# Patient Record
Sex: FEMALE | Race: ASIAN | NOT HISPANIC OR LATINO | Employment: OTHER | URBAN - METROPOLITAN AREA
[De-identification: names, ages, dates, MRNs, and addresses within clinical notes are randomized per-mention and may not be internally consistent; named-entity substitution may affect disease eponyms.]

---

## 2011-09-16 LAB — HM COLONOSCOPY: NEGATIVE

## 2017-01-02 RX ORDER — PRAVASTATIN SODIUM 20 MG/1
TABLET ORAL
Qty: 90 TABLET | Refills: 1 | Status: SHIPPED | OUTPATIENT
Start: 2017-01-02 | End: 2017-07-02 | Stop reason: SDUPTHER

## 2017-01-17 DIAGNOSIS — E11.69 COMBINED HYPERLIPIDEMIA ASSOCIATED WITH TYPE 2 DIABETES MELLITUS: ICD-10-CM

## 2017-01-17 DIAGNOSIS — E78.2 COMBINED HYPERLIPIDEMIA ASSOCIATED WITH TYPE 2 DIABETES MELLITUS: ICD-10-CM

## 2017-01-17 RX ORDER — METFORMIN HYDROCHLORIDE 500 MG/1
TABLET ORAL
Qty: 270 TABLET | Refills: 1 | Status: SHIPPED | OUTPATIENT
Start: 2017-01-17 | End: 2017-09-03 | Stop reason: SDUPTHER

## 2017-02-02 RX ORDER — OMEGA-3-ACID ETHYL ESTERS 1 G/1
2 CAPSULE, LIQUID FILLED ORAL 2 TIMES DAILY
Qty: 360 CAPSULE | Refills: 2 | Status: SHIPPED | OUTPATIENT
Start: 2017-02-02 | End: 2017-03-13

## 2017-02-02 NOTE — TELEPHONE ENCOUNTER
----- Message from Zoey Sanderson sent at 2/1/2017  3:37 PM CST -----  Contact: Self/[phone]  Refill:  omega-3 acid ethyl esters (LOVAZA) 1 gram capsule    Thank you.

## 2017-02-24 DIAGNOSIS — E11.9 TYPE 2 DIABETES MELLITUS WITHOUT COMPLICATION: ICD-10-CM

## 2017-03-13 ENCOUNTER — OFFICE VISIT (OUTPATIENT)
Dept: FAMILY MEDICINE | Facility: CLINIC | Age: 72
End: 2017-03-13
Payer: MEDICARE

## 2017-03-13 VITALS
RESPIRATION RATE: 16 BRPM | TEMPERATURE: 98 F | WEIGHT: 110.44 LBS | BODY MASS INDEX: 18.4 KG/M2 | HEART RATE: 60 BPM | DIASTOLIC BLOOD PRESSURE: 66 MMHG | SYSTOLIC BLOOD PRESSURE: 138 MMHG | OXYGEN SATURATION: 97 % | HEIGHT: 65 IN

## 2017-03-13 DIAGNOSIS — Z00.00 ROUTINE MEDICAL EXAM: Primary | ICD-10-CM

## 2017-03-13 DIAGNOSIS — I48.20 CHRONIC ATRIAL FIBRILLATION: Chronic | ICD-10-CM

## 2017-03-13 DIAGNOSIS — E11.29 TYPE 2 DIABETES MELLITUS WITH MICROALBUMINURIA, WITHOUT LONG-TERM CURRENT USE OF INSULIN: ICD-10-CM

## 2017-03-13 DIAGNOSIS — I70.0 ATHEROSCLEROSIS OF AORTA: Chronic | ICD-10-CM

## 2017-03-13 DIAGNOSIS — E03.9 ACQUIRED HYPOTHYROIDISM: Chronic | ICD-10-CM

## 2017-03-13 DIAGNOSIS — R80.9 TYPE 2 DIABETES MELLITUS WITH MICROALBUMINURIA, WITHOUT LONG-TERM CURRENT USE OF INSULIN: ICD-10-CM

## 2017-03-13 DIAGNOSIS — E11.3299 TYPE 2 DIABETES MELLITUS WITH MILD NONPROLIFERATIVE RETINOPATHY WITHOUT MACULAR EDEMA, WITHOUT LONG-TERM CURRENT USE OF INSULIN, UNSPECIFIED LATERALITY: ICD-10-CM

## 2017-03-13 DIAGNOSIS — E78.2 MIXED HYPERLIPIDEMIA: Chronic | ICD-10-CM

## 2017-03-13 DIAGNOSIS — I09.9 RHEUMATIC HEART DISEASE: Chronic | ICD-10-CM

## 2017-03-13 DIAGNOSIS — I10 ESSENTIAL (PRIMARY) HYPERTENSION: ICD-10-CM

## 2017-03-13 PROCEDURE — 3075F SYST BP GE 130 - 139MM HG: CPT | Mod: S$GLB,,, | Performed by: INTERNAL MEDICINE

## 2017-03-13 PROCEDURE — 99499 UNLISTED E&M SERVICE: CPT | Mod: S$GLB,,, | Performed by: INTERNAL MEDICINE

## 2017-03-13 PROCEDURE — 99397 PER PM REEVAL EST PAT 65+ YR: CPT | Mod: S$GLB,,, | Performed by: INTERNAL MEDICINE

## 2017-03-13 PROCEDURE — 3078F DIAST BP <80 MM HG: CPT | Mod: S$GLB,,, | Performed by: INTERNAL MEDICINE

## 2017-03-13 PROCEDURE — 99999 PR PBB SHADOW E&M-EST. PATIENT-LVL III: CPT | Mod: PBBFAC,,, | Performed by: INTERNAL MEDICINE

## 2017-03-13 RX ORDER — AMLODIPINE BESYLATE 5 MG/1
5 TABLET ORAL DAILY
Qty: 90 TABLET | Refills: 3 | Status: SHIPPED | OUTPATIENT
Start: 2017-03-13 | End: 2018-08-31 | Stop reason: SDUPTHER

## 2017-03-13 RX ORDER — LOSARTAN POTASSIUM 100 MG/1
100 TABLET ORAL DAILY
Qty: 90 TABLET | Refills: 3 | Status: SHIPPED | OUTPATIENT
Start: 2017-03-13 | End: 2018-04-19 | Stop reason: ALTCHOICE

## 2017-03-13 RX ORDER — METOPROLOL TARTRATE 25 MG/1
TABLET, FILM COATED ORAL
Qty: 180 TABLET | Refills: 3 | Status: SHIPPED | OUTPATIENT
Start: 2017-03-13 | End: 2018-06-08 | Stop reason: SDUPTHER

## 2017-03-13 RX ORDER — BLOOD-GLUCOSE CONTROL, NORMAL
EACH MISCELLANEOUS
COMMUNITY
Start: 2017-02-16 | End: 2021-01-01 | Stop reason: SDUPTHER

## 2017-03-13 RX ORDER — BLOOD GLUCOSE CONTROL HIGH,LOW
EACH MISCELLANEOUS
COMMUNITY
Start: 2016-12-29 | End: 2020-04-02 | Stop reason: SDUPTHER

## 2017-03-13 NOTE — ASSESSMENT & PLAN NOTE
Recheck labs.  On 1500mg/day.  Counseled on goal of A1c for her age and comorbidities.  The current medical regimen is effective;  continue present plan and medications.

## 2017-03-13 NOTE — PROGRESS NOTES
Patient, Orion Ty (MRN #[id number]), presented with a recent Platelet count less than 150 K/uL consistent with the definition of thrombocytopenia (ICD10 - D69.6).    Platelets   Date Value Ref Range Status   05/27/2016 98 (L) 150 - 350 K/uL Final     The patient's thrombocytopenia was monitored, evaluated, addressed and/or treated. This addendum to the medical record is made on 03/13/2017.

## 2017-03-13 NOTE — PROGRESS NOTES
Chief Complaint  Chief Complaint   Patient presents with    Annual Exam       HPI  Orion Ty is a 71 y.o. female with medical diagnoses as listed in the medical history and problem list that presents for routine physical.  Pt is known to me with her last appointment June 2016.  No acute complaints.  She is requesting to have her  translate when needed.       PAST MEDICAL HISTORY:  Past Medical History:   Diagnosis Date    Closed displaced fracture of distal phalanx of lesser toe 10/20/2015    Diabetes mellitus     Diabetes mellitus type I     Hypertension     Osteopenia     Pacemaker     Rheumatic heart disease        PAST SURGICAL HISTORY:  Past Surgical History:   Procedure Laterality Date    CARDIAC PACEMAKER PLACEMENT      CARDIAC VALVE REPLACEMENT      CARDIAC VALVE SURGERY      thryoid s         SOCIAL HISTORY:  Social History     Social History    Marital status:      Spouse name: N/A    Number of children: N/A    Years of education: N/A     Occupational History    Not on file.     Social History Main Topics    Smoking status: Never Smoker    Smokeless tobacco: Not on file    Alcohol use No    Drug use: Not on file    Sexual activity: Not on file     Other Topics Concern    Not on file     Social History Narrative       FAMILY HISTORY:  Family History   Problem Relation Age of Onset    Breast cancer Neg Hx     Colon cancer Neg Hx     Ovarian cancer Neg Hx        ALLERGIES AND MEDICATIONS: updated and reviewed.  Review of patient's allergies indicates:  No Known Allergies  Current Outpatient Prescriptions   Medication Sig Dispense Refill    ACCU-CHEK GATO CONTROL SOLN Soln       amlodipine (NORVASC) 5 MG tablet Take 1 tablet (5 mg total) by mouth once daily. 90 tablet 3    digoxin (DIGOX) 250 mcg tablet Take 1 tablet (0.25 mg total) by mouth once daily. 90 tablet 3    furosemide (LASIX) 40 MG tablet Take 1 tablet (40 mg total) by mouth daily as needed (leg  swelling). 30 tablet 11    lancets 30 gauge Misc       losartan (COZAAR) 100 MG tablet Take 1 tablet (100 mg total) by mouth once daily. 90 tablet 3    metformin (GLUCOPHAGE) 500 MG tablet TAKE 2 TABLETS BY MOUTH EVERY MORNING AND 1 TABLET BY MOUTH EVERY EVENING 270 tablet 1    metoprolol tartrate (LOPRESSOR) 25 MG tablet TAKE 1 TABLET(25 MG) BY MOUTH TWICE DAILY 180 tablet 3    pravastatin (PRAVACHOL) 20 MG tablet TAKE 1 TABLET BY MOUTH ONCE DAILY 90 tablet 1    TRUETEST TEST STRIPS Strp USE ONCE DAILY (Patient taking differently: twice daily) 100 strip 6    warfarin (COUMADIN) 4 MG tablet TAKE 1 TABLET BY MOUTH ON MONDAY AND FRIDAY AND ONE-HALF TABLET BY MOUTH ON ALL OTHER DAYS 57 tablet 3     No current facility-administered medications for this visit.          ROS  Review of Systems   Constitutional: Negative for chills, fever and unexpected weight change.   HENT: Negative for congestion, ear pain, hearing loss, rhinorrhea, sore throat and trouble swallowing.    Eyes: Negative for discharge, redness and visual disturbance.   Respiratory: Negative for cough, chest tightness, shortness of breath and wheezing.    Cardiovascular: Negative for chest pain, palpitations and leg swelling.   Gastrointestinal: Negative for abdominal pain, constipation, diarrhea, nausea and vomiting.   Endocrine: Negative for polydipsia, polyphagia and polyuria.   Genitourinary: Negative for decreased urine volume, dysuria and hematuria.   Musculoskeletal: Negative for arthralgias, joint swelling and myalgias.   Skin: Negative for color change and rash.   Neurological: Negative for dizziness, weakness, light-headedness and headaches.   Psychiatric/Behavioral: Negative for decreased concentration, dysphoric mood, sleep disturbance and suicidal ideas.           Physical Exam  Vitals:    03/13/17 1306   BP: 138/66   BP Location: Left arm   Patient Position: Sitting   BP Method: Manual   Pulse: 60   Resp: 16   Temp: 98 °F (36.7 °C)  "  [facility]   SpO2: 97%   Weight: 50.1 kg (110 lb 7.2 oz)   Height: 5' 5" (1.651 m)    Body mass index is 18.38 kg/(m^2).  Weight: 50.1 kg (110 lb 7.2 oz)   Height: 5' 5" (165.1 cm)   General appearance: alert, appears stated age, cooperative and no distress  Head: Normocephalic, without obvious abnormality, atraumatic  Eyes: PERRL EOMI conjunctiva clear  Ears: normal TM's and external ear canals both ears  Nose: Nares normal. Septum midline. Mucosa normal. No drainage or sinus tenderness.  Throat: lips, mucosa, and tongue normal; teeth and gums normal  Neck: no adenopathy, no carotid bruit, no JVD, supple, symmetrical, trachea midline and thyroid not enlarged, symmetric, no tenderness/mass/nodules  Back: symmetric, no curvature. ROM normal. No CVA tenderness.  Lungs: clear to auscultation bilaterally  Heart: regular rate and rhythm, no S3 or S4, systolic murmur: systolic ejection 3/6, harsh throughout the precordium, no click and no rub  Abdomen: soft, non-tender; bowel sounds normal; no masses,  no organomegaly  Extremities: extremities normal, atraumatic, no cyanosis or edema  Pulses: 2+ and symmetric  Neurologic: Mental status: Alert, oriented, thought content appropriate  Sensory: normal  Motor:grossly normal  Coordination: normal  Gait: Normal  Symmetric facial movements palate elevated symmetrically tongue midline     Health Maintenance       Date Due Completion Date    Pneumococcal (65+) (1 of 2 - PCV13) 7/21/2009 ---    Colonoscopy 1/1/2016 1/1/2006 (Done)    Override on 1/1/2006: Done (repeat in 10 years)    Hemoglobin A1c 11/30/2016 5/30/2016    Pap Smear 1/13/2017 1/13/2016    Lipid Panel 2/17/2017 2/17/2016    Mammogram 1/20/2017 1/20/2016    Foot Exam 5/30/2017 5/30/2016    Eye Exam 3/8/2018 3/8/2017 (Done)    Override on 3/8/2017: Done (Dr. Richmond Andrea/Optometry-positive for non-proliferative diabetic retinopathy)    DEXA SCAN 2/1/2019 2/1/2016    Override on 8/4/2014: Done    TETANUS VACCINE " 6/30/2026 6/30/2016 (Declined)    Override on 6/30/2016: Declined            Assessment & Plan  Problem List Items Addressed This Visit        Cardiac    Atrial fibrillation (Chronic)    Overview     Followed by Dr. Boateng         Current Assessment & Plan     Regular rhythm today.  Monitor         Relevant Medications    metoprolol tartrate (LOPRESSOR) 25 MG tablet    Other Relevant Orders    CBC auto differential    Rheumatic heart disease (Chronic)    Current Assessment & Plan     Stable, asymptomatic chronic condition.  Will continue to maximize risk factor reduction and adjust medication as needed.          Atherosclerosis of aorta (Chronic)    Overview     Seen on Echo x2  2014         Current Assessment & Plan     Stable, asymptomatic chronic condition.  Will continue to maximize risk factor reduction and adjust medication as needed.          Essential (primary) hypertension (Chronic)    Current Assessment & Plan     The current medical regimen is effective;  continue present plan and medications.          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  On 1500mg/day.  Counseled on goal of A1c for her age and comorbidities.  The current medical regimen is effective;  continue present plan and medications.          Relevant Orders    Hemoglobin A1c       Endocrine    Hypothyroidism (Chronic)    Current Assessment & Plan     Recheck labs.  Continue replacement.          Relevant Orders    TSH    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    Current Assessment & Plan     See above            Fluids/Electrolytes/Nutrition/GI    Mixed hyperlipidemia (Chronic)    Current Assessment & Plan     Recheck labs.  Continue statin.          Relevant Orders    Lipid panel    Comprehensive metabolic  panel      Other Visit Diagnoses     Routine medical exam    -  Primary            Health Maintenance reviewed, [id number] from pharmacy due to insurance coverage.    Follow-up: Return in about 6 months (around 9/13/2017) for follow up for chronic conditions.

## 2017-03-13 NOTE — MR AVS SNAPSHOT
[facility]  [address]  [place] [address]  Phone: [phone]  Fax: [phone]                  Orion Ty   3/13/2017 1:00 PM   Office Visit    Description:  Female : 1945   Provider:  Otis Zimmer MD   Department:  [facility]           Reason for Visit     Annual Exam           Diagnoses this Visit        Comments    Routine medical exam    -  Primary     Acquired hypothyroidism         Type 2 diabetes mellitus with microalbuminuria, without long-term current use of insulin         Mixed hyperlipidemia         Chronic atrial fibrillation         Atherosclerosis of aorta         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 without macular edema, without long-term current use of insulin, unspecified laterality         Combined hyperlipidemia associated with type 2 diabetes mellitus         Need for vaccination for pneumococcus         Essential (primary) hypertension                To Do List           Future Appointments        Provider Department Dept Phone    3/14/2017 11:30 AM [facility] [phone]      Goals (5 Years of Data)     None      Follow-Up and Disposition     Return in about 6 months (around 2017) for follow up for chronic conditions.       These Medications        Disp Refills Start End    losartan (COZAAR) 100 MG tablet 90 tablet 3 3/13/2017     Take 1 tablet (100 mg total) by mouth once daily. - Oral    Pharmacy: [facility] [address] [place] [address] [address] [place] [address] AT [facility] #: [phone]       amlodipine (NORVASC) 5 MG tablet 90 tablet 3 3/13/2017     Take 1 tablet (5 mg total) by mouth once daily. - Oral    Pharmacy: [facility] [address]  [facility] AT [facility] #: [phone]       Notes to Pharmacy: **Patient requests 90 days supply**    metoprolol tartrate (LOPRESSOR) 25 MG tablet 180 tablet 3  3/13/2017     TAKE 1 TABLET(25 MG) BY MOUTH TWICE DAILY    Pharmacy: [facility] [id number]  [address] [facility] AT [facility] #: [phone]       Notes to Pharmacy: **Patient requests 90 days supply**      [facility]     [facility] Care [facility] - 24/7 Assistance  Registered nurses in the [facility] Call Center provide clinical advisement, health education, appointment booking, and other advisory services.  Call for this free service at [phone].             Medications           Message regarding Medications     Verify the changes and/or additions to your medication regime listed below are the same as discussed with your clinician today.  If any of these changes or additions are incorrect, please notify your healthcare provider.        CHANGE how you are taking these medications     Start Taking Instead of    losartan (COZAAR) 100 MG tablet losartan (COZAAR) 50 MG tablet    Dosage:  Take 1 tablet (100 mg total) by mouth once daily. Dosage:  Take 1 tablet (50 mg total) by mouth once daily.    Reason for Change:  Reorder     amlodipine (NORVASC) 5 MG tablet amlodipine (NORVASC) 5 MG tablet    Dosage:  Take 1 tablet (5 mg total) by mouth once daily. Dosage:  TAKE 1 TABLET BY MOUTH DAILY    Reason for Change:  Reorder       STOP taking these medications     omega-3 acid ethyl esters (LOVAZA) 1 gram capsule Take 2 capsules (2 g total) by mouth 2 (two) times daily.           Verify that the below list of medications is an accurate representation of the medications you are currently taking.  If none reported, the list may be blank. If incorrect, please contact your healthcare provider. Carry this list with you in case of emergency.           Current Medications     ACCU-CHEK GATO CONTROL SOLN Soln     amlodipine (NORVASC) 5 MG tablet Take 1 tablet (5 mg total) by mouth once daily.    digoxin (DIGOX) 250 mcg tablet Take 1 tablet (0.25 mg total) by mouth once daily.     "furosemide (LASIX) 40 MG tablet Take 1 tablet (40 mg total) by mouth daily as needed (leg swelling).    lancets 30 gauge Misc     losartan (COZAAR) 100 MG tablet Take 1 tablet (100 mg total) by mouth once daily.    metformin (GLUCOPHAGE) 500 MG tablet TAKE 2 TABLETS BY MOUTH EVERY MORNING AND 1 TABLET BY MOUTH EVERY EVENING    metoprolol tartrate (LOPRESSOR) 25 MG tablet TAKE 1 TABLET(25 MG) BY MOUTH TWICE DAILY    pravastatin (PRAVACHOL) 20 MG tablet TAKE 1 TABLET BY MOUTH ONCE DAILY    TRUETEST TEST STRIPS Strp USE ONCE DAILY    warfarin (COUMADIN) 4 MG tablet TAKE 1 TABLET BY MOUTH ON MONDAY AND FRIDAY AND ONE-HALF TABLET BY MOUTH ON ALL OTHER DAYS           Clinical Reference Information           Your Vitals Were     BP Pulse Temp Resp Height Weight    138/66 (BP Location: Left arm, Patient Position: Sitting, BP Method: Manual) 60 98 °F (36.7 °C) (Oral) 16 5' 5" (1.651 m) 50.1 kg (110 lb 7.2 oz)    SpO2 BMI             97% 18.38 kg/m2         Blood Pressure          Most Recent Value    BP  138/66      Allergies as of 3/13/2017     No Known Allergies      Immunizations Administered on Date of Encounter - 3/13/2017     None      Orders Placed During Today's Visit     Future Labs/Procedures Expected by Expires    CBC auto differential  3/13/2017 3/13/2018    Comprehensive metabolic panel  3/13/2017 3/13/2018    Hemoglobin A1c  3/13/2017 5/12/2018    Lipid panel  3/13/2017 3/13/2018    TSH  3/13/2017 3/13/2018      Instructions    Check about the colonoscopy.      It appears that you need the Prevnar 13 vaccine       Language Assistance Services     ATTENTION: Language assistance services are available, free of charge. Please call [phone].      ATENCIÓN: Si habla markañol, tiene a vaca disposición servicios gratuitos de asistencia lingüística. Llame al [phone].     CHÚ Ý: N?u b?n nói Ti?ng Vi?t, có các d?ch v? h? tr? ngôn ng? mi?n phí dành cho b?n. G?i s? [phone].         [facility] complies with applicable Federal civil rights laws and does not discriminate on the basis of race, color, national origin, age, disability, or sex.

## 2017-03-13 NOTE — ASSESSMENT & PLAN NOTE
Stable, asymptomatic chronic condition.  Will continue to maximize risk factor reduction and adjust medication as needed.

## 2017-03-14 ENCOUNTER — LAB VISIT (OUTPATIENT)
Dept: LAB | Facility: HOSPITAL | Age: 72
End: 2017-03-14
Attending: INTERNAL MEDICINE
Payer: MEDICARE

## 2017-03-14 DIAGNOSIS — E11.29 TYPE 2 DIABETES MELLITUS WITH MICROALBUMINURIA, WITHOUT LONG-TERM CURRENT USE OF INSULIN: ICD-10-CM

## 2017-03-14 DIAGNOSIS — I48.20 CHRONIC ATRIAL FIBRILLATION: Chronic | ICD-10-CM

## 2017-03-14 DIAGNOSIS — E03.9 ACQUIRED HYPOTHYROIDISM: Chronic | ICD-10-CM

## 2017-03-14 DIAGNOSIS — E78.2 MIXED HYPERLIPIDEMIA: Chronic | ICD-10-CM

## 2017-03-14 DIAGNOSIS — R80.9 TYPE 2 DIABETES MELLITUS WITH MICROALBUMINURIA, WITHOUT LONG-TERM CURRENT USE OF INSULIN: ICD-10-CM

## 2017-03-14 DIAGNOSIS — E11.9 TYPE 2 DIABETES MELLITUS WITHOUT COMPLICATION: ICD-10-CM

## 2017-03-14 LAB
ALBUMIN SERPL BCP-MCNC: 4.2 G/DL
ALP SERPL-CCNC: 44 U/L
ALT SERPL W/O P-5'-P-CCNC: 23 U/L
ANION GAP SERPL CALC-SCNC: 9 MMOL/L
AST SERPL-CCNC: 45 U/L
BASOPHILS # BLD AUTO: 0.05 K/UL
BASOPHILS NFR BLD: 0.7 %
BILIRUB SERPL-MCNC: 0.7 MG/DL
BUN SERPL-MCNC: 12 MG/DL
CALCIUM SERPL-MCNC: 10 MG/DL
CHLORIDE SERPL-SCNC: 104 MMOL/L
CHOLEST/HDLC SERPL: 4.6 {RATIO}
CHOLEST/HDLC SERPL: 4.6 {RATIO}
CO2 SERPL-SCNC: 27 MMOL/L
CREAT SERPL-MCNC: 0.8 MG/DL
DIFFERENTIAL METHOD: ABNORMAL
EOSINOPHIL # BLD AUTO: 0.1 K/UL
EOSINOPHIL NFR BLD: 1.9 %
ERYTHROCYTE [DISTWIDTH] IN BLOOD BY AUTOMATED COUNT: 14.6 %
EST. GFR  (AFRICAN AMERICAN): >60 ML/MIN/1.73 M^2
EST. GFR  (NON AFRICAN AMERICAN): >60 ML/MIN/1.73 M^2
GLUCOSE SERPL-MCNC: 149 MG/DL
HCT VFR BLD AUTO: 38.6 %
HDL/CHOLESTEROL RATIO: 21.6 %
HDL/CHOLESTEROL RATIO: 21.6 %
HDLC SERPL-MCNC: 139 MG/DL
HDLC SERPL-MCNC: 139 MG/DL
HDLC SERPL-MCNC: 30 MG/DL
HDLC SERPL-MCNC: 30 MG/DL
HGB BLD-MCNC: 12.2 G/DL
LDLC SERPL CALC-MCNC: 55.2 MG/DL
LDLC SERPL CALC-MCNC: 55.2 MG/DL
LYMPHOCYTES # BLD AUTO: 2.2 K/UL
LYMPHOCYTES NFR BLD: 32.2 %
MCH RBC QN AUTO: 30.3 PG
MCHC RBC AUTO-ENTMCNC: 31.6 %
MCV RBC AUTO: 96 FL
MONOCYTES # BLD AUTO: 0.4 K/UL
MONOCYTES NFR BLD: 5.3 %
NEUTROPHILS # BLD AUTO: 4.1 K/UL
NEUTROPHILS NFR BLD: 59.8 %
NONHDLC SERPL-MCNC: 109 MG/DL
NONHDLC SERPL-MCNC: 109 MG/DL
PLATELET # BLD AUTO: 91 K/UL
PMV BLD AUTO: 11.6 FL
POTASSIUM SERPL-SCNC: 4.5 MMOL/L
PROT SERPL-MCNC: 7.3 G/DL
RBC # BLD AUTO: 4.03 M/UL
SODIUM SERPL-SCNC: 140 MMOL/L
T4 FREE SERPL-MCNC: 1.03 NG/DL
TRIGL SERPL-MCNC: 269 MG/DL
TRIGL SERPL-MCNC: 269 MG/DL
TSH SERPL DL<=0.005 MIU/L-ACNC: 6.91 UIU/ML
WBC # BLD AUTO: 6.84 K/UL

## 2017-03-14 PROCEDURE — 36415 COLL VENOUS BLD VENIPUNCTURE: CPT | Mod: PO

## 2017-03-14 PROCEDURE — 83036 HEMOGLOBIN GLYCOSYLATED A1C: CPT

## 2017-03-14 PROCEDURE — 84443 ASSAY THYROID STIM HORMONE: CPT

## 2017-03-14 PROCEDURE — 85025 COMPLETE CBC W/AUTO DIFF WBC: CPT

## 2017-03-14 PROCEDURE — 80053 COMPREHEN METABOLIC PANEL: CPT

## 2017-03-14 PROCEDURE — 84439 ASSAY OF FREE THYROXINE: CPT

## 2017-03-14 PROCEDURE — 80061 LIPID PANEL: CPT

## 2017-03-16 ENCOUNTER — PATIENT MESSAGE (OUTPATIENT)
Dept: FAMILY MEDICINE | Facility: CLINIC | Age: 72
End: 2017-03-16

## 2017-03-16 DIAGNOSIS — E03.8 SUBCLINICAL HYPOTHYROIDISM: Primary | ICD-10-CM

## 2017-03-16 LAB
ESTIMATED AVG GLUCOSE: 143 MG/DL
HBA1C MFR BLD HPLC: 6.6 %

## 2017-03-16 NOTE — TELEPHONE ENCOUNTER
Subclinical hypothyroid.  Recheck labs in 3 months.  If TSH continues to increase closer to 10, will plan to start levothyroxine.    Otis Zimmer

## 2017-04-07 ENCOUNTER — OFFICE VISIT (OUTPATIENT)
Dept: FAMILY MEDICINE | Facility: CLINIC | Age: 72
End: 2017-04-07
Payer: MEDICARE

## 2017-04-07 VITALS
BODY MASS INDEX: 18.4 KG/M2 | SYSTOLIC BLOOD PRESSURE: 134 MMHG | DIASTOLIC BLOOD PRESSURE: 70 MMHG | OXYGEN SATURATION: 97 % | TEMPERATURE: 98 F | WEIGHT: 110.44 LBS | HEART RATE: 60 BPM | HEIGHT: 65 IN

## 2017-04-07 DIAGNOSIS — H90.42 SENSORINEURAL HEARING LOSS (SNHL) OF LEFT EAR WITH UNRESTRICTED HEARING OF RIGHT EAR: Primary | ICD-10-CM

## 2017-04-07 PROCEDURE — 1126F AMNT PAIN NOTED NONE PRSNT: CPT | Mod: S$GLB,,, | Performed by: INTERNAL MEDICINE

## 2017-04-07 PROCEDURE — 1159F MED LIST DOCD IN RCRD: CPT | Mod: S$GLB,,, | Performed by: INTERNAL MEDICINE

## 2017-04-07 PROCEDURE — 99499 UNLISTED E&M SERVICE: CPT | Mod: S$GLB,,, | Performed by: INTERNAL MEDICINE

## 2017-04-07 PROCEDURE — 99999 PR PBB SHADOW E&M-EST. PATIENT-LVL III: CPT | Mod: PBBFAC,,, | Performed by: INTERNAL MEDICINE

## 2017-04-07 PROCEDURE — 1157F ADVNC CARE PLAN IN RCRD: CPT | Mod: S$GLB,,, | Performed by: INTERNAL MEDICINE

## 2017-04-07 PROCEDURE — 1160F RVW MEDS BY RX/DR IN RCRD: CPT | Mod: S$GLB,,, | Performed by: INTERNAL MEDICINE

## 2017-04-07 PROCEDURE — 99214 OFFICE O/P EST MOD 30 MIN: CPT | Mod: S$GLB,,, | Performed by: INTERNAL MEDICINE

## 2017-04-07 PROCEDURE — 3078F DIAST BP <80 MM HG: CPT | Mod: S$GLB,,, | Performed by: INTERNAL MEDICINE

## 2017-04-07 PROCEDURE — 3075F SYST BP GE 130 - 139MM HG: CPT | Mod: S$GLB,,, | Performed by: INTERNAL MEDICINE

## 2017-04-07 NOTE — MR AVS SNAPSHOT
[facility]  [address]  [place] [address]  Phone: [phone]  Fax: [phone]                  Orion Ty   2017 2:20 PM   Office Visit    Description:  Female : 1945   Provider:  Otis Zimmer MD   Department:  [facility]           Reason for Visit     Otalgia           Diagnoses this Visit        Comments    Sensorineural hearing loss (SNHL) of left ear with unrestricted hearing of right ear    -  Primary            To Do List           Future Appointments        Provider Department Dept Phone    5/15/2017 1:30 PM Sergo Boateng MD [facility] [phone]      Goals (5 Years of Data)     None      Follow-Up and Disposition     Return for pending findings from Dr. Moser.      [facility] On Call     [facility] On Call Nurse Care Line -  Assistance  Unless otherwise directed by your provider, please contact [facility], our nurse care line that is available for  assistance.     Registered nurses in the [facility] Center provide: appointment scheduling, clinical advisement, health education, and other advisory services.  Call: [phone] (toll free)               Medications           Message regarding Medications     Verify the changes and/or additions to your medication regime listed below are the same as discussed with your clinician today.  If any of these changes or additions are incorrect, please notify your healthcare provider.             Verify that the below list of medications is an accurate representation of the medications you are currently taking.  If none reported, the list may be blank. If incorrect, please contact your healthcare provider. Carry this list with you in case of emergency.           Current Medications     ACCU-CHEK GATO CONTROL SOLN Soln     amlodipine (NORVASC) 5 MG tablet Take 1 tablet (5 mg total) by mouth once daily.    digoxin (DIGOX) 250 mcg tablet Take 1 tablet (0.25 mg total) by  "mouth once daily.    furosemide (LASIX) 40 MG tablet Take 1 tablet (40 mg total) by mouth daily as needed (leg swelling).    lancets 30 gauge Misc     losartan (COZAAR) 100 MG tablet Take 1 tablet (100 mg total) by mouth once daily.    metformin (GLUCOPHAGE) 500 MG tablet TAKE 2 TABLETS BY MOUTH EVERY MORNING AND 1 TABLET BY MOUTH EVERY EVENING    metoprolol tartrate (LOPRESSOR) 25 MG tablet TAKE 1 TABLET(25 MG) BY MOUTH TWICE DAILY    pravastatin (PRAVACHOL) 20 MG tablet TAKE 1 TABLET BY MOUTH ONCE DAILY    TRUETEST TEST STRIPS Strp USE ONCE DAILY    warfarin (COUMADIN) 4 MG tablet TAKE 1 TABLET BY MOUTH ON MONDAY AND FRIDAY AND ONE-HALF TABLET BY MOUTH ON ALL OTHER DAYS           Clinical Reference Information           Your Vitals Were     BP Pulse Temp Height Weight SpO2    134/70 (BP Location: Left arm, Patient Position: Sitting, BP Method: Manual) 60 97.9 °F (36.6 °C) (Oral) 5' 5" (1.651 m) 50.1 kg (110 lb 7.2 oz) 97%    BMI                18.38 kg/m2          Blood Pressure          Most Recent Value    BP  134/70      Allergies as of 4/7/2017     No Known Allergies      Immunizations Administered on Date of Encounter - 4/7/2017     None      Instructions    Normal TM appearance.      Olivo localizes to the right.  Unable to hear bone or air conduction on the left only with 256 Hz tuning fork.      Normal neuro exam otherwise today.        Language Assistance Services     ATTENTION: Language assistance services are available, free of charge. Please call [phone].      ATENCIÓN: Si habla español, tiene a vaca disposición servicios gratuitos de asistencia lingüística. Llame al [phone].     [facility] Ý: N?u b?n nói Ti?ng Vi?t, có các d?ch v? h? tr? ngôn ng? mi?n phí dành cho b?n. G?i s? [phone].         [facility] complies with applicable Federal civil rights laws and does not discriminate on the basis of race, color, national origin, age, disability, or sex.        "

## 2017-04-07 NOTE — PATIENT INSTRUCTIONS
Normal TM appearance.      Olivo localizes to the right.  Unable to hear bone or air conduction on the left only with 256 Hz tuning fork.      Normal neuro exam otherwise today.

## 2017-04-07 NOTE — PROGRESS NOTES
Chief Complaint  Chief Complaint   Patient presents with    Otalgia     Left       HPI  Orion Ty is a 71 y.o. female with medical diagnoses as listed in the medical history and problem list that presents for left ear pain for a day.  Pt is known to me with her last appointment 3/13/2017.  Actual pain episode very mild.  Had sudden onset of hearing loss on the left only.  Can't hear telephone.  No congestion runny nose sore throat HA.  Denies weakness, numbness, facial droop, slurred speech vision change.  She is on coumadin.       PAST MEDICAL HISTORY:  Past Medical History:   Diagnosis Date    Closed displaced fracture of distal phalanx of lesser toe 10/20/2015    Diabetes mellitus     Diabetes mellitus type I     Hypertension     Osteopenia     Pacemaker     Rheumatic heart disease        PAST SURGICAL HISTORY:  Past Surgical History:   Procedure Laterality Date    CARDIAC PACEMAKER PLACEMENT      CARDIAC VALVE REPLACEMENT      CARDIAC VALVE SURGERY      thryoid s         SOCIAL HISTORY:  Social History     Social History    Marital status:      Spouse name: N/A    Number of children: N/A    Years of education: N/A     Occupational History    Not on file.     Social History Main Topics    Smoking status: Never Smoker    Smokeless tobacco: Not on file    Alcohol use No    Drug use: Not on file    Sexual activity: Not on file     Other Topics Concern    Not on file     Social History Narrative    No narrative on file       FAMILY HISTORY:  Family History   Problem Relation Age of Onset    Breast cancer Neg Hx     Colon cancer Neg Hx     Ovarian cancer Neg Hx        ALLERGIES AND MEDICATIONS: updated and reviewed.  Review of patient's allergies indicates:  No Known Allergies  Current Outpatient Prescriptions   Medication Sig Dispense Refill    ACCU-CHEK GATO CONTROL SOLN Soln       amlodipine (NORVASC) 5 MG tablet Take 1 tablet (5 mg total) by mouth once daily. 90 tablet 3     digoxin (DIGOX) 250 mcg tablet Take 1 tablet (0.25 mg total) by mouth once daily. 90 tablet 3    furosemide (LASIX) 40 MG tablet Take 1 tablet (40 mg total) by mouth daily as needed (leg swelling). 30 tablet 11    lancets 30 gauge Misc       losartan (COZAAR) 100 MG tablet Take 1 tablet (100 mg total) by mouth once daily. 90 tablet 3    metformin (GLUCOPHAGE) 500 MG tablet TAKE 2 TABLETS BY MOUTH EVERY MORNING AND 1 TABLET BY MOUTH EVERY EVENING 270 tablet 1    metoprolol tartrate (LOPRESSOR) 25 MG tablet TAKE 1 TABLET(25 MG) BY MOUTH TWICE DAILY 180 tablet 3    pravastatin (PRAVACHOL) 20 MG tablet TAKE 1 TABLET BY MOUTH ONCE DAILY 90 tablet 1    TRUETEST TEST STRIPS Strp USE ONCE DAILY (Patient taking differently: twice daily) 100 strip 6    warfarin (COUMADIN) 4 MG tablet TAKE 1 TABLET BY MOUTH ON MONDAY AND FRIDAY AND ONE-HALF TABLET BY MOUTH ON ALL OTHER DAYS 57 tablet 3     No current facility-administered medications for this visit.          ROS  Review of Systems   Constitutional: Negative for chills, fever and unexpected weight change.   HENT: Positive for hearing loss. Negative for congestion, ear pain, rhinorrhea, sore throat and trouble swallowing.    Eyes: Negative for discharge, redness and visual disturbance.   Respiratory: Negative for cough, chest tightness, shortness of breath and wheezing.    Cardiovascular: Negative for chest pain, palpitations and leg swelling.   Gastrointestinal: Negative for abdominal pain, constipation, diarrhea, nausea and vomiting.   Genitourinary: Negative for decreased urine volume, dysuria and hematuria.   Musculoskeletal: Negative for arthralgias, joint swelling and myalgias.   Skin: Negative for color change and rash.   Neurological: Negative for dizziness, weakness, light-headedness and headaches.           Physical Exam  Vitals:    04/07/17 1447   BP: 134/70   BP Location: Left arm   Patient Position: Sitting   BP Method: Manual   Pulse: 60   Temp: 97.9 °F  "(36.6 °C)   TempSrc: Oral   SpO2: 97%   Weight: 50.1 kg (110 lb 7.2 oz)   Height: 5' 5" (1.651 m)    Body mass index is 18.38 kg/(m^2).  Weight: 50.1 kg (110 lb 7.2 oz)   Height: 5' 5" (165.1 cm)   General appearance: alert, appears stated age, cooperative and no distress  Head: Normocephalic, without obvious abnormality, atraumatic  Eyes: PERRL EOMI conjunctiva clear  Ears: normal TM's and external ear canals both ears and Olivo with 256Hz tuning fork localizes to the right.  Pt denies being able to her in left ear with bone conduction from the mastoid or air conduction infront of the left ear.  Cannot hear snapped finger on the left.  Nose: Nares normal. Septum midline. Mucosa normal. No drainage or sinus tenderness.  Throat: lips, mucosa, and tongue normal; teeth and gums normal  Neck: no adenopathy, no carotid bruit, no JVD, supple, symmetrical, trachea midline and thyroid not enlarged, symmetric, no tenderness/mass/nodules  Lungs: clear to auscultation bilaterally  Heart: regular rate and rhythm, no S3 or S4, no click and no rub  Extremities: extremities normal, atraumatic, no cyanosis or edema  Pulses: 2+ and symmetric  Neurologic: Grossly normal      Health Maintenance       Date Due Completion Date    Pneumococcal (65+) (1 of 2 - PCV13) 7/21/2009 ---    Foot Exam 5/30/2017 5/30/2016    Hemoglobin A1c 9/14/2017 3/14/2017    Eye Exam 3/8/2018 3/8/2017 (Done)    Override on 3/8/2017: Done (Dr. Richmond Andrea/Optometry-positive for non-proliferative diabetic retinopathy)    Lipid Panel 3/14/2018 3/14/2017    Pap Smear 3/16/2018 3/16/2017    Mammogram 3/27/2018 3/27/2017    DEXA SCAN 2/1/2019 2/1/2016    Override on 8/4/2014: Done    Colonoscopy 1/1/2020 1/1/2010 (Done)    Override on 1/1/2010: Done    Override on 1/1/2006: Done (repeat in 10 years)    TETANUS VACCINE 6/30/2026 6/30/2016 (Declined)    Override on 6/30/2016: Declined            Assessment & Plan  Problem List Items Addressed This Visit     None    "   Visit Diagnoses     Sensorineural hearing loss (SNHL) of left ear with unrestricted hearing of right ear    -  Primary  -  Already has ENT appt for this Monday. Unclear etiology.  No findings that were consistent with stroke.  Will monitor ENT evaluation            Health Maintenance reviewed, deferred.    Follow-up: Return for pending findings from Dr. Moser.

## 2017-04-20 ENCOUNTER — HOSPITAL ENCOUNTER (OUTPATIENT)
Dept: RADIOLOGY | Facility: HOSPITAL | Age: 72
Discharge: HOME | End: 2017-04-20
Attending: OTOLARYNGOLOGY
Payer: MEDICARE

## 2017-04-20 DIAGNOSIS — H90.5 RIGHT-SIDED SENSORINEURAL HEARING LOSS: ICD-10-CM

## 2017-04-20 DIAGNOSIS — H91.22 SUDDEN LEFT HEARING LOSS: ICD-10-CM

## 2017-04-22 RX ORDER — LOSARTAN POTASSIUM 50 MG/1
TABLET ORAL
Qty: 90 TABLET | Refills: 0 | Status: SHIPPED | OUTPATIENT
Start: 2017-04-22 | End: 2018-04-19 | Stop reason: SDUPTHER

## 2017-04-27 ENCOUNTER — HOSPITAL ENCOUNTER (OUTPATIENT)
Dept: RADIOLOGY | Facility: HOSPITAL | Age: 72
Discharge: HOME | End: 2017-04-27
Attending: OTOLARYNGOLOGY
Payer: MEDICARE

## 2017-04-27 DIAGNOSIS — H91.22 HEARING LOSS, SUDDEN, LEFT: ICD-10-CM

## 2017-04-27 DIAGNOSIS — H90.5 SENSORINEURAL HEARING LOSS OF LEFT EAR: ICD-10-CM

## 2017-04-27 DIAGNOSIS — H91.22 SUDDEN LEFT HEARING LOSS: ICD-10-CM

## 2017-04-27 PROCEDURE — 70481 CT ORBIT/EAR/FOSSA W/DYE: CPT | Mod: TC

## 2017-04-27 PROCEDURE — 25500020 PHARM REV CODE 255: Performed by: OTOLARYNGOLOGY

## 2017-04-27 PROCEDURE — 70481 CT ORBIT/EAR/FOSSA W/DYE: CPT | Mod: 26,,, | Performed by: RADIOLOGY

## 2017-04-27 RX ADMIN — IOHEXOL 75 ML: 350 INJECTION, SOLUTION INTRAVENOUS at 11:04

## 2017-04-30 DIAGNOSIS — Z95.2 HEART VALVE REPLACED: ICD-10-CM

## 2017-04-30 DIAGNOSIS — I48.91 ATRIAL FIBRILLATION: ICD-10-CM

## 2017-05-01 DIAGNOSIS — Z95.2 HEART VALVE REPLACED: ICD-10-CM

## 2017-05-01 DIAGNOSIS — I48.91 ATRIAL FIBRILLATION: ICD-10-CM

## 2017-05-01 RX ORDER — WARFARIN 4 MG/1
TABLET ORAL
Qty: 90 TABLET | Refills: 0 | Status: SHIPPED | OUTPATIENT
Start: 2017-05-01 | End: 2017-05-01 | Stop reason: SDUPTHER

## 2017-05-01 RX ORDER — WARFARIN 4 MG/1
TABLET ORAL
Qty: 135 TABLET | Refills: 0 | Status: SHIPPED | OUTPATIENT
Start: 2017-05-01 | End: 2019-02-15 | Stop reason: SDUPTHER

## 2017-05-07 RX ORDER — AMLODIPINE BESYLATE 5 MG/1
TABLET ORAL
Qty: 90 TABLET | Refills: 0 | Status: SHIPPED | OUTPATIENT
Start: 2017-05-07 | End: 2018-04-19 | Stop reason: ALTCHOICE

## 2017-05-15 ENCOUNTER — OFFICE VISIT (OUTPATIENT)
Dept: CARDIOLOGY | Facility: CLINIC | Age: 72
End: 2017-05-15
Payer: MEDICARE

## 2017-05-15 VITALS
OXYGEN SATURATION: 96 % | BODY MASS INDEX: 21.65 KG/M2 | SYSTOLIC BLOOD PRESSURE: 138 MMHG | WEIGHT: 110.25 LBS | DIASTOLIC BLOOD PRESSURE: 64 MMHG | HEIGHT: 60 IN | HEART RATE: 61 BPM

## 2017-05-15 DIAGNOSIS — Z95.2 S/P MVR (MITRAL VALVE REPLACEMENT): Primary | ICD-10-CM

## 2017-05-15 DIAGNOSIS — I48.20 CHRONIC ATRIAL FIBRILLATION: Chronic | ICD-10-CM

## 2017-05-15 DIAGNOSIS — Z95.2 S/P AVR: ICD-10-CM

## 2017-05-15 DIAGNOSIS — E11.29 TYPE 2 DIABETES MELLITUS WITH MICROALBUMINURIA, WITHOUT LONG-TERM CURRENT USE OF INSULIN: Chronic | ICD-10-CM

## 2017-05-15 DIAGNOSIS — I10 ESSENTIAL (PRIMARY) HYPERTENSION: Chronic | ICD-10-CM

## 2017-05-15 DIAGNOSIS — Q75.2 HYPERTELORISM: ICD-10-CM

## 2017-05-15 DIAGNOSIS — R80.9 TYPE 2 DIABETES MELLITUS WITH MICROALBUMINURIA, WITHOUT LONG-TERM CURRENT USE OF INSULIN: Chronic | ICD-10-CM

## 2017-05-15 DIAGNOSIS — Z95.0 CARDIAC PACEMAKER IN SITU: ICD-10-CM

## 2017-05-15 DIAGNOSIS — E78.2 MIXED HYPERLIPIDEMIA: Chronic | ICD-10-CM

## 2017-05-15 PROCEDURE — 99499 UNLISTED E&M SERVICE: CPT | Mod: S$GLB,,, | Performed by: INTERNAL MEDICINE

## 2017-05-15 PROCEDURE — 3044F HG A1C LEVEL LT 7.0%: CPT | Mod: S$GLB,,, | Performed by: INTERNAL MEDICINE

## 2017-05-15 PROCEDURE — 4010F ACE/ARB THERAPY RXD/TAKEN: CPT | Mod: S$GLB,,, | Performed by: INTERNAL MEDICINE

## 2017-05-15 PROCEDURE — 99999 PR PBB SHADOW E&M-EST. PATIENT-LVL III: CPT | Mod: PBBFAC,,, | Performed by: INTERNAL MEDICINE

## 2017-05-15 PROCEDURE — 1160F RVW MEDS BY RX/DR IN RCRD: CPT | Mod: S$GLB,,, | Performed by: INTERNAL MEDICINE

## 2017-05-15 PROCEDURE — 1126F AMNT PAIN NOTED NONE PRSNT: CPT | Mod: S$GLB,,, | Performed by: INTERNAL MEDICINE

## 2017-05-15 PROCEDURE — 3075F SYST BP GE 130 - 139MM HG: CPT | Mod: S$GLB,,, | Performed by: INTERNAL MEDICINE

## 2017-05-15 PROCEDURE — 99214 OFFICE O/P EST MOD 30 MIN: CPT | Mod: S$GLB,,, | Performed by: INTERNAL MEDICINE

## 2017-05-15 PROCEDURE — 1159F MED LIST DOCD IN RCRD: CPT | Mod: S$GLB,,, | Performed by: INTERNAL MEDICINE

## 2017-05-15 PROCEDURE — 3078F DIAST BP <80 MM HG: CPT | Mod: S$GLB,,, | Performed by: INTERNAL MEDICINE

## 2017-05-15 PROCEDURE — 93000 ELECTROCARDIOGRAM COMPLETE: CPT | Mod: S$GLB,,, | Performed by: INTERNAL MEDICINE

## 2017-05-15 NOTE — MR AVS SNAPSHOT
[facility]  [address] [facility] [place] [address]  Phone: [phone]                  Orion Ty   5/15/2017 1:30 PM   Office Visit    Description:  Female : 1945   Provider:  Sergo Boateng MD   Department:  [facility]           Reason for Visit     Atrial Fibrillation                To Do List           Goals (5 Years of Data)     None      [facility]     [facility] Nurse Care Line -  Assistance  Unless otherwise directed by your provider, please contact [facility], our nurse care line that is available for  assistance.     Registered nurses in the [facility] provide: appointment scheduling, clinical advisement, health education, and other advisory services.  Call: [phone] (toll free)               Medications           Message regarding Medications     Verify the changes and/or additions to your medication regime listed below are the same as discussed with your clinician today.  If any of these changes or additions are incorrect, please notify your healthcare provider.             Verify that the below list of medications is an accurate representation of the medications you are currently taking.  If none reported, the list may be blank. If incorrect, please contact your healthcare provider. Carry this list with you in case of emergency.           Current Medications     ACCU-CHEK GATO CONTROL SOLN Soln     amlodipine (NORVASC) 5 MG tablet Take 1 tablet (5 mg total) by mouth once daily.    amlodipine (NORVASC) 5 MG tablet TAKE 1 TABLET BY MOUTH DAILY    digoxin (DIGOX) 250 mcg tablet Take 1 tablet (0.25 mg total) by mouth once daily.    furosemide (LASIX) 40 MG tablet Take 1 tablet (40 mg total) by mouth daily as needed (leg swelling).    lancets 30 gauge Misc     losartan (COZAAR) 100 MG tablet Take 1 tablet (100 mg total) by mouth once daily.    losartan (COZAAR) 50 MG tablet TAKE 1 TABLET(50 MG) BY MOUTH EVERY DAY     metformin (GLUCOPHAGE) 500 MG tablet TAKE 2 TABLETS BY MOUTH EVERY MORNING AND 1 TABLET BY MOUTH EVERY EVENING    metoprolol tartrate (LOPRESSOR) 25 MG tablet TAKE 1 TABLET(25 MG) BY MOUTH TWICE DAILY    pravastatin (PRAVACHOL) 20 MG tablet TAKE 1 TABLET BY MOUTH ONCE DAILY    TRUETEST TEST STRIPS Strp USE ONCE DAILY    warfarin (COUMADIN) 4 MG tablet TAKE 1 TABLET BY MOUTH ON MONDAY AND FRIDAY AND ONE-HALF TABLET BY MOUTH ON ALL OTHER DAYS    warfarin (COUMADIN) 4 MG tablet TAKE 1 TABLET BY MOUTH ON MONDAY AND FRIDAY AND 1/2 TABLET BY MOUTH ON ALL OTHER DAYS           Clinical Reference Information           Your Vitals Were     BP Pulse Height Weight SpO2 BMI    138/64 (BP Location: Right arm, Patient Position: Sitting, BP Method: Automatic) 61 5' (1.524 m) 50 kg (110 lb 3.7 oz) 96% 21.53 kg/m2      Blood Pressure          Most Recent Value    BP  138/64      Allergies as of 5/15/2017     No Known Allergies      Immunizations Administered on Date of Encounter - 5/15/2017     None      Language Assistance Services     ATTENTION: Language assistance services are available, free of charge. Please call [phone].      ATENCIÓN: Si habla jimena, tiene a vaca disposición servicios gratuitos de asistencia lingüística. Llame al [phone].     SHERI Ý: N?u b?n nói Ti?ng Vi?t, có các d?ch v? h? tr? ngôn ng? mi?n phí dành cho b?n. G?i s? [phone].         [facility] complies with applicable Federal civil rights laws and does not discriminate on the basis of race, color, national origin, age, disability, or sex.

## 2017-05-15 NOTE — PROGRESS NOTES
Subjective:    Patient ID:  Orion Ty is a 71 y.o. female who presents for follow-up of Atrial Fibrillation      HPI        Mechanical AVR/MVR 2010 monitors coumadin at home  Chronic A-fib  Medtronic pacemaker - last generator change 2011  HTN, DM, HLD     Last echo 2/17/16    1 - Low normal to mildly depressed left ventricular systolic function (EF 50-55%).  Mild global hypokinesis.     2 - Left ventricular diastolic dysfunction.     3 - Mechanical aortic valve prosthesis, effective prosthetic valve area corrected for BSA is 0.35 cm2.  Cannot exclude prosthetic aortic stenosis (peak yong 4.1 m/s, mean grad 30 mmHg).     4 - Trivial aortic regurgitation.     5 - Mechanical mitral valve prosthesis, appears to be normally functioning.     6 - Mild tricuspid regurgitation.     7 - Pulmonary hypertension. The estimated PA systolic pressure is 48 mmHg    Glucose has been more difficult to control  EKG V-paced, underlying A-fib       Review of Systems   Constitution: Negative for decreased appetite.   HENT: Negative for ear discharge.    Eyes: Negative for blurred vision.   Respiratory: Negative for hemoptysis.    Endocrine: Negative for polyphagia.   Hematologic/Lymphatic: Negative for adenopathy.   Skin: Negative for color change.   Musculoskeletal: Negative for joint swelling.   Neurological: Negative for brief paralysis.   Psychiatric/Behavioral: Negative for hallucinations.        Objective:    Physical Exam   Constitutional: She is oriented to person, place, and time. She appears well-developed.   Cardiovascular: Normal rate.  An irregularly irregular rhythm present.   No murmur heard.  Pulses:       Radial pulses are 2+ on the right side, and 2+ on the left side.        Dorsalis pedis pulses are 2+ on the right side, and 2+ on the left side.   Mechanical S1 and S2   Pulmonary/Chest: Effort normal and breath sounds normal.   Abdominal: Soft. She exhibits no distension. There is no tenderness.   Musculoskeletal:  She exhibits edema.   Neurological: She is alert and oriented to person, place, and time.   Skin: Skin is warm and dry.   Psychiatric: She has a normal mood and affect.   Nursing note and vitals reviewed.        Assessment:       1. S/P MVR (mitral valve replacement)    2. S/P AVR    3. Cardiac pacemaker in situ    4. Mixed hyperlipidemia    5. Type 2 diabetes mellitus with microalbuminuria, without long-term current use of insulin    6. Essential (primary) hypertension    7. Chronic atrial fibrillation         Plan:       Reschedule echo  Medtronic pacemaker check

## 2017-06-05 ENCOUNTER — HOSPITAL ENCOUNTER (OUTPATIENT)
Dept: CARDIOLOGY | Facility: HOSPITAL | Age: 72
Discharge: HOME | End: 2017-06-05
Attending: INTERNAL MEDICINE
Payer: MEDICARE

## 2017-06-05 DIAGNOSIS — Z95.2 S/P AVR: ICD-10-CM

## 2017-06-05 DIAGNOSIS — I48.20 CHRONIC ATRIAL FIBRILLATION: ICD-10-CM

## 2017-06-05 DIAGNOSIS — Z95.0 CARDIAC PACEMAKER IN SITU: ICD-10-CM

## 2017-06-05 DIAGNOSIS — E78.5 HYPERLIPIDEMIA, UNSPECIFIED HYPERLIPIDEMIA TYPE: ICD-10-CM

## 2017-06-05 DIAGNOSIS — Z95.2 S/P MVR (MITRAL VALVE REPLACEMENT): ICD-10-CM

## 2017-06-05 LAB
AORTIC VALVE REGURGITATION: ABNORMAL
ESTIMATED PA SYSTOLIC PRESSURE: 43.45
MITRAL VALVE REGURGITATION: ABNORMAL
RETIRED EF AND QEF - SEE NOTES: 55 (ref 55–65)
TRICUSPID VALVE REGURGITATION: ABNORMAL

## 2017-06-05 PROCEDURE — 93306 TTE W/DOPPLER COMPLETE: CPT

## 2017-06-05 PROCEDURE — 93306 TTE W/DOPPLER COMPLETE: CPT | Mod: 26,,, | Performed by: INTERNAL MEDICINE

## 2017-06-22 ENCOUNTER — OFFICE VISIT (OUTPATIENT)
Dept: CARDIOLOGY | Facility: CLINIC | Age: 72
End: 2017-06-22
Payer: MEDICARE

## 2017-06-22 VITALS
OXYGEN SATURATION: 98 % | HEIGHT: 60 IN | HEART RATE: 60 BPM | SYSTOLIC BLOOD PRESSURE: 143 MMHG | WEIGHT: 110 LBS | BODY MASS INDEX: 21.6 KG/M2 | DIASTOLIC BLOOD PRESSURE: 65 MMHG

## 2017-06-22 DIAGNOSIS — E78.2 MIXED HYPERLIPIDEMIA: Chronic | ICD-10-CM

## 2017-06-22 DIAGNOSIS — I48.20 CHRONIC ATRIAL FIBRILLATION: Primary | Chronic | ICD-10-CM

## 2017-06-22 DIAGNOSIS — R80.9 TYPE 2 DIABETES MELLITUS WITH MICROALBUMINURIA, WITHOUT LONG-TERM CURRENT USE OF INSULIN: Chronic | ICD-10-CM

## 2017-06-22 DIAGNOSIS — R07.89 CHEST PAIN, ATYPICAL: ICD-10-CM

## 2017-06-22 DIAGNOSIS — I10 ESSENTIAL (PRIMARY) HYPERTENSION: Chronic | ICD-10-CM

## 2017-06-22 DIAGNOSIS — Z95.0 CARDIAC PACEMAKER IN SITU: ICD-10-CM

## 2017-06-22 DIAGNOSIS — Z95.2 S/P AVR: ICD-10-CM

## 2017-06-22 DIAGNOSIS — E11.29 TYPE 2 DIABETES MELLITUS WITH MICROALBUMINURIA, WITHOUT LONG-TERM CURRENT USE OF INSULIN: Chronic | ICD-10-CM

## 2017-06-22 DIAGNOSIS — Z95.2 S/P MVR (MITRAL VALVE REPLACEMENT): ICD-10-CM

## 2017-06-22 PROCEDURE — 4010F ACE/ARB THERAPY RXD/TAKEN: CPT | Mod: S$GLB,,, | Performed by: INTERNAL MEDICINE

## 2017-06-22 PROCEDURE — 3044F HG A1C LEVEL LT 7.0%: CPT | Mod: S$GLB,,, | Performed by: INTERNAL MEDICINE

## 2017-06-22 PROCEDURE — 99499 UNLISTED E&M SERVICE: CPT | Mod: S$GLB,,, | Performed by: INTERNAL MEDICINE

## 2017-06-22 PROCEDURE — 93279 PRGRMG DEV EVAL PM/LDLS PM: CPT | Mod: S$GLB,,, | Performed by: INTERNAL MEDICINE

## 2017-06-22 PROCEDURE — 99999 PR PBB SHADOW E&M-EST. PATIENT-LVL IV: CPT | Mod: PBBFAC,,, | Performed by: INTERNAL MEDICINE

## 2017-06-22 PROCEDURE — 99214 OFFICE O/P EST MOD 30 MIN: CPT | Mod: 25,S$GLB,, | Performed by: INTERNAL MEDICINE

## 2017-06-22 PROCEDURE — 1159F MED LIST DOCD IN RCRD: CPT | Mod: S$GLB,,, | Performed by: INTERNAL MEDICINE

## 2017-06-22 PROCEDURE — 1126F AMNT PAIN NOTED NONE PRSNT: CPT | Mod: S$GLB,,, | Performed by: INTERNAL MEDICINE

## 2017-06-22 NOTE — PROGRESS NOTES
Subjective:    Patient ID:  Orion Ty is a 71 y.o. female who presents for follow-up of Chest Pain      HPI     Medtronic pacemaker check ok - 2 years left on PPM    Mechanical AVR/MVR 2010 monitors coumadin at home  Chronic A-fib  Medtronic pacemaker - last generator change 2011  HTN, DM, HLD     Echo 6/5/17    1 - Normal left ventricular systolic function (EF 55-60%).     2 - Concentric hypertrophy.     3 - Biatrial enlargement.     4 - Pulmonary hypertension. The estimated PA systolic pressure is 43 mmHg.     5 - Aortic valve mechanical prosthesis, effective prosthetic valve area corrected for BSA is 0.34 cm2.     6 - Mitral valve mechanical prosthesis - adequately functioning.     7 - Trivial aortic regurgitation.     8 - Trivial mitral regurgitation.     9 - Mild tricuspid regurgitation.     Yesterday had left sided CP - radiated to her back, lasted about 30 min    Review of Systems   Constitution: Negative for decreased appetite.   HENT: Negative for ear discharge.    Eyes: Negative for blurred vision.   Respiratory: Negative for hemoptysis.    Endocrine: Negative for polyphagia.   Hematologic/Lymphatic: Negative for adenopathy.   Skin: Negative for color change.   Musculoskeletal: Negative for joint swelling.   Neurological: Negative for brief paralysis.   Psychiatric/Behavioral: Negative for hallucinations.        Objective:    Physical Exam   Constitutional: She is oriented to person, place, and time. She appears well-developed.   Cardiovascular: Normal rate.  An irregularly irregular rhythm present.   No murmur heard.  Pulses:       Radial pulses are 2+ on the right side, and 2+ on the left side.        Dorsalis pedis pulses are 2+ on the right side, and 2+ on the left side.   Mechanical S1 and S2   Pulmonary/Chest: Effort normal and breath sounds normal.   Abdominal: Soft. She exhibits no distension. There is no tenderness.   Musculoskeletal: She exhibits edema.   Neurological: She is alert and  oriented to person, place, and time.   Skin: Skin is warm and dry.   Psychiatric: She has a normal mood and affect.   Nursing note and vitals reviewed.        Assessment:       1. Chronic atrial fibrillation    2. Essential (primary) hypertension    3. S/P AVR    4. S/P MVR (mitral valve replacement)    5. Type 2 diabetes mellitus with microalbuminuria, without long-term current use of insulin    6. Mixed hyperlipidemia    7. Cardiac pacemaker in situ    8. Chest pain, atypical         Plan:       lexiscan myoview for CP  OV with results

## 2017-07-02 RX ORDER — PRAVASTATIN SODIUM 20 MG/1
TABLET ORAL
Qty: 90 TABLET | Refills: 2 | Status: SHIPPED | OUTPATIENT
Start: 2017-07-02 | End: 2018-03-27 | Stop reason: SDUPTHER

## 2017-08-14 ENCOUNTER — OFFICE VISIT (OUTPATIENT)
Dept: FAMILY MEDICINE | Facility: CLINIC | Age: 72
End: 2017-08-14
Payer: MEDICARE

## 2017-08-14 VITALS
TEMPERATURE: 98 F | BODY MASS INDEX: 20.6 KG/M2 | HEART RATE: 62 BPM | DIASTOLIC BLOOD PRESSURE: 80 MMHG | WEIGHT: 109.13 LBS | HEIGHT: 61 IN | SYSTOLIC BLOOD PRESSURE: 138 MMHG | OXYGEN SATURATION: 93 %

## 2017-08-14 DIAGNOSIS — I10 ESSENTIAL (PRIMARY) HYPERTENSION: Chronic | ICD-10-CM

## 2017-08-14 DIAGNOSIS — R80.9 TYPE 2 DIABETES MELLITUS WITH MICROALBUMINURIA, WITHOUT LONG-TERM CURRENT USE OF INSULIN: Chronic | ICD-10-CM

## 2017-08-14 DIAGNOSIS — E11.29 TYPE 2 DIABETES MELLITUS WITH MICROALBUMINURIA, WITHOUT LONG-TERM CURRENT USE OF INSULIN: Chronic | ICD-10-CM

## 2017-08-14 DIAGNOSIS — E11.3299 TYPE 2 DIABETES MELLITUS WITH MILD NONPROLIFERATIVE RETINOPATHY WITHOUT MACULAR EDEMA, WITHOUT LONG-TERM CURRENT USE OF INSULIN, UNSPECIFIED LATERALITY: ICD-10-CM

## 2017-08-14 DIAGNOSIS — E03.8 SUBCLINICAL HYPOTHYROIDISM: ICD-10-CM

## 2017-08-14 DIAGNOSIS — E78.2 MIXED HYPERLIPIDEMIA: Chronic | ICD-10-CM

## 2017-08-14 PROCEDURE — 4010F ACE/ARB THERAPY RXD/TAKEN: CPT | Mod: S$GLB,,, | Performed by: INTERNAL MEDICINE

## 2017-08-14 PROCEDURE — 99999 PR PBB SHADOW E&M-EST. PATIENT-LVL III: CPT | Mod: PBBFAC,,, | Performed by: INTERNAL MEDICINE

## 2017-08-14 PROCEDURE — 99499 UNLISTED E&M SERVICE: CPT | Mod: S$GLB,,, | Performed by: INTERNAL MEDICINE

## 2017-08-14 PROCEDURE — 3079F DIAST BP 80-89 MM HG: CPT | Mod: S$GLB,,, | Performed by: INTERNAL MEDICINE

## 2017-08-14 PROCEDURE — 1126F AMNT PAIN NOTED NONE PRSNT: CPT | Mod: S$GLB,,, | Performed by: INTERNAL MEDICINE

## 2017-08-14 PROCEDURE — 3008F BODY MASS INDEX DOCD: CPT | Mod: S$GLB,,, | Performed by: INTERNAL MEDICINE

## 2017-08-14 PROCEDURE — 3075F SYST BP GE 130 - 139MM HG: CPT | Mod: S$GLB,,, | Performed by: INTERNAL MEDICINE

## 2017-08-14 PROCEDURE — 1159F MED LIST DOCD IN RCRD: CPT | Mod: S$GLB,,, | Performed by: INTERNAL MEDICINE

## 2017-08-14 PROCEDURE — 99214 OFFICE O/P EST MOD 30 MIN: CPT | Mod: S$GLB,,, | Performed by: INTERNAL MEDICINE

## 2017-08-14 PROCEDURE — 3044F HG A1C LEVEL LT 7.0%: CPT | Mod: S$GLB,,, | Performed by: INTERNAL MEDICINE

## 2017-08-14 RX ORDER — OMEGA-3-ACID ETHYL ESTERS 1 G/1
CAPSULE, LIQUID FILLED ORAL
Refills: 2 | Status: ON HOLD | COMMUNITY
Start: 2017-06-21 | End: 2022-01-01 | Stop reason: HOSPADM

## 2017-08-14 NOTE — PROGRESS NOTES
Chief Complaint  Chief Complaint   Patient presents with    Hypertension     F/U    Hyperlipidemia     F/U    Diabetes     F/U    Medication Refill       HPI  Orion Ty is a 72 y.o. female with medical diagnoses as listed in the medical history and problem list that presents for HTN DM thyroid follow up.  Pt is known to me with her last appointment 4/7/2017.  She is taking and tolerating her medications.  She was noted to have an increase in her TSH after he last lab draw but normal TSH.  No hypoglycemic symptoms.  No CP, SOB, palpitations.  She reports that her home BPs have running a bit on the low side and would like to know if she can try stopping any of her medications.        PAST MEDICAL HISTORY:  Past Medical History:   Diagnosis Date    Closed displaced fracture of distal phalanx of lesser toe 10/20/2015    Diabetes mellitus     Diabetes mellitus, type 2     Hypertension     Osteopenia     Pacemaker     Rheumatic heart disease        PAST SURGICAL HISTORY:  Past Surgical History:   Procedure Laterality Date    CARDIAC PACEMAKER PLACEMENT      CARDIAC VALVE REPLACEMENT      CARDIAC VALVE SURGERY      thryoid s         SOCIAL HISTORY:  Social History     Social History    Marital status:      Spouse name: N/A    Number of children: N/A    Years of education: N/A     Occupational History    Not on file.     Social History Main Topics    Smoking status: Never Smoker    Smokeless tobacco: Never Used    Alcohol use No    Drug use: Unknown    Sexual activity: Not on file     Other Topics Concern    Not on file     Social History Narrative    No narrative on file       FAMILY HISTORY:  Family History   Problem Relation Age of Onset    Breast cancer Neg Hx     Colon cancer Neg Hx     Ovarian cancer Neg Hx        ALLERGIES AND MEDICATIONS: updated and reviewed.  Review of patient's allergies indicates:  No Known Allergies  Current Outpatient Prescriptions   Medication Sig  Dispense Refill    ACCU-CHEK GATO CONTROL SOLN Soln       amlodipine (NORVASC) 5 MG tablet Take 1 tablet (5 mg total) by mouth once daily. 90 tablet 3    digoxin (DIGOX) 250 mcg tablet Take 1 tablet (0.25 mg total) by mouth once daily. 90 tablet 3    furosemide (LASIX) 40 MG tablet Take 1 tablet (40 mg total) by mouth daily as needed (leg swelling). 30 tablet 11    lancets 30 gauge Misc       losartan (COZAAR) 100 MG tablet Take 1 tablet (100 mg total) by mouth once daily. 90 tablet 3    losartan (COZAAR) 50 MG tablet TAKE 1 TABLET(50 MG) BY MOUTH EVERY DAY 90 tablet 0    metoprolol tartrate (LOPRESSOR) 25 MG tablet TAKE 1 TABLET(25 MG) BY MOUTH TWICE DAILY 180 tablet 3    pravastatin (PRAVACHOL) 20 MG tablet TAKE 1 TABLET BY MOUTH EVERY DAY 90 tablet 2    TRUETEST TEST STRIPS Strp USE ONCE DAILY (Patient taking differently: twice daily) 100 strip 6    warfarin (COUMADIN) 4 MG tablet TAKE 1 TABLET BY MOUTH ON MONDAY AND FRIDAY AND ONE-HALF TABLET BY MOUTH ON ALL OTHER DAYS 57 tablet 3    warfarin (COUMADIN) 4 MG tablet TAKE 1 TABLET BY MOUTH ON MONDAY AND FRIDAY AND 1/2 TABLET BY MOUTH ON ALL OTHER DAYS 135 tablet 0    amlodipine (NORVASC) 5 MG tablet TAKE 1 TABLET BY MOUTH DAILY 90 tablet 0    metformin (GLUCOPHAGE) 500 MG tablet TAKE 2 TABLETS BY MOUTH EVERY MORNING AND 1 TABLET BY MOUTH EVERY EVENING 270 tablet 1    omega-3 acid ethyl esters (LOVAZA) 1 gram capsule TK 2 CS PO BID  2     No current facility-administered medications for this visit.          ROS  Review of Systems   Constitutional: Negative for chills, fever and unexpected weight change.   HENT: Negative for congestion, ear pain, hearing loss, rhinorrhea, sore throat and trouble swallowing.    Eyes: Negative for discharge, redness and visual disturbance.   Respiratory: Negative for cough, chest tightness, shortness of breath and wheezing.    Cardiovascular: Negative for chest pain, palpitations and leg swelling.   Gastrointestinal:  "Negative for abdominal pain, constipation, diarrhea, nausea and vomiting.   Endocrine: Negative for polydipsia, polyphagia and polyuria.   Genitourinary: Negative for decreased urine volume, dysuria and hematuria.   Musculoskeletal: Negative for arthralgias, joint swelling and myalgias.   Skin: Negative for color change and rash.   Neurological: Negative for dizziness, weakness, light-headedness and headaches.   Psychiatric/Behavioral: Negative for decreased concentration, dysphoric mood, sleep disturbance and suicidal ideas.           Physical Exam  Vitals:    08/14/17 1335   BP: 138/80   BP Location: Left arm   Patient Position: Sitting   BP Method: Medium (Manual)   Pulse: 62   Temp: 98.3 °F (36.8 °C)   TempSrc: Oral   SpO2: (!) 93%   Weight: 49.5 kg (109 lb 2 oz)   Height: 5' 1" (1.549 m)    Body mass index is 20.62 kg/m².  Weight: 49.5 kg (109 lb 2 oz)   Height: 5' 1" (154.9 cm)   General appearance: alert, appears stated age, cooperative and no distress  Head: Normocephalic, without obvious abnormality, atraumatic  Eyes: PERRL EOMI conjunctiva clear  Neck: no adenopathy, no carotid bruit, no JVD, supple, symmetrical, trachea midline and thyroid not enlarged, symmetric, no tenderness/mass/nodules  Lungs: clear to auscultation bilaterally  Heart: regular rate and rhythm, S1, S2 normal, no murmur, click, rub or gallop  Abdomen: soft, non-tender; bowel sounds normal; no masses,  no organomegaly  Extremities: extremities normal, atraumatic, no cyanosis or edema  Pulses: 2+ and symmetric  Neurologic: Grossly normal  Protective Sensation (w/ 10 gram monofilament):  Right: Intact  Left: Intact    Visual Inspection:  Normal -  Bilateral    Pedal Pulses:   Right: Present  Left: Present    Posterior tibialis:   Right:Present  Left: Present          Health Maintenance       Date Due Completion Date    Pneumococcal (65+) (1 of 2 - PCV13) 07/21/2009 ---    Foot Exam 05/30/2017 5/30/2016    Influenza Vaccine 08/01/2017 " 10/10/2016 (Done)    Override on 10/10/2016: Done    Override on 12/1/2014: Done    Hemoglobin A1c 11/15/2017 5/15/2017    Eye Exam 03/08/2018 3/8/2017 (Done)    Override on 3/8/2017: Done (Dr. 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          Relevant Orders    Comprehensive metabolic panel    Lipid panel    Hemoglobin A1c       Cardiac/Vascular    Mixed hyperlipidemia (Chronic)    Current Assessment & Plan     The current medical regimen is effective;  continue present plan and medications.          Essential (primary) hypertension (Chronic)    Current Assessment & Plan     The current medical regimen is effective;  continue present plan and medications. Counseled that she may stop her amlodipine 5mg and monitor her pressure closely.              Endocrine    Subclinical hypothyroidism    Current Assessment & Plan     Recheck TSH         Relevant Orders    TSH    Type 2 diabetes mellitus with microalbuminuria, without long-term current use of insulin (Chronic)    Current Assessment & Plan     As above           Other Visit Diagnoses    None.           Health Maintenance reviewed, reports up to date with PCV-13.  Monitor.    Follow-up: Return for Routine Physical, March 2018.

## 2017-08-15 ENCOUNTER — LAB VISIT (OUTPATIENT)
Dept: LAB | Facility: HOSPITAL | Age: 72
End: 2017-08-15
Attending: INTERNAL MEDICINE
Payer: MEDICARE

## 2017-08-15 DIAGNOSIS — E11.3299 TYPE 2 DIABETES MELLITUS WITH MILD NONPROLIFERATIVE RETINOPATHY WITHOUT MACULAR EDEMA, WITHOUT LONG-TERM CURRENT USE OF INSULIN, UNSPECIFIED LATERALITY: ICD-10-CM

## 2017-08-15 DIAGNOSIS — E03.8 SUBCLINICAL HYPOTHYROIDISM: ICD-10-CM

## 2017-08-15 LAB
ALBUMIN SERPL BCP-MCNC: 4.1 G/DL
ALP SERPL-CCNC: 50 U/L
ALT SERPL W/O P-5'-P-CCNC: 21 U/L
ANION GAP SERPL CALC-SCNC: 6 MMOL/L
AST SERPL-CCNC: 32 U/L
BILIRUB SERPL-MCNC: 0.9 MG/DL
BUN SERPL-MCNC: 12 MG/DL
CALCIUM SERPL-MCNC: 9.7 MG/DL
CHLORIDE SERPL-SCNC: 106 MMOL/L
CHOLEST/HDLC SERPL: 4.2 {RATIO}
CO2 SERPL-SCNC: 28 MMOL/L
CREAT SERPL-MCNC: 0.8 MG/DL
EST. GFR  (AFRICAN AMERICAN): >60 ML/MIN/1.73 M^2
EST. GFR  (NON AFRICAN AMERICAN): >60 ML/MIN/1.73 M^2
GLUCOSE SERPL-MCNC: 149 MG/DL
HDL/CHOLESTEROL RATIO: 23.6 %
HDLC SERPL-MCNC: 127 MG/DL
HDLC SERPL-MCNC: 30 MG/DL
LDLC SERPL CALC-MCNC: 51.2 MG/DL
NONHDLC SERPL-MCNC: 97 MG/DL
POTASSIUM SERPL-SCNC: 4.8 MMOL/L
PROT SERPL-MCNC: 7.3 G/DL
SODIUM SERPL-SCNC: 140 MMOL/L
T4 FREE SERPL-MCNC: 0.97 NG/DL
TRIGL SERPL-MCNC: 229 MG/DL
TSH SERPL DL<=0.005 MIU/L-ACNC: 6.05 UIU/ML

## 2017-08-15 PROCEDURE — 80061 LIPID PANEL: CPT

## 2017-08-15 PROCEDURE — 36415 COLL VENOUS BLD VENIPUNCTURE: CPT | Mod: PO

## 2017-08-15 PROCEDURE — 83036 HEMOGLOBIN GLYCOSYLATED A1C: CPT

## 2017-08-15 PROCEDURE — 84443 ASSAY THYROID STIM HORMONE: CPT

## 2017-08-15 PROCEDURE — 80053 COMPREHEN METABOLIC PANEL: CPT

## 2017-08-15 PROCEDURE — 84439 ASSAY OF FREE THYROXINE: CPT

## 2017-08-16 LAB
ESTIMATED AVG GLUCOSE: 131 MG/DL
HBA1C MFR BLD HPLC: 6.2 %

## 2017-08-16 NOTE — PROGRESS NOTES
Your lab results are looking good.  There is still a slight change in the thyroid level but medication is not needed.  We can just keep an eye on this with the blood work.  Please continue your current medications and doses.  Please feel free to contact me with any questions or concerns.    Sincerely,  Otis Zimmer  [email or web address]

## 2017-08-30 ENCOUNTER — TELEPHONE (OUTPATIENT)
Dept: CARDIOLOGY | Facility: CLINIC | Age: 72
End: 2017-08-30

## 2017-09-03 DIAGNOSIS — E11.69 COMBINED HYPERLIPIDEMIA ASSOCIATED WITH TYPE 2 DIABETES MELLITUS: ICD-10-CM

## 2017-09-03 DIAGNOSIS — E78.2 COMBINED HYPERLIPIDEMIA ASSOCIATED WITH TYPE 2 DIABETES MELLITUS: ICD-10-CM

## 2017-09-04 RX ORDER — METFORMIN HYDROCHLORIDE 500 MG/1
TABLET ORAL
Qty: 270 TABLET | Refills: 1 | Status: SHIPPED | OUTPATIENT
Start: 2017-09-04 | End: 2018-03-01 | Stop reason: SDUPTHER

## 2017-09-26 RX ORDER — DIGOXIN 250 UG/1
TABLET ORAL
Qty: 90 TABLET | Refills: 0 | Status: SHIPPED | OUTPATIENT
Start: 2017-09-26 | End: 2017-12-12 | Stop reason: SDUPTHER

## 2017-11-09 ENCOUNTER — OFFICE VISIT (OUTPATIENT)
Dept: FAMILY MEDICINE | Facility: CLINIC | Age: 72
End: 2017-11-09
Payer: MEDICARE

## 2017-11-09 ENCOUNTER — LAB VISIT (OUTPATIENT)
Dept: LAB | Facility: HOSPITAL | Age: 72
End: 2017-11-09
Attending: FAMILY MEDICINE
Payer: MEDICARE

## 2017-11-09 VITALS
HEIGHT: 61 IN | BODY MASS INDEX: 20.01 KG/M2 | DIASTOLIC BLOOD PRESSURE: 80 MMHG | TEMPERATURE: 98 F | HEART RATE: 60 BPM | SYSTOLIC BLOOD PRESSURE: 118 MMHG | WEIGHT: 106 LBS | OXYGEN SATURATION: 95 %

## 2017-11-09 DIAGNOSIS — R31.0 GROSS HEMATURIA: ICD-10-CM

## 2017-11-09 DIAGNOSIS — I48.20 CHRONIC ATRIAL FIBRILLATION: ICD-10-CM

## 2017-11-09 DIAGNOSIS — Z79.01 CURRENT USE OF LONG TERM ANTICOAGULATION: ICD-10-CM

## 2017-11-09 DIAGNOSIS — R31.0 GROSS HEMATURIA: Primary | ICD-10-CM

## 2017-11-09 LAB
BACTERIA #/AREA URNS AUTO: NORMAL /HPF
BILIRUB SERPL-MCNC: NEGATIVE MG/DL
BLOOD URINE, POC: 50
COLOR, POC UA: YELLOW
GLUCOSE UR QL STRIP: NORMAL
KETONES UR QL STRIP: NEGATIVE
LEUKOCYTE ESTERASE URINE, POC: NEGATIVE
MICROSCOPIC COMMENT: NORMAL
NITRITE, POC UA: NEGATIVE
PH, POC UA: 6
PROTEIN, POC: POSITIVE
RBC #/AREA URNS AUTO: 1 /HPF (ref 0–4)
SPECIFIC GRAVITY, POC UA: 1.01
UROBILINOGEN, POC UA: NORMAL

## 2017-11-09 PROCEDURE — 81001 URINALYSIS AUTO W/SCOPE: CPT

## 2017-11-09 PROCEDURE — 99499 UNLISTED E&M SERVICE: CPT | Mod: S$GLB,,, | Performed by: FAMILY MEDICINE

## 2017-11-09 PROCEDURE — 99999 PR PBB SHADOW E&M-EST. PATIENT-LVL III: CPT | Mod: PBBFAC,,, | Performed by: FAMILY MEDICINE

## 2017-11-09 PROCEDURE — 87086 URINE CULTURE/COLONY COUNT: CPT

## 2017-11-09 PROCEDURE — 81001 URINALYSIS AUTO W/SCOPE: CPT | Mod: S$GLB,,, | Performed by: FAMILY MEDICINE

## 2017-11-09 PROCEDURE — 99213 OFFICE O/P EST LOW 20 MIN: CPT | Mod: 25,S$GLB,, | Performed by: FAMILY MEDICINE

## 2017-11-09 RX ORDER — LANCING DEVICE
EACH MISCELLANEOUS
COMMUNITY
Start: 2017-10-05 | End: 2021-01-01

## 2017-11-09 NOTE — PROGRESS NOTES
Office Visit    Patient Name: Orion Ty    : 1945  MRN: [id number]      Assessment/Plan:  Orion Ty is a 72 y.o. female who presents today for :    Gross hematuria  Chronic atrial fibrillation  -     POCT INR; Future; Expected date: 2017  Current use of long term anticoagulation  -     POCT urinalysis, dipstick or tablet reag  -     Urinalysis Microscopic; Future; Expected date: 2017  -     Urine culture; Future; Expected date: 2017  -     POCT INR; Future; Expected date: 2017    -AF VSS  Dipstick with normal except with +blood but grossly normal in appearance, will send for UA/UCx for verification and further eval of UTI vs. Coumadin effect. Will also check INR to make sure she's not supratherapeutic  -advised adequate hydration and proper hygiene  -RTC if Sx worsens or call clinic back if pt has any concerns. ED precautions given for bleeding.      Return for any evaluation as needed.     This note was created by combination of typed  and Dragon dictation.  Transcription errors may be present.  If there are any questions, please contact me.        ----------------------------------------------------------------------------------------------------------------------      HPI:  Orion is a 72 y.o. female who presents today for:    Hematuria        This patient has multiple medical diagnoses as noted below.    This patient is new to me     Patient is here today for Hematuria    She has a h/o Afib with pacemake on Coumadin 4mg on  and , and 2mg on all other days. Has not had any bleeding issues recently.   This morning, she noticed that her urine was red, denies any burning/pain with urination - no associated with frequency/urgency.  Her latest urine prior to clinic was NOT red.  She did remember having several episode of hematuria in the past - last was over a year ago.   Her last INR was 3.0 about 2 weeks ago. She has been taking her medications     NO  flank pain/bladder pain/hematuria  NO VB  No recent/GI bleeds - her last stool yesterday was normal without any blood.  Denies any F/C/N/V/palpitations/CP/MARTÍNEZ/SOB/vision changes/photophobia or phonophobia/syncope/facial or muscular weakness/neck stiffness/tingling/numbness/abd pain/swelling/bowel or bladder changes.         Additional ROS  No vision changes  No F/C/wt changes/fatigue  No dysphagia/sore throat/rhinorrhea  No CP/SOB/palpitations/swelling  No cough/wheezing/SOB  No nausea/vomiting/abd pain/blood in stool, no diarrhea, no constipation  No muscle aches/joint pain  No rashes  No weakness/HA/tingling/numbness  No anxiety/depression  No dysuria  No genital bleeding/discharge    No polyuria/polydipsia/fatigue/wt changes/cold or hot intolerance      Patient Active Problem List   Diagnosis    Atrial fibrillation    Cardiac pacemaker in situ    Rheumatic heart disease    Subclinical hypothyroidism    Osteopenia    Atherosclerosis of aorta    Mixed hyperlipidemia    Long term current use of anticoagulant    S/P AVR    S/P MVR (mitral valve replacement)    Type 2 diabetes mellitus with microalbuminuria, without long-term current use of insulin    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     Social History Main Topics    Smoking status: Never Smoker    Smokeless tobacco: Never Used    Alcohol use No    Drug use: Unknown    Sexual  "activity: Not on file     Other Topics Concern    Not on file     Social History Narrative    No narrative on file       Current Medications  Medications reviewed and updated.     Allergies   Review of patient's allergies indicates:  No Known Allergies          Review of Systems  See HPI      Physical Exam  /80   Pulse 60   Temp 97.9 °F (36.6 °C) (Oral)   Ht 5' 1" (1.549 m)   Wt 48.1 kg (106 lb)   SpO2 95%   BMI 20.03 kg/m²     GEN: NAD, well developed, pleasant, well nourished  HEENT: NCAT, PERRLA, EOMI, sclera clear, anicteric, O/P clear, MMM with no lesions  NECK: normal, supple with midline trachea, no LAD, no thyromegaly  LUNGS: CTAB, no w/r/r, no increased work of breathing   HEART: irregularly irregular, normal S1 and S2, no m/r/g, no edema  ABD: s/nt/nd, NABS  SKIN: normal turgor, no rashes  PSYCH: AOx3, appropriate mood and affect  MSK: warm/well perfused, normal ROM in all 4 extremities, no c/c/e.      Labs  Lab Results   Component Value Date    HGBA1C 6.2 (H) 08/15/2017     Lab Results   Component Value Date     08/15/2017    K 4.8 08/15/2017     08/15/2017    CO2 28 08/15/2017    BUN 12 08/15/2017    CREATININE 0.8 08/15/2017    CALCIUM 9.7 08/15/2017    ANIONGAP 6 (L) 08/15/2017    ESTGFRAFRICA >60.0 08/15/2017    EGFRNONAA >60.0 08/15/2017     Lab Results   Component Value Date    CHOL 127 08/15/2017    CHOL 139 03/14/2017    CHOL 139 03/14/2017     Lab Results   Component Value Date    HDL 30 (L) 08/15/2017    HDL 30 (L) 03/14/2017    HDL 30 (L) 03/14/2017     Lab Results   Component Value Date    LDLCALC 51.2 (L) 08/15/2017    LDLCALC 55.2 (L) 03/14/2017    LDLCALC 55.2 (L) 03/14/2017     Lab Results   Component Value Date    TRIG 229 (H) 08/15/2017    TRIG 269 (H) 03/14/2017    TRIG 269 (H) 03/14/2017     Lab Results   Component Value Date    CHOLHDL 23.6 08/15/2017    CHOLHDL 21.6 03/14/2017    CHOLHDL 21.6 03/14/2017     Last set of blood work has been reviewed as noted " above.      Health Maintenance  Health Maintenance       Date Due Completion Date    Pneumococcal (65+) (1 of 2 - PCV13) 07/21/2009 ---    Influenza Vaccine 08/01/2017 10/10/2016 (Done)    Override on 10/10/2016: Done    Override on 12/1/2014: Done    Hemoglobin A1c 02/15/2018 8/15/2017    Eye Exam 03/08/2018 3/8/2017 (Done)    Override on 3/8/2017: Done (Dr. Richmond Andrea/Optometry-positive for non-proliferative diabetic retinopathy)    Pap Smear 03/16/2018 3/16/2017    Mammogram 03/27/2018 3/27/2017    Foot Exam 08/14/2018 8/14/2017    Lipid Panel 08/15/2018 8/15/2017    DEXA SCAN 02/01/2019 2/1/2016    Override on 8/4/2014: Done    Colonoscopy 01/01/2020 1/1/2010 (Done)    Override on 1/1/2010: Done    Override on 1/1/2006: Done (repeat in 10 years)    TETANUS VACCINE 06/30/2026 6/30/2016 (Declined)    Override on 6/30/2016: Declined

## 2017-11-10 LAB
BACTERIA UR CULT: NORMAL
BACTERIA UR CULT: NORMAL

## 2017-11-28 ENCOUNTER — OFFICE VISIT (OUTPATIENT)
Dept: FAMILY MEDICINE | Facility: CLINIC | Age: 72
End: 2017-11-28
Payer: MEDICARE

## 2017-11-28 ENCOUNTER — LAB VISIT (OUTPATIENT)
Dept: LAB | Facility: HOSPITAL | Age: 72
End: 2017-11-28
Attending: INTERNAL MEDICINE
Payer: MEDICARE

## 2017-11-28 VITALS
DIASTOLIC BLOOD PRESSURE: 60 MMHG | HEIGHT: 61 IN | SYSTOLIC BLOOD PRESSURE: 120 MMHG | OXYGEN SATURATION: 97 % | HEART RATE: 60 BPM | TEMPERATURE: 99 F | BODY MASS INDEX: 19.81 KG/M2 | WEIGHT: 104.94 LBS

## 2017-11-28 DIAGNOSIS — S70.11XA HEMATOMA OF RIGHT THIGH, INITIAL ENCOUNTER: ICD-10-CM

## 2017-11-28 DIAGNOSIS — Z79.01 LONG TERM CURRENT USE OF ANTICOAGULANT: ICD-10-CM

## 2017-11-28 DIAGNOSIS — R23.3 ABNORMAL BRUISING: Primary | ICD-10-CM

## 2017-11-28 DIAGNOSIS — R23.3 ABNORMAL BRUISING: ICD-10-CM

## 2017-11-28 LAB
INR PPP: 4.1
PROTHROMBIN TIME: 41.9 SEC

## 2017-11-28 PROCEDURE — 99499 UNLISTED E&M SERVICE: CPT | Mod: S$GLB,,, | Performed by: INTERNAL MEDICINE

## 2017-11-28 PROCEDURE — 99999 PR PBB SHADOW E&M-EST. PATIENT-LVL III: CPT | Mod: PBBFAC,,, | Performed by: INTERNAL MEDICINE

## 2017-11-28 PROCEDURE — 36415 COLL VENOUS BLD VENIPUNCTURE: CPT | Mod: PO

## 2017-11-28 PROCEDURE — 85610 PROTHROMBIN TIME: CPT

## 2017-11-28 PROCEDURE — 99214 OFFICE O/P EST MOD 30 MIN: CPT | Mod: S$GLB,,, | Performed by: INTERNAL MEDICINE

## 2017-11-28 NOTE — PROGRESS NOTES
Assessment & Plan  Problem List Items Addressed This Visit        Hematology    Long term current use of anticoagulant  -    Check INR    Relevant Orders    Protime-INR      Other Visit Diagnoses     Abnormal bruising    -  Primary  -    Suspect secondary to some type of trauma that caused hematoma    Relevant Orders    Protime-INR    Hematoma of right thigh, initial encounter    -  Checking INR.\ ok to continue warm compresses.     Relevant Orders    Protime-INR            Health Maintenance reviewed, had flu vaccine.    Follow-up: Return if symptoms worsen or fail to improve.  ______________________________________________________________________    Chief Complaint  Chief Complaint   Patient presents with    Hematuria     f/u    Bleeding/Bruising     right thigh for a week       HPI  Orion Ty is a 72 y.o. female with medical diagnoses as listed in the medical history and problem list that presents for f/u gross hematuria and bruising of the right knee since last week.  Pt is known to me with her last appointment 11/9/2017.  Urine culture did not grown out anything of clinical significance.      Hematuria has resolved. Had onset of pain of the right lateral thigh that started last week.  No clear trauma or other inciting event.  Then noted to have bruising around the right knee without pain.  She felt a lump in the right lateral thigh.  Using heat and tylenol.  Last home INR was 3.6.  No change in dose.  This was several days ago.        PAST MEDICAL HISTORY:  Past Medical History:   Diagnosis Date    Closed displaced fracture of distal phalanx of lesser toe 10/20/2015    Diabetes mellitus     Diabetes mellitus, type 2     Hypertension     Osteopenia     Pacemaker     Rheumatic heart disease        PAST SURGICAL HISTORY:  Past Surgical History:   Procedure Laterality Date    CARDIAC PACEMAKER PLACEMENT      CARDIAC VALVE REPLACEMENT      CARDIAC VALVE SURGERY      thryoid s         SOCIAL  HISTORY:  Social History     Social History    Marital status:      Spouse name: N/A    Number of children: N/A    Years of education: N/A     Occupational History    Not on file.     Social History Main Topics    Smoking status: Never Smoker    Smokeless tobacco: Never Used    Alcohol use No    Drug use: Unknown    Sexual activity: Not on file     Other Topics Concern    Not on file     Social History Narrative    No narrative on file       FAMILY HISTORY:  Family History   Problem Relation Age of Onset    Breast cancer Neg Hx     Colon cancer Neg Hx     Ovarian cancer Neg Hx        ALLERGIES AND MEDICATIONS: updated and reviewed.  Review of patient's allergies indicates:  No Known Allergies  Current Outpatient Prescriptions   Medication Sig Dispense Refill    ACCU-CHEK GATO CONTROL SOLN Soln       amlodipine (NORVASC) 5 MG tablet Take 1 tablet (5 mg total) by mouth once daily. 90 tablet 3    amlodipine (NORVASC) 5 MG tablet TAKE 1 TABLET BY MOUTH DAILY 90 tablet 0    DIGOX 250 mcg tablet TAKE 1 TABLET(0.25 MG) BY MOUTH EVERY DAY 90 tablet 0    FLUZONE HIGH-DOSE 2017-18, PF, 180 mcg/0.5 mL vaccine ADM 0.5ML IM UTD  0    lancets 30 gauge Misc       lancing device Misc       losartan (COZAAR) 100 MG tablet Take 1 tablet (100 mg total) by mouth once daily. 90 tablet 3    losartan (COZAAR) 50 MG tablet TAKE 1 TABLET(50 MG) BY MOUTH EVERY DAY 90 tablet 0    metformin (GLUCOPHAGE) 500 MG tablet TAKE 2 TABLETS BY MOUTH EVERY MORNING AND 1 TABLET BY MOUTH EVERY EVENING 270 tablet 1    metoprolol tartrate (LOPRESSOR) 25 MG tablet TAKE 1 TABLET(25 MG) BY MOUTH TWICE DAILY 180 tablet 3    omega-3 acid ethyl esters (LOVAZA) 1 gram capsule TK 2 CS PO BID  2    pravastatin (PRAVACHOL) 20 MG tablet TAKE 1 TABLET BY MOUTH EVERY DAY 90 tablet 2    TRUETEST TEST STRIPS Strp USE ONCE DAILY (Patient taking differently: twice daily) 100 strip 6    warfarin (COUMADIN) 4 MG tablet TAKE 1 TABLET BY  "MOUTH ON MONDAY AND FRIDAY AND ONE-HALF TABLET BY MOUTH ON ALL OTHER DAYS 57 tablet 3    warfarin (COUMADIN) 4 MG tablet TAKE 1 TABLET BY MOUTH ON MONDAY AND FRIDAY AND 1/2 TABLET BY MOUTH ON ALL OTHER DAYS 135 tablet 0    furosemide (LASIX) 40 MG tablet Take 1 tablet (40 mg total) by mouth daily as needed (leg swelling). 30 tablet 11     No current facility-administered medications for this visit.          ROS  Review of Systems   Constitutional: Negative for chills, fever and unexpected weight change.   Respiratory: Negative for cough, chest tightness, shortness of breath and wheezing.    Cardiovascular: Negative for chest pain, palpitations and leg swelling.   Gastrointestinal: Negative for abdominal pain, constipation, diarrhea, nausea and vomiting.   Genitourinary: Negative for decreased urine volume, dysuria and hematuria.   Musculoskeletal: Positive for myalgias. Negative for arthralgias and joint swelling.   Skin: Positive for color change. Negative for rash.   Neurological: Negative for dizziness, weakness, light-headedness and headaches.           Physical Exam  Vitals:    11/28/17 0909   BP: 120/60   Pulse: 60   Temp: 98.7 °F (37.1 °C)   SpO2: 97%   Weight: 47.6 kg (104 lb 15 oz)   Height: 5' 1" (1.549 m)    Body mass index is 19.83 kg/m².  Weight: 47.6 kg (104 lb 15 oz)   Height: 5' 1" (154.9 cm)   Physical Exam   Constitutional: She is oriented to person, place, and time. She appears well-developed and well-nourished. No distress.   HENT:   Head: Normocephalic and atraumatic.   Eyes: Conjunctivae, EOM and lids are normal. Pupils are equal, round, and reactive to light. No scleral icterus.   Neck: Full passive range of motion without pain. Neck supple. No JVD present. Carotid bruit is not present. No thyromegaly present.   Cardiovascular: Normal rate, regular rhythm, intact distal pulses and normal pulses.  Exam reveals no S3, no S4 and no friction rub.    Murmur heard.  Pulmonary/Chest: Effort normal " and breath sounds normal. She has no wheezes. She has no rhonchi. She has no rales.   Abdominal: Soft. Bowel sounds are normal. There is no tenderness.   Musculoskeletal: She exhibits no edema or tenderness.   Lymphadenopathy:        Head (right side): No submental and no submandibular adenopathy present.        Head (left side): No submental and no submandibular adenopathy present.     She has no cervical adenopathy.   Neurological: She is alert and oriented to person, place, and time.   Motor grossly intact.  Sensory grossly intact.  Symmetric facial movements palate elevated symmetrically tongue midline   Skin: Skin is warm and dry. Bruising noted. No rash noted.        Psychiatric: She has a normal mood and affect. Her speech is normal and behavior is normal. Thought content normal.           Health Maintenance       Date Due Completion Date    Pneumococcal (65+) (1 of 2 - PCV13) 07/21/2009 ---    Influenza Vaccine 08/01/2017 10/10/2016 (Done)    Override on 10/10/2016: Done    Override on 12/1/2014: Done    Hemoglobin A1c 02/15/2018 8/15/2017    Eye Exam 03/08/2018 3/8/2017 (Done)    Override on 3/8/2017: Done (Dr. Richmond Andrea/Optometry-positive for non-proliferative diabetic retinopathy)    Pap Smear 03/16/2018 3/16/2017    Mammogram 03/27/2018 3/27/2017    Foot Exam 08/14/2018 8/14/2017    Lipid Panel 08/15/2018 8/15/2017    DEXA SCAN 02/01/2019 2/1/2016    Override on 8/4/2014: Done    Colonoscopy 01/01/2020 1/1/2010 (Done)    Override on 1/1/2010: Done    Override on 1/1/2006: Done (repeat in 10 years)    TETANUS VACCINE 06/30/2026 6/30/2016 (Declined)    Override on 6/30/2016: Declined

## 2017-11-28 NOTE — PATIENT INSTRUCTIONS
I will be in touch with the INR results to see if we need to hold a dose.      Tylenol and some salonpas is ok for pain.     Use a heating pad on the lump in the thigh to help get this to go away

## 2017-11-29 ENCOUNTER — TELEPHONE (OUTPATIENT)
Dept: FAMILY MEDICINE | Facility: CLINIC | Age: 72
End: 2017-11-29

## 2017-11-29 ENCOUNTER — TELEPHONE (OUTPATIENT)
Dept: CARDIOLOGY | Facility: CLINIC | Age: 72
End: 2017-11-29

## 2017-11-29 NOTE — TELEPHONE ENCOUNTER
I sent her a [email or web address] message       Your INR returned at 4.1.  I would skip your next dose of warfarin and then restart it as per your usual dosing.  You may need to let the coumadin nurse or your cardiologist be aware of this in case they want to change your future dosing.

## 2017-11-29 NOTE — TELEPHONE ENCOUNTER
----- Message from Mari Ruiz MA sent at 11/29/2017  3:42 PM CST -----      ----- Message -----  From: Leah Fowler  Sent: 11/29/2017   1:45 PM  To: [facility] Staff    Patient is calling in regards to her INR. She can be reached at [phone]. Thank you!

## 2017-12-12 ENCOUNTER — OFFICE VISIT (OUTPATIENT)
Dept: CARDIOLOGY | Facility: CLINIC | Age: 72
End: 2017-12-12
Payer: MEDICARE

## 2017-12-12 VITALS
HEIGHT: 61 IN | DIASTOLIC BLOOD PRESSURE: 57 MMHG | SYSTOLIC BLOOD PRESSURE: 124 MMHG | BODY MASS INDEX: 19.94 KG/M2 | WEIGHT: 105.63 LBS | HEART RATE: 65 BPM | OXYGEN SATURATION: 95 %

## 2017-12-12 DIAGNOSIS — Z95.2 S/P MVR (MITRAL VALVE REPLACEMENT): ICD-10-CM

## 2017-12-12 DIAGNOSIS — E78.2 MIXED HYPERLIPIDEMIA: Chronic | ICD-10-CM

## 2017-12-12 DIAGNOSIS — I10 HTN (HYPERTENSION): ICD-10-CM

## 2017-12-12 DIAGNOSIS — Z95.2 S/P AVR: ICD-10-CM

## 2017-12-12 DIAGNOSIS — I48.20 CHRONIC ATRIAL FIBRILLATION: Primary | Chronic | ICD-10-CM

## 2017-12-12 DIAGNOSIS — E11.29 TYPE 2 DIABETES MELLITUS WITH MICROALBUMINURIA, WITHOUT LONG-TERM CURRENT USE OF INSULIN: Chronic | ICD-10-CM

## 2017-12-12 DIAGNOSIS — R80.9 TYPE 2 DIABETES MELLITUS WITH MICROALBUMINURIA, WITHOUT LONG-TERM CURRENT USE OF INSULIN: Chronic | ICD-10-CM

## 2017-12-12 DIAGNOSIS — R07.89 CHEST PAIN, ATYPICAL: ICD-10-CM

## 2017-12-12 PROCEDURE — 99999 PR PBB SHADOW E&M-EST. PATIENT-LVL IV: CPT | Mod: PBBFAC,,, | Performed by: INTERNAL MEDICINE

## 2017-12-12 PROCEDURE — 93280 PM DEVICE PROGR EVAL DUAL: CPT | Mod: S$GLB,,, | Performed by: INTERNAL MEDICINE

## 2017-12-12 PROCEDURE — 99214 OFFICE O/P EST MOD 30 MIN: CPT | Mod: S$GLB,,, | Performed by: INTERNAL MEDICINE

## 2017-12-12 PROCEDURE — 99499 UNLISTED E&M SERVICE: CPT | Mod: S$GLB,,, | Performed by: INTERNAL MEDICINE

## 2017-12-12 PROCEDURE — 93010 ELECTROCARDIOGRAM REPORT: CPT | Mod: S$GLB,,, | Performed by: INTERNAL MEDICINE

## 2017-12-12 RX ORDER — WARFARIN 4 MG/1
TABLET ORAL
Qty: 57 TABLET | Refills: 11 | Status: SHIPPED | OUTPATIENT
Start: 2017-12-12 | End: 2018-04-19 | Stop reason: ALTCHOICE

## 2017-12-12 RX ORDER — DIGOXIN 250 MCG
TABLET ORAL
Qty: 90 TABLET | Refills: 3 | Status: SHIPPED | OUTPATIENT
Start: 2017-12-12 | End: 2018-04-19

## 2017-12-12 NOTE — PROGRESS NOTES
Subjective:    Patient ID:  Orion Ty is a 72 y.o. female who presents for follow-up of Atrial Fibrillation      HPI     [employer] pacemaker last check 12/12/17     Mechanical AVR/MVR 2010 monitors coumadin at home  Chronic A-fib  [employer] pacemaker - last generator change 2011  HTN, DM, HLD     Echo 6/5/17    1 - Normal left ventricular systolic function (EF 55-60%).     2 - Concentric hypertrophy.     3 - Biatrial enlargement.     4 - Pulmonary hypertension. The estimated PA systolic pressure is 43 mmHg.     5 - Aortic valve mechanical prosthesis, effective prosthetic valve area corrected for BSA is 0.34 cm2.     6 - Mitral valve mechanical prosthesis - adequately functioning.     7 - Trivial aortic regurgitation.     8 - Trivial mitral regurgitation.     9 - Mild tricuspid regurgitation.     Stress test was ordered last visit but never done - patient refused    Denies further CP or SOB    Review of Systems   Constitution: Negative for decreased appetite.   HENT: Negative for ear discharge.    Eyes: Negative for blurred vision.   Respiratory: Negative for hemoptysis.    Endocrine: Negative for polyphagia.   Hematologic/Lymphatic: Negative for adenopathy.   Skin: Negative for color change.   Musculoskeletal: Negative for joint swelling.   Neurological: Negative for brief paralysis.   Psychiatric/Behavioral: Negative for hallucinations.        Objective:    Physical Exam   Constitutional: She is oriented to person, place, and time. She appears well-developed.   Cardiovascular: Normal rate.  An irregularly irregular rhythm present.   No murmur heard.  Pulses:       Radial pulses are 2+ on the right side, and 2+ on the left side.        Dorsalis pedis pulses are 2+ on the right side, and 2+ on the left side.   Mechanical S1 and S2   Pulmonary/Chest: Effort normal and breath sounds normal.   Abdominal: Soft. She exhibits no distension. There is no tenderness.   Musculoskeletal: She exhibits edema.    Neurological: She is alert and oriented to person, place, and time.   Skin: Skin is warm and dry.   Psychiatric: She has a normal mood and affect.   Nursing note and vitals reviewed.        Assessment:       1. Chronic atrial fibrillation    2. S/P AVR    3. S/P MVR (mitral valve replacement)    4. Type 2 diabetes mellitus with microalbuminuria, without long-term current use of insulin    5. Mixed hyperlipidemia    6. Chest pain, atypical         Plan:       Cardiac stable  OV 6 months with repeat echo

## 2017-12-30 ENCOUNTER — PATIENT MESSAGE (OUTPATIENT)
Dept: FAMILY MEDICINE | Facility: CLINIC | Age: 72
End: 2017-12-30

## 2018-01-03 RX ORDER — DEXTROSE 4 G
TABLET,CHEWABLE ORAL
Qty: 1 EACH | Refills: 1 | Status: SHIPPED | OUTPATIENT
Start: 2018-01-03 | End: 2020-03-12

## 2018-03-01 DIAGNOSIS — E03.8 SUBCLINICAL HYPOTHYROIDISM: ICD-10-CM

## 2018-03-01 DIAGNOSIS — I10 ESSENTIAL (PRIMARY) HYPERTENSION: Chronic | ICD-10-CM

## 2018-03-01 DIAGNOSIS — E78.2 COMBINED HYPERLIPIDEMIA ASSOCIATED WITH TYPE 2 DIABETES MELLITUS: ICD-10-CM

## 2018-03-01 DIAGNOSIS — E11.69 COMBINED HYPERLIPIDEMIA ASSOCIATED WITH TYPE 2 DIABETES MELLITUS: ICD-10-CM

## 2018-03-01 DIAGNOSIS — E11.3299 TYPE 2 DIABETES MELLITUS WITH MILD NONPROLIFERATIVE RETINOPATHY WITHOUT MACULAR EDEMA, WITHOUT LONG-TERM CURRENT USE OF INSULIN, UNSPECIFIED LATERALITY: Primary | ICD-10-CM

## 2018-03-01 RX ORDER — METFORMIN HYDROCHLORIDE 500 MG/1
TABLET ORAL
Qty: 270 TABLET | Refills: 0 | Status: SHIPPED | OUTPATIENT
Start: 2018-03-01 | End: 2018-07-09 | Stop reason: SDUPTHER

## 2018-03-01 NOTE — TELEPHONE ENCOUNTER
Refill approved.  Due for routine physical with labs prior.  Please sched.    Thank you,  Karthikeyan

## 2018-03-07 ENCOUNTER — TELEPHONE (OUTPATIENT)
Dept: FAMILY MEDICINE | Facility: CLINIC | Age: 73
End: 2018-03-07

## 2018-03-07 DIAGNOSIS — Z79.01 LONG TERM CURRENT USE OF ANTICOAGULANT: Primary | ICD-10-CM

## 2018-03-07 NOTE — TELEPHONE ENCOUNTER
----- Message from Sonia Godoy sent at 3/7/2018  3:20 PM CST -----  Contact: [phone]  Pt wants the orders for the coumadin blood work be added to the pt  visit tomorrow morning at 7 Please call pt at your earliest convenience.  Thanks !

## 2018-03-08 ENCOUNTER — LAB VISIT (OUTPATIENT)
Dept: LAB | Facility: HOSPITAL | Age: 73
End: 2018-03-08
Attending: INTERNAL MEDICINE
Payer: MEDICARE

## 2018-03-08 DIAGNOSIS — Z79.01 LONG TERM CURRENT USE OF ANTICOAGULANT: ICD-10-CM

## 2018-03-08 DIAGNOSIS — E11.69 COMBINED HYPERLIPIDEMIA ASSOCIATED WITH TYPE 2 DIABETES MELLITUS: ICD-10-CM

## 2018-03-08 DIAGNOSIS — E78.2 COMBINED HYPERLIPIDEMIA ASSOCIATED WITH TYPE 2 DIABETES MELLITUS: ICD-10-CM

## 2018-03-08 DIAGNOSIS — E11.3299 TYPE 2 DIABETES MELLITUS WITH MILD NONPROLIFERATIVE RETINOPATHY WITHOUT MACULAR EDEMA, WITHOUT LONG-TERM CURRENT USE OF INSULIN, UNSPECIFIED LATERALITY: ICD-10-CM

## 2018-03-08 DIAGNOSIS — E03.8 SUBCLINICAL HYPOTHYROIDISM: ICD-10-CM

## 2018-03-08 DIAGNOSIS — I10 ESSENTIAL (PRIMARY) HYPERTENSION: Chronic | ICD-10-CM

## 2018-03-08 LAB
ALBUMIN SERPL BCP-MCNC: 4.3 G/DL
ALP SERPL-CCNC: 52 U/L
ALT SERPL W/O P-5'-P-CCNC: 30 U/L
ANION GAP SERPL CALC-SCNC: 8 MMOL/L
AST SERPL-CCNC: 41 U/L
BASOPHILS # BLD AUTO: 0.05 K/UL
BASOPHILS NFR BLD: 0.8 %
BILIRUB SERPL-MCNC: 0.8 MG/DL
BUN SERPL-MCNC: 13 MG/DL
CALCIUM SERPL-MCNC: 10 MG/DL
CHLORIDE SERPL-SCNC: 106 MMOL/L
CHOLEST SERPL-MCNC: 140 MG/DL
CHOLEST/HDLC SERPL: 3.7 {RATIO}
CO2 SERPL-SCNC: 28 MMOL/L
CREAT SERPL-MCNC: 0.9 MG/DL
DIFFERENTIAL METHOD: ABNORMAL
EOSINOPHIL # BLD AUTO: 0.2 K/UL
EOSINOPHIL NFR BLD: 2.3 %
ERYTHROCYTE [DISTWIDTH] IN BLOOD BY AUTOMATED COUNT: 13.8 %
EST. GFR  (AFRICAN AMERICAN): >60 ML/MIN/1.73 M^2
EST. GFR  (NON AFRICAN AMERICAN): >60 ML/MIN/1.73 M^2
ESTIMATED AVG GLUCOSE: 131 MG/DL
GLUCOSE SERPL-MCNC: 152 MG/DL
HBA1C MFR BLD HPLC: 6.2 %
HCT VFR BLD AUTO: 38.6 %
HDLC SERPL-MCNC: 38 MG/DL
HDLC SERPL: 27.1 %
HGB BLD-MCNC: 12.5 G/DL
IMM GRANULOCYTES # BLD AUTO: 0.01 K/UL
IMM GRANULOCYTES NFR BLD AUTO: 0.2 %
INR PPP: 3.1
LDLC SERPL CALC-MCNC: 72.8 MG/DL
LYMPHOCYTES # BLD AUTO: 1.8 K/UL
LYMPHOCYTES NFR BLD: 28.4 %
MCH RBC QN AUTO: 30.5 PG
MCHC RBC AUTO-ENTMCNC: 32.4 G/DL
MCV RBC AUTO: 94 FL
MONOCYTES # BLD AUTO: 0.4 K/UL
MONOCYTES NFR BLD: 5.5 %
NEUTROPHILS # BLD AUTO: 4.1 K/UL
NEUTROPHILS NFR BLD: 62.8 %
NONHDLC SERPL-MCNC: 102 MG/DL
NRBC BLD-RTO: 0 /100 WBC
PLATELET # BLD AUTO: 82 K/UL
PMV BLD AUTO: 12.9 FL
POTASSIUM SERPL-SCNC: 4.5 MMOL/L
PROT SERPL-MCNC: 7.4 G/DL
PROTHROMBIN TIME: 30.4 SEC
RBC # BLD AUTO: 4.1 M/UL
SODIUM SERPL-SCNC: 142 MMOL/L
T4 FREE SERPL-MCNC: 0.99 NG/DL
TRIGL SERPL-MCNC: 146 MG/DL
TSH SERPL DL<=0.005 MIU/L-ACNC: 7.04 UIU/ML
WBC # BLD AUTO: 6.49 K/UL

## 2018-03-08 PROCEDURE — 36415 COLL VENOUS BLD VENIPUNCTURE: CPT | Mod: PO

## 2018-03-08 PROCEDURE — 80053 COMPREHEN METABOLIC PANEL: CPT

## 2018-03-08 PROCEDURE — 84443 ASSAY THYROID STIM HORMONE: CPT

## 2018-03-08 PROCEDURE — 85025 COMPLETE CBC W/AUTO DIFF WBC: CPT

## 2018-03-08 PROCEDURE — 84439 ASSAY OF FREE THYROXINE: CPT

## 2018-03-08 PROCEDURE — 80061 LIPID PANEL: CPT

## 2018-03-08 PROCEDURE — 85610 PROTHROMBIN TIME: CPT

## 2018-03-08 PROCEDURE — 83036 HEMOGLOBIN GLYCOSYLATED A1C: CPT

## 2018-03-14 ENCOUNTER — OFFICE VISIT (OUTPATIENT)
Dept: FAMILY MEDICINE | Facility: CLINIC | Age: 73
End: 2018-03-14
Payer: MEDICARE

## 2018-03-14 VITALS
OXYGEN SATURATION: 96 % | BODY MASS INDEX: 20 KG/M2 | TEMPERATURE: 98 F | WEIGHT: 105.94 LBS | SYSTOLIC BLOOD PRESSURE: 138 MMHG | DIASTOLIC BLOOD PRESSURE: 70 MMHG | HEIGHT: 61 IN | HEART RATE: 60 BPM

## 2018-03-14 DIAGNOSIS — I48.20 CHRONIC ATRIAL FIBRILLATION: Chronic | ICD-10-CM

## 2018-03-14 DIAGNOSIS — E03.8 SUBCLINICAL HYPOTHYROIDISM: ICD-10-CM

## 2018-03-14 DIAGNOSIS — I70.0 ATHEROSCLEROSIS OF AORTA: Chronic | ICD-10-CM

## 2018-03-14 DIAGNOSIS — R80.9 TYPE 2 DIABETES MELLITUS WITH MICROALBUMINURIA, WITHOUT LONG-TERM CURRENT USE OF INSULIN: Chronic | ICD-10-CM

## 2018-03-14 DIAGNOSIS — E11.29 TYPE 2 DIABETES MELLITUS WITH MICROALBUMINURIA, WITHOUT LONG-TERM CURRENT USE OF INSULIN: Chronic | ICD-10-CM

## 2018-03-14 DIAGNOSIS — E78.2 MIXED HYPERLIPIDEMIA: Chronic | ICD-10-CM

## 2018-03-14 DIAGNOSIS — Z00.00 ROUTINE MEDICAL EXAM: Primary | ICD-10-CM

## 2018-03-14 DIAGNOSIS — E11.3299 TYPE 2 DIABETES MELLITUS WITH MILD NONPROLIFERATIVE RETINOPATHY WITHOUT MACULAR EDEMA, WITHOUT LONG-TERM CURRENT USE OF INSULIN, UNSPECIFIED LATERALITY: ICD-10-CM

## 2018-03-14 DIAGNOSIS — I10 ESSENTIAL (PRIMARY) HYPERTENSION: Chronic | ICD-10-CM

## 2018-03-14 PROBLEM — R07.89 CHEST PAIN, ATYPICAL: Status: RESOLVED | Noted: 2017-06-22 | Resolved: 2018-03-14

## 2018-03-14 PROCEDURE — 99499 UNLISTED E&M SERVICE: CPT | Mod: S$GLB,,, | Performed by: INTERNAL MEDICINE

## 2018-03-14 PROCEDURE — 99999 PR PBB SHADOW E&M-EST. PATIENT-LVL III: CPT | Mod: PBBFAC,,, | Performed by: INTERNAL MEDICINE

## 2018-03-14 PROCEDURE — 99397 PER PM REEVAL EST PAT 65+ YR: CPT | Mod: S$GLB,,, | Performed by: INTERNAL MEDICINE

## 2018-03-14 NOTE — PROGRESS NOTES
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  Keep eye appointments            Cardiac/Vascular    Atrial fibrillation (Chronic)    Current Assessment & Plan     The current medical regimen is effective;  continue present plan and medications. Requesting to change to Dr. Manzanares         Atherosclerosis of aorta (Chronic)    Overview     Seen on Echo x2  2014.  Stable, asymptomatic chronic condition.  Will continue to maximize risk factor reduction and adjust medication as needed.          Mixed hyperlipidemia (Chronic)    Current Assessment & Plan     The current medical regimen is effective;  continue present plan and medications.          Essential (primary) hypertension (Chronic)    Current Assessment & Plan     The current medical regimen is effective;  continue present plan and medications.             Endocrine    Subclinical hypothyroidism (Chronic)    Current Assessment & Plan     Slowly increasing TSH.  Asymptomatic.  Monitor labs         Relevant Orders    TSH    Type 2 diabetes mellitus with microalbuminuria, without long-term current use of insulin (Chronic)    Current Assessment & Plan     The current medical regimen is effective;  continue present plan and medications.          Relevant Orders    Basic metabolic panel    Hemoglobin A1c      Other Visit Diagnoses     Routine medical exam    -  Primary  -    Discussed healthy diet, regular exercise, necessary labs, age appropriate cancer screening, and routine vaccinations.   She is getting her MMG and DEXA from [facility] Maintenance reviewed, needs to see OBGYN.    Follow-up: Follow-up in about 6 months (around 9/14/2018) for follow up for chronic conditions.  ______________________________________________________________________    Chief Complaint  Chief Complaint   Patient presents with     Annual Exam       HPI  Orion Ty is a 72 y.o. female with medical diagnoses as listed in the medical history and problem list that presents for routine physical.  Pt is known to me with her last appointment 11/28/2017.  She had labs prior to her OV that showed stable CBC except for low platelets that is stable.  CMP stable, A1c well controlled and LDL controlled.  TSH is slowly increasing but remains in a subclinical state    No acute complaints.  No temperature intolerance, Gi changes, weight gain, fatigue.        PAST MEDICAL HISTORY:  Past Medical History:   Diagnosis Date    Closed displaced fracture of distal phalanx of lesser toe 10/20/2015    Diabetes mellitus     Diabetes mellitus, type 2     Hypertension     Osteopenia     Pacemaker     Rheumatic heart disease        PAST SURGICAL HISTORY:  Past Surgical History:   Procedure Laterality Date    CARDIAC PACEMAKER PLACEMENT      CARDIAC VALVE REPLACEMENT      CARDIAC VALVE SURGERY      thryoid s         SOCIAL HISTORY:  Social History     Social History    Marital status:      Spouse name: N/A    Number of children: N/A    Years of education: N/A     Occupational History    Not on file.     Social History Main Topics    Smoking status: Never Smoker    Smokeless tobacco: Never Used    Alcohol use No    Drug use: Unknown    Sexual activity: Not on file     Other Topics Concern    Not on file     Social History Narrative    No narrative on file       FAMILY HISTORY:  Family History   Problem Relation Age of Onset    Breast cancer Neg Hx     Colon cancer Neg Hx     Ovarian cancer Neg Hx        ALLERGIES AND MEDICATIONS: updated and reviewed.  Review of patient's allergies indicates:  No Known Allergies  Current Outpatient Prescriptions   Medication Sig Dispense Refill    ACCU-CHEK GATO CONTROL SOLN Soln       amlodipine (NORVASC) 5 MG tablet Take 1 tablet (5 mg total) by mouth once daily. 90 tablet 3    amlodipine  (NORVASC) 5 MG tablet TAKE 1 TABLET BY MOUTH DAILY 90 tablet 0    blood-glucose meter Misc Accu-chek [place] Glucose Meter, Use to test blood sugar once daily. 1 each 1    digoxin (DIGOX) 250 mcg tablet TAKE 1 TABLET(0.25 MG) BY MOUTH EVERY DAY 90 tablet 3    lancets 30 gauge Misc       lancing device Misc       losartan (COZAAR) 100 MG tablet Take 1 tablet (100 mg total) by mouth once daily. 90 tablet 3    losartan (COZAAR) 50 MG tablet TAKE 1 TABLET(50 MG) BY MOUTH EVERY DAY 90 tablet 0    metFORMIN (GLUCOPHAGE) 500 MG tablet TAKE 2 TABLETS EVERY MORNING AND 1 TABLET EVERY EVENING 270 tablet 0    metoprolol tartrate (LOPRESSOR) 25 MG tablet TAKE 1 TABLET(25 MG) BY MOUTH TWICE DAILY 180 tablet 3    omega-3 acid ethyl esters (LOVAZA) 1 gram capsule TK 2 CS PO BID  2    pravastatin (PRAVACHOL) 20 MG tablet TAKE 1 TABLET BY MOUTH EVERY DAY 90 tablet 2    TRUETEST TEST STRIPS Strp USE ONCE DAILY (Patient taking differently: twice daily) 100 strip 6    warfarin (COUMADIN) 4 MG tablet TAKE 1 TABLET BY MOUTH ON MONDAY AND FRIDAY AND 1/2 TABLET BY MOUTH ON ALL OTHER DAYS 135 tablet 0    warfarin (COUMADIN) 4 MG tablet TAKE 1 TABLET BY MOUTH ON MONDAY AND FRIDAY AND ONE-HALF TABLET BY MOUTH ON ALL OTHER DAYS 57 tablet 11    FLUZONE HIGH-DOSE 2017-18, PF, 180 mcg/0.5 mL vaccine ADM 0.5ML IM UTD  0    furosemide (LASIX) 40 MG tablet Take 1 tablet (40 mg total) by mouth daily as needed (leg swelling). 30 tablet 11     No current facility-administered medications for this visit.          ROS  Review of Systems   Constitutional: Negative for chills, fever and unexpected weight change.   HENT: Negative for congestion, ear pain, hearing loss, rhinorrhea, sore throat and trouble swallowing.    Eyes: Negative for discharge, redness and visual disturbance.   Respiratory: Negative for cough, chest tightness, shortness of breath and wheezing.    Cardiovascular: Negative for chest pain, palpitations and leg swelling.  "  Gastrointestinal: Negative for abdominal pain, constipation, diarrhea, nausea and vomiting.   Endocrine: Negative for polydipsia, polyphagia and polyuria.   Genitourinary: Negative for decreased urine volume, dysuria and hematuria.   Musculoskeletal: Negative for arthralgias, joint swelling and myalgias.   Skin: Negative for color change and rash.   Neurological: Negative for dizziness, weakness, light-headedness and headaches.   Psychiatric/Behavioral: Negative for decreased concentration, dysphoric mood, sleep disturbance and suicidal ideas.           Physical Exam  Vitals:    03/14/18 0803   BP: 138/70   Pulse: 60   Temp: 98.3 °F (36.8 °C)   SpO2: 96%   Weight: 48.1 kg (105 lb 14.9 oz)   Height: 5' 1" (1.549 m)    Body mass index is 20.02 kg/m².  Weight: 48.1 kg (105 lb 14.9 oz)   Height: 5' 1" (154.9 cm)   Physical Exam   Constitutional: She is oriented to person, place, and time. She appears well-developed and well-nourished. No distress.   HENT:   Head: Normocephalic and atraumatic.   Right Ear: Tympanic membrane, external ear and ear canal normal.   Left Ear: Tympanic membrane, external ear and ear canal normal.   Nose: Nose normal. Right sinus exhibits no maxillary sinus tenderness and no frontal sinus tenderness. Left sinus exhibits no maxillary sinus tenderness and no frontal sinus tenderness.   Mouth/Throat: Uvula is midline, oropharynx is clear and moist and mucous membranes are normal. No tonsillar exudate.   Eyes: Conjunctivae, EOM and lids are normal. Pupils are equal, round, and reactive to light. No scleral icterus.   Neck: Full passive range of motion without pain. Neck supple. No JVD present. No spinous process tenderness and no muscular tenderness present. Carotid bruit is not present. No thyromegaly present.   Cardiovascular: Normal rate, regular rhythm, S1 normal, S2 normal and intact distal pulses.  Exam reveals no S3, no S4 and no friction rub.    Murmur heard.   Systolic murmur is present " with a grade of 3/6   Pulmonary/Chest: Effort normal and breath sounds normal. She has no wheezes. She has no rhonchi. She has no rales.   Abdominal: Soft. Bowel sounds are normal. She exhibits no distension. There is no hepatosplenomegaly. There is no tenderness. There is no rebound and no CVA tenderness.   Musculoskeletal: Normal range of motion. She exhibits no edema or tenderness.   Lymphadenopathy:        Head (right side): No submental and no submandibular adenopathy present.        Head (left side): No submental and no submandibular adenopathy present.     She has no cervical adenopathy.   Neurological: She is alert and oriented to person, place, and time. Coordination normal.   Motor grossly intact.  Sensory grossly intact.  Symmetric facial movements palate elevated symmetrically tongue midline     Skin: Skin is warm and dry. No rash noted. No cyanosis. Nails show no clubbing.   Psychiatric: She has a normal mood and affect. Her speech is normal and behavior is normal. Thought content normal. Cognition and memory are normal.           Health Maintenance       Date Due Completion Date    Pneumococcal (65+) (1 of 2 - PCV13) 07/21/2009 ---    Pap Smear 03/16/2018 3/16/2017    Mammogram 03/27/2018 3/27/2017    Foot Exam 08/14/2018 8/14/2017    Eye Exam 09/06/2018 9/6/2017    Override on 3/8/2017: Done (Dr. Richmond Andrea/Optometry-positive for non-proliferative diabetic retinopathy)    Hemoglobin A1c 09/08/2018 3/8/2018    High Dose Statin 12/12/2018 12/12/2017    DEXA SCAN 02/01/2019 2/1/2016    Override on 8/4/2014: Done    Lipid Panel 03/08/2019 3/8/2018    Colonoscopy 01/01/2020 1/1/2010 (Done)    Override on 1/1/2010: Done    Override on 1/1/2006: Done (repeat in 10 years)    TETANUS VACCINE 06/30/2026 6/30/2016 (Declined)    Override on 6/30/2016: Declined

## 2018-03-14 NOTE — PROGRESS NOTES
Patient, Orion Ty (MRN #[id number]), presented with a recent Platelet count less than 150 K/uL consistent with the definition of thrombocytopenia (ICD10 - D69.6).    Platelets   Date Value Ref Range Status   03/08/2018 82 (L) 150 - 350 K/uL Final     The patient's thrombocytopenia was monitored, evaluated, addressed and/or treated. This addendum to the medical record is made on 03/14/2018.

## 2018-03-14 NOTE — ASSESSMENT & PLAN NOTE
The current medical regimen is effective;  continue present plan and medications. Requesting to change to Dr. Manzanares

## 2018-03-27 RX ORDER — PRAVASTATIN SODIUM 20 MG/1
TABLET ORAL
Qty: 90 TABLET | Refills: 3 | Status: SHIPPED | OUTPATIENT
Start: 2018-03-27 | End: 2019-02-15 | Stop reason: SDUPTHER

## 2018-04-19 ENCOUNTER — OFFICE VISIT (OUTPATIENT)
Dept: CARDIOLOGY | Facility: CLINIC | Age: 73
End: 2018-04-19
Payer: MEDICARE

## 2018-04-19 ENCOUNTER — ANTI-COAG VISIT (OUTPATIENT)
Dept: CARDIOLOGY | Facility: CLINIC | Age: 73
End: 2018-04-19

## 2018-04-19 VITALS
WEIGHT: 103.38 LBS | DIASTOLIC BLOOD PRESSURE: 65 MMHG | RESPIRATION RATE: 15 BRPM | BODY MASS INDEX: 19.52 KG/M2 | SYSTOLIC BLOOD PRESSURE: 138 MMHG | OXYGEN SATURATION: 98 % | HEIGHT: 61 IN | HEART RATE: 61 BPM

## 2018-04-19 DIAGNOSIS — Z79.01 LONG TERM CURRENT USE OF ANTICOAGULANT: ICD-10-CM

## 2018-04-19 DIAGNOSIS — I48.20 CHRONIC ATRIAL FIBRILLATION: ICD-10-CM

## 2018-04-19 DIAGNOSIS — I09.9 RHEUMATIC HEART DISEASE: Chronic | ICD-10-CM

## 2018-04-19 DIAGNOSIS — Z95.2 S/P AVR: ICD-10-CM

## 2018-04-19 DIAGNOSIS — I10 ESSENTIAL (PRIMARY) HYPERTENSION: Chronic | ICD-10-CM

## 2018-04-19 DIAGNOSIS — E11.3299 TYPE 2 DIABETES MELLITUS WITH MILD NONPROLIFERATIVE RETINOPATHY WITHOUT MACULAR EDEMA, WITHOUT LONG-TERM CURRENT USE OF INSULIN, UNSPECIFIED LATERALITY: Primary | Chronic | ICD-10-CM

## 2018-04-19 DIAGNOSIS — R80.9 TYPE 2 DIABETES MELLITUS WITH MICROALBUMINURIA, WITHOUT LONG-TERM CURRENT USE OF INSULIN: Chronic | ICD-10-CM

## 2018-04-19 DIAGNOSIS — Z95.2 S/P MVR (MITRAL VALVE REPLACEMENT): ICD-10-CM

## 2018-04-19 DIAGNOSIS — E78.2 MIXED HYPERLIPIDEMIA: Chronic | ICD-10-CM

## 2018-04-19 DIAGNOSIS — Z95.0 CARDIAC PACEMAKER IN SITU: ICD-10-CM

## 2018-04-19 DIAGNOSIS — E11.29 TYPE 2 DIABETES MELLITUS WITH MICROALBUMINURIA, WITHOUT LONG-TERM CURRENT USE OF INSULIN: Chronic | ICD-10-CM

## 2018-04-19 PROCEDURE — 99999 PR PBB SHADOW E&M-EST. PATIENT-LVL III: CPT | Mod: PBBFAC,,, | Performed by: INTERNAL MEDICINE

## 2018-04-19 PROCEDURE — 99215 OFFICE O/P EST HI 40 MIN: CPT | Mod: S$GLB,,, | Performed by: INTERNAL MEDICINE

## 2018-04-19 PROCEDURE — 93000 ELECTROCARDIOGRAM COMPLETE: CPT | Mod: S$GLB,,, | Performed by: INTERNAL MEDICINE

## 2018-04-19 PROCEDURE — 3078F DIAST BP <80 MM HG: CPT | Mod: CPTII,S$GLB,, | Performed by: INTERNAL MEDICINE

## 2018-04-19 PROCEDURE — 99499 UNLISTED E&M SERVICE: CPT | Mod: S$GLB,,, | Performed by: INTERNAL MEDICINE

## 2018-04-19 PROCEDURE — 3075F SYST BP GE 130 - 139MM HG: CPT | Mod: CPTII,S$GLB,, | Performed by: INTERNAL MEDICINE

## 2018-04-19 PROCEDURE — 3044F HG A1C LEVEL LT 7.0%: CPT | Mod: CPTII,S$GLB,, | Performed by: INTERNAL MEDICINE

## 2018-04-19 RX ORDER — LOSARTAN POTASSIUM 50 MG/1
50 TABLET ORAL DAILY
Qty: 90 TABLET | Refills: 3 | Status: SHIPPED | OUTPATIENT
Start: 2018-04-19 | End: 2019-02-15 | Stop reason: SDUPTHER

## 2018-04-19 NOTE — PROGRESS NOTES
CARDIOVASCULAR PROGRESS NOTE    REASON FOR CONSULT:   Orion Ty is a 72 y.o. female who presents for follow up of AF/AVR/MVR.    PCP: Mesha  HISTORY OF PRESENT ILLNESS:   Previously seen by Dr. Boateng, but requesting to change providers.    The patient is seen accompanied by her  who aids with Kittitian interpretation.  The patient denies chest discomfort or shortness of breath.  She's had no palpitations, lightheadedness, dizziness, or syncope.  There's been no PND, orthopnea, or lower extremity edema.  She denies melena, hematuria, or claudicant symptoms.    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) monitors coumadin at home  ?severe prosthetic AS (echo 12/2017)  Chronic A-fib  Medtronic pacemaker - last generator change 2011    PAST MEDICAL HISTORY:     Past Medical History:   Diagnosis Date    Closed displaced fracture of distal phalanx of lesser toe 10/20/2015    Diabetes mellitus     Diabetes mellitus, type 2     Hypertension     Osteopenia     Pacemaker     Rheumatic heart disease        PAST SURGICAL HISTORY:     Past Surgical History:   Procedure Laterality Date    CARDIAC PACEMAKER PLACEMENT      CARDIAC VALVE REPLACEMENT      CARDIAC VALVE SURGERY      thryoid s         ALLERGIES AND MEDICATION:   Review of patient's allergies indicates:  No Known Allergies  Previous Medications    ACCU-CHEK GATO CONTROL SOLN SOLN        AMLODIPINE (NORVASC) 5 MG TABLET    Take 1 tablet (5 mg total) by mouth once daily.    BLOOD-GLUCOSE METER MISC    Accu-chek Gato Glucose Meter, Use to test blood sugar once daily.    DIGOXIN (DIGOX) 250 MCG TABLET    TAKE 1 TABLET(0.25 MG) BY MOUTH EVERY DAY    FLUZONE HIGH-DOSE 2017-18, PF, 180 MCG/0.5 ML VACCINE    ADM 0.5ML IM UTD    FUROSEMIDE (LASIX) 40 MG TABLET    Take 1 tablet (40 mg total) by mouth daily as needed (leg swelling).    [facility]        LANCING DEVICE MISC        LOSARTAN (COZAAR) 50 MG TABLET     TAKE 1 TABLET(50 MG) BY MOUTH EVERY DAY    METFORMIN (GLUCOPHAGE) 500 MG TABLET    TAKE 2 TABLETS EVERY MORNING AND 1 TABLET EVERY EVENING    METOPROLOL TARTRATE (LOPRESSOR) 25 MG TABLET    TAKE 1 TABLET(25 MG) BY MOUTH TWICE DAILY    OMEGA-3 ACID ETHYL ESTERS (LOVAZA) 1 GRAM CAPSULE    TK 2 CS PO BID    PRAVASTATIN (PRAVACHOL) 20 MG TABLET    TAKE 1 TABLET BY MOUTH EVERY DAY    TRUETEST TEST STRIPS STRP    USE ONCE DAILY    WARFARIN (COUMADIN) 4 MG TABLET    TAKE 1 TABLET BY MOUTH ON MONDAY AND FRIDAY AND 1/2 TABLET BY MOUTH ON ALL OTHER DAYS       SOCIAL HISTORY:     Social History     Social History    Marital status:      Spouse name: N/A    Number of children: N/A    Years of education: N/A     Occupational History    Not on file.     Social History Main Topics    Smoking status: Never Smoker    Smokeless tobacco: Never Used    Alcohol use No    Drug use: Unknown    Sexual activity: Not on file     Other Topics Concern    Not on file     Social History Narrative    No narrative on file       FAMILY HISTORY:     Family History   Problem Relation Age of Onset    Breast cancer Neg Hx     Colon cancer Neg Hx     Ovarian cancer Neg Hx        REVIEW OF SYSTEMS:   Review of Systems   Constitutional: Negative for chills, diaphoresis and fever.   HENT: Negative for nosebleeds.    Eyes: Negative for blurred vision, double vision and photophobia.   Respiratory: Negative for hemoptysis, shortness of breath and wheezing.    Cardiovascular: Negative for chest pain, palpitations, orthopnea, claudication, leg swelling and PND.   Gastrointestinal: Negative for abdominal pain, blood in stool, heartburn, melena, nausea and vomiting.   Genitourinary: Negative for flank pain and hematuria.   Musculoskeletal: Negative for falls, myalgias and neck pain.   Skin: Negative for rash.   Neurological: Negative for dizziness, seizures, loss of consciousness, weakness and headaches.   Endo/Heme/Allergies: Negative for  "polydipsia. Does not bruise/bleed easily.   Psychiatric/Behavioral: Negative for depression and memory loss. The patient is not nervous/anxious.        PHYSICAL EXAM:     Vitals:    04/19/18 0826   BP: 138/65   Pulse: 61   Resp: 15    Body mass index is 19.54 kg/m².  Weight: 46.9 kg (103 lb 6.3 oz)   Height: 5' 1" (154.9 cm)     Physical Exam   Constitutional: She is oriented to person, place, and time. She appears well-developed and well-nourished. She is cooperative.  Non-toxic appearance. No distress.   HENT:   Head: Normocephalic and atraumatic.   Eyes: Conjunctivae and EOM are normal. Pupils are equal, round, and reactive to light. No scleral icterus.   Neck: Trachea normal. Neck supple. Normal carotid pulses and no JVD present. Carotid bruit is not present. No neck rigidity. No edema present. No thyromegaly present.   Cardiovascular: Normal rate, regular rhythm, S1 normal and S2 normal.  PMI is not displaced.  Exam reveals no gallop and no friction rub.    Murmur heard.   Systolic murmur is present with a grade of 2/6   Pulses:       Carotid pulses are 2+ on the right side, and 2+ on the left side.  Crisp Mech S1S2.  Systolic murmur radiates to carotids   Pulmonary/Chest: Effort normal and breath sounds normal. No respiratory distress. She has no wheezes. She has no rales. She exhibits no tenderness.   Abdominal: Soft. Bowel sounds are normal. She exhibits no distension. There is no hepatosplenomegaly.   Musculoskeletal: She exhibits no edema or tenderness.   Feet:   Right Foot:   Skin Integrity: Negative for ulcer.   Left Foot:   Skin Integrity: Negative for ulcer.   Neurological: She is alert and oriented to person, place, and time. No cranial nerve deficit.   Skin: Skin is warm and dry. No rash noted. No erythema.   Psychiatric: She has a normal mood and affect. Her speech is normal and behavior is normal.   Vitals reviewed.      DATA:   EKG: (personally reviewed tracing)  4/19/18  60, similar to " 12/12/17    Laboratory:  CBC:    Recent Labs  Lab 05/27/16  0700 03/14/17  0735 03/08/18  0736   WHITE BLOOD CELL COUNT 7.14 6.84 6.49   HEMOGLOBIN 12.0 12.2 12.5   HEMATOCRIT 36.4 L 38.6 38.6   PLATELETS 98 L 91 L 82 L       CHEMISTRIES:    Recent Labs  Lab 03/14/17  0735 04/27/17  0905 08/15/17  0745 03/08/18  0736   GLUCOSE 149 H  --  149 H 152 H   SODIUM 140  --  140 142   POTASSIUM 4.5  --  4.8 4.5   BUN BLD 12 16 12 13   CREATININE 0.8 0.9 0.8 0.9   EGFR IF  >60.0 >60 >60.0 >60.0   EGFR IF NON- >60.0 >60 >60.0 >60.0   CALCIUM 10.0  --  9.7 10.0       CARDIAC BIOMARKERS:        COAGS:    Recent Labs  Lab 05/30/16  1045 11/28/17  0934 03/08/18  0736   INR 2.9 H 4.1 H 3.1 H       LIPIDS/LFTS:    Recent Labs  Lab 03/14/17  0735 08/15/17  0745 03/08/18  0736   CHOLESTEROL 139  139 127 140   TRIGLYCERIDES 269 H  269 H 229 H 146   HDL 30 L  30 L 30 L 38 L   LDL CHOLESTEROL 55.2 L  55.2 L 51.2 L 72.8   NON-HDL CHOLESTEROL 109  109 97 102   AST 45 H 32 41 H   ALT 23 21 30     Lab Results   Component Value Date    TSH 7.037 (H) 03/08/2018     Free T4 0.99 (3/8/18)    Cardiovascular Testing:  Echo 6/5/17    1 - Normal left ventricular systolic function (EF 55-60%).     2 - Concentric hypertrophy.     3 - Biatrial enlargement.     4 - Pulmonary hypertension. The estimated PA systolic pressure is 43 mmHg.     5 - Aortic valve mechanical prosthesis, effective prosthetic valve area corrected for BSA is 0.34 cm2.     6 - Mitral valve mechanical prosthesis - adequately functioning.     7 - Trivial aortic regurgitation.     8 - Trivial mitral regurgitation.     9 - Mild tricuspid regurgitation.     L MPI 2/20/15  Nuclear Quantitative Functional Analysis:   LVEF: 57 %  Impression: EQUIVOCAL MYOCARDIAL PERFUSION  1. There is a mild to moderate mostly fixed lateral wall defect of uncertain significance.   2. The perfusion scan is free of evidence for myocardial ischemia.   3. There is abnormal  wall motion at rest showing mild hypokinesis of the anteroseptal wall of the left ventricle.   4. Resting LV function is normal.   5. The ventricular volumes are normal at rest and stress.   6. The extracardiac distribution of radioactivity is normal.   7. There was no previous study available to compare.    ASSESSMENT:   # [facility] AVR/MVR, echo suggests severe prosthetic AVR stenosis, asymptomatic  # HTN, controlled  # Chr AF, on coumadin (goal INR 2.5-3.5 given [facility] valves)  # HLP on prava 40mg  # DM  # thrombocytopenia    PLAN:   Cont med rx  Stop digoxin, increase metoprolol as a contingency  Refer to coumadin clinic for monitoring, pt checks INR at home  RTC 6 months with echo and Medtronic PPM check  Pt agreeable to enrollment in digital htn program      Ludwig Grigsby MD, FACC

## 2018-04-19 NOTE — PROGRESS NOTES
72 year old patient with Past medical history including atrial fibrillation, rheumatic heart disease, mixed hyperlipidemia, hypertension, subclinical hypothyroidism DM2, s/p mechanica AVR & MVR 2010, pulmonary hypertension, thrombocytopenia. Per medication card, She is on coumadin 4mg tablets (1 tablet Mondays and Fridays and 2mg all other days).

## 2018-04-20 NOTE — PROGRESS NOTES
I was able to reach patient's . She is currently taking coumadin Mon/Fri 4mg, all other days 2mg.  Per , she has been on coumadin several years and has her own [employer] Meter, which she would like to continue to use. They will come in Tuesday for a meter check and to sign home monitoring contract and to assess INR.

## 2018-04-24 ENCOUNTER — ANTI-COAG VISIT (OUTPATIENT)
Dept: CARDIOLOGY | Facility: CLINIC | Age: 73
End: 2018-04-24
Payer: MEDICARE

## 2018-04-24 DIAGNOSIS — Z95.2 S/P AVR: ICD-10-CM

## 2018-04-24 DIAGNOSIS — Z95.2 S/P MVR (MITRAL VALVE REPLACEMENT): ICD-10-CM

## 2018-04-24 DIAGNOSIS — Z79.01 LONG TERM CURRENT USE OF ANTICOAGULANT: ICD-10-CM

## 2018-04-24 DIAGNOSIS — I48.20 CHRONIC ATRIAL FIBRILLATION: ICD-10-CM

## 2018-04-24 LAB — INR PPP: 2.5

## 2018-04-24 NOTE — PROGRESS NOTES
Patient and her  are here for a Meter check. Patient demonstrated proper technique and knowledge with meter. INR within therapeutic range today. Patient denies any bleeding, bruising or other changes. Home monitoring contract presented to patient and her . It was read and signed by both. Advised patient and her  to maintain current dose and re test in 3 weeks. Advised patient and  to call coumadin clinic with any changes or concerns.

## 2018-04-27 NOTE — PROGRESS NOTES
Pt seen by Geovanna MILLIGAN. I have reviewed her initial findings and agree with her assessment and plan

## 2018-05-02 NOTE — PROGRESS NOTES
I called [facility] to have her INRs reported to us moving forward.  They will be faxing us a form for Dr Grigsby to sign. Once this is done they will begin forwarding patient's results to us.

## 2018-05-09 ENCOUNTER — HOSPITAL ENCOUNTER (OUTPATIENT)
Dept: CARDIOLOGY | Facility: HOSPITAL | Age: 73
Discharge: HOME | End: 2018-05-09
Attending: INTERNAL MEDICINE
Payer: MEDICARE

## 2018-05-09 DIAGNOSIS — Z95.2 S/P AVR: ICD-10-CM

## 2018-05-09 DIAGNOSIS — I48.20 CHRONIC ATRIAL FIBRILLATION: Chronic | ICD-10-CM

## 2018-05-09 DIAGNOSIS — E11.29 TYPE 2 DIABETES MELLITUS WITH MICROALBUMINURIA, WITHOUT LONG-TERM CURRENT USE OF INSULIN: Chronic | ICD-10-CM

## 2018-05-09 DIAGNOSIS — R80.9 TYPE 2 DIABETES MELLITUS WITH MICROALBUMINURIA, WITHOUT LONG-TERM CURRENT USE OF INSULIN: Chronic | ICD-10-CM

## 2018-05-09 DIAGNOSIS — Z95.2 S/P MVR (MITRAL VALVE REPLACEMENT): ICD-10-CM

## 2018-05-09 LAB
AORTIC VALVE REGURGITATION: ABNORMAL
ESTIMATED PA SYSTOLIC PRESSURE: 51.72
MITRAL VALVE REGURGITATION: ABNORMAL
RETIRED EF AND QEF - SEE NOTES: 50 (ref 55–65)
TRICUSPID VALVE REGURGITATION: ABNORMAL

## 2018-05-09 PROCEDURE — 93306 TTE W/DOPPLER COMPLETE: CPT

## 2018-05-09 PROCEDURE — 93306 TTE W/DOPPLER COMPLETE: CPT | Mod: 26,,, | Performed by: INTERNAL MEDICINE

## 2018-05-15 ENCOUNTER — OFFICE VISIT (OUTPATIENT)
Dept: CARDIOLOGY | Facility: CLINIC | Age: 73
End: 2018-05-15
Payer: MEDICARE

## 2018-05-15 VITALS
SYSTOLIC BLOOD PRESSURE: 130 MMHG | HEIGHT: 61 IN | WEIGHT: 107.13 LBS | OXYGEN SATURATION: 95 % | RESPIRATION RATE: 15 BRPM | DIASTOLIC BLOOD PRESSURE: 66 MMHG | BODY MASS INDEX: 20.22 KG/M2 | HEART RATE: 65 BPM

## 2018-05-15 DIAGNOSIS — Z79.01 LONG TERM CURRENT USE OF ANTICOAGULANT: ICD-10-CM

## 2018-05-15 DIAGNOSIS — Z95.2 S/P MVR (MITRAL VALVE REPLACEMENT): ICD-10-CM

## 2018-05-15 DIAGNOSIS — E78.2 MIXED HYPERLIPIDEMIA: Chronic | ICD-10-CM

## 2018-05-15 DIAGNOSIS — E11.3299 TYPE 2 DIABETES MELLITUS WITH MILD NONPROLIFERATIVE RETINOPATHY WITHOUT MACULAR EDEMA, WITHOUT LONG-TERM CURRENT USE OF INSULIN, UNSPECIFIED LATERALITY: Chronic | ICD-10-CM

## 2018-05-15 DIAGNOSIS — Z95.0 CARDIAC PACEMAKER IN SITU: ICD-10-CM

## 2018-05-15 DIAGNOSIS — E11.29 TYPE 2 DIABETES MELLITUS WITH MICROALBUMINURIA, WITHOUT LONG-TERM CURRENT USE OF INSULIN: Chronic | ICD-10-CM

## 2018-05-15 DIAGNOSIS — I10 ESSENTIAL (PRIMARY) HYPERTENSION: Primary | Chronic | ICD-10-CM

## 2018-05-15 DIAGNOSIS — Z95.2 S/P AVR: ICD-10-CM

## 2018-05-15 DIAGNOSIS — I09.9 RHEUMATIC HEART DISEASE: Chronic | ICD-10-CM

## 2018-05-15 DIAGNOSIS — I48.20 CHRONIC ATRIAL FIBRILLATION: ICD-10-CM

## 2018-05-15 DIAGNOSIS — R80.9 TYPE 2 DIABETES MELLITUS WITH MICROALBUMINURIA, WITHOUT LONG-TERM CURRENT USE OF INSULIN: Chronic | ICD-10-CM

## 2018-05-15 PROCEDURE — 3044F HG A1C LEVEL LT 7.0%: CPT | Mod: CPTII,S$GLB,, | Performed by: INTERNAL MEDICINE

## 2018-05-15 PROCEDURE — 99214 OFFICE O/P EST MOD 30 MIN: CPT | Mod: 25,S$GLB,, | Performed by: INTERNAL MEDICINE

## 2018-05-15 PROCEDURE — 93279 PRGRMG DEV EVAL PM/LDLS PM: CPT | Mod: 26,,, | Performed by: INTERNAL MEDICINE

## 2018-05-15 PROCEDURE — 3078F DIAST BP <80 MM HG: CPT | Mod: CPTII,S$GLB,, | Performed by: INTERNAL MEDICINE

## 2018-05-15 PROCEDURE — 3075F SYST BP GE 130 - 139MM HG: CPT | Mod: CPTII,S$GLB,, | Performed by: INTERNAL MEDICINE

## 2018-05-15 PROCEDURE — 99499 UNLISTED E&M SERVICE: CPT | Mod: S$GLB,,, | Performed by: INTERNAL MEDICINE

## 2018-05-15 PROCEDURE — 99999 PR PBB SHADOW E&M-EST. PATIENT-LVL III: CPT | Mod: PBBFAC,,, | Performed by: INTERNAL MEDICINE

## 2018-05-15 NOTE — PROGRESS NOTES
CARDIOVASCULAR PROGRESS NOTE    REASON FOR CONSULT:   Orion Ty is a 72 y.o. female who presents for follow up of AF/AVR/MVR.    PCP: Mesha  HISTORY OF PRESENT ILLNESS:   The patient is seen accompanied by her  who aids with Faroese interpretation.  The patient denies chest discomfort or shortness of breath.  She's had no palpitations, lightheadedness, dizziness, or syncope.  There's been no PND, orthopnea, or lower extremity edema.  She denies melena, hematuria, or claudicant symptoms.    The Medtronic pacemaker was interrogated in the office today.  Current rhythm is atrial fibrillation at 82 bpm.  The patient is pacing 82% of time in the ventricle.  Estimated longevity is 12 months.  Sensing, impedance, and pacing thresholds are normal.  Programming changes today included lowering the rate to 50 bpm and order to save the battery.    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) monitors coumadin at home  ?severe prosthetic AS (echo 12/2017)  Chronic A-fib  Medtronic pacemaker - last generator change 2011    PAST MEDICAL HISTORY:     Past Medical History:   Diagnosis Date    Closed displaced fracture of distal phalanx of lesser toe 10/20/2015    Diabetes mellitus     Diabetes mellitus, type 2     Hypertension     Osteopenia     Pacemaker     Rheumatic heart disease        PAST SURGICAL HISTORY:     Past Surgical History:   Procedure Laterality Date    CARDIAC PACEMAKER PLACEMENT      CARDIAC VALVE REPLACEMENT      CARDIAC VALVE SURGERY      thryoid s         ALLERGIES AND MEDICATION:   Review of patient's allergies indicates:  No Known Allergies  Previous Medications    ACCU-CHEK GATO CONTROL SOLN SOLN        AMLODIPINE (NORVASC) 5 MG TABLET    Take 1 tablet (5 mg total) by mouth once daily.    BLOOD-GLUCOSE METER MISC    Accu-chek Gato Glucose Meter, Use to test blood sugar once daily.    FLUZONE HIGH-DOSE 2017-18, PF, 180 MCG/0.5 ML VACCINE    ADM 0.5ML IM  UTD    FUROSEMIDE (LASIX) 40 MG TABLET    Take 1 tablet (40 mg total) by mouth daily as needed (leg swelling).    LANCETS 30 GAUGE MISC        LANCING DEVICE MISC        LOSARTAN (COZAAR) 50 MG TABLET    Take 1 tablet (50 mg total) by mouth once daily.    METFORMIN (GLUCOPHAGE) 500 MG TABLET    TAKE 2 TABLETS EVERY MORNING AND 1 TABLET EVERY EVENING    METOPROLOL TARTRATE (LOPRESSOR) 25 MG TABLET    TAKE 1 TABLET(25 MG) BY MOUTH TWICE DAILY    OMEGA-3 ACID ETHYL ESTERS (LOVAZA) 1 GRAM CAPSULE    TK 2 CS PO BID    PRAVASTATIN (PRAVACHOL) 20 MG TABLET    TAKE 1 TABLET BY MOUTH EVERY DAY    TRUETEST TEST STRIPS STRP    USE ONCE DAILY    WARFARIN (COUMADIN) 4 MG TABLET    TAKE 1 TABLET BY MOUTH ON MONDAY AND FRIDAY AND 1/2 TABLET BY MOUTH ON ALL OTHER DAYS       SOCIAL HISTORY:     Social History     Social History    Marital status:      Spouse name: N/A    Number of children: N/A    Years of education: N/A     Occupational History    Not on file.     Social History Main Topics    Smoking status: Never Smoker    Smokeless tobacco: Never Used    Alcohol use No    Drug use: Unknown    Sexual activity: Not on file     Other Topics Concern    Not on file     Social History Narrative    No narrative on file       FAMILY HISTORY:     Family History   Problem Relation Age of Onset    Breast cancer Neg Hx     Colon cancer Neg Hx     Ovarian cancer Neg Hx        REVIEW OF SYSTEMS:   Review of Systems   Constitutional: Negative for chills, diaphoresis and fever.   HENT: Negative for nosebleeds.    Eyes: Negative for blurred vision, double vision and photophobia.   Respiratory: Negative for hemoptysis, shortness of breath and wheezing.    Cardiovascular: Negative for chest pain, palpitations, orthopnea, claudication, leg swelling and PND.   Gastrointestinal: Negative for abdominal pain, blood in stool, heartburn, melena, nausea and vomiting.   Genitourinary: Negative for flank pain and hematuria.  "  Musculoskeletal: Negative for falls, myalgias and neck pain.   Skin: Negative for rash.   Neurological: Negative for dizziness, seizures, loss of consciousness, weakness and headaches.   Endo/Heme/Allergies: Negative for polydipsia. Does not bruise/bleed easily.   Psychiatric/Behavioral: Negative for depression and memory loss. The patient is not nervous/anxious.        PHYSICAL EXAM:     Vitals:    05/15/18 1341   BP: 130/66   Pulse: 65   Resp: 15    Body mass index is 20.24 kg/m².  Weight: 48.6 kg (107 lb 2.3 oz)   Height: 5' 1" (154.9 cm)     Physical Exam   Constitutional: She is oriented to person, place, and time. She appears well-developed and well-nourished. She is cooperative.  Non-toxic appearance. No distress.   HENT:   Head: Normocephalic and atraumatic.   Eyes: Conjunctivae and EOM are normal. Pupils are equal, round, and reactive to light. No scleral icterus.   Neck: Trachea normal. Neck supple. Normal carotid pulses and no JVD present. Carotid bruit is not present. No neck rigidity. No edema present. No thyromegaly present.   Cardiovascular: Normal rate, regular rhythm, S1 normal and S2 normal.  PMI is not displaced.  Exam reveals no gallop and no friction rub.    Murmur heard.   Systolic murmur is present with a grade of 2/6   Pulses:       Carotid pulses are 2+ on the right side, and 2+ on the left side.  Crisp Mech S1S2.  Systolic murmur radiates to carotids   Pulmonary/Chest: Effort normal and breath sounds normal. No respiratory distress. She has no wheezes. She has no rales. She exhibits no tenderness.   Abdominal: Soft. Bowel sounds are normal. She exhibits no distension. There is no hepatosplenomegaly.   Musculoskeletal: She exhibits no edema or tenderness.   Feet:   Right Foot:   Skin Integrity: Negative for ulcer.   Left Foot:   Skin Integrity: Negative for ulcer.   Neurological: She is alert and oriented to person, place, and time. No cranial nerve deficit.   Skin: Skin is warm and dry. " No rash noted. No erythema.   Psychiatric: She has a normal mood and affect. Her speech is normal and behavior is normal.   Vitals reviewed.      DATA:   EKG: (personally reviewed tracing)  4/19/18  60, similar to 12/12/17    Laboratory:  CBC:    Recent Labs  Lab 05/27/16  0700 03/14/17  0735 03/08/18  0736   WHITE BLOOD CELL COUNT 7.14 6.84 6.49   HEMOGLOBIN 12.0 12.2 12.5   HEMATOCRIT 36.4 L 38.6 38.6   PLATELETS 98 L 91 L 82 L       CHEMISTRIES:    Recent Labs  Lab 03/14/17  0735 04/27/17  0905 08/15/17  0745 03/08/18  0736   GLUCOSE 149 H  --  149 H 152 H   SODIUM 140  --  140 142   POTASSIUM 4.5  --  4.8 4.5   BUN BLD 12 16 12 13   CREATININE 0.8 0.9 0.8 0.9   EGFR IF  >60.0 >60 >60.0 >60.0   EGFR IF NON- >60.0 >60 >60.0 >60.0   CALCIUM 10.0  --  9.7 10.0       CARDIAC BIOMARKERS:        COAGS:    Recent Labs  Lab 11/28/17  0934 03/08/18  0736 04/24/18   INR 4.1 H 3.1 H 2.5       LIPIDS/LFTS:    Recent Labs  Lab 03/14/17  0735 08/15/17  0745 03/08/18  0736   CHOLESTEROL 139  139 127 140   TRIGLYCERIDES 269 H  269 H 229 H 146   HDL 30 L  30 L 30 L 38 L   LDL CHOLESTEROL 55.2 L  55.2 L 51.2 L 72.8   NON-HDL CHOLESTEROL 109  109 97 102   AST 45 H 32 41 H   ALT 23 21 30     Lab Results   Component Value Date    TSH 7.037 (H) 03/08/2018     Free T4 0.99 (3/8/18)    Cardiovascular Testing:  Echo 5/9/18    1 - Low normal to mildly depressed left ventricular systolic function (EF 50-55%).     2 - Concentric hypertrophy.     3 - Biatrial enlargement.     4 - Pulmonary hypertension. The estimated PA systolic pressure is 52 mmHg.     5 - Aortic valve mechanical prosthesis, YULIYA = 0.7 cm2, AVAi = 0.49 cm2/m2, peak velocity = 3.67 m/s, mean gradient = 33 mmHg.     6 - Mild aortic regurgitation.     7 - Mitral valve mechanical prosthesis adequately functioning.     8 - Mild mitral regurgitation.     9 - Trivial to mild tricuspid regurgitation.     10 - Trivial pulmonic regurgitation.      L MPI 2/20/15  Nuclear Quantitative Functional Analysis:   LVEF: 57 %  Impression: EQUIVOCAL MYOCARDIAL PERFUSION  1. There is a mild to moderate mostly fixed lateral wall defect of uncertain significance.   2. The perfusion scan is free of evidence for myocardial ischemia.   3. There is abnormal wall motion at rest showing mild hypokinesis of the anteroseptal wall of the left ventricle.   4. Resting LV function is normal.   5. The ventricular volumes are normal at rest and stress.   6. The extracardiac distribution of radioactivity is normal.   7. There was no previous study available to compare.    ASSESSMENT:   # [facility] AVR/MVR, echo suggests severe prosthetic AVR stenosis, asymptomatic  # HTN, controlled  # Chr AF, on coumadin (goal INR 2.5-3.5 given [facility] valves)  # HLP on prava 40mg  # DM  # thrombocytopenia    PLAN:   Cont med rx  RTC 6 months with Medtronic PPM check      Ludwig Grigsby MD, FACC

## 2018-05-16 NOTE — PROGRESS NOTES
Called Patient and spoke with her , needed to check if INR was tested 5/15,  stated no that he would test it tomorrow -5/17, also the  stated she's supposed to test every 3 weeks.

## 2018-05-17 ENCOUNTER — ANTI-COAG VISIT (OUTPATIENT)
Dept: CARDIOLOGY | Facility: CLINIC | Age: 73
End: 2018-05-17

## 2018-05-17 DIAGNOSIS — Z79.01 LONG TERM CURRENT USE OF ANTICOAGULANT: ICD-10-CM

## 2018-05-17 DIAGNOSIS — Z95.2 S/P AVR: ICD-10-CM

## 2018-05-17 DIAGNOSIS — Z95.2 S/P MVR (MITRAL VALVE REPLACEMENT): ICD-10-CM

## 2018-05-17 DIAGNOSIS — I48.20 CHRONIC ATRIAL FIBRILLATION: ICD-10-CM

## 2018-05-17 LAB — INR PPP: 2.2

## 2018-05-17 NOTE — PROGRESS NOTES
called in a verbal result dated today 5/17/18 as: INR -2.2, states unable to call it in to [facility] due to [facility] needing a referral update to coumadin clinic and was told to call in results to coumadin clinic

## 2018-06-06 ENCOUNTER — ANTI-COAG VISIT (OUTPATIENT)
Dept: CARDIOLOGY | Facility: CLINIC | Age: 73
End: 2018-06-06

## 2018-06-06 DIAGNOSIS — I48.20 CHRONIC ATRIAL FIBRILLATION: ICD-10-CM

## 2018-06-06 DIAGNOSIS — Z95.2 S/P AVR: ICD-10-CM

## 2018-06-06 DIAGNOSIS — Z95.2 S/P MVR (MITRAL VALVE REPLACEMENT): ICD-10-CM

## 2018-06-06 DIAGNOSIS — Z79.01 LONG TERM CURRENT USE OF ANTICOAGULANT: ICD-10-CM

## 2018-06-06 LAB — INR PPP: 1.9

## 2018-06-06 NOTE — PROGRESS NOTES
called in a verbal result dated today 6/06 as: INR -1.9, states is unable to call it in to [facility], said they needed some information but did not tell him what they needed

## 2018-06-08 DIAGNOSIS — I48.20 CHRONIC ATRIAL FIBRILLATION: Chronic | ICD-10-CM

## 2018-06-08 RX ORDER — METOPROLOL TARTRATE 25 MG/1
TABLET, FILM COATED ORAL
Qty: 180 TABLET | Refills: 3 | Status: SHIPPED | OUTPATIENT
Start: 2018-06-08 | End: 2019-02-15 | Stop reason: SDUPTHER

## 2018-06-11 ENCOUNTER — PATIENT MESSAGE (OUTPATIENT)
Dept: FAMILY MEDICINE | Facility: CLINIC | Age: 73
End: 2018-06-11

## 2018-06-11 RX ORDER — NITROGLYCERIN 0.4 MG/1
TABLET SUBLINGUAL
Qty: 275 TABLET | Refills: 0 | Status: ON HOLD | OUTPATIENT
Start: 2018-06-11 | End: 2022-01-01 | Stop reason: HOSPADM

## 2018-06-11 RX ORDER — NITROGLYCERIN 0.4 MG/1
0.4 TABLET SUBLINGUAL EVERY 5 MIN PRN
Qty: 30 TABLET | Refills: 0 | Status: SHIPPED | OUTPATIENT
Start: 2018-06-11 | End: 2018-06-11 | Stop reason: SDUPTHER

## 2018-06-12 NOTE — PROGRESS NOTES
Per patient, [facility] was not accepting pt/inr results. It seems they were accepted, but faxed to the wrong number. Representative has been given the correct contact info. Was not able to reach patient or her . Left message on voicemail informing them that the matter has been settled.

## 2018-06-13 ENCOUNTER — ANTI-COAG VISIT (OUTPATIENT)
Dept: CARDIOLOGY | Facility: CLINIC | Age: 73
End: 2018-06-13

## 2018-06-13 DIAGNOSIS — I48.20 CHRONIC ATRIAL FIBRILLATION: ICD-10-CM

## 2018-06-13 DIAGNOSIS — Z95.2 S/P MVR (MITRAL VALVE REPLACEMENT): ICD-10-CM

## 2018-06-13 DIAGNOSIS — Z79.01 LONG TERM CURRENT USE OF ANTICOAGULANT: ICD-10-CM

## 2018-06-13 DIAGNOSIS — Z95.2 S/P AVR: ICD-10-CM

## 2018-06-13 LAB — INR PPP: 2.4

## 2018-06-13 NOTE — PROGRESS NOTES
Verbal result taken from __Patient Husband__Toan _____. PT/INR ___2.4____ Date drawn____6/13/18____ Hardcopy to be faxed.

## 2018-06-20 ENCOUNTER — ANTI-COAG VISIT (OUTPATIENT)
Dept: CARDIOLOGY | Facility: CLINIC | Age: 73
End: 2018-06-20
Payer: MEDICARE

## 2018-06-20 DIAGNOSIS — Z95.2 S/P AVR: ICD-10-CM

## 2018-06-20 DIAGNOSIS — Z79.01 LONG TERM CURRENT USE OF ANTICOAGULANT: ICD-10-CM

## 2018-06-20 DIAGNOSIS — I48.20 CHRONIC ATRIAL FIBRILLATION: ICD-10-CM

## 2018-06-20 DIAGNOSIS — Z95.2 S/P MVR (MITRAL VALVE REPLACEMENT): ICD-10-CM

## 2018-06-20 LAB — INR PPP: 2.4

## 2018-06-20 PROCEDURE — G0250 MD INR TEST REVIE INTER MGMT: HCPCS | Mod: S$GLB,,, | Performed by: PHARMACIST

## 2018-06-27 ENCOUNTER — ANTI-COAG VISIT (OUTPATIENT)
Dept: CARDIOLOGY | Facility: CLINIC | Age: 73
End: 2018-06-27

## 2018-06-27 DIAGNOSIS — I48.20 CHRONIC ATRIAL FIBRILLATION: ICD-10-CM

## 2018-06-27 DIAGNOSIS — Z95.2 S/P AVR: ICD-10-CM

## 2018-06-27 DIAGNOSIS — Z95.2 S/P MVR (MITRAL VALVE REPLACEMENT): ICD-10-CM

## 2018-06-27 DIAGNOSIS — Z79.01 LONG TERM CURRENT USE OF ANTICOAGULANT: ICD-10-CM

## 2018-06-27 LAB — INR PPP: 2.6

## 2018-07-06 ENCOUNTER — PES CALL (OUTPATIENT)
Dept: ADMINISTRATIVE | Facility: CLINIC | Age: 73
End: 2018-07-06

## 2018-07-09 DIAGNOSIS — E11.69 COMBINED HYPERLIPIDEMIA ASSOCIATED WITH TYPE 2 DIABETES MELLITUS: ICD-10-CM

## 2018-07-09 DIAGNOSIS — E78.2 COMBINED HYPERLIPIDEMIA ASSOCIATED WITH TYPE 2 DIABETES MELLITUS: ICD-10-CM

## 2018-07-10 RX ORDER — METFORMIN HYDROCHLORIDE 500 MG/1
TABLET ORAL
Qty: 270 TABLET | Refills: 3 | Status: SHIPPED | OUTPATIENT
Start: 2018-07-10 | End: 2019-02-15 | Stop reason: SDUPTHER

## 2018-07-11 ENCOUNTER — ANTI-COAG VISIT (OUTPATIENT)
Dept: CARDIOLOGY | Facility: CLINIC | Age: 73
End: 2018-07-11

## 2018-07-11 DIAGNOSIS — I48.20 CHRONIC ATRIAL FIBRILLATION: ICD-10-CM

## 2018-07-11 DIAGNOSIS — Z95.2 S/P AVR: ICD-10-CM

## 2018-07-11 DIAGNOSIS — Z95.2 S/P MVR (MITRAL VALVE REPLACEMENT): ICD-10-CM

## 2018-07-11 DIAGNOSIS — Z79.01 LONG TERM CURRENT USE OF ANTICOAGULANT: ICD-10-CM

## 2018-07-11 LAB — INR PPP: 2.7

## 2018-07-20 ENCOUNTER — TELEPHONE (OUTPATIENT)
Dept: CARDIOLOGY | Facility: CLINIC | Age: 73
End: 2018-07-20

## 2018-07-20 NOTE — PROGRESS NOTES
"We received the following message:    "Pt is having a tooth extraction and needs cardiac clearance and instructions on coumadin.    1. The pt is at acceptable cardiac risk for the dental procedure.   2. She needs antibiotic prophylaxis.   3. She needs bridging lovenox, I will ask the coumadin clinic to assist. "    We will need to know date and extent of the procedure. I tried to leave message for callback with pt but unable to . Will continue to try to reach pt.     "

## 2018-07-25 ENCOUNTER — ANTI-COAG VISIT (OUTPATIENT)
Dept: CARDIOLOGY | Facility: CLINIC | Age: 73
End: 2018-07-25

## 2018-07-25 DIAGNOSIS — Z79.01 LONG TERM CURRENT USE OF ANTICOAGULANT: ICD-10-CM

## 2018-07-25 DIAGNOSIS — Z95.2 S/P MVR (MITRAL VALVE REPLACEMENT): ICD-10-CM

## 2018-07-25 DIAGNOSIS — I48.20 CHRONIC ATRIAL FIBRILLATION: ICD-10-CM

## 2018-07-25 DIAGNOSIS — Z95.2 S/P AVR: ICD-10-CM

## 2018-07-25 LAB — INR PPP: 2.3

## 2018-07-25 NOTE — PROGRESS NOTES
INR slightly low today, patient had tooth extraction yesterday and skipped coumadin.  She is prescribed to take penicillin 500mg x 5 days, no DDI expected.

## 2018-07-26 ENCOUNTER — HOSPITAL ENCOUNTER (EMERGENCY)
Facility: HOSPITAL | Age: 73
Discharge: ANOTHER HOSPITAL | End: 2018-07-27
Attending: EMERGENCY MEDICINE
Payer: MEDICARE

## 2018-07-26 DIAGNOSIS — K06.8 GINGIVAL BLEEDING: ICD-10-CM

## 2018-07-26 DIAGNOSIS — Z79.01 CURRENT USE OF LONG TERM ANTICOAGULATION: Primary | ICD-10-CM

## 2018-07-26 LAB
POC PTINR: 2.3 (ref 0.9–1.2)
POC PTWBT: 26.4 SEC (ref 9.7–14.3)
SAMPLE: ABNORMAL

## 2018-07-26 PROCEDURE — 25000003 PHARM REV CODE 250: Performed by: EMERGENCY MEDICINE

## 2018-07-26 PROCEDURE — 99285 EMERGENCY DEPT VISIT HI MDM: CPT

## 2018-07-26 PROCEDURE — 85610 PROTHROMBIN TIME: CPT

## 2018-07-26 PROCEDURE — 85025 COMPLETE CBC W/AUTO DIFF WBC: CPT

## 2018-07-26 RX ORDER — LIDOCAINE HYDROCHLORIDE AND EPINEPHRINE 10; 10 MG/ML; UG/ML
1 INJECTION, SOLUTION INFILTRATION; PERINEURAL
Status: COMPLETED | OUTPATIENT
Start: 2018-07-26 | End: 2018-07-26

## 2018-07-26 RX ADMIN — LIDOCAINE HYDROCHLORIDE AND EPINEPHRINE 1 ML: 10; 10 INJECTION, SOLUTION INFILTRATION; PERINEURAL at 09:07

## 2018-07-27 VITALS
BODY MASS INDEX: 20.42 KG/M2 | RESPIRATION RATE: 16 BRPM | OXYGEN SATURATION: 94 % | WEIGHT: 104 LBS | HEIGHT: 60 IN | SYSTOLIC BLOOD PRESSURE: 123 MMHG | DIASTOLIC BLOOD PRESSURE: 57 MMHG | HEART RATE: 88 BPM | TEMPERATURE: 98 F

## 2018-07-27 LAB — POCT GLUCOSE: 126 MG/DL (ref 70–110)

## 2018-07-27 PROCEDURE — 25000003 PHARM REV CODE 250: Performed by: EMERGENCY MEDICINE

## 2018-07-27 RX ORDER — SODIUM CHLORIDE 9 MG/ML
1000 INJECTION, SOLUTION INTRAVENOUS
Status: COMPLETED | OUTPATIENT
Start: 2018-07-27 | End: 2018-07-27

## 2018-07-27 RX ORDER — SODIUM CHLORIDE 9 MG/ML
1000 INJECTION, SOLUTION INTRAVENOUS
Status: DISCONTINUED | OUTPATIENT
Start: 2018-07-27 | End: 2018-07-27

## 2018-07-27 RX ADMIN — SODIUM CHLORIDE 1000 ML: 0.9 INJECTION, SOLUTION INTRAVENOUS at 01:07

## 2018-07-27 NOTE — ED NOTES
Report given to Anu rn at receiving facility ([facility]) accepted by MD Hernandez, no additional questions at this time. Vs stable. Awaiting [facility] for transport and pt and family aware of POC.

## 2018-07-27 NOTE — ED NOTES
Report given to [facility], care assumed, unit 376, piv ns continues to infuse with nrti noted to site. Vs stable, stable for transport. Bleeding controlled.

## 2018-07-27 NOTE — PROGRESS NOTES
pts husbands reports holding pts dose after tooth extraction on 7/23 and also held on 7/24 due to continued bleeding. Resumed w/ 6mg on 7/25 and ER visit on 7/26, d/c on 7/26 pt took 4mg. As of 7/27 bleeding has stopped. Please dose

## 2018-07-27 NOTE — ED PROVIDER NOTES
Encounter Date: 7/26/2018       History     Chief Complaint   Patient presents with    tooth bleeding     pt c/o dental bleeding , pt reports had tooth pulled on left upper rear tooth, reports had bleeding and had to return to dentist to seal, reports bleeding began today approx 10 and worsened approx 4pm. reports on coumadin     74 yo F on coumadin (heart valve replacements) presents to ED for recurrent bleeding to L upper gumline s/p tooth extraction 4 days ago by Dr. Howard Collins. Patient stopped coumadin during procedure, but resumed coumadin yesterday at recommendation of coumadin clinic per family. Denies pain, but does note feeling weak now. Notes having recurrent bleeding same day of surgery that required Surgicel to be sutured into extraction site. Bleeding began again this evening, prompting visit today. Notes saturating through gauze nearly every hour since this evening.          Review of patient's allergies indicates:  No Known Allergies  Past Medical History:   Diagnosis Date    Closed displaced fracture of distal phalanx of lesser toe 10/20/2015    Diabetes mellitus     Diabetes mellitus, type 2     Hypertension     Osteopenia     Pacemaker     Rheumatic heart disease      Past Surgical History:   Procedure Laterality Date    CARDIAC PACEMAKER PLACEMENT      CARDIAC VALVE REPLACEMENT      CARDIAC VALVE SURGERY      thryoid s       Family History   Problem Relation Age of Onset    Breast cancer Neg Hx     Colon cancer Neg Hx     Ovarian cancer Neg Hx      Social History   Substance Use Topics    Smoking status: Never Smoker    Smokeless tobacco: Never Used    Alcohol use No     Review of Systems   Constitutional: Negative for fever.   Eyes: Negative for visual disturbance.   Respiratory: Negative for cough and shortness of breath.    Cardiovascular: Negative for chest pain.   Gastrointestinal: Negative for abdominal pain, nausea and vomiting.   Skin: Positive for wound.    Neurological: Negative for dizziness and headaches.   All other systems reviewed and are negative.      Physical Exam     Initial Vitals [07/26/18 2049]   BP Pulse Resp Temp SpO2   122/86 102 18 98.3 °F (36.8 °C) 99 %      MAP       --         Physical Exam    Nursing note and vitals reviewed.  Constitutional: She appears well-developed and well-nourished. She is not diaphoretic. No distress.   HENT:   Head: Normocephalic and atraumatic.   Nose: Nose normal.   Mouth/Throat:       Brisk active bleeding from L upper gumline at tooth #15 extraction site.    Eyes: Conjunctivae and EOM are normal. Right eye exhibits no discharge. Left eye exhibits no discharge.   Neck: Normal range of motion. No tracheal deviation present. No JVD present.   Cardiovascular: Regular rhythm and normal heart sounds. Tachycardia present.    Pulmonary/Chest: Breath sounds normal. No stridor. No respiratory distress.   Musculoskeletal: She exhibits no edema or tenderness.   Neurological: She is alert and oriented to person, place, and time.   Skin: Skin is warm and dry. No rash and no abscess noted. No erythema. No pallor.         ED Course   Lac Repair  Date/Time: 7/26/2018 10:14 PM  Performed by: BRISEIDA PARHAM  Authorized by: KELLY BANKS   Consent Done: Yes  Consent: Verbal consent obtained.  Consent given by: patient  Location: Tooth #15 (L upper)  Anesthesia: local infiltration    Anesthesia:  Local Anesthetic: lidocaine 1% with epinephrine  Anesthetic total (ml): 0.5ml.  Patient sedated: no  Preparation: Patient was prepped and draped in the usual sterile fashion.  Patient tolerance: Patient tolerated the procedure well with no immediate complications  Comments: 0.5cm lidocaine with epi injected locally to extraction site. Hemcon dressing applied while suctioning patient.        Labs Reviewed   ISTAT PROCEDURE - Abnormal; Notable for the following:        Result Value    POC PTWBT 26.4 (*)     POC PTINR 2.3 (*)     All other  components within normal limits   POCT CBC   POCT PROTIME-INR          Imaging Results    None          Medical Decision Making:   History:   Old Medical Records: I decided to obtain old medical records.  Initial Assessment:   72 yo F with recurrent bleeding from tooth extraction site. On coumadin.   Clinical Tests:   Lab Tests: Ordered and Reviewed  ED Management:  Patient previously had Surgicel sown into extraction site. Lidocaine with epi injected to site and Hemcon dental dressing applied in this ED with significant improvement. Repeat evaluation shows persistent improvement of bleeding, but not complete resolution. Patient is tachycardiac. INR 2.3. H/H 13/39 today. Vitals stable.     Patient needs more definitive management by specialist. Transfer center contacted and discussed case with Nate. Dr. Veras will continue to evaluate and monitor patient while transfer decision is being made. Hemodynamically stable at this time.  Other:   I have discussed this case with another health care provider.       <> Summary of the Discussion: Also evaluated by attending.                       Clinical Impression:   There were no encounter diagnoses.                             Alhaji Elizabeth PA-C  07/26/18 0863

## 2018-07-27 NOTE — PROGRESS NOTES
Pt  said that the Office is close today 7/27/18. But on 7/26/18 he spoke to the Dentist and he said that he did all he could to control the (Bleeding) and that as for as the coumadin. He will leave that up to coumadin clinic to handle. Althought  did say that for right now pt is (Bleeding) a little. She was at [facility] ER on 7/26/18 then went to ([facility]) now home. Please advise.

## 2018-07-27 NOTE — ED NOTES
Nate, RN, ([facility]) called to report that pt has been accepted by Dr Parish Hernandez at [facility] for ED to ED transfer; Dr Veras notified; JOEL Dumont (primary nurse) to be notified

## 2018-07-27 NOTE — ED NOTES
Per Dr Veras, transfer nurse at [facility] has been called for update on bed status for pt; still awaiting bed availability at [facility] for dental services

## 2018-07-28 ENCOUNTER — HOSPITAL ENCOUNTER (EMERGENCY)
Facility: HOSPITAL | Age: 73
Discharge: HOME | End: 2018-07-28
Attending: EMERGENCY MEDICINE
Payer: MEDICARE

## 2018-07-28 VITALS
HEART RATE: 75 BPM | SYSTOLIC BLOOD PRESSURE: 106 MMHG | DIASTOLIC BLOOD PRESSURE: 59 MMHG | WEIGHT: 104 LBS | RESPIRATION RATE: 18 BRPM | BODY MASS INDEX: 20.42 KG/M2 | OXYGEN SATURATION: 95 % | TEMPERATURE: 98 F | HEIGHT: 60 IN

## 2018-07-28 DIAGNOSIS — K13.79 MOUTH BLEEDING: ICD-10-CM

## 2018-07-28 DIAGNOSIS — R79.1 ELEVATED INR: Primary | ICD-10-CM

## 2018-07-28 LAB
INR PPP: 3.9
PROTHROMBIN TIME: 40.7 SEC

## 2018-07-28 PROCEDURE — 85610 PROTHROMBIN TIME: CPT

## 2018-07-28 PROCEDURE — 99283 EMERGENCY DEPT VISIT LOW MDM: CPT

## 2018-07-28 NOTE — ED PROVIDER NOTES
"Encounter Date: 7/28/2018       History     Chief Complaint   Patient presents with    Dental Problem     States "she had tooth number 15 pulled from the top last Monday. She's been bleeding ever since. We've been to 3 different ERs". Reports pt takes warfarin      Chief complaint:  Dental pain    History of present illness:  Patient is a 73-year-old female who reports having had a tooth removed on 7/22/18.  She is taking coumadin s/p valve replacement.  Since procedure she has had recurrant bouts of bleeding.  She was instructed to not stop taking coumadin prior to her procedure (performed by Dr. Collins, [phone]-he is also present in ED providing history).  She has had visits both here and [facility] secondary to post procedural bleeding.  No bleeding at the present time.      The history is provided by the patient, the spouse, a relative and a caregiver. No  was used.     Review of patient's allergies indicates:  No Known Allergies  Past Medical History:   Diagnosis Date    Closed displaced fracture of distal phalanx of lesser toe 10/20/2015    Diabetes mellitus     Diabetes mellitus, type 2     Hypertension     Osteopenia     Pacemaker     Rheumatic heart disease      Past Surgical History:   Procedure Laterality Date    CARDIAC PACEMAKER PLACEMENT      CARDIAC VALVE REPLACEMENT      CARDIAC VALVE SURGERY      thryoid s       Family History   Problem Relation Age of Onset    Breast cancer Neg Hx     Colon cancer Neg Hx     Ovarian cancer Neg Hx      Social History   Substance Use Topics    Smoking status: Never Smoker    Smokeless tobacco: Never Used    Alcohol use No     Review of Systems   Constitutional: Negative for chills, fatigue and fever.   HENT: Negative for congestion, ear discharge, ear pain, postnasal drip, rhinorrhea, sinus pressure, sneezing, sore throat and voice change.    Eyes: Negative for discharge and itching.   Respiratory: Negative for cough, " shortness of breath and wheezing.    Cardiovascular: Negative for chest pain, palpitations and leg swelling.   Gastrointestinal: Negative for abdominal pain, constipation, diarrhea, nausea and vomiting.   Endocrine: Negative for polydipsia, polyphagia and polyuria.   Genitourinary: Negative for dysuria, frequency, hematuria, urgency, vaginal bleeding, vaginal discharge and vaginal pain.   Musculoskeletal: Negative for arthralgias and myalgias.   Skin: Negative for rash and wound.   Neurological: Negative for dizziness, seizures, syncope, weakness and numbness.   Hematological: Negative for adenopathy. Does not bruise/bleed easily.   Psychiatric/Behavioral: Negative for self-injury and suicidal ideas. The patient is not nervous/anxious.        Physical Exam     Initial Vitals [07/28/18 1354]   BP Pulse Resp Temp SpO2   120/62 79 16 99.2 °F (37.3 °C) 97 %      MAP       --         Physical Exam    Nursing note and vitals reviewed.  Constitutional: She appears well-developed and well-nourished.   HENT:   Head: Normocephalic and atraumatic.   Right Ear: External ear normal.   Left Ear: External ear normal.   Nose: Nose normal.   Mouth/Throat:       Eyes: Conjunctivae and EOM are normal. Pupils are equal, round, and reactive to light. Right eye exhibits no discharge. Left eye exhibits no discharge.   Neck: Normal range of motion.   Abdominal: She exhibits no distension.   Musculoskeletal: Normal range of motion.   Neurological: She is alert and oriented to person, place, and time.   Skin: Skin is dry. Capillary refill takes less than 2 seconds.         ED Course   Procedures  Labs Reviewed   PROTIME-INR - Abnormal; Notable for the following:        Result Value    Prothrombin Time 40.7 (*)     INR 3.9 (*)     All other components within normal limits          Imaging Results    None                APC / Resident Notes:   MEDICAL DECISION MAKING    INITIAL ASSESSMENT: 73 y.o.  female s/p tooth extraction, on  anticoagulants with recurrant bleeding at the procedure site.  Dentist here with patient. No current bleeding at this time.    LABS AND RADIOLOGY: PT/INR=3.9 (target 2.5-3.5)    MEDICATIONS ADMINISTERED: none    DIFFERENTIAL DIAGNOSES:coumadin toxicity, postprocedural bleeding    ACUTE DIAGNOSIS (ES):postprocedural bleeding    D/C PLANNING AND MEDICATIONS ORDRED: hold coumadin x1d then resume     FOLLOW UP: with coumdin clinic and pcp    PHYSICIAN INTERACTION:Care of the patient discussed with Dr. Abreu who agreed with the assessment and plan.                    Clinical Impression:   The primary encounter diagnosis was Elevated INR. A diagnosis of Mouth bleeding was also pertinent to this visit.      Disposition:   Disposition: Discharged  Condition: Stable                        Eliseo Gonzales, [facility]  07/28/18 4818

## 2018-07-28 NOTE — ED TRIAGE NOTES
Pt c/o dental bleeding. Pt had a tooth pulled Monday and states it has not stopped bleeding. Pt was seen at [facility] Thursday night for same complaint but the bleeding started again that night. Pt currently on Coumadin  4 mg and her PCP told her not to stop it.  states the dentist is currently on his way. Denies pain at this time

## 2018-07-28 NOTE — DISCHARGE INSTRUCTIONS
Stop coumadin for one day then resume at usual dose.  Return to the Emergency department for any worsening or failure to improve, otherwise follow up with your primary care provider.

## 2018-07-30 ENCOUNTER — ANTI-COAG VISIT (OUTPATIENT)
Dept: CARDIOLOGY | Facility: CLINIC | Age: 73
End: 2018-07-30
Payer: MEDICARE

## 2018-07-30 ENCOUNTER — TELEPHONE (OUTPATIENT)
Dept: CARDIOLOGY | Facility: CLINIC | Age: 73
End: 2018-07-30

## 2018-07-30 DIAGNOSIS — Z79.01 LONG TERM CURRENT USE OF ANTICOAGULANT: ICD-10-CM

## 2018-07-30 LAB — INR PPP: 2.2

## 2018-07-30 PROCEDURE — G0250 MD INR TEST REVIE INTER MGMT: HCPCS | Mod: S$GLB,,, | Performed by: INTERNAL MEDICINE

## 2018-07-30 NOTE — PROGRESS NOTES
"ED visit for bleeding post dental extractions - 7/27 & 7/28   Dental Problem       States "she had tooth number 15 pulled from the top last Monday. She's been bleeding ever since. We've been to 3 different ERs". Reports pt takes warfarin       Chief complaint:  Dental pain     History of present illness:  Patient is a 73-year-old female who reports having had a tooth removed on 7/22/18.  She is taking coumadin s/p valve replacement.  Since procedure she has had recurrant bouts of bleeding.  She was instructed to not stop taking coumadin prior to her procedure (performed by Dr. Collins, [phone]-he is also present in ED providing history).  [facility] bleeding.  No bleeding at the present time.  INITIAL ASSESSMENT: 73 y.o.  female s/p tooth extraction, on anticoagulants with recurrant bleeding at the procedure site.  Dentist here with patient. No current bleeding at this time.   LABS AND RADIOLOGY: PT/INR=3.9 (target 2.5-3.5)   MEDICATIONS ADMINISTERED: none   DIFFERENTIAL DIAGNOSES:coumadin toxicity, postprocedural bleeding   ACUTE DIAGNOSIS (ES):postprocedural bleeding   D/C PLANNING AND MEDICATIONS ORDRED: hold coumadin x1d then resume    FOLLOW UP: with coumdin clinic and pcp    Held dose per ED report last evening and confirmation per patient, will try to have her maintain her dose for now and repeat INR later this week for close follow-up.  INR subtherapeutic today but will not boost due to bleeding.  Does not report bleeding this morning.  "

## 2018-08-02 ENCOUNTER — ANTI-COAG VISIT (OUTPATIENT)
Dept: CARDIOLOGY | Facility: CLINIC | Age: 73
End: 2018-08-02

## 2018-08-02 LAB — INR PPP: 2.6

## 2018-08-16 ENCOUNTER — ANTI-COAG VISIT (OUTPATIENT)
Dept: CARDIOLOGY | Facility: CLINIC | Age: 73
End: 2018-08-16

## 2018-08-16 DIAGNOSIS — Z95.2 S/P MVR (MITRAL VALVE REPLACEMENT): ICD-10-CM

## 2018-08-16 DIAGNOSIS — Z79.01 LONG TERM CURRENT USE OF ANTICOAGULANT: ICD-10-CM

## 2018-08-16 DIAGNOSIS — Z95.2 S/P AVR: ICD-10-CM

## 2018-08-16 DIAGNOSIS — I48.20 CHRONIC ATRIAL FIBRILLATION: ICD-10-CM

## 2018-08-16 LAB — INR PPP: 4.4

## 2018-08-16 NOTE — PROGRESS NOTES
Patient's  reports incorrect dose, INR elevated with possible  increase in greens.  Will hold today and maintain for elevated INR as recent tooth bleeding.

## 2018-08-17 ENCOUNTER — OFFICE VISIT (OUTPATIENT)
Dept: CARDIOLOGY | Facility: CLINIC | Age: 73
End: 2018-08-17
Payer: MEDICARE

## 2018-08-17 VITALS
OXYGEN SATURATION: 92 % | BODY MASS INDEX: 20.38 KG/M2 | HEIGHT: 60 IN | HEART RATE: 65 BPM | WEIGHT: 103.81 LBS | SYSTOLIC BLOOD PRESSURE: 152 MMHG | RESPIRATION RATE: 15 BRPM | DIASTOLIC BLOOD PRESSURE: 74 MMHG

## 2018-08-17 DIAGNOSIS — R80.9 TYPE 2 DIABETES MELLITUS WITH MICROALBUMINURIA, WITHOUT LONG-TERM CURRENT USE OF INSULIN: Chronic | ICD-10-CM

## 2018-08-17 DIAGNOSIS — E11.29 TYPE 2 DIABETES MELLITUS WITH MICROALBUMINURIA, WITHOUT LONG-TERM CURRENT USE OF INSULIN: Chronic | ICD-10-CM

## 2018-08-17 DIAGNOSIS — E78.2 MIXED HYPERLIPIDEMIA: Chronic | ICD-10-CM

## 2018-08-17 DIAGNOSIS — I48.20 CHRONIC ATRIAL FIBRILLATION: ICD-10-CM

## 2018-08-17 DIAGNOSIS — Z95.2 S/P MVR (MITRAL VALVE REPLACEMENT): Primary | ICD-10-CM

## 2018-08-17 DIAGNOSIS — I09.9 RHEUMATIC HEART DISEASE: Chronic | ICD-10-CM

## 2018-08-17 DIAGNOSIS — E11.3299 TYPE 2 DIABETES MELLITUS WITH MILD NONPROLIFERATIVE RETINOPATHY WITHOUT MACULAR EDEMA, WITHOUT LONG-TERM CURRENT USE OF INSULIN, UNSPECIFIED LATERALITY: Chronic | ICD-10-CM

## 2018-08-17 DIAGNOSIS — Z95.0 CARDIAC PACEMAKER IN SITU: ICD-10-CM

## 2018-08-17 DIAGNOSIS — Z79.01 LONG TERM CURRENT USE OF ANTICOAGULANT: ICD-10-CM

## 2018-08-17 DIAGNOSIS — Z95.2 S/P AVR: ICD-10-CM

## 2018-08-17 DIAGNOSIS — I10 ESSENTIAL (PRIMARY) HYPERTENSION: Chronic | ICD-10-CM

## 2018-08-17 PROCEDURE — 3077F SYST BP >= 140 MM HG: CPT | Mod: CPTII,S$GLB,, | Performed by: INTERNAL MEDICINE

## 2018-08-17 PROCEDURE — 3078F DIAST BP <80 MM HG: CPT | Mod: CPTII,S$GLB,, | Performed by: INTERNAL MEDICINE

## 2018-08-17 PROCEDURE — 99999 PR PBB SHADOW E&M-EST. PATIENT-LVL III: CPT | Mod: PBBFAC,,, | Performed by: INTERNAL MEDICINE

## 2018-08-17 PROCEDURE — 99214 OFFICE O/P EST MOD 30 MIN: CPT | Mod: S$GLB,,, | Performed by: INTERNAL MEDICINE

## 2018-08-17 PROCEDURE — 3044F HG A1C LEVEL LT 7.0%: CPT | Mod: CPTII,S$GLB,, | Performed by: INTERNAL MEDICINE

## 2018-08-17 NOTE — PROGRESS NOTES
CARDIOVASCULAR PROGRESS NOTE    REASON FOR CONSULT:   rOion Ty is a 73 y.o. female who presents for follow up of AF/AVR/MVR.    PCP: Mesha  HISTORY OF PRESENT ILLNESS:   The patient is seen accompanied by her  who aids with [facility] interpretation.  The patient denies chest discomfort or shortness of breath.  She's had no palpitations, lightheadedness, dizziness, or syncope.  There's been no PND, orthopnea, or lower extremity edema.  She denies melena, hematuria, or claudicant symptoms.    She recently had a dental procedure, and suffered some bleeding in relation to this.  This bleeding appears to have resolved.  The patient's INR appears to be supratherapeutic today and is being managed by the [facility]in [facility].  I explained to the  that if she requires further significant dental work or other procedures, we may consider bridging her with Lovenox.    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) monitors coumadin at home  ?mod prosthetic AS (echo 12/2017)  Chronic A-fib  Medtronic pacemaker - last generator change 2011    PAST MEDICAL HISTORY:     Past Medical History:   Diagnosis Date    Closed displaced fracture of distal phalanx of lesser toe 10/20/2015    Diabetes mellitus     Diabetes mellitus, type 2     Hypertension     Osteopenia     Pacemaker     Rheumatic heart disease        PAST SURGICAL HISTORY:     Past Surgical History:   Procedure Laterality Date    CARDIAC PACEMAKER PLACEMENT      CARDIAC VALVE REPLACEMENT      CARDIAC VALVE SURGERY      thryoid s         ALLERGIES AND MEDICATION:   Review of patient's allergies indicates:  No Known Allergies  Previous Medications    ACCU-CHEK GATO CONTROL SOLN SOLN        AMLODIPINE (NORVASC) 5 MG TABLET    Take 1 tablet (5 mg total) by mouth once daily.    BLOOD-GLUCOSE METER MISC    Accu-chek Gato Glucose Meter, Use to test blood sugar once daily.    FLUZONE HIGH-DOSE 2017-18, PF, 180 MCG/0.5 ML  VACCINE    ADM 0.5ML IM UTD    FUROSEMIDE (LASIX) 40 MG TABLET    Take 1 tablet (40 mg total) by mouth daily as needed (leg swelling).    LANCETS 30 GAUGE MISC        LANCING DEVICE MISC        LOSARTAN (COZAAR) 50 MG TABLET    Take 1 tablet (50 mg total) by mouth once daily.    METFORMIN (GLUCOPHAGE) 500 MG TABLET    TAKE 2 TABLETS BY MOUTH EVERY MORNING AND 1 TABLET EVERY EVENING    METOPROLOL TARTRATE (LOPRESSOR) 25 MG TABLET    TAKE 1 TABLET BY MOUTH TWICE DAILY    NITROGLYCERIN (NITROSTAT) 0.4 MG SL TABLET    DISSOLVE 1 TABLET UNDER THE TONGUE EVERY 5 MINUTES AS NEEDED FOR CHEST PAINS    OMEGA-3 ACID ETHYL ESTERS (LOVAZA) 1 GRAM CAPSULE    TK 2 CS PO BID    PRAVASTATIN (PRAVACHOL) 20 MG TABLET    TAKE 1 TABLET BY MOUTH EVERY DAY    TRUETEST TEST STRIPS STRP    USE ONCE DAILY    WARFARIN (COUMADIN) 4 MG TABLET    TAKE 1 TABLET BY MOUTH ON MONDAY AND FRIDAY AND 1/2 TABLET BY MOUTH ON ALL OTHER DAYS       SOCIAL HISTORY:     Social History     Socioeconomic History    Marital status:      Spouse name: Not on file    Number of children: Not on file    Years of education: Not on file    Highest education level: Not on file   Social Needs    Financial resource strain: Not on file    Food insecurity - worry: Not on file    Food insecurity - inability: Not on file    Transportation needs - medical: Not on file    Transportation needs - non-medical: Not on file   Occupational History    Not on file   Tobacco Use    Smoking status: Never Smoker    Smokeless tobacco: Never Used   Substance and Sexual Activity    Alcohol use: No    Drug use: Not on file    Sexual activity: Not on file   Other Topics Concern    Not on file   Social History Narrative    Not on file       FAMILY HISTORY:     Family History   Problem Relation Age of Onset    Breast cancer Neg Hx     Colon cancer Neg Hx     Ovarian cancer Neg Hx        REVIEW OF SYSTEMS:   Review of Systems   Constitutional: Negative for chills,  diaphoresis and fever.   HENT: Negative for nosebleeds.    Eyes: Negative for blurred vision, double vision and photophobia.   Respiratory: Negative for hemoptysis, shortness of breath and wheezing.    Cardiovascular: Negative for chest pain, palpitations, orthopnea, claudication, leg swelling and PND.   Gastrointestinal: Negative for abdominal pain, blood in stool, heartburn, melena, nausea and vomiting.   Genitourinary: Negative for flank pain and hematuria.   Musculoskeletal: Negative for falls, myalgias and neck pain.   Skin: Negative for rash.   Neurological: Negative for dizziness, seizures, loss of consciousness, weakness and headaches.   Endo/Heme/Allergies: Negative for polydipsia. Does not bruise/bleed easily.   Psychiatric/Behavioral: Negative for depression and memory loss. The patient is not nervous/anxious.        PHYSICAL EXAM:     Vitals:    08/17/18 1118   BP: (!) 152/74   Pulse: 65   Resp: 15    Body mass index is 20.28 kg/m².  Weight: 47.1 kg (103 lb 13.4 oz)   Height: 5' (152.4 cm)     Physical Exam   Constitutional: She is oriented to person, place, and time. She appears well-developed and well-nourished. She is cooperative.  Non-toxic appearance. No distress.   HENT:   Head: Normocephalic and atraumatic.   Eyes: Conjunctivae and EOM are normal. Pupils are equal, round, and reactive to light. No scleral icterus.   Neck: Trachea normal and normal range of motion. Neck supple. Normal carotid pulses and no JVD present. Carotid bruit is not present. No neck rigidity. No tracheal deviation and no edema present. No thyromegaly present.   Cardiovascular: Normal rate, S1 normal and S2 normal. An irregularly irregular rhythm present. PMI is not displaced. Exam reveals no gallop and no friction rub.   Murmur heard.   Systolic murmur is present with a grade of 2/6.  Pulses:       Carotid pulses are 2+ on the right side, and 2+ on the left side.  Crisp Mech S1S2.  Systolic murmur radiates to carotids    Pulmonary/Chest: Effort normal and breath sounds normal. No stridor. No respiratory distress. She has no wheezes. She has no rales. She exhibits no tenderness.   Abdominal: Soft. She exhibits no distension. There is no hepatosplenomegaly.   Musculoskeletal: She exhibits no edema or tenderness.   Feet:   Right Foot:   Skin Integrity: Negative for ulcer.   Left Foot:   Skin Integrity: Negative for ulcer.   Neurological: She is alert and oriented to person, place, and time. No cranial nerve deficit.   Skin: Skin is warm and dry. No rash noted. No erythema.   Psychiatric: She has a normal mood and affect. Her speech is normal and behavior is normal.   Vitals reviewed.      DATA:   EKG: (personally reviewed tracing)  4/19/18  60, similar to 12/12/17    Laboratory:  CBC:  Recent Labs   Lab  05/27/16   0700  03/14/17   0735  03/08/18   0736   WHITE BLOOD CELL COUNT  7.14  6.84  6.49   HEMOGLOBIN  12.0  12.2  12.5   HEMATOCRIT  36.4 L  38.6  38.6   PLATELETS  98 L  91 L  82 L       CHEMISTRIES:  Recent Labs   Lab  03/14/17   0735  04/27/17   0905  08/15/17   0745  03/08/18   0736   GLUCOSE  149 H   --   149 H  152 H   SODIUM  140   --   140  142   POTASSIUM  4.5   --   4.8  4.5   BUN BLD  12  16  12  13   CREATININE  0.8  0.9  0.8  0.9   EGFR IF AFRICAN AMERICAN  >60.0  >60  >60.0  >60.0   EGFR IF NON-  >60.0  >60  >60.0  >60.0   CALCIUM  10.0   --   9.7  10.0       CARDIAC BIOMARKERS:        COAGS:  Recent Labs   Lab 07/30/18 08/02/18 08/16/18   INR  2.2  2.6  4.4       LIPIDS/LFTS:  Recent Labs   Lab  03/14/17   0735  08/15/17   0745  03/08/18   0736   CHOLESTEROL  139  139  127  140   TRIGLYCERIDES  269 H  269 H  229 H  146   HDL  30 L  30 L  30 L  38 L   LDL CHOLESTEROL  55.2 L  55.2 L  51.2 L  72.8   NON-HDL CHOLESTEROL  109  109  97  102   AST  45 H  32  41 H   ALT  23  21  30     Lab Results   Component Value Date    TSH 7.037 (H) 03/08/2018     Free T4 0.99 (3/8/18)    Cardiovascular  Testing:  Echo 5/9/18    1 - Low normal to mildly depressed left ventricular systolic function (EF 50-55%).     2 - Concentric hypertrophy.     3 - Biatrial enlargement.     4 - Pulmonary hypertension. The estimated PA systolic pressure is 52 mmHg.     5 - Aortic valve mechanical prosthesis, YULIYA = 0.7 cm2, AVAi = 0.49 cm2/m2, peak velocity = 3.67 m/s, mean gradient = 33 mmHg.     6 - Mild aortic regurgitation.     7 - Mitral valve mechanical prosthesis adequately functioning.     8 - Mild mitral regurgitation.     9 - Trivial to mild tricuspid regurgitation.     10 - Trivial pulmonic regurgitation.     L MPI 2/20/15  Nuclear Quantitative Functional Analysis:   LVEF: 57 %  Impression: EQUIVOCAL MYOCARDIAL PERFUSION  1. There is a mild to moderate mostly fixed lateral wall defect of uncertain significance.   2. The perfusion scan is free of evidence for myocardial ischemia.   3. There is abnormal wall motion at rest showing mild hypokinesis of the anteroseptal wall of the left ventricle.   4. Resting LV function is normal.   5. The ventricular volumes are normal at rest and stress.   6. The extracardiac distribution of radioactivity is normal.   7. There was no previous study available to compare.    ASSESSMENT:   # [facility] AVR/MVR, echo 5/2018 suggests mod prosthetic AVR stenosis, asymptomatic  # HTN, uncontrolled  # Chr AF, on coumadin (goal INR 2.5-3.5 given [facility] valves)  # HLP on prava 40mg  # DM  # thrombocytopenia    PLAN:   Cont med rx  Pt to monitor BP at home and RTC sooner for BP persistently >140/90  RTC 3 months with Medtronic PPM check      Ludwig Grigsby MD, FACC

## 2018-08-23 ENCOUNTER — ANTI-COAG VISIT (OUTPATIENT)
Dept: CARDIOLOGY | Facility: CLINIC | Age: 73
End: 2018-08-23

## 2018-08-23 DIAGNOSIS — Z95.2 S/P AVR: ICD-10-CM

## 2018-08-23 DIAGNOSIS — Z95.2 S/P MVR (MITRAL VALVE REPLACEMENT): ICD-10-CM

## 2018-08-23 DIAGNOSIS — I48.20 CHRONIC ATRIAL FIBRILLATION: ICD-10-CM

## 2018-08-23 DIAGNOSIS — Z79.01 LONG TERM CURRENT USE OF ANTICOAGULANT: ICD-10-CM

## 2018-08-23 LAB — INR PPP: 2.8

## 2018-08-24 ENCOUNTER — PES CALL (OUTPATIENT)
Dept: ADMINISTRATIVE | Facility: CLINIC | Age: 73
End: 2018-08-24

## 2018-08-28 ENCOUNTER — TELEPHONE (OUTPATIENT)
Dept: CARDIOLOGY | Facility: CLINIC | Age: 73
End: 2018-08-28

## 2018-08-31 ENCOUNTER — OFFICE VISIT (OUTPATIENT)
Dept: CARDIOLOGY | Facility: CLINIC | Age: 73
End: 2018-08-31
Payer: MEDICARE

## 2018-08-31 VITALS
DIASTOLIC BLOOD PRESSURE: 78 MMHG | SYSTOLIC BLOOD PRESSURE: 136 MMHG | OXYGEN SATURATION: 97 % | HEIGHT: 60 IN | RESPIRATION RATE: 15 BRPM | BODY MASS INDEX: 20.91 KG/M2 | WEIGHT: 106.5 LBS | HEART RATE: 64 BPM

## 2018-08-31 DIAGNOSIS — Z95.2 S/P MVR (MITRAL VALVE REPLACEMENT): ICD-10-CM

## 2018-08-31 DIAGNOSIS — I10 ESSENTIAL (PRIMARY) HYPERTENSION: Primary | Chronic | ICD-10-CM

## 2018-08-31 DIAGNOSIS — Z95.2 S/P AVR: ICD-10-CM

## 2018-08-31 DIAGNOSIS — Z79.01 LONG TERM CURRENT USE OF ANTICOAGULANT: ICD-10-CM

## 2018-08-31 DIAGNOSIS — R80.9 TYPE 2 DIABETES MELLITUS WITH MICROALBUMINURIA, WITHOUT LONG-TERM CURRENT USE OF INSULIN: Chronic | ICD-10-CM

## 2018-08-31 DIAGNOSIS — E11.29 TYPE 2 DIABETES MELLITUS WITH MICROALBUMINURIA, WITHOUT LONG-TERM CURRENT USE OF INSULIN: Chronic | ICD-10-CM

## 2018-08-31 DIAGNOSIS — E78.2 MIXED HYPERLIPIDEMIA: Chronic | ICD-10-CM

## 2018-08-31 DIAGNOSIS — Z95.0 CARDIAC PACEMAKER IN SITU: ICD-10-CM

## 2018-08-31 DIAGNOSIS — I48.20 CHRONIC ATRIAL FIBRILLATION: ICD-10-CM

## 2018-08-31 PROCEDURE — 3078F DIAST BP <80 MM HG: CPT | Mod: CPTII,S$GLB,, | Performed by: INTERNAL MEDICINE

## 2018-08-31 PROCEDURE — 99214 OFFICE O/P EST MOD 30 MIN: CPT | Mod: S$GLB,,, | Performed by: INTERNAL MEDICINE

## 2018-08-31 PROCEDURE — 3044F HG A1C LEVEL LT 7.0%: CPT | Mod: CPTII,S$GLB,, | Performed by: INTERNAL MEDICINE

## 2018-08-31 PROCEDURE — 3075F SYST BP GE 130 - 139MM HG: CPT | Mod: CPTII,S$GLB,, | Performed by: INTERNAL MEDICINE

## 2018-08-31 PROCEDURE — 99999 PR PBB SHADOW E&M-EST. PATIENT-LVL III: CPT | Mod: PBBFAC,,, | Performed by: INTERNAL MEDICINE

## 2018-08-31 PROCEDURE — 93010 ELECTROCARDIOGRAM REPORT: CPT | Mod: ,,, | Performed by: INTERNAL MEDICINE

## 2018-08-31 RX ORDER — AMLODIPINE BESYLATE 10 MG/1
10 TABLET ORAL DAILY
Qty: 90 TABLET | Refills: 3 | Status: SHIPPED | OUTPATIENT
Start: 2018-08-31 | End: 2019-02-15 | Stop reason: SDUPTHER

## 2018-09-06 ENCOUNTER — ANTI-COAG VISIT (OUTPATIENT)
Dept: CARDIOLOGY | Facility: CLINIC | Age: 73
End: 2018-09-06
Payer: MEDICARE

## 2018-09-06 DIAGNOSIS — Z79.01 LONG TERM CURRENT USE OF ANTICOAGULANT: ICD-10-CM

## 2018-09-06 DIAGNOSIS — Z95.2 S/P AVR: ICD-10-CM

## 2018-09-06 DIAGNOSIS — Z95.2 S/P MVR (MITRAL VALVE REPLACEMENT): ICD-10-CM

## 2018-09-06 DIAGNOSIS — I48.20 CHRONIC ATRIAL FIBRILLATION: ICD-10-CM

## 2018-09-06 LAB — INR PPP: 4.6

## 2018-09-06 PROCEDURE — G0250 MD INR TEST REVIE INTER MGMT: HCPCS | Mod: ,,, | Performed by: INTERNAL MEDICINE

## 2018-09-12 ENCOUNTER — OFFICE VISIT (OUTPATIENT)
Dept: FAMILY MEDICINE | Facility: CLINIC | Age: 73
End: 2018-09-12
Payer: MEDICARE

## 2018-09-12 VITALS
SYSTOLIC BLOOD PRESSURE: 154 MMHG | HEART RATE: 80 BPM | BODY MASS INDEX: 20.81 KG/M2 | DIASTOLIC BLOOD PRESSURE: 74 MMHG | OXYGEN SATURATION: 94 % | TEMPERATURE: 98 F | HEIGHT: 60 IN | WEIGHT: 106 LBS

## 2018-09-12 VITALS
TEMPERATURE: 98 F | HEIGHT: 60 IN | DIASTOLIC BLOOD PRESSURE: 74 MMHG | HEART RATE: 94 BPM | SYSTOLIC BLOOD PRESSURE: 136 MMHG | BODY MASS INDEX: 20.81 KG/M2 | WEIGHT: 106 LBS | OXYGEN SATURATION: 95 %

## 2018-09-12 DIAGNOSIS — I48.20 CHRONIC ATRIAL FIBRILLATION: ICD-10-CM

## 2018-09-12 DIAGNOSIS — Z23 NEED FOR 23-POLYVALENT PNEUMOCOCCAL POLYSACCHARIDE VACCINE: ICD-10-CM

## 2018-09-12 DIAGNOSIS — Z95.0 CARDIAC PACEMAKER IN SITU: ICD-10-CM

## 2018-09-12 DIAGNOSIS — E03.8 SUBCLINICAL HYPOTHYROIDISM: Chronic | ICD-10-CM

## 2018-09-12 DIAGNOSIS — E78.2 MIXED HYPERLIPIDEMIA: Chronic | ICD-10-CM

## 2018-09-12 DIAGNOSIS — Z23 NEED FOR IMMUNIZATION AGAINST INFLUENZA: ICD-10-CM

## 2018-09-12 DIAGNOSIS — Z79.01 LONG TERM CURRENT USE OF ANTICOAGULANT: ICD-10-CM

## 2018-09-12 DIAGNOSIS — Z95.2 S/P MVR (MITRAL VALVE REPLACEMENT): ICD-10-CM

## 2018-09-12 DIAGNOSIS — I70.0 ATHEROSCLEROSIS OF AORTA: Chronic | ICD-10-CM

## 2018-09-12 DIAGNOSIS — E11.29 TYPE 2 DIABETES MELLITUS WITH MICROALBUMINURIA, WITHOUT LONG-TERM CURRENT USE OF INSULIN: Chronic | ICD-10-CM

## 2018-09-12 DIAGNOSIS — E11.3299 TYPE 2 DIABETES MELLITUS WITH MILD NONPROLIFERATIVE RETINOPATHY WITHOUT MACULAR EDEMA, WITHOUT LONG-TERM CURRENT USE OF INSULIN, UNSPECIFIED LATERALITY: Chronic | ICD-10-CM

## 2018-09-12 DIAGNOSIS — Z95.2 S/P AVR: ICD-10-CM

## 2018-09-12 DIAGNOSIS — I10 ESSENTIAL (PRIMARY) HYPERTENSION: Chronic | ICD-10-CM

## 2018-09-12 DIAGNOSIS — R80.9 TYPE 2 DIABETES MELLITUS WITH MICROALBUMINURIA, WITHOUT LONG-TERM CURRENT USE OF INSULIN: Chronic | ICD-10-CM

## 2018-09-12 DIAGNOSIS — M85.80 OSTEOPENIA, UNSPECIFIED LOCATION: ICD-10-CM

## 2018-09-12 DIAGNOSIS — Z12.39 SCREENING FOR MALIGNANT NEOPLASM OF BREAST: ICD-10-CM

## 2018-09-12 DIAGNOSIS — Z00.00 ENCOUNTER FOR PREVENTIVE HEALTH EXAMINATION: Primary | ICD-10-CM

## 2018-09-12 PROCEDURE — 90732 PPSV23 VACC 2 YRS+ SUBQ/IM: CPT | Mod: PBBFAC,PO

## 2018-09-12 PROCEDURE — 3078F DIAST BP <80 MM HG: CPT | Mod: CPTII,,, | Performed by: INTERNAL MEDICINE

## 2018-09-12 PROCEDURE — 99213 OFFICE O/P EST LOW 20 MIN: CPT | Mod: PBBFAC,27,PO | Performed by: INTERNAL MEDICINE

## 2018-09-12 PROCEDURE — 3075F SYST BP GE 130 - 139MM HG: CPT | Mod: CPTII,,, | Performed by: INTERNAL MEDICINE

## 2018-09-12 PROCEDURE — G0439 PPPS, SUBSEQ VISIT: HCPCS | Mod: ,,, | Performed by: NURSE PRACTITIONER

## 2018-09-12 PROCEDURE — 1101F PT FALLS ASSESS-DOCD LE1/YR: CPT | Mod: CPTII,,, | Performed by: INTERNAL MEDICINE

## 2018-09-12 PROCEDURE — 99215 OFFICE O/P EST HI 40 MIN: CPT | Mod: PBBFAC,PO,25 | Performed by: NURSE PRACTITIONER

## 2018-09-12 PROCEDURE — 3078F DIAST BP <80 MM HG: CPT | Mod: CPTII,,, | Performed by: NURSE PRACTITIONER

## 2018-09-12 PROCEDURE — 3044F HG A1C LEVEL LT 7.0%: CPT | Mod: CPTII,,, | Performed by: INTERNAL MEDICINE

## 2018-09-12 PROCEDURE — 99214 OFFICE O/P EST MOD 30 MIN: CPT | Mod: S$PBB,,, | Performed by: INTERNAL MEDICINE

## 2018-09-12 PROCEDURE — 99999 PR PBB SHADOW E&M-EST. PATIENT-LVL III: CPT | Mod: PBBFAC,,, | Performed by: INTERNAL MEDICINE

## 2018-09-12 PROCEDURE — 3077F SYST BP >= 140 MM HG: CPT | Mod: CPTII,,, | Performed by: NURSE PRACTITIONER

## 2018-09-12 PROCEDURE — 3044F HG A1C LEVEL LT 7.0%: CPT | Mod: CPTII,,, | Performed by: NURSE PRACTITIONER

## 2018-09-12 PROCEDURE — 99999 PR PBB SHADOW E&M-EST. PATIENT-LVL V: CPT | Mod: PBBFAC,,, | Performed by: NURSE PRACTITIONER

## 2018-09-12 NOTE — ASSESSMENT & PLAN NOTE
Excellent home control. Rare low readings with even more rare symptoms.  Will monitor closely for ability to reduce metformin to 500mg daily.

## 2018-09-12 NOTE — ASSESSMENT & PLAN NOTE
Good home blood pressure log.  Patient is asked to monitor BP at home or work, several times per month and return with written values at next office visit.

## 2018-09-12 NOTE — PROGRESS NOTES
Assessment & Plan  Problem List Items Addressed This Visit        Cardiac/Vascular    Essential (primary) hypertension (Chronic)    Current Assessment & Plan     Good home blood pressure log.  Patient is asked to monitor BP at home or work, several times per month and return with written values at next office visit.              Endocrine    Subclinical hypothyroidism (Chronic)    Current Assessment & Plan     Slowly increasing TSH.  Asymptomatic.  Monitor labs         Type 2 diabetes mellitus with microalbuminuria, without long-term current use of insulin (Chronic)    Current Assessment & Plan     Excellent home control. Rare low readings with even more rare symptoms.  Will monitor closely for ability to reduce metformin to 500mg daily.                  Health Maintenance reviewed, Flu in Oct.    Follow-up: Follow-up in about 6 months (around 3/12/2019) for Routine Physical.  ______________________________________________________________________    Chief Complaint  Chief Complaint   Patient presents with    Hypothyroidism     subclinical    Hypertension    Diabetes    Follow-up       HPI  Orion Ty is a 73 y.o. female with medical diagnoses as listed in the medical history and problem list that presents for HTN DM subclinical hypothyroid follow up.  Pt is known to me with her last appointment 9/12/2018.      Her TSH has been slowly increasing but she remains asymptomatic.    She has been doing very well.  Sugars have been under excellent control.  Rare readings in the 70s and even less often with symptoms.  She is taking her tolerating her metformin.      Cardiology had increased her amlodipine to 10mg but she stayed on 5mg.  She has been watching her home BPs closely and I have attached her home BP log.              PAST MEDICAL HISTORY:  Past Medical History:   Diagnosis Date    Closed displaced fracture of distal phalanx of lesser toe 10/20/2015    Diabetes mellitus     Diabetes mellitus, type 2      Hypertension     Osteopenia     Pacemaker     Rheumatic heart disease        PAST SURGICAL HISTORY:  Past Surgical History:   Procedure Laterality Date    CARDIAC PACEMAKER PLACEMENT      CARDIAC VALVE REPLACEMENT      CARDIAC VALVE SURGERY      thryoid s         SOCIAL HISTORY:  Social History     Socioeconomic History    Marital status:      Spouse name: Not on file    Number of children: Not on file    Years of education: Not on file    Highest education level: Not on file   Social Needs    Financial resource strain: Not on file    Food insecurity - worry: Not on file    Food insecurity - inability: Not on file    Transportation needs - medical: Not on file    Transportation needs - non-medical: Not on file   Occupational History    Not on file   Tobacco Use    Smoking status: Never Smoker    Smokeless tobacco: Never Used   Substance and Sexual Activity    Alcohol use: No    Drug use: Not on file    Sexual activity: Not on file   Other Topics Concern    Not on file   Social History Narrative    Not on file       FAMILY HISTORY:  Family History   Problem Relation Age of Onset    Breast cancer Neg Hx     Colon cancer Neg Hx     Ovarian cancer Neg Hx        ALLERGIES AND MEDICATIONS: updated and reviewed.  Review of patient's allergies indicates:  No Known Allergies  Current Outpatient Medications   Medication Sig Dispense Refill    ACCU-CHEK GATO CONTROL SOLN Soln       amLODIPine (NORVASC) 10 MG tablet Take 1 tablet (10 mg total) by mouth once daily. 90 tablet 3    blood-glucose meter Misc Accu-chek Gato Glucose Meter, Use to test blood sugar once daily. 1 each 1    lancets 30 gauge Misc       lancing device Misc       losartan (COZAAR) 50 MG tablet Take 1 tablet (50 mg total) by mouth once daily. 90 tablet 3    metFORMIN (GLUCOPHAGE) 500 MG tablet TAKE 2 TABLETS BY MOUTH EVERY MORNING AND 1 TABLET EVERY EVENING 270 tablet 3    metoprolol tartrate (LOPRESSOR) 25 MG  tablet TAKE 1 TABLET BY MOUTH TWICE DAILY 180 tablet 3    nitroGLYCERIN (NITROSTAT) 0.4 MG SL tablet DISSOLVE 1 TABLET UNDER THE TONGUE EVERY 5 MINUTES AS NEEDED FOR CHEST PAINS 275 tablet 0    omega-3 acid ethyl esters (LOVAZA) 1 gram capsule TK 2 CS PO BID  2    pravastatin (PRAVACHOL) 20 MG tablet TAKE 1 TABLET BY MOUTH EVERY DAY 90 tablet 3    TRUETEST TEST STRIPS Strp USE ONCE DAILY (Patient taking differently: twice daily) 100 strip 6    warfarin (COUMADIN) 4 MG tablet TAKE 1 TABLET BY MOUTH ON MONDAY AND FRIDAY AND 1/2 TABLET BY MOUTH ON ALL OTHER DAYS 135 tablet 0     No current facility-administered medications for this visit.          ROS  Review of Systems   Constitutional: Negative for chills, fever and unexpected weight change.   HENT: Negative for congestion, ear pain, hearing loss, rhinorrhea, sore throat and trouble swallowing.    Eyes: Negative for discharge, redness and visual disturbance.   Respiratory: Negative for cough, chest tightness, shortness of breath and wheezing.    Cardiovascular: Negative for chest pain, palpitations and leg swelling.   Gastrointestinal: Negative for abdominal pain, constipation, diarrhea, nausea and vomiting.   Endocrine: Negative for polydipsia, polyphagia and polyuria.   Genitourinary: Negative for decreased urine volume, dysuria and hematuria.   Musculoskeletal: Negative for arthralgias, joint swelling and myalgias.   Skin: Negative for color change and rash.   Neurological: Negative for dizziness, weakness, light-headedness and headaches.   Psychiatric/Behavioral: Negative for decreased concentration, dysphoric mood, sleep disturbance and suicidal ideas.           Physical Exam  Vitals:    09/12/18 1314 09/12/18 1350   BP: (!) 150/70 136/74   Pulse: 94    Temp: 97.9 °F (36.6 °C)    SpO2: 95%    Weight: 48.1 kg (106 lb)    Height: 5' (1.524 m)     Body mass index is 20.7 kg/m².  Weight: 48.1 kg (106 lb)   Height: 5' (152.4 cm)   Physical Exam   Constitutional:  She is oriented to person, place, and time. She appears well-developed and well-nourished. No distress.   HENT:   Head: Normocephalic and atraumatic.   Eyes: Conjunctivae and EOM are normal. Pupils are equal, round, and reactive to light.   Cardiovascular: Normal rate and regular rhythm. Exam reveals no gallop and no friction rub.   Murmur heard.  Pulmonary/Chest: Effort normal and breath sounds normal. No respiratory distress.   Musculoskeletal: She exhibits no edema, tenderness or deformity.   Neurological: She is alert and oriented to person, place, and time.   Symmetric facial movements palate elevated symmetrically tongue midline    Skin: Skin is warm and dry. No erythema.           Health Maintenance       Date Due Completion Date    Mammogram 03/27/2018 3/27/2017    Eye Exam 09/06/2018 9/6/2017    Override on 3/8/2017: Done (Dr. Richmond Andrea/Optometry-positive for non-proliferative diabetic retinopathy)    Influenza Vaccine 10/02/2018 (Originally 8/1/2018) 10/10/2017 (Done)    Override on 10/10/2017: Done    Override on 10/10/2016: Done    Override on 12/1/2014: Done    Hemoglobin A1c 11/15/2018 5/15/2018    DEXA SCAN 02/01/2019 2/1/2016    Override on 8/4/2014: Done    Lipid Panel 03/08/2019 3/8/2018    High Dose Statin 09/12/2019 9/12/2018    Foot Exam 09/12/2019 9/12/2018    Override on 9/12/2018: Done    Colonoscopy 01/01/2020 1/1/2010 (Done)    Override on 1/1/2010: Done    Override on 1/1/2006: Done (repeat in 10 years)    TETANUS VACCINE 06/30/2026 6/30/2016 (Declined)    Override on 6/30/2016: Declined

## 2018-09-12 NOTE — PROGRESS NOTES
Orion Ty presented for a  Medicare AWV and comprehensive Health Risk Assessment today. The following components were reviewed and updated:    · Medical history  · Family History  · Social history  · Allergies and Current Medications  · Health Risk Assessment  · Health Maintenance  · Care Team     ** See Completed Assessments for Annual Wellness Visit within the encounter summary.**       The following assessments were completed:  · Living Situation  · CAGE  · Depression Screening  · Timed Get Up and Go  · Whisper Test  · Cognitive Function Screening  · Nutrition Screening  · ADL Screening  · PAQ Screening    Vitals:    09/12/18 1146   BP: (!) 154/74   BP Location: Right arm   Patient Position: Sitting   BP Method: Medium (Manual)   Pulse: 80   Temp: 97.9 °F (36.6 °C)   TempSrc: Oral   SpO2: (!) 94%   Weight: 48.1 kg (106 lb)   Height: 5' (1.524 m)     Body mass index is 20.7 kg/m².  Physical Exam   Constitutional: She is oriented to person, place, and time. She appears well-developed and well-nourished.   Cardiovascular: Normal rate and regular rhythm.   Pulmonary/Chest: Effort normal and breath sounds normal.   Neurological: She is alert and oriented to person, place, and time.   Skin: Skin is warm and dry.   Psychiatric: She has a normal mood and affect.       Protective Sensation (w/ 10 gram monofilament):  Right: Intact  Left: Intact    Visual Inspection:  Normal -  Bilateral    Pedal Pulses:   Right: Present  Left: Present    Posterior tibialis:   Right:Present  Left: Present          Diagnoses and health risks identified today and associated recommendations/orders:    1. Encounter for preventive health examination  Patient was seen today for AWV.  Healthcare maintenance addressed and updated.  [facility] for outside eye exam.  Mammogram scheduled.  Foot exam performed.  Pneumonia 23 administered.  Will return in October for influenza vaccine.    2. Atherosclerosis of aorta  Stable.  The current medical regimen is  effective;  continue present plan and medications.    3. Chronic atrial fibrillation  The current medical regimen is effective;  continue present plan and medications.    4. Type 2 diabetes mellitus with microalbuminuria, without long-term current use of insulin  The current medical regimen is effective;  continue present plan and medications.    5. 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    6. Mixed hyperlipidemia  The current medical regimen is effective;  continue present plan and medications.    7. Essential (primary) hypertension  The current medical regimen is effective;  continue present plan and medications.    8. Cardiac pacemaker in situ  The current medical regimen is effective;  continue present plan and medications.    9. S/P AVR  The current medical regimen is effective;  continue present plan and medications.    10. S/P MVR (mitral valve replacement)  The current medical regimen is effective;  continue present plan and medications.    11. Long term current use of anticoagulant  The current medical regimen is effective;  continue present plan and medications.    12. Subclinical hypothyroidism  The current medical regimen is effective;  continue present plan and medications.    13. Osteopenia, unspecified location  The current medical regimen is effective;  continue present plan and medications.    14. Need for 23-polyvalent pneumococcal polysaccharide vaccine  Administered today.  - (In Office Administered) Pneumococcal Polysaccharide Vaccine (23 Valent) (SQ/IM)    15. Need for immunization against influenza  Will return in October.  - Influenza - High Dose (65+) (PF) (IM)    16. Screening for malignant neoplasm of breast  Scheduled today.  - Mammo Digital Screening Bilateral With CAD; Future    Provided Orion  with a 5-10 year written screening schedule and personal prevention plan. Recommendations were developed using the USPSTF age appropriate recommendations. Education, counseling, and referrals were provided as needed. After Visit Summary printed and given to patient which includes a list of additional screenings\tests needed.    Follow-up in about 1 year (around 9/12/2019) for annual wellness visit.    Ericka Juan NP

## 2018-09-12 NOTE — PROGRESS NOTES
Patient tolerated vaccination well. Advised patient to wait 15 mins after shot. Verbalized understanding.

## 2018-09-12 NOTE — PATIENT INSTRUCTIONS
Counseling and Referral of Other Preventative  (Italic type indicates deductible and co-insurance are waived)    Patient Name: Orion Ty  Today's Date: 9/12/2018    Health Maintenance       Date Due Completion Date    Pap Smear 03/16/2018 3/16/2017    Pneumococcal (65+) (2 of 2 - PPSV23) 03/24/2018 3/24/2017    Mammogram 03/27/2018 3/27/2017    Influenza Vaccine 08/01/2018 10/10/2017 (Done)    Override on 10/10/2017: Done    Override on 10/10/2016: Done    Override on 12/1/2014: Done    Foot Exam 08/14/2018 8/14/2017    Eye Exam 09/06/2018 9/6/2017    Override on 3/8/2017: Done (Dr. Richmond Andrea/Optometry-positive for non-proliferative diabetic retinopathy)    Hemoglobin A1c 11/15/2018 5/15/2018    DEXA SCAN 02/01/2019 2/1/2016    Override on 8/4/2014: Done    Lipid Panel 03/08/2019 3/8/2018    High Dose Statin 08/31/2019 8/31/2018    Colonoscopy 01/01/2020 1/1/2010 (Done)    Override on 1/1/2010: Done    Override on 1/1/2006: Done (repeat in 10 years)    TETANUS VACCINE 06/30/2026 6/30/2016 (Declined)    Override on 6/30/2016: Declined        No orders of the defined types were placed in this encounter.    The following information is provided to all patients.  This information is to help you find resources for any of the problems found today that may be affecting your health:                Living healthy guide: www.[facility].gov      Understanding Diabetes: www.diabetes.org      Eating healthy: www.cdc.gov/healthyweight      CDC home safety checklist: www.cdc.gov/steadi/patient.html      Agency on Aging: www.goea.[place].gov      Alcoholics anonymous (AA): www.aa.org      Physical Activity: www.ade.nih.gov/rl0xzxa      Tobacco use: www.quitwithusla.org

## 2018-09-13 ENCOUNTER — HOSPITAL ENCOUNTER (OUTPATIENT)
Dept: RADIOLOGY | Facility: HOSPITAL | Age: 73
Discharge: HOME | End: 2018-09-13
Attending: NURSE PRACTITIONER
Payer: MEDICARE

## 2018-09-13 DIAGNOSIS — Z12.39 SCREENING FOR MALIGNANT NEOPLASM OF BREAST: ICD-10-CM

## 2018-09-13 PROCEDURE — 77067 SCR MAMMO BI INCL CAD: CPT | Mod: 26,,, | Performed by: RADIOLOGY

## 2018-09-13 PROCEDURE — 77063 BREAST TOMOSYNTHESIS BI: CPT | Mod: 26,,, | Performed by: RADIOLOGY

## 2018-09-13 PROCEDURE — 77063 BREAST TOMOSYNTHESIS BI: CPT | Mod: TC,PO

## 2018-09-14 ENCOUNTER — TELEPHONE (OUTPATIENT)
Dept: FAMILY MEDICINE | Facility: CLINIC | Age: 73
End: 2018-09-14

## 2018-09-14 NOTE — TELEPHONE ENCOUNTER
----- Message from Jesica Saleem sent at 9/14/2018  9:07 AM CDT -----  Contact: Cristobal/ / [phone]  Pt's  returning call for results. Thank you.

## 2018-09-14 NOTE — PROGRESS NOTES
Called and spoke with patient's  to see if INR was tested 9/13, he states she had lab yesterday but I told him no INR was drawn, he stated he will test it today -9/14

## 2018-09-17 ENCOUNTER — PATIENT MESSAGE (OUTPATIENT)
Dept: CARDIOLOGY | Facility: CLINIC | Age: 73
End: 2018-09-17

## 2018-09-19 ENCOUNTER — ANTI-COAG VISIT (OUTPATIENT)
Dept: CARDIOLOGY | Facility: CLINIC | Age: 73
End: 2018-09-19

## 2018-09-19 DIAGNOSIS — Z95.2 S/P MVR (MITRAL VALVE REPLACEMENT): ICD-10-CM

## 2018-09-19 DIAGNOSIS — I48.20 CHRONIC ATRIAL FIBRILLATION: ICD-10-CM

## 2018-09-19 DIAGNOSIS — Z79.01 LONG TERM CURRENT USE OF ANTICOAGULANT: ICD-10-CM

## 2018-09-19 DIAGNOSIS — Z95.2 S/P AVR: ICD-10-CM

## 2018-09-19 LAB — INR PPP: 2.6

## 2018-10-06 ENCOUNTER — TELEPHONE (OUTPATIENT)
Dept: ADMINISTRATIVE | Facility: HOSPITAL | Age: 73
End: 2018-10-06

## 2018-10-09 ENCOUNTER — ANTI-COAG VISIT (OUTPATIENT)
Dept: CARDIOLOGY | Facility: CLINIC | Age: 73
End: 2018-10-09
Payer: MEDICARE

## 2018-10-09 DIAGNOSIS — I48.20 CHRONIC ATRIAL FIBRILLATION: ICD-10-CM

## 2018-10-09 DIAGNOSIS — Z95.2 S/P MVR (MITRAL VALVE REPLACEMENT): ICD-10-CM

## 2018-10-09 DIAGNOSIS — Z79.01 LONG TERM CURRENT USE OF ANTICOAGULANT: ICD-10-CM

## 2018-10-09 DIAGNOSIS — Z95.2 S/P AVR: ICD-10-CM

## 2018-10-09 LAB — INR PPP: 1.8

## 2018-10-09 PROCEDURE — G0250 MD INR TEST REVIE INTER MGMT: HCPCS | Mod: ,,, | Performed by: INTERNAL MEDICINE

## 2018-10-09 NOTE — PROGRESS NOTES
INR low with no explanation, will boost today as her and maintain dose.  Will repeat INR Monday, closer interval.

## 2018-10-16 ENCOUNTER — ANTI-COAG VISIT (OUTPATIENT)
Dept: CARDIOLOGY | Facility: CLINIC | Age: 73
End: 2018-10-16

## 2018-10-16 DIAGNOSIS — Z79.01 LONG TERM CURRENT USE OF ANTICOAGULANT: ICD-10-CM

## 2018-10-16 DIAGNOSIS — Z95.2 S/P AVR: ICD-10-CM

## 2018-10-16 DIAGNOSIS — I48.20 CHRONIC ATRIAL FIBRILLATION: ICD-10-CM

## 2018-10-16 DIAGNOSIS — Z95.2 S/P MVR (MITRAL VALVE REPLACEMENT): ICD-10-CM

## 2018-10-16 LAB — INR PPP: 2.1

## 2018-10-23 ENCOUNTER — ANTI-COAG VISIT (OUTPATIENT)
Dept: CARDIOLOGY | Facility: CLINIC | Age: 73
End: 2018-10-23

## 2018-10-23 DIAGNOSIS — Z95.2 S/P AVR: ICD-10-CM

## 2018-10-23 DIAGNOSIS — Z95.2 S/P MVR (MITRAL VALVE REPLACEMENT): ICD-10-CM

## 2018-10-23 DIAGNOSIS — Z79.01 LONG TERM CURRENT USE OF ANTICOAGULANT: ICD-10-CM

## 2018-10-23 DIAGNOSIS — I48.20 CHRONIC ATRIAL FIBRILLATION: ICD-10-CM

## 2018-10-23 LAB — INR PPP: 1.8

## 2018-10-23 NOTE — PROGRESS NOTES
INR low again today but patient reports taking lower dose than recommended and also had extra servings of greens. Will boost again today and recommend patient follow new dosing plan and diet consistency. Will recheck INR again this week because we will need enoxaparin bridging if INR remains low.

## 2018-10-26 ENCOUNTER — ANTI-COAG VISIT (OUTPATIENT)
Dept: CARDIOLOGY | Facility: CLINIC | Age: 73
End: 2018-10-26

## 2018-10-26 DIAGNOSIS — I48.20 CHRONIC ATRIAL FIBRILLATION: ICD-10-CM

## 2018-10-26 DIAGNOSIS — Z79.01 LONG TERM CURRENT USE OF ANTICOAGULANT: ICD-10-CM

## 2018-10-26 DIAGNOSIS — Z95.2 S/P AVR: ICD-10-CM

## 2018-10-26 DIAGNOSIS — Z95.2 S/P MVR (MITRAL VALVE REPLACEMENT): ICD-10-CM

## 2018-10-26 LAB — INR PPP: 3.7

## 2018-10-26 NOTE — PROGRESS NOTES
questioned and confirmed dose 6mg 10/23.  Reports patient ate less greens than usual. No other changes reported

## 2018-10-26 NOTE — PROGRESS NOTES
INR slightly above goal range today. Patient's  reports some decreased intake of vit K foods. Will resume maintenance plan and advise patient/ to be consistent with diet.

## 2018-10-26 NOTE — PROGRESS NOTES
advised for pt to maintain be consistent with diet. Greens x1 weekly and Boost x2 bottles weekly.  verbalized understanding.

## 2018-10-30 ENCOUNTER — ANTI-COAG VISIT (OUTPATIENT)
Dept: CARDIOLOGY | Facility: CLINIC | Age: 73
End: 2018-10-30

## 2018-10-30 DIAGNOSIS — Z79.01 LONG TERM CURRENT USE OF ANTICOAGULANT: ICD-10-CM

## 2018-10-30 DIAGNOSIS — Z95.2 S/P AVR: ICD-10-CM

## 2018-10-30 DIAGNOSIS — I48.20 CHRONIC ATRIAL FIBRILLATION: ICD-10-CM

## 2018-10-30 DIAGNOSIS — Z95.2 S/P MVR (MITRAL VALVE REPLACEMENT): ICD-10-CM

## 2018-10-30 LAB — INR PPP: 3.6

## 2018-10-30 NOTE — PROGRESS NOTES
called and was given coumadin instructions: 2mg -Tuesday/Thursday/Saturday/Sunday and 4mg -Monday/Wednesday/Friday and re test the INR on 11/06/18, He stated understanding

## 2018-11-08 ENCOUNTER — ANTI-COAG VISIT (OUTPATIENT)
Dept: CARDIOLOGY | Facility: CLINIC | Age: 73
End: 2018-11-08

## 2018-11-08 ENCOUNTER — LAB VISIT (OUTPATIENT)
Dept: LAB | Facility: HOSPITAL | Age: 73
End: 2018-11-08
Attending: INTERNAL MEDICINE
Payer: MEDICARE

## 2018-11-08 DIAGNOSIS — I48.20 CHRONIC ATRIAL FIBRILLATION: ICD-10-CM

## 2018-11-08 DIAGNOSIS — Z95.2 S/P MVR (MITRAL VALVE REPLACEMENT): ICD-10-CM

## 2018-11-08 DIAGNOSIS — Z79.01 LONG TERM CURRENT USE OF ANTICOAGULANT: ICD-10-CM

## 2018-11-08 DIAGNOSIS — Z95.2 S/P AVR: ICD-10-CM

## 2018-11-08 LAB
INR PPP: 2.6
PROTHROMBIN TIME: 25 SEC

## 2018-11-08 PROCEDURE — 36415 COLL VENOUS BLD VENIPUNCTURE: CPT | Mod: HCNC,PO

## 2018-11-08 PROCEDURE — 85610 PROTHROMBIN TIME: CPT | Mod: HCNC

## 2018-11-09 ENCOUNTER — ANTI-COAG VISIT (OUTPATIENT)
Dept: CARDIOLOGY | Facility: CLINIC | Age: 73
End: 2018-11-09

## 2018-11-09 DIAGNOSIS — Z95.2 S/P MVR (MITRAL VALVE REPLACEMENT): ICD-10-CM

## 2018-11-09 DIAGNOSIS — I48.20 CHRONIC ATRIAL FIBRILLATION: ICD-10-CM

## 2018-11-09 DIAGNOSIS — Z95.2 S/P AVR: ICD-10-CM

## 2018-11-09 DIAGNOSIS — Z79.01 LONG TERM CURRENT USE OF ANTICOAGULANT: ICD-10-CM

## 2018-11-09 NOTE — PROGRESS NOTES
advised to have patient (Maintain - be consistent with diet  Greens 1x/week; Boost - 2 bottles/week. Ale verbalized understanding.

## 2018-11-12 ENCOUNTER — LAB VISIT (OUTPATIENT)
Dept: LAB | Facility: HOSPITAL | Age: 73
End: 2018-11-12
Attending: INTERNAL MEDICINE
Payer: MEDICARE

## 2018-11-12 DIAGNOSIS — I48.20 CHRONIC ATRIAL FIBRILLATION: ICD-10-CM

## 2018-11-12 DIAGNOSIS — Z95.2 S/P MVR (MITRAL VALVE REPLACEMENT): ICD-10-CM

## 2018-11-12 DIAGNOSIS — Z95.2 S/P AVR: ICD-10-CM

## 2018-11-12 DIAGNOSIS — Z79.01 LONG TERM CURRENT USE OF ANTICOAGULANT: ICD-10-CM

## 2018-11-12 LAB
INR PPP: 2.5
PROTHROMBIN TIME: 24.5 SEC

## 2018-11-12 PROCEDURE — 85610 PROTHROMBIN TIME: CPT | Mod: HCNC

## 2018-11-12 PROCEDURE — 36415 COLL VENOUS BLD VENIPUNCTURE: CPT | Mod: HCNC,PO

## 2018-11-13 ENCOUNTER — ANTI-COAG VISIT (OUTPATIENT)
Dept: CARDIOLOGY | Facility: CLINIC | Age: 73
End: 2018-11-13

## 2018-11-13 DIAGNOSIS — Z79.01 LONG TERM CURRENT USE OF ANTICOAGULANT: Primary | ICD-10-CM

## 2018-11-13 DIAGNOSIS — I48.20 CHRONIC ATRIAL FIBRILLATION: ICD-10-CM

## 2018-11-13 DIAGNOSIS — Z95.2 S/P AVR: ICD-10-CM

## 2018-11-13 DIAGNOSIS — Z95.2 S/P MVR (MITRAL VALVE REPLACEMENT): ICD-10-CM

## 2018-11-20 ENCOUNTER — LAB VISIT (OUTPATIENT)
Dept: LAB | Facility: HOSPITAL | Age: 73
End: 2018-11-20
Attending: INTERNAL MEDICINE
Payer: MEDICARE

## 2018-11-20 ENCOUNTER — ANTI-COAG VISIT (OUTPATIENT)
Dept: CARDIOLOGY | Facility: CLINIC | Age: 73
End: 2018-11-20

## 2018-11-20 DIAGNOSIS — Z79.01 LONG TERM CURRENT USE OF ANTICOAGULANT: ICD-10-CM

## 2018-11-20 DIAGNOSIS — I48.20 CHRONIC ATRIAL FIBRILLATION: ICD-10-CM

## 2018-11-20 DIAGNOSIS — Z95.2 S/P MVR (MITRAL VALVE REPLACEMENT): ICD-10-CM

## 2018-11-20 DIAGNOSIS — Z95.2 S/P AVR: ICD-10-CM

## 2018-11-20 LAB
INR PPP: 2.7
PROTHROMBIN TIME: 26.3 SEC

## 2018-11-20 PROCEDURE — 36415 COLL VENOUS BLD VENIPUNCTURE: CPT | Mod: HCNC,PO

## 2018-11-20 PROCEDURE — 85610 PROTHROMBIN TIME: CPT | Mod: HCNC

## 2018-11-21 ENCOUNTER — OFFICE VISIT (OUTPATIENT)
Dept: CARDIOLOGY | Facility: CLINIC | Age: 73
End: 2018-11-21
Payer: MEDICARE

## 2018-11-21 VITALS
SYSTOLIC BLOOD PRESSURE: 132 MMHG | WEIGHT: 110.25 LBS | DIASTOLIC BLOOD PRESSURE: 74 MMHG | BODY MASS INDEX: 21.65 KG/M2 | OXYGEN SATURATION: 95 % | HEART RATE: 74 BPM | RESPIRATION RATE: 15 BRPM | HEIGHT: 60 IN

## 2018-11-21 DIAGNOSIS — Z95.0 CARDIAC PACEMAKER IN SITU: ICD-10-CM

## 2018-11-21 DIAGNOSIS — I48.20 CHRONIC ATRIAL FIBRILLATION: ICD-10-CM

## 2018-11-21 DIAGNOSIS — E11.29 TYPE 2 DIABETES MELLITUS WITH MICROALBUMINURIA, WITHOUT LONG-TERM CURRENT USE OF INSULIN: Chronic | ICD-10-CM

## 2018-11-21 DIAGNOSIS — I10 ESSENTIAL (PRIMARY) HYPERTENSION: Chronic | ICD-10-CM

## 2018-11-21 DIAGNOSIS — Z95.2 S/P MVR (MITRAL VALVE REPLACEMENT): ICD-10-CM

## 2018-11-21 DIAGNOSIS — Z95.2 S/P AVR: ICD-10-CM

## 2018-11-21 DIAGNOSIS — R80.9 TYPE 2 DIABETES MELLITUS WITH MICROALBUMINURIA, WITHOUT LONG-TERM CURRENT USE OF INSULIN: Chronic | ICD-10-CM

## 2018-11-21 DIAGNOSIS — I09.9 RHEUMATIC HEART DISEASE: Primary | Chronic | ICD-10-CM

## 2018-11-21 DIAGNOSIS — E78.2 MIXED HYPERLIPIDEMIA: Chronic | ICD-10-CM

## 2018-11-21 PROCEDURE — 1101F PT FALLS ASSESS-DOCD LE1/YR: CPT | Mod: CPTII,HCNC,S$GLB, | Performed by: INTERNAL MEDICINE

## 2018-11-21 PROCEDURE — 3078F DIAST BP <80 MM HG: CPT | Mod: CPTII,HCNC,S$GLB, | Performed by: INTERNAL MEDICINE

## 2018-11-21 PROCEDURE — 3075F SYST BP GE 130 - 139MM HG: CPT | Mod: CPTII,HCNC,S$GLB, | Performed by: INTERNAL MEDICINE

## 2018-11-21 PROCEDURE — 93288 INTERROG EVL PM/LDLS PM IP: CPT | Mod: 26,HCNC,, | Performed by: INTERNAL MEDICINE

## 2018-11-21 PROCEDURE — 99999 PR PBB SHADOW E&M-EST. PATIENT-LVL III: CPT | Mod: PBBFAC,HCNC,, | Performed by: INTERNAL MEDICINE

## 2018-11-21 PROCEDURE — 99214 OFFICE O/P EST MOD 30 MIN: CPT | Mod: HCNC,25,S$GLB, | Performed by: INTERNAL MEDICINE

## 2018-11-21 PROCEDURE — 3044F HG A1C LEVEL LT 7.0%: CPT | Mod: CPTII,HCNC,S$GLB, | Performed by: INTERNAL MEDICINE

## 2018-11-21 NOTE — PROGRESS NOTES
Jax advised to have pt maintain. Also to eat greens 1 a week and 2 bottles of (Boost) a week.  verbalized understanding.

## 2018-11-21 NOTE — PROGRESS NOTES
CARDIOVASCULAR PROGRESS NOTE    REASON FOR CONSULT:   Orion Ty is a 73 y.o. female who presents for follow up of Chr AF, AVR/MVR.    PCP: Mesha  HISTORY OF PRESENT ILLNESS:   The patient is seen accompanied by her  who aids with Chinese interpretation.  The patient denies chest discomfort or shortness of breath.  She's had no palpitations, lightheadedness, dizziness, or syncope.  There's been no PND, orthopnea, or lower extremity edema.  She denies melena, hematuria, or claudicant symptoms.    The Medtronic pacemaker was interrogated in the office today.  Underlying rhythm is atrial fibrillation at 74 beats per minute.  Pacemaker is programmed at VVI 50.  Estimated longevity is 14 months.  The patient is pacing 15% of the time in the ventricle.  Sensing and pacing thresholds as well as impedance are normal.  No programming changes were made.    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) monitors coumadin at home  ?mod prosthetic AS (echo 12/2017)  Chronic A-fib  Medtronic pacemaker - last generator change 2011    PAST MEDICAL HISTORY:     Past Medical History:   Diagnosis Date    Closed displaced fracture of distal phalanx of lesser toe 10/20/2015    Diabetes mellitus     Diabetes mellitus, type 2     Hypertension     Osteopenia     Pacemaker     Rheumatic heart disease        PAST SURGICAL HISTORY:     Past Surgical History:   Procedure Laterality Date    CARDIAC PACEMAKER PLACEMENT      CARDIAC VALVE REPLACEMENT      CARDIAC VALVE SURGERY      thryoid s         ALLERGIES AND MEDICATION:   Review of patient's allergies indicates:  No Known Allergies       Medication List           Accurate as of 11/21/18 12:37 PM. If you have any questions, ask your nurse or doctor.               CHANGE how you take these medications    TRUETEST TEST STRIPS Strp  Generic drug:  blood sugar diagnostic  USE ONCE DAILY  What changed:  See the new instructions.        CONTINUE  taking these medications    ACCU-CHEK GATO CONTROL SOLN Soln  Generic drug:  blood glucose control high,low     amLODIPine 10 MG tablet  Commonly known as:  NORVASC  Take 1 tablet (10 mg total) by mouth once daily.     blood-glucose meter Misc  Accu-chek Gato Glucose Meter, Use to test blood sugar once daily.     lancets 30 gauge Misc     lancing device Misc     losartan 50 MG tablet  Commonly known as:  COZAAR  Take 1 tablet (50 mg total) by mouth once daily.     metFORMIN 500 MG tablet  Commonly known as:  GLUCOPHAGE  TAKE 2 TABLETS BY MOUTH EVERY MORNING AND 1 TABLET EVERY EVENING     metoprolol tartrate 25 MG tablet  Commonly known as:  LOPRESSOR  TAKE 1 TABLET BY MOUTH TWICE DAILY     nitroGLYCERIN 0.4 MG SL tablet  Commonly known as:  NITROSTAT  DISSOLVE 1 TABLET UNDER THE TONGUE EVERY 5 MINUTES AS NEEDED FOR CHEST PAINS     omega-3 acid ethyl esters 1 gram capsule  Commonly known as:  LOVAZA     pravastatin 20 MG tablet  Commonly known as:  PRAVACHOL  TAKE 1 TABLET BY MOUTH EVERY DAY     warfarin 4 MG tablet  Commonly known as:  COUMADIN  TAKE 1 TABLET BY MOUTH ON MONDAY AND FRIDAY AND 1/2 TABLET BY MOUTH ON ALL OTHER DAYS              SOCIAL HISTORY:     Social History     Socioeconomic History    Marital status:      Spouse name: Not on file    Number of children: Not on file    Years of education: Not on file    Highest education level: Not on file   Social Needs    Financial resource strain: Not on file    Food insecurity - worry: Not on file    Food insecurity - inability: Not on file    Transportation needs - medical: Not on file    Transportation needs - non-medical: Not on file   Occupational History    Not on file   Tobacco Use    Smoking status: Never Smoker    Smokeless tobacco: Never Used   Substance and Sexual Activity    Alcohol use: No    Drug use: Not on file    Sexual activity: Not on file   Other Topics Concern    Not on file   Social History Narrative    Not on  file       FAMILY HISTORY:     Family History   Problem Relation Age of Onset    Breast cancer Neg Hx     Colon cancer Neg Hx     Ovarian cancer Neg Hx        REVIEW OF SYSTEMS:   Review of Systems   Constitutional: Negative for chills, diaphoresis and fever.   HENT: Negative for nosebleeds.    Eyes: Negative for blurred vision, double vision and photophobia.   Respiratory: Negative for hemoptysis, shortness of breath and wheezing.    Cardiovascular: Negative for chest pain, palpitations, orthopnea, claudication, leg swelling and PND.   Gastrointestinal: Negative for abdominal pain, blood in stool, heartburn, melena, nausea and vomiting.   Genitourinary: Negative for flank pain and hematuria.   Musculoskeletal: Negative for falls, myalgias and neck pain.   Skin: Negative for rash.   Neurological: Negative for dizziness, seizures, loss of consciousness, weakness and headaches.   Endo/Heme/Allergies: Negative for polydipsia. Does not bruise/bleed easily.   Psychiatric/Behavioral: Negative for depression and memory loss. The patient is not nervous/anxious.        PHYSICAL EXAM:     Vitals:    11/21/18 1232   BP: 132/74   Pulse: 74   Resp: 15    Body mass index is 21.53 kg/m².  Weight: 50 kg (110 lb 3.7 oz)   Height: 5' (152.4 cm)     Physical Exam   Constitutional: She is oriented to person, place, and time. She appears well-developed and well-nourished. She is cooperative.  Non-toxic appearance. No distress.   HENT:   Head: Normocephalic and atraumatic.   Eyes: Conjunctivae and EOM are normal. Pupils are equal, round, and reactive to light. No scleral icterus.   Neck: Trachea normal and normal range of motion. Neck supple. Normal carotid pulses and no JVD present. Carotid bruit is not present. No neck rigidity. No tracheal deviation and no edema present. No thyromegaly present.   Cardiovascular: Normal rate, S1 normal and S2 normal. An irregularly irregular rhythm present. PMI is not displaced. Exam reveals no  gallop and no friction rub.   Murmur heard.   Systolic murmur is present with a grade of 2/6.  Pulses:       Carotid pulses are 2+ on the right side, and 2+ on the left side.  Crisp Mech S1S2.  Systolic murmur radiates to carotids   Pulmonary/Chest: Effort normal and breath sounds normal. No stridor. No respiratory distress. She has no wheezes. She has no rales. She exhibits no tenderness.   Abdominal: Soft. She exhibits no distension. There is no hepatosplenomegaly.   Musculoskeletal: She exhibits no edema or tenderness.   Feet:   Right Foot:   Skin Integrity: Negative for ulcer.   Left Foot:   Skin Integrity: Negative for ulcer.   Neurological: She is alert and oriented to person, place, and time. No cranial nerve deficit.   Skin: Skin is warm and dry. No rash noted. No erythema.   Psychiatric: She has a normal mood and affect. Her speech is normal and behavior is normal.   Vitals reviewed.      DATA:   EKG: (personally reviewed tracing)  8/31/18 AF 63, LVH/LAD/repol, occ     Laboratory:  CBC:  Recent Labs   Lab 05/27/16  0700 03/14/17  0735 03/08/18  0736   WHITE BLOOD CELL COUNT 7.14 6.84 6.49   HEMOGLOBIN 12.0 12.2 12.5   HEMATOCRIT 36.4 L 38.6 38.6   PLATELETS 98 L 91 L 82 L       CHEMISTRIES:  Recent Labs   Lab 08/15/17  0745 03/08/18  0736 09/13/18  0738   GLUCOSE 149 H 152 H 102   SODIUM 140 142 138   POTASSIUM 4.8 4.5 4.8   BUN BLD 12 13 14   CREATININE 0.8 0.9 0.8   EGFR IF A[place] AMERICAN >60.0 >60.0 >60.0   EGFR IF NON- >60.0 >60.0 >60.0   CALCIUM 9.7 10.0 10.1       CARDIAC BIOMARKERS:        COAGS:  Recent Labs   Lab 11/08/18  1521 11/12/18  1622 11/20/18  1117   INR 2.6 H 2.5 H 2.7 H       LIPIDS/LFTS:  Recent Labs   Lab 03/14/17  0735 08/15/17  0745 03/08/18  0736   CHOLESTEROL 139  139 127 140   TRIGLYCERIDES 269 H  269 H 229 H 146   HDL 30 L  30 L 30 L 38 L   LDL CHOLESTEROL 55.2 L  55.2 L 51.2 L 72.8   NON-HDL CHOLESTEROL 109  109 97 102   AST 45 H 32 41 H   ALT 23 21 30      Lab Results   Component Value Date    TSH 3.997 09/13/2018     Free T4 0.99 (3/8/18)    Cardiovascular Testing:  Echo 5/9/18    1 - Low normal to mildly depressed left ventricular systolic function (EF 50-55%).     2 - Concentric hypertrophy.     3 - Biatrial enlargement.     4 - Pulmonary hypertension. The estimated PA systolic pressure is 52 mmHg.     5 - Aortic valve mechanical prosthesis, YULIYA = 0.7 cm2, AVAi = 0.49 cm2/m2, peak velocity = 3.67 m/s, mean gradient = 33 mmHg.     6 - Mild aortic regurgitation.     7 - Mitral valve mechanical prosthesis adequately functioning.     8 - Mild mitral regurgitation.     9 - Trivial to mild tricuspid regurgitation.     10 - Trivial pulmonic regurgitation.     L MPI 2/20/15  Nuclear Quantitative Functional Analysis:   LVEF: 57 %  Impression: EQUIVOCAL MYOCARDIAL PERFUSION  1. There is a mild to moderate mostly fixed lateral wall defect of uncertain significance.   2. The perfusion scan is free of evidence for myocardial ischemia.   3. There is abnormal wall motion at rest showing mild hypokinesis of the anteroseptal wall of the left ventricle.   4. Resting LV function is normal.   5. The ventricular volumes are normal at rest and stress.   6. The extracardiac distribution of radioactivity is normal.   7. There was no previous study available to compare.    ASSESSMENT:   # [facility] AVR/MVR,   # prosthetic AS, echo 5/2018 suggests mod prosthetic AVR stenosis, asymptomatic  # HTN, controlled  # Chr AF, on coumadin (goal INR 2.5-3.5 given [facility] valves)  # HLP on prava 40mg  # DM  # thrombocytopenia  # Medtronic PPM    PLAN:   Cont med rx  RTC 6 months with Medtronic PPM check and echo (May 2019)      Ludwig Grigsby MD, FACC

## 2018-11-27 LAB — INR PPP: 2.8

## 2018-11-28 ENCOUNTER — ANTI-COAG VISIT (OUTPATIENT)
Dept: CARDIOLOGY | Facility: CLINIC | Age: 73
End: 2018-11-28

## 2018-11-28 DIAGNOSIS — Z95.2 S/P MVR (MITRAL VALVE REPLACEMENT): ICD-10-CM

## 2018-11-28 DIAGNOSIS — I48.20 CHRONIC ATRIAL FIBRILLATION: ICD-10-CM

## 2018-11-28 DIAGNOSIS — Z79.01 LONG TERM CURRENT USE OF ANTICOAGULANT: ICD-10-CM

## 2018-11-28 DIAGNOSIS — Z95.2 S/P AVR: ICD-10-CM

## 2018-12-11 ENCOUNTER — ANTI-COAG VISIT (OUTPATIENT)
Dept: CARDIOLOGY | Facility: CLINIC | Age: 73
End: 2018-12-11
Payer: MEDICARE

## 2018-12-11 DIAGNOSIS — Z95.2 S/P MVR (MITRAL VALVE REPLACEMENT): ICD-10-CM

## 2018-12-11 DIAGNOSIS — Z79.01 LONG TERM CURRENT USE OF ANTICOAGULANT: ICD-10-CM

## 2018-12-11 DIAGNOSIS — I48.20 CHRONIC ATRIAL FIBRILLATION: ICD-10-CM

## 2018-12-11 DIAGNOSIS — Z95.2 S/P AVR: ICD-10-CM

## 2018-12-11 LAB — INR PPP: 3

## 2018-12-11 PROCEDURE — G0250 MD INR TEST REVIE INTER MGMT: HCPCS | Mod: S$GLB,,, | Performed by: PHARMACIST

## 2018-12-26 ENCOUNTER — ANTI-COAG VISIT (OUTPATIENT)
Dept: CARDIOLOGY | Facility: CLINIC | Age: 73
End: 2018-12-26

## 2018-12-26 DIAGNOSIS — Z95.2 S/P AVR: ICD-10-CM

## 2018-12-26 DIAGNOSIS — Z95.2 S/P MVR (MITRAL VALVE REPLACEMENT): ICD-10-CM

## 2018-12-26 DIAGNOSIS — I48.20 CHRONIC ATRIAL FIBRILLATION: ICD-10-CM

## 2018-12-26 DIAGNOSIS — Z79.01 LONG TERM CURRENT USE OF ANTICOAGULANT: ICD-10-CM

## 2018-12-26 LAB — INR PPP: 3.1

## 2019-01-09 ENCOUNTER — ANTI-COAG VISIT (OUTPATIENT)
Dept: CARDIOLOGY | Facility: CLINIC | Age: 74
End: 2019-01-09

## 2019-01-09 DIAGNOSIS — Z79.01 LONG TERM CURRENT USE OF ANTICOAGULANT: ICD-10-CM

## 2019-01-09 DIAGNOSIS — Z95.2 S/P MVR (MITRAL VALVE REPLACEMENT): ICD-10-CM

## 2019-01-09 DIAGNOSIS — Z95.2 S/P AVR: ICD-10-CM

## 2019-01-09 DIAGNOSIS — I48.20 CHRONIC ATRIAL FIBRILLATION: ICD-10-CM

## 2019-01-09 LAB — INR PPP: 2.5

## 2019-01-14 RX ORDER — WARFARIN 4 MG/1
TABLET ORAL
Qty: 57 TABLET | Refills: 0 | Status: SHIPPED | OUTPATIENT
Start: 2019-01-14 | End: 2019-03-13 | Stop reason: SDUPTHER

## 2019-01-24 ENCOUNTER — ANTI-COAG VISIT (OUTPATIENT)
Dept: CARDIOLOGY | Facility: CLINIC | Age: 74
End: 2019-01-24

## 2019-01-24 ENCOUNTER — TELEPHONE (OUTPATIENT)
Dept: ADMINISTRATIVE | Facility: HOSPITAL | Age: 74
End: 2019-01-24

## 2019-01-24 DIAGNOSIS — Z95.2 S/P AVR: ICD-10-CM

## 2019-01-24 DIAGNOSIS — I48.20 CHRONIC ATRIAL FIBRILLATION: ICD-10-CM

## 2019-01-24 DIAGNOSIS — Z95.2 S/P MVR (MITRAL VALVE REPLACEMENT): ICD-10-CM

## 2019-01-24 DIAGNOSIS — Z79.01 LONG TERM CURRENT USE OF ANTICOAGULANT: ICD-10-CM

## 2019-01-24 LAB — INR PPP: 2.2

## 2019-01-24 NOTE — PROGRESS NOTES
advised to have patient take (Warfarin 4mg today, then resume dosage). Eat greens 1 a week and drink (2/bottles) of [place] a week.  verbalized understanding.

## 2019-02-04 DIAGNOSIS — M81.0 OSTEOPOROSIS: Primary | ICD-10-CM

## 2019-02-07 ENCOUNTER — ANTI-COAG VISIT (OUTPATIENT)
Dept: CARDIOLOGY | Facility: CLINIC | Age: 74
End: 2019-02-07
Payer: MEDICARE

## 2019-02-07 DIAGNOSIS — I48.20 CHRONIC ATRIAL FIBRILLATION: ICD-10-CM

## 2019-02-07 DIAGNOSIS — Z95.2 S/P AVR: ICD-10-CM

## 2019-02-07 DIAGNOSIS — Z79.01 LONG TERM CURRENT USE OF ANTICOAGULANT: ICD-10-CM

## 2019-02-07 DIAGNOSIS — Z95.2 S/P MVR (MITRAL VALVE REPLACEMENT): ICD-10-CM

## 2019-02-07 LAB — INR PPP: 2.2

## 2019-02-07 PROCEDURE — G0250 PR MD REVIEW INTERPRET OF TEST: ICD-10-PCS | Mod: S$GLB,,, | Performed by: PHARMACIST

## 2019-02-07 PROCEDURE — G0250 MD INR TEST REVIE INTER MGMT: HCPCS | Mod: S$GLB,,, | Performed by: PHARMACIST

## 2019-02-07 NOTE — PROGRESS NOTES
advised to have pt take (Warfarin 4mg Thurs/SAt/Mon/Wed, except 2mg all other days. Also eat (Greens 1 a week, drink 2 bottles Boost a week.  verbalized understanding.

## 2019-02-14 ENCOUNTER — ANTI-COAG VISIT (OUTPATIENT)
Dept: CARDIOLOGY | Facility: CLINIC | Age: 74
End: 2019-02-14

## 2019-02-14 DIAGNOSIS — Z95.2 S/P AVR: ICD-10-CM

## 2019-02-14 DIAGNOSIS — Z79.01 LONG TERM CURRENT USE OF ANTICOAGULANT: ICD-10-CM

## 2019-02-14 DIAGNOSIS — Z95.2 S/P MVR (MITRAL VALVE REPLACEMENT): ICD-10-CM

## 2019-02-14 DIAGNOSIS — I48.20 CHRONIC ATRIAL FIBRILLATION: ICD-10-CM

## 2019-02-14 LAB — INR PPP: 3.3

## 2019-02-15 ENCOUNTER — OFFICE VISIT (OUTPATIENT)
Dept: FAMILY MEDICINE | Facility: CLINIC | Age: 74
End: 2019-02-15
Payer: MEDICARE

## 2019-02-15 VITALS
TEMPERATURE: 98 F | HEART RATE: 76 BPM | DIASTOLIC BLOOD PRESSURE: 70 MMHG | BODY MASS INDEX: 21.34 KG/M2 | OXYGEN SATURATION: 97 % | SYSTOLIC BLOOD PRESSURE: 140 MMHG | WEIGHT: 113 LBS | HEIGHT: 61 IN

## 2019-02-15 DIAGNOSIS — Z95.2 HEART VALVE REPLACED: ICD-10-CM

## 2019-02-15 DIAGNOSIS — E78.2 MIXED HYPERLIPIDEMIA: Chronic | ICD-10-CM

## 2019-02-15 DIAGNOSIS — E03.8 SUBCLINICAL HYPOTHYROIDISM: Chronic | ICD-10-CM

## 2019-02-15 DIAGNOSIS — E11.3299 TYPE 2 DIABETES MELLITUS WITH MILD NONPROLIFERATIVE RETINOPATHY WITHOUT MACULAR EDEMA, WITHOUT LONG-TERM CURRENT USE OF INSULIN, UNSPECIFIED LATERALITY: Chronic | ICD-10-CM

## 2019-02-15 DIAGNOSIS — N30.01 ACUTE CYSTITIS WITH HEMATURIA: Primary | ICD-10-CM

## 2019-02-15 DIAGNOSIS — Z79.01 LONG TERM CURRENT USE OF ANTICOAGULANT: ICD-10-CM

## 2019-02-15 DIAGNOSIS — R80.9 TYPE 2 DIABETES MELLITUS WITH MICROALBUMINURIA, WITHOUT LONG-TERM CURRENT USE OF INSULIN: Chronic | ICD-10-CM

## 2019-02-15 DIAGNOSIS — I48.20 CHRONIC ATRIAL FIBRILLATION: Chronic | ICD-10-CM

## 2019-02-15 DIAGNOSIS — I70.0 ATHEROSCLEROSIS OF AORTA: ICD-10-CM

## 2019-02-15 DIAGNOSIS — I10 ESSENTIAL (PRIMARY) HYPERTENSION: Chronic | ICD-10-CM

## 2019-02-15 DIAGNOSIS — E11.29 TYPE 2 DIABETES MELLITUS WITH MICROALBUMINURIA, WITHOUT LONG-TERM CURRENT USE OF INSULIN: Chronic | ICD-10-CM

## 2019-02-15 DIAGNOSIS — D69.6 THROMBOCYTOPENIA: ICD-10-CM

## 2019-02-15 LAB
BILIRUB SERPL-MCNC: NORMAL MG/DL
BLOOD URINE, POC: 50
COLOR, POC UA: YELLOW
GLUCOSE UR QL STRIP: NORMAL
KETONES UR QL STRIP: NORMAL
LEUKOCYTE ESTERASE URINE, POC: NORMAL
NITRITE, POC UA: NORMAL
PH, POC UA: 6
PROTEIN, POC: NORMAL
SPECIFIC GRAVITY, POC UA: 1.01
UROBILINOGEN, POC UA: NORMAL

## 2019-02-15 PROCEDURE — 81001 POCT URINALYSIS, DIPSTICK OR TABLET REAGENT, AUTOMATED, WITH MICROSCOP: ICD-10-PCS | Mod: HCNC,S$GLB,, | Performed by: INTERNAL MEDICINE

## 2019-02-15 PROCEDURE — 87086 URINE CULTURE/COLONY COUNT: CPT | Mod: HCNC

## 2019-02-15 PROCEDURE — 99214 OFFICE O/P EST MOD 30 MIN: CPT | Mod: 25,HCNC,S$GLB, | Performed by: INTERNAL MEDICINE

## 2019-02-15 PROCEDURE — 81001 URINALYSIS AUTO W/SCOPE: CPT | Mod: HCNC,S$GLB,, | Performed by: INTERNAL MEDICINE

## 2019-02-15 PROCEDURE — 3044F PR MOST RECENT HEMOGLOBIN A1C LEVEL <7.0%: ICD-10-PCS | Mod: HCNC,CPTII,S$GLB, | Performed by: INTERNAL MEDICINE

## 2019-02-15 PROCEDURE — 3078F DIAST BP <80 MM HG: CPT | Mod: HCNC,CPTII,S$GLB, | Performed by: INTERNAL MEDICINE

## 2019-02-15 PROCEDURE — 3077F PR MOST RECENT SYSTOLIC BLOOD PRESSURE >= 140 MM HG: ICD-10-PCS | Mod: HCNC,CPTII,S$GLB, | Performed by: INTERNAL MEDICINE

## 2019-02-15 PROCEDURE — 99214 PR OFFICE/OUTPT VISIT, EST, LEVL IV, 30-39 MIN: ICD-10-PCS | Mod: 25,HCNC,S$GLB, | Performed by: INTERNAL MEDICINE

## 2019-02-15 PROCEDURE — 87186 SC STD MICRODIL/AGAR DIL: CPT | Mod: HCNC

## 2019-02-15 PROCEDURE — 1101F PR PT FALLS ASSESS DOC 0-1 FALLS W/OUT INJ PAST YR: ICD-10-PCS | Mod: HCNC,CPTII,S$GLB, | Performed by: INTERNAL MEDICINE

## 2019-02-15 PROCEDURE — 87088 URINE BACTERIA CULTURE: CPT | Mod: HCNC

## 2019-02-15 PROCEDURE — 99999 PR PBB SHADOW E&M-EST. PATIENT-LVL III: CPT | Mod: PBBFAC,HCNC,, | Performed by: INTERNAL MEDICINE

## 2019-02-15 PROCEDURE — 3077F SYST BP >= 140 MM HG: CPT | Mod: HCNC,CPTII,S$GLB, | Performed by: INTERNAL MEDICINE

## 2019-02-15 PROCEDURE — 1101F PT FALLS ASSESS-DOCD LE1/YR: CPT | Mod: HCNC,CPTII,S$GLB, | Performed by: INTERNAL MEDICINE

## 2019-02-15 PROCEDURE — 99999 PR PBB SHADOW E&M-EST. PATIENT-LVL III: ICD-10-PCS | Mod: PBBFAC,HCNC,, | Performed by: INTERNAL MEDICINE

## 2019-02-15 PROCEDURE — 3078F PR MOST RECENT DIASTOLIC BLOOD PRESSURE < 80 MM HG: ICD-10-PCS | Mod: HCNC,CPTII,S$GLB, | Performed by: INTERNAL MEDICINE

## 2019-02-15 PROCEDURE — 87077 CULTURE AEROBIC IDENTIFY: CPT | Mod: HCNC

## 2019-02-15 PROCEDURE — 3044F HG A1C LEVEL LT 7.0%: CPT | Mod: HCNC,CPTII,S$GLB, | Performed by: INTERNAL MEDICINE

## 2019-02-15 RX ORDER — AMLODIPINE BESYLATE 10 MG/1
10 TABLET ORAL DAILY
Qty: 90 TABLET | Refills: 3 | Status: ON HOLD | OUTPATIENT
Start: 2019-02-15 | End: 2019-12-10 | Stop reason: CLARIF

## 2019-02-15 RX ORDER — SULFAMETHOXAZOLE AND TRIMETHOPRIM 800; 160 MG/1; MG/1
1 TABLET ORAL 2 TIMES DAILY
Qty: 6 TABLET | Refills: 0 | Status: SHIPPED | OUTPATIENT
Start: 2019-02-15 | End: 2019-02-18

## 2019-02-15 RX ORDER — METFORMIN HYDROCHLORIDE 500 MG/1
TABLET ORAL
Qty: 270 TABLET | Refills: 3 | Status: SHIPPED | OUTPATIENT
Start: 2019-02-15 | End: 2020-02-01 | Stop reason: ALTCHOICE

## 2019-02-15 RX ORDER — METOPROLOL TARTRATE 25 MG/1
25 TABLET, FILM COATED ORAL 2 TIMES DAILY
Qty: 180 TABLET | Refills: 3 | Status: ON HOLD | OUTPATIENT
Start: 2019-02-15 | End: 2019-12-10 | Stop reason: HOSPADM

## 2019-02-15 RX ORDER — WARFARIN 4 MG/1
TABLET ORAL
Qty: 135 TABLET | Refills: 3 | Status: SHIPPED | OUTPATIENT
Start: 2019-02-15 | End: 2019-02-26 | Stop reason: SDUPTHER

## 2019-02-15 RX ORDER — PRAVASTATIN SODIUM 20 MG/1
20 TABLET ORAL DAILY
Qty: 90 TABLET | Refills: 3 | Status: SHIPPED | OUTPATIENT
Start: 2019-02-15 | End: 2019-12-17 | Stop reason: SDUPTHER

## 2019-02-15 RX ORDER — LOSARTAN POTASSIUM 50 MG/1
50 TABLET ORAL DAILY
Qty: 90 TABLET | Refills: 3 | Status: SHIPPED | OUTPATIENT
Start: 2019-02-15 | End: 2019-12-17 | Stop reason: SDUPTHER

## 2019-02-15 NOTE — ASSESSMENT & PLAN NOTE
Counseled on risk of increased INR due to antibiotics.  I asked them to check the INR tomorrow or Sunday

## 2019-02-15 NOTE — PROGRESS NOTES
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          Relevant Medications    metFORMIN (GLUCOPHAGE) 500 MG tablet       Cardiac/Vascular    Chronic atrial fibrillation (Chronic)    Current Assessment & Plan     The current medical regimen is effective;  continue present plan and medications.          Relevant Medications    metoprolol tartrate (LOPRESSOR) 25 MG tablet    warfarin (COUMADIN) 4 MG tablet    Atherosclerosis of aorta (Chronic)    Overview     Seen on Echo x2  2014.  Stable, asymptomatic chronic condition.  Will continue to maximize risk factor reduction and adjust medication as needed.          Mixed hyperlipidemia (Chronic)    Current Assessment & Plan     The current medical regimen is effective;  continue present plan and medications.          Relevant Medications    pravastatin (PRAVACHOL) 20 MG tablet    Essential (primary) hypertension (Chronic)    Current Assessment & Plan     Slightly elevated today.  Monitor at follow up in 4 weeks         Relevant Medications    amLODIPine (NORVASC) 10 MG tablet    losartan (COZAAR) 50 MG tablet    Other Relevant Orders    CBC auto differential    Comprehensive metabolic panel    Lipid panel       Hematology    Long term current use of anticoagulant (Chronic)    Current Assessment & Plan     Counseled on risk of increased INR due to antibiotics.  I asked them to check the INR tomorrow or Sunday         Thrombocytopenia (Chronic)    Current Assessment & Plan     Monitor with labs in 4 weeks            Endocrine    Subclinical hypothyroidism (Chronic)    Current Assessment & Plan     Recheck labs         Relevant Orders    TSH    Type 2 diabetes mellitus with microalbuminuria, without long-term current use of insulin (Chronic)    Current  Assessment & Plan     The current medical regimen is effective;  continue present plan and medications.          Relevant Medications    metFORMIN (GLUCOPHAGE) 500 MG tablet    Other Relevant Orders    Hemoglobin A1c      Other Visit Diagnoses     Acute cystitis with hematuria    -  Primary  -    Start bactrim.  As above regarding INR.      Relevant Medications    sulfamethoxazole-trimethoprim 800-160mg (BACTRIM DS) 800-160 mg Tab    Other Relevant Orders    POCT urinalysis, dipstick or tablet reag (Completed)    Urine culture    Heart valve replaced        Relevant Medications    warfarin (COUMADIN) 4 MG tablet            Health Maintenance reviewed, will discuss DEXA at follow up.    Follow-up: Follow-up in about 4 weeks (around 3/15/2019) for Routine Physical.  ______________________________________________________________________    Chief Complaint  Chief Complaint   Patient presents with    Hematuria       HPI  Orion Ty is a 73 y.o. female with medical diagnoses as listed in the medical history and problem list that presents for hematuria for 2 days.  Pt is known to me with her last appointment 9/12/2018.      + hematuria, dysuria, frequency.  No F/C/NS, N/V, flank pain.        PAST MEDICAL HISTORY:  Past Medical History:   Diagnosis Date    Closed displaced fracture of distal phalanx of lesser toe 10/20/2015    Diabetes mellitus     Diabetes mellitus, type 2     Hypertension     Osteopenia     Pacemaker     Rheumatic heart disease        PAST SURGICAL HISTORY:  Past Surgical History:   Procedure Laterality Date    CARDIAC PACEMAKER PLACEMENT      CARDIAC VALVE REPLACEMENT      CARDIAC VALVE SURGERY      thryoid s         SOCIAL HISTORY:  Social History     Socioeconomic History    Marital status:      Spouse name: Not on file    Number of children: Not on file    Years of education: Not on file    Highest education level: Not on file   Social Needs    Financial resource strain:  Not on file    Food insecurity - worry: Not on file    Food insecurity - inability: Not on file    Transportation needs - medical: Not on file    Transportation needs - non-medical: Not on file   Occupational History    Not on file   Tobacco Use    Smoking status: Never Smoker    Smokeless tobacco: Never Used   Substance and Sexual Activity    Alcohol use: No    Drug use: Not on file    Sexual activity: Not on file   Other Topics Concern    Not on file   Social History Narrative    Not on file       FAMILY HISTORY:  Family History   Problem Relation Age of Onset    Breast cancer Neg Hx     Colon cancer Neg Hx     Ovarian cancer Neg Hx        ALLERGIES AND MEDICATIONS: updated and reviewed.  Review of patient's allergies indicates:  No Known Allergies  Current Outpatient Medications   Medication Sig Dispense Refill    amLODIPine (NORVASC) 10 MG tablet Take 1 tablet (10 mg total) by mouth once daily. 90 tablet 3    losartan (COZAAR) 50 MG tablet Take 1 tablet (50 mg total) by mouth once daily. 90 tablet 3    metFORMIN (GLUCOPHAGE) 500 MG tablet TAKE 2 TABLETS BY MOUTH EVERY MORNING AND 1 TABLET EVERY EVENING 270 tablet 3    metoprolol tartrate (LOPRESSOR) 25 MG tablet Take 1 tablet (25 mg total) by mouth 2 (two) times daily. 180 tablet 3    multivitamin-Ca-iron-minerals 27-0.4 mg Tab       omega-3 acid ethyl esters (LOVAZA) 1 gram capsule TK 2 CS PO BID  2    pravastatin (PRAVACHOL) 20 MG tablet Take 1 tablet (20 mg total) by mouth once daily. 90 tablet 3    warfarin (COUMADIN) 4 MG tablet TAKE 1 TABLET BY MOUTH DAILY ON MONDAY AND FRIDAY AND 1/2 TABLET BY MOUTH ALL OTHER DAYS 57 tablet 0    ACCU-CHEK GATO CONTROL SOLN Soln       adhesive bandage (BANDAGES/PLASTIC TOP)       B-COMPLEX WITH VITAMIN C ORAL       blood-glucose meter Misc Accu-chek Gato Glucose Meter, Use to test blood sugar once daily. 1 each 1    lancets 30 gauge Misc       lancing device Misc       nitroGLYCERIN  "(NITROSTAT) 0.4 MG SL tablet DISSOLVE 1 TABLET UNDER THE TONGUE EVERY 5 MINUTES AS NEEDED FOR CHEST PAINS 275 tablet 0    phenyleph/pramoxin/glycr/w.pet (HEMORRHOIDAL CREAM RECT)       sulfamethoxazole-trimethoprim 800-160mg (BACTRIM DS) 800-160 mg Tab Take 1 tablet by mouth 2 (two) times daily. for 3 days 6 tablet 0    TRUETEST TEST STRIPS Strp USE ONCE DAILY (Patient taking differently: twice daily) 100 strip 6    warfarin (COUMADIN) 4 MG tablet TAKE 1 TABLET BY MOUTH ON MONDAY AND FRIDAY AND 1/2 TABLET BY MOUTH ON ALL OTHER DAYS 135 tablet 3     No current facility-administered medications for this visit.          ROS  Review of Systems   Constitutional: Negative for chills, fever and unexpected weight change.   HENT: Negative for congestion, ear pain, hearing loss, rhinorrhea, sore throat and trouble swallowing.    Eyes: Negative for discharge, redness and visual disturbance.   Respiratory: Negative for cough, chest tightness, shortness of breath and wheezing.    Cardiovascular: Negative for chest pain, palpitations and leg swelling.   Gastrointestinal: Negative for abdominal pain, constipation, diarrhea, nausea and vomiting.   Endocrine: Negative for polydipsia, polyphagia and polyuria.   Genitourinary: Positive for dysuria, frequency and hematuria. Negative for decreased urine volume.   Musculoskeletal: Negative for arthralgias, joint swelling and myalgias.   Skin: Negative for color change and rash.   Neurological: Negative for dizziness, weakness, light-headedness and headaches.   Psychiatric/Behavioral: Negative for decreased concentration, dysphoric mood, sleep disturbance and suicidal ideas.           Physical Exam  Vitals:    02/15/19 1045   BP: (!) 140/70   Pulse: 76   Temp: 98.3 °F (36.8 °C)   SpO2: 97%   Weight: 51.2 kg (112 lb 15.8 oz)   Height: 5' 1" (1.549 m)    Body mass index is 21.35 kg/m².  Weight: 51.2 kg (112 lb 15.8 oz)   Height: 5' 1" (154.9 cm)   Physical Exam   Constitutional: She is " oriented to person, place, and time. She appears well-developed and well-nourished. No distress.   HENT:   Head: Normocephalic and atraumatic.   Eyes: Conjunctivae, EOM and lids are normal. Pupils are equal, round, and reactive to light. No scleral icterus.   Neck: Full passive range of motion without pain. Neck supple. No JVD present. Carotid bruit is not present. No thyromegaly present.   Cardiovascular: Normal rate, regular rhythm, intact distal pulses and normal pulses. Exam reveals no S3, no S4 and no friction rub.   Murmur heard.  Pulmonary/Chest: Effort normal and breath sounds normal. She has no wheezes. She has no rhonchi. She has no rales.   Abdominal: Soft. Bowel sounds are normal. There is no tenderness.   Musculoskeletal: She exhibits no edema or tenderness.   Lymphadenopathy:        Head (right side): No submental and no submandibular adenopathy present.        Head (left side): No submental and no submandibular adenopathy present.     She has no cervical adenopathy.   Neurological: She is alert and oriented to person, place, and time.   Motor grossly intact.  Sensory grossly intact.  Symmetric facial movements palate elevated symmetrically tongue midline   Skin: Skin is warm and dry. No rash noted.   Psychiatric: She has a normal mood and affect. Her speech is normal and behavior is normal. Thought content normal.           Health Maintenance       Date Due Completion Date    DEXA SCAN 02/01/2019 2/1/2016    Override on 8/4/2014: Done    Lipid Panel 03/08/2019 3/8/2018    Hemoglobin A1c 03/13/2019 9/13/2018    Foot Exam 09/12/2019 9/12/2018    Override on 9/12/2018: Done    Mammogram 09/13/2019 9/13/2018    Colonoscopy 01/01/2020 1/1/2010 (Done)    Override on 1/1/2010: Done    Override on 1/1/2006: Done (repeat in 10 years)    Eye Exam 01/10/2020 1/10/2019    Override on 3/8/2017: Done (Dr. Richmond Andrea/Optometry-positive for non-proliferative diabetic retinopathy)    High Dose Statin 02/15/2020  2/15/2019    TETANUS VACCINE 06/30/2026 6/30/2016 (Declined)    Override on 6/30/2016: Declined

## 2019-02-15 NOTE — Clinical Note
UTI with previous resistance.  Starting her on Bactrim.  I asked her to check her INR tomorrow or Sunday.  Just FYI in case you wanted a different INR schedule. Thanks, Karthikeyan

## 2019-02-18 LAB
BACTERIA UR CULT: NORMAL
INR PPP: 3.7

## 2019-02-18 NOTE — PROGRESS NOTES
Your urine culture returned positive but you are on the correct antibiotic.  Please continue your current medications and doses.  Please feel free to contact me with any questions or concerns.    Sincerely,  Otis Zimmer  [email or web address]

## 2019-02-18 NOTE — PROGRESS NOTES
Patient was seen 2/15/19 by Dr. Otis Zimmer for hematuria, Patient was started on Sulfamethoxazole-Trimethoprim 800-160mg -1 twice a day for acute cystitis with hematuria

## 2019-02-18 NOTE — PROGRESS NOTES
Patient was seen in clinic 2/15/19 by Dr. Otis Zimmer for hematuria, patient was put on Sulfamethoxazole-Triamthoprim 800-160mg -1 twice a day for Acute cystitis with hematuria

## 2019-02-19 ENCOUNTER — ANTI-COAG VISIT (OUTPATIENT)
Dept: CARDIOLOGY | Facility: CLINIC | Age: 74
End: 2019-02-19

## 2019-02-19 DIAGNOSIS — Z95.2 S/P AVR: ICD-10-CM

## 2019-02-19 DIAGNOSIS — Z79.01 LONG TERM CURRENT USE OF ANTICOAGULANT: ICD-10-CM

## 2019-02-19 DIAGNOSIS — I48.20 CHRONIC ATRIAL FIBRILLATION: ICD-10-CM

## 2019-02-19 DIAGNOSIS — Z95.2 S/P MVR (MITRAL VALVE REPLACEMENT): ICD-10-CM

## 2019-02-21 ENCOUNTER — ANTI-COAG VISIT (OUTPATIENT)
Dept: CARDIOLOGY | Facility: CLINIC | Age: 74
End: 2019-02-21

## 2019-02-21 DIAGNOSIS — Z79.01 LONG TERM CURRENT USE OF ANTICOAGULANT: ICD-10-CM

## 2019-02-21 DIAGNOSIS — Z95.2 S/P MVR (MITRAL VALVE REPLACEMENT): ICD-10-CM

## 2019-02-21 DIAGNOSIS — Z95.2 S/P AVR: ICD-10-CM

## 2019-02-21 DIAGNOSIS — I48.20 CHRONIC ATRIAL FIBRILLATION: ICD-10-CM

## 2019-02-21 LAB — INR PPP: 2.7

## 2019-02-21 NOTE — PROGRESS NOTES
advised to have patient take (Warfarin $mg Turs/SAt, except 2mg Fri/Sun). Eat ([facility]) once Week, and drink 2 Bottlet (Boost) a week.  verbalized understanding.

## 2019-02-25 ENCOUNTER — ANTI-COAG VISIT (OUTPATIENT)
Dept: CARDIOLOGY | Facility: CLINIC | Age: 74
End: 2019-02-25

## 2019-02-25 DIAGNOSIS — I48.20 CHRONIC ATRIAL FIBRILLATION: ICD-10-CM

## 2019-02-25 DIAGNOSIS — Z79.01 LONG TERM CURRENT USE OF ANTICOAGULANT: ICD-10-CM

## 2019-02-25 DIAGNOSIS — Z95.2 S/P AVR: ICD-10-CM

## 2019-02-25 DIAGNOSIS — Z95.2 S/P MVR (MITRAL VALVE REPLACEMENT): ICD-10-CM

## 2019-02-25 LAB — INR PPP: 2.7

## 2019-02-26 DIAGNOSIS — I48.20 CHRONIC ATRIAL FIBRILLATION: Chronic | ICD-10-CM

## 2019-02-26 DIAGNOSIS — Z95.2 HEART VALVE REPLACED: ICD-10-CM

## 2019-02-26 RX ORDER — WARFARIN 4 MG/1
TABLET ORAL
Qty: 135 TABLET | Refills: 3 | Status: SHIPPED | OUTPATIENT
Start: 2019-02-26 | End: 2019-03-11 | Stop reason: SDUPTHER

## 2019-03-05 LAB — INR PPP: 3.2

## 2019-03-06 ENCOUNTER — ANTI-COAG VISIT (OUTPATIENT)
Dept: CARDIOLOGY | Facility: CLINIC | Age: 74
End: 2019-03-06

## 2019-03-06 DIAGNOSIS — I48.20 CHRONIC ATRIAL FIBRILLATION: ICD-10-CM

## 2019-03-06 DIAGNOSIS — Z95.2 S/P MVR (MITRAL VALVE REPLACEMENT): ICD-10-CM

## 2019-03-06 DIAGNOSIS — Z95.2 S/P AVR: ICD-10-CM

## 2019-03-06 DIAGNOSIS — Z79.01 LONG TERM CURRENT USE OF ANTICOAGULANT: ICD-10-CM

## 2019-03-06 NOTE — PROGRESS NOTES
called to report that the Patient was taking 4mg daily except 2mg-Tuesday and Friday but this week took 2mg Monday and Tuesday, wants to know what to do now, no bleeding/bruises/swelling, drinks 2 boost per week--not new, call back # [phone]

## 2019-03-11 DIAGNOSIS — Z95.2 HEART VALVE REPLACED: ICD-10-CM

## 2019-03-11 DIAGNOSIS — I48.20 CHRONIC ATRIAL FIBRILLATION: Chronic | ICD-10-CM

## 2019-03-12 ENCOUNTER — ANTI-COAG VISIT (OUTPATIENT)
Dept: CARDIOLOGY | Facility: CLINIC | Age: 74
End: 2019-03-12

## 2019-03-12 DIAGNOSIS — Z95.2 S/P MVR (MITRAL VALVE REPLACEMENT): ICD-10-CM

## 2019-03-12 DIAGNOSIS — Z95.2 S/P AVR: ICD-10-CM

## 2019-03-12 DIAGNOSIS — I48.20 CHRONIC ATRIAL FIBRILLATION: ICD-10-CM

## 2019-03-12 DIAGNOSIS — Z79.01 LONG TERM CURRENT USE OF ANTICOAGULANT: ICD-10-CM

## 2019-03-12 LAB — INR PPP: 3.2

## 2019-03-12 RX ORDER — WARFARIN 4 MG/1
TABLET ORAL
Qty: 135 TABLET | Refills: 3 | Status: SHIPPED | OUTPATIENT
Start: 2019-03-12 | End: 2019-12-17 | Stop reason: SDUPTHER

## 2019-03-13 ENCOUNTER — TELEPHONE (OUTPATIENT)
Dept: FAMILY MEDICINE | Facility: CLINIC | Age: 74
End: 2019-03-13

## 2019-03-13 ENCOUNTER — PATIENT OUTREACH (OUTPATIENT)
Dept: ADMINISTRATIVE | Facility: HOSPITAL | Age: 74
End: 2019-03-13

## 2019-03-13 DIAGNOSIS — M89.9 DISORDER OF BONE AND CARTILAGE: ICD-10-CM

## 2019-03-13 DIAGNOSIS — E11.9 DIABETES MELLITUS WITHOUT COMPLICATION: Primary | ICD-10-CM

## 2019-03-13 DIAGNOSIS — Z79.01 LONG TERM (CURRENT) USE OF ANTICOAGULANTS: Primary | ICD-10-CM

## 2019-03-13 DIAGNOSIS — M94.9 DISORDER OF BONE AND CARTILAGE: ICD-10-CM

## 2019-03-13 RX ORDER — WARFARIN 4 MG/1
2-4 TABLET ORAL DAILY
Qty: 30 TABLET | Refills: 1 | Status: SHIPPED | OUTPATIENT
Start: 2019-03-13 | End: 2019-06-18 | Stop reason: SDUPTHER

## 2019-03-13 NOTE — TELEPHONE ENCOUNTER
Spoke w/ patient's , he is requesting a 30 day supply of his wife's coumadin to be sent to [facility] ( [facility] / Patricia ) @  [phone]. The patient will run out before mail order supply is received. She has 3 days of medication left. Please advise

## 2019-03-19 ENCOUNTER — ANTI-COAG VISIT (OUTPATIENT)
Dept: CARDIOLOGY | Facility: CLINIC | Age: 74
End: 2019-03-19

## 2019-03-19 DIAGNOSIS — I48.20 CHRONIC ATRIAL FIBRILLATION: ICD-10-CM

## 2019-03-19 DIAGNOSIS — Z95.2 S/P MVR (MITRAL VALVE REPLACEMENT): ICD-10-CM

## 2019-03-19 DIAGNOSIS — Z79.01 LONG TERM CURRENT USE OF ANTICOAGULANT: ICD-10-CM

## 2019-03-19 DIAGNOSIS — Z95.2 S/P AVR: ICD-10-CM

## 2019-03-19 LAB — INR PPP: 3

## 2019-03-21 ENCOUNTER — PATIENT OUTREACH (OUTPATIENT)
Dept: ADMINISTRATIVE | Facility: HOSPITAL | Age: 74
End: 2019-03-21

## 2019-03-22 ENCOUNTER — LAB VISIT (OUTPATIENT)
Dept: LAB | Facility: HOSPITAL | Age: 74
End: 2019-03-22
Attending: INTERNAL MEDICINE
Payer: MEDICARE

## 2019-03-22 DIAGNOSIS — R80.9 TYPE 2 DIABETES MELLITUS WITH MICROALBUMINURIA, WITHOUT LONG-TERM CURRENT USE OF INSULIN: Chronic | ICD-10-CM

## 2019-03-22 DIAGNOSIS — E11.29 TYPE 2 DIABETES MELLITUS WITH MICROALBUMINURIA, WITHOUT LONG-TERM CURRENT USE OF INSULIN: Chronic | ICD-10-CM

## 2019-03-22 DIAGNOSIS — I10 ESSENTIAL (PRIMARY) HYPERTENSION: Chronic | ICD-10-CM

## 2019-03-22 DIAGNOSIS — E03.8 SUBCLINICAL HYPOTHYROIDISM: Chronic | ICD-10-CM

## 2019-03-22 LAB
ALBUMIN SERPL BCP-MCNC: 4.3 G/DL
ALP SERPL-CCNC: 67 U/L
ALT SERPL W/O P-5'-P-CCNC: 17 U/L
ANION GAP SERPL CALC-SCNC: 5 MMOL/L
AST SERPL-CCNC: 33 U/L
BASOPHILS # BLD AUTO: 0.06 K/UL
BASOPHILS NFR BLD: 0.9 %
BILIRUB SERPL-MCNC: 1 MG/DL
BUN SERPL-MCNC: 17 MG/DL
CALCIUM SERPL-MCNC: 10.2 MG/DL
CHLORIDE SERPL-SCNC: 102 MMOL/L
CHOLEST SERPL-MCNC: 143 MG/DL
CHOLEST/HDLC SERPL: 2.8 {RATIO}
CO2 SERPL-SCNC: 29 MMOL/L
CREAT SERPL-MCNC: 0.8 MG/DL
DIFFERENTIAL METHOD: ABNORMAL
EOSINOPHIL # BLD AUTO: 0.1 K/UL
EOSINOPHIL NFR BLD: 1.9 %
ERYTHROCYTE [DISTWIDTH] IN BLOOD BY AUTOMATED COUNT: 14 %
EST. GFR  (AFRICAN AMERICAN): >60 ML/MIN/1.73 M^2
EST. GFR  (NON AFRICAN AMERICAN): >60 ML/MIN/1.73 M^2
ESTIMATED AVG GLUCOSE: 123 MG/DL
GLUCOSE SERPL-MCNC: 102 MG/DL
HBA1C MFR BLD HPLC: 5.9 %
HCT VFR BLD AUTO: 39.8 %
HDLC SERPL-MCNC: 51 MG/DL
HDLC SERPL: 35.7 %
HGB BLD-MCNC: 12.7 G/DL
IMM GRANULOCYTES # BLD AUTO: 0.02 K/UL
IMM GRANULOCYTES NFR BLD AUTO: 0.3 %
LDLC SERPL CALC-MCNC: 72.8 MG/DL
LYMPHOCYTES # BLD AUTO: 1.5 K/UL
LYMPHOCYTES NFR BLD: 22.5 %
MCH RBC QN AUTO: 30.5 PG
MCHC RBC AUTO-ENTMCNC: 31.9 G/DL
MCV RBC AUTO: 95 FL
MONOCYTES # BLD AUTO: 0.5 K/UL
MONOCYTES NFR BLD: 6.8 %
NEUTROPHILS # BLD AUTO: 4.6 K/UL
NEUTROPHILS NFR BLD: 67.6 %
NONHDLC SERPL-MCNC: 92 MG/DL
NRBC BLD-RTO: 0 /100 WBC
PLATELET # BLD AUTO: 87 K/UL
PMV BLD AUTO: 12.5 FL
POTASSIUM SERPL-SCNC: 4.2 MMOL/L
PROT SERPL-MCNC: 7.7 G/DL
RBC # BLD AUTO: 4.17 M/UL
SODIUM SERPL-SCNC: 136 MMOL/L
T4 FREE SERPL-MCNC: 1.08 NG/DL
TRIGL SERPL-MCNC: 96 MG/DL
TSH SERPL DL<=0.005 MIU/L-ACNC: 5.08 UIU/ML
WBC # BLD AUTO: 6.76 K/UL

## 2019-03-22 PROCEDURE — 36415 COLL VENOUS BLD VENIPUNCTURE: CPT | Mod: HCNC,PO

## 2019-03-22 PROCEDURE — 84439 ASSAY OF FREE THYROXINE: CPT | Mod: HCNC

## 2019-03-22 PROCEDURE — 85025 COMPLETE CBC W/AUTO DIFF WBC: CPT | Mod: HCNC

## 2019-03-22 PROCEDURE — 84443 ASSAY THYROID STIM HORMONE: CPT | Mod: HCNC

## 2019-03-22 PROCEDURE — 83036 HEMOGLOBIN GLYCOSYLATED A1C: CPT | Mod: HCNC

## 2019-03-22 PROCEDURE — 80053 COMPREHEN METABOLIC PANEL: CPT | Mod: HCNC

## 2019-03-22 PROCEDURE — 80061 LIPID PANEL: CPT | Mod: HCNC

## 2019-04-02 ENCOUNTER — ANTI-COAG VISIT (OUTPATIENT)
Dept: CARDIOLOGY | Facility: CLINIC | Age: 74
End: 2019-04-02

## 2019-04-02 LAB — INR PPP: 3

## 2019-04-04 ENCOUNTER — OFFICE VISIT (OUTPATIENT)
Dept: FAMILY MEDICINE | Facility: CLINIC | Age: 74
End: 2019-04-04
Payer: MEDICARE

## 2019-04-04 VITALS
SYSTOLIC BLOOD PRESSURE: 130 MMHG | TEMPERATURE: 98 F | DIASTOLIC BLOOD PRESSURE: 70 MMHG | HEART RATE: 66 BPM | BODY MASS INDEX: 21.42 KG/M2 | HEIGHT: 61 IN | WEIGHT: 113.44 LBS | OXYGEN SATURATION: 95 %

## 2019-04-04 DIAGNOSIS — M85.89 OSTEOPENIA OF MULTIPLE SITES: ICD-10-CM

## 2019-04-04 DIAGNOSIS — E11.29 TYPE 2 DIABETES MELLITUS WITH MICROALBUMINURIA, WITHOUT LONG-TERM CURRENT USE OF INSULIN: Chronic | ICD-10-CM

## 2019-04-04 DIAGNOSIS — I70.0 ATHEROSCLEROSIS OF AORTA: Chronic | ICD-10-CM

## 2019-04-04 DIAGNOSIS — R80.9 TYPE 2 DIABETES MELLITUS WITH MICROALBUMINURIA, WITHOUT LONG-TERM CURRENT USE OF INSULIN: Chronic | ICD-10-CM

## 2019-04-04 DIAGNOSIS — Z12.31 ENCOUNTER FOR SCREENING MAMMOGRAM FOR BREAST CANCER: ICD-10-CM

## 2019-04-04 DIAGNOSIS — D69.6 THROMBOCYTOPENIA: Chronic | ICD-10-CM

## 2019-04-04 DIAGNOSIS — E03.8 SUBCLINICAL HYPOTHYROIDISM: Chronic | ICD-10-CM

## 2019-04-04 DIAGNOSIS — I48.20 CHRONIC ATRIAL FIBRILLATION: Chronic | ICD-10-CM

## 2019-04-04 DIAGNOSIS — E78.2 MIXED HYPERLIPIDEMIA: Chronic | ICD-10-CM

## 2019-04-04 DIAGNOSIS — Z79.01 LONG TERM CURRENT USE OF ANTICOAGULANT: Chronic | ICD-10-CM

## 2019-04-04 DIAGNOSIS — E11.3299 TYPE 2 DIABETES MELLITUS WITH MILD NONPROLIFERATIVE RETINOPATHY WITHOUT MACULAR EDEMA, WITHOUT LONG-TERM CURRENT USE OF INSULIN, UNSPECIFIED LATERALITY: Chronic | ICD-10-CM

## 2019-04-04 DIAGNOSIS — Z00.00 ROUTINE MEDICAL EXAM: Primary | ICD-10-CM

## 2019-04-04 DIAGNOSIS — I10 ESSENTIAL (PRIMARY) HYPERTENSION: Chronic | ICD-10-CM

## 2019-04-04 PROCEDURE — 99397 PER PM REEVAL EST PAT 65+ YR: CPT | Mod: HCNC,S$GLB,, | Performed by: INTERNAL MEDICINE

## 2019-04-04 PROCEDURE — 3044F HG A1C LEVEL LT 7.0%: CPT | Mod: HCNC,CPTII,S$GLB, | Performed by: INTERNAL MEDICINE

## 2019-04-04 PROCEDURE — 3075F PR MOST RECENT SYSTOLIC BLOOD PRESS GE 130-139MM HG: ICD-10-PCS | Mod: HCNC,CPTII,S$GLB, | Performed by: INTERNAL MEDICINE

## 2019-04-04 PROCEDURE — 3044F PR MOST RECENT HEMOGLOBIN A1C LEVEL <7.0%: ICD-10-PCS | Mod: HCNC,CPTII,S$GLB, | Performed by: INTERNAL MEDICINE

## 2019-04-04 PROCEDURE — 3078F PR MOST RECENT DIASTOLIC BLOOD PRESSURE < 80 MM HG: ICD-10-PCS | Mod: HCNC,CPTII,S$GLB, | Performed by: INTERNAL MEDICINE

## 2019-04-04 PROCEDURE — 99499 UNLISTED E&M SERVICE: CPT | Mod: HCNC,S$GLB,, | Performed by: INTERNAL MEDICINE

## 2019-04-04 PROCEDURE — 99499 RISK ADDL DX/OHS AUDIT: ICD-10-PCS | Mod: HCNC,S$GLB,, | Performed by: INTERNAL MEDICINE

## 2019-04-04 PROCEDURE — 99999 PR PBB SHADOW E&M-EST. PATIENT-LVL III: CPT | Mod: PBBFAC,HCNC,, | Performed by: INTERNAL MEDICINE

## 2019-04-04 PROCEDURE — 3078F DIAST BP <80 MM HG: CPT | Mod: HCNC,CPTII,S$GLB, | Performed by: INTERNAL MEDICINE

## 2019-04-04 PROCEDURE — 99999 PR PBB SHADOW E&M-EST. PATIENT-LVL III: ICD-10-PCS | Mod: PBBFAC,HCNC,, | Performed by: INTERNAL MEDICINE

## 2019-04-04 PROCEDURE — 3075F SYST BP GE 130 - 139MM HG: CPT | Mod: HCNC,CPTII,S$GLB, | Performed by: INTERNAL MEDICINE

## 2019-04-04 PROCEDURE — 99397 PR PREVENTIVE VISIT,EST,65 & OVER: ICD-10-PCS | Mod: HCNC,S$GLB,, | Performed by: INTERNAL MEDICINE

## 2019-04-04 NOTE — PROGRESS NOTES
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          Atherosclerosis of aorta (Chronic)    Overview     Seen on Echo x2  2014.  Stable, asymptomatic chronic condition.  Will continue to maximize risk factor reduction and adjust medication as needed.          Mixed hyperlipidemia (Chronic)    Current Assessment & Plan     The current medical regimen is effective;  continue present plan and medications.          Essential (primary) hypertension (Chronic)    Current Assessment & Plan     The current medical regimen is effective;  continue present plan and medications.             Hematology    Long term current use of anticoagulant (Chronic)    Current Assessment & Plan     The current medical regimen is effective;  continue present plan and medications.          Thrombocytopenia (Chronic)    Current Assessment & Plan     Stable, asymptomatic chronic condition.  Will continue to maximize risk factor reduction and adjust medication as needed.             Endocrine    Subclinical hypothyroidism (Chronic)    Current Assessment & Plan     Will continue to monitor with routine labs.  No clear need for replacement at this time.          Relevant Orders    TSH    Type 2 diabetes mellitus with microalbuminuria, without long-term current use of insulin (Chronic)    Current Assessment & Plan     The current medical regimen is effective;  continue present plan and medications.          Relevant Orders    Hemoglobin A1c    Comprehensive metabolic panel       Orthopedic    Osteopenia (Chronic)    Current Assessment & Plan     Needs repeat DEXA           Other Visit Diagnoses     Routine  medical exam    -  Primary  -    Discussed healthy diet, regular exercise, necessary labs, age appropriate cancer screening, and routine vaccinations.       Encounter for screening mammogram for breast cancer    -  Screening MMG     Relevant Orders    Mammo Digital Screening Bilat            Health Maintenance reviewed, as above.    Follow-up: Follow up in about 6 months (around 10/4/2019) for follow up for chronic conditions.  ______________________________________________________________________    Chief Complaint  Chief Complaint   Patient presents with    Annual Exam       HPI  Orion Ty is a 73 y.o. female with medical diagnoses as listed in the medical history and problem list that presents for routine physical.  Pt is known to me with her last appointment 2/15/2019.  She had labs prior to this OV that showed reassuring CBC, CMP, lipids, A1c.  TSH was slightly high at ~5.     No acute complaints      PAST MEDICAL HISTORY:  Past Medical History:   Diagnosis Date    Closed displaced fracture of distal phalanx of lesser toe 10/20/2015    Diabetes mellitus     Diabetes mellitus, type 2     Hypertension     Osteopenia     Pacemaker     Rheumatic heart disease        PAST SURGICAL HISTORY:  Past Surgical History:   Procedure Laterality Date    CARDIAC PACEMAKER PLACEMENT      CARDIAC VALVE REPLACEMENT      CARDIAC VALVE SURGERY      thryoid s         SOCIAL HISTORY:  Social History     Socioeconomic History    Marital status:      Spouse name: Not on file    Number of children: Not on file    Years of education: Not on file    Highest education level: Not on file   Occupational History    Not on file   Social Needs    Financial resource strain: Not on file    Food insecurity:     Worry: Not on file     Inability: Not on file    Transportation needs:     Medical: Not on file     Non-medical: Not on file   Tobacco Use    Smoking status: Never Smoker    Smokeless tobacco: Never Used    Substance and Sexual Activity    Alcohol use: No    Drug use: Not on file    Sexual activity: Not on file   Lifestyle    Physical activity:     Days per week: Not on file     Minutes per session: Not on file    Stress: Not on file   Relationships    Social connections:     Talks on phone: Not on file     Gets together: Not on file     Attends Hoahaoism service: Not on file     Active member of club or organization: Not on file     Attends meetings of clubs or organizations: Not on file     Relationship status: Not on file   Other Topics Concern    Not on file   Social History Narrative    Not on file       FAMILY HISTORY:  Family History   Problem Relation Age of Onset    Breast cancer Neg Hx     Colon cancer Neg Hx     Ovarian cancer Neg Hx        ALLERGIES AND MEDICATIONS: updated and reviewed.  Review of patient's allergies indicates:  No Known Allergies  Current Outpatient Medications   Medication Sig Dispense Refill    B-COMPLEX WITH VITAMIN C ORAL       losartan (COZAAR) 50 MG tablet Take 1 tablet (50 mg total) by mouth once daily. 90 tablet 3    metFORMIN (GLUCOPHAGE) 500 MG tablet TAKE 2 TABLETS BY MOUTH EVERY MORNING AND 1 TABLET EVERY EVENING 270 tablet 3    metoprolol tartrate (LOPRESSOR) 25 MG tablet Take 1 tablet (25 mg total) by mouth 2 (two) times daily. 180 tablet 3    multivitamin-Ca-iron-minerals 27-0.4 mg Tab       omega-3 acid ethyl esters (LOVAZA) 1 gram capsule TK 2 CS PO BID  2    phenyleph/pramoxin/glycr/w.pet (HEMORRHOIDAL CREAM RECT)       pravastatin (PRAVACHOL) 20 MG tablet Take 1 tablet (20 mg total) by mouth once daily. 90 tablet 3    warfarin (COUMADIN) 4 MG tablet TAKE 1 TABLET BY MOUTH ON MONDAY AND FRIDAY AND 1/2 TABLET BY MOUTH ON ALL OTHER DAYS 135 tablet 3    warfarin (COUMADIN) 4 MG tablet Take 0.5-1 tablets (2-4 mg total) by mouth Daily. As directed by coumadin clinic 30 tablet 1    ACCU-CHEK GATO CONTROL SOLN Soln       adhesive bandage  "(BANDAGES/PLASTIC TOP)       amLODIPine (NORVASC) 10 MG tablet Take 1 tablet (10 mg total) by mouth once daily. 90 tablet 3    blood-glucose meter Misc Accu-chek Cort[place] Glucose Meter, Use to test blood sugar once daily. 1 each 1    lancets 30 gauge Misc       lancing device Misc       nitroGLYCERIN (NITROSTAT) 0.4 MG SL tablet DISSOLVE 1 TABLET UNDER THE TONGUE EVERY 5 MINUTES AS NEEDED FOR CHEST PAINS 275 tablet 0    TRUETEST TEST STRIPS Strp USE ONCE DAILY (Patient taking differently: twice daily) 100 strip 6     No current facility-administered medications for this visit.          ROS  Review of Systems   Constitutional: Negative for chills, fever and unexpected weight change.   HENT: Negative for congestion, ear pain, hearing loss, rhinorrhea, sore throat and trouble swallowing.    Eyes: Negative for discharge, redness and visual disturbance.   Respiratory: Negative for cough, chest tightness, shortness of breath and wheezing.    Cardiovascular: Negative for chest pain, palpitations and leg swelling.   Gastrointestinal: Negative for abdominal pain, constipation, diarrhea, nausea and vomiting.   Endocrine: Negative for polydipsia, polyphagia and polyuria.   Genitourinary: Negative for decreased urine volume, dysuria and hematuria.   Musculoskeletal: Negative for arthralgias, joint swelling and myalgias.   Skin: Negative for color change and rash.   Neurological: Negative for dizziness, weakness, light-headedness and headaches.   Psychiatric/Behavioral: Negative for decreased concentration, dysphoric mood, sleep disturbance and suicidal ideas.           Physical Exam  Vitals:    04/04/19 0953   BP: 130/70   Pulse: 66   Temp: 98.4 °F (36.9 °C)   SpO2: 95%   Weight: 51.5 kg (113 lb 6.8 oz)   Height: 5' 1" (1.549 m)    Body mass index is 21.43 kg/m².  Weight: 51.5 kg (113 lb 6.8 oz)   Height: 5' 1" (154.9 cm)   Physical Exam   Constitutional: She is oriented to person, place, and time. She appears well-developed " and well-nourished. No distress.   HENT:   Head: Normocephalic and atraumatic.   Right Ear: Tympanic membrane, external ear and ear canal normal.   Left Ear: Tympanic membrane, external ear and ear canal normal.   Nose: Nose normal. Right sinus exhibits no maxillary sinus tenderness and no frontal sinus tenderness. Left sinus exhibits no maxillary sinus tenderness and no frontal sinus tenderness.   Mouth/Throat: Uvula is midline, oropharynx is clear and moist and mucous membranes are normal. No tonsillar exudate.   Eyes: Pupils are equal, round, and reactive to light. Conjunctivae, EOM and lids are normal. No scleral icterus.   Neck: Full passive range of motion without pain. Neck supple. No JVD present. No spinous process tenderness and no muscular tenderness present. Carotid bruit is not present. No thyromegaly present.   Cardiovascular: Normal rate, regular rhythm, S1 normal, S2 normal and intact distal pulses. Exam reveals no S3, no S4 and no friction rub.   Murmur heard.  Pulmonary/Chest: Effort normal and breath sounds normal. She has no wheezes. She has no rhonchi. She has no rales.   Abdominal: Soft. Bowel sounds are normal. She exhibits no distension. There is no hepatosplenomegaly. There is no tenderness. There is no rebound and no CVA tenderness.   Musculoskeletal: Normal range of motion. She exhibits no edema or tenderness.   Lymphadenopathy:        Head (right side): No submental and no submandibular adenopathy present.        Head (left side): No submental and no submandibular adenopathy present.     She has no cervical adenopathy.   Neurological: She is alert and oriented to person, place, and time. Coordination normal.   Motor grossly intact.  Sensory grossly intact.  Symmetric facial movements palate elevated symmetrically tongue midline     Skin: Skin is warm and dry. No rash noted. No cyanosis. Nails show no clubbing.   Psychiatric: She has a normal mood and affect. Her speech is normal and  behavior is normal. Thought content normal. Cognition and memory are normal.           Health Maintenance       Date Due Completion Date    DEXA SCAN 02/01/2019 2/1/2016    Override on 8/4/2014: Done    Foot Exam 09/12/2019 9/12/2018    Mammogram 09/13/2019 9/13/2018    Hemoglobin A1c 09/22/2019 3/22/2019    Colonoscopy 01/01/2020 1/1/2010 (Done)    Override on 1/1/2010: Done    Override on 1/1/2006: Done (repeat in 10 years)    Eye Exam 01/10/2020 1/10/2019    Override on 3/8/2017: Done (Dr. Richmond Andrea/Optometry-positive for non-proliferative diabetic retinopathy)    High Dose Statin 02/15/2020 2/15/2019    Lipid Panel 03/22/2020 3/22/2019    TETANUS VACCINE 06/30/2026 6/30/2016 (Declined)    Override on 6/30/2016: Declined

## 2019-04-11 ENCOUNTER — PATIENT OUTREACH (OUTPATIENT)
Dept: ADMINISTRATIVE | Facility: HOSPITAL | Age: 74
End: 2019-04-11

## 2019-04-15 ENCOUNTER — OFFICE VISIT (OUTPATIENT)
Dept: CARDIOLOGY | Facility: CLINIC | Age: 74
End: 2019-04-15
Payer: MEDICARE

## 2019-04-15 VITALS
BODY MASS INDEX: 21.45 KG/M2 | SYSTOLIC BLOOD PRESSURE: 140 MMHG | DIASTOLIC BLOOD PRESSURE: 82 MMHG | WEIGHT: 113.56 LBS | RESPIRATION RATE: 16 BRPM | HEART RATE: 106 BPM | OXYGEN SATURATION: 99 %

## 2019-04-15 DIAGNOSIS — Z01.810 PREOP CARDIOVASCULAR EXAM: ICD-10-CM

## 2019-04-15 DIAGNOSIS — I48.20 CHRONIC ATRIAL FIBRILLATION: ICD-10-CM

## 2019-04-15 DIAGNOSIS — Z95.2 S/P AVR: ICD-10-CM

## 2019-04-15 DIAGNOSIS — E78.2 MIXED HYPERLIPIDEMIA: ICD-10-CM

## 2019-04-15 DIAGNOSIS — I09.9 RHEUMATIC HEART DISEASE: ICD-10-CM

## 2019-04-15 DIAGNOSIS — E11.29 TYPE 2 DIABETES MELLITUS WITH MICROALBUMINURIA, WITHOUT LONG-TERM CURRENT USE OF INSULIN: ICD-10-CM

## 2019-04-15 DIAGNOSIS — R80.9 TYPE 2 DIABETES MELLITUS WITH MICROALBUMINURIA, WITHOUT LONG-TERM CURRENT USE OF INSULIN: ICD-10-CM

## 2019-04-15 DIAGNOSIS — I10 ESSENTIAL (PRIMARY) HYPERTENSION: ICD-10-CM

## 2019-04-15 DIAGNOSIS — Z95.0 CARDIAC PACEMAKER IN SITU: ICD-10-CM

## 2019-04-15 DIAGNOSIS — Z79.01 LONG TERM CURRENT USE OF ANTICOAGULANT: ICD-10-CM

## 2019-04-15 DIAGNOSIS — Z95.2 S/P MVR (MITRAL VALVE REPLACEMENT): Primary | ICD-10-CM

## 2019-04-15 PROCEDURE — 3079F DIAST BP 80-89 MM HG: CPT | Mod: HCNC,CPTII,S$GLB, | Performed by: INTERNAL MEDICINE

## 2019-04-15 PROCEDURE — 99999 PR PBB SHADOW E&M-EST. PATIENT-LVL III: CPT | Mod: PBBFAC,HCNC,, | Performed by: INTERNAL MEDICINE

## 2019-04-15 PROCEDURE — 1101F PT FALLS ASSESS-DOCD LE1/YR: CPT | Mod: HCNC,CPTII,S$GLB, | Performed by: INTERNAL MEDICINE

## 2019-04-15 PROCEDURE — 3044F HG A1C LEVEL LT 7.0%: CPT | Mod: HCNC,CPTII,S$GLB, | Performed by: INTERNAL MEDICINE

## 2019-04-15 PROCEDURE — 99214 PR OFFICE/OUTPT VISIT, EST, LEVL IV, 30-39 MIN: ICD-10-PCS | Mod: HCNC,S$GLB,, | Performed by: INTERNAL MEDICINE

## 2019-04-15 PROCEDURE — 99499 RISK ADDL DX/OHS AUDIT: ICD-10-PCS | Mod: HCNC,S$GLB,, | Performed by: INTERNAL MEDICINE

## 2019-04-15 PROCEDURE — 1101F PR PT FALLS ASSESS DOC 0-1 FALLS W/OUT INJ PAST YR: ICD-10-PCS | Mod: HCNC,CPTII,S$GLB, | Performed by: INTERNAL MEDICINE

## 2019-04-15 PROCEDURE — 3079F PR MOST RECENT DIASTOLIC BLOOD PRESSURE 80-89 MM HG: ICD-10-PCS | Mod: HCNC,CPTII,S$GLB, | Performed by: INTERNAL MEDICINE

## 2019-04-15 PROCEDURE — 3044F PR MOST RECENT HEMOGLOBIN A1C LEVEL <7.0%: ICD-10-PCS | Mod: HCNC,CPTII,S$GLB, | Performed by: INTERNAL MEDICINE

## 2019-04-15 PROCEDURE — 93000 EKG 12-LEAD: ICD-10-PCS | Mod: HCNC,S$GLB,, | Performed by: INTERNAL MEDICINE

## 2019-04-15 PROCEDURE — 3077F PR MOST RECENT SYSTOLIC BLOOD PRESSURE >= 140 MM HG: ICD-10-PCS | Mod: HCNC,CPTII,S$GLB, | Performed by: INTERNAL MEDICINE

## 2019-04-15 PROCEDURE — 99999 PR PBB SHADOW E&M-EST. PATIENT-LVL III: ICD-10-PCS | Mod: PBBFAC,HCNC,, | Performed by: INTERNAL MEDICINE

## 2019-04-15 PROCEDURE — 93000 ELECTROCARDIOGRAM COMPLETE: CPT | Mod: HCNC,S$GLB,, | Performed by: INTERNAL MEDICINE

## 2019-04-15 PROCEDURE — 3077F SYST BP >= 140 MM HG: CPT | Mod: HCNC,CPTII,S$GLB, | Performed by: INTERNAL MEDICINE

## 2019-04-15 PROCEDURE — 99214 OFFICE O/P EST MOD 30 MIN: CPT | Mod: HCNC,S$GLB,, | Performed by: INTERNAL MEDICINE

## 2019-04-15 PROCEDURE — 99499 UNLISTED E&M SERVICE: CPT | Mod: HCNC,S$GLB,, | Performed by: INTERNAL MEDICINE

## 2019-04-15 NOTE — PROGRESS NOTES
CARDIOVASCULAR PROGRESS NOTE    REASON FOR CONSULT:   Orion Ty is a 73 y.o. female who presents for follow up of Chr AF, AVR/MVR.    PCP: Mesha Aquino: Andrea, Can  HISTORY OF PRESENT ILLNESS:   The patient is seen accompanied by her  who aids with [place] interpretation.  The patient denies chest discomfort or shortness of breath.  She's had no palpitations, lightheadedness, dizziness, or syncope.  There's been no PND, orthopnea, or lower extremity edema.  She denies melena, hematuria, or claudicant symptoms.  They have come in earlier than planned because the patient's blood pressure has been somewhat uncontrolled in the 140-170 range.  She denies any symptoms with this.  They also tell me that the patient is due to have cataract surgery in the near future.  She is certainly at acceptable cardiac risk for this surgery.  However, I have suggested that if the surgeon plans to withdraw her from Coumadin, given her multiple mechanical valves, she should be bridged with Lovenox.    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) monitors coumadin at home  ?mod prosthetic AS (echo 12/2017)  Chronic A-fib  Medtronic pacemaker - last generator change 2011    PAST MEDICAL HISTORY:     Past Medical History:   Diagnosis Date    Closed displaced fracture of distal phalanx of lesser toe 10/20/2015    Diabetes mellitus     Diabetes mellitus, type 2     Hypertension     Osteopenia     Pacemaker     Rheumatic heart disease        PAST SURGICAL HISTORY:     Past Surgical History:   Procedure Laterality Date    CARDIAC PACEMAKER PLACEMENT      CARDIAC VALVE REPLACEMENT      CARDIAC VALVE SURGERY      thryoid s         ALLERGIES AND MEDICATION:   Review of patient's allergies indicates:  No Known Allergies       Medication List           Accurate as of 4/15/19  3:15 PM. If you have any questions, ask your nurse or doctor.               CHANGE how you take these medications     TRUETEST TEST STRIPS Strp  Generic drug:  blood sugar diagnostic  USE ONCE DAILY  What changed:  See the new instructions.        CONTINUE taking these medications    ACCU-CHEK GATO CONTROL SOLN Soln  Generic drug:  blood glucose control high,low     amLODIPine 10 MG tablet  Commonly known as:  NORVASC  Take 1 tablet (10 mg total) by mouth once daily.     B-COMPLEX WITH VITAMIN C ORAL     BANDAGES/PLASTIC TOP     blood-glucose meter Misc  Accu-chek Gato Glucose Meter, Use to test blood sugar once daily.     HEMORRHOIDAL CREAM RECT     lancets 30 gauge Misc     lancing device Misc     losartan 50 MG tablet  Commonly known as:  COZAAR  Take 1 tablet (50 mg total) by mouth once daily.     metFORMIN 500 MG tablet  Commonly known as:  GLUCOPHAGE  TAKE 2 TABLETS BY MOUTH EVERY MORNING AND 1 TABLET EVERY EVENING     metoprolol tartrate 25 MG tablet  Commonly known as:  LOPRESSOR  Take 1 tablet (25 mg total) by mouth 2 (two) times daily.     multivitamin-Ca-iron-minerals 27-0.4 mg Tab     nitroGLYCERIN 0.4 MG SL tablet  Commonly known as:  NITROSTAT  DISSOLVE 1 TABLET UNDER THE TONGUE EVERY 5 MINUTES AS NEEDED FOR CHEST PAINS     omega-3 acid ethyl esters 1 gram capsule  Commonly known as:  LOVAZA     pravastatin 20 MG tablet  Commonly known as:  PRAVACHOL  Take 1 tablet (20 mg total) by mouth once daily.     * warfarin 4 MG tablet  Commonly known as:  COUMADIN  TAKE 1 TABLET BY MOUTH ON MONDAY AND FRIDAY AND 1/2 TABLET BY MOUTH ON ALL OTHER DAYS     * warfarin 4 MG tablet  Commonly known as:  COUMADIN  Take 0.5-1 tablets (2-4 mg total) by mouth Daily. As directed by coumadin clinic         * This list has 2 medication(s) that are the same as other medications prescribed for you. Read the directions carefully, and ask your doctor or other care provider to review them with you.                  SOCIAL HISTORY:     Social History     Socioeconomic History    Marital status:      Spouse name: Not on file    Number  of children: Not on file    Years of education: Not on file    Highest education level: Not on file   Occupational History    Not on file   Social Needs    Financial resource strain: Not on file    Food insecurity:     Worry: Not on file     Inability: Not on file    Transportation needs:     Medical: Not on file     Non-medical: Not on file   Tobacco Use    Smoking status: Never Smoker    Smokeless tobacco: Never Used   Substance and Sexual Activity    Alcohol use: No    Drug use: Not on file    Sexual activity: Not on file   Lifestyle    Physical activity:     Days per week: Not on file     Minutes per session: Not on file    Stress: Not on file   Relationships    Social connections:     Talks on phone: Not on file     Gets together: Not on file     Attends Scientologist service: Not on file     Active member of club or organization: Not on file     Attends meetings of clubs or organizations: Not on file     Relationship status: Not on file   Other Topics Concern    Not on file   Social History Narrative    Not on file       FAMILY HISTORY:     Family History   Problem Relation Age of Onset    Breast cancer Neg Hx     Colon cancer Neg Hx     Ovarian cancer Neg Hx        REVIEW OF SYSTEMS:   Review of Systems   Constitutional: Negative for chills, diaphoresis and fever.   HENT: Negative for nosebleeds.    Eyes: Negative for blurred vision, double vision and photophobia.   Respiratory: Negative for hemoptysis, shortness of breath and wheezing.    Cardiovascular: Negative for chest pain, palpitations, orthopnea, claudication, leg swelling and PND.   Gastrointestinal: Negative for abdominal pain, blood in stool, heartburn, melena, nausea and vomiting.   Genitourinary: Negative for flank pain and hematuria.   Musculoskeletal: Negative for falls, myalgias and neck pain.   Skin: Negative for rash.   Neurological: Negative for dizziness, seizures, loss of consciousness, weakness and headaches.    Endo/Heme/Allergies: Negative for polydipsia. Does not bruise/bleed easily.   Psychiatric/Behavioral: Negative for depression and memory loss. The patient is not nervous/anxious.        PHYSICAL EXAM:     Vitals:    04/15/19 1502   BP: (!) 140/82   Pulse: 106   Resp: 16    Body mass index is 21.45 kg/m².  Weight: 51.5 kg (113 lb 8.6 oz)         Physical Exam   Constitutional: She is oriented to person, place, and time. She appears well-developed and well-nourished. She is cooperative.  Non-toxic appearance. No distress.   HENT:   Head: Normocephalic and atraumatic.   Eyes: Pupils are equal, round, and reactive to light. Conjunctivae and EOM are normal. No scleral icterus.   Neck: Trachea normal and normal range of motion. Neck supple. Normal carotid pulses and no JVD present. Carotid bruit is not present. No neck rigidity. No tracheal deviation and no edema present. No thyromegaly present.   Cardiovascular: Normal rate, S1 normal and S2 normal. An irregularly irregular rhythm present. PMI is not displaced. Exam reveals no gallop and no friction rub.   Murmur heard.   Systolic murmur is present with a grade of 2/6.  Pulses:       Carotid pulses are 2+ on the right side, and 2+ on the left side.  Crisp Mech S1S2.  Systolic murmur radiates to carotids   Pulmonary/Chest: Effort normal and breath sounds normal. No stridor. No respiratory distress. She has no wheezes. She has no rales. She exhibits no tenderness.   Abdominal: Soft. She exhibits no distension. There is no hepatosplenomegaly.   Musculoskeletal: She exhibits no edema or tenderness.   Feet:   Right Foot:   Skin Integrity: Negative for ulcer.   Left Foot:   Skin Integrity: Negative for ulcer.   Neurological: She is alert and oriented to person, place, and time. No cranial nerve deficit.   Skin: Skin is warm and dry. No rash noted. No erythema.   Psychiatric: She has a normal mood and affect. Her speech is normal and behavior is normal.   Vitals  reviewed.      DATA:   EKG: (personally reviewed tracing)  4/15/19 AF 79, occ paced beat, LVH/LAD/repol, similar to 8/31/18    Laboratory:  CBC:  Recent Labs   Lab 03/14/17  0735 03/08/18  0736 03/22/19  0704   WHITE BLOOD CELL COUNT 6.84 6.49 6.76   HEMOGLOBIN 12.2 12.5 12.7   HEMATOCRIT 38.6 38.6 39.8   PLATELETS 91 L 82 L 87 L       CHEMISTRIES:  Recent Labs   Lab 03/08/18  0736 09/13/18  0738 03/22/19  0704   GLUCOSE 152 H 102 102   SODIUM 142 138 136   POTASSIUM 4.5 4.8 4.2   BUN BLD 13 14 17   CREATININE 0.9 0.8 0.8   EGFR IF AFR[place] AMERICAN >60.0 >60.0 >60.0   EGFR IF NON- >60.0 >60.0 >60.0   CALCIUM 10.0 10.1 10.2       CARDIAC BIOMARKERS:        COAGS:  Recent Labs   Lab 03/12/19 03/19/19 04/02/19   INR 3.2 3.0 3.0       LIPIDS/LFTS:  Recent Labs   Lab 08/15/17  0745 03/08/18  0736 03/22/19  0704   CHOLESTEROL 127 140 143   TRIGLYCERIDES 229 H 146 96   HDL 30 L 38 L 51   LDL CHOLESTEROL 51.2 L 72.8 72.8   NON-HDL CHOLESTEROL 97 102 92   AST 32 41 H 33   ALT 21 30 17     Lab Results   Component Value Date    TSH 5.081 (H) 03/22/2019     Free T4 0.99 (3/8/18)    Cardiovascular Testing:  Echo 5/9/18    1 - Low normal to mildly depressed left ventricular systolic function (EF 50-55%).     2 - Concentric hypertrophy.     3 - Biatrial enlargement.     4 - Pulmonary hypertension. The estimated PA systolic pressure is 52 mmHg.     5 - Aortic valve mechanical prosthesis, YULIYA = 0.7 cm2, AVAi = 0.49 cm2/m2, peak velocity = 3.67 m/s, mean gradient = 33 mmHg.     6 - Mild aortic regurgitation.     7 - Mitral valve mechanical prosthesis adequately functioning.     8 - Mild mitral regurgitation.     9 - Trivial to mild tricuspid regurgitation.     10 - Trivial pulmonic regurgitation.     L MPI 2/20/15  Nuclear Quantitative Functional Analysis:   LVEF: 57 %  Impression: EQUIVOCAL MYOCARDIAL PERFUSION  1. There is a mild to moderate mostly fixed lateral wall defect of uncertain significance.   2. The  perfusion scan is free of evidence for myocardial ischemia.   3. There is abnormal wall motion at rest showing mild hypokinesis of the anteroseptal wall of the left ventricle.   4. Resting LV function is normal.   5. The ventricular volumes are normal at rest and stress.   6. The extracardiac distribution of radioactivity is normal.   7. There was no previous study available to compare.    [facility]:   [facility]  # prosthetic AS, echo 5/2018 suggests mod prosthetic AVR stenosis, asymptomatic  # HTN, controlled  # Chr AF, on coumadin (goal INR 2.5-3.5 given [facility] valves)  # HLP on prava 40mg  # DM  # thrombocytopenia  # Medtronic PPM    PLAN:   Cont med rx  RTC 1 month with Medtronic PPM check and echo (May 2019)  The patient tells me she plans to have cataract surgery in the near future.  If she needs to be withdrawn from Coumadin, I suggest bridging anticoagulation with Lovenox.  Please contact the Coumadin clinic for further instructions.      Ludwig Grigsby MD, FACC

## 2019-04-15 NOTE — PROGRESS NOTES
A staff message was sent from Dr Grigsby in regards to this patietnt is having cataract surgery.  He wants us to contact her opthamologist (Dr. Richmond Andrea) and arrange for a lovenox bridge if he plans to withdraw her from coumadin for the procedure.    I sent Dr Andrea a message in Epic to see if he requires any hold of Coumadin as we generally do not require it.

## 2019-04-17 ENCOUNTER — ANTI-COAG VISIT (OUTPATIENT)
Dept: CARDIOLOGY | Facility: CLINIC | Age: 74
End: 2019-04-17
Payer: MEDICARE

## 2019-04-17 DIAGNOSIS — Z95.2 S/P MVR (MITRAL VALVE REPLACEMENT): ICD-10-CM

## 2019-04-17 DIAGNOSIS — Z95.2 S/P AVR: ICD-10-CM

## 2019-04-17 DIAGNOSIS — I48.20 CHRONIC ATRIAL FIBRILLATION: ICD-10-CM

## 2019-04-17 DIAGNOSIS — Z79.01 LONG TERM CURRENT USE OF ANTICOAGULANT: ICD-10-CM

## 2019-04-17 LAB — INR PPP: 4.2

## 2019-04-17 PROCEDURE — 93793 ANTICOAG MGMT PT WARFARIN: CPT | Mod: S$GLB,,, | Performed by: PHARMACIST

## 2019-04-17 PROCEDURE — 93793 PR ANTICOAGULANT MGMT FOR PT TAKING WARFARIN: ICD-10-PCS | Mod: S$GLB,,, | Performed by: PHARMACIST

## 2019-04-22 ENCOUNTER — HOSPITAL ENCOUNTER (OUTPATIENT)
Dept: CARDIOLOGY | Facility: HOSPITAL | Age: 74
Discharge: HOME | End: 2019-04-22
Attending: INTERNAL MEDICINE
Payer: MEDICARE

## 2019-04-22 VITALS — BODY MASS INDEX: 19.84 KG/M2 | WEIGHT: 112 LBS | HEIGHT: 63 IN

## 2019-04-22 DIAGNOSIS — I09.9 RHEUMATIC HEART DISEASE: ICD-10-CM

## 2019-04-22 DIAGNOSIS — I48.20 CHRONIC ATRIAL FIBRILLATION: ICD-10-CM

## 2019-04-22 DIAGNOSIS — Z95.2 S/P AVR: ICD-10-CM

## 2019-04-22 DIAGNOSIS — Z95.2 S/P MVR (MITRAL VALVE REPLACEMENT): ICD-10-CM

## 2019-04-22 LAB
ASCENDING AORTA: 3.07 CM
AV INDEX (PROSTH): 0.23
AV MEAN GRADIENT: 33.66 MMHG
AV PEAK GRADIENT: 62.41 MMHG
AV VALVE AREA: 0.89 CM2
AV VELOCITY RATIO: 0.21
BSA FOR ECHO PROCEDURE: 1.5 M2
CV ECHO LV RWT: 0.5 CM
DOP CALC AO PEAK VEL: 3.95 M/S
DOP CALC AO VTI: 83.57 CM
DOP CALC LVOT AREA: 3.8 CM2
DOP CALC LVOT DIAMETER: 2.2 CM
DOP CALC LVOT PEAK VEL: 0.81 M/S
DOP CALC LVOT STROKE VOLUME: 74.16 CM3
DOP CALCLVOT PEAK VEL VTI: 19.52 CM
ECHO LV POSTERIOR WALL: 1.15 CM (ref 0.6–1.1)
FRACTIONAL SHORTENING: 37 % (ref 28–44)
INTERVENTRICULAR SEPTUM: 1.22 CM (ref 0.6–1.1)
IVRT: 0.07 MSEC
LA MAJOR: 3.68 CM
LEFT ATRIUM SIZE: 5.87 CM
LEFT INTERNAL DIMENSION IN SYSTOLE: 2.87 CM (ref 2.1–4)
LEFT VENTRICLE DIASTOLIC VOLUME INDEX: 63.89 ML/M2
LEFT VENTRICLE DIASTOLIC VOLUME: 96.56 ML
LEFT VENTRICLE MASS INDEX: 132.3 G/M2
LEFT VENTRICLE SYSTOLIC VOLUME INDEX: 20.8 ML/M2
LEFT VENTRICLE SYSTOLIC VOLUME: 31.5 ML
LEFT VENTRICULAR INTERNAL DIMENSION IN DIASTOLE: 4.58 CM (ref 3.5–6)
LEFT VENTRICULAR MASS: 199.97 G
MV PEAK E VEL: 1.97 M/S
PISA TR MAX VEL: 3.59 M/S
PV PEAK VELOCITY: 0.87 CM/S
RA PRESSURE: 3 MMHG
RV TISSUE DOPPLER FREE WALL SYSTOLIC VELOCITY 1 (APICAL 4 CHAMBER VIEW): 4.81 M/S
STJ: 3.34 CM
TR MAX PG: 51.55 MMHG
TRICUSPID ANNULAR PLANE SYSTOLIC EXCURSION: 1.05 CM
TV REST PULMONARY ARTERY PRESSURE: 55 MMHG

## 2019-04-22 PROCEDURE — 93306 TTE W/DOPPLER COMPLETE: CPT | Mod: 26,HCNC,, | Performed by: INTERNAL MEDICINE

## 2019-04-22 PROCEDURE — 93306 TRANSTHORACIC ECHO (TTE) COMPLETE (CUPID ONLY): ICD-10-PCS | Mod: 26,HCNC,, | Performed by: INTERNAL MEDICINE

## 2019-04-22 PROCEDURE — 93306 TTE W/DOPPLER COMPLETE: CPT | Mod: HCNC

## 2019-04-25 ENCOUNTER — ANTI-COAG VISIT (OUTPATIENT)
Dept: CARDIOLOGY | Facility: CLINIC | Age: 74
End: 2019-04-25
Payer: MEDICARE

## 2019-04-25 DIAGNOSIS — Z79.01 LONG TERM CURRENT USE OF ANTICOAGULANT: ICD-10-CM

## 2019-04-25 DIAGNOSIS — Z95.2 S/P MVR (MITRAL VALVE REPLACEMENT): ICD-10-CM

## 2019-04-25 DIAGNOSIS — I48.20 CHRONIC ATRIAL FIBRILLATION: ICD-10-CM

## 2019-04-25 DIAGNOSIS — Z95.2 S/P AVR: ICD-10-CM

## 2019-04-25 LAB — INR PPP: 3.4

## 2019-04-25 PROCEDURE — 93793 PR ANTICOAGULANT MGMT FOR PT TAKING WARFARIN: ICD-10-PCS | Mod: S$GLB,,, | Performed by: PHARMACIST

## 2019-04-25 PROCEDURE — 93793 ANTICOAG MGMT PT WARFARIN: CPT | Mod: S$GLB,,, | Performed by: PHARMACIST

## 2019-05-08 NOTE — PROGRESS NOTES
Called and left message to call coumadin clinic, Need to check if INR was tested recently -it was due 5/01/19

## 2019-05-10 ENCOUNTER — ANTI-COAG VISIT (OUTPATIENT)
Dept: CARDIOLOGY | Facility: CLINIC | Age: 74
End: 2019-05-10
Payer: MEDICARE

## 2019-05-10 DIAGNOSIS — Z95.2 S/P AVR: ICD-10-CM

## 2019-05-10 DIAGNOSIS — Z95.2 S/P MVR (MITRAL VALVE REPLACEMENT): ICD-10-CM

## 2019-05-10 DIAGNOSIS — Z79.01 LONG TERM CURRENT USE OF ANTICOAGULANT: ICD-10-CM

## 2019-05-10 DIAGNOSIS — I48.20 CHRONIC ATRIAL FIBRILLATION: ICD-10-CM

## 2019-05-10 LAB — INR PPP: 3

## 2019-05-10 PROCEDURE — 93793 PR ANTICOAGULANT MGMT FOR PT TAKING WARFARIN: ICD-10-PCS | Mod: S$GLB,,, | Performed by: PHARMACIST

## 2019-05-10 PROCEDURE — 93793 ANTICOAG MGMT PT WARFARIN: CPT | Mod: S$GLB,,, | Performed by: PHARMACIST

## 2019-05-10 NOTE — PROGRESS NOTES
INR at goal. Medications and chart reviewed. No changes noted to necessitate adjustment of warfarin or follow-up plan. See calendar.

## 2019-05-23 NOTE — PROGRESS NOTES
Called Patient and spoke with her , needed to check if INR was tested -5/16/19,  stated no but will test tomorrow--unable to test it today, advised  to please test early am tomorrow -5/24 and call result in the am

## 2019-05-28 ENCOUNTER — ANTI-COAG VISIT (OUTPATIENT)
Dept: CARDIOLOGY | Facility: CLINIC | Age: 74
End: 2019-05-28
Payer: MEDICARE

## 2019-05-28 DIAGNOSIS — I48.20 CHRONIC ATRIAL FIBRILLATION: ICD-10-CM

## 2019-05-28 DIAGNOSIS — Z95.2 S/P AVR: ICD-10-CM

## 2019-05-28 DIAGNOSIS — Z95.2 S/P MVR (MITRAL VALVE REPLACEMENT): ICD-10-CM

## 2019-05-28 DIAGNOSIS — Z79.01 LONG TERM CURRENT USE OF ANTICOAGULANT: ICD-10-CM

## 2019-05-28 LAB — INR PPP: 3.5

## 2019-05-28 PROCEDURE — 93793 ANTICOAG MGMT PT WARFARIN: CPT | Mod: S$GLB,,, | Performed by: PHARMACIST

## 2019-05-28 PROCEDURE — 93793 PR ANTICOAGULANT MGMT FOR PT TAKING WARFARIN: ICD-10-PCS | Mod: S$GLB,,, | Performed by: PHARMACIST

## 2019-06-04 ENCOUNTER — ANTI-COAG VISIT (OUTPATIENT)
Dept: CARDIOLOGY | Facility: CLINIC | Age: 74
End: 2019-06-04
Payer: MEDICARE

## 2019-06-04 DIAGNOSIS — Z95.2 S/P MVR (MITRAL VALVE REPLACEMENT): ICD-10-CM

## 2019-06-04 DIAGNOSIS — Z79.01 LONG TERM CURRENT USE OF ANTICOAGULANT: ICD-10-CM

## 2019-06-04 DIAGNOSIS — Z95.2 S/P AVR: ICD-10-CM

## 2019-06-04 DIAGNOSIS — I48.20 CHRONIC ATRIAL FIBRILLATION: ICD-10-CM

## 2019-06-04 LAB — INR PPP: 3.4

## 2019-06-04 PROCEDURE — 93793 PR ANTICOAGULANT MGMT FOR PT TAKING WARFARIN: ICD-10-PCS | Mod: S$GLB,,, | Performed by: PHARMACIST

## 2019-06-04 PROCEDURE — 93793 ANTICOAG MGMT PT WARFARIN: CPT | Mod: S$GLB,,, | Performed by: PHARMACIST

## 2019-06-12 NOTE — PROGRESS NOTES
Called Patient and spoke with her  to check if INR was tested -6/11/19, he stated no but will test today -6/12/19

## 2019-06-17 ENCOUNTER — HOSPITAL ENCOUNTER (OUTPATIENT)
Dept: RADIOLOGY | Facility: CLINIC | Age: 74
Discharge: HOME | End: 2019-06-17
Attending: INTERNAL MEDICINE
Payer: MEDICARE

## 2019-06-17 DIAGNOSIS — M89.9 DISORDER OF BONE AND CARTILAGE: ICD-10-CM

## 2019-06-17 DIAGNOSIS — M94.9 DISORDER OF BONE AND CARTILAGE: ICD-10-CM

## 2019-06-17 PROCEDURE — 77080 DEXA BONE DENSITY SPINE HIP: ICD-10-PCS | Mod: 26,HCNC,, | Performed by: INTERNAL MEDICINE

## 2019-06-17 PROCEDURE — 77080 DXA BONE DENSITY AXIAL: CPT | Mod: 26,HCNC,, | Performed by: INTERNAL MEDICINE

## 2019-06-17 PROCEDURE — 77080 DXA BONE DENSITY AXIAL: CPT | Mod: TC,HCNC,PO

## 2019-06-18 ENCOUNTER — ANTI-COAG VISIT (OUTPATIENT)
Dept: CARDIOLOGY | Facility: CLINIC | Age: 74
End: 2019-06-18
Payer: MEDICARE

## 2019-06-18 ENCOUNTER — OFFICE VISIT (OUTPATIENT)
Dept: FAMILY MEDICINE | Facility: CLINIC | Age: 74
End: 2019-06-18
Payer: MEDICARE

## 2019-06-18 VITALS
HEART RATE: 78 BPM | SYSTOLIC BLOOD PRESSURE: 127 MMHG | WEIGHT: 111.69 LBS | HEIGHT: 62 IN | BODY MASS INDEX: 20.55 KG/M2 | OXYGEN SATURATION: 97 % | TEMPERATURE: 99 F | DIASTOLIC BLOOD PRESSURE: 85 MMHG

## 2019-06-18 DIAGNOSIS — I70.0 ATHEROSCLEROSIS OF AORTA: Chronic | ICD-10-CM

## 2019-06-18 DIAGNOSIS — E11.3299 TYPE 2 DIABETES MELLITUS WITH MILD NONPROLIFERATIVE RETINOPATHY WITHOUT MACULAR EDEMA, WITHOUT LONG-TERM CURRENT USE OF INSULIN, UNSPECIFIED LATERALITY: Chronic | ICD-10-CM

## 2019-06-18 DIAGNOSIS — I48.20 CHRONIC ATRIAL FIBRILLATION: ICD-10-CM

## 2019-06-18 DIAGNOSIS — D69.6 THROMBOCYTOPENIA: Chronic | ICD-10-CM

## 2019-06-18 DIAGNOSIS — Z95.2 S/P MVR (MITRAL VALVE REPLACEMENT): ICD-10-CM

## 2019-06-18 DIAGNOSIS — E11.29 TYPE 2 DIABETES MELLITUS WITH MICROALBUMINURIA, WITHOUT LONG-TERM CURRENT USE OF INSULIN: Chronic | ICD-10-CM

## 2019-06-18 DIAGNOSIS — Z79.01 LONG TERM CURRENT USE OF ANTICOAGULANT: ICD-10-CM

## 2019-06-18 DIAGNOSIS — Z95.2 S/P AVR: ICD-10-CM

## 2019-06-18 DIAGNOSIS — H60.321 ACUTE HEMORRHAGIC OTITIS EXTERNA OF RIGHT EAR: Primary | ICD-10-CM

## 2019-06-18 DIAGNOSIS — I48.20 CHRONIC ATRIAL FIBRILLATION: Chronic | ICD-10-CM

## 2019-06-18 DIAGNOSIS — Z79.01 LONG TERM CURRENT USE OF ANTICOAGULANT: Chronic | ICD-10-CM

## 2019-06-18 DIAGNOSIS — R80.9 TYPE 2 DIABETES MELLITUS WITH MICROALBUMINURIA, WITHOUT LONG-TERM CURRENT USE OF INSULIN: Chronic | ICD-10-CM

## 2019-06-18 LAB — INR PPP: 3.8

## 2019-06-18 PROCEDURE — 3079F DIAST BP 80-89 MM HG: CPT | Mod: HCNC,CPTII,S$GLB, | Performed by: NURSE PRACTITIONER

## 2019-06-18 PROCEDURE — 3074F PR MOST RECENT SYSTOLIC BLOOD PRESSURE < 130 MM HG: ICD-10-PCS | Mod: HCNC,CPTII,S$GLB, | Performed by: NURSE PRACTITIONER

## 2019-06-18 PROCEDURE — 1101F PT FALLS ASSESS-DOCD LE1/YR: CPT | Mod: HCNC,CPTII,S$GLB, | Performed by: NURSE PRACTITIONER

## 2019-06-18 PROCEDURE — 99213 PR OFFICE/OUTPT VISIT, EST, LEVL III, 20-29 MIN: ICD-10-PCS | Mod: HCNC,S$GLB,, | Performed by: NURSE PRACTITIONER

## 2019-06-18 PROCEDURE — 1101F PR PT FALLS ASSESS DOC 0-1 FALLS W/OUT INJ PAST YR: ICD-10-PCS | Mod: HCNC,CPTII,S$GLB, | Performed by: NURSE PRACTITIONER

## 2019-06-18 PROCEDURE — 3074F SYST BP LT 130 MM HG: CPT | Mod: HCNC,CPTII,S$GLB, | Performed by: NURSE PRACTITIONER

## 2019-06-18 PROCEDURE — 3044F PR MOST RECENT HEMOGLOBIN A1C LEVEL <7.0%: ICD-10-PCS | Mod: HCNC,CPTII,S$GLB, | Performed by: NURSE PRACTITIONER

## 2019-06-18 PROCEDURE — 3044F HG A1C LEVEL LT 7.0%: CPT | Mod: HCNC,CPTII,S$GLB, | Performed by: NURSE PRACTITIONER

## 2019-06-18 PROCEDURE — 99999 PR PBB SHADOW E&M-EST. PATIENT-LVL IV: ICD-10-PCS | Mod: PBBFAC,HCNC,, | Performed by: NURSE PRACTITIONER

## 2019-06-18 PROCEDURE — 93793 ANTICOAG MGMT PT WARFARIN: CPT | Mod: S$GLB,,, | Performed by: PHARMACIST

## 2019-06-18 PROCEDURE — 99213 OFFICE O/P EST LOW 20 MIN: CPT | Mod: HCNC,S$GLB,, | Performed by: NURSE PRACTITIONER

## 2019-06-18 PROCEDURE — 3079F PR MOST RECENT DIASTOLIC BLOOD PRESSURE 80-89 MM HG: ICD-10-PCS | Mod: HCNC,CPTII,S$GLB, | Performed by: NURSE PRACTITIONER

## 2019-06-18 PROCEDURE — 99999 PR PBB SHADOW E&M-EST. PATIENT-LVL IV: CPT | Mod: PBBFAC,HCNC,, | Performed by: NURSE PRACTITIONER

## 2019-06-18 PROCEDURE — 93793 PR ANTICOAGULANT MGMT FOR PT TAKING WARFARIN: ICD-10-PCS | Mod: S$GLB,,, | Performed by: PHARMACIST

## 2019-06-18 RX ORDER — NEOMYCIN SULFATE, POLYMYXIN B SULFATE AND HYDROCORTISONE 10; 3.5; 1 MG/ML; MG/ML; [USP'U]/ML
3 SUSPENSION/ DROPS AURICULAR (OTIC) 3 TIMES DAILY
Qty: 10 ML | Refills: 0 | Status: SHIPPED | OUTPATIENT
Start: 2019-06-18 | End: 2019-06-28

## 2019-06-18 NOTE — PROGRESS NOTES
" confirmed pts taking incorrect dose of coumadin w/ 4mg MON and SAT; 2mg ALL others   Reports eating less greens and only had one boost this week; spotted blood in pts R ear; pt has an appt today @ 11 for this (per note, scratching the external canal and using Q-tips in the ear as well. She denies ear pain or decreased hearing; prescribed neomycin-polymyxin-hydrocortisone (CORTISPORIN) 3.5-10,000-1 mg/mL-unit/mL-% otic suspension"  NO other changes     "

## 2019-06-18 NOTE — PROGRESS NOTES
History of Present Illness   Orion Ty is a 73 y.o. woman with medical history as listed below who presents today for evaluation of right ear bleeding x3 days. Her  has noticed some dried blood on her pillow in the morning. She reports that she has had some itching and has been scratching the external canal and using Q-tips in the ear as well. She denies ear pain or decreased hearing. She has tried ear wax drops and alcohol in the ear with no relief. She and the  are concerned because she is taking Coumadin. She has no additional complaints and is otherwise healthy on today's visit.    Past Medical History:   Diagnosis Date    Closed displaced fracture of distal phalanx of lesser toe 10/20/2015    Diabetes mellitus     Diabetes mellitus, type 2     Hypertension     Osteopenia     Pacemaker     Rheumatic heart disease        Past Surgical History:   Procedure Laterality Date    CARDIAC PACEMAKER PLACEMENT      CARDIAC VALVE REPLACEMENT      CARDIAC VALVE SURGERY      thryoid s         Social History     Socioeconomic History    Marital status:      Spouse name: Not on file    Number of children: Not on file    Years of education: Not on file    Highest education level: Not on file   Occupational History    Not on file   Social Needs    Financial resource strain: Not on file    Food insecurity:     Worry: Not on file     Inability: Not on file    Transportation needs:     Medical: Not on file     Non-medical: Not on file   Tobacco Use    Smoking status: Never Smoker    Smokeless tobacco: Never Used   Substance and Sexual Activity    Alcohol use: No    Drug use: Not on file    Sexual activity: Not on file   Lifestyle    Physical activity:     Days per week: Not on file     Minutes per session: Not on file    Stress: Not on file   Relationships    Social connections:     Talks on phone: Not on file     Gets together: Not on file     Attends Congregational service: Not on  "file     Active member of club or organization: Not on file     Attends meetings of clubs or organizations: Not on file     Relationship status: Not on file   Other Topics Concern    Not on file   Social History Narrative    Not on file       Family History   Problem Relation Age of Onset    Breast cancer Neg Hx     Colon cancer Neg Hx     Ovarian cancer Neg Hx        Review of Systems  Review of Systems   Constitutional: Negative for fever.   HENT: Positive for ear discharge (bloody). Negative for ear pain, hearing loss and tinnitus.      A complete review of systems was otherwise negative.    Physical Exam  /85   Pulse 78   Temp 98.7 °F (37.1 °C) (Oral)   Ht 5' 2" (1.575 m)   Wt 50.7 kg (111 lb 10.6 oz)   SpO2 97%   BMI 20.42 kg/m²   General appearance: alert, appears stated age, cooperative and no distress  Ears: normal TM and external ear canal left ear, abnormal external canal right ear - edematous and erythematous and dried blood noted to external canal with normal TM right side  Nose: Nares normal. Septum midline. Mucosa normal. No drainage or sinus tenderness.  Throat: lips, mucosa, and tongue normal; teeth and gums normal  Neurologic: Grossly normal    Assessment/Plan  Acute hemorrhagic otitis externa of right ear  Treatment as listed below.  Avoid scratching in the ER and do not use Q-tip.  ER precautions discussed, no red flags on exam.  RTC PRN.  -     neomycin-polymyxin-hydrocortisone (CORTISPORIN) 3.5-10,000-1 mg/mL-unit/mL-% otic suspension; Place 3 drops into the right ear 3 (three) times daily. for 10 days  Dispense: 10 mL; Refill: 0    Long term current use of anticoagulant  The current medical regimen is effective;  continue present plan and medications.  Followed by Coumadin clinic.    Chronic atrial fibrillation  The current medical regimen is effective;  continue present plan and medications.    Atherosclerosis of aorta  Stable.  Statin therapy.    Type 2 diabetes mellitus with " microalbuminuria, without long-term current use of insulin  The current medical regimen is effective;  continue present plan and medications.    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    Thrombocytopenia  The current medical regimen is effective;  continue present plan and medications.    Patient has verbalized understanding and is in agreement with plan of care.    Follow up if symptoms worsen or fail to improve.

## 2019-06-19 NOTE — PROGRESS NOTES
"Your bone desity results are showing osteopenia or "pre-osteoporosis."  At this time, I would recommend that you be sure to take 800 units of Vit D a day.  Please continue your current medications and doses.  Please feel free to contact me with any questions or concerns.    Sincerely,  Otis Zimmer  [email or web address]  "

## 2019-06-25 ENCOUNTER — ANTI-COAG VISIT (OUTPATIENT)
Dept: CARDIOLOGY | Facility: CLINIC | Age: 74
End: 2019-06-25
Payer: MEDICARE

## 2019-06-25 DIAGNOSIS — Z95.2 S/P AVR: ICD-10-CM

## 2019-06-25 DIAGNOSIS — I48.20 CHRONIC ATRIAL FIBRILLATION: ICD-10-CM

## 2019-06-25 DIAGNOSIS — Z79.01 LONG TERM CURRENT USE OF ANTICOAGULANT: ICD-10-CM

## 2019-06-25 DIAGNOSIS — Z95.2 S/P MVR (MITRAL VALVE REPLACEMENT): ICD-10-CM

## 2019-06-25 LAB — INR PPP: 2.5

## 2019-06-25 PROCEDURE — 93793 PR ANTICOAGULANT MGMT FOR PT TAKING WARFARIN: ICD-10-PCS | Mod: S$GLB,,, | Performed by: PHARMACIST

## 2019-06-25 PROCEDURE — 93793 ANTICOAG MGMT PT WARFARIN: CPT | Mod: S$GLB,,, | Performed by: PHARMACIST

## 2019-07-06 DIAGNOSIS — I48.20 CHRONIC ATRIAL FIBRILLATION: Chronic | ICD-10-CM

## 2019-07-08 RX ORDER — METOPROLOL TARTRATE 25 MG/1
TABLET, FILM COATED ORAL
Qty: 180 TABLET | Refills: 0 | Status: SHIPPED | OUTPATIENT
Start: 2019-07-08 | End: 2019-12-17 | Stop reason: SDUPTHER

## 2019-07-16 ENCOUNTER — ANTI-COAG VISIT (OUTPATIENT)
Dept: CARDIOLOGY | Facility: CLINIC | Age: 74
End: 2019-07-16
Payer: MEDICARE

## 2019-07-16 DIAGNOSIS — Z95.2 S/P MVR (MITRAL VALVE REPLACEMENT): ICD-10-CM

## 2019-07-16 DIAGNOSIS — Z95.2 S/P AVR: ICD-10-CM

## 2019-07-16 DIAGNOSIS — Z79.01 LONG TERM CURRENT USE OF ANTICOAGULANT: ICD-10-CM

## 2019-07-16 DIAGNOSIS — I48.20 CHRONIC ATRIAL FIBRILLATION: ICD-10-CM

## 2019-07-16 LAB — INR PPP: 3.2

## 2019-07-16 PROCEDURE — 93793 ANTICOAG MGMT PT WARFARIN: CPT | Mod: S$GLB,,, | Performed by: PHARMACIST

## 2019-07-16 PROCEDURE — 93793 PR ANTICOAGULANT MGMT FOR PT TAKING WARFARIN: ICD-10-PCS | Mod: S$GLB,,, | Performed by: PHARMACIST

## 2019-07-17 NOTE — PROGRESS NOTES
was given lab result, verified correct dose of coumadin,  reports no changes,  was advised to continue same dose of coumadin and given next re test date,  verbalized understanding

## 2019-07-24 LAB
LEFT EYE DM RETINOPATHY: POSITIVE
RIGHT EYE DM RETINOPATHY: POSITIVE

## 2019-07-31 NOTE — PROGRESS NOTES
Called and spoke with  to check if INR was tested recently, it was due 7/23/19, He reports it was not done but will test tomorrow -8/01/19

## 2019-08-06 ENCOUNTER — ANTI-COAG VISIT (OUTPATIENT)
Dept: CARDIOLOGY | Facility: CLINIC | Age: 74
End: 2019-08-06
Payer: MEDICARE

## 2019-08-06 DIAGNOSIS — Z95.2 S/P MVR (MITRAL VALVE REPLACEMENT): ICD-10-CM

## 2019-08-06 DIAGNOSIS — I48.20 CHRONIC ATRIAL FIBRILLATION: ICD-10-CM

## 2019-08-06 DIAGNOSIS — Z95.2 S/P AVR: ICD-10-CM

## 2019-08-06 DIAGNOSIS — Z79.01 LONG TERM CURRENT USE OF ANTICOAGULANT: ICD-10-CM

## 2019-08-06 LAB — INR PPP: 3

## 2019-08-06 PROCEDURE — 93793 PR ANTICOAGULANT MGMT FOR PT TAKING WARFARIN: ICD-10-PCS | Mod: S$GLB,,, | Performed by: PHARMACIST

## 2019-08-06 PROCEDURE — 93793 ANTICOAG MGMT PT WARFARIN: CPT | Mod: S$GLB,,, | Performed by: PHARMACIST

## 2019-08-20 ENCOUNTER — ANTI-COAG VISIT (OUTPATIENT)
Dept: CARDIOLOGY | Facility: CLINIC | Age: 74
End: 2019-08-20
Payer: MEDICARE

## 2019-08-20 DIAGNOSIS — I48.20 CHRONIC ATRIAL FIBRILLATION: ICD-10-CM

## 2019-08-20 DIAGNOSIS — Z95.2 S/P MVR (MITRAL VALVE REPLACEMENT): ICD-10-CM

## 2019-08-20 DIAGNOSIS — Z79.01 LONG TERM CURRENT USE OF ANTICOAGULANT: ICD-10-CM

## 2019-08-20 DIAGNOSIS — Z95.2 S/P AVR: ICD-10-CM

## 2019-08-20 LAB — INR PPP: 3.3

## 2019-08-20 PROCEDURE — 93793 ANTICOAG MGMT PT WARFARIN: CPT | Mod: S$GLB,,,

## 2019-08-20 PROCEDURE — 93793 PR ANTICOAGULANT MGMT FOR PT TAKING WARFARIN: ICD-10-PCS | Mod: S$GLB,,,

## 2019-08-28 ENCOUNTER — OFFICE VISIT (OUTPATIENT)
Dept: CARDIOLOGY | Facility: CLINIC | Age: 74
End: 2019-08-28
Payer: MEDICARE

## 2019-08-28 VITALS
WEIGHT: 113.13 LBS | BODY MASS INDEX: 20.82 KG/M2 | SYSTOLIC BLOOD PRESSURE: 126 MMHG | HEART RATE: 65 BPM | RESPIRATION RATE: 15 BRPM | DIASTOLIC BLOOD PRESSURE: 74 MMHG | OXYGEN SATURATION: 94 % | HEIGHT: 62 IN

## 2019-08-28 DIAGNOSIS — Z95.2 S/P AVR: ICD-10-CM

## 2019-08-28 DIAGNOSIS — R80.9 TYPE 2 DIABETES MELLITUS WITH MICROALBUMINURIA, WITHOUT LONG-TERM CURRENT USE OF INSULIN: Chronic | ICD-10-CM

## 2019-08-28 DIAGNOSIS — Z79.01 LONG TERM CURRENT USE OF ANTICOAGULANT: Chronic | ICD-10-CM

## 2019-08-28 DIAGNOSIS — I10 ESSENTIAL (PRIMARY) HYPERTENSION: Chronic | ICD-10-CM

## 2019-08-28 DIAGNOSIS — I48.20 CHRONIC ATRIAL FIBRILLATION: Primary | Chronic | ICD-10-CM

## 2019-08-28 DIAGNOSIS — Z95.2 S/P MVR (MITRAL VALVE REPLACEMENT): ICD-10-CM

## 2019-08-28 DIAGNOSIS — E11.29 TYPE 2 DIABETES MELLITUS WITH MICROALBUMINURIA, WITHOUT LONG-TERM CURRENT USE OF INSULIN: Chronic | ICD-10-CM

## 2019-08-28 DIAGNOSIS — E78.2 MIXED HYPERLIPIDEMIA: Chronic | ICD-10-CM

## 2019-08-28 DIAGNOSIS — Z95.0 CARDIAC PACEMAKER IN SITU: ICD-10-CM

## 2019-08-28 PROCEDURE — 99214 OFFICE O/P EST MOD 30 MIN: CPT | Mod: 25,HCNC,S$GLB, | Performed by: INTERNAL MEDICINE

## 2019-08-28 PROCEDURE — 3044F PR MOST RECENT HEMOGLOBIN A1C LEVEL <7.0%: ICD-10-PCS | Mod: HCNC,CPTII,S$GLB, | Performed by: INTERNAL MEDICINE

## 2019-08-28 PROCEDURE — 3078F PR MOST RECENT DIASTOLIC BLOOD PRESSURE < 80 MM HG: ICD-10-PCS | Mod: HCNC,CPTII,S$GLB, | Performed by: INTERNAL MEDICINE

## 2019-08-28 PROCEDURE — 1101F PR PT FALLS ASSESS DOC 0-1 FALLS W/OUT INJ PAST YR: ICD-10-PCS | Mod: HCNC,CPTII,S$GLB, | Performed by: INTERNAL MEDICINE

## 2019-08-28 PROCEDURE — 3074F SYST BP LT 130 MM HG: CPT | Mod: HCNC,CPTII,S$GLB, | Performed by: INTERNAL MEDICINE

## 2019-08-28 PROCEDURE — 93288 PR INTERROG EVAL, IN PERSON,PACEMAKER: ICD-10-PCS | Mod: 26,HCNC,, | Performed by: INTERNAL MEDICINE

## 2019-08-28 PROCEDURE — 3078F DIAST BP <80 MM HG: CPT | Mod: HCNC,CPTII,S$GLB, | Performed by: INTERNAL MEDICINE

## 2019-08-28 PROCEDURE — 3074F PR MOST RECENT SYSTOLIC BLOOD PRESSURE < 130 MM HG: ICD-10-PCS | Mod: HCNC,CPTII,S$GLB, | Performed by: INTERNAL MEDICINE

## 2019-08-28 PROCEDURE — 93288 INTERROG EVL PM/LDLS PM IP: CPT | Mod: 26,HCNC,, | Performed by: INTERNAL MEDICINE

## 2019-08-28 PROCEDURE — 99999 PR PBB SHADOW E&M-EST. PATIENT-LVL III: CPT | Mod: PBBFAC,HCNC,, | Performed by: INTERNAL MEDICINE

## 2019-08-28 PROCEDURE — 1101F PT FALLS ASSESS-DOCD LE1/YR: CPT | Mod: HCNC,CPTII,S$GLB, | Performed by: INTERNAL MEDICINE

## 2019-08-28 PROCEDURE — 3044F HG A1C LEVEL LT 7.0%: CPT | Mod: HCNC,CPTII,S$GLB, | Performed by: INTERNAL MEDICINE

## 2019-08-28 PROCEDURE — 99999 PR PBB SHADOW E&M-EST. PATIENT-LVL III: ICD-10-PCS | Mod: PBBFAC,HCNC,, | Performed by: INTERNAL MEDICINE

## 2019-08-28 PROCEDURE — 99499 UNLISTED E&M SERVICE: CPT | Mod: HCNC,S$GLB,, | Performed by: INTERNAL MEDICINE

## 2019-08-28 PROCEDURE — 99499 RISK ADDL DX/OHS AUDIT: ICD-10-PCS | Mod: HCNC,S$GLB,, | Performed by: INTERNAL MEDICINE

## 2019-08-28 PROCEDURE — 99214 PR OFFICE/OUTPT VISIT, EST, LEVL IV, 30-39 MIN: ICD-10-PCS | Mod: 25,HCNC,S$GLB, | Performed by: INTERNAL MEDICINE

## 2019-08-28 NOTE — PROGRESS NOTES
Called  to check if INR was tested 8/27/19, he reports no it was not due to the patient being out of town, will test again 9/03/19

## 2019-08-28 NOTE — PROGRESS NOTES
CARDIOVASCULAR PROGRESS NOTE    REASON FOR CONSULT:   Orion Ty is a 74 y.o. female who presents for follow up of Chr AF, AVR/MVR.    PCP: Mesha Aquino: Andrea, Can  HISTORY OF PRESENT ILLNESS:   The patient returns for follow-up of her testing.  She denies intercurrent angina or dyspnea.  There has been no palpitations, lightheadedness, dizziness, syncope.  She denies PND, orthopnea, lower extremity edema.  There has been no melena, hematuria, claudicant symptoms.    I reviewed the results of her echocardiogram noting normal LV function with normal prosthetic mechanical aortic and mitral valve function.    The Medtronic pacemaker was interrogated in the office today.  There is normal sensing and pacing thresholds as well as impedance.  No device programming changes were made today.  Estimated longevity is 5 months.    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) monitors coumadin at home  ?mod prosthetic AS (echo 12/2017)  Chronic A-fib  Medtronic pacemaker - last generator change 2011  Ao root dil 3.7cm (echo 4/2019)    PAST MEDICAL HISTORY:     Past Medical History:   Diagnosis Date    Closed displaced fracture of distal phalanx of lesser toe 10/20/2015    Diabetes mellitus     Diabetes mellitus, type 2     Hypertension     Osteopenia     Pacemaker     Rheumatic heart disease        PAST SURGICAL HISTORY:     Past Surgical History:   Procedure Laterality Date    CARDIAC PACEMAKER PLACEMENT      CARDIAC VALVE REPLACEMENT      CARDIAC VALVE SURGERY      thryoid s         ALLERGIES AND MEDICATION:   Review of patient's allergies indicates:  No Known Allergies       Medication List           Accurate as of 8/28/19  1:38 PM. If you have any questions, ask your nurse or doctor.               CHANGE how you take these medications    TRUETEST TEST STRIPS Strp  Generic drug:  blood sugar diagnostic  USE ONCE DAILY  What changed:  See the new instructions.        CONTINUE taking  these medications    ACCU-CHEK GATO CONTROL SOLN Soln  Generic drug:  blood glucose control high,low     amLODIPine 10 MG tablet  Commonly known as:  NORVASC  Take 1 tablet (10 mg total) by mouth once daily.     B-COMPLEX WITH VITAMIN C ORAL     BANDAGES/PLASTIC TOP     blood-glucose meter Misc  Accu-chek Gato Glucose Meter, Use to test blood sugar once daily.     HEMORRHOIDAL CREAM RECT     lancets 30 gauge Misc     lancing device Misc     losartan 50 MG tablet  Commonly known as:  COZAAR  Take 1 tablet (50 mg total) by mouth once daily.     metFORMIN 500 MG tablet  Commonly known as:  GLUCOPHAGE  TAKE 2 TABLETS BY MOUTH EVERY MORNING AND 1 TABLET EVERY EVENING     * metoprolol tartrate 25 MG tablet  Commonly known as:  LOPRESSOR  Take 1 tablet (25 mg total) by mouth 2 (two) times daily.     * metoprolol tartrate 25 MG tablet  Commonly known as:  LOPRESSOR  TAKE 1 TABLET BY MOUTH TWICE DAILY     multivitamin-Ca-iron-minerals 27-0.4 mg Tab     nitroGLYCERIN 0.4 MG SL tablet  Commonly known as:  NITROSTAT  DISSOLVE 1 TABLET UNDER THE TONGUE EVERY 5 MINUTES AS NEEDED FOR CHEST PAINS     omega-3 acid ethyl esters 1 gram capsule  Commonly known as:  LOVAZA     pravastatin 20 MG tablet  Commonly known as:  PRAVACHOL  Take 1 tablet (20 mg total) by mouth once daily.     warfarin 4 MG tablet  Commonly known as:  COUMADIN  TAKE 1 TABLET BY MOUTH ON MONDAY AND FRIDAY AND 1/2 TABLET BY MOUTH ON ALL OTHER DAYS         * This list has 2 medication(s) that are the same as other medications prescribed for you. Read the directions carefully, and ask your doctor or other care provider to review them with you.                  SOCIAL HISTORY:     Social History     Socioeconomic History    Marital status:      Spouse name: Not on file    Number of children: Not on file    Years of education: Not on file    Highest education level: Not on file   Occupational History    Not on file   Social Needs    Financial resource  strain: Not on file    Food insecurity:     Worry: Not on file     Inability: Not on file    Transportation needs:     Medical: Not on file     Non-medical: Not on file   Tobacco Use    Smoking status: Never Smoker    Smokeless tobacco: Never Used   Substance and Sexual Activity    Alcohol use: No    Drug use: Not on file    Sexual activity: Not on file   Lifestyle    Physical activity:     Days per week: Not on file     Minutes per session: Not on file    Stress: Not on file   Relationships    Social connections:     Talks on phone: Not on file     Gets together: Not on file     Attends [employer] service: Not on file     Active member of club or organization: Not on file     Attends meetings of clubs or organizations: Not on file     Relationship status: Not on file   Other Topics Concern    Not on file   Social History Narrative    Not on file       FAMILY HISTORY:     Family History   Problem Relation Age of Onset    Breast cancer Neg Hx     Colon cancer Neg Hx     Ovarian cancer Neg Hx        REVIEW OF SYSTEMS:   Review of Systems   Constitutional: Negative for chills, diaphoresis and fever.   HENT: Negative for nosebleeds.    Eyes: Negative for blurred vision, double vision and photophobia.   Respiratory: Negative for hemoptysis, shortness of breath and wheezing.    Cardiovascular: Negative for chest pain, palpitations, orthopnea, claudication, leg swelling and PND.   Gastrointestinal: Negative for abdominal pain, blood in stool, heartburn, melena, nausea and vomiting.   Genitourinary: Negative for flank pain and hematuria.   Musculoskeletal: Negative for falls, myalgias and neck pain.   Skin: Negative for rash.   Neurological: Negative for dizziness, seizures, loss of consciousness, weakness and headaches.   Endo/Heme/Allergies: Negative for polydipsia. Does not bruise/bleed easily.   Psychiatric/Behavioral: Negative for depression and memory loss. The patient is not nervous/anxious.   "      PHYSICAL EXAM:     Vitals:    08/28/19 1245   BP: 126/74   Pulse: 65   Resp: 15    Body mass index is 20.69 kg/m².  Weight: 51.3 kg (113 lb 1.5 oz)   Height: 5' 2" (157.5 cm)     Physical Exam   Constitutional: She is oriented to person, place, and time. She appears well-developed and well-nourished. She is cooperative.  Non-toxic appearance. No distress.   HENT:   Head: Normocephalic and atraumatic.   Eyes: Pupils are equal, round, and reactive to light. Conjunctivae and EOM are normal. No scleral icterus.   Neck: Trachea normal and normal range of motion. Neck supple. Normal carotid pulses and no JVD present. Carotid bruit is not present. No neck rigidity. No tracheal deviation and no edema present. No thyromegaly present.   Cardiovascular: Normal rate. An irregularly irregular rhythm present. PMI is not displaced. Exam reveals no gallop and no friction rub.   Murmur heard.   Systolic murmur is present with a grade of 2/6.  Pulses:       Carotid pulses are 2+ on the right side, and 2+ on the left side.  Crisp Mech S1S2.  Systolic murmur radiates to carotids   Pulmonary/Chest: Effort normal and breath sounds normal. No stridor. No respiratory distress. She has no wheezes. She has no rales. She exhibits no tenderness.   Abdominal: Soft. She exhibits no distension. There is no hepatosplenomegaly.   Musculoskeletal: She exhibits no edema or tenderness.   Feet:   Right Foot:   Skin Integrity: Negative for ulcer.   Left Foot:   Skin Integrity: Negative for ulcer.   Neurological: She is alert and oriented to person, place, and time. No cranial nerve deficit.   Skin: Skin is warm and dry. No rash noted. No erythema.   Psychiatric: She has a normal mood and affect. Her speech is normal and behavior is normal.   Vitals reviewed.      DATA:   EKG: (personally reviewed tracing)  4/15/19 AF 79, occ paced beat, LVH/LAD/repol, similar to 8/31/18    Laboratory:  CBC:  Recent Labs   Lab 03/14/17  0735 03/08/18  0736 " 03/22/19  0704   WBC 6.84 6.49 6.76   Hemoglobin 12.2 12.5 12.7   Hematocrit 38.6 38.6 39.8   Platelets 91 L 82 L 87 L       CHEMISTRIES:  Recent Labs   Lab 03/08/18  0736 09/13/18  0738 03/22/19  0704   Glucose 152 H 102 102   Sodium 142 138 136   Potassium 4.5 4.8 4.2   BUN, Bld 13 14 17   Creatinine 0.9 0.8 0.8   eGFR if African American >60.0 >60.0 >60.0   eGFR if non African American >60.0 >60.0 >60.0   Calcium 10.0 10.1 10.2       CARDIAC BIOMARKERS:        COAGS:  Recent Labs   Lab 07/16/19 08/06/19 08/20/19   INR 3.2 3.0 3.3       LIPIDS/LFTS:  Recent Labs   Lab 08/15/17  0745 03/08/18  0736 03/22/19  0704   Cholesterol 127 140 143   Triglycerides 229 H 146 96   HDL 30 L 38 L 51   LDL Cholesterol 51.2 L 72.8 72.8   Non-HDL Cholesterol 97 102 92   AST 32 41 H 33   ALT 21 30 17     Lab Results   Component Value Date    TSH 5.081 (H) 03/22/2019     Free T4 0.99 (3/8/18)    Cardiovascular Testing:  Echo 4/22/19 (similar aortic grad/yong vs report 5/2018)  · Normal left ventricular systolic function. The estimated ejection fraction is 55%  · Concentric left ventricular hypertrophy.  · Normal LV diastolic function.  · Normal right ventricular systolic function.  · Severe left atrial enlargement.  · Severe right atrial enlargement.  · There is a mechanical aortic valve present. Prosthetic aortic valve is normal. (peak yong 3.95 m/sec, mean grad 34 mmHg)  · There is a mechanical mitral valve prosthesis. Prosthetic mitral valve is normal.  · Mild tricuspid regurgitation.  · The estimated PA systolic pressure is 55 mm Hg  · Pulmonary hypertension present.  · The aortic root is mildly dilated. 3.7 cm    L MPI 2/20/15  Nuclear Quantitative Functional Analysis:   LVEF: 57 %  Impression: EQUIVOCAL MYOCARDIAL PERFUSION  1. There is a mild to moderate mostly fixed lateral wall defect of uncertain significance.   2. The perfusion scan is free of evidence for myocardial ischemia.   3. There is abnormal wall motion at rest  showing mild hypokinesis of the anteroseptal wall of the left ventricle.   4. Resting LV function is normal.   5. The ventricular volumes are normal at rest and stress.   6. The extracardiac distribution of radioactivity is normal.   7. There was no previous study available to compare.    ASSESSMENT:   [facility] by [facility]  # prosthetic AS, echo 4/2019 suggests mod prosthetic AVR stenosis, asymptomatic, stable gradient by echo 4/2019.  # Ao root dil 3.7cm (echo 4/2019)  # HTN, controlled  # Chr AF, on coumadin (goal INR 2.5-3.5 given [facility] valves)  # HLP on prava 40mg  # DM  # thrombocytopenia  # Medtronic PPM, normally functioning    PLAN:   Cont med rx  RTC 3 month with Medtronic PPM check (Late Nov 2019)  Repeat echo 1 year (Aug 2019)      Ludwig Grigsby MD, FACC

## 2019-09-04 NOTE — PROGRESS NOTES
Called and spoke with  to check if INR was tested -9/03, he states she forgot, will test today 9/04

## 2019-09-06 ENCOUNTER — ANTI-COAG VISIT (OUTPATIENT)
Dept: CARDIOLOGY | Facility: CLINIC | Age: 74
End: 2019-09-06
Payer: MEDICARE

## 2019-09-06 DIAGNOSIS — I48.20 CHRONIC ATRIAL FIBRILLATION: ICD-10-CM

## 2019-09-06 DIAGNOSIS — Z95.2 S/P AVR: ICD-10-CM

## 2019-09-06 DIAGNOSIS — Z95.2 S/P MVR (MITRAL VALVE REPLACEMENT): ICD-10-CM

## 2019-09-06 DIAGNOSIS — Z79.01 LONG TERM CURRENT USE OF ANTICOAGULANT: ICD-10-CM

## 2019-09-06 LAB — INR PPP: 2.3

## 2019-09-06 PROCEDURE — 93793 ANTICOAG MGMT PT WARFARIN: CPT | Mod: S$GLB,,, | Performed by: PHARMACIST

## 2019-09-06 PROCEDURE — 93793 PR ANTICOAGULANT MGMT FOR PT TAKING WARFARIN: ICD-10-PCS | Mod: S$GLB,,, | Performed by: PHARMACIST

## 2019-09-06 NOTE — PROGRESS NOTES
Confirmed pts taking correct dose of coumadin  Reports eating greens 'salad' on 9/5 & also confirmed correct amount of pt boost intake   NO other changes

## 2019-09-19 ENCOUNTER — HOSPITAL ENCOUNTER (OUTPATIENT)
Dept: RADIOLOGY | Facility: HOSPITAL | Age: 74
Discharge: HOME | End: 2019-09-19
Attending: INTERNAL MEDICINE
Payer: MEDICARE

## 2019-09-19 VITALS — HEIGHT: 62 IN | BODY MASS INDEX: 20.8 KG/M2 | WEIGHT: 113 LBS

## 2019-09-19 DIAGNOSIS — Z12.31 ENCOUNTER FOR SCREENING MAMMOGRAM FOR BREAST CANCER: ICD-10-CM

## 2019-09-19 PROCEDURE — 77067 SCR MAMMO BI INCL CAD: CPT | Mod: 26,HCNC,, | Performed by: RADIOLOGY

## 2019-09-19 PROCEDURE — 77063 BREAST TOMOSYNTHESIS BI: CPT | Mod: 26,HCNC,, | Performed by: RADIOLOGY

## 2019-09-19 PROCEDURE — 77067 MAMMO DIGITAL SCREENING BILAT WITH TOMOSYNTHESIS_CAD: ICD-10-PCS | Mod: 26,HCNC,, | Performed by: RADIOLOGY

## 2019-09-19 PROCEDURE — 77063 MAMMO DIGITAL SCREENING BILAT WITH TOMOSYNTHESIS_CAD: ICD-10-PCS | Mod: 26,HCNC,, | Performed by: RADIOLOGY

## 2019-09-19 PROCEDURE — 77067 SCR MAMMO BI INCL CAD: CPT | Mod: TC,HCNC,PO

## 2019-09-23 ENCOUNTER — ANTI-COAG VISIT (OUTPATIENT)
Dept: CARDIOLOGY | Facility: CLINIC | Age: 74
End: 2019-09-23
Payer: MEDICARE

## 2019-09-23 DIAGNOSIS — Z79.01 LONG TERM CURRENT USE OF ANTICOAGULANT: ICD-10-CM

## 2019-09-23 DIAGNOSIS — Z95.2 S/P MVR (MITRAL VALVE REPLACEMENT): ICD-10-CM

## 2019-09-23 DIAGNOSIS — I48.20 CHRONIC ATRIAL FIBRILLATION: ICD-10-CM

## 2019-09-23 DIAGNOSIS — Z95.2 S/P AVR: ICD-10-CM

## 2019-09-23 LAB — INR PPP: 2.9

## 2019-09-23 PROCEDURE — 93793 ANTICOAG MGMT PT WARFARIN: CPT | Mod: S$GLB,,, | Performed by: PHARMACIST

## 2019-09-23 PROCEDURE — 93793 PR ANTICOAGULANT MGMT FOR PT TAKING WARFARIN: ICD-10-PCS | Mod: S$GLB,,, | Performed by: PHARMACIST

## 2019-10-03 NOTE — PROGRESS NOTES
Called and spoke with  to check if INR was tested -10/01, he reports she did not test, will have her test today -10/03

## 2019-10-08 ENCOUNTER — ANTI-COAG VISIT (OUTPATIENT)
Dept: CARDIOLOGY | Facility: CLINIC | Age: 74
End: 2019-10-08
Payer: MEDICARE

## 2019-10-08 DIAGNOSIS — Z79.01 LONG TERM CURRENT USE OF ANTICOAGULANT: ICD-10-CM

## 2019-10-08 DIAGNOSIS — Z95.2 S/P AVR: ICD-10-CM

## 2019-10-08 DIAGNOSIS — I48.20 CHRONIC ATRIAL FIBRILLATION: ICD-10-CM

## 2019-10-08 DIAGNOSIS — Z95.2 S/P MVR (MITRAL VALVE REPLACEMENT): ICD-10-CM

## 2019-10-08 LAB — INR PPP: 3

## 2019-10-08 PROCEDURE — 93793 PR ANTICOAGULANT MGMT FOR PT TAKING WARFARIN: ICD-10-PCS | Mod: S$GLB,,, | Performed by: PHARMACIST

## 2019-10-08 PROCEDURE — 93793 ANTICOAG MGMT PT WARFARIN: CPT | Mod: S$GLB,,, | Performed by: PHARMACIST

## 2019-10-08 NOTE — PROGRESS NOTES
INR self testing was missed 10/01, it was rescheduled to 10/03 and was missed again, Patient tested today 10/08/19

## 2019-10-16 NOTE — PROGRESS NOTES
Called Patient and left message to call coumadin clinic, need to check if INR was tested -10/15/19

## 2019-10-23 ENCOUNTER — ANTI-COAG VISIT (OUTPATIENT)
Dept: CARDIOLOGY | Facility: CLINIC | Age: 74
End: 2019-10-23
Payer: MEDICARE

## 2019-10-23 DIAGNOSIS — Z95.2 S/P MVR (MITRAL VALVE REPLACEMENT): ICD-10-CM

## 2019-10-23 DIAGNOSIS — Z95.2 S/P AVR: ICD-10-CM

## 2019-10-23 DIAGNOSIS — I48.20 CHRONIC ATRIAL FIBRILLATION: ICD-10-CM

## 2019-10-23 DIAGNOSIS — Z79.01 LONG TERM CURRENT USE OF ANTICOAGULANT: ICD-10-CM

## 2019-10-23 LAB — INR PPP: 2.6

## 2019-10-23 PROCEDURE — 93793 PR ANTICOAGULANT MGMT FOR PT TAKING WARFARIN: ICD-10-PCS | Mod: S$GLB,,, | Performed by: PHARMACIST

## 2019-10-23 PROCEDURE — 93793 ANTICOAG MGMT PT WARFARIN: CPT | Mod: S$GLB,,, | Performed by: PHARMACIST

## 2019-10-30 ENCOUNTER — PATIENT MESSAGE (OUTPATIENT)
Dept: FAMILY MEDICINE | Facility: CLINIC | Age: 74
End: 2019-10-30

## 2019-10-31 ENCOUNTER — PATIENT MESSAGE (OUTPATIENT)
Dept: FAMILY MEDICINE | Facility: CLINIC | Age: 74
End: 2019-10-31

## 2019-11-11 ENCOUNTER — OFFICE VISIT (OUTPATIENT)
Dept: FAMILY MEDICINE | Facility: CLINIC | Age: 74
End: 2019-11-11
Payer: MEDICARE

## 2019-11-11 VITALS
HEIGHT: 62 IN | OXYGEN SATURATION: 96 % | BODY MASS INDEX: 20.93 KG/M2 | SYSTOLIC BLOOD PRESSURE: 121 MMHG | WEIGHT: 113.75 LBS | HEART RATE: 65 BPM | TEMPERATURE: 98 F | DIASTOLIC BLOOD PRESSURE: 70 MMHG

## 2019-11-11 DIAGNOSIS — E03.8 SUBCLINICAL HYPOTHYROIDISM: ICD-10-CM

## 2019-11-11 DIAGNOSIS — I48.20 CHRONIC ATRIAL FIBRILLATION: ICD-10-CM

## 2019-11-11 DIAGNOSIS — Z95.0 CARDIAC PACEMAKER IN SITU: ICD-10-CM

## 2019-11-11 DIAGNOSIS — I70.0 ATHEROSCLEROSIS OF AORTA: ICD-10-CM

## 2019-11-11 DIAGNOSIS — Z79.01 LONG TERM CURRENT USE OF ANTICOAGULANT: ICD-10-CM

## 2019-11-11 DIAGNOSIS — E78.2 MIXED HYPERLIPIDEMIA: ICD-10-CM

## 2019-11-11 DIAGNOSIS — I10 ESSENTIAL (PRIMARY) HYPERTENSION: ICD-10-CM

## 2019-11-11 DIAGNOSIS — Z01.818 PREOPERATIVE EXAMINATION: Primary | ICD-10-CM

## 2019-11-11 DIAGNOSIS — H26.8 OTHER CATARACT OF RIGHT EYE: ICD-10-CM

## 2019-11-11 DIAGNOSIS — E11.3299 TYPE 2 DIABETES MELLITUS WITH MILD NONPROLIFERATIVE RETINOPATHY WITHOUT MACULAR EDEMA, WITHOUT LONG-TERM CURRENT USE OF INSULIN, UNSPECIFIED LATERALITY: ICD-10-CM

## 2019-11-11 PROCEDURE — 3044F HG A1C LEVEL LT 7.0%: CPT | Mod: HCNC,CPTII,S$GLB, | Performed by: FAMILY MEDICINE

## 2019-11-11 PROCEDURE — 99999 PR PBB SHADOW E&M-EST. PATIENT-LVL III: ICD-10-PCS | Mod: PBBFAC,HCNC,, | Performed by: FAMILY MEDICINE

## 2019-11-11 PROCEDURE — 99214 OFFICE O/P EST MOD 30 MIN: CPT | Mod: HCNC,S$GLB,, | Performed by: FAMILY MEDICINE

## 2019-11-11 PROCEDURE — 3074F PR MOST RECENT SYSTOLIC BLOOD PRESSURE < 130 MM HG: ICD-10-PCS | Mod: HCNC,CPTII,S$GLB, | Performed by: FAMILY MEDICINE

## 2019-11-11 PROCEDURE — 99999 PR PBB SHADOW E&M-EST. PATIENT-LVL III: CPT | Mod: PBBFAC,HCNC,, | Performed by: FAMILY MEDICINE

## 2019-11-11 PROCEDURE — 1101F PR PT FALLS ASSESS DOC 0-1 FALLS W/OUT INJ PAST YR: ICD-10-PCS | Mod: HCNC,CPTII,S$GLB, | Performed by: FAMILY MEDICINE

## 2019-11-11 PROCEDURE — 1101F PT FALLS ASSESS-DOCD LE1/YR: CPT | Mod: HCNC,CPTII,S$GLB, | Performed by: FAMILY MEDICINE

## 2019-11-11 PROCEDURE — 3078F DIAST BP <80 MM HG: CPT | Mod: HCNC,CPTII,S$GLB, | Performed by: FAMILY MEDICINE

## 2019-11-11 PROCEDURE — 3044F PR MOST RECENT HEMOGLOBIN A1C LEVEL <7.0%: ICD-10-PCS | Mod: HCNC,CPTII,S$GLB, | Performed by: FAMILY MEDICINE

## 2019-11-11 PROCEDURE — 99214 PR OFFICE/OUTPT VISIT, EST, LEVL IV, 30-39 MIN: ICD-10-PCS | Mod: HCNC,S$GLB,, | Performed by: FAMILY MEDICINE

## 2019-11-11 PROCEDURE — 3074F SYST BP LT 130 MM HG: CPT | Mod: HCNC,CPTII,S$GLB, | Performed by: FAMILY MEDICINE

## 2019-11-11 PROCEDURE — 3078F PR MOST RECENT DIASTOLIC BLOOD PRESSURE < 80 MM HG: ICD-10-PCS | Mod: HCNC,CPTII,S$GLB, | Performed by: FAMILY MEDICINE

## 2019-11-11 NOTE — PROGRESS NOTES
Office Visit    Patient Name: Orion Ty    : 1945  MRN: [id number]      Assessment/Plan:  Orion Ty is a 74 y.o. female who presents today for :    Preoperative examination  Other cataract of right eye  Pt medically optimized for cataract surgery  Pt is optimized for surgery and is at low risk for veronika-operative CV event.  Continue current medication regimen  -this note is printed out and given to patient to give to her surgeon          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 without macular edema, without long-term current use of insulin, unspecified laterality  Essential (primary) hypertension  Chronic atrial fibrillation  Cardiac pacemaker in situ  Long term current use of anticoagulant  Subclinical hypothyroidism  Atherosclerosis of aorta  Mixed hyperlipidemia    -stable, continue current regimen   -f/u as needed        Follow up for any evaluation as needed.     This note was created by combination of typed  and Dragon dictation.  Transcription errors may be present.  If there are any questions, please contact me.        ----------------------------------------------------------------------------------------------------------------------      HPI:  Patient Care Team:  Otis Zimmer MD as PCP - General (Internal Medicine)  Radha Canas LPN as Licensed Practical Nurse    Orion is a 74 y.o. female with      Patient Active Problem List   Diagnosis    Chronic atrial fibrillation    Cardiac pacemaker in situ    Rheumatic heart disease    Subclinical hypothyroidism    Osteopenia    Atherosclerosis of aorta    Mixed hyperlipidemia    Long term current use of anticoagulant    S/P AVR    S/P MVR (mitral valve replacement)    Type 2 diabetes mellitus with microalbuminuria, without long-term current use of insulin    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    Essential (primary) hypertension    Thrombocytopenia          Patient presents today for:  Pre-op Exam    Procedure to be performed: Right Catract removal with Dr. Richmond Andrea in 3 weeks. Topical anesthesia.    Pt states that pt has had no limitations in activities.   Patient is not a smoker. She takes coumadin long term for Afib - stable, no bleeding. She is able to go up and down 1-2 flight of stairs and able to walk 8 blocks without CP/SOB/MARTÍNEZ.  Denies F/C/N/V/palpitations/claudication.  Denies weakness/tingling/numbness/vertigo/unsteadiness/changes in mental status/blackouts.          Additional ROS    No F/C/wt changes/fatigue  No dysphagia/sore throat/rhinorrhea  No CP/SOB/palpitations/swelling  No cough/wheezing/SOB  No nausea/vomiting/abd pain/no diarrhea, no constipation, no blood in stool  No muscle aches/joint pain   No rashes  No MSK weakness/HA/tingling/numbness  No anxiety/depression  No polyuria/polydipsia/fatigue/wt changes/cold or hot intolerance  No bleeding/bruising/swelling/lumps in axilla, groin, or neck        Patient Active Problem List   Diagnosis    Chronic atrial fibrillation    Cardiac pacemaker in situ    Rheumatic heart disease    Subclinical hypothyroidism    Osteopenia    Atherosclerosis of aorta    Mixed hyperlipidemia    Long term current use of anticoagulant    S/P AVR    S/P MVR (mitral valve replacement)    Type 2 diabetes mellitus with microalbuminuria, without long-term current use of insulin    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    Essential (primary) hypertension    Thrombocytopenia       Current Medications  Medications reviewed and updated.       Current Outpatient Medications:     ACCU-CHEK GATO CONTROL RAVNE Richmond, , Disp: , Rfl:     adhesive bandage (BANDAGES/PLASTIC TOP), , Disp: , Rfl:     amLODIPine (NORVASC) 10 MG tablet, Take 1 tablet (10 mg total) by mouth once daily., Disp: 90 tablet, Rfl: 3    B-COMPLEX WITH VITAMIN C ORAL, , Disp: , Rfl:     blood-glucose meter Misc,  Accu-chek Cortney Glucose Meter, Use to test blood sugar once daily., Disp: 1 each, Rfl: 1    FLUZONE HIGH-DOSE 2019-20, PF, 180 mcg/0.5 mL Syrg, ADM 0.5ML IM UTD, Disp: , Rfl: 0    lancets 30 gauge Misc, , Disp: , Rfl:     lancing device Misc, , Disp: , Rfl:     losartan (COZAAR) 50 MG tablet, Take 1 tablet (50 mg total) by mouth once daily., Disp: 90 tablet, Rfl: 3    metFORMIN (GLUCOPHAGE) 500 MG tablet, TAKE 2 TABLETS BY MOUTH EVERY MORNING AND 1 TABLET EVERY EVENING, Disp: 270 tablet, Rfl: 3    metoprolol tartrate (LOPRESSOR) 25 MG tablet, Take 1 tablet (25 mg total) by mouth 2 (two) times daily., Disp: 180 tablet, Rfl: 3    metoprolol tartrate (LOPRESSOR) 25 MG tablet, TAKE 1 TABLET BY MOUTH TWICE DAILY, Disp: 180 tablet, Rfl: 0    multivitamin-Ca-iron-minerals 27-0.4 mg Tab, , Disp: , Rfl:     nitroGLYCERIN (NITROSTAT) 0.4 MG SL tablet, DISSOLVE 1 TABLET UNDER THE TONGUE EVERY 5 MINUTES AS NEEDED FOR CHEST PAINS, Disp: 275 tablet, Rfl: 0    omega-3 acid ethyl esters (LOVAZA) 1 gram capsule, TK 2 CS PO BID, Disp: , Rfl: 2    phenyleph/pramoxin/glycr/w.pet (HEMORRHOIDAL CREAM RECT), , Disp: , Rfl:     pravastatin (PRAVACHOL) 20 MG tablet, Take 1 tablet (20 mg total) by mouth once daily., Disp: 90 tablet, Rfl: 3    TRUETEST TEST STRIPS Strp, USE ONCE DAILY (Patient taking differently: twice daily), Disp: 100 strip, Rfl: 6    warfarin (COUMADIN) 4 MG tablet, TAKE 1 TABLET BY MOUTH ON MONDAY AND FRIDAY AND 1/2 TABLET BY MOUTH ON ALL OTHER DAYS, Disp: 135 tablet, Rfl: 3    Past Surgical History:   Procedure Laterality Date    CARDIAC PACEMAKER PLACEMENT      CARDIAC VALVE REPLACEMENT      CARDIAC VALVE SURGERY      thryoid s         Family History   Problem Relation Age of Onset    Breast cancer Maternal Aunt     Colon cancer Neg Hx     Ovarian cancer Neg Hx        Social History     Socioeconomic History    Marital status:      Spouse name: Not on file    Number of children: Not on file  "   Years of education: Not on file    Highest education level: Not on file   Occupational History    Not on file   Social Needs    Financial resource strain: Not on file    Food insecurity:     Worry: Not on file     Inability: Not on file    Transportation needs:     Medical: Not on file     Non-medical: Not on file   Tobacco Use    Smoking status: Never Smoker    Smokeless tobacco: Never Used   Substance and Sexual Activity    Alcohol use: No    Drug use: Not on file    Sexual activity: Not on file   Lifestyle    Physical activity:     Days per week: Not on file     Minutes per session: Not on file    Stress: Not on file   Relationships    Social connections:     Talks on phone: Not on file     Gets together: Not on file     Attends Baptism service: Not on file     Active member of club or organization: Not on file     Attends meetings of clubs or organizations: Not on file     Relationship status: Not on file   Other Topics Concern    Not on file   Social History Narrative    Not on file           Allergies   Review of patient's allergies indicates:  No Known Allergies          Review of Systems  See HPI      Physical Exam  /70 (BP Location: Left arm, Patient Position: Sitting, BP Method: Small (Manual))   Pulse 65   Temp 98.4 °F (36.9 °C) (Oral)   Ht 5' 2" (1.575 m)   Wt 51.6 kg (113 lb 12.1 oz)   SpO2 96%   BMI 20.81 kg/m²     GEN: NAD, well developed, pleasant, well nourished  HEENT: NCAT, PERRLA, EOMI, sclera clear, anicteric, O/P clear, MMM with no lesions  NECK: normal, supple with midline trachea, no LAD, no thyromegaly  LUNGS: CTAB, no w/r/r, no increased work of breathing   HEART: Normal rate. +Irregularly irregular rhythm present. PMI is not displaced. 2/6 systolic murmur heard. normal S1 and S2, no r/g, no edema  ABD: s/nt/nd, NABS  SKIN: normal turgor, no rashes  PSYCH: AOx3, appropriate mood and affect  MSK: warm/well perfused, normal ROM in all extremities, no " c/c/e.      Labs  Lab Results   Component Value Date    HGBA1C 5.9 (H) 03/22/2019     Lab Results   Component Value Date     03/22/2019    K 4.2 03/22/2019     03/22/2019    CO2 29 03/22/2019    BUN 17 03/22/2019    CREATININE 0.8 03/22/2019    CALCIUM 10.2 03/22/2019    ANIONGAP 5 (L) 03/22/2019    ESTGFRAFRICA >60.0 03/22/2019    EGFRNONAA >60.0 03/22/2019     Lab Results   Component Value Date    CHOL 143 03/22/2019    CHOL 140 03/08/2018    CHOL 127 08/15/2017     Lab Results   Component Value Date    HDL 51 03/22/2019    HDL 38 (L) 03/08/2018    HDL 30 (L) 08/15/2017     Lab Results   Component Value Date    LDLCALC 72.8 03/22/2019    LDLCALC 72.8 03/08/2018    LDLCALC 51.2 (L) 08/15/2017     Lab Results   Component Value Date    TRIG 96 03/22/2019    TRIG 146 03/08/2018    TRIG 229 (H) 08/15/2017     Lab Results   Component Value Date    CHOLHDL 35.7 03/22/2019    CHOLHDL 27.1 03/08/2018    CHOLHDL 23.6 08/15/2017     Last set of blood work has been reviewed as noted above.

## 2019-12-04 ENCOUNTER — LAB VISIT (OUTPATIENT)
Dept: LAB | Facility: HOSPITAL | Age: 74
End: 2019-12-04
Attending: INTERNAL MEDICINE
Payer: MEDICARE

## 2019-12-04 ENCOUNTER — ANTI-COAG VISIT (OUTPATIENT)
Dept: CARDIOLOGY | Facility: CLINIC | Age: 74
End: 2019-12-04
Payer: MEDICARE

## 2019-12-04 DIAGNOSIS — Z95.2 S/P AVR: ICD-10-CM

## 2019-12-04 DIAGNOSIS — Z95.2 S/P MVR (MITRAL VALVE REPLACEMENT): ICD-10-CM

## 2019-12-04 DIAGNOSIS — I48.20 CHRONIC ATRIAL FIBRILLATION: ICD-10-CM

## 2019-12-04 DIAGNOSIS — Z79.01 LONG TERM CURRENT USE OF ANTICOAGULANT: ICD-10-CM

## 2019-12-04 LAB
INR PPP: 3.9 (ref 0.8–1.2)
PROTHROMBIN TIME: 37.7 SEC (ref 9–12.5)

## 2019-12-04 PROCEDURE — 36415 COLL VENOUS BLD VENIPUNCTURE: CPT | Mod: HCNC,PO

## 2019-12-04 PROCEDURE — 93793 ANTICOAG MGMT PT WARFARIN: CPT | Mod: S$GLB,,, | Performed by: PHARMACIST

## 2019-12-04 PROCEDURE — 85610 PROTHROMBIN TIME: CPT | Mod: HCNC

## 2019-12-04 PROCEDURE — 93793 PR ANTICOAGULANT MGMT FOR PT TAKING WARFARIN: ICD-10-PCS | Mod: S$GLB,,, | Performed by: PHARMACIST

## 2019-12-09 ENCOUNTER — TELEPHONE (OUTPATIENT)
Dept: FAMILY MEDICINE | Facility: CLINIC | Age: 74
End: 2019-12-09

## 2019-12-09 ENCOUNTER — PATIENT OUTREACH (OUTPATIENT)
Dept: ADMINISTRATIVE | Facility: OTHER | Age: 74
End: 2019-12-09

## 2019-12-09 ENCOUNTER — HOSPITAL ENCOUNTER (OUTPATIENT)
Facility: HOSPITAL | Age: 74
Discharge: HOME | End: 2019-12-10
Attending: EMERGENCY MEDICINE | Admitting: HOSPITALIST
Payer: MEDICARE

## 2019-12-09 DIAGNOSIS — R07.9 CHEST PAIN: ICD-10-CM

## 2019-12-09 DIAGNOSIS — I10 ESSENTIAL (PRIMARY) HYPERTENSION: Chronic | ICD-10-CM

## 2019-12-09 DIAGNOSIS — I48.20 CHRONIC ATRIAL FIBRILLATION: Primary | Chronic | ICD-10-CM

## 2019-12-09 PROBLEM — I25.10 CAD (CORONARY ARTERY DISEASE): Status: ACTIVE | Noted: 2019-12-09

## 2019-12-09 PROBLEM — I27.21 PAH (PULMONARY ARTERY HYPERTENSION): Status: ACTIVE | Noted: 2019-12-09

## 2019-12-09 PROBLEM — E87.70 VOLUME OVERLOAD: Status: ACTIVE | Noted: 2019-12-09

## 2019-12-09 LAB
ALBUMIN SERPL BCP-MCNC: 4.2 G/DL (ref 3.5–5.2)
ALP SERPL-CCNC: 74 U/L (ref 55–135)
ALT SERPL W/O P-5'-P-CCNC: 19 U/L (ref 10–44)
ANION GAP SERPL CALC-SCNC: 11 MMOL/L (ref 8–16)
AST SERPL-CCNC: 35 U/L (ref 10–40)
BASOPHILS # BLD AUTO: 0.04 K/UL (ref 0–0.2)
BASOPHILS NFR BLD: 0.5 % (ref 0–1.9)
BILIRUB SERPL-MCNC: 0.7 MG/DL (ref 0.1–1)
BILIRUB UR QL STRIP: NEGATIVE
BNP SERPL-MCNC: 250 PG/ML (ref 0–99)
BUN SERPL-MCNC: 16 MG/DL (ref 8–23)
CALCIUM SERPL-MCNC: 9.6 MG/DL (ref 8.7–10.5)
CHLORIDE SERPL-SCNC: 100 MMOL/L (ref 95–110)
CHOLEST SERPL-MCNC: 131 MG/DL (ref 120–199)
CHOLEST/HDLC SERPL: 3 {RATIO} (ref 2–5)
CLARITY UR: CLEAR
CO2 SERPL-SCNC: 22 MMOL/L (ref 23–29)
COLOR UR: NORMAL
CREAT SERPL-MCNC: 0.8 MG/DL (ref 0.5–1.4)
DIFFERENTIAL METHOD: ABNORMAL
EOSINOPHIL # BLD AUTO: 0.2 K/UL (ref 0–0.5)
EOSINOPHIL NFR BLD: 2.7 % (ref 0–8)
ERYTHROCYTE [DISTWIDTH] IN BLOOD BY AUTOMATED COUNT: 13.1 % (ref 11.5–14.5)
EST. GFR  (AFRICAN AMERICAN): >60 ML/MIN/1.73 M^2
EST. GFR  (NON AFRICAN AMERICAN): >60 ML/MIN/1.73 M^2
ESTIMATED AVG GLUCOSE: 126 MG/DL (ref 68–131)
GLUCOSE SERPL-MCNC: 108 MG/DL (ref 70–110)
GLUCOSE UR QL STRIP: NEGATIVE
HBA1C MFR BLD HPLC: 6 % (ref 4–5.6)
HCT VFR BLD AUTO: 37.8 % (ref 37–48.5)
HDLC SERPL-MCNC: 43 MG/DL (ref 40–75)
HDLC SERPL: 32.8 % (ref 20–50)
HGB BLD-MCNC: 12.3 G/DL (ref 12–16)
HGB UR QL STRIP: NEGATIVE
IMM GRANULOCYTES # BLD AUTO: 0.02 K/UL (ref 0–0.04)
IMM GRANULOCYTES NFR BLD AUTO: 0.3 % (ref 0–0.5)
INR PPP: 2.5 (ref 0.8–1.2)
KETONES UR QL STRIP: NEGATIVE
LDLC SERPL CALC-MCNC: 73.6 MG/DL (ref 63–159)
LEUKOCYTE ESTERASE UR QL STRIP: NEGATIVE
LYMPHOCYTES # BLD AUTO: 1.6 K/UL (ref 1–4.8)
LYMPHOCYTES NFR BLD: 21.4 % (ref 18–48)
MCH RBC QN AUTO: 30.4 PG (ref 27–31)
MCHC RBC AUTO-ENTMCNC: 32.5 G/DL (ref 32–36)
MCV RBC AUTO: 93 FL (ref 82–98)
MONOCYTES # BLD AUTO: 0.5 K/UL (ref 0.3–1)
MONOCYTES NFR BLD: 6.9 % (ref 4–15)
NEUTROPHILS # BLD AUTO: 5.1 K/UL (ref 1.8–7.7)
NEUTROPHILS NFR BLD: 68.2 % (ref 38–73)
NITRITE UR QL STRIP: NEGATIVE
NONHDLC SERPL-MCNC: 88 MG/DL
NRBC BLD-RTO: 0 /100 WBC
PH UR STRIP: 7 [PH] (ref 5–8)
PLATELET # BLD AUTO: 96 K/UL (ref 150–350)
PMV BLD AUTO: 11.8 FL (ref 9.2–12.9)
POCT GLUCOSE: 101 MG/DL (ref 70–110)
POCT GLUCOSE: 102 MG/DL (ref 70–110)
POCT GLUCOSE: 114 MG/DL (ref 70–110)
POCT GLUCOSE: 123 MG/DL (ref 70–110)
POTASSIUM SERPL-SCNC: 4.2 MMOL/L (ref 3.5–5.1)
PROT SERPL-MCNC: 7.4 G/DL (ref 6–8.4)
PROT UR QL STRIP: NEGATIVE
PROTHROMBIN TIME: 26.2 SEC (ref 9–12.5)
RBC # BLD AUTO: 4.05 M/UL (ref 4–5.4)
SODIUM SERPL-SCNC: 133 MMOL/L (ref 136–145)
SP GR UR STRIP: 1.01 (ref 1–1.03)
TRIGL SERPL-MCNC: 72 MG/DL (ref 30–150)
TROPONIN I SERPL DL<=0.01 NG/ML-MCNC: 0.01 NG/ML (ref 0–0.03)
TROPONIN I SERPL DL<=0.01 NG/ML-MCNC: 0.01 NG/ML (ref 0–0.03)
TROPONIN I SERPL DL<=0.01 NG/ML-MCNC: <0.006 NG/ML (ref 0–0.03)
TROPONIN I SERPL DL<=0.01 NG/ML-MCNC: <0.006 NG/ML (ref 0–0.03)
TSH SERPL DL<=0.005 MIU/L-ACNC: 3.67 UIU/ML (ref 0.4–4)
URN SPEC COLLECT METH UR: NORMAL
UROBILINOGEN UR STRIP-ACNC: NEGATIVE EU/DL
WBC # BLD AUTO: 7.44 K/UL (ref 3.9–12.7)

## 2019-12-09 PROCEDURE — 99214 OFFICE O/P EST MOD 30 MIN: CPT | Mod: HCNC,,, | Performed by: INTERNAL MEDICINE

## 2019-12-09 PROCEDURE — 63600175 PHARM REV CODE 636 W HCPCS: Mod: HCNC

## 2019-12-09 PROCEDURE — G0378 HOSPITAL OBSERVATION PER HR: HCPCS | Mod: HCNC

## 2019-12-09 PROCEDURE — 93005 ELECTROCARDIOGRAM TRACING: CPT | Mod: HCNC

## 2019-12-09 PROCEDURE — 84484 ASSAY OF TROPONIN QUANT: CPT | Mod: 91,HCNC

## 2019-12-09 PROCEDURE — 84484 ASSAY OF TROPONIN QUANT: CPT | Mod: HCNC

## 2019-12-09 PROCEDURE — 84443 ASSAY THYROID STIM HORMONE: CPT | Mod: HCNC

## 2019-12-09 PROCEDURE — 83036 HEMOGLOBIN GLYCOSYLATED A1C: CPT | Mod: HCNC

## 2019-12-09 PROCEDURE — 99214 PR OFFICE/OUTPT VISIT, EST, LEVL IV, 30-39 MIN: ICD-10-PCS | Mod: HCNC,,, | Performed by: INTERNAL MEDICINE

## 2019-12-09 PROCEDURE — 80053 COMPREHEN METABOLIC PANEL: CPT | Mod: HCNC

## 2019-12-09 PROCEDURE — 25000003 PHARM REV CODE 250: Mod: HCNC | Performed by: EMERGENCY MEDICINE

## 2019-12-09 PROCEDURE — 63600175 PHARM REV CODE 636 W HCPCS: Mod: HCNC | Performed by: NURSE PRACTITIONER

## 2019-12-09 PROCEDURE — 99285 EMERGENCY DEPT VISIT HI MDM: CPT | Mod: 25,HCNC

## 2019-12-09 PROCEDURE — 83880 ASSAY OF NATRIURETIC PEPTIDE: CPT | Mod: HCNC

## 2019-12-09 PROCEDURE — 36415 COLL VENOUS BLD VENIPUNCTURE: CPT | Mod: HCNC

## 2019-12-09 PROCEDURE — 93010 EKG 12-LEAD: ICD-10-PCS | Mod: HCNC,,, | Performed by: INTERNAL MEDICINE

## 2019-12-09 PROCEDURE — 93010 ELECTROCARDIOGRAM REPORT: CPT | Mod: HCNC,,, | Performed by: INTERNAL MEDICINE

## 2019-12-09 PROCEDURE — 85610 PROTHROMBIN TIME: CPT | Mod: HCNC

## 2019-12-09 PROCEDURE — 85025 COMPLETE CBC W/AUTO DIFF WBC: CPT | Mod: HCNC

## 2019-12-09 PROCEDURE — 81003 URINALYSIS AUTO W/O SCOPE: CPT | Mod: HCNC

## 2019-12-09 PROCEDURE — 96360 HYDRATION IV INFUSION INIT: CPT

## 2019-12-09 PROCEDURE — 82962 GLUCOSE BLOOD TEST: CPT | Mod: HCNC

## 2019-12-09 PROCEDURE — 96361 HYDRATE IV INFUSION ADD-ON: CPT

## 2019-12-09 PROCEDURE — 80061 LIPID PANEL: CPT | Mod: HCNC

## 2019-12-09 RX ORDER — WARFARIN 2 MG/1
2 TABLET ORAL
Status: DISCONTINUED | OUTPATIENT
Start: 2019-12-10 | End: 2019-12-10 | Stop reason: HOSPADM

## 2019-12-09 RX ORDER — SODIUM CHLORIDE 0.9 % (FLUSH) 0.9 %
10 SYRINGE (ML) INJECTION
Status: DISCONTINUED | OUTPATIENT
Start: 2019-12-09 | End: 2019-12-10 | Stop reason: HOSPADM

## 2019-12-09 RX ORDER — SODIUM CHLORIDE 9 MG/ML
INJECTION, SOLUTION INTRAVENOUS CONTINUOUS
Status: DISCONTINUED | OUTPATIENT
Start: 2019-12-09 | End: 2019-12-09

## 2019-12-09 RX ORDER — PRAVASTATIN SODIUM 10 MG/1
20 TABLET ORAL DAILY
Status: DISCONTINUED | OUTPATIENT
Start: 2019-12-09 | End: 2019-12-10 | Stop reason: HOSPADM

## 2019-12-09 RX ORDER — NITROGLYCERIN 0.4 MG/1
TABLET SUBLINGUAL
Status: COMPLETED
Start: 2019-12-09 | End: 2019-12-09

## 2019-12-09 RX ORDER — NITROGLYCERIN 0.4 MG/1
0.4 TABLET SUBLINGUAL
Status: COMPLETED | OUTPATIENT
Start: 2019-12-09 | End: 2019-12-09

## 2019-12-09 RX ORDER — GLUCAGON 1 MG
1 KIT INJECTION
Status: DISCONTINUED | OUTPATIENT
Start: 2019-12-09 | End: 2019-12-10 | Stop reason: HOSPADM

## 2019-12-09 RX ORDER — INSULIN ASPART 100 [IU]/ML
0-5 INJECTION, SOLUTION INTRAVENOUS; SUBCUTANEOUS EVERY 6 HOURS PRN
Status: DISCONTINUED | OUTPATIENT
Start: 2019-12-09 | End: 2019-12-10 | Stop reason: HOSPADM

## 2019-12-09 RX ORDER — ASPIRIN 325 MG
325 TABLET ORAL
Status: COMPLETED | OUTPATIENT
Start: 2019-12-09 | End: 2019-12-09

## 2019-12-09 RX ORDER — FUROSEMIDE 10 MG/ML
20 INJECTION INTRAMUSCULAR; INTRAVENOUS DAILY
Status: DISCONTINUED | OUTPATIENT
Start: 2019-12-10 | End: 2019-12-10

## 2019-12-09 RX ORDER — NITROGLYCERIN 0.4 MG/1
0.4 TABLET SUBLINGUAL EVERY 5 MIN PRN
Status: DISCONTINUED | OUTPATIENT
Start: 2019-12-09 | End: 2019-12-10 | Stop reason: HOSPADM

## 2019-12-09 RX ORDER — AMLODIPINE BESYLATE 5 MG/1
10 TABLET ORAL DAILY
Status: DISCONTINUED | OUTPATIENT
Start: 2019-12-09 | End: 2019-12-10

## 2019-12-09 RX ORDER — LOSARTAN POTASSIUM 25 MG/1
50 TABLET ORAL DAILY
Status: DISCONTINUED | OUTPATIENT
Start: 2019-12-09 | End: 2019-12-10 | Stop reason: HOSPADM

## 2019-12-09 RX ORDER — WARFARIN 4 MG/1
4 TABLET ORAL
Status: DISCONTINUED | OUTPATIENT
Start: 2019-12-09 | End: 2019-12-10 | Stop reason: HOSPADM

## 2019-12-09 RX ADMIN — ASPIRIN 325 MG ORAL TABLET 325 MG: 325 PILL ORAL at 02:12

## 2019-12-09 RX ADMIN — PRAVASTATIN SODIUM 20 MG: 10 TABLET ORAL at 10:12

## 2019-12-09 RX ADMIN — WARFARIN SODIUM 4 MG: 4 TABLET ORAL at 05:12

## 2019-12-09 RX ADMIN — AMLODIPINE BESYLATE 10 MG: 5 TABLET ORAL at 09:12

## 2019-12-09 RX ADMIN — NITROGLYCERIN: 0.4 TABLET SUBLINGUAL at 02:12

## 2019-12-09 RX ADMIN — SODIUM CHLORIDE: 0.9 INJECTION, SOLUTION INTRAVENOUS at 08:12

## 2019-12-09 RX ADMIN — NITROGLYCERIN 0.5 INCH: 20 OINTMENT TOPICAL at 03:12

## 2019-12-09 RX ADMIN — LOSARTAN POTASSIUM 50 MG: 25 TABLET, FILM COATED ORAL at 09:12

## 2019-12-09 NOTE — ED PROVIDER NOTES
Encounter Date: 12/9/2019    SCRIBE #1 NOTE: I, Jean Ramesh, am scribing for, and in the presence of,  Wilner Vaughn MD. I have scribed the following portions of the note - Other sections scribed: HPI, ROS, PE.       History     Chief Complaint   Patient presents with    Chest Pain     patient reports heaviness in the chest since yesterday. Patient report heaviness radiating to bilateral shoulders. Patient also reports feeling fatigue. denies N/V/D. Patient with cardiac  history     CC: Chest Pain    HPI:  This is a 74 y.o. female with a PMHx of DM, HTN, and Rheumatic Heart and PSHx of heart valve replacement(x2) and pacemaker who presents to the Emergency Department with a cc of a 5/10 heavy chest pain beginning 2 day ago that radiates to the shoulders bilaterally.   Pt reports associated fatigue. Pt denies N/V/D. Symptoms are worsened by walking or laying down. Pt reports being given Aspirin at the ED with minor relief. She denies a history of blood clots, bypass, or stents. She notes that her pacemaker was placed in by Dr. Ludwig Grigsby, her cardiologist.     The history is provided by the patient and the spouse. The history is limited by a language barrier.     Review of patient's allergies indicates:  No Known Allergies  Past Medical History:   Diagnosis Date    Closed displaced fracture of distal phalanx of lesser toe 10/20/2015    Diabetes mellitus     Diabetes mellitus, type 2     Hypertension     Osteopenia     Pacemaker     Rheumatic heart disease      Past Surgical History:   Procedure Laterality Date    CARDIAC PACEMAKER PLACEMENT      CARDIAC VALVE REPLACEMENT      CARDIAC VALVE SURGERY      thryoid s       Family History   Problem Relation Age of Onset    Breast cancer Maternal Aunt     Colon cancer Neg Hx     Ovarian cancer Neg Hx      Social History     Tobacco Use    Smoking status: Never Smoker    Smokeless tobacco: Never Used   Substance Use Topics    Alcohol use: No     Drug use: Never     Review of Systems   Constitutional: Positive for fatigue. Negative for fever.   HENT: Negative for sore throat.    Respiratory: Negative for shortness of breath.    Cardiovascular: Positive for chest pain.   Gastrointestinal: Negative for diarrhea, nausea and vomiting.   Genitourinary: Negative for dysuria.   Musculoskeletal: Negative for back pain.   Skin: Negative for rash.   Neurological: Negative for weakness.   Hematological: Does not bruise/bleed easily.   Psychiatric/Behavioral: Negative for confusion.       Physical Exam     Initial Vitals [12/09/19 0150]   BP Pulse Resp Temp SpO2   (!) 159/67 70 19 98.6 °F (37 °C) (!) 92 %      MAP       --         Physical Exam    Nursing note and vitals reviewed.  Constitutional: She is not diaphoretic. No distress.   HENT:   Head: Normocephalic and atraumatic.   Mouth/Throat: Oropharynx is clear and moist.   Eyes: EOM are normal. Pupils are equal, round, and reactive to light. No scleral icterus.   Neck: Normal range of motion. Neck supple. No JVD present.   Cardiovascular: Normal rate, regular rhythm and intact distal pulses.   Pacemaker placed   Pulmonary/Chest: Breath sounds normal. No stridor. No respiratory distress.   Abdominal: Soft. Bowel sounds are normal. She exhibits no distension. There is no tenderness.   Musculoskeletal: Normal range of motion. She exhibits no edema or tenderness.   Neurological: She is alert. She has normal strength. No cranial nerve deficit or sensory deficit. GCS score is 15. GCS eye subscore is 4. GCS verbal subscore is 5. GCS motor subscore is 6.   Skin: Skin is warm and dry.   Psychiatric: She has a normal mood and affect.         ED Course   Procedures  Labs Reviewed   CBC W/ AUTO DIFFERENTIAL - Abnormal; Notable for the following components:       Result Value    Platelets 96 (*)     All other components within normal limits   COMPREHENSIVE METABOLIC PANEL - Abnormal; Notable for the following components:     Sodium 133 (*)     CO2 22 (*)     All other components within normal limits   B-TYPE NATRIURETIC PEPTIDE - Abnormal; Notable for the following components:     (*)     All other components within normal limits   PROTIME-INR - Abnormal; Notable for the following components:    Prothrombin Time 26.2 (*)     INR 2.5 (*)     All other components within normal limits   POCT GLUCOSE - Abnormal; Notable for the following components:    POCT Glucose 114 (*)     All other components within normal limits   TROPONIN I   TROPONIN I   LIPID PANEL   TSH   HEMOGLOBIN A1C   URINALYSIS, REFLEX TO URINE CULTURE        ECG Results          EKG 12-lead (In process)  Result time 12/09/19 06:17:40    In process by Interface, Lab In [facility] (12/09/19 06:17:40)                 Narrative:    Test Reason : R07.9,    Vent. Rate : 066 BPM     Atrial Rate : 066 BPM     P-R Int : 000 ms          QRS Dur : 108 ms      QT Int : 432 ms       P-R-T Axes : 000 000 -87 degrees     QTc Int : 452 ms    Accelerated Junctional rhythm with frequent ventricular-paced complexes  LVH with repolarization abnormality  Abnormal ECG  When compared with ECG of 15-APR-2019 14:14,  Significant changes have occurred    Referred By: [email or web address]   SELF           Confirmed By:                   In process by Interface, Lab In [facility] (12/09/19 06:16:52)                 Narrative:    Test Reason : R07.9,    Vent. Rate : 066 BPM     Atrial Rate : 066 BPM     P-R Int : 000 ms          QRS Dur : 108 ms      QT Int : 432 ms       P-R-T Axes : 000 000 -87 degrees     QTc Int : 452 ms    Accelerated Junctional rhythm with frequent ventricular-paced complexes  LVH with repolarization abnormality  Abnormal ECG  When compared with ECG of 15-APR-2019 14:14,  Significant changes have occurred    Referred By: [email or web address]   SELF           Confirmed By:                             Imaging Results          X-Ray Chest AP Portable (Final result)  Result time 12/09/19 04:01:03    Final  result by Reina Perkins MD (12/09/19 04:01:03)                 Impression:      Cardiomegaly and findings suggestive of interstitial edema/CHF.      Electronically signed by: Reina Perkins MD  Date:    12/09/2019  Time:    04:01             Narrative:    EXAMINATION:  XR CHEST AP PORTABLE    CLINICAL HISTORY:  Chest Pain;    TECHNIQUE:  Single frontal view of the chest was performed.    COMPARISON:  12/23/2010    FINDINGS:  Cardiac monitoring leads overlie the chest.  There is a stably positioned right-sided cardiac pacing device in place.  There is postoperative change of prior median sternotomy and cardiac valve replacement.  The cardiomediastinal silhouette is enlarged.  The lungs are symmetrically expanded with slight increased attenuation which can be seen in the setting of interstitial edema/CHF.  No significant volume of pleural fluid or pneumothorax.  Visualized osseous structures are intact.                                 Medical Decision Making:   Clinical Tests:   Lab Tests: Ordered and Reviewed  Radiological Study: Ordered and Reviewed  Medical Tests: Ordered and Reviewed  ED Management:  3:20- Patient's plan of action is to be administered Nitroglycerin gel and then placed in observations.           Pt arrived alert, afebrile, non-toxic in appearance, in no acute respiratory distress with VSS.  EKG had no ischemic changes.  Initial troponin negative with remainder of labs unremarkable.  Pt's CP did raise moderate suspicion for ACS and pain was relived with NTG.  Pt discussed with Dr. Blum and admitted for observation and cardiology consultation in the AM.    Wilner Vaughn MD                 I, Wilner Vaughn, personally performed the services described in this documentation. All medical record entries made by the scribe were at my direction and in my presence.  I have reviewed the chart and agree that the record reflects my personal performance and is accurate and complete.                   Clinical Impression:       ICD-10-CM ICD-9-CM   1. Chest pain R07.9 786.50                             Wilner Vaughn MD  12/09/19 0667

## 2019-12-09 NOTE — H&P
[facility] Hospital Medicine  History & Physical    Patient Name: Orion Ty  MRN: [id number]  Admission Date: 12/9/2019  Attending Physician: Malina Cazares MD   Primary Care Provider: Otis Zimmer MD         Patient information was obtained from patient and ER records.     Subjective:     Principal Problem:Chest pain    Chief Complaint:   Chief Complaint   Patient presents with    Chest Pain     patient reports heaviness in the chest since yesterday. Patient report heaviness radiating to bilateral shoulders. Patient also reports feeling fatigue. denies N/V/D. Patient with cardiac  history        HPI: Orion Ty is a 74 y.o. female patient with PMHx including DMII, pacemaker, valve replacement (on coumadin), HTN.  Patient presents with her .  She speaks minimal English but does understand and nose enough to make her needs known.  Her  who is the at the bedside speaks English.  Per patient she presents for evaluation of typical and atypical features of chest tightness that patient reports that began 1 day prior to presentation she also reports left shoulder pain but denies that it radiates from her mid sternal/epigastric area.  She rates the pain 5/10.  She did not take anything for the chest pain.  And reports that it relieved without intervention.  She denies any prior chest pain similar.  Patient has a history of valve replacement she is on Coumadin with therapeutic INR.    Past Medical History:   Diagnosis Date    Closed displaced fracture of distal phalanx of lesser toe 10/20/2015    Diabetes mellitus     Diabetes mellitus, type 2     Hypertension     Osteopenia     Pacemaker     Rheumatic heart disease        Past Surgical History:   Procedure Laterality Date    CARDIAC PACEMAKER PLACEMENT      CARDIAC VALVE REPLACEMENT      CARDIAC VALVE SURGERY      thryoid s         Review of patient's allergies indicates:  No Known Allergies    No current  facility-administered medications on file prior to encounter.      Current Outpatient Medications on File Prior to Encounter   Medication Sig    losartan (COZAAR) 50 MG tablet Take 1 tablet (50 mg total) by mouth once daily.    metFORMIN (GLUCOPHAGE) 500 MG tablet TAKE 2 TABLETS BY MOUTH EVERY MORNING AND 1 TABLET EVERY EVENING    metoprolol tartrate (LOPRESSOR) 25 MG tablet Take 1 tablet (25 mg total) by mouth 2 (two) times daily.    metoprolol tartrate (LOPRESSOR) 25 MG tablet TAKE 1 TABLET BY MOUTH TWICE DAILY    nitroGLYCERIN (NITROSTAT) 0.4 MG SL tablet DISSOLVE 1 TABLET UNDER THE TONGUE EVERY 5 MINUTES AS NEEDED FOR CHEST PAINS    pravastatin (PRAVACHOL) 20 MG tablet Take 1 tablet (20 mg total) by mouth once daily.    warfarin (COUMADIN) 4 MG tablet TAKE 1 TABLET BY MOUTH ON MONDAY AND FRIDAY AND 1/2 TABLET BY MOUTH ON ALL OTHER DAYS    ACCU-CHEK GATO CONTROL SOLN Soln     adhesive bandage (BANDAGES/PLASTIC TOP)     amLODIPine (NORVASC) 10 MG tablet Take 1 tablet (10 mg total) by mouth once daily.    B-COMPLEX WITH VITAMIN C ORAL     blood-glucose meter Misc Accu-chek Gato Glucose Meter, Use to test blood sugar once daily.    FLUZONE HIGH-DOSE 2019-20, PF, 180 mcg/0.5 mL Syrg ADM 0.5ML IM UTD    lancets 30 gauge Misc     lancing device Misc     multivitamin-Ca-iron-minerals 27-0.4 mg Tab     omega-3 acid ethyl esters (LOVAZA) 1 gram capsule TK 2 CS PO BID    phenyleph/pramoxin/glycr/w.pet (HEMORRHOIDAL CREAM RECT)     TRUETEST TEST STRIPS Strp USE ONCE DAILY (Patient taking differently: twice daily)     Family History     Problem Relation (Age of Onset)    Breast cancer Maternal Aunt        Tobacco Use    Smoking status: Never Smoker    Smokeless tobacco: Never Used   Substance and Sexual Activity    Alcohol use: No    Drug use: Never    Sexual activity: Not on file     Review of Systems   Constitutional: Negative for appetite change, chills, diaphoresis and fever.   HENT: Negative  for congestion, hearing loss, sore throat, tinnitus and trouble swallowing.    Eyes: Negative for photophobia, discharge, itching and visual disturbance.   Respiratory: Negative for apnea, cough, wheezing and stridor.    Cardiovascular: Positive for chest pain. Negative for palpitations and leg swelling.   Gastrointestinal: Negative for abdominal distention, abdominal pain, blood in stool, constipation, diarrhea and nausea.   Endocrine: Negative for polydipsia, polyphagia and polyuria.   Genitourinary: Negative for difficulty urinating, dysuria, flank pain and frequency.   Musculoskeletal: Negative for arthralgias, joint swelling and neck stiffness.   Skin: Negative for color change, rash and wound.   Neurological: Negative for dizziness, tremors, seizures, light-headedness, numbness and headaches.   Hematological: Negative for adenopathy.   Psychiatric/Behavioral: Negative for hallucinations and self-injury.     Objective:     Vital Signs (Most Recent):  Temp: 98.4 °F (36.9 °C) (12/09/19 1126)  Pulse: 66 (12/09/19 1126)  Resp: 16 (12/09/19 1126)  BP: (!) 105/56 (12/09/19 1126)  SpO2: 99 % (12/09/19 1126) Vital Signs (24h Range):  Temp:  [97.8 °F (36.6 °C)-98.7 °F (37.1 °C)] 98.4 °F (36.9 °C)  Pulse:  [64-70] 66  Resp:  [16-29] 16  SpO2:  [89 %-99 %] 99 %  BP: (105-159)/(56-75) 105/56     Weight: 49.6 kg (109 lb 5.6 oz)  Body mass index is 20.66 kg/m².    Physical Exam   Constitutional: She appears well-developed and well-nourished. She is cooperative.   HENT:   Head: Normocephalic and atraumatic.   Wears glasses   Eyes: Conjunctivae and lids are normal.   Neck: Full passive range of motion without pain. Neck supple. No JVD present. No edema present. No thyroid mass present.   Cardiovascular: S1 normal, S2 normal and intact distal pulses.   No murmur heard.  Abdominal: Soft. Bowel sounds are normal. She exhibits no distension and no abdominal bruit. There is no splenomegaly or hepatomegaly. There is no tenderness.  There is no CVA tenderness.   Lymphadenopathy:     She has no cervical adenopathy.     She has no axillary adenopathy.   Neurological: She is alert. She has normal reflexes. She displays no tremor. She displays no seizure activity.   Skin: Skin is warm, dry and intact.   Psychiatric: She has a normal mood and affect. Her speech is normal. Thought content normal. Cognition and memory are normal.           Significant Labs: All pertinent labs within the past 24 hours have been reviewed.    Significant Imaging: I have reviewed all pertinent imaging results/findings within the past 24 hours.    Assessment/Plan:     * Chest pain  Typical and atypical features of chest tightness that patient reports that began 1 day prior to presentation she also reports left shoulder pain but denies that it radiates from her mid sternal/epigastric area.  She rates the pain 5/10.  Continuous cardiac monitoring  Continue to trend CE Q8 hrs X3 sets   initial dose then 81 mg PO Daily  2D Echo  Consult to cardiology  UA, TSH, lipid panel            CAD (coronary artery disease)  Patient with history of valve replacement on Coumadin her INR is therapeutic 2.5 continue her home dose of Coumadin 4 mg      Chronic atrial fibrillation  Patient is in afib at the time of presentation continue beta-blocker from home      Type 2 diabetes mellitus with microalbuminuria, without long-term current use of insulin  Hold home oral hyperglycemics if indicated - while hospitalized will use combined insulin therapy with basal and prandial insulin coverage, POCT glucose checks, hypoglycemic protocol and correction scale - HgA1c      Subclinical hypothyroidism  Synthroid    VTE Risk Mitigation (From admission, onward)         Ordered     warfarin (COUMADIN) tablet 2 mg  Every Tues, Thurs, Sat      12/09/19 1006     warfarin (COUMADIN) tablet 4 mg  Every Mon, Wed, Fri      12/09/19 1006     IP VTE HIGH RISK PATIENT  Once      12/09/19 1006                    GHAZAAL Acevedo, FNP-C  Hospitalist - Department of Hospital Medicine  [address] [address]  Office [phone]; Pager [phone]

## 2019-12-09 NOTE — SUBJECTIVE & OBJECTIVE
Past Medical History:   Diagnosis Date    Closed displaced fracture of distal phalanx of lesser toe 10/20/2015    Diabetes mellitus     Diabetes mellitus, type 2     Hypertension     Osteopenia     Pacemaker     Rheumatic heart disease        Past Surgical History:   Procedure Laterality Date    CARDIAC PACEMAKER PLACEMENT      CARDIAC VALVE REPLACEMENT      CARDIAC VALVE SURGERY      thryoid s         Review of patient's allergies indicates:  No Known Allergies    No current facility-administered medications on file prior to encounter.      Current Outpatient Medications on File Prior to Encounter   Medication Sig    losartan (COZAAR) 50 MG tablet Take 1 tablet (50 mg total) by mouth once daily.    metFORMIN (GLUCOPHAGE) 500 MG tablet TAKE 2 TABLETS BY MOUTH EVERY MORNING AND 1 TABLET EVERY EVENING    metoprolol tartrate (LOPRESSOR) 25 MG tablet Take 1 tablet (25 mg total) by mouth 2 (two) times daily.    metoprolol tartrate (LOPRESSOR) 25 MG tablet TAKE 1 TABLET BY MOUTH TWICE DAILY    nitroGLYCERIN (NITROSTAT) 0.4 MG SL tablet DISSOLVE 1 TABLET UNDER THE TONGUE EVERY 5 MINUTES AS NEEDED FOR CHEST PAINS    pravastatin (PRAVACHOL) 20 MG tablet Take 1 tablet (20 mg total) by mouth once daily.    warfarin (COUMADIN) 4 MG tablet TAKE 1 TABLET BY MOUTH ON MONDAY AND FRIDAY AND 1/2 TABLET BY MOUTH ON ALL OTHER DAYS    ACCU-CHEK GATO CONTROL SOLN Soln     adhesive bandage (BANDAGES/PLASTIC TOP)     amLODIPine (NORVASC) 10 MG tablet Take 1 tablet (10 mg total) by mouth once daily.    B-COMPLEX WITH VITAMIN C ORAL     blood-glucose meter Misc Accu-chek Gato Glucose Meter, Use to test blood sugar once daily.    FLUZONE HIGH-DOSE 2019-20, PF, 180 mcg/0.5 mL Syrg ADM 0.5ML IM UTD    lancets 30 gauge Misc     lancing device Misc     multivitamin-Ca-iron-minerals 27-0.4 mg Tab     omega-3 acid ethyl esters (LOVAZA) 1 gram capsule TK 2 CS PO BID    phenyleph/pramoxin/glycr/w.pet (HEMORRHOIDAL CREAM  RECT)     TRUETEST TEST STRIPS Strp USE ONCE DAILY (Patient taking differently: twice daily)     Family History     Problem Relation (Age of Onset)    Breast cancer Maternal Aunt        Tobacco Use    Smoking status: Never Smoker    Smokeless tobacco: Never Used   Substance and Sexual Activity    Alcohol use: No    Drug use: Never    Sexual activity: Not on file     Review of Systems   Constitutional: Negative for appetite change, chills, diaphoresis and fever.   HENT: Negative for congestion, hearing loss, sore throat, tinnitus and trouble swallowing.    Eyes: Negative for photophobia, discharge, itching and visual disturbance.   Respiratory: Negative for apnea, cough, wheezing and stridor.    Cardiovascular: Positive for chest pain. Negative for palpitations and leg swelling.   Gastrointestinal: Negative for abdominal distention, abdominal pain, blood in stool, constipation, diarrhea and nausea.   Endocrine: Negative for polydipsia, polyphagia and polyuria.   Genitourinary: Negative for difficulty urinating, dysuria, flank pain and frequency.   Musculoskeletal: Negative for arthralgias, joint swelling and neck stiffness.   Skin: Negative for color change, rash and wound.   Neurological: Negative for dizziness, tremors, seizures, light-headedness, numbness and headaches.   Hematological: Negative for adenopathy.   Psychiatric/Behavioral: Negative for hallucinations and self-injury.     Objective:     Vital Signs (Most Recent):  Temp: 98.4 °F (36.9 °C) (12/09/19 1126)  Pulse: 66 (12/09/19 1126)  Resp: 16 (12/09/19 1126)  BP: (!) 105/56 (12/09/19 1126)  SpO2: 99 % (12/09/19 1126) Vital Signs (24h Range):  Temp:  [97.8 °F (36.6 °C)-98.7 °F (37.1 °C)] 98.4 °F (36.9 °C)  Pulse:  [64-70] 66  Resp:  [16-29] 16  SpO2:  [89 %-99 %] 99 %  BP: (105-159)/(56-75) 105/56     Weight: 49.6 kg (109 lb 5.6 oz)  Body mass index is 20.66 kg/m².    Physical Exam   Constitutional: She appears well-developed and well-nourished. She  is cooperative.   HENT:   Head: Normocephalic and atraumatic.   Wears glasses   Eyes: Conjunctivae and lids are normal.   Neck: Full passive range of motion without pain. Neck supple. No JVD present. No edema present. No thyroid mass present.   Cardiovascular: S1 normal, S2 normal and intact distal pulses.   No murmur heard.  Abdominal: Soft. Bowel sounds are normal. She exhibits no distension and no abdominal bruit. There is no splenomegaly or hepatomegaly. There is no tenderness. There is no CVA tenderness.   Lymphadenopathy:     She has no cervical adenopathy.     She has no axillary adenopathy.   Neurological: She is alert. She has normal reflexes. She displays no tremor. She displays no seizure activity.   Skin: Skin is warm, dry and intact.   Psychiatric: She has a normal mood and affect. Her speech is normal. Thought content normal. Cognition and memory are normal.           Significant Labs: All pertinent labs within the past 24 hours have been reviewed.    Significant Imaging: I have reviewed all pertinent imaging results/findings within the past 24 hours.

## 2019-12-09 NOTE — HPI
Orion Ty presents to [facility]/ with complaints of chest pressure over the past few days, worsening this am. Left sided. Radiation to shoulders. Currently denies pain. Regular cardiologist is Dr. Grigsby. She has a history of MVR/AVR. Preserved LVEF from echo in 4/2019. She had a stress in 2015. No hx of CAD. Chronic atrial fibrillation. PPM that was interrogated last in 8/2019. BP in 160s on presentation.     Echo 4/2019:    · Normal left ventricular systolic function. The estimated ejection fraction is 55%  · Concentric left ventricular hypertrophy.  · Normal LV diastolic function.  · Normal right ventricular systolic function.  · Severe left atrial enlargement.  · Severe right atrial enlargement.  · There is a mechanical aortic valve present. Prosthetic aortic valve is normal.  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 Prosthetic mitral valve is normal.  · Mild tricuspid regurgitation.  · The estimated PA systolic pressure is 55 mm Hg  · Pulmonary hypertension present.  · The aortic root is mildly dilated. 3.7 cm    Nuclear MPS 2/2015:    1. The EKG portion of this study is uninterpretable for ischemia at a peak heart rate of 81 bpm (56% of predicted).   2. Blood pressure remained stable throughout the protocol  (Presenting BP: 131/87 Peak BP: 144/85).   3. No significant arrhythmias were present.   4. There were no symptoms of chest discomfort or significant dyspnea throughout the protocol.     Nuclear Quantitative Functional Analysis:   LVEF: 57 %    Impression: EQUIVOCAL MYOCARDIAL PERFUSION  1. There is a mild to moderate mostly fixed lateral wall defect of uncertain significance.   2. The perfusion scan is free of evidence for myocardial ischemia.   3. There is abnormal wall motion at rest showing mild hypokinesis of the anteroseptal wall of the left ventricle.   4. Resting LV function is normal.   5. The ventricular volumes are normal at rest and stress.   6. The extracardiac distribution of  radioactivity is normal.   7. There was no previous study available to compare.

## 2019-12-09 NOTE — ED NOTES
Assumed care of patient. Pt resting in bed in NAD,  at bedside. Pt updated on transfer to obs.  and pt verbalizes understanding of delay.

## 2019-12-09 NOTE — ASSESSMENT & PLAN NOTE
Typical and atypical features of chest tightness that patient reports that began 1 day prior to presentation she also reports left shoulder pain but denies that it radiates from her mid sternal/epigastric area.  She rates the pain 5/10.  Continuous cardiac monitoring  Continue to trend CE Q8 hrs X3 sets   initial dose then 81 mg PO Daily  2D Echo  UA, TSH, lipid panel

## 2019-12-09 NOTE — CONSULTS
[facility]  Cardiology  Consult Note    Patient Name: Orion Ty  MRN: [id number]  Admission Date: 12/9/2019  Hospital Length of Stay: 0 days  Code Status: Full Code   Attending Provider: Malina Cazraes MD   Consulting Provider: Varinder Barahona MD  Primary Care Physician: Otis Zimmer MD  Principal Problem:Chest pain    Patient information was obtained from spouse/SO, past medical records and ER records.     Inpatient consult to Cardiology  Consult performed by: Varinder Barahona MD  Consult ordered by: Wilner Vaughn MD        Subjective:     Chief Complaint:  Chest pain     HPI:   Orion Ty presents to [facility]/ with complaints of chest pressure over the past few days, worsening this am. Left sided. Radiation to shoulders. Currently denies pain. Regular cardiologist is Dr. Grigsby. She has a history of MVR/AVR. Preserved LVEF from echo in 4/2019. She had a stress in 2015. No hx of CAD. Chronic atrial fibrillation. PPM that was interrogated last in 8/2019. BP in 160s on presentation.     Echo 4/2019:    · Normal left ventricular systolic function. The estimated ejection fraction is 55%  · Concentric left ventricular hypertrophy.  · Normal LV diastolic function.  · Normal right ventricular systolic function.  · Severe left atrial enlargement.  · Severe right atrial enlargement.  · There is a mechanical aortic valve present. Prosthetic aortic valve is normal.  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 Prosthetic mitral valve is normal.  · Mild tricuspid regurgitation.  · The estimated PA systolic pressure is 55 mm Hg  · Pulmonary hypertension present.  · The aortic root is mildly dilated. 3.7 cm    Nuclear MPS 2/2015:    1. The EKG portion of this study is uninterpretable for ischemia at a peak heart rate of 81 bpm (56% of predicted).   2. Blood pressure remained stable throughout the protocol  (Presenting BP: 131/87 Peak BP: 144/85).   3. No significant arrhythmias were  present.   4. There were no symptoms of chest discomfort or significant dyspnea throughout the protocol.     Nuclear Quantitative Functional Analysis:   LVEF: 57 %    Impression: EQUIVOCAL MYOCARDIAL PERFUSION  1. There is a mild to moderate mostly fixed lateral wall defect of uncertain significance.   2. The perfusion scan is free of evidence for myocardial ischemia.   3. There is abnormal wall motion at rest showing mild hypokinesis of the anteroseptal wall of the left ventricle.   4. Resting LV function is normal.   5. The ventricular volumes are normal at rest and stress.   6. The extracardiac distribution of radioactivity is normal.   7. There was no previous study available to compare.    Past Medical History:   Diagnosis Date    Closed displaced fracture of distal phalanx of lesser toe 10/20/2015    Diabetes mellitus     Diabetes mellitus, type 2     Hypertension     Osteopenia     Pacemaker     Rheumatic heart disease        Past Surgical History:   Procedure Laterality Date    CARDIAC PACEMAKER PLACEMENT      CARDIAC VALVE REPLACEMENT      CARDIAC VALVE SURGERY      ilene basilio         Review of patient's allergies indicates:  No Known Allergies    No current facility-administered medications on file prior to encounter.      Current Outpatient Medications on File Prior to Encounter   Medication Sig    losartan (COZAAR) 50 MG tablet Take 1 tablet (50 mg total) by mouth once daily.    metFORMIN (GLUCOPHAGE) 500 MG tablet TAKE 2 TABLETS BY MOUTH EVERY MORNING AND 1 TABLET EVERY EVENING    metoprolol tartrate (LOPRESSOR) 25 MG tablet Take 1 tablet (25 mg total) by mouth 2 (two) times daily.    metoprolol tartrate (LOPRESSOR) 25 MG tablet TAKE 1 TABLET BY MOUTH TWICE DAILY    nitroGLYCERIN (NITROSTAT) 0.4 MG SL tablet DISSOLVE 1 TABLET UNDER THE TONGUE EVERY 5 MINUTES AS NEEDED FOR CHEST PAINS    pravastatin (PRAVACHOL) 20 MG tablet Take 1 tablet (20 mg total) by mouth once daily.    warfarin  (COUMADIN) 4 MG tablet TAKE 1 TABLET BY MOUTH ON MONDAY AND FRIDAY AND 1/2 TABLET BY MOUTH ON ALL OTHER DAYS    ACCU-CHEK GATO CONTROL SOLN Soln     adhesive bandage (BANDAGES/PLASTIC TOP)     amLODIPine (NORVASC) 10 MG tablet Take 1 tablet (10 mg total) by mouth once daily.    B-COMPLEX WITH VITAMIN C ORAL     blood-glucose meter Misc Accu-chek Gato Glucose Meter, Use to test blood sugar once daily.    FLUZONE HIGH-DOSE 2019-20, PF, 180 mcg/0.5 mL Syrg ADM 0.5ML IM UTD    lancets 30 gauge Misc     lancing device Misc     multivitamin-Ca-iron-minerals 27-0.4 mg Tab     omega-3 acid ethyl esters (LOVAZA) 1 gram capsule TK 2 CS PO BID    phenyleph/pramoxin/glycr/w.pet (HEMORRHOIDAL CREAM RECT)     TRUETEST TEST STRIPS Strp USE ONCE DAILY (Patient taking differently: twice daily)     Family History     Problem Relation (Age of Onset)    Breast cancer Maternal Aunt        Tobacco Use    Smoking status: Never Smoker    Smokeless tobacco: Never Used   Substance and Sexual Activity    Alcohol use: No    Drug use: Never    Sexual activity: Not on file     Review of Systems   Cardiovascular: Positive for chest pain and irregular heartbeat. Negative for leg swelling, orthopnea, palpitations and paroxysmal nocturnal dyspnea.   Respiratory: Negative for shortness of breath and wheezing.    All other systems reviewed and are negative.    Objective:     Vital Signs (Most Recent):  Temp: 98.1 °F (36.7 °C) (12/09/19 1559)  Pulse: 82 (12/09/19 1559)  Resp: 16 (12/09/19 1559)  BP: (!) 115/58 (12/09/19 1559)  SpO2: 99 % (12/09/19 1559) Vital Signs (24h Range):  Temp:  [97.8 °F (36.6 °C)-98.7 °F (37.1 °C)] 98.1 °F (36.7 °C)  Pulse:  [64-82] 82  Resp:  [16-29] 16  SpO2:  [89 %-99 %] 99 %  BP: (105-159)/(56-75) 115/58     Weight: 49.6 kg (109 lb 5.6 oz)  Body mass index is 20.66 kg/m².    SpO2: 99 %  O2 Device (Oxygen Therapy): nasal cannula      Intake/Output Summary (Last 24 hours) at 12/9/2019 0370  Last data filed  at 12/9/2019 1620  Gross per 24 hour   Intake 786.67 ml   Output 500 ml   Net 286.67 ml       Lines/Drains/Airways     Peripheral Intravenous Line                 Peripheral IV - Single Lumen 12/09/19 0222 20 G Left Antecubital less than 1 day                Physical Exam   Constitutional: No distress.   Cardiovascular:   Murmur heard.  High-pitched blowing holosystolic murmur is present at the apex.   Harsh midsystolic murmur of grade 2/6 is also present at the upper right sternal border radiating to the neck.  Pulmonary/Chest: She has rales in the right lower field and the left lower field.   Abdominal: Soft. Bowel sounds are normal. There is no tenderness.   Musculoskeletal:        Right lower leg: She exhibits no edema.        Left lower leg: She exhibits no edema.   Skin: She is not diaphoretic.   Vitals reviewed.      Significant Labs:   CMP   Recent Labs   Lab 12/09/19 0226   *   K 4.2      CO2 22*      BUN 16   CREATININE 0.8   CALCIUM 9.6   PROT 7.4   ALBUMIN 4.2   BILITOT 0.7   ALKPHOS 74   AST 35   ALT 19   ANIONGAP 11   ESTGFRAFRICA >60   EGFRNONAA >60   , CBC   Recent Labs   Lab 12/09/19 0226   WBC 7.44   HGB 12.3   HCT 37.8   PLT 96*   , INR   Recent Labs   Lab 12/09/19 0222   INR 2.5*   , Lipid Panel   Recent Labs   Lab 12/09/19  0526   CHOL 131   HDL 43   LDLCALC 73.6   TRIG 72   CHOLHDL 32.8    and Troponin   Recent Labs   Lab 12/09/19 0226 12/09/19  0526 12/09/19  1117   TROPONINI 0.006 <0.006 <0.006       Significant Imaging: EKG: paced,underlying afib    Assessment and Plan:     * Chest pain  Cardiac enzymes negative. NPO at MN. Nuclear MPS.    PAH (pulmonary artery hypertension)  Secondary to valvular heart disease.    Volume overload  Preserved LVEF. Lasix. Daily weights. Strict I/Os    S/P MVR (mitral valve replacement)  Continue coumadin    S/P AVR  Continue coumadin    Mixed hyperlipidemia  Continue statin.    Chronic atrial fibrillation  Continue coumadin. Restart  metoprolol.        VTE Risk Mitigation (From admission, onward)         Ordered     warfarin (COUMADIN) tablet 2 mg  Every Tues, Thurs, Sat      12/09/19 1006     warfarin (COUMADIN) tablet 4 mg  Every Mon, Wed, Fri 12/09/19 1006     IP VTE HIGH RISK PATIENT  Once      12/09/19 1006                Thank you for your consult. I will follow-up with patient. Please contact us if you have any additional questions.    Varinder Barahona MD  Cardiology   [facility]

## 2019-12-09 NOTE — HPI
Orion Ty is a 74 y.o. female patient with PMHx including DMII, pacemaker, valve replacement (on coumadin), HTN.  Patient presents with her .  She speaks minimal English but does understand and nose enough to make her needs known.  Her  who is the at the bedside speaks English.  Per patient she presents for evaluation of typical and atypical features of chest tightness that patient reports that began 1 day prior to presentation she also reports left shoulder pain but denies that it radiates from her mid sternal/epigastric area.  She rates the pain 5/10.  She did not take anything for the chest pain.  And reports that it relieved without intervention.  She denies any prior chest pain similar.  Patient has a history of valve replacement she is on Coumadin with therapeutic INR.

## 2019-12-09 NOTE — ED NOTES
Bed rails are up and call light is within patient reach. Pt resting in bed with lights off.  at bedside.

## 2019-12-09 NOTE — ED TRIAGE NOTES
"Pt arrives with c/o chest pain x2 days but has gotten worse today and states "it is heavy pressure on chest" Pt denies NVD, SOB, diaphoresis. Pt speaks some english but  is good historian. States pt has had 3 pacemakers ( has been paced for 30 years)  states this is year 9 with this current pacemaker.   "

## 2019-12-09 NOTE — ASSESSMENT & PLAN NOTE
Typical and atypical features of chest tightness that patient reports that began 1 day prior to presentation she also reports left shoulder pain but denies that it radiates from her mid sternal/epigastric area.  She rates the pain 5/10.  Continuous cardiac monitoring  Continue to trend CE Q8 hrs X3 sets   initial dose then 81 mg PO Daily  2D Echo  Consult to cardiology  UA, TSH, lipid panel

## 2019-12-09 NOTE — PLAN OF CARE
12/09/19 1058   Discharge Assessment   Assessment Type Discharge Planning Assessment   Assessment information obtained from? Medical Record   Prior to hospitilization cognitive status: Alert/Oriented   Prior to hospitalization functional status: Independent;Assistive Equipment   Current cognitive status: Alert/Oriented   Current Functional Status: Independent;Assistive Equipment   Facility Arrived From: home   Lives With spouse   Able to Return to Prior Arrangements yes   Is patient able to care for self after discharge? Yes   Who are your caregiver(s) and their phone number(s)? [facility] [phone]   Patient's perception of discharge disposition home or selfcare   Readmission Within the Last 30 Days no previous admission in last 30 days   Patient currently being followed by outpatient case management? No   Patient currently receives any other outside agency services? No   Equipment Currently Used at Home none   Do you have any problems affording any of your prescribed medications? No   Is the patient taking medications as prescribed? yes   Does the patient have transportation home? Yes   Transportation Anticipated family or friend will provide   Does the patient receive services at the Coumadin Clinic? Yes  ([facility])   Discharge Plan A Home with family  (with follow up)   DME Needed Upon Discharge  none   Patient/Family in Agreement with Plan yes     [facility] - [place], [place] - [address]  [address] [address]  Phone: [phone] Fax: [phone]    [id number] - [place] - [address] AT [facility] [address]  [address]  [place] [address]  Phone: [phone] Fax: [phone]

## 2019-12-09 NOTE — ASSESSMENT & PLAN NOTE
Patient with history of valve replacement on Coumadin her INR is therapeutic 2.5 continue her home dose of Coumadin 4 mg

## 2019-12-09 NOTE — NURSING
Received patient from ER to room via stretcher. Patient accompanied by transport and  Cristobal. Pt stood and took a few steps to the hospital bed standby assist. Evaluated general patient appearance and condition. Admit assessment initiated. Tele monitoring initiated.  in progress to L AC intact. NTG paste to chest present.  fluent in English/Brazilian at bedside. Pt no complaints of cp/chest pressure at this time.  No apparent distress noted at this time.

## 2019-12-09 NOTE — TELEPHONE ENCOUNTER
----- Message from Kusum Quick sent at 12/9/2019  9:26 AM CST -----  Contact: Pt   Type: Patient Call Back    Who called:pt     What is the request in detail:pt  is calling to speak to Nurse    Can the clinic reply by [facility]?no    Would the patient rather a call back or a response via [facility]? call    Best call back number:

## 2019-12-09 NOTE — PROGRESS NOTES
WRITTEN HEALTHCARE DISCHARGE INFORMATION      Things that YOU are RESPONSIBLE for to Manage Your Care At Home:     1. Getting your prescriptions filled.  2. Taking you medications as directed. DO NOT MISS ANY DOSES!  3. Going to your follow-up doctor appointments. This is important because it allows the doctor to monitor your progress and to determine if any changes need to be made to your treatment plan.     If you are unable to make your follow up appointments, please call the number listed and reschedule this appointment.      ____________HELP AT HOME____________________     Experiencing any SIGNS or SYMPTOMS: YOU CAN     Schedule a same day appopintment with your Primary Care Doctor or  you can call [facility] On Call Nurse Care Line for 24/7 assistance at [phone]     If you are experience any signs or symptoms that have become severe, Call 911 and come to your nearest Emergency Room.     Thank you for choosing [facility] and allowing us to care for you.   From your care management team:      You should receive a call from [facility] Discharge Department within 48-72 hours to help manage your care after discharge. Please try to make sure that you answer your phone for this important phone call.      Follow-up Information     Otis Zimmer MD On 12/17/2019.    Specialty:  Internal Medicine  Why:  Appointment scheduled for Decenber 17th at 1:40pm  Contact information:  [address] [place] [address]  [phone]             Ludwig Grigsby MD On 12/10/2019.    Specialties:  Cardiology, INTERVENTIONAL CARDIOLOGY  Why:  Appointment scheduled for December 10th at 1pm  Contact information:  [address] [facility] [address]  [address]  [facility] [address]  [phone]

## 2019-12-09 NOTE — SUBJECTIVE & OBJECTIVE
Past Medical History:   Diagnosis Date    Closed displaced fracture of distal phalanx of lesser toe 10/20/2015    Diabetes mellitus     Diabetes mellitus, type 2     Hypertension     Osteopenia     Pacemaker     Rheumatic heart disease        Past Surgical History:   Procedure Laterality Date    CARDIAC PACEMAKER PLACEMENT      CARDIAC VALVE REPLACEMENT      CARDIAC VALVE SURGERY      thryoid s         Review of patient's allergies indicates:  No Known Allergies    No current facility-administered medications on file prior to encounter.      Current Outpatient Medications on File Prior to Encounter   Medication Sig    losartan (COZAAR) 50 MG tablet Take 1 tablet (50 mg total) by mouth once daily.    metFORMIN (GLUCOPHAGE) 500 MG tablet TAKE 2 TABLETS BY MOUTH EVERY MORNING AND 1 TABLET EVERY EVENING    metoprolol tartrate (LOPRESSOR) 25 MG tablet Take 1 tablet (25 mg total) by mouth 2 (two) times daily.    metoprolol tartrate (LOPRESSOR) 25 MG tablet TAKE 1 TABLET BY MOUTH TWICE DAILY    nitroGLYCERIN (NITROSTAT) 0.4 MG SL tablet DISSOLVE 1 TABLET UNDER THE TONGUE EVERY 5 MINUTES AS NEEDED FOR CHEST PAINS    pravastatin (PRAVACHOL) 20 MG tablet Take 1 tablet (20 mg total) by mouth once daily.    warfarin (COUMADIN) 4 MG tablet TAKE 1 TABLET BY MOUTH ON MONDAY AND FRIDAY AND 1/2 TABLET BY MOUTH ON ALL OTHER DAYS    ACCU-CHEK GATO CONTROL SOLN Soln     adhesive bandage (BANDAGES/PLASTIC TOP)     amLODIPine (NORVASC) 10 MG tablet Take 1 tablet (10 mg total) by mouth once daily.    B-COMPLEX WITH VITAMIN C ORAL     blood-glucose meter Misc Accu-chek Gato Glucose Meter, Use to test blood sugar once daily.    FLUZONE HIGH-DOSE 2019-20, PF, 180 mcg/0.5 mL Syrg ADM 0.5ML IM UTD    lancets 30 gauge Misc     lancing device Misc     multivitamin-Ca-iron-minerals 27-0.4 mg Tab     omega-3 acid ethyl esters (LOVAZA) 1 gram capsule TK 2 CS PO BID    phenyleph/pramoxin/glycr/w.pet (HEMORRHOIDAL CREAM  RECT)     TRUETEST TEST STRIPS Strp USE ONCE DAILY (Patient taking differently: twice daily)     Family History     Problem Relation (Age of Onset)    Breast cancer Maternal Aunt        Tobacco Use    Smoking status: Never Smoker    Smokeless tobacco: Never Used   Substance and Sexual Activity    Alcohol use: No    Drug use: Never    Sexual activity: Not on file     Review of Systems   Cardiovascular: Positive for chest pain and irregular heartbeat. Negative for leg swelling, orthopnea, palpitations and paroxysmal nocturnal dyspnea.   Respiratory: Negative for shortness of breath and wheezing.    All other systems reviewed and are negative.    Objective:     Vital Signs (Most Recent):  Temp: 98.1 °F (36.7 °C) (12/09/19 1559)  Pulse: 82 (12/09/19 1559)  Resp: 16 (12/09/19 1559)  BP: (!) 115/58 (12/09/19 1559)  SpO2: 99 % (12/09/19 1559) Vital Signs (24h Range):  Temp:  [97.8 °F (36.6 °C)-98.7 °F (37.1 °C)] 98.1 °F (36.7 °C)  Pulse:  [64-82] 82  Resp:  [16-29] 16  SpO2:  [89 %-99 %] 99 %  BP: (105-159)/(56-75) 115/58     Weight: 49.6 kg (109 lb 5.6 oz)  Body mass index is 20.66 kg/m².    SpO2: 99 %  O2 Device (Oxygen Therapy): nasal cannula      Intake/Output Summary (Last 24 hours) at 12/9/2019 1641  Last data filed at 12/9/2019 1620  Gross per 24 hour   Intake 786.67 ml   Output 500 ml   Net 286.67 ml       Lines/Drains/Airways     Peripheral Intravenous Line                 Peripheral IV - Single Lumen 12/09/19 0222 20 G Left Antecubital less than 1 day                Physical Exam   Constitutional: No distress.   Cardiovascular:   Murmur heard.  High-pitched blowing holosystolic murmur is present at the apex.   Harsh midsystolic murmur of grade 2/6 is also present at the upper right sternal border radiating to the neck.  Pulmonary/Chest: She has rales in the right lower field and the left lower field.   Abdominal: Soft. Bowel sounds are normal. There is no tenderness.   Musculoskeletal:        Right lower  leg: She exhibits no edema.        Left lower leg: She exhibits no edema.   Skin: She is not diaphoretic.   Vitals reviewed.      Significant Labs:   CMP   Recent Labs   Lab 12/09/19 0226   *   K 4.2      CO2 22*      BUN 16   CREATININE 0.8   CALCIUM 9.6   PROT 7.4   ALBUMIN 4.2   BILITOT 0.7   ALKPHOS 74   AST 35   ALT 19   ANIONGAP 11   ESTGFRAFRICA >60   EGFRNONAA >60   , CBC   Recent Labs   Lab 12/09/19 0226   WBC 7.44   HGB 12.3   HCT 37.8   PLT 96*   , INR   Recent Labs   Lab 12/09/19  0222   INR 2.5*   , Lipid Panel   Recent Labs   Lab 12/09/19  0526   CHOL 131   HDL 43   LDLCALC 73.6   TRIG 72   CHOLHDL 32.8    and Troponin   Recent Labs   Lab 12/09/19 0226 12/09/19  0526 12/09/19  1117   TROPONINI 0.006 <0.006 <0.006       Significant Imaging: EKG: paced,underlying afib

## 2019-12-10 ENCOUNTER — ANTI-COAG VISIT (OUTPATIENT)
Dept: CARDIOLOGY | Facility: CLINIC | Age: 74
End: 2019-12-10

## 2019-12-10 VITALS
HEART RATE: 91 BPM | HEIGHT: 61 IN | BODY MASS INDEX: 20.65 KG/M2 | WEIGHT: 109.38 LBS | OXYGEN SATURATION: 95 % | RESPIRATION RATE: 18 BRPM | TEMPERATURE: 98 F | DIASTOLIC BLOOD PRESSURE: 71 MMHG | SYSTOLIC BLOOD PRESSURE: 120 MMHG

## 2019-12-10 DIAGNOSIS — Z79.01 LONG TERM CURRENT USE OF ANTICOAGULANT: ICD-10-CM

## 2019-12-10 DIAGNOSIS — Z95.2 S/P AVR: ICD-10-CM

## 2019-12-10 DIAGNOSIS — I48.20 CHRONIC ATRIAL FIBRILLATION: ICD-10-CM

## 2019-12-10 DIAGNOSIS — Z95.2 S/P MVR (MITRAL VALVE REPLACEMENT): ICD-10-CM

## 2019-12-10 LAB
ALBUMIN SERPL BCP-MCNC: 3.8 G/DL (ref 3.5–5.2)
ALP SERPL-CCNC: 63 U/L (ref 55–135)
ALT SERPL W/O P-5'-P-CCNC: 12 U/L (ref 10–44)
ANION GAP SERPL CALC-SCNC: 8 MMOL/L (ref 8–16)
ASCENDING AORTA: 3.71 CM
AST SERPL-CCNC: 28 U/L (ref 10–40)
AV INDEX (PROSTH): 0.3
AV MEAN GRADIENT: 60 MMHG
AV PEAK GRADIENT: 104 MMHG
AV VALVE AREA: 0.94 CM2
AV VELOCITY RATIO: 0.35
BASOPHILS # BLD AUTO: 0.03 K/UL (ref 0–0.2)
BASOPHILS NFR BLD: 0.5 % (ref 0–1.9)
BILIRUB SERPL-MCNC: 1 MG/DL (ref 0.1–1)
BSA FOR ECHO PROCEDURE: 1.46 M2
BUN SERPL-MCNC: 11 MG/DL (ref 8–23)
CALCIUM SERPL-MCNC: 9.1 MG/DL (ref 8.7–10.5)
CHLORIDE SERPL-SCNC: 104 MMOL/L (ref 95–110)
CO2 SERPL-SCNC: 24 MMOL/L (ref 23–29)
CREAT SERPL-MCNC: 0.7 MG/DL (ref 0.5–1.4)
CV ECHO LV RWT: 0.45 CM
CV STRESS BASE HR: 84 BPM
DIASTOLIC BLOOD PRESSURE: 80 MMHG
DIFFERENTIAL METHOD: ABNORMAL
DOP CALC AO PEAK VEL: 5.1 M/S
DOP CALC AO VTI: 106.31 CM
DOP CALC LVOT AREA: 3.1 CM2
DOP CALC LVOT DIAMETER: 1.99 CM
DOP CALC LVOT PEAK VEL: 1.8 M/S
DOP CALC LVOT STROKE VOLUME: 100.29 CM3
DOP CALCLVOT PEAK VEL VTI: 32.26 CM
ECHO LV POSTERIOR WALL: 0.99 CM (ref 0.6–1.1)
EOSINOPHIL # BLD AUTO: 0.2 K/UL (ref 0–0.5)
EOSINOPHIL NFR BLD: 2.4 % (ref 0–8)
ERYTHROCYTE [DISTWIDTH] IN BLOOD BY AUTOMATED COUNT: 13.4 % (ref 11.5–14.5)
EST. GFR  (AFRICAN AMERICAN): >60 ML/MIN/1.73 M^2
EST. GFR  (NON AFRICAN AMERICAN): >60 ML/MIN/1.73 M^2
FRACTIONAL SHORTENING: 30 % (ref 28–44)
GLUCOSE SERPL-MCNC: 115 MG/DL (ref 70–110)
HCT VFR BLD AUTO: 37.1 % (ref 37–48.5)
HGB BLD-MCNC: 11.9 G/DL (ref 12–16)
IMM GRANULOCYTES # BLD AUTO: 0.02 K/UL (ref 0–0.04)
IMM GRANULOCYTES NFR BLD AUTO: 0.3 % (ref 0–0.5)
INR PPP: 2.1 (ref 0.8–1.2)
INTERVENTRICULAR SEPTUM: 1.48 CM (ref 0.6–1.1)
IVRT: 0.06 MSEC
LA MAJOR: 7.55 CM
LEFT ATRIUM SIZE: 5.33 CM
LEFT INTERNAL DIMENSION IN SYSTOLE: 3.06 CM (ref 2.1–4)
LEFT VENTRICLE DIASTOLIC VOLUME INDEX: 58.79 ML/M2
LEFT VENTRICLE DIASTOLIC VOLUME: 85.93 ML
LEFT VENTRICLE MASS INDEX: 135 G/M2
LEFT VENTRICLE SYSTOLIC VOLUME INDEX: 25.1 ML/M2
LEFT VENTRICLE SYSTOLIC VOLUME: 36.63 ML
LEFT VENTRICULAR INTERNAL DIMENSION IN DIASTOLE: 4.36 CM (ref 3.5–6)
LEFT VENTRICULAR MASS: 196.73 G
LYMPHOCYTES # BLD AUTO: 1 K/UL (ref 1–4.8)
LYMPHOCYTES NFR BLD: 15.2 % (ref 18–48)
MCH RBC QN AUTO: 30.4 PG (ref 27–31)
MCHC RBC AUTO-ENTMCNC: 32.1 G/DL (ref 32–36)
MCV RBC AUTO: 95 FL (ref 82–98)
MONOCYTES # BLD AUTO: 0.5 K/UL (ref 0.3–1)
MONOCYTES NFR BLD: 8.2 % (ref 4–15)
MV PEAK E VEL: 2.3 M/S
NEUTROPHILS # BLD AUTO: 4.7 K/UL (ref 1.8–7.7)
NEUTROPHILS NFR BLD: 73.4 % (ref 38–73)
NRBC BLD-RTO: 0 /100 WBC
NUC REST DIASTOLIC VOLUME INDEX: 58
NUC REST EJECTION FRACTION: 58
NUC REST SYSTOLIC VOLUME INDEX: 24
OHS CV CPX 85 PERCENT MAX PREDICTED HEART RATE MALE: 119
OHS CV CPX MAX PREDICTED HEART RATE: 140
OHS CV CPX PATIENT IS FEMALE: 1
OHS CV CPX PATIENT IS MALE: 0
OHS CV CPX PEAK DIASTOLIC BLOOD PRESSURE: 74 MMHG
OHS CV CPX PEAK HEAR RATE: 109 BPM
OHS CV CPX PEAK RATE PRESSURE PRODUCT: NORMAL
OHS CV CPX PEAK SYSTOLIC BLOOD PRESSURE: 121 MMHG
OHS CV CPX PERCENT MAX PREDICTED HEART RATE ACHIEVED: 78
OHS CV CPX RATE PRESSURE PRODUCT PRESENTING: NORMAL
PISA TR MAX VEL: 3.22 M/S
PLATELET # BLD AUTO: 95 K/UL (ref 150–350)
PMV BLD AUTO: 9.7 FL (ref 9.2–12.9)
POCT GLUCOSE: 105 MG/DL (ref 70–110)
POTASSIUM SERPL-SCNC: 4.3 MMOL/L (ref 3.5–5.1)
PROT SERPL-MCNC: 6.8 G/DL (ref 6–8.4)
PROTHROMBIN TIME: 22.3 SEC (ref 9–12.5)
PV PEAK VELOCITY: 0.99 CM/S
RA MAJOR: 7.32 CM
RA PRESSURE: 3 MMHG
RA WIDTH: 4.53 CM
RBC # BLD AUTO: 3.91 M/UL (ref 4–5.4)
RIGHT VENTRICULAR END-DIASTOLIC DIMENSION: 3.81 CM
RV TISSUE DOPPLER FREE WALL SYSTOLIC VELOCITY 1 (APICAL 4 CHAMBER VIEW): 5.85 CM/S
SINUS: 2.99 CM
SODIUM SERPL-SCNC: 136 MMOL/L (ref 136–145)
STJ: 1.98 CM
STRESS ECHO TARGET HR: 123 BPM
SYSTOLIC BLOOD PRESSURE: 130 MMHG
TR MAX PG: 41 MMHG
TRICUSPID ANNULAR PLANE SYSTOLIC EXCURSION: 1.22 CM
TV REST PULMONARY ARTERY PRESSURE: 44 MMHG
WBC # BLD AUTO: 6.37 K/UL (ref 3.9–12.7)

## 2019-12-10 PROCEDURE — 25000003 PHARM REV CODE 250: Mod: HCNC | Performed by: EMERGENCY MEDICINE

## 2019-12-10 PROCEDURE — 85610 PROTHROMBIN TIME: CPT | Mod: HCNC

## 2019-12-10 PROCEDURE — 80053 COMPREHEN METABOLIC PANEL: CPT | Mod: HCNC

## 2019-12-10 PROCEDURE — 25000003 PHARM REV CODE 250: Mod: HCNC | Performed by: HOSPITALIST

## 2019-12-10 PROCEDURE — 85025 COMPLETE CBC W/AUTO DIFF WBC: CPT | Mod: HCNC

## 2019-12-10 PROCEDURE — G0378 HOSPITAL OBSERVATION PER HR: HCPCS | Mod: HCNC

## 2019-12-10 PROCEDURE — 63600175 PHARM REV CODE 636 W HCPCS: Mod: HCNC | Performed by: INTERNAL MEDICINE

## 2019-12-10 PROCEDURE — 36415 COLL VENOUS BLD VENIPUNCTURE: CPT | Mod: HCNC

## 2019-12-10 RX ORDER — REGADENOSON 0.08 MG/ML
0.4 INJECTION, SOLUTION INTRAVENOUS ONCE
Status: COMPLETED | OUTPATIENT
Start: 2019-12-10 | End: 2019-12-10

## 2019-12-10 RX ORDER — ACETAMINOPHEN 325 MG/1
650 TABLET ORAL EVERY 6 HOURS PRN
Status: DISCONTINUED | OUTPATIENT
Start: 2019-12-10 | End: 2019-12-10 | Stop reason: HOSPADM

## 2019-12-10 RX ORDER — METOPROLOL TARTRATE 25 MG/1
25 TABLET, FILM COATED ORAL 2 TIMES DAILY
Status: ON HOLD | COMMUNITY
End: 2019-12-10 | Stop reason: HOSPADM

## 2019-12-10 RX ORDER — LANCETS
EACH MISCELLANEOUS
Qty: 200 EACH | Refills: 11 | Status: SHIPPED | OUTPATIENT
Start: 2019-12-10 | End: 2020-03-12

## 2019-12-10 RX ORDER — AMLODIPINE BESYLATE 5 MG/1
5 TABLET ORAL DAILY
Status: DISCONTINUED | OUTPATIENT
Start: 2019-12-11 | End: 2019-12-10

## 2019-12-10 RX ADMIN — LOSARTAN POTASSIUM 50 MG: 25 TABLET, FILM COATED ORAL at 12:12

## 2019-12-10 RX ADMIN — ACETAMINOPHEN 650 MG: 325 TABLET ORAL at 08:12

## 2019-12-10 RX ADMIN — REGADENOSON 0.4 MG: 0.08 INJECTION, SOLUTION INTRAVENOUS at 11:12

## 2019-12-10 RX ADMIN — PRAVASTATIN SODIUM 20 MG: 10 TABLET ORAL at 12:12

## 2019-12-10 NOTE — TELEPHONE ENCOUNTER
----- Message from Kusum Quick sent at 12/10/2019  8:30 AM CST -----  Contact: self  Type: Patient Call Back    Who called:pt     What is the request in detail:pt  needs to speak to Nurse    Can the clinic reply by [facility]?no    Would the patient rather a call back or a response via [facility]? call    Best call back number:

## 2019-12-10 NOTE — NURSING
Discharge instructions given to patient and friend  at bedside. Patient and friend verbalized understanding of instructions. Patient states willingness to comply. Saline lock removed. Tele monitoring removed. Transport requested

## 2019-12-10 NOTE — NURSING
Patient returned to room from NM. Patient awake, alert, oriented without c/o discomfort. Tele monitoring in progress. No apparent distress noted at this time.

## 2019-12-10 NOTE — PLAN OF CARE
Problem: Fall Injury Risk  Goal: Absence of Fall and Fall-Related Injury  Outcome: Ongoing, Progressing  Intervention: Promote Injury-Free Environment  Flowsheets (Taken 12/9/2019 1933)  Safety Promotion/Fall Prevention: assistive device/personal item within reach; bed alarm set; Fall Risk reviewed with patient/family; high risk medications identified; lighting adjusted; muscle strengthening facilitated; nonskid shoes/socks when out of bed; instructed to call staff for mobility  Environmental Safety Modification: assistive device/personal items within reach; clutter free environment maintained; lighting adjusted; room near unit station; room organization consistent     Problem: Adult Inpatient Plan of Care  Goal: Plan of Care Review  Outcome: Ongoing, Progressing  Goal: Patient-Specific Goal (Individualization)  Outcome: Ongoing, Progressing  Goal: Absence of Hospital-Acquired Illness or Injury  Outcome: Ongoing, Progressing  Goal: Optimal Comfort and Wellbeing  Outcome: Ongoing, Progressing  Goal: Readiness for Transition of Care  Outcome: Ongoing, Progressing  Goal: Rounds/Family Conference  Outcome: Ongoing, Progressing     Problem: Diabetes Comorbidity  Goal: Blood Glucose Level Within Desired Range  Outcome: Ongoing, Progressing  Intervention: Maintain Glycemic Control  Flowsheets (Taken 12/9/2019 1933)  Glycemic Management: blood glucose monitoring     Problem: Pain (Acute Coronary Syndrome)  Goal: Absence of Cardiac-Related Pain  Outcome: Ongoing, Progressing  Intervention: Manage Cardiac Pain Symptoms  Flowsheets (Taken 12/9/2019 1933)  Chest Pain Intervention: cardiac biomarkers drawn; cardiac monitoring continued; cardiac monitor placed; activity minimized; oxygen applied     Problem: Tissue Perfusion (Acute Coronary Syndrome)  Goal: Adequate Tissue Perfusion  Outcome: Ongoing, Progressing   Pt admitted for CP remained free of falls and free of CP this shift. Cardiology consulted, plans for NM stress  test and echo tomorrow. Pt updated on plan of care.

## 2019-12-10 NOTE — PROGRESS NOTES
H??ng D?n Sandoval?t Vi?n ??i v?i Ch?ng ?au Th?t Ng?c  Quý v? ?ã ???c ch?n ?oán m?c m?t lo?i ?au ng?c có tên là ?au th?t ng?c. Ch?ng ?au này x?y ra khi l??ng ôxi ??n c? anthony quá ít. C?m giác ?au th??ng sandoval?t hi?n d??i x??ng ?c, ? vai trái c?a quý v?, ho?c sandoval?ng d??i cánh mamadou trái. C?m giác ?au th?m chí có th? magdiel ??n hàm ho?c l?ng. T?p th? d?c, ho?t ??ng tamara?u, tr?m c?m, ho?c c?ng th?ng có th? kh?i ??u ch?ng ?au này. B?ng bi?n pháp ?i?u tr? và thay ??i l?i s?ng thích h?p ?? gi?m thi?u các nhân t? r?i ro, h?u h?t b?nh nhân ?au th?t ng?c có th? jessica trì ??i s?ng tr?n v?n và tích c?c.  Ki?m Soát Ca?c Nhân Tô? Ru?i Ro  · Tìm hi?u cách ?o vijay?t áp. Ghi l?i k?t qu? ?o. Hãy h?i bác s? ?? bi?t có k?t qu? nào thì quý v? c?n ph?i ???c ch?m sóc y t?.  · Jessica tri? m??c cân n??ng h??p ly?. Nh? ng??i giúp ?? gi?m cân th?a.  · Gi?m ?n mu?i.  ? H?n ch? th?c ?n ?óng h?p, s?y khô, ?óng gói, và th?c ?n nhanh.  ? Không thêm mu?i vào th?c ?n t?i bàn ?n.  ? Dùng th?o d??c thay cho mu?i ?? nêm khi n?u ?n.  · B?t ??u m?t ch??ng trình t?p th? d?c. H?i ý ki?n bác s? ?? bi?t cách b?t ??u. Các ho?t ??ng ??n gi?n ch?ng h?n nh? ?i b? ho?c làm v??n có th? có ích cho quý v?.  · B? thói quen hút thu?c. ??ng ký m?t ch??ng trình kel thu?c lá ?? c?i thi?n c? h?i thành công c?a quý v?.  · Tránh các tình hu?ng c?ng th?ng. Ho?c các ky? thuâ?t kiê?m soa?t c?ng th??ng.  Dùng Thu?c  · Ghi l?i nh?ng c?n ?au ng?c. Jolly ollie ghi chú này khi ?i khám b?nh.  · S? d?ng thu?c ?úng ollie ch? ??nh (th??ng là nitroglycerin). Không b? c?.  Dùng Nitroglycerin  · Lúc nào c?ng jolly ollie thu?c nitroglycerin.  · N?u quý v? ?ang dùng nitroglycerin, hoàn toàn không ???c dùng Viagra, Cialis, ho?c Levitra. Nh?ng thu?c này có th? ph?n ?ng v?i nitroglycerin và gây ra t?t vijay?t áp ??n m?c nguy hi?m ho?c th?m chí ?e d?a tính m?ng.  · N?u quý v? dùng nitroglycerin ?? phòng tránh các c?n ?au th?t, hãy tuân th? ch? ??nh c?a bác s? ??i v?i lo?i thu?c nitroglycerin c?a quý v? (thu?c viên, phun,  hay m? trên da, ho?c mi?ng ??p).  · N?u quý v? s? d?ng nitroglycerin ?? ng?n ch?n m?t c?n ?au th?t, hãy th?c hi?n nh? mariela:  ? Hãy ng?i couch?ng (quý v? có th? c?m th?y chóng m?t).  ? Hãy ??t m?t viên bên d??i l??i, ho?c gi?a môi và n??u, ho?c gi?a má và n??u. ?? cho viên thu?c tan h?t; ??ng nhai hay nu?t.  ? N?u quý v? dùng thu?c phun, thì hãy phun m?t l?n bên trên ho?c d??i l??i. ??ng hít. Hãy ng?m mi?ng l?i. Ch? vài giây tr??c khi nu?t.  ? Mariela khi dùng m?t viên ho?c phun thu?c m?t l?n, hãy ng?i tian 5 phút.  ? N?u ch?ng ?au th?t h?t h?n, hãy ngh? ng?i m?t lúc và ti?p t?c ho?t ??ng bình th??ng.  ? N?u ch?ng ?au th?t không d?t ho?c tr? n?ng, HY G?I 911 ngay l?p t?c. ??NG ch?m tr?. Quý v? có th? b? nh?i máu c? anthony ?ó!  ? Mariela khi g?i 911, hãy dùng viên th? todd. Ho?c, phun thu?c l?n th? todd. Ch? 5 phút n?a. N?u ch?ng ?au th?t v?n không d?t, hãy dùng viên th? ba, ho?c phun thu?c l?n th? ba. ??ng dùng quá 3 viên, ho?c phun thu?c quá 3 l?n, tian vòng 15 phút. Hãy gi? ?i?n tho?i liên l?c v?i 911 ?? ???c h??ng d?n thêm.  Abran Do?i  ??t h?n abran dõi abran h??ng d?n c?a nhân sony esquivel.  Khi Nào C?n G?i Cho Bác S?  Hãy g?i cho bác s? ngay n?u quý v? có b?t k? tri?u ch?ng nào mariela ?ây:  · Nh?c ??u n?ng  · Chóng m?t n?ng ho?c ng?t x?u  · Bu?n nôn ho?c ói m?a  · Anthony ??p nhanh (trên 100 l?n m?i phút)  · M?t cá s?ng  · Y?u s?c  · Các c?n ?au th?t kéo dài h?n, couch?t hi?n th??ng xuyên h?n, ho?c n?ng h?n tr??c ?ây

## 2019-12-10 NOTE — PLAN OF CARE
"   12/10/19 1242   Final Note   Assessment Type Final Discharge Note   Anticipated Discharge Disposition Home   Hospital Follow Up  Appt(s) scheduled? Yes   Discharge plans and expectations educations in teach back method with documentation complete? Yes   Right Care Referral Info   Post Acute Recommendation No Care   pts nurse Micki notified that pt can d/c from CM standpoint.    EDUCATION:   provided with educational information on Chest Pain.  Information reviewed and placed in :My Healthcare Packet" to be brought home to use as resource after discharge.  Information included:  signs and symptoms to look for and call the doctor if experiencing, and symptoms that may indicate a medical emergency: CALL 911.      All questions answered.  Teach back method used.    Patient stated, "  Will call MD if chest pain returns or gets worse--go to scheduled appointments and take meds asprescribed".        "

## 2019-12-10 NOTE — HOSPITAL COURSE
Mr. Ty is a 73 yo female who was admitted for chest pain. ACS ruled out. No further chest pain during hospital stay. NST no evidence of ischemia. 2 D echo showed normal EF, normal diastolic, and septal wall abnormal motion consistent with post op status. A fib rate controlled during stay. Patient stable for discharge home with follow up primary Cardiologist Dr. Grigsby on 1/10/2020.

## 2019-12-10 NOTE — TELEPHONE ENCOUNTER
Mr. Ty states that the patient needs AccuCheck lancets and strps. States that the patient checks her levels BID.       Please advise and review orders.    Thanks,  Tegan

## 2019-12-10 NOTE — PLAN OF CARE
Problem: Fall Injury Risk  Goal: Absence of Fall and Fall-Related Injury  Outcome: Ongoing, Progressing  Intervention: Identify and Manage Contributors to Fall Injury Risk  Flowsheets (Taken 12/10/2019 0344)  Self-Care Promotion: independence encouraged; BADL personal objects within reach  Medication Review/Management: medications reviewed     Problem: Fall Injury Risk  Goal: Absence of Fall and Fall-Related Injury  Intervention: Identify and Manage Contributors to Fall Injury Risk  Flowsheets (Taken 12/10/2019 0344)  Self-Care Promotion: independence encouraged; BADL personal objects within reach  Medication Review/Management: medications reviewed     Problem: Adult Inpatient Plan of Care  Goal: Plan of Care Review  Outcome: Ongoing, Progressing     Problem: Adult Inpatient Plan of Care  Goal: Absence of Hospital-Acquired Illness or Injury  Outcome: Ongoing, Progressing     Problem: Adult Inpatient Plan of Care  Goal: Absence of Hospital-Acquired Illness or Injury  Outcome: Ongoing, Progressing     Problem: Diabetes Comorbidity  Goal: Blood Glucose Level Within Desired Range  Outcome: Ongoing, Progressing     Problem: Pain (Acute Coronary Syndrome)  Goal: Absence of Cardiac-Related Pain  Intervention: Manage Cardiac Pain Symptoms  Flowsheets (Taken 12/10/2019 0344)  Chest Pain Intervention: cardiac biomarkers drawn; cardiac monitoring continued; oxygen applied     Problem: Pain (Acute Coronary Syndrome)  Goal: Absence of Cardiac-Related Pain  Intervention: Manage Cardiac Pain Symptoms  Flowsheets (Taken 12/10/2019 0344)  Chest Pain Intervention: cardiac biomarkers drawn; cardiac monitoring continued; oxygen applied   No chest pain reported this shift. Tele continues A-Fib with pacer beats. Troponins negative x3. Remains NPO after midnight for nuclear stress.

## 2019-12-10 NOTE — PROGRESS NOTES
WRITTEN HEALTHCARE DISCHARGE INFORMATION      Things that YOU are RESPONSIBLE for to Manage Your Care At Home:     1. Getting your prescriptions filled.  2. Taking you medications as directed. DO NOT MISS ANY DOSES!  3. Going to your follow-up doctor appointments. This is important because it allows the doctor to monitor your progress and to determine if any changes need to be made to your treatment plan.     If you are unable to make your follow up appointments, please call the number listed and reschedule this appointment.      ____________HELP AT HOME____________________     Experiencing any SIGNS or SYMPTOMS: YOU CAN     Schedule a same day appopintment with your Primary Care Doctor or  you can call [facility] On Call Nurse Care Line for 24/7 assistance at [phone]     If you are experience any signs or symptoms that have become severe, Call 911 and come to your nearest Emergency Room.     Thank you for choosing [facility] and allowing us to care for you.   From your care management team:      You should receive a call from [facility] Discharge Department within 48-72 hours to help manage your care after discharge. Please try to make sure that you answer your phone for this important phone call.    Follow-up Information     Otis Zimmer MD On 12/17/2019.    Specialty:  Internal Medicine  Why:  Appointment scheduled for Decenber 17th at 1:40pm  Contact information:  [address] [place] [address]  [phone]             Ludwig Grigsby MD On 1/10/2020.    Specialties:  Cardiology, INTERVENTIONAL CARDIOLOGY  Why:  Appointment scheduled for January 10th at 10am  Contact information:  [address] [facility] [address]  [address]  [facility] [address]  [phone]

## 2019-12-10 NOTE — NURSING
Patient to NM via wc. Patient awake, alert, oriented with mild headache, (prn APAP given 0832 see MAR) . No apparent distress noted.

## 2019-12-10 NOTE — DISCHARGE SUMMARY
[facility] Hospital Medicine  Discharge Summary      Patient Name: Orion Ty  MRN: [id number]  Admission Date: 12/9/2019  Hospital Length of Stay: 0 days  Discharge Date and Time:  12/10/2019 12:39 PM  Attending Physician: Loraine Sabillon MD   Discharging Provider: Lacy Ty NP  Primary Care Provider: Otis Zimmer MD      HPI:   Orion Ty is a 74 y.o. female patient with PMHx including DMII, pacemaker, valve replacement (on coumadin), HTN.  Patient presents with her .  She speaks minimal English but does understand and nose enough to make her needs known.  Her  who is the at the bedside speaks English.  Per patient she presents for evaluation of typical and atypical features of chest tightness that patient reports that began 1 day prior to presentation she also reports left shoulder pain but denies that it radiates from her mid sternal/epigastric area.  She rates the pain 5/10.  She did not take anything for the chest pain.  And reports that it relieved without intervention.  She denies any prior chest pain similar.  Patient has a history of valve replacement she is on Coumadin with therapeutic INR.    * No surgery found *      Hospital Course:   Mr. Ty is a 75 yo female who was admitted for chest pain. ACS ruled out. No further chest pain during hospital stay. NST no evidence of ischemia. 2 D echo showed normal EF, normal diastolic, and septal wall abnormal motion consistent with post op status. A fib rate controlled during stay. Patient stable for discharge home with follow up primary Cardiologist Dr. Grigsby on 1/10/2020.      Consults:   Consults (From admission, onward)        Status Ordering Provider     Inpatient consult to Cardiology  Once     Provider:  Varinder Barahona MD    Completed CARINE PABLO          * Chest pain  Typical and atypical features of chest tightness that patient reports that began 1 day prior to presentation she  also reports left shoulder pain but denies that it radiates from her mid sternal/epigastric area.  She rates the pain 5/10.  Continuous cardiac monitoring  Continue to trend CE Q8 hrs X3 sets   initial dose then 81 mg PO Daily  2D Echo  Consult to cardiology  UA, TSH, lipid panel              Final Active Diagnoses:    Diagnosis Date Noted POA    PRINCIPAL PROBLEM:  Chest pain [R07.9] 12/09/2019 Yes    CAD (coronary artery disease) [I25.10] 12/09/2019 Yes    Volume overload [E87.70] 12/09/2019 Yes    PAH (pulmonary artery hypertension) [I27.21] 12/09/2019 Yes    Type 2 diabetes mellitus with microalbuminuria, without long-term current use of insulin [E11.29, R80.9] 05/26/2015 Yes     Chronic    S/P AVR [Z95.2] 02/24/2015 Not Applicable    S/P MVR (mitral valve replacement) [Z95.2] 02/24/2015 Not Applicable    Subclinical hypothyroidism [E03.9] 01/08/2015 Yes     Chronic    Mixed hyperlipidemia [E78.2] 01/08/2015 Yes     Chronic    Chronic atrial fibrillation [I48.20] 06/25/2013 Yes     Chronic      Problems Resolved During this Admission:       Discharged Condition: good    Disposition: Home or Self Care    Follow Up:  Follow-up Information     Otis Zimmer MD On 12/17/2019.    Specialty:  Internal Medicine  Why:  Appointment scheduled for Decenber 17th at 1:40pm  Contact information:  [address] [facility] [address]  [phone]             Ludwig Grigsby MD On 1/10/2020.    Specialties:  Cardiology, INTERVENTIONAL CARDIOLOGY  Why:  Appointment scheduled for January 10th at 10am  Contact information:  [address]  [address]  [facility] [address]  [phone]                 Patient Instructions:      Diet Cardiac     Diet diabetic     Notify your health care provider if you experience any of the following:  temperature >100.4     Notify your health care provider if you experience any of the following:  difficulty breathing or increased cough     Notify your health care provider if  you experience any of the following:  increased confusion or weakness       Significant Diagnostic Studies: Labs:   BMP:   Recent Labs   Lab 12/09/19 0226 12/10/19  0647    115*   * 136   K 4.2 4.3    104   CO2 22* 24   BUN 16 11   CREATININE 0.8 0.7   CALCIUM 9.6 9.1   , CMP   Recent Labs   Lab 12/09/19  0226 12/10/19  0647   * 136   K 4.2 4.3    104   CO2 22* 24    115*   BUN 16 11   CREATININE 0.8 0.7   CALCIUM 9.6 9.1   PROT 7.4 6.8   ALBUMIN 4.2 3.8   BILITOT 0.7 1.0   ALKPHOS 74 63   AST 35 28   ALT 19 12   ANIONGAP 11 8   ESTGFRAFRICA >60 >60   EGFRNONAA >60 >60   , CBC   Recent Labs   Lab 12/09/19  0226 12/10/19  0647   WBC 7.44 6.37   HGB 12.3 11.9*   HCT 37.8 37.1   PLT 96* 95*   , INR   Lab Results   Component Value Date    INR 2.1 (H) 12/10/2019    INR 2.5 (H) 12/09/2019    INR 3.9 (H) 12/04/2019   , Lipid Panel   Lab Results   Component Value Date    CHOL 131 12/09/2019    HDL 43 12/09/2019    LDLCALC 73.6 12/09/2019    TRIG 72 12/09/2019    CHOLHDL 32.8 12/09/2019   , Troponin   Recent Labs   Lab 12/09/19  1655   TROPONINI 0.010    and A1C:   Recent Labs   Lab 12/09/19  0730   HGBA1C 6.0*       Pending Diagnostic Studies:     None         Medications:  Reconciled Home Medications:      Medication List      CHANGE how you take these medications    * lancets 30 gauge Misc  What changed:  Another medication with the same name was added. Make sure you understand how and when to take each.     * lancets Misc  Commonly known as:  Accu-Chek Softclix Lancets  Use to test blood sugar BID  What changed:  You were already taking a medication with the same name, and this prescription was added. Make sure you understand how and when to take each.     metoprolol tartrate 25 MG tablet  Commonly known as:  LOPRESSOR  TAKE 1 TABLET BY MOUTH TWICE DAILY  What changed:  Another medication with the same name was removed. Continue taking this medication, and follow the directions you  see here.     * Truetest Test Strips Strp  Generic drug:  blood sugar diagnostic  USE ONCE DAILY  What changed:  See the new instructions.     * blood sugar diagnostic Strp  Use to test blood sugar BID  What changed:  You were already taking a medication with the same name, and this prescription was added. Make sure you understand how and when to take each.         * This list has 4 medication(s) that are the same as other medications prescribed for you. Read the directions carefully, and ask your doctor or other care provider to review them with you.            CONTINUE taking these medications    B-COMPLEX WITH VITAMIN C ORAL     BANDAGES/PLASTIC TOP     blood-glucose meter Misc  Accu-chek Cortney Glucose Meter, Use to test blood sugar once daily.     Fluzone High-Dose 2019-20 (PF) 180 mcg/0.5 mL Syrg  Generic drug:  flu vacc [id number](65yr up)PF  ADM 0.5ML IM UTD     lancing device Misc     losartan 50 MG tablet  Commonly known as:  COZAAR  Take 1 tablet (50 mg total) by mouth once daily.     metFORMIN 500 MG tablet  Commonly known as:  GLUCOPHAGE  TAKE 2 TABLETS BY MOUTH EVERY MORNING AND 1 TABLET EVERY EVENING     multivitamin-Ca-iron-minerals 27-0.4 mg Tab     nitroGLYCERIN 0.4 MG SL tablet  Commonly known as:  NITROSTAT  DISSOLVE 1 TABLET UNDER THE TONGUE EVERY 5 MINUTES AS NEEDED FOR CHEST PAINS     omega-3 acid ethyl esters 1 gram capsule  Commonly known as:  LOVAZA  TK 2 CS PO BID     pravastatin 20 MG tablet  Commonly known as:  PRAVACHOL  Take 1 tablet (20 mg total) by mouth once daily.     warfarin 4 MG tablet  Commonly known as:  COUMADIN  TAKE 1 TABLET BY MOUTH ON MONDAY AND FRIDAY AND 1/2 TABLET BY MOUTH ON ALL OTHER DAYS        STOP taking these medications    HEMORRHOIDAL CREAM RECT        ASK your doctor about these medications    Accu-Chek Cortney Control Soln Soln  Generic drug:  blood glucose control high,low            Indwelling Lines/Drains at time of discharge:   Lines/Drains/Airways      None                 Time spent on the discharge of patient: 35 minutes  Patient was seen and examined on the date of discharge and determined to be suitable for discharge.         Lacy Ty NP  Department of Hospital Medicine  [facility]

## 2019-12-12 NOTE — PROGRESS NOTES
Called Patient and left message to remind her to test her INR today 12/12 since her last INR was low, and to call coumadin clinic if any questions

## 2019-12-17 ENCOUNTER — ANTI-COAG VISIT (OUTPATIENT)
Dept: CARDIOLOGY | Facility: CLINIC | Age: 74
End: 2019-12-17
Payer: MEDICARE

## 2019-12-17 ENCOUNTER — OFFICE VISIT (OUTPATIENT)
Dept: FAMILY MEDICINE | Facility: CLINIC | Age: 74
End: 2019-12-17
Payer: MEDICARE

## 2019-12-17 VITALS
TEMPERATURE: 98 F | HEIGHT: 61 IN | WEIGHT: 120.5 LBS | HEART RATE: 70 BPM | BODY MASS INDEX: 22.75 KG/M2 | SYSTOLIC BLOOD PRESSURE: 124 MMHG | DIASTOLIC BLOOD PRESSURE: 60 MMHG | OXYGEN SATURATION: 95 %

## 2019-12-17 DIAGNOSIS — E78.2 MIXED HYPERLIPIDEMIA: Chronic | ICD-10-CM

## 2019-12-17 DIAGNOSIS — E11.29 TYPE 2 DIABETES MELLITUS WITH MICROALBUMINURIA, WITHOUT LONG-TERM CURRENT USE OF INSULIN: Chronic | ICD-10-CM

## 2019-12-17 DIAGNOSIS — I10 ESSENTIAL (PRIMARY) HYPERTENSION: Chronic | ICD-10-CM

## 2019-12-17 DIAGNOSIS — I48.20 CHRONIC ATRIAL FIBRILLATION: ICD-10-CM

## 2019-12-17 DIAGNOSIS — R80.9 TYPE 2 DIABETES MELLITUS WITH MICROALBUMINURIA, WITHOUT LONG-TERM CURRENT USE OF INSULIN: Chronic | ICD-10-CM

## 2019-12-17 DIAGNOSIS — Z09 HOSPITAL DISCHARGE FOLLOW-UP: Primary | ICD-10-CM

## 2019-12-17 DIAGNOSIS — Z95.2 S/P MVR (MITRAL VALVE REPLACEMENT): ICD-10-CM

## 2019-12-17 DIAGNOSIS — E03.8 SUBCLINICAL HYPOTHYROIDISM: Chronic | ICD-10-CM

## 2019-12-17 DIAGNOSIS — Z95.2 HEART VALVE REPLACED: ICD-10-CM

## 2019-12-17 DIAGNOSIS — I27.21 PAH (PULMONARY ARTERY HYPERTENSION): ICD-10-CM

## 2019-12-17 DIAGNOSIS — Z95.2 S/P AVR: ICD-10-CM

## 2019-12-17 DIAGNOSIS — Z79.01 LONG TERM CURRENT USE OF ANTICOAGULANT: ICD-10-CM

## 2019-12-17 DIAGNOSIS — I48.20 CHRONIC ATRIAL FIBRILLATION: Chronic | ICD-10-CM

## 2019-12-17 PROBLEM — R07.9 CHEST PAIN: Status: RESOLVED | Noted: 2019-12-09 | Resolved: 2019-12-17

## 2019-12-17 LAB — INR PPP: 3.2

## 2019-12-17 PROCEDURE — 99999 PR PBB SHADOW E&M-EST. PATIENT-LVL III: ICD-10-PCS | Mod: PBBFAC,HCNC,, | Performed by: INTERNAL MEDICINE

## 2019-12-17 PROCEDURE — 93793 PR ANTICOAGULANT MGMT FOR PT TAKING WARFARIN: ICD-10-PCS | Mod: S$GLB,,, | Performed by: PHARMACIST

## 2019-12-17 PROCEDURE — 99214 OFFICE O/P EST MOD 30 MIN: CPT | Mod: HCNC,S$GLB,, | Performed by: INTERNAL MEDICINE

## 2019-12-17 PROCEDURE — 99999 PR PBB SHADOW E&M-EST. PATIENT-LVL III: CPT | Mod: PBBFAC,HCNC,, | Performed by: INTERNAL MEDICINE

## 2019-12-17 PROCEDURE — 93793 ANTICOAG MGMT PT WARFARIN: CPT | Mod: S$GLB,,, | Performed by: PHARMACIST

## 2019-12-17 PROCEDURE — 99214 PR OFFICE/OUTPT VISIT, EST, LEVL IV, 30-39 MIN: ICD-10-PCS | Mod: HCNC,S$GLB,, | Performed by: INTERNAL MEDICINE

## 2019-12-17 RX ORDER — CIPROFLOXACIN HYDROCHLORIDE 3 MG/ML
SOLUTION/ DROPS OPHTHALMIC
Refills: 1 | COMMUNITY
Start: 2019-12-14 | End: 2020-02-01 | Stop reason: CLARIF

## 2019-12-17 RX ORDER — WARFARIN 4 MG/1
TABLET ORAL
Qty: 135 TABLET | Refills: 1 | Status: SHIPPED | OUTPATIENT
Start: 2019-12-17 | End: 2021-01-01

## 2019-12-17 RX ORDER — LOSARTAN POTASSIUM 50 MG/1
50 TABLET ORAL DAILY
Qty: 90 TABLET | Refills: 1 | Status: SHIPPED | OUTPATIENT
Start: 2019-12-17 | End: 2020-06-12 | Stop reason: SDUPTHER

## 2019-12-17 RX ORDER — PRAVASTATIN SODIUM 20 MG/1
20 TABLET ORAL DAILY
Qty: 90 TABLET | Refills: 1 | Status: SHIPPED | OUTPATIENT
Start: 2019-12-17 | End: 2020-06-12 | Stop reason: SDUPTHER

## 2019-12-17 RX ORDER — PREDNISOLONE ACETATE 10 MG/ML
SUSPENSION/ DROPS OPHTHALMIC
Refills: 1 | COMMUNITY
Start: 2019-12-14 | End: 2020-02-01 | Stop reason: CLARIF

## 2019-12-17 RX ORDER — METOPROLOL TARTRATE 25 MG/1
TABLET, FILM COATED ORAL
Qty: 180 TABLET | Refills: 1 | Status: SHIPPED | OUTPATIENT
Start: 2019-12-17 | End: 2020-07-13 | Stop reason: SDUPTHER

## 2019-12-17 NOTE — PROGRESS NOTES
Assessment & Plan  Problem List Items Addressed This Visit        Pulmonary    PAH (pulmonary artery hypertension)    Overview     Secondary to valvular heart disease         Current Assessment & Plan     Stable.  Monitor             Cardiac/Vascular    Mixed hyperlipidemia (Chronic)    Current Assessment & Plan     The current medical regimen is effective;  continue present plan and medications.          Relevant Medications    pravastatin (PRAVACHOL) 20 MG tablet    Essential (primary) hypertension (Chronic)    Current Assessment & Plan     The current medical regimen is effective;  continue present plan and medications.          Relevant Medications    losartan (COZAAR) 50 MG tablet    Other Relevant Orders    CBC auto differential    Lipid panel    Chronic atrial fibrillation (Chronic)    Current Assessment & Plan     The current medical regimen is effective;  continue present plan and medications.          Relevant Medications    metoprolol tartrate (LOPRESSOR) 25 MG tablet    warfarin (COUMADIN) 4 MG tablet       Endocrine    Type 2 diabetes mellitus with microalbuminuria, without long-term current use of insulin (Chronic)    Current Assessment & Plan     The current medical regimen is effective;  continue present plan and medications.          Relevant Orders    Hemoglobin A1c    Comprehensive metabolic panel    Microalbumin/creatinine urine ratio    Subclinical hypothyroidism (Chronic)    Current Assessment & Plan     Recheck labs at follow up         Relevant Orders    TSH      Other Visit Diagnoses     Hospital discharge follow-up    -  Primary  -    Doing well.  The current medical regimen is effective;  continue present plan and medications.     Heart valve replaced        Relevant Medications    warfarin (COUMADIN) 4 MG tablet            Health Maintenance reviewed, shingrix from pharmacy.    Follow-up: Follow up in about 6 months (around 6/17/2020) for Routine  Physical.  ______________________________________________________________________    Chief Complaint  Chief Complaint   Patient presents with    Hospital Follow Up       HPI  Orion Ty is a 74 y.o. female with medical diagnoses as listed in the medical history and problem list that presents for hospital follow up.  Pt is known to me with her last appointment 11/11/2019.      Hospital Course:   Mr. Ty is a 75 yo female who was admitted for chest pain. ACS ruled out. No further chest pain during hospital stay. NST no evidence of ischemia. 2 D echo showed normal EF, normal diastolic, and septal wall abnormal motion consistent with post op status. A fib rate controlled during stay. Patient stable for discharge home with follow up primary Cardiologist Dr. Grigsby on 1/10/2020.      Currently:  She is doing very well.  Taking and tolerating medications as prescribed without perceived side effects.  No CP, SOB, palpitations, hypoglycemic symptoms.        PAST MEDICAL HISTORY:  Past Medical History:   Diagnosis Date    Closed displaced fracture of distal phalanx of lesser toe 10/20/2015    Diabetes mellitus     Diabetes mellitus, type 2     Hypertension     Osteopenia     Pacemaker     Rheumatic heart disease        PAST SURGICAL HISTORY:  Past Surgical History:   Procedure Laterality Date    CARDIAC PACEMAKER PLACEMENT      CARDIAC VALVE REPLACEMENT      CARDIAC VALVE SURGERY      thryoid s         SOCIAL HISTORY:  Social History     Socioeconomic History    Marital status:      Spouse name: Not on file    Number of children: Not on file    Years of education: Not on file    Highest education level: Not on file   Occupational History    Not on file   Social Needs    Financial resource strain: Not on file    Food insecurity:     Worry: Not on file     Inability: Not on file    Transportation needs:     Medical: Not on file     Non-medical: Not on file   Tobacco Use    Smoking status:  Never Smoker    Smokeless tobacco: Never Used   Substance and Sexual Activity    Alcohol use: No    Drug use: Never    Sexual activity: Not on file   Lifestyle    Physical activity:     Days per week: Not on file     Minutes per session: Not on file    Stress: Not on file   Relationships    Social connections:     Talks on phone: Not on file     Gets together: Not on file     Attends Mandaen service: Not on file     Active member of club or organization: Not on file     Attends meetings of clubs or organizations: Not on file     Relationship status: Not on file   Other Topics Concern    Not on file   Social History Narrative    Not on file       FAMILY HISTORY:  Family History   Problem Relation Age of Onset    Breast cancer Maternal Aunt     Colon cancer Neg Hx     Ovarian cancer Neg Hx        ALLERGIES AND MEDICATIONS: updated and reviewed.  Review of patient's allergies indicates:   Allergen Reactions    Aspirin Other (See Comments)     Current Outpatient Medications   Medication Sig Dispense Refill    B-COMPLEX WITH VITAMIN C ORAL       blood sugar diagnostic Strp Use to test blood sugar  strip 11    ciprofloxacin HCl (CILOXAN) 0.3 % ophthalmic solution INSTILL ONE DROP IN OD QID.  1    lancets (ACCU-CHEK SOFTCLIX LANCETS) Misc Use to test blood sugar  each 11    losartan (COZAAR) 50 MG tablet Take 1 tablet (50 mg total) by mouth once daily. 90 tablet 1    metFORMIN (GLUCOPHAGE) 500 MG tablet TAKE 2 TABLETS BY MOUTH EVERY MORNING AND 1 TABLET EVERY EVENING 270 tablet 3    metoprolol tartrate (LOPRESSOR) 25 MG tablet TAKE 1 TABLET BY MOUTH TWICE DAILY 180 tablet 1    multivitamin-Ca-iron-minerals 27-0.4 mg Tab       omega-3 acid ethyl esters (LOVAZA) 1 gram capsule TK 2 CS PO BID  2    pravastatin (PRAVACHOL) 20 MG tablet Take 1 tablet (20 mg total) by mouth once daily. 90 tablet 1    warfarin (COUMADIN) 4 MG tablet TAKE 1 TABLET BY MOUTH ON MONDAY AND FRIDAY AND 1/2 TABLET  BY MOUTH ON ALL OTHER DAYS 135 tablet 1    ACCU-CHEK GATO CONTROL SOLN Soln       adhesive bandage (BANDAGES/PLASTIC TOP)       blood-glucose meter Misc Accu-chek Gato Glucose Meter, Use to test blood sugar once daily. (Patient not taking: Reported on 12/17/2019) 1 each 1    FLUZONE HIGH-DOSE 2019-20, PF, 180 mcg/0.5 mL Syrg ADM 0.5ML IM UTD  0    lancets 30 gauge Misc       lancing device Misc       nitroGLYCERIN (NITROSTAT) 0.4 MG SL tablet DISSOLVE 1 TABLET UNDER THE TONGUE EVERY 5 MINUTES AS NEEDED FOR CHEST PAINS (Patient not taking: Reported on 12/17/2019) 275 tablet 0    prednisoLONE acetate (PRED FORTE) 1 % DrpS INSTILL ONE DROP IN OD QID  1    TRUETEST TEST STRIPS Strp USE ONCE DAILY (Patient not taking: Reported on 12/17/2019) 100 strip 6     No current facility-administered medications for this visit.          ROS  Review of Systems   Constitutional: Negative for chills, fever and unexpected weight change.   HENT: Negative for congestion, ear pain, hearing loss, rhinorrhea, sore throat and trouble swallowing.    Eyes: Negative for discharge, redness and visual disturbance.   Respiratory: Negative for cough, chest tightness, shortness of breath and wheezing.    Cardiovascular: Negative for chest pain, palpitations and leg swelling.   Gastrointestinal: Negative for abdominal pain, constipation, diarrhea, nausea and vomiting.   Endocrine: Negative for polydipsia, polyphagia and polyuria.   Genitourinary: Negative for decreased urine volume, dysuria and hematuria.   Musculoskeletal: Negative for arthralgias, joint swelling and myalgias.   Skin: Negative for color change and rash.   Neurological: Negative for dizziness, weakness, light-headedness and headaches.   Psychiatric/Behavioral: Negative for decreased concentration, dysphoric mood, sleep disturbance and suicidal ideas.           Physical Exam  Vitals:    12/17/19 1322   BP: 124/60   Pulse: 70   Temp: 97.8 °F (36.6 °C)   SpO2: 95%   Weight:  "54.6 kg (120 lb 7.7 oz)   Height: 5' 1" (1.549 m)    Body mass index is 22.76 kg/m².  Weight: 54.6 kg (120 lb 7.7 oz)   Height: 5' 1" (154.9 cm)   Physical Exam   Constitutional: She is oriented to person, place, and time. She appears well-developed and well-nourished. No distress.   HENT:   Head: Normocephalic and atraumatic.   Eyes: Pupils are equal, round, and reactive to light. Conjunctivae, EOM and lids are normal. No scleral icterus.   Neck: Full passive range of motion without pain. Neck supple. No JVD present. No thyromegaly present.   Cardiovascular: Normal rate, regular rhythm, intact distal pulses and normal pulses. Exam reveals no S3, no S4 and no friction rub.   Murmur heard.  Mechanical valve sounds noted   Pulmonary/Chest: Effort normal and breath sounds normal. She has no wheezes. She has no rhonchi. She has no rales.   Abdominal: Soft. Bowel sounds are normal. There is no tenderness.   Musculoskeletal: She exhibits no edema or tenderness.   Lymphadenopathy:        Head (right side): No submental and no submandibular adenopathy present.        Head (left side): No submental and no submandibular adenopathy present.     She has no cervical adenopathy.   Neurological: She is alert and oriented to person, place, and time.   Motor grossly intact.  Sensory grossly intact.  Symmetric facial movements palate elevated symmetrically tongue midline   Skin: Skin is warm and dry. No rash noted.   Psychiatric: She has a normal mood and affect. Her speech is normal and behavior is normal. Thought content normal.           Health Maintenance       Date Due Completion Date    Shingles Vaccine (1 of 2) 07/21/1994 ---    Pap Smear 03/16/2018 3/16/2017    Foot Exam 09/12/2019 9/12/2018    Colonoscopy 01/01/2020 1/1/2010 (Done)    Override on 1/1/2010: Done    Override on 1/1/2006: Done (repeat in 10 years)    Hemoglobin A1c 06/09/2020 12/9/2019    Eye Exam 07/24/2020 7/24/2019    Mammogram 09/19/2020 9/19/2019    Lipid " Panel 12/09/2020 12/9/2019    High Dose Statin 12/17/2020 12/17/2019    DEXA SCAN 06/17/2022 6/17/2019    Override on 8/4/2014: Done    TETANUS VACCINE 06/30/2026 6/30/2016 (Declined)    Override on 6/30/2016: Declined

## 2019-12-24 ENCOUNTER — PATIENT OUTREACH (OUTPATIENT)
Dept: ADMINISTRATIVE | Facility: HOSPITAL | Age: 74
End: 2019-12-24

## 2020-01-06 NOTE — PROGRESS NOTES
Called Patient  to check if INR was tested 12/23/19,  states it was not done but will test today 1/06/20

## 2020-01-08 ENCOUNTER — ANTI-COAG VISIT (OUTPATIENT)
Dept: CARDIOLOGY | Facility: CLINIC | Age: 75
End: 2020-01-08
Payer: MEDICARE

## 2020-01-08 DIAGNOSIS — Z79.01 LONG TERM CURRENT USE OF ANTICOAGULANT: ICD-10-CM

## 2020-01-08 DIAGNOSIS — Z95.2 S/P MVR (MITRAL VALVE REPLACEMENT): ICD-10-CM

## 2020-01-08 DIAGNOSIS — I48.20 CHRONIC ATRIAL FIBRILLATION: ICD-10-CM

## 2020-01-08 DIAGNOSIS — Z95.2 S/P AVR: ICD-10-CM

## 2020-01-08 LAB — INR PPP: 3

## 2020-01-08 PROCEDURE — 93793 PR ANTICOAGULANT MGMT FOR PT TAKING WARFARIN: ICD-10-PCS | Mod: S$GLB,,, | Performed by: PHARMACIST

## 2020-01-08 PROCEDURE — 93793 ANTICOAG MGMT PT WARFARIN: CPT | Mod: S$GLB,,, | Performed by: PHARMACIST

## 2020-01-15 NOTE — PROGRESS NOTES
Called Patient and left message to call coumadin clinic, need to check if INR was tested -1/14/20

## 2020-01-21 ENCOUNTER — PATIENT OUTREACH (OUTPATIENT)
Dept: ADMINISTRATIVE | Facility: OTHER | Age: 75
End: 2020-01-21

## 2020-01-21 DIAGNOSIS — Z12.11 COLON CANCER SCREENING: Primary | ICD-10-CM

## 2020-01-22 ENCOUNTER — OFFICE VISIT (OUTPATIENT)
Dept: CARDIOLOGY | Facility: CLINIC | Age: 75
End: 2020-01-22
Payer: MEDICARE

## 2020-01-22 VITALS
SYSTOLIC BLOOD PRESSURE: 132 MMHG | DIASTOLIC BLOOD PRESSURE: 60 MMHG | HEART RATE: 71 BPM | OXYGEN SATURATION: 95 % | BODY MASS INDEX: 21.14 KG/M2 | WEIGHT: 112 LBS | HEIGHT: 61 IN | RESPIRATION RATE: 15 BRPM

## 2020-01-22 DIAGNOSIS — E11.29 TYPE 2 DIABETES MELLITUS WITH MICROALBUMINURIA, WITHOUT LONG-TERM CURRENT USE OF INSULIN: Chronic | ICD-10-CM

## 2020-01-22 DIAGNOSIS — E78.2 MIXED HYPERLIPIDEMIA: Chronic | ICD-10-CM

## 2020-01-22 DIAGNOSIS — I48.20 CHRONIC ATRIAL FIBRILLATION: Chronic | ICD-10-CM

## 2020-01-22 DIAGNOSIS — Z95.0 CARDIAC PACEMAKER IN SITU: Primary | ICD-10-CM

## 2020-01-22 DIAGNOSIS — Z95.2 S/P AVR: ICD-10-CM

## 2020-01-22 DIAGNOSIS — R80.9 TYPE 2 DIABETES MELLITUS WITH MICROALBUMINURIA, WITHOUT LONG-TERM CURRENT USE OF INSULIN: Chronic | ICD-10-CM

## 2020-01-22 DIAGNOSIS — Z79.01 LONG TERM CURRENT USE OF ANTICOAGULANT: Chronic | ICD-10-CM

## 2020-01-22 DIAGNOSIS — Z95.2 S/P MVR (MITRAL VALVE REPLACEMENT): ICD-10-CM

## 2020-01-22 DIAGNOSIS — I09.9 RHEUMATIC HEART DISEASE: Chronic | ICD-10-CM

## 2020-01-22 DIAGNOSIS — I10 ESSENTIAL (PRIMARY) HYPERTENSION: Chronic | ICD-10-CM

## 2020-01-22 PROCEDURE — 3044F PR MOST RECENT HEMOGLOBIN A1C LEVEL <7.0%: ICD-10-PCS | Mod: HCNC,CPTII,S$GLB, | Performed by: INTERNAL MEDICINE

## 2020-01-22 PROCEDURE — 3044F HG A1C LEVEL LT 7.0%: CPT | Mod: HCNC,CPTII,S$GLB, | Performed by: INTERNAL MEDICINE

## 2020-01-22 PROCEDURE — 1126F AMNT PAIN NOTED NONE PRSNT: CPT | Mod: HCNC,S$GLB,, | Performed by: INTERNAL MEDICINE

## 2020-01-22 PROCEDURE — 1101F PR PT FALLS ASSESS DOC 0-1 FALLS W/OUT INJ PAST YR: ICD-10-PCS | Mod: HCNC,CPTII,S$GLB, | Performed by: INTERNAL MEDICINE

## 2020-01-22 PROCEDURE — 1101F PT FALLS ASSESS-DOCD LE1/YR: CPT | Mod: HCNC,CPTII,S$GLB, | Performed by: INTERNAL MEDICINE

## 2020-01-22 PROCEDURE — 3075F PR MOST RECENT SYSTOLIC BLOOD PRESS GE 130-139MM HG: ICD-10-PCS | Mod: HCNC,CPTII,S$GLB, | Performed by: INTERNAL MEDICINE

## 2020-01-22 PROCEDURE — 3075F SYST BP GE 130 - 139MM HG: CPT | Mod: HCNC,CPTII,S$GLB, | Performed by: INTERNAL MEDICINE

## 2020-01-22 PROCEDURE — 1126F PR PAIN SEVERITY QUANTIFIED, NO PAIN PRESENT: ICD-10-PCS | Mod: HCNC,S$GLB,, | Performed by: INTERNAL MEDICINE

## 2020-01-22 PROCEDURE — 99499 UNLISTED E&M SERVICE: CPT | Mod: S$GLB,,, | Performed by: INTERNAL MEDICINE

## 2020-01-22 PROCEDURE — 99214 PR OFFICE/OUTPT VISIT, EST, LEVL IV, 30-39 MIN: ICD-10-PCS | Mod: HCNC,25,S$GLB, | Performed by: INTERNAL MEDICINE

## 2020-01-22 PROCEDURE — 99999 PR PBB SHADOW E&M-EST. PATIENT-LVL III: CPT | Mod: PBBFAC,HCNC,, | Performed by: INTERNAL MEDICINE

## 2020-01-22 PROCEDURE — 1159F MED LIST DOCD IN RCRD: CPT | Mod: HCNC,S$GLB,, | Performed by: INTERNAL MEDICINE

## 2020-01-22 PROCEDURE — 1159F PR MEDICATION LIST DOCUMENTED IN MEDICAL RECORD: ICD-10-PCS | Mod: HCNC,S$GLB,, | Performed by: INTERNAL MEDICINE

## 2020-01-22 PROCEDURE — 3078F PR MOST RECENT DIASTOLIC BLOOD PRESSURE < 80 MM HG: ICD-10-PCS | Mod: HCNC,CPTII,S$GLB, | Performed by: INTERNAL MEDICINE

## 2020-01-22 PROCEDURE — 99999 PR PBB SHADOW E&M-EST. PATIENT-LVL III: ICD-10-PCS | Mod: PBBFAC,HCNC,, | Performed by: INTERNAL MEDICINE

## 2020-01-22 PROCEDURE — 93279 PRGRMG DEV EVAL PM/LDLS PM: CPT | Mod: 26,HCNC,, | Performed by: INTERNAL MEDICINE

## 2020-01-22 PROCEDURE — 99499 RISK ADDL DX/OHS AUDIT: ICD-10-PCS | Mod: S$GLB,,, | Performed by: INTERNAL MEDICINE

## 2020-01-22 PROCEDURE — 99214 OFFICE O/P EST MOD 30 MIN: CPT | Mod: HCNC,25,S$GLB, | Performed by: INTERNAL MEDICINE

## 2020-01-22 PROCEDURE — 93279 PR PROGRAM EVAL (IN PERSON) IMPLANT DEVICE,PACEMAKER,1 LEAD: ICD-10-PCS | Mod: 26,HCNC,, | Performed by: INTERNAL MEDICINE

## 2020-01-22 PROCEDURE — 3078F DIAST BP <80 MM HG: CPT | Mod: HCNC,CPTII,S$GLB, | Performed by: INTERNAL MEDICINE

## 2020-01-22 NOTE — PROGRESS NOTES
Called Patient and left message to call coumadin clinic, need to check if INR was tested recently, it was due 1/14/20 and coumadin clinic still has not received a result

## 2020-01-22 NOTE — PROGRESS NOTES
CARDIOVASCULAR PROGRESS NOTE    REASON FOR CONSULT:   Orion Ty is a 74 y.o. female who presents for follow up of Chr AF, AVR/MVR.    PCP: Mollere  Optho: Andrea, Can  HISTORY OF PRESENT ILLNESS:   The patient returns for follow-up, accompanied by her  who age with Nauruan interpretation (he is a Nauruan ).  She was recently hospitalized with chest pressure which has not recurred.  She ruled out for myocardial infarction in her nuclear stress test was normal.  Echocardiogram revealed normal LV function, with high gradients noted across her mechanical aortic valve.  At present, given the absence of any symptoms, I do not plan valve reintervention.  She otherwise has had no shortness of breath, palpitations, lightheadedness, dizziness, or syncope.  There has been no PND, orthopnea, or lower extremity edema.  She denies melena, hematuria, or claudicant symptoms.    Her Medtronic pacemaker was interrogated in the office today.  Current rhythm is ventricular sensing at 60 beats per minute with programmed mode VVI at 65 beats per minute.  Sensing and pacing thresholds as well as impedance are normal.  CHRISTI was detected on September 19, 2019.  I have discussed the case with Dr. Boateng and he will meet with the patient tomorrow to schedule elective generator change.  The patient is not pacemaker dependent.    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) monitors coumadin at home  ?mod prosthetic AS (echo 12/2019)  Chronic A-fib  Medtronic pacemaker - last generator change 2011  Ao root dil 3.7cm (echo 4/2019)    PAST MEDICAL HISTORY:     Past Medical History:   Diagnosis Date    Closed displaced fracture of distal phalanx of lesser toe 10/20/2015    Diabetes mellitus     Diabetes mellitus, type 2     Hypertension     Osteopenia     Pacemaker     Rheumatic heart disease        PAST SURGICAL HISTORY:     Past Surgical History:   Procedure Laterality Date    CARDIAC  PACEMAKER PLACEMENT      CARDIAC VALVE REPLACEMENT      CARDIAC VALVE SURGERY      thryoid s         ALLERGIES AND MEDICATION:   Review of patient's allergies indicates:  No Known Allergies       Medication List           Accurate as of January 22, 2020  1:06 PM. If you have any questions, ask your nurse or doctor.               CONTINUE taking these medications    Accu-Chek Cortney Control Soln Soln  Generic drug:  blood glucose control high,low     B-COMPLEX WITH VITAMIN C ORAL     BANDAGES/PLASTIC TOP     blood-glucose meter Misc  Accu-chek Cortney Glucose Meter, Use to test blood sugar once daily.     ciprofloxacin HCl 0.3 % ophthalmic solution  Commonly known as:  CILOXAN     Fluzone High-Dose 2019-20 (PF) 180 mcg/0.5 mL Syrg  Generic drug:  flu vacc [id number](65yr up)PF     * lancets 30 gauge Misc     * lancets Misc  Commonly known as:  Accu-Chek Softclix Lancets  Use to test blood sugar BID     lancing device Misc     losartan 50 MG tablet  Commonly known as:  COZAAR  Take 1 tablet (50 mg total) by mouth once daily.     metFORMIN 500 MG tablet  Commonly known as:  GLUCOPHAGE  TAKE 2 TABLETS BY MOUTH EVERY MORNING AND 1 TABLET EVERY EVENING     metoprolol tartrate 25 MG tablet  Commonly known as:  LOPRESSOR  TAKE 1 TABLET BY MOUTH TWICE DAILY     multivitamin-Ca-iron-minerals 27-0.4 mg Tab     nitroGLYCERIN 0.4 MG SL tablet  Commonly known as:  NITROSTAT  DISSOLVE 1 TABLET UNDER THE TONGUE EVERY 5 MINUTES AS NEEDED FOR CHEST PAINS     omega-3 acid ethyl esters 1 gram capsule  Commonly known as:  LOVAZA     pravastatin 20 MG tablet  Commonly known as:  PRAVACHOL  Take 1 tablet (20 mg total) by mouth once daily.     prednisoLONE acetate 1 % Drps  Commonly known as:  PRED FORTE     * Truetest Test Strips Strp  Generic drug:  blood sugar diagnostic  USE ONCE DAILY     * blood sugar diagnostic Strp  Use to test blood sugar BID     warfarin 4 MG tablet  Commonly known as:  COUMADIN  TAKE 1 TABLET BY MOUTH ON MONDAY  AND FRIDAY AND 1/2 TABLET BY MOUTH ON ALL OTHER DAYS         * This list has 4 medication(s) that are the same as other medications prescribed for you. Read the directions carefully, and ask your doctor or other care provider to review them with you.                  SOCIAL HISTORY:     Social History     Socioeconomic History    Marital status:      Spouse name: Not on file    Number of children: Not on file    Years of education: Not on file    Highest education level: Not on file   Occupational History    Not on file   Social Needs    Financial resource strain: Not on file    Food insecurity:     Worry: Not on file     Inability: Not on file    Transportation needs:     Medical: Not on file     Non-medical: Not on file   Tobacco Use    Smoking status: Never Smoker    Smokeless tobacco: Never Used   Substance and Sexual Activity    Alcohol use: No    Drug use: Never    Sexual activity: Not on file   Lifestyle    Physical activity:     Days per week: Not on file     Minutes per session: Not on file    Stress: Not on file   Relationships    Social connections:     Talks on phone: Not on file     Gets together: Not on file     Attends Jain service: Not on file     Active member of club or organization: Not on file     Attends meetings of clubs or organizations: Not on file     Relationship status: Not on file   Other Topics Concern    Not on file   Social History Narrative    Not on file       FAMILY HISTORY:     Family History   Problem Relation Age of Onset    Breast cancer Maternal Aunt     Colon cancer Neg Hx     Ovarian cancer Neg Hx        REVIEW OF SYSTEMS:   Review of Systems   Constitutional: Negative for chills, diaphoresis and fever.   HENT: Negative for nosebleeds.    Eyes: Negative for blurred vision, double vision and photophobia.   Respiratory: Negative for hemoptysis, shortness of breath and wheezing.    Cardiovascular: Negative for chest pain, palpitations, orthopnea,  "claudication, leg swelling and PND.   Gastrointestinal: Negative for abdominal pain, blood in stool, heartburn, melena, nausea and vomiting.   Genitourinary: Negative for flank pain and hematuria.   Musculoskeletal: Negative for falls, myalgias and neck pain.   Skin: Negative for rash.   Neurological: Negative for dizziness, seizures, loss of consciousness, weakness and headaches.   Endo/Heme/Allergies: Negative for polydipsia. Does not bruise/bleed easily.   Psychiatric/Behavioral: Negative for depression and memory loss. The patient is not nervous/anxious.        PHYSICAL EXAM:     Vitals:    01/22/20 1252   BP: 132/60   Pulse: 71   Resp: 15    Body mass index is 21.16 kg/m².  Weight: 50.8 kg (112 lb)   Height: 5' 1" (154.9 cm)     Physical Exam   Constitutional: She is oriented to person, place, and time. She appears well-developed and well-nourished. She is cooperative.  Non-toxic appearance. No distress.   HENT:   Head: Normocephalic and atraumatic.   Eyes: Pupils are equal, round, and reactive to light. Conjunctivae and EOM are normal. No scleral icterus.   Neck: Trachea normal and normal range of motion. Neck supple. Normal carotid pulses and no JVD present. Carotid bruit is not present. No neck rigidity. No tracheal deviation and no edema present. No thyromegaly present.   Cardiovascular: Normal rate. An irregularly irregular rhythm present. PMI is not displaced. Exam reveals no gallop and no friction rub.   Murmur heard.   Systolic murmur is present with a grade of 2/6.  Pulses:       Carotid pulses are 2+ on the right side, and 2+ on the left side.  [facility] S[facility]S2.  Systolic murmur radiates to carotids   Pulmonary/Chest: Effort normal and breath sounds normal. No stridor. No respiratory distress. She has no wheezes. She has no rales. She exhibits no tenderness.   Abdominal: Soft. She exhibits no distension. There is no hepatosplenomegaly.   Musculoskeletal: She exhibits no edema or tenderness.   Feet: "   Right Foot:   Skin Integrity: Negative for ulcer.   Left Foot:   Skin Integrity: Negative for ulcer.   Neurological: She is alert and oriented to person, place, and time. No cranial nerve deficit.   Skin: Skin is warm and dry. No rash noted. No erythema.   Psychiatric: She has a normal mood and affect. Her speech is normal and behavior is normal.   Vitals reviewed.      DATA:   EKG: (personally reviewed tracing)  4/15/19 AF 79, occ paced beat, LVH/LAD/repol, similar to 8/31/18    Laboratory:  CBC:  Recent Labs   Lab 03/22/19  0704 12/09/19  0226 12/10/19  0647   WBC 6.76 7.44 6.37   Hemoglobin 12.7 12.3 11.9 L   Hematocrit 39.8 37.8 37.1   Platelets 87 L 96 L 95 L       CHEMISTRIES:  Recent Labs   Lab 03/22/19  0704 12/09/19  0226 12/10/19  0647   Glucose 102 108 115 H   Sodium 136 133 L 136   Potassium 4.2 4.2 4.3   BUN, Bld 17 16 11   Creatinine 0.8 0.8 0.7   eGFR if African American >60.0 >60 >60   eGFR if non African American >60.0 >60 >60   Calcium 10.2 9.6 9.1       CARDIAC BIOMARKERS:  Recent Labs   Lab 12/09/19  0526 12/09/19  1117 12/09/19  1655   Troponin I <0.006 <0.006 0.010       COAGS:  Recent Labs   Lab 12/10/19  0647 12/17/19 01/08/20   INR 2.1 H 3.2 3.0       LIPIDS/LFTS:  Recent Labs   Lab 03/08/18  0736 03/22/19  0704 12/09/19  0226 12/09/19  0526 12/10/19  0647   Cholesterol 140 143  --  131  --    Triglycerides 146 96  --  72  --    HDL 38 L 51  --  43  --    LDL Cholesterol 72.8 72.8  --  73.6  --    Non-HDL Cholesterol 102 92  --  88  --    AST 41 H 33 35  --  28   ALT 30 17 19  --  12     Lab Results   Component Value Date    TSH 3.665 12/09/2019     Free T4 0.99 (3/8/18)    Cardiovascular Testing:  L MPI 12/10/19    The perfusion scan is free of evidence from myocardial ischemia or injury.    There is a moderate to severe intensity defect in the anterolateral wall of the left ventricle, secondary to breast attenuation.    Gated perfusion images showed an ejection fraction of 58 %.     The EKG portion of this study is uninterpretable.    The patient reported no chest pain during the stress test.    Arrhythmias during stress: rare PVCs.    Abnormal septal motion consistent with pacemaker.    Echo 12/10/19 (c/w report 4/2019, aortic mean grad inc 34->60mmHg, peak yong inc 3.95->5.1 m/sec)  · Concentric left ventricular hypertrophy.  · Normal left ventricular systolic function. The estimated ejection fraction is 55%  · Normal LV diastolic function.  · Septal wall has abnormal motion consistent with post-operative status.  · Normal right ventricular systolic function.  · Severe left atrial enlargement.  · Severe right atrial enlargement.  · There is a mechanical aortic valve present. There is no aortic aortic insufficiency present.  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  · Moderate pulmonary hypertension present.  · Normal central venous pressure (3 mm Hg).  · The estimated PA systolic pressure is 44 mm Hg    ASSESSMENT:   # [facility] AVR/MVR  # prosthetic AS, echo 12/2019 suggests prosthetic AVR stenosis, asymptomatic.  # Ao root dil 3.7cm (echo 4/2019)  # HTN, controlled  # Chr AF, on coumadin (goal INR 2.5-3.5 given [facility] valves)  # HLP on prava 40mg  # DM  # thrombocytopenia  # Medtronic PPM, normally functioning, at CHRISTI, not PPM dependent.    PLAN:   Cont med rx  Refer to Dr. Boateng for PPM generator change.  Pt has appt tomorrow.  RTC post-PPM   Repeat echo 1 year (Dec 2020)      Ludwig Grigsby MD, FACC

## 2020-01-23 ENCOUNTER — TELEPHONE (OUTPATIENT)
Dept: ELECTROPHYSIOLOGY | Facility: CLINIC | Age: 75
End: 2020-01-23

## 2020-01-23 ENCOUNTER — OFFICE VISIT (OUTPATIENT)
Dept: CARDIOLOGY | Facility: CLINIC | Age: 75
End: 2020-01-23
Payer: MEDICARE

## 2020-01-23 VITALS
DIASTOLIC BLOOD PRESSURE: 70 MMHG | WEIGHT: 112 LBS | BODY MASS INDEX: 21.14 KG/M2 | OXYGEN SATURATION: 94 % | HEIGHT: 61 IN | HEART RATE: 65 BPM | SYSTOLIC BLOOD PRESSURE: 116 MMHG

## 2020-01-23 DIAGNOSIS — I48.20 CHRONIC ATRIAL FIBRILLATION: Primary | Chronic | ICD-10-CM

## 2020-01-23 DIAGNOSIS — Z95.0 CARDIAC PACEMAKER IN SITU: Primary | ICD-10-CM

## 2020-01-23 DIAGNOSIS — Z95.2 S/P AVR: ICD-10-CM

## 2020-01-23 DIAGNOSIS — Z95.0 CARDIAC PACEMAKER IN SITU: ICD-10-CM

## 2020-01-23 DIAGNOSIS — I25.10 CORONARY ARTERY DISEASE INVOLVING NATIVE CORONARY ARTERY OF NATIVE HEART WITHOUT ANGINA PECTORIS: ICD-10-CM

## 2020-01-23 DIAGNOSIS — I48.20 CHRONIC ATRIAL FIBRILLATION: ICD-10-CM

## 2020-01-23 DIAGNOSIS — Z95.2 S/P MVR (MITRAL VALVE REPLACEMENT): ICD-10-CM

## 2020-01-23 DIAGNOSIS — I49.9 CARDIAC ARRHYTHMIA, UNSPECIFIED CARDIAC ARRHYTHMIA TYPE: Primary | ICD-10-CM

## 2020-01-23 PROCEDURE — 99214 PR OFFICE/OUTPT VISIT, EST, LEVL IV, 30-39 MIN: ICD-10-PCS | Mod: HCNC,S$GLB,, | Performed by: INTERNAL MEDICINE

## 2020-01-23 PROCEDURE — 1126F AMNT PAIN NOTED NONE PRSNT: CPT | Mod: HCNC,S$GLB,, | Performed by: INTERNAL MEDICINE

## 2020-01-23 PROCEDURE — 1159F MED LIST DOCD IN RCRD: CPT | Mod: HCNC,S$GLB,, | Performed by: INTERNAL MEDICINE

## 2020-01-23 PROCEDURE — 1159F PR MEDICATION LIST DOCUMENTED IN MEDICAL RECORD: ICD-10-PCS | Mod: HCNC,S$GLB,, | Performed by: INTERNAL MEDICINE

## 2020-01-23 PROCEDURE — 1126F PR PAIN SEVERITY QUANTIFIED, NO PAIN PRESENT: ICD-10-PCS | Mod: HCNC,S$GLB,, | Performed by: INTERNAL MEDICINE

## 2020-01-23 PROCEDURE — 3074F PR MOST RECENT SYSTOLIC BLOOD PRESSURE < 130 MM HG: ICD-10-PCS | Mod: HCNC,CPTII,S$GLB, | Performed by: INTERNAL MEDICINE

## 2020-01-23 PROCEDURE — 99214 OFFICE O/P EST MOD 30 MIN: CPT | Mod: HCNC,S$GLB,, | Performed by: INTERNAL MEDICINE

## 2020-01-23 PROCEDURE — 3074F SYST BP LT 130 MM HG: CPT | Mod: HCNC,CPTII,S$GLB, | Performed by: INTERNAL MEDICINE

## 2020-01-23 PROCEDURE — 3078F PR MOST RECENT DIASTOLIC BLOOD PRESSURE < 80 MM HG: ICD-10-PCS | Mod: HCNC,CPTII,S$GLB, | Performed by: INTERNAL MEDICINE

## 2020-01-23 PROCEDURE — 99999 PR PBB SHADOW E&M-EST. PATIENT-LVL V: ICD-10-PCS | Mod: PBBFAC,HCNC,, | Performed by: INTERNAL MEDICINE

## 2020-01-23 PROCEDURE — 99999 PR PBB SHADOW E&M-EST. PATIENT-LVL V: CPT | Mod: PBBFAC,HCNC,, | Performed by: INTERNAL MEDICINE

## 2020-01-23 PROCEDURE — 1101F PR PT FALLS ASSESS DOC 0-1 FALLS W/OUT INJ PAST YR: ICD-10-PCS | Mod: HCNC,CPTII,S$GLB, | Performed by: INTERNAL MEDICINE

## 2020-01-23 PROCEDURE — 3078F DIAST BP <80 MM HG: CPT | Mod: HCNC,CPTII,S$GLB, | Performed by: INTERNAL MEDICINE

## 2020-01-23 PROCEDURE — 1101F PT FALLS ASSESS-DOCD LE1/YR: CPT | Mod: HCNC,CPTII,S$GLB, | Performed by: INTERNAL MEDICINE

## 2020-01-23 NOTE — PROGRESS NOTES
Subjective:    Patient ID:  Orion Ty is a 74 y.o. female who presents for follow-up of Follow-up      HPI     Referred by Dr Grigsby for Medtronic PPM at CHRISTI  # [facility] AVR/MVR  # prosthetic AS, echo 12/2019 suggests prosthetic AVR stenosis, asymptomatic.  # Ao root dil 3.7cm (echo 4/2019)  # HTN, controlled  # Chr AF, on coumadin (goal INR 2.5-3.5 given [facility] valves)  # HLP on prava 40mg  # DM  # thrombocytopenia  # Medtronic PPM, normally functioning, at CHRISTI, not PPM dependent.    Her Medtronic pacemaker was interrogated in the office today.  Current rhythm is ventricular sensing at 60 beats per minute with programmed mode VVI at 65 beats per minute.  Sensing and pacing thresholds as well as impedance are normal.  CHRISTI was detected on September 19, 2019.  I have discussed the case with Dr. Boateng and he will meet with the patient tomorrow to schedule elective generator change.  The patient is not pacemaker dependent.    L MPI 12/10/19    The perfusion scan is free of evidence from myocardial ischemia or injury.    There is a moderate to severe intensity defect in the anterolateral wall of the left ventricle, secondary to breast attenuation.    Gated perfusion images showed an ejection fraction of 58 %.    The EKG portion of this study is uninterpretable.    The patient reported no chest pain during the stress test.    Arrhythmias during stress: rare PVCs.    Abnormal septal motion consistent with pacemaker.     Echo 12/10/19 (c/w report 4/2019, aortic mean grad inc 34->60mmHg, peak yong inc 3.95->5.1 m/sec)  · Concentric left ventricular hypertrophy.  · Normal left ventricular systolic function. The estimated ejection fraction is 55%  · Normal LV diastolic function.  · Septal wall has abnormal motion consistent with post-operative status.  · Normal right ventricular systolic function.  · Severe left atrial enlargement.  · Severe right atrial enlargement.  · There is a mechanical aortic valve present. There  is no aortic aortic insufficiency present.  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  · Moderate pulmonary hypertension present.  · Normal central venous pressure (3 mm Hg).  · The estimated PA systolic pressure is 44 mm Hg    1/23/20 Denies CP or SOB  On coumadin for mechanical AVR/MVR and A-fib    Review of Systems   Constitution: Negative for decreased appetite.   HENT: Negative for ear discharge.    Eyes: Negative for blurred vision.   Respiratory: Negative for hemoptysis.    Endocrine: Negative for polyphagia.   Hematologic/Lymphatic: Negative for adenopathy.   Skin: Negative for color change.   Musculoskeletal: Negative for joint swelling.   Genitourinary: Negative for bladder incontinence.   Neurological: Negative for brief paralysis.   Psychiatric/Behavioral: Negative for hallucinations.   Allergic/Immunologic: Negative for hives.        Objective:    Physical Exam   Constitutional: She is oriented to person, place, and time. She appears well-developed and well-nourished.   HENT:   Head: Normocephalic and atraumatic.   Eyes: Pupils are equal, round, and reactive to light. Conjunctivae are normal.   Neck: Normal range of motion. Neck supple.   Cardiovascular: Normal rate, normal heart sounds and intact distal pulses. An irregularly irregular rhythm present.   Mechanical S1 and S2   Pulmonary/Chest: Effort normal and breath sounds normal.   Abdominal: Soft. Bowel sounds are normal.   Musculoskeletal: Normal range of motion.   Neurological: She is alert and oriented to person, place, and time.   Skin: Skin is warm and dry.         Assessment:       1. Chronic atrial fibrillation    2. S/P AVR    3. S/P MVR (mitral valve replacement)    4. Coronary artery disease involving native coronary artery of native heart without angina pectoris    5. Cardiac pacemaker in situ         Plan:       Will refer to EP at [facility] to see in PPM generator change can be done without interrupting coumadin  F/U with me prn

## 2020-01-23 NOTE — TELEPHONE ENCOUNTER
Spoke with  to make sure that he brings the pacemaker paperwork on 1/27/2020.  I asked that they make sure to be here at 7:30 so we can fit them in to get the device checked and EKG in room.

## 2020-01-23 NOTE — PROGRESS NOTES
Patient, Orion Ty (MRN #[id number]), presented with a recent Estimated PA Systolic Pressure greater than 40 mmHG consistent with the definition of pulmonary hypertension (ICD10 - I27.0).    Est. PA Systolic Pressure   Date Value Ref Range Status   05/09/2018 51.72 (A)       The patient's pulmonary hypertension was monitored, evaluated, addressed and/or treated. This addendum to the medical record is made on 01/23/2020.

## 2020-01-23 NOTE — TELEPHONE ENCOUNTER
----- Message from Carie Young sent at 1/23/2020 11:41 AM CST -----  Contact: Spouse  Pt has pacemaker's paperwork info asking if needs to bring to appt.  Thanks     [phone]

## 2020-01-26 PROBLEM — I49.5 TACHY-BRADY SYNDROME: Status: ACTIVE | Noted: 2020-01-26

## 2020-01-26 NOTE — PROGRESS NOTES
Subjective:    Patient ID:  Orion Ty is a 74 y.o. female who presents for evaluation of Chronic atrial fibrillation      HPI 75 yo female with tachy-gail syndrome, chronic atrial fibrillation, PPM, Htn, Mechanical MVR and AVR, thrombocytopenia.  Primary cardiologist is Dr. Grigsby.  Has single chamber PPM, implanted 11/6/89.  Had undergone 3 generator changes.  Device has reached CHRISTI.  Device is right sided.  Notes stable dyspnea on exertion.  Denies syncope.  Notes fatigue.  Echo 12/10/19 normal biventricular structure and function, severe bi-atrial enlargement, well seated aortic and mitral prostheses.  Has chronic thrombocytopenia, with platelets generally in the 90's.      Review of Systems   Constitution: Negative. Negative for malaise/fatigue.   Cardiovascular: Negative for chest pain, dyspnea on exertion, irregular heartbeat, leg swelling, near-syncope, orthopnea, palpitations, paroxysmal nocturnal dyspnea and syncope.   Respiratory: Negative for cough and shortness of breath.    Neurological: Negative for dizziness, light-headedness and weakness.   All other systems reviewed and are negative.       Objective:    Physical Exam   Constitutional: She is oriented to person, place, and time. She appears well-developed and well-nourished.   Eyes: Conjunctivae are normal. No scleral icterus.   Neck: No JVD present. No tracheal deviation present.   Cardiovascular: Normal rate. An irregularly irregular rhythm present. PMI is not displaced.   Pulmonary/Chest: Effort normal and breath sounds normal. No respiratory distress.   Abdominal: Soft. There is no hepatosplenomegaly. There is no tenderness.   Musculoskeletal: She exhibits no edema or tenderness.   Neurological: She is alert and oriented to person, place, and time.   Skin: Skin is warm and dry. No rash noted.   Psychiatric: She has a normal mood and affect. Her behavior is normal.         Assessment:       1. Chronic atrial fibrillation    2. Tachy-gail  syndrome    3. Essential (primary) hypertension    4. Cardiac pacemaker in situ    5. Coronary artery disease involving native coronary artery of native heart without angina pectoris    6. S/P MVR (mitral valve replacement)    7. S/P AVR    8. Thrombocytopenia    9. Type 2 diabetes mellitus with microalbuminuria, without long-term current use of insulin         Plan:       Device at CHRISTI.  Risks and benefits of generator change discussed with patient.  Continue coumadin veronika-procedure, targeting and INR of 2-3.

## 2020-01-27 ENCOUNTER — PATIENT OUTREACH (OUTPATIENT)
Dept: ADMINISTRATIVE | Facility: OTHER | Age: 75
End: 2020-01-27

## 2020-01-27 ENCOUNTER — INITIAL CONSULT (OUTPATIENT)
Dept: ELECTROPHYSIOLOGY | Facility: CLINIC | Age: 75
End: 2020-01-27
Payer: MEDICARE

## 2020-01-27 ENCOUNTER — TELEPHONE (OUTPATIENT)
Dept: ELECTROPHYSIOLOGY | Facility: CLINIC | Age: 75
End: 2020-01-27

## 2020-01-27 ENCOUNTER — CLINICAL SUPPORT (OUTPATIENT)
Dept: CARDIOLOGY | Facility: HOSPITAL | Age: 75
End: 2020-01-27
Attending: INTERNAL MEDICINE
Payer: MEDICARE

## 2020-01-27 VITALS
DIASTOLIC BLOOD PRESSURE: 68 MMHG | BODY MASS INDEX: 21.14 KG/M2 | WEIGHT: 112 LBS | HEIGHT: 61 IN | SYSTOLIC BLOOD PRESSURE: 124 MMHG | HEART RATE: 68 BPM

## 2020-01-27 DIAGNOSIS — E11.29 TYPE 2 DIABETES MELLITUS WITH MICROALBUMINURIA, WITHOUT LONG-TERM CURRENT USE OF INSULIN: Chronic | ICD-10-CM

## 2020-01-27 DIAGNOSIS — I48.20 CHRONIC ATRIAL FIBRILLATION: Primary | Chronic | ICD-10-CM

## 2020-01-27 DIAGNOSIS — Z95.0 CARDIAC PACEMAKER IN SITU: ICD-10-CM

## 2020-01-27 DIAGNOSIS — I48.20 CHRONIC ATRIAL FIBRILLATION: ICD-10-CM

## 2020-01-27 DIAGNOSIS — I49.9 CARDIAC ARRHYTHMIA, UNSPECIFIED CARDIAC ARRHYTHMIA TYPE: ICD-10-CM

## 2020-01-27 DIAGNOSIS — I49.5 TACHY-BRADY SYNDROME: ICD-10-CM

## 2020-01-27 DIAGNOSIS — D69.6 THROMBOCYTOPENIA: Chronic | ICD-10-CM

## 2020-01-27 DIAGNOSIS — I25.10 CORONARY ARTERY DISEASE INVOLVING NATIVE CORONARY ARTERY OF NATIVE HEART WITHOUT ANGINA PECTORIS: ICD-10-CM

## 2020-01-27 DIAGNOSIS — Z95.2 S/P MVR (MITRAL VALVE REPLACEMENT): ICD-10-CM

## 2020-01-27 DIAGNOSIS — I10 ESSENTIAL (PRIMARY) HYPERTENSION: Chronic | ICD-10-CM

## 2020-01-27 DIAGNOSIS — Z95.2 S/P AVR: ICD-10-CM

## 2020-01-27 DIAGNOSIS — R80.9 TYPE 2 DIABETES MELLITUS WITH MICROALBUMINURIA, WITHOUT LONG-TERM CURRENT USE OF INSULIN: Chronic | ICD-10-CM

## 2020-01-27 PROCEDURE — 99999 PR PBB SHADOW E&M-EST. PATIENT-LVL III: ICD-10-PCS | Mod: PBBFAC,HCNC,, | Performed by: INTERNAL MEDICINE

## 2020-01-27 PROCEDURE — 93005 RHYTHM STRIP: ICD-10-PCS | Mod: HCNC,S$GLB,, | Performed by: INTERNAL MEDICINE

## 2020-01-27 PROCEDURE — 1159F PR MEDICATION LIST DOCUMENTED IN MEDICAL RECORD: ICD-10-PCS | Mod: HCNC,S$GLB,, | Performed by: INTERNAL MEDICINE

## 2020-01-27 PROCEDURE — 1126F AMNT PAIN NOTED NONE PRSNT: CPT | Mod: HCNC,S$GLB,, | Performed by: INTERNAL MEDICINE

## 2020-01-27 PROCEDURE — 3078F DIAST BP <80 MM HG: CPT | Mod: HCNC,CPTII,S$GLB, | Performed by: INTERNAL MEDICINE

## 2020-01-27 PROCEDURE — 3044F PR MOST RECENT HEMOGLOBIN A1C LEVEL <7.0%: ICD-10-PCS | Mod: HCNC,CPTII,S$GLB, | Performed by: INTERNAL MEDICINE

## 2020-01-27 PROCEDURE — 1126F PR PAIN SEVERITY QUANTIFIED, NO PAIN PRESENT: ICD-10-PCS | Mod: HCNC,S$GLB,, | Performed by: INTERNAL MEDICINE

## 2020-01-27 PROCEDURE — 3074F PR MOST RECENT SYSTOLIC BLOOD PRESSURE < 130 MM HG: ICD-10-PCS | Mod: HCNC,CPTII,S$GLB, | Performed by: INTERNAL MEDICINE

## 2020-01-27 PROCEDURE — 3074F SYST BP LT 130 MM HG: CPT | Mod: HCNC,CPTII,S$GLB, | Performed by: INTERNAL MEDICINE

## 2020-01-27 PROCEDURE — 1101F PR PT FALLS ASSESS DOC 0-1 FALLS W/OUT INJ PAST YR: ICD-10-PCS | Mod: HCNC,CPTII,S$GLB, | Performed by: INTERNAL MEDICINE

## 2020-01-27 PROCEDURE — 3044F HG A1C LEVEL LT 7.0%: CPT | Mod: HCNC,CPTII,S$GLB, | Performed by: INTERNAL MEDICINE

## 2020-01-27 PROCEDURE — 1159F MED LIST DOCD IN RCRD: CPT | Mod: HCNC,S$GLB,, | Performed by: INTERNAL MEDICINE

## 2020-01-27 PROCEDURE — 1101F PT FALLS ASSESS-DOCD LE1/YR: CPT | Mod: HCNC,CPTII,S$GLB, | Performed by: INTERNAL MEDICINE

## 2020-01-27 PROCEDURE — 99204 OFFICE O/P NEW MOD 45 MIN: CPT | Mod: HCNC,S$GLB,, | Performed by: INTERNAL MEDICINE

## 2020-01-27 PROCEDURE — 3078F PR MOST RECENT DIASTOLIC BLOOD PRESSURE < 80 MM HG: ICD-10-PCS | Mod: HCNC,CPTII,S$GLB, | Performed by: INTERNAL MEDICINE

## 2020-01-27 PROCEDURE — 93010 ELECTROCARDIOGRAM REPORT: CPT | Mod: HCNC,S$GLB,, | Performed by: INTERNAL MEDICINE

## 2020-01-27 PROCEDURE — 93010 RHYTHM STRIP: ICD-10-PCS | Mod: HCNC,S$GLB,, | Performed by: INTERNAL MEDICINE

## 2020-01-27 PROCEDURE — 93005 ELECTROCARDIOGRAM TRACING: CPT | Mod: HCNC,S$GLB,, | Performed by: INTERNAL MEDICINE

## 2020-01-27 PROCEDURE — 99999 PR PBB SHADOW E&M-EST. PATIENT-LVL III: CPT | Mod: PBBFAC,HCNC,, | Performed by: INTERNAL MEDICINE

## 2020-01-27 PROCEDURE — 99204 PR OFFICE/OUTPT VISIT, NEW, LEVL IV, 45-59 MIN: ICD-10-PCS | Mod: HCNC,S$GLB,, | Performed by: INTERNAL MEDICINE

## 2020-01-27 NOTE — TELEPHONE ENCOUNTER
Spoke with patient's  and rescheduled PPM generator change to 2/27/20 instead of 2/20/2020 so that their daughter can be with them. Will send all information through the portal, as requested.

## 2020-01-27 NOTE — TELEPHONE ENCOUNTER
Patient is scheduled for Pacemaker generator change with Dr Ring on 2/27/2020. She is on coumadin for mechanical valve as well as AF. Her normal INR goal is 2.5-3.5. For the procedure, Dr Ring would like her INR to be 2-3.   Thanks!

## 2020-01-27 NOTE — LETTER
January 27, 2020      Sergo Boateng MD  [address] [facility] [address]  [address]  [facility] [address]           [facility]  [address]  [facility] [address]  Phone: [phone]  Fax: [phone]          Patient: Orion Ty   MR Number: [id number]   YOB: 1945   Date of Visit: 1/27/2020       Dear Dr. Sergo Boateng:    Thank you for referring Orion Ty to me for evaluation. Attached you will find relevant portions of my assessment and plan of care.    If you have questions, please do not hesitate to call me. I look forward to following Orion Ty along with you.    Sincerely,    Mark Ring MD    Enclosure  CC:  No Recipients    If you would like to receive this communication electronically, please contact [email or web address] or [phone] to request more information on [facility] [employer] access.    For providers and/or their staff who would like to refer a patient to [facility], please contact us through our one-stop-shop provider referral line, [facility], at [phone].    If you feel you have received this communication in error or would no longer like to receive these types of communications, please e-mail [email or web address]

## 2020-01-27 NOTE — TELEPHONE ENCOUNTER
----- Message from Carie Young sent at 1/27/2020  9:32 AM CST -----  Contact: Spouse  Pt would like to discuss scheduling appt anything after 2/27. Thanks     [phone]

## 2020-01-28 DIAGNOSIS — I49.9 CARDIAC ARRHYTHMIA, UNSPECIFIED CARDIAC ARRHYTHMIA TYPE: ICD-10-CM

## 2020-01-28 DIAGNOSIS — Z45.010 PACEMAKER BATTERY DEPLETION: ICD-10-CM

## 2020-01-28 DIAGNOSIS — I49.5 TACHY-BRADY SYNDROME: Primary | ICD-10-CM

## 2020-01-29 ENCOUNTER — ANTI-COAG VISIT (OUTPATIENT)
Dept: CARDIOLOGY | Facility: CLINIC | Age: 75
End: 2020-01-29
Payer: MEDICARE

## 2020-01-29 DIAGNOSIS — Z95.2 S/P AVR: ICD-10-CM

## 2020-01-29 DIAGNOSIS — Z79.01 LONG TERM CURRENT USE OF ANTICOAGULANT: ICD-10-CM

## 2020-01-29 DIAGNOSIS — I48.20 CHRONIC ATRIAL FIBRILLATION: ICD-10-CM

## 2020-01-29 DIAGNOSIS — Z95.2 S/P MVR (MITRAL VALVE REPLACEMENT): ICD-10-CM

## 2020-01-29 LAB — INR PPP: 3.4

## 2020-01-29 PROCEDURE — 93793 ANTICOAG MGMT PT WARFARIN: CPT | Mod: S$GLB,,, | Performed by: PHARMACIST

## 2020-01-29 PROCEDURE — 93793 PR ANTICOAGULANT MGMT FOR PT TAKING WARFARIN: ICD-10-PCS | Mod: S$GLB,,, | Performed by: PHARMACIST

## 2020-02-01 ENCOUNTER — HOSPITAL ENCOUNTER (INPATIENT)
Facility: HOSPITAL | Age: 75
LOS: 9 days | Discharge: HOME HEALTH | DRG: 871 | End: 2020-02-10
Attending: EMERGENCY MEDICINE | Admitting: INTERNAL MEDICINE
Payer: MEDICARE

## 2020-02-01 DIAGNOSIS — R06.03 RESPIRATORY DISTRESS: ICD-10-CM

## 2020-02-01 DIAGNOSIS — R79.89 ELEVATED TROPONIN: ICD-10-CM

## 2020-02-01 DIAGNOSIS — J10.1 INFLUENZA A: ICD-10-CM

## 2020-02-01 DIAGNOSIS — S30.1XXA HEMATOMA OF RIGHT FLANK, INITIAL ENCOUNTER: ICD-10-CM

## 2020-02-01 DIAGNOSIS — I50.9 ACUTE CONGESTIVE HEART FAILURE, UNSPECIFIED HEART FAILURE TYPE: Primary | ICD-10-CM

## 2020-02-01 DIAGNOSIS — I48.91 ATRIAL FIBRILLATION WITH RAPID VENTRICULAR RESPONSE: ICD-10-CM

## 2020-02-01 DIAGNOSIS — R53.81 DEBILITY: ICD-10-CM

## 2020-02-01 DIAGNOSIS — I50.9 CHF (CONGESTIVE HEART FAILURE): ICD-10-CM

## 2020-02-01 DIAGNOSIS — D62 ACUTE BLOOD LOSS ANEMIA: ICD-10-CM

## 2020-02-01 DIAGNOSIS — Z95.2 H/O PROSTHETIC MITRAL VALVE: ICD-10-CM

## 2020-02-01 DIAGNOSIS — R50.9 HIGH FEVER: ICD-10-CM

## 2020-02-01 LAB
ALBUMIN SERPL BCP-MCNC: 3.9 G/DL (ref 3.5–5.2)
ALP SERPL-CCNC: 99 U/L (ref 55–135)
ALT SERPL W/O P-5'-P-CCNC: 28 U/L (ref 10–44)
ANION GAP SERPL CALC-SCNC: 9 MMOL/L (ref 8–16)
APTT BLDCRRT: 40.6 SEC (ref 21–32)
AST SERPL-CCNC: 64 U/L (ref 10–40)
BACTERIA #/AREA URNS HPF: ABNORMAL /HPF
BASOPHILS # BLD AUTO: 0.01 K/UL (ref 0–0.2)
BASOPHILS NFR BLD: 0.2 % (ref 0–1.9)
BILIRUB SERPL-MCNC: 1.3 MG/DL (ref 0.1–1)
BILIRUB UR QL STRIP: NEGATIVE
BNP SERPL-MCNC: 633 PG/ML (ref 0–99)
BUN SERPL-MCNC: 15 MG/DL (ref 8–23)
CALCIUM SERPL-MCNC: 8.8 MG/DL (ref 8.7–10.5)
CHLORIDE SERPL-SCNC: 92 MMOL/L (ref 95–110)
CLARITY UR: CLEAR
CO2 SERPL-SCNC: 23 MMOL/L (ref 23–29)
COLOR UR: YELLOW
CREAT SERPL-MCNC: 0.8 MG/DL (ref 0.5–1.4)
CTP QC/QA: YES
DIFFERENTIAL METHOD: ABNORMAL
EOSINOPHIL # BLD AUTO: 0 K/UL (ref 0–0.5)
EOSINOPHIL NFR BLD: 0 % (ref 0–8)
ERYTHROCYTE [DISTWIDTH] IN BLOOD BY AUTOMATED COUNT: 13.2 % (ref 11.5–14.5)
EST. GFR  (AFRICAN AMERICAN): >60 ML/MIN/1.73 M^2
EST. GFR  (NON AFRICAN AMERICAN): >60 ML/MIN/1.73 M^2
GLUCOSE SERPL-MCNC: 127 MG/DL (ref 70–110)
GLUCOSE UR QL STRIP: NEGATIVE
HCT VFR BLD AUTO: 33.3 % (ref 37–48.5)
HGB BLD-MCNC: 11 G/DL (ref 12–16)
HGB UR QL STRIP: NEGATIVE
HYALINE CASTS #/AREA URNS LPF: 0 /LPF
IMM GRANULOCYTES # BLD AUTO: 0.03 K/UL (ref 0–0.04)
IMM GRANULOCYTES NFR BLD AUTO: 0.6 % (ref 0–0.5)
INR PPP: 2.3 (ref 0.8–1.2)
KETONES UR QL STRIP: ABNORMAL
LACTATE SERPL-SCNC: 2 MMOL/L (ref 0.5–2.2)
LEUKOCYTE ESTERASE UR QL STRIP: NEGATIVE
LYMPHOCYTES # BLD AUTO: 0.8 K/UL (ref 1–4.8)
LYMPHOCYTES NFR BLD: 16.4 % (ref 18–48)
MAGNESIUM SERPL-MCNC: 1.5 MG/DL (ref 1.6–2.6)
MCH RBC QN AUTO: 29.9 PG (ref 27–31)
MCHC RBC AUTO-ENTMCNC: 33 G/DL (ref 32–36)
MCV RBC AUTO: 91 FL (ref 82–98)
MICROSCOPIC COMMENT: ABNORMAL
MONOCYTES # BLD AUTO: 0.4 K/UL (ref 0.3–1)
MONOCYTES NFR BLD: 8.5 % (ref 4–15)
NEUTROPHILS # BLD AUTO: 3.8 K/UL (ref 1.8–7.7)
NEUTROPHILS NFR BLD: 74.3 % (ref 38–73)
NITRITE UR QL STRIP: NEGATIVE
NRBC BLD-RTO: 0 /100 WBC
PH UR STRIP: 6 [PH] (ref 5–8)
PLATELET # BLD AUTO: 71 K/UL (ref 150–350)
PMV BLD AUTO: 12.2 FL (ref 9.2–12.9)
POC MOLECULAR INFLUENZA A AGN: POSITIVE
POC MOLECULAR INFLUENZA B AGN: NEGATIVE
POCT GLUCOSE: 137 MG/DL (ref 70–110)
POCT GLUCOSE: 146 MG/DL (ref 70–110)
POTASSIUM SERPL-SCNC: 4.1 MMOL/L (ref 3.5–5.1)
PROCALCITONIN SERPL IA-MCNC: 0.11 NG/ML
PROT SERPL-MCNC: 7 G/DL (ref 6–8.4)
PROT UR QL STRIP: ABNORMAL
PROTHROMBIN TIME: 24.1 SEC (ref 9–12.5)
RBC # BLD AUTO: 3.68 M/UL (ref 4–5.4)
RBC #/AREA URNS HPF: 8 /HPF (ref 0–4)
SODIUM SERPL-SCNC: 124 MMOL/L (ref 136–145)
SP GR UR STRIP: 1.02 (ref 1–1.03)
TROPONIN I SERPL DL<=0.01 NG/ML-MCNC: 0.1 NG/ML (ref 0–0.03)
TROPONIN I SERPL DL<=0.01 NG/ML-MCNC: 0.26 NG/ML (ref 0–0.03)
URN SPEC COLLECT METH UR: ABNORMAL
UROBILINOGEN UR STRIP-ACNC: NEGATIVE EU/DL
WBC # BLD AUTO: 5.07 K/UL (ref 3.9–12.7)
WBC #/AREA URNS HPF: 0 /HPF (ref 0–5)

## 2020-02-01 PROCEDURE — 63600175 PHARM REV CODE 636 W HCPCS: Mod: HCNC | Performed by: EMERGENCY MEDICINE

## 2020-02-01 PROCEDURE — 84484 ASSAY OF TROPONIN QUANT: CPT | Mod: HCNC

## 2020-02-01 PROCEDURE — 83735 ASSAY OF MAGNESIUM: CPT | Mod: HCNC

## 2020-02-01 PROCEDURE — 25000003 PHARM REV CODE 250: Mod: HCNC | Performed by: HOSPITALIST

## 2020-02-01 PROCEDURE — 83605 ASSAY OF LACTIC ACID: CPT | Mod: HCNC

## 2020-02-01 PROCEDURE — 85730 THROMBOPLASTIN TIME PARTIAL: CPT | Mod: HCNC

## 2020-02-01 PROCEDURE — 93010 EKG 12-LEAD: ICD-10-PCS | Mod: HCNC,,, | Performed by: INTERNAL MEDICINE

## 2020-02-01 PROCEDURE — 96366 THER/PROPH/DIAG IV INF ADDON: CPT | Mod: HCNC

## 2020-02-01 PROCEDURE — 87502 INFLUENZA DNA AMP PROBE: CPT | Mod: HCNC

## 2020-02-01 PROCEDURE — 21400001 HC TELEMETRY ROOM: Mod: HCNC

## 2020-02-01 PROCEDURE — 84145 PROCALCITONIN (PCT): CPT | Mod: HCNC

## 2020-02-01 PROCEDURE — 93010 ELECTROCARDIOGRAM REPORT: CPT | Mod: HCNC,,, | Performed by: INTERNAL MEDICINE

## 2020-02-01 PROCEDURE — 85610 PROTHROMBIN TIME: CPT | Mod: HCNC

## 2020-02-01 PROCEDURE — 83880 ASSAY OF NATRIURETIC PEPTIDE: CPT | Mod: HCNC

## 2020-02-01 PROCEDURE — 99291 CRITICAL CARE FIRST HOUR: CPT | Mod: 25,HCNC

## 2020-02-01 PROCEDURE — 82962 GLUCOSE BLOOD TEST: CPT | Mod: HCNC

## 2020-02-01 PROCEDURE — 51798 US URINE CAPACITY MEASURE: CPT | Mod: HCNC

## 2020-02-01 PROCEDURE — 81000 URINALYSIS NONAUTO W/SCOPE: CPT | Mod: HCNC

## 2020-02-01 PROCEDURE — 85025 COMPLETE CBC W/AUTO DIFF WBC: CPT | Mod: HCNC

## 2020-02-01 PROCEDURE — 96367 TX/PROPH/DG ADDL SEQ IV INF: CPT | Mod: HCNC

## 2020-02-01 PROCEDURE — 96365 THER/PROPH/DIAG IV INF INIT: CPT | Mod: HCNC

## 2020-02-01 PROCEDURE — 25000003 PHARM REV CODE 250: Mod: HCNC | Performed by: EMERGENCY MEDICINE

## 2020-02-01 PROCEDURE — 87040 BLOOD CULTURE FOR BACTERIA: CPT | Mod: HCNC

## 2020-02-01 PROCEDURE — 93005 ELECTROCARDIOGRAM TRACING: CPT | Mod: HCNC

## 2020-02-01 PROCEDURE — 80053 COMPREHEN METABOLIC PANEL: CPT | Mod: HCNC

## 2020-02-01 RX ORDER — INSULIN ASPART 100 [IU]/ML
1-10 INJECTION, SOLUTION INTRAVENOUS; SUBCUTANEOUS
Status: DISCONTINUED | OUTPATIENT
Start: 2020-02-01 | End: 2020-02-04

## 2020-02-01 RX ORDER — VANCOMYCIN HCL IN 5 % DEXTROSE 1G/250ML
20 PLASTIC BAG, INJECTION (ML) INTRAVENOUS
Status: COMPLETED | OUTPATIENT
Start: 2020-02-01 | End: 2020-02-01

## 2020-02-01 RX ORDER — ACETAMINOPHEN 500 MG
1000 TABLET ORAL
Status: COMPLETED | OUTPATIENT
Start: 2020-02-01 | End: 2020-02-01

## 2020-02-01 RX ORDER — SODIUM CHLORIDE 0.9 % (FLUSH) 0.9 %
10 SYRINGE (ML) INJECTION
Status: DISCONTINUED | OUTPATIENT
Start: 2020-02-01 | End: 2020-02-10 | Stop reason: HOSPADM

## 2020-02-01 RX ORDER — CLOPIDOGREL BISULFATE 75 MG/1
75 TABLET ORAL DAILY
Status: DISCONTINUED | OUTPATIENT
Start: 2020-02-01 | End: 2020-02-02

## 2020-02-01 RX ORDER — OSELTAMIVIR PHOSPHATE 75 MG/1
75 CAPSULE ORAL
Status: COMPLETED | OUTPATIENT
Start: 2020-02-01 | End: 2020-02-01

## 2020-02-01 RX ORDER — GLUCAGON 1 MG
1 KIT INJECTION
Status: DISCONTINUED | OUTPATIENT
Start: 2020-02-01 | End: 2020-02-04

## 2020-02-01 RX ORDER — ACETAMINOPHEN 325 MG/1
650 TABLET ORAL EVERY 6 HOURS PRN
Status: DISCONTINUED | OUTPATIENT
Start: 2020-02-01 | End: 2020-02-04

## 2020-02-01 RX ORDER — IBUPROFEN 200 MG
24 TABLET ORAL
Status: DISCONTINUED | OUTPATIENT
Start: 2020-02-01 | End: 2020-02-04

## 2020-02-01 RX ORDER — OSELTAMIVIR PHOSPHATE 75 MG/1
75 CAPSULE ORAL 2 TIMES DAILY
Status: DISCONTINUED | OUTPATIENT
Start: 2020-02-01 | End: 2020-02-06

## 2020-02-01 RX ORDER — SODIUM CHLORIDE 9 MG/ML
INJECTION, SOLUTION INTRAVENOUS CONTINUOUS
Status: DISCONTINUED | OUTPATIENT
Start: 2020-02-01 | End: 2020-02-01

## 2020-02-01 RX ORDER — IBUPROFEN 200 MG
16 TABLET ORAL
Status: DISCONTINUED | OUTPATIENT
Start: 2020-02-01 | End: 2020-02-04

## 2020-02-01 RX ORDER — GUAIFENESIN/DEXTROMETHORPHAN 100-10MG/5
10 SYRUP ORAL EVERY 4 HOURS PRN
Status: DISCONTINUED | OUTPATIENT
Start: 2020-02-01 | End: 2020-02-10 | Stop reason: HOSPADM

## 2020-02-01 RX ORDER — CIPROFLOXACIN 2 MG/ML
400 INJECTION, SOLUTION INTRAVENOUS
Status: COMPLETED | OUTPATIENT
Start: 2020-02-01 | End: 2020-02-01

## 2020-02-01 RX ORDER — WARFARIN 2 MG/1
2 TABLET ORAL DAILY
Status: DISCONTINUED | OUTPATIENT
Start: 2020-02-02 | End: 2020-02-03

## 2020-02-01 RX ORDER — MORPHINE SULFATE 10 MG/ML
2 INJECTION INTRAMUSCULAR; INTRAVENOUS; SUBCUTANEOUS EVERY 4 HOURS PRN
Status: DISCONTINUED | OUTPATIENT
Start: 2020-02-01 | End: 2020-02-04

## 2020-02-01 RX ADMIN — SODIUM CHLORIDE 500 ML: 0.9 INJECTION, SOLUTION INTRAVENOUS at 05:02

## 2020-02-01 RX ADMIN — CIPROFLOXACIN 400 MG: 2 INJECTION, SOLUTION INTRAVENOUS at 03:02

## 2020-02-01 RX ADMIN — CLOPIDOGREL BISULFATE 75 MG: 75 TABLET ORAL at 11:02

## 2020-02-01 RX ADMIN — OSELTAMIVIR PHOSPHATE 75 MG: 75 CAPSULE ORAL at 11:02

## 2020-02-01 RX ADMIN — OSELTAMIVIR PHOSPHATE 75 MG: 75 CAPSULE ORAL at 06:02

## 2020-02-01 RX ADMIN — VANCOMYCIN HYDROCHLORIDE 1000 MG: 1 INJECTION, POWDER, LYOPHILIZED, FOR SOLUTION INTRAVENOUS at 04:02

## 2020-02-01 RX ADMIN — ACETAMINOPHEN 1000 MG: 500 TABLET ORAL at 02:02

## 2020-02-01 RX ADMIN — GUAIFENESIN AND DEXTROMETHORPHAN 10 ML: 100; 10 SYRUP ORAL at 10:02

## 2020-02-01 RX ADMIN — PIPERACILLIN AND TAZOBACTAM 4.5 G: 4; .5 INJECTION, POWDER, LYOPHILIZED, FOR SOLUTION INTRAVENOUS; PARENTERAL at 03:02

## 2020-02-01 RX ADMIN — ACETAMINOPHEN 650 MG: 325 TABLET ORAL at 08:02

## 2020-02-01 NOTE — ED PROVIDER NOTES
Encounter Date: 2/1/2020       History     Chief Complaint   Patient presents with    Shortness of Breath     Per EMS, family called 911 due to lethargy. EMS noted patient is working to breathe and felt hot to touch.     HPI   This 74-year-old female presents to the emergency room transported by EMS.  They report that the patient was short of breath on the scene, with oxygen saturations in the 80s.  There is a history of cough.  They no rales in the left base.  The patient had a heart rate of 120.  She is in atrial fibrillation.  There are no other known complaints, besides cough and shortness of breath.  Review of patient's allergies indicates:   Allergen Reactions    Aspirin Other (See Comments)     Past Medical History:   Diagnosis Date    Closed displaced fracture of distal phalanx of lesser toe 10/20/2015    Diabetes mellitus     Diabetes mellitus, type 2     Hypertension     Osteopenia     Pacemaker     Rheumatic heart disease      Past Surgical History:   Procedure Laterality Date    CARDIAC PACEMAKER PLACEMENT      CARDIAC VALVE REPLACEMENT      CARDIAC VALVE SURGERY      thryoid s       Family History   Problem Relation Age of Onset    Breast cancer Maternal Aunt     Colon cancer Neg Hx     Ovarian cancer Neg Hx      Social History     Tobacco Use    Smoking status: Never Smoker    Smokeless tobacco: Never Used   Substance Use Topics    Alcohol use: No    Drug use: Never     Review of Systems  The patient was questioned specifically with regard to the following.  General: Fever, chills, sweats. Neuro: Headache. Eyes: eye problems. ENT: Ear pain, sore throat. Cardiovascular: Chest pain. Respiratory: Cough, shortness of breath. Gastrointestinal: Abdominal pain, vomiting, diarrhea. Genitourinary: Painful urination.  Musculoskeletal: Arm and leg problems. Skin: Rash.  The review of systems was negative except for the following:  Fever cough and shortness of breath  Physical Exam     Initial  Vitals [02/01/20 1439]   BP Pulse Resp Temp SpO2   137/88 (!) 139 (!) 39 (!) 103 °F (39.4 °C) 96 %      MAP       --         Physical Exam  The patient was examined specifically for the following:   General:No significant distress, Good color, Warm and dry. Head and neck:Scalp atraumatic, Neck supple. Neurological:Appropriate conversation, Gross motor deficits. Eyes:Conjugate gaze, Clear corneas. ENT: No epistaxis. Cardiac: Regular rate and rhythm, Grossly normal heart tones. Pulmonary: Wheezing, Rales. Gastrointestinal: Abdominal tenderness, Abdominal distention. Musculoskeletal: Extremity deformity, Apparent pain with range of motion of the joints. Skin: Rash.   The findings on examination were normal except for the following:  The patient has a midline sternotomy scar.  She has a palpable pacemaker in the right chest.  She has mechanical heart valve sounds.  She is tachycardic.  She has rales in the left base.  The abdomen is soft.  Oxygen saturations are 88-90% on room air.  Extremities are nontender.  There is no pain with range of motion of any joints.  The patient has some discoloration in her feet.   ED Course   Critical Care  Date/Time: 2/1/2020 6:24 PM  Performed by: Leo Daly MD  Authorized by: Leo Daly MD   Direct patient critical care time: 22 minutes  Additional history critical care time: 11 minutes  Ordering / reviewing critical care time: 11 minutes  Documentation critical care time: 11 minutes  Consulting other physicians critical care time: 11 minutes  Consult with family critical care time: 7 minutes  Total critical care time (exclusive of procedural time) : 73 minutes  Critical care time was exclusive of separately billable procedures and treating other patients and teaching time.  Critical care was necessary to treat or prevent imminent or life-threatening deterioration of the following conditions: respiratory failure, cardiac failure and shock.  Critical care was time spent  personally by me on the following activities: development of treatment plan with patient or surrogate, discussions with consultants, discussions with primary provider, evaluation of patient's response to treatment, examination of patient, obtaining history from patient or surrogate, ordering and performing treatments and interventions, ordering and review of laboratory studies, ordering and review of radiographic studies, pulse oximetry, re-evaluation of patient's condition and review of old charts.        Labs Reviewed   CBC W/ AUTO DIFFERENTIAL - Abnormal; Notable for the following components:       Result Value    RBC 3.68 (*)     Hemoglobin 11.0 (*)     Hematocrit 33.3 (*)     Platelets 71 (*)     Immature Granulocytes 0.6 (*)     Lymph # 0.8 (*)     Gran% 74.3 (*)     Lymph% 16.4 (*)     All other components within normal limits   COMPREHENSIVE METABOLIC PANEL - Abnormal; Notable for the following components:    Sodium 124 (*)     Chloride 92 (*)     Glucose 127 (*)     Total Bilirubin 1.3 (*)     AST 64 (*)     All other components within normal limits   URINALYSIS, REFLEX TO URINE CULTURE - Abnormal; Notable for the following components:    Protein, UA 1+ (*)     Ketones, UA 1+ (*)     All other components within normal limits    Narrative:     Preferred Collection Type->Urine, Clean Catch   MAGNESIUM - Abnormal; Notable for the following components:    Magnesium 1.5 (*)     All other components within normal limits   B-TYPE NATRIURETIC PEPTIDE - Abnormal; Notable for the following components:     (*)     All other components within normal limits   PROTIME-INR - Abnormal; Notable for the following components:    Prothrombin Time 24.1 (*)     INR 2.3 (*)     All other components within normal limits   APTT - Abnormal; Notable for the following components:    aPTT 40.6 (*)     All other components within normal limits   TROPONIN I - Abnormal; Notable for the following components:    Troponin I 0.099 (*)      All other components within normal limits   URINALYSIS MICROSCOPIC - Abnormal; Notable for the following components:    RBC, UA 8 (*)     All other components within normal limits    Narrative:     Preferred Collection Type->Urine, Clean Catch   POCT INFLUENZA A/B MOLECULAR - Abnormal; Notable for the following components:    POC Molecular Influenza A Ag Positive (*)     All other components within normal limits   POCT GLUCOSE - Abnormal; Notable for the following components:    POCT Glucose 137 (*)     All other components within normal limits   CULTURE, BLOOD   CULTURE, BLOOD   LACTIC ACID, PLASMA   PROCALCITONIN   POCT GLUCOSE MONITORING CONTINUOUS     EKG Readings: (Independently Interpreted)   Patient is in atrial fibrillation with rapid ventricular response.  The heart rate is 116.  There are nonspecific ST segment and T-wave changes.  There is lateral ST segment depression.       Imaging Results          X-Ray Chest AP Portable (Final result)  Result time 02/01/20 15:10:22    Final result by Augie Keane MD (02/01/20 15:10:22)                 Impression:      Cardiomegaly with bilateral increased interstitial and alveolar infiltrates may be associated with pulmonary edema.  Superimposed pneumonitis not excluded.  Recommend follow-up.      Electronically signed by: Augie Keane  Date:    02/01/2020  Time:    15:10             Narrative:    EXAMINATION:  XR CHEST AP PORTABLE    CLINICAL HISTORY:  Sepsis;    TECHNIQUE:  Single frontal view of the chest was performed.    COMPARISON:  12/09/2019    FINDINGS:  Heart is enlarged.  Interstitial and alveolar infiltrates bilaterally may be associated with pulmonary edema.  Superimposed pneumonitis not excluded.    Sternotomy wires are present.  Pacemaker device on the right.    Status post cardiac valve replacement.    No mass is identified.  No acute osseous abnormality.    No effusion or pneumothorax.                              Medical decision making:   Given the above this patient presents to the emergency room with a high fever severe respiratory distress tachycardia.  Her oxygen saturations were 88%.  Chest x-ray revealed pulmonary edema.  The troponin and BNP were elevated.  The patient was discovered to have a temperature of a 103°.  She was treated with Tylenol.  Her fever came down.  Her heart rate went from about 125 down to 78.  Her respiratory distress resolved.  Influenza a testing was positive.  Procalcitonin was normal. Consultation was obtained with , ICU, who recommended cardiology consultation.  Cardiology consultation was obtained with , who recommended IV fluids for the patient's borderline low blood pressure at 80 9/50.  The patient's blood pressure is now 109 systolic.  The patient looks comfortable.  She is cool to touch.  She is essentially asymptomatic without respiratory distress.  The shortness of breath is resolved.  We will admit her for hyponatremia with her sodium 124.  We will support her and treat with Tamiflu.  Cardiology will be consulted.                                     Clinical Impression:       ICD-10-CM ICD-9-CM   1. Acute congestive heart failure, unspecified heart failure type I50.9 428.0   2. Atrial fibrillation with rapid ventricular response I48.91 427.31   3. Influenza A J10.1 487.1   4. High fever R50.9 780.60   5. Respiratory distress R06.03 786.09   6. Elevated troponin R79.89 790.6   7. H/O prosthetic mitral valve Z95.2 V43.3                             Leo Daly MD  02/01/20 1820

## 2020-02-01 NOTE — ED TRIAGE NOTES
Pt arrived to the ED via EMS from home with c/o SOB. Per pts  and daughter, pt began on last night with coughing, fever, sore throat and SOB. On admit to ED bed, pt alert, RR 30, temp 103 orally, O2 sats 89% on room air, all other VSS. Pt placed on cardiac monitor, pulse ox and BP cuff.

## 2020-02-02 PROBLEM — R79.89 ELEVATED TROPONIN I LEVEL: Status: ACTIVE | Noted: 2020-02-02

## 2020-02-02 PROBLEM — I50.31 ACUTE DIASTOLIC CONGESTIVE HEART FAILURE: Status: ACTIVE | Noted: 2020-02-01

## 2020-02-02 PROBLEM — J10.1 INFLUENZA A: Status: ACTIVE | Noted: 2020-02-02

## 2020-02-02 LAB
ANION GAP SERPL CALC-SCNC: 8 MMOL/L (ref 8–16)
ASCENDING AORTA: 2.77 CM
AV INDEX (PROSTH): 0.49
AV MEAN GRADIENT: 47 MMHG
AV PEAK GRADIENT: 74 MMHG
AV VALVE AREA: 1.62 CM2
AV VELOCITY RATIO: 0.5
BASOPHILS # BLD AUTO: 0.01 K/UL (ref 0–0.2)
BASOPHILS NFR BLD: 0.2 % (ref 0–1.9)
BSA FOR ECHO PROCEDURE: 1.5 M2
BUN SERPL-MCNC: 14 MG/DL (ref 8–23)
CALCIUM SERPL-MCNC: 8.3 MG/DL (ref 8.7–10.5)
CHLORIDE SERPL-SCNC: 94 MMOL/L (ref 95–110)
CO2 SERPL-SCNC: 22 MMOL/L (ref 23–29)
CREAT SERPL-MCNC: 0.7 MG/DL (ref 0.5–1.4)
CV ECHO LV RWT: 0.44 CM
DIFFERENTIAL METHOD: ABNORMAL
DOP CALC AO PEAK VEL: 4.29 M/S
DOP CALC AO VTI: 83.64 CM
DOP CALC LVOT AREA: 3.3 CM2
DOP CALC LVOT DIAMETER: 2.06 CM
DOP CALC LVOT PEAK VEL: 2.13 M/S
DOP CALC LVOT STROKE VOLUME: 135.48 CM3
DOP CALCLVOT PEAK VEL VTI: 40.67 CM
ECHO LV POSTERIOR WALL: 1.03 CM (ref 0.6–1.1)
EOSINOPHIL # BLD AUTO: 0 K/UL (ref 0–0.5)
EOSINOPHIL NFR BLD: 0.2 % (ref 0–8)
ERYTHROCYTE [DISTWIDTH] IN BLOOD BY AUTOMATED COUNT: 13.2 % (ref 11.5–14.5)
EST. GFR  (AFRICAN AMERICAN): >60 ML/MIN/1.73 M^2
EST. GFR  (NON AFRICAN AMERICAN): >60 ML/MIN/1.73 M^2
FRACTIONAL SHORTENING: 36 % (ref 28–44)
GLUCOSE SERPL-MCNC: 153 MG/DL (ref 70–110)
HCT VFR BLD AUTO: 30.8 % (ref 37–48.5)
HGB BLD-MCNC: 10.3 G/DL (ref 12–16)
IMM GRANULOCYTES # BLD AUTO: 0.02 K/UL (ref 0–0.04)
IMM GRANULOCYTES NFR BLD AUTO: 0.4 % (ref 0–0.5)
INR PPP: 1.7 (ref 0.8–1.2)
INTERVENTRICULAR SEPTUM: 1.02 CM (ref 0.6–1.1)
IVRT: 0.05 MSEC
LA MAJOR: 6.8 CM
LA MINOR: 6.76 CM
LA WIDTH: 5.41 CM
LEFT ATRIUM SIZE: 5.59 CM
LEFT ATRIUM VOLUME INDEX: 116.7 ML/M2
LEFT ATRIUM VOLUME: 174.28 CM3
LEFT INTERNAL DIMENSION IN SYSTOLE: 3 CM (ref 2.1–4)
LEFT VENTRICLE DIASTOLIC VOLUME INDEX: 69.02 ML/M2
LEFT VENTRICLE DIASTOLIC VOLUME: 103.09 ML
LEFT VENTRICLE MASS INDEX: 114 G/M2
LEFT VENTRICLE SYSTOLIC VOLUME INDEX: 23.5 ML/M2
LEFT VENTRICLE SYSTOLIC VOLUME: 35.05 ML
LEFT VENTRICULAR INTERNAL DIMENSION IN DIASTOLE: 4.71 CM (ref 3.5–6)
LEFT VENTRICULAR MASS: 170.68 G
LYMPHOCYTES # BLD AUTO: 0.7 K/UL (ref 1–4.8)
LYMPHOCYTES NFR BLD: 13.4 % (ref 18–48)
MAGNESIUM SERPL-MCNC: 2.1 MG/DL (ref 1.6–2.6)
MCH RBC QN AUTO: 30 PG (ref 27–31)
MCHC RBC AUTO-ENTMCNC: 33.4 G/DL (ref 32–36)
MCV RBC AUTO: 90 FL (ref 82–98)
MONOCYTES # BLD AUTO: 0.4 K/UL (ref 0.3–1)
MONOCYTES NFR BLD: 8.1 % (ref 4–15)
NEUTROPHILS # BLD AUTO: 4.2 K/UL (ref 1.8–7.7)
NEUTROPHILS NFR BLD: 77.7 % (ref 38–73)
NRBC BLD-RTO: 0 /100 WBC
PISA TR MAX VEL: 3.31 M/S
PLATELET # BLD AUTO: 71 K/UL (ref 150–350)
PMV BLD AUTO: 12.6 FL (ref 9.2–12.9)
POCT GLUCOSE: 134 MG/DL (ref 70–110)
POCT GLUCOSE: 138 MG/DL (ref 70–110)
POCT GLUCOSE: 147 MG/DL (ref 70–110)
POCT GLUCOSE: 166 MG/DL (ref 70–110)
POTASSIUM SERPL-SCNC: 3.6 MMOL/L (ref 3.5–5.1)
PROTHROMBIN TIME: 18.1 SEC (ref 9–12.5)
PV PEAK VELOCITY: 0.96 CM/S
RA MAJOR: 6.88 CM
RA PRESSURE: 8 MMHG
RA WIDTH: 6.02 CM
RBC # BLD AUTO: 3.43 M/UL (ref 4–5.4)
RIGHT VENTRICULAR END-DIASTOLIC DIMENSION: 3.54 CM
RV TISSUE DOPPLER FREE WALL SYSTOLIC VELOCITY 1 (APICAL 4 CHAMBER VIEW): 6.14 CM/S
SINUS: 2.87 CM
SODIUM SERPL-SCNC: 124 MMOL/L (ref 136–145)
STJ: 2.44 CM
TR MAX PG: 44 MMHG
TROPONIN I SERPL DL<=0.01 NG/ML-MCNC: 0.16 NG/ML (ref 0–0.03)
TROPONIN I SERPL DL<=0.01 NG/ML-MCNC: 0.19 NG/ML (ref 0–0.03)
TV REST PULMONARY ARTERY PRESSURE: 52 MMHG
WBC # BLD AUTO: 5.43 K/UL (ref 3.9–12.7)

## 2020-02-02 PROCEDURE — 94761 N-INVAS EAR/PLS OXIMETRY MLT: CPT | Mod: HCNC

## 2020-02-02 PROCEDURE — 63600175 PHARM REV CODE 636 W HCPCS: Mod: HCNC | Performed by: HOSPITALIST

## 2020-02-02 PROCEDURE — 99223 1ST HOSP IP/OBS HIGH 75: CPT | Mod: HCNC,,, | Performed by: INTERNAL MEDICINE

## 2020-02-02 PROCEDURE — 25000003 PHARM REV CODE 250: Mod: HCNC | Performed by: INTERNAL MEDICINE

## 2020-02-02 PROCEDURE — 83735 ASSAY OF MAGNESIUM: CPT | Mod: HCNC

## 2020-02-02 PROCEDURE — 25000003 PHARM REV CODE 250: Mod: HCNC | Performed by: HOSPITALIST

## 2020-02-02 PROCEDURE — 21400001 HC TELEMETRY ROOM: Mod: HCNC

## 2020-02-02 PROCEDURE — 80048 BASIC METABOLIC PNL TOTAL CA: CPT | Mod: HCNC

## 2020-02-02 PROCEDURE — 63600175 PHARM REV CODE 636 W HCPCS: Mod: HCNC | Performed by: INTERNAL MEDICINE

## 2020-02-02 PROCEDURE — 85025 COMPLETE CBC W/AUTO DIFF WBC: CPT | Mod: HCNC

## 2020-02-02 PROCEDURE — 99223 PR INITIAL HOSPITAL CARE,LEVL III: ICD-10-PCS | Mod: HCNC,,, | Performed by: INTERNAL MEDICINE

## 2020-02-02 PROCEDURE — 36415 COLL VENOUS BLD VENIPUNCTURE: CPT | Mod: HCNC

## 2020-02-02 PROCEDURE — 85610 PROTHROMBIN TIME: CPT | Mod: HCNC

## 2020-02-02 PROCEDURE — 84484 ASSAY OF TROPONIN QUANT: CPT | Mod: 91,HCNC

## 2020-02-02 PROCEDURE — 27000221 HC OXYGEN, UP TO 24 HOURS: Mod: HCNC

## 2020-02-02 RX ORDER — NITROGLYCERIN 0.3 MG/1
0.3 TABLET SUBLINGUAL EVERY 5 MIN PRN
Status: DISCONTINUED | OUTPATIENT
Start: 2020-02-02 | End: 2020-02-02

## 2020-02-02 RX ORDER — METOPROLOL TARTRATE 25 MG/1
12.5 TABLET ORAL 2 TIMES DAILY
Status: DISCONTINUED | OUTPATIENT
Start: 2020-02-02 | End: 2020-02-04

## 2020-02-02 RX ORDER — ENOXAPARIN SODIUM 100 MG/ML
1 INJECTION SUBCUTANEOUS
Status: DISCONTINUED | OUTPATIENT
Start: 2020-02-02 | End: 2020-02-04

## 2020-02-02 RX ORDER — FUROSEMIDE 10 MG/ML
40 INJECTION INTRAMUSCULAR; INTRAVENOUS 2 TIMES DAILY
Status: DISCONTINUED | OUTPATIENT
Start: 2020-02-02 | End: 2020-02-03

## 2020-02-02 RX ORDER — NITROGLYCERIN 0.4 MG/1
0.4 TABLET SUBLINGUAL EVERY 5 MIN PRN
Status: DISCONTINUED | OUTPATIENT
Start: 2020-02-02 | End: 2020-02-10 | Stop reason: HOSPADM

## 2020-02-02 RX ORDER — MAGNESIUM SULFATE 1 G/100ML
1 INJECTION INTRAVENOUS
Status: COMPLETED | OUTPATIENT
Start: 2020-02-02 | End: 2020-02-02

## 2020-02-02 RX ORDER — HYDRALAZINE HYDROCHLORIDE 20 MG/ML
10 INJECTION INTRAMUSCULAR; INTRAVENOUS EVERY 6 HOURS PRN
Status: DISCONTINUED | OUTPATIENT
Start: 2020-02-02 | End: 2020-02-10 | Stop reason: HOSPADM

## 2020-02-02 RX ADMIN — ENOXAPARIN SODIUM 50 MG: 100 INJECTION SUBCUTANEOUS at 08:02

## 2020-02-02 RX ADMIN — OSELTAMIVIR PHOSPHATE 75 MG: 75 CAPSULE ORAL at 08:02

## 2020-02-02 RX ADMIN — WARFARIN SODIUM 2 MG: 2 TABLET ORAL at 05:02

## 2020-02-02 RX ADMIN — METOPROLOL TARTRATE 12.5 MG: 25 TABLET, FILM COATED ORAL at 08:02

## 2020-02-02 RX ADMIN — MAGNESIUM SULFATE 1 G: 1 INJECTION INTRAVENOUS at 08:02

## 2020-02-02 RX ADMIN — FUROSEMIDE 40 MG: 10 INJECTION, SOLUTION INTRAVENOUS at 06:02

## 2020-02-02 RX ADMIN — INSULIN ASPART 2 UNITS: 100 INJECTION, SOLUTION INTRAVENOUS; SUBCUTANEOUS at 08:02

## 2020-02-02 RX ADMIN — FUROSEMIDE 40 MG: 10 INJECTION, SOLUTION INTRAVENOUS at 08:02

## 2020-02-02 RX ADMIN — MAGNESIUM SULFATE 1 G: 1 INJECTION INTRAVENOUS at 06:02

## 2020-02-02 RX ADMIN — CLOPIDOGREL BISULFATE 75 MG: 75 TABLET ORAL at 08:02

## 2020-02-02 NOTE — NURSING
Transfer From: ED To: 322     Transfer via stretcher     Transfer with 2L of O2 via NC     Transported by transport personnel     Medicines sent: None   Chart send with patient: yes     Notified: Significant other with patient at the bedside     Patient reassessed at: 2000      Upon arrival to floor patient transferred to bed from stretcher, with complaints of generalized weakness, cough and sore throat. Report received from SIOBAHN Alegria . Patient with O2 via NC and cardiac monitoring in place. Vital signs obtained and recorded, complete patient assessment. Skin dry and intact with two peripheral IV cannula on both arms . Patient and family updated on plan of care and verbalized understanding. Call light in reach and patient instructed to inform the nurse if anything is needed.Will continue to be monitored.               JOSELINE Sims notified about patients complaints.

## 2020-02-02 NOTE — HPI
Orion Ty is a 74 y.o. female that (in part)  has a past medical history of Closed displaced fracture of distal phalanx of lesser toe, Diabetes mellitus, Diabetes mellitus, type 2, Hypertension, Osteopenia, Pacemaker, and Rheumatic heart disease.  has a past surgical history that includes Cardiac valve replacement; thryoid s; Cardiac pacemaker placement; and Cardiac valuve replacement. Presents to [facility] complaining of shortness of breath.  EMS was activated due to worsening shortness of breath and lethargy.  Unable to ambulate independently, which is not normal for her.  She was febrile to 103° at home and is having difficulty taking deep breaths.  History of significant cardiac disease and heart valve replacement due to rheumatic heart valve disease. History of CAD and MI.  Denied chest pain, cyanosis, palpitations, or syncope.  Positive for peripheral edema.  Positive for nonproductive cough.    In the emergency department routine laboratory studies, chest x-ray, EKG, and cardiac enzymes were obtained.  There was evidence of acute CHF exacerbation with pulmonary edema, trace edema, and hypoxemia.  Influenza A serology was also positive.    Hospital medicine has been asked to admit to inpatient for further evaluation and treatment.

## 2020-02-02 NOTE — NURSING
Dr. Banuelos notified of latest troponin result. Ordered to give Clopidogrel tab and troponin for two more occurrence.    0025 Dr. Banuelos at bedside.

## 2020-02-02 NOTE — PLAN OF CARE
02/02/20 1535   Discharge Assessment   Assessment Type Discharge Planning Assessment   Confirmed/corrected address and phone number on facesheet? Yes   Assessment information obtained from? Caregiver;Medical Record  (Spouse and daughter provided information)   Communicated expected length of stay with patient/caregiver no   Prior to hospitilization cognitive status: Alert/Oriented   Prior to hospitalization functional status: Independent   Current cognitive status: Alert/Oriented   Current Functional Status: Independent   Facility Arrived From: Home   Lives With spouse   Able to Return to Prior Arrangements no   Is patient able to care for self after discharge? Yes   Who are your caregiver(s) and their phone number(s)? Cristobal-spouse: [phone]   Patient's perception of discharge disposition home or selfcare   Readmission Within the Last 30 Days no previous admission in last 30 days   Patient currently being followed by outpatient case management? No   Patient currently receives any other outside agency services? No   Equipment Currently Used at Home other (see comments)  (INR Monitoring Meter)   Do you have any problems affording any of your prescribed medications? No   Is the patient taking medications as prescribed? yes   Does the patient have transportation home? Yes   Transportation Anticipated family or friend will provide   Does the patient receive services at the [facility]? Yes  (INR Monitoring Meter at home; [employer] monitoring)   Discharge Plan A Home with family  (Continue warfarin monitoring at home; assess for any other needs: HH or DME)   Discharge Plan B Other  (TBD)   DME Needed Upon Discharge  other (see comments)  (TBD)   Patient/Family in Agreement with Plan yes   SW Role explained to patient; two patient identifiers recognized; SW contact information placed on Communication board. Discussed patient managing health care at home; determined who would be helping patient at home with  recovery: Cristobal-spouse will help at home and family    PCP: Otis Zimmer MD Prefers morning appointments    Extended Emergency Contact Information  Primary Emergency Contact: Cristobal Ty  Address: [facility] [address]  Home Phone: [phone]  Mobile Phone: [phone]  Relation: Spouse  Secondary Emergency Contact: MelonyJesse  Mobile Phone: [phone]  Relation: Daughter  Preferred language: English   needed? No     [facility] - [place], [place] - [address]  [address] [facility] [address]  Phone: [phone] Fax: [phone]    [id number] [place] - [address] AT [facility] [address]  [address]  [place] [address]  Phone: [phone] Fax: [phone]    Payor: [employer] MANAGED MEDICARE / Plan: [employer] MEDICARE HMO / Product Type: Capitation /

## 2020-02-02 NOTE — ASSESSMENT & PLAN NOTE
Trop peaked at 0.255.  Likely demand in setting of influenza and chr AF/RVR.  Recent MPI neg for ischemia.  Check echo.  Assuming no new WMA will plan med rx.  If new WMA abnormality evident, will need to consider cath.

## 2020-02-02 NOTE — SUBJECTIVE & OBJECTIVE
Past Medical History:   Diagnosis Date    Closed displaced fracture of distal phalanx of lesser toe 10/20/2015    Diabetes mellitus     Diabetes mellitus, type 2     Hypertension     Osteopenia     Pacemaker     Rheumatic heart disease        Past Surgical History:   Procedure Laterality Date    CARDIAC PACEMAKER PLACEMENT      CARDIAC VALVE REPLACEMENT      CARDIAC VALVE SURGERY      thryoid s         Review of patient's allergies indicates:   Allergen Reactions    Aspirin Other (See Comments)       No current facility-administered medications on file prior to encounter.      Current Outpatient Medications on File Prior to Encounter   Medication Sig    losartan (COZAAR) 50 MG tablet Take 1 tablet (50 mg total) by mouth once daily.    metoprolol tartrate (LOPRESSOR) 25 MG tablet TAKE 1 TABLET BY MOUTH TWICE DAILY    multivitamin-Ca-iron-minerals 27-0.4 mg Tab     omega-3 acid ethyl esters (LOVAZA) 1 gram capsule TK 2 CS PO BID    pravastatin (PRAVACHOL) 20 MG tablet Take 1 tablet (20 mg total) by mouth once daily.    warfarin (COUMADIN) 4 MG tablet TAKE 1 TABLET BY MOUTH ON MONDAY AND FRIDAY AND 1/2 TABLET BY MOUTH ON ALL OTHER DAYS    ACCU-CHEK GATO CONTROL SOLN Soln     adhesive bandage (BANDAGES/PLASTIC TOP)     B-COMPLEX WITH VITAMIN C ORAL     blood sugar diagnostic Strp Use to test blood sugar BID    blood-glucose meter Misc Accu-chek Gato Glucose Meter, Use to test blood sugar once daily.    FLUZONE HIGH-DOSE 2019-20, PF, 180 mcg/0.5 mL Syrg ADM 0.5ML IM UTD    lancets (ACCU-CHEK SOFTCLIX LANCETS) Misc Use to test blood sugar BID    lancets 30 gauge Misc     lancing device Misc     nitroGLYCERIN (NITROSTAT) 0.4 MG SL tablet DISSOLVE 1 TABLET UNDER THE TONGUE EVERY 5 MINUTES AS NEEDED FOR CHEST PAINS (Patient not taking: Reported on 1/27/2020)    TRUETEST TEST STRIPS Strp USE ONCE DAILY     Family History     Problem Relation (Age of Onset)    Breast cancer Maternal Aunt         Tobacco Use    Smoking status: Never Smoker    Smokeless tobacco: Never Used   Substance and Sexual Activity    Alcohol use: No    Drug use: Never    Sexual activity: Not on file     Review of Systems   Constitution: Positive for fever. Negative for chills, diaphoresis and malaise/fatigue.   HENT: Negative for nosebleeds.    Eyes: Negative for blurred vision and double vision.   Cardiovascular: Negative for chest pain, claudication, cyanosis, dyspnea on exertion, leg swelling, orthopnea, palpitations, paroxysmal nocturnal dyspnea and syncope.   Respiratory: Positive for cough and shortness of breath. Negative for wheezing.    Skin: Negative for dry skin and poor wound healing.   Musculoskeletal: Negative for back pain, joint swelling and myalgias.   Gastrointestinal: Negative for abdominal pain, nausea and vomiting.   Genitourinary: Negative for hematuria.   Neurological: Negative for dizziness, headaches, numbness, seizures and weakness.   Psychiatric/Behavioral: Negative for altered mental status and depression.     Objective:     Vital Signs (Most Recent):  Temp: 99 °F (37.2 °C) (02/02/20 0759)  Pulse: 83 (02/02/20 0759)  Resp: 18 (02/02/20 0759)  BP: (!) 125/58 (02/02/20 0759)  SpO2: (!) 93 % (02/02/20 0759) Vital Signs (24h Range):  Temp:  [97.7 °F (36.5 °C)-103 °F (39.4 °C)] 99 °F (37.2 °C)  Pulse:  [] 83  Resp:  [17-39] 18  SpO2:  [93 %-98 %] 93 %  BP: ()/(51-88) 125/58     Weight: 52.2 kg (115 lb 1.3 oz)  Body mass index is 21.74 kg/m².    SpO2: (!) 93 %  O2 Device (Oxygen Therapy): nasal cannula      Intake/Output Summary (Last 24 hours) at 2/2/2020 0814  Last data filed at 2/2/2020 0619  Gross per 24 hour   Intake 1330 ml   Output 1325 ml   Net 5 ml       Lines/Drains/Airways     Peripheral Intravenous Line                 Peripheral IV - Single Lumen 02/01/20 18 G Left Antecubital 1 day         Peripheral IV - Single Lumen 02/01/20 18 G Right Antecubital 1 day                Physical  Exam   Constitutional: She is oriented to person, place, and time. She appears well-developed and well-nourished. No distress.   HENT:   Head: Normocephalic and atraumatic.   Mouth/Throat: No oropharyngeal exudate.   Eyes: Pupils are equal, round, and reactive to light. Conjunctivae and EOM are normal. No scleral icterus.   Neck: Normal range of motion. Neck supple. No JVD present. No tracheal deviation present. No thyromegaly present.   Cardiovascular: Normal rate. An irregularly irregular rhythm present. Exam reveals no gallop and no friction rub.   Murmur heard.   Systolic murmur is present with a grade of 3/6.  Mech S1/S2, crisp   Pulmonary/Chest: Effort normal. No respiratory distress. She has no wheezes. She has rales. She exhibits no tenderness.   Abdominal: Soft. She exhibits no distension.   Musculoskeletal: Normal range of motion. She exhibits no edema.   Neurological: She is alert and oriented to person, place, and time. No cranial nerve deficit.   Skin: Skin is warm and dry. She is not diaphoretic.   Psychiatric: She has a normal mood and affect. Her behavior is normal. Judgment normal.       Current Medications:   clopidogreL  75 mg Oral Daily    furosemide  40 mg Intravenous BID    magnesium sulfate IVPB  1 g Intravenous Q2H    oseltamivir  75 mg Oral BID    warfarin  2 mg Oral Daily       acetaminophen, dextromethorphan-guaifenesin  mg/5 ml, dextrose 50%, dextrose 50%, glucagon (human recombinant), glucose, glucose, insulin aspart U-100, morphine, nitroGLYCERIN, promethazine (PHENERGAN) IVPB, sodium chloride 0.9%    Laboratory (all labs reviewed):  CBC:  Recent Labs   Lab 03/22/19  0704 12/09/19  0226 12/10/19  0647 02/01/20  1443 02/02/20  0154   WBC 6.76 7.44 6.37 5.07 5.43   Hemoglobin 12.7 12.3 11.9 L 11.0 L 10.3 L   Hematocrit 39.8 37.8 37.1 33.3 L 30.8 L   Platelets 87 L 96 L 95 L 71 L 71 L       CHEMISTRIES:  Recent Labs   Lab 03/22/19  0704 12/09/19  0226 12/10/19  0647  02/01/20  1443 02/02/20  0154   Glucose 102 108 115 H 127 H 153 H   Sodium 136 133 L 136 124 L 124 L   Potassium 4.2 4.2 4.3 4.1 3.6   BUN, Bld 17 16 11 15 14   Creatinine 0.8 0.8 0.7 0.8 0.7   eGFR if African American >60.0 >60 >60 >60 >60   eGFR if non African American >60.0 >60 >60 >60 >60   Calcium 10.2 9.6 9.1 8.8 8.3 L   Magnesium  --   --   --  1.5 L  --        CARDIAC BIOMARKERS:  Recent Labs   Lab 12/09/19  1117 12/09/19  1655 02/01/20  1443 02/01/20  1940 02/02/20  0154   Troponin I <0.006 0.010 0.099 H 0.255 H 0.190 H       COAGS:  Recent Labs   Lab 12/17/19 01/08/20 01/29/20 02/01/20  1443 02/02/20  0154   INR 3.2 3.0 3.4 2.3 H 1.7 H       LIPIDS/LFTS:  Recent Labs   Lab 03/14/17  0735 08/15/17  0745 03/08/18  0736 03/22/19  0704 12/09/19  0226 12/09/19  0526 12/10/19  0647 02/01/20  1443   Cholesterol 139  139 127 140 143  --  131  --   --    Triglycerides 269 H  269 H 229 H 146 96  --  72  --   --    HDL 30 L  30 L 30 L 38 L 51  --  43  --   --    LDL Cholesterol 55.2 L  55.2 L 51.2 L 72.8 72.8  --  73.6  --   --    Non-HDL Cholesterol 109  109 97 102 92  --  88  --   --    AST 45 H 32 41 H 33 35  --  28 64 H   ALT 23 21 30 17 19  --  12 28       BNP:  Recent Labs   Lab 12/09/19  0226 02/01/20  1443    H 633 H       TSH:  Recent Labs   Lab 08/15/17  0745 03/08/18  0736 09/13/18  0738 03/22/19  0704 12/09/19  0525   TSH 6.046 H 7.037 H 3.997 5.081 H 3.665       Free T4:  Recent Labs   Lab 03/14/17  0735 08/15/17  0745 03/08/18  0736 03/22/19  0704   Free T4 1.03 0.97 0.99 1.08       Diagnostic Results:  ECG (personally reviewed and interpreted tracing(s)):  2/1/20 1438 , inflat ST abnl ?isch    Chest X-Ray (personally reviewed and interpreted image(s)): 2/1/20 CHFH vs bilat pneumonitis, R PPM 1 lead, sternotomy    L MPI 12/10/19    The perfusion scan is free of evidence from myocardial ischemia or injury.    There is a moderate to severe intensity defect in the anterolateral wall of  the left ventricle, secondary to breast attenuation.    Gated perfusion images showed an ejection fraction of 58 %.    The EKG portion of this study is uninterpretable.    The patient reported no chest pain during the stress test.    Arrhythmias during stress: rare PVCs.    Abnormal septal motion consistent with pacemaker.     Echo 12/10/19 (c/w report 4/2019, aortic mean grad inc 34->60mmHg, peak yong inc 3.95->5.1 m/sec)  · Concentric left ventricular hypertrophy.  · Normal left ventricular systolic function. The estimated ejection fraction is 55%  · Normal LV diastolic function.  · Septal wall has abnormal motion consistent with post-operative status.  · Normal right ventricular systolic function.  · Severe left atrial enlargement.  · Severe right atrial enlargement.  · There is a mechanical aortic valve present. There is no aortic aortic insufficiency present.  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  · Moderate pulmonary hypertension present.  · Normal central venous pressure (3 mm Hg).  · The estimated PA systolic pressure is 44 mm Hg

## 2020-02-02 NOTE — CONSULTS
[facility]  Cardiology  Consult Note    Patient Name: Orion Ty  MRN: [id number]  Admission Date: 2/1/2020  Hospital Length of Stay: 1 days  Code Status: Full Code   Attending Provider: Dennis Pearson MD   Consulting Provider: Ludwig Grigsby MD  Primary Care Physician: Otis Zimmer MD  Principal Problem:Influenza A    Patient information was obtained from patient, spouse/SO and ER records.     Inpatient consult to Cardiology  Consult performed by: Ludwig Grigsby MD  Consult ordered by: Ludwig Banuelos MD  Reason for consult: CHF/AF/trop/mech MVR&AVR        Subjective:     Chief Complaint:  SOB/fever     HPI:   74 y.o. female that (in part)  has a past medical history of Closed displaced fracture of distal phalanx of lesser toe, Diabetes mellitus, Diabetes mellitus, type 2, Hypertension, Osteopenia, Pacemaker, and Rheumatic heart disease.  has a past surgical history that includes Cardiac valve replacement; thryoid s; Cardiac pacemaker placement; and Cardiac valuve replacement. Presents to [facility] Emergency Department complaining of shortness of breath.  EMS was activated due to worsening shortness of breath and lethargy.  Unable to ambulate independently, which is not normal for her.  She was febrile to 103° at home and is having difficulty taking deep breaths.  History of significant cardiac disease and heart valve replacement due to rheumatic heart valve disease. History of CAD and MI.  Denied chest pain, cyanosis, palpitations, or syncope.  Positive for peripheral edema.  Positive for nonproductive cough.    Follows with me, last seen 1/22/20.  PPM gen change (at [facility]) planned by Dr. Ring 2/27/20.    Patient presents to the emergency room with high fever and dyspnea on exertion.  She has no angina.  She was diagnosed with influenza.  She does appear to have some heart failure on her chest x-ray on on examination.  Note is made of a minimal  troponin elevation.  She did recently have a normal nuclear stress test.  Repeat echocardiogram is planned.  She denies any recent history of travel, and her  is at the bedside in age with interpretation.      Seb OV 1/22/20:  HISTORY OF PRESENT ILLNESS:   The patient returns for follow-up, accompanied by her  who age with Tajik interpretation (he is a Tajik ).  She was recently hospitalized with chest pressure which has not recurred.  She ruled out for myocardial infarction in her nuclear stress test was normal.  Echocardiogram revealed normal LV function, with high gradients noted across her mechanical aortic valve.  At present, given the absence of any symptoms, I do not plan valve reintervention.  She otherwise has had no shortness of breath, palpitations, lightheadedness, dizziness, or syncope.  There has been no PND, orthopnea, or lower extremity edema.  She denies melena, hematuria, or claudicant symptoms.     Her Medtronic pacemaker was interrogated in the office today.  Current rhythm is ventricular sensing at 60 beats per minute with programmed mode VVI at 65 beats per minute.  Sensing and pacing thresholds as well as impedance are normal.  [facility] was detected on September 19, 2019.  I have discussed the case with Dr. Boateng and he will meet with the patient tomorrow to schedule elective generator change.  The patient is not pacemaker dependent.     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) monitors coumadin at home  ?mod prosthetic AS (echo 12/2019)  Chronic A-fib  Medtronic pacemaker - last generator change 2011  Ao root dil 3.7cm (echo 4/2019)    Cardiovascular Testing:  L MPI 12/10/19    The perfusion scan is free of evidence from myocardial ischemia or injury.    There is a moderate to severe intensity defect in the anterolateral wall of the left ventricle, secondary to breast attenuation.    Gated perfusion images showed an ejection  fraction of 58 %.    The EKG portion of this study is uninterpretable.    The patient reported no chest pain during the stress test.    Arrhythmias during stress: rare PVCs.    Abnormal septal motion consistent with pacemaker.     Echo 12/10/19 (c/w report 4/2019, aortic mean grad inc 34->60mmHg, peak yong inc 3.95->5.1 m/sec)  · Concentric left ventricular hypertrophy.  · Normal left ventricular systolic function. The estimated ejection fraction is 55%  · Normal LV diastolic function.  · Septal wall has abnormal motion consistent with post-operative status.  · Normal right ventricular systolic function.  · Severe left atrial enlargement.  · Severe right atrial enlargement.  · There is a mechanical aortic valve present. There is no aortic aortic insufficiency present.  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  · Moderate pulmonary hypertension present.  · Normal central venous pressure (3 mm Hg).  · The estimated PA systolic pressure is 44 mm Hg     ASSESSMENT:   # [facility] AVR/MVR  # prosthetic AS, echo 12/2019 suggests prosthetic AVR stenosis, asymptomatic.  # Ao root dil 3.7cm (echo 4/2019)  # HTN, controlled  # Chr AF, on coumadin (goal INR 2.5-3.5 given [facility] valves)  # HLP on prava 40mg  # DM  # thrombocytopenia  # Medtronic PPM, normally functioning, at [facility], not PPM dependent.     PLAN:   Cont med rx  Refer to Dr. Boateng for PPM generator change.  Pt has appt tomorrow.  RTC post-PPM   Repeat echo 1 year (Dec 2020)      Past Medical History:   Diagnosis Date    Closed displaced fracture of distal phalanx of lesser toe 10/20/2015    Diabetes mellitus     Diabetes mellitus, type 2     Hypertension     Osteopenia     Pacemaker     Rheumatic heart disease        Past Surgical History:   Procedure Laterality Date    CARDIAC PACEMAKER PLACEMENT      CARDIAC VALVE REPLACEMENT      CARDIAC VALVE SURGERY      thryoid s         Review of patient's allergies indicates:   Allergen Reactions    Aspirin  Other (See Comments)       No current facility-administered medications on file prior to encounter.      Current Outpatient Medications on File Prior to Encounter   Medication Sig    losartan (COZAAR) 50 MG tablet Take 1 tablet (50 mg total) by mouth once daily.    metoprolol tartrate (LOPRESSOR) 25 MG tablet TAKE 1 TABLET BY MOUTH TWICE DAILY    multivitamin-Ca-iron-minerals 27-0.4 mg Tab     omega-3 acid ethyl esters (LOVAZA) 1 gram capsule TK 2 CS PO BID    pravastatin (PRAVACHOL) 20 MG tablet Take 1 tablet (20 mg total) by mouth once daily.    warfarin (COUMADIN) 4 MG tablet TAKE 1 TABLET BY MOUTH ON MONDAY AND FRIDAY AND 1/2 TABLET BY MOUTH ON ALL OTHER DAYS    ACCU-CHEK GATO CONTROL SOLN Soln     adhesive bandage (BANDAGES/PLASTIC TOP)     B-COMPLEX WITH VITAMIN C ORAL     blood sugar diagnostic Strp Use to test blood sugar BID    blood-glucose meter Misc Accu-chek Gato Glucose Meter, Use to test blood sugar once daily.    FLUZONE HIGH-DOSE 2019-20, PF, 180 mcg/0.5 mL Syrg ADM 0.5ML IM UTD    lancets (ACCU-CHEK SOFTCLIX LANCETS) Misc Use to test blood sugar BID    lancets 30 gauge Misc     lancing device Misc     nitroGLYCERIN (NITROSTAT) 0.4 MG SL tablet DISSOLVE 1 TABLET UNDER THE TONGUE EVERY 5 MINUTES AS NEEDED FOR CHEST PAINS (Patient not taking: Reported on 1/27/2020)    TRUETEST TEST STRIPS Strp USE ONCE DAILY     Family History     Problem Relation (Age of Onset)    Breast cancer Maternal Aunt        Tobacco Use    Smoking status: Never Smoker    Smokeless tobacco: Never Used   Substance and Sexual Activity    Alcohol use: No    Drug use: Never    Sexual activity: Not on file     Review of Systems   Constitution: Positive for fever. Negative for chills, diaphoresis and malaise/fatigue.   HENT: Negative for nosebleeds.    Eyes: Negative for blurred vision and double vision.   Cardiovascular: Negative for chest pain, claudication, cyanosis, dyspnea on exertion, leg swelling,  orthopnea, palpitations, paroxysmal nocturnal dyspnea and syncope.   Respiratory: Positive for cough and shortness of breath. Negative for wheezing.    Skin: Negative for dry skin and poor wound healing.   Musculoskeletal: Negative for back pain, joint swelling and myalgias.   Gastrointestinal: Negative for abdominal pain, nausea and vomiting.   Genitourinary: Negative for hematuria.   Neurological: Negative for dizziness, headaches, numbness, seizures and weakness.   Psychiatric/Behavioral: Negative for altered mental status and depression.     Objective:     Vital Signs (Most Recent):  Temp: 99 °F (37.2 °C) (02/02/20 0759)  Pulse: 83 (02/02/20 0759)  Resp: 18 (02/02/20 0759)  BP: (!) 125/58 (02/02/20 0759)  SpO2: (!) 93 % (02/02/20 0759) Vital Signs (24h Range):  Temp:  [97.7 °F (36.5 °C)-103 °F (39.4 °C)] 99 °F (37.2 °C)  Pulse:  [] 83  Resp:  [17-39] 18  SpO2:  [93 %-98 %] 93 %  BP: ()/(51-88) 125/58     Weight: 52.2 kg (115 lb 1.3 oz)  Body mass index is 21.74 kg/m².    SpO2: (!) 93 %  O2 Device (Oxygen Therapy): nasal cannula      Intake/Output Summary (Last 24 hours) at 2/2/2020 0814  Last data filed at 2/2/2020 0619  Gross per 24 hour   Intake 1330 ml   Output 1325 ml   Net 5 ml       Lines/Drains/Airways     Peripheral Intravenous Line                 Peripheral IV - Single Lumen 02/01/20 18 G Left Antecubital 1 day         Peripheral IV - Single Lumen 02/01/20 18 G Right Antecubital 1 day                Physical Exam   Constitutional: She is oriented to person, place, and time. She appears well-developed and well-nourished. No distress.   HENT:   Head: Normocephalic and atraumatic.   Mouth/Throat: No oropharyngeal exudate.   Eyes: Pupils are equal, round, and reactive to light. Conjunctivae and EOM are normal. No scleral icterus.   Neck: Normal range of motion. Neck supple. No JVD present. No tracheal deviation present. No thyromegaly present.   Cardiovascular: Normal rate. An irregularly  irregular rhythm present. Exam reveals no gallop and no friction rub.   Murmur heard.   Systolic murmur is present with a grade of 3/6.  Mech S1/S2, crisp   Pulmonary/Chest: Effort normal. No respiratory distress. She has no wheezes. She has rales. She exhibits no tenderness.   Abdominal: Soft. She exhibits no distension.   Musculoskeletal: Normal range of motion. She exhibits no edema.   Neurological: She is alert and oriented to person, place, and time. No cranial nerve deficit.   Skin: Skin is warm and dry. She is not diaphoretic.   Psychiatric: She has a normal mood and affect. Her behavior is normal. Judgment normal.       Current Medications:   clopidogreL  75 mg Oral Daily    furosemide  40 mg Intravenous BID    magnesium sulfate IVPB  1 g Intravenous Q2H    oseltamivir  75 mg Oral BID    warfarin  2 mg Oral Daily       acetaminophen, dextromethorphan-guaifenesin  mg/5 ml, dextrose 50%, dextrose 50%, glucagon (human recombinant), glucose, glucose, insulin aspart U-100, morphine, nitroGLYCERIN, promethazine (PHENERGAN) IVPB, sodium chloride 0.9%    Laboratory (all labs reviewed):  CBC:  Recent Labs   Lab 03/22/19  0704 12/09/19  0226 12/10/19  0647 02/01/20  1443 02/02/20  0154   WBC 6.76 7.44 6.37 5.07 5.43   Hemoglobin 12.7 12.3 11.9 L 11.0 L 10.3 L   Hematocrit 39.8 37.8 37.1 33.3 L 30.8 L   Platelets 87 L 96 L 95 L 71 L 71 L       CHEMISTRIES:  Recent Labs   Lab 03/22/19  0704 12/09/19  0226 12/10/19  0647 02/01/20  1443 02/02/20  0154   Glucose 102 108 115 H 127 H 153 H   Sodium 136 133 L 136 124 L 124 L   Potassium 4.2 4.2 4.3 4.1 3.6   BUN, Bld 17 16 11 15 14   Creatinine 0.8 0.8 0.7 0.8 0.7   eGFR if African American >60.0 >60 >60 >60 >60   eGFR if non African American >60.0 >60 >60 >60 >60   Calcium 10.2 9.6 9.1 8.8 8.3 L   Magnesium  --   --   --  1.5 L  --        CARDIAC BIOMARKERS:  Recent Labs   Lab 12/09/19  1117 12/09/19  1655 02/01/20  1443 02/01/20  1940 02/02/20  0154   Troponin I  <0.006 0.010 0.099 H 0.255 H 0.190 H       COAGS:  Recent Labs   Lab 12/17/19 01/08/20 01/29/20 02/01/20  1443 02/02/20  0154   INR 3.2 3.0 3.4 2.3 H 1.7 H       LIPIDS/LFTS:  Recent Labs   Lab 03/14/17  0735 08/15/17  0745 03/08/18  0736 03/22/19  0704 12/09/19  0226 12/09/19  0526 12/10/19  0647 02/01/20  1443   Cholesterol 139  139 127 140 143  --  131  --   --    Triglycerides 269 H  269 H 229 H 146 96  --  72  --   --    HDL 30 L  30 L 30 L 38 L 51  --  43  --   --    LDL Cholesterol 55.2 L  55.2 L 51.2 L 72.8 72.8  --  73.6  --   --    Non-HDL Cholesterol 109  109 97 102 92  --  88  --   --    AST 45 H 32 41 H 33 35  --  28 64 H   ALT 23 21 30 17 19  --  12 28       BNP:  Recent Labs   Lab 12/09/19  0226 02/01/20  1443    H 633 H       TSH:  Recent Labs   Lab 08/15/17  0745 03/08/18  0736 09/13/18  0738 03/22/19  0704 12/09/19  0525   TSH 6.046 H 7.037 H 3.997 5.081 H 3.665       Free T4:  Recent Labs   Lab 03/14/17  0735 08/15/17  0745 03/08/18  0736 03/22/19  0704   Free T4 1.03 0.97 0.99 1.08       Diagnostic Results:  ECG (personally reviewed and interpreted tracing(s)):  2/1/20 1438 , inflat ST abnl ?isch    Chest X-Ray (personally reviewed and interpreted image(s)): 2/1/20 CHFH vs bilat pneumonitis, R PPM 1 lead, sternotomy    L MPI 12/10/19    The perfusion scan is free of evidence from myocardial ischemia or injury.    There is a moderate to severe intensity defect in the anterolateral wall of the left ventricle, secondary to breast attenuation.    Gated perfusion images showed an ejection fraction of 58 %.    The EKG portion of this study is uninterpretable.    The patient reported no chest pain during the stress test.    Arrhythmias during stress: rare PVCs.    Abnormal septal motion consistent with pacemaker.     Echo 12/10/19 (c/w report 4/2019, aortic mean grad inc 34->60mmHg, peak yong inc 3.95->5.1 m/sec)  · Concentric left ventricular hypertrophy.  · Normal left  ventricular systolic function. The estimated ejection fraction is 55%  · Normal LV diastolic function.  · Septal wall has abnormal motion consistent with post-operative status.  · Normal right ventricular systolic function.  · Severe left atrial enlargement.  · Severe right atrial enlargement.  · There is a mechanical aortic valve present. There is no aortic aortic insufficiency present.  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  · Moderate pulmonary hypertension present.  · Normal central venous pressure (3 mm Hg).  · The estimated PA systolic pressure is 44 mm Hg       Assessment and Plan:     * Influenza A  Seem to be presenting issue with high fever and cough.  Mgmt per IM, on Tamiflu and abx.    Elevated troponin I level  Trop peaked at 0.255.  Likely demand in setting of influenza and chr AF/RVR.  Recent MPI neg for ischemia.  Check echo.  Assuming no new WMA will plan med rx.  If new WMA abnormality evident, will need to consider cath.    Acute diastolic congestive heart failure  Agree with lasix  Check echo    Chronic atrial fibrillation  Cont coumadin, goal INR 2.5-3.5 with [facility] MVR/AVR.  Start enox until INR >2.5.    Mixed hyperlipidemia  Cont statin/fish oil    Long term current use of anticoagulant  Cont coumadin for goal INR 2.5-3.5  Cover with enox until INR >2.5    Type 2 diabetes mellitus with microalbuminuria, without long-term current use of insulin  Per IM    Essential (primary) hypertension  Restart low dose metoprolol    S/P AVR  [facility] AVR, prosthetic stenosis noted on last echo.    S/P MVR (mitral valve replacement)  [facility] MVR, normally functioning on last echo.        VTE Risk Mitigation (From admission, onward)         Ordered     warfarin (COUMADIN) tablet 2 mg  Daily      02/01/20 2005     enoxaparin injection 50 mg  Every 12 hours (non-standard times)      02/02/20 0832     IP VTE HIGH RISK PATIENT  Once      02/01/20 2005     Reason for No Pharmacological VTE Prophylaxis  Once      Question:  Reasons:  Answer:  Already adequately anticoagulated on oral Anticoagulants    02/01/20 2005                Thank you for your consult. I will follow-up with patient. Please contact us if you have any additional questions.    Ludwig Grigsby MD  Cardiology   [facility]

## 2020-02-02 NOTE — H&P
[facility] Hospital Medicine  History & Physical    Patient Name: Orion Ty  MRN: [id number]  Admission Date: 02/02/2020  Attending Physician: Ludwig Banuelos MD, MPH      PCP:     Otis Zimmer MD    CC:     Chief Complaint   Patient presents with    Shortness of Breath     Per EMS, family called 911 due to lethargy. EMS noted patient is working to breathe and felt hot to touch.       HISTORY OF PRESENT ILLNESS:     Orion Ty is a 74 y.o. female that (in part)  has a past medical history of Closed displaced fracture of distal phalanx of lesser toe, Diabetes mellitus, Diabetes mellitus, type 2, Hypertension, Osteopenia, Pacemaker, and Rheumatic heart disease.  has a past surgical history that includes Cardiac valve replacement; thryoid s; Cardiac pacemaker placement; and Cardiac valuve replacement. Presents to [facility] Emergency Department complaining of shortness of breath.  EMS was activated due to worsening shortness of breath and lethargy.  Unable to ambulate independently, which is not normal for her.  She was febrile to 103° at home and is having difficulty taking deep breaths.  History of significant cardiac disease and heart valve replacement due to rheumatic heart valve disease. History of CAD and MI.  Denied chest pain, cyanosis, palpitations, or syncope.  Positive for peripheral edema.  Positive for nonproductive cough.    In the emergency department routine laboratory studies, chest x-ray, EKG, and cardiac enzymes were obtained.  There was evidence of acute CHF exacerbation with pulmonary edema, trace edema, and hypoxemia.  Influenza A serology was also positive.    Hospital medicine has been asked to admit to inpatient for further evaluation and treatment.       REVIEW OF SYSTEMS:     -- Constitutional:  Generalized weakness and fever  -- Eyes: No visual changes, diplopia, pain, tearing, blind spots, or discharge.   -- Ears, nose, mouth,  throat, and face: No congestion, sore throat, epistaxis, d/c, bleeding gums, neck stiffness masses, or dental issues.  -- Respiratory:  As above in HPI.  -- Cardiovascular:  As above in HPI.   -- Gastrointestinal: No vomiting, abdominal pain, hematemesis, melena, dyspepsia, or change in bowel habits.  -- Genitourinary: No hematuria, dysuria, frequency, urgency, nocturia, polyuria, stones, or incontinence.  -- Integument/breast: No rash, pruritis, pigmentation changes, dryness, or changes in hair  -- Hematologic/lymphatic: No easy bruising or lymphadenopathy.   -- Musculoskeletal:  Chronic arthralgias.  No acute arthralgias, acute myalgias, joint swelling, acute limitations of ROM, or acute muscular weakness.  -- Neurological: No seizures, headaches, incoordination, paraesthesias, ataxia, vertigo, or tremors.  -- Behavioral/Psych: No auditory or visual hallucinations, depression, or suicidal/homicidal ideations.  -- Endocrine: No heat or cold intolerance, polydipsia, or unintentional weight gain / loss.  -- Allergy/Immunologic: No recurrent infections or adverse reaction to food, insects, or difficulty breathing.      PAST MEDICAL / SURGICAL HISTORY:     Past Medical History:   Diagnosis Date    Closed displaced fracture of distal phalanx of lesser toe 10/20/2015    Diabetes mellitus     Diabetes mellitus, type 2     Hypertension     Osteopenia     Pacemaker     Rheumatic heart disease      Past Surgical History:   Procedure Laterality Date    CARDIAC PACEMAKER PLACEMENT      CARDIAC VALVE REPLACEMENT      CARDIAC VALVE SURGERY      thryoid s           FAMILY HISTORY:     Family History   Problem Relation Age of Onset    Breast cancer Maternal Aunt     Colon cancer Neg Hx     Ovarian cancer Neg Hx          SOCIAL HISTORY:     Social History     Socioeconomic History    Marital status:      Spouse name: Not on file    Number of children: Not on file    Years of education: Not on file     Highest education level: Not on file   Occupational History    Not on file   Social Needs    Financial resource strain: Not on file    Food insecurity:     Worry: Not on file     Inability: Not on file    Transportation needs:     Medical: Not on file     Non-medical: Not on file   Tobacco Use    Smoking status: Never Smoker    Smokeless tobacco: Never Used   Substance and Sexual Activity    Alcohol use: No    Drug use: Never    Sexual activity: Not on file   Lifestyle    Physical activity:     Days per week: Not on file     Minutes per session: Not on file    Stress: Not on file   Relationships    Social connections:     Talks on phone: Not on file     Gets together: Not on file     Attends [employer]ism service: Not on file     Active member of club or organization: Not on file     Attends meetings of clubs or organizations: Not on file     Relationship status: Not on file   Other Topics Concern    Not on file   Social History Narrative    Not on file         ALLERGIES:       Review of patient's allergies indicates:   Allergen Reactions    Aspirin Other (See Comments)         HOME MEDICATIONS:     Prior to Admission medications    Medication Sig Start Date End Date Taking? Authorizing Provider   losartan (COZAAR) 50 MG tablet Take 1 tablet (50 mg total) by mouth once daily. 12/17/19  Yes Otis Zimmer MD   metoprolol tartrate (LOPRESSOR) 25 MG tablet TAKE 1 TABLET BY MOUTH TWICE DAILY 12/17/19  Yes Otis Zimmer MD   multivitamin-Ca-iron-minerals 27-0.4 mg Tab  12/27/18  Yes Historical Provider, MD   omega-3 acid ethyl esters (LOVAZA) 1 gram capsule TK 2 CS PO BID 6/21/17  Yes Historical Provider, MD   pravastatin (PRAVACHOL) 20 MG tablet Take 1 tablet (20 mg total) by mouth once daily. 12/17/19  Yes Otis Zimmer MD   warfarin (COUMADIN) 4 MG tablet TAKE 1 TABLET BY MOUTH ON MONDAY AND FRIDAY AND 1/2 TABLET BY MOUTH ON ALL OTHER DAYS 12/17/19  Yes Otis Zimmer MD   [facility]  CONTROL SOLN Soln  12/29/16   Historical Provider, MD   adhesive bandage (BANDAGES/PLASTIC TOP)  12/27/18   Historical Provider, MD   B-COMPLEX WITH VITAMIN C ORAL  12/27/18   Historical Provider, MD   blood sugar diagnostic Strp Use to test blood sugar BID 12/10/19   Otis Zimmer MD   blood-glucose meter Misc Accu-chek [place] Glucose Meter, Use to test blood sugar once daily. 1/3/18   Otis Zimmer MD   FLUZONE HIGH-DOSE 2019-20, PF, 180 mcg/0.5 mL Syrg ADM 0.5ML IM UTD 10/14/19   Historical Provider, MD   lancets (ACCU-CHEK SOFTCLIX LANCETS) Misc Use to test blood sugar BID 12/10/19   Otis Zimmer MD   lancets 30 gauge Misc  2/16/17   Historical Provider, MD   lancing device Misc  10/5/17   Historical Provider, MD   nitroGLYCERIN (NITROSTAT) 0.4 MG SL tablet DISSOLVE 1 TABLET UNDER THE TONGUE EVERY 5 MINUTES AS NEEDED FOR CHEST PAINS  Patient not taking: Reported on 1/27/2020 6/11/18   Otis Zimmer MD   TRUETEST TEST STRIPS Strp USE ONCE DAILY 5/13/16   Otis Zimmer MD          HOSPITAL MEDICATIONS:     Scheduled Meds:    clopidogreL  75 mg Oral Daily    furosemide  40 mg Intravenous BID    magnesium sulfate IVPB  1 g Intravenous Q2H    oseltamivir  75 mg Oral BID    warfarin  2 mg Oral Daily     Continuous Infusions:   PRN Meds: acetaminophen, dextromethorphan-guaifenesin  mg/5 ml, dextrose 50%, dextrose 50%, glucagon (human recombinant), glucose, glucose, insulin aspart U-100, morphine, nitroGLYCERIN, promethazine (PHENERGAN) IVPB, sodium chloride 0.9%      PHYSICAL EXAM:     Wt Readings from Last 1 Encounters:   02/01/20 2315 52.2 kg (115 lb 1.3 oz)   02/01/20 2042 52.2 kg (115 lb 1.3 oz)   02/01/20 1439 52.2 kg (115 lb)     Body mass index is 21.74 kg/m².  Vitals:    02/01/20 2042 02/01/20 2315 02/01/20 2345 02/02/20 0442   BP: 107/63 107/63 (!) 113/57 117/61   BP Location:       Patient Position:       Pulse: 69 69 77 67   Resp: 20 20 20 20   Temp: 97.7 °F (36.5 °C) 97.7 °F  "(36.5 °C) 98.3 °F (36.8 °C) 98.4 °F (36.9 °C)   TempSrc:       SpO2: 96%  97% 98%   Weight: 52.2 kg (115 lb 1.3 oz) 52.2 kg (115 lb 1.3 oz)     Height:  5' 1" (1.549 m)            -- General appearance: well developed. appears stated age   -- Head: normocephalic, atraumatic   -- Eyes: conjunctivae clear. Extraocular muscles intact  -- Nose: Nares normal. Septum midline.   -- Mouth/Throat: lips, mucosa, and tongue normal. no throat erythema.   -- Neck: supple, symmetrical, trachea midline, +JVD. thyroid not grossly enlarged, appears symmetric  -- Lungs:  Crackles at bases bilaterally. normal respiratory effort. No use of accessory muscles.   -- Chest wall: no tenderness. equal bilateral chest rise   -- Heart: regular rate and regular rhythm. S1, S2 normal.  no click, rub or gallop   -- Abdomen: soft, non-tender, non-distended, non-tympanic; bowel sounds normal; no masses  -- Extremities: no cyanosis, clubbing.  1+ bilateral lower extremity pitting edema  -- Pulses: 2+ and symmetric   -- Skin:  Turgor normal. Color normal. Texture normal. No rashes or lesions.   -- Neurologic:  Globally decreased muscle strength and with normal tone. No focal numbness or weakness. CNII-XII intact. [place] coma scale: eyes open spontaneously-4, oriented & converses-5, obeys commands-6.      LABORATORY STUDIES:     Recent Results (from the past 36 hour(s))   Blood culture x two cultures. Draw prior to antibiotics.    Collection Time: 02/01/20 12:50 PM   Result Value Ref Range    Blood Culture, Routine No Growth to date    CBC auto differential    Collection Time: 02/01/20  2:43 PM   Result Value Ref Range    WBC 5.07 3.90 - 12.70 K/uL    RBC 3.68 (L) 4.00 - 5.40 M/uL    Hemoglobin 11.0 (L) 12.0 - 16.0 g/dL    Hematocrit 33.3 (L) 37.0 - 48.5 %    Mean Corpuscular Volume 91 82 - 98 fL    Mean Corpuscular Hemoglobin 29.9 27.0 - 31.0 pg    Mean Corpuscular Hemoglobin Conc 33.0 32.0 - 36.0 g/dL    RDW 13.2 11.5 - 14.5 %    Platelets 71 (L) " 150 - 350 K/uL    MPV 12.2 9.2 - 12.9 fL    Immature Granulocytes 0.6 (H) 0.0 - 0.5 %    Gran # (ANC) 3.8 1.8 - 7.7 K/uL    Immature Grans (Abs) 0.03 0.00 - 0.04 K/uL    Lymph # 0.8 (L) 1.0 - 4.8 K/uL    Mono # 0.4 0.3 - 1.0 K/uL    Eos # 0.0 0.0 - 0.5 K/uL    Baso # 0.01 0.00 - 0.20 K/uL    nRBC 0 0 /100 WBC    Gran% 74.3 (H) 38.0 - 73.0 %    Lymph% 16.4 (L) 18.0 - 48.0 %    Mono% 8.5 4.0 - 15.0 %    Eosinophil% 0.0 0.0 - 8.0 %    Basophil% 0.2 0.0 - 1.9 %    Differential Method Automated    Comprehensive metabolic panel    Collection Time: 02/01/20  2:43 PM   Result Value Ref Range    Sodium 124 (L) 136 - 145 mmol/L    Potassium 4.1 3.5 - 5.1 mmol/L    Chloride 92 (L) 95 - 110 mmol/L    CO2 23 23 - 29 mmol/L    Glucose 127 (H) 70 - 110 mg/dL    BUN, Bld 15 8 - 23 mg/dL    Creatinine 0.8 0.5 - 1.4 mg/dL    Calcium 8.8 8.7 - 10.5 mg/dL    Total Protein 7.0 6.0 - 8.4 g/dL    Albumin 3.9 3.5 - 5.2 g/dL    Total Bilirubin 1.3 (H) 0.1 - 1.0 mg/dL    Alkaline Phosphatase 99 55 - 135 U/L    AST 64 (H) 10 - 40 U/L    ALT 28 10 - 44 U/L    Anion Gap 9 8 - 16 mmol/L    eGFR if African American >60 >60 mL/min/1.73 m^2    eGFR if non African American >60 >60 mL/min/1.73 m^2   Lactic acid, plasma #1    Collection Time: 02/01/20  2:43 PM   Result Value Ref Range    Lactate (Lactic Acid) 2.0 0.5 - 2.2 mmol/L   Magnesium    Collection Time: 02/01/20  2:43 PM   Result Value Ref Range    Magnesium 1.5 (L) 1.6 - 2.6 mg/dL   Procalcitonin    Collection Time: 02/01/20  2:43 PM   Result Value Ref Range    Procalcitonin 0.11 <0.25 ng/mL   Brain natriuretic peptide    Collection Time: 02/01/20  2:43 PM   Result Value Ref Range     (H) 0 - 99 pg/mL   Protime-INR    Collection Time: 02/01/20  2:43 PM   Result Value Ref Range    Prothrombin Time 24.1 (H) 9.0 - 12.5 sec    INR 2.3 (H) 0.8 - 1.2   APTT    Collection Time: 02/01/20  2:43 PM   Result Value Ref Range    aPTT 40.6 (H) 21.0 - 32.0 sec   Troponin I    Collection Time:  02/01/20  2:43 PM   Result Value Ref Range    Troponin I 0.099 (H) 0.000 - 0.026 ng/mL   Blood culture x two cultures. Draw prior to antibiotics.    Collection Time: 02/01/20  3:05 PM   Result Value Ref Range    Blood Culture, Routine No Growth to date    POCT Influenza A/B Molecular    Collection Time: 02/01/20  3:06 PM   Result Value Ref Range    POC Molecular Influenza A Ag Positive (A) Negative, Not Reported    POC Molecular Influenza B Ag Negative Negative, Not Reported     Acceptable Yes    POCT glucose    Collection Time: 02/01/20  3:13 PM   Result Value Ref Range    POCT Glucose 137 (H) 70 - 110 mg/dL   Urinalysis, Reflex to Urine Culture Urine, Clean Catch    Collection Time: 02/01/20  3:24 PM   Result Value Ref Range    Specimen UA Urine, Catheterized     Color, UA Yellow Yellow, Straw, Sheila    Appearance, UA Clear Clear    pH, UA 6.0 5.0 - 8.0    Specific Gravity, UA 1.020 1.005 - 1.030    Protein, UA 1+ (A) Negative    Glucose, UA Negative Negative    Ketones, UA 1+ (A) Negative    Bilirubin (UA) Negative Negative    Occult Blood UA Negative Negative    Nitrite, UA Negative Negative    Urobilinogen, UA Negative <2.0 EU/dL    Leukocytes, UA Negative Negative   Urinalysis Microscopic    Collection Time: 02/01/20  3:24 PM   Result Value Ref Range    RBC, UA 8 (H) 0 - 4 /hpf    WBC, UA 0 0 - 5 /hpf    Bacteria Rare None-Occ /hpf    Hyaline Casts, UA 0 0-1/lpf /lpf    Microscopic Comment SEE COMMENT    Troponin I    Collection Time: 02/01/20  7:40 PM   Result Value Ref Range    Troponin I 0.255 (H) 0.000 - 0.026 ng/mL   POCT glucose    Collection Time: 02/01/20  8:44 PM   Result Value Ref Range    POCT Glucose 146 (H) 70 - 110 mg/dL   Basic metabolic panel    Collection Time: 02/02/20  1:54 AM   Result Value Ref Range    Sodium 124 (L) 136 - 145 mmol/L    Potassium 3.6 3.5 - 5.1 mmol/L    Chloride 94 (L) 95 - 110 mmol/L    CO2 22 (L) 23 - 29 mmol/L    Glucose 153 (H) 70 - 110 mg/dL    BUN, Bld  14 8 - 23 mg/dL    Creatinine 0.7 0.5 - 1.4 mg/dL    Calcium 8.3 (L) 8.7 - 10.5 mg/dL    Anion Gap 8 8 - 16 mmol/L    eGFR if African American >60 >60 mL/min/1.73 m^2    eGFR if non African American >60 >60 mL/min/1.73 m^2   CBC auto differential    Collection Time: 02/02/20  1:54 AM   Result Value Ref Range    WBC 5.43 3.90 - 12.70 K/uL    RBC 3.43 (L) 4.00 - 5.40 M/uL    Hemoglobin 10.3 (L) 12.0 - 16.0 g/dL    Hematocrit 30.8 (L) 37.0 - 48.5 %    Mean Corpuscular Volume 90 82 - 98 fL    Mean Corpuscular Hemoglobin 30.0 27.0 - 31.0 pg    Mean Corpuscular Hemoglobin Conc 33.4 32.0 - 36.0 g/dL    RDW 13.2 11.5 - 14.5 %    Platelets 71 (L) 150 - 350 K/uL    MPV 12.6 9.2 - 12.9 fL    Immature Granulocytes 0.4 0.0 - 0.5 %    Gran # (ANC) 4.2 1.8 - 7.7 K/uL    Immature Grans (Abs) 0.02 0.00 - 0.04 K/uL    Lymph # 0.7 (L) 1.0 - 4.8 K/uL    Mono # 0.4 0.3 - 1.0 K/uL    Eos # 0.0 0.0 - 0.5 K/uL    Baso # 0.01 0.00 - 0.20 K/uL    nRBC 0 0 /100 WBC    Gran% 77.7 (H) 38.0 - 73.0 %    Lymph% 13.4 (L) 18.0 - 48.0 %    Mono% 8.1 4.0 - 15.0 %    Eosinophil% 0.2 0.0 - 8.0 %    Basophil% 0.2 0.0 - 1.9 %    Differential Method Automated    Troponin I    Collection Time: 02/02/20  1:54 AM   Result Value Ref Range    Troponin I 0.190 (H) 0.000 - 0.026 ng/mL       Lab Results   Component Value Date    INR 2.3 (H) 02/01/2020    INR 3.4 01/29/2020    INR 3.0 01/08/2020     Lab Results   Component Value Date    HGBA1C 6.0 (H) 12/09/2019     Recent Labs     02/01/20  1513 02/01/20 2044   POCTGLUCOSE 137* 146*           MICROBIOLOGY DATA:     Urine Culture, Routine   Date Value Ref Range Status   02/15/2019 ESCHERICHIA COLI  >100,000 cfu/ml    Final   11/09/2017   Final    Multiple organisms isolated. None in predominance.  Repeat if   11/09/2017 clinically necessary.  Final   06/30/2015   Final    METHICILLIN RESISTANT STAPHYLOCOCCUS AUREUS  10,000 - 49,999 cfu/ml     06/16/2015   Final    Multiple organisms isolated. None in predominance.   Repeat if   06/16/2015 clinically necessary.  Final       Microbiology x 7d:   Microbiology Results (last 7 days)     Procedure Component Value Units Date/Time    Blood culture x two cultures. Draw prior to antibiotics. [[id number]] Collected:  02/01/20 1250    Order Status:  Completed Specimen:  Blood from Peripheral, Hand, Right Updated:  02/01/20 2312     Blood Culture, Routine No Growth to date    Narrative:       Aerobic and anaerobic    Blood culture x two cultures. Draw prior to antibiotics. [[id number]] Collected:  02/01/20 1505    Order Status:  Completed Specimen:  Blood from Peripheral, Hand, Left Updated:  02/01/20 2312     Blood Culture, Routine No Growth to date    Narrative:       Aerobic and anaerobic            IMAGING:     Imaging Results          X-Ray Chest AP Portable (Final result)  Result time 02/01/20 15:10:22    Final result by Augie Keane MD (02/01/20 15:10:22)                 Impression:      Cardiomegaly with bilateral increased interstitial and alveolar infiltrates may be associated with pulmonary edema.  Superimposed pneumonitis not excluded.  Recommend follow-up.      Electronically signed by: Augie Keane  Date:    02/01/2020  Time:    15:10             Narrative:    EXAMINATION:  XR CHEST AP PORTABLE    CLINICAL HISTORY:  Sepsis;    TECHNIQUE:  Single frontal view of the chest was performed.    COMPARISON:  12/09/2019    FINDINGS:  Heart is enlarged.  Interstitial and alveolar infiltrates bilaterally may be associated with pulmonary edema.  Superimposed pneumonitis not excluded.    Sternotomy wires are present.  Pacemaker device on the right.    Status post cardiac valve replacement.    No mass is identified.  No acute osseous abnormality.    No effusion or pneumothorax.                                  CONSULTS:     IP CONSULT TO CARDIOLOGY  IP CONSULT TO REGISTERED DIETITIAN/NUTRITIONIST       ASSESSMENT & PLAN:     Primary Diagnosis:  Acute congestive heart failure    Active  Hospital Problems    Diagnosis  POA    *Acute congestive heart failure [I50.9]  Yes     Priority: 1 - High    Influenza A [J10.1]  Yes     Priority: 2     Elevated troponin I level [R79.89]  Yes     Priority: 3     CAD (coronary artery disease) [I25.10]  Yes    PAH (pulmonary artery hypertension) [I27.21]  Yes     Secondary to valvular heart disease      Essential (primary) hypertension [I10]  Yes     Chronic    Type 2 diabetes mellitus with microalbuminuria, without long-term current use of insulin [E11.29, R80.9]  Yes     Chronic    S/P AVR [Z95.2]  Not Applicable     Mechanical 2010      S/P MVR (mitral valve replacement) [Z95.2]  Not Applicable     Mechanical 2010      Long term current use of anticoagulant [Z79.01]  Not Applicable     Chronic    Subclinical hypothyroidism [E03.9]  Yes     Chronic    Mixed hyperlipidemia [E78.2]  Yes     Chronic    Chronic atrial fibrillation [I48.20]  Yes     Chronic    Rheumatic heart disease [I09.9]  Yes     Chronic      Resolved Hospital Problems   No resolved problems to display.         Acute  on chronic CHF exacerbation with history of rheumatic heart valve disease and status post AVR status post MVR  · Evidenced by history, elevated BNP, pulmonary edema,   · Trace peripheral edema  · Provide diuresis w/ IV medication  · Maintain w/ beta-blocker  · ACE inhibitor or ARB if GFR allows and remains stable  · If 1) Patient cannot tolerate ACEi or 2) NYHA class III or above, add spironolactone (or eplerenone) and hydralazine-isosorbide dinitrate   · Daily Weights  · Strict I/O  · Fluid restriction to 1,500cc daily  · Low-sodium cardiac diet  · Obtain 2D echo if <6 months     Nuc Rest EF   Date Value Ref Range Status   12/10/2019 58  Final     · Chest X-ray  · Check TSH, albumin, UA, and renal function  · EKG and cardiac enzymes PRN  · DVT prophylaxis w/ pharmacological and/or mechanical measures  · Oxygen supplementation support PRN  · Cardiology consult for  further evaluation and recommendations    Instructions given to patient/family:  Monitor daily weight.  Regular activity within patient's limitations.  Low salt, low fat and low choleterol diet and restrict fluid < 2L per day.  Call MD if SOB, chest pain, weight gain > 2-3 lbs per day and/or 5-6 lbs per week.   No smoking. Annual influenza vaccine required.      Influenza A  · As evidenced by history and serology  · Supportive care.  Patient feels weak and comes hypoxic with ambulation.  Part of this is likely due to the CHF as noted above  · Tamiflu given, although at this point may not have much effect    Elevated troponin in the setting of a history of CAD and CHF as noted above  · No evidence of acute ST elevation MI   · Monitoring on telemetry  · Likely due to cardiac strain  · Aspirin   · Supplemental oxygen  · Trend troponins  · Cardiology consulted    Hypomagnesemia  · Due to GI losses or poor oral intake  · Replace magnesium IV  · Check serial magnesium    Essential Hypertension  · Goal while inpatient is a systolic blood pressure less than 160mmHg  · BP in acceptable range at this time  · Continue current home regimen with hold parameters  · PRN antihypertensives available      Hyperlipidemia   · Lipid panel - as an outpatient  · Cardiac diet  · Continue statin      Diabetes mellitus type 2  · BG in acceptable range at this time  · Maintain w/ subcutaneous insulin management order set  · Hold oral diabetic meds  · ADA 1800 kcal diet  · BG goal while in patient is <180mg/dL  · HgA1c = Pending      VTE Risk Mitigation (From admission, onward)         Ordered     warfarin (COUMADIN) tablet 2 mg  Daily      02/01/20 2005     IP VTE HIGH RISK PATIENT  Once      02/01/20 2005     Reason for No Pharmacological VTE Prophylaxis  Once     Question:  Reasons:  Answer:  Already adequately anticoagulated on oral Anticoagulants    02/01/20 2005                  Adult PRN medications available   DVT prophylaxis given        DISPOSITION:     Will admit to the Hospital Medicine service for further evaluation and treatment.    Chart reviewed and updated where applicable.    High Risk Conditions:  Patient has a condition that poses threat to life and bodily function:  Acute CHF exacerbation      ===============================================================    Ludwig Banuelos MD, MPH  Department of Hospital Medicine   [facility]  [phone] pg  (7pm - 6am)          This note is dictated using [employer] voice recognition software.  There are word recognition mistakes that are occasionally missed on review.

## 2020-02-02 NOTE — PROGRESS NOTES
Report received from off going nurse, JOEL Hernandez. Patient AAO. No signs of distress noted. Droplet precautions in place.  at bedside for support. Call light in reach. Bed low and locked. Will continue to monitor.

## 2020-02-02 NOTE — HPI
74 y.o. female that (in part)  has a past medical history of Closed displaced fracture of distal phalanx of lesser toe, Diabetes mellitus, Diabetes mellitus, type 2, Hypertension, Osteopenia, Pacemaker, and Rheumatic heart disease.  has a past surgical history that includes Cardiac valve replacement; thryoid s; Cardiac pacemaker placement; and Cardiac valuve replacement. Presents to [facility] complaining of shortness of breath.  EMS was activated due to worsening shortness of breath and lethargy.  Unable to ambulate independently, which is not normal for her.  She was febrile to 103° at home and is having difficulty taking deep breaths.  History of significant cardiac disease and heart valve replacement due to rheumatic heart valve disease. History of CAD and MI.  Denied chest pain, cyanosis, palpitations, or syncope.  Positive for peripheral edema.  Positive for nonproductive cough.    Follows with me, last seen 1/22/20.  PPM gen change (at [facility]) planned by Dr. Ring 2/27/20.    Patient presents to the emergency room with high fever and dyspnea on exertion.  She has no angina.  She was diagnosed with influenza.  She does appear to have some heart failure on her chest x-ray on on examination.  Note is made of a minimal troponin elevation.  She did recently have a normal nuclear stress test.  Repeat echocardiogram is planned.  She denies any recent history of travel, and her  is at the bedside in age with interpretation.      Seb OV 1/22/20:  HISTORY OF PRESENT ILLNESS:   The patient returns for follow-up, accompanied by her  who age with Amharic interpretation (he is a Amharic ).  She was recently hospitalized with chest pressure which has not recurred.  She ruled out for myocardial infarction in her nuclear stress test was normal.  Echocardiogram revealed normal LV function, with high gradients noted across her mechanical aortic valve.   At present, given the absence of any symptoms, I do not plan valve reintervention.  She otherwise has had no shortness of breath, palpitations, lightheadedness, dizziness, or syncope.  There has been no PND, orthopnea, or lower extremity edema.  She denies melena, hematuria, or claudicant symptoms.     Her Medtronic pacemaker was interrogated in the office today.  Current rhythm is ventricular sensing at 60 beats per minute with programmed mode VVI at 65 beats per minute.  Sensing and pacing thresholds as well as impedance are normal.  [facility] was detected on September 19, 2019.  I have discussed the case with Dr. Boateng and he will meet with the patient tomorrow to schedule elective generator change.  The patient is not pacemaker dependent.     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) monitors coumadin at home  ?mod prosthetic AS (echo 12/2019)  Chronic A-fib  Medtronic pacemaker - last generator change 2011  Ao root dil 3.7cm (echo 4/2019)    Cardiovascular Testing:  L MPI 12/10/19    The perfusion scan is free of evidence from myocardial ischemia or injury.    There is a moderate to severe intensity defect in the anterolateral wall of the left ventricle, secondary to breast attenuation.    Gated perfusion images showed an ejection fraction of 58 %.    The EKG portion of this study is uninterpretable.    The patient reported no chest pain during the stress test.    Arrhythmias during stress: rare PVCs.    Abnormal septal motion consistent with pacemaker.     Echo 12/10/19 (c/w report 4/2019, aortic mean grad inc 34->60mmHg, peak yong inc 3.95->5.1 m/sec)  · Concentric left ventricular hypertrophy.  · Normal left ventricular systolic function. The estimated ejection fraction is 55%  · Normal LV diastolic function.  · Septal wall has abnormal motion consistent with post-operative status.  · Normal right ventricular systolic function.  · Severe left atrial enlargement.  · Severe right  atrial enlargement.  · There is a mechanical aortic valve present. There is no aortic aortic insufficiency present.  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  · Moderate pulmonary hypertension present.  · Normal central venous pressure (3 mm Hg).  · The estimated PA systolic pressure is 44 mm Hg     ASSESSMENT:   # [facility] AVR/MVR  # prosthetic AS, echo 12/2019 suggests prosthetic AVR stenosis, asymptomatic.  # Ao root dil 3.7cm (echo 4/2019)  # HTN, controlled  # Chr AF, on coumadin (goal INR 2.5-3.5 given [facility] valves)  # HLP on prava 40mg  # DM  # thrombocytopenia  # Medtronic PPM, normally functioning, at [facility], not PPM dependent.     PLAN:   Cont med rx  Refer to Dr. Boateng for PPM generator change.  Pt has appt tomorrow.  RTC post-PPM   Repeat echo 1 year (Dec 2020)

## 2020-02-02 NOTE — CONSULTS
Food & Nutrition  Education    Diet Education: Coumadin, low sodium  Time Spent: 10 minutes  Learners: Patient and family      Nutrition Education provided with handouts:   Pt denied, family denied.      Comments: Met with patient and family to review basics of low sodium benefits with CHF and Vit K interactions with coumadin. Pt states they are well aware of coumadin and Vit K foods and do not need further information. They state patient has been following a low sodium diet for many years as well and they do not need further information.       All questions and concerns answered. Dietitian's contact information provided.       Please Re-consult as needed        Thanks!  Yun Chacko RD

## 2020-02-02 NOTE — CARE UPDATE
Reviewed H and P by my colleague and agree with A and P. Patient presenting with CHF likely brought on by + influenza A. On tamiflu. Clinically much improved. Cards consulted. Following trop. Echo. Noted AVR and MVR mechanical- INR low. Lovenox for now.  Home in 2-3 days. PT/OT eval.

## 2020-02-02 NOTE — PLAN OF CARE
Problem: Fall Injury Risk  Goal: Absence of Fall and Fall-Related Injury  Outcome: Ongoing, Progressing  Intervention: Identify and Manage Contributors to Fall Injury Risk  Flowsheets (Taken 2/2/2020 0457)  Self-Care Promotion: independence encouraged; BADL personal objects within reach; BADL personal routines maintained  Medication Review/Management: medications reviewed; high risk medications identified  Intervention: Promote Injury-Free Environment  Flowsheets (Taken 2/2/2020 0457)  Safety Promotion/Fall Prevention: assistive device/personal item within reach; bed alarm set; Fall Risk reviewed with patient/family; family to remain at bedside; high risk medications identified; muscle strengthening facilitated; side rails raised x 3; supervised activity; instructed to call staff for mobility  Environmental Safety Modification: assistive device/personal items within reach; clutter free environment maintained; lighting adjusted; room near unit station; room organization consistent     Problem: Infection  Goal: Infection Symptom Resolution  Outcome: Ongoing, Progressing     Problem: Adjustment to Illness (Heart Failure)  Goal: Optimal Coping  Outcome: Ongoing, Progressing     Problem: Fluid Imbalance (Heart Failure)  Goal: Fluid Balance  Outcome: Ongoing, Progressing  Intervention: Monitor and Manage Fluid Balance  Flowsheets (Taken 2/2/2020 0457)  Fluid/Electrolyte Management: fluids restricted

## 2020-02-03 ENCOUNTER — PATIENT MESSAGE (OUTPATIENT)
Dept: FAMILY MEDICINE | Facility: CLINIC | Age: 75
End: 2020-02-03

## 2020-02-03 PROBLEM — D63.8 ANEMIA OF CHRONIC DISEASE: Status: ACTIVE | Noted: 2020-02-03

## 2020-02-03 PROBLEM — E87.6 HYPOKALEMIA: Status: ACTIVE | Noted: 2020-02-03

## 2020-02-03 PROBLEM — R53.81 DEBILITY: Status: ACTIVE | Noted: 2020-02-03

## 2020-02-03 LAB
ANION GAP SERPL CALC-SCNC: 9 MMOL/L (ref 8–16)
BASOPHILS # BLD AUTO: 0.02 K/UL (ref 0–0.2)
BASOPHILS NFR BLD: 0.3 % (ref 0–1.9)
BUN SERPL-MCNC: 19 MG/DL (ref 8–23)
CALCIUM SERPL-MCNC: 8.5 MG/DL (ref 8.7–10.5)
CHLORIDE SERPL-SCNC: 92 MMOL/L (ref 95–110)
CO2 SERPL-SCNC: 27 MMOL/L (ref 23–29)
CREAT SERPL-MCNC: 0.9 MG/DL (ref 0.5–1.4)
DIFFERENTIAL METHOD: ABNORMAL
EOSINOPHIL # BLD AUTO: 0 K/UL (ref 0–0.5)
EOSINOPHIL NFR BLD: 0 % (ref 0–8)
ERYTHROCYTE [DISTWIDTH] IN BLOOD BY AUTOMATED COUNT: 13.3 % (ref 11.5–14.5)
EST. GFR  (AFRICAN AMERICAN): >60 ML/MIN/1.73 M^2
EST. GFR  (NON AFRICAN AMERICAN): >60 ML/MIN/1.73 M^2
ESTIMATED AVG GLUCOSE: 126 MG/DL (ref 68–131)
ESTIMATED AVG GLUCOSE: 126 MG/DL (ref 68–131)
GLUCOSE SERPL-MCNC: 130 MG/DL (ref 70–110)
HBA1C MFR BLD HPLC: 6 % (ref 4–5.6)
HBA1C MFR BLD HPLC: 6 % (ref 4–5.6)
HCT VFR BLD AUTO: 34.1 % (ref 37–48.5)
HGB BLD-MCNC: 11.2 G/DL (ref 12–16)
IMM GRANULOCYTES # BLD AUTO: 0.02 K/UL (ref 0–0.04)
IMM GRANULOCYTES NFR BLD AUTO: 0.3 % (ref 0–0.5)
INR PPP: 1.3 (ref 0.8–1.2)
LYMPHOCYTES # BLD AUTO: 1 K/UL (ref 1–4.8)
LYMPHOCYTES NFR BLD: 15.3 % (ref 18–48)
MAGNESIUM SERPL-MCNC: 1.9 MG/DL (ref 1.6–2.6)
MCH RBC QN AUTO: 29.5 PG (ref 27–31)
MCHC RBC AUTO-ENTMCNC: 32.8 G/DL (ref 32–36)
MCV RBC AUTO: 90 FL (ref 82–98)
MONOCYTES # BLD AUTO: 0.4 K/UL (ref 0.3–1)
MONOCYTES NFR BLD: 6.5 % (ref 4–15)
NEUTROPHILS # BLD AUTO: 5 K/UL (ref 1.8–7.7)
NEUTROPHILS NFR BLD: 77.6 % (ref 38–73)
NRBC BLD-RTO: 0 /100 WBC
PLATELET # BLD AUTO: 88 K/UL (ref 150–350)
PMV BLD AUTO: 11.3 FL (ref 9.2–12.9)
POCT GLUCOSE: 109 MG/DL (ref 70–110)
POCT GLUCOSE: 149 MG/DL (ref 70–110)
POCT GLUCOSE: 200 MG/DL (ref 70–110)
POCT GLUCOSE: 268 MG/DL (ref 70–110)
POTASSIUM SERPL-SCNC: 3.2 MMOL/L (ref 3.5–5.1)
PROTHROMBIN TIME: 14.1 SEC (ref 9–12.5)
RBC # BLD AUTO: 3.8 M/UL (ref 4–5.4)
SODIUM SERPL-SCNC: 128 MMOL/L (ref 136–145)
WBC # BLD AUTO: 6.49 K/UL (ref 3.9–12.7)

## 2020-02-03 PROCEDURE — 97161 PT EVAL LOW COMPLEX 20 MIN: CPT | Mod: HCNC

## 2020-02-03 PROCEDURE — 99233 PR SUBSEQUENT HOSPITAL CARE,LEVL III: ICD-10-PCS | Mod: HCNC,,, | Performed by: INTERNAL MEDICINE

## 2020-02-03 PROCEDURE — 97165 OT EVAL LOW COMPLEX 30 MIN: CPT | Mod: HCNC

## 2020-02-03 PROCEDURE — 63600175 PHARM REV CODE 636 W HCPCS: Mod: HCNC | Performed by: INTERNAL MEDICINE

## 2020-02-03 PROCEDURE — 83735 ASSAY OF MAGNESIUM: CPT | Mod: HCNC

## 2020-02-03 PROCEDURE — 80048 BASIC METABOLIC PNL TOTAL CA: CPT | Mod: HCNC

## 2020-02-03 PROCEDURE — 83036 HEMOGLOBIN GLYCOSYLATED A1C: CPT | Mod: HCNC

## 2020-02-03 PROCEDURE — 21400001 HC TELEMETRY ROOM: Mod: HCNC

## 2020-02-03 PROCEDURE — 85610 PROTHROMBIN TIME: CPT | Mod: HCNC

## 2020-02-03 PROCEDURE — 85025 COMPLETE CBC W/AUTO DIFF WBC: CPT | Mod: HCNC

## 2020-02-03 PROCEDURE — 36415 COLL VENOUS BLD VENIPUNCTURE: CPT | Mod: HCNC

## 2020-02-03 PROCEDURE — 25000003 PHARM REV CODE 250: Mod: HCNC | Performed by: INTERNAL MEDICINE

## 2020-02-03 PROCEDURE — 99233 SBSQ HOSP IP/OBS HIGH 50: CPT | Mod: HCNC,,, | Performed by: INTERNAL MEDICINE

## 2020-02-03 PROCEDURE — 25000003 PHARM REV CODE 250: Mod: HCNC | Performed by: HOSPITALIST

## 2020-02-03 PROCEDURE — 97110 THERAPEUTIC EXERCISES: CPT | Mod: HCNC

## 2020-02-03 RX ORDER — WARFARIN SODIUM 5 MG/1
5 TABLET ORAL DAILY
Status: DISCONTINUED | OUTPATIENT
Start: 2020-02-03 | End: 2020-02-04

## 2020-02-03 RX ORDER — POTASSIUM CHLORIDE 20 MEQ/1
60 TABLET, EXTENDED RELEASE ORAL ONCE
Status: COMPLETED | OUTPATIENT
Start: 2020-02-03 | End: 2020-02-03

## 2020-02-03 RX ORDER — FUROSEMIDE 20 MG/1
20 TABLET ORAL DAILY
Status: DISCONTINUED | OUTPATIENT
Start: 2020-02-03 | End: 2020-02-04

## 2020-02-03 RX ADMIN — ACETAMINOPHEN 650 MG: 325 TABLET ORAL at 12:02

## 2020-02-03 RX ADMIN — ENOXAPARIN SODIUM 50 MG: 100 INJECTION SUBCUTANEOUS at 09:02

## 2020-02-03 RX ADMIN — WARFARIN SODIUM 5 MG: 5 TABLET ORAL at 06:02

## 2020-02-03 RX ADMIN — OSELTAMIVIR PHOSPHATE 75 MG: 75 CAPSULE ORAL at 09:02

## 2020-02-03 RX ADMIN — FUROSEMIDE 20 MG: 20 TABLET ORAL at 09:02

## 2020-02-03 RX ADMIN — POTASSIUM CHLORIDE 60 MEQ: 1500 TABLET, EXTENDED RELEASE ORAL at 09:02

## 2020-02-03 NOTE — SUBJECTIVE & OBJECTIVE
Past Medical History:   Diagnosis Date    Closed displaced fracture of distal phalanx of lesser toe 10/20/2015    Diabetes mellitus     Diabetes mellitus, type 2     Hypertension     Osteopenia     Pacemaker     Rheumatic heart disease        Past Surgical History:   Procedure Laterality Date    CARDIAC PACEMAKER PLACEMENT      CARDIAC VALVE REPLACEMENT      CARDIAC VALVE SURGERY      thryoid s         Review of patient's allergies indicates:   Allergen Reactions    Aspirin Other (See Comments)       No current facility-administered medications on file prior to encounter.      Current Outpatient Medications on File Prior to Encounter   Medication Sig    losartan (COZAAR) 50 MG tablet Take 1 tablet (50 mg total) by mouth once daily.    metoprolol tartrate (LOPRESSOR) 25 MG tablet TAKE 1 TABLET BY MOUTH TWICE DAILY    multivitamin-Ca-iron-minerals 27-0.4 mg Tab     omega-3 acid ethyl esters (LOVAZA) 1 gram capsule TK 2 CS PO BID    pravastatin (PRAVACHOL) 20 MG tablet Take 1 tablet (20 mg total) by mouth once daily.    warfarin (COUMADIN) 4 MG tablet TAKE 1 TABLET BY MOUTH ON MONDAY AND FRIDAY AND 1/2 TABLET BY MOUTH ON ALL OTHER DAYS    ACCU-CHEK GATO CONTROL SOLN Soln     adhesive bandage (BANDAGES/PLASTIC TOP)     B-COMPLEX WITH VITAMIN C ORAL     blood sugar diagnostic Strp Use to test blood sugar BID    blood-glucose meter Misc Accu-chek Gato Glucose Meter, Use to test blood sugar once daily.    FLUZONE HIGH-DOSE 2019-20, PF, 180 mcg/0.5 mL Syrg ADM 0.5ML IM UTD    lancets (ACCU-CHEK SOFTCLIX LANCETS) Misc Use to test blood sugar BID    lancets 30 gauge Misc     lancing device Misc     nitroGLYCERIN (NITROSTAT) 0.4 MG SL tablet DISSOLVE 1 TABLET UNDER THE TONGUE EVERY 5 MINUTES AS NEEDED FOR CHEST PAINS (Patient not taking: Reported on 1/27/2020)    TRUETEST TEST STRIPS Strp USE ONCE DAILY     Family History     Problem Relation (Age of Onset)    Breast cancer Maternal Aunt         Tobacco Use    Smoking status: Never Smoker    Smokeless tobacco: Never Used   Substance and Sexual Activity    Alcohol use: No    Drug use: Never    Sexual activity: Not on file     Review of Systems   Gastrointestinal: Negative for melena.   Genitourinary: Negative for hematuria.     Objective:     Vital Signs (Most Recent):  Temp: 98.1 °F (36.7 °C) (02/03/20 0444)  Pulse: 80 (02/03/20 0500)  Resp: 18 (02/02/20 2340)  BP: (!) 92/54 (02/03/20 0500)  SpO2: 99 % (02/03/20 0444) Vital Signs (24h Range):  Temp:  [98.1 °F (36.7 °C)-102.9 °F (39.4 °C)] 98.1 °F (36.7 °C)  Pulse:  [] 80  Resp:  [18-21] 18  SpO2:  [93 %-99 %] 99 %  BP: ()/() 92/54     Weight: 52.2 kg (115 lb 1.3 oz)  Body mass index is 21.74 kg/m².    SpO2: 99 %  O2 Device (Oxygen Therapy): nasal cannula      Intake/Output Summary (Last 24 hours) at 2/3/2020 0733  Last data filed at 2/2/2020 1917  Gross per 24 hour   Intake 318.33 ml   Output 2500 ml   Net -2181.67 ml       Lines/Drains/Airways     Peripheral Intravenous Line                 Peripheral IV - Single Lumen 02/01/20 18 G Left Antecubital 2 days                Physical Exam   Constitutional: She is oriented to person, place, and time. She appears well-developed and well-nourished. No distress.   HENT:   Head: Normocephalic and atraumatic.   Mouth/Throat: No oropharyngeal exudate.   Eyes: Pupils are equal, round, and reactive to light. Conjunctivae and EOM are normal. No scleral icterus.   Neck: Normal range of motion. Neck supple. No JVD present. No tracheal deviation present. No thyromegaly present.   Cardiovascular: Normal rate. An irregularly irregular rhythm present. Exam reveals no gallop and no friction rub.   Murmur heard.   Systolic murmur is present with a grade of 3/6.  Mech S1/S2, crisp   Pulmonary/Chest: Effort normal. No respiratory distress. She has no wheezes. She has no rales. She exhibits no tenderness.   Abdominal: Soft. She exhibits no distension.    Musculoskeletal: Normal range of motion. She exhibits no edema.   Neurological: She is alert and oriented to person, place, and time. No cranial nerve deficit.   Skin: Skin is warm and dry. She is not diaphoretic.   Psychiatric: She has a normal mood and affect. Her behavior is normal. Judgment normal.       Current Medications:   enoxaparin  1 mg/kg Subcutaneous Q12H    furosemide  40 mg Intravenous BID    metoprolol tartrate  12.5 mg Oral BID    oseltamivir  75 mg Oral BID    warfarin  2 mg Oral Daily       acetaminophen, dextromethorphan-guaifenesin  mg/5 ml, dextrose 50%, dextrose 50%, glucagon (human recombinant), glucose, glucose, hydrALAZINE, insulin aspart U-100, morphine, nitroGLYCERIN, promethazine (PHENERGAN) IVPB, sodium chloride 0.9%    Laboratory (all labs reviewed):  CBC:  Recent Labs   Lab 12/09/19  0226 12/10/19  0647 02/01/20  1443 02/02/20  0154 02/03/20  0414   WBC 7.44 6.37 5.07 5.43 6.49   Hemoglobin 12.3 11.9 L 11.0 L 10.3 L 11.2 L   Hematocrit 37.8 37.1 33.3 L 30.8 L 34.1 L   Platelets 96 L 95 L 71 L 71 L 88 L       CHEMISTRIES:  Recent Labs   Lab 12/09/19  0226 12/10/19  0647 02/01/20  1443 02/02/20  0154 02/02/20  1229 02/03/20  0414   Glucose 108 115 H 127 H 153 H  --  130 H   Sodium 133 L 136 124 L 124 L  --  128 L   Potassium 4.2 4.3 4.1 3.6  --  3.2 L   BUN, Bld 16 11 15 14  --  19   Creatinine 0.8 0.7 0.8 0.7  --  0.9   eGFR if  >60 >60 >60 >60  --  >60   eGFR if non African American >60 >60 >60 >60  --  >60   Calcium 9.6 9.1 8.8 8.3 L  --  8.5 L   Magnesium  --   --  1.5 L  --  2.1 1.9       CARDIAC BIOMARKERS:  Recent Labs   Lab 12/09/19  1655 02/01/20  1443 02/01/20  1940 02/02/20  0154 02/02/20  0753   Troponin I 0.010 0.099 H 0.255 H 0.190 H 0.162 H       COAGS:  Recent Labs   Lab 01/08/20 01/29/20 02/01/20  1443 02/02/20  0154 02/03/20  0414   INR 3.0 3.4 2.3 H 1.7 H 1.3 H       LIPIDS/LFTS:  Recent Labs   Lab 03/14/17  0735 08/15/17  0745  03/08/18  0736 03/22/19  0704 12/09/19  0226 12/09/19  0526 12/10/19  0647 02/01/20  1443   Cholesterol 139  139 127 140 143  --  131  --   --    Triglycerides 269 H  269 H 229 H 146 96  --  72  --   --    HDL 30 L  30 L 30 L 38 L 51  --  43  --   --    LDL Cholesterol 55.2 L  55.2 L 51.2 L 72.8 72.8  --  73.6  --   --    Non-HDL Cholesterol 109  109 97 102 92  --  88  --   --    AST 45 H 32 41 H 33 35  --  28 64 H   ALT 23 21 30 17 19  --  12 28       BNP:  Recent Labs   Lab 12/09/19  0226 02/01/20  1443    H 633 H       TSH:  Recent Labs   Lab 08/15/17  0745 03/08/18  0736 09/13/18  0738 03/22/19  0704 12/09/19  0525   TSH 6.046 H 7.037 H 3.997 5.081 H 3.665       Free T4:  Recent Labs   Lab 03/14/17  0735 08/15/17  0745 03/08/18  0736 03/22/19  0704   Free T4 1.03 0.97 0.99 1.08       Diagnostic Results:  ECG (personally reviewed and interpreted tracing(s)):  2/1/20 1438 , inflat ST abnl ?[facility]    Chest X-Ray (personally reviewed and interpreted image(s)): 2/1/20 CHFH vs bilat pneumonitis, R PPM 1 lead, sternotomy    Echo 2/2/20 (images personally reviewed and interpreted; Ao peak yong 4.3 m/sec, mean grad 47 mmHg)  · Normal left ventricular systolic function. The estimated ejection fraction is 55-60%.  · Mild concentric left ventricular hypertrophy.  · Septal wall has abnormal motion with normal thickening consistent with post-operative status.  · Mild right ventricular enlargement.  · Low normal right ventricular systolic function.  · There is a bileaflet tilting disc mechanical aortic valve present. There is no aortic insufficiency present. Cannot exclude some degree of prsothetic AS (not severe).  · There is a bileaflet mechanical mitral valve prosthesis. Prosthetic mitral valve is normal.  · Mild tricuspid regurgitation.  · The estimated PA systolic pressure is 52 mmHg.  · Pulmonary hypertension present.    L MPI 12/10/19    The perfusion scan is free of evidence from myocardial ischemia or  injury.    There is a moderate to severe intensity defect in the anterolateral wall of the left ventricle, secondary to breast attenuation.    Gated perfusion images showed an ejection fraction of 58 %.    The EKG portion of this study is uninterpretable.    The patient reported no chest pain during the stress test.    Arrhythmias during stress: rare PVCs.    Abnormal septal motion consistent with pacemaker.

## 2020-02-03 NOTE — HOSPITAL COURSE
2/1/20 adm with influenza and [facility].  2/2/20: echo with normal LV fxn and normally functioning AVR/MVR (?pros AS)    Interval Hx: no cp, complains of SOB/cough.   at bedside aids with interpretation.  Asking when she will get better, I tried to explain the nature of flu, and that she may be wiped out for a bit.    Tele: AF 80-90s, occ  (personally reviewed and interpreted)

## 2020-02-03 NOTE — PROGRESS NOTES
Report given to oncoming nurse, JOEL Murray. Patient AAO. No signs of distress noted. Call light in reach. Bed low and locked. Will continue to monitor.

## 2020-02-03 NOTE — PT/OT/SLP EVAL
"Occupational Therapy   Evaluation    Name: Orion Ty  MRN: [id number]  Admitting Diagnosis:  Influenza A      Recommendations:     Discharge Recommendations: home  Discharge Equipment Recommendations:  none  Barriers to discharge:       Assessment:     Orion Ty is a 74 y.o. female with a medical diagnosis of Influenza A.  She presents with  independence for self-care and functional transfers. Performance deficits affecting function: weakness, impaired endurance, impaired self care skills, decreased upper extremity function, impaired cardiopulmonary response to activity.      Rehab Prognosis: Good; patient would benefit from acute skilled OT services to address these deficits and reach maximum level of function.       Plan:     Patient to be seen 3 x/week to address the above listed problems via self-care/home management, therapeutic activities, therapeutic exercises  · Plan of Care Expires: 02/10/20  · Plan of Care Reviewed with: patient    Subjective     Chief Complaint: "I feel weak."  Patient/Family Comments/goals: to get better    Occupational Profile:  Living Environment: lives with her spouse   Previous level of function: independent  Roles and Routines: able to perform household duties, was not driving  Equipment Used at Home:  none  Assistance upon Discharge: from her     Pain/Comfort:  · Pain Rating 1: 0/10    Patients cultural, spiritual, Adventism conflicts given the current situation: yes    Objective:     Communicated with: nurse prior to session.  Patient found supine with peripheral IV, oxygen, telemetry upon OT entry to room.    General Precautions: Standard, fall, diabetic(h/o pacemaker)   Orthopedic Precautions:N/A   Braces: N/A     Occupational Performance:    Bed Mobility:    · Patient completed Rolling/Turning to Left with  stand by assistance  · Patient completed Rolling/Turning to Right with stand by assistance  · Patient completed Scooting/Bridging with stand by " assistance  · Patient completed Supine to Sit with contact guard assistance  · Patient completed Sit to Supine with contact guard assistance    Activities of Daily Living:  · Feeding:  modified independence bed level  · Upper Body Dressing: minimum assistance seated EOB  · Lower Body Dressing: contact guard assistance bed level    Cognitive/Visual Perceptual:  Cognitive/Psychosocial Skills:     -       Oriented to: Person, Place and Situation   -       Follows Commands/attention:Follows one-step commands  -       Communication: clear/fluent  -       Memory: No Deficits noted  -       Safety awareness/insight to disability: intact   -       Mood/Affect/Coping skills/emotional control: Appropriate to situation    Physical Exam:  Balance:    -       sit gbalance good  Postural examination/scapula alignment:    -       Rounded shoulders  Skin integrity: Visible skin intact  Upper Extremity Range of Motion:     -       Right Upper Extremity: WFL  -       Left Upper Extremity: WFL  Upper Extremity Strength:    -       Right Upper Extremity: WFL  -       Left Upper Extremity: WFL    AMPAC 6 Click ADL:  AMPAC Total Score: 20    Treatment & Education:  Evaluation  Education:    Patient left supine with all lines intact and call button in reach    GOALS:   Multidisciplinary Problems     Occupational Therapy Goals        Problem: Occupational Therapy Goal    Goal Priority Disciplines Outcome Interventions   Occupational Therapy Goal     OT, PT/OT     Description:  Goals to be met by: 2/10/2020     Patient will increase functional independence with ADLs by performing:    UE Dressing with Modified [place].  LE Dressing with Modified [place].  Grooming while standing at sink with Stand-by Assistance.  Supine to sit with Supervision.  Step transfer with Contact Guard Assistance  Toilet transfer to toilet with Contact Guard Assistance.  Upper extremity exercise program per handout, with assistance as needed.                       History:     Past Medical History:   Diagnosis Date    Closed displaced fracture of distal phalanx of lesser toe 10/20/2015    Diabetes mellitus     Diabetes mellitus, type 2     Hypertension     Osteopenia     Pacemaker     Rheumatic heart disease        Past Surgical History:   Procedure Laterality Date    CARDIAC PACEMAKER PLACEMENT      CARDIAC VALVE REPLACEMENT      CARDIAC VALVE SURGERY      thryoid s         Time Tracking:     OT Date of Treatment: 02/03/20  OT Start Time: 1630  OT Stop Time: 1642  OT Total Time (min): 12 min    Billable Minutes:Evaluation 12 minutes    NING Rouse, MS  2/3/2020

## 2020-02-03 NOTE — PLAN OF CARE
Problem: Physical Therapy Goal  Goal: Physical Therapy Goal  Description  Goals to be met by: 20     Patient will increase functional independence with mobility by performin. Supine to sit with Modified [place]  2. Rolling to Left and Right with Modified [place]  3. Sit to stand transfer with Modified [place]  4. Bed to chair transfer with Modified [place]   5. Gait >250 feet with Modified [place] using no AD  6. Upper/Lower extremity exercise program 3 sets x10 reps per handout, with independence     Outcome: Ongoing, Progressing    Pt ambulated within room with hand-held assistance and on 3L O2 NC.  Gait limited 2* pt on droplet precautions at this time.

## 2020-02-03 NOTE — NURSING
Bedside report received from JOEL uMrray. Patient awake and alert. Spouse at bedside. NAD noted at this time. All safety precautions in place. Will continue to monitor.

## 2020-02-03 NOTE — ASSESSMENT & PLAN NOTE
Trop peaked at 0.255.  Likely demand in setting of influenza and chr AF/RVR.  Recent MPI neg for ischemia.  Echo 2/2/20 without WMA.  Will plan med rx.

## 2020-02-03 NOTE — SUBJECTIVE & OBJECTIVE
Interval History: No new issues.   Review of Systems   Constitutional: Positive for activity change.   HENT: Negative for congestion.    Respiratory: Negative for chest tightness and shortness of breath.    Cardiovascular: Negative for chest pain.   Gastrointestinal: Negative for abdominal pain.   Genitourinary: Negative for difficulty urinating.     Objective:     Vital Signs (Most Recent):  Temp: 98.1 °F (36.7 °C) (02/03/20 0444)  Pulse: 80 (02/03/20 0500)  Resp: 18 (02/02/20 2340)  BP: (!) 92/54 (02/03/20 0500)  SpO2: 99 % (02/03/20 0444) Vital Signs (24h Range):  Temp:  [98.1 °F (36.7 °C)-102.9 °F (39.4 °C)] 98.1 °F (36.7 °C)  Pulse:  [] 80  Resp:  [18-21] 18  SpO2:  [93 %-99 %] 99 %  BP: ()/() 92/54     Weight: 52.2 kg (115 lb 1.3 oz)  Body mass index is 21.74 kg/m².    Intake/Output Summary (Last 24 hours) at 2/3/2020 0752  Last data filed at 2/2/2020 1917  Gross per 24 hour   Intake 318.33 ml   Output 2500 ml   Net -2181.67 ml      Physical Exam   Constitutional: She is oriented to person, place, and time. She appears well-developed and well-nourished.   HENT:   Head: Normocephalic and atraumatic.   Cardiovascular: Normal rate and regular rhythm.   Pulmonary/Chest: Effort normal and breath sounds normal.   Neurological: She is alert and oriented to person, place, and time.   Skin: Skin is warm.   Nursing note and vitals reviewed.      Significant Labs:   BMP:   Recent Labs   Lab 02/03/20  0414   *   *   K 3.2*   CL 92*   CO2 27   BUN 19   CREATININE 0.9   CALCIUM 8.5*   MG 1.9     CBC:   Recent Labs   Lab 02/01/20  1443 02/02/20  0154 02/03/20  0414   WBC 5.07 5.43 6.49   HGB 11.0* 10.3* 11.2*   HCT 33.3* 30.8* 34.1*   PLT 71* 71* 88*       Significant Imaging:

## 2020-02-03 NOTE — PROGRESS NOTES
[facility] Hospital Medicine  Progress Note    Patient Name: Orion Ty  MRN: [id number]  Patient Class: IP- Inpatient   Admission Date: 2/1/2020  Length of Stay: 2 days  Attending Physician: Dennis Pearson MD  Primary Care Provider: Otis Zimmer MD        Subjective:     Principal Problem:Influenza A        HPI:  Orion Ty is a 74 y.o. female that (in part)  has a past medical history of Closed displaced fracture of distal phalanx of lesser toe, Diabetes mellitus, Diabetes mellitus, type 2, Hypertension, Osteopenia, Pacemaker, and Rheumatic heart disease.  has a past surgical history that includes Cardiac valve replacement; thryoid s; Cardiac pacemaker placement; and Cardiac valuve replacement. Presents to [facility] Emergency Department complaining of shortness of breath.  EMS was activated due to worsening shortness of breath and lethargy.  Unable to ambulate independently, which is not normal for her.  She was febrile to 103° at home and is having difficulty taking deep breaths.  History of significant cardiac disease and heart valve replacement due to rheumatic heart valve disease. History of CAD and MI.  Denied chest pain, cyanosis, palpitations, or syncope.  Positive for peripheral edema.  Positive for nonproductive cough.    In the emergency department routine laboratory studies, chest x-ray, EKG, and cardiac enzymes were obtained.  There was evidence of acute CHF exacerbation with pulmonary edema, trace edema, and hypoxemia.  Influenza A serology was also positive.    Hospital medicine has been asked to admit to inpatient for further evaluation and treatment.     Overview/Hospital Course:   Presents to [facility] Emergency Department complaining of shortness of breath.  EMS was activated due to worsening shortness of breath and lethargy.  Unable to ambulate independently, which is not normal for her.  She was febrile to  103° at home and is having difficulty taking deep breaths.  History of significant cardiac disease and heart valve replacement due to rheumatic heart valve disease. History of CAD and MI.  Denied chest pain, cyanosis, palpitations, or syncope.  Positive for peripheral edema.  Positive for nonproductive cough. n the emergency department routine laboratory studies, chest x-ray, EKG, and cardiac enzymes were obtained.  There was evidence of acute CHF exacerbation with pulmonary edema, trace edema, and hypoxemia.  Influenza A serology was also positive.    Hospital medicine has been asked to admit to inpatient for further evaluation and treatment. Cards was consulted.  Started on full dose lovenox for mechanical valves with low INR.  Lasix was continued but changed to po on 2/3. PT/OT were consulted for eval. Tamiflu was continued.         Interval History: No new issues.   Review of Systems   Constitutional: Positive for activity change.   HENT: Negative for congestion.    Respiratory: Negative for chest tightness and shortness of breath.    Cardiovascular: Negative for chest pain.   Gastrointestinal: Negative for abdominal pain.   Genitourinary: Negative for difficulty urinating.     Objective:     Vital Signs (Most Recent):  Temp: 98.1 °F (36.7 °C) (02/03/20 0444)  Pulse: 80 (02/03/20 0500)  Resp: 18 (02/02/20 2340)  BP: (!) 92/54 (02/03/20 0500)  SpO2: 99 % (02/03/20 0444) Vital Signs (24h Range):  Temp:  [98.1 °F (36.7 °C)-102.9 °F (39.4 °C)] 98.1 °F (36.7 °C)  Pulse:  [] 80  Resp:  [18-21] 18  SpO2:  [93 %-99 %] 99 %  BP: ()/() 92/54     Weight: 52.2 kg (115 lb 1.3 oz)  Body mass index is 21.74 kg/m².    Intake/Output Summary (Last 24 hours) at 2/3/2020 0752  Last data filed at 2/2/2020 1917  Gross per 24 hour   Intake 318.33 ml   Output 2500 ml   Net -2181.67 ml      Physical Exam   Constitutional: She is oriented to person, place, and time. She appears well-developed and well-nourished.   HENT:    Head: Normocephalic and atraumatic.   Cardiovascular: Normal rate and regular rhythm.   Pulmonary/Chest: Effort normal and breath sounds normal.   Neurological: She is alert and oriented to person, place, and time.   Skin: Skin is warm.   Nursing note and vitals reviewed.      Significant Labs:   BMP:   Recent Labs   Lab 02/03/20  0414   *   *   K 3.2*   CL 92*   CO2 27   BUN 19   CREATININE 0.9   CALCIUM 8.5*   MG 1.9     CBC:   Recent Labs   Lab 02/01/20  1443 02/02/20  0154 02/03/20  0414   WBC 5.07 5.43 6.49   HGB 11.0* 10.3* 11.2*   HCT 33.3* 30.8* 34.1*   PLT 71* 71* 88*       Significant Imaging:      Assessment/Plan:      * Influenza A  Continue Tamiflu.        S/P MVR (mitral valve replacement)  INR is low. Continue full dose lovenox until INR > 2.5. Coumadin dose increased on 2/3.       S/P AVR  See above.       Hypokalemia  Will replace. Cut back lasix       Debility  PT/OT eval pending       Anemia of chronic disease  Stable      Elevated troponin I level  Recent negative stress. Likely demand ischemia.  Cards noted med Rx only         Acute diastolic congestive heart failure  Echo does not show diastolic dysf. From this hospital stay. Cut back to po lasix       PAH (pulmonary artery hypertension)  No acute issues       CAD (coronary artery disease)  No acute issues       Essential (primary) hypertension  Resume home meds       Type 2 diabetes mellitus with microalbuminuria, without long-term current use of insulin  Well controlled. No other manifestations. Will order A1c.        Long term current use of anticoagulant  INR at 1.3 on 2/3.  Full dose lovenox       Mixed hyperlipidemia  Resume statin       Subclinical hypothyroidism  Resume home meds       Rheumatic heart disease  No acute issues      Chronic atrial fibrillation  Also on Coumadin         VTE Risk Mitigation (From admission, onward)         Ordered     warfarin (COUMADIN) tablet 5 mg  Daily      02/03/20 0750     enoxaparin  injection 50 mg  Every 12 hours (non-standard times)      02/02/20 0832     IP VTE HIGH RISK PATIENT  Once      02/01/20 2005     Reason for No Pharmacological VTE Prophylaxis  Once     Question:  Reasons:  Answer:  Already adequately anticoagulated on oral Anticoagulants    02/01/20 2005              PT/OT eval.  Will d/c lasix. Increase Coumadin. Full dose lovenox for now. Here for 2 more days at minimum. Likely H/H on discharge. Continue Tamiflu for 3 more days         Dennis Juan MD  Department of Hospital Medicine   [facility]

## 2020-02-03 NOTE — HOSPITAL COURSE
Ms Ty presented with sepsis, acute respiratory failure with hypoxia (88% at room air, per ED documentation) and acute encephalopathy secondary to influenza A infection. Patient was given isotonic fluids, oseltamivir and supplemental O2. Droplet precautions initiated. Patient clinically improved and completed course of osetamivir. Patient complained of right flank pain. On exam, patient was noted to have large bruise and firmness from right flank down to lower lateral aspect of abdomen and back. A CT of abdomen pelvis with IV contrast showed a large hematoma interposed between oblique muscles. Unknown what caused this hematoma. Per patient and , this is new. Patient is on coumadin for stroke prophylaxis as patient has 2 mechanical heart valves. Is also chronically thrombocytopenic (50s-60s in 2010; 80s-90s recently). Patient was given one unit of blood for acute blood loss anemia. Hematology consulted for recs on therapeutic INR levels. Recommend at least 2.5 (lowest allowed level for mechanical valves) per my conversation with Hematologist. Lovenox no longer required and coumadin held to reach goal INR. Repeat CT of abdomen showed improvement of hematoma. Patient continued to clinically improve and no longer with evidence of bleeding. Did require one more unit of blood but was stable afterwards. Weaned off nasal cannula. Did well with therapy. Discharged to resume home regimen of coumadin and INR was 2.2 at time of discharge. Home health set up. INR to be drawn by home health daily for at least 3 days to ensure INR remains at goal. Vitals by  as well along with therapy. Plan discussed with patient and family. Questions answered to satisfaction. Cardiac diet. Activity as tolerated. F/u with PCP at date shown below.

## 2020-02-03 NOTE — PROGRESS NOTES
Received report from SRINI Alegria. Patient awake & alert, resting quietly in bed w/ family at bedside, 3L O2 NC & telemetry monitoring in place, no distress noted. Safety maintained, call light in reach.

## 2020-02-03 NOTE — PT/OT/SLP EVAL
Physical Therapy Evaluation    Patient Name:  Orion Ty   MRN:  [id number]    Recommendations:     Discharge Recommendations:  home health PT   Discharge Equipment Recommendations: none   Barriers to discharge: None    Assessment:     Orion Ty is a 74 y.o. female admitted with a medical diagnosis of Influenza A.  She presents with the following impairments/functional limitations:  weakness, impaired endurance, impaired functional mobilty, gait instability, impaired balance, impaired cardiopulmonary response to activity.    Rehab Prognosis: Good; patient would benefit from acute skilled PT services to address these deficits and reach maximum level of function.    Recent Surgery: * No surgery found *      Plan:     During this hospitalization, patient to be seen (3-5x/wk) to address the identified rehab impairments via gait training, therapeutic activities, therapeutic exercises and progress toward the following goals:    · Plan of Care Expires:  02/17/20    Subjective     Chief Complaint: weakness/SOB  Patient/Family Comments/goals: Pt agreeable to therapy.   Pain/Comfort:  · Pain Rating 1: 0/10    Living Environment:  Pt lives with spouse in a SS house with no concerns.   Prior to admission, patients level of function was independent.  Pt does not drive, spouse provides transportation.  Equipment used at home: none.  Upon discharge, patient will have assistance from spouse.  Pt has 5 children, only 2 are local.    Objective:     Patient found HOB elevated with peripheral IV, oxygen 3L, telemetry  upon PT entry to room.    General Precautions: Standard, fall, diabetic(h/o pacemaker), contact and droplet isolation   Orthopedic Precautions:N/A   Braces: N/A     Exams:  · Cognitive Exam:  Patient was able to follow multiple commands.   · Gross Motor Coordination:  WFL  · Postural Exam:  Patient presented with the following abnormalities:    · -       No postural abnormalities identified  · Sensation:    · -        Intact  light/touch BLE  · Skin Integrity/Edema:      · -       Skin integrity: Visible skin intact; minimal discoloration to BLE  · -       Edema: None noted BLE  · BLE ROM: WFL  · BLE Strength: WFL    Functional Mobility:  · Bed Mobility:     · Scooting: minimum assistance  · Supine to Sit: minimum assistance with HOB elevated   · Transfers:     · Sit to Stand:  contact guard assistance with hand-held assist  · Bed to Chair: contact guard assistance and minimum assistance with  no AD and hand-held assist  using  Step Transfer  · Toilet Transfer: contact guard assistance and minimum assistance with  no AD and hand-held assist  using  Step Transfer  · Gait: Pt ambulated ~10-12 ft x2 trials with min A-CGA with no AD and on 3L O2 NC.  Pt with decreased step length and aaron.  Pt with generalized weakness and decreased endurance.   · Balance: Pt with fair dynamic standing balance.       Therapeutic Activities and Exercises:  BLE supine therex 20 reps: AP, HS, hip abd/add, and SLR    Pt encouraged to be OOB>chair and to ambulate to the bathroom with nursing assistance while in the hospital.  Pt educated on acute skilled PT services and goals.  Pt verbalized good understanding.     AM-PAC 6 CLICK MOBILITY  Total Score:18     Patient left up in chair reclined with BLE elevated on pillow with all lines intact, call button in reach, nurse Noelle notified and friend/family present.    GOALS:   Multidisciplinary Problems     Physical Therapy Goals        Problem: Physical Therapy Goal    Goal Priority Disciplines Outcome Goal Variances Interventions   Physical Therapy Goal     PT, PT/OT Ongoing, Progressing     Description:  Goals to be met by: 20     Patient will increase functional independence with mobility by performin. Supine to sit with Modified [place]  2. Rolling to Left and Right with Modified [place]  3. Sit to stand transfer with Modified [place]  4. Bed to chair transfer with  Modified [place]   5. Gait >250 feet with Modified [place] using no AD  6. Upper/Lower extremity exercise program 3 sets x10 reps per handout, with independence                      History:     Past Medical History:   Diagnosis Date    Closed displaced fracture of distal phalanx of lesser toe 10/20/2015    Diabetes mellitus     Diabetes mellitus, type 2     Hypertension     Osteopenia     Pacemaker     Rheumatic heart disease        Past Surgical History:   Procedure Laterality Date    CARDIAC PACEMAKER PLACEMENT      CARDIAC VALVE REPLACEMENT      CARDIAC VALVE SURGERY      thryoid s         Time Tracking:     PT Received On: 02/03/20  PT Start Time: 0934     PT Stop Time: 0957  PT Total Time (min): 23 min     Billable Minutes: Evaluation 13 min and Therapeutic Exercise 10 min      Keshia U Zully, PT  02/03/2020

## 2020-02-04 PROBLEM — S30.1XXA RIGHT FLANK HEMATOMA: Status: ACTIVE | Noted: 2020-02-04

## 2020-02-04 LAB
ABO + RH BLD: NORMAL
ANION GAP SERPL CALC-SCNC: 10 MMOL/L (ref 8–16)
ANISOCYTOSIS BLD QL SMEAR: SLIGHT
BASOPHILS # BLD AUTO: 0 K/UL (ref 0–0.2)
BASOPHILS NFR BLD: 0 % (ref 0–1.9)
BLD GP AB SCN CELLS X3 SERPL QL: NORMAL
BLD PROD TYP BPU: NORMAL
BLOOD UNIT EXPIRATION DATE: NORMAL
BLOOD UNIT TYPE CODE: 6200
BLOOD UNIT TYPE: NORMAL
BUN SERPL-MCNC: 37 MG/DL (ref 8–23)
CALCIUM SERPL-MCNC: 8.8 MG/DL (ref 8.7–10.5)
CHLORIDE SERPL-SCNC: 92 MMOL/L (ref 95–110)
CO2 SERPL-SCNC: 25 MMOL/L (ref 23–29)
CODING SYSTEM: NORMAL
CREAT SERPL-MCNC: 1.1 MG/DL (ref 0.5–1.4)
DIFFERENTIAL METHOD: ABNORMAL
DISPENSE STATUS: NORMAL
EOSINOPHIL # BLD AUTO: 0 K/UL (ref 0–0.5)
EOSINOPHIL NFR BLD: 0 % (ref 0–8)
ERYTHROCYTE [DISTWIDTH] IN BLOOD BY AUTOMATED COUNT: 13.2 % (ref 11.5–14.5)
EST. GFR  (AFRICAN AMERICAN): 57 ML/MIN/1.73 M^2
EST. GFR  (NON AFRICAN AMERICAN): 49 ML/MIN/1.73 M^2
GLUCOSE SERPL-MCNC: 180 MG/DL (ref 70–110)
HCT VFR BLD AUTO: 18.1 % (ref 37–48.5)
HGB BLD-MCNC: 6.1 G/DL (ref 12–16)
HYPOCHROMIA BLD QL SMEAR: ABNORMAL
IMM GRANULOCYTES # BLD AUTO: 0.04 K/UL (ref 0–0.04)
IMM GRANULOCYTES NFR BLD AUTO: 0.7 % (ref 0–0.5)
INR PPP: 1.8 (ref 0.8–1.2)
LYMPHOCYTES # BLD AUTO: 1.1 K/UL (ref 1–4.8)
LYMPHOCYTES NFR BLD: 19.5 % (ref 18–48)
MAGNESIUM SERPL-MCNC: 2.1 MG/DL (ref 1.6–2.6)
MCH RBC QN AUTO: 30.3 PG (ref 27–31)
MCHC RBC AUTO-ENTMCNC: 33.7 G/DL (ref 32–36)
MCV RBC AUTO: 90 FL (ref 82–98)
MONOCYTES # BLD AUTO: 0.6 K/UL (ref 0.3–1)
MONOCYTES NFR BLD: 11.4 % (ref 4–15)
NEUTROPHILS # BLD AUTO: 3.7 K/UL (ref 1.8–7.7)
NEUTROPHILS NFR BLD: 68.4 % (ref 38–73)
NRBC BLD-RTO: 0 /100 WBC
PLATELET # BLD AUTO: 88 K/UL (ref 150–350)
PLATELET BLD QL SMEAR: ABNORMAL
PMV BLD AUTO: 12.2 FL (ref 9.2–12.9)
POCT GLUCOSE: 205 MG/DL (ref 70–110)
POCT GLUCOSE: 207 MG/DL (ref 70–110)
POCT GLUCOSE: 258 MG/DL (ref 70–110)
POCT GLUCOSE: 270 MG/DL (ref 70–110)
POTASSIUM SERPL-SCNC: 5.1 MMOL/L (ref 3.5–5.1)
PROTHROMBIN TIME: 19.1 SEC (ref 9–12.5)
RBC # BLD AUTO: 2.01 M/UL (ref 4–5.4)
SODIUM SERPL-SCNC: 127 MMOL/L (ref 136–145)
TRANS ERYTHROCYTES VOL PATIENT: NORMAL ML
WBC # BLD AUTO: 5.43 K/UL (ref 3.9–12.7)

## 2020-02-04 PROCEDURE — 25000003 PHARM REV CODE 250: Mod: HCNC | Performed by: INTERNAL MEDICINE

## 2020-02-04 PROCEDURE — 27000221 HC OXYGEN, UP TO 24 HOURS: Mod: HCNC

## 2020-02-04 PROCEDURE — 85025 COMPLETE CBC W/AUTO DIFF WBC: CPT | Mod: HCNC

## 2020-02-04 PROCEDURE — 97116 GAIT TRAINING THERAPY: CPT | Mod: HCNC

## 2020-02-04 PROCEDURE — 85014 HEMATOCRIT: CPT | Mod: HCNC

## 2020-02-04 PROCEDURE — 85018 HEMOGLOBIN: CPT | Mod: HCNC

## 2020-02-04 PROCEDURE — 63600175 PHARM REV CODE 636 W HCPCS: Mod: HCNC | Performed by: INTERNAL MEDICINE

## 2020-02-04 PROCEDURE — 83735 ASSAY OF MAGNESIUM: CPT | Mod: HCNC

## 2020-02-04 PROCEDURE — 86901 BLOOD TYPING SEROLOGIC RH(D): CPT | Mod: HCNC

## 2020-02-04 PROCEDURE — 25000003 PHARM REV CODE 250: Mod: HCNC | Performed by: HOSPITALIST

## 2020-02-04 PROCEDURE — 94799 UNLISTED PULMONARY SVC/PX: CPT | Mod: HCNC

## 2020-02-04 PROCEDURE — P9021 RED BLOOD CELLS UNIT: HCPCS | Mod: HCNC

## 2020-02-04 PROCEDURE — 36430 TRANSFUSION BLD/BLD COMPNT: CPT | Mod: HCNC

## 2020-02-04 PROCEDURE — 21400001 HC TELEMETRY ROOM: Mod: HCNC

## 2020-02-04 PROCEDURE — 85610 PROTHROMBIN TIME: CPT | Mod: HCNC

## 2020-02-04 PROCEDURE — 86920 COMPATIBILITY TEST SPIN: CPT | Mod: HCNC

## 2020-02-04 PROCEDURE — 94761 N-INVAS EAR/PLS OXIMETRY MLT: CPT | Mod: HCNC

## 2020-02-04 PROCEDURE — 97110 THERAPEUTIC EXERCISES: CPT | Mod: HCNC

## 2020-02-04 PROCEDURE — 36415 COLL VENOUS BLD VENIPUNCTURE: CPT | Mod: HCNC

## 2020-02-04 PROCEDURE — 25500020 PHARM REV CODE 255: Mod: HCNC | Performed by: INTERNAL MEDICINE

## 2020-02-04 PROCEDURE — 80048 BASIC METABOLIC PNL TOTAL CA: CPT | Mod: HCNC

## 2020-02-04 RX ORDER — GLUCAGON 1 MG
1 KIT INJECTION
Status: DISCONTINUED | OUTPATIENT
Start: 2020-02-04 | End: 2020-02-10 | Stop reason: HOSPADM

## 2020-02-04 RX ORDER — INSULIN ASPART 100 [IU]/ML
0-5 INJECTION, SOLUTION INTRAVENOUS; SUBCUTANEOUS
Status: DISCONTINUED | OUTPATIENT
Start: 2020-02-04 | End: 2020-02-10 | Stop reason: HOSPADM

## 2020-02-04 RX ORDER — SODIUM CHLORIDE 9 MG/ML
INJECTION, SOLUTION INTRAVENOUS CONTINUOUS
Status: DISCONTINUED | OUTPATIENT
Start: 2020-02-04 | End: 2020-02-05

## 2020-02-04 RX ORDER — IBUPROFEN 200 MG
16 TABLET ORAL
Status: DISCONTINUED | OUTPATIENT
Start: 2020-02-04 | End: 2020-02-10 | Stop reason: HOSPADM

## 2020-02-04 RX ORDER — HYDROCODONE BITARTRATE AND ACETAMINOPHEN 500; 5 MG/1; MG/1
TABLET ORAL
Status: DISCONTINUED | OUTPATIENT
Start: 2020-02-04 | End: 2020-02-10 | Stop reason: HOSPADM

## 2020-02-04 RX ORDER — IBUPROFEN 200 MG
24 TABLET ORAL
Status: DISCONTINUED | OUTPATIENT
Start: 2020-02-04 | End: 2020-02-10 | Stop reason: HOSPADM

## 2020-02-04 RX ORDER — HYDROCODONE BITARTRATE AND ACETAMINOPHEN 5; 325 MG/1; MG/1
1 TABLET ORAL EVERY 6 HOURS PRN
Status: DISCONTINUED | OUTPATIENT
Start: 2020-02-04 | End: 2020-02-10 | Stop reason: HOSPADM

## 2020-02-04 RX ORDER — SODIUM CHLORIDE 9 MG/ML
INJECTION, SOLUTION INTRAVENOUS CONTINUOUS
Status: DISCONTINUED | OUTPATIENT
Start: 2020-02-04 | End: 2020-02-04

## 2020-02-04 RX ADMIN — IOHEXOL 80 ML: 350 INJECTION, SOLUTION INTRAVENOUS at 03:02

## 2020-02-04 RX ADMIN — SODIUM CHLORIDE: 0.9 INJECTION, SOLUTION INTRAVENOUS at 11:02

## 2020-02-04 RX ADMIN — OSELTAMIVIR PHOSPHATE 75 MG: 75 CAPSULE ORAL at 08:02

## 2020-02-04 RX ADMIN — ENOXAPARIN SODIUM 50 MG: 100 INJECTION SUBCUTANEOUS at 08:02

## 2020-02-04 RX ADMIN — FUROSEMIDE 20 MG: 20 TABLET ORAL at 08:02

## 2020-02-04 NOTE — ASSESSMENT & PLAN NOTE
Not present on admission  Near site of lovenox injections  Firm to palpation and tender  Will obtain CT with contrast STAT  Check CBC  Consider holding warfarin/lovenox however risk for stroke very high without these

## 2020-02-04 NOTE — NURSING
Bedside report received from JOEL Pace. Patient awake and alert. Spouse at bedside. NAD noted at this time. Nasal cannula at 2L 02 in place. All safety precautions in place. Will continue to monitor.

## 2020-02-04 NOTE — PROGRESS NOTES
[facility] Hospital Medicine  Progress Note    Patient Name: Orion Ty  MRN: [id number]  Patient Class: IP- Inpatient   Admission Date: 2/1/2020  Length of Stay: 3 days  Attending Physician: Amy Vale MD  Primary Care Provider: Otis Zimmer MD        Subjective:     Principal Problem:Influenza A        HPI:  Orion Ty is a 74 y.o. female that (in part)  has a past medical history of Closed displaced fracture of distal phalanx of lesser toe, Diabetes mellitus, Diabetes mellitus, type 2, Hypertension, Osteopenia, Pacemaker, and Rheumatic heart disease.  has a past surgical history that includes Cardiac valve replacement; thryoid s; Cardiac pacemaker placement; and Cardiac valuve replacement. Presents to [facility] Emergency Department complaining of shortness of breath.  EMS was activated due to worsening shortness of breath and lethargy.  Unable to ambulate independently, which is not normal for her.  She was febrile to 103° at home and is having difficulty taking deep breaths.  History of significant cardiac disease and heart valve replacement due to rheumatic heart valve disease. History of CAD and MI.  Denied chest pain, cyanosis, palpitations, or syncope.  Positive for peripheral edema.  Positive for nonproductive cough.    In the emergency department routine laboratory studies, chest x-ray, EKG, and cardiac enzymes were obtained.  There was evidence of acute CHF exacerbation with pulmonary edema, trace edema, and hypoxemia.  Influenza A serology was also positive.    Hospital medicine has been asked to admit to inpatient for further evaluation and treatment.     Overview/Hospital Course:   Presents to [facility] Emergency Department complaining of shortness of breath.  EMS was activated due to worsening shortness of breath and lethargy.  Unable to ambulate independently, which is not normal for her.  She was febrile to 103°  at home and is having difficulty taking deep breaths.  History of significant cardiac disease and heart valve replacement due to rheumatic heart valve disease. History of CAD and MI.  Denied chest pain, cyanosis, palpitations, or syncope.  Positive for peripheral edema.  Positive for nonproductive cough. n the emergency department routine laboratory studies, chest x-ray, EKG, and cardiac enzymes were obtained.  There was evidence of acute CHF exacerbation with pulmonary edema, trace edema, and hypoxemia.  Influenza A serology was also positive.    Hospital medicine has been asked to admit to inpatient for further evaluation and treatment. Cards was consulted.  Started on full dose lovenox for mechanical valves with low INR.  Lasix was continued but changed to po on 2/3. PT/OT were consulted for eval. Tamiflu was continued.         Interval History: complaining of right upper/lower quadrant pain. Exacerbated with cough and movement. SOB on exertion. Poor appetite. BP low.     Review of Systems   Constitutional: Positive for appetite change.   Respiratory: Positive for cough and shortness of breath.    Cardiovascular: Negative for chest pain.   Gastrointestinal:        Right lateral pain   Skin: Positive for color change.   Neurological: Positive for weakness.   Psychiatric/Behavioral: Negative.      Objective:     Vital Signs (Most Recent):  Temp: 97.9 °F (36.6 °C) (02/04/20 1119)  Pulse: 110 (02/04/20 1119)  Resp: 18 (02/04/20 1119)  BP: 98/60 (02/04/20 1119)  SpO2: 100 % (02/04/20 1119) Vital Signs (24h Range):  Temp:  [97.7 °F (36.5 °C)-99.7 °F (37.6 °C)] 97.9 °F (36.6 °C)  Pulse:  [] 110  Resp:  [18-19] 18  SpO2:  [98 %-100 %] 100 %  BP: ()/(56-66) 98/60     Weight: 52.2 kg (115 lb 1.3 oz)  Body mass index is 21.74 kg/m².    Intake/Output Summary (Last 24 hours) at 2/4/2020 1446  Last data filed at 2/4/2020 1230  Gross per 24 hour   Intake 400 ml   Output --   Net 400 ml      Physical Exam    Constitutional: She is oriented to person, place, and time. She appears well-developed. No distress.   Cardiovascular:   Sinus tachycardia   Pulmonary/Chest: Effort normal.   Coarse breath sounds bases   Abdominal: Soft. Bowel sounds are normal. She exhibits no distension.   Neurological: She is alert and oriented to person, place, and time.   Skin: She is not diaphoretic.   Large bruise lateral aspect right upper/lower quadrants. Firm to palpation.    Nursing note and vitals reviewed.      Significant Labs: All pertinent labs within the past 24 hours have been reviewed.    Significant Imaging: I have reviewed all pertinent imaging results/findings within the past 24 hours.  I have reviewed and interpreted all pertinent imaging results/findings within the past 24 hours.      Assessment/Plan:      * Influenza A  Continue Tamiflu.    Is short of breath however this is likely due to atelectasis as she does have flank pain and inspiratory effort limited as this exacerbates pain.   Treat pain. Incentive spirometry. Supplemental O2      Right flank hematoma  Not present on admission  Near site of lovenox injections  Firm to palpation and tender  Will obtain CT with contrast STAT  Check CBC  Consider holding warfarin/lovenox however risk for stroke very high without these      Hypokalemia  Resolved       Debility  PT/OT eval pending       Anemia of chronic disease  Stable      Elevated troponin I level  Recent negative stress. Likely demand ischemia.  Cards noted med Rx only         Acute diastolic congestive heart failure  Echo does not show diastolic dysf. From this hospital stay. Cut back to po lasix       PAH (pulmonary artery hypertension)  No acute issues       CAD (coronary artery disease)  No acute issues       Essential (primary) hypertension  Hypotensive and poor PO intake  Stop antihypertensives and start fluids        Type 2 diabetes mellitus with microalbuminuria, without long-term current use of  insulin  Well controlled. No other manifestations.   On insulin for good glucose control      S/P MVR (mitral valve replacement)  INR is low. Continue full dose lovenox until INR > 2.5. Coumadin dose increased on 2/3.       S/P AVR  See above.       Long term current use of anticoagulant  INR at 1.3 on 2/3.  Full dose lovenox       Mixed hyperlipidemia  Resume statin       Subclinical hypothyroidism  Resume home meds       Rheumatic heart disease  No acute issues      Chronic atrial fibrillation  Also on Coumadin       VTE Risk Mitigation (From admission, onward)         Ordered     warfarin (COUMADIN) tablet 5 mg  Daily      02/03/20 0750     enoxaparin injection 50 mg  Every 12 hours (non-standard times)      02/02/20 0832     IP VTE HIGH RISK PATIENT  Once      02/01/20 2005     Reason for No Pharmacological VTE Prophylaxis  Once     Question:  Reasons:  Answer:  Already adequately anticoagulated on oral Anticoagulants    02/01/20 2005                Discussed with patient and  at bedside. All questions answered to satisfaction      Amy Valerio MD  Department of Hospital Medicine   [facility]

## 2020-02-04 NOTE — PLAN OF CARE
Problem: Physical Therapy Goal  Goal: Physical Therapy Goal  Description  Goals to be met by: 20     Patient will increase functional independence with mobility by performin. Supine to sit with Modified [place]  2. Rolling to Left and Right with Modified [place]  3. Sit to stand transfer with Modified [place]  4. Bed to chair transfer with Modified [place]   5. Gait >250 feet with Modified [place] using no AD  6. Upper/Lower extremity exercise program 3 sets x10 reps per handout, with independence     Outcome: Ongoing, Progressing    Pt ambulated 20 ft x 2 trials within room with min A-CGA/hand-held assist and 3L O2 NC.  Gait limited 2* pt on droplet isolation.

## 2020-02-04 NOTE — ASSESSMENT & PLAN NOTE
Continue Tamiflu.    Is short of breath however this is likely due to atelectasis as she does have flank pain and inspiratory effort limited as this exacerbates pain.

## 2020-02-04 NOTE — PT/OT/SLP PROGRESS
Physical Therapy Treatment    Patient Name:  Orion Ty   MRN:  [id number]    Recommendations:     Discharge Recommendations:  home health PT   Discharge Equipment Recommendations: none   Barriers to discharge: None    Assessment:     Orion Ty is a 74 y.o. female admitted with a medical diagnosis of Influenza A.  She presents with the following impairments/functional limitations:  weakness, impaired endurance, impaired functional mobilty, gait instability, impaired balance, impaired cardiopulmonary response to activity.    Rehab Prognosis: Good; patient would benefit from acute skilled PT services to address these deficits and reach maximum level of function.    Recent Surgery: * No surgery found *      Plan:     During this hospitalization, patient to be seen (3-5x/wk) to address the identified rehab impairments via gait training, therapeutic activities, therapeutic exercises and progress toward the following goals:    · Plan of Care Expires:  02/17/20    Subjective     Chief Complaint: Pt reported feeling weaker today.  Patient/Family Comments/goals: Pt agreeable to therapy.   Pain/Comfort:  · Pain Rating 1: 0/10      Objective:     Patient found HOB elevated with peripheral IV, oxygen 3L, telemetry upon PT entry to room.     General Precautions: Standard, fall, diabetic(h/o pacemaker), contact/droplet isolation   Orthopedic Precautions:N/A   Braces: N/A    Functional Mobility:  Pt with no c/o dizziness, c/o minimal SOB during activities, and increased weakness today.    · Bed Mobility:     · Scooting: minimum assistance  · Supine to Sit: minimum assistance with HOB elevated   · Transfers:     · Sit to Stand:  contact guard assistance and minimum assistance with no AD and hand-held assist x 4 trials  · Bed to Chair: contact guard assistance and minimum assistance with  no AD and hand-held assist  using  Step Transfer  · Gait: Pt ambulated 20 ft x 2 trials with min A-CGA/hand-held assist and 3L O2 NC.  Pt  with decreased step length and max decreased aaron.  Pt with generalized weakness and decreased endurance.   · Balance: Pt with fair dynamic standing balance.      AM-PAC 6 CLICK MOBILITY  Turning over in bed (including adjusting bedclothes, sheets and blankets)?: 3  Sitting down on and standing up from a chair with arms (e.g., wheelchair, bedside commode, etc.): 3  Moving from lying on back to sitting on the side of the bed?: 3  Moving to and from a bed to a chair (including a wheelchair)?: 3  Need to walk in hospital room?: 3  Climbing 3-5 steps with a railing?: 3  Basic Mobility Total Score: 18       Therapeutic Activities and Exercises:  BLE seated therex 10 reps: LAQ and heel/toe raises  BLE standing therex 10 reps: marches with B hand-held assist    BUE standing therex 10 reps: shoulder flex, butterfly, and elbow flex/ext    Pt issued HEP for seated/supine LE therex.      Patient left up in chair reclined with BLE elevated on pillow with all lines intact, call button in reach and nurse Noelle present.    GOALS:   Multidisciplinary Problems     Physical Therapy Goals        Problem: Physical Therapy Goal    Goal Priority Disciplines Outcome Goal Variances Interventions   Physical Therapy Goal     PT, PT/OT Ongoing, Progressing     Description:  Goals to be met by: 20     Patient will increase functional independence with mobility by performin. Supine to sit with Modified [place]  2. Rolling to Left and Right with Modified [place]  3. Sit to stand transfer with Modified [place]  4. Bed to chair transfer with Modified [place]   5. Gait >250 feet with Modified [place] using no AD  6. Upper/Lower extremity exercise program 3 sets x10 reps per handout, with independence                      Time Tracking:     PT Received On: 20  PT Start Time: 1114     PT Stop Time: 1140  PT Total Time (min): 26 min     Billable Minutes: Gait Training 13 min and Therapeutic Exercise 13  pauline Andrea, PT  02/04/2020

## 2020-02-04 NOTE — SUBJECTIVE & OBJECTIVE
Interval History: complaining of right upper/lower quadrant pain. Exacerbated with cough and movement. SOB on exertion. Poor appetite. BP low.     Review of Systems   Constitutional: Positive for appetite change.   Respiratory: Positive for cough and shortness of breath.    Cardiovascular: Negative for chest pain.   Gastrointestinal:        Right lateral pain   Skin: Positive for color change.   Neurological: Positive for weakness.   Psychiatric/Behavioral: Negative.      Objective:     Vital Signs (Most Recent):  Temp: 97.9 °F (36.6 °C) (02/04/20 1119)  Pulse: 110 (02/04/20 1119)  Resp: 18 (02/04/20 1119)  BP: 98/60 (02/04/20 1119)  SpO2: 100 % (02/04/20 1119) Vital Signs (24h Range):  Temp:  [97.7 °F (36.5 °C)-99.7 °F (37.6 °C)] 97.9 °F (36.6 °C)  Pulse:  [] 110  Resp:  [18-19] 18  SpO2:  [98 %-100 %] 100 %  BP: ()/(56-66) 98/60     Weight: 52.2 kg (115 lb 1.3 oz)  Body mass index is 21.74 kg/m².    Intake/Output Summary (Last 24 hours) at 2/4/2020 1446  Last data filed at 2/4/2020 1230  Gross per 24 hour   Intake 400 ml   Output --   Net 400 ml      Physical Exam   Constitutional: She is oriented to person, place, and time. She appears well-developed. No distress.   Cardiovascular:   Sinus tachycardia   Pulmonary/Chest: Effort normal.   Coarse breath sounds bases   Abdominal: Soft. Bowel sounds are normal. She exhibits no distension.   Neurological: She is alert and oriented to person, place, and time.   Skin: She is not diaphoretic.   Large bruise lateral aspect right upper/lower quadrants. Firm to palpation.    Nursing note and vitals reviewed.      Significant Labs: All pertinent labs within the past 24 hours have been reviewed.    Significant Imaging: I have reviewed all pertinent imaging results/findings within the past 24 hours.  I have reviewed and interpreted all pertinent imaging results/findings within the past 24 hours.

## 2020-02-05 PROBLEM — E87.6 HYPOKALEMIA: Status: RESOLVED | Noted: 2020-02-03 | Resolved: 2020-02-05

## 2020-02-05 LAB
ANION GAP SERPL CALC-SCNC: 8 MMOL/L (ref 8–16)
BASOPHILS # BLD AUTO: 0.01 K/UL (ref 0–0.2)
BASOPHILS NFR BLD: 0.1 % (ref 0–1.9)
BUN SERPL-MCNC: 37 MG/DL (ref 8–23)
CALCIUM SERPL-MCNC: 8.1 MG/DL (ref 8.7–10.5)
CHLORIDE SERPL-SCNC: 94 MMOL/L (ref 95–110)
CO2 SERPL-SCNC: 24 MMOL/L (ref 23–29)
CREAT SERPL-MCNC: 0.9 MG/DL (ref 0.5–1.4)
DIFFERENTIAL METHOD: ABNORMAL
EOSINOPHIL # BLD AUTO: 0 K/UL (ref 0–0.5)
EOSINOPHIL NFR BLD: 0.1 % (ref 0–8)
ERYTHROCYTE [DISTWIDTH] IN BLOOD BY AUTOMATED COUNT: 13.5 % (ref 11.5–14.5)
EST. GFR  (AFRICAN AMERICAN): >60 ML/MIN/1.73 M^2
EST. GFR  (NON AFRICAN AMERICAN): >60 ML/MIN/1.73 M^2
GLUCOSE SERPL-MCNC: 151 MG/DL (ref 70–110)
HCT VFR BLD AUTO: 22.4 % (ref 37–48.5)
HCT VFR BLD AUTO: 24.1 % (ref 37–48.5)
HGB BLD-MCNC: 7.5 G/DL (ref 12–16)
HGB BLD-MCNC: 8.1 G/DL (ref 12–16)
IMM GRANULOCYTES # BLD AUTO: 0.11 K/UL (ref 0–0.04)
IMM GRANULOCYTES NFR BLD AUTO: 1 % (ref 0–0.5)
INR PPP: 3.4 (ref 0.8–1.2)
LYMPHOCYTES # BLD AUTO: 2 K/UL (ref 1–4.8)
LYMPHOCYTES NFR BLD: 17.7 % (ref 18–48)
MAGNESIUM SERPL-MCNC: 2 MG/DL (ref 1.6–2.6)
MCH RBC QN AUTO: 30 PG (ref 27–31)
MCHC RBC AUTO-ENTMCNC: 33.5 G/DL (ref 32–36)
MCV RBC AUTO: 90 FL (ref 82–98)
MONOCYTES # BLD AUTO: 1 K/UL (ref 0.3–1)
MONOCYTES NFR BLD: 8.6 % (ref 4–15)
NEUTROPHILS # BLD AUTO: 8.2 K/UL (ref 1.8–7.7)
NEUTROPHILS NFR BLD: 72.5 % (ref 38–73)
NRBC BLD-RTO: 0 /100 WBC
PLATELET # BLD AUTO: 92 K/UL (ref 150–350)
PLATELET BLD QL SMEAR: ABNORMAL
PMV BLD AUTO: 11.9 FL (ref 9.2–12.9)
POCT GLUCOSE: 168 MG/DL (ref 70–110)
POCT GLUCOSE: 204 MG/DL (ref 70–110)
POCT GLUCOSE: 283 MG/DL (ref 70–110)
POCT GLUCOSE: 291 MG/DL (ref 70–110)
POIKILOCYTOSIS BLD QL SMEAR: SLIGHT
POTASSIUM SERPL-SCNC: 4.7 MMOL/L (ref 3.5–5.1)
PROTHROMBIN TIME: 38.1 SEC (ref 9–12.5)
RBC # BLD AUTO: 2.5 M/UL (ref 4–5.4)
SODIUM SERPL-SCNC: 126 MMOL/L (ref 136–145)
WBC # BLD AUTO: 11.31 K/UL (ref 3.9–12.7)

## 2020-02-05 PROCEDURE — 25000003 PHARM REV CODE 250: Mod: HCNC | Performed by: HOSPITALIST

## 2020-02-05 PROCEDURE — 94799 UNLISTED PULMONARY SVC/PX: CPT | Mod: HCNC

## 2020-02-05 PROCEDURE — 63600175 PHARM REV CODE 636 W HCPCS: Mod: HCNC | Performed by: INTERNAL MEDICINE

## 2020-02-05 PROCEDURE — 85610 PROTHROMBIN TIME: CPT | Mod: HCNC

## 2020-02-05 PROCEDURE — 99900035 HC TECH TIME PER 15 MIN (STAT): Mod: HCNC

## 2020-02-05 PROCEDURE — 97110 THERAPEUTIC EXERCISES: CPT | Mod: HCNC

## 2020-02-05 PROCEDURE — 83735 ASSAY OF MAGNESIUM: CPT | Mod: HCNC

## 2020-02-05 PROCEDURE — 25000003 PHARM REV CODE 250: Mod: HCNC | Performed by: INTERNAL MEDICINE

## 2020-02-05 PROCEDURE — 94761 N-INVAS EAR/PLS OXIMETRY MLT: CPT | Mod: HCNC

## 2020-02-05 PROCEDURE — 36415 COLL VENOUS BLD VENIPUNCTURE: CPT | Mod: HCNC

## 2020-02-05 PROCEDURE — 80048 BASIC METABOLIC PNL TOTAL CA: CPT | Mod: HCNC

## 2020-02-05 PROCEDURE — 85025 COMPLETE CBC W/AUTO DIFF WBC: CPT | Mod: HCNC

## 2020-02-05 PROCEDURE — 27000221 HC OXYGEN, UP TO 24 HOURS: Mod: HCNC

## 2020-02-05 PROCEDURE — 21400001 HC TELEMETRY ROOM: Mod: HCNC

## 2020-02-05 PROCEDURE — 97116 GAIT TRAINING THERAPY: CPT | Mod: HCNC

## 2020-02-05 RX ORDER — WARFARIN 2 MG/1
2 TABLET ORAL DAILY
Status: DISCONTINUED | OUTPATIENT
Start: 2020-02-05 | End: 2020-02-06

## 2020-02-05 RX ADMIN — INSULIN ASPART 2 UNITS: 100 INJECTION, SOLUTION INTRAVENOUS; SUBCUTANEOUS at 12:02

## 2020-02-05 RX ADMIN — OSELTAMIVIR PHOSPHATE 75 MG: 75 CAPSULE ORAL at 08:02

## 2020-02-05 RX ADMIN — INSULIN ASPART 1 UNITS: 100 INJECTION, SOLUTION INTRAVENOUS; SUBCUTANEOUS at 08:02

## 2020-02-05 RX ADMIN — HYDROCODONE BITARTRATE AND ACETAMINOPHEN 1 TABLET: 5; 325 TABLET ORAL at 12:02

## 2020-02-05 RX ADMIN — HYDROCODONE BITARTRATE AND ACETAMINOPHEN 1 TABLET: 5; 325 TABLET ORAL at 08:02

## 2020-02-05 RX ADMIN — SODIUM CHLORIDE: 0.9 INJECTION, SOLUTION INTRAVENOUS at 05:02

## 2020-02-05 RX ADMIN — INSULIN ASPART 3 UNITS: 100 INJECTION, SOLUTION INTRAVENOUS; SUBCUTANEOUS at 09:02

## 2020-02-05 RX ADMIN — WARFARIN SODIUM 2 MG: 2 TABLET ORAL at 04:02

## 2020-02-05 NOTE — ASSESSMENT & PLAN NOTE
Blood pressure no longer low but stable without antihypertensive therapy  Stop fluids. Encouraged PO intake

## 2020-02-05 NOTE — PT/OT/SLP PROGRESS
Physical Therapy Treatment    Patient Name:  Orion Ty   MRN:  [id number]    Recommendations:     Discharge Recommendations:  home health PT   Discharge Equipment Recommendations: none   Barriers to discharge: None    Assessment:     Orion Ty is a 74 y.o. female admitted with a medical diagnosis of Influenza A.  She presents with the following impairments/functional limitations:  weakness, impaired endurance, impaired functional mobilty, gait instability, impaired balance, impaired cardiopulmonary response to activity.    Rehab Prognosis: Good; patient would benefit from acute skilled PT services to address these deficits and reach maximum level of function.    Recent Surgery: * No surgery found *      Plan:     During this hospitalization, patient to be seen (3-5x/wk) to address the identified rehab impairments via gait training, therapeutic activities, therapeutic exercises and progress toward the following goals:    · Plan of Care Expires:  02/17/20    Subjective     Chief Complaint: R flank pain  Patient/Family Comments/goals: Pt reported feeling better and less SOB today.   Pain/Comfort:  · Pain Rating 1: (Pt c/o R lower flank pain, declined pain meds at this time.)      Objective:     Patient found HOB elevated with peripheral IV, oxygen 2L, telemetry upon PT entry to room.     General Precautions: Standard, fall, diabetic(h/o pacemaker), droplet isolation   Orthopedic Precautions:N/A   Braces: N/A    Functional Mobility:  Pt feeling better today.  Pt now with R lower flank hematoma and ecchymosis, received 1 unit of blood transfusion yesterday.  Pt with increased tolerance and participation during tx session today.    · Bed Mobility:     · Scooting: stand by assistance  · Supine to Sit: contact guard assistance with HOB elevated   · Transfers:     · Sit to Stand:  contact guard assistance with hand-held assist  · Bed to Chair: contact guard assistance with  hand-held assist  using  Step  Transfer  · Gait: Pt ambulated 50 ft x 2 trials within room with CGA-SBA using 2L O2 NC.  Pt with decreased step length and aaron.  Pt with increased gait speed today compared to yesterday.  Gait limited to room 2* pt on droplet isolation.      · Balance: Pt with fair dynamic standing balance.       AM-PAC 6 CLICK MOBILITY  Turning over in bed (including adjusting bedclothes, sheets and blankets)?: 3  Sitting down on and standing up from a chair with arms (e.g., wheelchair, bedside commode, etc.): 3  Moving from lying on back to sitting on the side of the bed?: 3  Moving to and from a bed to a chair (including a wheelchair)?: 3  Need to walk in hospital room?: 3  Climbing 3-5 steps with a railing?: 3  Basic Mobility Total Score: 18       Therapeutic Activities and Exercises:  BLE seated therex 10 reps: hip flex, pillow squeezes, LAQ, and AP    Pt educated on HEP for seated/supine LE.  Pt verbalized good understanding.     Patient left up in chair reclined with BLE elevated on pillow with all lines intact, call button in reach and nurse Anabell notified.    GOALS:   Multidisciplinary Problems     Physical Therapy Goals        Problem: Physical Therapy Goal    Goal Priority Disciplines Outcome Goal Variances Interventions   Physical Therapy Goal     PT, PT/OT Ongoing, Progressing     Description:  Goals to be met by: 20     Patient will increase functional independence with mobility by performin. Supine to sit with Modified [place]  2. Rolling to Left and Right with Modified [place]  3. Sit to stand transfer with Modified [place]  4. Bed to chair transfer with Modified [place]   5. Gait >250 feet with Modified [place] using no AD  6. Upper/Lower extremity exercise program 3 sets x10 reps per handout, with independence                      Time Tracking:     PT Received On: 20  PT Start Time: 1004     PT Stop Time: 1028  PT Total Time (min): 24 min     Billable Minutes:  Gait Training 12 min and Therapeutic Exercise 12 min                   Keshia Andrea, PT  02/05/2020

## 2020-02-05 NOTE — ASSESSMENT & PLAN NOTE
Not present on admission  Near site of lovenox injections  Per nurse, daughter reported it was present before admission but  denies this  Will ask patient  CT confirms presence of large hematoma. No evidence of active bleeding  INR 3.4 today. Stopped lovenox and continued on warfarin as patient is at very high risk for stroke  Stable s/p transfusion and vitals significantly improved  Monitor closely

## 2020-02-05 NOTE — PLAN OF CARE
Problem: Physical Therapy Goal  Goal: Physical Therapy Goal  Description  Goals to be met by: 20     Patient will increase functional independence with mobility by performin. Supine to sit with Modified [place]  2. Rolling to Left and Right with Modified [place]  3. Sit to stand transfer with Modified [place]  4. Bed to chair transfer with Modified [place]   5. Gait >250 feet with Modified [place] using no AD  6. Upper/Lower extremity exercise program 3 sets x10 reps per handout, with independence     Outcome: Ongoing, Progressing    Pt ambulated ~50 ft x 2 trials within room with CGA-SBA using 2L O2 NC.  Gait limited within room 2* pt on droplet isolation.

## 2020-02-05 NOTE — ASSESSMENT & PLAN NOTE
Continue Tamiflu.    Shortness of breath much improved with pain management and use of incentive spirometry  Will continue efforts to wean nasal cannula

## 2020-02-05 NOTE — SUBJECTIVE & OBJECTIVE
Interval History: feels better today. Hgb stable and blood pressure much improved.     Review of Systems   Respiratory: Negative.    Cardiovascular: Negative.    Gastrointestinal:        Right flank pain, which is better     Objective:     Vital Signs (Most Recent):  Temp: 98.9 °F (37.2 °C) (02/05/20 1121)  Pulse: 97 (02/05/20 1121)  Resp: 19 (02/05/20 1121)  BP: (!) 139/59 (02/05/20 1121)  SpO2: 100 % (02/05/20 1121) Vital Signs (24h Range):  Temp:  [97.7 °F (36.5 °C)-99 °F (37.2 °C)] 98.9 °F (37.2 °C)  Pulse:  [] 97  Resp:  [18-20] 19  SpO2:  [98 %-100 %] 100 %  BP: (105-142)/(52-80) 139/59     Weight: 52.2 kg (115 lb 1.3 oz)  Body mass index is 21.74 kg/m².    Intake/Output Summary (Last 24 hours) at 2/5/2020 1337  Last data filed at 2/5/2020 0524  Gross per 24 hour   Intake 571.25 ml   Output --   Net 571.25 ml      Physical Exam   Constitutional: She is oriented to person, place, and time. She appears well-developed. No distress.   Cardiovascular: Normal rate and regular rhythm.   Pulmonary/Chest: Effort normal and breath sounds normal.   Abdominal: Soft. Bowel sounds are normal. She exhibits no distension.   Neurological: She is alert and oriented to person, place, and time.   Skin: She is not diaphoretic.   Large bruise lateral aspect right flank. Firm to palpation.    Nursing note and vitals reviewed.      Significant Labs: All pertinent labs within the past 24 hours have been reviewed.    Significant Imaging: I have reviewed all pertinent imaging results/findings within the past 24 hours.  I have reviewed and interpreted all pertinent imaging results/findings within the past 24 hours.

## 2020-02-05 NOTE — PROGRESS NOTES
[facility] Hospital Medicine  Progress Note    Patient Name: Orion Ty  MRN: [id number]  Patient Class: IP- Inpatient   Admission Date: 2/1/2020  Length of Stay: 4 days  Attending Physician: Amy Vale MD  Primary Care Provider: Otis Zimmer MD        Subjective:     Principal Problem:Influenza A        HPI:  Orion Ty is a 74 y.o. female that (in part)  has a past medical history of Closed displaced fracture of distal phalanx of lesser toe, Diabetes mellitus, Diabetes mellitus, type 2, Hypertension, Osteopenia, Pacemaker, and Rheumatic heart disease.  has a past surgical history that includes Cardiac valve replacement; thryoid s; Cardiac pacemaker placement; and Cardiac valuve replacement. Presents to [facility] Emergency Department complaining of shortness of breath.  EMS was activated due to worsening shortness of breath and lethargy.  Unable to ambulate independently, which is not normal for her.  She was febrile to 103° at home and is having difficulty taking deep breaths.  History of significant cardiac disease and heart valve replacement due to rheumatic heart valve disease. History of CAD and MI.  Denied chest pain, cyanosis, palpitations, or syncope.  Positive for peripheral edema.  Positive for nonproductive cough.    In the emergency department routine laboratory studies, chest x-ray, EKG, and cardiac enzymes were obtained.  There was evidence of acute CHF exacerbation with pulmonary edema, trace edema, and hypoxemia.  Influenza A serology was also positive.    Hospital medicine has been asked to admit to inpatient for further evaluation and treatment.     Overview/Hospital Course:          Interval History: feels better today. Hgb stable and blood pressure much improved.     Review of Systems   Respiratory: Negative.    Cardiovascular: Negative.    Gastrointestinal:        Right flank pain, which is better     Objective:     Vital Signs  (Most Recent):  Temp: 98.9 °F (37.2 °C) (02/05/20 1121)  Pulse: 97 (02/05/20 1121)  Resp: 19 (02/05/20 1121)  BP: (!) 139/59 (02/05/20 1121)  SpO2: 100 % (02/05/20 1121) Vital Signs (24h Range):  Temp:  [97.7 °F (36.5 °C)-99 °F (37.2 °C)] 98.9 °F (37.2 °C)  Pulse:  [] 97  Resp:  [18-20] 19  SpO2:  [98 %-100 %] 100 %  BP: (105-142)/(52-80) 139/59     Weight: 52.2 kg (115 lb 1.3 oz)  Body mass index is 21.74 kg/m².    Intake/Output Summary (Last 24 hours) at 2/5/2020 1337  Last data filed at 2/5/2020 0524  Gross per 24 hour   Intake 571.25 ml   Output --   Net 571.25 ml      Physical Exam   Constitutional: She is oriented to person, place, and time. She appears well-developed. No distress.   Cardiovascular: Normal rate and regular rhythm.   Pulmonary/Chest: Effort normal and breath sounds normal.   Abdominal: Soft. Bowel sounds are normal. She exhibits no distension.   Neurological: She is alert and oriented to person, place, and time.   Skin: She is not diaphoretic.   Large bruise lateral aspect right flank. Firm to palpation.    Nursing note and vitals reviewed.      Significant Labs: All pertinent labs within the past 24 hours have been reviewed.    Significant Imaging: I have reviewed all pertinent imaging results/findings within the past 24 hours.  I have reviewed and interpreted all pertinent imaging results/findings within the past 24 hours.      Assessment/Plan:      * Influenza A  Continue Tamiflu.    Shortness of breath much improved with pain management and use of incentive spirometry  Will continue efforts to wean nasal cannula      Right flank hematoma  Not present on admission  Near site of lovenox injections  Per nurse, daughter reported it was present before admission but  denies this  Will ask patient  CT confirms presence of large hematoma. No evidence of active bleeding  INR 3.4 today. Stopped lovenox and continued on warfarin as patient is at very high risk for stroke  Stable s/p  transfusion and vitals significantly improved  Monitor closely      Debility  PT/OT rec HH  Will arrange at discharge      Anemia of chronic disease  Stable      Elevated troponin I level  Recent negative stress. Likely demand ischemia.  Cards noted med Rx only         Acute diastolic congestive heart failure  Echo does not show diastolic dysf. Furosemide stopped as patient had bleeding and likely over diuresed  Stopped fluids today. Encouraged PO intake      PAH (pulmonary artery hypertension)  No acute issues       CAD (coronary artery disease)  No acute issues       Essential (primary) hypertension  Blood pressure no longer low but stable without antihypertensive therapy  Stop fluids. Encouraged PO intake        Type 2 diabetes mellitus with microalbuminuria, without long-term current use of insulin  Well controlled. No other manifestations.   On insulin for good glucose control      S/P MVR (mitral valve replacement)  Mechanical 2010  On warfarin daily for INR 2.5-2.5  Currently at goal      S/P AVR  Mechanical 2010  On warfarin daily for INR 2.5-3.5  Currently at goal      Long term current use of anticoagulant  On warfarin       Mixed hyperlipidemia  Resume statin       Subclinical hypothyroidism  Resume home meds       Rheumatic heart disease  No acute issues      Chronic atrial fibrillation  Also on Coumadin       VTE Risk Mitigation (From admission, onward)         Ordered     warfarin (COUMADIN) tablet 2 mg  Daily      02/05/20 1343     IP VTE HIGH RISK PATIENT  Once      02/01/20 2005     Reason for No Pharmacological VTE Prophylaxis  Once     Question:  Reasons:  Answer:  Already adequately anticoagulated on oral Anticoagulants    02/01/20 2005              Plan discussed with patient and patient's . All questions answered to satisfaction     Dispo: home in 2-3 days hopefully       Amy Valerio MD  Department of Hospital Medicine   [facility]

## 2020-02-05 NOTE — PLAN OF CARE
Problem: Diabetes Comorbidity  Goal: Blood Glucose Level Within Desired Range  Outcome: Ongoing, Progressing  Intervention: Maintain Glycemic Control  Flowsheets (Taken 2/5/2020 7561)  Glycemic Management: blood glucose monitoring; supplemental insulin given

## 2020-02-05 NOTE — PLAN OF CARE
Problem: Diabetes Comorbidity  Goal: Blood Glucose Level Within Desired Range  Outcome: Ongoing, Progressing  Intervention: Maintain Glycemic Control  Flowsheets (Taken 2/5/2020 7037)  Glycemic Management: blood glucose monitoring; supplemental insulin given

## 2020-02-05 NOTE — ASSESSMENT & PLAN NOTE
Echo does not show diastolic dysf. Furosemide stopped as patient had bleeding and likely over diuresed  Stopped fluids today. Encouraged PO intake

## 2020-02-06 PROBLEM — D62 ACUTE BLOOD LOSS ANEMIA: Status: ACTIVE | Noted: 2020-02-03

## 2020-02-06 LAB
ANION GAP SERPL CALC-SCNC: 4 MMOL/L (ref 8–16)
ANION GAP SERPL CALC-SCNC: 7 MMOL/L (ref 8–16)
BACTERIA BLD CULT: NORMAL
BACTERIA BLD CULT: NORMAL
BASOPHILS # BLD AUTO: 0.01 K/UL (ref 0–0.2)
BASOPHILS # BLD AUTO: 0.04 K/UL (ref 0–0.2)
BASOPHILS NFR BLD: 0.1 % (ref 0–1.9)
BASOPHILS NFR BLD: 0.3 % (ref 0–1.9)
BLD PROD TYP BPU: NORMAL
BLOOD UNIT EXPIRATION DATE: NORMAL
BLOOD UNIT TYPE CODE: 6200
BLOOD UNIT TYPE: NORMAL
BUN SERPL-MCNC: 17 MG/DL (ref 8–23)
BUN SERPL-MCNC: 20 MG/DL (ref 8–23)
CALCIUM SERPL-MCNC: 8 MG/DL (ref 8.7–10.5)
CALCIUM SERPL-MCNC: 8 MG/DL (ref 8.7–10.5)
CHLORIDE SERPL-SCNC: 94 MMOL/L (ref 95–110)
CHLORIDE SERPL-SCNC: 97 MMOL/L (ref 95–110)
CO2 SERPL-SCNC: 23 MMOL/L (ref 23–29)
CO2 SERPL-SCNC: 26 MMOL/L (ref 23–29)
CODING SYSTEM: NORMAL
CREAT SERPL-MCNC: 0.7 MG/DL (ref 0.5–1.4)
CREAT SERPL-MCNC: 0.7 MG/DL (ref 0.5–1.4)
DIFFERENTIAL METHOD: ABNORMAL
DIFFERENTIAL METHOD: ABNORMAL
DISPENSE STATUS: NORMAL
EOSINOPHIL # BLD AUTO: 0 K/UL (ref 0–0.5)
EOSINOPHIL # BLD AUTO: 0.1 K/UL (ref 0–0.5)
EOSINOPHIL NFR BLD: 0.4 % (ref 0–8)
EOSINOPHIL NFR BLD: 1 % (ref 0–8)
ERYTHROCYTE [DISTWIDTH] IN BLOOD BY AUTOMATED COUNT: 13.9 % (ref 11.5–14.5)
ERYTHROCYTE [DISTWIDTH] IN BLOOD BY AUTOMATED COUNT: 14.1 % (ref 11.5–14.5)
EST. GFR  (AFRICAN AMERICAN): >60 ML/MIN/1.73 M^2
EST. GFR  (AFRICAN AMERICAN): >60 ML/MIN/1.73 M^2
EST. GFR  (NON AFRICAN AMERICAN): >60 ML/MIN/1.73 M^2
EST. GFR  (NON AFRICAN AMERICAN): >60 ML/MIN/1.73 M^2
GLUCOSE SERPL-MCNC: 164 MG/DL (ref 70–110)
GLUCOSE SERPL-MCNC: 182 MG/DL (ref 70–110)
HCT VFR BLD AUTO: 18.3 % (ref 37–48.5)
HCT VFR BLD AUTO: 24.8 % (ref 37–48.5)
HGB BLD-MCNC: 6.1 G/DL (ref 12–16)
HGB BLD-MCNC: 8.2 G/DL (ref 12–16)
IMM GRANULOCYTES # BLD AUTO: 0.33 K/UL (ref 0–0.04)
IMM GRANULOCYTES # BLD AUTO: 0.64 K/UL (ref 0–0.04)
IMM GRANULOCYTES NFR BLD AUTO: 3.6 % (ref 0–0.5)
IMM GRANULOCYTES NFR BLD AUTO: 4.6 % (ref 0–0.5)
INR PPP: 3.8 (ref 0.8–1.2)
LYMPHOCYTES # BLD AUTO: 1.5 K/UL (ref 1–4.8)
LYMPHOCYTES # BLD AUTO: 2.1 K/UL (ref 1–4.8)
LYMPHOCYTES NFR BLD: 14.8 % (ref 18–48)
LYMPHOCYTES NFR BLD: 16.1 % (ref 18–48)
MAGNESIUM SERPL-MCNC: 1.8 MG/DL (ref 1.6–2.6)
MCH RBC QN AUTO: 29.9 PG (ref 27–31)
MCH RBC QN AUTO: 30.5 PG (ref 27–31)
MCHC RBC AUTO-ENTMCNC: 33.1 G/DL (ref 32–36)
MCHC RBC AUTO-ENTMCNC: 33.3 G/DL (ref 32–36)
MCV RBC AUTO: 91 FL (ref 82–98)
MCV RBC AUTO: 92 FL (ref 82–98)
MONOCYTES # BLD AUTO: 0.8 K/UL (ref 0.3–1)
MONOCYTES # BLD AUTO: 0.9 K/UL (ref 0.3–1)
MONOCYTES NFR BLD: 6.6 % (ref 4–15)
MONOCYTES NFR BLD: 9 % (ref 4–15)
NEUTROPHILS # BLD AUTO: 10.1 K/UL (ref 1.8–7.7)
NEUTROPHILS # BLD AUTO: 6.5 K/UL (ref 1.8–7.7)
NEUTROPHILS NFR BLD: 70.8 % (ref 38–73)
NEUTROPHILS NFR BLD: 72.7 % (ref 38–73)
NRBC BLD-RTO: 1 /100 WBC
NRBC BLD-RTO: 4 /100 WBC
PLATELET # BLD AUTO: 102 K/UL (ref 150–350)
PLATELET # BLD AUTO: 87 K/UL (ref 150–350)
PMV BLD AUTO: 11.5 FL (ref 9.2–12.9)
PMV BLD AUTO: 12.6 FL (ref 9.2–12.9)
POCT GLUCOSE: 134 MG/DL (ref 70–110)
POCT GLUCOSE: 140 MG/DL (ref 70–110)
POCT GLUCOSE: 189 MG/DL (ref 70–110)
POCT GLUCOSE: 203 MG/DL (ref 70–110)
POTASSIUM SERPL-SCNC: 4.7 MMOL/L (ref 3.5–5.1)
POTASSIUM SERPL-SCNC: 4.8 MMOL/L (ref 3.5–5.1)
PROTHROMBIN TIME: 41.8 SEC (ref 9–12.5)
RBC # BLD AUTO: 2 M/UL (ref 4–5.4)
RBC # BLD AUTO: 2.74 M/UL (ref 4–5.4)
SODIUM SERPL-SCNC: 124 MMOL/L (ref 136–145)
SODIUM SERPL-SCNC: 127 MMOL/L (ref 136–145)
TRANS ERYTHROCYTES VOL PATIENT: NORMAL ML
WBC # BLD AUTO: 13.92 K/UL (ref 3.9–12.7)
WBC # BLD AUTO: 9.24 K/UL (ref 3.9–12.7)

## 2020-02-06 PROCEDURE — 99222 1ST HOSP IP/OBS MODERATE 55: CPT | Mod: HCNC,,, | Performed by: INTERNAL MEDICINE

## 2020-02-06 PROCEDURE — 25000003 PHARM REV CODE 250: Mod: HCNC | Performed by: HOSPITALIST

## 2020-02-06 PROCEDURE — 80048 BASIC METABOLIC PNL TOTAL CA: CPT | Mod: 91,HCNC

## 2020-02-06 PROCEDURE — 36430 TRANSFUSION BLD/BLD COMPNT: CPT | Mod: HCNC

## 2020-02-06 PROCEDURE — 85025 COMPLETE CBC W/AUTO DIFF WBC: CPT | Mod: HCNC

## 2020-02-06 PROCEDURE — 94761 N-INVAS EAR/PLS OXIMETRY MLT: CPT | Mod: HCNC

## 2020-02-06 PROCEDURE — 99222 PR INITIAL HOSPITAL CARE,LEVL II: ICD-10-PCS | Mod: HCNC,,, | Performed by: INTERNAL MEDICINE

## 2020-02-06 PROCEDURE — 85610 PROTHROMBIN TIME: CPT | Mod: HCNC

## 2020-02-06 PROCEDURE — 21400001 HC TELEMETRY ROOM: Mod: HCNC

## 2020-02-06 PROCEDURE — 36415 COLL VENOUS BLD VENIPUNCTURE: CPT | Mod: HCNC

## 2020-02-06 PROCEDURE — 83735 ASSAY OF MAGNESIUM: CPT | Mod: HCNC

## 2020-02-06 PROCEDURE — 63600175 PHARM REV CODE 636 W HCPCS: Mod: HCNC | Performed by: INTERNAL MEDICINE

## 2020-02-06 PROCEDURE — P9021 RED BLOOD CELLS UNIT: HCPCS | Mod: HCNC

## 2020-02-06 PROCEDURE — 25000003 PHARM REV CODE 250: Mod: HCNC | Performed by: INTERNAL MEDICINE

## 2020-02-06 PROCEDURE — 80048 BASIC METABOLIC PNL TOTAL CA: CPT | Mod: HCNC

## 2020-02-06 RX ORDER — HYDROCODONE BITARTRATE AND ACETAMINOPHEN 500; 5 MG/1; MG/1
TABLET ORAL
Status: DISCONTINUED | OUTPATIENT
Start: 2020-02-06 | End: 2020-02-10 | Stop reason: HOSPADM

## 2020-02-06 RX ORDER — AMOXICILLIN 250 MG
1 CAPSULE ORAL 2 TIMES DAILY
Status: DISCONTINUED | OUTPATIENT
Start: 2020-02-06 | End: 2020-02-10 | Stop reason: HOSPADM

## 2020-02-06 RX ORDER — POLYETHYLENE GLYCOL 3350 17 G/17G
17 POWDER, FOR SOLUTION ORAL 2 TIMES DAILY
Status: DISCONTINUED | OUTPATIENT
Start: 2020-02-06 | End: 2020-02-10 | Stop reason: HOSPADM

## 2020-02-06 RX ORDER — WARFARIN 2 MG/1
2 TABLET ORAL DAILY
Status: DISCONTINUED | OUTPATIENT
Start: 2020-02-07 | End: 2020-02-06

## 2020-02-06 RX ADMIN — HYDROCODONE BITARTRATE AND ACETAMINOPHEN 1 TABLET: 5; 325 TABLET ORAL at 11:02

## 2020-02-06 RX ADMIN — POLYETHYLENE GLYCOL 3350 17 G: 17 POWDER, FOR SOLUTION ORAL at 10:02

## 2020-02-06 RX ADMIN — POLYETHYLENE GLYCOL 3350 17 G: 17 POWDER, FOR SOLUTION ORAL at 11:02

## 2020-02-06 RX ADMIN — SODIUM CHLORIDE 1000 ML: 0.9 INJECTION, SOLUTION INTRAVENOUS at 09:02

## 2020-02-06 RX ADMIN — DOCUSATE SODIUM - SENNOSIDES 1 TABLET: 50; 8.6 TABLET, FILM COATED ORAL at 05:02

## 2020-02-06 RX ADMIN — OSELTAMIVIR PHOSPHATE 75 MG: 75 CAPSULE ORAL at 09:02

## 2020-02-06 NOTE — HPI
74 year old female with Hx od tachybrady arrythmia. Valvular heart disease , cardiac pacemaker admitted to hospital because of weakness. Was on coumadin therapy. Documented to have a large right flank hematoma. Denies any trauma.  No Known Hx of bleeding diathesis. Documented to have mild decrease in platelet count. Has been on hydralazine for hypertension.

## 2020-02-06 NOTE — PT/OT/SLP PROGRESS
Physical Therapy      Patient Name:  Orion Ty   MRN:  [id number]    Patient not seen today secondary to Other ( Patient with low H/H (6.1/18.3 ) pt receiving 1U PRBC's). Will follow-up as able later hour/day .    Alisha Ovalles, PTA

## 2020-02-06 NOTE — NURSING
19:30 Received bedside report from JOEL Hung. Alert and oriented. Complaints of rt sided back to lower hip pain(heamtoma noted) 8/10 - will give PRN pain meds. Ice pack in place. No sign of distress. Cardiac monitor in place. On oxygen at  2L/nasal cannula. Daughter at bedside. Call bell given on her reach, instructed to call for assistance all the time. Bed on lowest position. Bed alarm on. Safety maintained.

## 2020-02-06 NOTE — ASSESSMENT & PLAN NOTE
Thrombocytopenia, most likely secondary to hydralazine.  Common side effect. No need to stop drug. No indication for platelet transfusion.

## 2020-02-06 NOTE — SUBJECTIVE & OBJECTIVE
Interval History: feeling ok and still ambulating. States bruising is worse today. Also constipated. Hgb 6.1 gd/L today. INR 3.8    Review of Systems   Respiratory: Negative.    Cardiovascular: Negative.    Gastrointestinal: Positive for constipation.        Right flank pain, which is better   Hematological: Bruises/bleeds easily.     Objective:     Vital Signs (Most Recent):  Temp: 98.7 °F (37.1 °C) (02/06/20 1116)  Pulse: 98 (02/06/20 1116)  Resp: 18 (02/06/20 1116)  BP: 138/83 (02/06/20 1116)  SpO2: 98 % (02/06/20 1116) Vital Signs (24h Range):  Temp:  [97.8 °F (36.6 °C)-99.1 °F (37.3 °C)] 98.7 °F (37.1 °C)  Pulse:  [] 98  Resp:  [18-19] 18  SpO2:  [98 %-100 %] 98 %  BP: (114-140)/(63-83) 138/83     Weight: 53.5 kg (117 lb 15.1 oz)  Body mass index is 22.29 kg/m².    Intake/Output Summary (Last 24 hours) at 2/6/2020 1406  Last data filed at 2/5/2020 2100  Gross per 24 hour   Intake 400 ml   Output --   Net 400 ml      Physical Exam   Constitutional: She is oriented to person, place, and time. She appears well-developed. No distress.   Cardiovascular: Normal rate and regular rhythm.   Pulmonary/Chest: Effort normal.   Abdominal: Soft. Bowel sounds are normal. She exhibits distension.   Large bruise to right flank extending to back and proximal aspect of thigh. Firm.    Neurological: She is alert and oriented to person, place, and time.   Skin: She is not diaphoretic.   Psychiatric: She has a normal mood and affect. Her behavior is normal. Judgment and thought content normal.   Vitals reviewed.      Significant Labs: All pertinent labs within the past 24 hours have been reviewed.    Significant Imaging: I have reviewed all pertinent imaging results/findings within the past 24 hours.  I have reviewed and interpreted all pertinent imaging results/findings within the past 24 hours.

## 2020-02-06 NOTE — SUBJECTIVE & OBJECTIVE
Pt with no acute events overnight.  The patient states she feels better. Pt denies fever, chills, CP, SOB, cough, constipation, diarrhea.  She endorses occasional nausea.    Oncology Treatment Plan:   [No treatment plan]    Medications:  Continuous Infusions:  Scheduled Meds:   polyethylene glycol  17 g Oral BID    senna-docusate 8.6-50 mg  1 tablet Oral BID     PRN Meds:sodium chloride, sodium chloride, dextromethorphan-guaifenesin  mg/5 ml, dextrose 50%, dextrose 50%, glucagon (human recombinant), glucose, glucose, hydrALAZINE, HYDROcodone-acetaminophen, insulin aspart U-100, nitroGLYCERIN, promethazine (PHENERGAN) IVPB, sodium chloride 0.9%     Review of patient's allergies indicates:   Allergen Reactions    Aspirin Other (See Comments)        Past Medical History:   Diagnosis Date    Closed displaced fracture of distal phalanx of lesser toe 10/20/2015    Diabetes mellitus     Diabetes mellitus, type 2     Hypertension     Osteopenia     Pacemaker     Rheumatic heart disease      Past Surgical History:   Procedure Laterality Date    CARDIAC PACEMAKER PLACEMENT      CARDIAC VALVE REPLACEMENT      CARDIAC VALVE SURGERY      thryoid s       Family History     Problem Relation (Age of Onset)    Breast cancer Maternal Aunt        Tobacco Use    Smoking status: Never Smoker    Smokeless tobacco: Never Used   Substance and Sexual Activity    Alcohol use: No    Drug use: Never    Sexual activity: Not on file       Review of Systems   Constitutional: Negative for chills and fever.   Respiratory: Negative for cough and shortness of breath.    Cardiovascular: Negative for chest pain and palpitations.   Gastrointestinal: Positive for nausea. Negative for abdominal pain, constipation, diarrhea and vomiting.   Skin: Negative for rash.   Neurological: Negative for headaches.     Objective:     Vital Signs (Most Recent):  Temp: 98.7 °F (37.1 °C) (02/06/20 1116)  Pulse: 98 (02/06/20 1116)  Resp: 18  (02/06/20 1116)  BP: 138/83 (02/06/20 1116)  SpO2: 98 % (02/06/20 1116) Vital Signs (24h Range):  Temp:  [97.8 °F (36.6 °C)-99.1 °F (37.3 °C)] 98.7 °F (37.1 °C)  Pulse:  [] 98  Resp:  [18-19] 18  SpO2:  [98 %-100 %] 98 %  BP: (114-140)/(63-83) 138/83     Weight: 53.5 kg (117 lb 15.1 oz)  Body mass index is 22.29 kg/m².  Body surface area is 1.52 meters squared.      Intake/Output Summary (Last 24 hours) at 2/6/2020 1442  Last data filed at 2/5/2020 2100  Gross per 24 hour   Intake 400 ml   Output --   Net 400 ml       Physical Exam   Constitutional: She is oriented to person, place, and time. She appears well-developed and well-nourished.   Eyes: EOM are normal. Right eye exhibits no discharge. Left eye exhibits no discharge.   Cardiovascular: Normal rate, regular rhythm and normal heart sounds. Exam reveals no gallop and no friction rub.   No murmur heard.  Pulmonary/Chest: Effort normal and breath sounds normal. No respiratory distress. She has no wheezes. She has no rales.   Abdominal: Soft. Bowel sounds are normal. She exhibits no distension. There is no tenderness. There is no rebound and no guarding.   Extensive bruising in the RLQ.   Neurological: She is alert and oriented to person, place, and time.       Significant Labs:   Recent Results (from the past 24 hour(s))   POCT glucose    Collection Time: 02/06/20  4:11 PM   Result Value Ref Range    POCT Glucose 203 (H) 70 - 110 mg/dL   POCT glucose    Collection Time: 02/06/20  7:58 PM   Result Value Ref Range    POCT Glucose 140 (H) 70 - 110 mg/dL   CBC auto differential    Collection Time: 02/06/20  9:18 PM   Result Value Ref Range    WBC 13.92 (H) 3.90 - 12.70 K/uL    RBC 2.74 (L) 4.00 - 5.40 M/uL    Hemoglobin 8.2 (L) 12.0 - 16.0 g/dL    Hematocrit 24.8 (L) 37.0 - 48.5 %    Mean Corpuscular Volume 91 82 - 98 fL    Mean Corpuscular Hemoglobin 29.9 27.0 - 31.0 pg    Mean Corpuscular Hemoglobin Conc 33.1 32.0 - 36.0 g/dL    RDW 13.9 11.5 - 14.5 %     Platelets 102 (L) 150 - 350 K/uL    MPV 11.5 9.2 - 12.9 fL    Immature Granulocytes 4.6 (H) 0.0 - 0.5 %    Gran # (ANC) 10.1 (H) 1.8 - 7.7 K/uL    Immature Grans (Abs) 0.64 (H) 0.00 - 0.04 K/uL    Lymph # 2.1 1.0 - 4.8 K/uL    Mono # 0.9 0.3 - 1.0 K/uL    Eos # 0.1 0.0 - 0.5 K/uL    Baso # 0.04 0.00 - 0.20 K/uL    nRBC 4 (A) 0 /100 WBC    Gran% 72.7 38.0 - 73.0 %    Lymph% 14.8 (L) 18.0 - 48.0 %    Mono% 6.6 4.0 - 15.0 %    Eosinophil% 1.0 0.0 - 8.0 %    Basophil% 0.3 0.0 - 1.9 %    Differential Method Automated    Basic metabolic panel    Collection Time: 02/07/20  4:20 AM   Result Value Ref Range    Sodium 127 (L) 136 - 145 mmol/L    Potassium 5.2 (H) 3.5 - 5.1 mmol/L    Chloride 96 95 - 110 mmol/L    CO2 26 23 - 29 mmol/L    Glucose 132 (H) 70 - 110 mg/dL    BUN, Bld 12 8 - 23 mg/dL    Creatinine 0.7 0.5 - 1.4 mg/dL    Calcium 8.3 (L) 8.7 - 10.5 mg/dL    Anion Gap 5 (L) 8 - 16 mmol/L    eGFR if African American >60 >60 mL/min/1.73 m^2    eGFR if non African American >60 >60 mL/min/1.73 m^2   Protime-INR    Collection Time: 02/07/20  4:20 AM   Result Value Ref Range    Prothrombin Time 33.6 (H) 9.0 - 12.5 sec    INR 3.0 (H) 0.8 - 1.2   Magnesium    Collection Time: 02/07/20  4:20 AM   Result Value Ref Range    Magnesium 1.8 1.6 - 2.6 mg/dL   CBC auto differential    Collection Time: 02/07/20  4:20 AM   Result Value Ref Range    WBC 14.55 (H) 3.90 - 12.70 K/uL    RBC 2.56 (L) 4.00 - 5.40 M/uL    Hemoglobin 7.7 (L) 12.0 - 16.0 g/dL    Hematocrit 23.2 (L) 37.0 - 48.5 %    Mean Corpuscular Volume 91 82 - 98 fL    Mean Corpuscular Hemoglobin 30.1 27.0 - 31.0 pg    Mean Corpuscular Hemoglobin Conc 33.2 32.0 - 36.0 g/dL    RDW 14.3 11.5 - 14.5 %    Platelets 114 (L) 150 - 350 K/uL    MPV 11.1 9.2 - 12.9 fL    Immature Granulocytes CANCELED 0.0 - 0.5 %    Immature Grans (Abs) CANCELED 0.00 - 0.04 K/uL    Lymph # CANCELED 1.0 - 4.8 K/uL    Mono # CANCELED 0.3 - 1.0 K/uL    Eos # CANCELED 0.0 - 0.5 K/uL    Baso # CANCELED  0.00 - 0.20 K/uL    nRBC 5 (A) 0 /100 WBC    Gran% 66.0 38.0 - 73.0 %    Lymph% 16.0 (L) 18.0 - 48.0 %    Mono% 8.0 4.0 - 15.0 %    Eosinophil% 2.0 0.0 - 8.0 %    Basophil% 0.0 0.0 - 1.9 %    Bands 3.0 %    Metamyelocytes 1.0 %    Myelocytes 4.0 %    Platelet Estimate Decreased (A)     Aniso Moderate     Differential Method Manual    POCT glucose    Collection Time: 02/07/20  7:40 AM   Result Value Ref Range    POCT Glucose 143 (H) 70 - 110 mg/dL   POCT glucose    Collection Time: 02/07/20 11:23 AM   Result Value Ref Range    POCT Glucose 207 (H) 70 - 110 mg/dL         Diagnostic Results:  CT abdomen 2/07/20    There is a moderate amount of air within the right atrium which is likely due to contrast injector malfunction.  The atrial chambers are considerably enlarged.  Prosthetic aortic and mitral valves are partially visualized and there is a left-sided pacemaker.    Abdomen:    - Lung bases: Bibasilar interstitial changes are present likely related to chronic lung disease.    - Liver: Normal.    - Gallbladder and bile ducts: Collapsed around multiple gallstones    - Spleen: Unremarkable.    - Pancreas: Normal.    - Kidneys: No mass or hydronephrosis.    - Adrenals: Unremarkable.    - Retroperitoneum:  No significant adenopathy.    - Vascular: There is aortic atherosclerosis present.  No aneurysm.    -Bowel/mesentery: It has noncontrast appearance of the GI tract is remarkable for some generalized fecal stasis with formed stool seen throughout the colon.  There is a small amount of free peritoneal fluid in the pelvic cul-de-sac.    Pelvis:    The uterus and urinary bladder have a benign appearance.    Bones:  Unremarkable.    There is generalized subcutaneous edema suggesting anasarca.  Compared with the prior study the intramuscular hematoma within the external oblique muscles of the lower abdomen appears decreased in size, now measuring approximately 9 x 5 by 10 mm, previously 13 by 8 by 16 cm.

## 2020-02-06 NOTE — PT/OT/SLP PROGRESS
Occupational Therapy      Patient Name:  Orion Ty   MRN:  [id number]    Patient not seen today secondary to Unavailable (Comment)(Patient with low H/H (6.1/18.3), orders for 1U PRBC's). Will follow-up as able.    NIGN Rouse, MS  2/6/2020

## 2020-02-06 NOTE — PROGRESS NOTES
[facility] Hospital Medicine  Progress Note    Patient Name: Orion Ty  MRN: [id number]  Patient Class: IP- Inpatient   Admission Date: 2/1/2020  Length of Stay: 5 days  Attending Physician: Amy Vale MD  Primary Care Provider: Otis Zimmer MD        Subjective:     Principal Problem:Influenza A        HPI:  Orion Ty is a 74 y.o. female that (in part)  has a past medical history of Closed displaced fracture of distal phalanx of lesser toe, Diabetes mellitus, Diabetes mellitus, type 2, Hypertension, Osteopenia, Pacemaker, and Rheumatic heart disease.  has a past surgical history that includes Cardiac valve replacement; thryoid s; Cardiac pacemaker placement; and Cardiac valuve replacement. Presents to [facility] Emergency Department complaining of shortness of breath.  EMS was activated due to worsening shortness of breath and lethargy.  Unable to ambulate independently, which is not normal for her.  She was febrile to 103° at home and is having difficulty taking deep breaths.  History of significant cardiac disease and heart valve replacement due to rheumatic heart valve disease. History of CAD and MI.  Denied chest pain, cyanosis, palpitations, or syncope.  Positive for peripheral edema.  Positive for nonproductive cough.    In the emergency department routine laboratory studies, chest x-ray, EKG, and cardiac enzymes were obtained.  There was evidence of acute CHF exacerbation with pulmonary edema, trace edema, and hypoxemia.  Influenza A serology was also positive.    Hospital medicine has been asked to admit to inpatient for further evaluation and treatment.     Overview/Hospital Course:          Interval History: feeling ok and still ambulating. States bruising is worse today. Also constipated. Hgb 6.1 gd/L today. INR 3.8    Review of Systems   Respiratory: Negative.    Cardiovascular: Negative.    Gastrointestinal: Positive for constipation.         Right flank pain, which is better   Hematological: Bruises/bleeds easily.     Objective:     Vital Signs (Most Recent):  Temp: 98.7 °F (37.1 °C) (02/06/20 1116)  Pulse: 98 (02/06/20 1116)  Resp: 18 (02/06/20 1116)  BP: 138/83 (02/06/20 1116)  SpO2: 98 % (02/06/20 1116) Vital Signs (24h Range):  Temp:  [97.8 °F (36.6 °C)-99.1 °F (37.3 °C)] 98.7 °F (37.1 °C)  Pulse:  [] 98  Resp:  [18-19] 18  SpO2:  [98 %-100 %] 98 %  BP: (114-140)/(63-83) 138/83     Weight: 53.5 kg (117 lb 15.1 oz)  Body mass index is 22.29 kg/m².    Intake/Output Summary (Last 24 hours) at 2/6/2020 1406  Last data filed at 2/5/2020 2100  Gross per 24 hour   Intake 400 ml   Output --   Net 400 ml      Physical Exam   Constitutional: She is oriented to person, place, and time. She appears well-developed. No distress.   Cardiovascular: Normal rate and regular rhythm.   Pulmonary/Chest: Effort normal.   Abdominal: Soft. Bowel sounds are normal. She exhibits distension.   Large bruise to right flank extending to back and proximal aspect of thigh. Firm.    Neurological: She is alert and oriented to person, place, and time.   Skin: She is not diaphoretic.   Psychiatric: She has a normal mood and affect. Her behavior is normal. Judgment and thought content normal.   Vitals reviewed.      Significant Labs: All pertinent labs within the past 24 hours have been reviewed.    Significant Imaging: I have reviewed all pertinent imaging results/findings within the past 24 hours.  I have reviewed and interpreted all pertinent imaging results/findings within the past 24 hours.      Assessment/Plan:      * Influenza A  Completed 5 days of oseltamivir today  Better overall  Wean cannula  Remove precautions       Right flank hematoma  Not present on admission  Near site of lovenox injections extending to flank. Now extending to back and proximal aspect of thigh.   CT confirms presence of large hematoma. No evidence of active bleeding at the time  Vitals have  much improved however hematoma extending  INR 3.8. Hold coumadin. Reversing coumadin would put patient at very high risk for stroke. Will give another unit of blood today for Hgb < 7 g/dL and consult Hematology for assistance as patient is also somewhat thrombocytopenic (88 today). Maybe a goal INR 2.5-3?  Treat pain and start bowel regimen. Mobilize  Monitor closely      Debility  PT/OT rec HH  Will arrange at discharge      Acute blood loss anemia  As above      Elevated troponin I level  Recent negative stress. Likely demand ischemia.  Cards noted med Rx only         Acute diastolic congestive heart failure  Echo does not show diastolic dysf.   Resolved       PAH (pulmonary artery hypertension)  No acute issues       CAD (coronary artery disease)  No acute issues       Thrombocytopenia  Stable 80-90    Essential (primary) hypertension  Blood pressure no longer low but stable without antihypertensive therapy  Stopped fluids. Encouraged PO intake        Type 2 diabetes mellitus with microalbuminuria, without long-term current use of insulin  Well controlled. No other manifestations.   On insulin for good glucose control      S/P MVR (mitral valve replacement)  Mechanical 2010  Goal INR 2.5-3.5  3.8 today and with large flank hematoma      S/P AVR  Mechanical 2010  Goal INR 2.5-3.5  3.8 today and with large flank hematoma      Long term current use of anticoagulant  On warfarin       Mixed hyperlipidemia  Resume statin       Subclinical hypothyroidism  Resume home meds       Rheumatic heart disease  No acute issues      Chronic atrial fibrillation  Also on Coumadin       VTE Risk Mitigation (From admission, onward)         Ordered     IP VTE HIGH RISK PATIENT  Once      02/01/20 2005     Reason for No Pharmacological VTE Prophylaxis  Once     Question:  Reasons:  Answer:  Already adequately anticoagulated on oral Anticoagulants    02/01/20 2005                      Amy Valerio MD  Department of Hospital Medicine    [facility] [place]

## 2020-02-06 NOTE — ASSESSMENT & PLAN NOTE
CARDIAC REHAB FLOWSHEET Service Date:6/5/17 Session #:2 Time In:1230 Time Out:1330 30-day Re-Assessment Date:   Patients carry-over of treatment evident by:NONE, 1ST SESSION     OBJECTIVE Objective Comments:   Skin [x] - Warm    [] - Cool     [] - Dry   [] - Moist    [] - Dusky   [] - Pale   [] - Cyanosis   Dependent Edema: [x] - None   OR Location:   Blood Glucose: [x]- NA      Fasting:   Pre-Exercise:  Post-Exercise:   Medications taken as prescribed:   [x] - Yes              [] - No Medication Comments:   Pre SpO2:    98   Pre HR:67 Pre BP:116/71 Chest Pain:0 Weight:142 Other Pain (1-10) and Location:0   Post SpO2:  98       Post HR:66 Post BP:116/72 Chest Pain:0 Other Pain (1-10) and Location:0   ECG interpretation at rest:  NSR ECG interpretation post-exercise:          NSR       Self-Care/ Education   []-CHF/Heart Disease         []- Heart Attack Warning Signs        [x]- Exercise Safety        []- Risk Factors for CAD        []- Medications   []- Breathing Techniques    []- Collaborative Self-Mgt                 []- Hypertension            []-Adequate Sleep                []-  Nutrition                                              []-Cholesterol and Lipids    []- Stress Mgt/Emotional Health      []-Diabetes Mellitus       []-Intimacy Concerns with Heart Disease         []-Travel/Environment         []-Returning pre-cardiac activities   []-Home Exercise     []-Other:     Patient Response []- NA [x]- Patient actively participated in education    []- Patient disengaged during education     [] - Able to recite main objectives    [] - Unable to recite main objectives [] - Able to demonstrate     [] - Unable to demonstrate   Educational comments:    EXERCISE PRESCRIPTION:        MODALITY   Time   (min) Speed/Level During Exercise Post Exercise      HR RPD   1-4 RPE  6-20 Pain  1-10 HR RPD   1-4 RPE  6-20 Pain  1-10   stretching/monitoring/deep breathing 20  66 1 6 1 67 1 6 1   STEPPER 20 L2 91 1 12 1 83 1 6 1 Not present on admission  Near site of lovenox injections extending to flank. Now extending to back and proximal aspect of thigh.   CT confirms presence of large hematoma. No evidence of active bleeding at the time  Vitals have much improved however hematoma extending  INR 3.8. Hold coumadin. Reversing coumadin would put patient at very high risk for stroke. Will give another unit of blood today for Hgb < 7 g/dL and consult Hematology for assistance as patient is also somewhat thrombocytopenic (88 today). Maybe a goal INR 2.5-3?  Treat pain and start bowel regimen. Mobilize  Monitor closely     [facility] 20  92 2 13 1 84 1 6 1                                Session #3 Time in:1330 Time out:1405   MODALITY   Time   (min) Speed/Level During Exercise Post Exercise      HR RPD   1-4 RPE   6 - 20 Pain  1 - 10 HR RPD   1-4 RPE   6 - 20 Pain   1 - 10   stretching/monitoring/deep breathing 15  67 1 6 1 68 1 6 1   ERGOMETER 20 10W 96 1 13 1 72 1 6 1                                          Patient Response to Exercise Prescription   []- ? Dyspnea                        [] - ? Fatigue                              [] - Abnormal responses:  Explain         [] - ? Pain (Describe location, description of pain, action(s) taken:         [] - Experienced no problems; no cuing required            [x]- Good effort               []- Fair effort                   []- Poor   effort        [x]- Good motivation         []- Fair motivation                 []- Poor motivation         [] - Appropriate physiological responses                             Exercise Comments:    ASSESSMENT   []- Patient unable to  progress towards  LTGs due to:  []- Illness       []- Weakness/debility       []- Poor effort       []- Poor Motivation       []- Poor Attendance        [x]- Patient progressing towards LTGs as evident by:    [] Decreased dyspnea on exertion                 [] Decreased fatigue                  [] Improved strength                [] Improved aerobic capacity & endurance                [] Decreased pain               [] Increased pacing          [] Improved emotional health          [] Cigarette reduction / Smoking cessation participation            [] Improved self-monitoring at home          [] Improved nutritional awareness/monitoring          [x] Other (Describe): Additional Comments: 1ST SESSION   PLAN OF CARE [x] - Continue as written   OR    [] - Adjust treatment plan as follows:           Physician supervision provided this date of service by:    [employer] [id number]   BILLING Code 49431 (non-monitored):     Total minutes:    Code 68902 (monitored):             Total minutes:   *ECG rhythm attached      [] 1 CR session only   OR     []  2 CR sessions       [] 1 CR session only     OR     [x]  2 CR sessions       *EKG Strip:    Rula Kim RN  6/5/2017, 2:13 PM

## 2020-02-06 NOTE — ASSESSMENT & PLAN NOTE
Blood pressure no longer low but stable without antihypertensive therapy  Stopped fluids. Encouraged PO intake

## 2020-02-06 NOTE — CONSULTS
[facility]  Hematology/Oncology  Consult Note    Patient Name: Orion Ty  MRN: [id number]  Admission Date: 2/1/2020  Hospital Length of Stay: 5 days  Code Status: Full Code   Attending Provider: Amy Vale MD  Consulting Provider: Wang Perez MD  Primary Care Physician: Otis Zimmer MD  Principal Problem:Influenza A    Consults  Subjective:     HPI:  74 year old female with Hx od tachybrady arrythmia. Valvular heart disease , cardiac pacemaker admitted to hospital because of weakness. Was on coumadin therapy. Documented to have a large right flank hematoma. Denies any trauma.  No Known Hx of bleeding diathesis. Documented to have mild decrease in platelet count. Has been on hydralazine for hypertension.    Oncology Treatment Plan:   [No treatment plan]    Medications:  Continuous Infusions:  Scheduled Meds:   polyethylene glycol  17 g Oral BID    senna-docusate 8.6-50 mg  1 tablet Oral BID     PRN Meds:sodium chloride, sodium chloride, dextromethorphan-guaifenesin  mg/5 ml, dextrose 50%, dextrose 50%, glucagon (human recombinant), glucose, glucose, hydrALAZINE, HYDROcodone-acetaminophen, insulin aspart U-100, nitroGLYCERIN, promethazine (PHENERGAN) IVPB, sodium chloride 0.9%     Review of patient's allergies indicates:   Allergen Reactions    Aspirin Other (See Comments)        Past Medical History:   Diagnosis Date    Closed displaced fracture of distal phalanx of lesser toe 10/20/2015    Diabetes mellitus     Diabetes mellitus, type 2     Hypertension     Osteopenia     Pacemaker     Rheumatic heart disease      Past Surgical History:   Procedure Laterality Date    CARDIAC PACEMAKER PLACEMENT      CARDIAC VALVE REPLACEMENT      CARDIAC VALVE SURGERY      thryoid s       Family History     Problem Relation (Age of Onset)    Breast cancer Maternal Aunt        Tobacco Use    Smoking status: Never Smoker    Smokeless tobacco: Never Used   Substance  and Sexual Activity    Alcohol use: No    Drug use: Never    Sexual activity: Not on file       Review of Systems   Constitutional: Negative for activity change, appetite change, chills, diaphoresis, fatigue, fever and unexpected weight change.   HENT: Negative for congestion, nosebleeds, sore throat, tinnitus, trouble swallowing and voice change.    Eyes: Negative for pain, discharge and itching.        Cataract surgery right   Respiratory: Negative for apnea, cough, chest tightness, shortness of breath and stridor.    Cardiovascular: Negative for chest pain, palpitations and leg swelling.   Gastrointestinal: Negative for abdominal distention, abdominal pain, anal bleeding, blood in stool, constipation, diarrhea, nausea, rectal pain and vomiting.   Endocrine: Negative for cold intolerance, heat intolerance, polydipsia, polyphagia and polyuria.   Genitourinary: Negative for difficulty urinating, dysuria, flank pain, hematuria and vaginal bleeding.   Musculoskeletal: Negative for arthralgias, back pain, gait problem, joint swelling, myalgias, neck pain and neck stiffness.   Skin: Positive for color change. Negative for pallor, rash and wound.   Neurological: Negative for dizziness, seizures, syncope, facial asymmetry, speech difficulty, light-headedness, numbness and headaches.   Hematological: Negative for adenopathy. Bruises/bleeds easily.   Psychiatric/Behavioral: Negative for agitation, behavioral problems, confusion, decreased concentration, hallucinations and sleep disturbance. The patient is not nervous/anxious.      Objective:     Vital Signs (Most Recent):  Temp: 98.7 °F (37.1 °C) (02/06/20 1116)  Pulse: 98 (02/06/20 1116)  Resp: 18 (02/06/20 1116)  BP: 138/83 (02/06/20 1116)  SpO2: 98 % (02/06/20 1116) Vital Signs (24h Range):  Temp:  [97.8 °F (36.6 °C)-99.1 °F (37.3 °C)] 98.7 °F (37.1 °C)  Pulse:  [] 98  Resp:  [18-19] 18  SpO2:  [98 %-100 %] 98 %  BP: (114-140)/(63-83) 138/83     Weight: 53.5 kg  (117 lb 15.1 oz)  Body mass index is 22.29 kg/m².  Body surface area is 1.52 meters squared.      Intake/Output Summary (Last 24 hours) at 2/6/2020 1414  Last data filed at 2/5/2020 2100  Gross per 24 hour   Intake 400 ml   Output --   Net 400 ml       Physical Exam   Constitutional: She is oriented to person, place, and time. She appears well-developed and well-nourished. No distress.   HENT:   Head: Normocephalic and atraumatic.   Nose: Nose normal.   Eyes: Conjunctivae and EOM are normal.   Right Cataract surgery   Neck: Normal range of motion. Neck supple. No JVD present. No tracheal deviation present. No thyromegaly present.   Cardiovascular:   Irregular heart beat   Pulmonary/Chest: Effort normal and breath sounds normal. No respiratory distress. She has no wheezes. She has no rales. She exhibits no tenderness.   Abdominal: Soft. Bowel sounds are normal. She exhibits no distension and no mass. There is no tenderness. There is no rebound and no guarding. No hernia.   Musculoskeletal: Normal range of motion. She exhibits no edema, tenderness or deformity.   Lymphadenopathy:     She has no cervical adenopathy.   Neurological: She is alert and oriented to person, place, and time. No cranial nerve deficit. Coordination normal.   Skin: Skin is warm. She is not diaphoretic.   Large ecchymotic area extending down right lower chest wall and flank   Psychiatric: She has a normal mood and affect. Her behavior is normal. Thought content normal.       Significant Labs:  On admit Hgb 11.2 with Hct 34.1 and platelet 88,000. Today Hgb 6.1; Hct 15.3; MCV 92; Platelet 87,000. On admit INR 1.3; [place] INR 3.4 Today INR 3,8.       Diagnostic Results: chest x ray : no effusion or pneumothorax. CT abdomen and Pelvis, large right flank hematoma      Assessment/Plan:     Acute blood loss anemia  Patient is scheduled torecieve one unit PRBC today.     Thrombocytopenia  Thrombocytopenia, most likely secondary to hydralazine.  Common  side effect. No need to stop drug. No indication for platelet transfusion.    Long term current use of anticoagulant  Reviewed with . Will continue coumadin, with Intent of keeping INR between 2 and 2.5. Without anticoagulation risk of stroke is high.        Thank you for your consult.  Will follow.    Wang Perez MD  Hematology/Oncology  [facility]

## 2020-02-06 NOTE — ASSESSMENT & PLAN NOTE
Reviewed with . Will continue coumadin, with Intent of keeping INR between 2 and 2.5. Without anticoagulation risk of stroke is high.

## 2020-02-06 NOTE — SUBJECTIVE & OBJECTIVE
Oncology Treatment Plan:   [No treatment plan]    Medications:  Continuous Infusions:  Scheduled Meds:   polyethylene glycol  17 g Oral BID    senna-docusate 8.6-50 mg  1 tablet Oral BID     PRN Meds:sodium chloride, sodium chloride, dextromethorphan-guaifenesin  mg/5 ml, dextrose 50%, dextrose 50%, glucagon (human recombinant), glucose, glucose, hydrALAZINE, HYDROcodone-acetaminophen, insulin aspart U-100, nitroGLYCERIN, promethazine (PHENERGAN) IVPB, sodium chloride 0.9%     Review of patient's allergies indicates:   Allergen Reactions    Aspirin Other (See Comments)        Past Medical History:   Diagnosis Date    Closed displaced fracture of distal phalanx of lesser toe 10/20/2015    Diabetes mellitus     Diabetes mellitus, type 2     Hypertension     Osteopenia     Pacemaker     Rheumatic heart disease      Past Surgical History:   Procedure Laterality Date    CARDIAC PACEMAKER PLACEMENT      CARDIAC VALVE REPLACEMENT      CARDIAC VALVE SURGERY      thryoid s       Family History     Problem Relation (Age of Onset)    Breast cancer Maternal Aunt        Tobacco Use    Smoking status: Never Smoker    Smokeless tobacco: Never Used   Substance and Sexual Activity    Alcohol use: No    Drug use: Never    Sexual activity: Not on file       Review of Systems   Constitutional: Negative for activity change, appetite change, chills, diaphoresis, fatigue, fever and unexpected weight change.   HENT: Negative for congestion, nosebleeds, sore throat, tinnitus, trouble swallowing and voice change.    Eyes: Negative for pain, discharge and itching.        Cataract surgery right   Respiratory: Negative for apnea, cough, chest tightness, shortness of breath and stridor.    Cardiovascular: Negative for chest pain, palpitations and leg swelling.   Gastrointestinal: Negative for abdominal distention, abdominal pain, anal bleeding, blood in stool, constipation, diarrhea, nausea, rectal pain and vomiting.    Endocrine: Negative for cold intolerance, heat intolerance, polydipsia, polyphagia and polyuria.   Genitourinary: Negative for difficulty urinating, dysuria, flank pain, hematuria and vaginal bleeding.   Musculoskeletal: Negative for arthralgias, back pain, gait problem, joint swelling, myalgias, neck pain and neck stiffness.   Skin: Positive for color change. Negative for pallor, rash and wound.   Neurological: Negative for dizziness, seizures, syncope, facial asymmetry, speech difficulty, light-headedness, numbness and headaches.   Hematological: Negative for adenopathy. Bruises/bleeds easily.   Psychiatric/Behavioral: Negative for agitation, behavioral problems, confusion, decreased concentration, hallucinations and sleep disturbance. The patient is not nervous/anxious.      Objective:     Vital Signs (Most Recent):  Temp: 98.7 °F (37.1 °C) (02/06/20 1116)  Pulse: 98 (02/06/20 1116)  Resp: 18 (02/06/20 1116)  BP: 138/83 (02/06/20 1116)  SpO2: 98 % (02/06/20 1116) Vital Signs (24h Range):  Temp:  [97.8 °F (36.6 °C)-99.1 °F (37.3 °C)] 98.7 °F (37.1 °C)  Pulse:  [] 98  Resp:  [18-19] 18  SpO2:  [98 %-100 %] 98 %  BP: (114-140)/(63-83) 138/83     Weight: 53.5 kg (117 lb 15.1 oz)  Body mass index is 22.29 kg/m².  Body surface area is 1.52 meters squared.      Intake/Output Summary (Last 24 hours) at 2/6/2020 1414  Last data filed at 2/5/2020 2100  Gross per 24 hour   Intake 400 ml   Output --   Net 400 ml       Physical Exam   Constitutional: She is oriented to person, place, and time. She appears well-developed and well-nourished. No distress.   HENT:   Head: Normocephalic and atraumatic.   Nose: Nose normal.   Eyes: Conjunctivae and EOM are normal.   Right Cataract surgery   Neck: Normal range of motion. Neck supple. No JVD present. No tracheal deviation present. No thyromegaly present.   Cardiovascular:   Irregular heart beat   Pulmonary/Chest: Effort normal and breath sounds normal. No respiratory  distress. She has no wheezes. She has no rales. She exhibits no tenderness.   Abdominal: Soft. Bowel sounds are normal. She exhibits no distension and no mass. There is no tenderness. There is no rebound and no guarding. No hernia.   Musculoskeletal: Normal range of motion. She exhibits no edema, tenderness or deformity.   Lymphadenopathy:     She has no cervical adenopathy.   Neurological: She is alert and oriented to person, place, and time. No cranial nerve deficit. Coordination normal.   Skin: Skin is warm. She is not diaphoretic.   Large ecchymotic area extending down right lower chest wall and flank   Psychiatric: She has a normal mood and affect. Her behavior is normal. Thought content normal.       Significant Labs:  On admit Hgb 11.2 with Hct 34.1 and platelet 88,000. Today Hgb 6.1; Hct 15.3; MCV 92; Platelet 87,000. On admit INR 1.3; [place] INR 3.4 Today INR 3,8.       Diagnostic Results: chest x ray : no effusion or pneumothorax. CT abdomen and Pelvis, large right flank hematoma

## 2020-02-07 LAB
ANION GAP SERPL CALC-SCNC: 5 MMOL/L (ref 8–16)
ANION GAP SERPL CALC-SCNC: 6 MMOL/L (ref 8–16)
ANISOCYTOSIS BLD QL SMEAR: ABNORMAL
BASOPHILS # BLD AUTO: ABNORMAL K/UL (ref 0–0.2)
BASOPHILS # BLD AUTO: ABNORMAL K/UL (ref 0–0.2)
BASOPHILS NFR BLD: 0 % (ref 0–1.9)
BASOPHILS NFR BLD: 0 % (ref 0–1.9)
BUN SERPL-MCNC: 12 MG/DL (ref 8–23)
BUN SERPL-MCNC: 12 MG/DL (ref 8–23)
CALCIUM SERPL-MCNC: 8.2 MG/DL (ref 8.7–10.5)
CALCIUM SERPL-MCNC: 8.3 MG/DL (ref 8.7–10.5)
CHLORIDE SERPL-SCNC: 95 MMOL/L (ref 95–110)
CHLORIDE SERPL-SCNC: 96 MMOL/L (ref 95–110)
CO2 SERPL-SCNC: 24 MMOL/L (ref 23–29)
CO2 SERPL-SCNC: 26 MMOL/L (ref 23–29)
CREAT SERPL-MCNC: 0.7 MG/DL (ref 0.5–1.4)
CREAT SERPL-MCNC: 0.7 MG/DL (ref 0.5–1.4)
DIFFERENTIAL METHOD: ABNORMAL
DIFFERENTIAL METHOD: ABNORMAL
EOSINOPHIL # BLD AUTO: ABNORMAL K/UL (ref 0–0.5)
EOSINOPHIL # BLD AUTO: ABNORMAL K/UL (ref 0–0.5)
EOSINOPHIL NFR BLD: 2 % (ref 0–8)
EOSINOPHIL NFR BLD: 3 % (ref 0–8)
ERYTHROCYTE [DISTWIDTH] IN BLOOD BY AUTOMATED COUNT: 14.2 % (ref 11.5–14.5)
ERYTHROCYTE [DISTWIDTH] IN BLOOD BY AUTOMATED COUNT: 14.3 % (ref 11.5–14.5)
EST. GFR  (AFRICAN AMERICAN): >60 ML/MIN/1.73 M^2
EST. GFR  (AFRICAN AMERICAN): >60 ML/MIN/1.73 M^2
EST. GFR  (NON AFRICAN AMERICAN): >60 ML/MIN/1.73 M^2
EST. GFR  (NON AFRICAN AMERICAN): >60 ML/MIN/1.73 M^2
GLUCOSE SERPL-MCNC: 132 MG/DL (ref 70–110)
GLUCOSE SERPL-MCNC: 179 MG/DL (ref 70–110)
HCT VFR BLD AUTO: 22.3 % (ref 37–48.5)
HCT VFR BLD AUTO: 23.2 % (ref 37–48.5)
HGB BLD-MCNC: 7.3 G/DL (ref 12–16)
HGB BLD-MCNC: 7.7 G/DL (ref 12–16)
IMM GRANULOCYTES # BLD AUTO: ABNORMAL K/UL (ref 0–0.04)
IMM GRANULOCYTES # BLD AUTO: ABNORMAL K/UL (ref 0–0.04)
IMM GRANULOCYTES NFR BLD AUTO: ABNORMAL % (ref 0–0.5)
IMM GRANULOCYTES NFR BLD AUTO: ABNORMAL % (ref 0–0.5)
INR PPP: 3 (ref 0.8–1.2)
LYMPHOCYTES # BLD AUTO: ABNORMAL K/UL (ref 1–4.8)
LYMPHOCYTES # BLD AUTO: ABNORMAL K/UL (ref 1–4.8)
LYMPHOCYTES NFR BLD: 10 % (ref 18–48)
LYMPHOCYTES NFR BLD: 16 % (ref 18–48)
MAGNESIUM SERPL-MCNC: 1.8 MG/DL (ref 1.6–2.6)
MCH RBC QN AUTO: 30.1 PG (ref 27–31)
MCH RBC QN AUTO: 30.2 PG (ref 27–31)
MCHC RBC AUTO-ENTMCNC: 32.7 G/DL (ref 32–36)
MCHC RBC AUTO-ENTMCNC: 33.2 G/DL (ref 32–36)
MCV RBC AUTO: 91 FL (ref 82–98)
MCV RBC AUTO: 92 FL (ref 82–98)
METAMYELOCYTES NFR BLD MANUAL: 1 %
METAMYELOCYTES NFR BLD MANUAL: 1 %
MONOCYTES # BLD AUTO: ABNORMAL K/UL (ref 0.3–1)
MONOCYTES # BLD AUTO: ABNORMAL K/UL (ref 0.3–1)
MONOCYTES NFR BLD: 6 % (ref 4–15)
MONOCYTES NFR BLD: 8 % (ref 4–15)
MYELOCYTES NFR BLD MANUAL: 4 %
MYELOCYTES NFR BLD MANUAL: 5 %
NEUTROPHILS NFR BLD: 66 % (ref 38–73)
NEUTROPHILS NFR BLD: 67 % (ref 38–73)
NEUTS BAND NFR BLD MANUAL: 3 %
NEUTS BAND NFR BLD MANUAL: 8 %
NRBC BLD-RTO: 4 /100 WBC
NRBC BLD-RTO: 5 /100 WBC
PLATELET # BLD AUTO: 113 K/UL (ref 150–350)
PLATELET # BLD AUTO: 114 K/UL (ref 150–350)
PLATELET BLD QL SMEAR: ABNORMAL
PLATELET BLD QL SMEAR: ABNORMAL
PMV BLD AUTO: 11 FL (ref 9.2–12.9)
PMV BLD AUTO: 11.1 FL (ref 9.2–12.9)
POCT GLUCOSE: 143 MG/DL (ref 70–110)
POCT GLUCOSE: 155 MG/DL (ref 70–110)
POCT GLUCOSE: 164 MG/DL (ref 70–110)
POCT GLUCOSE: 207 MG/DL (ref 70–110)
POTASSIUM SERPL-SCNC: 4.7 MMOL/L (ref 3.5–5.1)
POTASSIUM SERPL-SCNC: 5.2 MMOL/L (ref 3.5–5.1)
PROTHROMBIN TIME: 33.6 SEC (ref 9–12.5)
RBC # BLD AUTO: 2.42 M/UL (ref 4–5.4)
RBC # BLD AUTO: 2.56 M/UL (ref 4–5.4)
SODIUM SERPL-SCNC: 125 MMOL/L (ref 136–145)
SODIUM SERPL-SCNC: 127 MMOL/L (ref 136–145)
TOXIC GRANULES BLD QL SMEAR: PRESENT
WBC # BLD AUTO: 13.44 K/UL (ref 3.9–12.7)
WBC # BLD AUTO: 14.55 K/UL (ref 3.9–12.7)

## 2020-02-07 PROCEDURE — 94799 UNLISTED PULMONARY SVC/PX: CPT | Mod: HCNC

## 2020-02-07 PROCEDURE — 63600175 PHARM REV CODE 636 W HCPCS: Mod: HCNC | Performed by: INTERNAL MEDICINE

## 2020-02-07 PROCEDURE — 97530 THERAPEUTIC ACTIVITIES: CPT | Mod: HCNC,CQ

## 2020-02-07 PROCEDURE — 97116 GAIT TRAINING THERAPY: CPT | Mod: HCNC,CQ

## 2020-02-07 PROCEDURE — 83735 ASSAY OF MAGNESIUM: CPT | Mod: HCNC

## 2020-02-07 PROCEDURE — 80048 BASIC METABOLIC PNL TOTAL CA: CPT | Mod: 91,HCNC

## 2020-02-07 PROCEDURE — 36415 COLL VENOUS BLD VENIPUNCTURE: CPT | Mod: HCNC

## 2020-02-07 PROCEDURE — 25000003 PHARM REV CODE 250: Mod: HCNC | Performed by: INTERNAL MEDICINE

## 2020-02-07 PROCEDURE — 99232 SBSQ HOSP IP/OBS MODERATE 35: CPT | Mod: HCNC,,, | Performed by: INTERNAL MEDICINE

## 2020-02-07 PROCEDURE — 85027 COMPLETE CBC AUTOMATED: CPT | Mod: HCNC

## 2020-02-07 PROCEDURE — 21400001 HC TELEMETRY ROOM: Mod: HCNC

## 2020-02-07 PROCEDURE — 80048 BASIC METABOLIC PNL TOTAL CA: CPT | Mod: HCNC

## 2020-02-07 PROCEDURE — 85007 BL SMEAR W/DIFF WBC COUNT: CPT | Mod: HCNC

## 2020-02-07 PROCEDURE — 85610 PROTHROMBIN TIME: CPT | Mod: HCNC

## 2020-02-07 PROCEDURE — 25500020 PHARM REV CODE 255: Mod: HCNC | Performed by: INTERNAL MEDICINE

## 2020-02-07 PROCEDURE — 99232 PR SUBSEQUENT HOSPITAL CARE,LEVL II: ICD-10-PCS | Mod: HCNC,,, | Performed by: INTERNAL MEDICINE

## 2020-02-07 RX ORDER — SODIUM CHLORIDE 9 MG/ML
INJECTION, SOLUTION INTRAVENOUS CONTINUOUS
Status: DISCONTINUED | OUTPATIENT
Start: 2020-02-07 | End: 2020-02-08

## 2020-02-07 RX ORDER — ACETAMINOPHEN 325 MG/1
650 TABLET ORAL EVERY 6 HOURS PRN
Status: DISCONTINUED | OUTPATIENT
Start: 2020-02-07 | End: 2020-02-10 | Stop reason: HOSPADM

## 2020-02-07 RX ORDER — WARFARIN 1 MG/1
1 TABLET ORAL DAILY
Status: DISCONTINUED | OUTPATIENT
Start: 2020-02-07 | End: 2020-02-08

## 2020-02-07 RX ORDER — SYRING-NEEDL,DISP,INSUL,0.3 ML 29 G X1/2"
296 SYRINGE, EMPTY DISPOSABLE MISCELLANEOUS ONCE AS NEEDED
Status: COMPLETED | OUTPATIENT
Start: 2020-02-07 | End: 2020-02-07

## 2020-02-07 RX ADMIN — POLYETHYLENE GLYCOL 3350 17 G: 17 POWDER, FOR SOLUTION ORAL at 10:02

## 2020-02-07 RX ADMIN — MAGNESIUM CITRATE 296 ML: 1.75 LIQUID ORAL at 05:02

## 2020-02-07 RX ADMIN — POLYETHYLENE GLYCOL 3350 17 G: 17 POWDER, FOR SOLUTION ORAL at 09:02

## 2020-02-07 RX ADMIN — SODIUM CHLORIDE: 0.9 INJECTION, SOLUTION INTRAVENOUS at 08:02

## 2020-02-07 RX ADMIN — WARFARIN SODIUM 1 MG: 1 TABLET ORAL at 05:02

## 2020-02-07 RX ADMIN — DOCUSATE SODIUM - SENNOSIDES 1 TABLET: 50; 8.6 TABLET, FILM COATED ORAL at 09:02

## 2020-02-07 RX ADMIN — ACETAMINOPHEN 650 MG: 325 TABLET ORAL at 12:02

## 2020-02-07 RX ADMIN — IOHEXOL 75 ML: 350 INJECTION, SOLUTION INTRAVENOUS at 10:02

## 2020-02-07 RX ADMIN — DOCUSATE SODIUM - SENNOSIDES 1 TABLET: 50; 8.6 TABLET, FILM COATED ORAL at 10:02

## 2020-02-07 NOTE — CONSULTS
Please see consult note from Dr. Perez dated 2/06/20.    Gerardo Maldonado MD  Hematology and Oncology  [facility]  Office:[phone]  Fax: [phone]

## 2020-02-07 NOTE — PROGRESS NOTES
[facility] Hospital Medicine  Progress Note    Patient Name: Orion Ty  MRN: [id number]  Patient Class: IP- Inpatient   Admission Date: 2/1/2020  Length of Stay: 6 days  Attending Physician: Amy Vale MD  Primary Care Provider: Otis Zimmer MD        Subjective:     Principal Problem:Influenza A        HPI:  Orion Ty is a 74 y.o. female that (in part)  has a past medical history of Closed displaced fracture of distal phalanx of lesser toe, Diabetes mellitus, Diabetes mellitus, type 2, Hypertension, Osteopenia, Pacemaker, and Rheumatic heart disease.  has a past surgical history that includes Cardiac valve replacement; thryoid s; Cardiac pacemaker placement; and Cardiac valuve replacement. Presents to [facility] Emergency Department complaining of shortness of breath.  EMS was activated due to worsening shortness of breath and lethargy.  Unable to ambulate independently, which is not normal for her.  She was febrile to 103° at home and is having difficulty taking deep breaths.  History of significant cardiac disease and heart valve replacement due to rheumatic heart valve disease. History of CAD and MI.  Denied chest pain, cyanosis, palpitations, or syncope.  Positive for peripheral edema.  Positive for nonproductive cough.    In the emergency department routine laboratory studies, chest x-ray, EKG, and cardiac enzymes were obtained.  There was evidence of acute CHF exacerbation with pulmonary edema, trace edema, and hypoxemia.  Influenza A serology was also positive.    Hospital medicine has been asked to admit to inpatient for further evaluation and treatment.     Overview/Hospital Course:  Ms Ty presented with sepsis, acute respiratory failure with hypoxia (88% at room air, per ED documentation) and acute encephalopathy secondary to influenza A infection. Patient was given isotonic fluids, oseltamivir and supplemental O2. Droplet precautions  initiated. Patient clinically improved. Patient complained of right flank pain. On exam, patient was noted to have large bruise and firmness from right flank down to lower lateral aspect of abdomen and back. A CT of abdomen pelvis with IV contrast showed a large hematoma interposed between oblique muscles. Unknown what caused this hematoma. Per nurse, daughter stated a bruise was present prior to admit but per patient and , this is new. Patient is on coumadin for stroke prophylaxis as patient has 2 mechanical heart valves. Is also chronically thrombocytopenic (50s-60s in 2010; 80s-90s recently). Patient was given one unit of blood for acute blood loss anemia. Hematology consulted for recs on therapeutic INR levels. Recommend at least 2.5 (lowest allowed level for mechanical valves) per my conversation with Hematologist. Lovenox no longer required and coumadin held to reach goal INR.     Interval History: INR 3. Patient stable and well appearing. Still complains of constipation. Bruise still very large but abdomen less protuberant and softer today.     Review of Systems   Respiratory: Negative.    Cardiovascular: Negative.    Gastrointestinal: Positive for constipation.        Right flank pain, which is better   Hematological: Bruises/bleeds easily.     Objective:     Vital Signs (Most Recent):  Temp: 99.4 °F (37.4 °C) (02/07/20 0743)  Pulse: 92 (02/07/20 0743)  Resp: 16 (02/07/20 0743)  BP: (!) 114/55 (02/07/20 0743)  SpO2: 96 % (02/07/20 0743) Vital Signs (24h Range):  Temp:  [98.1 °F (36.7 °C)-99.4 °F (37.4 °C)] 99.4 °F (37.4 °C)  Pulse:  [74-99] 92  Resp:  [16-19] 16  SpO2:  [96 %-100 %] 96 %  BP: (114-146)/(55-83) 114/55     Weight: 53.5 kg (117 lb 15.1 oz)  Body mass index is 22.29 kg/m².    Intake/Output Summary (Last 24 hours) at 2/7/2020 1100  Last data filed at 2/7/2020 0238  Gross per 24 hour   Intake 440 ml   Output 250 ml   Net 190 ml      Physical Exam   Constitutional: She is oriented to person,  place, and time. She appears well-developed. No distress.   Cardiovascular: Normal rate and regular rhythm.   Pulmonary/Chest: Effort normal.   Abdominal: Soft. Bowel sounds are normal. She exhibits no distension.   Large bruise to right flank extending to back and proximal aspect of thigh. Firm.    Neurological: She is alert and oriented to person, place, and time.   Skin: She is not diaphoretic.   Psychiatric: She has a normal mood and affect. Her behavior is normal. Judgment and thought content normal.   Vitals reviewed.      Significant Labs: All pertinent labs within the past 24 hours have been reviewed.    Significant Imaging: I have reviewed all pertinent imaging results/findings within the past 24 hours.  I have reviewed and interpreted all pertinent imaging results/findings within the past 24 hours.      Assessment/Plan:      * Influenza A  Completed 5 days of oseltamivir today  Better overall  Wean cannula  Removed precautions       Right flank hematoma  Not present on admission  Near site of lovenox injections extending to flank. Now extending to back and proximal aspect of thigh.   CT 2/4 confirmed presence of large hematoma. No evidence of active bleeding at the time  Vitals have much improved however bruising appears to be extending  INR 3. Reversing coumadin would put patient at very high risk for stroke.   Discussed with Hematology. Recommend keeping INR closest to 2.5 as possible   Will do very low dose coumadin to avoid subtherapeutic INR  Repeat CT abdomen/pelvis today to reassess  Treat pain and upgrade bowel regimen. Mobilize  Monitor closely      Debility  PT/OT rec HH  Will arrange at discharge      Acute blood loss anemia  As above      Elevated troponin I level  Recent negative stress. Likely demand ischemia.  Cards noted med Rx only         Acute diastolic congestive heart failure  Current Echo does not show diastolic dysf.   2/2 influenza? Resolved       PAH (pulmonary artery  hypertension)  No acute issues       CAD (coronary artery disease)  No acute issues       Thrombocytopenia  Chronic  Actually improving to low 100s  Hold off on antiplatelet agents    Essential (primary) hypertension  Blood pressure no longer low but stable without antihypertensive therapy  Stopped fluids. Encouraged PO intake        Type 2 diabetes mellitus with microalbuminuria, without long-term current use of insulin  Well controlled. No other manifestations.   On insulin as needed for good glucose control      S/P MVR (mitral valve replacement)  Mechanical 2010  Given large hematoma and chronic thrombocytopenia, I discussed with Hematology our new INR goal  Goal is to be as close to 2.5 as possible to reduce risk of stroke and bleeding concomitantly   INR 3 today. Considering very low dose coumadin today to avoid subtherapeutic level  Patient is well appearing, not hypotensive and no clear evidence of active bleeding today  Monitoring very closely      S/P AVR  Mechanical 2010  Given large hematoma and chronic thrombocytopenia, I discussed with Hematology our new INR goal  Goal is to be as close to 2.5 as possible to reduce risk of stroke and bleeding concomitantly   INR 3 today. Considering very low dose coumadin today to avoid subtherapeutic level  Patient is well appearing, not hypotensive and no clear evidence of active bleeding today  Monitoring very closely      Long term current use of anticoagulant  On warfarin       Mixed hyperlipidemia  Resume statin       Subclinical hypothyroidism  Resume home meds       Rheumatic heart disease  No acute issues      Chronic atrial fibrillation  On anticoagulation  Stable without AV blocking agents      VTE Risk Mitigation (From admission, onward)         Ordered     IP VTE HIGH RISK PATIENT  Once      02/01/20 2005     Reason for No Pharmacological VTE Prophylaxis  Once     Question:  Reasons:  Answer:  Already adequately anticoagulated on oral Anticoagulants     02/01/20 2005              Will repeat CBC and BMP at 2 pm today    Assessment and plan discussed with patient, patient's  and Dr Blum (a family friend whom patient and patient's  asked to discuss case with). All questions answered to satisfaction.     Amy Valerio MD  Department of Hospital Medicine   [facility]

## 2020-02-07 NOTE — ASSESSMENT & PLAN NOTE
-Pt with right flank hematoma likely from lovenox administration  -Improved on recent CT  -Would monitor

## 2020-02-07 NOTE — ASSESSMENT & PLAN NOTE
-Need to continue anticoagulation given valve replacements  -PT's hematoma is improving despite INR of 3  -Would go back to INR goal of 2.5-3.5 per cardiolgoy recs.

## 2020-02-07 NOTE — NURSING
End of shift bedside report given to JOEL Seth. Patient in no apparent distress.     12 hour chart check complete.

## 2020-02-07 NOTE — ASSESSMENT & PLAN NOTE
Mechanical 2010  Given large hematoma and chronic thrombocytopenia, I discussed with Hematology our new INR goal  Goal is to be as close to 2.5 as possible to reduce risk of stroke and bleeding concomitantly   INR 3 today. Considering very low dose coumadin today to avoid subtherapeutic level  Patient is well appearing, not hypotensive and no clear evidence of active bleeding today  Monitoring very closely

## 2020-02-07 NOTE — SUBJECTIVE & OBJECTIVE
Interval History: INR 3. Patient stable and well appearing. Still complains of constipation. Bruise still very large but abdomen less protuberant and softer today.     Review of Systems   Respiratory: Negative.    Cardiovascular: Negative.    Gastrointestinal: Positive for constipation.        Right flank pain, which is better   Hematological: Bruises/bleeds easily.     Objective:     Vital Signs (Most Recent):  Temp: 99.4 °F (37.4 °C) (02/07/20 0743)  Pulse: 92 (02/07/20 0743)  Resp: 16 (02/07/20 0743)  BP: (!) 114/55 (02/07/20 0743)  SpO2: 96 % (02/07/20 0743) Vital Signs (24h Range):  Temp:  [98.1 °F (36.7 °C)-99.4 °F (37.4 °C)] 99.4 °F (37.4 °C)  Pulse:  [74-99] 92  Resp:  [16-19] 16  SpO2:  [96 %-100 %] 96 %  BP: (114-146)/(55-83) 114/55     Weight: 53.5 kg (117 lb 15.1 oz)  Body mass index is 22.29 kg/m².    Intake/Output Summary (Last 24 hours) at 2/7/2020 1100  Last data filed at 2/7/2020 0238  Gross per 24 hour   Intake 440 ml   Output 250 ml   Net 190 ml      Physical Exam   Constitutional: She is oriented to person, place, and time. She appears well-developed. No distress.   Cardiovascular: Normal rate and regular rhythm.   Pulmonary/Chest: Effort normal.   Abdominal: Soft. Bowel sounds are normal. She exhibits no distension.   Large bruise to right flank extending to back and proximal aspect of thigh. Firm.    Neurological: She is alert and oriented to person, place, and time.   Skin: She is not diaphoretic.   Psychiatric: She has a normal mood and affect. Her behavior is normal. Judgment and thought content normal.   Vitals reviewed.      Significant Labs: All pertinent labs within the past 24 hours have been reviewed.    Significant Imaging: I have reviewed all pertinent imaging results/findings within the past 24 hours.  I have reviewed and interpreted all pertinent imaging results/findings within the past 24 hours.

## 2020-02-07 NOTE — NURSING
Bedside report received from JOEL Seth. Patient awake and alert. Sitting up in bed. Nasal cannula in place at 2L 02. Spouse at bedside. NAD noted at this time. All safety precautions in place. Will continue to monitor.

## 2020-02-07 NOTE — NURSING
Patient and  ask Family member Dr. Rufus Blum to be contacted () with updates on patient status. Patient  not able to relay information for their children to understand. Password set up by patient.

## 2020-02-07 NOTE — PROGRESS NOTES
[facility]  Hematology/Oncology  Progress Note    Patient Name: Orion Ty  Admission Date: 2/1/2020  Hospital Length of Stay: 6 days  Code Status: Full Code     Subjective:     HPI:  74 year old female with Hx od tachybrady arrythmia. Valvular heart disease , cardiac pacemaker admitted to hospital because of weakness. Was on coumadin therapy. Documented to have a large right flank hematoma. Denies any trauma.  No Known Hx of bleeding diathesis. Documented to have mild decrease in platelet count. Has been on hydralazine for hypertension.    Pt with no acute events overnight.  The patient states she feels better. Pt denies fever, chills, CP, SOB, cough, constipation, diarrhea.  She endorses occasional nausea.    Oncology Treatment Plan:   [No treatment plan]    Medications:  Continuous Infusions:  Scheduled Meds:   polyethylene glycol  17 g Oral BID    senna-docusate 8.6-50 mg  1 tablet Oral BID     PRN Meds:sodium chloride, sodium chloride, dextromethorphan-guaifenesin  mg/5 ml, dextrose 50%, dextrose 50%, glucagon (human recombinant), glucose, glucose, hydrALAZINE, HYDROcodone-acetaminophen, insulin aspart U-100, nitroGLYCERIN, promethazine (PHENERGAN) IVPB, sodium chloride 0.9%     Review of patient's allergies indicates:   Allergen Reactions    Aspirin Other (See Comments)        Past Medical History:   Diagnosis Date    Closed displaced fracture of distal phalanx of lesser toe 10/20/2015    Diabetes mellitus     Diabetes mellitus, type 2     Hypertension     Osteopenia     Pacemaker     Rheumatic heart disease      Past Surgical History:   Procedure Laterality Date    CARDIAC PACEMAKER PLACEMENT      CARDIAC VALVE REPLACEMENT      CARDIAC VALVE SURGERY      thryoid s       Family History     Problem Relation (Age of Onset)    Breast cancer Maternal Aunt        Tobacco Use    Smoking status: Never Smoker    Smokeless tobacco: Never Used   Substance and Sexual Activity     Alcohol use: No    Drug use: Never    Sexual activity: Not on file       Review of Systems   Constitutional: Negative for chills and fever.   Respiratory: Negative for cough and shortness of breath.    Cardiovascular: Negative for chest pain and palpitations.   Gastrointestinal: Positive for nausea. Negative for abdominal pain, constipation, diarrhea and vomiting.   Skin: Negative for rash.   Neurological: Negative for headaches.     Objective:     Vital Signs (Most Recent):  Temp: 98.7 °F (37.1 °C) (02/06/20 1116)  Pulse: 98 (02/06/20 1116)  Resp: 18 (02/06/20 1116)  BP: 138/83 (02/06/20 1116)  SpO2: 98 % (02/06/20 1116) Vital Signs (24h Range):  Temp:  [97.8 °F (36.6 °C)-99.1 °F (37.3 °C)] 98.7 °F (37.1 °C)  Pulse:  [] 98  Resp:  [18-19] 18  SpO2:  [98 %-100 %] 98 %  BP: (114-140)/(63-83) 138/83     Weight: 53.5 kg (117 lb 15.1 oz)  Body mass index is 22.29 kg/m².  Body surface area is 1.52 meters squared.      Intake/Output Summary (Last 24 hours) at 2/6/2020 1442  Last data filed at 2/5/2020 2100  Gross per 24 hour   Intake 400 ml   Output --   Net 400 ml       Physical Exam   Constitutional: She is oriented to person, place, and time. She appears well-developed and well-nourished.   Eyes: EOM are normal. Right eye exhibits no discharge. Left eye exhibits no discharge.   Cardiovascular: Normal rate, regular rhythm and normal heart sounds. Exam reveals no gallop and no friction rub.   No murmur heard.  Pulmonary/Chest: Effort normal and breath sounds normal. No respiratory distress. She has no wheezes. She has no rales.   Abdominal: Soft. Bowel sounds are normal. She exhibits no distension. There is no tenderness. There is no rebound and no guarding.   Extensive bruising in the RLQ.   Neurological: She is alert and oriented to person, place, and time.       Significant Labs:   Recent Results (from the past 24 hour(s))   POCT glucose    Collection Time: 02/06/20  4:11 PM   Result Value Ref Range     POCT Glucose 203 (H) 70 - 110 mg/dL   POCT glucose    Collection Time: 02/06/20  7:58 PM   Result Value Ref Range    POCT Glucose 140 (H) 70 - 110 mg/dL   CBC auto differential    Collection Time: 02/06/20  9:18 PM   Result Value Ref Range    WBC 13.92 (H) 3.90 - 12.70 K/uL    RBC 2.74 (L) 4.00 - 5.40 M/uL    Hemoglobin 8.2 (L) 12.0 - 16.0 g/dL    Hematocrit 24.8 (L) 37.0 - 48.5 %    Mean Corpuscular Volume 91 82 - 98 fL    Mean Corpuscular Hemoglobin 29.9 27.0 - 31.0 pg    Mean Corpuscular Hemoglobin Conc 33.1 32.0 - 36.0 g/dL    RDW 13.9 11.5 - 14.5 %    Platelets 102 (L) 150 - 350 K/uL    MPV 11.5 9.2 - 12.9 fL    Immature Granulocytes 4.6 (H) 0.0 - 0.5 %    Gran # (ANC) 10.1 (H) 1.8 - 7.7 K/uL    Immature Grans (Abs) 0.64 (H) 0.00 - 0.04 K/uL    Lymph # 2.1 1.0 - 4.8 K/uL    Mono # 0.9 0.3 - 1.0 K/uL    Eos # 0.1 0.0 - 0.5 K/uL    Baso # 0.04 0.00 - 0.20 K/uL    nRBC 4 (A) 0 /100 WBC    Gran% 72.7 38.0 - 73.0 %    Lymph% 14.8 (L) 18.0 - 48.0 %    Mono% 6.6 4.0 - 15.0 %    Eosinophil% 1.0 0.0 - 8.0 %    Basophil% 0.3 0.0 - 1.9 %    Differential Method Automated    Basic metabolic panel    Collection Time: 02/07/20  4:20 AM   Result Value Ref Range    Sodium 127 (L) 136 - 145 mmol/L    Potassium 5.2 (H) 3.5 - 5.1 mmol/L    Chloride 96 95 - 110 mmol/L    CO2 26 23 - 29 mmol/L    Glucose 132 (H) 70 - 110 mg/dL    BUN, Bld 12 8 - 23 mg/dL    Creatinine 0.7 0.5 - 1.4 mg/dL    Calcium 8.3 (L) 8.7 - 10.5 mg/dL    Anion Gap 5 (L) 8 - 16 mmol/L    eGFR if African American >60 >60 mL/min/1.73 m^2    eGFR if non African American >60 >60 mL/min/1.73 m^2   Protime-INR    Collection Time: 02/07/20  4:20 AM   Result Value Ref Range    Prothrombin Time 33.6 (H) 9.0 - 12.5 sec    INR 3.0 (H) 0.8 - 1.2   Magnesium    Collection Time: 02/07/20  4:20 AM   Result Value Ref Range    Magnesium 1.8 1.6 - 2.6 mg/dL   CBC auto differential    Collection Time: 02/07/20  4:20 AM   Result Value Ref Range    WBC 14.55 (H) 3.90 - 12.70 K/uL     RBC 2.56 (L) 4.00 - 5.40 M/uL    Hemoglobin 7.7 (L) 12.0 - 16.0 g/dL    Hematocrit 23.2 (L) 37.0 - 48.5 %    Mean Corpuscular Volume 91 82 - 98 fL    Mean Corpuscular Hemoglobin 30.1 27.0 - 31.0 pg    Mean Corpuscular Hemoglobin Conc 33.2 32.0 - 36.0 g/dL    RDW 14.3 11.5 - 14.5 %    Platelets 114 (L) 150 - 350 K/uL    MPV 11.1 9.2 - 12.9 fL    Immature Granulocytes CANCELED 0.0 - 0.5 %    Immature Grans (Abs) CANCELED 0.00 - 0.04 K/uL    Lymph # CANCELED 1.0 - 4.8 K/uL    Mono # CANCELED 0.3 - 1.0 K/uL    Eos # CANCELED 0.0 - 0.5 K/uL    Baso # CANCELED 0.00 - 0.20 K/uL    nRBC 5 (A) 0 /100 WBC    Gran% 66.0 38.0 - 73.0 %    Lymph% 16.0 (L) 18.0 - 48.0 %    Mono% 8.0 4.0 - 15.0 %    Eosinophil% 2.0 0.0 - 8.0 %    Basophil% 0.0 0.0 - 1.9 %    Bands 3.0 %    Metamyelocytes 1.0 %    Myelocytes 4.0 %    Platelet Estimate Decreased (A)     Aniso Moderate     Differential Method Manual    POCT glucose    Collection Time: 02/07/20  7:40 AM   Result Value Ref Range    POCT Glucose 143 (H) 70 - 110 mg/dL   POCT glucose    Collection Time: 02/07/20 11:23 AM   Result Value Ref Range    POCT Glucose 207 (H) 70 - 110 mg/dL         Diagnostic Results:  CT abdomen 2/07/20    There is a moderate amount of air within the right atrium which is likely due to contrast injector malfunction.  The atrial chambers are considerably enlarged.  Prosthetic aortic and mitral valves are partially visualized and there is a left-sided pacemaker.    Abdomen:    - Lung bases: Bibasilar interstitial changes are present likely related to chronic lung disease.    - Liver: Normal.    - Gallbladder and bile ducts: Collapsed around multiple gallstones    - Spleen: Unremarkable.    - Pancreas: Normal.    - Kidneys: No mass or hydronephrosis.    - Adrenals: Unremarkable.    - Retroperitoneum:  No significant adenopathy.    - Vascular: There is aortic atherosclerosis present.  No aneurysm.    -Bowel/mesentery: It has noncontrast appearance of the GI tract  is remarkable for some generalized fecal stasis with formed stool seen throughout the colon.  There is a small amount of free peritoneal fluid in the pelvic cul-de-sac.    Pelvis:    The uterus and urinary bladder have a benign appearance.    Bones:  Unremarkable.    There is generalized subcutaneous edema suggesting anasarca.  Compared with the prior study the intramuscular hematoma within the external oblique muscles of the lower abdomen appears decreased in size, now measuring approximately 9 x 5 by 10 mm, previously 13 by 8 by 16 cm.            Assessment/Plan:     Right flank hematoma  -Pt with right flank hematoma likely from lovenox administration  -Improved on recent CT  -Would monitor    Acute blood loss anemia  Patient is scheduled torecieve one unit PRBC today.     Thrombocytopenia  -Thrombocytopenia improved to 114k  -Would monitior    Long term current use of anticoagulant  -Need to continue anticoagulation given valve replacements  -PT's hematoma is improving despite INR of 3  -Would go back to INR goal of 2.5-3.5 per cardiolgoy recs.           I will sign off. Please contact us if you have any additional questions.     Gerardo Maldonado MD  Hematology/Oncology  [facility] [place]

## 2020-02-07 NOTE — NURSING
19:40 Received bedside report from JOEL Palencia. Patient alert and oriented - having unit of blood. No pain. No sign of distress. IV line intact over Rt lower FA - infusing with blood. On 2L of oxygen. Cardiac monitor in place. On 2L of oxygen. Call bell given on her reach, instructed to call for assistance all the time. Bed alarm on. Bed on lowest position. Safety maintained.

## 2020-02-07 NOTE — ASSESSMENT & PLAN NOTE
Not present on admission  Near site of lovenox injections extending to flank. Now extending to back and proximal aspect of thigh.   CT 2/4 confirmed presence of large hematoma. No evidence of active bleeding at the time  Vitals have much improved however bruising appears to be extending  INR 3. Reversing coumadin would put patient at very high risk for stroke.   Discussed with Hematology. Recommend keeping INR closest to 2.5 as possible   Will do very low dose coumadin to avoid subtherapeutic INR  Repeat CT abdomen/pelvis today to reassess  Treat pain and upgrade bowel regimen. Mobilize  Monitor closely

## 2020-02-07 NOTE — PLAN OF CARE
Problem: Fall Injury Risk  Goal: Absence of Fall and Fall-Related Injury  Outcome: Ongoing, Not Progressing  Intervention: Identify and Manage Contributors to Fall Injury Risk  Flowsheets (Taken 2/7/2020 6782)  Self-Care Promotion: independence encouraged; BADL personal objects within reach  Medication Review/Management: medications reviewed  Intervention: Promote Injury-Free Environment  Flowsheets (Taken 2/7/2020 0458)  Safety Promotion/Fall Prevention: assistive device/personal item within reach; bed alarm set; side rails raised x 2; lighting adjusted; commode/urinal/bedpan at bedside

## 2020-02-07 NOTE — PLAN OF CARE
02/07/20 1253   Discharge Reassessment   Assessment Type Discharge Planning Reassessment   Do you have any problems affording any of your prescribed medications? No   Discharge Plan A Home with family  (with follow up appointment)   DME Needed Upon Discharge  none   Patient choice form signed by patient/caregiver N/A   Anticipated Discharge Disposition Home   Can the patient/caregiver answer the patient profile reliably? Yes, cognitively intact   How does the patient rate their overall health at the present time? Fair   How often would a person be available to care for the patient? Whenever needed   Number of comorbid conditions (as recorded on the chart) Five or more   During the past month, has the patient often been bothered by feeling down, depressed or hopeless? No   During the past month, has the patient often been bothered by little interest or pleasure in doing things? No   Post-Acute Status   Post-Acute Authorization Other   Other Status No Post-Acute Service Needs

## 2020-02-08 LAB
ANION GAP SERPL CALC-SCNC: 5 MMOL/L (ref 8–16)
ANISOCYTOSIS BLD QL SMEAR: SLIGHT
APTT BLDCRRT: 40.2 SEC (ref 21–32)
APTT BLDCRRT: 60.2 SEC (ref 21–32)
BASOPHILS NFR BLD: 0 % (ref 0–1.9)
BUN SERPL-MCNC: 10 MG/DL (ref 8–23)
CALCIUM SERPL-MCNC: 8.5 MG/DL (ref 8.7–10.5)
CHLORIDE SERPL-SCNC: 100 MMOL/L (ref 95–110)
CO2 SERPL-SCNC: 25 MMOL/L (ref 23–29)
CREAT SERPL-MCNC: 0.6 MG/DL (ref 0.5–1.4)
DIFFERENTIAL METHOD: ABNORMAL
EOSINOPHIL NFR BLD: 2 % (ref 0–8)
ERYTHROCYTE [DISTWIDTH] IN BLOOD BY AUTOMATED COUNT: 14.6 % (ref 11.5–14.5)
EST. GFR  (AFRICAN AMERICAN): >60 ML/MIN/1.73 M^2
EST. GFR  (NON AFRICAN AMERICAN): >60 ML/MIN/1.73 M^2
GIANT PLATELETS BLD QL SMEAR: PRESENT
GLUCOSE SERPL-MCNC: 129 MG/DL (ref 70–110)
HCT VFR BLD AUTO: 23.8 % (ref 37–48.5)
HGB BLD-MCNC: 7.4 G/DL (ref 12–16)
IMM GRANULOCYTES # BLD AUTO: ABNORMAL K/UL (ref 0–0.04)
IMM GRANULOCYTES NFR BLD AUTO: ABNORMAL % (ref 0–0.5)
INR PPP: 1.9 (ref 0.8–1.2)
LYMPHOCYTES NFR BLD: 15 % (ref 18–48)
MAGNESIUM SERPL-MCNC: 2.3 MG/DL (ref 1.6–2.6)
MCH RBC QN AUTO: 29.2 PG (ref 27–31)
MCHC RBC AUTO-ENTMCNC: 31.1 G/DL (ref 32–36)
MCV RBC AUTO: 94 FL (ref 82–98)
METAMYELOCYTES NFR BLD MANUAL: 1 %
MONOCYTES NFR BLD: 8 % (ref 4–15)
NEUTROPHILS NFR BLD: 67 % (ref 38–73)
NEUTS BAND NFR BLD MANUAL: 7 %
NRBC BLD-RTO: 3 /100 WBC
PLATELET # BLD AUTO: 117 K/UL (ref 150–350)
PLATELET BLD QL SMEAR: ABNORMAL
PMV BLD AUTO: 11.1 FL (ref 9.2–12.9)
POCT GLUCOSE: 110 MG/DL (ref 70–110)
POCT GLUCOSE: 118 MG/DL (ref 70–110)
POCT GLUCOSE: 143 MG/DL (ref 70–110)
POCT GLUCOSE: 179 MG/DL (ref 70–110)
POLYCHROMASIA BLD QL SMEAR: ABNORMAL
POTASSIUM SERPL-SCNC: 4.9 MMOL/L (ref 3.5–5.1)
PROTHROMBIN TIME: 21.3 SEC (ref 9–12.5)
RBC # BLD AUTO: 2.53 M/UL (ref 4–5.4)
SODIUM SERPL-SCNC: 130 MMOL/L (ref 136–145)
TOXIC GRANULES BLD QL SMEAR: PRESENT
WBC # BLD AUTO: 11.35 K/UL (ref 3.9–12.7)

## 2020-02-08 PROCEDURE — 21400001 HC TELEMETRY ROOM: Mod: HCNC

## 2020-02-08 PROCEDURE — 83735 ASSAY OF MAGNESIUM: CPT | Mod: HCNC

## 2020-02-08 PROCEDURE — 85007 BL SMEAR W/DIFF WBC COUNT: CPT | Mod: HCNC

## 2020-02-08 PROCEDURE — 94799 UNLISTED PULMONARY SVC/PX: CPT | Mod: HCNC

## 2020-02-08 PROCEDURE — 85610 PROTHROMBIN TIME: CPT | Mod: HCNC

## 2020-02-08 PROCEDURE — 94761 N-INVAS EAR/PLS OXIMETRY MLT: CPT | Mod: HCNC

## 2020-02-08 PROCEDURE — 63600175 PHARM REV CODE 636 W HCPCS: Mod: HCNC | Performed by: INTERNAL MEDICINE

## 2020-02-08 PROCEDURE — 36415 COLL VENOUS BLD VENIPUNCTURE: CPT | Mod: HCNC

## 2020-02-08 PROCEDURE — 85027 COMPLETE CBC AUTOMATED: CPT | Mod: HCNC

## 2020-02-08 PROCEDURE — 25000003 PHARM REV CODE 250: Mod: HCNC | Performed by: INTERNAL MEDICINE

## 2020-02-08 PROCEDURE — 99900035 HC TECH TIME PER 15 MIN (STAT): Mod: HCNC

## 2020-02-08 PROCEDURE — 85730 THROMBOPLASTIN TIME PARTIAL: CPT | Mod: HCNC

## 2020-02-08 PROCEDURE — 80048 BASIC METABOLIC PNL TOTAL CA: CPT | Mod: HCNC

## 2020-02-08 RX ORDER — WARFARIN 3 MG/1
3 TABLET ORAL DAILY
Status: DISCONTINUED | OUTPATIENT
Start: 2020-02-08 | End: 2020-02-09

## 2020-02-08 RX ORDER — HEPARIN SODIUM,PORCINE/D5W 25000/250
12 INTRAVENOUS SOLUTION INTRAVENOUS CONTINUOUS
Status: DISCONTINUED | OUTPATIENT
Start: 2020-02-08 | End: 2020-02-10

## 2020-02-08 RX ADMIN — WARFARIN SODIUM 3 MG: 3 TABLET ORAL at 05:02

## 2020-02-08 RX ADMIN — HEPARIN SODIUM 12 UNITS/KG/HR: 10000 INJECTION, SOLUTION INTRAVENOUS at 11:02

## 2020-02-08 RX ADMIN — HEPARIN SODIUM 10 UNITS/KG/HR: 10000 INJECTION, SOLUTION INTRAVENOUS at 07:02

## 2020-02-08 RX ADMIN — SODIUM CHLORIDE: 0.9 INJECTION, SOLUTION INTRAVENOUS at 04:02

## 2020-02-08 NOTE — ASSESSMENT & PLAN NOTE
Not present on admission  Near site of lovenox injections extending to flank. Now extending to back and proximal aspect of thigh.   CT 2/4 confirmed presence of large hematoma. No evidence of active bleeding at the time  Vitals have much improved however bruising appears to be extending  INR 3. Reversing coumadin would put patient at very high risk for stroke.   Discussed with Hematology. Recommend keeping INR closest to 2.5 as possible   INR 1.8 which is subtherapeutic. Will increase dose of coumadin and bridge with heparin infusion for safety  INR in AM  Monitor closely

## 2020-02-08 NOTE — PLAN OF CARE
Problem: Physical Therapy Goal  Goal: Physical Therapy Goal  Description  Goals to be met by: 20     Patient will increase functional independence with mobility by performin. Supine to sit with Modified [place]  2. Rolling to Left and Right with Modified [place]  3. Sit to stand transfer with Modified [place]  4. Bed to chair transfer with Modified [place]   5. Gait >250 feet with Modified [place] using no AD  6. Upper/Lower extremity exercise program 3 sets x10 reps per handout, with independence     Outcome: Ongoing, Progressing   Pt ambulated ~ 40 ft x 2 with HHA , CGA/MIN A (2LO2NC).

## 2020-02-08 NOTE — ASSESSMENT & PLAN NOTE
Mechanical 2010  Given large hematoma and chronic thrombocytopenia, I discussed with Hematology our new INR goal  Goal is to be as close to 2.5 as possible to reduce risk of stroke and bleeding concomitantly

## 2020-02-08 NOTE — PLAN OF CARE
Problem: Skin Injury Risk Increased  Goal: Skin Health and Integrity  Outcome: Ongoing, Progressing  Intervention: Optimize Skin Protection  Flowsheets (Taken 2/8/2020 5139)  Pressure Reduction Techniques: frequent weight shift encouraged; heels elevated off bed; weight shift assistance provided  Pressure Reduction Devices: heel offloading device utilized  Head of Bed (HOB): HOB at 20-30 degrees; HOB at 30-45 degrees

## 2020-02-08 NOTE — SUBJECTIVE & OBJECTIVE
Interval History: INR 1.8 today. Had large BM this AM. Has no complaints. Feels well.     Review of Systems   Respiratory: Negative.    Cardiovascular: Negative.    Gastrointestinal: Negative for constipation.   Hematological: Bruises/bleeds easily.     Objective:     Vital Signs (Most Recent):  Temp: 97.9 °F (36.6 °C) (02/08/20 1518)  Pulse: 90 (02/08/20 1518)  Resp: 18 (02/08/20 1518)  BP: (!) 160/79 (02/08/20 1518)  SpO2: (!) 90 % (02/08/20 1518) Vital Signs (24h Range):  Temp:  [97.9 °F (36.6 °C)-99.4 °F (37.4 °C)] 97.9 °F (36.6 °C)  Pulse:  [] 90  Resp:  [16-18] 18  SpO2:  [90 %-100 %] 90 %  BP: (122-160)/(57-79) 160/79     Weight: 54.3 kg (119 lb 11.4 oz)  Body mass index is 22.62 kg/m².    Intake/Output Summary (Last 24 hours) at 2/8/2020 1551  Last data filed at 2/8/2020 0146  Gross per 24 hour   Intake 240 ml   Output 550 ml   Net -310 ml      Physical Exam   Constitutional: She is oriented to person, place, and time. She appears well-developed. No distress.   Cardiovascular: Normal rate and regular rhythm.   Pulmonary/Chest: Effort normal.   Abdominal: Soft. Bowel sounds are normal. She exhibits no distension.   Large bruise to right flank extending to back and proximal aspect of thigh. Firm.    Neurological: She is alert and oriented to person, place, and time.   Skin: She is not diaphoretic.   Psychiatric: She has a normal mood and affect. Her behavior is normal. Judgment and thought content normal.   Vitals reviewed.      Significant Labs: All pertinent labs within the past 24 hours have been reviewed.    Significant Imaging: I have reviewed all pertinent imaging results/findings within the past 24 hours.  I have reviewed and interpreted all pertinent imaging results/findings within the past 24 hours.

## 2020-02-08 NOTE — PT/OT/SLP PROGRESS
Physical Therapy Treatment    Patient Name:  Orion Ty   MRN:  [id number]    Recommendations:     Discharge Recommendations:  home health PT   Discharge Equipment Recommendations: none   Barriers to discharge: None    Assessment:     Orion Ty is a 74 y.o. female admitted with a medical diagnosis of Influenza A.  She presents with the following impairments/functional limitations:  weakness, impaired endurance, impaired balance, gait instability, decreased upper extremity function, decreased lower extremity function, decreased safety awareness, decreased ROM, impaired skin, impaired cardiopulmonary response to activity .    Rehab Prognosis: Good; patient would benefit from acute skilled PT services to address these deficits and reach maximum level of function.    Recent Surgery: * No surgery found *      Plan:     During this hospitalization, patient to be seen (3-5x/wk) to address the identified rehab impairments via gait training, therapeutic activities, therapeutic exercises and progress toward the following goals:    · Plan of Care Expires:  02/17/20    Subjective     Chief Complaint: Sleepy   Patient/Family Comments/goals: pt agreeable to treatment.   Pain/Comfort:  · Pain Rating 1: 0/10  · Pain Rating Post-Intervention 1: 0/10      Objective:     Communicated with nurse Drake  prior to session.  Patient found HOB elevated with bed alarm, telemetry, oxygen upon PT entry to room.     General Precautions: Standard, fall, respiratory   Orthopedic Precautions:N/A   Braces: N/A     Functional Mobility: pt appears sleepy today, required extra V/T cues  to stay awake   · Bed Mobility:     · Rolling Right: contact guard assistance  · Scooting: contact guard assistance  · Supine to Sit: minimum assistance, HOB elevated   · Transfers:     · Sit to Stand: from bed, bedside commode and chair  contact guard assistance and minimum assistance with no AD and hand-held assist  · Bed to Chair: contact guard assistance  and minimum assistance with  no AD  using  Step Transfer  · Toilet Transfer (bed<>bedside commode) : contact guard assistance and minimum assistance with  hand-held assist  using  Stand Pivot  · Gait:  Pt ambulated ~ 40 ft x 2 and 5 ft x 2 from chair <>the sink  with HHA , CGA/MIN A (2LO2NC). Noted with decreased aaron, decreased step length. Pt c/o light dizziness during ambulation . VC's for safety and sequence. VC's cues for pursed lip breathing technique.   · Balance:  Fair       AM-PAC 6 CLICK MOBILITY  Turning over in bed (including adjusting bedclothes, sheets and blankets)?: 4  Sitting down on and standing up from a chair with arms (e.g., wheelchair, bedside commode, etc.): 3  Moving from lying on back to sitting on the side of the bed?: 3  Moving to and from a bed to a chair (including a wheelchair)?: 3  Need to walk in hospital room?: 3  Climbing 3-5 steps with a railing?: 3  Basic Mobility Total Score: 19       Therapeutic Activities and Exercises:   pt tolerated standing balance for hand hygiene and washing face with CGA/SBA.   Encouraged pt to perform BLE AP throughout the day.     Patient left up in chair with dinner tray table set up all lines intact, call button in reach, nurse notified and spouse present..    GOALS:   Multidisciplinary Problems     Physical Therapy Goals        Problem: Physical Therapy Goal    Goal Priority Disciplines Outcome Goal Variances Interventions   Physical Therapy Goal     PT, PT/OT Ongoing, Progressing     Description:  Goals to be met by: 20     Patient will increase functional independence with mobility by performin. Supine to sit with Modified [place]  2. Rolling to Left and Right with Modified [place]  3. Sit to stand transfer with Modified [place]  4. Bed to chair transfer with Modified [place]   5. Gait >250 feet with Modified [place] using no AD  6. Upper/Lower extremity exercise program 3 sets x10 reps per handout, with  independence                      Time Tracking:     PT Received On: 02/07/20  PT Start Time: 1605     PT Stop Time: 1647  PT Total Time (min): 42 min     Billable Minutes: Gait Training 19 and Therapeutic Activity 23    Treatment Type: Treatment  PT/PTA: PTA     PTA Visit Number: 1     Alisha Ovalles, PTA  02/07/2020

## 2020-02-08 NOTE — NURSING
Bedside shift report received from JOLE Barrow. Communication board has been updated. NAD noted. Will continue to monitor. O2 at 2 liters NC. NS discontinued. Pt had a BM this AM.

## 2020-02-08 NOTE — PLAN OF CARE
Problem: Fall Injury Risk  Goal: Absence of Fall and Fall-Related Injury  Outcome: Ongoing, Progressing  Intervention: Identify and Manage Contributors to Fall Injury Risk  Flowsheets (Taken 2/8/2020 0541)  Self-Care Promotion: independence encouraged; BADL personal objects within reach; BADL personal routines maintained  Medication Review/Management: medications reviewed  Intervention: Promote Injury-Free Environment  Flowsheets (Taken 2/8/2020 0541)  Safety Promotion/Fall Prevention: assistive device/personal item within reach; bed alarm set; instructed to call staff for mobility; side rails raised x 3; nonskid shoes/socks when out of bed; medications reviewed; Fall Risk reviewed with patient/family  Environmental Safety Modification: assistive device/personal items within reach; clutter free environment maintained; room organization consistent

## 2020-02-08 NOTE — PROGRESS NOTES
Bedside report given to oncoming nurse, NAD noted. Pt lying in bed, Respirations even and unlabored. Safety maintained, Call light within reach.   Family @ bedside x 1.  IV Fluids Stopped.   24 hr chart check completed.

## 2020-02-08 NOTE — NURSING
Patient bedside report has been completed and given to JOEL Jones. Chart check has been completed. NAD noted. Pt ambulates to the bathroom with stand by assist. Heparin gtt started. Family at the bedside throughout the shift. Stool softners held per pt and family request. Pt has had 3 BM throughout the shift. Possible discharge in the AM if INR therapeutic.

## 2020-02-09 LAB
ANION GAP SERPL CALC-SCNC: 9 MMOL/L (ref 8–16)
APTT BLDCRRT: 52.5 SEC (ref 21–32)
APTT BLDCRRT: 67.4 SEC (ref 21–32)
BUN SERPL-MCNC: 10 MG/DL (ref 8–23)
CALCIUM SERPL-MCNC: 8.4 MG/DL (ref 8.7–10.5)
CHLORIDE SERPL-SCNC: 99 MMOL/L (ref 95–110)
CO2 SERPL-SCNC: 23 MMOL/L (ref 23–29)
CREAT SERPL-MCNC: 0.6 MG/DL (ref 0.5–1.4)
EST. GFR  (AFRICAN AMERICAN): >60 ML/MIN/1.73 M^2
EST. GFR  (NON AFRICAN AMERICAN): >60 ML/MIN/1.73 M^2
GLUCOSE SERPL-MCNC: 111 MG/DL (ref 70–110)
INR PPP: 1.6 (ref 0.8–1.2)
MAGNESIUM SERPL-MCNC: 2.1 MG/DL (ref 1.6–2.6)
POCT GLUCOSE: 119 MG/DL (ref 70–110)
POCT GLUCOSE: 128 MG/DL (ref 70–110)
POCT GLUCOSE: 149 MG/DL (ref 70–110)
POCT GLUCOSE: 159 MG/DL (ref 70–110)
POCT GLUCOSE: 227 MG/DL (ref 70–110)
POTASSIUM SERPL-SCNC: 4.6 MMOL/L (ref 3.5–5.1)
PROTHROMBIN TIME: 17.6 SEC (ref 9–12.5)
SODIUM SERPL-SCNC: 131 MMOL/L (ref 136–145)

## 2020-02-09 PROCEDURE — 63600175 PHARM REV CODE 636 W HCPCS: Mod: HCNC | Performed by: INTERNAL MEDICINE

## 2020-02-09 PROCEDURE — 25000003 PHARM REV CODE 250: Mod: HCNC | Performed by: INTERNAL MEDICINE

## 2020-02-09 PROCEDURE — 85610 PROTHROMBIN TIME: CPT | Mod: HCNC

## 2020-02-09 PROCEDURE — 85730 THROMBOPLASTIN TIME PARTIAL: CPT | Mod: HCNC

## 2020-02-09 PROCEDURE — 80048 BASIC METABOLIC PNL TOTAL CA: CPT | Mod: HCNC

## 2020-02-09 PROCEDURE — 21400001 HC TELEMETRY ROOM: Mod: HCNC

## 2020-02-09 PROCEDURE — 83735 ASSAY OF MAGNESIUM: CPT | Mod: HCNC

## 2020-02-09 PROCEDURE — 85730 THROMBOPLASTIN TIME PARTIAL: CPT | Mod: 91,HCNC

## 2020-02-09 PROCEDURE — 36415 COLL VENOUS BLD VENIPUNCTURE: CPT | Mod: HCNC

## 2020-02-09 RX ORDER — WARFARIN SODIUM 5 MG/1
5 TABLET ORAL DAILY
Status: DISCONTINUED | OUTPATIENT
Start: 2020-02-09 | End: 2020-02-10

## 2020-02-09 RX ADMIN — WARFARIN SODIUM 5 MG: 5 TABLET ORAL at 05:02

## 2020-02-09 RX ADMIN — HEPARIN SODIUM 8 UNITS/KG/HR: 10000 INJECTION, SOLUTION INTRAVENOUS at 03:02

## 2020-02-09 RX ADMIN — DOCUSATE SODIUM - SENNOSIDES 1 TABLET: 50; 8.6 TABLET, FILM COATED ORAL at 04:02

## 2020-02-09 RX ADMIN — POLYETHYLENE GLYCOL 3350 17 G: 17 POWDER, FOR SOLUTION ORAL at 09:02

## 2020-02-09 NOTE — PLAN OF CARE
Problem: Adult Inpatient Plan of Care  Goal: Absence of Hospital-Acquired Illness or Injury  Outcome: Ongoing, Progressing     Problem: Skin Injury Risk Increased  Goal: Skin Health and Integrity  Outcome: Ongoing, Progressing  Intervention: Promote and Optimize Oral Intake  Flowsheets (Taken 2/9/2020 9215)  Oral Nutrition Promotion: calorie dense foods provided; safe use of adaptive equipment encouraged; social interaction promoted; physical activity promoted

## 2020-02-09 NOTE — NURSING
Bedside shift report received from JOEL Woodard. Communication board has been updated. NAD noted. Will continue to monitor. Pt is asleep but easily aroused. Family is at the bedside.

## 2020-02-09 NOTE — PROGRESS NOTES
[facility] Hospital Medicine  Progress Note    Patient Name: Orion Ty  MRN: [id number]  Patient Class: IP- Inpatient   Admission Date: 2/1/2020  Length of Stay: 8 days  Attending Physician: Amy Vale MD  Primary Care Provider: Otis Zimmer MD        Subjective:     Principal Problem:Influenza A        HPI:  Orion Ty is a 74 y.o. female that (in part)  has a past medical history of Closed displaced fracture of distal phalanx of lesser toe, Diabetes mellitus, Diabetes mellitus, type 2, Hypertension, Osteopenia, Pacemaker, and Rheumatic heart disease.  has a past surgical history that includes Cardiac valve replacement; thryoid s; Cardiac pacemaker placement; and Cardiac valuve replacement. Presents to [facility] Emergency Department complaining of shortness of breath.  EMS was activated due to worsening shortness of breath and lethargy.  Unable to ambulate independently, which is not normal for her.  She was febrile to 103° at home and is having difficulty taking deep breaths.  History of significant cardiac disease and heart valve replacement due to rheumatic heart valve disease. History of CAD and MI.  Denied chest pain, cyanosis, palpitations, or syncope.  Positive for peripheral edema.  Positive for nonproductive cough.    In the emergency department routine laboratory studies, chest x-ray, EKG, and cardiac enzymes were obtained.  There was evidence of acute CHF exacerbation with pulmonary edema, trace edema, and hypoxemia.  Influenza A serology was also positive.    Hospital medicine has been asked to admit to inpatient for further evaluation and treatment.     Overview/Hospital Course:  Ms Ty presented with sepsis, acute respiratory failure with hypoxia (88% at room air, per ED documentation) and acute encephalopathy secondary to influenza A infection. Patient was given isotonic fluids, oseltamivir and supplemental O2. Droplet precautions  initiated. Patient clinically improved. Patient complained of right flank pain. On exam, patient was noted to have large bruise and firmness from right flank down to lower lateral aspect of abdomen and back. A CT of abdomen pelvis with IV contrast showed a large hematoma interposed between oblique muscles. Unknown what caused this hematoma. Per nurse, daughter stated a bruise was present prior to admit but per patient and , this is new. Patient is on coumadin for stroke prophylaxis as patient has 2 mechanical heart valves. Is also chronically thrombocytopenic (50s-60s in 2010; 80s-90s recently). Patient was given one unit of blood for acute blood loss anemia. Hematology consulted for recs on therapeutic INR levels. Recommend at least 2.5 (lowest allowed level for mechanical valves) per my conversation with Hematologist. Lovenox no longer required and coumadin held to reach goal INR.     Interval History: stable at room air. INR low today despite use of coumadin although lower than home dose. On heparin infusion. Is clinically stable otherwise.     Review of Systems   Respiratory: Negative.    Cardiovascular: Negative.    Gastrointestinal: Negative for constipation.   Hematological: Bruises/bleeds easily.     Objective:     Vital Signs (Most Recent):  Temp: 97.7 °F (36.5 °C) (02/09/20 0733)  Pulse: 85 (02/09/20 0733)  Resp: 18 (02/09/20 0733)  BP: 121/60 (02/09/20 0733)  SpO2: 95 % (02/09/20 0733) Vital Signs (24h Range):  Temp:  [97.7 °F (36.5 °C)-99.9 °F (37.7 °C)] 97.7 °F (36.5 °C)  Pulse:  [] 85  Resp:  [16-20] 18  SpO2:  [90 %-95 %] 95 %  BP: (116-160)/(60-81) 121/60     Weight: 53.5 kg (117 lb 15.1 oz)  Body mass index is 22.29 kg/m².  No intake or output data in the 24 hours ending 02/09/20 0942   Physical Exam   Constitutional: She is oriented to person, place, and time. She appears well-developed. No distress.   Cardiovascular: Normal rate and regular rhythm.   Pulmonary/Chest: Effort normal.  She has no wheezes. She has no rales.   Breathing comfortably at room air   Abdominal: Soft. Bowel sounds are normal. She exhibits no distension.   Large bruise to right flank extending to back and proximal aspect of thigh. Firm.    Neurological: She is alert and oriented to person, place, and time.   Skin: She is not diaphoretic.   Psychiatric: She has a normal mood and affect. Her behavior is normal. Judgment and thought content normal.   Vitals reviewed.      Significant Labs: All pertinent labs within the past 24 hours have been reviewed.    Significant Imaging: I have reviewed all pertinent imaging results/findings within the past 24 hours.  I have reviewed and interpreted all pertinent imaging results/findings within the past 24 hours.      Assessment/Plan:      * Influenza A  Completed 5 days of oseltamivir  Better overall  Stable at room air  Removed precautions       Right flank hematoma  Not present on admission  Near site of lovenox injections extending to flank, back and proximal aspect of thigh  CT 2/4 confirmed presence of large hematoma. No evidence of active bleeding at the time  Repeat CT 2/7 showed significant improvement. Stable post transfusion  Told family that large bruise will take a long time to completely resolve  INR subtherapeutic despite warfarin. Will increase dose and recheck INR in AM  Goal INR 2.5-3, eventually to 3.5 if possible  Bridging with heparin infusion for safety  Mobilize  Monitor closely      Debility  PT/OT rec HH  Will arrange at discharge      Acute blood loss anemia  As above      Elevated troponin I level  Recent negative stress. Likely demand ischemia.  Cards noted med Rx only         Acute diastolic congestive heart failure  Current Echo does not show diastolic dysf.   2/2 influenza? Resolved       PAH (pulmonary artery hypertension)  No acute issues       CAD (coronary artery disease)  No acute issues       Thrombocytopenia  Chronic  Actually improving to low  100s  Hold off on antiplatelet agents    Essential (primary) hypertension  Blood pressure no longer low but stable without antihypertensive therapy  Stopped fluids. Encouraged PO intake        Type 2 diabetes mellitus with microalbuminuria, without long-term current use of insulin  Well controlled. No other manifestations.   On insulin as needed for good glucose control      S/P MVR (mitral valve replacement)  Mechanical 2010  Given large hematoma and chronic thrombocytopenia, I discussed with Hematology our new INR goal  Goal is to be as close to 2.5 as possible to reduce risk of stroke and bleeding concomitantly         S/P AVR  Mechanical 2010  Given large hematoma and chronic thrombocytopenia, I discussed with Hematology our new INR goal  Goal is to be as close to 2.5 as possible to reduce risk of stroke and bleeding concomitantly         Long term current use of anticoagulant  On warfarin       Mixed hyperlipidemia  Resume statin       Subclinical hypothyroidism  Resume home meds       Rheumatic heart disease  No acute issues      Chronic atrial fibrillation  On anticoagulation  Stable without AV blocking agents      VTE Risk Mitigation (From admission, onward)         Ordered     warfarin (COUMADIN) tablet 5 mg  Daily      02/09/20 0822     heparin 25,000 units in dextrose 5% 250 ml (100 units/mL) infusion MINIMAL INTENSITY nomogram - [facility]  Continuous     Question:  Heparin Infusion Adjustment (DO NOT MODIFY ANSWER)  Answer:  [email or web address]\[email or web address]\heparin MINIMAL  INTENSITY nomogram for [facility] [email or web address]    02/08/20 0758     IP VTE HIGH RISK PATIENT  Once      02/01/20 2005     Reason for No Pharmacological VTE Prophylaxis  Once     Question:  Reasons:  Answer:  Already adequately anticoagulated on oral Anticoagulants    02/01/20 2005              Plan discussed with patient, , son and daughter at bedside. All questions answered to satisfaction.     Dispo: home with  once INR  at goal.    Amy Valerio MD  Department of Hospital Medicine   [facility]

## 2020-02-09 NOTE — PLAN OF CARE
Problem: Fall Injury Risk  Goal: Absence of Fall and Fall-Related Injury  Outcome: Ongoing, Progressing     Problem: Adult Inpatient Plan of Care  Goal: Plan of Care Review  Outcome: Ongoing, Progressing  Goal: Patient-Specific Goal (Individualization)  Outcome: Ongoing, Progressing  Goal: Absence of Hospital-Acquired Illness or Injury  Outcome: Ongoing, Progressing  Goal: Optimal Comfort and Wellbeing  Outcome: Ongoing, Progressing  Goal: Readiness for Transition of Care  Outcome: Ongoing, Progressing  Goal: Rounds/Family Conference  Outcome: Ongoing, Progressing     Problem: Diabetes Comorbidity  Goal: Blood Glucose Level Within Desired Range  Outcome: Ongoing, Progressing     Problem: Infection  Goal: Infection Symptom Resolution  Outcome: Ongoing, Progressing     Problem: Skin Injury Risk Increased  Goal: Skin Health and Integrity  Outcome: Ongoing, Progressing     Problem: Adjustment to Illness (Heart Failure)  Goal: Optimal Coping  Outcome: Ongoing, Progressing     Problem: Arrhythmia/Dysrhythmia (Heart Failure)  Goal: Stable Heart Rate and Rhythm  Outcome: Ongoing, Progressing     Problem: Fluid Imbalance (Heart Failure)  Goal: Fluid Balance  Outcome: Ongoing, Progressing     Problem: Respiratory Compromise (Heart Failure)  Goal: Effective Oxygenation and Ventilation  Outcome: Ongoing, Progressing

## 2020-02-09 NOTE — SUBJECTIVE & OBJECTIVE
Interval History: stable at room air. INR low today despite use of coumadin although lower than home dose. On heparin infusion. Is clinically stable otherwise.     Review of Systems   Respiratory: Negative.    Cardiovascular: Negative.    Gastrointestinal: Negative for constipation.   Hematological: Bruises/bleeds easily.     Objective:     Vital Signs (Most Recent):  Temp: 97.7 °F (36.5 °C) (02/09/20 0733)  Pulse: 85 (02/09/20 0733)  Resp: 18 (02/09/20 0733)  BP: 121/60 (02/09/20 0733)  SpO2: 95 % (02/09/20 0733) Vital Signs (24h Range):  Temp:  [97.7 °F (36.5 °C)-99.9 °F (37.7 °C)] 97.7 °F (36.5 °C)  Pulse:  [] 85  Resp:  [16-20] 18  SpO2:  [90 %-95 %] 95 %  BP: (116-160)/(60-81) 121/60     Weight: 53.5 kg (117 lb 15.1 oz)  Body mass index is 22.29 kg/m².  No intake or output data in the 24 hours ending 02/09/20 0942   Physical Exam   Constitutional: She is oriented to person, place, and time. She appears well-developed. No distress.   Cardiovascular: Normal rate and regular rhythm.   Pulmonary/Chest: Effort normal. She has no wheezes. She has no rales.   Breathing comfortably at room air   Abdominal: Soft. Bowel sounds are normal. She exhibits no distension.   Large bruise to right flank extending to back and proximal aspect of thigh. Firm.    Neurological: She is alert and oriented to person, place, and time.   Skin: She is not diaphoretic.   Psychiatric: She has a normal mood and affect. Her behavior is normal. Judgment and thought content normal.   Vitals reviewed.      Significant Labs: All pertinent labs within the past 24 hours have been reviewed.    Significant Imaging: I have reviewed all pertinent imaging results/findings within the past 24 hours.  I have reviewed and interpreted all pertinent imaging results/findings within the past 24 hours.

## 2020-02-09 NOTE — NURSING
Pt states that she felt lie she had a BM but she was unable to have one. I asked if she wanted on e of the stool softners. She agreed to take one. I gave the senna-docusate.

## 2020-02-09 NOTE — ASSESSMENT & PLAN NOTE
Not present on admission  Near site of lovenox injections extending to flank, back and proximal aspect of thigh  CT 2/4 confirmed presence of large hematoma. No evidence of active bleeding at the time  Repeat CT 2/7 showed significant improvement. Stable post transfusion  Told family that large bruise will take a long time to completely resolve  INR subtherapeutic despite warfarin. Will increase dose and recheck INR in AM  Goal INR 2.5-3, eventually to 3.5 if possible  Bridging with heparin infusion for safety  Mobilize  Monitor closely

## 2020-02-10 VITALS
HEART RATE: 101 BPM | BODY MASS INDEX: 22.68 KG/M2 | TEMPERATURE: 99 F | WEIGHT: 120.13 LBS | SYSTOLIC BLOOD PRESSURE: 138 MMHG | RESPIRATION RATE: 18 BRPM | HEIGHT: 61 IN | DIASTOLIC BLOOD PRESSURE: 77 MMHG | OXYGEN SATURATION: 93 %

## 2020-02-10 LAB
APTT BLDCRRT: 54.3 SEC (ref 21–32)
INR PPP: 2.2 (ref 0.8–1.2)
POCT GLUCOSE: 118 MG/DL (ref 70–110)
POCT GLUCOSE: 135 MG/DL (ref 70–110)
PROTHROMBIN TIME: 24.2 SEC (ref 9–12.5)

## 2020-02-10 PROCEDURE — 97116 GAIT TRAINING THERAPY: CPT | Mod: HCNC,CQ

## 2020-02-10 PROCEDURE — 36415 COLL VENOUS BLD VENIPUNCTURE: CPT | Mod: HCNC

## 2020-02-10 PROCEDURE — 97530 THERAPEUTIC ACTIVITIES: CPT | Mod: HCNC,CQ

## 2020-02-10 PROCEDURE — 85730 THROMBOPLASTIN TIME PARTIAL: CPT | Mod: HCNC

## 2020-02-10 PROCEDURE — 97535 SELF CARE MNGMENT TRAINING: CPT | Mod: HCNC,CO

## 2020-02-10 PROCEDURE — 85610 PROTHROMBIN TIME: CPT | Mod: HCNC

## 2020-02-10 RX ORDER — WARFARIN 2 MG/1
4 TABLET ORAL DAILY
Status: DISCONTINUED | OUTPATIENT
Start: 2020-02-10 | End: 2020-02-10 | Stop reason: HOSPADM

## 2020-02-10 NOTE — PLAN OF CARE
Problem: Physical Therapy Goal  Goal: Physical Therapy Goal  Description  Goals to be met by: 20     Patient will increase functional independence with mobility by performin. Supine to sit with Modified Rains  2. Rolling to Left and Right with Modified Rains  3. Sit to stand transfer with Modified Rains  4. Bed to chair transfer with Modified Rains   5. Gait >250 feet with Modified Rains using no AD  6. Upper/Lower extremity exercise program 3 sets x10 reps per handout, with independence      Outcome: Ongoing, Progressing  Pt will benefit from further therapy in order to return to PLOF.

## 2020-02-10 NOTE — PLAN OF CARE
[facility]    HOME HEALTH ORDERS  FACE TO FACE ENCOUNTER    Patient Name: Orion Ty  YOB: 1945    PCP: Otis Zimmer MD   PCP Address: [address] / [place] [address]  PCP Phone Number: [phone]  PCP Fax: [phone]    Encounter Date: 02/10/2020    Admit to Home Health    Diagnoses:  Active Hospital Problems    Diagnosis  POA    *Influenza A [J10.1]  Yes    Right flank hematoma [S30.1XXA]  No    Acute blood loss anemia [D62]  Yes    Debility [R53.81]  Yes    Elevated troponin I level [R79.89]  Yes    Acute diastolic congestive heart failure [I50.31]  Yes    CAD (coronary artery disease) [I25.10]  Yes    PAH (pulmonary artery hypertension) [I27.21]  Yes     Secondary to valvular heart disease      Thrombocytopenia [D69.6]  Yes     Chronic    Essential (primary) hypertension [I10]  Yes     Chronic    Type 2 diabetes mellitus with microalbuminuria, without long-term current use of insulin [E11.29, R80.9]  Yes     Chronic    S/P AVR [Z95.2]  Not Applicable     Mechanical 2010      S/P MVR (mitral valve replacement) [Z95.2]  Not Applicable     Mechanical 2010      Long term current use of anticoagulant [Z79.01]  Not Applicable     Chronic    Subclinical hypothyroidism [E03.9]  Yes     Chronic    Mixed hyperlipidemia [E78.2]  Yes     Chronic    Chronic atrial fibrillation [I48.20]  Yes     Chronic    Rheumatic heart disease [I09.9]  Yes     Chronic      Resolved Hospital Problems    Diagnosis Date Resolved POA    Hypokalemia [E87.6] 02/05/2020 Yes       Future Appointments   Date Time Provider Department [place]   2/13/2020  8:00 AM LAB, [facility] [place]   2/27/2020  9:00 AM [facility]   3/5/2020 10:00 AM PACEMAKER, ICD NOM[facility]LLAN [facility] [place]   3/12/2020 10:00 AM Otis Zimmer MD [facility] [place]   6/17/2020  8:00 AM LAB, [facility] [place]   6/17/2020  8:15 AM [facility] [place]    6/19/2020  1:20 PM Otis Zimmer MD [facility]     Follow-up Information     Otis Zimmer MD On 3/12/2020.    Specialty:  Internal Medicine  Why:  appointment scheduled for March 12th at 10am  Contact information:  [address] [address]  [phone]                     I have seen and examined this patient face to face today. My clinical findings that support the need for the home health skilled services and home bound status are the following:  Weakness/numbness causing balance and gait disturbance due to Weakness/Debility and Anemia making it taxing to leave home.  Requiring assistive device to leave home due to unsteady gait caused by  Anemia and Malignancy/Cancer.  Medical restrictions requiring assistance of another human to leave home due to  Unstable ambulation.    Allergies:  Review of patient's allergies indicates:   Allergen Reactions    Aspirin Other (See Comments)       Diet: cardiac diet    Activities: ambulate in house with assistance    Nursing:   SN to complete comprehensive assessment including routine vital signs. Instruct on disease process and s/s of complications to report to MD. Review/verify medication list sent home with the patient at time of discharge  and instruct patient/caregiver as needed. Frequency may be adjusted depending on start of care date.    Notify MD if SBP > 160 or < 90; DBP > 90 or < 50; HR > 120 or < 50; Temp > 101      CONSULTS:    Physical Therapy to evaluate and treat. Evaluate for home safety and equipment needs; Establish/upgrade home exercise program. Perform / instruct on therapeutic exercises, gait training, transfer training, and Range of Motion.  Occupational Therapy to evaluate and treat. Evaluate home environment for safety and equipment needs. Perform/Instruct on transfers, ADL training, ROM, and therapeutic exercises.    Laboratory studies: INR daily x 3 days starting 2/11. Please send results to PCP, Dr Otis Zimmer for  adjustments in coumadin dose as necessary. Goal INR 2.5-3.5    Medications: Review discharge medications with patient and family and provide education.      Current Discharge Medication List      CONTINUE these medications which have NOT CHANGED    Details   losartan (COZAAR) 50 MG tablet Take 1 tablet (50 mg total) by mouth once daily.  Qty: 90 tablet, Refills: 1    Associated Diagnoses: Essential (primary) hypertension   Hold this medication if blood pressure < 140/80 mmHg   metoprolol tartrate (LOPRESSOR) 25 MG tablet TAKE 1 TABLET BY MOUTH TWICE DAILY  Qty: 180 tablet, Refills: 1    Associated Diagnoses: Chronic atrial fibrillation   Hold this medication if heart rate < 60 bpm   pravastatin (PRAVACHOL) 20 MG tablet Take 1 tablet (20 mg total) by mouth once daily.  Qty: 90 tablet, Refills: 1    Associated Diagnoses: Mixed hyperlipidemia      warfarin (COUMADIN) 4 MG tablet TAKE 1 TABLET BY MOUTH ON MONDAY AND FRIDAY AND 1/2 TABLET BY MOUTH ON ALL OTHER DAYS  Qty: 135 tablet, Refills: 1    Comments: **Patient requests 90 days supply**  Associated Diagnoses: Heart valve replaced; Chronic atrial fibrillation          I certify that this patient is confined to her home and needs physical therapy and occupational therapy.

## 2020-02-10 NOTE — PROGRESS NOTES
Message received from ESTEBAN Marmolejo with [facility] who states that it is approved and can be delivered to the pt.    1040- message sent to SALAS Arora to request walker be delivered to the pt in preparation for discharge.  Advised that it was approved by Halie with [facility]

## 2020-02-10 NOTE — PLAN OF CARE
Problem: Fall Injury Risk  Goal: Absence of Fall and Fall-Related Injury  Outcome: Ongoing, Not Progressing  Intervention: Identify and Manage Contributors to Fall Injury Risk  Flowsheets (Taken 2/10/2020 0639)  Medication Review/Management: medications reviewed  Intervention: Promote Injury-Free Environment  Flowsheets (Taken 2/10/2020 0639)  Safety Promotion/Fall Prevention: assistive device/personal item within reach; bed alarm set; side rails raised x 2; instructed to call staff for mobility; lighting adjusted; room near unit station; nonskid shoes/socks when out of bed  Environmental Safety Modification: assistive device/personal items within reach; clutter free environment maintained; lighting adjusted; mobility aid in reach; room near unit station; room organization consistent

## 2020-02-10 NOTE — PLAN OF CARE
02/10/20 1328   Final Note   Assessment Type Final Discharge Note   Anticipated Discharge Disposition Home-Health   Hospital Follow Up  Appt(s) scheduled? Yes   Discharge plans and expectations educations in teach back method with documentation complete? Yes   Right Care Referral Info   Post Acute Recommendation Home-care   Referral Type home health    Facility Name [facility]    Post-Acute Status   Post-Acute Authorization Home Health/Hospice   Home Health/Hospice Status Set-up Complete   pts nurse Luz notified that the pt can d/c from CM standpoint.

## 2020-02-10 NOTE — NURSING
1907 Received bedside report from JOEL Malcolm. Patient alert and oriented. Complaints of moderate ABDO pain . No sign of distress. IV line intact over Rt FA - infusing with Heparin at 8 unit/kg/hr. Daughter at bedside. Fritz rowe given on her reach, instructed to call for assistance all the time. Bed on lowest position. Bed alarm on. Safety maintained.

## 2020-02-10 NOTE — NURSING
Verbal and written discharge instructions given., who speaks english verbalized understanding. Patient voiding clear yellow urine. Denies pain. Waiting for transport.

## 2020-02-10 NOTE — PROGRESS NOTES
Message sent to ESTEBAN Rodríguez with [facility] to follow up on order for walker, if it was received and if it can be pulled from depot to deliver to pt.

## 2020-02-10 NOTE — PLAN OF CARE
02/10/20 1032   Post-Acute Status   Post-Acute Authorization Home Health/Hospice   Home Health/Hospice Status Set-up Complete   pt accepted by [facility]. [place] completed booking in [facility] with [facility] as d/c destination

## 2020-02-10 NOTE — NURSING
Patient bedside report has been completed [facility]. Chart check has been completed. NAD noted. Heparin handoff completed at 8 units/kg/hr or 4 ml/hr.

## 2020-02-10 NOTE — PHYSICIAN QUERY
PT Name: Orion Ty  MR #: [id number]     Physician Query Form - Documentation Clarification      CDS/: Celsa Watts RN CDI     Contact information: [email or web address]    This form is a permanent document in the medical record.     Query Date: February 10, 2020    By submitting this query, we are merely seeking further clarification of documentation. Please utilize your independent clinical judgment when addressing the question(s) below.    The Medical record reflects the following:    Supporting Clinical Findings Location in Medical Record   Principal Problem:Influenza A        Ms Ty presented with sepsis,   acute respiratory failure with hypoxia (88% at room air, per ED documentation) and acute encephalopathy secondary to influenza A infection. Patient was given isotonic fluids, oseltamivir and supplemental O2. Droplet precautions initiated. Patient clinically improved.    2/1 blood culture no growth    POCT INFLUENZA A/B MOLECULAR - Abnormal; Notable for the following components:     POC Molecular Influenza A Ag Positive       Primary Diagnosis:  Acute congestive heart failure  In the emergency department routine laboratory studies, chest x-ray, EKG, and cardiac enzymes were obtained.  There was evidence of acute CHF exacerbation with pulmonary edema, trace edema, and hypoxemia.  Influenza A serology was also positive.   Hospital medicine 2/7 1154 am  JOYA Valerio MD                Lab    ER MD Notes 2/1 626 pm  ESTEBAN Daly MD        [facility] medicine H&P  2/2 612 am CHINA Banuelos MD   2/1 Vitals in the Er  Temp - 103F   HR - 130  114   108  RR - 39 - 28 - 24 - 22 - 18  BP - 84/51 - 104/55  Sat 96  Oxygen saturations are 88-90% on room air  WBC - 13.92 14.55 13.44   lactic - 2.0 procal - 0.11    Treatments/meds   Cipro  piperacillin  Vancomyciin  NS bolus at 100ml and 500 mlat 100/hr ER   Vitals            ER MD Notes  Labs      Medication sheets                                                                             Doctor, Please specify diagnosis or diagnoses associated with above clinical findings.         Provider, was sepsis ruled in and what was the POA, (present on admit)  status?    Provider Use Only         [ x  ] Sepsis ruled in, due to Influenza A, POA  (  ) Yes  (  ) No          [   ] Sepsis ruled in, due to other, specify________. POA (  )Yes  (  ) No       [   ] Sepsis ruled out.       [   ] ZOther, please specify_______________.                                                                                                               [  ] Clinically Undetermined

## 2020-02-10 NOTE — PT/OT/SLP PROGRESS
Occupational Therapy   Treatment    Name: Orion Ty  MRN: [id number]  Admitting Diagnosis:  Influenza A       Recommendations:     Discharge Recommendations: home  Discharge Equipment Recommendations:  Per chart, rolling walker being ordered for pt discharge  Barriers to discharge:       Assessment:     Orion yT is a 74 y.o. female with a medical diagnosis of Influenza A.  She presents with the following performance deficits affecting function: weakness, impaired endurance, impaired self care skills, impaired functional mobilty, impaired cardiopulmonary response to activity, edema, impaired skin, impaired balance, gait instability, decreased upper extremity function, decreased lower extremity function, decreased safety awareness. Pt tolerated OT session well today with good participation. Pt progressing toward OT goals and will benefit from continued OT services to address functional deficits, safety concerns, and to return to OF.     Rehab Prognosis:  Good; patient would benefit from acute skilled OT services to address these deficits and reach maximum level of function.       Plan:     Patient to be seen 3 x/week to address the above listed problems via self-care/home management, therapeutic activities, therapeutic exercises  · Plan of Care Expires: 02/10/20  · Plan of Care Reviewed with: patient    Subjective   Pt agreeable to therapy.    Pain/Comfort:  · Pain Rating 1: 0/10    Objective:     Communicated with: Patient and pt spouse prior to session.  Patient found HOB elevated with bed alarm, telemetry upon OT entry to room.    General Precautions: Standard, fall, respiratory   Orthopedic Precautions:N/A   Braces: N/A     Occupational Performance:     Bed Mobility:    · Patient completed Scooting/Bridging with stand by assistance  · Patient completed Supine to Sit with stand by assistance     Functional Mobility/Transfers:  · Patient completed Sit <> Stand Transfer with stand by assistance  with   rolling walker  and without AD several trials from EOB  · Functional Mobility: Pt able to ambulate bed<>sink with SBA/RW and bed>chair with CGA/no AD, VC's for safety and walker management.     Activities of Daily Living:  · Grooming: stand by assistance standing at sink to wash face and comb hair. Pt able to tolerate standing balance with SBA to wash hair and dry with a towel ~10 mins   · Upper Body Dressing: minimum assistance to jesus back gown and pullover shirt seated EOB  · Lower Body Dressing: stand by assistance to doff/jesus underwear and pants. Pt demos good standing balance    [facility] 6 Click ADL: 22    Treatment & Education:  · Pt and spouse educated on OT role/POC.   · Importance of sitting up in the chair throughout the day as tolerated, especially for meals   · Safety during functional t/f and mobility with use of RW  Pt completed x 5 reps BUE HEP with yellow (light resistance) theraband in seated position:  Shoulder horizontal ab/dduction  External rotation  Shoulder flexion/extension with ab/dduction  Elbow extension  Elbow flexion   Handout placed in blue folder with theraband   Pt encouraged to complete 5 reps, 2x/day to maximize recovery and improve overall strength and endurance. Pt and spouse verbalize understanding.   Pt instructed to utilize PLB technique and slow pace when performing therex to prevent SOB and quick fatigue.  · Multiple self-care tasks/functional mobility completed- assistance level noted above  · All questions/concerns answered within OT scope of practice      Patient left up in chair with all lines intact, call button in reach, nurse  notified and spouse and lunch tray  presentEducation:      GOALS:   Multidisciplinary Problems     Occupational Therapy Goals        Problem: Occupational Therapy Goal    Goal Priority Disciplines Outcome Interventions   Occupational Therapy Goal     OT, PT/OT     Description:  Goals to be met by: 2/10/2020     Patient will increase functional  independence with ADLs by performing:    UE Dressing with Modified [place].  LE Dressing with Modified [place].  Grooming while standing at sink with Stand-by Assistance.  Supine to sit with Supervision.  Step transfer with Contact Guard Assistance  Toilet transfer to toilet with Contact Guard Assistance.  Upper extremity exercise program per handout, with assistance as needed.                      Time Tracking:     OT Date of Treatment: 02/10/20  OT Start Time: 1125  OT Stop Time: 1155  OT Total Time (min): 30 min    Billable Minutes:Self Care/Home Management 30    SUE Arboleda  2/10/2020

## 2020-02-10 NOTE — PLAN OF CARE
Problem: Occupational Therapy Goal  Goal: Occupational Therapy Goal  Description  Goals to be met by: 2/10/2020     Patient will increase functional independence with ADLs by performing:    UE Dressing with Modified [place].  LE Dressing with Modified [place].  Grooming while standing at sink with Stand-by Assistance.  Supine to sit with Supervision.  Step transfer with Contact Guard Assistance MET  Toilet transfer to toilet with Contact Guard Assistance.  Upper extremity exercise program per handout, with assistance as needed.      Outcome: Ongoing, Progressing   Pt tolerated OT session well with good participation . Pt progressing well toward all ADL goals. Continue OT POC as indicated.

## 2020-02-10 NOTE — PT/OT/SLP PROGRESS
Physical Therapy Treatment    Patient Name:  Orion Ty   MRN:  [id number]    Recommendations:     Discharge Recommendations:  home health PT   Discharge Equipment Recommendations: none   Barriers to discharge: None    Assessment:     Orion Ty is a 74 y.o. female admitted with a medical diagnosis of Influenza A.  She presents with the following impairments/functional limitations:  weakness, impaired endurance, impaired self care skills, impaired balance, gait instability, decreased upper extremity function, decreased lower extremity function, pain, decreased ROM, decreased safety awareness .    Rehab Prognosis: Good; patient would benefit from acute skilled PT services to address these deficits and reach maximum level of function.    Recent Surgery: * No surgery found *      Plan:     During this hospitalization, patient to be seen (3-5x/wk) to address the identified rehab impairments via gait training, therapeutic activities, therapeutic exercises and progress toward the following goals:    · Plan of Care Expires:  02/17/20    Subjective     Chief Complaint: None  Patient/Family Comments/goals: pt agreeable to treatment   Pain/Comfort:  · Pain Rating 1: 0/10  · Pain Rating Post-Intervention 1: 0/10      Objective:     Communicated with nurse prior to session.  Patient found HOB elevated with bed alarm, telemetry upon PT entry to room.     General Precautions: Standard, fall   Orthopedic Precautions:N/A   Braces: N/A     Functional Mobility:  · Bed Mobility:     · Rolling Left:  supervision  · Scooting: supervision  · Supine to Sit: supervision  · Transfers:     · Sit to Stand:  contact guard assistance with no AD  · Toilet Transfer: stand by assistance and contact guard assistance with  no AD  using  Step Transfer  · Gait:  Pt ambulated ~ 100 ft x 2 trials with HHA , CGA. Noted with decreased aaron, decreased step length. Pt c/o light dizziness during ambulation . VC's for safety and sequence. VC's cues  for pursed lip breathing technique.   · Balance:  Fair       AM-PAC 6 CLICK MOBILITY  Turning over in bed (including adjusting bedclothes, sheets and blankets)?: 4  Sitting down on and standing up from a chair with arms (e.g., wheelchair, bedside commode, etc.): 3  Moving from lying on back to sitting on the side of the bed?: 4  Moving to and from a bed to a chair (including a wheelchair)?: 3  Need to walk in hospital room?: 3  Climbing 3-5 steps with a railing?: 3  Basic Mobility Total Score: 20       Therapeutic Activities and Exercises:   BLE HEP given with instructions and demonstrations (pt and pt's spouse verbalize understanding). Encouraged pt to perform BLE ex's per handout throughout the day.     Patient left seated EOB  with all lines intact, call button in reach, Nurse notified and spouse present..    GOALS:   Multidisciplinary Problems     Physical Therapy Goals        Problem: Physical Therapy Goal    Goal Priority Disciplines Outcome Goal Variances Interventions   Physical Therapy Goal     PT, PT/OT Ongoing, Progressing     Description:  Goals to be met by: 20     Patient will increase functional independence with mobility by performin. Supine to sit with Modified [place]  2. Rolling to Left and Right with Modified [place]  3. Sit to stand transfer with Modified [place]  4. Bed to chair transfer with Modified [place]   5. Gait >250 feet with Modified [place] using no AD  6. Upper/Lower extremity exercise program 3 sets x10 reps per handout, with independence                       Time Tracking:     PT Received On: 02/10/20  PT Start Time: 1040     PT Stop Time: 1106  PT Total Time (min): 26 min     Billable Minutes: Gait Training 14 and Therapeutic Activity 12    Treatment Type: Treatment  PT/PTA: PTA     PTA Visit Number: 2     Alisha Ovalles PTA  02/10/2020

## 2020-02-10 NOTE — PROGRESS NOTES
Home health orders uploaded to [facility] and sent to [facility] to inquire if able to accept pt.  TN to follow in [facility] to await response.

## 2020-02-10 NOTE — PROGRESS NOTES
WRITTEN HEALTHCARE DISCHARGE INFORMATION      Things that YOU are RESPONSIBLE for to Manage Your Care At Home:     1. Getting your prescriptions filled.  2. Taking you medications as directed. DO NOT MISS ANY DOSES!  3. Going to your follow-up doctor appointments. This is important because it allows the doctor to monitor your progress and to determine if any changes need to be made to your treatment plan.     If you are unable to make your follow up appointments, please call the number listed and reschedule this appointment.      ____________HELP AT HOME____________________     Experiencing any SIGNS or SYMPTOMS: YOU CAN     Schedule a same day appopintment with your Primary Care Doctor or  you can call [facility] On Call Nurse Care Line for 24/7 assistance at [phone]     If you are experience any signs or symptoms that have become severe, Call 911 and come to your nearest Emergency Room.     Thank you for choosing [facility] and allowing us to care for you.   From your care management team:      You should receive a call from [facility] Discharge Department within 48-72 hours to help manage your care after discharge. Please try to make sure that you answer your phone for this important phone call.    Follow-up Information     Otis Zimmer MD On 3/12/2020.    Specialty:  Internal Medicine  Why:  appointment scheduled for March 12th at 10am  Contact information:  [address] [facility] [id number] [phone]             [facility] [place].    Specialty:  Home Health Services  Why:  Home Health- call number provided with questions or concerns regarding home health   Contact information:  [address] [facility].  [address]  [place] [id number] [phone]             [facility].    Specialty:  DME Provider  Why:  DME- provider of rolling walker for questions or concerns regarding equipment call number provided.  Contact information:  [address] [place] [address]  [id number] [phone]

## 2020-02-10 NOTE — PLAN OF CARE
02/10/20 1027   Medicare Message   Important Message from Medicare regarding Discharge Appeal Rights Given to patient/caregiver;Explained to patient/caregiver;Signed/date by patient/caregiver   Date IMM was signed 02/10/20   Time IMM was signed 1027

## 2020-02-11 ENCOUNTER — TELEPHONE (OUTPATIENT)
Dept: ELECTROPHYSIOLOGY | Facility: CLINIC | Age: 75
End: 2020-02-11

## 2020-02-11 ENCOUNTER — ANTI-COAG VISIT (OUTPATIENT)
Dept: CARDIOLOGY | Facility: CLINIC | Age: 75
End: 2020-02-11
Payer: MEDICARE

## 2020-02-11 DIAGNOSIS — I48.20 CHRONIC ATRIAL FIBRILLATION: ICD-10-CM

## 2020-02-11 DIAGNOSIS — Z95.2 S/P AVR: ICD-10-CM

## 2020-02-11 DIAGNOSIS — Z95.2 S/P MVR (MITRAL VALVE REPLACEMENT): ICD-10-CM

## 2020-02-11 DIAGNOSIS — Z79.01 LONG TERM CURRENT USE OF ANTICOAGULANT: ICD-10-CM

## 2020-02-11 PROCEDURE — 93793 PR ANTICOAGULANT MGMT FOR PT TAKING WARFARIN: ICD-10-PCS | Mod: S$GLB,,,

## 2020-02-11 PROCEDURE — 93793 ANTICOAG MGMT PT WARFARIN: CPT | Mod: S$GLB,,,

## 2020-02-11 PROCEDURE — G0180 PR HOME HEALTH MD CERTIFICATION: ICD-10-PCS | Mod: ,,, | Performed by: INTERNAL MEDICINE

## 2020-02-11 PROCEDURE — G0180 MD CERTIFICATION HHA PATIENT: HCPCS | Mod: ,,, | Performed by: INTERNAL MEDICINE

## 2020-02-11 NOTE — PHYSICIAN QUERY
PT Name: Orion Ty  MR #: [id number]    Physician Query Form -Present on Admission (POA) Diagnosis Clarification     CDS/: Celsa Watts RN CDI     Contact information:  [email or web address]    This form is a permanent document in the medical record.     Query Date: February 11, 2020    By submitting this query, we are merely seeking further clarification of documentation. Please utilize your independent clinical judgment when addressing the question(s) below.       The Medical record contains the following:    Diagnosis      Supporting Clinical Information   Location in Medical Record   Sepsis       Principal Problem:Influenza A        Ms Ty presented with sepsis,   acute respiratory failure with hypoxia (88% at room air, per ED documentation) and acute encephalopathy secondary to influenza A infection. Patient was given isotonic fluids, oseltamivir and supplemental O2. Droplet precautions initiated. Patient clinically improved.    POCT INFLUENZA A/B MOLECULAR - Abnormal; Notable for the following components:     POC Molecular Influenza A Ag Positive       Treatments/meds   Cipro  piperacillin  Vancomyciin  NS bolus at 100ml and 500 mlat 100/hr ER    Query answer 2/10 657 pm Dr. Valerio  Sepsisruled in due to Influenza A Hospital medicine 2/7 1154 am  JOYA Valerio MD                ER MD Notes 2/1 626 pm  ESTEBAN Daly MD            Medication sheets            Query 2/10 657 pm         Doctor, Please specify Present On Admission (POA) status of  Sepsis.    [ x ] Present on Admission    [  ] Not Present on Admission   [  ] Clinically Undetermined

## 2020-02-11 NOTE — PROGRESS NOTES
Admitted 2/1-2/10 for influenza. Calendar updated with inpatient warfarin doses/INRs. See calendar for plan.

## 2020-02-11 NOTE — PROGRESS NOTES
2/11/2020 received following message:  Patient was rescheduled for Pacemaker generator change with Dr Ring from 2/27/20 to 3/2/2020. She is on coumadin for mechanical valve as well as AF. Her normal INR goal is 2.5-3.5. For the procedure, Dr Ring would like her INR to be 2-3.   Thanks!

## 2020-02-11 NOTE — DISCHARGE SUMMARY
[facility] Hospital Medicine  Discharge Summary      Patient Name: Orion Ty  MRN: [id number]  Admission Date: 2/1/2020  Hospital Length of Stay: 9 days  Discharge Date and Time:  02/10/2020 6:55 PM  Attending Physician: Vida att. providers found   Discharging Provider: Amy Valerio MD  Primary Care Provider: Otis Zimmer MD      HPI:   Orion Ty is a 74 y.o. female that (in part)  has a past medical history of Closed displaced fracture of distal phalanx of lesser toe, Diabetes mellitus, Diabetes mellitus, type 2, Hypertension, Osteopenia, Pacemaker, and Rheumatic heart disease.  has a past surgical history that includes Cardiac valve replacement; thryoid s; Cardiac pacemaker placement; and Cardiac valuve replacement. Presents to [facility] Emergency Department complaining of shortness of breath.  EMS was activated due to worsening shortness of breath and lethargy.  Unable to ambulate independently, which is not normal for her.  She was febrile to 103° at home and is having difficulty taking deep breaths.  History of significant cardiac disease and heart valve replacement due to rheumatic heart valve disease. History of CAD and MI.  Denied chest pain, cyanosis, palpitations, or syncope.  Positive for peripheral edema.  Positive for nonproductive cough.    In the emergency department routine laboratory studies, chest x-ray, EKG, and cardiac enzymes were obtained.  There was evidence of acute CHF exacerbation with pulmonary edema, trace edema, and hypoxemia.  Influenza A serology was also positive.    Hospital medicine has been asked to admit to inpatient for further evaluation and treatment.     * No surgery found *      Hospital Course:   Ms Ty presented with sepsis, acute respiratory failure with hypoxia (88% at room air, per ED documentation) and acute encephalopathy secondary to influenza A infection. Patient was given isotonic fluids, oseltamivir and  supplemental O2. Droplet precautions initiated. Patient clinically improved and completed course of osetamivir. Patient complained of right flank pain. On exam, patient was noted to have large bruise and firmness from right flank down to lower lateral aspect of abdomen and back. A CT of abdomen pelvis with IV contrast showed a large hematoma interposed between oblique muscles. Unknown what caused this hematoma. Per patient and , this is new. Patient is on coumadin for stroke prophylaxis as patient has 2 mechanical heart valves. Is also chronically thrombocytopenic (50s-60s in 2010; 80s-90s recently). Patient was given one unit of blood for acute blood loss anemia. Hematology consulted for recs on therapeutic INR levels. Recommend at least 2.5 (lowest allowed level for mechanical valves) per my conversation with Hematologist. Lovenox no longer required and coumadin held to reach goal INR. Repeat CT of abdomen showed improvement of hematoma. Patient continued to clinically improve and no longer with evidence of bleeding. Did require one more unit of blood but was stable afterwards. Weaned off nasal cannula. Did well with therapy. Discharged to resume home regimen of coumadin and INR was 2.2 at time of discharge. Home health set up. INR to be drawn by home health daily for at least 3 days to ensure INR remains at goal. Vitals by  as well along with therapy. Plan discussed with patient and family. Questions answered to satisfaction. Cardiac diet. Activity as tolerated. F/u with PCP at date shown below.      Consults:   Consults (From admission, onward)        Status Ordering Provider     Inpatient consult to Cardiology  Once     Provider:  Ludwig Grigsby MD    Completed LUDWIG ANTONIO     Inpatient consult to Hematology/Oncology - [facility]  Once     Provider:  Gerardo Maldonado MD    Completed JAY IQBAL     Nutrition Services Referral  Once     Provider:  (Not yet assigned)    Completed MARISELA  BABATUNDE UMAÑA        Final Active Diagnoses:    Diagnosis Date Noted POA    PRINCIPAL PROBLEM:  Influenza A [J10.1] 02/02/2020 Yes    Right flank hematoma [S30.1XXA] 02/04/2020 No    Acute blood loss anemia [D62] 02/03/2020 Yes    Debility [R53.81] 02/03/2020 Yes    Elevated troponin I level [R79.89] 02/02/2020 Yes    Acute diastolic congestive heart failure [I50.31] 02/01/2020 Yes    CAD (coronary artery disease) [I25.10] 12/09/2019 Yes    PAH (pulmonary artery hypertension) [I27.21] 12/09/2019 Yes    Thrombocytopenia [D69.6] 02/15/2019 Yes     Chronic    Essential (primary) hypertension [I10] 03/13/2017 Yes     Chronic    Type 2 diabetes mellitus with microalbuminuria, without long-term current use of insulin [E11.29, R80.9] 05/26/2015 Yes     Chronic    S/P AVR [Z95.2] 02/24/2015 Not Applicable    S/P MVR (mitral valve replacement) [Z95.2] 02/24/2015 Not Applicable    Long term current use of anticoagulant [Z79.01] 01/13/2015 Not Applicable     Chronic    Subclinical hypothyroidism [E03.9] 01/08/2015 Yes     Chronic    Mixed hyperlipidemia [E78.2] 01/08/2015 Yes     Chronic    Chronic atrial fibrillation [I48.20] 06/25/2013 Yes     Chronic    Rheumatic heart disease [I09.9] 06/25/2013 Yes     Chronic      Problems Resolved During this Admission:    Diagnosis Date Noted Date Resolved POA    Hypokalemia [E87.6] 02/03/2020 02/05/2020 Yes     Discharged Condition: good    Disposition: Home-Health Care [facility]    Follow Up:  Follow-up Information     Otis Zimmer MD On 3/12/2020.    Specialty:  Internal Medicine  Why:  appointment scheduled for March 12th at 10am  Contact information:  [address] [facility]  [place] [address] [phone]             [facility] [place].    Specialty:  Home Health Services  Why:  Home Health- call number provided with questions or concerns regarding home health   Contact information:  [address] [facility].  [address]  [place] [id number] [phone]              "[facility].    Specialty:  DME Provider  Why:  DME- provider of rolling walker for questions or concerns regarding equipment call number provided.  Contact information:  [address] [place] [address] [phone]                 Patient Instructions:      WALKER FOR HOME USE     Order Specific Question Answer Comments   Type of Walker: Adult (5'4"-6'6")    With wheels? Yes    Height: 5' 1" (1.549 m)    Weight: 54.5 kg (120 lb 2.4 oz)    Length of need (1-99 months): 99    Does patient have medical equipment at home? none    Please check all that apply: Patient is unable to safely ambulate without equipment.        Medications:  Reconciled Home Medications:      Medication List      CONTINUE taking these medications    losartan 50 MG tablet  Commonly known as:  COZAAR  Take 1 tablet (50 mg total) by mouth once daily.     metoprolol tartrate 25 MG tablet  Commonly known as:  LOPRESSOR  TAKE 1 TABLET BY MOUTH TWICE DAILY     multivitamin-Ca-iron-minerals 27-0.4 mg Tab     nitroGLYCERIN 0.4 MG SL tablet  Commonly known as:  NITROSTAT  DISSOLVE 1 TABLET UNDER THE TONGUE EVERY 5 MINUTES AS NEEDED FOR CHEST PAINS     omega-3 acid ethyl esters 1 gram capsule  Commonly known as:  LOVAZA  TK 2 CS PO BID     pravastatin 20 MG tablet  Commonly known as:  PRAVACHOL  Take 1 tablet (20 mg total) by mouth once daily.     warfarin 4 MG tablet  Commonly known as:  COUMADIN  TAKE 1 TABLET BY MOUTH ON MONDAY AND FRIDAY AND 1/2 TABLET BY MOUTH ON ALL OTHER DAYS         Indwelling Lines/Drains at time of discharge:   Lines/Drains/Airways     None                 Time spent on the discharge of patient: > 35 minutes  Patient was seen and examined on the date of discharge and determined to be suitable for discharge.         Amy Valerio MD  Department of Hospital Medicine  [facility]  "

## 2020-02-11 NOTE — PT/OT/SLP DISCHARGE
Physical Therapy Discharge Summary    Name: Orion Ty  MRN: [id number]   Principal Problem: Influenza A     Patient Discharged from acute Physical Therapy on 2/10/20.  Please refer to prior PT notes for functional status.     Assessment:     Patient appropriate for care in another setting.    Objective:     GOALS:   Multidisciplinary Problems     Physical Therapy Goals        Problem: Physical Therapy Goal    Goal Priority Disciplines Outcome Goal Variances Interventions   Physical Therapy Goal     PT, PT/OT Ongoing, Progressing     Description:  Goals to be met by: 20     Patient will increase functional independence with mobility by performin. Supine to sit with Modified [place]  2. Rolling to Left and Right with Modified [place]  3. Sit to stand transfer with Modified [place]  4. Bed to chair transfer with Modified [place]   5. Gait >250 feet with Modified [place] using no AD  6. Upper/Lower extremity exercise program 3 sets x10 reps per handout, with independence                       Reasons for Discontinuation of Therapy Services  Transfer to alternate level of care.      Plan:     Patient Discharged to: Home with Home Health Service.    Keshia Andrea, PT  2020

## 2020-02-11 NOTE — TELEPHONE ENCOUNTER
Patient was rescheduled for Pacemaker generator change with Dr Ring from 2/27/20 to 3/2/2020. She is on coumadin for mechanical valve as well as AF. Her normal INR goal is 2.5-3.5. For the procedure, Dr Ring would like her INR to be 2-3.   Thanks!

## 2020-02-12 ENCOUNTER — ANTI-COAG VISIT (OUTPATIENT)
Dept: CARDIOLOGY | Facility: CLINIC | Age: 75
End: 2020-02-12
Payer: MEDICARE

## 2020-02-12 ENCOUNTER — PATIENT MESSAGE (OUTPATIENT)
Dept: FAMILY MEDICINE | Facility: CLINIC | Age: 75
End: 2020-02-12

## 2020-02-12 ENCOUNTER — PATIENT OUTREACH (OUTPATIENT)
Dept: ADMINISTRATIVE | Facility: CLINIC | Age: 75
End: 2020-02-12

## 2020-02-12 DIAGNOSIS — Z95.2 S/P AVR: ICD-10-CM

## 2020-02-12 DIAGNOSIS — J10.1 INFLUENZA A: Primary | ICD-10-CM

## 2020-02-12 DIAGNOSIS — Z95.2 S/P MVR (MITRAL VALVE REPLACEMENT): ICD-10-CM

## 2020-02-12 DIAGNOSIS — Z79.01 LONG TERM CURRENT USE OF ANTICOAGULANT: ICD-10-CM

## 2020-02-12 DIAGNOSIS — I48.20 CHRONIC ATRIAL FIBRILLATION: ICD-10-CM

## 2020-02-12 LAB — INR PPP: 3

## 2020-02-12 PROCEDURE — 93793 PR ANTICOAGULANT MGMT FOR PT TAKING WARFARIN: ICD-10-PCS | Mod: S$GLB,,, | Performed by: PHARMACIST

## 2020-02-12 PROCEDURE — 93793 ANTICOAG MGMT PT WARFARIN: CPT | Mod: S$GLB,,, | Performed by: PHARMACIST

## 2020-02-12 NOTE — PATIENT INSTRUCTIONS
Preventing Common Respiratory Infections  Respiratory infections such as colds and influenza (the flu) are common in winter. These infections are often caused by viruses. They may share some symptoms, but not all respiratory infections are the same. Some make you more sick than others. You can take steps to prevent common respiratory infections. And if you get sick, you can take care of yourself to keep the infection from getting worse.    What is a cold?  · Symptoms include runny nose, coughing and sneezing, and sore throat. Cold symptoms tend to be milder than flu symptoms.  · Symptoms tend to come on slowly. They last for a few days to about a week.  · With a cold, you can still do most of the things you usually do.  What is the flu?  · Symptoms include fever, headache, fatigue, cough, sore throat, runny nose, and muscle aches. Children may have upset stomach and vomiting, but adults usually dont.  · Symptoms tend to come on quickly. Some, such as fatigue and cough, can last a few weeks.  · With the flu, you may feel worn out and not able to do normal activities.  · Its most likely NOT the flu if an adult has vomiting or diarrhea for a day or two. This so-called stomach flu is probably a GI (gastrointestinal) infection.  When the infection gets worse  Without proper care, a respiratory infection can get worse. It can lead to serious complications and death. If you arent getting better, call your healthcare provider. Complications can include:  · Bronchitis (infection of the airways that leads to shortness of breath and coughing up thick yellow or green mucus)  · Pneumonia (infection of the lungs in which fluid and mucus settle in the lungs, making breathing difficult)  · Worsening of chronic conditions such as heart failure, chronic lung disease, asthma, or diabetes  · Severe dehydration (loss of fluids)  · Sinus problems  · Ear infections   Get a flu vaccine  A flu vaccine protects you from influenza  (but not other colds or infections). Get a vaccine each fall, before flu season starts. This can be done at a clinic, healthcare providers office, drugstore, senior center, or through your workplace.  Get pneumococcal vaccines  Pneumonia can be a complication of influenza. There are 2 pneumococcal pneumonia vaccines that protect against many types of pneumonia. Talk with your healthcare provider about these important vaccines.   Keep germs from spreading  No one likes getting sick. To protect yourself and others from cold and flu germs:  · Wash your hands often. Use alcohol-based hand  when you dont have access to soap and water.  · Dont touch your eyes, nose, and mouth. This may help you keep germs out of your body.  · Try to avoid people with respiratory infections. You may want to stay out of crowds during flu season (winter).  · Ask your healthcare provider if you should get a pneumonia vaccination.  How to wash your hands  · Use warm water and plenty of soap. Work up a good lather.  · Clean your whole hand, under your nails, between your fingers, and up your wrists. Wash for at least 15 to 20 seconds. Dont just wipe--rub well.  · Rinse. Let the water run down your fingertips, not up your wrists.  · In a public restroom, use a paper towel to turn off the faucet and open the door.   Date Last Reviewed: 12/1/2016  © 2723-7958 [employer]. [address] [place] [address], [place] [address]. All rights reserved. This information is not intended as a substitute for professional medical care. Always follow your healthcare professional's instructions.        Preventing Common Respiratory Infections  Respiratory infections such as colds and influenza (the flu) are common in winter. These infections are often caused by viruses. They may share some symptoms, but not all respiratory infections are the same. Some make you more sick than others. You can take steps to prevent common respiratory infections.  And if you get sick, you can take care of yourself to keep the infection from getting worse.    What is a cold?  · Symptoms include runny nose, coughing and sneezing, and sore throat. Cold symptoms tend to be milder than flu symptoms.  · Symptoms tend to come on slowly. They last for a few days to about a week.  · With a cold, you can still do most of the things you usually do.  What is the flu?  · Symptoms include fever, headache, fatigue, cough, sore throat, runny nose, and muscle aches. Children may have upset stomach and vomiting, but adults usually dont.  · Symptoms tend to come on quickly. Some, such as fatigue and cough, can last a few weeks.  · With the flu, you may feel worn out and not able to do normal activities.  · Its most likely NOT the flu if an adult has vomiting or diarrhea for a day or two. This so-called stomach flu is probably a GI (gastrointestinal) infection.  When the infection gets worse  Without proper care, a respiratory infection can get worse. It can lead to serious complications and death. If you arent getting better, call your healthcare provider. Complications can include:  · Bronchitis (infection of the airways that leads to shortness of breath and coughing up thick yellow or green mucus)  · Pneumonia (infection of the lungs in which fluid and mucus settle in the lungs, making breathing difficult)  · Worsening of chronic conditions such as heart failure, chronic lung disease, asthma, or diabetes  · Severe dehydration (loss of fluids)  · Sinus problems  · Ear infections   Get a flu vaccine  A flu vaccine protects you from influenza (but not other colds or infections). Get a vaccine each fall, before flu season starts. This can be done at a clinic, healthcare providers office, drugstore, [facility] center, or through your workplace.  Get pneumococcal vaccines  Pneumonia can be a complication of influenza. There are 2 pneumococcal pneumonia vaccines that protect against many types of  pneumonia. Talk with your healthcare provider about these important vaccines.   Keep germs from spreading  No one likes getting sick. To protect yourself and others from cold and flu germs:  · Wash your hands often. Use alcohol-based hand  when you dont have access to soap and water.  · Dont touch your eyes, nose, and mouth. This may help you keep germs out of your body.  · Try to avoid people with respiratory infections. You may want to stay out of crowds during flu season (winter).  · Ask your healthcare provider if you should get a pneumonia vaccination.  How to wash your hands  · Use warm water and plenty of soap. Work up a good lather.  · Clean your whole hand, under your nails, between your fingers, and up your wrists. Wash for at least 15 to 20 seconds. Dont just wipe--rub well.  · Rinse. Let the water run down your fingertips, not up your wrists.  · In a public restroom, use a paper towel to turn off the faucet and open the door.   Date Last Reviewed: 12/1/2016  © 2551-5750 [employer]. [address] [place] [address], [place] [address]. All rights reserved. This information is not intended as a substitute for professional medical care. Always follow your healthcare professional's instructions.

## 2020-02-13 ENCOUNTER — NURSE TRIAGE (OUTPATIENT)
Dept: ADMINISTRATIVE | Facility: CLINIC | Age: 75
End: 2020-02-13

## 2020-02-13 NOTE — TELEPHONE ENCOUNTER
If there is no pain redness bruising or warmth, she should elevate the leg this evening. Ace bandage can also be used as compression. If this does not resolve by tomorrow she should call us to sched an appointment.     If she has bruising developing on the leg I would recommend real in the ED this evening.     Thank you,   Karthikeyan

## 2020-02-13 NOTE — TELEPHONE ENCOUNTER
Spoke with pt  Mr. Ty he stated pt was DC from hospital  Monday 2/10/20 and was weighting 110lbs since being DC pt is now weighting 115 pt   stated pt legs is very swollen with no pain. Please advise.

## 2020-02-13 NOTE — TELEPHONE ENCOUNTER
Spoke with pt's  informed him of 's recommendations. Pt advised that if swelling does not decrease by tomorrow to return call back to clinic. states there's no bruising or warmth on legs. Verbalized understanding.

## 2020-02-13 NOTE — TELEPHONE ENCOUNTER
calling, states pt has had a >5 lb weight gain in last three days and has bilateral leg swelling that extends well above the knee. No other sx/sx at this time. Per triage protocol, I informed caller that I would reach out to PCP office for them to give him a callback today. He verbalized understanding.    Reason for Disposition   SEVERE leg swelling (e.g., swelling extends above knee, entire leg is swollen, weeping fluid)    Additional Information   Negative: SEVERE difficulty breathing (e.g., struggling for each breath, speaks in single words, bluish lips)   Negative: Bluish (or gray) lips or face now   Negative: Difficult to awaken or acting confused (e.g., disoriented, slurred speech)   Negative: Slow, shallow and weak breathing   Negative: [1] Received SHOCK from implantable cardiac defibrillator AND [2] persisting symptoms (i.e., palpitations, lightheadedness)   Negative: Sounds like a life-threatening emergency to the triager   Negative: Chest pain   Negative: [1] SEVERE weakness (e.g., can't stand or can barely walk) AND [2] new onset   Negative: MODERATE difficulty breathing (e.g., speaks in phrases, SOB even at rest, pulse 100-120)   Negative: Patient sounds very sick or weak to the triager   Negative: Fever > 100.5 F (38.1 C)   Negative: [1] Thigh, calf, or ankle swelling AND [2] bilateral AND [3] 1 side is more swollen    Protocols used: HEART FAILURE POST-HOSPITALIZATION FOLLOW-UP CALL-A-

## 2020-02-15 RX ORDER — FUROSEMIDE 20 MG/1
20 TABLET ORAL DAILY PRN
Qty: 30 TABLET | Refills: 5 | Status: ON HOLD | OUTPATIENT
Start: 2020-02-15 | End: 2020-12-12 | Stop reason: SDUPTHER

## 2020-02-17 ENCOUNTER — ANTI-COAG VISIT (OUTPATIENT)
Dept: CARDIOLOGY | Facility: CLINIC | Age: 75
End: 2020-02-17
Payer: MEDICARE

## 2020-02-17 DIAGNOSIS — Z95.2 S/P AVR: ICD-10-CM

## 2020-02-17 DIAGNOSIS — I48.20 CHRONIC ATRIAL FIBRILLATION: ICD-10-CM

## 2020-02-17 DIAGNOSIS — Z95.2 S/P MVR (MITRAL VALVE REPLACEMENT): ICD-10-CM

## 2020-02-17 DIAGNOSIS — Z79.01 LONG TERM CURRENT USE OF ANTICOAGULANT: ICD-10-CM

## 2020-02-17 LAB — INR PPP: 3

## 2020-02-17 PROCEDURE — 93793 PR ANTICOAGULANT MGMT FOR PT TAKING WARFARIN: ICD-10-PCS | Mod: S$GLB,,, | Performed by: PHARMACIST

## 2020-02-17 PROCEDURE — 93793 ANTICOAG MGMT PT WARFARIN: CPT | Mod: S$GLB,,, | Performed by: PHARMACIST

## 2020-02-21 ENCOUNTER — EXTERNAL HOME HEALTH (OUTPATIENT)
Dept: HOME HEALTH SERVICES | Facility: HOSPITAL | Age: 75
End: 2020-02-21
Payer: MEDICARE

## 2020-02-24 ENCOUNTER — ANTI-COAG VISIT (OUTPATIENT)
Dept: CARDIOLOGY | Facility: CLINIC | Age: 75
End: 2020-02-24
Payer: MEDICARE

## 2020-02-24 ENCOUNTER — LAB VISIT (OUTPATIENT)
Dept: LAB | Facility: HOSPITAL | Age: 75
End: 2020-02-24
Attending: INTERNAL MEDICINE
Payer: MEDICARE

## 2020-02-24 DIAGNOSIS — Z95.2 S/P AVR: ICD-10-CM

## 2020-02-24 DIAGNOSIS — Z45.010 PACEMAKER BATTERY DEPLETION: ICD-10-CM

## 2020-02-24 DIAGNOSIS — Z79.01 LONG TERM CURRENT USE OF ANTICOAGULANT: ICD-10-CM

## 2020-02-24 DIAGNOSIS — I49.9 CARDIAC ARRHYTHMIA, UNSPECIFIED CARDIAC ARRHYTHMIA TYPE: ICD-10-CM

## 2020-02-24 DIAGNOSIS — I49.5 TACHY-BRADY SYNDROME: ICD-10-CM

## 2020-02-24 DIAGNOSIS — I48.20 CHRONIC ATRIAL FIBRILLATION: ICD-10-CM

## 2020-02-24 DIAGNOSIS — Z95.2 S/P MVR (MITRAL VALVE REPLACEMENT): ICD-10-CM

## 2020-02-24 LAB
ANION GAP SERPL CALC-SCNC: 8 MMOL/L (ref 8–16)
APTT BLDCRRT: 36.9 SEC (ref 21–32)
BASOPHILS # BLD AUTO: 0.04 K/UL (ref 0–0.2)
BASOPHILS NFR BLD: 0.9 % (ref 0–1.9)
BUN SERPL-MCNC: 14 MG/DL (ref 8–23)
CALCIUM SERPL-MCNC: 9.5 MG/DL (ref 8.7–10.5)
CHLORIDE SERPL-SCNC: 103 MMOL/L (ref 95–110)
CO2 SERPL-SCNC: 25 MMOL/L (ref 23–29)
CREAT SERPL-MCNC: 0.8 MG/DL (ref 0.5–1.4)
DIFFERENTIAL METHOD: ABNORMAL
EOSINOPHIL # BLD AUTO: 0.1 K/UL (ref 0–0.5)
EOSINOPHIL NFR BLD: 2.9 % (ref 0–8)
ERYTHROCYTE [DISTWIDTH] IN BLOOD BY AUTOMATED COUNT: 21.2 % (ref 11.5–14.5)
EST. GFR  (AFRICAN AMERICAN): >60 ML/MIN/1.73 M^2
EST. GFR  (NON AFRICAN AMERICAN): >60 ML/MIN/1.73 M^2
GLUCOSE SERPL-MCNC: 103 MG/DL (ref 70–110)
HCT VFR BLD AUTO: 32.3 % (ref 37–48.5)
HGB BLD-MCNC: 9.7 G/DL (ref 12–16)
IMM GRANULOCYTES # BLD AUTO: 0.01 K/UL (ref 0–0.04)
IMM GRANULOCYTES NFR BLD AUTO: 0.2 % (ref 0–0.5)
INR PPP: 3.2
INR PPP: 3.7 (ref 0.8–1.2)
LYMPHOCYTES # BLD AUTO: 1.1 K/UL (ref 1–4.8)
LYMPHOCYTES NFR BLD: 25.3 % (ref 18–48)
MCH RBC QN AUTO: 32 PG (ref 27–31)
MCHC RBC AUTO-ENTMCNC: 30 G/DL (ref 32–36)
MCV RBC AUTO: 107 FL (ref 82–98)
MONOCYTES # BLD AUTO: 0.3 K/UL (ref 0.3–1)
MONOCYTES NFR BLD: 7.4 % (ref 4–15)
NEUTROPHILS # BLD AUTO: 2.8 K/UL (ref 1.8–7.7)
NEUTROPHILS NFR BLD: 63.3 % (ref 38–73)
NRBC BLD-RTO: 0 /100 WBC
PLATELET # BLD AUTO: 134 K/UL (ref 150–350)
PMV BLD AUTO: 11.9 FL (ref 9.2–12.9)
POTASSIUM SERPL-SCNC: 4.5 MMOL/L (ref 3.5–5.1)
PROTHROMBIN TIME: 34.7 SEC (ref 9–12.5)
RBC # BLD AUTO: 3.03 M/UL (ref 4–5.4)
SODIUM SERPL-SCNC: 136 MMOL/L (ref 136–145)
WBC # BLD AUTO: 4.43 K/UL (ref 3.9–12.7)

## 2020-02-24 PROCEDURE — 85730 THROMBOPLASTIN TIME PARTIAL: CPT | Mod: HCNC

## 2020-02-24 PROCEDURE — 93793 ANTICOAG MGMT PT WARFARIN: CPT | Mod: S$GLB,,, | Performed by: PHARMACIST

## 2020-02-24 PROCEDURE — 85025 COMPLETE CBC W/AUTO DIFF WBC: CPT | Mod: HCNC

## 2020-02-24 PROCEDURE — 80048 BASIC METABOLIC PNL TOTAL CA: CPT | Mod: HCNC

## 2020-02-24 PROCEDURE — 36415 COLL VENOUS BLD VENIPUNCTURE: CPT | Mod: HCNC,PO

## 2020-02-24 PROCEDURE — 85610 PROTHROMBIN TIME: CPT | Mod: HCNC

## 2020-02-24 PROCEDURE — 93793 PR ANTICOAGULANT MGMT FOR PT TAKING WARFARIN: ICD-10-PCS | Mod: S$GLB,,, | Performed by: PHARMACIST

## 2020-02-24 NOTE — PROGRESS NOTES
Confirmed correct dose of coumadin   Reports swollen ankles   Reports starting Lasix    No diet change reported. We will check later in the week due to upcoming procedure to target the desired INR

## 2020-02-27 ENCOUNTER — OFFICE VISIT (OUTPATIENT)
Dept: FAMILY MEDICINE | Facility: CLINIC | Age: 75
End: 2020-02-27
Payer: MEDICARE

## 2020-02-27 VITALS
WEIGHT: 112.19 LBS | HEART RATE: 83 BPM | OXYGEN SATURATION: 96 % | TEMPERATURE: 99 F | HEIGHT: 61 IN | BODY MASS INDEX: 21.18 KG/M2 | SYSTOLIC BLOOD PRESSURE: 126 MMHG | DIASTOLIC BLOOD PRESSURE: 76 MMHG

## 2020-02-27 DIAGNOSIS — Z01.818 PRE-OP EXAMINATION: Primary | ICD-10-CM

## 2020-02-27 DIAGNOSIS — Z95.0 CARDIAC PACEMAKER IN SITU: ICD-10-CM

## 2020-02-27 DIAGNOSIS — R80.9 TYPE 2 DIABETES MELLITUS WITH MICROALBUMINURIA, WITHOUT LONG-TERM CURRENT USE OF INSULIN: Chronic | ICD-10-CM

## 2020-02-27 DIAGNOSIS — I25.10 CORONARY ARTERY DISEASE INVOLVING NATIVE CORONARY ARTERY OF NATIVE HEART WITHOUT ANGINA PECTORIS: ICD-10-CM

## 2020-02-27 DIAGNOSIS — I48.20 CHRONIC ATRIAL FIBRILLATION: Chronic | ICD-10-CM

## 2020-02-27 DIAGNOSIS — I49.5 TACHY-BRADY SYNDROME: ICD-10-CM

## 2020-02-27 DIAGNOSIS — E11.3299 TYPE 2 DIABETES MELLITUS WITH MILD NONPROLIFERATIVE RETINOPATHY WITHOUT MACULAR EDEMA, WITHOUT LONG-TERM CURRENT USE OF INSULIN, UNSPECIFIED LATERALITY: Chronic | ICD-10-CM

## 2020-02-27 DIAGNOSIS — I70.0 ATHEROSCLEROSIS OF AORTA: Chronic | ICD-10-CM

## 2020-02-27 DIAGNOSIS — I50.31 ACUTE DIASTOLIC CONGESTIVE HEART FAILURE: ICD-10-CM

## 2020-02-27 DIAGNOSIS — Z95.2 S/P MVR (MITRAL VALVE REPLACEMENT): ICD-10-CM

## 2020-02-27 DIAGNOSIS — E11.29 TYPE 2 DIABETES MELLITUS WITH MICROALBUMINURIA, WITHOUT LONG-TERM CURRENT USE OF INSULIN: Chronic | ICD-10-CM

## 2020-02-27 DIAGNOSIS — Z79.01 LONG TERM CURRENT USE OF ANTICOAGULANT: Chronic | ICD-10-CM

## 2020-02-27 PROCEDURE — 99999 PR PBB SHADOW E&M-EST. PATIENT-LVL IV: CPT | Mod: PBBFAC,HCNC,, | Performed by: NURSE PRACTITIONER

## 2020-02-27 PROCEDURE — 3078F DIAST BP <80 MM HG: CPT | Mod: HCNC,CPTII,S$GLB, | Performed by: NURSE PRACTITIONER

## 2020-02-27 PROCEDURE — 3074F SYST BP LT 130 MM HG: CPT | Mod: HCNC,CPTII,S$GLB, | Performed by: NURSE PRACTITIONER

## 2020-02-27 PROCEDURE — 3044F HG A1C LEVEL LT 7.0%: CPT | Mod: HCNC,CPTII,S$GLB, | Performed by: NURSE PRACTITIONER

## 2020-02-27 PROCEDURE — 99214 OFFICE O/P EST MOD 30 MIN: CPT | Mod: HCNC,S$GLB,, | Performed by: NURSE PRACTITIONER

## 2020-02-27 PROCEDURE — 3078F PR MOST RECENT DIASTOLIC BLOOD PRESSURE < 80 MM HG: ICD-10-PCS | Mod: HCNC,CPTII,S$GLB, | Performed by: NURSE PRACTITIONER

## 2020-02-27 PROCEDURE — 1159F PR MEDICATION LIST DOCUMENTED IN MEDICAL RECORD: ICD-10-PCS | Mod: HCNC,S$GLB,, | Performed by: NURSE PRACTITIONER

## 2020-02-27 PROCEDURE — 1101F PT FALLS ASSESS-DOCD LE1/YR: CPT | Mod: HCNC,CPTII,S$GLB, | Performed by: NURSE PRACTITIONER

## 2020-02-27 PROCEDURE — 1159F MED LIST DOCD IN RCRD: CPT | Mod: HCNC,S$GLB,, | Performed by: NURSE PRACTITIONER

## 2020-02-27 PROCEDURE — 3074F PR MOST RECENT SYSTOLIC BLOOD PRESSURE < 130 MM HG: ICD-10-PCS | Mod: HCNC,CPTII,S$GLB, | Performed by: NURSE PRACTITIONER

## 2020-02-27 PROCEDURE — 1126F PR PAIN SEVERITY QUANTIFIED, NO PAIN PRESENT: ICD-10-PCS | Mod: HCNC,S$GLB,, | Performed by: NURSE PRACTITIONER

## 2020-02-27 PROCEDURE — 1101F PR PT FALLS ASSESS DOC 0-1 FALLS W/OUT INJ PAST YR: ICD-10-PCS | Mod: HCNC,CPTII,S$GLB, | Performed by: NURSE PRACTITIONER

## 2020-02-27 PROCEDURE — 99999 PR PBB SHADOW E&M-EST. PATIENT-LVL IV: ICD-10-PCS | Mod: PBBFAC,HCNC,, | Performed by: NURSE PRACTITIONER

## 2020-02-27 PROCEDURE — 99214 PR OFFICE/OUTPT VISIT, EST, LEVL IV, 30-39 MIN: ICD-10-PCS | Mod: HCNC,S$GLB,, | Performed by: NURSE PRACTITIONER

## 2020-02-27 PROCEDURE — 1126F AMNT PAIN NOTED NONE PRSNT: CPT | Mod: HCNC,S$GLB,, | Performed by: NURSE PRACTITIONER

## 2020-02-27 PROCEDURE — 3044F PR MOST RECENT HEMOGLOBIN A1C LEVEL <7.0%: ICD-10-PCS | Mod: HCNC,CPTII,S$GLB, | Performed by: NURSE PRACTITIONER

## 2020-02-27 RX ORDER — METFORMIN HYDROCHLORIDE 500 MG/1
500 TABLET ORAL DAILY
COMMUNITY
End: 2020-06-12 | Stop reason: SDUPTHER

## 2020-02-27 NOTE — PROGRESS NOTES
Subjective:       Patient ID: Orion Ty is a 74 y.o. female.    Chief Complaint: Pre-op Exam    73 yo female, known to me and this clinic presents for pre-op clearance to have pacemaker generator changed. She recently was hospitalized for influenza. She has CHF, A-fib, Tachy-gail syndrome, PAH, Diabetes and Hypothyroidism. She is on Coumadin. Today she reports improved symptoms. She does report some mild swelling to her ankle and feet. She denies shortness of breath, dizziness, chest pain or headache. She does report a mild cough with a tickle in the back of her throat.       Past Medical History:   Diagnosis Date    Closed displaced fracture of distal phalanx of lesser toe 10/20/2015    Diabetes mellitus     Diabetes mellitus, type 2     Hypertension     Osteopenia     Pacemaker     Rheumatic heart disease        Social History     Socioeconomic History    Marital status:      Spouse name: Not on file    Number of children: Not on file    Years of education: Not on file    Highest education level: Not on file   Occupational History    Not on file   Social Needs    Financial resource strain: Not on file    Food insecurity:     Worry: Not on file     Inability: Not on file    Transportation needs:     Medical: Not on file     Non-medical: Not on file   Tobacco Use    Smoking status: Never Smoker    Smokeless tobacco: Never Used   Substance and Sexual Activity    Alcohol use: No    Drug use: Never    Sexual activity: Not on file   Lifestyle    Physical activity:     Days per week: Not on file     Minutes per session: Not on file    Stress: Not on file   Relationships    Social connections:     Talks on phone: Not on file     Gets together: Not on file     Attends Gnosticist service: Not on file     Active member of club or organization: Not on file     Attends meetings of clubs or organizations: Not on file     Relationship status: Not on file   Other Topics Concern    Not on file  "  Social History Narrative    Not on file       Past Surgical History:   Procedure Laterality Date    CARDIAC PACEMAKER PLACEMENT      CARDIAC VALVE REPLACEMENT      CARDIAC VALVE SURGERY      thryoid s         Review of Systems   Constitutional: Negative for fatigue and unexpected weight change.   Eyes: Negative for photophobia, redness and visual disturbance.   Respiratory: Positive for cough. Negative for chest tightness and shortness of breath.    Cardiovascular: Positive for leg swelling. Negative for chest pain and palpitations.   Gastrointestinal: Negative for abdominal pain, nausea and vomiting.   Skin: Negative for pallor.   Neurological: Negative for dizziness, tremors, seizures, syncope, weakness, numbness and headaches.   Hematological: Does not bruise/bleed easily.   All other systems reviewed and are negative.      Objective:   /76 (BP Location: Left arm, Patient Position: Sitting, BP Method: Medium (Manual))   Pulse 83   Temp 99 °F (37.2 °C) (Oral)   Ht 5' 1" (1.549 m)   Wt 50.9 kg (112 lb 3.4 oz)   SpO2 96%   BMI 21.20 kg/m²      Physical Exam   Constitutional: She is oriented to person, place, and time. She appears well-developed and well-nourished.   HENT:   Head: Normocephalic and atraumatic.   Nose: No epistaxis.   Neck: Normal carotid pulses and no JVD present. Carotid bruit is not present.   Cardiovascular: Normal rate and regular rhythm.   2+ non-pitting edema noted to the right foot and ankle  1+ non-pitting edema noted to left foot and ankle  + RCW pacer   Pulmonary/Chest: Effort normal and breath sounds normal. No respiratory distress. She has no decreased breath sounds. She has no wheezes. She has no rhonchi. She has no rales.   Musculoskeletal: Normal range of motion.        Right ankle: She exhibits swelling.        Left ankle: She exhibits swelling.   + equal  bilaterally  + equal strength noted to upper and lower extremities.    Neurological: She is alert and " oriented to person, place, and time.   Skin: Skin is warm, dry and intact. She is not diaphoretic. No pallor.   Psychiatric: She has a normal mood and affect. Her speech is normal and behavior is normal.       Assessment:       1. Pre-op examination    2. 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 without macular edema, without long-term current use of insulin, unspecified laterality    3. Cardiac pacemaker in situ    4. Chronic atrial fibrillation    5. Coronary artery disease involving native coronary artery of native heart without angina pectoris    6. S/P MVR (mitral valve replacement)    7. Tachy-gail syndrome    8. Type 2 diabetes mellitus with microalbuminuria, without long-term current use of insulin    9. Long term current use of anticoagulant    10. Atherosclerosis of aorta    11. Acute diastolic congestive heart failure        Plan:       Orion was seen today for pre-op exam.    Diagnoses and all orders for this visit:    Pre-op examination  Recent blood work, EKG and chest xray done within last 30 days reviewed. Per Cardiology, patient INR need to be below 3.0 to proceed with procedure.       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 Continue current treatment plan as previously prescribed with your PCP.   The patient is asked to make an attempt to improve diet and exercise patterns to aid in medical management of this problem.             Relevant Medications    metFORMIN (GLUCOPHAGE) 500 MG tablet    Tachy-gail syndrome    Current Assessment & Plan     Followed by Cardiology         S/P MVR (mitral valve replacement)     Overview     Mechanical 2010         Current Assessment & Plan     On Coumadin         Long term current use of anticoagulant (Chronic)    Current Assessment & Plan     Stable. Continue to monitor         Chronic atrial fibrillation (Chronic)    Current Assessment & Plan     Patient on coumadin  Followed by Cardiology  Need Pacer generator replaced.          Cardiac pacemaker in situ    Overview     Medtronic single chamber - last generator change 2011         CAD (coronary artery disease)    Atherosclerosis of aorta (Chronic)    Overview     Seen on Echo x2  2014.  Stable, asymptomatic chronic condition.  Will continue to maximize risk factor reduction and adjust medication as needed.          Current Assessment & Plan     Stable. Continue to monitor           Acute diastolic congestive heart failure    Current Assessment & Plan     Followed by Cardiology           Other Visit Diagnoses     Pre-op examination    -  Primary          Follow up if symptoms worsen or fail to improve.

## 2020-02-28 ENCOUNTER — TELEPHONE (OUTPATIENT)
Dept: ELECTROPHYSIOLOGY | Facility: CLINIC | Age: 75
End: 2020-02-28

## 2020-02-28 ENCOUNTER — CARE AT HOME (OUTPATIENT)
Dept: HOME HEALTH SERVICES | Facility: CLINIC | Age: 75
End: 2020-02-28
Payer: MEDICARE

## 2020-02-28 ENCOUNTER — ANTI-COAG VISIT (OUTPATIENT)
Dept: CARDIOLOGY | Facility: CLINIC | Age: 75
End: 2020-02-28
Payer: MEDICARE

## 2020-02-28 ENCOUNTER — TELEPHONE (OUTPATIENT)
Dept: FAMILY MEDICINE | Facility: CLINIC | Age: 75
End: 2020-02-28

## 2020-02-28 VITALS
BODY MASS INDEX: 20.78 KG/M2 | SYSTOLIC BLOOD PRESSURE: 124 MMHG | TEMPERATURE: 98 F | WEIGHT: 110 LBS | HEART RATE: 62 BPM | RESPIRATION RATE: 18 BRPM | OXYGEN SATURATION: 97 % | DIASTOLIC BLOOD PRESSURE: 68 MMHG

## 2020-02-28 DIAGNOSIS — I09.9 RHEUMATIC HEART DISEASE: Chronic | ICD-10-CM

## 2020-02-28 DIAGNOSIS — J10.1 INFLUENZA A: ICD-10-CM

## 2020-02-28 DIAGNOSIS — I48.20 CHRONIC ATRIAL FIBRILLATION: Primary | Chronic | ICD-10-CM

## 2020-02-28 DIAGNOSIS — Z95.2 S/P AVR: ICD-10-CM

## 2020-02-28 DIAGNOSIS — I48.20 CHRONIC ATRIAL FIBRILLATION: ICD-10-CM

## 2020-02-28 DIAGNOSIS — Z95.2 S/P MVR (MITRAL VALVE REPLACEMENT): ICD-10-CM

## 2020-02-28 DIAGNOSIS — Z79.01 LONG TERM CURRENT USE OF ANTICOAGULANT: ICD-10-CM

## 2020-02-28 DIAGNOSIS — I50.9 CONGESTIVE HEART FAILURE, UNSPECIFIED HF CHRONICITY, UNSPECIFIED HEART FAILURE TYPE: ICD-10-CM

## 2020-02-28 LAB — INR PPP: 3.5

## 2020-02-28 PROCEDURE — 93793 PR ANTICOAGULANT MGMT FOR PT TAKING WARFARIN: ICD-10-PCS | Mod: S$GLB,,,

## 2020-02-28 PROCEDURE — 99349 PR HOME VISIT,ESTAB PATIENT,LEVEL III: ICD-10-PCS | Mod: S$GLB,,, | Performed by: NURSE PRACTITIONER

## 2020-02-28 PROCEDURE — 1159F MED LIST DOCD IN RCRD: CPT | Mod: S$GLB,,, | Performed by: NURSE PRACTITIONER

## 2020-02-28 PROCEDURE — 1126F PR PAIN SEVERITY QUANTIFIED, NO PAIN PRESENT: ICD-10-PCS | Mod: S$GLB,,, | Performed by: NURSE PRACTITIONER

## 2020-02-28 PROCEDURE — 3074F PR MOST RECENT SYSTOLIC BLOOD PRESSURE < 130 MM HG: ICD-10-PCS | Mod: CPTII,S$GLB,, | Performed by: NURSE PRACTITIONER

## 2020-02-28 PROCEDURE — 3074F SYST BP LT 130 MM HG: CPT | Mod: CPTII,S$GLB,, | Performed by: NURSE PRACTITIONER

## 2020-02-28 PROCEDURE — 1101F PT FALLS ASSESS-DOCD LE1/YR: CPT | Mod: CPTII,S$GLB,, | Performed by: NURSE PRACTITIONER

## 2020-02-28 PROCEDURE — 99349 HOME/RES VST EST MOD MDM 40: CPT | Mod: S$GLB,,, | Performed by: NURSE PRACTITIONER

## 2020-02-28 PROCEDURE — 1101F PR PT FALLS ASSESS DOC 0-1 FALLS W/OUT INJ PAST YR: ICD-10-PCS | Mod: CPTII,S$GLB,, | Performed by: NURSE PRACTITIONER

## 2020-02-28 PROCEDURE — 1126F AMNT PAIN NOTED NONE PRSNT: CPT | Mod: S$GLB,,, | Performed by: NURSE PRACTITIONER

## 2020-02-28 PROCEDURE — 1159F PR MEDICATION LIST DOCUMENTED IN MEDICAL RECORD: ICD-10-PCS | Mod: S$GLB,,, | Performed by: NURSE PRACTITIONER

## 2020-02-28 PROCEDURE — 3078F PR MOST RECENT DIASTOLIC BLOOD PRESSURE < 80 MM HG: ICD-10-PCS | Mod: CPTII,S$GLB,, | Performed by: NURSE PRACTITIONER

## 2020-02-28 PROCEDURE — 3078F DIAST BP <80 MM HG: CPT | Mod: CPTII,S$GLB,, | Performed by: NURSE PRACTITIONER

## 2020-02-28 PROCEDURE — 93793 ANTICOAG MGMT PT WARFARIN: CPT | Mod: S$GLB,,,

## 2020-02-28 NOTE — PROGRESS NOTES
INR at goal but needs to be lowered for upcoming procedure. Medications, chart, and patient findings reviewed. See calendar for adjustments to dose and follow up plan.

## 2020-02-28 NOTE — PROGRESS NOTES
[facility]  Transition of Care Home Visit    Visit Date: 2/28/2020  Encounter Provider: Yakelin Pedro NP  PCP:  Otis Zimmer MD    PRESENTING HISTORY      Patient ID: Orion Ty is a 74 y.o. female.    Consult Requested By:  Dr. Otis Zimmer  Reason for Consult:  Hospital Follow Up    Orion is being seen at home due to patient preference      Chief Complaint: Transitional Care; Fever; and Shortness of Breath      History of Present Illness: Ms. Orion Ty is a 74 y.o. female who was recently admitted to the hospital.    Admit: 2/1/20  Discharge: 2/10/2020    Hospital Course:   Ms Ty presented with sepsis, acute respiratory failure with hypoxia (88% at room air, per ED documentation) and acute encephalopathy secondary to influenza A infection. Patient was given isotonic fluids, oseltamivir and supplemental O2. Droplet precautions initiated. Patient clinically improved and completed course of osetamivir. Patient complained of right flank pain. On exam, patient was noted to have large bruise and firmness from right flank down to lower lateral aspect of abdomen and back. A CT of abdomen pelvis with IV contrast showed a large hematoma interposed between oblique muscles. Unknown what caused this hematoma. Per patient and , this is new. Patient is on coumadin for stroke prophylaxis as patient has 2 mechanical heart valves. Is also chronically thrombocytopenic (50s-60s in 2010; 80s-90s recently). Patient was given one unit of blood for acute blood loss anemia. Hematology consulted for recs on therapeutic INR levels. Recommend at least 2.5 (lowest allowed level for mechanical valves) per my conversation with Hematologist. Lovenox no longer required and coumadin held to reach goal INR. Repeat CT of abdomen showed improvement of hematoma. Patient continued to clinically improve and no longer with evidence of bleeding. Did require one more unit of blood but was stable afterwards. Weaned  off nasal cannula. Did well with therapy. Discharged to resume home regimen of coumadin and INR was 2.2 at time of discharge. Home health set up. INR to be drawn by home health daily for at least 3 days to ensure INR remains at goal. Vitals by HH as well along with therapy. Plan discussed with patient and family. Questions answered to satisfaction. Cardiac diet. Activity as tolerated. F/u with PCP at date shown below._________________________________________________________________    Today:    HPI:  Pt is being seen today for a hospital follow up. See hospital course. She was Flu A positive    Today, she reports no complaints and has returned to her usual state of health    Review of Systems   Constitutional: Negative.    HENT: Negative.    Respiratory: Negative.    Cardiovascular: Negative.    Genitourinary: Negative.    Neurological: Negative.    Hematological: Negative.        Assessments:  · Environmental: clean, adequate lighting and temperature  · Functional Status: independent  · Safety: no issues  · Nutritional: adequate  · Home Health/DME/Supplies: [facility]/none    PAST HISTORY:     Past Medical History:   Diagnosis Date    Closed displaced fracture of distal phalanx of lesser toe 10/20/2015    Diabetes mellitus     Diabetes mellitus, type 2     Hypertension     Osteopenia     Pacemaker     Rheumatic heart disease        Past Surgical History:   Procedure Laterality Date    CARDIAC PACEMAKER PLACEMENT      CARDIAC VALVE REPLACEMENT      CARDIAC VALVE SURGERY      thryoid s         Family History   Problem Relation Age of Onset    Breast cancer Maternal Aunt     Colon cancer Neg Hx     Ovarian cancer Neg Hx        Social History     Socioeconomic History    Marital status:      Spouse name: Not on file    Number of children: Not on file    Years of education: Not on file    Highest education level: Not on file   Occupational History    Not on file   Social Needs    Financial resource  strain: Not on file    Food insecurity:     Worry: Not on file     Inability: Not on file    Transportation needs:     Medical: Not on file     Non-medical: Not on file   Tobacco Use    Smoking status: Never Smoker    Smokeless tobacco: Never Used   Substance and Sexual Activity    Alcohol use: No    Drug use: Never    Sexual activity: Not on file   Lifestyle    Physical activity:     Days per week: Not on file     Minutes per session: Not on file    Stress: Not on file   Relationships    Social connections:     Talks on phone: Not on file     Gets together: Not on file     Attends Amish service: Not on file     Active member of club or organization: Not on file     Attends meetings of clubs or organizations: Not on file     Relationship status: Not on file   Other Topics Concern    Not on file   Social History Narrative    Not on file       MEDICATIONS & ALLERGIES:     Current Outpatient Medications on File Prior to Visit   Medication Sig Dispense Refill    ACCU-CHEK GATO CONTROL SOLN Soln       adhesive bandage (BANDAGES/PLASTIC TOP)       B-COMPLEX WITH VITAMIN C ORAL       blood sugar diagnostic Strp Use to test blood sugar  strip 11    blood-glucose meter Misc Accu-chek Gato Glucose Meter, Use to test blood sugar once daily. 1 each 1    FLUZONE HIGH-DOSE 2019-20, PF, 180 mcg/0.5 mL Syrg ADM 0.5ML IM UTD  0    furosemide (LASIX) 20 MG tablet Take 1 tablet (20 mg total) by mouth daily as needed (Take one table daily for 3 days until weight back to normal. After that use as needed for weight gain greater than 3 pounds daily.). 30 tablet 5    lancets (ACCU-CHEK SOFTCLIX LANCETS) Misc Use to test blood sugar  each 11    lancets 30 gauge Misc       lancing device Misc       losartan (COZAAR) 50 MG tablet Take 1 tablet (50 mg total) by mouth once daily. 90 tablet 1    metFORMIN (GLUCOPHAGE) 500 MG tablet Take 500 mg by mouth once daily.      metoprolol tartrate (LOPRESSOR)  25 MG tablet TAKE 1 TABLET BY MOUTH TWICE DAILY 180 tablet 1    multivitamin-Ca-iron-minerals 27-0.4 mg Tab       nitroGLYCERIN (NITROSTAT) 0.4 MG SL tablet DISSOLVE 1 TABLET UNDER THE TONGUE EVERY 5 MINUTES AS NEEDED FOR CHEST PAINS 275 tablet 0    omega-3 acid ethyl esters (LOVAZA) 1 gram capsule TK 2 CS PO BID  2    pravastatin (PRAVACHOL) 20 MG tablet Take 1 tablet (20 mg total) by mouth once daily. 90 tablet 1    TRUETEST TEST STRIPS Strp USE ONCE DAILY 100 strip 6    warfarin (COUMADIN) 4 MG tablet TAKE 1 TABLET BY MOUTH ON MONDAY AND FRIDAY AND 1/2 TABLET BY MOUTH ON ALL OTHER DAYS 135 tablet 1     No current facility-administered medications on file prior to visit.         Review of patient's allergies indicates:   Allergen Reactions    Aspirin Other (See Comments)       OBJECTIVE:     Vital Signs:  Vitals:    02/28/20 0945   BP: 124/68   Pulse: 62   Resp: 18   Temp: 97.8 °F (36.6 °C)     Body mass index is 20.78 kg/m².     Physical Exam:  Physical Exam   Constitutional: She is oriented to person, place, and time. She appears well-developed and well-nourished. No distress.   HENT:   Head: Normocephalic and atraumatic.   Eyes: Pupils are equal, round, and reactive to light. EOM are normal.   Neck: Normal range of motion. Neck supple.   Cardiovascular: Normal rate and regular rhythm.   mumur due to valves   Pulmonary/Chest: Effort normal and breath sounds normal.   Abdominal: Soft. Bowel sounds are normal.   Musculoskeletal: Normal range of motion. She exhibits no edema.   Neurological: She is alert and oriented to person, place, and time. No cranial nerve deficit.   Skin: Skin is warm and dry.   Psychiatric: She has a normal mood and affect. Her behavior is normal. Judgment and thought content normal.   Vitals reviewed.      Laboratory  Lab Results   Component Value Date    WBC 4.43 02/24/2020    HGB 9.7 (L) 02/24/2020    HCT 32.3 (L) 02/24/2020     (H) 02/24/2020     (L) 02/24/2020      Lab Results   Component Value Date    INR 3.7 (H) 02/24/2020    INR 3.2 02/24/2020    INR 3.0 02/17/2020     Lab Results   Component Value Date    HGBA1C 6.0 (H) 02/03/2020    HGBA1C 6.0 (H) 02/03/2020     No results for input(s): POCTGLUCOSE in the last 72 hours.    Diagnostic Results:  Results for orders placed during the hospital encounter of 02/01/20   Echo Color Flow Doppler? Yes    Narrative · Normal left ventricular systolic function. The estimated ejection   fraction is 55-60%.  · Mild concentric left ventricular hypertrophy.  · Septal wall has abnormal motion with normal thickening consistent with   post-operative status.  · Mild right ventricular enlargement.  · Low normal right ventricular systolic function.  · There is a bileaflet tilting disc mechanical aortic valve present. There   is no aortic insufficiency present. Cannot exclude some degree of   prsothetic AS (not severe).  · There is a bileaflet mechanical mitral valve prosthesis. Prosthetic   mitral valve is normal.  · Mild tricuspid regurgitation.  · The estimated PA systolic pressure is 52 mmHg.  · Pulmonary hypertension present.            TRANSITION OF CARE:     [facility] Contact Note: 2/12/2020    Family and/or Caretaker present at visit?  Yes.  Diagnostic tests reviewed/disposition: No diagnosic tests pending after this hospitalization.  Disease/illness education: DM/Flu  Home health/community services discussion/referrals: Patient has home health established at [facility].   Establishment or re-establishment of referral orders for community resources: No other necessary community resources.   Discussion with other health care providers: No discussion with other health care providers necessary.     Transition of Care Visit:     I have reviewed and updated the history and problem list.  I have reconciled the medication list.  I have discussed the hospitalization and current medical issues, prognosis and plans with the patient/family.  I   spent more than 50% of time discussing the care with the patient/family.  Total Face-to-Face Encounter: 60 minutes.    Medications Reconciliation:   I have reconciled the patient's home medications and discharge medications with the patient/family. I have updated all changes.  Refer to After-Visit Medication List.    ASSESSMENT & PLAN:     HIGH RISK CONDITION(S):      Orion was seen today for transitional care, fever and shortness of breath.    Diagnoses and all orders for this visit:    Chronic atrial fibrillation    Influenza A  -     Ambulatory referral/consult to [facility]    Rheumatic heart disease    Congestive heart failure, unspecified HF chronicity, unspecified heart failure type    Pt is much improved, symptoms of flu have all resolved.  She has returned to her usual state of health  Pt has returned to usual coumadin dosing followed by coumadin clinic  Has procedure this upcoming week to exchange pacemaker battery  Continue all medications as currently in place  Follow up as directed      Were controlled substances prescribed?  No    Instructions for the patient:  Take all medications as prescribed  Keep upcoming follow-up appointments with cardiology    Scheduled Follow-up :  Future Appointments   Date Time Provider Department Center   2/28/2020 11:00 AM Yakelin Pedro NP [facility] [id number] [place]   3/2/2020  6:00 AM [id number]   3/10/2020 11:00 AM COORDINATED DEVICE CHECK [facility]Y [place]   3/12/2020 10:00 AM Otis Zimmer MD Merg[facility] FAM MED [place]   6/17/2020  8:00 AM LAB[facility] LAB [place]   6/17/2020  8:15 AM SPECIMENANNA SPECLAB [place]   6/19/2020  1:20 PM Otis Zimmer MD Merged with [facility] [place]       After Visit Medication List :     Medication List           Accurate as of February 28, 2020  9:46 AM. If you have any questions, ask your nurse or doctor.               CONTINUE taking these medications    Kayleenu-Chek Cortney Control Soln  Soln  Generic drug:  blood glucose control high,low     B-COMPLEX WITH VITAMIN C ORAL     BANDAGES/PLASTIC TOP     blood-glucose meter Misc  Accu-chek Cortney Glucose Meter, Use to test blood sugar once daily.     Fluzone High-Dose 2019-20 (PF) 180 mcg/0.5 mL Syrg  Generic drug:  flu vacc [id number](65yr up)PF     furosemide 20 MG tablet  Commonly known as:  LASIX  Take 1 tablet (20 mg total) by mouth daily as needed (Take one table daily for 3 days until weight back to normal. After that use as needed for weight gain greater than 3 pounds daily.).     * lancets 30 gauge Misc     * lancets Misc  Commonly known as:  Accu-Chek Softclix Lancets  Use to test blood sugar BID     lancing device Misc     losartan 50 MG tablet  Commonly known as:  COZAAR  Take 1 tablet (50 mg total) by mouth once daily.     metFORMIN 500 MG tablet  Commonly known as:  GLUCOPHAGE     metoprolol tartrate 25 MG tablet  Commonly known as:  LOPRESSOR  TAKE 1 TABLET BY MOUTH TWICE DAILY     multivitamin-Ca-iron-minerals 27-0.4 mg Tab     nitroGLYCERIN 0.4 MG SL tablet  Commonly known as:  NITROSTAT  DISSOLVE 1 TABLET UNDER THE TONGUE EVERY 5 MINUTES AS NEEDED FOR CHEST PAINS     omega-3 acid ethyl esters 1 gram capsule  Commonly known as:  LOVAZA     pravastatin 20 MG tablet  Commonly known as:  PRAVACHOL  Take 1 tablet (20 mg total) by mouth once daily.     * Truetest Test Strips Strp  Generic drug:  blood sugar diagnostic  USE ONCE DAILY     * blood sugar diagnostic Strp  Use to test blood sugar BID     warfarin 4 MG tablet  Commonly known as:  COUMADIN  TAKE 1 TABLET BY MOUTH ON MONDAY AND FRIDAY AND 1/2 TABLET BY MOUTH ON ALL OTHER DAYS         * This list has 4 medication(s) that are the same as other medications prescribed for you. Read the directions carefully, and ask your doctor or other care provider to review them with you.                Signature:  Yakelin Pedro NP    Patient consent obtained prior to treatment

## 2020-02-28 NOTE — TELEPHONE ENCOUNTER
----- Message from Yamilet Merrill MA sent at 2/28/2020 11:13 AM CST -----  Contact: Cristobal- [phone]  Pt's  calling back to give  you a new INR. Please call.  pt.

## 2020-02-28 NOTE — TELEPHONE ENCOUNTER
----- Message from Rebecca Anderson sent at 2/28/2020 12:15 PM CST -----  Contact: CANDIS VANN [[id number]]  Name of Who is Calling : CANDIS VANN [[id number]]    Patient is returning a call from staff no further details  .....Please contact to further discuss and advise.    Can the clinic reply by [facility] : No    What Number to Call Back :  [phone]

## 2020-02-28 NOTE — TELEPHONE ENCOUNTER
Spoke with patient's . INR on home monitor was 3.5 (Dr Ring wants it below 3.0 for procedure on Monday). Information forwarded to coumadin clinic as urgent to make sure it gets addressed today.

## 2020-02-28 NOTE — TELEPHONE ENCOUNTER
Spoke to patient's     CONFIRMED procedure arrival time of 6am on 3/2/20 for PPM gen change  Reiterated instructions including:  -Directions to check in desk  -NPO after midnight night prior to procedure  -High importance of HOLDING Metformin on day of procedure due to fasting   -Confirmed compliance of Coumadin. Last INR was 3.7 on 2/24/20. Dr Ring wants it to be between 2-3 for procedure. Msg sent to coumadin clinic.  will check it on home monitor and call in results.   -Pre-procedure LABS reviewed. H/H=9.7/32.3 which has improved from earlier this month. No other alerts noted.  -Confirmed no recent fever, bleeding, infection or skin rash in the past 30 days  -Do not wear mascara day of procedure  -Bathe night prior and morning prior to procedure with Hibiclens solution or an antibacterial soap     Patient's  verbalizes understanding of above and appreciates call.

## 2020-02-28 NOTE — TELEPHONE ENCOUNTER
INR 2/24/20 was 3.7, she is having a device change on Monday and Dr Ring wants her between 2.0-3.0. Patient's  said he will check it with the home monitor today and call results.

## 2020-02-28 NOTE — TELEPHONE ENCOUNTER
----- Message from Carie Young sent at 2/28/2020 11:19 AM CST -----  Contact: Spouse  Pt's INR reading is 3.5 wants to know what to do next.  Thanks     [phone]

## 2020-03-01 ENCOUNTER — ANESTHESIA EVENT (OUTPATIENT)
Dept: MEDSURG UNIT | Facility: HOSPITAL | Age: 75
End: 2020-03-01
Payer: MEDICARE

## 2020-03-02 ENCOUNTER — HOSPITAL ENCOUNTER (OUTPATIENT)
Facility: HOSPITAL | Age: 75
Discharge: HOME | End: 2020-03-02
Attending: INTERNAL MEDICINE | Admitting: INTERNAL MEDICINE
Payer: MEDICARE

## 2020-03-02 ENCOUNTER — DOCUMENTATION ONLY (OUTPATIENT)
Dept: CARDIOLOGY | Facility: HOSPITAL | Age: 75
End: 2020-03-02

## 2020-03-02 ENCOUNTER — ANESTHESIA (OUTPATIENT)
Dept: MEDSURG UNIT | Facility: HOSPITAL | Age: 75
End: 2020-03-02
Payer: MEDICARE

## 2020-03-02 ENCOUNTER — ANTI-COAG VISIT (OUTPATIENT)
Dept: CARDIOLOGY | Facility: CLINIC | Age: 75
End: 2020-03-02
Payer: MEDICARE

## 2020-03-02 ENCOUNTER — TELEPHONE (OUTPATIENT)
Dept: ADMINISTRATIVE | Facility: OTHER | Age: 75
End: 2020-03-02

## 2020-03-02 VITALS
TEMPERATURE: 98 F | DIASTOLIC BLOOD PRESSURE: 60 MMHG | WEIGHT: 110 LBS | HEART RATE: 57 BPM | BODY MASS INDEX: 20.77 KG/M2 | HEIGHT: 61 IN | RESPIRATION RATE: 17 BRPM | OXYGEN SATURATION: 97 % | SYSTOLIC BLOOD PRESSURE: 111 MMHG

## 2020-03-02 DIAGNOSIS — I49.5 TACHY-BRADY SYNDROME: ICD-10-CM

## 2020-03-02 DIAGNOSIS — Z95.2 S/P AVR: ICD-10-CM

## 2020-03-02 DIAGNOSIS — Z79.01 LONG TERM CURRENT USE OF ANTICOAGULANT: ICD-10-CM

## 2020-03-02 DIAGNOSIS — Z95.2 S/P MVR (MITRAL VALVE REPLACEMENT): ICD-10-CM

## 2020-03-02 DIAGNOSIS — I48.91 ATRIAL FIBRILLATION: ICD-10-CM

## 2020-03-02 DIAGNOSIS — I49.9 ARRHYTHMIA: ICD-10-CM

## 2020-03-02 DIAGNOSIS — I48.20 CHRONIC ATRIAL FIBRILLATION: ICD-10-CM

## 2020-03-02 DIAGNOSIS — Z45.010 PACEMAKER BATTERY DEPLETION: ICD-10-CM

## 2020-03-02 LAB
APTT BLDCRRT: 35.8 SEC (ref 21–32)
INR PPP: 2.7 (ref 0.8–1.2)
POCT GLUCOSE: 123 MG/DL (ref 70–110)
POCT GLUCOSE: 137 MG/DL (ref 70–110)
PROTHROMBIN TIME: 25.4 SEC (ref 9–12.5)

## 2020-03-02 PROCEDURE — 85730 THROMBOPLASTIN TIME PARTIAL: CPT | Mod: HCNC

## 2020-03-02 PROCEDURE — 82962 GLUCOSE BLOOD TEST: CPT | Mod: HCNC,91

## 2020-03-02 PROCEDURE — 93010 EKG 12-LEAD: ICD-10-PCS | Mod: HCNC,,, | Performed by: INTERNAL MEDICINE

## 2020-03-02 PROCEDURE — 93005 ELECTROCARDIOGRAM TRACING: CPT | Mod: HCNC,59

## 2020-03-02 PROCEDURE — 63600175 PHARM REV CODE 636 W HCPCS: Mod: HCNC | Performed by: INTERNAL MEDICINE

## 2020-03-02 PROCEDURE — 63600175 PHARM REV CODE 636 W HCPCS: Mod: HCNC | Performed by: NURSE ANESTHETIST, CERTIFIED REGISTERED

## 2020-03-02 PROCEDURE — 37000008 HC ANESTHESIA 1ST 15 MINUTES: Mod: HCNC | Performed by: INTERNAL MEDICINE

## 2020-03-02 PROCEDURE — 25000003 PHARM REV CODE 250: Mod: HCNC | Performed by: NURSE ANESTHETIST, CERTIFIED REGISTERED

## 2020-03-02 PROCEDURE — C1785 PMKR, DUAL, RATE-RESP: HCPCS | Mod: HCNC | Performed by: INTERNAL MEDICINE

## 2020-03-02 PROCEDURE — 93010 ELECTROCARDIOGRAM REPORT: CPT | Mod: HCNC,,, | Performed by: INTERNAL MEDICINE

## 2020-03-02 PROCEDURE — 25000003 PHARM REV CODE 250: Mod: HCNC | Performed by: STUDENT IN AN ORGANIZED HEALTH CARE EDUCATION/TRAINING PROGRAM

## 2020-03-02 PROCEDURE — 93010 ELECTROCARDIOGRAM REPORT: CPT | Mod: 76,HCNC,, | Performed by: INTERNAL MEDICINE

## 2020-03-02 PROCEDURE — 33227 PR REMOVE&REPLACE PM GEN SINGL: ICD-10-PCS | Mod: HCNC,,, | Performed by: INTERNAL MEDICINE

## 2020-03-02 PROCEDURE — 27201423 OPTIME MED/SURG SUP & DEVICES STERILE SUPPLY: Mod: HCNC | Performed by: INTERNAL MEDICINE

## 2020-03-02 PROCEDURE — 82962 GLUCOSE BLOOD TEST: CPT | Mod: HCNC,91 | Performed by: INTERNAL MEDICINE

## 2020-03-02 PROCEDURE — 37000009 HC ANESTHESIA EA ADD 15 MINS: Mod: HCNC | Performed by: INTERNAL MEDICINE

## 2020-03-02 PROCEDURE — 85610 PROTHROMBIN TIME: CPT | Mod: HCNC

## 2020-03-02 PROCEDURE — 25000003 PHARM REV CODE 250: Mod: HCNC | Performed by: INTERNAL MEDICINE

## 2020-03-02 PROCEDURE — D9220A PRA ANESTHESIA: Mod: HCNC,CRNA,, | Performed by: NURSE ANESTHETIST, CERTIFIED REGISTERED

## 2020-03-02 PROCEDURE — D9220A PRA ANESTHESIA: ICD-10-PCS | Mod: HCNC,CRNA,, | Performed by: NURSE ANESTHETIST, CERTIFIED REGISTERED

## 2020-03-02 PROCEDURE — 33227 REMOVE&REPLACE PM GEN SINGL: CPT | Mod: HCNC | Performed by: INTERNAL MEDICINE

## 2020-03-02 PROCEDURE — 93793 PR ANTICOAGULANT MGMT FOR PT TAKING WARFARIN: ICD-10-PCS | Mod: S$GLB,,, | Performed by: PHARMACIST

## 2020-03-02 PROCEDURE — 63600175 PHARM REV CODE 636 W HCPCS: Mod: HCNC | Performed by: NURSE PRACTITIONER

## 2020-03-02 PROCEDURE — D9220A PRA ANESTHESIA: Mod: HCNC,ANES,, | Performed by: ANESTHESIOLOGY

## 2020-03-02 PROCEDURE — D9220A PRA ANESTHESIA: ICD-10-PCS | Mod: HCNC,ANES,, | Performed by: ANESTHESIOLOGY

## 2020-03-02 PROCEDURE — C1776 JOINT DEVICE (IMPLANTABLE): HCPCS | Mod: HCNC | Performed by: INTERNAL MEDICINE

## 2020-03-02 PROCEDURE — 93793 ANTICOAG MGMT PT WARFARIN: CPT | Mod: S$GLB,,, | Performed by: PHARMACIST

## 2020-03-02 PROCEDURE — 33227 REMOVE&REPLACE PM GEN SINGL: CPT | Mod: HCNC,,, | Performed by: INTERNAL MEDICINE

## 2020-03-02 DEVICE — LOW-PROFILE BIPOLAR LEAD TO IS-1 BIPOLAR IMPLANTABLE CONNECTOR.
Type: IMPLANTABLE DEVICE | Site: CHEST | Status: FUNCTIONAL
Brand: LEAD ADAPTER

## 2020-03-02 DEVICE — IPG W1SR01 AZURE XT SR MRI WL USA
Type: IMPLANTABLE DEVICE | Site: CHEST | Status: FUNCTIONAL
Brand: AZURE™ XT SR MRI SURESCAN™

## 2020-03-02 RX ORDER — ACETAMINOPHEN 325 MG/1
650 TABLET ORAL EVERY 4 HOURS PRN
Status: DISCONTINUED | OUTPATIENT
Start: 2020-03-02 | End: 2020-03-02 | Stop reason: HOSPADM

## 2020-03-02 RX ORDER — LIDOCAINE HYDROCHLORIDE 20 MG/ML
INJECTION INTRAVENOUS
Status: DISCONTINUED | OUTPATIENT
Start: 2020-03-02 | End: 2020-03-02

## 2020-03-02 RX ORDER — FENTANYL CITRATE 50 UG/ML
INJECTION, SOLUTION INTRAMUSCULAR; INTRAVENOUS
Status: DISCONTINUED | OUTPATIENT
Start: 2020-03-02 | End: 2020-03-02

## 2020-03-02 RX ORDER — HYDROMORPHONE HYDROCHLORIDE 1 MG/ML
0.2 INJECTION, SOLUTION INTRAMUSCULAR; INTRAVENOUS; SUBCUTANEOUS EVERY 5 MIN PRN
Status: DISCONTINUED | OUTPATIENT
Start: 2020-03-02 | End: 2020-03-02 | Stop reason: HOSPADM

## 2020-03-02 RX ORDER — PHENYLEPHRINE HYDROCHLORIDE 10 MG/ML
INJECTION INTRAVENOUS
Status: DISCONTINUED | OUTPATIENT
Start: 2020-03-02 | End: 2020-03-02

## 2020-03-02 RX ORDER — CEFAZOLIN SODIUM 1 G/3ML
2 INJECTION, POWDER, FOR SOLUTION INTRAMUSCULAR; INTRAVENOUS
Status: COMPLETED | OUTPATIENT
Start: 2020-03-02 | End: 2020-03-02

## 2020-03-02 RX ORDER — PROPOFOL 10 MG/ML
VIAL (ML) INTRAVENOUS
Status: DISCONTINUED | OUTPATIENT
Start: 2020-03-02 | End: 2020-03-02

## 2020-03-02 RX ORDER — SODIUM CHLORIDE 0.9 % (FLUSH) 0.9 %
3 SYRINGE (ML) INJECTION
Status: DISCONTINUED | OUTPATIENT
Start: 2020-03-02 | End: 2020-03-02 | Stop reason: HOSPADM

## 2020-03-02 RX ORDER — VANCOMYCIN HYDROCHLORIDE 1 G/20ML
INJECTION, POWDER, LYOPHILIZED, FOR SOLUTION INTRAVENOUS
Status: DISCONTINUED | OUTPATIENT
Start: 2020-03-02 | End: 2020-03-02 | Stop reason: HOSPADM

## 2020-03-02 RX ORDER — PROPOFOL 10 MG/ML
VIAL (ML) INTRAVENOUS CONTINUOUS PRN
Status: DISCONTINUED | OUTPATIENT
Start: 2020-03-02 | End: 2020-03-02

## 2020-03-02 RX ORDER — LIDOCAINE HYDROCHLORIDE 20 MG/ML
INJECTION, SOLUTION EPIDURAL; INFILTRATION; INTRACAUDAL; PERINEURAL
Status: DISCONTINUED | OUTPATIENT
Start: 2020-03-02 | End: 2020-03-02 | Stop reason: HOSPADM

## 2020-03-02 RX ORDER — CEPHALEXIN 500 MG/1
500 CAPSULE ORAL EVERY 8 HOURS
Qty: 15 CAPSULE | Refills: 0 | Status: SHIPPED | OUTPATIENT
Start: 2020-03-02 | End: 2020-03-07

## 2020-03-02 RX ORDER — FENTANYL CITRATE 50 UG/ML
25 INJECTION, SOLUTION INTRAMUSCULAR; INTRAVENOUS EVERY 5 MIN PRN
Status: DISCONTINUED | OUTPATIENT
Start: 2020-03-02 | End: 2020-03-02 | Stop reason: HOSPADM

## 2020-03-02 RX ORDER — VASOPRESSIN 20 [USP'U]/ML
INJECTION, SOLUTION INTRAMUSCULAR; SUBCUTANEOUS
Status: DISCONTINUED | OUTPATIENT
Start: 2020-03-02 | End: 2020-03-02

## 2020-03-02 RX ORDER — BUPIVACAINE HYDROCHLORIDE 2.5 MG/ML
INJECTION, SOLUTION EPIDURAL; INFILTRATION; INTRACAUDAL
Status: DISCONTINUED | OUTPATIENT
Start: 2020-03-02 | End: 2020-03-02 | Stop reason: HOSPADM

## 2020-03-02 RX ORDER — SODIUM CHLORIDE 9 MG/ML
INJECTION, SOLUTION INTRAVENOUS CONTINUOUS PRN
Status: DISCONTINUED | OUTPATIENT
Start: 2020-03-02 | End: 2020-03-02

## 2020-03-02 RX ORDER — SODIUM CHLORIDE 9 MG/ML
INJECTION, SOLUTION INTRAVENOUS CONTINUOUS
Status: ACTIVE | OUTPATIENT
Start: 2020-03-02

## 2020-03-02 RX ORDER — SODIUM CHLORIDE 0.9 G/100ML
IRRIGANT IRRIGATION
Status: DISCONTINUED | OUTPATIENT
Start: 2020-03-02 | End: 2020-03-02 | Stop reason: HOSPADM

## 2020-03-02 RX ORDER — DIPHENHYDRAMINE HYDROCHLORIDE 50 MG/ML
25 INJECTION INTRAMUSCULAR; INTRAVENOUS EVERY 6 HOURS PRN
Status: DISCONTINUED | OUTPATIENT
Start: 2020-03-02 | End: 2020-03-02 | Stop reason: HOSPADM

## 2020-03-02 RX ADMIN — PROPOFOL 20 MG: 10 INJECTION, EMULSION INTRAVENOUS at 07:03

## 2020-03-02 RX ADMIN — LIDOCAINE HYDROCHLORIDE 40 MG: 20 INJECTION, SOLUTION INTRAVENOUS at 07:03

## 2020-03-02 RX ADMIN — PROPOFOL 75 MCG/KG/MIN: 10 INJECTION, EMULSION INTRAVENOUS at 07:03

## 2020-03-02 RX ADMIN — VASOPRESSIN 1 UNITS: 20 INJECTION, SOLUTION INTRAMUSCULAR; SUBCUTANEOUS at 08:03

## 2020-03-02 RX ADMIN — PHENYLEPHRINE HYDROCHLORIDE 100 MCG: 10 INJECTION INTRAVENOUS at 08:03

## 2020-03-02 RX ADMIN — VASOPRESSIN 2 UNITS: 20 INJECTION, SOLUTION INTRAMUSCULAR; SUBCUTANEOUS at 09:03

## 2020-03-02 RX ADMIN — PHENYLEPHRINE HYDROCHLORIDE 100 MCG: 10 INJECTION INTRAVENOUS at 07:03

## 2020-03-02 RX ADMIN — SODIUM CHLORIDE: 0.9 INJECTION, SOLUTION INTRAVENOUS at 07:03

## 2020-03-02 RX ADMIN — VASOPRESSIN 1 UNITS: 20 INJECTION, SOLUTION INTRAMUSCULAR; SUBCUTANEOUS at 09:03

## 2020-03-02 RX ADMIN — CEFAZOLIN 2 G: 330 INJECTION, POWDER, FOR SOLUTION INTRAMUSCULAR; INTRAVENOUS at 07:03

## 2020-03-02 RX ADMIN — FENTANYL CITRATE 25 MCG: 50 INJECTION, SOLUTION INTRAMUSCULAR; INTRAVENOUS at 07:03

## 2020-03-02 RX ADMIN — ACETAMINOPHEN 650 MG: 325 TABLET ORAL at 10:03

## 2020-03-02 NOTE — ANESTHESIA PREPROCEDURE EVALUATION
03/02/2020  Pre-operative evaluation for Procedure(s) (LRB):  REPLACEMENT, PACEMAKER GENERATOR (N/A)    Orion Ty is a 74 y.o. female here for pacemaker gen change. 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      Current Outpatient Medications on File Prior to Encounter   Medication Sig Dispense Refill    ACCU-CHEK CORTNEY CONTROL SOLN Soln       B-COMPLEX WITH VITAMIN C ORAL       blood sugar diagnostic Strp Use to test blood sugar  strip 11    blood-glucose meter Misc Accu-chek Cortney Glucose Meter, Use to test blood sugar once daily. 1 each 1    lancets (ACCU-CHEK SOFTCLIX LANCETS) Misc Use to test blood sugar  each 11    lancets 30 gauge Misc       losartan (COZAAR) 50 MG tablet Take 1 tablet (50 mg total) by mouth once daily. 90 tablet 1    metoprolol tartrate  (LOPRESSOR) 25 MG tablet TAKE 1 TABLET BY MOUTH TWICE DAILY 180 tablet 1    multivitamin-Ca-iron-minerals 27-0.4 mg Tab       omega-3 acid ethyl esters (LOVAZA) 1 gram capsule TK 2 CS PO BID  2    pravastatin (PRAVACHOL) 20 MG tablet Take 1 tablet (20 mg total) by mouth once daily. 90 tablet 1    TRUETEST TEST STRIPS Strp USE ONCE DAILY 100 strip 6    adhesive bandage (BANDAGES/PLASTIC TOP)       FLUZONE HIGH-DOSE 2019-20, PF, 180 mcg/0.5 mL Syrg ADM 0.5ML IM UTD  0    lancing device Misc       nitroGLYCERIN (NITROSTAT) 0.4 MG SL tablet DISSOLVE 1 TABLET UNDER THE TONGUE EVERY 5 MINUTES AS NEEDED FOR CHEST PAINS 275 tablet 0    warfarin (COUMADIN) 4 MG tablet TAKE 1 TABLET BY MOUTH ON MONDAY AND FRIDAY AND 1/2 TABLET BY MOUTH ON ALL OTHER DAYS 135 tablet 1       Past Surgical History:   Procedure Laterality Date    CARDIAC PACEMAKER PLACEMENT      CARDIAC VALVE REPLACEMENT      CARDIAC VALVE SURGERY      thryoid s         Social History     Socioeconomic History    Marital status:      Spouse name: Not on file    Number of children: Not on file    Years of education: Not on file    Highest education level: Not on file   Occupational History    Not on file   Social Needs    Financial resource strain: Not on file    Food insecurity:     Worry: Not on file     Inability: Not on file    Transportation needs:     Medical: Not on file     Non-medical: Not on file   Tobacco Use    Smoking status: Never Smoker    Smokeless tobacco: Never Used   Substance and Sexual Activity    Alcohol use: No    Drug use: Never    Sexual activity: Not on file   Lifestyle    Physical activity:     Days per week: Not on file     Minutes per session: Not on file    Stress: Not on file   Relationships    Social connections:     Talks on phone: Not on file     Gets together: Not on file     Attends Taoism service: Not on file     Active member of club or organization: Not on file     Attends meetings of  clubs or organizations: Not on file     Relationship status: Not on file   Other Topics Concern    Not on file   Social History Narrative    Not on file         2D Echo:  Results for orders placed or performed during the hospital encounter of 05/09/18   2D Echo w/ Color Flow Doppler   Result Value Ref Range    QEF 50 55 - 65    Mitral Valve Regurgitation MILD     Aortic Valve Regurgitation MILD     Est. PA Systolic Pressure 51.72 (A)     Tricuspid Valve Regurgitation TRIVIAL TO MILD          Anesthesia Evaluation    I have reviewed the Patient Summary Reports.    I have reviewed the Nursing Notes.   I have reviewed the Medications.     Review of Systems  Anesthesia Hx:  No problems with previous Anesthesia    Cardiovascular:   Hypertension Valvular problems/Murmurs CAD   CHF    Pulmonary:  Pulmonary Normal    Renal/:  Renal/ Normal     Hepatic/GI:  Hepatic/GI Normal    Neurological:  Neurology Normal    Endocrine:   Diabetes Hypothyroidism        Physical Exam  General:  Well nourished    Airway/Jaw/Neck:  Airway Findings: Mouth Opening: Normal Tongue: Normal  General Airway Assessment: Adult  Mallampati: II  TM Distance: Normal, at least 6 cm       Chest/Lungs:  Chest/Lungs Findings: Clear to auscultation, Normal Respiratory Rate     Heart/Vascular:  Heart Findings: Rate: Normal             Anesthesia Plan  Type of Anesthesia, risks & benefits discussed:  Anesthesia Type:  general, MAC  Patient's Preference:   Intra-op Monitoring Plan: standard ASA monitors  Intra-op Monitoring Plan Comments:   Post Op Pain Control Plan: multimodal analgesia and IV/PO Opioids PRN  Post Op Pain Control Plan Comments:   Induction:   IV  Beta Blocker:         Informed Consent: Patient understands risks and agrees with Anesthesia plan.  Questions answered. Anesthesia consent signed with patient.  ASA Score: 3     Day of Surgery Review of History & Physical:            Ready For Surgery From Anesthesia Perspective.

## 2020-03-02 NOTE — ANESTHESIA POSTPROCEDURE EVALUATION
Anesthesia Post Evaluation    Patient: Orion Ty    Procedure(s) Performed: Procedure(s) (LRB):  REPLACEMENT, PACEMAKER GENERATOR (N/A)    Final Anesthesia Type: general    Patient location during evaluation: PACU  Patient participation: Yes- Able to Participate  Level of consciousness: awake and alert  Post-procedure vital signs: reviewed and stable  Pain management: adequate  Airway patency: patent    PONV status at discharge: No PONV  Anesthetic complications: no      Cardiovascular status: blood pressure returned to baseline  Respiratory status: unassisted  Follow-up not needed.          Vitals Value Taken Time   /60 3/2/2020 11:00 AM   Temp 36.7 °C (98.1 °F) 3/2/2020 11:00 AM   Pulse 57 3/2/2020 11:13 AM   Resp 17 3/2/2020 11:00 AM   SpO2 97 % 3/2/2020 11:00 AM         Event Time     Out of Recovery 10:45:00          Pain/Paul Score: Pain Rating Prior to Med Admin: 1 (3/2/2020 10:29 AM)  Pain Rating Post Med Admin: 0 (3/2/2020 10:45 AM)  Paul Score: 10 (3/2/2020 10:39 AM)

## 2020-03-02 NOTE — PLAN OF CARE
Pt ambulated on unit this morning with her spouse and daughter at her side.  Verbalized understanding of procedure.  Denies pain or SOB.  Oriented to room and call bell provided.  Will continue to monitor.

## 2020-03-02 NOTE — Clinical Note
Pt has existing device Med[employer]onic Adapta SN#[id number] implanted 3/11/2011 and existing RV lead Guidant 4260 SN# [id number] unknown implant date

## 2020-03-02 NOTE — TRANSFER OF CARE
"Anesthesia Transfer of Care Note    Patient: Orion Ty    Procedure(s) Performed: Procedure(s) (LRB):  REPLACEMENT, PACEMAKER GENERATOR (N/A)    Patient location: PACU    Anesthesia Type: general    Transport from OR: Transported from OR on 6-10 L/min O2 by face mask with adequate spontaneous ventilation    Post pain: adequate analgesia    Post assessment: no apparent anesthetic complications and tolerated procedure well    Post vital signs: stable    Level of consciousness: responds to stimulation    Nausea/Vomiting: no nausea/vomiting    Complications: none    Transfer of care protocol was followed      Last vitals:   Visit Vitals  BP (!) 144/63 (BP Location: Left arm, Patient Position: Lying)   Pulse 74   Temp 36.7 °C (98.1 °F)   Resp 16   Ht 5' 1" (1.549 m)   Wt 49.9 kg (110 lb)   SpO2 96%   Breastfeeding? No   BMI 20.78 kg/m²     "

## 2020-03-02 NOTE — NURSING
Ambulated to restroom with assistance.  Voided.  Dressing to right upper chest wall remains clean dry and intact.  No c/o pain or SOB.

## 2020-03-02 NOTE — BRIEF OP NOTE
Brought to the EP lab in the fasting state. Safety timeout performed. Sedation per anesthesia. Ancef given prior to incision. Lidocaine for local anesthetic. Incision made. Generator changed.    Earnest Dupree MD PGY7

## 2020-03-02 NOTE — Clinical Note
Lead thresholds tested in the . New leads were attached to the following locations: right ventricle. Chronic leads were found attached to the RV.

## 2020-03-02 NOTE — Clinical Note
Thresholds tested to existing RV lead Guidant 5567 # [id number] implanted 2000; we did not achieve adequate pacing parameters; troubleshooting system

## 2020-03-02 NOTE — PLAN OF CARE
Received via stretcher from PACU.  Report from JOEL Drake.  Awake alert and oriented.  Oriented to room and call bell provided.  Aquacell dressing in place to right upper chest wall clean dry and intact.  Ice pack in place to right upper chest wall dressing.  Call bell provided.  Food and drinks provided.  Will continue to monitor.

## 2020-03-02 NOTE — Clinical Note
Existing RV lead Guidant 4260 # [id number] unknown implant date connected to replacement generator; thresholds tested and achieved adequate parameters

## 2020-03-02 NOTE — DISCHARGE INSTRUCTIONS
The dressing you have on your wound is to stay intact and in place until you see our nurses for a wound check in 2 weeks.  This dressing can get wet so you can shower tonight.  Your paper instructions may mention to remove the dressing tomorrow, however that is in reference to a different type of bandage.  This bandage is unique and helps prevent infection and should stay on until your wound check.      NO HEPARIN PRODUCTS  Keflex 500 mg TID for 5 days at discharge  Patient may shower in 24 hours, do not let beam of shower hit site directly and no scrubbing in area  Follow up in device clinic in 1 week and with Dr Ring in 4 months.

## 2020-03-02 NOTE — PROGRESS NOTES
Received a call from [employer] Rep Natalie Mueller on this am in relation to this pt.  Pt had a Medtronic [place] Pacemaker implanted on this am.  Per Natalie should the initial set up remote  transmission is not received by 3/5/2020, pt will need to send a manual transmission.  Message sent to Technician who monitors the [employer] site with above information.

## 2020-03-02 NOTE — DISCHARGE SUMMARY
[facility] - Short Stay Cardiac Unit  Cardiac Electrophysiology  Discharge Summary      Patient Name: Orion Ty  MRN: [id number]  Admission Date: 3/2/2020  Hospital Length of Stay: 0 days  Discharge Date and Time: 3/2/2020 12:28 PM  Attending Physician: Mark Ring MD   Discharging Provider: Earnest Dupree MD  Primary Care Physician: Otis Zimmer MD    HPI:   Orion Ty is a 74 y.o. female with DM, HTN, Tachy-gail syndrome s/p single chamber PPM, mechanical MVR, and AF on warfarin who presented to [facility] for outpatient elective generator change.  Procedure(s) (LRB):  REPLACEMENT, PACEMAKER GENERATOR (N/A)     Indwelling Lines/Drains at time of discharge:  Lines/Drains/Airways     None                 Hospital Course:  Underwent successful right sided single chamber ppm generator change. No immediate complications.     Significant Diagnostic Studies: Labs:   BMP: No results for input(s): GLU, NA, K, CL, CO2, BUN, CREATININE, CALCIUM, MG in the last 48 hours., CMP No results for input(s): NA, K, CL, CO2, GLU, BUN, CREATININE, CALCIUM, PROT, ALBUMIN, BILITOT, ALKPHOS, AST, ALT, ANIONGAP, ESTGFRAFRICA, EGFRNONAA in the last 48 hours., CBC No results for input(s): WBC, HGB, HCT, PLT in the last 48 hours. and INR   Lab Results   Component Value Date    INR 2.7 (H) 03/02/2020    INR 3.5 02/28/2020    INR 3.7 (H) 02/24/2020       Pending Diagnostic Studies:     None          Final Active Diagnoses:    Diagnosis Date Noted POA    Tachy-gail syndrome [I49.5] 01/26/2020 Yes      Problems Resolved During this Admission:     No new Assessment & Plan notes have been filed under this hospital service since the last note was generated.  Service: Arrhythmia      Discharged Condition: good    Disposition: Home or Self Care    Follow Up:  Follow-up Information     [address] - Arrhythmia In 7 days.    Specialty:  Cardiology  Why:  medtronic single chamber ppm device and wound check  Contact information:  [id number]  [facility] [id number] [phone]  Additional information:  Clinic [place] - 3rd Floor           Mark Ring MD In 4 months.    Specialties:  Electrophysiology, Cardiology  Contact information:  [address]  [facility] [address]  [phone]                 Patient Instructions:   No discharge procedures on file.  Medications:  Reconciled Home Medications:      Medication List      START taking these medications    cephALEXin 500 MG capsule  Commonly known as:  KEFLEX  Take 1 capsule (500 mg total) by mouth every 8 (eight) hours. for 5 days        CONTINUE taking these medications    Accu-Chek [place]ney Control Soln Soln  Generic drug:  blood glucose control high,low     B-COMPLEX WITH VITAMIN C ORAL     BANDAGES/PLASTIC TOP     blood-glucose meter Misc  Accu-chek Cortney Glucose Meter, Use to test blood sugar once daily.     Fluzone High-Dose 2019-20 (PF) 180 mcg/0.5 mL Syrg  Generic drug:  flu vacc [id number](65yr up)PF  ADM 0.5ML IM UTD     furosemide 20 MG tablet  Commonly known as:  LASIX  Take 1 tablet (20 mg total) by mouth daily as needed (Take one table daily for 3 days until weight back to normal. After that use as needed for weight gain greater than 3 pounds daily.).     * lancets 30 gauge Misc     * lancets Misc  Commonly known as:  Accu-Chek Softclix Lancets  Use to test blood sugar BID     lancing device Misc     losartan 50 MG tablet  Commonly known as:  COZAAR  Take 1 tablet (50 mg total) by mouth once daily.     metFORMIN 500 MG tablet  Commonly known as:  GLUCOPHAGE  Take 500 mg by mouth once daily.     metoprolol tartrate 25 MG tablet  Commonly known as:  LOPRESSOR  TAKE 1 TABLET BY MOUTH TWICE DAILY     multivitamin-Ca-iron-minerals 27-0.4 mg Tab     nitroGLYCERIN 0.4 MG SL tablet  Commonly known as:  NITROSTAT  DISSOLVE 1 TABLET UNDER THE TONGUE EVERY 5 MINUTES AS NEEDED FOR CHEST PAINS     omega-3 acid ethyl esters 1 gram capsule  Commonly known as:  LOVAZA  TK 2 CS  PO BID     pravastatin 20 MG tablet  Commonly known as:  PRAVACHOL  Take 1 tablet (20 mg total) by mouth once daily.     * Truetest Test Strips Strp  Generic drug:  blood sugar diagnostic  USE ONCE DAILY     * blood sugar diagnostic Strp  Use to test blood sugar BID     warfarin 4 MG tablet  Commonly known as:  COUMADIN  TAKE 1 TABLET BY MOUTH ON MONDAY AND FRIDAY AND 1/2 TABLET BY MOUTH ON ALL OTHER DAYS         * This list has 4 medication(s) that are the same as other medications prescribed for you. Read the directions carefully, and ask your doctor or other care provider to review them with you.                Time spent on the discharge of patient: 30 minutes    Earnest Dupree MD  Cardiac Electrophysiology  [facility] - Short Stay Cardiac Unit

## 2020-03-02 NOTE — H&P
"Electrophysiology Pre Procedure H&P    HPI:   Orion Ty is a 74 y.o. female with DM, HTN, Tachy-gail syndrome s/p single chamber PPM, mechanical MVR, and AF on warfarin who presents to [facility] for outpatient elective generator change. No fever or chills. Had flu requiring hospitalization in early 2/2020. No chest pain. No shortness of breath. Has ongoing bruising as a side effect from injectable ac received during above mentioned hospital stay.     From Dr Ring's clinic note 1/27/20:  "75 yo female with tachy-gail syndrome, chronic atrial fibrillation, PPM, Htn, Mechanical MVR and AVR, thrombocytopenia.  Primary cardiologist is Dr. Grigsby.  Has single chamber PPM, implanted 11/6/89.  Had undergone 3 generator changes.  Device has reached CHRISTI.  Device is right sided.  Notes stable dyspnea on exertion.  Denies syncope.  Notes fatigue.  Echo 12/10/19 normal biventricular structure and function, severe bi-atrial enlargement, well seated aortic and mitral prostheses.  Has chronic thrombocytopenia, with platelets generally in the 90's."    Past Medical History:   Diagnosis Date    Closed displaced fracture of distal phalanx of lesser toe 10/20/2015    Diabetes mellitus     Diabetes mellitus, type 2     Hypertension     Osteopenia     Pacemaker     Rheumatic heart disease         Social History     Socioeconomic History    Marital status:      Spouse name: Not on file    Number of children: Not on file    Years of education: Not on file    Highest education level: Not on file   Occupational History    Not on file   Social Needs    Financial resource strain: Not on file    Food insecurity:     Worry: Not on file     Inability: Not on file    Transportation needs:     Medical: Not on file     Non-medical: Not on file   Tobacco Use    Smoking status: Never Smoker    Smokeless tobacco: Never Used   Substance and Sexual Activity    Alcohol use: No    Drug use: Never    Sexual activity: Not on " "file   Lifestyle    Physical activity:     Days per week: Not on file     Minutes per session: Not on file    Stress: Not on file   Relationships    Social connections:     Talks on phone: Not on file     Gets together: Not on file     Attends Buddhism service: Not on file     Active member of club or organization: Not on file     Attends meetings of clubs or organizations: Not on file     Relationship status: Not on file   Other Topics Concern    Not on file   Social History Narrative    Not on file        Family History   Problem Relation Age of Onset    Breast cancer Maternal Aunt     Colon cancer Neg Hx     Ovarian cancer Neg Hx         Current Facility-Administered Medications   Medication Dose Route Frequency Provider Last Rate Last Dose    0.9%  NaCl infusion   Intravenous Continuous Darlene Garcia NP        ceFAZolin injection 2 g  2 g Intravenous On Call Procedure Darlene Garcia NP            Constitutional: (-)fevers, (-)chills, (-)night sweats  HEENT: (-) headaches (-) lightheadedness (-) blurry vision  Cardiovascular: (-)chest pain (-)paroxysmal nocturnal dyspnea (-)orthopnea  Respiratory: (-)shortness of breath, (-)dyspnea on exertion (-)hemoptysis  Gastrointestinal: (-)abdominal pain (-)nausea (-)vomiting (-)tarry stools  Musculoskeletal: (-)arthralgias (-)limited range of motion  Neurologic: (-)parasthesias (-)mood disorder (-)anxiety  Endo: (-)polyuria (-)polydipsia (-)heat/cold intolerance  Skin: (+)bruising     Vitals:    03/02/20 0600 03/02/20 0609   BP: 134/61 (!) 144/63   BP Location: Right arm Left arm   Patient Position: Lying Lying   Pulse: 74    Resp: 16    Temp: 98.1 °F (36.7 °C)    SpO2: 96%    Weight: 49.9 kg (110 lb)    Height: 5' 1" (1.549 m)      Physical Exam:   Gen: no acute distress, pleasant patient answering questions appropriately  HEENT: extra-ocular muscles intact, normocephalic-atraumatic  Neck: no jugular venous distension, no lymphadenopathy, no " thyromegaly, no carotid bruits  CVS: irregularly irregular, mechanical MV click noted  CHEST: clear to auscultation bilaterally, no wheezes/rales/ronchi  ABD: Soft, non-tender, nondistended, bowel sounds present  EXT: +BLE Edema  NEURO: awake, alert, and oriented   Skin: No rash     Review of Labs:     Lab Results   Component Value Date    INR 3.5 02/28/2020    INR 3.7 (H) 02/24/2020    INR 3.2 02/24/2020     Lab Results   Component Value Date    WBC 4.43 02/24/2020    HGB 9.7 (L) 02/24/2020    HCT 32.3 (L) 02/24/2020     (H) 02/24/2020     (L) 02/24/2020       CMP  Sodium   Date Value Ref Range Status   02/24/2020 136 136 - 145 mmol/L Final     Potassium   Date Value Ref Range Status   02/24/2020 4.5 3.5 - 5.1 mmol/L Final     Chloride   Date Value Ref Range Status   02/24/2020 103 95 - 110 mmol/L Final     CO2   Date Value Ref Range Status   02/24/2020 25 23 - 29 mmol/L Final     Glucose   Date Value Ref Range Status   02/24/2020 103 70 - 110 mg/dL Final     BUN, Bld   Date Value Ref Range Status   02/24/2020 14 8 - 23 mg/dL Final     Creatinine   Date Value Ref Range Status   02/24/2020 0.8 0.5 - 1.4 mg/dL Final     Calcium   Date Value Ref Range Status   02/24/2020 9.5 8.7 - 10.5 mg/dL Final     Total Protein   Date Value Ref Range Status   02/01/2020 7.0 6.0 - 8.4 g/dL Final     Albumin   Date Value Ref Range Status   02/01/2020 3.9 3.5 - 5.2 g/dL Final     Total Bilirubin   Date Value Ref Range Status   02/01/2020 1.3 (H) 0.1 - 1.0 mg/dL Final     Comment:     For infants and newborns, interpretation of results should be based  on gestational age, weight and in agreement with clinical  observations.  Premature Infant recommended reference ranges:  Up to 24 hours.............<8.0 mg/dL  Up to 48 hours............<12.0 mg/dL  3-5 days..................<15.0 mg/dL  6-29 days.................<15.0 mg/dL       Alkaline Phosphatase   Date Value Ref Range Status   02/01/2020 99 55 - 135 U/L Final      AST   Date Value Ref Range Status   02/01/2020 64 (H) 10 - 40 U/L Final     ALT   Date Value Ref Range Status   02/01/2020 28 10 - 44 U/L Final     Anion Gap   Date Value Ref Range Status   02/24/2020 8 8 - 16 mmol/L Final     eGFR if    Date Value Ref Range Status   02/24/2020 >60.0 >60 mL/min/1.73 m^2 Final     eGFR if non    Date Value Ref Range Status   02/24/2020 >60.0 >60 mL/min/1.73 m^2 Final     Comment:     Calculation used to obtain the estimated glomerular filtration  rate (eGFR) is the CKD-EPI equation.        Most recent ECG  AF     Assessment/Plan:  Orion Ty is a 74 y.o. female with DM, HTN, Tachy-gail syndrome s/p single chamber PPM, mechanical MVR, and AF on warfarin who presents to [facility] for outpatient elective generator change.  Consent confirmed in chart.   Generator change today.

## 2020-03-02 NOTE — NURSING
IV's d/c'd x 2 with cath tips intact.  Pt and pt spouse verbalized understanding of d/c instructions.  aquacell to right upper chest wall remains clean dry and intact.  Off unit via wheelchair for discharge home.  Pt spouse at her side.  icepack and bedside monitor sent home with patient.  Pt given printed Rx for antiobiotic per Dr. Dupree.

## 2020-03-02 NOTE — Clinical Note
Existing RV lead disconnected from existing device Medtronic Adapta SN#[id number] implanted 3/11/2011 ; the device was returned to  due to EOL

## 2020-03-02 NOTE — NURSING TRANSFER
Nursing Transfer Note      3/2/2020     Transfer To: [facility]    Transfer via stretcher    Transfer with cardiac monitoring    Transported by RN    Medicines sent: n/a    Chart send with patient: Yes    Notified: spouse    Patient reassessed at: see epic    Upon arrival to floor: patient stable, no signs of distress, no complaints

## 2020-03-03 ENCOUNTER — TELEPHONE (OUTPATIENT)
Dept: CARDIOLOGY | Facility: HOSPITAL | Age: 75
End: 2020-03-03

## 2020-03-03 NOTE — TELEPHONE ENCOUNTER
----- Message from Mary Sierra sent at 3/3/2020 12:57 PM CST -----  Contact: Patient's  Cristobal  The Pt's  is calling to speak with someone regarding her new  device. Please call him back @ [phone]. Thanks, Mary

## 2020-03-03 NOTE — PROGRESS NOTES
Called  and advised him to have HH nurse use Patient's home meter for the INR, otherwise may be dis enrolled from the meter program

## 2020-03-09 ENCOUNTER — ANTI-COAG VISIT (OUTPATIENT)
Dept: CARDIOLOGY | Facility: CLINIC | Age: 75
End: 2020-03-09
Payer: MEDICARE

## 2020-03-09 DIAGNOSIS — Z79.01 LONG TERM CURRENT USE OF ANTICOAGULANT: ICD-10-CM

## 2020-03-09 DIAGNOSIS — I48.20 CHRONIC ATRIAL FIBRILLATION: ICD-10-CM

## 2020-03-09 DIAGNOSIS — Z95.2 S/P MVR (MITRAL VALVE REPLACEMENT): ICD-10-CM

## 2020-03-09 DIAGNOSIS — Z95.2 S/P AVR: ICD-10-CM

## 2020-03-09 LAB — INR PPP: 2.4

## 2020-03-09 PROCEDURE — 93793 PR ANTICOAGULANT MGMT FOR PT TAKING WARFARIN: ICD-10-PCS | Mod: S$GLB,,, | Performed by: PHARMACIST

## 2020-03-09 PROCEDURE — 93793 ANTICOAG MGMT PT WARFARIN: CPT | Mod: S$GLB,,, | Performed by: PHARMACIST

## 2020-03-10 ENCOUNTER — CLINICAL SUPPORT (OUTPATIENT)
Dept: CARDIOLOGY | Facility: HOSPITAL | Age: 75
End: 2020-03-10
Attending: INTERNAL MEDICINE
Payer: MEDICARE

## 2020-03-10 DIAGNOSIS — Z95.0 CARDIAC PACEMAKER IN SITU: ICD-10-CM

## 2020-03-10 DIAGNOSIS — I48.20 CHRONIC ATRIAL FIBRILLATION: ICD-10-CM

## 2020-03-10 PROCEDURE — 93279 PRGRMG DEV EVAL PM/LDLS PM: CPT | Mod: HCNC

## 2020-03-12 ENCOUNTER — OFFICE VISIT (OUTPATIENT)
Dept: FAMILY MEDICINE | Facility: CLINIC | Age: 75
End: 2020-03-12
Payer: MEDICARE

## 2020-03-12 VITALS
DIASTOLIC BLOOD PRESSURE: 60 MMHG | TEMPERATURE: 98 F | SYSTOLIC BLOOD PRESSURE: 120 MMHG | BODY MASS INDEX: 20.5 KG/M2 | HEART RATE: 75 BPM | WEIGHT: 108.56 LBS | OXYGEN SATURATION: 98 % | HEIGHT: 61 IN

## 2020-03-12 DIAGNOSIS — R80.9 TYPE 2 DIABETES MELLITUS WITH MICROALBUMINURIA, WITHOUT LONG-TERM CURRENT USE OF INSULIN: Chronic | ICD-10-CM

## 2020-03-12 DIAGNOSIS — Z09 HOSPITAL DISCHARGE FOLLOW-UP: Primary | ICD-10-CM

## 2020-03-12 DIAGNOSIS — R60.0 PERIPHERAL EDEMA: ICD-10-CM

## 2020-03-12 DIAGNOSIS — R05.9 COUGH: ICD-10-CM

## 2020-03-12 DIAGNOSIS — I10 ESSENTIAL (PRIMARY) HYPERTENSION: Chronic | ICD-10-CM

## 2020-03-12 DIAGNOSIS — Z95.0 CARDIAC PACEMAKER IN SITU: ICD-10-CM

## 2020-03-12 DIAGNOSIS — E11.3299 TYPE 2 DIABETES MELLITUS WITH MILD NONPROLIFERATIVE RETINOPATHY WITHOUT MACULAR EDEMA, WITHOUT LONG-TERM CURRENT USE OF INSULIN, UNSPECIFIED LATERALITY: Chronic | ICD-10-CM

## 2020-03-12 DIAGNOSIS — E11.29 TYPE 2 DIABETES MELLITUS WITH MICROALBUMINURIA, WITHOUT LONG-TERM CURRENT USE OF INSULIN: Chronic | ICD-10-CM

## 2020-03-12 PROBLEM — D62 ACUTE BLOOD LOSS ANEMIA: Status: RESOLVED | Noted: 2020-02-03 | Resolved: 2020-03-12

## 2020-03-12 PROBLEM — R79.89 ELEVATED TROPONIN I LEVEL: Status: RESOLVED | Noted: 2020-02-02 | Resolved: 2020-03-12

## 2020-03-12 PROCEDURE — 99999 PR PBB SHADOW E&M-EST. PATIENT-LVL III: ICD-10-PCS | Mod: PBBFAC,HCNC,, | Performed by: INTERNAL MEDICINE

## 2020-03-12 PROCEDURE — 99214 OFFICE O/P EST MOD 30 MIN: CPT | Mod: HCNC,S$GLB,, | Performed by: INTERNAL MEDICINE

## 2020-03-12 PROCEDURE — 3044F PR MOST RECENT HEMOGLOBIN A1C LEVEL <7.0%: ICD-10-PCS | Mod: HCNC,CPTII,S$GLB, | Performed by: INTERNAL MEDICINE

## 2020-03-12 PROCEDURE — 99214 PR OFFICE/OUTPT VISIT, EST, LEVL IV, 30-39 MIN: ICD-10-PCS | Mod: HCNC,S$GLB,, | Performed by: INTERNAL MEDICINE

## 2020-03-12 PROCEDURE — 99999 PR PBB SHADOW E&M-EST. PATIENT-LVL III: CPT | Mod: PBBFAC,HCNC,, | Performed by: INTERNAL MEDICINE

## 2020-03-12 PROCEDURE — 1125F AMNT PAIN NOTED PAIN PRSNT: CPT | Mod: HCNC,S$GLB,, | Performed by: INTERNAL MEDICINE

## 2020-03-12 PROCEDURE — 3078F DIAST BP <80 MM HG: CPT | Mod: HCNC,CPTII,S$GLB, | Performed by: INTERNAL MEDICINE

## 2020-03-12 PROCEDURE — 3078F PR MOST RECENT DIASTOLIC BLOOD PRESSURE < 80 MM HG: ICD-10-PCS | Mod: HCNC,CPTII,S$GLB, | Performed by: INTERNAL MEDICINE

## 2020-03-12 PROCEDURE — 3074F PR MOST RECENT SYSTOLIC BLOOD PRESSURE < 130 MM HG: ICD-10-PCS | Mod: HCNC,CPTII,S$GLB, | Performed by: INTERNAL MEDICINE

## 2020-03-12 PROCEDURE — 1159F PR MEDICATION LIST DOCUMENTED IN MEDICAL RECORD: ICD-10-PCS | Mod: HCNC,S$GLB,, | Performed by: INTERNAL MEDICINE

## 2020-03-12 PROCEDURE — 1159F MED LIST DOCD IN RCRD: CPT | Mod: HCNC,S$GLB,, | Performed by: INTERNAL MEDICINE

## 2020-03-12 PROCEDURE — 3044F HG A1C LEVEL LT 7.0%: CPT | Mod: HCNC,CPTII,S$GLB, | Performed by: INTERNAL MEDICINE

## 2020-03-12 PROCEDURE — 1101F PT FALLS ASSESS-DOCD LE1/YR: CPT | Mod: HCNC,CPTII,S$GLB, | Performed by: INTERNAL MEDICINE

## 2020-03-12 PROCEDURE — 99499 UNLISTED E&M SERVICE: CPT | Mod: HCNC,S$GLB,, | Performed by: INTERNAL MEDICINE

## 2020-03-12 PROCEDURE — 1125F PR PAIN SEVERITY QUANTIFIED, PAIN PRESENT: ICD-10-PCS | Mod: HCNC,S$GLB,, | Performed by: INTERNAL MEDICINE

## 2020-03-12 PROCEDURE — 3074F SYST BP LT 130 MM HG: CPT | Mod: HCNC,CPTII,S$GLB, | Performed by: INTERNAL MEDICINE

## 2020-03-12 PROCEDURE — 1101F PR PT FALLS ASSESS DOC 0-1 FALLS W/OUT INJ PAST YR: ICD-10-PCS | Mod: HCNC,CPTII,S$GLB, | Performed by: INTERNAL MEDICINE

## 2020-03-12 PROCEDURE — 99499 RISK ADDL DX/OHS AUDIT: ICD-10-PCS | Mod: HCNC,S$GLB,, | Performed by: INTERNAL MEDICINE

## 2020-03-12 RX ORDER — BENZONATATE 200 MG/1
200 CAPSULE ORAL 3 TIMES DAILY PRN
Qty: 60 CAPSULE | Refills: 1 | Status: SHIPPED | OUTPATIENT
Start: 2020-03-12 | End: 2020-06-12

## 2020-03-12 NOTE — ASSESSMENT & PLAN NOTE
Keep OV in June.  The current medical regimen is effective;  continue present plan and medications.

## 2020-03-12 NOTE — PROGRESS NOTES
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  The current medical regimen is effective;  continue present plan and medications.             Cardiac/Vascular    Essential (primary) hypertension (Chronic)    Current Assessment & Plan     The current medical regimen is effective;  continue present plan and medications.          Cardiac pacemaker in situ    Overview     Medtronic single chamber - last generator change 2020            Endocrine    Type 2 diabetes mellitus with microalbuminuria, without long-term current use of insulin (Chronic)    Current Assessment & Plan     As above           Other Visit Diagnoses     Hospital discharge follow-up    -  Primary  -    Doing much better.     Cough    -  Start Tessalon PRN.     Relevant Medications    benzonatate (TESSALON) 200 MG capsule    Peripheral edema    -  Seems euvolemic today.  Likely from large hematoma as noted below.  Suggested elevation of the leg and starting compression stocking.             Health Maintenance reviewed, deferred.  She was counseled on novel coronavirus and appropriate precautions today.    Follow-up: Follow up for as scheduled for June.  ______________________________________________________________________    Chief Complaint  Chief Complaint   Patient presents with    Hospital Follow Up       HPI  Orion Ty is a 74 y.o. female with medical diagnoses as listed in the medical history and problem list that presents for hospital discahrge follow up.  Pt is known to me with her last appointment 2/27/2020.  Admitted in mid Feb with Flu A.  REcently placed in obs for generator change on her pacemaker.   is present today    Hospital Course:   Ms Ty presented with sepsis, acute respiratory failure with hypoxia (88% at room air, per ED documentation) and acute encephalopathy  secondary to influenza A infection. Patient was given isotonic fluids, oseltamivir and supplemental O2. Droplet precautions initiated. Patient clinically improved and completed course of osetamivir. Patient complained of right flank pain. On exam, patient was noted to have large bruise and firmness from right flank down to lower lateral aspect of abdomen and back. A CT of abdomen pelvis with IV contrast showed a large hematoma interposed between oblique muscles. Unknown what caused this hematoma. Per patient and , this is new. Patient is on coumadin for stroke prophylaxis as patient has 2 mechanical heart valves. Is also chronically thrombocytopenic (50s-60s in 2010; 80s-90s recently). Patient was given one unit of blood for acute blood loss anemia. Hematology consulted for recs on therapeutic INR levels. Recommend at least 2.5 (lowest allowed level for mechanical valves) per my conversation with Hematologist. Lovenox no longer required and coumadin held to reach goal INR. Repeat CT of abdomen showed improvement of hematoma. Patient continued to clinically improve and no longer with evidence of bleeding. Did require one more unit of blood but was stable afterwards. Weaned off nasal cannula. Did well with therapy. Discharged to resume home regimen of coumadin and INR was 2.2 at time of discharge. Home health set up. INR to be drawn by home health daily for at least 3 days to ensure INR remains at goal. Vitals by  as well along with therapy. Plan discussed with patient and family. Questions answered to satisfaction. Cardiac diet. Activity as tolerated. F/u with PCP at date shown below.      Currently:  I have reviewed and reconciled her discharge and current medications.   She has PT at home and is doing well.   She is feeling better.  No F/C/NS.  Appetite is still poor and feeling slight fatigued.  No additional bruising or bleeding.  Still having some R ankle swelling but it is improving overall.  She is  net negative 2# from her baseline weight.  She is coughing some still.  Having some pain with cough from the recent generator change to her pacemaker.        PAST MEDICAL HISTORY:  Past Medical History:   Diagnosis Date    Closed displaced fracture of distal phalanx of lesser toe 10/20/2015    Diabetes mellitus     Diabetes mellitus, type 2     Hypertension     Osteopenia     Pacemaker     Rheumatic heart disease        PAST SURGICAL HISTORY:  Past Surgical History:   Procedure Laterality Date    CARDIAC PACEMAKER PLACEMENT      CARDIAC VALVE REPLACEMENT      CARDIAC VALVE SURGERY      REPLACEMENT OF PACEMAKER GENERATOR N/A 3/2/2020    Procedure: REPLACEMENT, PACEMAKER GENERATOR;  Surgeon: Mark Ring MD;  Location: [facility] EP LAB;  Service: Cardiology;  Laterality: N/A;  [facility], Single PPM gen change, Right side, MDT, MAC, SK, 3 Prep    thryoid s         SOCIAL HISTORY:  Social History     Socioeconomic History    Marital status:      Spouse name: Not on file    Number of children: Not on file    Years of education: Not on file    Highest education level: Not on file   Occupational History    Not on file   Social Needs    Financial resource strain: Not on file    Food insecurity:     Worry: Not on file     Inability: Not on file    Transportation needs:     Medical: Not on file     Non-medical: Not on file   Tobacco Use    Smoking status: Never Smoker    Smokeless tobacco: Never Used   Substance and Sexual Activity    Alcohol use: No    Drug use: Never    Sexual activity: Not on file   Lifestyle    Physical activity:     Days per week: Not on file     Minutes per session: Not on file    Stress: Not on file   Relationships    Social connections:     Talks on phone: Not on file     Gets together: Not on file     Attends [employer] service: Not on file     Active member of club or organization: Not on file     Attends meetings of clubs or organizations: Not on file     Relationship  status: Not on file   Other Topics Concern    Not on file   Social History Narrative    Not on file       FAMILY HISTORY:  Family History   Problem Relation Age of Onset    Breast cancer Maternal Aunt     Colon cancer Neg Hx     Ovarian cancer Neg Hx        ALLERGIES AND MEDICATIONS: updated and reviewed.  Review of patient's allergies indicates:   Allergen Reactions    Aspirin Other (See Comments)     Current Outpatient Medications   Medication Sig Dispense Refill    B-COMPLEX WITH VITAMIN C ORAL       losartan (COZAAR) 50 MG tablet Take 1 tablet (50 mg total) by mouth once daily. 90 tablet 1    metFORMIN (GLUCOPHAGE) 500 MG tablet Take 500 mg by mouth once daily.      metoprolol tartrate (LOPRESSOR) 25 MG tablet TAKE 1 TABLET BY MOUTH TWICE DAILY 180 tablet 1    multivitamin-Ca-iron-minerals 27-0.4 mg Tab       omega-3 acid ethyl esters (LOVAZA) 1 gram capsule TK 2 CS PO BID  2    pravastatin (PRAVACHOL) 20 MG tablet Take 1 tablet (20 mg total) by mouth once daily. 90 tablet 1    warfarin (COUMADIN) 4 MG tablet TAKE 1 TABLET BY MOUTH ON MONDAY AND FRIDAY AND 1/2 TABLET BY MOUTH ON ALL OTHER DAYS 135 tablet 1    ACCU-CHEK GATO CONTROL SOLN Soln       adhesive bandage (BANDAGES/PLASTIC TOP)       benzonatate (TESSALON) 200 MG capsule Take 1 capsule (200 mg total) by mouth 3 (three) times daily as needed for Cough. 60 capsule 1    furosemide (LASIX) 20 MG tablet Take 1 tablet (20 mg total) by mouth daily as needed (Take one table daily for 3 days until weight back to normal. After that use as needed for weight gain greater than 3 pounds daily.). (Patient not taking: Reported on 3/12/2020) 30 tablet 5    lancets 30 gauge Misc       lancing device Misc       nitroGLYCERIN (NITROSTAT) 0.4 MG SL tablet DISSOLVE 1 TABLET UNDER THE TONGUE EVERY 5 MINUTES AS NEEDED FOR CHEST PAINS (Patient not taking: Reported on 3/12/2020) 275 tablet 0     No current facility-administered medications for this visit.   "    Facility-Administered Medications Ordered in Other Visits   Medication Dose Route Frequency Provider Last Rate Last Dose    0.9%  NaCl infusion   Intravenous Continuous Darlene Garcia NP   Stopped at 03/02/20 0926         ROS  Review of Systems   Constitutional: Positive for appetite change and fatigue. Negative for chills, diaphoresis, fever and unexpected weight change.   HENT: Negative for congestion, ear pain, hearing loss, rhinorrhea, sore throat and trouble swallowing.    Eyes: Negative for discharge, redness and visual disturbance.   Respiratory: Positive for cough. Negative for chest tightness, shortness of breath and wheezing.    Cardiovascular: Positive for chest pain (with cough at site of generator change) and leg swelling (RLE, see HPI). Negative for palpitations.   Gastrointestinal: Negative for abdominal pain, constipation, diarrhea, nausea and vomiting.   Endocrine: Negative for polydipsia, polyphagia and polyuria.   Genitourinary: Negative for decreased urine volume, dysuria and hematuria.   Musculoskeletal: Negative for arthralgias, joint swelling and myalgias.   Skin: Negative for color change and rash.   Neurological: Negative for dizziness, weakness, light-headedness and headaches.   Psychiatric/Behavioral: Negative for decreased concentration, dysphoric mood, sleep disturbance and suicidal ideas.           Physical Exam  Vitals:    03/12/20 1003   BP: 120/60   Pulse: 75   Temp: 97.9 °F (36.6 °C)   SpO2: 98%   Weight: 49.2 kg (108 lb 9.2 oz)   Height: 5' 1" (1.549 m)    Body mass index is 20.52 kg/m².  Weight: 49.2 kg (108 lb 9.2 oz)   Height: 5' 1" (154.9 cm)   Physical Exam   Constitutional: She is oriented to person, place, and time. She appears well-developed and well-nourished. No distress.   HENT:   Head: Normocephalic and atraumatic.   Eyes: Pupils are equal, round, and reactive to light. Conjunctivae, EOM and lids are normal. No scleral icterus.   Neck: Full passive range of " motion without pain. Neck supple. No JVD present. Carotid bruit is not present. No thyromegaly present.   Cardiovascular: Normal rate, regular rhythm, intact distal pulses and normal pulses. Exam reveals no S3, no S4 and no friction rub.   Murmur heard.  Pulmonary/Chest: Effort normal and breath sounds normal. She has no wheezes. She has no rhonchi. She has no rales.   Abdominal: Soft. Bowel sounds are normal. There is no tenderness.   Musculoskeletal: She exhibits edema (2+ pitting, RLE to the upper 2/3 of the leg). She exhibits no tenderness.   Neurological: She is alert and oriented to person, place, and time.   Motor grossly intact.  Sensory grossly intact.  Symmetric facial movements palate elevated symmetrically tongue midline   Skin: Skin is warm and dry. No rash noted.   Psychiatric: She has a normal mood and affect. Her speech is normal and behavior is normal. Thought content normal.           Health Maintenance       Date Due Completion Date    Shingles Vaccine (1 of 2) 07/21/1994 ---    Foot Exam 09/12/2019 9/12/2018    Hemoglobin A1c 08/03/2020 2/3/2020    Eye Exam 10/30/2020 10/30/2019    Lipid Panel 12/09/2020 12/9/2019    High Dose Statin 03/12/2021 3/12/2020    Colonoscopy 09/16/2021 9/16/2011    Override on 1/1/2010: Done    Override on 1/1/2006: Done (repeat in 10 years)    DEXA SCAN 06/17/2022 6/17/2019    Override on 8/4/2014: Done    TETANUS VACCINE 06/30/2026 6/30/2016 (Declined)    Override on 6/30/2016: Declined

## 2020-03-12 NOTE — PATIENT INSTRUCTIONS
I would recommend you use a compression stocking on the right lower leg.  Put it on in first thing in the morning and take it off when you go to bed or if you have pain

## 2020-03-16 ENCOUNTER — OFFICE VISIT (OUTPATIENT)
Dept: CARDIOLOGY | Facility: CLINIC | Age: 75
End: 2020-03-16
Payer: MEDICARE

## 2020-03-16 VITALS
DIASTOLIC BLOOD PRESSURE: 61 MMHG | SYSTOLIC BLOOD PRESSURE: 103 MMHG | RESPIRATION RATE: 16 BRPM | OXYGEN SATURATION: 96 % | BODY MASS INDEX: 20.79 KG/M2 | WEIGHT: 110 LBS | HEART RATE: 66 BPM

## 2020-03-16 DIAGNOSIS — Z95.2 S/P MVR (MITRAL VALVE REPLACEMENT): ICD-10-CM

## 2020-03-16 DIAGNOSIS — I25.10 CORONARY ARTERY DISEASE INVOLVING NATIVE CORONARY ARTERY OF NATIVE HEART WITHOUT ANGINA PECTORIS: ICD-10-CM

## 2020-03-16 DIAGNOSIS — I09.9 RHEUMATIC HEART DISEASE: Chronic | ICD-10-CM

## 2020-03-16 DIAGNOSIS — Z95.0 CARDIAC PACEMAKER IN SITU: ICD-10-CM

## 2020-03-16 DIAGNOSIS — I48.20 CHRONIC ATRIAL FIBRILLATION: Primary | Chronic | ICD-10-CM

## 2020-03-16 DIAGNOSIS — Z95.2 S/P AVR: ICD-10-CM

## 2020-03-16 DIAGNOSIS — I49.5 TACHY-BRADY SYNDROME: ICD-10-CM

## 2020-03-16 PROCEDURE — 99999 PR PBB SHADOW E&M-EST. PATIENT-LVL IV: ICD-10-PCS | Mod: PBBFAC,HCNC,, | Performed by: INTERNAL MEDICINE

## 2020-03-16 PROCEDURE — 3078F DIAST BP <80 MM HG: CPT | Mod: HCNC,CPTII,S$GLB, | Performed by: INTERNAL MEDICINE

## 2020-03-16 PROCEDURE — 1126F AMNT PAIN NOTED NONE PRSNT: CPT | Mod: HCNC,S$GLB,, | Performed by: INTERNAL MEDICINE

## 2020-03-16 PROCEDURE — 1126F PR PAIN SEVERITY QUANTIFIED, NO PAIN PRESENT: ICD-10-PCS | Mod: HCNC,S$GLB,, | Performed by: INTERNAL MEDICINE

## 2020-03-16 PROCEDURE — 99214 PR OFFICE/OUTPT VISIT, EST, LEVL IV, 30-39 MIN: ICD-10-PCS | Mod: HCNC,S$GLB,, | Performed by: INTERNAL MEDICINE

## 2020-03-16 PROCEDURE — 1101F PT FALLS ASSESS-DOCD LE1/YR: CPT | Mod: HCNC,CPTII,S$GLB, | Performed by: INTERNAL MEDICINE

## 2020-03-16 PROCEDURE — 1101F PR PT FALLS ASSESS DOC 0-1 FALLS W/OUT INJ PAST YR: ICD-10-PCS | Mod: HCNC,CPTII,S$GLB, | Performed by: INTERNAL MEDICINE

## 2020-03-16 PROCEDURE — 1159F PR MEDICATION LIST DOCUMENTED IN MEDICAL RECORD: ICD-10-PCS | Mod: HCNC,S$GLB,, | Performed by: INTERNAL MEDICINE

## 2020-03-16 PROCEDURE — 99999 PR PBB SHADOW E&M-EST. PATIENT-LVL IV: CPT | Mod: PBBFAC,HCNC,, | Performed by: INTERNAL MEDICINE

## 2020-03-16 PROCEDURE — 3074F SYST BP LT 130 MM HG: CPT | Mod: HCNC,CPTII,S$GLB, | Performed by: INTERNAL MEDICINE

## 2020-03-16 PROCEDURE — 3078F PR MOST RECENT DIASTOLIC BLOOD PRESSURE < 80 MM HG: ICD-10-PCS | Mod: HCNC,CPTII,S$GLB, | Performed by: INTERNAL MEDICINE

## 2020-03-16 PROCEDURE — 99214 OFFICE O/P EST MOD 30 MIN: CPT | Mod: HCNC,S$GLB,, | Performed by: INTERNAL MEDICINE

## 2020-03-16 PROCEDURE — 3074F PR MOST RECENT SYSTOLIC BLOOD PRESSURE < 130 MM HG: ICD-10-PCS | Mod: HCNC,CPTII,S$GLB, | Performed by: INTERNAL MEDICINE

## 2020-03-16 PROCEDURE — 1159F MED LIST DOCD IN RCRD: CPT | Mod: HCNC,S$GLB,, | Performed by: INTERNAL MEDICINE

## 2020-03-16 NOTE — PROGRESS NOTES
Subjective:    Patient ID:  Orion yT is a 74 y.o. female who presents for follow-up of Follow-up      HPI        Referred by Dr Grigsby for Medtronic PPM at [facility]  Patient requests to change back to my clinic  # [facility] AVR/MVR  # prosthetic AS, echo 12/2019 suggests prosthetic AVR stenosis, asymptomatic.  # Ao root dil 3.7cm (echo 4/2019)  # HTN, controlled  # Chr AF, on coumadin (goal INR 2.5-3.5 given [facility] valves)  # HLP on prava 40mg  # DM  # thrombocytopenia  # Medtronic PPM, normally functioning, Gen change 3/2/20     Her Medtronic pacemaker was interrogated in the office today.  Current rhythm is ventricular sensing at 60 beats per minute with programmed mode VVI at 65 beats per minute.  Sensing and pacing thresholds as well as impedance are normal.  [facility] was detected on September 19, 2019.  I have discussed the case with Dr. Boateng and he will meet with the patient tomorrow to schedule elective generator change.  The patient is not pacemaker dependent.     L MPI 12/10/19    The perfusion scan is free of evidence from myocardial ischemia or injury.    There is a moderate to severe intensity defect in the anterolateral wall of the left ventricle, secondary to breast attenuation.    Gated perfusion images showed an ejection fraction of 58 %.    The EKG portion of this study is uninterpretable.    The patient reported no chest pain during the stress test.    Arrhythmias during stress: rare PVCs.    Abnormal septal motion consistent with pacemaker.     Echo 2/2/20  · Normal left ventricular systolic function. The estimated ejection fraction is 55-60%.  · Mild concentric left ventricular hypertrophy.  · Septal wall has abnormal motion with normal thickening consistent with post-operative status.  · Mild right ventricular enlargement.  · Low normal right ventricular systolic function.  · There is a bileaflet tilting disc mechanical aortic valve present. There is no aortic insufficiency present. Cannot  exclude some degree of prsothetic AS (not severe).  · There is a bileaflet mechanical mitral valve prosthesis. Prosthetic mitral valve is normal.  · Mild tricuspid regurgitation.  · The estimated PA systolic pressure is 52 mmHg.  · Pulmonary hypertension present.     1/23/20 Denies CP or SOB  On coumadin for mechanical AVR/MVR and A-fib  Will refer to EP at [facility] to see in PPM generator change can be done without interrupting coumadin  F/U with me prn    Saw Dr Ring 1/27/20  Device at [facility].  Risks and benefits of generator change discussed with patient.  Continue coumadin veronika-procedure, targeting and INR of 2-3.    Admitted 2/1/20  Orion Ty is a 74 y.o. female that (in part)  has a past medical history of Closed displaced fracture of distal phalanx of lesser toe, Diabetes mellitus, Diabetes mellitus, type 2, Hypertension, Osteopenia, Pacemaker, and Rheumatic heart disease.  has a past surgical history that includes Cardiac valve replacement; thryoid s; Cardiac pacemaker placement; and Cardiac valuve replacement. Presents to [facility] complaining of shortness of breath.  EMS was activated due to worsening shortness of breath and lethargy.  Unable to ambulate independently, which is not normal for her.  She was febrile to 103° at home and is having difficulty taking deep breaths.  History of significant cardiac disease and heart valve replacement due to rheumatic heart valve disease. History of CAD and MI.  Denied chest pain, cyanosis, palpitations, or syncope.  Positive for peripheral edema.  Positive for nonproductive cough.     In the emergency department routine laboratory studies, chest x-ray, EKG, and cardiac enzymes were obtained.  There was evidence of acute CHF exacerbation with pulmonary edema, trace edema, and hypoxemia.  Influenza A serology was also positive.    Ms Ty presented with sepsis, acute respiratory failure with hypoxia (88% at room air,  per ED documentation) and acute encephalopathy secondary to influenza A infection. Patient was given isotonic fluids, oseltamivir and supplemental O2. Droplet precautions initiated. Patient clinically improved and completed course of osetamivir. Patient complained of right flank pain. On exam, patient was noted to have large bruise and firmness from right flank down to lower lateral aspect of abdomen and back. A CT of abdomen pelvis with IV contrast showed a large hematoma interposed between oblique muscles. Unknown what caused this hematoma. Per patient and , this is new. Patient is on coumadin for stroke prophylaxis as patient has 2 mechanical heart valves. Is also chronically thrombocytopenic (50s-60s in 2010; 80s-90s recently). Patient was given one unit of blood for acute blood loss anemia. Hematology consulted for recs on therapeutic INR levels. Recommend at least 2.5 (lowest allowed level for mechanical valves) per my conversation with Hematologist. Lovenox no longer required and coumadin held to reach goal INR. Repeat CT of abdomen showed improvement of hematoma. Patient continued to clinically improve and no longer with evidence of bleeding. Did require one more unit of blood but was stable afterwards. Weaned off nasal cannula. Did well with therapy. Discharged to resume home regimen of coumadin and INR was 2.2 at time of discharge. Home health set up. INR to be drawn by home health daily for at least 3 days to ensure INR remains at goal. Vitals by  as well along with therapy. Plan discussed with patient and family. Questions answered to satisfaction. Cardiac diet. Activity as tolerated. F/u with PCP at date shown below.       3/16/20 had PPM gen change 3/2/20   Feels better. Less SOB  Denies CP    Review of Systems   Constitution: Negative for decreased appetite.   HENT: Negative for ear discharge.    Eyes: Negative for blurred vision.   Respiratory: Negative for hemoptysis.    Endocrine: Negative  for polyphagia.   Hematologic/Lymphatic: Negative for adenopathy.   Skin: Negative for color change.   Musculoskeletal: Negative for joint swelling.   Genitourinary: Negative for bladder incontinence.   Neurological: Negative for brief paralysis.   Psychiatric/Behavioral: Negative for hallucinations.   Allergic/Immunologic: Negative for hives.        Objective:    Physical Exam   Constitutional: She is oriented to person, place, and time. She appears well-developed and well-nourished.   Eyes: Conjunctivae are normal. No scleral icterus.   Neck: No JVD present. No tracheal deviation present.   Cardiovascular: Normal rate. An irregularly irregular rhythm present. PMI is not displaced.   Pulmonary/Chest: Effort normal and breath sounds normal. No respiratory distress.   Abdominal: Soft. There is no hepatosplenomegaly. There is no tenderness.   Musculoskeletal: She exhibits no edema or tenderness.   Neurological: She is alert and oriented to person, place, and time.   Skin: Skin is warm and dry. No rash noted.   Psychiatric: She has a normal mood and affect. Her behavior is normal.   PPM site C/D/I some bruising and puffiness but no drainage or erythema      Assessment:       1. Chronic atrial fibrillation    2. Rheumatic heart disease    3. S/P AVR    4. S/P MVR (mitral valve replacement)    5. Tachy-gail syndrome    6. Coronary artery disease involving native coronary artery of native heart without angina pectoris    7. Cardiac pacemaker in situ         Plan:       Cardiac stable  OV 3 months with Medtronic PPM check

## 2020-03-24 ENCOUNTER — ANTI-COAG VISIT (OUTPATIENT)
Dept: CARDIOLOGY | Facility: CLINIC | Age: 75
End: 2020-03-24
Payer: MEDICARE

## 2020-03-24 DIAGNOSIS — Z79.01 LONG TERM CURRENT USE OF ANTICOAGULANT: ICD-10-CM

## 2020-03-24 DIAGNOSIS — Z95.2 S/P AVR: ICD-10-CM

## 2020-03-24 DIAGNOSIS — Z95.2 S/P MVR (MITRAL VALVE REPLACEMENT): ICD-10-CM

## 2020-03-24 DIAGNOSIS — I48.20 CHRONIC ATRIAL FIBRILLATION: ICD-10-CM

## 2020-03-24 LAB — INR PPP: 2

## 2020-03-24 PROCEDURE — 93793 PR ANTICOAGULANT MGMT FOR PT TAKING WARFARIN: ICD-10-PCS | Mod: S$GLB,,, | Performed by: PHARMACIST

## 2020-03-24 PROCEDURE — 93793 ANTICOAG MGMT PT WARFARIN: CPT | Mod: S$GLB,,, | Performed by: PHARMACIST

## 2020-03-24 NOTE — PROGRESS NOTES
confirmed pts taking correct dose of coumadin  Reports eating two serving of greens (cabbage & broccoli) & also will drink a Boost  NO other changes

## 2020-03-27 ENCOUNTER — TELEPHONE (OUTPATIENT)
Dept: FAMILY MEDICINE | Facility: CLINIC | Age: 75
End: 2020-03-27

## 2020-03-27 NOTE — TELEPHONE ENCOUNTER
Spoke with pt he states that he is getting the darwin installed to neda a virtual appt informed pt to contact ofc after it is done , we will neda pt for the virtual appt

## 2020-03-27 NOTE — TELEPHONE ENCOUNTER
"----- Message from Isa Bass sent at 3/27/2020  9:58 AM CDT -----  Contact: Cristobal "" [phone]  .Type: Patient Call Back    Who called: Cristobal ""    What is the request in detail: Pt's  stated that he noticed over the last few weeks the pt is having some swelling in her stomach and in her R ankle     Can the clinic reply by [facility]? Call back     Would the patient rather a call back or a response via [facility]? Call back     Best call back number: [phone]        "

## 2020-04-02 RX ORDER — BLOOD GLUCOSE CONTROL HIGH,LOW
EACH MISCELLANEOUS
Qty: 1 EACH | Refills: 6 | Status: SHIPPED | OUTPATIENT
Start: 2020-04-02 | End: 2021-01-01

## 2020-04-02 RX ORDER — ISOPROPYL ALCOHOL 70 ML/100ML
1 SWAB TOPICAL
Qty: 400 EACH | Refills: 11 | Status: SHIPPED | OUTPATIENT
Start: 2020-04-02 | End: 2020-12-18

## 2020-04-07 ENCOUNTER — ANTI-COAG VISIT (OUTPATIENT)
Dept: CARDIOLOGY | Facility: CLINIC | Age: 75
End: 2020-04-07
Payer: MEDICARE

## 2020-04-07 DIAGNOSIS — Z95.2 S/P MVR (MITRAL VALVE REPLACEMENT): ICD-10-CM

## 2020-04-07 DIAGNOSIS — I48.20 CHRONIC ATRIAL FIBRILLATION: ICD-10-CM

## 2020-04-07 DIAGNOSIS — Z95.2 S/P AVR: ICD-10-CM

## 2020-04-07 DIAGNOSIS — Z79.01 LONG TERM CURRENT USE OF ANTICOAGULANT: ICD-10-CM

## 2020-04-07 LAB — INR PPP: 2.1

## 2020-04-07 PROCEDURE — 93793 ANTICOAG MGMT PT WARFARIN: CPT | Mod: S$GLB,,, | Performed by: PHARMACIST

## 2020-04-07 PROCEDURE — 93793 PR ANTICOAGULANT MGMT FOR PT TAKING WARFARIN: ICD-10-PCS | Mod: S$GLB,,, | Performed by: PHARMACIST

## 2020-04-07 NOTE — PROGRESS NOTES
confirmed pt took coumadin 4mg on 3/24 only;  2mg all other days   Kale on 4/6; drinks boost twice a week  NO other changes

## 2020-04-21 ENCOUNTER — PATIENT MESSAGE (OUTPATIENT)
Dept: FAMILY MEDICINE | Facility: CLINIC | Age: 75
End: 2020-04-21

## 2020-04-21 ENCOUNTER — ANTI-COAG VISIT (OUTPATIENT)
Dept: CARDIOLOGY | Facility: CLINIC | Age: 75
End: 2020-04-21
Payer: MEDICARE

## 2020-04-21 DIAGNOSIS — Z79.01 LONG TERM CURRENT USE OF ANTICOAGULANT: ICD-10-CM

## 2020-04-21 DIAGNOSIS — Z95.2 S/P AVR: ICD-10-CM

## 2020-04-21 DIAGNOSIS — Z95.2 S/P MVR (MITRAL VALVE REPLACEMENT): ICD-10-CM

## 2020-04-21 DIAGNOSIS — I48.20 CHRONIC ATRIAL FIBRILLATION: ICD-10-CM

## 2020-04-21 LAB — INR PPP: 2.2

## 2020-04-21 PROCEDURE — 93793 PR ANTICOAGULANT MGMT FOR PT TAKING WARFARIN: ICD-10-PCS | Mod: S$GLB,,, | Performed by: PHARMACIST

## 2020-04-21 PROCEDURE — 93793 ANTICOAG MGMT PT WARFARIN: CPT | Mod: S$GLB,,, | Performed by: PHARMACIST

## 2020-04-21 NOTE — PROGRESS NOTES
confirmed pts taking correct dose of coumadin.   Reports pt drinking Boost 3-4xs week; cabbage recently  NO other new

## 2020-04-28 ENCOUNTER — ANTI-COAG VISIT (OUTPATIENT)
Dept: CARDIOLOGY | Facility: CLINIC | Age: 75
End: 2020-04-28
Payer: MEDICARE

## 2020-04-28 DIAGNOSIS — I48.20 CHRONIC ATRIAL FIBRILLATION: ICD-10-CM

## 2020-04-28 DIAGNOSIS — Z95.2 S/P AVR: ICD-10-CM

## 2020-04-28 DIAGNOSIS — Z95.2 S/P MVR (MITRAL VALVE REPLACEMENT): ICD-10-CM

## 2020-04-28 DIAGNOSIS — Z79.01 LONG TERM CURRENT USE OF ANTICOAGULANT: ICD-10-CM

## 2020-04-28 LAB — INR PPP: 2.8

## 2020-04-28 PROCEDURE — 93793 PR ANTICOAGULANT MGMT FOR PT TAKING WARFARIN: ICD-10-PCS | Mod: S$GLB,,, | Performed by: PHARMACIST

## 2020-04-28 PROCEDURE — 93793 ANTICOAG MGMT PT WARFARIN: CPT | Mod: S$GLB,,, | Performed by: PHARMACIST

## 2020-04-28 NOTE — PROGRESS NOTES
called regarding lab result, verified coumadin dosage correctly, reports no changes,  was advised to have Patient continue same dose of coumadin and given next self test date, verbalized understanding

## 2020-05-12 ENCOUNTER — ANTI-COAG VISIT (OUTPATIENT)
Dept: CARDIOLOGY | Facility: CLINIC | Age: 75
End: 2020-05-12
Payer: MEDICARE

## 2020-05-12 DIAGNOSIS — Z95.2 S/P MVR (MITRAL VALVE REPLACEMENT): ICD-10-CM

## 2020-05-12 DIAGNOSIS — Z79.01 LONG TERM CURRENT USE OF ANTICOAGULANT: ICD-10-CM

## 2020-05-12 DIAGNOSIS — I48.20 CHRONIC ATRIAL FIBRILLATION: ICD-10-CM

## 2020-05-12 DIAGNOSIS — Z95.2 S/P AVR: ICD-10-CM

## 2020-05-12 LAB — INR PPP: 3.4

## 2020-05-12 PROCEDURE — 93793 ANTICOAG MGMT PT WARFARIN: CPT | Mod: S$GLB,,, | Performed by: PHARMACIST

## 2020-05-12 PROCEDURE — 93793 PR ANTICOAGULANT MGMT FOR PT TAKING WARFARIN: ICD-10-PCS | Mod: S$GLB,,, | Performed by: PHARMACIST

## 2020-05-21 NOTE — PROGRESS NOTES
5/21/20 Called pt and spoke to  regarding miss testing on 5/19/20,  reports that Dr. Boateng gave ok to test every two weeks and is due to test next Tuesday May 26, 2020.

## 2020-05-26 ENCOUNTER — ANTI-COAG VISIT (OUTPATIENT)
Dept: CARDIOLOGY | Facility: CLINIC | Age: 75
End: 2020-05-26
Payer: MEDICARE

## 2020-05-26 DIAGNOSIS — Z95.2 S/P AVR: ICD-10-CM

## 2020-05-26 DIAGNOSIS — Z79.01 LONG TERM CURRENT USE OF ANTICOAGULANT: ICD-10-CM

## 2020-05-26 DIAGNOSIS — I48.20 CHRONIC ATRIAL FIBRILLATION: ICD-10-CM

## 2020-05-26 DIAGNOSIS — Z95.2 S/P MVR (MITRAL VALVE REPLACEMENT): ICD-10-CM

## 2020-05-26 LAB — INR PPP: 3.3

## 2020-05-26 PROCEDURE — 93793 ANTICOAG MGMT PT WARFARIN: CPT | Mod: S$GLB,,,

## 2020-05-26 PROCEDURE — 93793 PR ANTICOAGULANT MGMT FOR PT TAKING WARFARIN: ICD-10-PCS | Mod: S$GLB,,,

## 2020-05-31 ENCOUNTER — CLINICAL SUPPORT (OUTPATIENT)
Dept: CARDIOLOGY | Facility: HOSPITAL | Age: 75
End: 2020-05-31
Payer: MEDICARE

## 2020-05-31 PROCEDURE — 93294 REM INTERROG EVL PM/LDLS PM: CPT | Mod: ,,, | Performed by: INTERNAL MEDICINE

## 2020-05-31 PROCEDURE — 93296 REM INTERROG EVL PM/IDS: CPT | Mod: HCNC | Performed by: INTERNAL MEDICINE

## 2020-05-31 PROCEDURE — 93294 CARDIAC DEVICE CHECK - REMOTE: ICD-10-PCS | Mod: ,,, | Performed by: INTERNAL MEDICINE

## 2020-06-09 ENCOUNTER — ANTI-COAG VISIT (OUTPATIENT)
Dept: CARDIOLOGY | Facility: CLINIC | Age: 75
End: 2020-06-09
Payer: MEDICARE

## 2020-06-09 DIAGNOSIS — I48.20 CHRONIC ATRIAL FIBRILLATION: ICD-10-CM

## 2020-06-09 DIAGNOSIS — Z95.2 S/P MVR (MITRAL VALVE REPLACEMENT): ICD-10-CM

## 2020-06-09 DIAGNOSIS — Z95.2 S/P AVR: ICD-10-CM

## 2020-06-09 DIAGNOSIS — Z79.01 LONG TERM CURRENT USE OF ANTICOAGULANT: ICD-10-CM

## 2020-06-09 LAB — INR PPP: 3.4

## 2020-06-09 PROCEDURE — 93793 PR ANTICOAGULANT MGMT FOR PT TAKING WARFARIN: ICD-10-PCS | Mod: S$GLB,,, | Performed by: PHARMACIST

## 2020-06-09 PROCEDURE — 93793 ANTICOAG MGMT PT WARFARIN: CPT | Mod: S$GLB,,, | Performed by: PHARMACIST

## 2020-06-11 ENCOUNTER — TELEPHONE (OUTPATIENT)
Dept: FAMILY MEDICINE | Facility: CLINIC | Age: 75
End: 2020-06-11

## 2020-06-11 NOTE — TELEPHONE ENCOUNTER
----- Message from Kasia Mcdaniel MA sent at 6/11/2020  8:30 AM CDT -----  Contact: Orion [phone]      ----- Message -----  From: Sydnie Harrington  Sent: 6/11/2020   7:07 AM CDT  To: Mesha Berg Staff    Type: Patient Call Back    Who called:Orion    What is the request in detail: Patient would like an order for covid19. She is schedule for tomorrow but wants to get tested. Patient is not showing any symptoms.    Can the clinic reply by [facility]?no    Would the patient rather a call back or a response via [facility]? Call back     Best call back number:[phone]

## 2020-06-12 ENCOUNTER — OFFICE VISIT (OUTPATIENT)
Dept: FAMILY MEDICINE | Facility: CLINIC | Age: 75
End: 2020-06-12
Payer: MEDICARE

## 2020-06-12 VITALS
TEMPERATURE: 97 F | DIASTOLIC BLOOD PRESSURE: 100 MMHG | BODY MASS INDEX: 19.15 KG/M2 | OXYGEN SATURATION: 98 % | HEIGHT: 61 IN | WEIGHT: 101.44 LBS | SYSTOLIC BLOOD PRESSURE: 150 MMHG | HEART RATE: 81 BPM

## 2020-06-12 DIAGNOSIS — E78.2 MIXED HYPERLIPIDEMIA: Chronic | ICD-10-CM

## 2020-06-12 DIAGNOSIS — E11.29 TYPE 2 DIABETES MELLITUS WITH MICROALBUMINURIA, WITHOUT LONG-TERM CURRENT USE OF INSULIN: Chronic | ICD-10-CM

## 2020-06-12 DIAGNOSIS — I10 ESSENTIAL (PRIMARY) HYPERTENSION: Chronic | ICD-10-CM

## 2020-06-12 DIAGNOSIS — R80.9 TYPE 2 DIABETES MELLITUS WITH MICROALBUMINURIA, WITHOUT LONG-TERM CURRENT USE OF INSULIN: Chronic | ICD-10-CM

## 2020-06-12 DIAGNOSIS — G44.209 ACUTE NON INTRACTABLE TENSION-TYPE HEADACHE: Primary | ICD-10-CM

## 2020-06-12 DIAGNOSIS — Z20.822 SUSPECTED COVID-19 VIRUS INFECTION: ICD-10-CM

## 2020-06-12 PROBLEM — J10.1 INFLUENZA A: Status: RESOLVED | Noted: 2020-02-02 | Resolved: 2020-06-12

## 2020-06-12 PROBLEM — E87.70 VOLUME OVERLOAD: Status: RESOLVED | Noted: 2019-12-09 | Resolved: 2020-06-12

## 2020-06-12 PROCEDURE — 3080F DIAST BP >= 90 MM HG: CPT | Mod: HCNC,CPTII,S$GLB, | Performed by: INTERNAL MEDICINE

## 2020-06-12 PROCEDURE — 99499 RISK ADDL DX/OHS AUDIT: ICD-10-PCS | Mod: HCNC,S$GLB,, | Performed by: INTERNAL MEDICINE

## 2020-06-12 PROCEDURE — 1159F MED LIST DOCD IN RCRD: CPT | Mod: HCNC,S$GLB,, | Performed by: INTERNAL MEDICINE

## 2020-06-12 PROCEDURE — 1126F AMNT PAIN NOTED NONE PRSNT: CPT | Mod: HCNC,S$GLB,, | Performed by: INTERNAL MEDICINE

## 2020-06-12 PROCEDURE — 99214 PR OFFICE/OUTPT VISIT, EST, LEVL IV, 30-39 MIN: ICD-10-PCS | Mod: HCNC,S$GLB,, | Performed by: INTERNAL MEDICINE

## 2020-06-12 PROCEDURE — 1101F PT FALLS ASSESS-DOCD LE1/YR: CPT | Mod: HCNC,CPTII,S$GLB, | Performed by: INTERNAL MEDICINE

## 2020-06-12 PROCEDURE — 1159F PR MEDICATION LIST DOCUMENTED IN MEDICAL RECORD: ICD-10-PCS | Mod: HCNC,S$GLB,, | Performed by: INTERNAL MEDICINE

## 2020-06-12 PROCEDURE — 99499 UNLISTED E&M SERVICE: CPT | Mod: HCNC,S$GLB,, | Performed by: INTERNAL MEDICINE

## 2020-06-12 PROCEDURE — 3077F PR MOST RECENT SYSTOLIC BLOOD PRESSURE >= 140 MM HG: ICD-10-PCS | Mod: HCNC,CPTII,S$GLB, | Performed by: INTERNAL MEDICINE

## 2020-06-12 PROCEDURE — 99999 PR PBB SHADOW E&M-EST. PATIENT-LVL III: ICD-10-PCS | Mod: PBBFAC,HCNC,, | Performed by: INTERNAL MEDICINE

## 2020-06-12 PROCEDURE — 3044F PR MOST RECENT HEMOGLOBIN A1C LEVEL <7.0%: ICD-10-PCS | Mod: HCNC,CPTII,S$GLB, | Performed by: INTERNAL MEDICINE

## 2020-06-12 PROCEDURE — 99999 PR PBB SHADOW E&M-EST. PATIENT-LVL III: CPT | Mod: PBBFAC,HCNC,, | Performed by: INTERNAL MEDICINE

## 2020-06-12 PROCEDURE — 3044F HG A1C LEVEL LT 7.0%: CPT | Mod: HCNC,CPTII,S$GLB, | Performed by: INTERNAL MEDICINE

## 2020-06-12 PROCEDURE — 3077F SYST BP >= 140 MM HG: CPT | Mod: HCNC,CPTII,S$GLB, | Performed by: INTERNAL MEDICINE

## 2020-06-12 PROCEDURE — 1101F PR PT FALLS ASSESS DOC 0-1 FALLS W/OUT INJ PAST YR: ICD-10-PCS | Mod: HCNC,CPTII,S$GLB, | Performed by: INTERNAL MEDICINE

## 2020-06-12 PROCEDURE — 3080F PR MOST RECENT DIASTOLIC BLOOD PRESSURE >= 90 MM HG: ICD-10-PCS | Mod: HCNC,CPTII,S$GLB, | Performed by: INTERNAL MEDICINE

## 2020-06-12 PROCEDURE — 1126F PR PAIN SEVERITY QUANTIFIED, NO PAIN PRESENT: ICD-10-PCS | Mod: HCNC,S$GLB,, | Performed by: INTERNAL MEDICINE

## 2020-06-12 PROCEDURE — 99214 OFFICE O/P EST MOD 30 MIN: CPT | Mod: HCNC,S$GLB,, | Performed by: INTERNAL MEDICINE

## 2020-06-12 RX ORDER — BLOOD SUGAR DIAGNOSTIC
STRIP MISCELLANEOUS
COMMUNITY
Start: 2020-04-22 | End: 2021-01-01

## 2020-06-12 RX ORDER — AMLODIPINE BESYLATE 10 MG/1
10 TABLET ORAL DAILY
Qty: 90 TABLET | Refills: 1 | Status: SHIPPED | OUTPATIENT
Start: 2020-06-12 | End: 2020-10-28

## 2020-06-12 RX ORDER — PRAVASTATIN SODIUM 20 MG/1
20 TABLET ORAL DAILY
Qty: 90 TABLET | Refills: 1 | Status: SHIPPED | OUTPATIENT
Start: 2020-06-12 | End: 2021-01-01

## 2020-06-12 RX ORDER — METFORMIN HYDROCHLORIDE 500 MG/1
500 TABLET ORAL DAILY
Qty: 90 TABLET | Refills: 1 | Status: SHIPPED | OUTPATIENT
Start: 2020-06-12 | End: 2020-07-06 | Stop reason: SDUPTHER

## 2020-06-12 RX ORDER — AMLODIPINE BESYLATE 10 MG/1
10 TABLET ORAL DAILY
Qty: 30 TABLET | Refills: 0 | Status: SHIPPED | OUTPATIENT
Start: 2020-06-12 | End: 2020-07-12

## 2020-06-12 RX ORDER — LOSARTAN POTASSIUM 50 MG/1
50 TABLET ORAL DAILY
Qty: 90 TABLET | Refills: 1 | Status: SHIPPED | OUTPATIENT
Start: 2020-06-12 | End: 2020-10-28

## 2020-06-12 NOTE — PROGRESS NOTES
Assessment & Plan  Problem List Items Addressed This Visit        Cardiac/Vascular    Mixed hyperlipidemia (Chronic)    Current Assessment & Plan     The current medical regimen is effective;  continue present plan and medications.          Relevant Medications    pravastatin (PRAVACHOL) 20 MG tablet    Essential (primary) hypertension (Chronic)    Current Assessment & Plan     Restart amlodipine.  Tolerated this well in the past but it was stopped due to lower BPs.  Monitor for need to reduce to 5mg.           Relevant Medications    losartan (COZAAR) 50 MG tablet    amLODIPine (NORVASC) 10 MG tablet    amLODIPine (NORVASC) 10 MG tablet       Endocrine    Type 2 diabetes mellitus with microalbuminuria, without long-term current use of insulin (Chronic)    Current Assessment & Plan     The current medical regimen is effective;  continue present plan and medications.          Relevant Medications    metFORMIN (GLUCOPHAGE) 500 MG tablet      Other Visit Diagnoses     Acute non intractable tension-type headache    -  Primary  -    Suspect due to elevated BPs.  Ok for tylenol.      Suspected Covid-19 Virus Infection    -  Check Ab with next lab draw.  Low suspicion that her HAs are COVID related.     Relevant Orders    COVID-19 (SARS CoV-2) IgG Antibody            Health Maintenance reviewed, foot exam at follow up.    Follow-up: Follow up pending labs.  Will need BP follow up.  ______________________________________________________________________    Chief Complaint  Chief Complaint   Patient presents with    Headache       HPI  Orion Ty is a 74 y.o. female with medical diagnoses as listed in the medical history and problem list that presents for HA x 3 days and weight loss.  Pt is known to me with her last appointment 3/12/2020.      She has been having some loss of appetite and weight loss since the hospitalization in March.   She is noted 10 pounds down since LOV.  Appetite down.  No abdominal pain,  N/V/C/D/BRBPR/melena.  Only eating a tablespoon of rice at a time.  Some soup.  She has taken Boost a few times.      HA has been for 3-4 days.  + phonophobia.  No photophobia.  Pain is mid frontal.  No throbbing.  Not continuous.  No HA right now.  It is a bit better today.  No associated F/C, SOB/coough, loss of taste/smell, diarrhea.      Home sugar log is ok.  Her home BP log is high.  She has been off of her amlodipine for some time now.       PAST MEDICAL HISTORY:  Past Medical History:   Diagnosis Date    Closed displaced fracture of distal phalanx of lesser toe 10/20/2015    Diabetes mellitus     Diabetes mellitus, type 2     Hypertension     Osteopenia     Pacemaker     Rheumatic heart disease        PAST SURGICAL HISTORY:  Past Surgical History:   Procedure Laterality Date    CARDIAC PACEMAKER PLACEMENT      CARDIAC VALVE REPLACEMENT      CARDIAC VALVE SURGERY      REPLACEMENT OF PACEMAKER GENERATOR N/A 3/2/2020    Procedure: REPLACEMENT, PACEMAKER GENERATOR;  Surgeon: Mark Ring MD;  Location: [facility];  Service: Cardiology;  Laterality: N/A;  TBS/CHRISTI, Single PPM gen change, Right side, MDT, MAC, SK, 3 Prep    thryoid s         SOCIAL HISTORY:  Social History     Socioeconomic History    Marital status:      Spouse name: Not on file    Number of children: Not on file    Years of education: Not on file    Highest education level: Not on file   Occupational History    Not on file   Social Needs    Financial resource strain: Not on file    Food insecurity:     Worry: Not on file     Inability: Not on file    Transportation needs:     Medical: Not on file     Non-medical: Not on file   Tobacco Use    Smoking status: Never Smoker    Smokeless tobacco: Never Used   Substance and Sexual Activity    Alcohol use: No    Drug use: Never    Sexual activity: Not on file   Lifestyle    Physical activity:     Days per week: Not on file     Minutes per session: Not on file     Stress: Not on file   Relationships    Social connections:     Talks on phone: Not on file     Gets together: Not on file     Attends Anabaptist service: Not on file     Active member of club or organization: Not on file     Attends meetings of clubs or organizations: Not on file     Relationship status: Not on file   Other Topics Concern    Not on file   Social History Narrative    Not on file       FAMILY HISTORY:  Family History   Problem Relation Age of Onset    Breast cancer Maternal Aunt     Colon cancer Neg Hx     Ovarian cancer Neg Hx        ALLERGIES AND MEDICATIONS: updated and reviewed.  Review of patient's allergies indicates:   Allergen Reactions    Aspirin Other (See Comments)     Current Outpatient Medications   Medication Sig Dispense Refill    B-COMPLEX WITH VITAMIN C ORAL       furosemide (LASIX) 20 MG tablet Take 1 tablet (20 mg total) by mouth daily as needed (Take one table daily for 3 days until weight back to normal. After that use as needed for weight gain greater than 3 pounds daily.). 30 tablet 5    losartan (COZAAR) 50 MG tablet Take 1 tablet (50 mg total) by mouth once daily. 90 tablet 1    metFORMIN (GLUCOPHAGE) 500 MG tablet Take 1 tablet (500 mg total) by mouth once daily. 90 tablet 1    metoprolol tartrate (LOPRESSOR) 25 MG tablet TAKE 1 TABLET BY MOUTH TWICE DAILY 180 tablet 1    multivitamin-Ca-iron-minerals 27-0.4 mg Tab       omega-3 acid ethyl esters (LOVAZA) 1 gram capsule TK 2 CS PO BID  2    pravastatin (PRAVACHOL) 20 MG tablet Take 1 tablet (20 mg total) by mouth once daily. 90 tablet 1    warfarin (COUMADIN) 4 MG tablet TAKE 1 TABLET BY MOUTH ON MONDAY AND FRIDAY AND 1/2 TABLET BY MOUTH ON ALL OTHER DAYS 135 tablet 1    ACCU-CHEK GATO CONTROL SOLN Soln Use as directed to perform controls on meter (Patient not taking: Reported on 6/12/2020) 1 each 6    ACCU-CHEK GATO PLUS TEST STRP Strp       adhesive bandage (BANDAGES/PLASTIC TOP)       alcohol swabs (BD  ALCOHOL SWABS) PadM Apply 1 each topically as needed (prior to finger sticks). (Patient not taking: Reported on 6/12/2020) 400 each 11    amLODIPine (NORVASC) 10 MG tablet Take 1 tablet (10 mg total) by mouth once daily. 30 tablet 0    amLODIPine (NORVASC) 10 MG tablet Take 1 tablet (10 mg total) by mouth once daily. 90 tablet 1    lancets 30 gauge Misc       lancing device Misc       nitroGLYCERIN (NITROSTAT) 0.4 MG SL tablet DISSOLVE 1 TABLET UNDER THE TONGUE EVERY 5 MINUTES AS NEEDED FOR CHEST PAINS (Patient not taking: Reported on 6/12/2020) 275 tablet 0     No current facility-administered medications for this visit.      Facility-Administered Medications Ordered in Other Visits   Medication Dose Route Frequency Provider Last Rate Last Dose    0.9%  NaCl infusion   Intravenous Continuous Darlene Garcia NP   Stopped at 03/02/20 0926         ROS  Review of Systems   Constitutional: Positive for appetite change and unexpected weight change. Negative for chills and fever.   HENT: Negative for congestion, ear pain, hearing loss, rhinorrhea, sore throat and trouble swallowing.    Eyes: Negative for discharge, redness and visual disturbance.   Respiratory: Negative for cough, chest tightness, shortness of breath and wheezing.    Cardiovascular: Negative for chest pain, palpitations and leg swelling.   Gastrointestinal: Negative for abdominal pain, constipation, diarrhea, nausea and vomiting.   Endocrine: Negative for polydipsia, polyphagia and polyuria.   Genitourinary: Negative for decreased urine volume, dysuria and hematuria.   Musculoskeletal: Negative for arthralgias, joint swelling and myalgias.   Skin: Negative for color change and rash.   Neurological: Positive for headaches. Negative for dizziness, weakness and light-headedness.   Psychiatric/Behavioral: Negative for decreased concentration, dysphoric mood, sleep disturbance and suicidal ideas.           Physical Exam  Vitals:    06/12/20 1005  "  BP: (!) 150/100   Pulse: 81   Temp: 97 °F (36.1 °C)   SpO2: 98%   Weight: 46 kg (101 lb 6.6 oz)   Height: 5' 1" (1.549 m)    Body mass index is 19.16 kg/m².  Weight: 46 kg (101 lb 6.6 oz)   Height: 5' 1" (154.9 cm)   Physical Exam   Constitutional: She is oriented to person, place, and time. She appears well-developed and well-nourished. No distress.   HENT:   Head: Normocephalic and atraumatic.   Eyes: Pupils are equal, round, and reactive to light. Conjunctivae, EOM and lids are normal. No scleral icterus.   Neck: Full passive range of motion without pain. Neck supple. No JVD present. Carotid bruit is not present. No thyromegaly present.   Cardiovascular: Normal rate, regular rhythm, intact distal pulses and normal pulses. Exam reveals no S3, no S4 and no friction rub.   Murmur (with mechanical valce sounds noted as well) heard.  Pulmonary/Chest: Effort normal and breath sounds normal. She has no wheezes. She has no rhonchi. She has no rales.   Abdominal: Soft. Bowel sounds are normal. There is no tenderness.   Musculoskeletal: She exhibits no edema or tenderness.   Neurological: She is alert and oriented to person, place, and time.   Motor grossly intact.  Sensory grossly intact.  Symmetric facial movements palate elevated symmetrically tongue midline   Skin: Skin is warm and dry. No rash noted.   Psychiatric: She has a normal mood and affect. Her speech is normal and behavior is normal. Thought content normal.           Health Maintenance       Date Due Completion Date    Shingles Vaccine (1 of 2) 07/21/1994 ---    Foot Exam 09/12/2019 9/12/2018    Hemoglobin A1c 08/03/2020 2/3/2020    Eye Exam 10/30/2020 10/30/2019    Lipid Panel 12/09/2020 12/9/2019    High Dose Statin 03/16/2021 3/16/2020    Colonoscopy 09/16/2021 9/16/2011    Override on 1/1/2010: Done    Override on 1/1/2006: Done (repeat in 10 years)    DEXA SCAN 06/17/2022 6/17/2019    Override on 8/4/2014: Done    TETANUS VACCINE 06/30/2026 6/30/2016 " (Declined)    Override on 6/30/2016: Declined

## 2020-06-12 NOTE — ASSESSMENT & PLAN NOTE
Restart amlodipine.  Tolerated this well in the past but it was stopped due to lower BPs.  Monitor for need to reduce to 5mg.

## 2020-06-15 ENCOUNTER — CLINICAL SUPPORT (OUTPATIENT)
Dept: CARDIOLOGY | Facility: HOSPITAL | Age: 75
End: 2020-06-15
Attending: INTERNAL MEDICINE
Payer: MEDICARE

## 2020-06-15 ENCOUNTER — TELEPHONE (OUTPATIENT)
Dept: CARDIOLOGY | Facility: HOSPITAL | Age: 75
End: 2020-06-15

## 2020-06-15 DIAGNOSIS — I48.20 CHRONIC ATRIAL FIBRILLATION: ICD-10-CM

## 2020-06-15 DIAGNOSIS — Z95.0 CARDIAC PACEMAKER IN SITU: ICD-10-CM

## 2020-06-15 PROCEDURE — 93279 PRGRMG DEV EVAL PM/LDLS PM: CPT | Mod: 26,HCNC,, | Performed by: INTERNAL MEDICINE

## 2020-06-15 PROCEDURE — 93279 CARDIAC DEVICE CHECK - IN CLINIC & HOSPITAL: ICD-10-PCS | Mod: 26,HCNC,, | Performed by: INTERNAL MEDICINE

## 2020-06-15 PROCEDURE — 93279 PRGRMG DEV EVAL PM/LDLS PM: CPT | Mod: HCNC

## 2020-06-15 NOTE — TELEPHONE ENCOUNTER
----- Message from Mark Ring MD sent at 6/15/2020  2:42 PM CDT -----  Ok thanks  ----- Message -----  From: Kendra Camacho  Sent: 6/15/2020   1:21 PM CDT  To: Mark Ring MD    Pt in for 3 mos post op gen change device check, incision WNL.  Chronic RV lead demonstrating slightly higher capture thresholds than at implant, now 1.75V @ 0.40ms, was 1.00V @ 0.40ms.  Adaptive capture is on and RV pacing burden is only 13.6%.  We made no changes but wanted to let you know.      Thanks,  Kendra

## 2020-06-17 ENCOUNTER — TELEPHONE (OUTPATIENT)
Dept: CARDIOLOGY | Facility: HOSPITAL | Age: 75
End: 2020-06-17

## 2020-06-17 ENCOUNTER — LAB VISIT (OUTPATIENT)
Dept: LAB | Facility: HOSPITAL | Age: 75
End: 2020-06-17
Attending: INTERNAL MEDICINE
Payer: MEDICARE

## 2020-06-17 DIAGNOSIS — I48.20 CHRONIC ATRIAL FIBRILLATION: Primary | ICD-10-CM

## 2020-06-17 DIAGNOSIS — E11.29 TYPE 2 DIABETES MELLITUS WITH MICROALBUMINURIA, WITHOUT LONG-TERM CURRENT USE OF INSULIN: Chronic | ICD-10-CM

## 2020-06-17 DIAGNOSIS — Z95.0 CARDIAC PACEMAKER IN SITU: ICD-10-CM

## 2020-06-17 DIAGNOSIS — I10 ESSENTIAL (PRIMARY) HYPERTENSION: Chronic | ICD-10-CM

## 2020-06-17 DIAGNOSIS — Z20.822 SUSPECTED COVID-19 VIRUS INFECTION: ICD-10-CM

## 2020-06-17 DIAGNOSIS — R80.9 TYPE 2 DIABETES MELLITUS WITH MICROALBUMINURIA, WITHOUT LONG-TERM CURRENT USE OF INSULIN: Chronic | ICD-10-CM

## 2020-06-17 DIAGNOSIS — E03.8 SUBCLINICAL HYPOTHYROIDISM: Chronic | ICD-10-CM

## 2020-06-17 LAB
ALBUMIN SERPL BCP-MCNC: 4.4 G/DL (ref 3.5–5.2)
ALP SERPL-CCNC: 87 U/L (ref 55–135)
ALT SERPL W/O P-5'-P-CCNC: 24 U/L (ref 10–44)
ANION GAP SERPL CALC-SCNC: 7 MMOL/L (ref 8–16)
AST SERPL-CCNC: 37 U/L (ref 10–40)
BASOPHILS # BLD AUTO: 0.04 K/UL (ref 0–0.2)
BASOPHILS NFR BLD: 0.7 % (ref 0–1.9)
BILIRUB SERPL-MCNC: 1.2 MG/DL (ref 0.1–1)
BUN SERPL-MCNC: 15 MG/DL (ref 8–23)
CALCIUM SERPL-MCNC: 10 MG/DL (ref 8.7–10.5)
CHLORIDE SERPL-SCNC: 98 MMOL/L (ref 95–110)
CHOLEST SERPL-MCNC: 140 MG/DL (ref 120–199)
CHOLEST/HDLC SERPL: 2.4 {RATIO} (ref 2–5)
CO2 SERPL-SCNC: 29 MMOL/L (ref 23–29)
CREAT SERPL-MCNC: 0.8 MG/DL (ref 0.5–1.4)
DIFFERENTIAL METHOD: ABNORMAL
EOSINOPHIL # BLD AUTO: 0.2 K/UL (ref 0–0.5)
EOSINOPHIL NFR BLD: 2.8 % (ref 0–8)
ERYTHROCYTE [DISTWIDTH] IN BLOOD BY AUTOMATED COUNT: 16.3 % (ref 11.5–14.5)
EST. GFR  (AFRICAN AMERICAN): >60 ML/MIN/1.73 M^2
EST. GFR  (NON AFRICAN AMERICAN): >60 ML/MIN/1.73 M^2
ESTIMATED AVG GLUCOSE: 117 MG/DL (ref 68–131)
GLUCOSE SERPL-MCNC: 97 MG/DL (ref 70–110)
HBA1C MFR BLD HPLC: 5.7 % (ref 4–5.6)
HCT VFR BLD AUTO: 39.7 % (ref 37–48.5)
HDLC SERPL-MCNC: 59 MG/DL (ref 40–75)
HDLC SERPL: 42.1 % (ref 20–50)
HGB BLD-MCNC: 12.3 G/DL (ref 12–16)
IMM GRANULOCYTES # BLD AUTO: 0.01 K/UL (ref 0–0.04)
IMM GRANULOCYTES NFR BLD AUTO: 0.2 % (ref 0–0.5)
LDLC SERPL CALC-MCNC: 68.2 MG/DL (ref 63–159)
LYMPHOCYTES # BLD AUTO: 1.5 K/UL (ref 1–4.8)
LYMPHOCYTES NFR BLD: 25.9 % (ref 18–48)
MCH RBC QN AUTO: 28.5 PG (ref 27–31)
MCHC RBC AUTO-ENTMCNC: 31 G/DL (ref 32–36)
MCV RBC AUTO: 92 FL (ref 82–98)
MONOCYTES # BLD AUTO: 0.4 K/UL (ref 0.3–1)
MONOCYTES NFR BLD: 6.7 % (ref 4–15)
NEUTROPHILS # BLD AUTO: 3.6 K/UL (ref 1.8–7.7)
NEUTROPHILS NFR BLD: 63.7 % (ref 38–73)
NONHDLC SERPL-MCNC: 81 MG/DL
NRBC BLD-RTO: 0 /100 WBC
PLATELET # BLD AUTO: 100 K/UL (ref 150–350)
PMV BLD AUTO: 11 FL (ref 9.2–12.9)
POTASSIUM SERPL-SCNC: 4.3 MMOL/L (ref 3.5–5.1)
PROT SERPL-MCNC: 8.5 G/DL (ref 6–8.4)
RBC # BLD AUTO: 4.32 M/UL (ref 4–5.4)
SARS-COV-2 IGG SERPLBLD QL IA.RAPID: NEGATIVE
SODIUM SERPL-SCNC: 134 MMOL/L (ref 136–145)
T4 FREE SERPL-MCNC: 1.17 NG/DL (ref 0.71–1.51)
TRIGL SERPL-MCNC: 64 MG/DL (ref 30–150)
TSH SERPL DL<=0.005 MIU/L-ACNC: 4.34 UIU/ML (ref 0.4–4)
WBC # BLD AUTO: 5.71 K/UL (ref 3.9–12.7)

## 2020-06-17 PROCEDURE — 36415 COLL VENOUS BLD VENIPUNCTURE: CPT | Mod: HCNC,PO

## 2020-06-17 PROCEDURE — 84439 ASSAY OF FREE THYROXINE: CPT | Mod: HCNC

## 2020-06-17 PROCEDURE — 84443 ASSAY THYROID STIM HORMONE: CPT | Mod: HCNC

## 2020-06-17 PROCEDURE — 85025 COMPLETE CBC W/AUTO DIFF WBC: CPT | Mod: HCNC

## 2020-06-17 PROCEDURE — 80061 LIPID PANEL: CPT | Mod: HCNC

## 2020-06-17 PROCEDURE — 80053 COMPREHEN METABOLIC PANEL: CPT | Mod: HCNC

## 2020-06-17 PROCEDURE — 83036 HEMOGLOBIN GLYCOSYLATED A1C: CPT | Mod: HCNC

## 2020-06-17 PROCEDURE — 86769 SARS-COV-2 COVID-19 ANTIBODY: CPT | Mod: HCNC

## 2020-06-17 NOTE — TELEPHONE ENCOUNTER
Appointments made and mailed to patient.----- Message from Melissa Goodman sent at 6/15/2020  3:44 PM CDT -----    ----- Message -----  From: Kendra Camacho  Sent: 6/15/2020   1:22 PM CDT  To: [facility] Arrhythmia Clinical Support Staff    Pt was in for 3 mos post op device check but never had a provider appt booked.    Please arrange 3 mos post op clinic appt.    Thanks,  Kendra

## 2020-06-18 ENCOUNTER — DOCUMENT SCAN (OUTPATIENT)
Dept: HOME HEALTH SERVICES | Facility: HOSPITAL | Age: 75
End: 2020-06-18
Payer: MEDICARE

## 2020-06-18 NOTE — PROGRESS NOTES
Your lab results are generally looking good.  The blood counts have returned to normal, the thyroid is looking stable, the cholesterol is well controlled, the kidney & liver look good, the A1c is normal.  Additionally the labs to look at your nutrition status looked good.  I would expect that your appetite will continue to improve, but please let me know if it does not.    Please continue your current medications and doses, and please feel free to contact me with any questions or concerns.    Sincerely,  Otis Zimmer  [email or web address]

## 2020-06-23 ENCOUNTER — ANTI-COAG VISIT (OUTPATIENT)
Dept: CARDIOLOGY | Facility: CLINIC | Age: 75
End: 2020-06-23
Payer: MEDICARE

## 2020-06-23 DIAGNOSIS — Z79.01 LONG TERM CURRENT USE OF ANTICOAGULANT: ICD-10-CM

## 2020-06-23 DIAGNOSIS — I48.20 CHRONIC ATRIAL FIBRILLATION: ICD-10-CM

## 2020-06-23 DIAGNOSIS — Z95.2 S/P MVR (MITRAL VALVE REPLACEMENT): ICD-10-CM

## 2020-06-23 DIAGNOSIS — Z95.2 S/P AVR: ICD-10-CM

## 2020-06-23 LAB — INR PPP: 3.9

## 2020-06-23 PROCEDURE — 93793 ANTICOAG MGMT PT WARFARIN: CPT | Mod: S$GLB,,, | Performed by: PHARMACIST

## 2020-06-23 PROCEDURE — 93793 PR ANTICOAGULANT MGMT FOR PT TAKING WARFARIN: ICD-10-PCS | Mod: S$GLB,,, | Performed by: PHARMACIST

## 2020-07-06 RX ORDER — METFORMIN HYDROCHLORIDE 500 MG/1
500 TABLET ORAL DAILY
Qty: 90 TABLET | Refills: 1 | Status: SHIPPED | OUTPATIENT
Start: 2020-07-06 | End: 2021-01-19 | Stop reason: SDUPTHER

## 2020-07-06 NOTE — TELEPHONE ENCOUNTER
----- Message from Norah Eubanks sent at 7/6/2020  2:43 PM CDT -----  Contact: Patient [phone]  Type: RX Refill Request    Who Called:  Cristobal Ty    Have you contacted your pharmacy:No    Refill or New Rx:Refill    RX Name and Strength: metFORMIN (GLUCOPHAGE) 500 MG tablet//Need to increase medication to take twice a day. Instead of once a day.    Is this a 30 day or 90 day RX: 90 day    Preferred Pharmacy with phone number: .  [facility] - [place], [place] - [address]  [address] [address]  Phone: [phone] Fax: [phone]    Local or Mail Order:Mail Order    Would the patient rather a call back or a response via My [facility]? Call back    Best Call Back Number: [phone]

## 2020-07-07 ENCOUNTER — ANTI-COAG VISIT (OUTPATIENT)
Dept: CARDIOLOGY | Facility: CLINIC | Age: 75
End: 2020-07-07
Payer: MEDICARE

## 2020-07-07 DIAGNOSIS — I48.20 CHRONIC ATRIAL FIBRILLATION: ICD-10-CM

## 2020-07-07 DIAGNOSIS — Z79.01 LONG TERM CURRENT USE OF ANTICOAGULANT: ICD-10-CM

## 2020-07-07 DIAGNOSIS — Z95.2 S/P AVR: ICD-10-CM

## 2020-07-07 DIAGNOSIS — Z95.2 S/P MVR (MITRAL VALVE REPLACEMENT): ICD-10-CM

## 2020-07-07 LAB — INR PPP: 2.2

## 2020-07-07 PROCEDURE — 93793 PR ANTICOAGULANT MGMT FOR PT TAKING WARFARIN: ICD-10-PCS | Mod: S$GLB,,, | Performed by: PHARMACIST

## 2020-07-07 PROCEDURE — 93793 ANTICOAG MGMT PT WARFARIN: CPT | Mod: S$GLB,,, | Performed by: PHARMACIST

## 2020-07-08 NOTE — PROGRESS NOTES
confirmed pts taking correct dose of coumadin.   More greens than usual; ensure twice a week  NO other changes

## 2020-07-09 NOTE — PROGRESS NOTES
Ms. Ty is a patient of Dr. Ring and was last seen in clinic 1/27/2020.      Subjective:   Patient ID:  Orion Ty is a 74 y.o. female who presents for follow-up of Atrial Fibrillation and Pacemaker Check  .     HPI:    Ms. Ty is a 74 y.o. female with tachy-gail syndrome, chronic atrial fibrillation, PPM, Htn, Mechanical MVR and AVR, thrombocytopenia here for follow up after generator change.    Background:    Primary cardiologist is Dr. Boateng.  Has single chamber PPM, implanted 11/6/89.  Had undergone 3 generator changes.  Device has reached CHRISTI.  Device is right sided.  Notes stable dyspnea on exertion.  Denies syncope.  Notes fatigue.  Echo 12/10/19 normal biventricular structure and function, severe bi-atrial enlargement, well seated aortic and mitral prostheses.  Has chronic thrombocytopenia, with platelets generally in the 90's.  Device at CHRISTI.  Risks and benefits of generator change discussed with patient.  Continue coumadin veronika-procedure, targeting and INR of 2-3.    Update (07/01/2020):    3/2/2020: Successful generator change.    Today she says she feels well. No new cardiac complaints. Ms. Ty denies chest pain with exertion or at rest, palpitations, SOB, MARTÍNEZ, dizziness, or syncope. Her  states that since her procedure she has a heightened sense of smell and find his use of mosquito spray unpleasant. He is wondering if there is medication for this.    Sees Dr. Boateng in general cardiology.    She is currently taking warfarin for stroke prophylaxis/mechanical valve and denies significant bleeding episodes. She is currently being treated with metoprolol tartrate 25mg BID for HR control.  Kidney function is stable, with a creatinine of 0.8 on 6/17/2020.    Device Interrogation (7/10/2020) reveals an intrinsic AF with stable lead and device function. NSVTx1, 2 seconds. She paces 13% in the RV. Estimated battery longevity 13.5 years.     I have personally reviewed the patient's EKG  today, which shows AF at 65bpm with occ PVC. QRS is 104. QTc is 443.    Recent Cardiac Tests:    2D Echo (2/2/2020):  · Normal left ventricular systolic function. The estimated ejection fraction is 55-60%.  · Mild concentric left ventricular hypertrophy.  · Septal wall has abnormal motion with normal thickening consistent with post-operative status.  · Mild right ventricular enlargement.  · Low normal right ventricular systolic function.  · There is a bileaflet tilting disc mechanical aortic valve present. There is no aortic insufficiency present. Cannot exclude some degree of prsothetic AS (not severe).  · There is a bileaflet mechanical mitral valve prosthesis. Prosthetic mitral valve is normal.  · Mild tricuspid regurgitation.  · The estimated PA systolic pressure is 52 mmHg.  · Pulmonary hypertension present.    Current Outpatient Medications   Medication Sig    ACCU-CHEK GATO PLUS TEST STRP Strp     amLODIPine (NORVASC) 10 MG tablet Take 1 tablet (10 mg total) by mouth once daily.    furosemide (LASIX) 20 MG tablet Take 1 tablet (20 mg total) by mouth daily as needed (Take one table daily for 3 days until weight back to normal. After that use as needed for weight gain greater than 3 pounds daily.).    metFORMIN (GLUCOPHAGE) 500 MG tablet Take 1 tablet (500 mg total) by mouth once daily.    metoprolol tartrate (LOPRESSOR) 25 MG tablet TAKE 1 TABLET BY MOUTH TWICE DAILY    multivitamin-Ca-iron-minerals 27-0.4 mg Tab     nitroGLYCERIN (NITROSTAT) 0.4 MG SL tablet DISSOLVE 1 TABLET UNDER THE TONGUE EVERY 5 MINUTES AS NEEDED FOR CHEST PAINS    omega-3 acid ethyl esters (LOVAZA) 1 gram capsule TK 2 CS PO BID    pravastatin (PRAVACHOL) 20 MG tablet Take 1 tablet (20 mg total) by mouth once daily.    warfarin (COUMADIN) 4 MG tablet TAKE 1 TABLET BY MOUTH ON MONDAY AND FRIDAY AND 1/2 TABLET BY MOUTH ON ALL OTHER DAYS    ACCU-CHEK GATO CONTROL SOLN Soln Use as directed to perform controls on meter (Patient  "not taking: Reported on 6/12/2020)    adhesive bandage (BANDAGES/PLASTIC TOP)     alcohol swabs (BD ALCOHOL SWABS) PadM Apply 1 each topically as needed (prior to finger sticks). (Patient not taking: Reported on 6/12/2020)    amLODIPine (NORVASC) 10 MG tablet Take 1 tablet (10 mg total) by mouth once daily.    B-COMPLEX WITH VITAMIN C ORAL     lancets 30 gauge Misc     lancing device Misc     losartan (COZAAR) 50 MG tablet Take 1 tablet (50 mg total) by mouth once daily.     No current facility-administered medications for this visit.      Facility-Administered Medications Ordered in Other Visits   Medication    0.9%  NaCl infusion     Review of Systems   Constitution: Negative for malaise/fatigue.   Cardiovascular: Negative for chest pain, dyspnea on exertion, irregular heartbeat, leg swelling and palpitations.   Respiratory: Negative for shortness of breath.    Hematologic/Lymphatic: Negative for bleeding problem.   Skin: Negative for rash.   Musculoskeletal: Negative for myalgias.   Gastrointestinal: Negative for hematemesis, hematochezia and nausea.   Genitourinary: Negative for hematuria.   Neurological: Negative for light-headedness.   Psychiatric/Behavioral: Negative for altered mental status.   Allergic/Immunologic: Negative for persistent infections.     Objective:          /66   Pulse 65   Ht 5' 1" (1.549 m)   Wt 46.5 kg (102 lb 8.2 oz)   BMI 19.37 kg/m²     Physical Exam   Constitutional: She is oriented to person, place, and time. She appears well-developed and well-nourished.   HENT:   Head: Normocephalic.   Nose: Nose normal.   Eyes: Pupils are equal, round, and reactive to light.   Cardiovascular: Normal rate, regular rhythm, S1 normal and S2 normal.   No murmur heard.  Pulses:       Radial pulses are 2+ on the right side and 2+ on the left side.   Pulmonary/Chest: Breath sounds normal. No respiratory distress.   Device to RUCW, Incision well healed and pocket in good repair. "   Abdominal: Normal appearance.   Musculoskeletal: Normal range of motion.         General: No edema.   Neurological: She is alert and oriented to person, place, and time.   Skin: Skin is warm and dry. No erythema.   Psychiatric: She has a normal mood and affect. Her speech is normal and behavior is normal.   Nursing note and vitals reviewed.    Lab Results   Component Value Date     (L) 06/17/2020    K 4.3 06/17/2020    MG 2.1 02/09/2020    BUN 15 06/17/2020    CREATININE 0.8 06/17/2020    ALT 24 06/17/2020    AST 37 06/17/2020    HGB 12.3 06/17/2020    HCT 39.7 06/17/2020    TSH 4.343 (H) 06/17/2020    LDLCALC 68.2 06/17/2020       Recent Labs   Lab 05/26/20 06/09/20 06/23/20 07/07/20   INR 3.3 3.4 3.9 2.2       Assessment:     1. Cardiac pacemaker in situ    2. Chronic atrial fibrillation    3. Essential (primary) hypertension    4. Tachy-gail syndrome    5. Long term current use of anticoagulant      Plan:     In summary, Ms. Ty is a 74 y.o. female with tachy-gail syndrome, chronic atrial fibrillation, PPM, HTN, Mechanical MVR and AVR, thrombocytopenia here for follow up after generator change.  She is now 4 months s/p generator change. Ms. Ty is doing well from a device perspective with stable lead and device function. Chronic persistent AF on warfarin. 13% RV pacing which is stable for her. Echo from 2/2020 showed normal EF. Instructed to follow up with PCP regarding olfactory changes.    Continue current medication regimen and device settings.   Follow up in device clinic as scheduled.   Follow up in EP clinic in 1 year, sooner as needed.     *A copy of this note has been sent to Dr. Ring*    Follow up in about 1 year (around 7/10/2021).    ------------------------------------------------------------------    GHAZALA White, NP-C  Cardiac Electrophysiology

## 2020-07-10 ENCOUNTER — OFFICE VISIT (OUTPATIENT)
Dept: ELECTROPHYSIOLOGY | Facility: CLINIC | Age: 75
End: 2020-07-10
Payer: MEDICARE

## 2020-07-10 ENCOUNTER — HOSPITAL ENCOUNTER (OUTPATIENT)
Dept: CARDIOLOGY | Facility: CLINIC | Age: 75
Discharge: HOME | End: 2020-07-10
Payer: MEDICARE

## 2020-07-10 VITALS
HEIGHT: 61 IN | SYSTOLIC BLOOD PRESSURE: 108 MMHG | DIASTOLIC BLOOD PRESSURE: 66 MMHG | BODY MASS INDEX: 19.35 KG/M2 | HEART RATE: 65 BPM | WEIGHT: 102.5 LBS

## 2020-07-10 DIAGNOSIS — I10 ESSENTIAL (PRIMARY) HYPERTENSION: Chronic | ICD-10-CM

## 2020-07-10 DIAGNOSIS — I48.20 CHRONIC ATRIAL FIBRILLATION: ICD-10-CM

## 2020-07-10 DIAGNOSIS — I48.20 CHRONIC ATRIAL FIBRILLATION: Chronic | ICD-10-CM

## 2020-07-10 DIAGNOSIS — Z95.0 CARDIAC PACEMAKER IN SITU: ICD-10-CM

## 2020-07-10 DIAGNOSIS — Z79.01 LONG TERM CURRENT USE OF ANTICOAGULANT: Chronic | ICD-10-CM

## 2020-07-10 DIAGNOSIS — I49.5 TACHY-BRADY SYNDROME: ICD-10-CM

## 2020-07-10 DIAGNOSIS — Z95.0 CARDIAC PACEMAKER IN SITU: Primary | ICD-10-CM

## 2020-07-10 PROCEDURE — 3078F PR MOST RECENT DIASTOLIC BLOOD PRESSURE < 80 MM HG: ICD-10-PCS | Mod: HCNC,CPTII,S$GLB, | Performed by: NURSE PRACTITIONER

## 2020-07-10 PROCEDURE — 99999 PR PBB SHADOW E&M-EST. PATIENT-LVL IV: ICD-10-PCS | Mod: PBBFAC,HCNC,, | Performed by: NURSE PRACTITIONER

## 2020-07-10 PROCEDURE — 1126F PR PAIN SEVERITY QUANTIFIED, NO PAIN PRESENT: ICD-10-PCS | Mod: HCNC,S$GLB,, | Performed by: NURSE PRACTITIONER

## 2020-07-10 PROCEDURE — 99999 PR PBB SHADOW E&M-EST. PATIENT-LVL IV: CPT | Mod: PBBFAC,HCNC,, | Performed by: NURSE PRACTITIONER

## 2020-07-10 PROCEDURE — 99499 RISK ADDL DX/OHS AUDIT: ICD-10-PCS | Mod: HCNC,S$GLB,, | Performed by: NURSE PRACTITIONER

## 2020-07-10 PROCEDURE — 1159F PR MEDICATION LIST DOCUMENTED IN MEDICAL RECORD: ICD-10-PCS | Mod: HCNC,S$GLB,, | Performed by: NURSE PRACTITIONER

## 2020-07-10 PROCEDURE — 3074F SYST BP LT 130 MM HG: CPT | Mod: HCNC,CPTII,S$GLB, | Performed by: NURSE PRACTITIONER

## 2020-07-10 PROCEDURE — 1159F MED LIST DOCD IN RCRD: CPT | Mod: HCNC,S$GLB,, | Performed by: NURSE PRACTITIONER

## 2020-07-10 PROCEDURE — 93005 ELECTROCARDIOGRAM TRACING: CPT | Mod: HCNC,S$GLB,, | Performed by: INTERNAL MEDICINE

## 2020-07-10 PROCEDURE — 93010 ELECTROCARDIOGRAM REPORT: CPT | Mod: HCNC,S$GLB,, | Performed by: INTERNAL MEDICINE

## 2020-07-10 PROCEDURE — 93005 RHYTHM STRIP: ICD-10-PCS | Mod: HCNC,S$GLB,, | Performed by: INTERNAL MEDICINE

## 2020-07-10 PROCEDURE — 3078F DIAST BP <80 MM HG: CPT | Mod: HCNC,CPTII,S$GLB, | Performed by: NURSE PRACTITIONER

## 2020-07-10 PROCEDURE — 99499 UNLISTED E&M SERVICE: CPT | Mod: HCNC,S$GLB,, | Performed by: NURSE PRACTITIONER

## 2020-07-10 PROCEDURE — 3074F PR MOST RECENT SYSTOLIC BLOOD PRESSURE < 130 MM HG: ICD-10-PCS | Mod: HCNC,CPTII,S$GLB, | Performed by: NURSE PRACTITIONER

## 2020-07-10 PROCEDURE — 99214 OFFICE O/P EST MOD 30 MIN: CPT | Mod: HCNC,S$GLB,, | Performed by: NURSE PRACTITIONER

## 2020-07-10 PROCEDURE — 1101F PT FALLS ASSESS-DOCD LE1/YR: CPT | Mod: HCNC,CPTII,S$GLB, | Performed by: NURSE PRACTITIONER

## 2020-07-10 PROCEDURE — 1101F PR PT FALLS ASSESS DOC 0-1 FALLS W/OUT INJ PAST YR: ICD-10-PCS | Mod: HCNC,CPTII,S$GLB, | Performed by: NURSE PRACTITIONER

## 2020-07-10 PROCEDURE — 99214 PR OFFICE/OUTPT VISIT, EST, LEVL IV, 30-39 MIN: ICD-10-PCS | Mod: HCNC,S$GLB,, | Performed by: NURSE PRACTITIONER

## 2020-07-10 PROCEDURE — 1126F AMNT PAIN NOTED NONE PRSNT: CPT | Mod: HCNC,S$GLB,, | Performed by: NURSE PRACTITIONER

## 2020-07-10 PROCEDURE — 93010 RHYTHM STRIP: ICD-10-PCS | Mod: HCNC,S$GLB,, | Performed by: INTERNAL MEDICINE

## 2020-07-13 DIAGNOSIS — I48.20 CHRONIC ATRIAL FIBRILLATION: Chronic | ICD-10-CM

## 2020-07-13 RX ORDER — METOPROLOL TARTRATE 25 MG/1
TABLET, FILM COATED ORAL
Qty: 180 TABLET | Refills: 1 | Status: SHIPPED | OUTPATIENT
Start: 2020-07-13 | End: 2021-01-19 | Stop reason: SDUPTHER

## 2020-07-13 NOTE — TELEPHONE ENCOUNTER
----- Message from Rebecca Anderson sent at 7/13/2020  2:35 PM CDT -----  Regarding: Refill Request  Please refill the medication(s) listed below :Norma ([facility])    Medication # 1 :metoprolol tartrate (LOPRESSOR) 25 MG tablet    Please call the patient when the prescription is sent to pharmacy :[phone]    Can the clinic reply in [facility] :No    Preferred Pharmacy :[facility] - [place] [id number] [facility] [phone] (Phone)  [phone] (Fax)

## 2020-07-21 ENCOUNTER — ANTI-COAG VISIT (OUTPATIENT)
Dept: CARDIOLOGY | Facility: CLINIC | Age: 75
End: 2020-07-21
Payer: MEDICARE

## 2020-07-21 DIAGNOSIS — Z95.2 S/P AVR: ICD-10-CM

## 2020-07-21 DIAGNOSIS — Z95.2 S/P MVR (MITRAL VALVE REPLACEMENT): ICD-10-CM

## 2020-07-21 DIAGNOSIS — Z79.01 LONG TERM CURRENT USE OF ANTICOAGULANT: ICD-10-CM

## 2020-07-21 DIAGNOSIS — I48.20 CHRONIC ATRIAL FIBRILLATION: ICD-10-CM

## 2020-07-21 LAB — INR PPP: 2.2

## 2020-07-21 PROCEDURE — 93793 ANTICOAG MGMT PT WARFARIN: CPT | Mod: S$GLB,,, | Performed by: PHARMACIST

## 2020-07-21 PROCEDURE — 93793 PR ANTICOAGULANT MGMT FOR PT TAKING WARFARIN: ICD-10-PCS | Mod: S$GLB,,, | Performed by: PHARMACIST

## 2020-07-21 NOTE — PROGRESS NOTES
confirmed taking correct dose of coumadin; also confirmed 7/8  Cabbage recently   NO other changes

## 2020-08-04 ENCOUNTER — ANTI-COAG VISIT (OUTPATIENT)
Dept: CARDIOLOGY | Facility: CLINIC | Age: 75
End: 2020-08-04
Payer: MEDICARE

## 2020-08-04 DIAGNOSIS — Z95.2 S/P AVR: ICD-10-CM

## 2020-08-04 DIAGNOSIS — I48.20 CHRONIC ATRIAL FIBRILLATION: ICD-10-CM

## 2020-08-04 DIAGNOSIS — Z95.2 S/P MVR (MITRAL VALVE REPLACEMENT): ICD-10-CM

## 2020-08-04 DIAGNOSIS — Z79.01 LONG TERM CURRENT USE OF ANTICOAGULANT: ICD-10-CM

## 2020-08-04 LAB — INR PPP: 2.5

## 2020-08-04 PROCEDURE — 93793 ANTICOAG MGMT PT WARFARIN: CPT | Mod: S$GLB,,, | Performed by: PHARMACIST

## 2020-08-04 PROCEDURE — 93793 PR ANTICOAGULANT MGMT FOR PT TAKING WARFARIN: ICD-10-PCS | Mod: S$GLB,,, | Performed by: PHARMACIST

## 2020-08-14 DIAGNOSIS — Z11.59 NEED FOR HEPATITIS C SCREENING TEST: ICD-10-CM

## 2020-08-19 ENCOUNTER — ANTI-COAG VISIT (OUTPATIENT)
Dept: CARDIOLOGY | Facility: CLINIC | Age: 75
End: 2020-08-19
Payer: MEDICARE

## 2020-08-19 DIAGNOSIS — I48.20 CHRONIC ATRIAL FIBRILLATION: ICD-10-CM

## 2020-08-19 DIAGNOSIS — Z79.01 LONG TERM CURRENT USE OF ANTICOAGULANT: ICD-10-CM

## 2020-08-19 DIAGNOSIS — Z95.2 S/P MVR (MITRAL VALVE REPLACEMENT): ICD-10-CM

## 2020-08-19 DIAGNOSIS — Z95.2 S/P AVR: ICD-10-CM

## 2020-08-19 LAB — INR PPP: 2.1

## 2020-08-19 PROCEDURE — 93793 PR ANTICOAGULANT MGMT FOR PT TAKING WARFARIN: ICD-10-PCS | Mod: S$GLB,,, | Performed by: PHARMACIST

## 2020-08-19 PROCEDURE — 93793 ANTICOAG MGMT PT WARFARIN: CPT | Mod: S$GLB,,, | Performed by: PHARMACIST

## 2020-08-19 NOTE — PROGRESS NOTES
returned call and he was given day by day dose of coumadin and re test date, repeated instructions back correctly, verbalized understanding

## 2020-08-19 NOTE — PROGRESS NOTES
confirmed taking correct dose of coumadin  Broccoli twice recently; will drink boost twice a week    NO other changes

## 2020-08-20 ENCOUNTER — OFFICE VISIT (OUTPATIENT)
Dept: CARDIOLOGY | Facility: CLINIC | Age: 75
End: 2020-08-20
Payer: MEDICARE

## 2020-08-20 VITALS
SYSTOLIC BLOOD PRESSURE: 130 MMHG | HEIGHT: 61 IN | BODY MASS INDEX: 19.15 KG/M2 | OXYGEN SATURATION: 94 % | DIASTOLIC BLOOD PRESSURE: 65 MMHG | HEART RATE: 80 BPM | WEIGHT: 101.44 LBS

## 2020-08-20 DIAGNOSIS — E78.2 MIXED HYPERLIPIDEMIA: Chronic | ICD-10-CM

## 2020-08-20 DIAGNOSIS — Z95.2 S/P MVR (MITRAL VALVE REPLACEMENT): ICD-10-CM

## 2020-08-20 DIAGNOSIS — Z95.2 S/P AVR: Primary | ICD-10-CM

## 2020-08-20 DIAGNOSIS — I10 HYPERTENSION: ICD-10-CM

## 2020-08-20 DIAGNOSIS — Z95.0 CARDIAC PACEMAKER IN SITU: ICD-10-CM

## 2020-08-20 DIAGNOSIS — I48.20 CHRONIC ATRIAL FIBRILLATION: Chronic | ICD-10-CM

## 2020-08-20 DIAGNOSIS — I10 ESSENTIAL (PRIMARY) HYPERTENSION: Chronic | ICD-10-CM

## 2020-08-20 PROCEDURE — 1126F PR PAIN SEVERITY QUANTIFIED, NO PAIN PRESENT: ICD-10-PCS | Mod: HCNC,S$GLB,, | Performed by: INTERNAL MEDICINE

## 2020-08-20 PROCEDURE — 3078F PR MOST RECENT DIASTOLIC BLOOD PRESSURE < 80 MM HG: ICD-10-PCS | Mod: HCNC,CPTII,S$GLB, | Performed by: INTERNAL MEDICINE

## 2020-08-20 PROCEDURE — 1159F PR MEDICATION LIST DOCUMENTED IN MEDICAL RECORD: ICD-10-PCS | Mod: HCNC,S$GLB,, | Performed by: INTERNAL MEDICINE

## 2020-08-20 PROCEDURE — 93000 ELECTROCARDIOGRAM COMPLETE: CPT | Mod: HCNC,S$GLB,, | Performed by: INTERNAL MEDICINE

## 2020-08-20 PROCEDURE — 1126F AMNT PAIN NOTED NONE PRSNT: CPT | Mod: HCNC,S$GLB,, | Performed by: INTERNAL MEDICINE

## 2020-08-20 PROCEDURE — 93000 EKG 12-LEAD: ICD-10-PCS | Mod: HCNC,S$GLB,, | Performed by: INTERNAL MEDICINE

## 2020-08-20 PROCEDURE — 99499 RISK ADDL DX/OHS AUDIT: ICD-10-PCS | Mod: HCNC,S$GLB,, | Performed by: INTERNAL MEDICINE

## 2020-08-20 PROCEDURE — 99213 PR OFFICE/OUTPT VISIT, EST, LEVL III, 20-29 MIN: ICD-10-PCS | Mod: HCNC,S$GLB,, | Performed by: INTERNAL MEDICINE

## 2020-08-20 PROCEDURE — 3078F DIAST BP <80 MM HG: CPT | Mod: HCNC,CPTII,S$GLB, | Performed by: INTERNAL MEDICINE

## 2020-08-20 PROCEDURE — 1101F PR PT FALLS ASSESS DOC 0-1 FALLS W/OUT INJ PAST YR: ICD-10-PCS | Mod: HCNC,CPTII,S$GLB, | Performed by: INTERNAL MEDICINE

## 2020-08-20 PROCEDURE — 99499 UNLISTED E&M SERVICE: CPT | Mod: HCNC,S$GLB,, | Performed by: INTERNAL MEDICINE

## 2020-08-20 PROCEDURE — 1159F MED LIST DOCD IN RCRD: CPT | Mod: HCNC,S$GLB,, | Performed by: INTERNAL MEDICINE

## 2020-08-20 PROCEDURE — 3075F SYST BP GE 130 - 139MM HG: CPT | Mod: HCNC,CPTII,S$GLB, | Performed by: INTERNAL MEDICINE

## 2020-08-20 PROCEDURE — 99999 PR PBB SHADOW E&M-EST. PATIENT-LVL IV: ICD-10-PCS | Mod: PBBFAC,HCNC,, | Performed by: INTERNAL MEDICINE

## 2020-08-20 PROCEDURE — 3075F PR MOST RECENT SYSTOLIC BLOOD PRESS GE 130-139MM HG: ICD-10-PCS | Mod: HCNC,CPTII,S$GLB, | Performed by: INTERNAL MEDICINE

## 2020-08-20 PROCEDURE — 1101F PT FALLS ASSESS-DOCD LE1/YR: CPT | Mod: HCNC,CPTII,S$GLB, | Performed by: INTERNAL MEDICINE

## 2020-08-20 PROCEDURE — 99213 OFFICE O/P EST LOW 20 MIN: CPT | Mod: HCNC,S$GLB,, | Performed by: INTERNAL MEDICINE

## 2020-08-20 PROCEDURE — 99999 PR PBB SHADOW E&M-EST. PATIENT-LVL IV: CPT | Mod: PBBFAC,HCNC,, | Performed by: INTERNAL MEDICINE

## 2020-08-20 NOTE — PROGRESS NOTES
Subjective:    Patient ID:  Orion Ty is a 75 y.o. female who presents for follow-up of Atrial Fibrillation      HPI     Referred by Dr Grigsby for Medtronic PPM at [facility]  Patient requests to change back to my clinic  # [facility] AVR/MVR  # prosthetic AS, echo 12/2019 suggests prosthetic AVR stenosis, asymptomatic.  # Ao root dil 3.7cm (echo 4/2019)  # HTN, controlled  # Chr AF, on coumadin (goal INR 2.5-3.5 given [facility] valves)  # HLP on prava 40mg  # DM  # thrombocytopenia  # Medtronic PPM, normally functioning, Gen change 3/2/20     Her Medtronic pacemaker was interrogated in the office today.  Current rhythm is ventricular sensing at 60 beats per minute with programmed mode VVI at 65 beats per minute.  Sensing and pacing thresholds as well as impedance are normal.  [facility] was detected on September 19, 2019.  I have discussed the case with Dr. Boateng and he will meet with the patient tomorrow to schedule elective generator change.  The patient is not pacemaker dependent.     L MPI 12/10/19    The perfusion scan is free of evidence from myocardial ischemia or injury.    There is a moderate to severe intensity defect in the anterolateral wall of the left ventricle, secondary to breast attenuation.    Gated perfusion images showed an ejection fraction of 58 %.    The EKG portion of this study is uninterpretable.    The patient reported no chest pain during the stress test.    Arrhythmias during stress: rare PVCs.    Abnormal septal motion consistent with pacemaker.     Echo 2/2/20  · Normal left ventricular systolic function. The estimated ejection fraction is 55-60%.  · Mild concentric left ventricular hypertrophy.  · Septal wall has abnormal motion with normal thickening consistent with post-operative status.  · Mild right ventricular enlargement.  · Low normal right ventricular systolic function.  · There is a bileaflet tilting disc mechanical aortic valve present. There is no aortic insufficiency present.  Cannot exclude some degree of prsothetic AS (not severe).  · There is a bileaflet mechanical mitral valve prosthesis. Prosthetic mitral valve is normal.  · Mild tricuspid regurgitation.  · The estimated PA systolic pressure is 52 mmHg.  · Pulmonary hypertension present.     1/23/20 Denies CP or SOB  On coumadin for mechanical AVR/MVR and A-fib  [place] [facility] [place] generator change can be done without interrupting coumadin  F/U with me prn     Saw Dr Ring 1/27/20  Device at [facility].  Risks and benefits of generator change discussed with patient.  Continue coumadin veronika-procedure, targeting and INR of 2-3.     Admitted 2/1/20  Orion Ty is a 74 y.o. female that (in part)  has a past medical history of Closed displaced fracture of distal phalanx of lesser toe, Diabetes mellitus, Diabetes mellitus, type 2, Hypertension, Osteopenia, Pacemaker, and Rheumatic heart disease.  has a past surgical history that includes Cardiac valve replacement; thryoid s; Cardiac pacemaker placement; and Cardiac valuve replacement. Presents to [facility] complaining of shortness of breath.  EMS was activated due to worsening shortness of breath and lethargy.  Unable to ambulate independently, which is not normal for her.  She was febrile to 103° at home and is having difficulty taking deep breaths.  History of significant cardiac disease and heart valve replacement due to rheumatic heart valve disease. History of CAD and MI.  Denied chest pain, cyanosis, palpitations, or syncope.  Positive for peripheral edema.  Positive for nonproductive cough.     In the emergency department routine laboratory studies, chest x-ray, EKG, and cardiac enzymes were obtained.  There was evidence of acute CHF exacerbation with pulmonary edema, trace edema, and hypoxemia.  Influenza A serology was also positive.     Ms Ty presented with sepsis, acute respiratory failure with hypoxia (88% at  room air, per ED documentation) and acute encephalopathy secondary to influenza A infection. Patient was given isotonic fluids, oseltamivir and supplemental O2. Droplet precautions initiated. Patient clinically improved and completed course of osetamivir. Patient complained of right flank pain. On exam, patient was noted to have large bruise and firmness from right flank down to lower lateral aspect of abdomen and back. A CT of abdomen pelvis with IV contrast showed a large hematoma interposed between oblique muscles. Unknown what caused this hematoma. Per patient and , this is new. Patient is on coumadin for stroke prophylaxis as patient has 2 mechanical heart valves. Is also chronically thrombocytopenic (50s-60s in 2010; 80s-90s recently). Patient was given one unit of blood for acute blood loss anemia. Hematology consulted for recs on therapeutic INR levels. Recommend at least 2.5 (lowest allowed level for mechanical valves) per my conversation with Hematologist. Lovenox no longer required and coumadin held to reach goal INR. Repeat CT of abdomen showed improvement of hematoma. Patient continued to clinically improve and no longer with evidence of bleeding. Did require one more unit of blood but was stable afterwards. Weaned off nasal cannula. Did well with therapy. Discharged to resume home regimen of coumadin and INR was 2.2 at time of discharge. Home health set up. INR to be drawn by home health daily for at least 3 days to ensure INR remains at goal. Vitals by HH as well along with therapy. Plan discussed with patient and family. Questions answered to satisfaction. Cardiac diet. Activity as tolerated. F/u with PCP at date shown below.       3/16/20 had PPM gen change 3/2/20   Feels better. Less SOB  Denies CP  Cardiac stable  OV 3 months with Medtronic PPM check    Saw EP NP 7/10/20  In summary, Ms. Ty is a 74 y.o. female with tachy-gail syndrome, chronic atrial fibrillation, PPM, HTN, Mechanical  MVR and AVR, thrombocytopenia here for follow up after generator change.  She is now 4 months s/p generator change. Ms. Ty is doing well from a device perspective with stable lead and device function. Chronic persistent AF on warfarin. 13% RV pacing which is stable for her. Echo from 2/2020 showed normal EF. Instructed to follow up with PCP regarding olfactory changes.     8/20/20 Doing well. Denies CP or SOB. Only get SOB around strong smells like insect spray  EKG A-fib NSSTT changes    Review of Systems   Constitution: Negative for decreased appetite.   HENT: Negative for ear discharge.    Eyes: Negative for blurred vision.   Respiratory: Negative for hemoptysis.    Endocrine: Negative for polyphagia.   Hematologic/Lymphatic: Negative for adenopathy.   Skin: Negative for color change.   Musculoskeletal: Negative for joint swelling.   Genitourinary: Negative for bladder incontinence.   Neurological: Negative for brief paralysis.   Psychiatric/Behavioral: Negative for hallucinations.   Allergic/Immunologic: Negative for hives.        Objective:    Physical Exam   Constitutional: She is oriented to person, place, and time. She appears well-developed and well-nourished.   Eyes: Conjunctivae are normal. No scleral icterus.   Neck: No JVD present. No tracheal deviation present.   Cardiovascular: Normal rate. An irregularly irregular rhythm present. PMI is not displaced.   Pulmonary/Chest: Effort normal and breath sounds normal. No respiratory distress.   Abdominal: Soft. There is no hepatosplenomegaly. There is no abdominal tenderness.   Musculoskeletal:         General: No tenderness or edema.   Neurological: She is alert and oriented to person, place, and time.   Skin: Skin is warm and dry. No rash noted.   Psychiatric: She has a normal mood and affect. Her behavior is normal.         Assessment:       1. S/P AVR    2. S/P MVR (mitral valve replacement)    3. Cardiac pacemaker in situ    4. Chronic atrial  fibrillation    5. Mixed hyperlipidemia    6. Essential (primary) hypertension         Plan:       Cardiac stable  OV 6 months with repeat echo

## 2020-08-27 NOTE — PROGRESS NOTES
returned call, reports Patient did not test this week due to seeing Dr Boateng last week and told it's ok to test every other week, states Patient to test next Tuesday -9/01/20

## 2020-08-29 ENCOUNTER — CLINICAL SUPPORT (OUTPATIENT)
Dept: CARDIOLOGY | Facility: HOSPITAL | Age: 75
End: 2020-08-29
Payer: MEDICARE

## 2020-08-29 DIAGNOSIS — Z95.0 PRESENCE OF CARDIAC PACEMAKER: ICD-10-CM

## 2020-08-29 PROCEDURE — 93294 REM INTERROG EVL PM/LDLS PM: CPT | Mod: ,,, | Performed by: INTERNAL MEDICINE

## 2020-08-29 PROCEDURE — 93294 CARDIAC DEVICE CHECK - REMOTE: ICD-10-PCS | Mod: ,,, | Performed by: INTERNAL MEDICINE

## 2020-08-29 PROCEDURE — 93296 REM INTERROG EVL PM/IDS: CPT | Mod: HCNC | Performed by: INTERNAL MEDICINE

## 2020-09-01 ENCOUNTER — ANTI-COAG VISIT (OUTPATIENT)
Dept: CARDIOLOGY | Facility: CLINIC | Age: 75
End: 2020-09-01
Payer: MEDICARE

## 2020-09-01 DIAGNOSIS — Z79.01 LONG TERM CURRENT USE OF ANTICOAGULANT: ICD-10-CM

## 2020-09-01 DIAGNOSIS — I48.20 CHRONIC ATRIAL FIBRILLATION: ICD-10-CM

## 2020-09-01 DIAGNOSIS — Z95.2 S/P MVR (MITRAL VALVE REPLACEMENT): ICD-10-CM

## 2020-09-01 DIAGNOSIS — Z95.2 S/P AVR: ICD-10-CM

## 2020-09-01 LAB — INR PPP: 2.9

## 2020-09-01 PROCEDURE — 93793 PR ANTICOAGULANT MGMT FOR PT TAKING WARFARIN: ICD-10-PCS | Mod: S$GLB,,, | Performed by: PHARMACIST

## 2020-09-01 PROCEDURE — 93793 ANTICOAG MGMT PT WARFARIN: CPT | Mod: S$GLB,,, | Performed by: PHARMACIST

## 2020-09-15 ENCOUNTER — ANTI-COAG VISIT (OUTPATIENT)
Dept: CARDIOLOGY | Facility: CLINIC | Age: 75
End: 2020-09-15
Payer: MEDICARE

## 2020-09-15 DIAGNOSIS — Z95.2 S/P AVR: ICD-10-CM

## 2020-09-15 DIAGNOSIS — Z95.2 S/P MVR (MITRAL VALVE REPLACEMENT): ICD-10-CM

## 2020-09-15 DIAGNOSIS — Z79.01 LONG TERM CURRENT USE OF ANTICOAGULANT: ICD-10-CM

## 2020-09-15 DIAGNOSIS — I48.20 CHRONIC ATRIAL FIBRILLATION: ICD-10-CM

## 2020-09-15 LAB — INR PPP: 2.8

## 2020-09-15 PROCEDURE — 93793 PR ANTICOAGULANT MGMT FOR PT TAKING WARFARIN: ICD-10-PCS | Mod: S$GLB,,, | Performed by: PHARMACIST

## 2020-09-15 PROCEDURE — 93793 ANTICOAG MGMT PT WARFARIN: CPT | Mod: S$GLB,,, | Performed by: PHARMACIST

## 2020-09-29 ENCOUNTER — PATIENT MESSAGE (OUTPATIENT)
Dept: OTHER | Facility: OTHER | Age: 75
End: 2020-09-29

## 2020-09-29 ENCOUNTER — ANTI-COAG VISIT (OUTPATIENT)
Dept: CARDIOLOGY | Facility: CLINIC | Age: 75
End: 2020-09-29
Payer: MEDICARE

## 2020-09-29 DIAGNOSIS — I48.20 CHRONIC ATRIAL FIBRILLATION: ICD-10-CM

## 2020-09-29 DIAGNOSIS — Z95.2 S/P AVR: ICD-10-CM

## 2020-09-29 DIAGNOSIS — Z79.01 LONG TERM CURRENT USE OF ANTICOAGULANT: ICD-10-CM

## 2020-09-29 DIAGNOSIS — Z95.2 S/P MVR (MITRAL VALVE REPLACEMENT): ICD-10-CM

## 2020-09-29 LAB — INR PPP: 2.4

## 2020-09-29 PROCEDURE — 93793 ANTICOAG MGMT PT WARFARIN: CPT | Mod: S$GLB,,, | Performed by: PHARMACIST

## 2020-09-29 PROCEDURE — 93793 PR ANTICOAGULANT MGMT FOR PT TAKING WARFARIN: ICD-10-PCS | Mod: S$GLB,,, | Performed by: PHARMACIST

## 2020-10-01 ENCOUNTER — LAB VISIT (OUTPATIENT)
Dept: LAB | Facility: HOSPITAL | Age: 75
End: 2020-10-01
Attending: INTERNAL MEDICINE
Payer: MEDICARE

## 2020-10-01 DIAGNOSIS — Z12.11 SPECIAL SCREENING FOR MALIGNANT NEOPLASMS, COLON: Primary | ICD-10-CM

## 2020-10-01 LAB
ALBUMIN SERPL BCP-MCNC: 4.2 G/DL (ref 3.5–5.2)
ALP SERPL-CCNC: 92 U/L (ref 55–135)
ALT SERPL W/O P-5'-P-CCNC: 16 U/L (ref 10–44)
ANION GAP SERPL CALC-SCNC: 7 MMOL/L (ref 8–16)
AST SERPL-CCNC: 33 U/L (ref 10–40)
BASOPHILS # BLD AUTO: 0.05 K/UL (ref 0–0.2)
BASOPHILS NFR BLD: 0.9 % (ref 0–1.9)
BILIRUB SERPL-MCNC: 1.1 MG/DL (ref 0.1–1)
BUN SERPL-MCNC: 17 MG/DL (ref 8–23)
CALCIUM SERPL-MCNC: 9.6 MG/DL (ref 8.7–10.5)
CHLORIDE SERPL-SCNC: 100 MMOL/L (ref 95–110)
CO2 SERPL-SCNC: 27 MMOL/L (ref 23–29)
CREAT SERPL-MCNC: 0.8 MG/DL (ref 0.5–1.4)
DIFFERENTIAL METHOD: ABNORMAL
EOSINOPHIL # BLD AUTO: 0.3 K/UL (ref 0–0.5)
EOSINOPHIL NFR BLD: 4.6 % (ref 0–8)
ERYTHROCYTE [DISTWIDTH] IN BLOOD BY AUTOMATED COUNT: 13.5 % (ref 11.5–14.5)
EST. GFR  (AFRICAN AMERICAN): >60 ML/MIN/1.73 M^2
EST. GFR  (NON AFRICAN AMERICAN): >60 ML/MIN/1.73 M^2
GLUCOSE SERPL-MCNC: 96 MG/DL (ref 70–110)
HCT VFR BLD AUTO: 41.3 % (ref 37–48.5)
HGB BLD-MCNC: 12.8 G/DL (ref 12–16)
IMM GRANULOCYTES # BLD AUTO: 0.01 K/UL (ref 0–0.04)
IMM GRANULOCYTES NFR BLD AUTO: 0.2 % (ref 0–0.5)
LYMPHOCYTES # BLD AUTO: 1.5 K/UL (ref 1–4.8)
LYMPHOCYTES NFR BLD: 26.3 % (ref 18–48)
MCH RBC QN AUTO: 30.1 PG (ref 27–31)
MCHC RBC AUTO-ENTMCNC: 31 G/DL (ref 32–36)
MCV RBC AUTO: 97 FL (ref 82–98)
MONOCYTES # BLD AUTO: 0.4 K/UL (ref 0.3–1)
MONOCYTES NFR BLD: 6.6 % (ref 4–15)
NEUTROPHILS # BLD AUTO: 3.5 K/UL (ref 1.8–7.7)
NEUTROPHILS NFR BLD: 61.4 % (ref 38–73)
NRBC BLD-RTO: 0 /100 WBC
PLATELET # BLD AUTO: 92 K/UL (ref 150–350)
PMV BLD AUTO: 11.6 FL (ref 9.2–12.9)
POTASSIUM SERPL-SCNC: 4.2 MMOL/L (ref 3.5–5.1)
PROT SERPL-MCNC: 7.9 G/DL (ref 6–8.4)
RBC # BLD AUTO: 4.25 M/UL (ref 4–5.4)
SODIUM SERPL-SCNC: 134 MMOL/L (ref 136–145)
WBC # BLD AUTO: 5.62 K/UL (ref 3.9–12.7)

## 2020-10-01 PROCEDURE — 85025 COMPLETE CBC W/AUTO DIFF WBC: CPT | Mod: GA,HCNC

## 2020-10-01 PROCEDURE — 36415 COLL VENOUS BLD VENIPUNCTURE: CPT | Mod: HCNC,PO

## 2020-10-01 PROCEDURE — 80053 COMPREHEN METABOLIC PANEL: CPT | Mod: HCNC

## 2020-10-13 ENCOUNTER — ANTI-COAG VISIT (OUTPATIENT)
Dept: CARDIOLOGY | Facility: CLINIC | Age: 75
End: 2020-10-13
Payer: MEDICARE

## 2020-10-13 DIAGNOSIS — Z95.2 S/P MVR (MITRAL VALVE REPLACEMENT): ICD-10-CM

## 2020-10-13 DIAGNOSIS — I48.20 CHRONIC ATRIAL FIBRILLATION: ICD-10-CM

## 2020-10-13 DIAGNOSIS — Z95.2 S/P AVR: ICD-10-CM

## 2020-10-13 DIAGNOSIS — Z79.01 LONG TERM CURRENT USE OF ANTICOAGULANT: ICD-10-CM

## 2020-10-13 LAB — INR PPP: 3

## 2020-10-13 PROCEDURE — 93793 ANTICOAG MGMT PT WARFARIN: CPT | Mod: S$GLB,,, | Performed by: PHARMACIST

## 2020-10-13 PROCEDURE — 93793 PR ANTICOAGULANT MGMT FOR PT TAKING WARFARIN: ICD-10-PCS | Mod: S$GLB,,, | Performed by: PHARMACIST

## 2020-10-14 NOTE — PROGRESS NOTES
called and was given lab result, verified correct dose of coumadin, reports no changes,  advised to continue same dose and was given next re test date, verbalized understanding

## 2020-10-28 ENCOUNTER — ANTI-COAG VISIT (OUTPATIENT)
Dept: CARDIOLOGY | Facility: CLINIC | Age: 75
End: 2020-10-28
Payer: MEDICARE

## 2020-10-28 DIAGNOSIS — I10 ESSENTIAL (PRIMARY) HYPERTENSION: Chronic | ICD-10-CM

## 2020-10-28 DIAGNOSIS — Z95.2 S/P MVR (MITRAL VALVE REPLACEMENT): ICD-10-CM

## 2020-10-28 DIAGNOSIS — Z95.2 S/P AVR: ICD-10-CM

## 2020-10-28 DIAGNOSIS — Z79.01 LONG TERM CURRENT USE OF ANTICOAGULANT: ICD-10-CM

## 2020-10-28 DIAGNOSIS — I48.20 CHRONIC ATRIAL FIBRILLATION: ICD-10-CM

## 2020-10-28 LAB — INR PPP: 3.4

## 2020-10-28 PROCEDURE — 93793 PR ANTICOAGULANT MGMT FOR PT TAKING WARFARIN: ICD-10-PCS | Mod: S$GLB,,, | Performed by: PHARMACIST

## 2020-10-28 PROCEDURE — 93793 ANTICOAG MGMT PT WARFARIN: CPT | Mod: S$GLB,,, | Performed by: PHARMACIST

## 2020-10-28 RX ORDER — AMLODIPINE BESYLATE 10 MG/1
TABLET ORAL
Qty: 90 TABLET | Refills: 1 | Status: SHIPPED | OUTPATIENT
Start: 2020-10-28 | End: 2021-01-01

## 2020-10-28 RX ORDER — LOSARTAN POTASSIUM 50 MG/1
TABLET ORAL
Qty: 90 TABLET | Refills: 1 | Status: SHIPPED | OUTPATIENT
Start: 2020-10-28 | End: 2021-01-01

## 2020-11-24 ENCOUNTER — ANTI-COAG VISIT (OUTPATIENT)
Dept: CARDIOLOGY | Facility: CLINIC | Age: 75
End: 2020-11-24
Payer: MEDICARE

## 2020-11-24 DIAGNOSIS — Z95.2 S/P AVR: ICD-10-CM

## 2020-11-24 DIAGNOSIS — I48.20 CHRONIC ATRIAL FIBRILLATION: ICD-10-CM

## 2020-11-24 DIAGNOSIS — Z95.2 S/P MVR (MITRAL VALVE REPLACEMENT): ICD-10-CM

## 2020-11-24 DIAGNOSIS — Z79.01 LONG TERM CURRENT USE OF ANTICOAGULANT: ICD-10-CM

## 2020-11-24 LAB — INR PPP: 3.3

## 2020-11-24 PROCEDURE — 93793 ANTICOAG MGMT PT WARFARIN: CPT | Mod: S$GLB,,, | Performed by: PHARMACIST

## 2020-11-24 PROCEDURE — 93793 PR ANTICOAGULANT MGMT FOR PT TAKING WARFARIN: ICD-10-PCS | Mod: S$GLB,,, | Performed by: PHARMACIST

## 2020-11-27 ENCOUNTER — CLINICAL SUPPORT (OUTPATIENT)
Dept: CARDIOLOGY | Facility: HOSPITAL | Age: 75
End: 2020-11-27
Payer: MEDICARE

## 2020-11-27 DIAGNOSIS — Z95.0 PRESENCE OF CARDIAC PACEMAKER: ICD-10-CM

## 2020-11-27 PROCEDURE — 93296 REM INTERROG EVL PM/IDS: CPT | Mod: HCNC | Performed by: INTERNAL MEDICINE

## 2020-11-27 PROCEDURE — 93294 CARDIAC DEVICE CHECK - REMOTE: ICD-10-PCS | Mod: ,,, | Performed by: INTERNAL MEDICINE

## 2020-11-27 PROCEDURE — 93294 REM INTERROG EVL PM/LDLS PM: CPT | Mod: ,,, | Performed by: INTERNAL MEDICINE

## 2020-12-08 ENCOUNTER — ANTI-COAG VISIT (OUTPATIENT)
Dept: CARDIOLOGY | Facility: CLINIC | Age: 75
End: 2020-12-08
Payer: MEDICARE

## 2020-12-08 DIAGNOSIS — I48.20 CHRONIC ATRIAL FIBRILLATION: ICD-10-CM

## 2020-12-08 DIAGNOSIS — Z79.01 LONG TERM CURRENT USE OF ANTICOAGULANT: ICD-10-CM

## 2020-12-08 DIAGNOSIS — Z95.2 S/P AVR: ICD-10-CM

## 2020-12-08 DIAGNOSIS — Z95.2 S/P MVR (MITRAL VALVE REPLACEMENT): ICD-10-CM

## 2020-12-08 LAB — INR PPP: 3.6

## 2020-12-08 PROCEDURE — 93793 PR ANTICOAGULANT MGMT FOR PT TAKING WARFARIN: ICD-10-PCS | Mod: S$GLB,,, | Performed by: PHARMACIST

## 2020-12-08 PROCEDURE — 93793 ANTICOAG MGMT PT WARFARIN: CPT | Mod: S$GLB,,, | Performed by: PHARMACIST

## 2020-12-10 ENCOUNTER — NURSE TRIAGE (OUTPATIENT)
Dept: ADMINISTRATIVE | Facility: CLINIC | Age: 75
End: 2020-12-10

## 2020-12-10 ENCOUNTER — HOSPITAL ENCOUNTER (INPATIENT)
Facility: HOSPITAL | Age: 75
LOS: 2 days | Discharge: HOME | DRG: 287 | End: 2020-12-12
Attending: EMERGENCY MEDICINE | Admitting: INTERNAL MEDICINE
Payer: MEDICARE

## 2020-12-10 DIAGNOSIS — R06.00 DYSPNEA, UNSPECIFIED TYPE: ICD-10-CM

## 2020-12-10 DIAGNOSIS — I35.0 AORTIC VALVE STENOSIS, ETIOLOGY OF CARDIAC VALVE DISEASE UNSPECIFIED: ICD-10-CM

## 2020-12-10 DIAGNOSIS — R53.1 WEAKNESS: ICD-10-CM

## 2020-12-10 DIAGNOSIS — J90 PLEURAL EFFUSION: Primary | ICD-10-CM

## 2020-12-10 DIAGNOSIS — I50.33 ACUTE ON CHRONIC DIASTOLIC HEART FAILURE: ICD-10-CM

## 2020-12-10 DIAGNOSIS — Z95.2 S/P AVR (AORTIC VALVE REPLACEMENT): ICD-10-CM

## 2020-12-10 PROBLEM — E87.1 HYPONATREMIA: Status: ACTIVE | Noted: 2020-12-10

## 2020-12-10 LAB
ALBUMIN SERPL BCP-MCNC: 4.4 G/DL (ref 3.5–5.2)
ALP SERPL-CCNC: 80 U/L (ref 55–135)
ALT SERPL W/O P-5'-P-CCNC: 25 U/L (ref 10–44)
ANION GAP SERPL CALC-SCNC: 11 MMOL/L (ref 8–16)
AST SERPL-CCNC: 43 U/L (ref 10–40)
BASOPHILS # BLD AUTO: 0.04 K/UL (ref 0–0.2)
BASOPHILS NFR BLD: 0.6 % (ref 0–1.9)
BILIRUB SERPL-MCNC: 1 MG/DL (ref 0.1–1)
BNP SERPL-MCNC: 268 PG/ML (ref 0–99)
BUN SERPL-MCNC: 18 MG/DL (ref 8–23)
CALCIUM SERPL-MCNC: 9.7 MG/DL (ref 8.7–10.5)
CHLORIDE SERPL-SCNC: 90 MMOL/L (ref 95–110)
CO2 SERPL-SCNC: 25 MMOL/L (ref 23–29)
CREAT SERPL-MCNC: 0.8 MG/DL (ref 0.5–1.4)
CTP QC/QA: YES
DIFFERENTIAL METHOD: ABNORMAL
EOSINOPHIL # BLD AUTO: 0.2 K/UL (ref 0–0.5)
EOSINOPHIL NFR BLD: 2.4 % (ref 0–8)
ERYTHROCYTE [DISTWIDTH] IN BLOOD BY AUTOMATED COUNT: 13.2 % (ref 11.5–14.5)
EST. GFR  (AFRICAN AMERICAN): >60 ML/MIN/1.73 M^2
EST. GFR  (NON AFRICAN AMERICAN): >60 ML/MIN/1.73 M^2
GLUCOSE SERPL-MCNC: 100 MG/DL (ref 70–110)
GLUCOSE SERPL-MCNC: 124 MG/DL (ref 70–110)
HCT VFR BLD AUTO: 38.3 % (ref 37–48.5)
HGB BLD-MCNC: 12.8 G/DL (ref 12–16)
IMM GRANULOCYTES # BLD AUTO: 0.02 K/UL (ref 0–0.04)
IMM GRANULOCYTES NFR BLD AUTO: 0.3 % (ref 0–0.5)
INR PPP: 2 (ref 0.8–1.2)
LYMPHOCYTES # BLD AUTO: 1.4 K/UL (ref 1–4.8)
LYMPHOCYTES NFR BLD: 20.5 % (ref 18–48)
MAGNESIUM SERPL-MCNC: 1.9 MG/DL (ref 1.6–2.6)
MCH RBC QN AUTO: 30.1 PG (ref 27–31)
MCHC RBC AUTO-ENTMCNC: 33.4 G/DL (ref 32–36)
MCV RBC AUTO: 90 FL (ref 82–98)
MONOCYTES # BLD AUTO: 0.5 K/UL (ref 0.3–1)
MONOCYTES NFR BLD: 7.5 % (ref 4–15)
NEUTROPHILS # BLD AUTO: 4.6 K/UL (ref 1.8–7.7)
NEUTROPHILS NFR BLD: 68.7 % (ref 38–73)
NRBC BLD-RTO: 0 /100 WBC
PLATELET # BLD AUTO: 112 K/UL (ref 150–350)
PMV BLD AUTO: 11.4 FL (ref 9.2–12.9)
POTASSIUM SERPL-SCNC: 4.1 MMOL/L (ref 3.5–5.1)
PROT SERPL-MCNC: 8.4 G/DL (ref 6–8.4)
PROTHROMBIN TIME: 22.3 SEC (ref 9–12.5)
RBC # BLD AUTO: 4.25 M/UL (ref 4–5.4)
SARS-COV-2 RDRP RESP QL NAA+PROBE: NEGATIVE
SODIUM SERPL-SCNC: 126 MMOL/L (ref 136–145)
T4 FREE SERPL-MCNC: 1.11 NG/DL (ref 0.71–1.51)
TROPONIN I SERPL DL<=0.01 NG/ML-MCNC: 0.01 NG/ML (ref 0–0.03)
TSH SERPL DL<=0.005 MIU/L-ACNC: 4.51 UIU/ML (ref 0.4–4)
WBC # BLD AUTO: 6.65 K/UL (ref 3.9–12.7)

## 2020-12-10 PROCEDURE — 25000003 PHARM REV CODE 250: Mod: HCNC | Performed by: INTERNAL MEDICINE

## 2020-12-10 PROCEDURE — 93010 ELECTROCARDIOGRAM REPORT: CPT | Mod: HCNC,,, | Performed by: INTERNAL MEDICINE

## 2020-12-10 PROCEDURE — 84439 ASSAY OF FREE THYROXINE: CPT | Mod: HCNC

## 2020-12-10 PROCEDURE — 83880 ASSAY OF NATRIURETIC PEPTIDE: CPT | Mod: HCNC

## 2020-12-10 PROCEDURE — 84484 ASSAY OF TROPONIN QUANT: CPT | Mod: HCNC

## 2020-12-10 PROCEDURE — 63600175 PHARM REV CODE 636 W HCPCS: Mod: HCNC | Performed by: EMERGENCY MEDICINE

## 2020-12-10 PROCEDURE — 93010 EKG 12-LEAD: ICD-10-PCS | Mod: HCNC,,, | Performed by: INTERNAL MEDICINE

## 2020-12-10 PROCEDURE — 84443 ASSAY THYROID STIM HORMONE: CPT | Mod: HCNC

## 2020-12-10 PROCEDURE — 83735 ASSAY OF MAGNESIUM: CPT | Mod: HCNC

## 2020-12-10 PROCEDURE — 96374 THER/PROPH/DIAG INJ IV PUSH: CPT | Mod: HCNC

## 2020-12-10 PROCEDURE — 80053 COMPREHEN METABOLIC PANEL: CPT | Mod: HCNC

## 2020-12-10 PROCEDURE — U0002 COVID-19 LAB TEST NON-CDC: HCPCS | Mod: HCNC | Performed by: EMERGENCY MEDICINE

## 2020-12-10 PROCEDURE — 83930 ASSAY OF BLOOD OSMOLALITY: CPT | Mod: HCNC

## 2020-12-10 PROCEDURE — 11000001 HC ACUTE MED/SURG PRIVATE ROOM: Mod: HCNC

## 2020-12-10 PROCEDURE — 85610 PROTHROMBIN TIME: CPT | Mod: HCNC

## 2020-12-10 PROCEDURE — 85025 COMPLETE CBC W/AUTO DIFF WBC: CPT | Mod: HCNC

## 2020-12-10 PROCEDURE — 93005 ELECTROCARDIOGRAM TRACING: CPT | Mod: HCNC

## 2020-12-10 PROCEDURE — 99285 EMERGENCY DEPT VISIT HI MDM: CPT | Mod: 25,HCNC

## 2020-12-10 RX ORDER — WARFARIN 4 MG/1
4 TABLET ORAL
Status: DISCONTINUED | OUTPATIENT
Start: 2020-12-11 | End: 2020-12-12 | Stop reason: HOSPADM

## 2020-12-10 RX ORDER — AMLODIPINE BESYLATE 5 MG/1
10 TABLET ORAL DAILY
Status: DISCONTINUED | OUTPATIENT
Start: 2020-12-11 | End: 2020-12-12 | Stop reason: HOSPADM

## 2020-12-10 RX ORDER — LOSARTAN POTASSIUM 25 MG/1
50 TABLET ORAL DAILY
Status: DISCONTINUED | OUTPATIENT
Start: 2020-12-11 | End: 2020-12-12 | Stop reason: HOSPADM

## 2020-12-10 RX ORDER — SODIUM CHLORIDE 0.9 % (FLUSH) 0.9 %
10 SYRINGE (ML) INJECTION
Status: DISCONTINUED | OUTPATIENT
Start: 2020-12-10 | End: 2020-12-12 | Stop reason: HOSPADM

## 2020-12-10 RX ORDER — FUROSEMIDE 10 MG/ML
40 INJECTION INTRAMUSCULAR; INTRAVENOUS
Status: COMPLETED | OUTPATIENT
Start: 2020-12-10 | End: 2020-12-10

## 2020-12-10 RX ORDER — WARFARIN 2 MG/1
2 TABLET ORAL
Status: DISCONTINUED | OUTPATIENT
Start: 2020-12-16 | End: 2020-12-12 | Stop reason: HOSPADM

## 2020-12-10 RX ORDER — WARFARIN 2 MG/1
2 TABLET ORAL
Status: DISCONTINUED | OUTPATIENT
Start: 2020-12-10 | End: 2020-12-10

## 2020-12-10 RX ORDER — FUROSEMIDE 10 MG/ML
40 INJECTION INTRAMUSCULAR; INTRAVENOUS DAILY
Status: DISCONTINUED | OUTPATIENT
Start: 2020-12-11 | End: 2020-12-11

## 2020-12-10 RX ORDER — IBUPROFEN 200 MG
16 TABLET ORAL
Status: DISCONTINUED | OUTPATIENT
Start: 2020-12-10 | End: 2020-12-12 | Stop reason: HOSPADM

## 2020-12-10 RX ORDER — TIZANIDINE 4 MG/1
4 TABLET ORAL EVERY 8 HOURS PRN
Status: DISCONTINUED | OUTPATIENT
Start: 2020-12-10 | End: 2020-12-12 | Stop reason: HOSPADM

## 2020-12-10 RX ORDER — METOPROLOL TARTRATE 25 MG/1
25 TABLET, FILM COATED ORAL 2 TIMES DAILY
Status: DISCONTINUED | OUTPATIENT
Start: 2020-12-10 | End: 2020-12-12 | Stop reason: HOSPADM

## 2020-12-10 RX ORDER — WARFARIN 2 MG/1
2 TABLET ORAL
Status: DISCONTINUED | OUTPATIENT
Start: 2020-12-10 | End: 2020-12-12 | Stop reason: HOSPADM

## 2020-12-10 RX ORDER — GLUCAGON 1 MG
1 KIT INJECTION
Status: DISCONTINUED | OUTPATIENT
Start: 2020-12-10 | End: 2020-12-12 | Stop reason: HOSPADM

## 2020-12-10 RX ORDER — PRAVASTATIN SODIUM 10 MG/1
20 TABLET ORAL DAILY
Status: DISCONTINUED | OUTPATIENT
Start: 2020-12-11 | End: 2020-12-12 | Stop reason: HOSPADM

## 2020-12-10 RX ORDER — WARFARIN 2 MG/1
2 TABLET ORAL
Status: DISCONTINUED | OUTPATIENT
Start: 2020-12-12 | End: 2020-12-10

## 2020-12-10 RX ORDER — IBUPROFEN 200 MG
24 TABLET ORAL
Status: DISCONTINUED | OUTPATIENT
Start: 2020-12-10 | End: 2020-12-12 | Stop reason: HOSPADM

## 2020-12-10 RX ADMIN — TIZANIDINE 4 MG: 4 TABLET ORAL at 06:12

## 2020-12-10 RX ADMIN — FUROSEMIDE 40 MG: 10 INJECTION, SOLUTION INTRAVENOUS at 04:12

## 2020-12-10 RX ADMIN — WARFARIN SODIUM 2 MG: 2 TABLET ORAL at 05:12

## 2020-12-10 RX ADMIN — METOPROLOL TARTRATE 25 MG: 25 TABLET, FILM COATED ORAL at 08:12

## 2020-12-10 NOTE — HPI
Ms Leon is a 75 F with history of AFib, mechanical AVR & MVR, aortic root dilation, HTN, HLD, DM, pacemaker who presents to  with worsening SOB. Pt notice SOB starting 2 days ago. Exertional capacity diminished from being able to walk 2 blocks to only being able to walk to bathroom. She does not have chest pain, cough, fever, chills. She does not have orthopnea. She states that when she sits up she does feel dizzy, however. She has stable LE swelling. No other complaints at this time.     She has been taking meds as prescribed; not sure if she is taking lasix. Does not restrict salt intake.

## 2020-12-10 NOTE — ASSESSMENT & PLAN NOTE
AFib  Rate well controlled on metoprolol 25 mg BID   Cont warfarin to goal INR 2.5-3.5 due to mechanical valves   Takes 2 mg T W Th Sat Sun and 4 mg M F

## 2020-12-10 NOTE — ED TRIAGE NOTES
Patient comes in with increased SOB, states started to have increased SOB yesterday, having difficulty wearing mask, states increases SOB.

## 2020-12-10 NOTE — ASSESSMENT & PLAN NOTE
Right sided moderate pleural effusion  Suspect may be due to HFpEF  No infectious symptoms or wt loss  Consult pulm to consider thoracentesis

## 2020-12-10 NOTE — Clinical Note
Dr. Umesh parekh. Tramadol 50 mg Take 1 tablet by mouth every 8 hours as needed for pain. Dispense 90 refill 3   Ordered 8/1/16 by Aleshia.  Last seen 10/28/16 by Yohannes.     According to PDMP filled 9/12/16, 10/13/16 and 12/12/16    Okay for refill?

## 2020-12-10 NOTE — ASSESSMENT & PLAN NOTE
Na 126  Suspect hypervolemic hypernatremia  Urine studies, TSH (hx of subclinical hypothyroid), AM cortisol  Repeat BMP in AM

## 2020-12-10 NOTE — TELEPHONE ENCOUNTER
Pt's  on the phone. Reports pt is having SOB. Per triage protocol, advised to call 911. Pt's  VU    Reason for Disposition   Slow, shallow and weak breathing    Additional Information   Negative: Breathing stopped and hasn't returned   Negative: Choking on something   Negative: SEVERE difficulty breathing (e.g., struggling for each breath, speaks in single words, pulse > 120)   Negative: Bluish (or gray) lips or face   Negative: Difficult to awaken or acting confused (e.g., disoriented, slurred speech)   Negative: Wheezing started suddenly after medicine, an allergic food, or bee sting   Negative: Stridor   Negative: Passed out (i.e., fainted, collapsed and was not responding)    Protocols used: BREATHING DIFFICULTY-A-OH

## 2020-12-10 NOTE — ASSESSMENT & PLAN NOTE
Worsening MARTÍNEZ, noted LE edema (however pt reports this is baseline edema)  Repeat TTE  Consult cardiology  Lasix 40 mg IV daily

## 2020-12-10 NOTE — SUBJECTIVE & OBJECTIVE
Past Medical History:   Diagnosis Date    A-fib     Anticoagulant long-term use     Closed displaced fracture of distal phalanx of lesser toe 10/20/2015    Right    Coronary artery disease     Diabetes mellitus     Diabetes mellitus, type 2     Hypertension     Mechanical heart valve present     X's two    Osteopenia     Pacemaker     Rheumatic heart disease        Past Surgical History:   Procedure Laterality Date    CARDIAC PACEMAKER PLACEMENT      CARDIAC VALVE REPLACEMENT      CARDIAC VALVE SURGERY      EYE SURGERY Bilateral     cataract    REPLACEMENT OF PACEMAKER GENERATOR N/A 3/2/2020    Procedure: REPLACEMENT, PACEMAKER GENERATOR;  Surgeon: Mark Ring MD;  Location: [facility] EP LAB;  Service: Cardiology;  Laterality: N/A;  TBS/[facility], Single PPM gen change, Right side, MDT, MAC, SK, 3 Prep    thryoid s         Review of patient's allergies indicates:   Allergen Reactions    Aspirin Other (See Comments)       Current Facility-Administered Medications on File Prior to Encounter   Medication    0.9%  NaCl infusion     Current Outpatient Medications on File Prior to Encounter   Medication Sig    ACCU-CHEK GATO CONTROL SOLN Soln Use as directed to perform controls on meter    ACCU-CHEK GATO PLUS TEST STRP Strp     amLODIPine (NORVASC) 10 MG tablet TAKE 1 TABLET ONCE DAILY.    furosemide (LASIX) 20 MG tablet Take 1 tablet (20 mg total) by mouth daily as needed (Take one table daily for 3 days until weight back to normal. After that use as needed for weight gain greater than 3 pounds daily.).    lancets 30 gauge Misc     lancing device Misc     losartan (COZAAR) 50 MG tablet TAKE 1 TABLET ONCE DAILY.    metFORMIN (GLUCOPHAGE) 500 MG tablet Take 1 tablet (500 mg total) by mouth once daily.    metoprolol tartrate (LOPRESSOR) 25 MG tablet TAKE 1 TABLET BY MOUTH TWICE DAILY    multivitamin-Ca-iron-minerals 27-0.4 mg Tab     omega-3 acid ethyl esters (LOVAZA) 1 gram capsule TK 2 CS PO BID     pravastatin (PRAVACHOL) 20 MG tablet Take 1 tablet (20 mg total) by mouth once daily.    warfarin (COUMADIN) 4 MG tablet TAKE 1 TABLET BY MOUTH ON MONDAY AND FRIDAY AND 1/2 TABLET BY MOUTH ON ALL OTHER DAYS    adhesive bandage (BANDAGES/PLASTIC TOP)     alcohol swabs (BD ALCOHOL SWABS) PadM Apply 1 each topically as needed (prior to finger sticks). (Patient not taking: Reported on 6/12/2020)    B-COMPLEX WITH VITAMIN C ORAL     nitroGLYCERIN (NITROSTAT) 0.4 MG SL tablet DISSOLVE 1 TABLET UNDER THE TONGUE EVERY 5 MINUTES AS NEEDED FOR CHEST PAINS     Family History     Problem Relation (Age of Onset)    Breast cancer Maternal Aunt        Tobacco Use    Smoking status: Never Smoker    Smokeless tobacco: Never Used   Substance and Sexual Activity    Alcohol use: No    Drug use: Never    Sexual activity: Not on file     Review of Systems   Constitutional: Positive for appetite change and fatigue. Negative for chills and fever.   HENT: Negative for postnasal drip and rhinorrhea.    Eyes: Negative for discharge and itching.   Respiratory: Positive for shortness of breath. Negative for cough, choking, chest tightness and wheezing.    Cardiovascular: Positive for leg swelling (stable). Negative for chest pain and palpitations.   Gastrointestinal: Negative for abdominal distention and abdominal pain.   Endocrine: Negative for cold intolerance and heat intolerance.   Genitourinary: Negative for decreased urine volume, difficulty urinating and dysuria.   Musculoskeletal: Negative for arthralgias.   Skin: Negative for rash and wound.   Neurological: Positive for dizziness. Negative for light-headedness and headaches.   Psychiatric/Behavioral: Negative for confusion and decreased concentration.     Objective:     Vital Signs (Most Recent):  Temp: 98.5 °F (36.9 °C) (12/10/20 1406)  Pulse: 85 (12/10/20 1646)  Resp: (!) 23 (12/10/20 1646)  BP: (!) 149/79 (12/10/20 1646)  SpO2: 100 % (12/10/20 1646) Vital Signs (24h  Range):  Temp:  [98.5 °F (36.9 °C)] 98.5 °F (36.9 °C)  Pulse:  [73-90] 85  Resp:  [18-25] 23  SpO2:  [94 %-100 %] 100 %  BP: (128-174)/(71-86) 149/79        Body mass index is 19.16 kg/m².    Physical Exam  Constitutional:       General: She is not in acute distress.     Appearance: Normal appearance.   HENT:      Head: Normocephalic and atraumatic.      Right Ear: External ear normal.      Left Ear: External ear normal.      Nose: Nose normal. No congestion or rhinorrhea.      Mouth/Throat:      Mouth: Mucous membranes are moist.      Pharynx: Oropharynx is clear.   Eyes:      Extraocular Movements: Extraocular movements intact.      Conjunctiva/sclera: Conjunctivae normal.   Neck:      Musculoskeletal: Normal range of motion and neck supple.   Cardiovascular:      Rate and Rhythm: Normal rate. Rhythm irregular.      Pulses: Normal pulses.      Heart sounds: Normal heart sounds.      Comments: +Mechanical valve murmur  Pulmonary:      Effort: Pulmonary effort is normal.      Comments: +RLL decreased breath sounds  Shallow inspirations  Abdominal:      General: Abdomen is flat.      Palpations: Abdomen is soft.   Musculoskeletal:      Right lower leg: Edema present.      Left lower leg: Edema present.      Comments: +[facility] trace pretibial edema   Neurological:      Mental Status: She is alert and oriented to person, place, and time. Mental status is at baseline.   Psychiatric:         Mood and Affect: Mood normal.         Behavior: Behavior normal.             Significant Labs: All pertinent labs within the past 24 hours have been reviewed.    Significant Imaging: I have reviewed all pertinent imaging results/findings within the past 24 hours.

## 2020-12-10 NOTE — ED PROVIDER NOTES
Encounter Date: 12/10/2020  SORT:   76 y/o F with history of HTN, DM, AFib, Pacemaker presenting for evaluation of SOB and fatigue since yesterday. No appetite and decreased PO intake lately per  with weight loss. Initial orders placed. HOLLY Rojas PA-C      History     Chief Complaint   Patient presents with    Shortness of Breath     Pt c/o SOB, fatigue since yesterday. States she is only able to do her incentive spirometry half way now. Denies pain. Pt has pacemaker     Ms Ty reports gradual worsening sob over the past several weeks, worsening a bit more in recent days. No recent changes in her medications. She has a hx of htn and chf. Denies chest pain, fevers, back pain, urinary or bowel symptoms, numbness, tingling or weakness, syncope or other problems. Reports she feels fine at rest but gets sob with mild exertion. She has a hx of chronic afiv, pacemaker, 2 valve replacements, takes all meds with good compliance.     The history is provided by the patient and the spouse. A  was used ( acting as , offered martcandy, politely declined, pt comfortable w  translating).     Review of patient's allergies indicates:   Allergen Reactions    Aspirin Other (See Comments)     Past Medical History:   Diagnosis Date    A-fib     Anticoagulant long-term use     Closed displaced fracture of distal phalanx of lesser toe 10/20/2015    Right    Coronary artery disease     Diabetes mellitus     Diabetes mellitus, type 2     Hypertension     Mechanical heart valve present     X's two    Osteopenia     Pacemaker     Rheumatic heart disease      Past Surgical History:   Procedure Laterality Date    CARDIAC PACEMAKER PLACEMENT      CARDIAC VALVE REPLACEMENT      CARDIAC VALVE SURGERY      EYE SURGERY Bilateral     cataract    REPLACEMENT OF PACEMAKER GENERATOR N/A 3/2/2020    Procedure: REPLACEMENT, PACEMAKER GENERATOR;  Surgeon: Mark Ring MD;  Location:  [facility] EP [facility];  Service: Cardiology;  Laterality: N/A;  TBS/CHRISTI, Single PPM gen change, Right side, MDT, MAC, SK, 3 Prep    thryoid s       Family History   Problem Relation Age of Onset    Breast cancer Maternal Aunt     Colon cancer Neg Hx     Ovarian cancer Neg Hx      Social History     Tobacco Use    Smoking status: Never Smoker    Smokeless tobacco: Never Used   Substance Use Topics    Alcohol use: No    Drug use: Never     Review of Systems   Constitutional: Positive for fatigue.   Respiratory: Positive for shortness of breath. Negative for chest tightness and wheezing.         Mild congestion   Cardiovascular: Negative for chest pain, palpitations and leg swelling.   All other systems reviewed and are negative.      Physical Exam     Initial Vitals [12/10/20 1406]   BP Pulse Resp Temp SpO2   129/77 73 18 98.5 °F (36.9 °C) 97 %      MAP       --         Physical Exam    Nursing note and vitals reviewed.  Constitutional: She appears well-developed and well-nourished. She is not diaphoretic. No distress.   HENT:   Head: Normocephalic and atraumatic.   Eyes: Conjunctivae and EOM are normal.   Neck: Normal range of motion.   Cardiovascular: Normal rate and intact distal pulses.   Irregularly irregular. No edema. Good perfusion in BLE, no ankle swelling or ttp or asymmetry   Pulmonary/Chest: Breath sounds normal. No respiratory distress. She has no wheezes. She has no rales.   Mild decreased breath sounds in R post base   Abdominal: Soft. She exhibits no distension. There is no abdominal tenderness. There is no rebound.   Musculoskeletal: Normal range of motion. No tenderness or edema.   Neurological: She is alert and oriented to person, place, and time. She has normal strength. GCS score is 15. GCS eye subscore is 4. GCS verbal subscore is 5. GCS motor subscore is 6.   Skin: Skin is warm. Capillary refill takes less than 2 seconds.   Psychiatric: She has a normal mood and affect. Thought content normal.          ED Course   Critical Care    Date/Time: 12/12/2020 1:11 PM  Performed by: Portia Roman MD  Authorized by: Romario Rashid DO   Direct patient critical care time: 5 minutes  Additional history critical care time: 5 minutes  Ordering / reviewing critical care time: 5 minutes  Documentation critical care time: 5 minutes  Consulting other physicians critical care time: 5 minutes  Consult with family critical care time: 5 minutes  Total critical care time (exclusive of procedural time) : 30 minutes        Labs Reviewed   CBC W/ AUTO DIFFERENTIAL - Abnormal; Notable for the following components:       Result Value    Platelets 112 (*)     All other components within normal limits   COMPREHENSIVE METABOLIC PANEL - Abnormal; Notable for the following components:    Sodium 126 (*)     Chloride 90 (*)     AST 43 (*)     All other components within normal limits   B-TYPE NATRIURETIC PEPTIDE - Abnormal; Notable for the following components:     (*)     All other components within normal limits   PROTIME-INR - Abnormal; Notable for the following components:    Prothrombin Time 22.3 (*)     INR 2.0 (*)     All other components within normal limits   TROPONIN I   MAGNESIUM   OSMOLALITY, SERUM   SODIUM, URINE, RANDOM   OSMOLALITY, URINE RANDOM   OSMOLALITY, SERUM   SARS-COV-2 RDRP GENE     EKG Readings: (Independently Interpreted)   Initial Reading: No STEMI. Rhythm: Atrial Fibrillation. Ectopy: No Ectopy.     ECG Results          EKG 12-lead (Final result)  Result time 12/11/20 07:01:56    Final result by Interface, Lab In [facility] (12/11/20 07:01:56)                 Narrative:    Test Reason : R07.9,    Vent. Rate : 087 BPM     Atrial Rate : 107 BPM     P-R Int : 000 ms          QRS Dur : 106 ms      QT Int : 400 ms       P-R-T Axes : 000 -05 193 degrees     QTc Int : 481 ms    Atrial fibrillation  Voltage criteria for left ventricular hypertrophy  Marked ST abnormality, possible inferior subendocardial  injury  Abnormal ECG  When compared with ECG of 20-AUG-2020 12:38,  Significant changes have occurred  Confirmed by Umesh SCOTT, Mckay COTTO ([id number]) on 12/11/2020 7:01:41 AM    Referred By: [email or web address]   SELF           Confirmed By:Mckay Calvo MD                             EKG 12-LEAD (Final result)  Result time 12/12/20 07:44:49    Final result by Unknown User (12/12/20 07:44:49)                                Imaging Results          X-Ray Chest AP Portable (Final result)  Result time 12/10/20 15:01:19    Final result by Thomas Dumont MD (12/10/20 15:01:19)                 Impression:      1. Development of a moderate to large right pleural effusion since the previous study.  2. Cardiomegaly.  3. Pulmonary vascular congestion with interstitial edema, likely from CHF.      Electronically signed by: Thomas Dumont MD  Date:    12/10/2020  Time:    15:01             Narrative:    EXAMINATION:  XR CHEST AP PORTABLE    CLINICAL HISTORY:  Chest Pain;    TECHNIQUE:  Single frontal view of the chest was performed.    COMPARISON:  Chest 02/05/2020    FINDINGS:  Since the previous examination there is development of a moderate to large right pleural effusion.  Enlargement of the cardiac silhouette persists.  Postoperative changes from median sternotomy for heart valve surgery again noted.    There is central pulmonary vascular congestion with slight mild interstitial edema, likely from CHF.  Superimposed infectious process not excluded.    The position of the unipolar transvenous pacemaker remains without significant change.  There are cardiac monitoring leads over the chest.                                 Medical Decision Making:   Clinical Tests:   Lab Tests: Ordered and Reviewed  Radiological Study: Ordered and Reviewed  Medical Tests: Ordered and Reviewed  ED Management:  Ms Ty has been stable during her time in the er. She is not hypoxi. She is stable on room air. Plural effusion noted on R. Given lasix. Labs noted, w  hyponatremia. She warrants further inpatient care for monitoring and correction of electrolytes and diuresis with likely cards consult. Suspect chf flare. Pt happy w plan. Discussed w inpatient HM team, Dr. Rashid, will see pt soon.                              Clinical Impression:      1. Pleural effusion  2. Weakness  3. Dyspnea, unspecified      ED Disposition Condition    Admit                             Portia Roman MD  12/12/20 9230

## 2020-12-10 NOTE — H&P
[facility] Hospital Medicine  History & Physical    Patient Name: Orion Ty  MRN: [id number]  Admission Date: 12/10/2020  Attending Physician: Romario Rashid DO   Primary Care Provider: Otis Zimmer MD         Patient information was obtained from patient, spouse/SO and ER records.     Subjective:     Principal Problem:Pleural effusion    Chief Complaint:   Chief Complaint   Patient presents with    Shortness of Breath     Pt c/o SOB, fatigue since yesterday. States she is only able to do her incentive spirometry half way now. Denies pain. Pt has pacemaker        HPI: Ms Leon is a 75 F with history of AFib, mechanical AVR & MVR, aortic root dilation, HTN, HLD, DM, pacemaker who presents to  with worsening SOB. Pt notice SOB starting 2 days ago. Exertional capacity diminished from being able to walk 2 blocks to only being able to walk to bathroom. She does not have chest pain, cough, fever, chills. She does not have orthopnea. She states that when she sits up she does feel dizzy, however. She has stable LE swelling. No other complaints at this time.     She has been taking meds as prescribed; not sure if she is taking lasix. Does not restrict salt intake.     Past Medical History:   Diagnosis Date    A-fib     Anticoagulant long-term use     Closed displaced fracture of distal phalanx of lesser toe 10/20/2015    Right    Coronary artery disease     Diabetes mellitus     Diabetes mellitus, type 2     Hypertension     Mechanical heart valve present     X's two    Osteopenia     Pacemaker     Rheumatic heart disease        Past Surgical History:   Procedure Laterality Date    CARDIAC PACEMAKER PLACEMENT      CARDIAC VALVE REPLACEMENT      CARDIAC VALVE SURGERY      EYE SURGERY Bilateral     cataract    REPLACEMENT OF PACEMAKER GENERATOR N/A 3/2/2020    Procedure: REPLACEMENT, PACEMAKER GENERATOR;  Surgeon: Mark Ring MD;  Location: [facility];  Service: Cardiology;   Laterality: N/A;  TBS/CHRISTI, Single PPM gen change, Right side, MDT, MAC, SK, 3 Prep    thryoid s         Review of patient's allergies indicates:   Allergen Reactions    Aspirin Other (See Comments)       Current Facility-Administered Medications on File Prior to Encounter   Medication    0.9%  NaCl infusion     Current Outpatient Medications on File Prior to Encounter   Medication Sig    ACCU-CHEK GATO CONTROL SOLN Soln Use as directed to perform controls on meter    ACCU-CHEK GATO PLUS TEST STRP Strp     amLODIPine (NORVASC) 10 MG tablet TAKE 1 TABLET ONCE DAILY.    furosemide (LASIX) 20 MG tablet Take 1 tablet (20 mg total) by mouth daily as needed (Take one table daily for 3 days until weight back to normal. After that use as needed for weight gain greater than 3 pounds daily.).    lancets 30 gauge Misc     lancing device Misc     losartan (COZAAR) 50 MG tablet TAKE 1 TABLET ONCE DAILY.    metFORMIN (GLUCOPHAGE) 500 MG tablet Take 1 tablet (500 mg total) by mouth once daily.    metoprolol tartrate (LOPRESSOR) 25 MG tablet TAKE 1 TABLET BY MOUTH TWICE DAILY    multivitamin-Ca-iron-minerals 27-0.4 mg Tab     omega-3 acid ethyl esters (LOVAZA) 1 gram capsule TK 2 CS PO BID    pravastatin (PRAVACHOL) 20 MG tablet Take 1 tablet (20 mg total) by mouth once daily.    warfarin (COUMADIN) 4 MG tablet TAKE 1 TABLET BY MOUTH ON MONDAY AND FRIDAY AND 1/2 TABLET BY MOUTH ON ALL OTHER DAYS    adhesive bandage (BANDAGES/PLASTIC TOP)     alcohol swabs (BD ALCOHOL SWABS) PadM Apply 1 each topically as needed (prior to finger sticks). (Patient not taking: Reported on 6/12/2020)    B-COMPLEX WITH VITAMIN C ORAL     nitroGLYCERIN (NITROSTAT) 0.4 MG SL tablet DISSOLVE 1 TABLET UNDER THE TONGUE EVERY 5 MINUTES AS NEEDED FOR CHEST PAINS     Family History     Problem Relation (Age of Onset)    Breast cancer Maternal Aunt        Tobacco Use    Smoking status: Never Smoker    Smokeless tobacco: Never Used    Substance and Sexual Activity    Alcohol use: No    Drug use: Never    Sexual activity: Not on file     Review of Systems   Constitutional: Positive for appetite change and fatigue. Negative for chills and fever.   HENT: Negative for postnasal drip and rhinorrhea.    Eyes: Negative for discharge and itching.   Respiratory: Positive for shortness of breath. Negative for cough, choking, chest tightness and wheezing.    Cardiovascular: Positive for leg swelling (stable). Negative for chest pain and palpitations.   Gastrointestinal: Negative for abdominal distention and abdominal pain.   Endocrine: Negative for cold intolerance and heat intolerance.   Genitourinary: Negative for decreased urine volume, difficulty urinating and dysuria.   Musculoskeletal: Negative for arthralgias.   Skin: Negative for rash and wound.   Neurological: Positive for dizziness. Negative for light-headedness and headaches.   Psychiatric/Behavioral: Negative for confusion and decreased concentration.     Objective:     Vital Signs (Most Recent):  Temp: 98.5 °F (36.9 °C) (12/10/20 1406)  Pulse: 85 (12/10/20 1646)  Resp: (!) 23 (12/10/20 1646)  BP: (!) 149/79 (12/10/20 1646)  SpO2: 100 % (12/10/20 1646) Vital Signs (24h Range):  Temp:  [98.5 °F (36.9 °C)] 98.5 °F (36.9 °C)  Pulse:  [73-90] 85  Resp:  [18-25] 23  SpO2:  [94 %-100 %] 100 %  BP: (128-174)/(71-86) 149/79        Body mass index is 19.16 kg/m².    Physical Exam  Constitutional:       General: She is not in acute distress.     Appearance: Normal appearance.   HENT:      Head: Normocephalic and atraumatic.      Right Ear: External ear normal.      Left Ear: External ear normal.      Nose: Nose normal. No congestion or rhinorrhea.      Mouth/Throat:      Mouth: Mucous membranes are moist.      Pharynx: Oropharynx is clear.   Eyes:      Extraocular Movements: Extraocular movements intact.      Conjunctiva/sclera: Conjunctivae normal.   Neck:      Musculoskeletal: Normal range of  motion and neck supple.   Cardiovascular:      Rate and Rhythm: Normal rate. Rhythm irregular.      Pulses: Normal pulses.      Heart sounds: Normal heart sounds.      Comments: +Mechanical valve murmur  Pulmonary:      Effort: Pulmonary effort is normal.      Comments: +RLL decreased breath sounds  Shallow inspirations  Abdominal:      General: Abdomen is flat.      Palpations: Abdomen is soft.   Musculoskeletal:      Right lower leg: Edema present.      Left lower leg: Edema present.      Comments: +CHRISTIANO trace pretibial edema   Neurological:      Mental Status: She is alert and oriented to person, place, and time. Mental status is at baseline.   Psychiatric:         Mood and Affect: Mood normal.         Behavior: Behavior normal.             Significant Labs: All pertinent labs within the past 24 hours have been reviewed.    Significant Imaging: I have reviewed all pertinent imaging results/findings within the past 24 hours.    Assessment/Plan:     * Pleural effusion  Right sided moderate pleural effusion  Suspect may be due to HFpEF  No infectious symptoms or wt loss  Consult pulm to consider thoracentesis      Hyponatremia  Na 126  Suspect hypervolemic hypernatremia  Urine studies, TSH (hx of subclinical hypothyroid), AM cortisol  Repeat BMP in AM        Acute on chronic diastolic heart failure  Worsening MARTÍNEZ, noted LE edema (however pt reports this is baseline edema)  Repeat TTE  Consult cardiology  Lasix 40 mg IV daily       PAH (pulmonary artery hypertension)  Due to valvular disease      CAD (coronary artery disease)  No chest pain    Essential (primary) hypertension  Cont home meds      Type 2 diabetes mellitus with microalbuminuria, without long-term current use of insulin  Last A1c 5.7  On metformin at home  POC glucose monitoring. Start insulin if needed      S/P MVR (mitral valve replacement)        S/P AVR        Long term current use of anticoagulant  INR goal 2.5 - 3.5   Previously complicated by  interolique hematoma in 2/2020      Mixed hyperlipidemia  Pravastatin      Cardiac pacemaker in situ  Noted      Chronic atrial fibrillation  AFib  Rate well controlled on metoprolol 25 mg BID   Cont warfarin to goal INR 2.5-3.5 due to mechanical valves   Takes 2 mg T W Th Sat Sun and 4 mg M F      VTE Risk Mitigation (From admission, onward)         Ordered     warfarin (COUMADIN) tablet 2 mg  Every Wednesday      12/10/20 1631     warfarin (COUMADIN) tablet 4 mg  Every Mon, Fri      12/10/20 1631     warfarin (COUMADIN) tablet 2 mg  Every Tues, Thurs, Sat, Sun      12/10/20 1659                   Romario Rashid DO  Department of Hospital Medicine   [facility]

## 2020-12-11 ENCOUNTER — PATIENT MESSAGE (OUTPATIENT)
Dept: OTHER | Facility: OTHER | Age: 75
End: 2020-12-11

## 2020-12-11 LAB
ANION GAP SERPL CALC-SCNC: 11 MMOL/L (ref 8–16)
ASCENDING AORTA: 2.76 CM
AV INDEX (PROSTH): 0.24
AV MEAN GRADIENT: 51 MMHG
AV PEAK GRADIENT: 84 MMHG
AV VALVE AREA: 0.65 CM2
AV VELOCITY RATIO: 0.24
BASOPHILS # BLD AUTO: 0.05 K/UL (ref 0–0.2)
BASOPHILS NFR BLD: 0.7 % (ref 0–1.9)
BUN SERPL-MCNC: 17 MG/DL (ref 8–23)
CALCIUM SERPL-MCNC: 9.6 MG/DL (ref 8.7–10.5)
CHLORIDE SERPL-SCNC: 90 MMOL/L (ref 95–110)
CO2 SERPL-SCNC: 28 MMOL/L (ref 23–29)
CORTIS SERPL-MCNC: 18.9 UG/DL
CREAT SERPL-MCNC: 0.7 MG/DL (ref 0.5–1.4)
CV ECHO LV RWT: 0.56 CM
DIFFERENTIAL METHOD: ABNORMAL
DOP CALC AO PEAK VEL: 4.57 M/S
DOP CALC AO VTI: 97.69 CM
DOP CALC LVOT AREA: 2.8 CM2
DOP CALC LVOT DIAMETER: 1.88 CM
DOP CALC LVOT PEAK VEL: 1.11 M/S
DOP CALC LVOT STROKE VOLUME: 63.92 CM3
DOP CALCLVOT PEAK VEL VTI: 23.04 CM
ECHO LV POSTERIOR WALL: 1.14 CM (ref 0.6–1.1)
EOSINOPHIL # BLD AUTO: 0.2 K/UL (ref 0–0.5)
EOSINOPHIL NFR BLD: 2.7 % (ref 0–8)
ERYTHROCYTE [DISTWIDTH] IN BLOOD BY AUTOMATED COUNT: 13.2 % (ref 11.5–14.5)
EST. GFR  (AFRICAN AMERICAN): >60 ML/MIN/1.73 M^2
EST. GFR  (NON AFRICAN AMERICAN): >60 ML/MIN/1.73 M^2
FRACTIONAL SHORTENING: 40 % (ref 28–44)
GLUCOSE SERPL-MCNC: 106 MG/DL (ref 70–110)
HCT VFR BLD AUTO: 39.5 % (ref 37–48.5)
HGB BLD-MCNC: 13.3 G/DL (ref 12–16)
IMM GRANULOCYTES # BLD AUTO: 0.03 K/UL (ref 0–0.04)
IMM GRANULOCYTES NFR BLD AUTO: 0.4 % (ref 0–0.5)
INR PPP: 1.9 (ref 0.8–1.2)
INTERVENTRICULAR SEPTUM: 1.07 CM (ref 0.6–1.1)
IVRT: 64.59 MSEC
LA MAJOR: 7.06 CM
LA MINOR: 7.13 CM
LA WIDTH: 5.11 CM
LEFT ATRIUM SIZE: 4.66 CM
LEFT ATRIUM VOLUME: 143.6 CM3
LEFT INTERNAL DIMENSION IN SYSTOLE: 2.42 CM (ref 2.1–4)
LEFT VENTRICLE DIASTOLIC VOLUME: 72.66 ML
LEFT VENTRICLE SYSTOLIC VOLUME: 20.58 ML
LEFT VENTRICULAR INTERNAL DIMENSION IN DIASTOLE: 4.06 CM (ref 3.5–6)
LEFT VENTRICULAR MASS: 150 G
LYMPHOCYTES # BLD AUTO: 1.3 K/UL (ref 1–4.8)
LYMPHOCYTES NFR BLD: 19 % (ref 18–48)
MAGNESIUM SERPL-MCNC: 1.9 MG/DL (ref 1.6–2.6)
MCH RBC QN AUTO: 30.4 PG (ref 27–31)
MCHC RBC AUTO-ENTMCNC: 33.7 G/DL (ref 32–36)
MCV RBC AUTO: 90 FL (ref 82–98)
MONOCYTES # BLD AUTO: 0.5 K/UL (ref 0.3–1)
MONOCYTES NFR BLD: 7.1 % (ref 4–15)
NEUTROPHILS # BLD AUTO: 4.8 K/UL (ref 1.8–7.7)
NEUTROPHILS NFR BLD: 70.1 % (ref 38–73)
NRBC BLD-RTO: 0 /100 WBC
PISA TR MAX VEL: 3.56 M/S
PLATELET # BLD AUTO: 120 K/UL (ref 150–350)
PMV BLD AUTO: 11.5 FL (ref 9.2–12.9)
POTASSIUM SERPL-SCNC: 4 MMOL/L (ref 3.5–5.1)
PROTHROMBIN TIME: 20.9 SEC (ref 9–12.5)
PV PEAK VELOCITY: 0.94 CM/S
RA MAJOR: 6.7 CM
RA PRESSURE: 3 MMHG
RA WIDTH: 4.96 CM
RBC # BLD AUTO: 4.38 M/UL (ref 4–5.4)
RIGHT VENTRICULAR END-DIASTOLIC DIMENSION: 4.24 CM
RV TISSUE DOPPLER FREE WALL SYSTOLIC VELOCITY 1 (APICAL 4 CHAMBER VIEW): 5.02 CM/S
SINUS: 2.85 CM
SODIUM SERPL-SCNC: 129 MMOL/L (ref 136–145)
STJ: 2.34 CM
TR MAX PG: 51 MMHG
TRICUSPID ANNULAR PLANE SYSTOLIC EXCURSION: 1.56 CM
TV REST PULMONARY ARTERY PRESSURE: 54 MMHG
WBC # BLD AUTO: 6.78 K/UL (ref 3.9–12.7)

## 2020-12-11 PROCEDURE — 76000 FLUOROSCOPY <1 HR PHYS/QHP: CPT | Mod: 26,HCNC,, | Performed by: INTERNAL MEDICINE

## 2020-12-11 PROCEDURE — 85610 PROTHROMBIN TIME: CPT | Mod: HCNC

## 2020-12-11 PROCEDURE — 99223 PR INITIAL HOSPITAL CARE,LEVL III: ICD-10-PCS | Mod: 25,HCNC,, | Performed by: INTERNAL MEDICINE

## 2020-12-11 PROCEDURE — 82533 TOTAL CORTISOL: CPT | Mod: HCNC

## 2020-12-11 PROCEDURE — 94761 N-INVAS EAR/PLS OXIMETRY MLT: CPT | Mod: HCNC

## 2020-12-11 PROCEDURE — 63600175 PHARM REV CODE 636 W HCPCS: Mod: HCNC | Performed by: INTERNAL MEDICINE

## 2020-12-11 PROCEDURE — 11000001 HC ACUTE MED/SURG PRIVATE ROOM: Mod: HCNC

## 2020-12-11 PROCEDURE — 99223 1ST HOSP IP/OBS HIGH 75: CPT | Mod: 25,HCNC,, | Performed by: INTERNAL MEDICINE

## 2020-12-11 PROCEDURE — 76000 FLUOROSCOPY <1 HR PHYS/QHP: CPT | Mod: HCNC | Performed by: INTERNAL MEDICINE

## 2020-12-11 PROCEDURE — 80048 BASIC METABOLIC PNL TOTAL CA: CPT | Mod: HCNC

## 2020-12-11 PROCEDURE — 85025 COMPLETE CBC W/AUTO DIFF WBC: CPT | Mod: HCNC

## 2020-12-11 PROCEDURE — 36415 COLL VENOUS BLD VENIPUNCTURE: CPT | Mod: HCNC

## 2020-12-11 PROCEDURE — 83735 ASSAY OF MAGNESIUM: CPT | Mod: HCNC

## 2020-12-11 PROCEDURE — 76000 PR  FLUOROSCOPE EXAMINATION: ICD-10-PCS | Mod: 26,HCNC,, | Performed by: INTERNAL MEDICINE

## 2020-12-11 PROCEDURE — 25000003 PHARM REV CODE 250: Mod: HCNC | Performed by: INTERNAL MEDICINE

## 2020-12-11 RX ORDER — ACETAMINOPHEN 500 MG
500 TABLET ORAL EVERY 6 HOURS PRN
Status: DISCONTINUED | OUTPATIENT
Start: 2020-12-11 | End: 2020-12-12 | Stop reason: HOSPADM

## 2020-12-11 RX ORDER — FUROSEMIDE 40 MG/1
40 TABLET ORAL DAILY
Status: DISCONTINUED | OUTPATIENT
Start: 2020-12-12 | End: 2020-12-12 | Stop reason: HOSPADM

## 2020-12-11 RX ADMIN — FUROSEMIDE 40 MG: 10 INJECTION, SOLUTION INTRAVENOUS at 11:12

## 2020-12-11 RX ADMIN — PRAVASTATIN SODIUM 20 MG: 10 TABLET ORAL at 11:12

## 2020-12-11 RX ADMIN — METOPROLOL TARTRATE 25 MG: 25 TABLET, FILM COATED ORAL at 11:12

## 2020-12-11 RX ADMIN — TIZANIDINE 4 MG: 4 TABLET ORAL at 10:12

## 2020-12-11 RX ADMIN — WARFARIN SODIUM 4 MG: 4 TABLET ORAL at 05:12

## 2020-12-11 RX ADMIN — AMLODIPINE BESYLATE 10 MG: 5 TABLET ORAL at 11:12

## 2020-12-11 RX ADMIN — LOSARTAN POTASSIUM 50 MG: 25 TABLET, FILM COATED ORAL at 11:12

## 2020-12-11 RX ADMIN — METOPROLOL TARTRATE 25 MG: 25 TABLET, FILM COATED ORAL at 10:12

## 2020-12-11 NOTE — ASSESSMENT & PLAN NOTE
Not well visualized on 2D echo however Gradients increased across aortic valve.  Will take to cath for further os could be of the aortic valve.

## 2020-12-11 NOTE — HPI
Patient is a patient of Dr. Boateng.  Came in with shortness of breath.  Has a past medical history significant for aortic valve and mitral valve replacement with mechanical valves, aortic root dilation, HTN, HLD, DM, pacemake.  Came in with shortness of breath.  Received IV Lasix.  Improvement in respiratory status.  Was also found to have right-sided pleural effusion.    · The left ventricle is normal in size with normal systolic function. The estimated ejection fraction is 65%.  · There is mild left ventricular concentric hypertrophy.  · Mild right ventricular enlargement.  · Moderate tricuspid regurgitation.  · Mild pulmonic regurgitation.  · Severe left atrial enlargement.  · Severe right atrial enlargement.  · There is a mechanical aortic valve present. Prosthetic aortic valve is not well visualised but elevated gradients across the valve suggest one of the following abnormalities: stenosis or obstruction or prosthesis mismatch ( when compared to prior ECHOs, her gradients have been similarly elevated across the aortic valve in the past also)  · Aortic valve area is 0.65 cm2; peak velocity is 4.57 m/s; mean gradient is 51 mmHg.  · Severe aortic valve stenosis.  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 Gradients not studied in this study. No significant regurgitation seen.  · Atrial fibrillation observed.  · Mild aortic regurgitation.  · Normal central venous pressure (3 mmHg).  · The estimated PA systolic pressure is 54 mmHg.  · There is pulmonary hypertension.    Follows with Dr Boateng:    # [facility] AVR/MVR  # prosthetic AS, echo 12/2019 suggests prosthetic AVR stenosis, asymptomatic.  # Ao root dil 3.7cm (echo 4/2019)  # HTN, controlled  # Chr AF, on coumadin (goal INR 2.5-3.5 given [facility] valves)  # HLP on prava 40mg  # DM  # thrombocytopenia  # Medtronic PPM, normally functioning, Gen change 3/2/20     Her Medtronic pacemaker was interrogated in the office today.  Current rhythm is ventricular sensing at  60 beats per minute with programmed mode VVI at 65 beats per minute.  Sensing and pacing thresholds as well as impedance are normal.  CHRISTI was detected on September 19, 2019.  I have discussed the case with Dr. Boateng and he will meet with the patient tomorrow to schedule elective generator change.  The patient is not pacemaker dependent.     L MPI 12/10/19    The perfusion scan is free of evidence from myocardial ischemia or injury.    There is a moderate to severe intensity defect in the anterolateral wall of the left ventricle, secondary to breast attenuation.    Gated perfusion images showed an ejection fraction of 58 %.    The EKG portion of this study is uninterpretable.    The patient reported no chest pain during the stress test.    Arrhythmias during stress: rare PVCs.    Abnormal septal motion consistent with pacemaker.     Echo 2/2/20  · Normal left ventricular systolic function. The estimated ejection fraction is 55-60%.  · Mild concentric left ventricular hypertrophy.  · Septal wall has abnormal motion with normal thickening consistent with post-operative status.  · Mild right ventricular enlargement.  · Low normal right ventricular systolic function.  · There is a bileaflet tilting disc mechanical aortic valve present. There is no aortic insufficiency present. Cannot exclude some degree of prsothetic AS (not severe).  · There is a bileaflet mechanical mitral valve prosthesis. Prosthetic mitral valve is normal.  · Mild tricuspid regurgitation.  · The estimated PA systolic pressure is 52 mmHg.  · Pulmonary hypertension present.     1/23/20 Denies CP or SOB  On coumadin for mechanical AVR/MVR and A-fib  [place] [facility] [place] see [place] PPM generator change can be done without interrupting coumadin  F/U with me prn     Saw Dr Ring 1/27/20  Device [facility].  Risks and benefits of generator change discussed with patient.  Continue coumadin veronika-procedure, targeting and INR of 2-3.     Admitted  2/1/20  Orion Ty is a 74 y.o. female that (in part)  has a past medical history of Closed displaced fracture of distal phalanx of lesser toe, Diabetes mellitus, Diabetes mellitus, type 2, Hypertension, Osteopenia, Pacemaker, and Rheumatic heart disease.  has a past surgical history that includes Cardiac valve replacement; thryoid s; Cardiac pacemaker placement; and Cardiac valuve replacement. Presents to [facility] Emergency Department complaining of shortness of breath.  EMS was activated due to worsening shortness of breath and lethargy.  Unable to ambulate independently, which is not normal for her.  She was febrile to 103° at home and is having difficulty taking deep breaths.  History of significant cardiac disease and heart valve replacement due to rheumatic heart valve disease. History of CAD and MI.  Denied chest pain, cyanosis, palpitations, or syncope.  Positive for peripheral edema.  Positive for nonproductive cough.     In the emergency department routine laboratory studies, chest x-ray, EKG, and cardiac enzymes were obtained.  There was evidence of acute CHF exacerbation with pulmonary edema, trace edema, and hypoxemia.  Influenza A serology was also positive.     Ms Ty presented with sepsis, acute respiratory failure with hypoxia (88% at room air, per ED documentation) and acute encephalopathy secondary to influenza A infection. Patient was given isotonic fluids, oseltamivir and supplemental O2. Droplet precautions initiated. Patient clinically improved and completed course of osetamivir. Patient complained of right flank pain. On exam, patient was noted to have large bruise and firmness from right flank down to lower lateral aspect of abdomen and back. A CT of abdomen pelvis with IV contrast showed a large hematoma interposed between oblique muscles. Unknown what caused this hematoma. Per patient and , this is new. Patient is on coumadin for stroke prophylaxis  as patient has 2 mechanical heart valves. Is also chronically thrombocytopenic (50s-60s in 2010; 80s-90s recently). Patient was given one unit of blood for acute blood loss anemia. Hematology consulted for recs on therapeutic INR levels. Recommend at least 2.5 (lowest allowed level for mechanical valves) per my conversation with Hematologist. Lovenox no longer required and coumadin held to reach goal INR. Repeat CT of abdomen showed improvement of hematoma. Patient continued to clinically improve and no longer with evidence of bleeding. Did require one more unit of blood but was stable afterwards. Weaned off nasal cannula. Did well with therapy. Discharged to resume home regimen of coumadin and INR was 2.2 at time of discharge. Home health set up. INR to be drawn by home health daily for at least 3 days to ensure INR remains at goal. Vitals by  as well along with therapy. Plan discussed with patient and family. Questions answered to satisfaction. Cardiac diet. Activity as tolerated. F/u with PCP at date shown below.

## 2020-12-11 NOTE — NURSING
Report received from Lan MILLIGAN. [Patient on telebox # [id number].]Patient remains free from falls and injury. No distress noted. VSS. Questions encouraged and answered. Patient verbalized understanding. Bed locked and in low position; safety maintained. Call light in reach. White board updated, and explained. No complaint of pain, n/v, diarrhea, or SOB.  Will continue to monitor, will continue with plan of care.

## 2020-12-11 NOTE — SUBJECTIVE & OBJECTIVE
Interval History: SOB improved. Feeling well.     Review of Systems   Constitutional: Negative for chills.   Respiratory: Negative for shortness of breath and stridor.    Cardiovascular: Negative for chest pain, palpitations and leg swelling.   Gastrointestinal: Negative for abdominal distention and abdominal pain.     Objective:     Vital Signs (Most Recent):  Temp: 98.2 °F (36.8 °C) (12/11/20 1434)  Pulse: 64 (12/11/20 1434)  Resp: 17 (12/11/20 1434)  BP: (!) 117/57 (12/11/20 1434)  SpO2: 96 % (12/11/20 1434) Vital Signs (24h Range):  Temp:  [98 °F (36.7 °C)-98.2 °F (36.8 °C)] 98.2 °F (36.8 °C)  Pulse:  [64-91] 64  Resp:  [16-23] 17  SpO2:  [94 %-100 %] 96 %  BP: (105-174)/(57-86) 117/57     Weight: 46 kg (101 lb 6.6 oz)  Body mass index is 19.16 kg/m².    Intake/Output Summary (Last 24 hours) at 12/11/2020 1556  Last data filed at 12/11/2020 1200  Gross per 24 hour   Intake 240 ml   Output --   Net 240 ml      Physical Exam  Cardiovascular:      Rate and Rhythm: Normal rate. Rhythm irregular.      Pulses: Normal pulses.      Heart sounds: Normal heart sounds.   Pulmonary:      Effort: Pulmonary effort is normal.      Comments: resp effort improved  Still diminished breath sounds rt lung base  Abdominal:      General: Abdomen is flat.      Palpations: Abdomen is soft.   Musculoskeletal:      Right lower leg: No edema.      Left lower leg: No edema.      Comments: Edema improved         Significant Labs: All pertinent labs within the past 24 hours have been reviewed.    Significant Imaging: I have reviewed all pertinent imaging results/findings within the past 24 hours.

## 2020-12-11 NOTE — CONSULTS
[facility]  Cardiology  Consult Note    Patient Name: Orion Ty  MRN: [id number]  Admission Date: 12/10/2020  Hospital Length of Stay: 1 days  Code Status: Full Code   Attending Provider: Romario Rashid DO   Consulting Provider: Mckay Calvo MD  Primary Care Physician: Otis Zimmer MD  Principal Problem:Pleural effusion    Patient information was obtained from patient and ER records.     Inpatient consult to Cardiology  Consult performed by: Mckay Calvo MD  Consult ordered by: Romario Rashid DO        Subjective:     Chief Complaint:  sob     HPI:   Patient is a patient of Dr. Boateng.  Came in with shortness of breath.  Has a past medical history significant for aortic valve and mitral valve replacement with mechanical valves, aortic root dilation, HTN, HLD, DM, pacemake.  Came in with shortness of breath.  Received IV Lasix.  Improvement in respiratory status.  Was also found to have right-sided pleural effusion.    · The left ventricle is normal in size with normal systolic function. The estimated ejection fraction is 65%.  · There is mild left ventricular concentric hypertrophy.  · Mild right ventricular enlargement.  · Moderate tricuspid regurgitation.  · Mild pulmonic regurgitation.  · Severe left atrial enlargement.  · Severe right atrial enlargement.  · There is a mechanical aortic valve present. Prosthetic aortic valve is not well visualised but elevated gradients across the valve suggest one of the following abnormalities: stenosis or obstruction or prosthesis mismatch ( when compared to prior ECHOs, her gradients have been similarly elevated across the aortic valve in the past also)  · Aortic valve area is 0.65 cm2; peak velocity is 4.57 m/s; mean gradient is 51 mmHg.  · Severe aortic valve stenosis.  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 Gradients not studied in this study. No significant regurgitation seen.  · Atrial fibrillation observed.  · Mild aortic  regurgitation.  · Normal central venous pressure (3 mmHg).  · The estimated PA systolic pressure is 54 mmHg.  · There is pulmonary hypertension.    Follows with Dr Boateng:    # [facility] AVR/MVR  # prosthetic AS, echo 12/2019 suggests prosthetic AVR stenosis, asymptomatic.  # Ao root dil 3.7cm (echo 4/2019)  # HTN, controlled  # Chr AF, on coumadin (goal INR 2.5-3.5 given [facility] valves)  # HLP on prava 40mg  # DM  # thrombocytopenia  # Medtronic PPM, normally functioning, Gen change 3/2/20     Her Medtronic pacemaker was interrogated in the office today.  Current rhythm is ventricular sensing at 60 beats per minute with programmed mode VVI at 65 beats per minute.  Sensing and pacing thresholds as well as impedance are normal.  CHRISTI was detected on September 19, 2019.  I have discussed the case with Dr. Boateng and he will meet with the patient tomorrow to schedule elective generator change.  The patient is not pacemaker dependent.     L MPI 12/10/19    The perfusion scan is free of evidence from myocardial ischemia or injury.    There is a moderate to severe intensity defect in the anterolateral wall of the left ventricle, secondary to breast attenuation.    Gated perfusion images showed an ejection fraction of 58 %.    The EKG portion of this study is uninterpretable.    The patient reported no chest pain during the stress test.    Arrhythmias during stress: rare PVCs.    Abnormal septal motion consistent with pacemaker.     Echo 2/2/20  · Normal left ventricular systolic function. The estimated ejection fraction is 55-60%.  · Mild concentric left ventricular hypertrophy.  · Septal wall has abnormal motion with normal thickening consistent with post-operative status.  · Mild right ventricular enlargement.  · Low normal right ventricular systolic function.  · There is a bileaflet tilting disc mechanical aortic valve present. There is no aortic insufficiency present. Cannot exclude some degree of prsothetic AS (not  severe).  · There is a bileaflet mechanical mitral valve prosthesis. Prosthetic mitral valve is normal.  · Mild tricuspid regurgitation.  · The estimated PA systolic pressure is 52 mmHg.  · Pulmonary hypertension present.     1/23/20 Denies CP or SOB  On coumadin for mechanical AVR/MVR and A-fib  [place] refer [facility] [place] [facility] [place] see in PPM generator change can be done without interrupting coumadin  F/U with me prn     Saw Dr Ring 1/27/20  Device at CHRISTI.  Risks and benefits of generator change discussed with patient.  Continue coumadin veronika-procedure, targeting and INR of 2-3.     Admitted 2/1/20  Orion Ty is a 74 y.o. female that (in part)  has a past medical history of Closed displaced fracture of distal phalanx of lesser toe, Diabetes mellitus, Diabetes mellitus, type 2, Hypertension, Osteopenia, Pacemaker, and Rheumatic heart disease.  has a past surgical history that includes Cardiac valve replacement; thryoid s; Cardiac pacemaker placement; and Cardiac valuve replacement. Presents to [facility] Emergency [facility] complaining of shortness of breath.  EMS was activated due to worsening shortness of breath and lethargy.  Unable to ambulate independently, which is not normal for her.  She was febrile to 103° at home and is having difficulty taking deep breaths.  History of significant cardiac disease and heart valve replacement due to rheumatic heart valve disease. History of CAD and MI.  Denied chest pain, cyanosis, palpitations, or syncope.  Positive for peripheral edema.  Positive for nonproductive cough.     In the emergency department routine laboratory studies, chest x-ray, EKG, and cardiac enzymes were obtained.  There was evidence of acute CHF exacerbation with pulmonary edema, trace edema, and hypoxemia.  Influenza A serology was also positive.     Ms Ty presented with sepsis, acute respiratory failure with hypoxia (88% at room air, per ED documentation) and acute  encephalopathy secondary to influenza A infection. Patient was given isotonic fluids, oseltamivir and supplemental O2. Droplet precautions initiated. Patient clinically improved and completed course of osetamivir. Patient complained of right flank pain. On exam, patient was noted to have large bruise and firmness from right flank down to lower lateral aspect of abdomen and back. A CT of abdomen pelvis with IV contrast showed a large hematoma interposed between oblique muscles. Unknown what caused this hematoma. Per patient and , this is new. Patient is on coumadin for stroke prophylaxis as patient has 2 mechanical heart valves. Is also chronically thrombocytopenic (50s-60s in 2010; 80s-90s recently). Patient was given one unit of blood for acute blood loss anemia. Hematology consulted for recs on therapeutic INR levels. Recommend at least 2.5 (lowest allowed level for mechanical valves) per my conversation with Hematologist. Lovenox no longer required and coumadin held to reach goal INR. Repeat CT of abdomen showed improvement of hematoma. Patient continued to clinically improve and no longer with evidence of bleeding. Did require one more unit of blood but was stable afterwards. Weaned off nasal cannula. Did well with therapy. Discharged to resume home regimen of coumadin and INR was 2.2 at time of discharge. Home health set up. INR to be drawn by home health daily for at least 3 days to ensure INR remains at goal. Vitals by  as well along with therapy. Plan discussed with patient and family. Questions answered to satisfaction. Cardiac diet. Activity as tolerated. F/u with PCP at date shown below.        Review of Systems   Constitution: Negative.   HENT: Negative.    Eyes: Negative.    Cardiovascular: Positive for dyspnea on exertion.   Respiratory: Positive for shortness of breath.    Endocrine: Negative.    Hematologic/Lymphatic: Negative.    Skin: Negative.    Musculoskeletal: Negative.     Gastrointestinal: Negative.    Genitourinary: Negative.    Neurological: Negative.    Psychiatric/Behavioral: Negative.    Allergic/Immunologic: Negative.      Objective:     Vital Signs (Most Recent):  Temp: 98.2 °F (36.8 °C) (12/11/20 1102)  Pulse: 74 (12/11/20 1102)  Resp: 18 (12/11/20 1102)  BP: (!) 141/67 (12/11/20 1102)  SpO2: 95 % (12/11/20 1102) Vital Signs (24h Range):  Temp:  [98 °F (36.7 °C)-98.5 °F (36.9 °C)] 98.2 °F (36.8 °C)  Pulse:  [65-91] 74  Resp:  [16-25] 18  SpO2:  [94 %-100 %] 95 %  BP: (105-174)/(62-86) 141/67        Body mass index is 19.16 kg/m².     SpO2: 95 %  O2 Device (Oxygen Therapy): room air      Intake/Output Summary (Last 24 hours) at 12/11/2020 1229  Last data filed at 12/11/2020 0800  Gross per 24 hour   Intake 120 ml   Output --   Net 120 ml       Lines/Drains/Airways     Peripheral Intravenous Line                 Peripheral IV - Single Lumen 12/10/20 1421 18 G Right Antecubital less than 1 day                Physical Exam   Constitutional: She is oriented to person, place, and time. She appears well-developed and well-nourished.   HENT:   Head: Normocephalic.   Eyes: Pupils are equal, round, and reactive to light.   Neck: Normal range of motion. Neck supple.   Cardiovascular: Normal rate. An irregularly irregular rhythm present.   Pulmonary/Chest: Effort normal and breath sounds normal.   Abdominal: Soft. Normal appearance and bowel sounds are normal. There is no abdominal tenderness.   Musculoskeletal: Normal range of motion.   Neurological: She is alert and oriented to person, place, and time.   Skin: Skin is warm.   Psychiatric: She has a normal mood and affect.   Nursing note and vitals reviewed.      Significant Labs:   BMP:   Recent Labs   Lab 12/10/20  1430 12/11/20  0643    106   * 129*   K 4.1 4.0   CL 90* 90*   CO2 25 28   BUN 18 17   CREATININE 0.8 0.7   CALCIUM 9.7 9.6   MG 1.9 1.9   , CMP   Recent Labs   Lab 12/10/20  1430 12/11/20  0643   *  129*   K 4.1 4.0   CL 90* 90*   CO2 25 28    106   BUN 18 17   CREATININE 0.8 0.7   CALCIUM 9.7 9.6   PROT 8.4  --    ALBUMIN 4.4  --    BILITOT 1.0  --    ALKPHOS 80  --    AST 43*  --    ALT 25  --    ANIONGAP 11 11   ESTGFRAFRICA >60 >60   EGFRNONAA >60 >60   , CBC   Recent Labs   Lab 12/10/20  1430 12/11/20  0643   WBC 6.65 6.78   HGB 12.8 13.3   HCT 38.3 39.5   * 120*   , INR   Recent Labs   Lab 12/10/20  1551 12/11/20  1010   INR 2.0* 1.9*   , Lipid Panel No results for input(s): CHOL, HDL, LDLCALC, TRIG, CHOLHDL in the last 48 hours., Troponin   Recent Labs   Lab 12/10/20  1430   TROPONINI 0.006    and All pertinent lab results from the last 24 hours have been reviewed.    Significant Imaging: Echocardiogram:   Transthoracic echo (TTE) complete (Cupid Only):   Results for orders placed or performed during the hospital encounter of 12/10/20   Echo Color Flow Doppler? Yes   Result Value Ref Range    Ascending aorta 2.76 cm    STJ 2.34 cm    AV mean gradient 51 mmHg    Ao peak vipul 4.57 m/s    Ao VTI 97.69 cm    IVRT 64.59 msec    IVS 1.07 0.6 - 1.1 cm    LA size 4.66 cm    Left Atrium Major Axis 7.06 cm    Left Atrium Minor Axis 7.13 cm    LVIDd 4.06 3.5 - 6.0 cm    LVIDs 2.42 2.1 - 4.0 cm    LVOT diameter 1.88 cm    LVOT peak VTI 23.04 cm    Posterior Wall 1.14 (A) 0.6 - 1.1 cm    RA Major Axis 6.70 cm    RA Width 4.96 cm    RVDD 4.24 cm    Sinus 2.85 cm    TAPSE 1.56 cm    TR Max Vipul 3.56 m/s    LA WIDTH 5.11 cm    PV PEAK VELOCITY 0.94 cm/s    LV Diastolic Volume 72.66 mL    LV Systolic Volume 20.58 mL    RV S' 5.02 cm/s    LVOT peak vipul 1.11 m/s    FS 40 %    LA volume 143.60 cm3    LV mass 150.00 g    Left Ventricle Relative Wall Thickness 0.56 cm    AV valve area 0.65 cm2    AV Velocity Ratio 0.24     AV index (prosthetic) 0.24     LVOT area 2.8 cm2    LVOT stroke volume 63.92 cm3    AV peak gradient 84 mmHg    Triscuspid Valve Regurgitation Peak Gradient 51 mmHg    Right Atrial Pressure  (from IVC) 3 mmHg    TV rest pulmonary artery pressure 54 mmHg    Narrative    · The left ventricle is normal in size with normal systolic function. The   estimated ejection fraction is 65%.  · There is mild left ventricular concentric hypertrophy.  · Mild right ventricular enlargement.  · Moderate tricuspid regurgitation.  · Mild pulmonic regurgitation.  · Severe left atrial enlargement.  · Severe right atrial enlargement.  · There is a mechanical aortic valve present. Prosthetic aortic valve is   not well visualised but elevated gradients across the valve suggest one of   the following abnormalities: stenosis or obstruction or prosthesis   mismatch ( when compared to prior ECHOs, her gradients have been similarly   elevated across the aortic valve in the past also)  · Aortic valve area is 0.65 cm2; peak velocity is 4.57 m/s; mean gradient   is 51 mmHg.  · Severe aortic valve stenosis.  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 Gradients not studied in   this study. No significant regurgitation seen.  · Atrial fibrillation observed.  · Mild aortic regurgitation.  · Normal central venous pressure (3 mmHg).  · The estimated PA systolic pressure is 54 mmHg.  · There is pulmonary hypertension.        Assessment and Plan:     * Pleural effusion         Acute on chronic diastolic heart failure  Has responded well to IV diuresis.  Continue IV diuresis for now.  Switch to oral diuresis in a.m..    PAH (pulmonary artery hypertension)         Essential (primary) hypertension        Type 2 diabetes mellitus with microalbuminuria, without long-term current use of insulin        S/P MVR (mitral valve replacement)  Coumadin to keep INR 2.5-3.5    S/P AVR  Not well visualized on 2D echo however Gradients increased across aortic valve.  Will take to cath for further os could be of the aortic valve.    Cardiac pacemaker in situ    Medtronic.  Managed by Dr. Boateng.    Chronic atrial fibrillation  Continue Coumadin to keep  INR 2.5-3.5.        VTE Risk Mitigation (From admission, onward)         Ordered     warfarin (COUMADIN) tablet 2 mg  Every Wednesday      12/10/20 1631     warfarin (COUMADIN) tablet 4 mg  Every Mon, Fri      12/10/20 1631     warfarin (COUMADIN) tablet 2 mg  Every Tues, Thurs, Sat, Sun      12/10/20 1659                Thank you for your consult. I will follow-up with patient. Please contact us if you have any additional questions.    Mckay Calvo MD  Cardiology   [facility]

## 2020-12-11 NOTE — PLAN OF CARE
Discharge Planning Assessment completed and home needs addressed with patient at bedside. Patient confirmed information on Face Sheet as correct.     SW Role Explained.  Patient identified by using 3 identifiers: name, date of birth and home address.      Patient's Disposition:     Patient confirmed that HELP AT HOME will be from:      SW placed name and contact number on Communicate Board. Pt informed to contact SW for all discharge needs/questions.          PCP: Otis Zimmer MD    [facility] - [place], [place] - [address]  [address] [address]  Phone: [phone] Fax: [phone]    [id number] - [place], [place] - [address] AT [facility] [address]  [address]  [facility] [address]  Phone: [phone] Fax: [phone]    Extended Emergency Contact Information  Primary Emergency Contact: Cristobal Ty  Address: [facility]  Home Phone: [phone]  Mobile Phone: [phone]  Relation: Spouse  Secondary Emergency Contact: Jesse Ty  Mobile Phone: [phone]  Relation: Daughter  Preferred language: English   needed? No       12/11/20 1310   Discharge Assessment   Assessment Type Discharge Planning Assessment   Confirmed/corrected address and phone number on facesheet? Yes   Assessment information obtained from? Patient;Medical Record   Expected Length of Stay (days) 2   Communicated expected length of stay with patient/caregiver yes   Prior to hospitilization cognitive status: Alert/Oriented   Prior to hospitalization functional status: Assistive Equipment   Current cognitive status: Alert/Oriented   Current Functional Status: Assistive Equipment   Facility Arrived From: Home   Lives With spouse   Able to Return to Prior Arrangements yes   Is patient able to care for self after discharge? Yes   Who are your caregiver(s) and their phone number(s)? Spouse: Cristobal  Melony [phone]   Patient's perception of discharge disposition home health;home or selfcare   Readmission Within the Last 30 Days no previous admission in last 30 days   Patient currently being followed by outpatient case management? No   Patient currently receives any other outside agency services? No   Equipment Currently Used at Home walker, rolling   Do you have any problems affording any of your prescribed medications? No   Is the patient taking medications as prescribed? yes   Does the patient have transportation home? Yes   Transportation Anticipated family or friend will provide   Dialysis Name and Scheduled days N/A   Does the patient receive services at the [facility] Clinic? Yes  (INR Monitoring Meter at home; [employer] monitoring)   Discharge Plan A Home with family   DME Needed Upon Discharge  other (see comments)  (Pending)   Patient/Family in Agreement with Plan yes   Does the patient have transportation to healthcare appointments? Yes

## 2020-12-11 NOTE — SUBJECTIVE & OBJECTIVE
Review of Systems   Constitution: Negative.   HENT: Negative.    Eyes: Negative.    Cardiovascular: Positive for dyspnea on exertion.   Respiratory: Positive for shortness of breath.    Endocrine: Negative.    Hematologic/Lymphatic: Negative.    Skin: Negative.    Musculoskeletal: Negative.    Gastrointestinal: Negative.    Genitourinary: Negative.    Neurological: Negative.    Psychiatric/Behavioral: Negative.    Allergic/Immunologic: Negative.      Objective:     Vital Signs (Most Recent):  Temp: 98.2 °F (36.8 °C) (12/11/20 1102)  Pulse: 74 (12/11/20 1102)  Resp: 18 (12/11/20 1102)  BP: (!) 141/67 (12/11/20 1102)  SpO2: 95 % (12/11/20 1102) Vital Signs (24h Range):  Temp:  [98 °F (36.7 °C)-98.5 °F (36.9 °C)] 98.2 °F (36.8 °C)  Pulse:  [65-91] 74  Resp:  [16-25] 18  SpO2:  [94 %-100 %] 95 %  BP: (105-174)/(62-86) 141/67        Body mass index is 19.16 kg/m².     SpO2: 95 %  O2 Device (Oxygen Therapy): room air      Intake/Output Summary (Last 24 hours) at 12/11/2020 1229  Last data filed at 12/11/2020 0800  Gross per 24 hour   Intake 120 ml   Output --   Net 120 ml       Lines/Drains/Airways     Peripheral Intravenous Line                 Peripheral IV - Single Lumen 12/10/20 1421 18 G Right Antecubital less than 1 day                Physical Exam   Constitutional: She is oriented to person, place, and time. She appears well-developed and well-nourished.   HENT:   Head: Normocephalic.   Eyes: Pupils are equal, round, and reactive to light.   Neck: Normal range of motion. Neck supple.   Cardiovascular: Normal rate. An irregularly irregular rhythm present.   Pulmonary/Chest: Effort normal and breath sounds normal.   Abdominal: Soft. Normal appearance and bowel sounds are normal. There is no abdominal tenderness.   Musculoskeletal: Normal range of motion.   Neurological: She is alert and oriented to person, place, and time.   Skin: Skin is warm.   Psychiatric: She has a normal mood and affect.   Nursing note and  vitals reviewed.      Significant Labs:   BMP:   Recent Labs   Lab 12/10/20  1430 12/11/20  0643    106   * 129*   K 4.1 4.0   CL 90* 90*   CO2 25 28   BUN 18 17   CREATININE 0.8 0.7   CALCIUM 9.7 9.6   MG 1.9 1.9   , CMP   Recent Labs   Lab 12/10/20  1430 12/11/20  0643   * 129*   K 4.1 4.0   CL 90* 90*   CO2 25 28    106   BUN 18 17   CREATININE 0.8 0.7   CALCIUM 9.7 9.6   PROT 8.4  --    ALBUMIN 4.4  --    BILITOT 1.0  --    ALKPHOS 80  --    AST 43*  --    ALT 25  --    ANIONGAP 11 11   ESTGFRAFRICA >60 >60   EGFRNONAA >60 >60   , CBC   Recent Labs   Lab 12/10/20  1430 12/11/20  0643   WBC 6.65 6.78   HGB 12.8 13.3   HCT 38.3 39.5   * 120*   , INR   Recent Labs   Lab 12/10/20  1551 12/11/20  1010   INR 2.0* 1.9*   , Lipid Panel No results for input(s): CHOL, HDL, LDLCALC, TRIG, CHOLHDL in the last 48 hours., Troponin   Recent Labs   Lab 12/10/20  1430   TROPONINI 0.006    and All pertinent lab results from the last 24 hours have been reviewed.    Significant Imaging: Echocardiogram:   Transthoracic echo (TTE) complete (Cupid Only):   Results for orders placed or performed during the hospital encounter of 12/10/20   Echo Color Flow Doppler? Yes   Result Value Ref Range    Ascending aorta 2.76 cm    STJ 2.34 cm    AV mean gradient 51 mmHg    Ao peak vipul 4.57 m/s    Ao VTI 97.69 cm    IVRT 64.59 msec    IVS 1.07 0.6 - 1.1 cm    LA size 4.66 cm    Left Atrium Major Axis 7.06 cm    Left Atrium Minor Axis 7.13 cm    LVIDd 4.06 3.5 - 6.0 cm    LVIDs 2.42 2.1 - 4.0 cm    LVOT diameter 1.88 cm    LVOT peak VTI 23.04 cm    Posterior Wall 1.14 (A) 0.6 - 1.1 cm    RA Major Axis 6.70 cm    RA Width 4.96 cm    RVDD 4.24 cm    Sinus 2.85 cm    TAPSE 1.56 cm    TR Max Vipul 3.56 m/s    LA WIDTH 5.11 cm    PV PEAK VELOCITY 0.94 cm/s    LV Diastolic Volume 72.66 mL    LV Systolic Volume 20.58 mL    RV S' 5.02 cm/s    LVOT peak vipul 1.11 m/s    FS 40 %    LA volume 143.60 cm3    LV mass 150.00 g     Left Ventricle Relative Wall Thickness 0.56 cm    AV valve area 0.65 cm2    AV Velocity Ratio 0.24     AV index (prosthetic) 0.24     LVOT area 2.8 cm2    LVOT stroke volume 63.92 cm3    AV peak gradient 84 mmHg    Triscuspid Valve Regurgitation Peak Gradient 51 mmHg    Right Atrial Pressure (from IVC) 3 mmHg    TV rest pulmonary artery pressure 54 mmHg    Narrative    · The left ventricle is normal in size with normal systolic function. The   estimated ejection fraction is 65%.  · There is mild left ventricular concentric hypertrophy.  · Mild right ventricular enlargement.  · Moderate tricuspid regurgitation.  · Mild pulmonic regurgitation.  · Severe left atrial enlargement.  · Severe right atrial enlargement.  · There is a mechanical aortic valve present. Prosthetic aortic valve is   not well visualised but elevated gradients across the valve suggest one of   the following abnormalities: stenosis or obstruction or prosthesis   mismatch ( when compared to prior ECHOs, her gradients have been similarly   elevated across the aortic valve in the past also)  · Aortic valve area is 0.65 cm2; peak velocity is 4.57 m/s; mean gradient   is 51 mmHg.  · Severe aortic valve stenosis.  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 Gradients not studied in   this study. No significant regurgitation seen.  · Atrial fibrillation observed.  · Mild aortic regurgitation.  · Normal central venous pressure (3 mmHg).  · The estimated PA systolic pressure is 54 mmHg.  · There is pulmonary hypertension.

## 2020-12-11 NOTE — PLAN OF CARE
Patient is awake alert oriented. Complains of leg cramps, notified Dr Rashid received new order for med. Call light, phone in reach. Denies pain.   at bedside to assist with translation; however patient understands English, answered simple questions appropriately. Continue with plan of care, no distress noted.   Problem: Fall Injury Risk  Goal: Absence of Fall and Fall-Related Injury  Outcome: Ongoing, Progressing     Problem: Adult Inpatient Plan of Care  Goal: Plan of Care Review  Outcome: Ongoing, Progressing  Goal: Absence of Hospital-Acquired Illness or Injury  Outcome: Ongoing, Progressing

## 2020-12-11 NOTE — ASSESSMENT & PLAN NOTE
Right sided moderate pleural effusion  Suspect may be due to HFpEF  No infectious symptoms or wt loss  Consult pulm to consider thoracentesis. Pending

## 2020-12-11 NOTE — ASSESSMENT & PLAN NOTE
Has responded well to IV diuresis.  Continue IV diuresis for now.  Switch to oral diuresis in a.m..

## 2020-12-11 NOTE — NURSING
pt aa&o x4. pt is ambulatory.   able to make needs known. denies   pain/sob/distress at this time.   will continue to monitor.

## 2020-12-11 NOTE — PROGRESS NOTES
[facility] Hospital Medicine  Progress Note    Patient Name: Orion Ty  MRN: [id number]  Patient Class: IP- Inpatient   Admission Date: 12/10/2020  Length of Stay: 1 days  Attending Physician: Romario Rashid DO  Primary Care Provider: Otis Zimmer MD        Subjective:     Principal Problem:Pleural effusion        HPI:  Ms Leon is a 75 F with history of AFib, mechanical AVR & MVR, aortic root dilation, HTN, HLD, DM, pacemaker who presents to  with worsening SOB. Pt notice SOB starting 2 days ago. Exertional capacity diminished from being able to walk 2 blocks to only being able to walk to bathroom. She does not have chest pain, cough, fever, chills. She does not have orthopnea. She states that when she sits up she does feel dizzy, however. She has stable LE swelling. No other complaints at this time.     She has been taking meds as prescribed; not sure if she is taking lasix. Does not restrict salt intake.     Overview/Hospital Course:  Admitted to inpatient 12/10 for worsening MARTÍNEZ, pleural effusion (R>L), and hyponatremia. Started on diuretics. TTE ordered. Cardiology consulted for AM evaluation.    Interval History: SOB improved. Feeling well.     Review of Systems   Constitutional: Negative for chills.   Respiratory: Negative for shortness of breath and stridor.    Cardiovascular: Negative for chest pain, palpitations and leg swelling.   Gastrointestinal: Negative for abdominal distention and abdominal pain.     Objective:     Vital Signs (Most Recent):  Temp: 98.2 °F (36.8 °C) (12/11/20 1434)  Pulse: 64 (12/11/20 1434)  Resp: 17 (12/11/20 1434)  BP: (!) 117/57 (12/11/20 1434)  SpO2: 96 % (12/11/20 1434) Vital Signs (24h Range):  Temp:  [98 °F (36.7 °C)-98.2 °F (36.8 °C)] 98.2 °F (36.8 °C)  Pulse:  [64-91] 64  Resp:  [16-23] 17  SpO2:  [94 %-100 %] 96 %  BP: (105-174)/(57-86) 117/57     Weight: 46 kg (101 lb 6.6 oz)  Body mass index is 19.16 kg/m².    Intake/Output Summary (Last 24  hours) at 12/11/2020 1556  Last data filed at 12/11/2020 1200  Gross per 24 hour   Intake 240 ml   Output --   Net 240 ml      Physical Exam  Cardiovascular:      Rate and Rhythm: Normal rate. Rhythm irregular.      Pulses: Normal pulses.      Heart sounds: Normal heart sounds.   Pulmonary:      Effort: Pulmonary effort is normal.      Comments: resp effort improved  Still diminished breath sounds rt lung base  Abdominal:      General: Abdomen is flat.      Palpations: Abdomen is soft.   Musculoskeletal:      Right lower leg: No edema.      Left lower leg: No edema.      Comments: Edema improved         Significant Labs: All pertinent labs within the past 24 hours have been reviewed.    Significant Imaging: I have reviewed all pertinent imaging results/findings within the past 24 hours.      Assessment/Plan:      * Pleural effusion  Right sided moderate pleural effusion  Suspect may be due to HFpEF  No infectious symptoms or wt loss  Consult pulm to consider thoracentesis. Pending      Hyponatremia  Na 126 on admission  Improving  Hypervolemic hypernatremia  Cont diuresis      Acute on chronic diastolic heart failure  Worsening MARTÍNEZ, noted LE edema (however pt reports this is baseline edema)  Lasix 40 mg IV today  Switch to oral tomorrow      PAH (pulmonary artery hypertension)  Due to valvular disease      CAD (coronary artery disease)  No chest pain    Essential (primary) hypertension  Cont home meds      Type 2 diabetes mellitus with microalbuminuria, without long-term current use of insulin  Last A1c 5.7  On metformin at home  POC glucose monitoring. Start insulin if needed      S/P MVR (mitral valve replacement)        S/P AVR  Echo 12/11: AV not well visualized. +Increased pressures  Going to cath lab per cardiology      Long term current use of anticoagulant  INR goal 2.5 - 3.5   Previously complicated by inter-oblique hematoma in 2/2020      Mixed hyperlipidemia  Pravastatin      Cardiac pacemaker in  situ  Noted      Chronic atrial fibrillation  AFib  Rate well controlled on metoprolol 25 mg BID   Cont warfarin to goal INR 2.5-3.5 due to mechanical valves   Takes 2 mg T W Th Sat Sun and 4 mg M F      VTE Risk Mitigation (From admission, onward)         Ordered     warfarin (COUMADIN) tablet 2 mg  Every Wednesday      12/10/20 1631     warfarin (COUMADIN) tablet 4 mg  Every Mon, Fri      12/10/20 1631     warfarin (COUMADIN) tablet 2 mg  Every Tues, Thurs, Sat, Sun      12/10/20 1659                Discharge Planning   MENDEL:      Code Status: Full Code   Is the patient medically ready for discharge?:     Reason for patient still in hospital (select all that apply): Treatment  Discharge Plan A: Home with family                  Romario Rashid DO  Department of Hospital Medicine   [facility]

## 2020-12-11 NOTE — ASSESSMENT & PLAN NOTE
Worsening MARTÍNEZ, noted LE edema (however pt reports this is baseline edema)  Lasix 40 mg IV today  Switch to oral tomorrow

## 2020-12-12 VITALS
RESPIRATION RATE: 19 BRPM | DIASTOLIC BLOOD PRESSURE: 63 MMHG | OXYGEN SATURATION: 93 % | HEART RATE: 60 BPM | BODY MASS INDEX: 19.15 KG/M2 | WEIGHT: 101.44 LBS | HEIGHT: 61 IN | SYSTOLIC BLOOD PRESSURE: 109 MMHG | TEMPERATURE: 98 F

## 2020-12-12 LAB
ANION GAP SERPL CALC-SCNC: 10 MMOL/L (ref 8–16)
BASOPHILS # BLD AUTO: 0.05 K/UL (ref 0–0.2)
BASOPHILS NFR BLD: 0.7 % (ref 0–1.9)
BUN SERPL-MCNC: 23 MG/DL (ref 8–23)
CALCIUM SERPL-MCNC: 10.1 MG/DL (ref 8.7–10.5)
CHLORIDE SERPL-SCNC: 92 MMOL/L (ref 95–110)
CO2 SERPL-SCNC: 27 MMOL/L (ref 23–29)
CREAT SERPL-MCNC: 0.8 MG/DL (ref 0.5–1.4)
DIFFERENTIAL METHOD: ABNORMAL
EOSINOPHIL # BLD AUTO: 0.2 K/UL (ref 0–0.5)
EOSINOPHIL NFR BLD: 3.3 % (ref 0–8)
ERYTHROCYTE [DISTWIDTH] IN BLOOD BY AUTOMATED COUNT: 13.4 % (ref 11.5–14.5)
EST. GFR  (AFRICAN AMERICAN): >60 ML/MIN/1.73 M^2
EST. GFR  (NON AFRICAN AMERICAN): >60 ML/MIN/1.73 M^2
GLUCOSE SERPL-MCNC: 125 MG/DL (ref 70–110)
HCT VFR BLD AUTO: 41.2 % (ref 37–48.5)
HGB BLD-MCNC: 13.8 G/DL (ref 12–16)
IMM GRANULOCYTES # BLD AUTO: 0.02 K/UL (ref 0–0.04)
IMM GRANULOCYTES NFR BLD AUTO: 0.3 % (ref 0–0.5)
INR PPP: 2 (ref 0.8–1.2)
LYMPHOCYTES # BLD AUTO: 1.5 K/UL (ref 1–4.8)
LYMPHOCYTES NFR BLD: 20.8 % (ref 18–48)
MAGNESIUM SERPL-MCNC: 1.8 MG/DL (ref 1.6–2.6)
MCH RBC QN AUTO: 30.3 PG (ref 27–31)
MCHC RBC AUTO-ENTMCNC: 33.5 G/DL (ref 32–36)
MCV RBC AUTO: 90 FL (ref 82–98)
MONOCYTES # BLD AUTO: 0.6 K/UL (ref 0.3–1)
MONOCYTES NFR BLD: 8 % (ref 4–15)
NEUTROPHILS # BLD AUTO: 4.7 K/UL (ref 1.8–7.7)
NEUTROPHILS NFR BLD: 66.9 % (ref 38–73)
NRBC BLD-RTO: 0 /100 WBC
PLATELET # BLD AUTO: 132 K/UL (ref 150–350)
PMV BLD AUTO: 10.8 FL (ref 9.2–12.9)
POTASSIUM SERPL-SCNC: 4.6 MMOL/L (ref 3.5–5.1)
PROTHROMBIN TIME: 21.8 SEC (ref 9–12.5)
RBC # BLD AUTO: 4.56 M/UL (ref 4–5.4)
SODIUM SERPL-SCNC: 129 MMOL/L (ref 136–145)
WBC # BLD AUTO: 7.02 K/UL (ref 3.9–12.7)

## 2020-12-12 PROCEDURE — 94761 N-INVAS EAR/PLS OXIMETRY MLT: CPT | Mod: HCNC

## 2020-12-12 PROCEDURE — 99233 PR SUBSEQUENT HOSPITAL CARE,LEVL III: ICD-10-PCS | Mod: HCNC,,, | Performed by: INTERNAL MEDICINE

## 2020-12-12 PROCEDURE — 85610 PROTHROMBIN TIME: CPT | Mod: HCNC

## 2020-12-12 PROCEDURE — 99233 SBSQ HOSP IP/OBS HIGH 50: CPT | Mod: HCNC,,, | Performed by: INTERNAL MEDICINE

## 2020-12-12 PROCEDURE — 36415 COLL VENOUS BLD VENIPUNCTURE: CPT | Mod: HCNC

## 2020-12-12 PROCEDURE — 85025 COMPLETE CBC W/AUTO DIFF WBC: CPT | Mod: HCNC

## 2020-12-12 PROCEDURE — 25000003 PHARM REV CODE 250: Mod: HCNC | Performed by: INTERNAL MEDICINE

## 2020-12-12 PROCEDURE — 83735 ASSAY OF MAGNESIUM: CPT | Mod: HCNC

## 2020-12-12 PROCEDURE — 80048 BASIC METABOLIC PNL TOTAL CA: CPT | Mod: HCNC

## 2020-12-12 RX ORDER — FUROSEMIDE 20 MG/1
40 TABLET ORAL DAILY
Qty: 30 TABLET | Refills: 5 | Status: SHIPPED | OUTPATIENT
Start: 2020-12-12 | End: 2020-12-18 | Stop reason: SDUPTHER

## 2020-12-12 RX ORDER — TIZANIDINE 4 MG/1
4 TABLET ORAL EVERY 8 HOURS PRN
Qty: 30 TABLET | Refills: 0 | Status: SHIPPED | OUTPATIENT
Start: 2020-12-12 | End: 2020-12-22

## 2020-12-12 RX ADMIN — PRAVASTATIN SODIUM 20 MG: 10 TABLET ORAL at 08:12

## 2020-12-12 RX ADMIN — FUROSEMIDE 40 MG: 40 TABLET ORAL at 08:12

## 2020-12-12 NOTE — PROGRESS NOTES
[facility] CASE MANAGEMENT                  WRITTEN DISCHARGE INFORMATION      APPOINTMENTS AND RESOURCES TO HELP YOU MANAGE YOUR CARE AT HOME BASED ON YOUR PREFERENCES:  (If an appointment is not scheduled for you when you leave the hospital, call your doctor to schedule a follow up visit within a week)  Follow-up Information     Sergo Boateng MD. Go on 1/5/2021.    Specialty: Cardiology  Why: Please attend your scheduled appointment on 1/5/2021  Contact information:  [address] [address]  [phone]                   Healthy Living Instructions to HELP MANAGE YOUR CARE AT HOME:  Things You are responsible for:  1.    Getting your prescriptions filled   2.    Taking your medications as directed, DO NOT MISS ANY DOSES!  3.    Following the diet and exercise recommended by your doctor  4.    Going to your follow-up doctor appointment. This is important because it allows the doctor to monitor your progress and determine if any changes need to made to your treatment plan.  5. If you have any questions about MANAGING YOUR CARE AT HOME Call the Nurse Care Line for 24/7 Assistance [phone]     Thank you for choosing [facility] for your healthcare needs!

## 2020-12-12 NOTE — NURSING
VSS, NAD, Afebrile, voids without difficulty, ambulatory. Discharge instructions explained and handed to pt's spouse. Pt's spouse verbalizes understanding. IV access dc. Discharged home via wheelchair, surgical mask in place.

## 2020-12-12 NOTE — CONSULTS
[facility]  Pulmonology  Consult Note    Patient Name: Orion Ty  MRN: [id number]  Admission Date: 12/10/2020  Hospital Length of Stay: 1 days  Code Status: Full Code  Attending Physician: Romario Rashid DO  Primary Care Provider: Otis Zimmer MD   Principal Problem: Pleural effusion    Inpatient consult to Pulmonology  Consult performed by: Ruth Gomez MD  Consult ordered by: Romario Rashid DO        Subjective:     HPI:  75 F with history of AFib, mechanical AVR & MVR, aortic root dilation, HTN, HLD, DM, pacemaker who presented with worsening SOB. Her infectious work up was negative. Had CXR showing a right pleural effusion. Had TTE and heart catheterization today.   Pulmonary was consulted for evaluation of her pleural effusion.     Past Medical History:   Diagnosis Date    A-fib     Anticoagulant long-term use     Closed displaced fracture of distal phalanx of lesser toe 10/20/2015    Right    Coronary artery disease     Diabetes mellitus     Diabetes mellitus, type 2     Hypertension     Mechanical heart valve present     X's two    Osteopenia     Pacemaker     Rheumatic heart disease        Past Surgical History:   Procedure Laterality Date    CARDIAC PACEMAKER PLACEMENT      CARDIAC VALVE REPLACEMENT      CARDIAC VALVE SURGERY      EYE SURGERY Bilateral     cataract    REPLACEMENT OF PACEMAKER GENERATOR N/A 3/2/2020    Procedure: REPLACEMENT, PACEMAKER GENERATOR;  Surgeon: Mark Ring MD;  Location: [facility] EP LAB;  Service: Cardiology;  Laterality: N/A;  TBS/CHRISTI, Single PPM gen change, Right side, MDT, MAC, SK, 3 Prep    thryoid s         Review of patient's allergies indicates:   Allergen Reactions    Aspirin Other (See Comments)       Family History     Problem Relation (Age of Onset)    Breast cancer Maternal Aunt        Tobacco Use    Smoking status: Never Smoker    Smokeless tobacco: Never Used   Substance and Sexual Activity    Alcohol use: No    Drug  use: Never    Sexual activity: Not on file         Review of Systems   Constitutional: Negative for chills and fever.   HENT: Negative for congestion, mouth sores and sore throat.    Respiratory: Negative for cough, positive for shortness of breath Cardiovascular: Negative for chest pain, palpitations and leg swelling.   Gastrointestinal: Negative for abdominal pain, diarrhea, nausea and vomiting.   Endocrine: Negative for cold intolerance, heat intolerance, polydipsia and polyuria.   Genitourinary: Negative for dysuria, frequency and urgency.   Musculoskeletal: Negative for arthralgias and myalgias.   Skin: Negative for color change and rash.   Neurological: Negative for tremors, seizures, weakness and headaches.   Psychiatric/Behavioral: Negative for agitation and suicidal ideas. The patient is not nervous/anxious.    Objective:     Vital Signs (Most Recent):  Temp: 98.3 °F (36.8 °C) (12/11/20 1601)  Pulse: 66 (12/11/20 1601)  Resp: 18 (12/11/20 1601)  BP: 124/66 (12/11/20 1601)  SpO2: 95 % (12/11/20 1601) Vital Signs (24h Range):  Temp:  [98.1 °F (36.7 °C)-98.3 °F (36.8 °C)] 98.3 °F (36.8 °C)  Pulse:  [64-76] 66  Resp:  [16-18] 18  SpO2:  [94 %-96 %] 95 %  BP: (117-151)/(57-69) 124/66     Weight: 46 kg (101 lb 6.6 oz)  Body mass index is 19.16 kg/m².      Intake/Output Summary (Last 24 hours) at 12/11/2020 1849  Last data filed at 12/11/2020 1700  Gross per 24 hour   Intake 360 ml   Output --   Net 360 ml     PHYSICAL EXAMINATION:  GENERAL: The patient is well developed and nontoxic.  HEENT: Nonicteric sclerae, PERRLA, EOMI. Oropharynx clear. Moist mucous membranes. Conjunctivae appear well perfused.  CHEST: Chest wall is nontender.  HEART: Regular rate and rhythm with mechanical valve clicks   LUNGS: diminished breath sounds on the right  No wheezes, no rales.  ABDOMEN: Soft, nontender, nondistended, no organomegaly.  SKIN: No rash, no excessive bruising, petechiae, or purpura.  NEUROLOGIC: Cranial nerves  grossly intact without motor/sensory deficit.  Vents:       Lines/Drains/Airways     Peripheral Intravenous Line                 Peripheral IV - Single Lumen 12/10/20 1421 18 G Right Antecubital 1 day                Significant Labs:    CBC/Anemia Profile:  Recent Labs   Lab 12/10/20  1430 12/11/20  0643   WBC 6.65 6.78   HGB 12.8 13.3   HCT 38.3 39.5   * 120*   MCV 90 90   RDW 13.2 13.2        Chemistries:  Recent Labs   Lab 12/10/20  1430 12/11/20  0643   * 129*   K 4.1 4.0   CL 90* 90*   CO2 25 28   BUN 18 17   CREATININE 0.8 0.7   CALCIUM 9.7 9.6   ALBUMIN 4.4  --    PROT 8.4  --    BILITOT 1.0  --    ALKPHOS 80  --    ALT 25  --    AST 43*  --    MG 1.9 1.9         Significant Imaging:   I have reviewed and interpreted all pertinent imaging results/findings within the past 24 hours.    Assessment/Plan:     * Pleural effusion  Bedside ultrasound revealed a moderate right pleural effusion and a sleeve of left pleural effusion. Given the clinical picture, the data, and patient's history, I think the pleural effusion is due to her many cardiac co-morbidities and most likely a transudate. While it is more common to see it bilaterally, it can also favor one lung more. I would increase diuretics and repeat the CXR as outpatient (4-6 weeks). If effusion persists with aggressive diuresis, then I would consider thoracentesis.           Thank you for your consult. I will sign off. Please contact us if you have any additional questions.     Ruth Gomez MD  Pulmonology  [facility] [place]

## 2020-12-12 NOTE — ASSESSMENT & PLAN NOTE
Bedside ultrasound revealed a moderate right pleural effusion and a sleeve of left pleural effusion. Given the clinical picture, the data, and patient's history, I think the pleural effusion is due to her many cardiac co-morbidities and most likely a transudate. While it is more common to see it bilaterally, it can also favor one lung more. I would increase diuretics and repeat the CXR as outpatient (4-6 weeks). If effusion persists with aggressive diuresis, then I would consider thoracentesis.

## 2020-12-12 NOTE — PLAN OF CARE
"   12/12/20 1114   Final Note   Assessment Type Final Discharge Note   Anticipated Discharge Disposition Home   What phone number can be called within the next 1-3 days to see how you are doing after discharge?   (see chart)   Hospital Follow Up  Appt(s) scheduled? Yes   Discharge plans and expectations educations in teach back method with documentation complete? Yes   Right Care Referral Info   Post Acute Recommendation No Care   Post-Acute Status   Post-Acute Authorization Other   Other Status No Post-Acute Service Needs   Discharge Delays None known at this time     JANETTE met with patient to provide discharge follow-up information.  Information reviewed and placed in :My Healthcare Packet" to be brought home for patient to use as resource after discharge.  Information included:  signs and symptoms to look for and when to call the doctor if experiencing, any symptoms that may indicate a medical emergency: CALL 911. JANETTE reviewed with patient the things she will be responsible for to manage her health at home: taking medications as prescribed, getting prescriptions filled, and going on her doctor appointments. JANETTE also reviewed with patient "help at home" and "preferenes".  JANETTE informed pt's nurse, JANETTE Sanchez completed all discharge planning for patient and is clear to discharge from case management standpoint.   "

## 2020-12-12 NOTE — ASSESSMENT & PLAN NOTE
Not well visualized on 2D echo however Gradients increased across aortic valve.  Will take to cath for further os could be of the aortic valve.    12/12:  Fluoroscopy performed yesterday and images reviewed with CT surgery.  Mitral valve functioning fine.  Aortic valve leaflets open in synchrony.  No evidence of stuck leaflets.  The open in a v-shaped form.  Because of increased gradients across the aortic valve (this is chronic and were seen in 2019 also) recommend cardiac CT as an outpatient for further evaluation of the aortic valve.    Will sign off.  Follow-up in Cardiology Clinic with Dr. Boateng.

## 2020-12-12 NOTE — PROGRESS NOTES
[facility]  Cardiology  Progress Note    Patient Name: Orion Ty  MRN: [id number]  Admission Date: 12/10/2020  Hospital Length of Stay: 2 days  Code Status: Full Code   Attending Physician: Romario Rashid DO   Primary Care Physician: Otis Zimmer MD  Expected Discharge Date: 12/12/2020  Principal Problem:Pleural effusion    Subjective:       Interval History:  Patient has responded well to IV diuresis.  Feeling back to baseline.  Eager to go home.  Denies any chest pains at rest on exertion, orthopnea, PND.    Review of Systems   Constitution: Negative.   HENT: Negative.    Eyes: Negative.    Cardiovascular: Negative.    Respiratory: Negative.    Endocrine: Negative.    Hematologic/Lymphatic: Negative.    Skin: Negative.    Musculoskeletal: Negative.    Gastrointestinal: Negative.    Genitourinary: Negative.    Neurological: Negative.    Psychiatric/Behavioral: Negative.    Allergic/Immunologic: Negative.      Objective:     Vital Signs (Most Recent):  Temp: 98.1 °F (36.7 °C) (12/12/20 0723)  Pulse: 60 (12/12/20 0950)  Resp: 19 (12/12/20 0723)  BP: 109/63 (12/12/20 0723)  SpO2: (!) 93 % (12/12/20 0950) Vital Signs (24h Range):  Temp:  [97.9 °F (36.6 °C)-98.3 °F (36.8 °C)] 98.1 °F (36.7 °C)  Pulse:  [49-78] 60  Resp:  [17-19] 19  SpO2:  [93 %-96 %] 93 %  BP: ()/(47-66) 109/63     Weight: 46 kg (101 lb 6.6 oz)  Body mass index is 19.16 kg/m².     SpO2: (!) 93 %  O2 Device (Oxygen Therapy): room air      Intake/Output Summary (Last 24 hours) at 12/12/2020 1117  Last data filed at 12/12/2020 0800  Gross per 24 hour   Intake 360 ml   Output --   Net 360 ml       Lines/Drains/Airways     Peripheral Intravenous Line                 Peripheral IV - Single Lumen 12/10/20 1421 18 G Right Antecubital 1 day                Physical Exam   Constitutional: She is oriented to person, place, and time. She appears well-developed and well-nourished.   HENT:   Head: Normocephalic.   Eyes: Pupils are  equal, round, and reactive to light.   Neck: Normal range of motion. Neck supple.   Cardiovascular: Normal rate and regular rhythm.   Pulmonary/Chest: Effort normal and breath sounds normal.   Abdominal: Soft. Normal appearance and bowel sounds are normal. There is no abdominal tenderness.   Musculoskeletal: Normal range of motion.   Neurological: She is alert and oriented to person, place, and time.   Skin: Skin is warm.   Psychiatric: She has a normal mood and affect.       Significant Labs:   BMP:   Recent Labs   Lab 12/10/20  1430 12/11/20  0643 12/12/20  0714    106 125*   * 129* 129*   K 4.1 4.0 4.6   CL 90* 90* 92*   CO2 25 28 27   BUN 18 17 23   CREATININE 0.8 0.7 0.8   CALCIUM 9.7 9.6 10.1   MG 1.9 1.9 1.8   , CMP   Recent Labs   Lab 12/10/20  1430 12/11/20  0643 12/12/20  0714   * 129* 129*   K 4.1 4.0 4.6   CL 90* 90* 92*   CO2 25 28 27    106 125*   BUN 18 17 23   CREATININE 0.8 0.7 0.8   CALCIUM 9.7 9.6 10.1   PROT 8.4  --   --    ALBUMIN 4.4  --   --    BILITOT 1.0  --   --    ALKPHOS 80  --   --    AST 43*  --   --    ALT 25  --   --    ANIONGAP 11 11 10   ESTGFRAFRICA >60 >60 >60   EGFRNONAA >60 >60 >60   , CBC   Recent Labs   Lab 12/10/20  1430 12/11/20  0643 12/12/20  0714   WBC 6.65 6.78 7.02   HGB 12.8 13.3 13.8   HCT 38.3 39.5 41.2   * 120* 132*   , INR   Recent Labs   Lab 12/10/20  1551 12/11/20  1010 12/12/20  0714   INR 2.0* 1.9* 2.0*   , Lipid Panel No results for input(s): CHOL, HDL, LDLCALC, TRIG, CHOLHDL in the last 48 hours., Troponin   Recent Labs   Lab 12/10/20  1430   TROPONINI 0.006    and All pertinent lab results from the last 24 hours have been reviewed.    Significant Imaging: Echocardiogram:   Transthoracic echo (TTE) complete (Cupid Only):   Results for orders placed or performed during the hospital encounter of 12/10/20   Echo Color Flow Doppler? Yes   Result Value Ref Range    Ascending aorta 2.76 cm    STJ 2.34 cm    AV mean gradient 51 mmHg     Ao peak vipul 4.57 m/s    Ao VTI 97.69 cm    IVRT 64.59 msec    IVS 1.07 0.6 - 1.1 cm    LA size 4.66 cm    Left Atrium Major Axis 7.06 cm    Left Atrium Minor Axis 7.13 cm    LVIDd 4.06 3.5 - 6.0 cm    LVIDs 2.42 2.1 - 4.0 cm    LVOT diameter 1.88 cm    LVOT peak VTI 23.04 cm    Posterior Wall 1.14 (A) 0.6 - 1.1 cm    RA Major Axis 6.70 cm    RA Width 4.96 cm    RVDD 4.24 cm    Sinus 2.85 cm    TAPSE 1.56 cm    TR Max Vipul 3.56 m/s    LA WIDTH 5.11 cm    PV PEAK VELOCITY 0.94 cm/s    LV Diastolic Volume 72.66 mL    LV Systolic Volume 20.58 mL    RV S' 5.02 cm/s    LVOT peak vipul 1.11 m/s    FS 40 %    LA volume 143.60 cm3    LV mass 150.00 g    Left Ventricle Relative Wall Thickness 0.56 cm    AV valve area 0.65 cm2    AV Velocity Ratio 0.24     AV index (prosthetic) 0.24     LVOT area 2.8 cm2    LVOT stroke volume 63.92 cm3    AV peak gradient 84 mmHg    Triscuspid Valve Regurgitation Peak Gradient 51 mmHg    Right Atrial Pressure (from IVC) 3 mmHg    TV rest pulmonary artery pressure 54 mmHg    Narrative    · The left ventricle is normal in size with normal systolic function. The   estimated ejection fraction is 65%.  · There is mild left ventricular concentric hypertrophy.  · Mild right ventricular enlargement.  · Moderate tricuspid regurgitation.  · Mild pulmonic regurgitation.  · Severe left atrial enlargement.  · Severe right atrial enlargement.  · There is a mechanical aortic valve present. Prosthetic aortic valve is   not well visualised but elevated gradients across the valve suggest one of   the following abnormalities: stenosis or obstruction or prosthesis   mismatch ( when compared to prior ECHOs, her gradients have been similarly   elevated across the aortic valve in the past also)  · Aortic valve area is 0.65 cm2; peak velocity is 4.57 m/s; mean gradient   is 51 mmHg.  · Severe aortic valve stenosis.  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 Gradients not studied in   this study. No significant  regurgitation seen.  · Atrial fibrillation observed.  · Mild aortic regurgitation.  · Normal central venous pressure (3 mmHg).  · The estimated PA systolic pressure is 54 mmHg.  · There is pulmonary hypertension.        Assessment and Plan:     Brief HPI:     * Pleural effusion         Acute on chronic diastolic heart failure  Has responded well to IV diuresis.  Continue IV diuresis for now.  Switch to oral diuresis in a.m..    PAH (pulmonary artery hypertension)         Essential (primary) hypertension        Type 2 diabetes mellitus with microalbuminuria, without long-term current use of insulin        S/P MVR (mitral valve replacement)  Coumadin to keep INR 2.5-3.5    S/P AVR  Not well visualized on 2D echo however Gradients increased across aortic valve.  Will take to cath for further os could be of the aortic valve.    12/12:  Fluoroscopy performed yesterday and images reviewed with CT surgery.  Mitral valve functioning fine.  Aortic valve leaflets open in synchrony.  No evidence of stuck leaflets.  The open in a v-shaped form.  Because of increased gradients across the aortic valve (this is chronic and were seen in 2019 also) recommend cardiac CT as an outpatient for further evaluation of the aortic valve.    Will sign off.  Follow-up in Cardiology Clinic with Dr. Boateng.    Cardiac pacemaker in situ    [employer].  Managed by Dr. Boateng.    Chronic atrial fibrillation  Continue Coumadin to keep INR 2.5-3.5.        VTE Risk Mitigation (From admission, onward)         Ordered     warfarin (COUMADIN) tablet 2 mg  Every Wednesday      12/10/20 1631     warfarin (COUMADIN) tablet 4 mg  Every Mon, Fri      12/10/20 1631     warfarin (COUMADIN) tablet 2 mg  Every Tues, Thurs, Sat, Sun      12/10/20 1659                Mckay Calvo MD  Cardiology  [facility]

## 2020-12-12 NOTE — HPI
75 F with history of AFib, mechanical AVR & MVR, aortic root dilation, HTN, HLD, DM, pacemaker who presented with worsening SOB. Her infectious work up was negative. Had CXR showing a right pleural effusion. Had TTE and heart catheterization today.   Pulmonary was consulted for evaluation of her pleural effusion.

## 2020-12-12 NOTE — PLAN OF CARE
Problem: Fall Injury Risk  Goal: Absence of Fall and Fall-Related Injury  Outcome: Ongoing, Progressing  Intervention: Identify and Manage Contributors to Fall Injury Risk  Flowsheets (Taken 12/12/2020 0257)  Self-Care Promotion: independence encouraged  Medication Review/Management: medications reviewed  Intervention: Promote Injury-Free Environment  Flowsheets (Taken 12/12/2020 0257)  Safety Promotion/Fall Prevention:   assistive device/personal item within reach   nonskid shoes/socks when out of bed   side rails raised x 2  Environmental Safety Modification:   assistive device/personal items within reach   clutter free environment maintained     Problem: Adult Inpatient Plan of Care  Goal: Absence of Hospital-Acquired Illness or Injury  Outcome: Ongoing, Progressing  Intervention: Identify and Manage Fall Risk  Flowsheets (Taken 12/12/2020 0257)  Safety Promotion/Fall Prevention:   assistive device/personal item within reach   nonskid shoes/socks when out of bed   side rails raised x 2  Goal: Rounds/Family Conference  Outcome: Ongoing, Progressing

## 2020-12-12 NOTE — NURSING
pt awake, alert, and oriented   x4. pt ambulatory, independent of   adl's. denies pain/sob/distress at   this time. hourly rounds made. will   continue to monitor.

## 2020-12-12 NOTE — PLAN OF CARE
12/12/20 1226   Medicare Message   Important Message from Medicare regarding Discharge Appeal Rights Given to patient/caregiver;Explained to patient/caregiver;Signed/date by patient/caregiver   Date IMM was signed 12/12/20   Time IMM was signed 0935

## 2020-12-12 NOTE — SUBJECTIVE & OBJECTIVE
Past Medical History:   Diagnosis Date    A-fib     Anticoagulant long-term use     Closed displaced fracture of distal phalanx of lesser toe 10/20/2015    Right    Coronary artery disease     Diabetes mellitus     Diabetes mellitus, type 2     Hypertension     Mechanical heart valve present     X's two    Osteopenia     Pacemaker     Rheumatic heart disease        Past Surgical History:   Procedure Laterality Date    CARDIAC PACEMAKER PLACEMENT      CARDIAC VALVE REPLACEMENT      CARDIAC VALVE SURGERY      EYE SURGERY Bilateral     cataract    REPLACEMENT OF PACEMAKER GENERATOR N/A 3/2/2020    Procedure: REPLACEMENT, PACEMAKER GENERATOR;  Surgeon: Mark Ring MD;  Location: [facility];  Service: Cardiology;  Laterality: N/A;  TBS/CHRISTI, Single PPM gen change, Right side, MDT, MAC, SK, 3 Prep    thryoid s         Review of patient's allergies indicates:   Allergen Reactions    Aspirin Other (See Comments)       Family History     Problem Relation (Age of Onset)    Breast cancer Maternal Aunt        Tobacco Use    Smoking status: Never Smoker    Smokeless tobacco: Never Used   Substance and Sexual Activity    Alcohol use: No    Drug use: Never    Sexual activity: Not on file         Review of Systems   Constitutional: Negative for chills and fever.   HENT: Negative for congestion, mouth sores and sore throat.    Respiratory: Negative for cough, positive for shortness of breath Cardiovascular: Negative for chest pain, palpitations and leg swelling.   Gastrointestinal: Negative for abdominal pain, diarrhea, nausea and vomiting.   Endocrine: Negative for cold intolerance, heat intolerance, polydipsia and polyuria.   Genitourinary: Negative for dysuria, frequency and urgency.   Musculoskeletal: Negative for arthralgias and myalgias.   Skin: Negative for color change and rash.   Neurological: Negative for tremors, seizures, weakness and headaches.   Psychiatric/Behavioral: Negative for agitation  and suicidal ideas. The patient is not nervous/anxious.    Objective:     Vital Signs (Most Recent):  Temp: 98.3 °F (36.8 °C) (12/11/20 1601)  Pulse: 66 (12/11/20 1601)  Resp: 18 (12/11/20 1601)  BP: 124/66 (12/11/20 1601)  SpO2: 95 % (12/11/20 1601) Vital Signs (24h Range):  Temp:  [98.1 °F (36.7 °C)-98.3 °F (36.8 °C)] 98.3 °F (36.8 °C)  Pulse:  [64-76] 66  Resp:  [16-18] 18  SpO2:  [94 %-96 %] 95 %  BP: (117-151)/(57-69) 124/66     Weight: 46 kg (101 lb 6.6 oz)  Body mass index is 19.16 kg/m².      Intake/Output Summary (Last 24 hours) at 12/11/2020 1849  Last data filed at 12/11/2020 1700  Gross per 24 hour   Intake 360 ml   Output --   Net 360 ml     PHYSICAL EXAMINATION:  GENERAL: The patient is well developed and nontoxic.  HEENT: Nonicteric sclerae, PERRLA, EOMI. Oropharynx clear. Moist mucous membranes. Conjunctivae appear well perfused.  CHEST: Chest wall is nontender.  HEART: Regular rate and rhythm with mechanical valve clicks   LUNGS: diminished breath sounds on the right  No wheezes, no rales.  ABDOMEN: Soft, nontender, nondistended, no organomegaly.  SKIN: No rash, no excessive bruising, petechiae, or purpura.  NEUROLOGIC: Cranial nerves grossly intact without motor/sensory deficit.  Vents:       Lines/Drains/Airways     Peripheral Intravenous Line                 Peripheral IV - Single Lumen 12/10/20 1421 18 G Right Antecubital 1 day                Significant Labs:    CBC/Anemia Profile:  Recent Labs   Lab 12/10/20  1430 12/11/20  0643   WBC 6.65 6.78   HGB 12.8 13.3   HCT 38.3 39.5   * 120*   MCV 90 90   RDW 13.2 13.2        Chemistries:  Recent Labs   Lab 12/10/20  1430 12/11/20  0643   * 129*   K 4.1 4.0   CL 90* 90*   CO2 25 28   BUN 18 17   CREATININE 0.8 0.7   CALCIUM 9.7 9.6   ALBUMIN 4.4  --    PROT 8.4  --    BILITOT 1.0  --    ALKPHOS 80  --    ALT 25  --    AST 43*  --    MG 1.9 1.9         Significant Imaging:   I have reviewed and interpreted all pertinent imaging  results/findings within the past 24 hours.

## 2020-12-12 NOTE — SUBJECTIVE & OBJECTIVE
Interval History:  Patient has responded well to IV diuresis.  Feeling back to baseline.  Eager to go home.  Denies any chest pains at rest on exertion, orthopnea, PND.    Review of Systems   Constitution: Negative.   HENT: Negative.    Eyes: Negative.    Cardiovascular: Negative.    Respiratory: Negative.    Endocrine: Negative.    Hematologic/Lymphatic: Negative.    Skin: Negative.    Musculoskeletal: Negative.    Gastrointestinal: Negative.    Genitourinary: Negative.    Neurological: Negative.    Psychiatric/Behavioral: Negative.    Allergic/Immunologic: Negative.      Objective:     Vital Signs (Most Recent):  Temp: 98.1 °F (36.7 °C) (12/12/20 0723)  Pulse: 60 (12/12/20 0950)  Resp: 19 (12/12/20 0723)  BP: 109/63 (12/12/20 0723)  SpO2: (!) 93 % (12/12/20 0950) Vital Signs (24h Range):  Temp:  [97.9 °F (36.6 °C)-98.3 °F (36.8 °C)] 98.1 °F (36.7 °C)  Pulse:  [49-78] 60  Resp:  [17-19] 19  SpO2:  [93 %-96 %] 93 %  BP: ()/(47-66) 109/63     Weight: 46 kg (101 lb 6.6 oz)  Body mass index is 19.16 kg/m².     SpO2: (!) 93 %  O2 Device (Oxygen Therapy): room air      Intake/Output Summary (Last 24 hours) at 12/12/2020 1117  Last data filed at 12/12/2020 0800  Gross per 24 hour   Intake 360 ml   Output --   Net 360 ml       Lines/Drains/Airways     Peripheral Intravenous Line                 Peripheral IV - Single Lumen 12/10/20 1421 18 G Right Antecubital 1 day                Physical Exam   Constitutional: She is oriented to person, place, and time. She appears well-developed and well-nourished.   HENT:   Head: Normocephalic.   Eyes: Pupils are equal, round, and reactive to light.   Neck: Normal range of motion. Neck supple.   Cardiovascular: Normal rate and regular rhythm.   Pulmonary/Chest: Effort normal and breath sounds normal.   Abdominal: Soft. Normal appearance and bowel sounds are normal. There is no abdominal tenderness.   Musculoskeletal: Normal range of motion.   Neurological: She is alert and  oriented to person, place, and time.   Skin: Skin is warm.   Psychiatric: She has a normal mood and affect.       Significant Labs:   BMP:   Recent Labs   Lab 12/10/20  1430 12/11/20  0643 12/12/20  0714    106 125*   * 129* 129*   K 4.1 4.0 4.6   CL 90* 90* 92*   CO2 25 28 27   BUN 18 17 23   CREATININE 0.8 0.7 0.8   CALCIUM 9.7 9.6 10.1   MG 1.9 1.9 1.8   , CMP   Recent Labs   Lab 12/10/20  1430 12/11/20  0643 12/12/20  0714   * 129* 129*   K 4.1 4.0 4.6   CL 90* 90* 92*   CO2 25 28 27    106 125*   BUN 18 17 23   CREATININE 0.8 0.7 0.8   CALCIUM 9.7 9.6 10.1   PROT 8.4  --   --    ALBUMIN 4.4  --   --    BILITOT 1.0  --   --    ALKPHOS 80  --   --    AST 43*  --   --    ALT 25  --   --    ANIONGAP 11 11 10   ESTGFRAFRICA >60 >60 >60   EGFRNONAA >60 >60 >60   , CBC   Recent Labs   Lab 12/10/20  1430 12/11/20  0643 12/12/20  0714   WBC 6.65 6.78 7.02   HGB 12.8 13.3 13.8   HCT 38.3 39.5 41.2   * 120* 132*   , INR   Recent Labs   Lab 12/10/20  1551 12/11/20  1010 12/12/20  0714   INR 2.0* 1.9* 2.0*   , Lipid Panel No results for input(s): CHOL, HDL, LDLCALC, TRIG, CHOLHDL in the last 48 hours., Troponin   Recent Labs   Lab 12/10/20  1430   TROPONINI 0.006    and All pertinent lab results from the last 24 hours have been reviewed.    Significant Imaging: Echocardiogram:   Transthoracic echo (TTE) complete (Cupid Only):   Results for orders placed or performed during the hospital encounter of 12/10/20   Echo Color Flow Doppler? Yes   Result Value Ref Range    Ascending aorta 2.76 cm    STJ 2.34 cm    AV mean gradient 51 mmHg    Ao peak yong 4.57 m/s    Ao VTI 97.69 cm    IVRT 64.59 msec    IVS 1.07 0.6 - 1.1 cm    LA size 4.66 cm    Left Atrium Major Axis 7.06 cm    Left Atrium Minor Axis 7.13 cm    LVIDd 4.06 3.5 - 6.0 cm    LVIDs 2.42 2.1 - 4.0 cm    LVOT diameter 1.88 cm    LVOT peak VTI 23.04 cm    Posterior Wall 1.14 (A) 0.6 - 1.1 cm    RA Major Axis 6.70 cm    RA Width 4.96 cm     RVDD 4.24 cm    Sinus 2.85 cm    TAPSE 1.56 cm    TR Max Vipul 3.56 m/s    LA WIDTH 5.11 cm    PV PEAK VELOCITY 0.94 cm/s    LV Diastolic Volume 72.66 mL    LV Systolic Volume 20.58 mL    RV S' 5.02 cm/s    LVOT peak vipul 1.11 m/s    FS 40 %    LA volume 143.60 cm3    LV mass 150.00 g    Left Ventricle Relative Wall Thickness 0.56 cm    AV valve area 0.65 cm2    AV Velocity Ratio 0.24     AV index (prosthetic) 0.24     LVOT area 2.8 cm2    LVOT stroke volume 63.92 cm3    AV peak gradient 84 mmHg    Triscuspid Valve Regurgitation Peak Gradient 51 mmHg    Right Atrial Pressure (from IVC) 3 mmHg    TV rest pulmonary artery pressure 54 mmHg    Narrative    · The left ventricle is normal in size with normal systolic function. The   estimated ejection fraction is 65%.  · There is mild left ventricular concentric hypertrophy.  · Mild right ventricular enlargement.  · Moderate tricuspid regurgitation.  · Mild pulmonic regurgitation.  · Severe left atrial enlargement.  · Severe right atrial enlargement.  · There is a mechanical aortic valve present. Prosthetic aortic valve is   not well visualised but elevated gradients across the valve suggest one of   the following abnormalities: stenosis or obstruction or prosthesis   mismatch ( when compared to prior ECHOs, her gradients have been similarly   elevated across the aortic valve in the past also)  · Aortic valve area is 0.65 cm2; peak velocity is 4.57 m/s; mean gradient   is 51 mmHg.  · Severe aortic valve stenosis.  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 Gradients not studied in   this study. No significant regurgitation seen.  · Atrial fibrillation observed.  · Mild aortic regurgitation.  · Normal central venous pressure (3 mmHg).  · The estimated PA systolic pressure is 54 mmHg.  · There is pulmonary hypertension.

## 2020-12-13 NOTE — ASSESSMENT & PLAN NOTE
Worsening MARTÍNEZ, noted LE edema  Improved with IV lasix  DC with lasix PO to be taken daily for maintenance diuresis. She was previously taking on a PRN basis.

## 2020-12-13 NOTE — ASSESSMENT & PLAN NOTE
Echo 12/11: AV not well visualized. +Increased pressures  12/11- Fluroscopy performed by cardiology; showed good MV function. AV leaflets with synchronous opening. No stuck leaflets. Pt planned for cardiac CT as outpatient due to increased gradients.

## 2020-12-13 NOTE — ASSESSMENT & PLAN NOTE
AFib  Rate well controlled on metoprolol 25 mg BID   Cont warfarin to goal INR 2.5-3.5 due to mechanical valves   Takes 2 mg T W Th Sat Sun and 4 mg M F  INR 1.9 on day of admission, inc to 2.0 day 2   F/U with [facility]

## 2020-12-13 NOTE — DISCHARGE SUMMARY
[facility] Medicine  Discharge Summary      Patient Name: Orion Ty  MRN: [id number]  Admission Date: 12/10/2020  Hospital Length of Stay: 2 days  Discharge Date and Time: 12/12/2020  1:06 PM  Attending Physician: No att. providers found   Discharging Provider: Romario Rashid DO  Primary Care Provider: Oits Zimmer MD      HPI:   Ms Leon is a 75 F with history of AFib, mechanical AVR & MVR, aortic root dilation, HTN, HLD, DM, pacemaker who presents to  with worsening SOB. Pt notice SOB starting 2 days ago. Exertional capacity diminished from being able to walk 2 blocks to only being able to walk to bathroom. She does not have chest pain, cough, fever, chills. She does not have orthopnea. She states that when she sits up she does feel dizzy, however. She has stable LE swelling. No other complaints at this time.     She has been taking meds as prescribed; not sure if she is taking lasix. Does not restrict salt intake.     Procedure(s) (LRB):  FLUOROSCOPY (N/A)      Hospital Course:   See below.     Consults:   Consults (From admission, onward)        Status Ordering Provider     Inpatient consult to Cardiology  Once     Provider:  Ludwig Grigsby MD    Completed ROMARIO RASHID     Inpatient consult to Pulmonology  Once     Provider:  Ruth Gomez MD    Completed ROMARIO RASHID          * Pleural effusion  Right sided moderate pleural effusion  Suspect may be due to HFpEF  No infectious symptoms or wt loss  Pulm was consulted - suspect transudate. Recommend repeat imaging at later date.   Discussed findings with PCP who will repeat CXR to assess improvement      Hyponatremia  Na 126 on admission  Hypervolemic hypernatremia  Improved to 129 with diuresis  Monitor as outpt      Acute on chronic diastolic heart failure  Worsening MARTÍNEZ, noted LE edema  Improved with IV lasix  DC with lasix PO to be taken daily for maintenance diuresis. She was previously taking on a PRN  basis.      PAH (pulmonary artery hypertension)  Due to valvular disease      CAD (coronary artery disease)  No chest pain    Essential (primary) hypertension  Cont home meds      Type 2 diabetes mellitus with microalbuminuria, without long-term current use of insulin  Last A1c 5.7  On metformin at home      S/P MVR (mitral valve replacement)  Good function per cardiology      S/P AVR  Echo 12/11: AV not well visualized. +Increased pressures  12/11- Fluroscopy performed by cardiology; showed good MV function. AV leaflets with synchronous opening. No stuck leaflets. Pt planned for cardiac CT as outpatient due to increased gradients.     Long term current use of anticoagulant  INR goal 2.5 - 3.5   Previously complicated by inter-oblique hematoma in 2/2020      Mixed hyperlipidemia  Pravastatin      Cardiac pacemaker in situ  Noted      Chronic atrial fibrillation  AFib  Rate well controlled on metoprolol 25 mg BID   Cont warfarin to goal INR 2.5-3.5 due to mechanical valves   Takes 2 mg T W Th Sat Sun and 4 mg M F  INR 1.9 on day of admission, inc to 2.0 day 2   F/U with [facility] clinic      Final Active Diagnoses:    Diagnosis Date Noted POA    PRINCIPAL PROBLEM:  Pleural effusion [J90] 12/10/2020 Yes    Acute on chronic diastolic heart failure [I50.33] 12/10/2020 Yes    Hyponatremia [E87.1] 12/10/2020 Unknown    CAD (coronary artery disease) [I25.10] 12/09/2019 Yes    PAH (pulmonary artery hypertension) [I27.21] 12/09/2019 Yes    Essential (primary) hypertension [I10] 03/13/2017 Yes     Chronic    Type 2 diabetes mellitus with microalbuminuria, without long-term current use of insulin [E11.29, R80.9] 05/26/2015 Yes     Chronic    S/P AVR [Z95.2] 02/24/2015 Not Applicable    S/P MVR (mitral valve replacement) [Z95.2] 02/24/2015 Not Applicable    Long term current use of anticoagulant [Z79.01] 01/13/2015 Not Applicable     Chronic    Mixed hyperlipidemia [E78.2] 01/08/2015 Yes     Chronic    Chronic atrial  fibrillation [I48.20] 06/25/2013 Yes     Chronic    Cardiac pacemaker in situ [Z95.0] 06/25/2013 Yes      Problems Resolved During this Admission:       Discharged Condition: stable    Disposition: Home or Self Care    Follow Up:  Follow-up Information     Sergo Boateng MD. Go on 1/5/2021.    Specialty: Cardiology  Why: Please attend your scheduled appointment on 1/5/2021 at 8:45am  Contact information:  [address] [id number]  [phone]                 Patient Instructions:      Notify your health care provider if you experience any of the following:  persistent dizziness, light-headedness, or visual disturbances     Notify your health care provider if you experience any of the following:  difficulty breathing or increased cough     No dressing needed     Activity as tolerated       Significant Diagnostic Studies:     Pending Diagnostic Studies:     None         Medications:  Reconciled Home Medications:      Medication List      START taking these medications    tiZANidine 4 MG tablet  Commonly known as: ZANAFLEX  Take 1 tablet (4 mg total) by mouth every 8 (eight) hours as needed (cramping).        CHANGE how you take these medications    furosemide 20 MG tablet  Commonly known as: LASIX  Take 2 tablets (40 mg total) by mouth once daily.  What changed:   · how much to take  · when to take this  · reasons to take this        CONTINUE taking these medications    ACCU-CHEK GATO CONTROL SOLN Soln  Generic drug: blood glucose control high,low  Use as directed to perform controls on meter     ACCU-CHEK GATO PLUS TEST STRP Strp  Generic drug: blood sugar diagnostic     alcohol swabs Pad  Commonly known as: BD ALCOHOL SWABS  Apply 1 each topically as needed (prior to finger sticks).     amLODIPine 10 MG tablet  Commonly known as: NORVASC  TAKE 1 TABLET ONCE DAILY.     B-COMPLEX WITH VITAMIN C ORAL     BANDAGES/PLASTIC TOP     lancets 30 gauge Misc     lancing device Misc     losartan 50 MG  tablet  Commonly known as: COZAAR  TAKE 1 TABLET ONCE DAILY.     metFORMIN 500 MG tablet  Commonly known as: GLUCOPHAGE  Take 1 tablet (500 mg total) by mouth once daily.     metoprolol tartrate 25 MG tablet  Commonly known as: LOPRESSOR  TAKE 1 TABLET BY MOUTH TWICE DAILY     multivitamin-Ca-iron-minerals 27-0.4 mg Tab     nitroGLYCERIN 0.4 MG SL tablet  Commonly known as: NITROSTAT  DISSOLVE 1 TABLET UNDER THE TONGUE EVERY 5 MINUTES AS NEEDED FOR CHEST PAINS     omega-3 acid ethyl esters 1 gram capsule  Commonly known as: LOVAZA  TK 2 CS PO BID     pravastatin 20 MG tablet  Commonly known as: PRAVACHOL  Take 1 tablet (20 mg total) by mouth once daily.     warfarin 4 MG tablet  Commonly known as: COUMADIN  TAKE 1 TABLET BY MOUTH ON MONDAY AND FRIDAY AND 1/2 TABLET BY MOUTH ON ALL OTHER DAYS            Indwelling Lines/Drains at time of discharge:   Lines/Drains/Airways     None                 Time spent on the discharge of patient: 30 minutes  Patient was seen and examined on the date of discharge and determined to be suitable for discharge.         Romario Rashid DO  Department of Hospital Medicine  [facility]

## 2020-12-13 NOTE — ASSESSMENT & PLAN NOTE
Right sided moderate pleural effusion  Suspect may be due to HFpEF  No infectious symptoms or wt loss  Pulm was consulted - suspect transudate. Recommend repeat imaging at later date.   Discussed findings with PCP who will repeat CXR to assess improvement

## 2020-12-15 ENCOUNTER — PATIENT OUTREACH (OUTPATIENT)
Dept: ADMINISTRATIVE | Facility: CLINIC | Age: 75
End: 2020-12-15

## 2020-12-17 ENCOUNTER — PATIENT OUTREACH (OUTPATIENT)
Dept: ADMINISTRATIVE | Facility: OTHER | Age: 75
End: 2020-12-17

## 2020-12-18 ENCOUNTER — OFFICE VISIT (OUTPATIENT)
Dept: CARDIOLOGY | Facility: CLINIC | Age: 75
End: 2020-12-18
Payer: MEDICARE

## 2020-12-18 VITALS
BODY MASS INDEX: 19.52 KG/M2 | HEART RATE: 60 BPM | SYSTOLIC BLOOD PRESSURE: 142 MMHG | OXYGEN SATURATION: 95 % | HEIGHT: 61 IN | WEIGHT: 103.38 LBS | DIASTOLIC BLOOD PRESSURE: 68 MMHG

## 2020-12-18 DIAGNOSIS — I48.20 CHRONIC ATRIAL FIBRILLATION: Chronic | ICD-10-CM

## 2020-12-18 DIAGNOSIS — I50.33 ACUTE ON CHRONIC DIASTOLIC HEART FAILURE: ICD-10-CM

## 2020-12-18 DIAGNOSIS — Z95.2 S/P AVR: Primary | ICD-10-CM

## 2020-12-18 DIAGNOSIS — I25.10 CORONARY ARTERY DISEASE INVOLVING NATIVE CORONARY ARTERY OF NATIVE HEART WITHOUT ANGINA PECTORIS: ICD-10-CM

## 2020-12-18 DIAGNOSIS — Z95.0 CARDIAC PACEMAKER IN SITU: ICD-10-CM

## 2020-12-18 DIAGNOSIS — I10 ESSENTIAL (PRIMARY) HYPERTENSION: Chronic | ICD-10-CM

## 2020-12-18 DIAGNOSIS — Z95.2 S/P MVR (MITRAL VALVE REPLACEMENT): ICD-10-CM

## 2020-12-18 PROCEDURE — 3077F SYST BP >= 140 MM HG: CPT | Mod: HCNC,CPTII,S$GLB, | Performed by: INTERNAL MEDICINE

## 2020-12-18 PROCEDURE — 99214 PR OFFICE/OUTPT VISIT, EST, LEVL IV, 30-39 MIN: ICD-10-PCS | Mod: HCNC,S$GLB,, | Performed by: INTERNAL MEDICINE

## 2020-12-18 PROCEDURE — 99499 UNLISTED E&M SERVICE: CPT | Mod: S$GLB,,, | Performed by: INTERNAL MEDICINE

## 2020-12-18 PROCEDURE — 1126F AMNT PAIN NOTED NONE PRSNT: CPT | Mod: HCNC,S$GLB,, | Performed by: INTERNAL MEDICINE

## 2020-12-18 PROCEDURE — 99999 PR PBB SHADOW E&M-EST. PATIENT-LVL IV: ICD-10-PCS | Mod: PBBFAC,HCNC,, | Performed by: INTERNAL MEDICINE

## 2020-12-18 PROCEDURE — 3077F PR MOST RECENT SYSTOLIC BLOOD PRESSURE >= 140 MM HG: ICD-10-PCS | Mod: HCNC,CPTII,S$GLB, | Performed by: INTERNAL MEDICINE

## 2020-12-18 PROCEDURE — 1159F MED LIST DOCD IN RCRD: CPT | Mod: HCNC,S$GLB,, | Performed by: INTERNAL MEDICINE

## 2020-12-18 PROCEDURE — 99499 RISK ADDL DX/OHS AUDIT: ICD-10-PCS | Mod: S$GLB,,, | Performed by: INTERNAL MEDICINE

## 2020-12-18 PROCEDURE — 99214 OFFICE O/P EST MOD 30 MIN: CPT | Mod: HCNC,S$GLB,, | Performed by: INTERNAL MEDICINE

## 2020-12-18 PROCEDURE — 1101F PT FALLS ASSESS-DOCD LE1/YR: CPT | Mod: HCNC,CPTII,S$GLB, | Performed by: INTERNAL MEDICINE

## 2020-12-18 PROCEDURE — 3288F PR FALLS RISK ASSESSMENT DOCUMENTED: ICD-10-PCS | Mod: HCNC,CPTII,S$GLB, | Performed by: INTERNAL MEDICINE

## 2020-12-18 PROCEDURE — 3288F FALL RISK ASSESSMENT DOCD: CPT | Mod: HCNC,CPTII,S$GLB, | Performed by: INTERNAL MEDICINE

## 2020-12-18 PROCEDURE — 99999 PR PBB SHADOW E&M-EST. PATIENT-LVL IV: CPT | Mod: PBBFAC,HCNC,, | Performed by: INTERNAL MEDICINE

## 2020-12-18 PROCEDURE — 3078F DIAST BP <80 MM HG: CPT | Mod: HCNC,CPTII,S$GLB, | Performed by: INTERNAL MEDICINE

## 2020-12-18 PROCEDURE — 1101F PR PT FALLS ASSESS DOC 0-1 FALLS W/OUT INJ PAST YR: ICD-10-PCS | Mod: HCNC,CPTII,S$GLB, | Performed by: INTERNAL MEDICINE

## 2020-12-18 PROCEDURE — 3078F PR MOST RECENT DIASTOLIC BLOOD PRESSURE < 80 MM HG: ICD-10-PCS | Mod: HCNC,CPTII,S$GLB, | Performed by: INTERNAL MEDICINE

## 2020-12-18 PROCEDURE — 1159F PR MEDICATION LIST DOCUMENTED IN MEDICAL RECORD: ICD-10-PCS | Mod: HCNC,S$GLB,, | Performed by: INTERNAL MEDICINE

## 2020-12-18 PROCEDURE — 1126F PR PAIN SEVERITY QUANTIFIED, NO PAIN PRESENT: ICD-10-PCS | Mod: HCNC,S$GLB,, | Performed by: INTERNAL MEDICINE

## 2020-12-18 RX ORDER — FUROSEMIDE 40 MG/1
40 TABLET ORAL 2 TIMES DAILY
Qty: 180 TABLET | Refills: 3 | Status: SHIPPED | OUTPATIENT
Start: 2020-12-18 | End: 2021-01-06

## 2020-12-18 RX ORDER — FUROSEMIDE 40 MG/1
40 TABLET ORAL 2 TIMES DAILY
Qty: 180 TABLET | Refills: 3 | Status: SHIPPED | OUTPATIENT
Start: 2020-12-18 | End: 2020-12-18 | Stop reason: SDUPTHER

## 2020-12-18 NOTE — PROGRESS NOTES
Subjective:    Patient ID:  Orion Ty is a 75 y.o. female who presents for follow-up of Hospital Follow Up      HPI     Referred by Dr Grigsby for Medtronic PPM at [facility]  Patient requests to change back to my clinic  # [facility] AVR/MVR  # prosthetic AS, echo 12/2019 suggests prosthetic AVR stenosis, asymptomatic.  # Ao root dil 3.7cm (echo 4/2019)  # HTN, controlled  # Chr AF, on coumadin (goal INR 2.5-3.5 given [facility] valves)  # HLP on prava 40mg  # DM  # thrombocytopenia  # Medtronic PPM, normally functioning, Gen change 3/2/20     Her Medtronic pacemaker was interrogated in the office today.  Current rhythm is ventricular sensing at 60 beats per minute with programmed mode VVI at 65 beats per minute.  Sensing and pacing thresholds as well as impedance are normal.  [facility] was detected on September 19, 2019.  I have discussed the case with Dr. Boateng and he will meet with the patient tomorrow to schedule elective generator change.  The patient is not pacemaker dependent.     L MPI 12/10/19    The perfusion scan is free of evidence from myocardial ischemia or injury.    There is a moderate to severe intensity defect in the anterolateral wall of the left ventricle, secondary to breast attenuation.    Gated perfusion images showed an ejection fraction of 58 %.    The EKG portion of this study is uninterpretable.    The patient reported no chest pain during the stress test.    Arrhythmias during stress: rare PVCs.    Abnormal septal motion consistent with pacemaker.    Echo 12/11/20  · The left ventricle is normal in size with normal systolic function. The estimated ejection fraction is 65%.  · There is mild left ventricular concentric hypertrophy.  · Mild right ventricular enlargement.  · Moderate tricuspid regurgitation.  · Mild pulmonic regurgitation.  · Severe left atrial enlargement.  · Severe right atrial enlargement.  · There is a mechanical aortic valve present. Prosthetic aortic valve is not well visualised  but elevated gradients across the valve suggest one of the following abnormalities: stenosis or obstruction or prosthesis mismatch ( when compared to prior ECHOs, her gradients have been similarly elevated across the aortic valve in the past also)  · Aortic valve area is 0.65 cm2; peak velocity is 4.57 m/s; mean gradient is 51 mmHg.  · Severe aortic valve stenosis.  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 Gradients not studied in this study. No significant regurgitation seen.  · Atrial fibrillation observed.  · Mild aortic regurgitation.  · Normal central venous pressure (3 mmHg).  · The estimated PA systolic pressure is 54 mmHg.  · There is pulmonary hypertension.          Echo 2/2/20  · Normal left ventricular systolic function. The estimated ejection fraction is 55-60%.  · Mild concentric left ventricular hypertrophy.  · Septal wall has abnormal motion with normal thickening consistent with post-operative status.  · Mild right ventricular enlargement.  · Low normal right ventricular systolic function.  · There is a bileaflet tilting disc mechanical aortic valve present. There is no aortic insufficiency present. Cannot exclude some degree of prsothetic AS (not severe).  · There is a bileaflet mechanical mitral valve prosthesis. Prosthetic mitral valve is normal.  · Mild tricuspid regurgitation.  · The estimated PA systolic pressure is 52 mmHg.  · Pulmonary hypertension present.     1/23/20 Denies CP or SOB  On coumadin for mechanical AVR/MVR and A-fib  [place] [facility] [place]PM generator change can be done without interrupting coumadin  F/U with me prn     Saw Dr Ring 1/27/20  Device at [facility].  Risks and benefits of generator change discussed with patient.  Continue coumadin veronika-procedure, targeting and INR of 2-3.     Admitted 2/1/20  Orion Ty is a 74 y.o. female that (in part)  has a past medical history of Closed displaced fracture of distal phalanx of lesser toe, Diabetes mellitus,  Diabetes mellitus, type 2, Hypertension, Osteopenia, Pacemaker, and Rheumatic heart disease.  has a past surgical history that includes Cardiac valve replacement; thryoid s; Cardiac pacemaker placement; and Cardiac valuve replacement. Presents to [facility] complaining of shortness of breath.  EMS was activated due to worsening shortness of breath and lethargy.  Unable to ambulate independently, which is not normal for her.  She was febrile to 103° at home and is having difficulty taking deep breaths.  History of significant cardiac disease and heart valve replacement due to rheumatic heart valve disease. History of CAD and MI.  Denied chest pain, cyanosis, palpitations, or syncope.  Positive for peripheral edema.  Positive for nonproductive cough.     In the emergency department routine laboratory studies, chest x-ray, EKG, and cardiac enzymes were obtained.  There was evidence of acute CHF exacerbation with pulmonary edema, trace edema, and hypoxemia.  Influenza A serology was also positive.     Ms Ty presented with sepsis, acute respiratory failure with hypoxia (88% at room air, per ED documentation) and acute encephalopathy secondary to influenza A infection. Patient was given isotonic fluids, oseltamivir and supplemental O2. Droplet precautions initiated. Patient clinically improved and completed course of osetamivir. Patient complained of right flank pain. On exam, patient was noted to have large bruise and firmness from right flank down to lower lateral aspect of abdomen and back. A CT of abdomen pelvis with IV contrast showed a large hematoma interposed between oblique muscles. Unknown what caused this hematoma. Per patient and , this is new. Patient is on coumadin for stroke prophylaxis as patient has 2 mechanical heart valves. Is also chronically thrombocytopenic (50s-60s in 2010; 80s-90s recently). Patient was given one unit of blood for acute blood  loss anemia. Hematology consulted for recs on therapeutic INR levels. Recommend at least 2.5 (lowest allowed level for mechanical valves) per my conversation with Hematologist. Lovenox no longer required and coumadin held to reach goal INR. Repeat CT of abdomen showed improvement of hematoma. Patient continued to clinically improve and no longer with evidence of bleeding. Did require one more unit of blood but was stable afterwards. Weaned off nasal cannula. Did well with therapy. Discharged to resume home regimen of coumadin and INR was 2.2 at time of discharge. Home health set up. INR to be drawn by home health daily for at least 3 days to ensure INR remains at goal. Vitals by HH as well along with therapy. Plan discussed with patient and family. Questions answered to satisfaction. Cardiac diet. Activity as tolerated. F/u with PCP at date shown below.       3/16/20 had PPM gen change 3/2/20   Feels better. Less SOB  Denies CP  Cardiac stable  OV 3 months with Medtronic PPM check     Saw EP NP 7/10/20  In summary, Ms. Ty is a 74 y.o. female with tachy-gail syndrome, chronic atrial fibrillation, PPM, HTN, Mechanical MVR and AVR, thrombocytopenia here for follow up after generator change.  She is now 4 months s/p generator change. Ms. Ty is doing well from a device perspective with stable lead and device function. Chronic persistent AF on warfarin. 13% RV pacing which is stable for her. Echo from 2/2020 showed normal EF. Instructed to follow up with PCP regarding olfactory changes.     8/20/20 Doing well. Denies CP or SOB. Only get SOB around strong smells like insect spray  EKG A-fib NSSTT changes     Admitted 12/10/20  Ms Leon is a 75 F with history of AFib, mechanical AVR & MVR, aortic root dilation, HTN, HLD, DM, pacemaker who presents to [facility] with worsening SOB. Pt notice SOB starting 2 days ago. Exertional capacity diminished from being able to walk 2 blocks to only being able to walk to bathroom. She  does not have chest pain, cough, fever, chills. She does not have orthopnea. She states that when she sits up she does feel dizzy, however. She has stable LE swelling. No other complaints at this time.     S/P AVR  Echo 12/11: AV not well visualized. +Increased pressures  12/11- Fluroscopy performed by cardiology; showed good MV function. AV leaflets with synchronous opening. No stuck leaflets. Pt planned for cardiac CT as outpatient due to increased gradients.     12/18/20 Still with some MARTÍNEZ since discharge. Taking lasix 40 qd. Denies CP    Review of Systems   Constitution: Negative for decreased appetite.   HENT: Negative for ear discharge.    Eyes: Negative for blurred vision.   Respiratory: Negative for hemoptysis.    Endocrine: Negative for polyphagia.   Hematologic/Lymphatic: Negative for adenopathy.   Skin: Negative for color change.   Musculoskeletal: Negative for joint swelling.   Genitourinary: Negative for bladder incontinence.   Neurological: Negative for brief paralysis.   Psychiatric/Behavioral: Negative for hallucinations.   Allergic/Immunologic: Negative for hives.        Objective:    Physical Exam   Constitutional: She is oriented to person, place, and time. She appears well-developed and well-nourished.   Eyes: Conjunctivae are normal. No scleral icterus.   Neck: No JVD present. No tracheal deviation present.   Cardiovascular: Normal rate. An irregularly irregular rhythm present. PMI is not displaced.   Mechanical S1 and S2   Pulmonary/Chest: Effort normal and breath sounds normal. No respiratory distress.   Abdominal: Soft. There is no hepatosplenomegaly. There is no abdominal tenderness.   Musculoskeletal:         General: No tenderness or edema.   Neurological: She is alert and oriented to person, place, and time.   Skin: Skin is warm and dry. No rash noted.   Psychiatric: She has a normal mood and affect. Her behavior is normal.         Assessment:       1. S/P AVR    2. S/P MVR (mitral valve  replacement)    3. Coronary artery disease involving native coronary artery of native heart without angina pectoris    4. Acute on chronic diastolic heart failure    5. Cardiac pacemaker in situ    6. Essential (primary) hypertension    7. Chronic atrial fibrillation         Plan:       Will refer to CT surgery - [facility] for possible prosthetic AS  Increase lasix 40 bid for diastolic CHF  OV 2 weeks with BNP, BMP

## 2020-12-22 ENCOUNTER — ANTI-COAG VISIT (OUTPATIENT)
Dept: CARDIOLOGY | Facility: CLINIC | Age: 75
End: 2020-12-22
Payer: MEDICARE

## 2020-12-22 ENCOUNTER — TELEPHONE (OUTPATIENT)
Dept: FAMILY MEDICINE | Facility: CLINIC | Age: 75
End: 2020-12-22

## 2020-12-22 DIAGNOSIS — Z95.2 S/P AVR: ICD-10-CM

## 2020-12-22 DIAGNOSIS — Z95.2 S/P MVR (MITRAL VALVE REPLACEMENT): ICD-10-CM

## 2020-12-22 DIAGNOSIS — Z79.01 LONG TERM CURRENT USE OF ANTICOAGULANT: ICD-10-CM

## 2020-12-22 DIAGNOSIS — I48.20 CHRONIC ATRIAL FIBRILLATION: ICD-10-CM

## 2020-12-22 LAB — INR PPP: 5.9

## 2020-12-22 PROCEDURE — 93793 ANTICOAG MGMT PT WARFARIN: CPT | Mod: S$GLB,,,

## 2020-12-22 PROCEDURE — 93793 PR ANTICOAGULANT MGMT FOR PT TAKING WARFARIN: ICD-10-PCS | Mod: S$GLB,,,

## 2020-12-22 NOTE — PROGRESS NOTES
INR not at goal - likely high 2/2 fluid retention. Medications, chart, and patient findings reviewed. See calendar for adjustments to dose and follow up plan.

## 2020-12-22 NOTE — TELEPHONE ENCOUNTER
Called patient daughter Tammy  No answer per Dr Jason Guerra there is no covid vaccine ready for the public at this time.

## 2020-12-22 NOTE — TELEPHONE ENCOUNTER
----- Message from Jesica Arizmendi sent at 12/21/2020  1:09 PM CST -----  Regarding: pt's daughter liz [phone]  Type: Patient Call Back    Who called:pt    What is the request in detail:pt requesting order for covid vaccine. Call pt    Can the clinic reply by [facility]?    Would the patient rather a call back or a response via [facility]? call    Best call back number:pt's daughter liz [phone]    Additional Information

## 2020-12-22 NOTE — TELEPHONE ENCOUNTER
----- Message from Barb Holland sent at 12/21/2020  1:26 PM CST -----  Regarding: Pt's  Cristobal Ty requesting a call back today regarding the pt's care  Patient Advice:    Pt's  Cristobal Ty requesting a call back today regarding the pt's care.    Mr Ty can be reached at [phone]

## 2020-12-24 ENCOUNTER — ANTI-COAG VISIT (OUTPATIENT)
Dept: CARDIOLOGY | Facility: CLINIC | Age: 75
End: 2020-12-24
Payer: MEDICARE

## 2020-12-24 DIAGNOSIS — Z95.2 S/P MVR (MITRAL VALVE REPLACEMENT): ICD-10-CM

## 2020-12-24 DIAGNOSIS — Z95.2 S/P AVR: ICD-10-CM

## 2020-12-24 DIAGNOSIS — I48.20 CHRONIC ATRIAL FIBRILLATION: ICD-10-CM

## 2020-12-24 DIAGNOSIS — Z79.01 LONG TERM CURRENT USE OF ANTICOAGULANT: ICD-10-CM

## 2020-12-24 LAB — INR PPP: 3.4

## 2020-12-24 PROCEDURE — 93793 PR ANTICOAGULANT MGMT FOR PT TAKING WARFARIN: ICD-10-PCS | Mod: S$GLB,,,

## 2020-12-24 PROCEDURE — 93793 ANTICOAG MGMT PT WARFARIN: CPT | Mod: S$GLB,,,

## 2020-12-29 ENCOUNTER — ANTI-COAG VISIT (OUTPATIENT)
Dept: CARDIOLOGY | Facility: CLINIC | Age: 75
End: 2020-12-29
Payer: MEDICARE

## 2020-12-29 DIAGNOSIS — Z95.2 S/P MVR (MITRAL VALVE REPLACEMENT): ICD-10-CM

## 2020-12-29 DIAGNOSIS — I48.20 CHRONIC ATRIAL FIBRILLATION: ICD-10-CM

## 2020-12-29 DIAGNOSIS — Z79.01 LONG TERM CURRENT USE OF ANTICOAGULANT: ICD-10-CM

## 2020-12-29 DIAGNOSIS — Z95.2 S/P AVR: ICD-10-CM

## 2020-12-29 LAB — INR PPP: 3.8

## 2020-12-29 PROCEDURE — 93793 PR ANTICOAGULANT MGMT FOR PT TAKING WARFARIN: ICD-10-PCS | Mod: S$GLB,,, | Performed by: PHARMACIST

## 2020-12-29 PROCEDURE — 93793 ANTICOAG MGMT PT WARFARIN: CPT | Mod: S$GLB,,, | Performed by: PHARMACIST

## 2020-12-29 NOTE — PROGRESS NOTES
12/24 pt took warfarin 2 mg & held on 12/22 & 12/23 as she was suppose to ,  states he thought she was suppose to take 2 mg daily, he states she did have her serving of high vitamin K foods and no new medications, denies ETOH use so no reason as to why her INR elevated with less Coumadin reported

## 2020-12-31 ENCOUNTER — LAB VISIT (OUTPATIENT)
Dept: LAB | Facility: HOSPITAL | Age: 75
End: 2020-12-31
Attending: INTERNAL MEDICINE
Payer: MEDICARE

## 2020-12-31 DIAGNOSIS — I25.10 CORONARY ARTERY DISEASE INVOLVING NATIVE CORONARY ARTERY OF NATIVE HEART WITHOUT ANGINA PECTORIS: ICD-10-CM

## 2020-12-31 DIAGNOSIS — Z95.0 CARDIAC PACEMAKER IN SITU: ICD-10-CM

## 2020-12-31 DIAGNOSIS — I50.33 ACUTE ON CHRONIC DIASTOLIC HEART FAILURE: ICD-10-CM

## 2020-12-31 DIAGNOSIS — I48.20 CHRONIC ATRIAL FIBRILLATION: Chronic | ICD-10-CM

## 2020-12-31 DIAGNOSIS — Z95.2 S/P MVR (MITRAL VALVE REPLACEMENT): ICD-10-CM

## 2020-12-31 DIAGNOSIS — Z95.2 S/P AVR: ICD-10-CM

## 2020-12-31 DIAGNOSIS — I10 ESSENTIAL (PRIMARY) HYPERTENSION: Chronic | ICD-10-CM

## 2020-12-31 LAB
ANION GAP SERPL CALC-SCNC: 12 MMOL/L (ref 8–16)
BNP SERPL-MCNC: 236 PG/ML (ref 0–99)
BUN SERPL-MCNC: 15 MG/DL (ref 8–23)
CALCIUM SERPL-MCNC: 9.7 MG/DL (ref 8.7–10.5)
CHLORIDE SERPL-SCNC: 94 MMOL/L (ref 95–110)
CO2 SERPL-SCNC: 32 MMOL/L (ref 23–29)
CREAT SERPL-MCNC: 0.8 MG/DL (ref 0.5–1.4)
EST. GFR  (AFRICAN AMERICAN): >60 ML/MIN/1.73 M^2
EST. GFR  (NON AFRICAN AMERICAN): >60 ML/MIN/1.73 M^2
GLUCOSE SERPL-MCNC: 134 MG/DL (ref 70–110)
POTASSIUM SERPL-SCNC: 3.9 MMOL/L (ref 3.5–5.1)
SODIUM SERPL-SCNC: 138 MMOL/L (ref 136–145)

## 2020-12-31 PROCEDURE — 36415 COLL VENOUS BLD VENIPUNCTURE: CPT | Mod: HCNC,PO

## 2020-12-31 PROCEDURE — 80048 BASIC METABOLIC PNL TOTAL CA: CPT | Mod: HCNC

## 2020-12-31 PROCEDURE — 83880 ASSAY OF NATRIURETIC PEPTIDE: CPT | Mod: HCNC

## 2021-01-01 ENCOUNTER — CLINICAL SUPPORT (OUTPATIENT)
Dept: CARDIOLOGY | Facility: HOSPITAL | Age: 76
End: 2021-01-01
Payer: MEDICARE

## 2021-01-01 ENCOUNTER — ANTI-COAG VISIT (OUTPATIENT)
Dept: CARDIOLOGY | Facility: CLINIC | Age: 76
End: 2021-01-01
Payer: MEDICARE

## 2021-01-01 ENCOUNTER — DOCUMENTATION ONLY (OUTPATIENT)
Dept: CARDIOLOGY | Facility: CLINIC | Age: 76
End: 2021-01-01
Payer: MEDICARE

## 2021-01-01 ENCOUNTER — OFFICE VISIT (OUTPATIENT)
Dept: FAMILY MEDICINE | Facility: CLINIC | Age: 76
End: 2021-01-01
Payer: MEDICARE

## 2021-01-01 ENCOUNTER — HOSPITAL ENCOUNTER (OUTPATIENT)
Dept: CARDIOLOGY | Facility: HOSPITAL | Age: 76
Discharge: HOME | End: 2021-03-02
Attending: INTERNAL MEDICINE
Payer: MEDICARE

## 2021-01-01 ENCOUNTER — PATIENT MESSAGE (OUTPATIENT)
Dept: FAMILY MEDICINE | Facility: CLINIC | Age: 76
End: 2021-01-01
Payer: MEDICARE

## 2021-01-01 ENCOUNTER — LAB VISIT (OUTPATIENT)
Dept: LAB | Facility: HOSPITAL | Age: 76
End: 2021-01-01
Attending: INTERNAL MEDICINE
Payer: MEDICARE

## 2021-01-01 ENCOUNTER — TELEPHONE (OUTPATIENT)
Dept: PREADMISSION TESTING | Facility: HOSPITAL | Age: 76
End: 2021-01-01
Payer: MEDICARE

## 2021-01-01 ENCOUNTER — TELEPHONE (OUTPATIENT)
Dept: CARDIOTHORACIC SURGERY | Facility: CLINIC | Age: 76
End: 2021-01-01
Payer: MEDICARE

## 2021-01-01 ENCOUNTER — TELEPHONE (OUTPATIENT)
Dept: FAMILY MEDICINE | Facility: CLINIC | Age: 76
End: 2021-01-01
Payer: MEDICARE

## 2021-01-01 ENCOUNTER — OFFICE VISIT (OUTPATIENT)
Dept: CARDIOLOGY | Facility: CLINIC | Age: 76
End: 2021-01-01
Payer: MEDICARE

## 2021-01-01 ENCOUNTER — HOSPITAL ENCOUNTER (OUTPATIENT)
Dept: CARDIOLOGY | Facility: HOSPITAL | Age: 76
Discharge: HOME | End: 2021-06-09
Attending: INTERNAL MEDICINE
Payer: MEDICARE

## 2021-01-01 ENCOUNTER — PATIENT MESSAGE (OUTPATIENT)
Dept: CARDIOLOGY | Facility: CLINIC | Age: 76
End: 2021-01-01
Payer: MEDICARE

## 2021-01-01 ENCOUNTER — PATIENT MESSAGE (OUTPATIENT)
Dept: FAMILY MEDICINE | Facility: CLINIC | Age: 76
End: 2021-01-01

## 2021-01-01 ENCOUNTER — TELEPHONE (OUTPATIENT)
Dept: FAMILY MEDICINE | Facility: CLINIC | Age: 76
End: 2021-01-01

## 2021-01-01 ENCOUNTER — HOSPITAL ENCOUNTER (OUTPATIENT)
Facility: HOSPITAL | Age: 76
Discharge: HOME | End: 2021-12-21
Attending: INTERNAL MEDICINE | Admitting: INTERNAL MEDICINE
Payer: MEDICARE

## 2021-01-01 ENCOUNTER — NURSE TRIAGE (OUTPATIENT)
Dept: ADMINISTRATIVE | Facility: CLINIC | Age: 76
End: 2021-01-01
Payer: MEDICARE

## 2021-01-01 ENCOUNTER — HOSPITAL ENCOUNTER (OUTPATIENT)
Dept: RADIOLOGY | Facility: HOSPITAL | Age: 76
Discharge: HOME | End: 2021-11-11
Attending: INTERNAL MEDICINE
Payer: MEDICARE

## 2021-01-01 ENCOUNTER — HOSPITAL ENCOUNTER (OUTPATIENT)
Dept: CARDIOLOGY | Facility: HOSPITAL | Age: 76
Discharge: HOME | End: 2021-10-07
Attending: INTERNAL MEDICINE
Payer: MEDICARE

## 2021-01-01 ENCOUNTER — TELEPHONE (OUTPATIENT)
Dept: CARDIOLOGY | Facility: CLINIC | Age: 76
End: 2021-01-01
Payer: MEDICARE

## 2021-01-01 ENCOUNTER — NURSE TRIAGE (OUTPATIENT)
Dept: ADMINISTRATIVE | Facility: CLINIC | Age: 76
End: 2021-01-01

## 2021-01-01 ENCOUNTER — OFFICE VISIT (OUTPATIENT)
Dept: CARDIOTHORACIC SURGERY | Facility: CLINIC | Age: 76
End: 2021-01-01
Payer: MEDICARE

## 2021-01-01 VITALS
SYSTOLIC BLOOD PRESSURE: 110 MMHG | OXYGEN SATURATION: 98 % | TEMPERATURE: 99 F | BODY MASS INDEX: 19.29 KG/M2 | HEIGHT: 61 IN | WEIGHT: 102.19 LBS | HEART RATE: 78 BPM | DIASTOLIC BLOOD PRESSURE: 60 MMHG

## 2021-01-01 VITALS
WEIGHT: 99 LBS | HEART RATE: 72 BPM | HEIGHT: 61 IN | DIASTOLIC BLOOD PRESSURE: 61 MMHG | SYSTOLIC BLOOD PRESSURE: 100 MMHG | BODY MASS INDEX: 18.69 KG/M2

## 2021-01-01 VITALS
BODY MASS INDEX: 18.83 KG/M2 | HEIGHT: 61 IN | TEMPERATURE: 98 F | DIASTOLIC BLOOD PRESSURE: 62 MMHG | HEIGHT: 60 IN | OXYGEN SATURATION: 96 % | BODY MASS INDEX: 19.24 KG/M2 | SYSTOLIC BLOOD PRESSURE: 118 MMHG | WEIGHT: 98 LBS | HEART RATE: 69 BPM | WEIGHT: 99.75 LBS

## 2021-01-01 VITALS
SYSTOLIC BLOOD PRESSURE: 140 MMHG | HEART RATE: 77 BPM | BODY MASS INDEX: 18.94 KG/M2 | DIASTOLIC BLOOD PRESSURE: 68 MMHG | HEIGHT: 61 IN | WEIGHT: 100.31 LBS | TEMPERATURE: 99 F

## 2021-01-01 VITALS
HEIGHT: 60 IN | BODY MASS INDEX: 19.63 KG/M2 | OXYGEN SATURATION: 97 % | HEART RATE: 77 BPM | SYSTOLIC BLOOD PRESSURE: 132 MMHG | WEIGHT: 100 LBS | DIASTOLIC BLOOD PRESSURE: 70 MMHG

## 2021-01-01 VITALS
BODY MASS INDEX: 18.69 KG/M2 | WEIGHT: 99 LBS | SYSTOLIC BLOOD PRESSURE: 130 MMHG | OXYGEN SATURATION: 94 % | HEIGHT: 61 IN | HEART RATE: 66 BPM | DIASTOLIC BLOOD PRESSURE: 70 MMHG

## 2021-01-01 VITALS
SYSTOLIC BLOOD PRESSURE: 130 MMHG | DIASTOLIC BLOOD PRESSURE: 50 MMHG | HEART RATE: 77 BPM | BODY MASS INDEX: 19.19 KG/M2 | TEMPERATURE: 101 F | HEIGHT: 61 IN | OXYGEN SATURATION: 95 % | WEIGHT: 101.63 LBS

## 2021-01-01 VITALS
SYSTOLIC BLOOD PRESSURE: 123 MMHG | DIASTOLIC BLOOD PRESSURE: 57 MMHG | HEIGHT: 60 IN | OXYGEN SATURATION: 96 % | WEIGHT: 98.56 LBS | BODY MASS INDEX: 19.35 KG/M2 | HEART RATE: 78 BPM

## 2021-01-01 VITALS
DIASTOLIC BLOOD PRESSURE: 61 MMHG | WEIGHT: 99 LBS | BODY MASS INDEX: 18.69 KG/M2 | SYSTOLIC BLOOD PRESSURE: 100 MMHG | HEIGHT: 61 IN

## 2021-01-01 VITALS
OXYGEN SATURATION: 93 % | BODY MASS INDEX: 18.61 KG/M2 | HEIGHT: 61 IN | WEIGHT: 98.56 LBS | SYSTOLIC BLOOD PRESSURE: 134 MMHG | DIASTOLIC BLOOD PRESSURE: 61 MMHG | HEART RATE: 60 BPM

## 2021-01-01 VITALS
HEIGHT: 61 IN | RESPIRATION RATE: 18 BRPM | TEMPERATURE: 98 F | DIASTOLIC BLOOD PRESSURE: 69 MMHG | SYSTOLIC BLOOD PRESSURE: 117 MMHG | HEART RATE: 71 BPM | OXYGEN SATURATION: 91 % | WEIGHT: 95 LBS | BODY MASS INDEX: 17.94 KG/M2

## 2021-01-01 DIAGNOSIS — E78.2 MIXED HYPERLIPIDEMIA: Chronic | ICD-10-CM

## 2021-01-01 DIAGNOSIS — Z95.2 S/P AVR: ICD-10-CM

## 2021-01-01 DIAGNOSIS — E78.2 MIXED HYPERLIPIDEMIA: ICD-10-CM

## 2021-01-01 DIAGNOSIS — Z95.2 S/P MVR (MITRAL VALVE REPLACEMENT): ICD-10-CM

## 2021-01-01 DIAGNOSIS — R80.9 TYPE 2 DIABETES MELLITUS WITH MICROALBUMINURIA, WITHOUT LONG-TERM CURRENT USE OF INSULIN: ICD-10-CM

## 2021-01-01 DIAGNOSIS — I27.21 PAH (PULMONARY ARTERY HYPERTENSION): ICD-10-CM

## 2021-01-01 DIAGNOSIS — Z79.01 LONG TERM CURRENT USE OF ANTICOAGULANT: ICD-10-CM

## 2021-01-01 DIAGNOSIS — T82.09XS PROSTHETIC VALVE DYSFUNCTION, SEQUELA: Primary | ICD-10-CM

## 2021-01-01 DIAGNOSIS — I48.20 CHRONIC ATRIAL FIBRILLATION: ICD-10-CM

## 2021-01-01 DIAGNOSIS — E03.8 SUBCLINICAL HYPOTHYROIDISM: ICD-10-CM

## 2021-01-01 DIAGNOSIS — I10 ESSENTIAL (PRIMARY) HYPERTENSION: Chronic | ICD-10-CM

## 2021-01-01 DIAGNOSIS — Z95.0 CARDIAC PACEMAKER IN SITU: ICD-10-CM

## 2021-01-01 DIAGNOSIS — I10 ESSENTIAL (PRIMARY) HYPERTENSION: ICD-10-CM

## 2021-01-01 DIAGNOSIS — Z95.2 HEART VALVE REPLACED: ICD-10-CM

## 2021-01-01 DIAGNOSIS — E11.3299 TYPE 2 DIABETES MELLITUS WITH MILD NONPROLIFERATIVE RETINOPATHY WITHOUT MACULAR EDEMA, WITHOUT LONG-TERM CURRENT USE OF INSULIN, UNSPECIFIED LATERALITY: Chronic | ICD-10-CM

## 2021-01-01 DIAGNOSIS — I48.20 CHRONIC ATRIAL FIBRILLATION: Primary | ICD-10-CM

## 2021-01-01 DIAGNOSIS — R53.83 FATIGUE, UNSPECIFIED TYPE: ICD-10-CM

## 2021-01-01 DIAGNOSIS — I48.20 CHRONIC ATRIAL FIBRILLATION: Chronic | ICD-10-CM

## 2021-01-01 DIAGNOSIS — R06.2 WHEEZES: ICD-10-CM

## 2021-01-01 DIAGNOSIS — I09.9 RHEUMATIC HEART DISEASE: Chronic | ICD-10-CM

## 2021-01-01 DIAGNOSIS — I49.5 TACHY-BRADY SYNDROME: ICD-10-CM

## 2021-01-01 DIAGNOSIS — Z00.00 ROUTINE MEDICAL EXAM: Primary | ICD-10-CM

## 2021-01-01 DIAGNOSIS — I09.9 RHEUMATIC HEART DISEASE: ICD-10-CM

## 2021-01-01 DIAGNOSIS — E11.29 TYPE 2 DIABETES MELLITUS WITH MICROALBUMINURIA, WITHOUT LONG-TERM CURRENT USE OF INSULIN: ICD-10-CM

## 2021-01-01 DIAGNOSIS — D69.6 ANEMIA WITH LOW PLATELET COUNT: ICD-10-CM

## 2021-01-01 DIAGNOSIS — Z95.0 PRESENCE OF CARDIAC PACEMAKER: ICD-10-CM

## 2021-01-01 DIAGNOSIS — R80.9 TYPE 2 DIABETES MELLITUS WITH MICROALBUMINURIA, WITHOUT LONG-TERM CURRENT USE OF INSULIN: Chronic | ICD-10-CM

## 2021-01-01 DIAGNOSIS — R50.9 FEVER, UNSPECIFIED FEVER CAUSE: ICD-10-CM

## 2021-01-01 DIAGNOSIS — I70.0 ATHEROSCLEROSIS OF AORTA: Chronic | ICD-10-CM

## 2021-01-01 DIAGNOSIS — R53.1 WEAKNESS: ICD-10-CM

## 2021-01-01 DIAGNOSIS — E11.3299 TYPE 2 DIABETES MELLITUS WITH MILD NONPROLIFERATIVE RETINOPATHY WITHOUT MACULAR EDEMA, WITHOUT LONG-TERM CURRENT USE OF INSULIN, UNSPECIFIED LATERALITY: ICD-10-CM

## 2021-01-01 DIAGNOSIS — T82.09XA PROSTHETIC VALVE DYSFUNCTION, INITIAL ENCOUNTER: ICD-10-CM

## 2021-01-01 DIAGNOSIS — Z95.2 S/P MVR (MITRAL VALVE REPLACEMENT): Primary | ICD-10-CM

## 2021-01-01 DIAGNOSIS — I48.20 CHRONIC ATRIAL FIBRILLATION: Primary | Chronic | ICD-10-CM

## 2021-01-01 DIAGNOSIS — Z95.2 S/P AVR: Primary | ICD-10-CM

## 2021-01-01 DIAGNOSIS — I50.33 ACUTE ON CHRONIC DIASTOLIC HEART FAILURE: ICD-10-CM

## 2021-01-01 DIAGNOSIS — I49.8 OTHER SPECIFIED CARDIAC ARRHYTHMIAS: Primary | ICD-10-CM

## 2021-01-01 DIAGNOSIS — I25.10 CORONARY ARTERY DISEASE INVOLVING NATIVE CORONARY ARTERY OF NATIVE HEART WITHOUT ANGINA PECTORIS: ICD-10-CM

## 2021-01-01 DIAGNOSIS — I27.21 PAH (PULMONARY ARTERY HYPERTENSION): Primary | ICD-10-CM

## 2021-01-01 DIAGNOSIS — I35.0 AORTIC VALVE STENOSIS, ETIOLOGY OF CARDIAC VALVE DISEASE UNSPECIFIED: ICD-10-CM

## 2021-01-01 DIAGNOSIS — I25.10 CORONARY ARTERY DISEASE INVOLVING NATIVE CORONARY ARTERY OF NATIVE HEART WITHOUT ANGINA PECTORIS: Primary | ICD-10-CM

## 2021-01-01 DIAGNOSIS — R50.9 FEVER, UNSPECIFIED FEVER CAUSE: Primary | ICD-10-CM

## 2021-01-01 DIAGNOSIS — E03.8 SUBCLINICAL HYPOTHYROIDISM: Chronic | ICD-10-CM

## 2021-01-01 DIAGNOSIS — R06.2 WHEEZES: Primary | ICD-10-CM

## 2021-01-01 DIAGNOSIS — T82.09XS PROSTHETIC VALVE DYSFUNCTION, SEQUELA: ICD-10-CM

## 2021-01-01 DIAGNOSIS — E11.29 TYPE 2 DIABETES MELLITUS WITH MICROALBUMINURIA, WITHOUT LONG-TERM CURRENT USE OF INSULIN: Chronic | ICD-10-CM

## 2021-01-01 DIAGNOSIS — I35.0 AORTIC VALVE STENOSIS, ETIOLOGY OF CARDIAC VALVE DISEASE UNSPECIFIED: Primary | ICD-10-CM

## 2021-01-01 DIAGNOSIS — Z88.2 ALLERGY TO SULFA DRUGS: ICD-10-CM

## 2021-01-01 LAB
ABO + RH BLD: NORMAL
ANION GAP SERPL CALC-SCNC: 11 MMOL/L (ref 8–16)
ANION GAP SERPL CALC-SCNC: 7 MMOL/L (ref 8–16)
ANION GAP SERPL CALC-SCNC: 9 MMOL/L (ref 8–16)
AORTIC ROOT ANNULUS: 3.51 CM
AORTIC ROOT ANNULUS: 3.59 CM
AORTIC VALVE CUSP SEPERATION: 2.3 CM
APTT BLDCRRT: 46.7 SEC (ref 21–32)
ASCENDING AORTA: 3.47 CM
AV INDEX (PROSTH): 0.31
AV INDEX (PROSTH): 0.44
AV INDEX (PROSTH): 0.66
AV MEAN GRADIENT: 28 MMHG
AV MEAN GRADIENT: 38 MMHG
AV MEAN GRADIENT: 41 MMHG
AV PEAK GRADIENT: 48 MMHG
AV PEAK GRADIENT: 57 MMHG
AV PEAK GRADIENT: 63 MMHG
AV VALVE AREA: 1.02 CM2
AV VALVE AREA: 1.61 CM2
AV VALVE AREA: 2.06 CM2
AV VELOCITY RATIO: 0.34
AV VELOCITY RATIO: 0.5
AV VELOCITY RATIO: 0.68
BASOPHILS # BLD AUTO: 0.03 K/UL (ref 0–0.2)
BASOPHILS # BLD AUTO: 0.04 K/UL (ref 0–0.2)
BASOPHILS NFR BLD: 0.6 % (ref 0–1.9)
BASOPHILS NFR BLD: 0.7 % (ref 0–1.9)
BLD GP AB SCN CELLS X3 SERPL QL: NORMAL
BNP SERPL-MCNC: 69 PG/ML (ref 0–99)
BSA FOR ECHO PROCEDURE: 1.37 M2
BSA FOR ECHO PROCEDURE: 1.39 M2
BUN SERPL-MCNC: 18 MG/DL (ref 8–23)
BUN SERPL-MCNC: 20 MG/DL (ref 8–23)
BUN SERPL-MCNC: 27 MG/DL (ref 8–23)
CALCIUM SERPL-MCNC: 10.1 MG/DL (ref 8.7–10.5)
CALCIUM SERPL-MCNC: 10.2 MG/DL (ref 8.7–10.5)
CALCIUM SERPL-MCNC: 9.6 MG/DL (ref 8.7–10.5)
CHLORIDE SERPL-SCNC: 95 MMOL/L (ref 95–110)
CHLORIDE SERPL-SCNC: 96 MMOL/L (ref 95–110)
CHLORIDE SERPL-SCNC: 97 MMOL/L (ref 95–110)
CHOLEST SERPL-MCNC: 162 MG/DL (ref 120–199)
CHOLEST/HDLC SERPL: 2.9 {RATIO} (ref 2–5)
CO2 SERPL-SCNC: 28 MMOL/L (ref 23–29)
CO2 SERPL-SCNC: 31 MMOL/L (ref 23–29)
CO2 SERPL-SCNC: 34 MMOL/L (ref 23–29)
CREAT SERPL-MCNC: 0.8 MG/DL (ref 0.5–1.4)
CREAT SERPL-MCNC: 0.8 MG/DL (ref 0.5–1.4)
CREAT SERPL-MCNC: 0.9 MG/DL (ref 0.5–1.4)
CV ECHO LV RWT: 0.73 CM
CV ECHO LV RWT: 0.74 CM
CV ECHO LV RWT: 0.94 CM
DIFFERENTIAL METHOD: ABNORMAL
DIFFERENTIAL METHOD: ABNORMAL
DOP CALC AO PEAK VEL: 3.47 M/S
DOP CALC AO PEAK VEL: 3.77 M/S
DOP CALC AO PEAK VEL: 3.97 M/S
DOP CALC AO VTI: 76.44 CM
DOP CALC AO VTI: 91.61 CM
DOP CALC AO VTI: 97.66 CM
DOP CALC LVOT AREA: 3.1 CM2
DOP CALC LVOT AREA: 3.3 CM2
DOP CALC LVOT AREA: 3.7 CM2
DOP CALC LVOT DIAMETER: 1.99 CM
DOP CALC LVOT DIAMETER: 2.04 CM
DOP CALC LVOT DIAMETER: 2.16 CM
DOP CALC LVOT PEAK VEL: 1.36 M/S
DOP CALC LVOT PEAK VEL: 1.88 M/S
DOP CALC LVOT PEAK VEL: 2.37 M/S
DOP CALC LVOT STROKE VOLUME: 147.23 CM3
DOP CALC LVOT STROKE VOLUME: 157.42 CM3
DOP CALC LVOT STROKE VOLUME: 99.57 CM3
DOP CALC MV VTI: 29 CM
DOP CALC MV VTI: 38.48 CM
DOP CALCLVOT PEAK VEL VTI: 30.48 CM
DOP CALCLVOT PEAK VEL VTI: 40.2 CM
DOP CALCLVOT PEAK VEL VTI: 50.64 CM
E/E' RATIO: 3.65 M/S
ECHO LV POSTERIOR WALL: 1.26 CM (ref 0.6–1.1)
ECHO LV POSTERIOR WALL: 1.34 CM (ref 0.6–1.1)
ECHO LV POSTERIOR WALL: 1.68 CM (ref 0.6–1.1)
EJECTION FRACTION: 55 %
EJECTION FRACTION: 60 %
EOSINOPHIL # BLD AUTO: 0.1 K/UL (ref 0–0.5)
EOSINOPHIL # BLD AUTO: 0.2 K/UL (ref 0–0.5)
EOSINOPHIL NFR BLD: 2.2 % (ref 0–8)
EOSINOPHIL NFR BLD: 3.2 % (ref 0–8)
ERYTHROCYTE [DISTWIDTH] IN BLOOD BY AUTOMATED COUNT: 13.9 % (ref 11.5–14.5)
ERYTHROCYTE [DISTWIDTH] IN BLOOD BY AUTOMATED COUNT: 15.1 % (ref 11.5–14.5)
ERYTHROCYTE [DISTWIDTH] IN BLOOD BY AUTOMATED COUNT: 15.4 % (ref 11.5–14.5)
EST. GFR  (AFRICAN AMERICAN): >60 ML/MIN/1.73 M^2
EST. GFR  (NON AFRICAN AMERICAN): >60 ML/MIN/1.73 M^2
ESTIMATED AVG GLUCOSE: 128 MG/DL (ref 68–131)
FRACTIONAL SHORTENING: 26 % (ref 28–44)
FRACTIONAL SHORTENING: 30 % (ref 28–44)
FRACTIONAL SHORTENING: 31 % (ref 28–44)
GLUCOSE SERPL-MCNC: 108 MG/DL (ref 70–110)
GLUCOSE SERPL-MCNC: 139 MG/DL (ref 70–110)
GLUCOSE SERPL-MCNC: 189 MG/DL (ref 70–110)
HBA1C MFR BLD: 6.1 % (ref 4–5.6)
HCT VFR BLD AUTO: 33.3 % (ref 37–48.5)
HCT VFR BLD AUTO: 33.4 % (ref 37–48.5)
HCT VFR BLD AUTO: 36.8 % (ref 37–48.5)
HDLC SERPL-MCNC: 55 MG/DL (ref 40–75)
HDLC SERPL: 34 % (ref 20–50)
HGB BLD-MCNC: 10.2 G/DL (ref 12–16)
HGB BLD-MCNC: 10.3 G/DL (ref 12–16)
HGB BLD-MCNC: 11.5 G/DL (ref 12–16)
HR MV ECHO: 60 BPM
IMM GRANULOCYTES # BLD AUTO: 0.01 K/UL (ref 0–0.04)
IMM GRANULOCYTES # BLD AUTO: 0.02 K/UL (ref 0–0.04)
IMM GRANULOCYTES NFR BLD AUTO: 0.2 % (ref 0–0.5)
IMM GRANULOCYTES NFR BLD AUTO: 0.3 % (ref 0–0.5)
INR PPP: 1.8
INR PPP: 1.9
INR PPP: 1.9
INR PPP: 2 (ref 0.8–1.2)
INR PPP: 2.2
INR PPP: 2.4
INR PPP: 2.6
INR PPP: 2.6
INR PPP: 2.7
INR PPP: 2.7
INR PPP: 2.8
INR PPP: 2.8
INR PPP: 3
INR PPP: 3
INR PPP: 3 (ref 0.8–1.2)
INR PPP: 3.1
INR PPP: 3.3
INR PPP: 3.4
INR PPP: 3.5
INR PPP: 3.5
INR PPP: 3.8
INR PPP: 4.2
INR PPP: 4.6
INR PPP: 5.1
INTERVENTRICULAR SEPTUM: 1.27 CM (ref 0.6–1.1)
INTERVENTRICULAR SEPTUM: 1.35 CM (ref 0.6–1.1)
INTERVENTRICULAR SEPTUM: 1.5 CM (ref 0.6–1.1)
IVRT: 119.95 MSEC
IVRT: 76.12 MSEC
IVRT: 92.27 MSEC
LA MAJOR: 6.58 CM
LA MAJOR: 7.3 CM
LA MAJOR: 7.6 CM
LA MINOR: 6.74 CM
LA MINOR: 7.65 CM
LA MINOR: 7.71 CM
LA WIDTH: 5.03 CM
LA WIDTH: 5.29 CM
LA WIDTH: 5.36 CM
LDLC SERPL CALC-MCNC: 93.8 MG/DL (ref 63–159)
LEFT ATRIUM SIZE: 4.56 CM
LEFT ATRIUM SIZE: 5.43 CM
LEFT ATRIUM SIZE: 6.02 CM
LEFT ATRIUM VOLUME INDEX: 126.4 ML/M2
LEFT ATRIUM VOLUME INDEX: 147.1 ML/M2
LEFT ATRIUM VOLUME: 138.34 CM3
LEFT ATRIUM VOLUME: 177.02 CM3
LEFT ATRIUM VOLUME: 203 CM3
LEFT INTERNAL DIMENSION IN SYSTOLE: 2.46 CM (ref 2.1–4)
LEFT INTERNAL DIMENSION IN SYSTOLE: 2.51 CM (ref 2.1–4)
LEFT INTERNAL DIMENSION IN SYSTOLE: 2.58 CM (ref 2.1–4)
LEFT VENTRICLE DIASTOLIC VOLUME INDEX: 38.74 ML/M2
LEFT VENTRICLE DIASTOLIC VOLUME INDEX: 41.06 ML/M2
LEFT VENTRICLE DIASTOLIC VOLUME: 47.18 ML
LEFT VENTRICLE DIASTOLIC VOLUME: 53.46 ML
LEFT VENTRICLE DIASTOLIC VOLUME: 57.49 ML
LEFT VENTRICLE MASS INDEX: 124 G/M2
LEFT VENTRICLE MASS INDEX: 158 G/M2
LEFT VENTRICLE SYSTOLIC VOLUME INDEX: 15.6 ML/M2
LEFT VENTRICLE SYSTOLIC VOLUME INDEX: 17.2 ML/M2
LEFT VENTRICLE SYSTOLIC VOLUME: 21.52 ML
LEFT VENTRICLE SYSTOLIC VOLUME: 22.61 ML
LEFT VENTRICLE SYSTOLIC VOLUME: 24.04 ML
LEFT VENTRICULAR INTERNAL DIMENSION IN DIASTOLE: 3.39 CM (ref 3.5–6)
LEFT VENTRICULAR INTERNAL DIMENSION IN DIASTOLE: 3.57 CM (ref 3.5–6)
LEFT VENTRICULAR INTERNAL DIMENSION IN DIASTOLE: 3.68 CM (ref 3.5–6)
LEFT VENTRICULAR MASS: 140.81 G
LEFT VENTRICULAR MASS: 174.19 G
LEFT VENTRICULAR MASS: 218.62 G
LV LATERAL E/E' RATIO: 1.89 M/S
LV SEPTAL E/E' RATIO: 49.25 M/S
LYMPHOCYTES # BLD AUTO: 0.8 K/UL (ref 1–4.8)
LYMPHOCYTES # BLD AUTO: 1.3 K/UL (ref 1–4.8)
LYMPHOCYTES NFR BLD: 14.2 % (ref 18–48)
LYMPHOCYTES NFR BLD: 23.2 % (ref 18–48)
MCH RBC QN AUTO: 28.2 PG (ref 27–31)
MCH RBC QN AUTO: 28.3 PG (ref 27–31)
MCH RBC QN AUTO: 29.8 PG (ref 27–31)
MCHC RBC AUTO-ENTMCNC: 30.5 G/DL (ref 32–36)
MCHC RBC AUTO-ENTMCNC: 30.9 G/DL (ref 32–36)
MCHC RBC AUTO-ENTMCNC: 31.3 G/DL (ref 32–36)
MCV RBC AUTO: 92 FL (ref 82–98)
MCV RBC AUTO: 92 FL (ref 82–98)
MCV RBC AUTO: 95 FL (ref 82–98)
MONOCYTES # BLD AUTO: 0.4 K/UL (ref 0.3–1)
MONOCYTES # BLD AUTO: 0.4 K/UL (ref 0.3–1)
MONOCYTES NFR BLD: 6.8 % (ref 4–15)
MONOCYTES NFR BLD: 7.6 % (ref 4–15)
MV MEAN GRADIENT: 1 MMHG
MV MEAN GRADIENT: 1 MMHG
MV MEAN GRADIENT: 5 MMHG
MV PEAK E VEL: 1.96 M/S
MV PEAK E VEL: 1.97 M/S
MV PEAK E VEL: 2.32 M/S
MV PEAK GRADIENT: 15 MMHG
MV PEAK GRADIENT: 16 MMHG
MV PEAK GRADIENT: 22 MMHG
MV STENOSIS PRESSURE HALF TIME: 43.68 MS
MV STENOSIS PRESSURE HALF TIME: 50.86 MS
MV STENOSIS PRESSURE HALF TIME: 59.25 MS
MV VALVE AREA BY CONTINUITY EQUATION: 3.83 CM2
MV VALVE AREA BY CONTINUITY EQUATION: 5.43 CM2
MV VALVE AREA P 1/2 METHOD: 3.71 CM2
MV VALVE AREA P 1/2 METHOD: 4.33 CM2
MV VALVE AREA P 1/2 METHOD: 5.04 CM2
NEUTROPHILS # BLD AUTO: 3.5 K/UL (ref 1.8–7.7)
NEUTROPHILS # BLD AUTO: 4.5 K/UL (ref 1.8–7.7)
NEUTROPHILS NFR BLD: 65.2 % (ref 38–73)
NEUTROPHILS NFR BLD: 75.8 % (ref 38–73)
NONHDLC SERPL-MCNC: 107 MG/DL
NRBC BLD-RTO: 0 /100 WBC
NRBC BLD-RTO: 0 /100 WBC
PISA TR MAX VEL: 3.26 M/S
PISA TR MAX VEL: 3.5 M/S
PISA TR MAX VEL: 3.83 M/S
PLATELET # BLD AUTO: 125 K/UL (ref 150–450)
PLATELET # BLD AUTO: 145 K/UL (ref 150–450)
PLATELET # BLD AUTO: 96 K/UL (ref 150–450)
PMV BLD AUTO: 11.1 FL (ref 9.2–12.9)
PMV BLD AUTO: 11.4 FL (ref 9.2–12.9)
PMV BLD AUTO: 12.3 FL (ref 9.2–12.9)
POCT GLUCOSE: 165 MG/DL (ref 70–110)
POTASSIUM SERPL-SCNC: 4.1 MMOL/L (ref 3.5–5.1)
POTASSIUM SERPL-SCNC: 4.2 MMOL/L (ref 3.5–5.1)
POTASSIUM SERPL-SCNC: 4.6 MMOL/L (ref 3.5–5.1)
PROTHROMBIN TIME: 20.8 SEC (ref 9–12.5)
PROTHROMBIN TIME: 30.5 SEC (ref 9–12.5)
PV PEAK VELOCITY: 0.73 CM/S
PV PEAK VELOCITY: 0.86 CM/S
PV PEAK VELOCITY: 0.9 CM/S
RA MAJOR: 6.03 CM
RA MAJOR: 6.66 CM
RA MAJOR: 7.2 CM
RA PRESSURE: 3 MMHG
RA PRESSURE: 3 MMHG
RA PRESSURE: 8 MMHG
RA WIDTH: 5.47 CM
RA WIDTH: 5.88 CM
RA WIDTH: 6.1 CM
RBC # BLD AUTO: 3.62 M/UL (ref 4–5.4)
RBC # BLD AUTO: 3.64 M/UL (ref 4–5.4)
RBC # BLD AUTO: 3.86 M/UL (ref 4–5.4)
RIGHT VENTRICULAR END-DIASTOLIC DIMENSION: 3.92 CM
RIGHT VENTRICULAR END-DIASTOLIC DIMENSION: 4.23 CM
RIGHT VENTRICULAR END-DIASTOLIC DIMENSION: 4.23 CM
RV TISSUE DOPPLER FREE WALL SYSTOLIC VELOCITY 1 (APICAL 4 CHAMBER VIEW): 5.21 CM/S
RV TISSUE DOPPLER FREE WALL SYSTOLIC VELOCITY 1 (APICAL 4 CHAMBER VIEW): 6.1 CM/S
RV TISSUE DOPPLER FREE WALL SYSTOLIC VELOCITY 1 (APICAL 4 CHAMBER VIEW): 6.36 CM/S
SARS-COV-2 RNA RESP QL NAA+PROBE: NOT DETECTED
SARS-COV-2- CYCLE NUMBER: NORMAL
SINUS: 2.95 CM
SINUS: 3.07 CM
SODIUM SERPL-SCNC: 135 MMOL/L (ref 136–145)
SODIUM SERPL-SCNC: 136 MMOL/L (ref 136–145)
SODIUM SERPL-SCNC: 137 MMOL/L (ref 136–145)
STJ: 2.82 CM
T4 FREE SERPL-MCNC: 1.07 NG/DL (ref 0.71–1.51)
TDI LATERAL: 1.04 M/S
TDI SEPTAL: 0.04 M/S
TDI: 0.54 M/S
TR MAX PG: 43 MMHG
TR MAX PG: 49 MMHG
TR MAX PG: 59 MMHG
TRICUSPID ANNULAR PLANE SYSTOLIC EXCURSION: 1.18 CM
TRICUSPID ANNULAR PLANE SYSTOLIC EXCURSION: 1.28 CM
TRICUSPID ANNULAR PLANE SYSTOLIC EXCURSION: 1.4 CM
TRIGL SERPL-MCNC: 66 MG/DL (ref 30–150)
TSH SERPL DL<=0.005 MIU/L-ACNC: 5.78 UIU/ML (ref 0.4–4)
TV REST PULMONARY ARTERY PRESSURE: 46 MMHG
TV REST PULMONARY ARTERY PRESSURE: 57 MMHG
TV REST PULMONARY ARTERY PRESSURE: 62 MMHG
WBC # BLD AUTO: 5.38 K/UL (ref 3.9–12.7)
WBC # BLD AUTO: 5.92 K/UL (ref 3.9–12.7)
WBC # BLD AUTO: 6.27 K/UL (ref 3.9–12.7)

## 2021-01-01 PROCEDURE — 93306 TTE W/DOPPLER COMPLETE: CPT | Mod: 26,HCNC,, | Performed by: INTERNAL MEDICINE

## 2021-01-01 PROCEDURE — 25000003 PHARM REV CODE 250: Mod: HCNC | Performed by: INTERNAL MEDICINE

## 2021-01-01 PROCEDURE — 3288F PR FALLS RISK ASSESSMENT DOCUMENTED: ICD-10-PCS | Mod: CPTII,S$GLB,, | Performed by: INTERNAL MEDICINE

## 2021-01-01 PROCEDURE — 3074F SYST BP LT 130 MM HG: CPT | Mod: HCNC,CPTII,S$GLB, | Performed by: INTERNAL MEDICINE

## 2021-01-01 PROCEDURE — 99999 PR PBB SHADOW E&M-EST. PATIENT-LVL IV: ICD-10-PCS | Mod: PBBFAC,,, | Performed by: INTERNAL MEDICINE

## 2021-01-01 PROCEDURE — 99999 PR PBB SHADOW E&M-EST. PATIENT-LVL III: ICD-10-PCS | Mod: PBBFAC,HCNC,, | Performed by: THORACIC SURGERY (CARDIOTHORACIC VASCULAR SURGERY)

## 2021-01-01 PROCEDURE — 1126F AMNT PAIN NOTED NONE PRSNT: CPT | Mod: HCNC,CPTII,S$GLB, | Performed by: INTERNAL MEDICINE

## 2021-01-01 PROCEDURE — C1769 GUIDE WIRE: HCPCS | Mod: HCNC | Performed by: INTERNAL MEDICINE

## 2021-01-01 PROCEDURE — 93306 TTE W/DOPPLER COMPLETE: CPT | Mod: 26,,, | Performed by: INTERNAL MEDICINE

## 2021-01-01 PROCEDURE — 3078F PR MOST RECENT DIASTOLIC BLOOD PRESSURE < 80 MM HG: ICD-10-PCS | Mod: CPTII,S$GLB,, | Performed by: INTERNAL MEDICINE

## 2021-01-01 PROCEDURE — 3078F DIAST BP <80 MM HG: CPT | Mod: HCNC,CPTII,S$GLB, | Performed by: INTERNAL MEDICINE

## 2021-01-01 PROCEDURE — 93296 REM INTERROG EVL PM/IDS: CPT | Performed by: INTERNAL MEDICINE

## 2021-01-01 PROCEDURE — 80048 BASIC METABOLIC PNL TOTAL CA: CPT | Mod: HCNC | Performed by: INTERNAL MEDICINE

## 2021-01-01 PROCEDURE — 93793 PR ANTICOAGULANT MGMT FOR PT TAKING WARFARIN: ICD-10-PCS | Mod: S$GLB,,, | Performed by: PHARMACIST

## 2021-01-01 PROCEDURE — 99999 PR PBB SHADOW E&M-EST. PATIENT-LVL IV: ICD-10-PCS | Mod: PBBFAC,HCNC,, | Performed by: INTERNAL MEDICINE

## 2021-01-01 PROCEDURE — 80061 LIPID PANEL: CPT | Performed by: INTERNAL MEDICINE

## 2021-01-01 PROCEDURE — 99999 PR PBB SHADOW E&M-EST. PATIENT-LVL V: ICD-10-PCS | Mod: PBBFAC,HCNC,, | Performed by: NURSE PRACTITIONER

## 2021-01-01 PROCEDURE — 71046 XR CHEST PA AND LATERAL: ICD-10-PCS | Mod: 26,,, | Performed by: RADIOLOGY

## 2021-01-01 PROCEDURE — 93306 ECHO (CUPID ONLY): ICD-10-PCS | Mod: 26,HCNC,, | Performed by: INTERNAL MEDICINE

## 2021-01-01 PROCEDURE — 93793 PR ANTICOAGULANT MGMT FOR PT TAKING WARFARIN: ICD-10-PCS | Mod: ,,, | Performed by: PHARMACIST

## 2021-01-01 PROCEDURE — 93793 ANTICOAG MGMT PT WARFARIN: CPT | Mod: S$GLB,,, | Performed by: PHARMACIST

## 2021-01-01 PROCEDURE — C1887 CATHETER, GUIDING: HCPCS | Mod: HCNC | Performed by: INTERNAL MEDICINE

## 2021-01-01 PROCEDURE — 1101F PR PT FALLS ASSESS DOC 0-1 FALLS W/OUT INJ PAST YR: ICD-10-PCS | Mod: CPTII,S$GLB,, | Performed by: INTERNAL MEDICINE

## 2021-01-01 PROCEDURE — 93306 TTE W/DOPPLER COMPLETE: CPT

## 2021-01-01 PROCEDURE — 3074F SYST BP LT 130 MM HG: CPT | Mod: CPTII,S$GLB,, | Performed by: INTERNAL MEDICINE

## 2021-01-01 PROCEDURE — 93294 CARDIAC DEVICE CHECK - REMOTE: ICD-10-PCS | Mod: ,,, | Performed by: INTERNAL MEDICINE

## 2021-01-01 PROCEDURE — 1126F PR PAIN SEVERITY QUANTIFIED, NO PAIN PRESENT: ICD-10-PCS | Mod: S$GLB,,, | Performed by: INTERNAL MEDICINE

## 2021-01-01 PROCEDURE — 99214 OFFICE O/P EST MOD 30 MIN: CPT | Mod: HCNC,S$GLB,, | Performed by: INTERNAL MEDICINE

## 2021-01-01 PROCEDURE — 84443 ASSAY THYROID STIM HORMONE: CPT | Performed by: INTERNAL MEDICINE

## 2021-01-01 PROCEDURE — 99397 PER PM REEVAL EST PAT 65+ YR: CPT | Mod: S$GLB,,, | Performed by: INTERNAL MEDICINE

## 2021-01-01 PROCEDURE — 93000 EKG 12-LEAD: ICD-10-PCS | Mod: HCNC,S$GLB,, | Performed by: INTERNAL MEDICINE

## 2021-01-01 PROCEDURE — 3288F FALL RISK ASSESSMENT DOCD: CPT | Mod: CPTII,S$GLB,, | Performed by: INTERNAL MEDICINE

## 2021-01-01 PROCEDURE — 99999 PR PBB SHADOW E&M-EST. PATIENT-LVL V: CPT | Mod: PBBFAC,HCNC,, | Performed by: NURSE PRACTITIONER

## 2021-01-01 PROCEDURE — 1159F PR MEDICATION LIST DOCUMENTED IN MEDICAL RECORD: ICD-10-PCS | Mod: HCNC,CPTII,S$GLB, | Performed by: INTERNAL MEDICINE

## 2021-01-01 PROCEDURE — 3074F PR MOST RECENT SYSTOLIC BLOOD PRESSURE < 130 MM HG: ICD-10-PCS | Mod: CPTII,S$GLB,, | Performed by: INTERNAL MEDICINE

## 2021-01-01 PROCEDURE — 93294 REM INTERROG EVL PM/LDLS PM: CPT | Mod: ,,, | Performed by: INTERNAL MEDICINE

## 2021-01-01 PROCEDURE — 93793 ANTICOAG MGMT PT WARFARIN: CPT | Mod: ,,, | Performed by: PHARMACIST

## 2021-01-01 PROCEDURE — 1126F AMNT PAIN NOTED NONE PRSNT: CPT | Mod: S$GLB,,, | Performed by: INTERNAL MEDICINE

## 2021-01-01 PROCEDURE — 1159F MED LIST DOCD IN RCRD: CPT | Mod: S$GLB,,, | Performed by: INTERNAL MEDICINE

## 2021-01-01 PROCEDURE — 1101F PT FALLS ASSESS-DOCD LE1/YR: CPT | Mod: HCNC,CPTII,S$GLB, | Performed by: INTERNAL MEDICINE

## 2021-01-01 PROCEDURE — 3078F DIAST BP <80 MM HG: CPT | Mod: CPTII,S$GLB,, | Performed by: INTERNAL MEDICINE

## 2021-01-01 PROCEDURE — 83036 HEMOGLOBIN GLYCOSYLATED A1C: CPT | Performed by: INTERNAL MEDICINE

## 2021-01-01 PROCEDURE — C1894 INTRO/SHEATH, NON-LASER: HCPCS | Mod: HCNC | Performed by: INTERNAL MEDICINE

## 2021-01-01 PROCEDURE — 99213 OFFICE O/P EST LOW 20 MIN: CPT | Mod: PBBFAC | Performed by: THORACIC SURGERY (CARDIOTHORACIC VASCULAR SURGERY)

## 2021-01-01 PROCEDURE — 93000 EKG 12-LEAD: ICD-10-PCS | Mod: S$GLB,,, | Performed by: INTERNAL MEDICINE

## 2021-01-01 PROCEDURE — 93306 TTE W/DOPPLER COMPLETE: CPT | Mod: HCNC

## 2021-01-01 PROCEDURE — 99499 UNLISTED E&M SERVICE: CPT | Mod: HCNC,S$GLB,, | Performed by: INTERNAL MEDICINE

## 2021-01-01 PROCEDURE — 99999 PR PBB SHADOW E&M-EST. PATIENT-LVL IV: CPT | Mod: PBBFAC,HCNC,, | Performed by: INTERNAL MEDICINE

## 2021-01-01 PROCEDURE — 93010 ELECTROCARDIOGRAM REPORT: CPT | Mod: HCNC,,, | Performed by: INTERNAL MEDICINE

## 2021-01-01 PROCEDURE — 1101F PR PT FALLS ASSESS DOC 0-1 FALLS W/OUT INJ PAST YR: ICD-10-PCS | Mod: HCNC,CPTII,S$GLB, | Performed by: INTERNAL MEDICINE

## 2021-01-01 PROCEDURE — 93000 ELECTROCARDIOGRAM COMPLETE: CPT | Mod: S$GLB,,, | Performed by: INTERNAL MEDICINE

## 2021-01-01 PROCEDURE — 93793 PR ANTICOAGULANT MGMT FOR PT TAKING WARFARIN: ICD-10-PCS | Mod: S$GLB,,,

## 2021-01-01 PROCEDURE — 99499 RISK ADDL DX/OHS AUDIT: ICD-10-PCS | Mod: HCNC,S$GLB,, | Performed by: INTERNAL MEDICINE

## 2021-01-01 PROCEDURE — 99999 PR PBB SHADOW E&M-EST. PATIENT-LVL IV: CPT | Mod: PBBFAC,,, | Performed by: INTERNAL MEDICINE

## 2021-01-01 PROCEDURE — 1160F PR REVIEW ALL MEDS BY PRESCRIBER/CLIN PHARMACIST DOCUMENTED: ICD-10-PCS | Mod: HCNC,CPTII,S$GLB, | Performed by: INTERNAL MEDICINE

## 2021-01-01 PROCEDURE — 99999 PR PBB SHADOW E&M-EST. PATIENT-LVL III: CPT | Mod: PBBFAC,HCNC,, | Performed by: INTERNAL MEDICINE

## 2021-01-01 PROCEDURE — 71046 X-RAY EXAM CHEST 2 VIEWS: CPT | Mod: 26,,, | Performed by: RADIOLOGY

## 2021-01-01 PROCEDURE — 83880 ASSAY OF NATRIURETIC PEPTIDE: CPT

## 2021-01-01 PROCEDURE — 80048 BASIC METABOLIC PNL TOTAL CA: CPT

## 2021-01-01 PROCEDURE — 3074F PR MOST RECENT SYSTOLIC BLOOD PRESSURE < 130 MM HG: ICD-10-PCS | Mod: HCNC,CPTII,S$GLB, | Performed by: INTERNAL MEDICINE

## 2021-01-01 PROCEDURE — 99152 MOD SED SAME PHYS/QHP 5/>YRS: CPT | Mod: HCNC,,, | Performed by: INTERNAL MEDICINE

## 2021-01-01 PROCEDURE — 1159F PR MEDICATION LIST DOCUMENTED IN MEDICAL RECORD: ICD-10-PCS | Mod: S$GLB,,, | Performed by: INTERNAL MEDICINE

## 2021-01-01 PROCEDURE — 99152 MOD SED SAME PHYS/QHP 5/>YRS: CPT | Mod: HCNC | Performed by: INTERNAL MEDICINE

## 2021-01-01 PROCEDURE — 99214 PR OFFICE/OUTPT VISIT, EST, LEVL IV, 30-39 MIN: ICD-10-PCS | Mod: HCNC,S$GLB,, | Performed by: NURSE PRACTITIONER

## 2021-01-01 PROCEDURE — 3288F PR FALLS RISK ASSESSMENT DOCUMENTED: ICD-10-PCS | Mod: HCNC,CPTII,S$GLB, | Performed by: INTERNAL MEDICINE

## 2021-01-01 PROCEDURE — 93793 ANTICOAG MGMT PT WARFARIN: CPT | Mod: S$GLB,,,

## 2021-01-01 PROCEDURE — 99205 PR OFFICE/OUTPT VISIT, NEW, LEVL V, 60-74 MIN: ICD-10-PCS | Mod: HCNC,S$GLB,, | Performed by: INTERNAL MEDICINE

## 2021-01-01 PROCEDURE — 25500020 PHARM REV CODE 255: Mod: HCNC | Performed by: INTERNAL MEDICINE

## 2021-01-01 PROCEDURE — 85025 COMPLETE CBC W/AUTO DIFF WBC: CPT | Mod: HCNC | Performed by: INTERNAL MEDICINE

## 2021-01-01 PROCEDURE — 1101F PT FALLS ASSESS-DOCD LE1/YR: CPT | Mod: CPTII,S$GLB,, | Performed by: INTERNAL MEDICINE

## 2021-01-01 PROCEDURE — 93306 ECHO (CUPID ONLY): ICD-10-PCS | Mod: 26,,, | Performed by: INTERNAL MEDICINE

## 2021-01-01 PROCEDURE — 99397 PR PREVENTIVE VISIT,EST,65 & OVER: ICD-10-PCS | Mod: S$GLB,,, | Performed by: INTERNAL MEDICINE

## 2021-01-01 PROCEDURE — U0003 INFECTIOUS AGENT DETECTION BY NUCLEIC ACID (DNA OR RNA); SEVERE ACUTE RESPIRATORY SYNDROME CORONAVIRUS 2 (SARS-COV-2) (CORONAVIRUS DISEASE [COVID-19]), AMPLIFIED PROBE TECHNIQUE, MAKING USE OF HIGH THROUGHPUT TECHNOLOGIES AS DESCRIBED BY CMS-2020-01-R: HCPCS | Performed by: NURSE PRACTITIONER

## 2021-01-01 PROCEDURE — 82962 GLUCOSE BLOOD TEST: CPT | Mod: HCNC | Performed by: INTERNAL MEDICINE

## 2021-01-01 PROCEDURE — 1160F RVW MEDS BY RX/DR IN RCRD: CPT | Mod: HCNC,CPTII,S$GLB, | Performed by: INTERNAL MEDICINE

## 2021-01-01 PROCEDURE — 99214 PR OFFICE/OUTPT VISIT, EST, LEVL IV, 30-39 MIN: ICD-10-PCS | Mod: HCNC,S$GLB,, | Performed by: INTERNAL MEDICINE

## 2021-01-01 PROCEDURE — 3078F PR MOST RECENT DIASTOLIC BLOOD PRESSURE < 80 MM HG: ICD-10-PCS | Mod: HCNC,CPTII,S$GLB, | Performed by: INTERNAL MEDICINE

## 2021-01-01 PROCEDURE — U0005 INFEC AGEN DETEC AMPLI PROBE: HCPCS | Performed by: NURSE PRACTITIONER

## 2021-01-01 PROCEDURE — 1126F PR PAIN SEVERITY QUANTIFIED, NO PAIN PRESENT: ICD-10-PCS | Mod: HCNC,CPTII,S$GLB, | Performed by: INTERNAL MEDICINE

## 2021-01-01 PROCEDURE — 99215 OFFICE O/P EST HI 40 MIN: CPT | Mod: HCNC,S$GLB,, | Performed by: THORACIC SURGERY (CARDIOTHORACIC VASCULAR SURGERY)

## 2021-01-01 PROCEDURE — 85025 COMPLETE CBC W/AUTO DIFF WBC: CPT | Performed by: INTERNAL MEDICINE

## 2021-01-01 PROCEDURE — 99999 PR PBB SHADOW E&M-EST. PATIENT-LVL III: CPT | Mod: PBBFAC,HCNC,, | Performed by: THORACIC SURGERY (CARDIOTHORACIC VASCULAR SURGERY)

## 2021-01-01 PROCEDURE — 85610 PROTHROMBIN TIME: CPT | Mod: HCNC | Performed by: INTERNAL MEDICINE

## 2021-01-01 PROCEDURE — 99152 PR MOD CONSCIOUS SEDATION, SAME PHYS, 5+ YRS, FIRST 15 MIN: ICD-10-PCS | Mod: HCNC,,, | Performed by: INTERNAL MEDICINE

## 2021-01-01 PROCEDURE — 36415 COLL VENOUS BLD VENIPUNCTURE: CPT | Mod: PO | Performed by: INTERNAL MEDICINE

## 2021-01-01 PROCEDURE — 93454 CORONARY ARTERY ANGIO S&I: CPT | Mod: HCNC | Performed by: INTERNAL MEDICINE

## 2021-01-01 PROCEDURE — 85027 COMPLETE CBC AUTOMATED: CPT | Mod: HCNC | Performed by: INTERNAL MEDICINE

## 2021-01-01 PROCEDURE — 99999 PR PBB SHADOW E&M-EST. PATIENT-LVL III: ICD-10-PCS | Mod: PBBFAC,HCNC,, | Performed by: INTERNAL MEDICINE

## 2021-01-01 PROCEDURE — 93010 EKG 12-LEAD: ICD-10-PCS | Mod: HCNC,,, | Performed by: INTERNAL MEDICINE

## 2021-01-01 PROCEDURE — 3288F FALL RISK ASSESSMENT DOCD: CPT | Mod: HCNC,CPTII,S$GLB, | Performed by: INTERNAL MEDICINE

## 2021-01-01 PROCEDURE — 99215 OFFICE O/P EST HI 40 MIN: CPT | Mod: PBBFAC,PO | Performed by: NURSE PRACTITIONER

## 2021-01-01 PROCEDURE — 99205 OFFICE O/P NEW HI 60 MIN: CPT | Mod: HCNC,S$GLB,, | Performed by: INTERNAL MEDICINE

## 2021-01-01 PROCEDURE — 86900 BLOOD TYPING SEROLOGIC ABO: CPT | Mod: HCNC | Performed by: INTERNAL MEDICINE

## 2021-01-01 PROCEDURE — 84439 ASSAY OF FREE THYROXINE: CPT | Performed by: INTERNAL MEDICINE

## 2021-01-01 PROCEDURE — 1159F MED LIST DOCD IN RCRD: CPT | Mod: HCNC,CPTII,S$GLB, | Performed by: INTERNAL MEDICINE

## 2021-01-01 PROCEDURE — 99214 PR OFFICE/OUTPT VISIT, EST, LEVL IV, 30-39 MIN: ICD-10-PCS | Mod: S$GLB,,, | Performed by: INTERNAL MEDICINE

## 2021-01-01 PROCEDURE — 93005 ELECTROCARDIOGRAM TRACING: CPT | Mod: 59,HCNC

## 2021-01-01 PROCEDURE — 36415 COLL VENOUS BLD VENIPUNCTURE: CPT | Mod: HCNC | Performed by: INTERNAL MEDICINE

## 2021-01-01 PROCEDURE — 99214 PR OFFICE/OUTPT VISIT, EST, LEVL IV, 30-39 MIN: ICD-10-PCS | Mod: 25,S$GLB,, | Performed by: INTERNAL MEDICINE

## 2021-01-01 PROCEDURE — 93000 ELECTROCARDIOGRAM COMPLETE: CPT | Mod: HCNC,S$GLB,, | Performed by: INTERNAL MEDICINE

## 2021-01-01 PROCEDURE — 93454 PR CATH PLACE/CORONARY ANGIO, IMG SUPER/INTERP: ICD-10-PCS | Mod: 26,HCNC,, | Performed by: INTERNAL MEDICINE

## 2021-01-01 PROCEDURE — 99215 PR OFFICE/OUTPT VISIT, EST, LEVL V, 40-54 MIN: ICD-10-PCS | Mod: HCNC,S$GLB,, | Performed by: THORACIC SURGERY (CARDIOTHORACIC VASCULAR SURGERY)

## 2021-01-01 PROCEDURE — 99213 OFFICE O/P EST LOW 20 MIN: CPT | Mod: PBBFAC | Performed by: INTERNAL MEDICINE

## 2021-01-01 PROCEDURE — 99214 OFFICE O/P EST MOD 30 MIN: CPT | Mod: 25,S$GLB,, | Performed by: INTERNAL MEDICINE

## 2021-01-01 PROCEDURE — 99214 OFFICE O/P EST MOD 30 MIN: CPT | Mod: S$GLB,,, | Performed by: INTERNAL MEDICINE

## 2021-01-01 PROCEDURE — 85730 THROMBOPLASTIN TIME PARTIAL: CPT | Mod: HCNC | Performed by: INTERNAL MEDICINE

## 2021-01-01 PROCEDURE — 36415 COLL VENOUS BLD VENIPUNCTURE: CPT | Mod: PO

## 2021-01-01 PROCEDURE — 93454 CORONARY ARTERY ANGIO S&I: CPT | Mod: 26,HCNC,, | Performed by: INTERNAL MEDICINE

## 2021-01-01 PROCEDURE — 99214 OFFICE O/P EST MOD 30 MIN: CPT | Mod: HCNC,S$GLB,, | Performed by: NURSE PRACTITIONER

## 2021-01-01 PROCEDURE — 63600175 PHARM REV CODE 636 W HCPCS: Mod: HCNC | Performed by: INTERNAL MEDICINE

## 2021-01-01 PROCEDURE — 71046 X-RAY EXAM CHEST 2 VIEWS: CPT | Mod: TC,FY,PO

## 2021-01-01 RX ORDER — METFORMIN HYDROCHLORIDE 500 MG/1
TABLET ORAL
Qty: 90 TABLET | Refills: 1 | Status: SHIPPED | OUTPATIENT
Start: 2021-01-01 | End: 2021-01-01

## 2021-01-01 RX ORDER — FERROUS SULFATE 325(65) MG
325 TABLET, DELAYED RELEASE (ENTERIC COATED) ORAL
COMMUNITY
Start: 2021-01-01 | End: 2021-01-01

## 2021-01-01 RX ORDER — BLOOD-GLUCOSE CONTROL, NORMAL
EACH MISCELLANEOUS
Qty: 200 EACH | Refills: 11 | Status: ON HOLD | OUTPATIENT
Start: 2021-01-01 | End: 2022-01-01 | Stop reason: HOSPADM

## 2021-01-01 RX ORDER — ACETAMINOPHEN 500 MG
1000 TABLET ORAL
Status: COMPLETED | OUTPATIENT
Start: 2021-01-01 | End: 2021-01-01

## 2021-01-01 RX ORDER — LOSARTAN POTASSIUM 25 MG/1
25 TABLET ORAL DAILY
Qty: 90 TABLET | Refills: 1 | Status: ON HOLD | OUTPATIENT
Start: 2021-01-01 | End: 2022-01-01 | Stop reason: HOSPADM

## 2021-01-01 RX ORDER — HEPARIN SODIUM 1000 [USP'U]/ML
INJECTION, SOLUTION INTRAVENOUS; SUBCUTANEOUS
Status: DISCONTINUED | OUTPATIENT
Start: 2021-01-01 | End: 2021-01-01 | Stop reason: HOSPADM

## 2021-01-01 RX ORDER — WARFARIN 4 MG/1
TABLET ORAL
Qty: 59 TABLET | Refills: 1 | Status: SHIPPED | OUTPATIENT
Start: 2021-01-01 | End: 2021-01-01

## 2021-01-01 RX ORDER — ENOXAPARIN SODIUM 100 MG/ML
40 INJECTION SUBCUTANEOUS EVERY 12 HOURS
Qty: 14 EACH | Refills: 0 | Status: ON HOLD | OUTPATIENT
Start: 2021-01-01 | End: 2022-01-01 | Stop reason: HOSPADM

## 2021-01-01 RX ORDER — BLOOD SUGAR DIAGNOSTIC
STRIP MISCELLANEOUS
Qty: 200 STRIP | Refills: 11 | Status: SHIPPED | OUTPATIENT
Start: 2021-01-01 | End: 2021-01-01

## 2021-01-01 RX ORDER — LIDOCAINE HYDROCHLORIDE 20 MG/ML
INJECTION, SOLUTION EPIDURAL; INFILTRATION; INTRACAUDAL; PERINEURAL
Status: DISCONTINUED | OUTPATIENT
Start: 2021-01-01 | End: 2021-01-01 | Stop reason: HOSPADM

## 2021-01-01 RX ORDER — AMLODIPINE BESYLATE 5 MG/1
5 TABLET ORAL DAILY
Qty: 90 TABLET | Refills: 3 | Status: ON HOLD | OUTPATIENT
Start: 2021-01-01 | End: 2022-01-01 | Stop reason: HOSPADM

## 2021-01-01 RX ORDER — LOSARTAN POTASSIUM 50 MG/1
TABLET ORAL
Qty: 90 TABLET | Refills: 1 | Status: SHIPPED | OUTPATIENT
Start: 2021-01-01 | End: 2021-01-01

## 2021-01-01 RX ORDER — METFORMIN HYDROCHLORIDE 500 MG/1
TABLET ORAL
Qty: 90 TABLET | Refills: 1 | Status: ON HOLD | OUTPATIENT
Start: 2021-01-01 | End: 2022-01-01 | Stop reason: HOSPADM

## 2021-01-01 RX ORDER — METOPROLOL TARTRATE 25 MG/1
TABLET, FILM COATED ORAL
Qty: 180 TABLET | Refills: 1 | Status: ON HOLD | OUTPATIENT
Start: 2021-01-01 | End: 2022-01-01 | Stop reason: HOSPADM

## 2021-01-01 RX ORDER — PRAVASTATIN SODIUM 20 MG/1
TABLET ORAL
Qty: 90 TABLET | Refills: 1 | Status: SHIPPED | OUTPATIENT
Start: 2021-01-01 | End: 2021-01-01 | Stop reason: SDUPTHER

## 2021-01-01 RX ORDER — PRAVASTATIN SODIUM 20 MG/1
20 TABLET ORAL DAILY
Qty: 90 TABLET | Refills: 1 | Status: ON HOLD | OUTPATIENT
Start: 2021-01-01 | End: 2022-01-01 | Stop reason: HOSPADM

## 2021-01-01 RX ORDER — MIDAZOLAM HYDROCHLORIDE 1 MG/ML
INJECTION, SOLUTION INTRAMUSCULAR; INTRAVENOUS
Status: DISCONTINUED | OUTPATIENT
Start: 2021-01-01 | End: 2021-01-01 | Stop reason: HOSPADM

## 2021-01-01 RX ORDER — SODIUM CHLORIDE 9 MG/ML
INJECTION, SOLUTION INTRAVENOUS CONTINUOUS
Status: CANCELLED | OUTPATIENT
Start: 2021-01-01 | End: 2021-01-01

## 2021-01-01 RX ORDER — SODIUM CHLORIDE 9 MG/ML
INJECTION, SOLUTION INTRAVENOUS CONTINUOUS
Status: DISCONTINUED | OUTPATIENT
Start: 2021-01-01 | End: 2021-01-01 | Stop reason: HOSPADM

## 2021-01-01 RX ORDER — SODIUM CHLORIDE 9 MG/ML
INJECTION, SOLUTION INTRAVENOUS CONTINUOUS
Status: ACTIVE | OUTPATIENT
Start: 2021-01-01 | End: 2021-01-01

## 2021-01-01 RX ORDER — BLOOD-GLUCOSE CONTROL, NORMAL
EACH MISCELLANEOUS
Qty: 200 EACH | Refills: 0 | OUTPATIENT
Start: 2021-01-01

## 2021-01-01 RX ORDER — HEPARIN SODIUM 200 [USP'U]/100ML
INJECTION, SOLUTION INTRAVENOUS
Status: DISCONTINUED | OUTPATIENT
Start: 2021-01-01 | End: 2021-01-01 | Stop reason: HOSPADM

## 2021-01-01 RX ORDER — DIPHENHYDRAMINE HCL 50 MG
50 CAPSULE ORAL ONCE
Status: CANCELLED | OUTPATIENT
Start: 2021-01-01 | End: 2021-01-01

## 2021-01-01 RX ORDER — AMOXICILLIN 875 MG/1
TABLET, FILM COATED ORAL
Status: ON HOLD | COMMUNITY
Start: 2021-01-01 | End: 2022-01-01 | Stop reason: HOSPADM

## 2021-01-01 RX ORDER — LOSARTAN POTASSIUM 25 MG/1
25 TABLET ORAL DAILY
Qty: 90 TABLET | Refills: 1 | Status: SHIPPED | OUTPATIENT
Start: 2021-01-01 | End: 2021-01-01 | Stop reason: SDUPTHER

## 2021-01-01 RX ORDER — BLOOD-GLUCOSE CONTROL, NORMAL
EACH MISCELLANEOUS
Refills: 0 | OUTPATIENT
Start: 2021-01-01

## 2021-01-01 RX ORDER — DIPHENHYDRAMINE HCL 50 MG
50 CAPSULE ORAL ONCE
Status: COMPLETED | OUTPATIENT
Start: 2021-01-01 | End: 2021-01-01

## 2021-01-01 RX ORDER — WARFARIN 4 MG/1
TABLET ORAL
Qty: 5 TABLET | Refills: 3 | Status: ON HOLD | OUTPATIENT
Start: 2021-01-01 | End: 2022-01-01 | Stop reason: HOSPADM

## 2021-01-01 RX ORDER — FENTANYL CITRATE 50 UG/ML
INJECTION, SOLUTION INTRAMUSCULAR; INTRAVENOUS
Status: DISCONTINUED | OUTPATIENT
Start: 2021-01-01 | End: 2021-01-01 | Stop reason: HOSPADM

## 2021-01-01 RX ORDER — INSULIN PUMP SYRINGE, 3 ML
EACH MISCELLANEOUS
Refills: 0 | OUTPATIENT
Start: 2021-01-01

## 2021-01-01 RX ORDER — LOSARTAN POTASSIUM 25 MG/1
25 TABLET ORAL DAILY
Qty: 90 TABLET | Refills: 0 | Status: SHIPPED | OUTPATIENT
Start: 2021-01-01 | End: 2021-01-01 | Stop reason: SDUPTHER

## 2021-01-01 RX ADMIN — Medication 1000 MG: at 04:11

## 2021-01-01 RX ADMIN — DIPHENHYDRAMINE HYDROCHLORIDE 50 MG: 50 CAPSULE ORAL at 07:12

## 2021-01-01 RX ADMIN — SODIUM CHLORIDE: 0.9 INJECTION, SOLUTION INTRAVENOUS at 07:12

## 2021-01-01 NOTE — PROGRESS NOTES
Called Patient and left message to call coumadin clinic, need to check if INR was tested -8/13/19    
INR at goal. Medications and chart reviewed. No changes noted to necessitate adjustment of warfarin or follow-up plan. See calendar.      
INR self testing was missed 8/01/19, Patient tested today 8/06/19  
Patients  confirms, pt cataract surgery scheduled in November but DR advised pt to continue coumadin dose as plan.   
None

## 2021-01-06 ENCOUNTER — OFFICE VISIT (OUTPATIENT)
Dept: CARDIOLOGY | Facility: CLINIC | Age: 76
End: 2021-01-06
Payer: MEDICARE

## 2021-01-06 VITALS
OXYGEN SATURATION: 95 % | HEART RATE: 60 BPM | WEIGHT: 98.75 LBS | SYSTOLIC BLOOD PRESSURE: 124 MMHG | HEIGHT: 61 IN | DIASTOLIC BLOOD PRESSURE: 80 MMHG | BODY MASS INDEX: 18.64 KG/M2

## 2021-01-06 DIAGNOSIS — Z88.2 ALLERGY TO SULFA DRUGS: ICD-10-CM

## 2021-01-06 DIAGNOSIS — I50.33 ACUTE ON CHRONIC DIASTOLIC HEART FAILURE: ICD-10-CM

## 2021-01-06 DIAGNOSIS — I10 ESSENTIAL (PRIMARY) HYPERTENSION: Chronic | ICD-10-CM

## 2021-01-06 DIAGNOSIS — Z95.2 S/P AVR: Primary | ICD-10-CM

## 2021-01-06 DIAGNOSIS — Z95.2 S/P MVR (MITRAL VALVE REPLACEMENT): ICD-10-CM

## 2021-01-06 DIAGNOSIS — Z95.0 CARDIAC PACEMAKER IN SITU: ICD-10-CM

## 2021-01-06 DIAGNOSIS — I49.5 TACHY-BRADY SYNDROME: ICD-10-CM

## 2021-01-06 DIAGNOSIS — E78.2 MIXED HYPERLIPIDEMIA: Chronic | ICD-10-CM

## 2021-01-06 PROCEDURE — 3074F SYST BP LT 130 MM HG: CPT | Mod: HCNC,CPTII,S$GLB, | Performed by: INTERNAL MEDICINE

## 2021-01-06 PROCEDURE — 99999 PR PBB SHADOW E&M-EST. PATIENT-LVL IV: ICD-10-PCS | Mod: PBBFAC,HCNC,, | Performed by: INTERNAL MEDICINE

## 2021-01-06 PROCEDURE — 3288F PR FALLS RISK ASSESSMENT DOCUMENTED: ICD-10-PCS | Mod: HCNC,CPTII,S$GLB, | Performed by: INTERNAL MEDICINE

## 2021-01-06 PROCEDURE — 3079F DIAST BP 80-89 MM HG: CPT | Mod: HCNC,CPTII,S$GLB, | Performed by: INTERNAL MEDICINE

## 2021-01-06 PROCEDURE — 1126F AMNT PAIN NOTED NONE PRSNT: CPT | Mod: HCNC,S$GLB,, | Performed by: INTERNAL MEDICINE

## 2021-01-06 PROCEDURE — 99499 RISK ADDL DX/OHS AUDIT: ICD-10-PCS | Mod: S$GLB,,, | Performed by: INTERNAL MEDICINE

## 2021-01-06 PROCEDURE — 1101F PT FALLS ASSESS-DOCD LE1/YR: CPT | Mod: HCNC,CPTII,S$GLB, | Performed by: INTERNAL MEDICINE

## 2021-01-06 PROCEDURE — 1159F PR MEDICATION LIST DOCUMENTED IN MEDICAL RECORD: ICD-10-PCS | Mod: HCNC,S$GLB,, | Performed by: INTERNAL MEDICINE

## 2021-01-06 PROCEDURE — 1126F PR PAIN SEVERITY QUANTIFIED, NO PAIN PRESENT: ICD-10-PCS | Mod: HCNC,S$GLB,, | Performed by: INTERNAL MEDICINE

## 2021-01-06 PROCEDURE — 3074F PR MOST RECENT SYSTOLIC BLOOD PRESSURE < 130 MM HG: ICD-10-PCS | Mod: HCNC,CPTII,S$GLB, | Performed by: INTERNAL MEDICINE

## 2021-01-06 PROCEDURE — 99214 OFFICE O/P EST MOD 30 MIN: CPT | Mod: HCNC,S$GLB,, | Performed by: INTERNAL MEDICINE

## 2021-01-06 PROCEDURE — 99999 PR PBB SHADOW E&M-EST. PATIENT-LVL IV: CPT | Mod: PBBFAC,HCNC,, | Performed by: INTERNAL MEDICINE

## 2021-01-06 PROCEDURE — 1101F PR PT FALLS ASSESS DOC 0-1 FALLS W/OUT INJ PAST YR: ICD-10-PCS | Mod: HCNC,CPTII,S$GLB, | Performed by: INTERNAL MEDICINE

## 2021-01-06 PROCEDURE — 99499 UNLISTED E&M SERVICE: CPT | Mod: S$GLB,,, | Performed by: INTERNAL MEDICINE

## 2021-01-06 PROCEDURE — 3288F FALL RISK ASSESSMENT DOCD: CPT | Mod: HCNC,CPTII,S$GLB, | Performed by: INTERNAL MEDICINE

## 2021-01-06 PROCEDURE — 3079F PR MOST RECENT DIASTOLIC BLOOD PRESSURE 80-89 MM HG: ICD-10-PCS | Mod: HCNC,CPTII,S$GLB, | Performed by: INTERNAL MEDICINE

## 2021-01-06 PROCEDURE — 1159F MED LIST DOCD IN RCRD: CPT | Mod: HCNC,S$GLB,, | Performed by: INTERNAL MEDICINE

## 2021-01-06 PROCEDURE — 99214 PR OFFICE/OUTPT VISIT, EST, LEVL IV, 30-39 MIN: ICD-10-PCS | Mod: HCNC,S$GLB,, | Performed by: INTERNAL MEDICINE

## 2021-01-06 RX ORDER — ETHACRYNIC ACID 25 MG/1
50 TABLET ORAL DAILY
Qty: 60 TABLET | Refills: 11 | Status: SHIPPED | OUTPATIENT
Start: 2021-01-06 | End: 2021-01-07 | Stop reason: SDUPTHER

## 2021-01-07 ENCOUNTER — TELEPHONE (OUTPATIENT)
Dept: CARDIOTHORACIC SURGERY | Facility: CLINIC | Age: 76
End: 2021-01-07

## 2021-01-07 ENCOUNTER — ANTI-COAG VISIT (OUTPATIENT)
Dept: CARDIOLOGY | Facility: CLINIC | Age: 76
End: 2021-01-07
Payer: MEDICARE

## 2021-01-07 DIAGNOSIS — Z88.2 ALLERGY TO SULFA DRUGS: ICD-10-CM

## 2021-01-07 DIAGNOSIS — I48.20 CHRONIC ATRIAL FIBRILLATION: ICD-10-CM

## 2021-01-07 DIAGNOSIS — Z79.01 LONG TERM CURRENT USE OF ANTICOAGULANT: ICD-10-CM

## 2021-01-07 DIAGNOSIS — Z95.2 S/P AVR: ICD-10-CM

## 2021-01-07 DIAGNOSIS — Z95.2 S/P MVR (MITRAL VALVE REPLACEMENT): ICD-10-CM

## 2021-01-07 LAB — INR PPP: 2

## 2021-01-07 PROCEDURE — 93793 ANTICOAG MGMT PT WARFARIN: CPT | Mod: S$GLB,,, | Performed by: PHARMACIST

## 2021-01-07 PROCEDURE — 93793 PR ANTICOAGULANT MGMT FOR PT TAKING WARFARIN: ICD-10-PCS | Mod: S$GLB,,, | Performed by: PHARMACIST

## 2021-01-07 RX ORDER — ETHACRYNIC ACID 25 MG/1
50 TABLET ORAL DAILY
Qty: 60 TABLET | Refills: 11 | Status: SHIPPED | OUTPATIENT
Start: 2021-01-07 | End: 2021-01-12 | Stop reason: SDUPTHER

## 2021-01-07 RX ORDER — ETHACRYNIC ACID 25 MG/1
50 TABLET ORAL DAILY
Qty: 60 TABLET | Refills: 11 | Status: CANCELLED | OUTPATIENT
Start: 2021-01-07 | End: 2022-01-01

## 2021-01-12 ENCOUNTER — OFFICE VISIT (OUTPATIENT)
Dept: CARDIOTHORACIC SURGERY | Facility: CLINIC | Age: 76
End: 2021-01-12
Payer: MEDICARE

## 2021-01-12 ENCOUNTER — ANTI-COAG VISIT (OUTPATIENT)
Dept: CARDIOLOGY | Facility: CLINIC | Age: 76
End: 2021-01-12
Payer: MEDICARE

## 2021-01-12 VITALS
HEART RATE: 68 BPM | WEIGHT: 97.56 LBS | BODY MASS INDEX: 18.42 KG/M2 | OXYGEN SATURATION: 97 % | DIASTOLIC BLOOD PRESSURE: 67 MMHG | HEIGHT: 61 IN | SYSTOLIC BLOOD PRESSURE: 129 MMHG | RESPIRATION RATE: 18 BRPM

## 2021-01-12 DIAGNOSIS — Z95.2 S/P MVR (MITRAL VALVE REPLACEMENT): ICD-10-CM

## 2021-01-12 DIAGNOSIS — T82.09XA PROSTHETIC VALVE DYSFUNCTION, INITIAL ENCOUNTER: ICD-10-CM

## 2021-01-12 DIAGNOSIS — Z79.01 LONG TERM CURRENT USE OF ANTICOAGULANT: ICD-10-CM

## 2021-01-12 DIAGNOSIS — Z95.2 S/P AVR: ICD-10-CM

## 2021-01-12 DIAGNOSIS — Z88.2 ALLERGY TO SULFA DRUGS: ICD-10-CM

## 2021-01-12 DIAGNOSIS — I48.20 CHRONIC ATRIAL FIBRILLATION: ICD-10-CM

## 2021-01-12 LAB — INR PPP: 2.3

## 2021-01-12 PROCEDURE — 3078F PR MOST RECENT DIASTOLIC BLOOD PRESSURE < 80 MM HG: ICD-10-PCS | Mod: HCNC,CPTII,S$GLB, | Performed by: THORACIC SURGERY (CARDIOTHORACIC VASCULAR SURGERY)

## 2021-01-12 PROCEDURE — 1126F AMNT PAIN NOTED NONE PRSNT: CPT | Mod: HCNC,S$GLB,, | Performed by: THORACIC SURGERY (CARDIOTHORACIC VASCULAR SURGERY)

## 2021-01-12 PROCEDURE — 3074F SYST BP LT 130 MM HG: CPT | Mod: HCNC,CPTII,S$GLB, | Performed by: THORACIC SURGERY (CARDIOTHORACIC VASCULAR SURGERY)

## 2021-01-12 PROCEDURE — 93793 ANTICOAG MGMT PT WARFARIN: CPT | Mod: S$GLB,,, | Performed by: PHARMACIST

## 2021-01-12 PROCEDURE — 1126F PR PAIN SEVERITY QUANTIFIED, NO PAIN PRESENT: ICD-10-PCS | Mod: HCNC,S$GLB,, | Performed by: THORACIC SURGERY (CARDIOTHORACIC VASCULAR SURGERY)

## 2021-01-12 PROCEDURE — 3074F PR MOST RECENT SYSTOLIC BLOOD PRESSURE < 130 MM HG: ICD-10-PCS | Mod: HCNC,CPTII,S$GLB, | Performed by: THORACIC SURGERY (CARDIOTHORACIC VASCULAR SURGERY)

## 2021-01-12 PROCEDURE — 93793 PR ANTICOAGULANT MGMT FOR PT TAKING WARFARIN: ICD-10-PCS | Mod: S$GLB,,, | Performed by: PHARMACIST

## 2021-01-12 PROCEDURE — 99205 PR OFFICE/OUTPT VISIT, NEW, LEVL V, 60-74 MIN: ICD-10-PCS | Mod: HCNC,S$GLB,, | Performed by: THORACIC SURGERY (CARDIOTHORACIC VASCULAR SURGERY)

## 2021-01-12 PROCEDURE — 99999 PR PBB SHADOW E&M-EST. PATIENT-LVL IV: CPT | Mod: PBBFAC,HCNC,, | Performed by: THORACIC SURGERY (CARDIOTHORACIC VASCULAR SURGERY)

## 2021-01-12 PROCEDURE — 3078F DIAST BP <80 MM HG: CPT | Mod: HCNC,CPTII,S$GLB, | Performed by: THORACIC SURGERY (CARDIOTHORACIC VASCULAR SURGERY)

## 2021-01-12 PROCEDURE — 1159F MED LIST DOCD IN RCRD: CPT | Mod: HCNC,S$GLB,, | Performed by: THORACIC SURGERY (CARDIOTHORACIC VASCULAR SURGERY)

## 2021-01-12 PROCEDURE — 1159F PR MEDICATION LIST DOCUMENTED IN MEDICAL RECORD: ICD-10-PCS | Mod: HCNC,S$GLB,, | Performed by: THORACIC SURGERY (CARDIOTHORACIC VASCULAR SURGERY)

## 2021-01-12 PROCEDURE — 99205 OFFICE O/P NEW HI 60 MIN: CPT | Mod: HCNC,S$GLB,, | Performed by: THORACIC SURGERY (CARDIOTHORACIC VASCULAR SURGERY)

## 2021-01-12 PROCEDURE — 99999 PR PBB SHADOW E&M-EST. PATIENT-LVL IV: ICD-10-PCS | Mod: PBBFAC,HCNC,, | Performed by: THORACIC SURGERY (CARDIOTHORACIC VASCULAR SURGERY)

## 2021-01-12 RX ORDER — ETHACRYNIC ACID 25 MG/1
50 TABLET ORAL DAILY
Qty: 60 TABLET | Refills: 11 | Status: SHIPPED | OUTPATIENT
Start: 2021-01-12 | End: 2021-01-13 | Stop reason: SDUPTHER

## 2021-01-12 RX ORDER — FUROSEMIDE 40 MG/1
40 TABLET ORAL 2 TIMES DAILY
Status: ON HOLD | COMMUNITY
End: 2022-01-01 | Stop reason: HOSPADM

## 2021-01-13 ENCOUNTER — TELEPHONE (OUTPATIENT)
Dept: CARDIOTHORACIC SURGERY | Facility: CLINIC | Age: 76
End: 2021-01-13

## 2021-01-13 DIAGNOSIS — Z88.2 ALLERGY TO SULFA DRUGS: ICD-10-CM

## 2021-01-13 RX ORDER — ETHACRYNIC ACID 25 MG/1
50 TABLET ORAL DAILY
Qty: 60 TABLET | Refills: 11 | Status: SHIPPED | OUTPATIENT
Start: 2021-01-13 | End: 2021-01-01

## 2021-01-19 ENCOUNTER — OFFICE VISIT (OUTPATIENT)
Dept: FAMILY MEDICINE | Facility: CLINIC | Age: 76
End: 2021-01-19
Payer: MEDICARE

## 2021-01-19 ENCOUNTER — LAB VISIT (OUTPATIENT)
Dept: LAB | Facility: HOSPITAL | Age: 76
End: 2021-01-19
Attending: INTERNAL MEDICINE
Payer: MEDICARE

## 2021-01-19 ENCOUNTER — ANTI-COAG VISIT (OUTPATIENT)
Dept: CARDIOLOGY | Facility: CLINIC | Age: 76
End: 2021-01-19
Payer: MEDICARE

## 2021-01-19 VITALS
TEMPERATURE: 98 F | HEIGHT: 60 IN | OXYGEN SATURATION: 95 % | WEIGHT: 93.5 LBS | HEART RATE: 74 BPM | SYSTOLIC BLOOD PRESSURE: 130 MMHG | DIASTOLIC BLOOD PRESSURE: 60 MMHG | BODY MASS INDEX: 18.36 KG/M2

## 2021-01-19 DIAGNOSIS — Z95.2 S/P MVR (MITRAL VALVE REPLACEMENT): ICD-10-CM

## 2021-01-19 DIAGNOSIS — D69.6 ANEMIA WITH LOW PLATELET COUNT: ICD-10-CM

## 2021-01-19 DIAGNOSIS — R53.81 DEBILITY: ICD-10-CM

## 2021-01-19 DIAGNOSIS — I77.819 AORTIC ECTASIA, UNSPECIFIED SITE: ICD-10-CM

## 2021-01-19 DIAGNOSIS — I70.0 ATHEROSCLEROSIS OF AORTA: Chronic | ICD-10-CM

## 2021-01-19 DIAGNOSIS — Z09 HOSPITAL DISCHARGE FOLLOW-UP: Primary | ICD-10-CM

## 2021-01-19 DIAGNOSIS — T82.09XA PROSTHETIC VALVE DYSFUNCTION, INITIAL ENCOUNTER: ICD-10-CM

## 2021-01-19 DIAGNOSIS — I48.20 CHRONIC ATRIAL FIBRILLATION: Chronic | ICD-10-CM

## 2021-01-19 DIAGNOSIS — Z95.2 S/P AVR: ICD-10-CM

## 2021-01-19 DIAGNOSIS — Z79.01 LONG TERM CURRENT USE OF ANTICOAGULANT: Chronic | ICD-10-CM

## 2021-01-19 DIAGNOSIS — R80.9 TYPE 2 DIABETES MELLITUS WITH MICROALBUMINURIA, WITHOUT LONG-TERM CURRENT USE OF INSULIN: ICD-10-CM

## 2021-01-19 DIAGNOSIS — I50.33 ACUTE ON CHRONIC DIASTOLIC HEART FAILURE: ICD-10-CM

## 2021-01-19 DIAGNOSIS — I48.20 CHRONIC ATRIAL FIBRILLATION: ICD-10-CM

## 2021-01-19 DIAGNOSIS — Z11.59 NEED FOR HEPATITIS C SCREENING TEST: ICD-10-CM

## 2021-01-19 DIAGNOSIS — E11.29 TYPE 2 DIABETES MELLITUS WITH MICROALBUMINURIA, WITHOUT LONG-TERM CURRENT USE OF INSULIN: ICD-10-CM

## 2021-01-19 DIAGNOSIS — Z71.89 COUNSELING REGARDING ADVANCE CARE PLANNING AND GOALS OF CARE: ICD-10-CM

## 2021-01-19 DIAGNOSIS — I10 ESSENTIAL (PRIMARY) HYPERTENSION: Chronic | ICD-10-CM

## 2021-01-19 DIAGNOSIS — Z79.01 LONG TERM CURRENT USE OF ANTICOAGULANT: ICD-10-CM

## 2021-01-19 DIAGNOSIS — F43.21 ADJUSTMENT DISORDER WITH DEPRESSED MOOD: ICD-10-CM

## 2021-01-19 LAB
ESTIMATED AVG GLUCOSE: 128 MG/DL (ref 68–131)
HBA1C MFR BLD HPLC: 6.1 % (ref 4–5.6)
INR PPP: 2.9

## 2021-01-19 PROCEDURE — 3288F PR FALLS RISK ASSESSMENT DOCUMENTED: ICD-10-PCS | Mod: HCNC,CPTII,S$GLB, | Performed by: INTERNAL MEDICINE

## 2021-01-19 PROCEDURE — 36415 COLL VENOUS BLD VENIPUNCTURE: CPT | Mod: HCNC,PO

## 2021-01-19 PROCEDURE — 1126F PR PAIN SEVERITY QUANTIFIED, NO PAIN PRESENT: ICD-10-PCS | Mod: HCNC,S$GLB,, | Performed by: INTERNAL MEDICINE

## 2021-01-19 PROCEDURE — 3078F PR MOST RECENT DIASTOLIC BLOOD PRESSURE < 80 MM HG: ICD-10-PCS | Mod: HCNC,CPTII,S$GLB, | Performed by: INTERNAL MEDICINE

## 2021-01-19 PROCEDURE — 1159F PR MEDICATION LIST DOCUMENTED IN MEDICAL RECORD: ICD-10-PCS | Mod: HCNC,S$GLB,, | Performed by: INTERNAL MEDICINE

## 2021-01-19 PROCEDURE — 99499 UNLISTED E&M SERVICE: CPT | Mod: S$GLB,,, | Performed by: INTERNAL MEDICINE

## 2021-01-19 PROCEDURE — 99999 PR PBB SHADOW E&M-EST. PATIENT-LVL V: CPT | Mod: PBBFAC,HCNC,, | Performed by: INTERNAL MEDICINE

## 2021-01-19 PROCEDURE — 93793 PR ANTICOAGULANT MGMT FOR PT TAKING WARFARIN: ICD-10-PCS | Mod: S$GLB,,, | Performed by: PHARMACIST

## 2021-01-19 PROCEDURE — 99215 PR OFFICE/OUTPT VISIT, EST, LEVL V, 40-54 MIN: ICD-10-PCS | Mod: HCNC,S$GLB,, | Performed by: INTERNAL MEDICINE

## 2021-01-19 PROCEDURE — 1126F AMNT PAIN NOTED NONE PRSNT: CPT | Mod: HCNC,S$GLB,, | Performed by: INTERNAL MEDICINE

## 2021-01-19 PROCEDURE — 1159F MED LIST DOCD IN RCRD: CPT | Mod: HCNC,S$GLB,, | Performed by: INTERNAL MEDICINE

## 2021-01-19 PROCEDURE — 3078F DIAST BP <80 MM HG: CPT | Mod: HCNC,CPTII,S$GLB, | Performed by: INTERNAL MEDICINE

## 2021-01-19 PROCEDURE — 3288F FALL RISK ASSESSMENT DOCD: CPT | Mod: HCNC,CPTII,S$GLB, | Performed by: INTERNAL MEDICINE

## 2021-01-19 PROCEDURE — 99499 RISK ADDL DX/OHS AUDIT: ICD-10-PCS | Mod: S$GLB,,, | Performed by: INTERNAL MEDICINE

## 2021-01-19 PROCEDURE — 86803 HEPATITIS C AB TEST: CPT | Mod: HCNC

## 2021-01-19 PROCEDURE — 83036 HEMOGLOBIN GLYCOSYLATED A1C: CPT | Mod: HCNC

## 2021-01-19 PROCEDURE — 3075F SYST BP GE 130 - 139MM HG: CPT | Mod: HCNC,CPTII,S$GLB, | Performed by: INTERNAL MEDICINE

## 2021-01-19 PROCEDURE — 3075F PR MOST RECENT SYSTOLIC BLOOD PRESS GE 130-139MM HG: ICD-10-PCS | Mod: HCNC,CPTII,S$GLB, | Performed by: INTERNAL MEDICINE

## 2021-01-19 PROCEDURE — 1101F PT FALLS ASSESS-DOCD LE1/YR: CPT | Mod: HCNC,CPTII,S$GLB, | Performed by: INTERNAL MEDICINE

## 2021-01-19 PROCEDURE — 99215 OFFICE O/P EST HI 40 MIN: CPT | Mod: HCNC,S$GLB,, | Performed by: INTERNAL MEDICINE

## 2021-01-19 PROCEDURE — 93793 ANTICOAG MGMT PT WARFARIN: CPT | Mod: S$GLB,,, | Performed by: PHARMACIST

## 2021-01-19 PROCEDURE — 1101F PR PT FALLS ASSESS DOC 0-1 FALLS W/OUT INJ PAST YR: ICD-10-PCS | Mod: HCNC,CPTII,S$GLB, | Performed by: INTERNAL MEDICINE

## 2021-01-19 PROCEDURE — 99999 PR PBB SHADOW E&M-EST. PATIENT-LVL V: ICD-10-PCS | Mod: PBBFAC,HCNC,, | Performed by: INTERNAL MEDICINE

## 2021-01-19 RX ORDER — METOPROLOL TARTRATE 25 MG/1
TABLET, FILM COATED ORAL
Qty: 180 TABLET | Refills: 1 | Status: SHIPPED | OUTPATIENT
Start: 2021-01-19 | End: 2021-01-01 | Stop reason: SDUPTHER

## 2021-01-19 RX ORDER — METFORMIN HYDROCHLORIDE 500 MG/1
500 TABLET ORAL DAILY
Qty: 90 TABLET | Refills: 1 | Status: SHIPPED | OUTPATIENT
Start: 2021-01-19 | End: 2021-01-01

## 2021-01-20 ENCOUNTER — PATIENT MESSAGE (OUTPATIENT)
Dept: FAMILY MEDICINE | Facility: CLINIC | Age: 76
End: 2021-01-20

## 2021-01-20 LAB — HCV AB SERPL QL IA: NEGATIVE

## 2021-01-21 ENCOUNTER — PATIENT MESSAGE (OUTPATIENT)
Dept: FAMILY MEDICINE | Facility: CLINIC | Age: 76
End: 2021-01-21

## 2021-01-26 PROCEDURE — G0180 MD CERTIFICATION HHA PATIENT: HCPCS | Mod: ,,, | Performed by: INTERNAL MEDICINE

## 2021-01-26 PROCEDURE — G0180 PR HOME HEALTH MD CERTIFICATION: ICD-10-PCS | Mod: ,,, | Performed by: INTERNAL MEDICINE

## 2021-02-02 ENCOUNTER — ANTI-COAG VISIT (OUTPATIENT)
Dept: CARDIOLOGY | Facility: CLINIC | Age: 76
End: 2021-02-02
Payer: MEDICARE

## 2021-02-02 DIAGNOSIS — Z79.01 LONG TERM CURRENT USE OF ANTICOAGULANT: ICD-10-CM

## 2021-02-02 DIAGNOSIS — Z95.2 S/P AVR: ICD-10-CM

## 2021-02-02 DIAGNOSIS — Z95.2 S/P MVR (MITRAL VALVE REPLACEMENT): ICD-10-CM

## 2021-02-02 DIAGNOSIS — I48.20 CHRONIC ATRIAL FIBRILLATION: Primary | ICD-10-CM

## 2021-02-02 LAB — INR PPP: 3

## 2021-02-02 PROCEDURE — 93793 PR ANTICOAGULANT MGMT FOR PT TAKING WARFARIN: ICD-10-PCS | Mod: S$GLB,,, | Performed by: PHARMACIST

## 2021-02-02 PROCEDURE — 93793 ANTICOAG MGMT PT WARFARIN: CPT | Mod: S$GLB,,, | Performed by: PHARMACIST

## 2021-02-09 ENCOUNTER — EXTERNAL HOME HEALTH (OUTPATIENT)
Dept: HOME HEALTH SERVICES | Facility: HOSPITAL | Age: 76
End: 2021-02-09
Payer: MEDICARE

## 2021-03-27 NOTE — PROGRESS NOTES
Patient denies any changes in diet, medications, or health that would effect warfarin therapy.     109

## 2021-07-22 NOTE — PROGRESS NOTES
Disclaimer: Primary Closures are bundled in Soft Tissue Excision cpt codes. If you feel complex repairs, flaps or graft closures are warranted you will have to document them separately and must justify your reasoning for adding these closures. You assume the risk of audit by doing so. [facility] Hospital Medicine  Progress Note    Patient Name: Orion Ty  MRN: [id number]  Patient Class: IP- Inpatient   Admission Date: 2/1/2020  Length of Stay: 7 days  Attending Physician: Amy Vale MD  Primary Care Provider: Otis Zimmer MD        Subjective:     Principal Problem:Influenza A        HPI:  Orion Ty is a 74 y.o. female that (in part)  has a past medical history of Closed displaced fracture of distal phalanx of lesser toe, Diabetes mellitus, Diabetes mellitus, type 2, Hypertension, Osteopenia, Pacemaker, and Rheumatic heart disease.  has a past surgical history that includes Cardiac valve replacement; thryoid s; Cardiac pacemaker placement; and Cardiac valuve replacement. Presents to [facility] Emergency Department complaining of shortness of breath.  EMS was activated due to worsening shortness of breath and lethargy.  Unable to ambulate independently, which is not normal for her.  She was febrile to 103° at home and is having difficulty taking deep breaths.  History of significant cardiac disease and heart valve replacement due to rheumatic heart valve disease. History of CAD and MI.  Denied chest pain, cyanosis, palpitations, or syncope.  Positive for peripheral edema.  Positive for nonproductive cough.    In the emergency department routine laboratory studies, chest x-ray, EKG, and cardiac enzymes were obtained.  There was evidence of acute CHF exacerbation with pulmonary edema, trace edema, and hypoxemia.  Influenza A serology was also positive.    Hospital medicine has been asked to admit to inpatient for further evaluation and treatment.     Overview/Hospital Course:  Ms Ty presented with sepsis, acute respiratory failure with hypoxia (88% at room air, per ED documentation) and acute encephalopathy secondary to influenza A infection. Patient was given isotonic fluids, oseltamivir and supplemental O2. Droplet precautions  Body Location Override (Optional - Billing Will Still Be Based On Selected Body Map Location If Applicable): posterior neck initiated. Patient clinically improved. Patient complained of right flank pain. On exam, patient was noted to have large bruise and firmness from right flank down to lower lateral aspect of abdomen and back. A CT of abdomen pelvis with IV contrast showed a large hematoma interposed between oblique muscles. Unknown what caused this hematoma. Per nurse, daughter stated a bruise was present prior to admit but per patient and , this is new. Patient is on coumadin for stroke prophylaxis as patient has 2 mechanical heart valves. Is also chronically thrombocytopenic (50s-60s in 2010; 80s-90s recently). Patient was given one unit of blood for acute blood loss anemia. Hematology consulted for recs on therapeutic INR levels. Recommend at least 2.5 (lowest allowed level for mechanical valves) per my conversation with Hematologist. Lovenox no longer required and coumadin held to reach goal INR.     Interval History: INR 1.8 today. Had large BM this AM. Has no complaints. Feels well.     Review of Systems   Respiratory: Negative.    Cardiovascular: Negative.    Gastrointestinal: Negative for constipation.   Hematological: Bruises/bleeds easily.     Objective:     Vital Signs (Most Recent):  Temp: 97.9 °F (36.6 °C) (02/08/20 1518)  Pulse: 90 (02/08/20 1518)  Resp: 18 (02/08/20 1518)  BP: (!) 160/79 (02/08/20 1518)  SpO2: (!) 90 % (02/08/20 1518) Vital Signs (24h Range):  Temp:  [97.9 °F (36.6 °C)-99.4 °F (37.4 °C)] 97.9 °F (36.6 °C)  Pulse:  [] 90  Resp:  [16-18] 18  SpO2:  [90 %-100 %] 90 %  BP: (122-160)/(57-79) 160/79     Weight: 54.3 kg (119 lb 11.4 oz)  Body mass index is 22.62 kg/m².    Intake/Output Summary (Last 24 hours) at 2/8/2020 1551  Last data filed at 2/8/2020 0146  Gross per 24 hour   Intake 240 ml   Output 550 ml   Net -310 ml      Physical Exam   Constitutional: She is oriented to person, place, and time. She appears well-developed. No distress.   Cardiovascular: Normal rate and regular rhythm.    Pulmonary/Chest: Effort normal.   Abdominal: Soft. Bowel sounds are normal. She exhibits no distension.   Large bruise to right flank extending to back and proximal aspect of thigh. Firm.    Neurological: She is alert and oriented to person, place, and time.   Skin: She is not diaphoretic.   Psychiatric: She has a normal mood and affect. Her behavior is normal. Judgment and thought content normal.   Vitals reviewed.      Significant Labs: All pertinent labs within the past 24 hours have been reviewed.    Significant Imaging: I have reviewed all pertinent imaging results/findings within the past 24 hours.  I have reviewed and interpreted all pertinent imaging results/findings within the past 24 hours.      Assessment/Plan:      * Influenza A  Completed 5 days of oseltamivir today  Better overall  Wean cannula  Removed precautions       Right flank hematoma  Not present on admission  Near site of lovenox injections extending to flank. Now extending to back and proximal aspect of thigh.   CT 2/4 confirmed presence of large hematoma. No evidence of active bleeding at the time  Vitals have much improved however bruising appears to be extending  INR 3. Reversing coumadin would put patient at very high risk for stroke.   Discussed with Hematology. Recommend keeping INR closest to 2.5 as possible   INR 1.8 which is subtherapeutic. Will increase dose of coumadin and bridge with heparin infusion for safety  INR in AM  Monitor closely      Debility  PT/OT rec HH  Will arrange at discharge      Acute blood loss anemia  As above      Elevated troponin I level  Recent negative stress. Likely demand ischemia.  Cards noted med Rx only         Acute diastolic congestive heart failure  Current Echo does not show diastolic dysf.   2/2 influenza? Resolved       PAH (pulmonary artery hypertension)  No acute issues       CAD (coronary artery disease)  No acute issues       Thrombocytopenia  Chronic  Actually improving to low 100s  Hold  Detail Level: Detailed Insurance Zone (Required For Proper Billing): Neck/Thorax off on antiplatelet agents    Essential (primary) hypertension  Blood pressure no longer low but stable without antihypertensive therapy  Stopped fluids. Encouraged PO intake        Type 2 diabetes mellitus with microalbuminuria, without long-term current use of insulin  Well controlled. No other manifestations.   On insulin as needed for good glucose control      S/P MVR (mitral valve replacement)  Mechanical 2010  Given large hematoma and chronic thrombocytopenia, I discussed with Hematology our new INR goal  Goal is to be as close to 2.5 as possible to reduce risk of stroke and bleeding concomitantly         S/P AVR  Mechanical 2010  Given large hematoma and chronic thrombocytopenia, I discussed with Hematology our new INR goal  Goal is to be as close to 2.5 as possible to reduce risk of stroke and bleeding concomitantly         Long term current use of anticoagulant  On warfarin       Mixed hyperlipidemia  Resume statin       Subclinical hypothyroidism  Resume home meds       Rheumatic heart disease  No acute issues      Chronic atrial fibrillation  On anticoagulation  Stable without AV blocking agents      VTE Risk Mitigation (From admission, onward)         Ordered     warfarin (COUMADIN) tablet 3 mg  Daily      02/08/20 0757     heparin 25,000 units in dextrose 5% 250 ml (100 units/mL) infusion MINIMAL INTENSITY nomogram - [facility]  Continuous     Question:  Heparin Infusion Adjustment (DO NOT MODIFY ANSWER)  Answer:  [email or web address]\[email or web address]\heparin MINIMAL  INTENSITY nomogram for [facility] [id number]    02/08/20 0758     IP VTE HIGH RISK PATIENT  Once      02/01/20 2005     Reason for No Pharmacological VTE Prophylaxis  Once     Question:  Reasons:  Answer:  Already adequately anticoagulated on oral Anticoagulants    02/01/20 2005                      Amy Valerio MD  Department of Hospital Medicine   [facility]     Size Of Lesion In Cm: 4.5 X Size Of Lesion In Cm (Optional): 3 Size Of Margin In Cm: 0 Excision Depth (Required For Proper Billing): submuscular Excision Method: Slit Repair Type: Intermediate Intermediate / Complex Repair - Final Wound Length In Cm: 4.8 Suture Removal: 14 days Anesthesia Volume In Cc: 6 Scalpel Size: 15 blade Anesthesia Type: 1% lidocaine with epinephrine Hemostasis: Electrocautery Estimated Blood Loss (Cc): minimal Lab: [id number] Lab Facility: [id number] Repair Anesthesia Method: local infiltration Deep Sutures: 4-0 Polysorb Epidermal Sutures: 4-0 Prolene Epidermal Closure: running Wound Care: Bacitracin Dressing: pressure dressing with telfa Path Notes (To The Dermatopathologist): Please check margins. Render Path Notes In Note?: No Medical Necessity Clause: This procedure was medically necessary because the lesion that was treated was: Consent was obtained from the patient. The risks and benefits to therapy were discussed in detail. Specifically, the risks of infection, scarring, bleeding, prolonged wound healing, incomplete removal, allergy to anesthesia, nerve injury and recurrence were addressed. Prior to the procedure, the treatment site was clearly identified and confirmed by the patient. All components of Universal Protocol/PAUSE Rule completed. Post-Care Instructions: I reviewed with the patient in detail post-care instructions. Patient is not to engage in any heavy lifting, exercise, or swimming for the next 14 days. Should the patient develop any fevers, chills, bleeding, severe pain patient will contact the office immediately. Billing Type: Third-Party Bill

## 2021-11-05 PROBLEM — R50.9 FEVER: Status: ACTIVE | Noted: 2021-01-01

## 2022-01-01 ENCOUNTER — ANESTHESIA (OUTPATIENT)
Dept: SURGERY | Facility: HOSPITAL | Age: 77
DRG: 003 | End: 2022-01-01
Payer: MEDICARE

## 2022-01-01 ENCOUNTER — HOSPITAL ENCOUNTER (OUTPATIENT)
Dept: CARDIOLOGY | Facility: HOSPITAL | Age: 77
Discharge: HOME | DRG: 003 | End: 2022-01-04
Attending: THORACIC SURGERY (CARDIOTHORACIC VASCULAR SURGERY)
Payer: MEDICARE

## 2022-01-01 ENCOUNTER — ANESTHESIA (OUTPATIENT)
Dept: INTENSIVE CARE | Facility: HOSPITAL | Age: 77
DRG: 003 | End: 2022-01-01
Payer: MEDICARE

## 2022-01-01 ENCOUNTER — DOCUMENTATION ONLY (OUTPATIENT)
Dept: CARDIOLOGY | Facility: HOSPITAL | Age: 77
End: 2022-01-01
Payer: MEDICARE

## 2022-01-01 ENCOUNTER — HOSPITAL ENCOUNTER (OUTPATIENT)
Dept: PULMONOLOGY | Facility: CLINIC | Age: 77
Discharge: HOME | End: 2022-01-03
Payer: MEDICARE

## 2022-01-01 ENCOUNTER — OFFICE VISIT (OUTPATIENT)
Dept: ELECTROPHYSIOLOGY | Facility: CLINIC | Age: 77
End: 2022-01-01
Payer: MEDICARE

## 2022-01-01 ENCOUNTER — HOSPITAL ENCOUNTER (OUTPATIENT)
Dept: RADIOLOGY | Facility: HOSPITAL | Age: 77
Discharge: HOME | DRG: 003 | End: 2022-01-03
Attending: THORACIC SURGERY (CARDIOTHORACIC VASCULAR SURGERY)
Payer: MEDICARE

## 2022-01-01 ENCOUNTER — DOCUMENTATION ONLY (OUTPATIENT)
Dept: ELECTROPHYSIOLOGY | Facility: CLINIC | Age: 77
End: 2022-01-01
Payer: MEDICARE

## 2022-01-01 ENCOUNTER — OFFICE VISIT (OUTPATIENT)
Dept: CARDIOTHORACIC SURGERY | Facility: CLINIC | Age: 77
DRG: 003 | End: 2022-01-01
Payer: MEDICARE

## 2022-01-01 ENCOUNTER — HOSPITAL ENCOUNTER (OUTPATIENT)
Dept: CARDIOLOGY | Facility: CLINIC | Age: 77
Discharge: HOME | End: 2022-01-03
Payer: MEDICARE

## 2022-01-01 ENCOUNTER — HOSPITAL ENCOUNTER (OUTPATIENT)
Dept: VASCULAR SURGERY | Facility: CLINIC | Age: 77
Discharge: HOME | DRG: 003 | End: 2022-01-04
Attending: THORACIC SURGERY (CARDIOTHORACIC VASCULAR SURGERY)
Payer: MEDICARE

## 2022-01-01 ENCOUNTER — TELEPHONE (OUTPATIENT)
Dept: CARDIOTHORACIC SURGERY | Facility: CLINIC | Age: 77
End: 2022-01-01
Payer: MEDICARE

## 2022-01-01 ENCOUNTER — HOSPITAL ENCOUNTER (OUTPATIENT)
Dept: CARDIOLOGY | Facility: CLINIC | Age: 77
Discharge: HOME | DRG: 003 | End: 2022-01-04
Payer: MEDICARE

## 2022-01-01 ENCOUNTER — HOSPITAL ENCOUNTER (INPATIENT)
Facility: HOSPITAL | Age: 77
LOS: 42 days | DRG: 003 | End: 2022-02-16
Attending: THORACIC SURGERY (CARDIOTHORACIC VASCULAR SURGERY) | Admitting: THORACIC SURGERY (CARDIOTHORACIC VASCULAR SURGERY)
Payer: MEDICARE

## 2022-01-01 ENCOUNTER — LAB VISIT (OUTPATIENT)
Dept: PRIMARY CARE CLINIC | Facility: CLINIC | Age: 77
DRG: 003 | End: 2022-01-01
Payer: MEDICARE

## 2022-01-01 ENCOUNTER — ANESTHESIA EVENT (OUTPATIENT)
Dept: INTENSIVE CARE | Facility: HOSPITAL | Age: 77
DRG: 003 | End: 2022-01-01
Payer: MEDICARE

## 2022-01-01 ENCOUNTER — HOSPITAL ENCOUNTER (OUTPATIENT)
Dept: PULMONOLOGY | Facility: CLINIC | Age: 77
Discharge: HOME | DRG: 003 | End: 2022-01-03
Payer: MEDICARE

## 2022-01-01 ENCOUNTER — ANESTHESIA EVENT (OUTPATIENT)
Dept: SURGERY | Facility: HOSPITAL | Age: 77
DRG: 003 | End: 2022-01-01
Payer: MEDICARE

## 2022-01-01 ENCOUNTER — CLINICAL SUPPORT (OUTPATIENT)
Dept: CARDIOLOGY | Facility: HOSPITAL | Age: 77
End: 2022-01-01
Attending: NURSE PRACTITIONER
Payer: MEDICARE

## 2022-01-01 ENCOUNTER — HOSPITAL ENCOUNTER (OUTPATIENT)
Dept: PREADMISSION TESTING | Facility: HOSPITAL | Age: 77
Discharge: HOME | End: 2022-01-04
Attending: THORACIC SURGERY (CARDIOTHORACIC VASCULAR SURGERY)
Payer: MEDICARE

## 2022-01-01 VITALS
OXYGEN SATURATION: 92 % | HEIGHT: 61 IN | BODY MASS INDEX: 16.05 KG/M2 | SYSTOLIC BLOOD PRESSURE: 134 MMHG | WEIGHT: 96 LBS | HEIGHT: 64 IN | DIASTOLIC BLOOD PRESSURE: 60 MMHG | SYSTOLIC BLOOD PRESSURE: 138 MMHG | WEIGHT: 94 LBS | HEART RATE: 65 BPM | TEMPERATURE: 98 F | DIASTOLIC BLOOD PRESSURE: 62 MMHG | HEART RATE: 75 BPM | BODY MASS INDEX: 18.12 KG/M2

## 2022-01-01 VITALS
HEART RATE: 80 BPM | HEIGHT: 61 IN | TEMPERATURE: 96 F | OXYGEN SATURATION: 78 % | WEIGHT: 114.63 LBS | DIASTOLIC BLOOD PRESSURE: 58 MMHG | SYSTOLIC BLOOD PRESSURE: 124 MMHG | RESPIRATION RATE: 14 BRPM | BODY MASS INDEX: 21.64 KG/M2

## 2022-01-01 VITALS
TEMPERATURE: 98 F | WEIGHT: 97.13 LBS | SYSTOLIC BLOOD PRESSURE: 128 MMHG | HEART RATE: 59 BPM | HEIGHT: 61 IN | BODY MASS INDEX: 18.34 KG/M2 | DIASTOLIC BLOOD PRESSURE: 56 MMHG | OXYGEN SATURATION: 94 %

## 2022-01-01 VITALS
DIASTOLIC BLOOD PRESSURE: 62 MMHG | HEART RATE: 54 BPM | SYSTOLIC BLOOD PRESSURE: 134 MMHG | BODY MASS INDEX: 18.12 KG/M2 | WEIGHT: 95.88 LBS

## 2022-01-01 DIAGNOSIS — T82.09XS PROSTHETIC VALVE DYSFUNCTION, SEQUELA: ICD-10-CM

## 2022-01-01 DIAGNOSIS — Z79.01 LONG TERM CURRENT USE OF ANTICOAGULANT: Chronic | ICD-10-CM

## 2022-01-01 DIAGNOSIS — I48.91 A-FIB: ICD-10-CM

## 2022-01-01 DIAGNOSIS — N17.0 ATN (ACUTE TUBULAR NECROSIS): ICD-10-CM

## 2022-01-01 DIAGNOSIS — I10 ESSENTIAL (PRIMARY) HYPERTENSION: Primary | Chronic | ICD-10-CM

## 2022-01-01 DIAGNOSIS — J96.01 ACUTE HYPOXEMIC RESPIRATORY FAILURE: ICD-10-CM

## 2022-01-01 DIAGNOSIS — N17.9 AKI (ACUTE KIDNEY INJURY): ICD-10-CM

## 2022-01-01 DIAGNOSIS — R00.0 INCREASED HEART RATE: ICD-10-CM

## 2022-01-01 DIAGNOSIS — I10 ESSENTIAL (PRIMARY) HYPERTENSION: Chronic | ICD-10-CM

## 2022-01-01 DIAGNOSIS — I48.20 CHRONIC ATRIAL FIBRILLATION: Chronic | ICD-10-CM

## 2022-01-01 DIAGNOSIS — R53.81 DEBILITY: ICD-10-CM

## 2022-01-01 DIAGNOSIS — Z95.2 HISTORY OF MECHANICAL AORTIC VALVE REPLACEMENT: ICD-10-CM

## 2022-01-01 DIAGNOSIS — Z95.2 S/P AVR: Primary | ICD-10-CM

## 2022-01-01 DIAGNOSIS — I65.23 BILATERAL CAROTID ARTERY STENOSIS: ICD-10-CM

## 2022-01-01 DIAGNOSIS — Z95.2 S/P MVR (MITRAL VALVE REPLACEMENT): ICD-10-CM

## 2022-01-01 DIAGNOSIS — Z01.818 PRE-OP EXAM: ICD-10-CM

## 2022-01-01 DIAGNOSIS — I49.8 OTHER SPECIFIED CARDIAC ARRHYTHMIAS: ICD-10-CM

## 2022-01-01 DIAGNOSIS — I25.10 CORONARY ARTERY DISEASE: ICD-10-CM

## 2022-01-01 DIAGNOSIS — R80.9 TYPE 2 DIABETES MELLITUS WITH MICROALBUMINURIA, WITHOUT LONG-TERM CURRENT USE OF INSULIN: Chronic | ICD-10-CM

## 2022-01-01 DIAGNOSIS — Z95.2 S/P AORTIC VALVE REPLACEMENT: ICD-10-CM

## 2022-01-01 DIAGNOSIS — I35.0 AORTIC STENOSIS: ICD-10-CM

## 2022-01-01 DIAGNOSIS — Z98.890 HISTORY OF HEART SURGERY: ICD-10-CM

## 2022-01-01 DIAGNOSIS — I49.9 ARRHYTHMIA: ICD-10-CM

## 2022-01-01 DIAGNOSIS — Z95.0 CARDIAC PACEMAKER IN SITU: Primary | ICD-10-CM

## 2022-01-01 DIAGNOSIS — Z13.9 SCREENING PROCEDURE: Primary | ICD-10-CM

## 2022-01-01 DIAGNOSIS — E11.29 TYPE 2 DIABETES MELLITUS WITH MICROALBUMINURIA, WITHOUT LONG-TERM CURRENT USE OF INSULIN: Chronic | ICD-10-CM

## 2022-01-01 DIAGNOSIS — I47.10 SVT (SUPRAVENTRICULAR TACHYCARDIA): ICD-10-CM

## 2022-01-01 DIAGNOSIS — I35.1 AORTIC REGURGITATION: ICD-10-CM

## 2022-01-01 DIAGNOSIS — R94.31 EKG ABNORMALITY: ICD-10-CM

## 2022-01-01 DIAGNOSIS — R00.0 TACHYCARDIA: ICD-10-CM

## 2022-01-01 LAB
ABO + RH BLD: NORMAL
ACANTHOCYTES BLD QL SMEAR: PRESENT
ALBUMIN SERPL BCP-MCNC: 1.8 G/DL (ref 3.5–5.2)
ALBUMIN SERPL BCP-MCNC: 1.9 G/DL (ref 3.5–5.2)
ALBUMIN SERPL BCP-MCNC: 2 G/DL (ref 3.5–5.2)
ALBUMIN SERPL BCP-MCNC: 2.1 G/DL (ref 3.5–5.2)
ALBUMIN SERPL BCP-MCNC: 2.2 G/DL (ref 3.5–5.2)
ALBUMIN SERPL BCP-MCNC: 2.3 G/DL (ref 3.5–5.2)
ALBUMIN SERPL BCP-MCNC: 2.4 G/DL (ref 3.5–5.2)
ALBUMIN SERPL BCP-MCNC: 2.5 G/DL (ref 3.5–5.2)
ALBUMIN SERPL BCP-MCNC: 2.6 G/DL (ref 3.5–5.2)
ALBUMIN SERPL BCP-MCNC: 2.7 G/DL (ref 3.5–5.2)
ALBUMIN SERPL BCP-MCNC: 2.8 G/DL (ref 3.5–5.2)
ALBUMIN SERPL BCP-MCNC: 2.9 G/DL (ref 3.5–5.2)
ALBUMIN SERPL BCP-MCNC: 3 G/DL (ref 3.5–5.2)
ALBUMIN SERPL BCP-MCNC: 3.1 G/DL (ref 3.5–5.2)
ALBUMIN SERPL BCP-MCNC: 3.2 G/DL (ref 3.5–5.2)
ALBUMIN SERPL BCP-MCNC: 3.3 G/DL (ref 3.5–5.2)
ALBUMIN SERPL BCP-MCNC: 3.4 G/DL (ref 3.5–5.2)
ALBUMIN SERPL BCP-MCNC: 3.5 G/DL (ref 3.5–5.2)
ALBUMIN SERPL BCP-MCNC: 3.6 G/DL (ref 3.5–5.2)
ALBUMIN SERPL BCP-MCNC: 3.6 G/DL (ref 3.5–5.2)
ALBUMIN SERPL BCP-MCNC: 3.8 G/DL (ref 3.5–5.2)
ALBUMIN SERPL BCP-MCNC: 3.9 G/DL (ref 3.5–5.2)
ALBUMIN SERPL BCP-MCNC: 3.9 G/DL (ref 3.5–5.2)
ALLENS TEST: ABNORMAL
ALLENS TEST: NORMAL
ALP SERPL-CCNC: 102 U/L (ref 55–135)
ALP SERPL-CCNC: 104 U/L (ref 55–135)
ALP SERPL-CCNC: 111 U/L (ref 55–135)
ALP SERPL-CCNC: 111 U/L (ref 55–135)
ALP SERPL-CCNC: 112 U/L (ref 55–135)
ALP SERPL-CCNC: 116 U/L (ref 55–135)
ALP SERPL-CCNC: 118 U/L (ref 55–135)
ALP SERPL-CCNC: 118 U/L (ref 55–135)
ALP SERPL-CCNC: 119 U/L (ref 55–135)
ALP SERPL-CCNC: 120 U/L (ref 55–135)
ALP SERPL-CCNC: 123 U/L (ref 55–135)
ALP SERPL-CCNC: 128 U/L (ref 55–135)
ALP SERPL-CCNC: 129 U/L (ref 55–135)
ALP SERPL-CCNC: 131 U/L (ref 55–135)
ALP SERPL-CCNC: 131 U/L (ref 55–135)
ALP SERPL-CCNC: 137 U/L (ref 55–135)
ALP SERPL-CCNC: 139 U/L (ref 55–135)
ALP SERPL-CCNC: 146 U/L (ref 55–135)
ALP SERPL-CCNC: 147 U/L (ref 55–135)
ALP SERPL-CCNC: 150 U/L (ref 55–135)
ALP SERPL-CCNC: 153 U/L (ref 55–135)
ALP SERPL-CCNC: 161 U/L (ref 55–135)
ALP SERPL-CCNC: 166 U/L (ref 55–135)
ALP SERPL-CCNC: 170 U/L (ref 55–135)
ALP SERPL-CCNC: 178 U/L (ref 55–135)
ALP SERPL-CCNC: 190 U/L (ref 55–135)
ALP SERPL-CCNC: 221 U/L (ref 55–135)
ALP SERPL-CCNC: 255 U/L (ref 55–135)
ALP SERPL-CCNC: 45 U/L (ref 55–135)
ALP SERPL-CCNC: 48 U/L (ref 55–135)
ALP SERPL-CCNC: 70 U/L (ref 55–135)
ALP SERPL-CCNC: 77 U/L (ref 55–135)
ALT SERPL W/O P-5'-P-CCNC: 12 U/L (ref 10–44)
ALT SERPL W/O P-5'-P-CCNC: 12 U/L (ref 10–44)
ALT SERPL W/O P-5'-P-CCNC: 13 U/L (ref 10–44)
ALT SERPL W/O P-5'-P-CCNC: 14 U/L (ref 10–44)
ALT SERPL W/O P-5'-P-CCNC: 16 U/L (ref 10–44)
ALT SERPL W/O P-5'-P-CCNC: 17 U/L (ref 10–44)
ALT SERPL W/O P-5'-P-CCNC: 17 U/L (ref 10–44)
ALT SERPL W/O P-5'-P-CCNC: 18 U/L (ref 10–44)
ALT SERPL W/O P-5'-P-CCNC: 18 U/L (ref 10–44)
ALT SERPL W/O P-5'-P-CCNC: 19 U/L (ref 10–44)
ALT SERPL W/O P-5'-P-CCNC: 21 U/L (ref 10–44)
ALT SERPL W/O P-5'-P-CCNC: 22 U/L (ref 10–44)
ALT SERPL W/O P-5'-P-CCNC: 23 U/L (ref 10–44)
ALT SERPL W/O P-5'-P-CCNC: 24 U/L (ref 10–44)
ALT SERPL W/O P-5'-P-CCNC: 24 U/L (ref 10–44)
ALT SERPL W/O P-5'-P-CCNC: 25 U/L (ref 10–44)
ALT SERPL W/O P-5'-P-CCNC: 25 U/L (ref 10–44)
ALT SERPL W/O P-5'-P-CCNC: 28 U/L (ref 10–44)
ALT SERPL W/O P-5'-P-CCNC: 29 U/L (ref 10–44)
ALT SERPL W/O P-5'-P-CCNC: 29 U/L (ref 10–44)
ALT SERPL W/O P-5'-P-CCNC: 30 U/L (ref 10–44)
ALT SERPL W/O P-5'-P-CCNC: 30 U/L (ref 10–44)
ALT SERPL W/O P-5'-P-CCNC: 39 U/L (ref 10–44)
ALT SERPL W/O P-5'-P-CCNC: 39 U/L (ref 10–44)
ALT SERPL W/O P-5'-P-CCNC: 40 U/L (ref 10–44)
ALT SERPL W/O P-5'-P-CCNC: 48 U/L (ref 10–44)
ALT SERPL W/O P-5'-P-CCNC: 7 U/L (ref 10–44)
ALT SERPL W/O P-5'-P-CCNC: 7 U/L (ref 10–44)
ANION GAP SERPL CALC-SCNC: 10 MMOL/L (ref 8–16)
ANION GAP SERPL CALC-SCNC: 11 MMOL/L (ref 8–16)
ANION GAP SERPL CALC-SCNC: 12 MMOL/L (ref 8–16)
ANION GAP SERPL CALC-SCNC: 13 MMOL/L (ref 8–16)
ANION GAP SERPL CALC-SCNC: 14 MMOL/L (ref 8–16)
ANION GAP SERPL CALC-SCNC: 15 MMOL/L (ref 8–16)
ANION GAP SERPL CALC-SCNC: 16 MMOL/L (ref 8–16)
ANION GAP SERPL CALC-SCNC: 17 MMOL/L (ref 8–16)
ANION GAP SERPL CALC-SCNC: 18 MMOL/L (ref 8–16)
ANION GAP SERPL CALC-SCNC: 19 MMOL/L (ref 8–16)
ANION GAP SERPL CALC-SCNC: 6 MMOL/L (ref 8–16)
ANION GAP SERPL CALC-SCNC: 7 MMOL/L (ref 8–16)
ANION GAP SERPL CALC-SCNC: 7 MMOL/L (ref 8–16)
ANION GAP SERPL CALC-SCNC: 8 MMOL/L (ref 8–16)
ANION GAP SERPL CALC-SCNC: 9 MMOL/L (ref 8–16)
ANISOCYTOSIS BLD QL SMEAR: ABNORMAL
ANISOCYTOSIS BLD QL SMEAR: ABNORMAL
ANISOCYTOSIS BLD QL SMEAR: SLIGHT
APTT BLDCRRT: 100.5 SEC (ref 21–32)
APTT BLDCRRT: 106.9 SEC (ref 21–32)
APTT BLDCRRT: 117.3 SEC (ref 21–32)
APTT BLDCRRT: 121 SEC (ref 21–32)
APTT BLDCRRT: 123.3 SEC (ref 21–32)
APTT BLDCRRT: 124.4 SEC (ref 21–32)
APTT BLDCRRT: 130.4 SEC (ref 21–32)
APTT BLDCRRT: 130.5 SEC (ref 21–32)
APTT BLDCRRT: 134.6 SEC (ref 21–32)
APTT BLDCRRT: 27.1 SEC (ref 21–32)
APTT BLDCRRT: 29.6 SEC (ref 21–32)
APTT BLDCRRT: 30 SEC (ref 21–32)
APTT BLDCRRT: 30.4 SEC (ref 21–32)
APTT BLDCRRT: 33.9 SEC (ref 21–32)
APTT BLDCRRT: 34.5 SEC (ref 21–32)
APTT BLDCRRT: 35.1 SEC (ref 21–32)
APTT BLDCRRT: 35.9 SEC (ref 21–32)
APTT BLDCRRT: 36 SEC (ref 21–32)
APTT BLDCRRT: 36.7 SEC (ref 21–32)
APTT BLDCRRT: 37 SEC (ref 21–32)
APTT BLDCRRT: 37.3 SEC (ref 21–32)
APTT BLDCRRT: 37.3 SEC (ref 21–32)
APTT BLDCRRT: 38.7 SEC (ref 21–32)
APTT BLDCRRT: 39.7 SEC (ref 21–32)
APTT BLDCRRT: 39.8 SEC (ref 21–32)
APTT BLDCRRT: 40.2 SEC (ref 21–32)
APTT BLDCRRT: 40.4 SEC (ref 21–32)
APTT BLDCRRT: 40.7 SEC (ref 21–32)
APTT BLDCRRT: 42.4 SEC (ref 21–32)
APTT BLDCRRT: 43.6 SEC (ref 21–32)
APTT BLDCRRT: 43.8 SEC (ref 21–32)
APTT BLDCRRT: 44.1 SEC (ref 21–32)
APTT BLDCRRT: 44.2 SEC (ref 21–32)
APTT BLDCRRT: 44.7 SEC (ref 21–32)
APTT BLDCRRT: 45.1 SEC (ref 21–32)
APTT BLDCRRT: 45.3 SEC (ref 21–32)
APTT BLDCRRT: 45.4 SEC (ref 21–32)
APTT BLDCRRT: 45.6 SEC (ref 21–32)
APTT BLDCRRT: 45.6 SEC (ref 21–32)
APTT BLDCRRT: 45.7 SEC (ref 21–32)
APTT BLDCRRT: 46.4 SEC (ref 21–32)
APTT BLDCRRT: 46.6 SEC (ref 21–32)
APTT BLDCRRT: 48.1 SEC (ref 21–32)
APTT BLDCRRT: 49.5 SEC (ref 21–32)
APTT BLDCRRT: 49.7 SEC (ref 21–32)
APTT BLDCRRT: 50 SEC (ref 21–32)
APTT BLDCRRT: 50.4 SEC (ref 21–32)
APTT BLDCRRT: 50.7 SEC (ref 21–32)
APTT BLDCRRT: 50.7 SEC (ref 21–32)
APTT BLDCRRT: 50.8 SEC (ref 21–32)
APTT BLDCRRT: 51.7 SEC (ref 21–32)
APTT BLDCRRT: 51.7 SEC (ref 21–32)
APTT BLDCRRT: 52.3 SEC (ref 21–32)
APTT BLDCRRT: 52.4 SEC (ref 21–32)
APTT BLDCRRT: 54.1 SEC (ref 21–32)
APTT BLDCRRT: 55 SEC (ref 21–32)
APTT BLDCRRT: 56.4 SEC (ref 21–32)
APTT BLDCRRT: 57.2 SEC (ref 21–32)
APTT BLDCRRT: 58.1 SEC (ref 21–32)
APTT BLDCRRT: 58.6 SEC (ref 21–32)
APTT BLDCRRT: 59.1 SEC (ref 21–32)
APTT BLDCRRT: 59.4 SEC (ref 21–32)
APTT BLDCRRT: 59.5 SEC (ref 21–32)
APTT BLDCRRT: 60.6 SEC (ref 21–32)
APTT BLDCRRT: 62.4 SEC (ref 21–32)
APTT BLDCRRT: 63 SEC (ref 21–32)
APTT BLDCRRT: 63 SEC (ref 21–32)
APTT BLDCRRT: 64.1 SEC (ref 21–32)
APTT BLDCRRT: 64.6 SEC (ref 21–32)
APTT BLDCRRT: 65 SEC (ref 21–32)
APTT BLDCRRT: 66.7 SEC (ref 21–32)
APTT BLDCRRT: 66.8 SEC (ref 21–32)
APTT BLDCRRT: 67.7 SEC (ref 21–32)
APTT BLDCRRT: 68.8 SEC (ref 21–32)
APTT BLDCRRT: 70.2 SEC (ref 21–32)
APTT BLDCRRT: 70.6 SEC (ref 21–32)
APTT BLDCRRT: 70.9 SEC (ref 21–32)
APTT BLDCRRT: 73.2 SEC (ref 21–32)
APTT BLDCRRT: 73.3 SEC (ref 21–32)
APTT BLDCRRT: 74.3 SEC (ref 21–32)
APTT BLDCRRT: 74.8 SEC (ref 21–32)
APTT BLDCRRT: 76.3 SEC (ref 21–32)
APTT BLDCRRT: 76.4 SEC (ref 21–32)
APTT BLDCRRT: 77.2 SEC (ref 21–32)
APTT BLDCRRT: 77.2 SEC (ref 21–32)
APTT BLDCRRT: 82.6 SEC (ref 21–32)
APTT BLDCRRT: 82.6 SEC (ref 21–32)
APTT BLDCRRT: 84.4 SEC (ref 21–32)
APTT BLDCRRT: 85.5 SEC (ref 21–32)
APTT BLDCRRT: 87.8 SEC (ref 21–32)
APTT BLDCRRT: 88.9 SEC (ref 21–32)
APTT BLDCRRT: 90.5 SEC (ref 21–32)
APTT BLDCRRT: 93.4 SEC (ref 21–32)
APTT BLDCRRT: 94.5 SEC (ref 21–32)
APTT BLDCRRT: 99.4 SEC (ref 21–32)
APTT BLDCRRT: 99.4 SEC (ref 21–32)
ASCENDING AORTA: 3.76 CM
ASCENDING AORTA: 3.94 CM
AST SERPL-CCNC: 32 U/L (ref 10–40)
AST SERPL-CCNC: 37 U/L (ref 10–40)
AST SERPL-CCNC: 37 U/L (ref 10–40)
AST SERPL-CCNC: 39 U/L (ref 10–40)
AST SERPL-CCNC: 39 U/L (ref 10–40)
AST SERPL-CCNC: 41 U/L (ref 10–40)
AST SERPL-CCNC: 42 U/L (ref 10–40)
AST SERPL-CCNC: 43 U/L (ref 10–40)
AST SERPL-CCNC: 44 U/L (ref 10–40)
AST SERPL-CCNC: 45 U/L (ref 10–40)
AST SERPL-CCNC: 47 U/L (ref 10–40)
AST SERPL-CCNC: 47 U/L (ref 10–40)
AST SERPL-CCNC: 48 U/L (ref 10–40)
AST SERPL-CCNC: 49 U/L (ref 10–40)
AST SERPL-CCNC: 49 U/L (ref 10–40)
AST SERPL-CCNC: 50 U/L (ref 10–40)
AST SERPL-CCNC: 51 U/L (ref 10–40)
AST SERPL-CCNC: 52 U/L (ref 10–40)
AST SERPL-CCNC: 52 U/L (ref 10–40)
AST SERPL-CCNC: 53 U/L (ref 10–40)
AST SERPL-CCNC: 54 U/L (ref 10–40)
AST SERPL-CCNC: 56 U/L (ref 10–40)
AST SERPL-CCNC: 57 U/L (ref 10–40)
AST SERPL-CCNC: 60 U/L (ref 10–40)
AST SERPL-CCNC: 60 U/L (ref 10–40)
AST SERPL-CCNC: 63 U/L (ref 10–40)
AST SERPL-CCNC: 65 U/L (ref 10–40)
AST SERPL-CCNC: 67 U/L (ref 10–40)
AST SERPL-CCNC: 73 U/L (ref 10–40)
AST SERPL-CCNC: 74 U/L (ref 10–40)
AST SERPL-CCNC: 91 U/L (ref 10–40)
AV INDEX (PROSTH): 0.29
AV INDEX (PROSTH): 0.48
AV INDEX (PROSTH): 0.77
AV MEAN GRADIENT: 18 MMHG
AV MEAN GRADIENT: 29 MMHG
AV MEAN GRADIENT: 7 MMHG
AV PEAK GRADIENT: 16 MMHG
AV PEAK GRADIENT: 35 MMHG
AV PEAK GRADIENT: 46 MMHG
AV VALVE AREA: 0.68 CM2
AV VALVE AREA: 1.65 CM2
AV VALVE AREA: 2.67 CM2
AV VELOCITY RATIO: 0.29
AV VELOCITY RATIO: 0.57
AV VELOCITY RATIO: 0.72
BACTERIA BLD CULT: NORMAL
BACTERIA SPEC AEROBE CULT: ABNORMAL
BACTERIA SPEC AEROBE CULT: ABNORMAL
BASO STIPL BLD QL SMEAR: ABNORMAL
BASOPHILS # BLD AUTO: 0 K/UL (ref 0–0.2)
BASOPHILS # BLD AUTO: 0.01 K/UL (ref 0–0.2)
BASOPHILS # BLD AUTO: 0.02 K/UL (ref 0–0.2)
BASOPHILS # BLD AUTO: 0.03 K/UL (ref 0–0.2)
BASOPHILS # BLD AUTO: 0.04 K/UL (ref 0–0.2)
BASOPHILS # BLD AUTO: 0.05 K/UL (ref 0–0.2)
BASOPHILS # BLD AUTO: 0.06 K/UL (ref 0–0.2)
BASOPHILS # BLD AUTO: 0.07 K/UL (ref 0–0.2)
BASOPHILS # BLD AUTO: 0.07 K/UL (ref 0–0.2)
BASOPHILS # BLD AUTO: ABNORMAL K/UL (ref 0–0.2)
BASOPHILS NFR BLD: 0 % (ref 0–1.9)
BASOPHILS NFR BLD: 0.1 % (ref 0–1.9)
BASOPHILS NFR BLD: 0.2 % (ref 0–1.9)
BASOPHILS NFR BLD: 0.3 % (ref 0–1.9)
BASOPHILS NFR BLD: 0.4 % (ref 0–1.9)
BASOPHILS NFR BLD: 0.5 % (ref 0–1.9)
BASOPHILS NFR BLD: 0.6 % (ref 0–1.9)
BASOPHILS NFR BLD: 0.6 % (ref 0–1.9)
BILIRUB SERPL-MCNC: 0.5 MG/DL (ref 0.1–1)
BILIRUB SERPL-MCNC: 0.6 MG/DL (ref 0.1–1)
BILIRUB SERPL-MCNC: 0.7 MG/DL (ref 0.1–1)
BILIRUB SERPL-MCNC: 0.8 MG/DL (ref 0.1–1)
BILIRUB SERPL-MCNC: 0.8 MG/DL (ref 0.1–1)
BILIRUB SERPL-MCNC: 0.9 MG/DL (ref 0.1–1)
BILIRUB SERPL-MCNC: 1 MG/DL (ref 0.1–1)
BILIRUB SERPL-MCNC: 1 MG/DL (ref 0.1–1)
BILIRUB SERPL-MCNC: 1.2 MG/DL (ref 0.1–1)
BILIRUB SERPL-MCNC: 1.3 MG/DL (ref 0.1–1)
BILIRUB SERPL-MCNC: 1.4 MG/DL (ref 0.1–1)
BILIRUB SERPL-MCNC: 1.4 MG/DL (ref 0.1–1)
BILIRUB SERPL-MCNC: 1.5 MG/DL (ref 0.1–1)
BILIRUB SERPL-MCNC: 1.6 MG/DL (ref 0.1–1)
BILIRUB SERPL-MCNC: 1.6 MG/DL (ref 0.1–1)
BILIRUB SERPL-MCNC: 2 MG/DL (ref 0.1–1)
BILIRUB SERPL-MCNC: 2.6 MG/DL (ref 0.1–1)
BLD GP AB SCN CELLS X3 SERPL QL: NORMAL
BLD PROD TYP BPU: NORMAL
BLOOD UNIT EXPIRATION DATE: NORMAL
BLOOD UNIT TYPE CODE: 600
BLOOD UNIT TYPE CODE: 6200
BLOOD UNIT TYPE CODE: 9500
BLOOD UNIT TYPE: NORMAL
BSA FOR ECHO PROCEDURE: 1.37 M2
BSA FOR ECHO PROCEDURE: 1.38 M2
BSA FOR ECHO PROCEDURE: 1.45 M2
BUN SERPL-MCNC: 10 MG/DL (ref 8–23)
BUN SERPL-MCNC: 10 MG/DL (ref 8–23)
BUN SERPL-MCNC: 103 MG/DL (ref 8–23)
BUN SERPL-MCNC: 11 MG/DL (ref 8–23)
BUN SERPL-MCNC: 112 MG/DL (ref 8–23)
BUN SERPL-MCNC: 12 MG/DL (ref 8–23)
BUN SERPL-MCNC: 14 MG/DL (ref 8–23)
BUN SERPL-MCNC: 15 MG/DL (ref 8–23)
BUN SERPL-MCNC: 16 MG/DL (ref 8–23)
BUN SERPL-MCNC: 16 MG/DL (ref 8–23)
BUN SERPL-MCNC: 17 MG/DL (ref 8–23)
BUN SERPL-MCNC: 17 MG/DL (ref 8–23)
BUN SERPL-MCNC: 18 MG/DL (ref 8–23)
BUN SERPL-MCNC: 18 MG/DL (ref 8–23)
BUN SERPL-MCNC: 20 MG/DL (ref 8–23)
BUN SERPL-MCNC: 21 MG/DL (ref 8–23)
BUN SERPL-MCNC: 22 MG/DL (ref 8–23)
BUN SERPL-MCNC: 22 MG/DL (ref 8–23)
BUN SERPL-MCNC: 24 MG/DL (ref 8–23)
BUN SERPL-MCNC: 24 MG/DL (ref 8–23)
BUN SERPL-MCNC: 25 MG/DL (ref 8–23)
BUN SERPL-MCNC: 25 MG/DL (ref 8–23)
BUN SERPL-MCNC: 26 MG/DL (ref 8–23)
BUN SERPL-MCNC: 26 MG/DL (ref 8–23)
BUN SERPL-MCNC: 27 MG/DL (ref 8–23)
BUN SERPL-MCNC: 27 MG/DL (ref 8–23)
BUN SERPL-MCNC: 28 MG/DL (ref 8–23)
BUN SERPL-MCNC: 29 MG/DL (ref 8–23)
BUN SERPL-MCNC: 3 MG/DL (ref 8–23)
BUN SERPL-MCNC: 30 MG/DL (ref 8–23)
BUN SERPL-MCNC: 31 MG/DL (ref 8–23)
BUN SERPL-MCNC: 32 MG/DL (ref 8–23)
BUN SERPL-MCNC: 32 MG/DL (ref 8–23)
BUN SERPL-MCNC: 33 MG/DL (ref 8–23)
BUN SERPL-MCNC: 34 MG/DL (ref 8–23)
BUN SERPL-MCNC: 35 MG/DL (ref 8–23)
BUN SERPL-MCNC: 35 MG/DL (ref 8–23)
BUN SERPL-MCNC: 36 MG/DL (ref 8–23)
BUN SERPL-MCNC: 37 MG/DL (ref 8–23)
BUN SERPL-MCNC: 38 MG/DL (ref 8–23)
BUN SERPL-MCNC: 4 MG/DL (ref 8–23)
BUN SERPL-MCNC: 40 MG/DL (ref 8–23)
BUN SERPL-MCNC: 41 MG/DL (ref 8–23)
BUN SERPL-MCNC: 43 MG/DL (ref 8–23)
BUN SERPL-MCNC: 45 MG/DL (ref 8–23)
BUN SERPL-MCNC: 45 MG/DL (ref 8–23)
BUN SERPL-MCNC: 46 MG/DL (ref 8–23)
BUN SERPL-MCNC: 47 MG/DL (ref 8–23)
BUN SERPL-MCNC: 48 MG/DL (ref 8–23)
BUN SERPL-MCNC: 5 MG/DL (ref 8–23)
BUN SERPL-MCNC: 50 MG/DL (ref 8–23)
BUN SERPL-MCNC: 55 MG/DL (ref 8–23)
BUN SERPL-MCNC: 55 MG/DL (ref 8–23)
BUN SERPL-MCNC: 56 MG/DL (ref 8–23)
BUN SERPL-MCNC: 57 MG/DL (ref 8–23)
BUN SERPL-MCNC: 58 MG/DL (ref 8–23)
BUN SERPL-MCNC: 6 MG/DL (ref 8–23)
BUN SERPL-MCNC: 60 MG/DL (ref 8–23)
BUN SERPL-MCNC: 65 MG/DL (ref 8–23)
BUN SERPL-MCNC: 66 MG/DL (ref 8–23)
BUN SERPL-MCNC: 66 MG/DL (ref 8–23)
BUN SERPL-MCNC: 68 MG/DL (ref 8–23)
BUN SERPL-MCNC: 68 MG/DL (ref 8–23)
BUN SERPL-MCNC: 7 MG/DL (ref 8–23)
BUN SERPL-MCNC: 7 MG/DL (ref 8–23)
BUN SERPL-MCNC: 70 MG/DL (ref 8–23)
BUN SERPL-MCNC: 70 MG/DL (ref 8–23)
BUN SERPL-MCNC: 73 MG/DL (ref 8–23)
BUN SERPL-MCNC: 73 MG/DL (ref 8–23)
BUN SERPL-MCNC: 75 MG/DL (ref 8–23)
BUN SERPL-MCNC: 77 MG/DL (ref 8–23)
BUN SERPL-MCNC: 78 MG/DL (ref 8–23)
BUN SERPL-MCNC: 8 MG/DL (ref 8–23)
BUN SERPL-MCNC: 82 MG/DL (ref 8–23)
BUN SERPL-MCNC: 9 MG/DL (ref 8–23)
BUN SERPL-MCNC: <3 MG/DL (ref 8–23)
BURR CELLS BLD QL SMEAR: ABNORMAL
CA-I BLDV-SCNC: 0.95 MMOL/L (ref 1.06–1.42)
CA-I BLDV-SCNC: 0.99 MMOL/L (ref 1.06–1.42)
CA-I BLDV-SCNC: 0.99 MMOL/L (ref 1.06–1.42)
CA-I BLDV-SCNC: 1 MMOL/L (ref 1.06–1.42)
CA-I BLDV-SCNC: 1.02 MMOL/L (ref 1.06–1.42)
CA-I BLDV-SCNC: 1.03 MMOL/L (ref 1.06–1.42)
CA-I BLDV-SCNC: 1.03 MMOL/L (ref 1.06–1.42)
CA-I BLDV-SCNC: 1.04 MMOL/L (ref 1.06–1.42)
CA-I BLDV-SCNC: 1.06 MMOL/L (ref 1.06–1.42)
CA-I BLDV-SCNC: 1.1 MMOL/L (ref 1.06–1.42)
CA-I BLDV-SCNC: 1.19 MMOL/L (ref 1.06–1.42)
CALCIUM SERPL-MCNC: 10 MG/DL (ref 8.7–10.5)
CALCIUM SERPL-MCNC: 10 MG/DL (ref 8.7–10.5)
CALCIUM SERPL-MCNC: 6.5 MG/DL (ref 8.7–10.5)
CALCIUM SERPL-MCNC: 6.7 MG/DL (ref 8.7–10.5)
CALCIUM SERPL-MCNC: 6.9 MG/DL (ref 8.7–10.5)
CALCIUM SERPL-MCNC: 6.9 MG/DL (ref 8.7–10.5)
CALCIUM SERPL-MCNC: 7 MG/DL (ref 8.7–10.5)
CALCIUM SERPL-MCNC: 7 MG/DL (ref 8.7–10.5)
CALCIUM SERPL-MCNC: 7.2 MG/DL (ref 8.7–10.5)
CALCIUM SERPL-MCNC: 7.4 MG/DL (ref 8.7–10.5)
CALCIUM SERPL-MCNC: 7.5 MG/DL (ref 8.7–10.5)
CALCIUM SERPL-MCNC: 7.6 MG/DL (ref 8.7–10.5)
CALCIUM SERPL-MCNC: 7.6 MG/DL (ref 8.7–10.5)
CALCIUM SERPL-MCNC: 7.7 MG/DL (ref 8.7–10.5)
CALCIUM SERPL-MCNC: 7.7 MG/DL (ref 8.7–10.5)
CALCIUM SERPL-MCNC: 7.8 MG/DL (ref 8.7–10.5)
CALCIUM SERPL-MCNC: 7.9 MG/DL (ref 8.7–10.5)
CALCIUM SERPL-MCNC: 8 MG/DL (ref 8.7–10.5)
CALCIUM SERPL-MCNC: 8.1 MG/DL (ref 8.7–10.5)
CALCIUM SERPL-MCNC: 8.2 MG/DL (ref 8.7–10.5)
CALCIUM SERPL-MCNC: 8.3 MG/DL (ref 8.7–10.5)
CALCIUM SERPL-MCNC: 8.4 MG/DL (ref 8.7–10.5)
CALCIUM SERPL-MCNC: 8.5 MG/DL (ref 8.7–10.5)
CALCIUM SERPL-MCNC: 8.6 MG/DL (ref 8.7–10.5)
CALCIUM SERPL-MCNC: 8.7 MG/DL (ref 8.7–10.5)
CALCIUM SERPL-MCNC: 8.8 MG/DL (ref 8.7–10.5)
CALCIUM SERPL-MCNC: 8.9 MG/DL (ref 8.7–10.5)
CALCIUM SERPL-MCNC: 9 MG/DL (ref 8.7–10.5)
CALCIUM SERPL-MCNC: 9.1 MG/DL (ref 8.7–10.5)
CALCIUM SERPL-MCNC: 9.2 MG/DL (ref 8.7–10.5)
CALCIUM SERPL-MCNC: 9.3 MG/DL (ref 8.7–10.5)
CALCIUM SERPL-MCNC: 9.3 MG/DL (ref 8.7–10.5)
CALCIUM SERPL-MCNC: 9.4 MG/DL (ref 8.7–10.5)
CALCIUM SERPL-MCNC: 9.5 MG/DL (ref 8.7–10.5)
CALCIUM SERPL-MCNC: 9.7 MG/DL (ref 8.7–10.5)
CALCIUM SERPL-MCNC: 9.9 MG/DL (ref 8.7–10.5)
CALCIUM SERPL-MCNC: 9.9 MG/DL (ref 8.7–10.5)
CHLORIDE SERPL-SCNC: 100 MMOL/L (ref 95–110)
CHLORIDE SERPL-SCNC: 101 MMOL/L (ref 95–110)
CHLORIDE SERPL-SCNC: 102 MMOL/L (ref 95–110)
CHLORIDE SERPL-SCNC: 103 MMOL/L (ref 95–110)
CHLORIDE SERPL-SCNC: 104 MMOL/L (ref 95–110)
CHLORIDE SERPL-SCNC: 105 MMOL/L (ref 95–110)
CHLORIDE SERPL-SCNC: 106 MMOL/L (ref 95–110)
CHLORIDE SERPL-SCNC: 107 MMOL/L (ref 95–110)
CHLORIDE SERPL-SCNC: 109 MMOL/L (ref 95–110)
CHLORIDE SERPL-SCNC: 87 MMOL/L (ref 95–110)
CHLORIDE SERPL-SCNC: 88 MMOL/L (ref 95–110)
CHLORIDE SERPL-SCNC: 89 MMOL/L (ref 95–110)
CHLORIDE SERPL-SCNC: 89 MMOL/L (ref 95–110)
CHLORIDE SERPL-SCNC: 90 MMOL/L (ref 95–110)
CHLORIDE SERPL-SCNC: 91 MMOL/L (ref 95–110)
CHLORIDE SERPL-SCNC: 91 MMOL/L (ref 95–110)
CHLORIDE SERPL-SCNC: 92 MMOL/L (ref 95–110)
CHLORIDE SERPL-SCNC: 93 MMOL/L (ref 95–110)
CHLORIDE SERPL-SCNC: 93 MMOL/L (ref 95–110)
CHLORIDE SERPL-SCNC: 94 MMOL/L (ref 95–110)
CHLORIDE SERPL-SCNC: 94 MMOL/L (ref 95–110)
CHLORIDE SERPL-SCNC: 95 MMOL/L (ref 95–110)
CHLORIDE SERPL-SCNC: 96 MMOL/L (ref 95–110)
CHLORIDE SERPL-SCNC: 97 MMOL/L (ref 95–110)
CHLORIDE SERPL-SCNC: 98 MMOL/L (ref 95–110)
CHLORIDE SERPL-SCNC: 99 MMOL/L (ref 95–110)
CO2 SERPL-SCNC: 14 MMOL/L (ref 23–29)
CO2 SERPL-SCNC: 15 MMOL/L (ref 23–29)
CO2 SERPL-SCNC: 15 MMOL/L (ref 23–29)
CO2 SERPL-SCNC: 16 MMOL/L (ref 23–29)
CO2 SERPL-SCNC: 17 MMOL/L (ref 23–29)
CO2 SERPL-SCNC: 17 MMOL/L (ref 23–29)
CO2 SERPL-SCNC: 18 MMOL/L (ref 23–29)
CO2 SERPL-SCNC: 19 MMOL/L (ref 23–29)
CO2 SERPL-SCNC: 20 MMOL/L (ref 23–29)
CO2 SERPL-SCNC: 21 MMOL/L (ref 23–29)
CO2 SERPL-SCNC: 22 MMOL/L (ref 23–29)
CO2 SERPL-SCNC: 23 MMOL/L (ref 23–29)
CO2 SERPL-SCNC: 24 MMOL/L (ref 23–29)
CO2 SERPL-SCNC: 25 MMOL/L (ref 23–29)
CO2 SERPL-SCNC: 26 MMOL/L (ref 23–29)
CO2 SERPL-SCNC: 27 MMOL/L (ref 23–29)
CO2 SERPL-SCNC: 27 MMOL/L (ref 23–29)
CO2 SERPL-SCNC: 28 MMOL/L (ref 23–29)
CO2 SERPL-SCNC: 28 MMOL/L (ref 23–29)
CODING SYSTEM: NORMAL
CREAT SERPL-MCNC: 0.3 MG/DL (ref 0.5–1.4)
CREAT SERPL-MCNC: 0.4 MG/DL (ref 0.5–1.4)
CREAT SERPL-MCNC: 0.5 MG/DL (ref 0.5–1.4)
CREAT SERPL-MCNC: 0.6 MG/DL (ref 0.5–1.4)
CREAT SERPL-MCNC: 0.7 MG/DL (ref 0.5–1.4)
CREAT SERPL-MCNC: 0.8 MG/DL (ref 0.5–1.4)
CREAT SERPL-MCNC: 0.9 MG/DL (ref 0.5–1.4)
CREAT SERPL-MCNC: 1 MG/DL (ref 0.5–1.4)
CREAT SERPL-MCNC: 1.1 MG/DL (ref 0.5–1.4)
CREAT SERPL-MCNC: 1.2 MG/DL (ref 0.5–1.4)
CREAT SERPL-MCNC: 1.3 MG/DL (ref 0.5–1.4)
CREAT SERPL-MCNC: 1.4 MG/DL (ref 0.5–1.4)
CREAT SERPL-MCNC: 1.5 MG/DL (ref 0.5–1.4)
CREAT SERPL-MCNC: 1.6 MG/DL (ref 0.5–1.4)
CREAT SERPL-MCNC: 1.7 MG/DL (ref 0.5–1.4)
CREAT SERPL-MCNC: 1.8 MG/DL (ref 0.5–1.4)
CREAT SERPL-MCNC: 2 MG/DL (ref 0.5–1.4)
CREAT SERPL-MCNC: 2 MG/DL (ref 0.5–1.4)
CREAT SERPL-MCNC: 2.1 MG/DL (ref 0.5–1.4)
CREAT SERPL-MCNC: 2.2 MG/DL (ref 0.5–1.4)
CREAT SERPL-MCNC: 2.3 MG/DL (ref 0.5–1.4)
CREAT SERPL-MCNC: 2.3 MG/DL (ref 0.5–1.4)
CREAT SERPL-MCNC: 2.4 MG/DL (ref 0.5–1.4)
CREAT SERPL-MCNC: 2.5 MG/DL (ref 0.5–1.4)
CREAT SERPL-MCNC: 2.8 MG/DL (ref 0.5–1.4)
CREAT SERPL-MCNC: 2.8 MG/DL (ref 0.5–1.4)
CREAT SERPL-MCNC: 2.9 MG/DL (ref 0.5–1.4)
CREAT SERPL-MCNC: 3 MG/DL (ref 0.5–1.4)
CREAT SERPL-MCNC: 3 MG/DL (ref 0.5–1.4)
CREAT SERPL-MCNC: 3.2 MG/DL (ref 0.5–1.4)
CREAT SERPL-MCNC: 3.2 MG/DL (ref 0.5–1.4)
CREAT SERPL-MCNC: 3.3 MG/DL (ref 0.5–1.4)
CREAT SERPL-MCNC: 3.5 MG/DL (ref 0.5–1.4)
CREAT SERPL-MCNC: 3.5 MG/DL (ref 0.5–1.4)
CREAT SERPL-MCNC: 4.2 MG/DL (ref 0.5–1.4)
CREAT UR-MCNC: 88 MG/DL (ref 15–325)
CRP SERPL-MCNC: 118.9 MG/L (ref 0–8.2)
CRP SERPL-MCNC: 145.8 MG/L (ref 0–8.2)
CRP SERPL-MCNC: 63.1 MG/L (ref 0–8.2)
CRP SERPL-MCNC: 86.2 MG/L (ref 0–8.2)
CRP SERPL-MCNC: 90.3 MG/L (ref 0–8.2)
CV ECHO LV RWT: 0.31 CM
CV ECHO LV RWT: 0.33 CM
CV ECHO LV RWT: 0.55 CM
DACRYOCYTES BLD QL SMEAR: ABNORMAL
DELSYS: ABNORMAL
DELSYS: NORMAL
DIFFERENTIAL METHOD: ABNORMAL
DISPENSE STATUS: NORMAL
DOHLE BOD BLD QL SMEAR: PRESENT
DOP CALC AO PEAK VEL: 2.03 M/S
DOP CALC AO PEAK VEL: 2.97 M/S
DOP CALC AO PEAK VEL: 3.4 M/S
DOP CALC AO VTI: 36.6 CM
DOP CALC AO VTI: 66.21 CM
DOP CALC AO VTI: 78 CM
DOP CALC LVOT AREA: 2.3 CM2
DOP CALC LVOT AREA: 3.5 CM2
DOP CALC LVOT AREA: 3.5 CM2
DOP CALC LVOT DIAMETER: 1.73 CM
DOP CALC LVOT DIAMETER: 2.1 CM
DOP CALC LVOT DIAMETER: 2.1 CM
DOP CALC LVOT PEAK VEL: 1 M/S
DOP CALC LVOT PEAK VEL: 1.46 M/S
DOP CALC LVOT PEAK VEL: 1.7 M/S
DOP CALC LVOT STROKE VOLUME: 109.01 CM3
DOP CALC LVOT STROKE VOLUME: 52.79 CM3
DOP CALC LVOT STROKE VOLUME: 97.73 CM3
DOP CALC MV VTI: 24.95 CM
DOP CALC MV VTI: 43.84 CM
DOP CALCLVOT PEAK VEL VTI: 22.47 CM
DOP CALCLVOT PEAK VEL VTI: 28.23 CM
DOP CALCLVOT PEAK VEL VTI: 31.49 CM
E WAVE DECELERATION TIME: 190.53 MSEC
E/A RATIO: 2.09
E/E' RATIO: 24.53 M/S
E/E' RATIO: 29.2 M/S
E/E' RATIO: 36.2 M/S
ECHO LV POSTERIOR WALL: 0.7 CM (ref 0.6–1.1)
ECHO LV POSTERIOR WALL: 0.76 CM (ref 0.6–1.1)
ECHO LV POSTERIOR WALL: 1.23 CM (ref 0.6–1.1)
EJECTION FRACTION: 60 %
EJECTION FRACTION: 60 %
EJECTION FRACTION: 65 %
EOSINOPHIL # BLD AUTO: 0 K/UL (ref 0–0.5)
EOSINOPHIL # BLD AUTO: 0.1 K/UL (ref 0–0.5)
EOSINOPHIL # BLD AUTO: 0.2 K/UL (ref 0–0.5)
EOSINOPHIL # BLD AUTO: ABNORMAL K/UL (ref 0–0.5)
EOSINOPHIL NFR BLD: 0 % (ref 0–8)
EOSINOPHIL NFR BLD: 0.1 % (ref 0–8)
EOSINOPHIL NFR BLD: 0.2 % (ref 0–8)
EOSINOPHIL NFR BLD: 0.3 % (ref 0–8)
EOSINOPHIL NFR BLD: 0.4 % (ref 0–8)
EOSINOPHIL NFR BLD: 0.5 % (ref 0–8)
EOSINOPHIL NFR BLD: 0.5 % (ref 0–8)
EOSINOPHIL NFR BLD: 0.6 % (ref 0–8)
EOSINOPHIL NFR BLD: 0.7 % (ref 0–8)
EOSINOPHIL NFR BLD: 0.8 % (ref 0–8)
EOSINOPHIL NFR BLD: 0.9 % (ref 0–8)
EOSINOPHIL NFR BLD: 1 % (ref 0–8)
EOSINOPHIL NFR BLD: 1.2 % (ref 0–8)
EOSINOPHIL NFR BLD: 1.3 % (ref 0–8)
EOSINOPHIL NFR BLD: 1.3 % (ref 0–8)
EOSINOPHIL NFR BLD: 1.6 % (ref 0–8)
EOSINOPHIL NFR BLD: 1.8 % (ref 0–8)
EOSINOPHIL NFR BLD: 1.9 % (ref 0–8)
EOSINOPHIL NFR BLD: 1.9 % (ref 0–8)
EOSINOPHIL NFR BLD: 2 % (ref 0–8)
EP: 6
EP: 7
ERYTHROCYTE [DISTWIDTH] IN BLOOD BY AUTOMATED COUNT: 14.3 % (ref 11.5–14.5)
ERYTHROCYTE [DISTWIDTH] IN BLOOD BY AUTOMATED COUNT: 15.9 % (ref 11.5–14.5)
ERYTHROCYTE [DISTWIDTH] IN BLOOD BY AUTOMATED COUNT: 15.9 % (ref 11.5–14.5)
ERYTHROCYTE [DISTWIDTH] IN BLOOD BY AUTOMATED COUNT: 16 % (ref 11.5–14.5)
ERYTHROCYTE [DISTWIDTH] IN BLOOD BY AUTOMATED COUNT: 16.2 % (ref 11.5–14.5)
ERYTHROCYTE [DISTWIDTH] IN BLOOD BY AUTOMATED COUNT: 16.5 % (ref 11.5–14.5)
ERYTHROCYTE [DISTWIDTH] IN BLOOD BY AUTOMATED COUNT: 16.7 % (ref 11.5–14.5)
ERYTHROCYTE [DISTWIDTH] IN BLOOD BY AUTOMATED COUNT: 17 % (ref 11.5–14.5)
ERYTHROCYTE [DISTWIDTH] IN BLOOD BY AUTOMATED COUNT: 17.2 % (ref 11.5–14.5)
ERYTHROCYTE [DISTWIDTH] IN BLOOD BY AUTOMATED COUNT: 17.5 % (ref 11.5–14.5)
ERYTHROCYTE [DISTWIDTH] IN BLOOD BY AUTOMATED COUNT: 17.6 % (ref 11.5–14.5)
ERYTHROCYTE [DISTWIDTH] IN BLOOD BY AUTOMATED COUNT: 17.8 % (ref 11.5–14.5)
ERYTHROCYTE [DISTWIDTH] IN BLOOD BY AUTOMATED COUNT: 17.9 % (ref 11.5–14.5)
ERYTHROCYTE [DISTWIDTH] IN BLOOD BY AUTOMATED COUNT: 18 % (ref 11.5–14.5)
ERYTHROCYTE [DISTWIDTH] IN BLOOD BY AUTOMATED COUNT: 18 % (ref 11.5–14.5)
ERYTHROCYTE [DISTWIDTH] IN BLOOD BY AUTOMATED COUNT: 18.1 % (ref 11.5–14.5)
ERYTHROCYTE [DISTWIDTH] IN BLOOD BY AUTOMATED COUNT: 18.2 % (ref 11.5–14.5)
ERYTHROCYTE [DISTWIDTH] IN BLOOD BY AUTOMATED COUNT: 18.3 % (ref 11.5–14.5)
ERYTHROCYTE [DISTWIDTH] IN BLOOD BY AUTOMATED COUNT: 18.4 % (ref 11.5–14.5)
ERYTHROCYTE [DISTWIDTH] IN BLOOD BY AUTOMATED COUNT: 18.5 % (ref 11.5–14.5)
ERYTHROCYTE [DISTWIDTH] IN BLOOD BY AUTOMATED COUNT: 18.6 % (ref 11.5–14.5)
ERYTHROCYTE [DISTWIDTH] IN BLOOD BY AUTOMATED COUNT: 18.8 % (ref 11.5–14.5)
ERYTHROCYTE [DISTWIDTH] IN BLOOD BY AUTOMATED COUNT: 18.9 % (ref 11.5–14.5)
ERYTHROCYTE [DISTWIDTH] IN BLOOD BY AUTOMATED COUNT: 18.9 % (ref 11.5–14.5)
ERYTHROCYTE [DISTWIDTH] IN BLOOD BY AUTOMATED COUNT: 19 % (ref 11.5–14.5)
ERYTHROCYTE [DISTWIDTH] IN BLOOD BY AUTOMATED COUNT: 19.2 % (ref 11.5–14.5)
ERYTHROCYTE [DISTWIDTH] IN BLOOD BY AUTOMATED COUNT: 19.2 % (ref 11.5–14.5)
ERYTHROCYTE [DISTWIDTH] IN BLOOD BY AUTOMATED COUNT: 19.4 % (ref 11.5–14.5)
ERYTHROCYTE [DISTWIDTH] IN BLOOD BY AUTOMATED COUNT: 19.6 % (ref 11.5–14.5)
ERYTHROCYTE [DISTWIDTH] IN BLOOD BY AUTOMATED COUNT: 19.6 % (ref 11.5–14.5)
ERYTHROCYTE [DISTWIDTH] IN BLOOD BY AUTOMATED COUNT: 19.8 % (ref 11.5–14.5)
ERYTHROCYTE [DISTWIDTH] IN BLOOD BY AUTOMATED COUNT: 19.9 % (ref 11.5–14.5)
ERYTHROCYTE [DISTWIDTH] IN BLOOD BY AUTOMATED COUNT: 20 % (ref 11.5–14.5)
ERYTHROCYTE [DISTWIDTH] IN BLOOD BY AUTOMATED COUNT: 20 % (ref 11.5–14.5)
ERYTHROCYTE [DISTWIDTH] IN BLOOD BY AUTOMATED COUNT: 20.6 % (ref 11.5–14.5)
ERYTHROCYTE [DISTWIDTH] IN BLOOD BY AUTOMATED COUNT: 21 % (ref 11.5–14.5)
ERYTHROCYTE [DISTWIDTH] IN BLOOD BY AUTOMATED COUNT: 22.5 % (ref 11.5–14.5)
ERYTHROCYTE [DISTWIDTH] IN BLOOD BY AUTOMATED COUNT: 23 % (ref 11.5–14.5)
ERYTHROCYTE [SEDIMENTATION RATE] IN BLOOD BY WESTERGREN METHOD: 16 MM/H
ERYTHROCYTE [SEDIMENTATION RATE] IN BLOOD BY WESTERGREN METHOD: 16 MM/H
ERYTHROCYTE [SEDIMENTATION RATE] IN BLOOD BY WESTERGREN METHOD: 18 MM/H
ERYTHROCYTE [SEDIMENTATION RATE] IN BLOOD BY WESTERGREN METHOD: 20 MM/H
ERYTHROCYTE [SEDIMENTATION RATE] IN BLOOD BY WESTERGREN METHOD: 22 MM/H
ERYTHROCYTE [SEDIMENTATION RATE] IN BLOOD BY WESTERGREN METHOD: 24 MM/H
ERYTHROCYTE [SEDIMENTATION RATE] IN BLOOD BY WESTERGREN METHOD: 26 MM/H
ERYTHROCYTE [SEDIMENTATION RATE] IN BLOOD BY WESTERGREN METHOD: 28 MM/H
ERYTHROCYTE [SEDIMENTATION RATE] IN BLOOD BY WESTERGREN METHOD: 29 MM/H
ERYTHROCYTE [SEDIMENTATION RATE] IN BLOOD BY WESTERGREN METHOD: 30 MM/H
ERYTHROCYTE [SEDIMENTATION RATE] IN BLOOD BY WESTERGREN METHOD: 32 MM/H
ERYTHROCYTE [SEDIMENTATION RATE] IN BLOOD BY WESTERGREN METHOD: 33 MM/H
ERYTHROCYTE [SEDIMENTATION RATE] IN BLOOD BY WESTERGREN METHOD: 34 MM/H
ERYTHROCYTE [SEDIMENTATION RATE] IN BLOOD BY WESTERGREN METHOD: 38 MM/H
ERYTHROCYTE [SEDIMENTATION RATE] IN BLOOD BY WESTERGREN METHOD: 5 MM/H
EST. GFR  (AFRICAN AMERICAN): 11.1 ML/MIN/1.73 M^2
EST. GFR  (AFRICAN AMERICAN): 13.8 ML/MIN/1.73 M^2
EST. GFR  (AFRICAN AMERICAN): 13.8 ML/MIN/1.73 M^2
EST. GFR  (AFRICAN AMERICAN): 14.8 ML/MIN/1.73 M^2
EST. GFR  (AFRICAN AMERICAN): 15.4 ML/MIN/1.73 M^2
EST. GFR  (AFRICAN AMERICAN): 15.4 ML/MIN/1.73 M^2
EST. GFR  (AFRICAN AMERICAN): 16.6 ML/MIN/1.73 M^2
EST. GFR  (AFRICAN AMERICAN): 16.6 ML/MIN/1.73 M^2
EST. GFR  (AFRICAN AMERICAN): 17.3 ML/MIN/1.73 M^2
EST. GFR  (AFRICAN AMERICAN): 18.1 ML/MIN/1.73 M^2
EST. GFR  (AFRICAN AMERICAN): 18.1 ML/MIN/1.73 M^2
EST. GFR  (AFRICAN AMERICAN): 20.7 ML/MIN/1.73 M^2
EST. GFR  (AFRICAN AMERICAN): 21.8 ML/MIN/1.73 M^2
EST. GFR  (AFRICAN AMERICAN): 22.9 ML/MIN/1.73 M^2
EST. GFR  (AFRICAN AMERICAN): 22.9 ML/MIN/1.73 M^2
EST. GFR  (AFRICAN AMERICAN): 24.2 ML/MIN/1.73 M^2
EST. GFR  (AFRICAN AMERICAN): 25.6 ML/MIN/1.73 M^2
EST. GFR  (AFRICAN AMERICAN): 27.2 ML/MIN/1.73 M^2
EST. GFR  (AFRICAN AMERICAN): 27.2 ML/MIN/1.73 M^2
EST. GFR  (AFRICAN AMERICAN): 30.9 ML/MIN/1.73 M^2
EST. GFR  (AFRICAN AMERICAN): 33.1 ML/MIN/1.73 M^2
EST. GFR  (AFRICAN AMERICAN): 35.6 ML/MIN/1.73 M^2
EST. GFR  (AFRICAN AMERICAN): 38.5 ML/MIN/1.73 M^2
EST. GFR  (AFRICAN AMERICAN): 41.8 ML/MIN/1.73 M^2
EST. GFR  (AFRICAN AMERICAN): 45.7 ML/MIN/1.73 M^2
EST. GFR  (AFRICAN AMERICAN): 50.4 ML/MIN/1.73 M^2
EST. GFR  (AFRICAN AMERICAN): 56 ML/MIN/1.73 M^2
EST. GFR  (AFRICAN AMERICAN): >60 ML/MIN/1.73 M^2
EST. GFR  (NON AFRICAN AMERICAN): 12 ML/MIN/1.73 M^2
EST. GFR  (NON AFRICAN AMERICAN): 12 ML/MIN/1.73 M^2
EST. GFR  (NON AFRICAN AMERICAN): 12.9 ML/MIN/1.73 M^2
EST. GFR  (NON AFRICAN AMERICAN): 13.3 ML/MIN/1.73 M^2
EST. GFR  (NON AFRICAN AMERICAN): 13.3 ML/MIN/1.73 M^2
EST. GFR  (NON AFRICAN AMERICAN): 14.4 ML/MIN/1.73 M^2
EST. GFR  (NON AFRICAN AMERICAN): 14.4 ML/MIN/1.73 M^2
EST. GFR  (NON AFRICAN AMERICAN): 15 ML/MIN/1.73 M^2
EST. GFR  (NON AFRICAN AMERICAN): 15.7 ML/MIN/1.73 M^2
EST. GFR  (NON AFRICAN AMERICAN): 15.7 ML/MIN/1.73 M^2
EST. GFR  (NON AFRICAN AMERICAN): 18 ML/MIN/1.73 M^2
EST. GFR  (NON AFRICAN AMERICAN): 18.9 ML/MIN/1.73 M^2
EST. GFR  (NON AFRICAN AMERICAN): 19.9 ML/MIN/1.73 M^2
EST. GFR  (NON AFRICAN AMERICAN): 19.9 ML/MIN/1.73 M^2
EST. GFR  (NON AFRICAN AMERICAN): 21 ML/MIN/1.73 M^2
EST. GFR  (NON AFRICAN AMERICAN): 22.2 ML/MIN/1.73 M^2
EST. GFR  (NON AFRICAN AMERICAN): 23.6 ML/MIN/1.73 M^2
EST. GFR  (NON AFRICAN AMERICAN): 23.6 ML/MIN/1.73 M^2
EST. GFR  (NON AFRICAN AMERICAN): 26.8 ML/MIN/1.73 M^2
EST. GFR  (NON AFRICAN AMERICAN): 28.7 ML/MIN/1.73 M^2
EST. GFR  (NON AFRICAN AMERICAN): 30.9 ML/MIN/1.73 M^2
EST. GFR  (NON AFRICAN AMERICAN): 33.4 ML/MIN/1.73 M^2
EST. GFR  (NON AFRICAN AMERICAN): 36.3 ML/MIN/1.73 M^2
EST. GFR  (NON AFRICAN AMERICAN): 39.7 ML/MIN/1.73 M^2
EST. GFR  (NON AFRICAN AMERICAN): 43.7 ML/MIN/1.73 M^2
EST. GFR  (NON AFRICAN AMERICAN): 48.5 ML/MIN/1.73 M^2
EST. GFR  (NON AFRICAN AMERICAN): 54.5 ML/MIN/1.73 M^2
EST. GFR  (NON AFRICAN AMERICAN): 9.6 ML/MIN/1.73 M^2
EST. GFR  (NON AFRICAN AMERICAN): >60 ML/MIN/1.73 M^2
FIBRINOGEN PPP-MCNC: 317 MG/DL (ref 182–400)
FINAL PATHOLOGIC DIAGNOSIS: NORMAL
FIO2: 100
FIO2: 30
FIO2: 35
FIO2: 40
FIO2: 50
FIO2: 60
FIO2: 65
FIO2: 70
FIO2: 70
FIO2: 80
FIO2: 90
FLOW: 10
FLOW: 4
FLOW: 4
FLOW: 6
FRACTIONAL SHORTENING: 27 % (ref 28–44)
FRACTIONAL SHORTENING: 27 % (ref 28–44)
FRACTIONAL SHORTENING: 33 % (ref 28–44)
GIANT PLATELETS BLD QL SMEAR: PRESENT
GLUCOSE SERPL-MCNC: 100 MG/DL (ref 70–110)
GLUCOSE SERPL-MCNC: 101 MG/DL (ref 70–110)
GLUCOSE SERPL-MCNC: 102 MG/DL (ref 70–110)
GLUCOSE SERPL-MCNC: 102 MG/DL (ref 70–110)
GLUCOSE SERPL-MCNC: 104 MG/DL (ref 70–110)
GLUCOSE SERPL-MCNC: 104 MG/DL (ref 70–110)
GLUCOSE SERPL-MCNC: 106 MG/DL (ref 70–110)
GLUCOSE SERPL-MCNC: 107 MG/DL (ref 70–110)
GLUCOSE SERPL-MCNC: 110 MG/DL (ref 70–110)
GLUCOSE SERPL-MCNC: 112 MG/DL (ref 70–110)
GLUCOSE SERPL-MCNC: 112 MG/DL (ref 70–110)
GLUCOSE SERPL-MCNC: 113 MG/DL (ref 70–110)
GLUCOSE SERPL-MCNC: 116 MG/DL (ref 70–110)
GLUCOSE SERPL-MCNC: 116 MG/DL (ref 70–110)
GLUCOSE SERPL-MCNC: 117 MG/DL (ref 70–110)
GLUCOSE SERPL-MCNC: 122 MG/DL (ref 70–110)
GLUCOSE SERPL-MCNC: 124 MG/DL (ref 70–110)
GLUCOSE SERPL-MCNC: 125 MG/DL (ref 70–110)
GLUCOSE SERPL-MCNC: 128 MG/DL (ref 70–110)
GLUCOSE SERPL-MCNC: 130 MG/DL (ref 70–110)
GLUCOSE SERPL-MCNC: 130 MG/DL (ref 70–110)
GLUCOSE SERPL-MCNC: 133 MG/DL (ref 70–110)
GLUCOSE SERPL-MCNC: 134 MG/DL (ref 70–110)
GLUCOSE SERPL-MCNC: 135 MG/DL (ref 70–110)
GLUCOSE SERPL-MCNC: 135 MG/DL (ref 70–110)
GLUCOSE SERPL-MCNC: 136 MG/DL (ref 70–110)
GLUCOSE SERPL-MCNC: 137 MG/DL (ref 70–110)
GLUCOSE SERPL-MCNC: 138 MG/DL (ref 70–110)
GLUCOSE SERPL-MCNC: 138 MG/DL (ref 70–110)
GLUCOSE SERPL-MCNC: 139 MG/DL (ref 70–110)
GLUCOSE SERPL-MCNC: 139 MG/DL (ref 70–110)
GLUCOSE SERPL-MCNC: 142 MG/DL (ref 70–110)
GLUCOSE SERPL-MCNC: 143 MG/DL (ref 70–110)
GLUCOSE SERPL-MCNC: 144 MG/DL (ref 70–110)
GLUCOSE SERPL-MCNC: 145 MG/DL (ref 70–110)
GLUCOSE SERPL-MCNC: 146 MG/DL (ref 70–110)
GLUCOSE SERPL-MCNC: 146 MG/DL (ref 70–110)
GLUCOSE SERPL-MCNC: 147 MG/DL (ref 70–110)
GLUCOSE SERPL-MCNC: 148 MG/DL (ref 70–110)
GLUCOSE SERPL-MCNC: 148 MG/DL (ref 70–110)
GLUCOSE SERPL-MCNC: 149 MG/DL (ref 70–110)
GLUCOSE SERPL-MCNC: 150 MG/DL (ref 70–110)
GLUCOSE SERPL-MCNC: 150 MG/DL (ref 70–110)
GLUCOSE SERPL-MCNC: 152 MG/DL (ref 70–110)
GLUCOSE SERPL-MCNC: 152 MG/DL (ref 70–110)
GLUCOSE SERPL-MCNC: 154 MG/DL (ref 70–110)
GLUCOSE SERPL-MCNC: 156 MG/DL (ref 70–110)
GLUCOSE SERPL-MCNC: 159 MG/DL (ref 70–110)
GLUCOSE SERPL-MCNC: 160 MG/DL (ref 70–110)
GLUCOSE SERPL-MCNC: 160 MG/DL (ref 70–110)
GLUCOSE SERPL-MCNC: 161 MG/DL (ref 70–110)
GLUCOSE SERPL-MCNC: 162 MG/DL (ref 70–110)
GLUCOSE SERPL-MCNC: 162 MG/DL (ref 70–110)
GLUCOSE SERPL-MCNC: 163 MG/DL (ref 70–110)
GLUCOSE SERPL-MCNC: 165 MG/DL (ref 70–110)
GLUCOSE SERPL-MCNC: 165 MG/DL (ref 70–110)
GLUCOSE SERPL-MCNC: 166 MG/DL (ref 70–110)
GLUCOSE SERPL-MCNC: 168 MG/DL (ref 70–110)
GLUCOSE SERPL-MCNC: 169 MG/DL (ref 70–110)
GLUCOSE SERPL-MCNC: 170 MG/DL (ref 70–110)
GLUCOSE SERPL-MCNC: 171 MG/DL (ref 70–110)
GLUCOSE SERPL-MCNC: 171 MG/DL (ref 70–110)
GLUCOSE SERPL-MCNC: 173 MG/DL (ref 70–110)
GLUCOSE SERPL-MCNC: 174 MG/DL (ref 70–110)
GLUCOSE SERPL-MCNC: 174 MG/DL (ref 70–110)
GLUCOSE SERPL-MCNC: 175 MG/DL (ref 70–110)
GLUCOSE SERPL-MCNC: 177 MG/DL (ref 70–110)
GLUCOSE SERPL-MCNC: 179 MG/DL (ref 70–110)
GLUCOSE SERPL-MCNC: 180 MG/DL (ref 70–110)
GLUCOSE SERPL-MCNC: 182 MG/DL (ref 70–110)
GLUCOSE SERPL-MCNC: 182 MG/DL (ref 70–110)
GLUCOSE SERPL-MCNC: 183 MG/DL (ref 70–110)
GLUCOSE SERPL-MCNC: 187 MG/DL (ref 70–110)
GLUCOSE SERPL-MCNC: 189 MG/DL (ref 70–110)
GLUCOSE SERPL-MCNC: 193 MG/DL (ref 70–110)
GLUCOSE SERPL-MCNC: 193 MG/DL (ref 70–110)
GLUCOSE SERPL-MCNC: 196 MG/DL (ref 70–110)
GLUCOSE SERPL-MCNC: 197 MG/DL (ref 70–110)
GLUCOSE SERPL-MCNC: 197 MG/DL (ref 70–110)
GLUCOSE SERPL-MCNC: 199 MG/DL (ref 70–110)
GLUCOSE SERPL-MCNC: 200 MG/DL (ref 70–110)
GLUCOSE SERPL-MCNC: 202 MG/DL (ref 70–110)
GLUCOSE SERPL-MCNC: 203 MG/DL (ref 70–110)
GLUCOSE SERPL-MCNC: 203 MG/DL (ref 70–110)
GLUCOSE SERPL-MCNC: 207 MG/DL (ref 70–110)
GLUCOSE SERPL-MCNC: 209 MG/DL (ref 70–110)
GLUCOSE SERPL-MCNC: 211 MG/DL (ref 70–110)
GLUCOSE SERPL-MCNC: 211 MG/DL (ref 70–110)
GLUCOSE SERPL-MCNC: 213 MG/DL (ref 70–110)
GLUCOSE SERPL-MCNC: 214 MG/DL (ref 70–110)
GLUCOSE SERPL-MCNC: 216 MG/DL (ref 70–110)
GLUCOSE SERPL-MCNC: 216 MG/DL (ref 70–110)
GLUCOSE SERPL-MCNC: 219 MG/DL (ref 70–110)
GLUCOSE SERPL-MCNC: 219 MG/DL (ref 70–110)
GLUCOSE SERPL-MCNC: 224 MG/DL (ref 70–110)
GLUCOSE SERPL-MCNC: 231 MG/DL (ref 70–110)
GLUCOSE SERPL-MCNC: 243 MG/DL (ref 70–110)
GLUCOSE SERPL-MCNC: 244 MG/DL (ref 70–110)
GLUCOSE SERPL-MCNC: 246 MG/DL (ref 70–110)
GLUCOSE SERPL-MCNC: 254 MG/DL (ref 70–110)
GLUCOSE SERPL-MCNC: 258 MG/DL (ref 70–110)
GLUCOSE SERPL-MCNC: 282 MG/DL (ref 70–110)
GLUCOSE SERPL-MCNC: 81 MG/DL (ref 70–110)
GLUCOSE SERPL-MCNC: 88 MG/DL (ref 70–110)
GLUCOSE SERPL-MCNC: 92 MG/DL (ref 70–110)
GLUCOSE SERPL-MCNC: 95 MG/DL (ref 70–110)
GRAM STN SPEC: ABNORMAL
GRAM STN SPEC: ABNORMAL
GROSS: NORMAL
HCO3 UR-SCNC: 13.2 MMOL/L (ref 24–28)
HCO3 UR-SCNC: 14.8 MMOL/L (ref 24–28)
HCO3 UR-SCNC: 15.7 MMOL/L (ref 24–28)
HCO3 UR-SCNC: 16.6 MMOL/L (ref 24–28)
HCO3 UR-SCNC: 17.3 MMOL/L (ref 24–28)
HCO3 UR-SCNC: 17.4 MMOL/L (ref 24–28)
HCO3 UR-SCNC: 17.6 MMOL/L (ref 24–28)
HCO3 UR-SCNC: 18 MMOL/L (ref 24–28)
HCO3 UR-SCNC: 18.2 MMOL/L (ref 24–28)
HCO3 UR-SCNC: 18.5 MMOL/L (ref 24–28)
HCO3 UR-SCNC: 18.8 MMOL/L (ref 24–28)
HCO3 UR-SCNC: 19 MMOL/L (ref 24–28)
HCO3 UR-SCNC: 19.7 MMOL/L (ref 24–28)
HCO3 UR-SCNC: 19.7 MMOL/L (ref 24–28)
HCO3 UR-SCNC: 20.1 MMOL/L (ref 24–28)
HCO3 UR-SCNC: 20.5 MMOL/L (ref 24–28)
HCO3 UR-SCNC: 20.5 MMOL/L (ref 24–28)
HCO3 UR-SCNC: 20.8 MMOL/L (ref 24–28)
HCO3 UR-SCNC: 20.8 MMOL/L (ref 24–28)
HCO3 UR-SCNC: 21.1 MMOL/L (ref 24–28)
HCO3 UR-SCNC: 21.5 MMOL/L (ref 24–28)
HCO3 UR-SCNC: 21.7 MMOL/L (ref 24–28)
HCO3 UR-SCNC: 21.7 MMOL/L (ref 24–28)
HCO3 UR-SCNC: 21.8 MMOL/L (ref 24–28)
HCO3 UR-SCNC: 21.8 MMOL/L (ref 24–28)
HCO3 UR-SCNC: 22.1 MMOL/L (ref 24–28)
HCO3 UR-SCNC: 22.4 MMOL/L (ref 24–28)
HCO3 UR-SCNC: 22.5 MMOL/L (ref 24–28)
HCO3 UR-SCNC: 22.9 MMOL/L (ref 24–28)
HCO3 UR-SCNC: 23 MMOL/L (ref 24–28)
HCO3 UR-SCNC: 23.1 MMOL/L (ref 24–28)
HCO3 UR-SCNC: 23.2 MMOL/L (ref 24–28)
HCO3 UR-SCNC: 23.4 MMOL/L (ref 24–28)
HCO3 UR-SCNC: 23.6 MMOL/L (ref 24–28)
HCO3 UR-SCNC: 23.7 MMOL/L (ref 24–28)
HCO3 UR-SCNC: 23.9 MMOL/L (ref 24–28)
HCO3 UR-SCNC: 23.9 MMOL/L (ref 24–28)
HCO3 UR-SCNC: 24 MMOL/L (ref 24–28)
HCO3 UR-SCNC: 24.1 MMOL/L (ref 24–28)
HCO3 UR-SCNC: 24.2 MMOL/L (ref 24–28)
HCO3 UR-SCNC: 24.3 MMOL/L (ref 24–28)
HCO3 UR-SCNC: 24.5 MMOL/L (ref 24–28)
HCO3 UR-SCNC: 24.5 MMOL/L (ref 24–28)
HCO3 UR-SCNC: 24.6 MMOL/L (ref 24–28)
HCO3 UR-SCNC: 24.8 MMOL/L (ref 24–28)
HCO3 UR-SCNC: 24.9 MMOL/L (ref 24–28)
HCO3 UR-SCNC: 25 MMOL/L (ref 24–28)
HCO3 UR-SCNC: 25.1 MMOL/L (ref 24–28)
HCO3 UR-SCNC: 25.1 MMOL/L (ref 24–28)
HCO3 UR-SCNC: 25.2 MMOL/L (ref 24–28)
HCO3 UR-SCNC: 25.5 MMOL/L (ref 24–28)
HCO3 UR-SCNC: 25.6 MMOL/L (ref 24–28)
HCO3 UR-SCNC: 25.7 MMOL/L (ref 24–28)
HCO3 UR-SCNC: 25.8 MMOL/L (ref 24–28)
HCO3 UR-SCNC: 26 MMOL/L (ref 24–28)
HCO3 UR-SCNC: 26 MMOL/L (ref 24–28)
HCO3 UR-SCNC: 26.1 MMOL/L (ref 24–28)
HCO3 UR-SCNC: 26.1 MMOL/L (ref 24–28)
HCO3 UR-SCNC: 26.2 MMOL/L (ref 24–28)
HCO3 UR-SCNC: 26.3 MMOL/L (ref 24–28)
HCO3 UR-SCNC: 26.4 MMOL/L (ref 24–28)
HCO3 UR-SCNC: 26.5 MMOL/L (ref 24–28)
HCO3 UR-SCNC: 26.7 MMOL/L (ref 24–28)
HCO3 UR-SCNC: 26.8 MMOL/L (ref 24–28)
HCO3 UR-SCNC: 26.9 MMOL/L (ref 24–28)
HCO3 UR-SCNC: 26.9 MMOL/L (ref 24–28)
HCO3 UR-SCNC: 27 MMOL/L (ref 24–28)
HCO3 UR-SCNC: 27.2 MMOL/L (ref 24–28)
HCO3 UR-SCNC: 27.2 MMOL/L (ref 24–28)
HCO3 UR-SCNC: 27.3 MMOL/L (ref 24–28)
HCO3 UR-SCNC: 27.3 MMOL/L (ref 24–28)
HCO3 UR-SCNC: 27.4 MMOL/L (ref 24–28)
HCO3 UR-SCNC: 27.5 MMOL/L (ref 24–28)
HCO3 UR-SCNC: 27.5 MMOL/L (ref 24–28)
HCO3 UR-SCNC: 27.6 MMOL/L (ref 24–28)
HCO3 UR-SCNC: 27.7 MMOL/L (ref 24–28)
HCO3 UR-SCNC: 27.8 MMOL/L (ref 24–28)
HCO3 UR-SCNC: 27.9 MMOL/L (ref 24–28)
HCO3 UR-SCNC: 28 MMOL/L (ref 24–28)
HCO3 UR-SCNC: 28.1 MMOL/L (ref 24–28)
HCO3 UR-SCNC: 28.2 MMOL/L (ref 24–28)
HCO3 UR-SCNC: 28.2 MMOL/L (ref 24–28)
HCO3 UR-SCNC: 28.4 MMOL/L (ref 24–28)
HCO3 UR-SCNC: 28.7 MMOL/L (ref 24–28)
HCO3 UR-SCNC: 28.8 MMOL/L (ref 24–28)
HCO3 UR-SCNC: 28.9 MMOL/L (ref 24–28)
HCO3 UR-SCNC: 29.1 MMOL/L (ref 24–28)
HCO3 UR-SCNC: 29.2 MMOL/L (ref 24–28)
HCO3 UR-SCNC: 29.4 MMOL/L (ref 24–28)
HCO3 UR-SCNC: 29.6 MMOL/L (ref 24–28)
HCO3 UR-SCNC: 29.7 MMOL/L (ref 24–28)
HCO3 UR-SCNC: 29.7 MMOL/L (ref 24–28)
HCO3 UR-SCNC: 30 MMOL/L (ref 24–28)
HCO3 UR-SCNC: 30.1 MMOL/L (ref 24–28)
HCO3 UR-SCNC: 30.1 MMOL/L (ref 24–28)
HCO3 UR-SCNC: 30.7 MMOL/L (ref 24–28)
HCO3 UR-SCNC: 30.8 MMOL/L (ref 24–28)
HCO3 UR-SCNC: 31 MMOL/L (ref 24–28)
HCO3 UR-SCNC: 31.1 MMOL/L (ref 24–28)
HCO3 UR-SCNC: 31.2 MMOL/L (ref 24–28)
HCO3 UR-SCNC: 31.3 MMOL/L (ref 24–28)
HCO3 UR-SCNC: 31.6 MMOL/L (ref 24–28)
HCO3 UR-SCNC: 31.7 MMOL/L (ref 24–28)
HCO3 UR-SCNC: 32 MMOL/L (ref 24–28)
HCO3 UR-SCNC: 33.4 MMOL/L (ref 24–28)
HCO3 UR-SCNC: 34.1 MMOL/L (ref 24–28)
HCT VFR BLD AUTO: 20.8 % (ref 37–48.5)
HCT VFR BLD AUTO: 20.8 % (ref 37–48.5)
HCT VFR BLD AUTO: 20.9 % (ref 37–48.5)
HCT VFR BLD AUTO: 21 % (ref 37–48.5)
HCT VFR BLD AUTO: 21.2 % (ref 37–48.5)
HCT VFR BLD AUTO: 21.6 % (ref 37–48.5)
HCT VFR BLD AUTO: 21.7 % (ref 37–48.5)
HCT VFR BLD AUTO: 22.4 % (ref 37–48.5)
HCT VFR BLD AUTO: 22.9 % (ref 37–48.5)
HCT VFR BLD AUTO: 23.1 % (ref 37–48.5)
HCT VFR BLD AUTO: 23.3 % (ref 37–48.5)
HCT VFR BLD AUTO: 23.3 % (ref 37–48.5)
HCT VFR BLD AUTO: 23.4 % (ref 37–48.5)
HCT VFR BLD AUTO: 23.5 % (ref 37–48.5)
HCT VFR BLD AUTO: 23.5 % (ref 37–48.5)
HCT VFR BLD AUTO: 23.9 % (ref 37–48.5)
HCT VFR BLD AUTO: 24 % (ref 37–48.5)
HCT VFR BLD AUTO: 24 % (ref 37–48.5)
HCT VFR BLD AUTO: 24.1 % (ref 37–48.5)
HCT VFR BLD AUTO: 24.1 % (ref 37–48.5)
HCT VFR BLD AUTO: 24.2 % (ref 37–48.5)
HCT VFR BLD AUTO: 24.2 % (ref 37–48.5)
HCT VFR BLD AUTO: 24.4 % (ref 37–48.5)
HCT VFR BLD AUTO: 24.4 % (ref 37–48.5)
HCT VFR BLD AUTO: 24.6 % (ref 37–48.5)
HCT VFR BLD AUTO: 24.8 % (ref 37–48.5)
HCT VFR BLD AUTO: 24.9 % (ref 37–48.5)
HCT VFR BLD AUTO: 25.1 % (ref 37–48.5)
HCT VFR BLD AUTO: 25.2 % (ref 37–48.5)
HCT VFR BLD AUTO: 25.2 % (ref 37–48.5)
HCT VFR BLD AUTO: 25.4 % (ref 37–48.5)
HCT VFR BLD AUTO: 25.4 % (ref 37–48.5)
HCT VFR BLD AUTO: 25.5 % (ref 37–48.5)
HCT VFR BLD AUTO: 25.5 % (ref 37–48.5)
HCT VFR BLD AUTO: 25.6 % (ref 37–48.5)
HCT VFR BLD AUTO: 25.6 % (ref 37–48.5)
HCT VFR BLD AUTO: 25.8 % (ref 37–48.5)
HCT VFR BLD AUTO: 25.9 % (ref 37–48.5)
HCT VFR BLD AUTO: 25.9 % (ref 37–48.5)
HCT VFR BLD AUTO: 26.1 % (ref 37–48.5)
HCT VFR BLD AUTO: 26.2 % (ref 37–48.5)
HCT VFR BLD AUTO: 26.4 % (ref 37–48.5)
HCT VFR BLD AUTO: 26.5 % (ref 37–48.5)
HCT VFR BLD AUTO: 26.5 % (ref 37–48.5)
HCT VFR BLD AUTO: 26.6 % (ref 37–48.5)
HCT VFR BLD AUTO: 27 % (ref 37–48.5)
HCT VFR BLD AUTO: 27.3 % (ref 37–48.5)
HCT VFR BLD AUTO: 27.6 % (ref 37–48.5)
HCT VFR BLD AUTO: 27.7 % (ref 37–48.5)
HCT VFR BLD AUTO: 28.1 % (ref 37–48.5)
HCT VFR BLD AUTO: 28.3 % (ref 37–48.5)
HCT VFR BLD AUTO: 28.8 % (ref 37–48.5)
HCT VFR BLD AUTO: 29.1 % (ref 37–48.5)
HCT VFR BLD AUTO: 29.6 % (ref 37–48.5)
HCT VFR BLD AUTO: 29.6 % (ref 37–48.5)
HCT VFR BLD AUTO: 30.9 % (ref 37–48.5)
HCT VFR BLD AUTO: 31.3 % (ref 37–48.5)
HCT VFR BLD AUTO: 31.6 % (ref 37–48.5)
HCT VFR BLD AUTO: 32 % (ref 37–48.5)
HCT VFR BLD AUTO: 32.1 % (ref 37–48.5)
HCT VFR BLD AUTO: 38 % (ref 37–48.5)
HCT VFR BLD CALC: 15 %PCV (ref 36–54)
HCT VFR BLD CALC: 18 %PCV (ref 36–54)
HCT VFR BLD CALC: 20 %PCV (ref 36–54)
HCT VFR BLD CALC: 21 %PCV (ref 36–54)
HCT VFR BLD CALC: 22 %PCV (ref 36–54)
HCT VFR BLD CALC: 23 %PCV (ref 36–54)
HCT VFR BLD CALC: 24 %PCV (ref 36–54)
HCT VFR BLD CALC: 25 %PCV (ref 36–54)
HCT VFR BLD CALC: 26 %PCV (ref 36–54)
HCT VFR BLD CALC: 27 %PCV (ref 36–54)
HCT VFR BLD CALC: 28 %PCV (ref 36–54)
HCT VFR BLD CALC: 29 %PCV (ref 36–54)
HCT VFR BLD CALC: 32 %PCV (ref 36–54)
HCT VFR BLD CALC: 34 %PCV (ref 36–54)
HCT VFR BLD CALC: 37 %PCV (ref 36–54)
HCT VFR BLD CALC: 37 %PCV (ref 36–54)
HCT VFR BLD CALC: 38 %PCV (ref 36–54)
HCT VFR BLD CALC: 40 %PCV (ref 36–54)
HGB BLD-MCNC: 10 G/DL (ref 12–16)
HGB BLD-MCNC: 10.1 G/DL (ref 12–16)
HGB BLD-MCNC: 10.1 G/DL (ref 12–16)
HGB BLD-MCNC: 10.2 G/DL (ref 12–16)
HGB BLD-MCNC: 12.3 G/DL (ref 12–16)
HGB BLD-MCNC: 6.1 G/DL (ref 12–16)
HGB BLD-MCNC: 6.1 G/DL (ref 12–16)
HGB BLD-MCNC: 6.2 G/DL (ref 12–16)
HGB BLD-MCNC: 6.4 G/DL (ref 12–16)
HGB BLD-MCNC: 6.4 G/DL (ref 12–16)
HGB BLD-MCNC: 6.5 G/DL (ref 12–16)
HGB BLD-MCNC: 7 G/DL (ref 12–16)
HGB BLD-MCNC: 7 G/DL (ref 12–16)
HGB BLD-MCNC: 7.1 G/DL (ref 12–16)
HGB BLD-MCNC: 7.2 G/DL (ref 12–16)
HGB BLD-MCNC: 7.2 G/DL (ref 12–16)
HGB BLD-MCNC: 7.3 G/DL (ref 12–16)
HGB BLD-MCNC: 7.4 G/DL (ref 12–16)
HGB BLD-MCNC: 7.5 G/DL (ref 12–16)
HGB BLD-MCNC: 7.6 G/DL (ref 12–16)
HGB BLD-MCNC: 7.6 G/DL (ref 12–16)
HGB BLD-MCNC: 7.7 G/DL (ref 12–16)
HGB BLD-MCNC: 7.7 G/DL (ref 12–16)
HGB BLD-MCNC: 7.8 G/DL (ref 12–16)
HGB BLD-MCNC: 8 G/DL (ref 12–16)
HGB BLD-MCNC: 8.1 G/DL (ref 12–16)
HGB BLD-MCNC: 8.1 G/DL (ref 12–16)
HGB BLD-MCNC: 8.2 G/DL (ref 12–16)
HGB BLD-MCNC: 8.3 G/DL (ref 12–16)
HGB BLD-MCNC: 8.3 G/DL (ref 12–16)
HGB BLD-MCNC: 8.4 G/DL (ref 12–16)
HGB BLD-MCNC: 8.5 G/DL (ref 12–16)
HGB BLD-MCNC: 8.7 G/DL (ref 12–16)
HGB BLD-MCNC: 8.8 G/DL (ref 12–16)
HGB BLD-MCNC: 8.8 G/DL (ref 12–16)
HGB BLD-MCNC: 9 G/DL (ref 12–16)
HGB BLD-MCNC: 9.2 G/DL (ref 12–16)
HGB BLD-MCNC: 9.2 G/DL (ref 12–16)
HGB BLD-MCNC: 9.4 G/DL (ref 12–16)
HGB BLD-MCNC: 9.5 G/DL (ref 12–16)
HGB BLD-MCNC: 9.7 G/DL (ref 12–16)
HR MV ECHO: 80 BPM
HR MV ECHO: 86 BPM
HYPOCHROMIA BLD QL SMEAR: ABNORMAL
IMM GRANULOCYTES # BLD AUTO: 0.02 K/UL (ref 0–0.04)
IMM GRANULOCYTES # BLD AUTO: 0.03 K/UL (ref 0–0.04)
IMM GRANULOCYTES # BLD AUTO: 0.03 K/UL (ref 0–0.04)
IMM GRANULOCYTES # BLD AUTO: 0.04 K/UL (ref 0–0.04)
IMM GRANULOCYTES # BLD AUTO: 0.05 K/UL (ref 0–0.04)
IMM GRANULOCYTES # BLD AUTO: 0.05 K/UL (ref 0–0.04)
IMM GRANULOCYTES # BLD AUTO: 0.06 K/UL (ref 0–0.04)
IMM GRANULOCYTES # BLD AUTO: 0.07 K/UL (ref 0–0.04)
IMM GRANULOCYTES # BLD AUTO: 0.08 K/UL (ref 0–0.04)
IMM GRANULOCYTES # BLD AUTO: 0.09 K/UL (ref 0–0.04)
IMM GRANULOCYTES # BLD AUTO: 0.1 K/UL (ref 0–0.04)
IMM GRANULOCYTES # BLD AUTO: 0.1 K/UL (ref 0–0.04)
IMM GRANULOCYTES # BLD AUTO: 0.11 K/UL (ref 0–0.04)
IMM GRANULOCYTES # BLD AUTO: 0.11 K/UL (ref 0–0.04)
IMM GRANULOCYTES # BLD AUTO: 0.12 K/UL (ref 0–0.04)
IMM GRANULOCYTES # BLD AUTO: 0.13 K/UL (ref 0–0.04)
IMM GRANULOCYTES # BLD AUTO: 0.14 K/UL (ref 0–0.04)
IMM GRANULOCYTES # BLD AUTO: 0.16 K/UL (ref 0–0.04)
IMM GRANULOCYTES # BLD AUTO: 0.17 K/UL (ref 0–0.04)
IMM GRANULOCYTES # BLD AUTO: 0.18 K/UL (ref 0–0.04)
IMM GRANULOCYTES # BLD AUTO: 0.22 K/UL (ref 0–0.04)
IMM GRANULOCYTES # BLD AUTO: 0.27 K/UL (ref 0–0.04)
IMM GRANULOCYTES # BLD AUTO: 0.29 K/UL (ref 0–0.04)
IMM GRANULOCYTES # BLD AUTO: 0.33 K/UL (ref 0–0.04)
IMM GRANULOCYTES # BLD AUTO: 0.33 K/UL (ref 0–0.04)
IMM GRANULOCYTES # BLD AUTO: 0.44 K/UL (ref 0–0.04)
IMM GRANULOCYTES # BLD AUTO: 0.48 K/UL (ref 0–0.04)
IMM GRANULOCYTES # BLD AUTO: 0.57 K/UL (ref 0–0.04)
IMM GRANULOCYTES # BLD AUTO: 0.73 K/UL (ref 0–0.04)
IMM GRANULOCYTES # BLD AUTO: 0.82 K/UL (ref 0–0.04)
IMM GRANULOCYTES # BLD AUTO: 0.99 K/UL (ref 0–0.04)
IMM GRANULOCYTES # BLD AUTO: ABNORMAL K/UL (ref 0–0.04)
IMM GRANULOCYTES NFR BLD AUTO: 0.2 % (ref 0–0.5)
IMM GRANULOCYTES NFR BLD AUTO: 0.3 % (ref 0–0.5)
IMM GRANULOCYTES NFR BLD AUTO: 0.3 % (ref 0–0.5)
IMM GRANULOCYTES NFR BLD AUTO: 0.4 % (ref 0–0.5)
IMM GRANULOCYTES NFR BLD AUTO: 0.5 % (ref 0–0.5)
IMM GRANULOCYTES NFR BLD AUTO: 0.6 % (ref 0–0.5)
IMM GRANULOCYTES NFR BLD AUTO: 0.7 % (ref 0–0.5)
IMM GRANULOCYTES NFR BLD AUTO: 0.8 % (ref 0–0.5)
IMM GRANULOCYTES NFR BLD AUTO: 0.9 % (ref 0–0.5)
IMM GRANULOCYTES NFR BLD AUTO: 1.1 % (ref 0–0.5)
IMM GRANULOCYTES NFR BLD AUTO: 1.1 % (ref 0–0.5)
IMM GRANULOCYTES NFR BLD AUTO: 1.2 % (ref 0–0.5)
IMM GRANULOCYTES NFR BLD AUTO: 1.3 % (ref 0–0.5)
IMM GRANULOCYTES NFR BLD AUTO: 1.3 % (ref 0–0.5)
IMM GRANULOCYTES NFR BLD AUTO: 1.4 % (ref 0–0.5)
IMM GRANULOCYTES NFR BLD AUTO: 1.5 % (ref 0–0.5)
IMM GRANULOCYTES NFR BLD AUTO: 1.5 % (ref 0–0.5)
IMM GRANULOCYTES NFR BLD AUTO: 1.6 % (ref 0–0.5)
IMM GRANULOCYTES NFR BLD AUTO: 1.6 % (ref 0–0.5)
IMM GRANULOCYTES NFR BLD AUTO: 1.7 % (ref 0–0.5)
IMM GRANULOCYTES NFR BLD AUTO: 2.2 % (ref 0–0.5)
IMM GRANULOCYTES NFR BLD AUTO: 2.3 % (ref 0–0.5)
IMM GRANULOCYTES NFR BLD AUTO: 2.3 % (ref 0–0.5)
IMM GRANULOCYTES NFR BLD AUTO: 3.2 % (ref 0–0.5)
IMM GRANULOCYTES NFR BLD AUTO: 3.4 % (ref 0–0.5)
IMM GRANULOCYTES NFR BLD AUTO: 4.3 % (ref 0–0.5)
IMM GRANULOCYTES NFR BLD AUTO: ABNORMAL % (ref 0–0.5)
INR PPP: 1.1 (ref 0.8–1.2)
INR PPP: 1.2 (ref 0.8–1.2)
INR PPP: 1.3 (ref 0.8–1.2)
INR PPP: 1.4 (ref 0.8–1.2)
INR PPP: 1.5 (ref 0.8–1.2)
INR PPP: 1.6 (ref 0.8–1.2)
INR PPP: 1.7 (ref 0.8–1.2)
INR PPP: 1.7 (ref 0.8–1.2)
INR PPP: 1.8 (ref 0.8–1.2)
INR PPP: 1.9 (ref 0.8–1.2)
INR PPP: 1.9 (ref 0.8–1.2)
INR PPP: 2.1 (ref 0.8–1.2)
INR PPP: 2.2 (ref 0.8–1.2)
INR PPP: 2.3 (ref 0.8–1.2)
INR PPP: 2.4 (ref 0.8–1.2)
INR PPP: 2.4 (ref 0.8–1.2)
INR PPP: 2.5 (ref 0.8–1.2)
INR PPP: 2.6 (ref 0.8–1.2)
INR PPP: 2.7 (ref 0.8–1.2)
INR PPP: 2.8 (ref 0.8–1.2)
INR PPP: 2.9 (ref 0.8–1.2)
INR PPP: 3 (ref 0.8–1.2)
INR PPP: 3 (ref 0.8–1.2)
INR PPP: 3.1 (ref 0.8–1.2)
INR PPP: 3.1 (ref 0.8–1.2)
INR PPP: 3.2 (ref 0.8–1.2)
INR PPP: 3.3 (ref 0.8–1.2)
INR PPP: 3.5 (ref 0.8–1.2)
INR PPP: 3.7 (ref 0.8–1.2)
INR PPP: 3.7 (ref 0.8–1.2)
INR PPP: 3.9 (ref 0.8–1.2)
INR PPP: 4 (ref 0.8–1.2)
INR PPP: 4.1 (ref 0.8–1.2)
INR PPP: 4.3 (ref 0.8–1.2)
INR PPP: 4.4 (ref 0.8–1.2)
INR PPP: 4.4 (ref 0.8–1.2)
INR PPP: 4.5 (ref 0.8–1.2)
INR PPP: 4.7 (ref 0.8–1.2)
INR PPP: 4.7 (ref 0.8–1.2)
INR PPP: 5 (ref 0.8–1.2)
INR PPP: 5.1 (ref 0.8–1.2)
INR PPP: 5.2 (ref 0.8–1.2)
INR PPP: 6.2 (ref 0.8–1.2)
INR PPP: 6.3 (ref 0.8–1.2)
INR PPP: 6.5 (ref 0.8–1.2)
INR PPP: 6.6 (ref 0.8–1.2)
INR PPP: 6.7 (ref 0.8–1.2)
INR PPP: 7 (ref 0.8–1.2)
INR PPP: 7.6 (ref 0.8–1.2)
INR PPP: 8.2 (ref 0.8–1.2)
INTERVENTRICULAR SEPTUM: 0.83 CM (ref 0.6–1.1)
INTERVENTRICULAR SEPTUM: 0.85 CM (ref 0.6–1.1)
INTERVENTRICULAR SEPTUM: 1.39 CM (ref 0.6–1.1)
IP: 12
IP: 14
IVRT: 71.36 MSEC
LA MAJOR: 7.07 CM
LA MAJOR: 7.16 CM
LA MAJOR: 7.39 CM
LA MINOR: 6.13 CM
LA MINOR: 7.22 CM
LA MINOR: 7.43 CM
LA WIDTH: 4.59 CM
LA WIDTH: 4.8 CM
LA WIDTH: 5.6 CM
LACTATE SERPL-SCNC: 0.8 MMOL/L (ref 0.5–2.2)
LACTATE SERPL-SCNC: 0.9 MMOL/L (ref 0.5–2.2)
LDH SERPL L TO P-CCNC: 0.45 MMOL/L (ref 0.36–1.25)
LDH SERPL L TO P-CCNC: 0.52 MMOL/L (ref 0.36–1.25)
LDH SERPL L TO P-CCNC: 0.76 MMOL/L (ref 0.36–1.25)
LDH SERPL L TO P-CCNC: 0.93 MMOL/L (ref 0.36–1.25)
LDH SERPL L TO P-CCNC: 0.98 MMOL/L (ref 0.36–1.25)
LDH SERPL L TO P-CCNC: 1.15 MMOL/L (ref 0.36–1.25)
LDH SERPL L TO P-CCNC: 1.26 MMOL/L (ref 0.36–1.25)
LDH SERPL L TO P-CCNC: 1.31 MMOL/L (ref 0.36–1.25)
LDH SERPL L TO P-CCNC: 1.47 MMOL/L (ref 0.36–1.25)
LDH SERPL L TO P-CCNC: 1.61 MMOL/L (ref 0.36–1.25)
LDH SERPL L TO P-CCNC: 1.65 MMOL/L (ref 0.36–1.25)
LDH SERPL L TO P-CCNC: 2.21 MMOL/L (ref 0.36–1.25)
LDH SERPL L TO P-CCNC: 3.11 MMOL/L (ref 0.36–1.25)
LDH SERPL L TO P-CCNC: 3.43 MMOL/L (ref 0.36–1.25)
LEFT ATRIUM SIZE: 4.42 CM
LEFT ATRIUM SIZE: 4.8 CM
LEFT ATRIUM SIZE: 5.49 CM
LEFT ATRIUM VOLUME INDEX MOD: 144.9 ML/M2
LEFT ATRIUM VOLUME INDEX MOD: 66.5 ML/M2
LEFT ATRIUM VOLUME INDEX: 140.3 ML/M2
LEFT ATRIUM VOLUME INDEX: 85.5 ML/M2
LEFT ATRIUM VOLUME INDEX: 85.7 ML/M2
LEFT ATRIUM VOLUME MOD: 199.9 CM3
LEFT ATRIUM VOLUME MOD: 96.44 CM3
LEFT ATRIUM VOLUME: 123.99 CM3
LEFT ATRIUM VOLUME: 128.6 CM3
LEFT ATRIUM VOLUME: 193.64 CM3
LEFT INTERNAL DIMENSION IN SYSTOLE: 2.97 CM (ref 2.1–4)
LEFT INTERNAL DIMENSION IN SYSTOLE: 3.3 CM (ref 2.1–4)
LEFT INTERNAL DIMENSION IN SYSTOLE: 3.35 CM (ref 2.1–4)
LEFT VENTRICLE DIASTOLIC VOLUME INDEX: 28.71 ML/M2
LEFT VENTRICLE DIASTOLIC VOLUME INDEX: 62.34 ML/M2
LEFT VENTRICLE DIASTOLIC VOLUME INDEX: 79.05 ML/M2
LEFT VENTRICLE DIASTOLIC VOLUME: 109.09 ML
LEFT VENTRICLE DIASTOLIC VOLUME: 43.07 ML
LEFT VENTRICLE DIASTOLIC VOLUME: 90.39 ML
LEFT VENTRICLE MASS INDEX: 150 G/M2
LEFT VENTRICLE MASS INDEX: 73 G/M2
LEFT VENTRICLE MASS INDEX: 86 G/M2
LEFT VENTRICLE SYSTOLIC VOLUME INDEX: 12.6 ML/M2
LEFT VENTRICLE SYSTOLIC VOLUME INDEX: 23.5 ML/M2
LEFT VENTRICLE SYSTOLIC VOLUME INDEX: 33.1 ML/M2
LEFT VENTRICLE SYSTOLIC VOLUME: 18.87 ML
LEFT VENTRICLE SYSTOLIC VOLUME: 34.09 ML
LEFT VENTRICLE SYSTOLIC VOLUME: 45.71 ML
LEFT VENTRICULAR INTERNAL DIMENSION IN DIASTOLE: 4.46 CM (ref 3.5–6)
LEFT VENTRICULAR INTERNAL DIMENSION IN DIASTOLE: 4.5 CM (ref 3.5–6)
LEFT VENTRICULAR INTERNAL DIMENSION IN DIASTOLE: 4.6 CM (ref 3.5–6)
LEFT VENTRICULAR MASS: 105.6 G
LEFT VENTRICULAR MASS: 118.87 G
LEFT VENTRICULAR MASS: 225.09 G
LV LATERAL E/E' RATIO: 18.4 M/S
LV LATERAL E/E' RATIO: 29.2 M/S
LV LATERAL E/E' RATIO: 30.17 M/S
LV SEPTAL E/E' RATIO: 29.2 M/S
LV SEPTAL E/E' RATIO: 36.8 M/S
LV SEPTAL E/E' RATIO: 45.25 M/S
LYMPHOCYTES # BLD AUTO: 0.2 K/UL (ref 1–4.8)
LYMPHOCYTES # BLD AUTO: 0.3 K/UL (ref 1–4.8)
LYMPHOCYTES # BLD AUTO: 0.4 K/UL (ref 1–4.8)
LYMPHOCYTES # BLD AUTO: 0.5 K/UL (ref 1–4.8)
LYMPHOCYTES # BLD AUTO: 0.6 K/UL (ref 1–4.8)
LYMPHOCYTES # BLD AUTO: 0.7 K/UL (ref 1–4.8)
LYMPHOCYTES # BLD AUTO: 0.8 K/UL (ref 1–4.8)
LYMPHOCYTES # BLD AUTO: 0.9 K/UL (ref 1–4.8)
LYMPHOCYTES # BLD AUTO: 0.9 K/UL (ref 1–4.8)
LYMPHOCYTES # BLD AUTO: 1 K/UL (ref 1–4.8)
LYMPHOCYTES # BLD AUTO: 1.1 K/UL (ref 1–4.8)
LYMPHOCYTES # BLD AUTO: 1.2 K/UL (ref 1–4.8)
LYMPHOCYTES # BLD AUTO: 1.4 K/UL (ref 1–4.8)
LYMPHOCYTES # BLD AUTO: 1.4 K/UL (ref 1–4.8)
LYMPHOCYTES # BLD AUTO: ABNORMAL K/UL (ref 1–4.8)
LYMPHOCYTES NFR BLD: 1 % (ref 18–48)
LYMPHOCYTES NFR BLD: 1.9 % (ref 18–48)
LYMPHOCYTES NFR BLD: 2.5 % (ref 18–48)
LYMPHOCYTES NFR BLD: 2.8 % (ref 18–48)
LYMPHOCYTES NFR BLD: 2.8 % (ref 18–48)
LYMPHOCYTES NFR BLD: 2.9 % (ref 18–48)
LYMPHOCYTES NFR BLD: 2.9 % (ref 18–48)
LYMPHOCYTES NFR BLD: 3 % (ref 18–48)
LYMPHOCYTES NFR BLD: 3 % (ref 18–48)
LYMPHOCYTES NFR BLD: 3.1 % (ref 18–48)
LYMPHOCYTES NFR BLD: 3.2 % (ref 18–48)
LYMPHOCYTES NFR BLD: 3.5 % (ref 18–48)
LYMPHOCYTES NFR BLD: 3.5 % (ref 18–48)
LYMPHOCYTES NFR BLD: 3.6 % (ref 18–48)
LYMPHOCYTES NFR BLD: 3.6 % (ref 18–48)
LYMPHOCYTES NFR BLD: 3.7 % (ref 18–48)
LYMPHOCYTES NFR BLD: 3.8 % (ref 18–48)
LYMPHOCYTES NFR BLD: 4 % (ref 18–48)
LYMPHOCYTES NFR BLD: 4.3 % (ref 18–48)
LYMPHOCYTES NFR BLD: 4.4 % (ref 18–48)
LYMPHOCYTES NFR BLD: 4.5 % (ref 18–48)
LYMPHOCYTES NFR BLD: 4.6 % (ref 18–48)
LYMPHOCYTES NFR BLD: 4.7 % (ref 18–48)
LYMPHOCYTES NFR BLD: 4.7 % (ref 18–48)
LYMPHOCYTES NFR BLD: 5.2 % (ref 18–48)
LYMPHOCYTES NFR BLD: 5.3 % (ref 18–48)
LYMPHOCYTES NFR BLD: 5.5 % (ref 18–48)
LYMPHOCYTES NFR BLD: 5.6 % (ref 18–48)
LYMPHOCYTES NFR BLD: 5.7 % (ref 18–48)
LYMPHOCYTES NFR BLD: 5.8 % (ref 18–48)
LYMPHOCYTES NFR BLD: 6 % (ref 18–48)
LYMPHOCYTES NFR BLD: 6.1 % (ref 18–48)
LYMPHOCYTES NFR BLD: 6.4 % (ref 18–48)
LYMPHOCYTES NFR BLD: 6.5 % (ref 18–48)
LYMPHOCYTES NFR BLD: 6.7 % (ref 18–48)
LYMPHOCYTES NFR BLD: 6.9 % (ref 18–48)
LYMPHOCYTES NFR BLD: 7 % (ref 18–48)
LYMPHOCYTES NFR BLD: 7.2 % (ref 18–48)
LYMPHOCYTES NFR BLD: 7.4 % (ref 18–48)
LYMPHOCYTES NFR BLD: 7.4 % (ref 18–48)
LYMPHOCYTES NFR BLD: 7.7 % (ref 18–48)
LYMPHOCYTES NFR BLD: 7.8 % (ref 18–48)
LYMPHOCYTES NFR BLD: 8 % (ref 18–48)
LYMPHOCYTES NFR BLD: 8.5 % (ref 18–48)
LYMPHOCYTES NFR BLD: 9.5 % (ref 18–48)
Lab: NORMAL
MAGNESIUM SERPL-MCNC: 1.5 MG/DL (ref 1.6–2.6)
MAGNESIUM SERPL-MCNC: 1.5 MG/DL (ref 1.6–2.6)
MAGNESIUM SERPL-MCNC: 1.6 MG/DL (ref 1.6–2.6)
MAGNESIUM SERPL-MCNC: 1.7 MG/DL (ref 1.6–2.6)
MAGNESIUM SERPL-MCNC: 1.8 MG/DL (ref 1.6–2.6)
MAGNESIUM SERPL-MCNC: 1.9 MG/DL (ref 1.6–2.6)
MAGNESIUM SERPL-MCNC: 2 MG/DL (ref 1.6–2.6)
MAGNESIUM SERPL-MCNC: 2.1 MG/DL (ref 1.6–2.6)
MAGNESIUM SERPL-MCNC: 2.2 MG/DL (ref 1.6–2.6)
MAGNESIUM SERPL-MCNC: 2.3 MG/DL (ref 1.6–2.6)
MAGNESIUM SERPL-MCNC: 2.4 MG/DL (ref 1.6–2.6)
MAGNESIUM SERPL-MCNC: 2.5 MG/DL (ref 1.6–2.6)
MAGNESIUM SERPL-MCNC: 2.6 MG/DL (ref 1.6–2.6)
MAGNESIUM SERPL-MCNC: 2.7 MG/DL (ref 1.6–2.6)
MCH RBC QN AUTO: 28.2 PG (ref 27–31)
MCH RBC QN AUTO: 28.5 PG (ref 27–31)
MCH RBC QN AUTO: 28.7 PG (ref 27–31)
MCH RBC QN AUTO: 28.8 PG (ref 27–31)
MCH RBC QN AUTO: 29 PG (ref 27–31)
MCH RBC QN AUTO: 29.1 PG (ref 27–31)
MCH RBC QN AUTO: 29.2 PG (ref 27–31)
MCH RBC QN AUTO: 29.3 PG (ref 27–31)
MCH RBC QN AUTO: 29.3 PG (ref 27–31)
MCH RBC QN AUTO: 29.4 PG (ref 27–31)
MCH RBC QN AUTO: 29.4 PG (ref 27–31)
MCH RBC QN AUTO: 29.5 PG (ref 27–31)
MCH RBC QN AUTO: 29.6 PG (ref 27–31)
MCH RBC QN AUTO: 29.7 PG (ref 27–31)
MCH RBC QN AUTO: 29.7 PG (ref 27–31)
MCH RBC QN AUTO: 29.8 PG (ref 27–31)
MCH RBC QN AUTO: 29.9 PG (ref 27–31)
MCH RBC QN AUTO: 30 PG (ref 27–31)
MCH RBC QN AUTO: 30.1 PG (ref 27–31)
MCH RBC QN AUTO: 30.2 PG (ref 27–31)
MCH RBC QN AUTO: 30.3 PG (ref 27–31)
MCH RBC QN AUTO: 30.4 PG (ref 27–31)
MCH RBC QN AUTO: 30.5 PG (ref 27–31)
MCH RBC QN AUTO: 30.6 PG (ref 27–31)
MCH RBC QN AUTO: 30.7 PG (ref 27–31)
MCH RBC QN AUTO: 30.8 PG (ref 27–31)
MCH RBC QN AUTO: 30.8 PG (ref 27–31)
MCH RBC QN AUTO: 31 PG (ref 27–31)
MCH RBC QN AUTO: 31.1 PG (ref 27–31)
MCH RBC QN AUTO: 31.2 PG (ref 27–31)
MCH RBC QN AUTO: 31.3 PG (ref 27–31)
MCH RBC QN AUTO: 31.3 PG (ref 27–31)
MCH RBC QN AUTO: 31.7 PG (ref 27–31)
MCHC RBC AUTO-ENTMCNC: 28.8 G/DL (ref 32–36)
MCHC RBC AUTO-ENTMCNC: 29.2 G/DL (ref 32–36)
MCHC RBC AUTO-ENTMCNC: 29.4 G/DL (ref 32–36)
MCHC RBC AUTO-ENTMCNC: 29.5 G/DL (ref 32–36)
MCHC RBC AUTO-ENTMCNC: 30.1 G/DL (ref 32–36)
MCHC RBC AUTO-ENTMCNC: 30.2 G/DL (ref 32–36)
MCHC RBC AUTO-ENTMCNC: 30.2 G/DL (ref 32–36)
MCHC RBC AUTO-ENTMCNC: 30.3 G/DL (ref 32–36)
MCHC RBC AUTO-ENTMCNC: 30.4 G/DL (ref 32–36)
MCHC RBC AUTO-ENTMCNC: 30.5 G/DL (ref 32–36)
MCHC RBC AUTO-ENTMCNC: 30.6 G/DL (ref 32–36)
MCHC RBC AUTO-ENTMCNC: 30.7 G/DL (ref 32–36)
MCHC RBC AUTO-ENTMCNC: 30.8 G/DL (ref 32–36)
MCHC RBC AUTO-ENTMCNC: 30.9 G/DL (ref 32–36)
MCHC RBC AUTO-ENTMCNC: 31 G/DL (ref 32–36)
MCHC RBC AUTO-ENTMCNC: 31 G/DL (ref 32–36)
MCHC RBC AUTO-ENTMCNC: 31.1 G/DL (ref 32–36)
MCHC RBC AUTO-ENTMCNC: 31.2 G/DL (ref 32–36)
MCHC RBC AUTO-ENTMCNC: 31.3 G/DL (ref 32–36)
MCHC RBC AUTO-ENTMCNC: 31.3 G/DL (ref 32–36)
MCHC RBC AUTO-ENTMCNC: 31.4 G/DL (ref 32–36)
MCHC RBC AUTO-ENTMCNC: 31.4 G/DL (ref 32–36)
MCHC RBC AUTO-ENTMCNC: 31.5 G/DL (ref 32–36)
MCHC RBC AUTO-ENTMCNC: 31.6 G/DL (ref 32–36)
MCHC RBC AUTO-ENTMCNC: 31.6 G/DL (ref 32–36)
MCHC RBC AUTO-ENTMCNC: 31.7 G/DL (ref 32–36)
MCHC RBC AUTO-ENTMCNC: 31.8 G/DL (ref 32–36)
MCHC RBC AUTO-ENTMCNC: 31.9 G/DL (ref 32–36)
MCHC RBC AUTO-ENTMCNC: 32 G/DL (ref 32–36)
MCHC RBC AUTO-ENTMCNC: 32.3 G/DL (ref 32–36)
MCHC RBC AUTO-ENTMCNC: 32.3 G/DL (ref 32–36)
MCHC RBC AUTO-ENTMCNC: 32.4 G/DL (ref 32–36)
MCHC RBC AUTO-ENTMCNC: 32.4 G/DL (ref 32–36)
MCHC RBC AUTO-ENTMCNC: 32.6 G/DL (ref 32–36)
MCHC RBC AUTO-ENTMCNC: 32.7 G/DL (ref 32–36)
MCHC RBC AUTO-ENTMCNC: 32.8 G/DL (ref 32–36)
MCV RBC AUTO: 100 FL (ref 82–98)
MCV RBC AUTO: 101 FL (ref 82–98)
MCV RBC AUTO: 105 FL (ref 82–98)
MCV RBC AUTO: 106 FL (ref 82–98)
MCV RBC AUTO: 88 FL (ref 82–98)
MCV RBC AUTO: 91 FL (ref 82–98)
MCV RBC AUTO: 91 FL (ref 82–98)
MCV RBC AUTO: 92 FL (ref 82–98)
MCV RBC AUTO: 93 FL (ref 82–98)
MCV RBC AUTO: 94 FL (ref 82–98)
MCV RBC AUTO: 95 FL (ref 82–98)
MCV RBC AUTO: 96 FL (ref 82–98)
MCV RBC AUTO: 97 FL (ref 82–98)
MCV RBC AUTO: 98 FL (ref 82–98)
MCV RBC AUTO: 99 FL (ref 82–98)
METAMYELOCYTES NFR BLD MANUAL: 1 %
METAMYELOCYTES NFR BLD MANUAL: 1 %
METAMYELOCYTES NFR BLD MANUAL: 2 %
METHEMOGLOBIN: 0.1 % (ref 0–3)
METHEMOGLOBIN: 0.3 % (ref 0–3)
METHEMOGLOBIN: 0.4 % (ref 0–3)
METHEMOGLOBIN: 0.6 % (ref 0–3)
METHEMOGLOBIN: 0.9 % (ref 0–3)
METHEMOGLOBIN: 1.1 % (ref 0–3)
METHEMOGLOBIN: 2.8 % (ref 0–3)
MIN VOL: 10
MIN VOL: 10.1
MIN VOL: 10.2
MIN VOL: 10.3
MIN VOL: 10.9
MIN VOL: 11
MIN VOL: 6.7
MIN VOL: 7.81
MIN VOL: 8.08
MIN VOL: 8.25
MIN VOL: 8.41
MIN VOL: 8.41
MIN VOL: 8.47
MIN VOL: 8.75
MIN VOL: 8.75
MIN VOL: 9
MIN VOL: 9
MIN VOL: 9.21
MIN VOL: 9.31
MIN VOL: 9.65
MIN VOL: 9.86
MODE: ABNORMAL
MODE: NORMAL
MONOCYTES # BLD AUTO: 0.1 K/UL (ref 0.3–1)
MONOCYTES # BLD AUTO: 0.3 K/UL (ref 0.3–1)
MONOCYTES # BLD AUTO: 0.4 K/UL (ref 0.3–1)
MONOCYTES # BLD AUTO: 0.5 K/UL (ref 0.3–1)
MONOCYTES # BLD AUTO: 0.6 K/UL (ref 0.3–1)
MONOCYTES # BLD AUTO: 0.7 K/UL (ref 0.3–1)
MONOCYTES # BLD AUTO: 0.8 K/UL (ref 0.3–1)
MONOCYTES # BLD AUTO: 0.9 K/UL (ref 0.3–1)
MONOCYTES # BLD AUTO: 1 K/UL (ref 0.3–1)
MONOCYTES # BLD AUTO: 1.1 K/UL (ref 0.3–1)
MONOCYTES # BLD AUTO: 1.2 K/UL (ref 0.3–1)
MONOCYTES # BLD AUTO: ABNORMAL K/UL (ref 0.3–1)
MONOCYTES NFR BLD: 1.4 % (ref 4–15)
MONOCYTES NFR BLD: 10 % (ref 4–15)
MONOCYTES NFR BLD: 10 % (ref 4–15)
MONOCYTES NFR BLD: 10.3 % (ref 4–15)
MONOCYTES NFR BLD: 2 % (ref 4–15)
MONOCYTES NFR BLD: 2 % (ref 4–15)
MONOCYTES NFR BLD: 2.1 % (ref 4–15)
MONOCYTES NFR BLD: 2.2 % (ref 4–15)
MONOCYTES NFR BLD: 2.3 % (ref 4–15)
MONOCYTES NFR BLD: 2.4 % (ref 4–15)
MONOCYTES NFR BLD: 2.7 % (ref 4–15)
MONOCYTES NFR BLD: 2.9 % (ref 4–15)
MONOCYTES NFR BLD: 3 % (ref 4–15)
MONOCYTES NFR BLD: 3.1 % (ref 4–15)
MONOCYTES NFR BLD: 3.2 % (ref 4–15)
MONOCYTES NFR BLD: 3.4 % (ref 4–15)
MONOCYTES NFR BLD: 3.5 % (ref 4–15)
MONOCYTES NFR BLD: 3.5 % (ref 4–15)
MONOCYTES NFR BLD: 3.6 % (ref 4–15)
MONOCYTES NFR BLD: 3.7 % (ref 4–15)
MONOCYTES NFR BLD: 3.8 % (ref 4–15)
MONOCYTES NFR BLD: 3.9 % (ref 4–15)
MONOCYTES NFR BLD: 4 % (ref 4–15)
MONOCYTES NFR BLD: 4.2 % (ref 4–15)
MONOCYTES NFR BLD: 4.3 % (ref 4–15)
MONOCYTES NFR BLD: 4.4 % (ref 4–15)
MONOCYTES NFR BLD: 4.7 % (ref 4–15)
MONOCYTES NFR BLD: 4.7 % (ref 4–15)
MONOCYTES NFR BLD: 5 % (ref 4–15)
MONOCYTES NFR BLD: 5 % (ref 4–15)
MONOCYTES NFR BLD: 5.1 % (ref 4–15)
MONOCYTES NFR BLD: 5.2 % (ref 4–15)
MONOCYTES NFR BLD: 5.7 % (ref 4–15)
MONOCYTES NFR BLD: 6 % (ref 4–15)
MONOCYTES NFR BLD: 6.3 % (ref 4–15)
MONOCYTES NFR BLD: 6.4 % (ref 4–15)
MONOCYTES NFR BLD: 6.5 % (ref 4–15)
MONOCYTES NFR BLD: 6.6 % (ref 4–15)
MONOCYTES NFR BLD: 7 % (ref 4–15)
MONOCYTES NFR BLD: 7 % (ref 4–15)
MONOCYTES NFR BLD: 7.4 % (ref 4–15)
MONOCYTES NFR BLD: 7.6 % (ref 4–15)
MONOCYTES NFR BLD: 8 % (ref 4–15)
MONOCYTES NFR BLD: 8 % (ref 4–15)
MONOCYTES NFR BLD: 8.1 % (ref 4–15)
MONOCYTES NFR BLD: 8.2 % (ref 4–15)
MONOCYTES NFR BLD: 8.6 % (ref 4–15)
MONOCYTES NFR BLD: 8.8 % (ref 4–15)
MONOCYTES NFR BLD: 9.1 % (ref 4–15)
MONOCYTES NFR BLD: 9.6 % (ref 4–15)
MONOCYTES NFR BLD: 9.7 % (ref 4–15)
MRSA SPEC QL CULT: NORMAL
MV MEAN GRADIENT: 2 MMHG
MV MEAN GRADIENT: 4 MMHG
MV PEAK A VEL: 0.7 M/S
MV PEAK E VEL: 1.46 M/S
MV PEAK E VEL: 1.81 M/S
MV PEAK E VEL: 1.84 M/S
MV PEAK GRADIENT: 10 MMHG
MV PEAK GRADIENT: 13 MMHG
MV STENOSIS PRESSURE HALF TIME: 55.25 MS
MV VALVE AREA BY CONTINUITY EQUATION: 1.2 CM2
MV VALVE AREA BY CONTINUITY EQUATION: 3.92 CM2
MV VALVE AREA P 1/2 METHOD: 3.98 CM2
MYELOCYTES NFR BLD MANUAL: 1 %
MYELOCYTES NFR BLD MANUAL: 2 %
MYELOCYTES NFR BLD MANUAL: 2 %
NEUTROPHILS # BLD AUTO: 10 K/UL (ref 1.8–7.7)
NEUTROPHILS # BLD AUTO: 10.1 K/UL (ref 1.8–7.7)
NEUTROPHILS # BLD AUTO: 10.1 K/UL (ref 1.8–7.7)
NEUTROPHILS # BLD AUTO: 10.3 K/UL (ref 1.8–7.7)
NEUTROPHILS # BLD AUTO: 10.4 K/UL (ref 1.8–7.7)
NEUTROPHILS # BLD AUTO: 10.5 K/UL (ref 1.8–7.7)
NEUTROPHILS # BLD AUTO: 10.5 K/UL (ref 1.8–7.7)
NEUTROPHILS # BLD AUTO: 11 K/UL (ref 1.8–7.7)
NEUTROPHILS # BLD AUTO: 11.3 K/UL (ref 1.8–7.7)
NEUTROPHILS # BLD AUTO: 11.4 K/UL (ref 1.8–7.7)
NEUTROPHILS # BLD AUTO: 11.4 K/UL (ref 1.8–7.7)
NEUTROPHILS # BLD AUTO: 11.7 K/UL (ref 1.8–7.7)
NEUTROPHILS # BLD AUTO: 11.8 K/UL (ref 1.8–7.7)
NEUTROPHILS # BLD AUTO: 12 K/UL (ref 1.8–7.7)
NEUTROPHILS # BLD AUTO: 12.3 K/UL (ref 1.8–7.7)
NEUTROPHILS # BLD AUTO: 12.3 K/UL (ref 1.8–7.7)
NEUTROPHILS # BLD AUTO: 12.4 K/UL (ref 1.8–7.7)
NEUTROPHILS # BLD AUTO: 12.8 K/UL (ref 1.8–7.7)
NEUTROPHILS # BLD AUTO: 12.8 K/UL (ref 1.8–7.7)
NEUTROPHILS # BLD AUTO: 13.9 K/UL (ref 1.8–7.7)
NEUTROPHILS # BLD AUTO: 14.7 K/UL (ref 1.8–7.7)
NEUTROPHILS # BLD AUTO: 15.4 K/UL (ref 1.8–7.7)
NEUTROPHILS # BLD AUTO: 15.5 K/UL (ref 1.8–7.7)
NEUTROPHILS # BLD AUTO: 16.1 K/UL (ref 1.8–7.7)
NEUTROPHILS # BLD AUTO: 16.2 K/UL (ref 1.8–7.7)
NEUTROPHILS # BLD AUTO: 17.3 K/UL (ref 1.8–7.7)
NEUTROPHILS # BLD AUTO: 17.8 K/UL (ref 1.8–7.7)
NEUTROPHILS # BLD AUTO: 18 K/UL (ref 1.8–7.7)
NEUTROPHILS # BLD AUTO: 18.4 K/UL (ref 1.8–7.7)
NEUTROPHILS # BLD AUTO: 18.5 K/UL (ref 1.8–7.7)
NEUTROPHILS # BLD AUTO: 19.3 K/UL (ref 1.8–7.7)
NEUTROPHILS # BLD AUTO: 20.6 K/UL (ref 1.8–7.7)
NEUTROPHILS # BLD AUTO: 34.8 K/UL (ref 1.8–7.7)
NEUTROPHILS # BLD AUTO: 7 K/UL (ref 1.8–7.7)
NEUTROPHILS # BLD AUTO: 7.2 K/UL (ref 1.8–7.7)
NEUTROPHILS # BLD AUTO: 7.3 K/UL (ref 1.8–7.7)
NEUTROPHILS # BLD AUTO: 7.4 K/UL (ref 1.8–7.7)
NEUTROPHILS # BLD AUTO: 7.4 K/UL (ref 1.8–7.7)
NEUTROPHILS # BLD AUTO: 7.6 K/UL (ref 1.8–7.7)
NEUTROPHILS # BLD AUTO: 7.8 K/UL (ref 1.8–7.7)
NEUTROPHILS # BLD AUTO: 7.8 K/UL (ref 1.8–7.7)
NEUTROPHILS # BLD AUTO: 8 K/UL (ref 1.8–7.7)
NEUTROPHILS # BLD AUTO: 8.3 K/UL (ref 1.8–7.7)
NEUTROPHILS # BLD AUTO: 8.4 K/UL (ref 1.8–7.7)
NEUTROPHILS # BLD AUTO: 8.4 K/UL (ref 1.8–7.7)
NEUTROPHILS # BLD AUTO: 8.7 K/UL (ref 1.8–7.7)
NEUTROPHILS # BLD AUTO: 8.9 K/UL (ref 1.8–7.7)
NEUTROPHILS # BLD AUTO: 8.9 K/UL (ref 1.8–7.7)
NEUTROPHILS # BLD AUTO: 9.1 K/UL (ref 1.8–7.7)
NEUTROPHILS # BLD AUTO: 9.2 K/UL (ref 1.8–7.7)
NEUTROPHILS # BLD AUTO: 9.2 K/UL (ref 1.8–7.7)
NEUTROPHILS # BLD AUTO: 9.3 K/UL (ref 1.8–7.7)
NEUTROPHILS # BLD AUTO: 9.3 K/UL (ref 1.8–7.7)
NEUTROPHILS # BLD AUTO: 9.6 K/UL (ref 1.8–7.7)
NEUTROPHILS # BLD AUTO: 9.7 K/UL (ref 1.8–7.7)
NEUTROPHILS # BLD AUTO: 9.8 K/UL (ref 1.8–7.7)
NEUTROPHILS # BLD AUTO: 9.9 K/UL (ref 1.8–7.7)
NEUTROPHILS NFR BLD: 80 % (ref 38–73)
NEUTROPHILS NFR BLD: 81.4 % (ref 38–73)
NEUTROPHILS NFR BLD: 81.9 % (ref 38–73)
NEUTROPHILS NFR BLD: 82 % (ref 38–73)
NEUTROPHILS NFR BLD: 82.1 % (ref 38–73)
NEUTROPHILS NFR BLD: 83.4 % (ref 38–73)
NEUTROPHILS NFR BLD: 83.5 % (ref 38–73)
NEUTROPHILS NFR BLD: 84 % (ref 38–73)
NEUTROPHILS NFR BLD: 84.2 % (ref 38–73)
NEUTROPHILS NFR BLD: 84.4 % (ref 38–73)
NEUTROPHILS NFR BLD: 84.4 % (ref 38–73)
NEUTROPHILS NFR BLD: 84.8 % (ref 38–73)
NEUTROPHILS NFR BLD: 84.9 % (ref 38–73)
NEUTROPHILS NFR BLD: 85 % (ref 38–73)
NEUTROPHILS NFR BLD: 85 % (ref 38–73)
NEUTROPHILS NFR BLD: 85.1 % (ref 38–73)
NEUTROPHILS NFR BLD: 85.5 % (ref 38–73)
NEUTROPHILS NFR BLD: 85.9 % (ref 38–73)
NEUTROPHILS NFR BLD: 86 % (ref 38–73)
NEUTROPHILS NFR BLD: 86.4 % (ref 38–73)
NEUTROPHILS NFR BLD: 86.4 % (ref 38–73)
NEUTROPHILS NFR BLD: 86.5 % (ref 38–73)
NEUTROPHILS NFR BLD: 86.5 % (ref 38–73)
NEUTROPHILS NFR BLD: 86.6 % (ref 38–73)
NEUTROPHILS NFR BLD: 87 % (ref 38–73)
NEUTROPHILS NFR BLD: 87 % (ref 38–73)
NEUTROPHILS NFR BLD: 87.1 % (ref 38–73)
NEUTROPHILS NFR BLD: 87.2 % (ref 38–73)
NEUTROPHILS NFR BLD: 87.4 % (ref 38–73)
NEUTROPHILS NFR BLD: 87.7 % (ref 38–73)
NEUTROPHILS NFR BLD: 87.8 % (ref 38–73)
NEUTROPHILS NFR BLD: 88 % (ref 38–73)
NEUTROPHILS NFR BLD: 88.1 % (ref 38–73)
NEUTROPHILS NFR BLD: 88.2 % (ref 38–73)
NEUTROPHILS NFR BLD: 88.2 % (ref 38–73)
NEUTROPHILS NFR BLD: 88.7 % (ref 38–73)
NEUTROPHILS NFR BLD: 88.7 % (ref 38–73)
NEUTROPHILS NFR BLD: 88.9 % (ref 38–73)
NEUTROPHILS NFR BLD: 89 % (ref 38–73)
NEUTROPHILS NFR BLD: 89.1 % (ref 38–73)
NEUTROPHILS NFR BLD: 89.4 % (ref 38–73)
NEUTROPHILS NFR BLD: 89.5 % (ref 38–73)
NEUTROPHILS NFR BLD: 89.5 % (ref 38–73)
NEUTROPHILS NFR BLD: 89.7 % (ref 38–73)
NEUTROPHILS NFR BLD: 89.9 % (ref 38–73)
NEUTROPHILS NFR BLD: 90 % (ref 38–73)
NEUTROPHILS NFR BLD: 90 % (ref 38–73)
NEUTROPHILS NFR BLD: 90.7 % (ref 38–73)
NEUTROPHILS NFR BLD: 91.2 % (ref 38–73)
NEUTROPHILS NFR BLD: 91.6 % (ref 38–73)
NEUTROPHILS NFR BLD: 91.6 % (ref 38–73)
NEUTROPHILS NFR BLD: 92.3 % (ref 38–73)
NEUTROPHILS NFR BLD: 92.5 % (ref 38–73)
NEUTROPHILS NFR BLD: 92.6 % (ref 38–73)
NEUTROPHILS NFR BLD: 92.7 % (ref 38–73)
NEUTROPHILS NFR BLD: 93 % (ref 38–73)
NEUTROPHILS NFR BLD: 93.2 % (ref 38–73)
NEUTROPHILS NFR BLD: 94.3 % (ref 38–73)
NEUTROPHILS NFR BLD: 95 % (ref 38–73)
NEUTS BAND NFR BLD MANUAL: 1 %
NEUTS BAND NFR BLD MANUAL: 2 %
NEUTS BAND NFR BLD MANUAL: 2 %
NEUTS BAND NFR BLD MANUAL: 2.5 %
NEUTS BAND NFR BLD MANUAL: 8 %
NRBC BLD-RTO: 0 /100 WBC
NRBC BLD-RTO: 1 /100 WBC
NRBC BLD-RTO: 10 /100 WBC
NRBC BLD-RTO: 17 /100 WBC
NRBC BLD-RTO: 2 /100 WBC
NRBC BLD-RTO: 23 /100 WBC
NRBC BLD-RTO: 25 /100 WBC
NRBC BLD-RTO: 3 /100 WBC
NRBC BLD-RTO: 3 /100 WBC
NRBC BLD-RTO: 4 /100 WBC
NRBC BLD-RTO: 5 /100 WBC
NRBC BLD-RTO: 6 /100 WBC
NUM UNITS TRANS FFP: NORMAL
NUM UNITS TRANS PACKED RBC: NORMAL
NUM UNITS TRANS PACKED RBC: NORMAL
OVALOCYTES BLD QL SMEAR: ABNORMAL
PATH REV BLD -IMP: NORMAL
PCO2 BLDA: 31.2 MMHG (ref 35–45)
PCO2 BLDA: 31.3 MMHG (ref 35–45)
PCO2 BLDA: 32.4 MMHG (ref 35–45)
PCO2 BLDA: 32.6 MMHG (ref 35–45)
PCO2 BLDA: 33.4 MMHG (ref 35–45)
PCO2 BLDA: 33.5 MMHG (ref 35–45)
PCO2 BLDA: 33.9 MMHG (ref 35–45)
PCO2 BLDA: 34 MMHG (ref 35–45)
PCO2 BLDA: 34.6 MMHG (ref 35–45)
PCO2 BLDA: 34.8 MMHG (ref 35–45)
PCO2 BLDA: 35.1 MMHG (ref 35–45)
PCO2 BLDA: 35.6 MMHG (ref 35–45)
PCO2 BLDA: 35.6 MMHG (ref 35–45)
PCO2 BLDA: 36 MMHG (ref 35–45)
PCO2 BLDA: 36.1 MMHG (ref 35–45)
PCO2 BLDA: 36.2 MMHG (ref 35–45)
PCO2 BLDA: 36.6 MMHG (ref 35–45)
PCO2 BLDA: 36.7 MMHG (ref 35–45)
PCO2 BLDA: 36.7 MMHG (ref 35–45)
PCO2 BLDA: 36.8 MMHG (ref 35–45)
PCO2 BLDA: 37 MMHG (ref 35–45)
PCO2 BLDA: 37.1 MMHG (ref 35–45)
PCO2 BLDA: 37.1 MMHG (ref 35–45)
PCO2 BLDA: 37.4 MMHG (ref 35–45)
PCO2 BLDA: 37.4 MMHG (ref 35–45)
PCO2 BLDA: 37.5 MMHG (ref 35–45)
PCO2 BLDA: 37.5 MMHG (ref 35–45)
PCO2 BLDA: 37.6 MMHG (ref 35–45)
PCO2 BLDA: 37.8 MMHG (ref 35–45)
PCO2 BLDA: 38.7 MMHG (ref 35–45)
PCO2 BLDA: 39 MMHG (ref 35–45)
PCO2 BLDA: 39.1 MMHG (ref 35–45)
PCO2 BLDA: 39.1 MMHG (ref 35–45)
PCO2 BLDA: 39.4 MMHG (ref 35–45)
PCO2 BLDA: 39.5 MMHG (ref 35–45)
PCO2 BLDA: 39.7 MMHG (ref 35–45)
PCO2 BLDA: 39.9 MMHG (ref 35–45)
PCO2 BLDA: 40.1 MMHG (ref 35–45)
PCO2 BLDA: 40.4 MMHG (ref 35–45)
PCO2 BLDA: 40.4 MMHG (ref 35–45)
PCO2 BLDA: 40.6 MMHG (ref 35–45)
PCO2 BLDA: 40.8 MMHG (ref 35–45)
PCO2 BLDA: 41.6 MMHG (ref 35–45)
PCO2 BLDA: 41.7 MMHG (ref 35–45)
PCO2 BLDA: 41.8 MMHG (ref 35–45)
PCO2 BLDA: 41.9 MMHG (ref 35–45)
PCO2 BLDA: 42 MMHG (ref 35–45)
PCO2 BLDA: 42 MMHG (ref 35–45)
PCO2 BLDA: 42.3 MMHG (ref 35–45)
PCO2 BLDA: 42.3 MMHG (ref 35–45)
PCO2 BLDA: 42.6 MMHG (ref 35–45)
PCO2 BLDA: 42.8 MMHG (ref 35–45)
PCO2 BLDA: 43 MMHG (ref 35–45)
PCO2 BLDA: 43.3 MMHG (ref 35–45)
PCO2 BLDA: 43.4 MMHG (ref 35–45)
PCO2 BLDA: 43.7 MMHG (ref 35–45)
PCO2 BLDA: 44 MMHG (ref 35–45)
PCO2 BLDA: 44.1 MMHG (ref 35–45)
PCO2 BLDA: 44.6 MMHG (ref 35–45)
PCO2 BLDA: 44.7 MMHG (ref 35–45)
PCO2 BLDA: 44.8 MMHG (ref 35–45)
PCO2 BLDA: 45 MMHG (ref 35–45)
PCO2 BLDA: 45.1 MMHG (ref 35–45)
PCO2 BLDA: 45.2 MMHG (ref 35–45)
PCO2 BLDA: 45.4 MMHG (ref 35–45)
PCO2 BLDA: 45.5 MMHG (ref 35–45)
PCO2 BLDA: 45.5 MMHG (ref 35–45)
PCO2 BLDA: 45.6 MMHG (ref 35–45)
PCO2 BLDA: 45.7 MMHG (ref 35–45)
PCO2 BLDA: 45.7 MMHG (ref 35–45)
PCO2 BLDA: 45.9 MMHG (ref 35–45)
PCO2 BLDA: 46.6 MMHG (ref 35–45)
PCO2 BLDA: 47 MMHG (ref 35–45)
PCO2 BLDA: 47.2 MMHG (ref 35–45)
PCO2 BLDA: 47.2 MMHG (ref 35–45)
PCO2 BLDA: 47.3 MMHG (ref 35–45)
PCO2 BLDA: 47.4 MMHG (ref 35–45)
PCO2 BLDA: 47.6 MMHG (ref 35–45)
PCO2 BLDA: 47.6 MMHG (ref 35–45)
PCO2 BLDA: 47.7 MMHG (ref 35–45)
PCO2 BLDA: 47.7 MMHG (ref 35–45)
PCO2 BLDA: 48.7 MMHG (ref 35–45)
PCO2 BLDA: 48.9 MMHG (ref 35–45)
PCO2 BLDA: 48.9 MMHG (ref 35–45)
PCO2 BLDA: 49.5 MMHG (ref 35–45)
PCO2 BLDA: 49.7 MMHG (ref 35–45)
PCO2 BLDA: 49.8 MMHG (ref 35–45)
PCO2 BLDA: 49.9 MMHG (ref 35–45)
PCO2 BLDA: 49.9 MMHG (ref 35–45)
PCO2 BLDA: 50.1 MMHG (ref 35–45)
PCO2 BLDA: 50.4 MMHG (ref 35–45)
PCO2 BLDA: 50.5 MMHG (ref 35–45)
PCO2 BLDA: 50.6 MMHG (ref 35–45)
PCO2 BLDA: 50.6 MMHG (ref 35–45)
PCO2 BLDA: 50.7 MMHG (ref 35–45)
PCO2 BLDA: 50.9 MMHG (ref 35–45)
PCO2 BLDA: 51.5 MMHG (ref 35–45)
PCO2 BLDA: 51.6 MMHG (ref 35–45)
PCO2 BLDA: 51.8 MMHG (ref 35–45)
PCO2 BLDA: 52 MMHG (ref 35–45)
PCO2 BLDA: 52.1 MMHG (ref 35–45)
PCO2 BLDA: 52.6 MMHG (ref 35–45)
PCO2 BLDA: 52.8 MMHG (ref 35–45)
PCO2 BLDA: 53.1 MMHG (ref 35–45)
PCO2 BLDA: 53.6 MMHG (ref 35–45)
PCO2 BLDA: 54.1 MMHG (ref 35–45)
PCO2 BLDA: 54.2 MMHG (ref 35–45)
PCO2 BLDA: 54.4 MMHG (ref 35–45)
PCO2 BLDA: 54.9 MMHG (ref 35–45)
PCO2 BLDA: 55 MMHG (ref 35–45)
PCO2 BLDA: 55.3 MMHG (ref 35–45)
PCO2 BLDA: 55.4 MMHG (ref 35–45)
PCO2 BLDA: 56.2 MMHG (ref 35–45)
PCO2 BLDA: 57.3 MMHG (ref 35–45)
PCO2 BLDA: 57.3 MMHG (ref 35–45)
PCO2 BLDA: 57.4 MMHG (ref 35–45)
PCO2 BLDA: 58.1 MMHG (ref 35–45)
PCO2 BLDA: 58.4 MMHG (ref 35–45)
PCO2 BLDA: 59.4 MMHG (ref 35–45)
PCO2 BLDA: 59.5 MMHG (ref 35–45)
PCO2 BLDA: 61.1 MMHG (ref 35–45)
PCO2 BLDA: 61.4 MMHG (ref 35–45)
PCO2 BLDA: 62.1 MMHG (ref 35–45)
PCO2 BLDA: 62.7 MMHG (ref 35–45)
PCO2 BLDA: 64.8 MMHG (ref 35–45)
PCO2 BLDA: 65.3 MMHG (ref 35–45)
PCO2 BLDA: 70.1 MMHG (ref 35–45)
PCO2 BLDA: 74.2 MMHG (ref 35–45)
PEEP: 10
PEEP: 5
PEEP: 6
PEEP: 7
PEEP: 7.5
PEEP: 8
PH SMN: 7.05 [PH] (ref 7.35–7.45)
PH SMN: 7.13 [PH] (ref 7.35–7.45)
PH SMN: 7.16 [PH] (ref 7.35–7.45)
PH SMN: 7.16 [PH] (ref 7.35–7.45)
PH SMN: 7.17 [PH] (ref 7.35–7.45)
PH SMN: 7.2 [PH] (ref 7.35–7.45)
PH SMN: 7.21 [PH] (ref 7.35–7.45)
PH SMN: 7.22 [PH] (ref 7.35–7.45)
PH SMN: 7.22 [PH] (ref 7.35–7.45)
PH SMN: 7.23 [PH] (ref 7.35–7.45)
PH SMN: 7.24 [PH] (ref 7.35–7.45)
PH SMN: 7.26 [PH] (ref 7.35–7.45)
PH SMN: 7.26 [PH] (ref 7.35–7.45)
PH SMN: 7.27 [PH] (ref 7.35–7.45)
PH SMN: 7.28 [PH] (ref 7.35–7.45)
PH SMN: 7.29 [PH] (ref 7.35–7.45)
PH SMN: 7.3 [PH] (ref 7.35–7.45)
PH SMN: 7.31 [PH] (ref 7.35–7.45)
PH SMN: 7.32 [PH] (ref 7.35–7.45)
PH SMN: 7.33 [PH] (ref 7.35–7.45)
PH SMN: 7.34 [PH] (ref 7.35–7.45)
PH SMN: 7.35 [PH] (ref 7.35–7.45)
PH SMN: 7.36 [PH] (ref 7.35–7.45)
PH SMN: 7.37 [PH] (ref 7.35–7.45)
PH SMN: 7.38 [PH] (ref 7.35–7.45)
PH SMN: 7.39 [PH] (ref 7.35–7.45)
PH SMN: 7.4 [PH] (ref 7.35–7.45)
PH SMN: 7.41 [PH] (ref 7.35–7.45)
PH SMN: 7.42 [PH] (ref 7.35–7.45)
PH SMN: 7.42 [PH] (ref 7.35–7.45)
PH SMN: 7.43 [PH] (ref 7.35–7.45)
PH SMN: 7.44 [PH] (ref 7.35–7.45)
PH SMN: 7.45 [PH] (ref 7.35–7.45)
PH SMN: 7.45 [PH] (ref 7.35–7.45)
PH SMN: 7.46 [PH] (ref 7.35–7.45)
PH SMN: 7.46 [PH] (ref 7.35–7.45)
PH SMN: 7.47 [PH] (ref 7.35–7.45)
PH SMN: 7.48 [PH] (ref 7.35–7.45)
PH SMN: 7.49 [PH] (ref 7.35–7.45)
PH SMN: 7.5 [PH] (ref 7.35–7.45)
PH SMN: 7.5 [PH] (ref 7.35–7.45)
PH SMN: 7.51 [PH] (ref 7.35–7.45)
PH SMN: 7.52 [PH] (ref 7.35–7.45)
PH SMN: 7.52 [PH] (ref 7.35–7.45)
PH SMN: 7.53 [PH] (ref 7.35–7.45)
PH SMN: 7.54 [PH] (ref 7.35–7.45)
PH SMN: 7.54 [PH] (ref 7.35–7.45)
PHOSPHATE SERPL-MCNC: 1.1 MG/DL (ref 2.7–4.5)
PHOSPHATE SERPL-MCNC: 1.2 MG/DL (ref 2.7–4.5)
PHOSPHATE SERPL-MCNC: 1.2 MG/DL (ref 2.7–4.5)
PHOSPHATE SERPL-MCNC: 1.3 MG/DL (ref 2.7–4.5)
PHOSPHATE SERPL-MCNC: 1.3 MG/DL (ref 2.7–4.5)
PHOSPHATE SERPL-MCNC: 1.4 MG/DL (ref 2.7–4.5)
PHOSPHATE SERPL-MCNC: 1.5 MG/DL (ref 2.7–4.5)
PHOSPHATE SERPL-MCNC: 1.5 MG/DL (ref 2.7–4.5)
PHOSPHATE SERPL-MCNC: 1.6 MG/DL (ref 2.7–4.5)
PHOSPHATE SERPL-MCNC: 1.7 MG/DL (ref 2.7–4.5)
PHOSPHATE SERPL-MCNC: 1.8 MG/DL (ref 2.7–4.5)
PHOSPHATE SERPL-MCNC: 1.8 MG/DL (ref 2.7–4.5)
PHOSPHATE SERPL-MCNC: 1.9 MG/DL (ref 2.7–4.5)
PHOSPHATE SERPL-MCNC: 11.4 MG/DL (ref 2.7–4.5)
PHOSPHATE SERPL-MCNC: 2 MG/DL (ref 2.7–4.5)
PHOSPHATE SERPL-MCNC: 2.1 MG/DL (ref 2.7–4.5)
PHOSPHATE SERPL-MCNC: 2.2 MG/DL (ref 2.7–4.5)
PHOSPHATE SERPL-MCNC: 2.4 MG/DL (ref 2.7–4.5)
PHOSPHATE SERPL-MCNC: 2.5 MG/DL (ref 2.7–4.5)
PHOSPHATE SERPL-MCNC: 2.6 MG/DL (ref 2.7–4.5)
PHOSPHATE SERPL-MCNC: 2.7 MG/DL (ref 2.7–4.5)
PHOSPHATE SERPL-MCNC: 2.8 MG/DL (ref 2.7–4.5)
PHOSPHATE SERPL-MCNC: 2.9 MG/DL (ref 2.7–4.5)
PHOSPHATE SERPL-MCNC: 3 MG/DL (ref 2.7–4.5)
PHOSPHATE SERPL-MCNC: 3.1 MG/DL (ref 2.7–4.5)
PHOSPHATE SERPL-MCNC: 3.2 MG/DL (ref 2.7–4.5)
PHOSPHATE SERPL-MCNC: 3.3 MG/DL (ref 2.7–4.5)
PHOSPHATE SERPL-MCNC: 3.4 MG/DL (ref 2.7–4.5)
PHOSPHATE SERPL-MCNC: 3.5 MG/DL (ref 2.7–4.5)
PHOSPHATE SERPL-MCNC: 3.6 MG/DL (ref 2.7–4.5)
PHOSPHATE SERPL-MCNC: 3.7 MG/DL (ref 2.7–4.5)
PHOSPHATE SERPL-MCNC: 3.8 MG/DL (ref 2.7–4.5)
PHOSPHATE SERPL-MCNC: 3.9 MG/DL (ref 2.7–4.5)
PHOSPHATE SERPL-MCNC: 3.9 MG/DL (ref 2.7–4.5)
PHOSPHATE SERPL-MCNC: 4 MG/DL (ref 2.7–4.5)
PHOSPHATE SERPL-MCNC: 4 MG/DL (ref 2.7–4.5)
PHOSPHATE SERPL-MCNC: 4.1 MG/DL (ref 2.7–4.5)
PHOSPHATE SERPL-MCNC: 4.2 MG/DL (ref 2.7–4.5)
PHOSPHATE SERPL-MCNC: 4.3 MG/DL (ref 2.7–4.5)
PHOSPHATE SERPL-MCNC: 4.4 MG/DL (ref 2.7–4.5)
PHOSPHATE SERPL-MCNC: 4.4 MG/DL (ref 2.7–4.5)
PHOSPHATE SERPL-MCNC: 4.6 MG/DL (ref 2.7–4.5)
PHOSPHATE SERPL-MCNC: 4.6 MG/DL (ref 2.7–4.5)
PHOSPHATE SERPL-MCNC: 4.7 MG/DL (ref 2.7–4.5)
PHOSPHATE SERPL-MCNC: 4.7 MG/DL (ref 2.7–4.5)
PHOSPHATE SERPL-MCNC: 4.8 MG/DL (ref 2.7–4.5)
PHOSPHATE SERPL-MCNC: 4.8 MG/DL (ref 2.7–4.5)
PHOSPHATE SERPL-MCNC: 5.1 MG/DL (ref 2.7–4.5)
PHOSPHATE SERPL-MCNC: 5.2 MG/DL (ref 2.7–4.5)
PHOSPHATE SERPL-MCNC: 5.2 MG/DL (ref 2.7–4.5)
PHOSPHATE SERPL-MCNC: 5.3 MG/DL (ref 2.7–4.5)
PHOSPHATE SERPL-MCNC: 5.4 MG/DL (ref 2.7–4.5)
PHOSPHATE SERPL-MCNC: 5.6 MG/DL (ref 2.7–4.5)
PHOSPHATE SERPL-MCNC: 5.7 MG/DL (ref 2.7–4.5)
PHOSPHATE SERPL-MCNC: 5.7 MG/DL (ref 2.7–4.5)
PHOSPHATE SERPL-MCNC: 5.8 MG/DL (ref 2.7–4.5)
PHOSPHATE SERPL-MCNC: 5.8 MG/DL (ref 2.7–4.5)
PHOSPHATE SERPL-MCNC: 5.9 MG/DL (ref 2.7–4.5)
PHOSPHATE SERPL-MCNC: 5.9 MG/DL (ref 2.7–4.5)
PHOSPHATE SERPL-MCNC: 6.1 MG/DL (ref 2.7–4.5)
PHOSPHATE SERPL-MCNC: 6.2 MG/DL (ref 2.7–4.5)
PHOSPHATE SERPL-MCNC: 6.2 MG/DL (ref 2.7–4.5)
PHOSPHATE SERPL-MCNC: 6.8 MG/DL (ref 2.7–4.5)
PHOSPHATE SERPL-MCNC: 6.9 MG/DL (ref 2.7–4.5)
PHOSPHATE SERPL-MCNC: 7 MG/DL (ref 2.7–4.5)
PHOSPHATE SERPL-MCNC: 7 MG/DL (ref 2.7–4.5)
PHOSPHATE SERPL-MCNC: 7.2 MG/DL (ref 2.7–4.5)
PHOSPHATE SERPL-MCNC: 8 MG/DL (ref 2.7–4.5)
PHOSPHATE SERPL-MCNC: 8 MG/DL (ref 2.7–4.5)
PHOSPHATE SERPL-MCNC: 8.2 MG/DL (ref 2.7–4.5)
PHOSPHATE SERPL-MCNC: 8.2 MG/DL (ref 2.7–4.5)
PHOSPHATE SERPL-MCNC: 8.4 MG/DL (ref 2.7–4.5)
PHOSPHATE SERPL-MCNC: 8.7 MG/DL (ref 2.7–4.5)
PHOSPHATE SERPL-MCNC: 8.7 MG/DL (ref 2.7–4.5)
PHOSPHATE SERPL-MCNC: 8.9 MG/DL (ref 2.7–4.5)
PHOSPHATE SERPL-MCNC: <1 MG/DL (ref 2.7–4.5)
PIP: 20
PIP: 22
PIP: 23
PIP: 27
PIP: 27
PIP: 28
PIP: 29
PIP: 33
PIP: 35
PIP: 35
PIP: 38
PIP: 39
PIP: 40
PIP: 41
PIP: 41
PIP: 42
PIP: 43
PIP: 43
PIP: 44
PIP: 47
PIP: 49
PIP: 51
PIP: 52
PIP: 52
PISA TR MAX VEL: 2.58 M/S
PISA TR MAX VEL: 3.13 M/S
PISA TR MAX VEL: 3.37 M/S
PLATELET # BLD AUTO: 100 K/UL (ref 150–450)
PLATELET # BLD AUTO: 101 K/UL (ref 150–450)
PLATELET # BLD AUTO: 102 K/UL (ref 150–450)
PLATELET # BLD AUTO: 102 K/UL (ref 150–450)
PLATELET # BLD AUTO: 103 K/UL (ref 150–450)
PLATELET # BLD AUTO: 103 K/UL (ref 150–450)
PLATELET # BLD AUTO: 106 K/UL (ref 150–450)
PLATELET # BLD AUTO: 109 K/UL (ref 150–450)
PLATELET # BLD AUTO: 111 K/UL (ref 150–450)
PLATELET # BLD AUTO: 111 K/UL (ref 150–450)
PLATELET # BLD AUTO: 113 K/UL (ref 150–450)
PLATELET # BLD AUTO: 114 K/UL (ref 150–450)
PLATELET # BLD AUTO: 115 K/UL (ref 150–450)
PLATELET # BLD AUTO: 117 K/UL (ref 150–450)
PLATELET # BLD AUTO: 117 K/UL (ref 150–450)
PLATELET # BLD AUTO: 118 K/UL (ref 150–450)
PLATELET # BLD AUTO: 119 K/UL (ref 150–450)
PLATELET # BLD AUTO: 120 K/UL (ref 150–450)
PLATELET # BLD AUTO: 122 K/UL (ref 150–450)
PLATELET # BLD AUTO: 125 K/UL (ref 150–450)
PLATELET # BLD AUTO: 127 K/UL (ref 150–450)
PLATELET # BLD AUTO: 128 K/UL (ref 150–450)
PLATELET # BLD AUTO: 129 K/UL (ref 150–450)
PLATELET # BLD AUTO: 130 K/UL (ref 150–450)
PLATELET # BLD AUTO: 131 K/UL (ref 150–450)
PLATELET # BLD AUTO: 133 K/UL (ref 150–450)
PLATELET # BLD AUTO: 134 K/UL (ref 150–450)
PLATELET # BLD AUTO: 134 K/UL (ref 150–450)
PLATELET # BLD AUTO: 136 K/UL (ref 150–450)
PLATELET # BLD AUTO: 136 K/UL (ref 150–450)
PLATELET # BLD AUTO: 138 K/UL (ref 150–450)
PLATELET # BLD AUTO: 142 K/UL (ref 150–450)
PLATELET # BLD AUTO: 146 K/UL (ref 150–450)
PLATELET # BLD AUTO: 52 K/UL (ref 150–450)
PLATELET # BLD AUTO: 56 K/UL (ref 150–450)
PLATELET # BLD AUTO: 58 K/UL (ref 150–450)
PLATELET # BLD AUTO: 59 K/UL (ref 150–450)
PLATELET # BLD AUTO: 59 K/UL (ref 150–450)
PLATELET # BLD AUTO: 65 K/UL (ref 150–450)
PLATELET # BLD AUTO: 67 K/UL (ref 150–450)
PLATELET # BLD AUTO: 71 K/UL (ref 150–450)
PLATELET # BLD AUTO: 75 K/UL (ref 150–450)
PLATELET # BLD AUTO: 76 K/UL (ref 150–450)
PLATELET # BLD AUTO: 80 K/UL (ref 150–450)
PLATELET # BLD AUTO: 81 K/UL (ref 150–450)
PLATELET # BLD AUTO: 81 K/UL (ref 150–450)
PLATELET # BLD AUTO: 88 K/UL (ref 150–450)
PLATELET # BLD AUTO: 89 K/UL (ref 150–450)
PLATELET # BLD AUTO: 89 K/UL (ref 150–450)
PLATELET # BLD AUTO: 90 K/UL (ref 150–450)
PLATELET # BLD AUTO: 90 K/UL (ref 150–450)
PLATELET # BLD AUTO: 91 K/UL (ref 150–450)
PLATELET # BLD AUTO: 91 K/UL (ref 150–450)
PLATELET # BLD AUTO: 92 K/UL (ref 150–450)
PLATELET # BLD AUTO: 92 K/UL (ref 150–450)
PLATELET # BLD AUTO: 93 K/UL (ref 150–450)
PLATELET # BLD AUTO: 93 K/UL (ref 150–450)
PLATELET # BLD AUTO: 97 K/UL (ref 150–450)
PLATELET # BLD AUTO: 98 K/UL (ref 150–450)
PLATELET BLD QL SMEAR: ABNORMAL
PMV BLD AUTO: 10.1 FL (ref 9.2–12.9)
PMV BLD AUTO: 10.4 FL (ref 9.2–12.9)
PMV BLD AUTO: 10.8 FL (ref 9.2–12.9)
PMV BLD AUTO: 10.9 FL (ref 9.2–12.9)
PMV BLD AUTO: 11 FL (ref 9.2–12.9)
PMV BLD AUTO: 11 FL (ref 9.2–12.9)
PMV BLD AUTO: 11.1 FL (ref 9.2–12.9)
PMV BLD AUTO: 11.2 FL (ref 9.2–12.9)
PMV BLD AUTO: 11.3 FL (ref 9.2–12.9)
PMV BLD AUTO: 11.4 FL (ref 9.2–12.9)
PMV BLD AUTO: 11.5 FL (ref 9.2–12.9)
PMV BLD AUTO: 11.6 FL (ref 9.2–12.9)
PMV BLD AUTO: 11.7 FL (ref 9.2–12.9)
PMV BLD AUTO: 11.8 FL (ref 9.2–12.9)
PMV BLD AUTO: 11.9 FL (ref 9.2–12.9)
PMV BLD AUTO: 12 FL (ref 9.2–12.9)
PMV BLD AUTO: 12.1 FL (ref 9.2–12.9)
PMV BLD AUTO: 12.2 FL (ref 9.2–12.9)
PMV BLD AUTO: 12.3 FL (ref 9.2–12.9)
PMV BLD AUTO: 12.4 FL (ref 9.2–12.9)
PMV BLD AUTO: 12.5 FL (ref 9.2–12.9)
PMV BLD AUTO: 12.5 FL (ref 9.2–12.9)
PMV BLD AUTO: 12.6 FL (ref 9.2–12.9)
PMV BLD AUTO: 12.6 FL (ref 9.2–12.9)
PMV BLD AUTO: 12.7 FL (ref 9.2–12.9)
PMV BLD AUTO: 12.9 FL (ref 9.2–12.9)
PO2 BLDA: 100 MMHG (ref 80–100)
PO2 BLDA: 100 MMHG (ref 80–100)
PO2 BLDA: 104 MMHG (ref 80–100)
PO2 BLDA: 106 MMHG (ref 80–100)
PO2 BLDA: 110 MMHG (ref 80–100)
PO2 BLDA: 111 MMHG (ref 80–100)
PO2 BLDA: 113 MMHG (ref 80–100)
PO2 BLDA: 113 MMHG (ref 80–100)
PO2 BLDA: 115 MMHG (ref 80–100)
PO2 BLDA: 115 MMHG (ref 80–100)
PO2 BLDA: 117 MMHG (ref 80–100)
PO2 BLDA: 119 MMHG (ref 80–100)
PO2 BLDA: 124 MMHG (ref 80–100)
PO2 BLDA: 126 MMHG (ref 80–100)
PO2 BLDA: 128 MMHG (ref 80–100)
PO2 BLDA: 129 MMHG (ref 80–100)
PO2 BLDA: 129 MMHG (ref 80–100)
PO2 BLDA: 130 MMHG (ref 80–100)
PO2 BLDA: 131 MMHG (ref 80–100)
PO2 BLDA: 132 MMHG (ref 80–100)
PO2 BLDA: 141 MMHG (ref 80–100)
PO2 BLDA: 141 MMHG (ref 80–100)
PO2 BLDA: 143 MMHG (ref 80–100)
PO2 BLDA: 143 MMHG (ref 80–100)
PO2 BLDA: 144 MMHG (ref 80–100)
PO2 BLDA: 145 MMHG (ref 80–100)
PO2 BLDA: 146 MMHG (ref 80–100)
PO2 BLDA: 159 MMHG (ref 80–100)
PO2 BLDA: 164 MMHG (ref 80–100)
PO2 BLDA: 164 MMHG (ref 80–100)
PO2 BLDA: 165 MMHG (ref 80–100)
PO2 BLDA: 167 MMHG (ref 80–100)
PO2 BLDA: 168 MMHG (ref 80–100)
PO2 BLDA: 169 MMHG (ref 80–100)
PO2 BLDA: 189 MMHG (ref 80–100)
PO2 BLDA: 192 MMHG (ref 80–100)
PO2 BLDA: 208 MMHG (ref 80–100)
PO2 BLDA: 222 MMHG (ref 80–100)
PO2 BLDA: 227 MMHG (ref 80–100)
PO2 BLDA: 234 MMHG (ref 80–100)
PO2 BLDA: 237 MMHG (ref 80–100)
PO2 BLDA: 238 MMHG (ref 80–100)
PO2 BLDA: 240 MMHG (ref 80–100)
PO2 BLDA: 246 MMHG (ref 80–100)
PO2 BLDA: 32 MMHG (ref 40–60)
PO2 BLDA: 33 MMHG (ref 40–60)
PO2 BLDA: 38 MMHG (ref 40–60)
PO2 BLDA: 383 MMHG (ref 80–100)
PO2 BLDA: 406 MMHG (ref 80–100)
PO2 BLDA: 42 MMHG (ref 40–60)
PO2 BLDA: 426 MMHG (ref 80–100)
PO2 BLDA: 45 MMHG (ref 40–60)
PO2 BLDA: 45 MMHG (ref 40–60)
PO2 BLDA: 45 MMHG (ref 80–100)
PO2 BLDA: 46 MMHG (ref 40–60)
PO2 BLDA: 471 MMHG (ref 80–100)
PO2 BLDA: 48 MMHG (ref 80–100)
PO2 BLDA: 49 MMHG (ref 40–60)
PO2 BLDA: 49 MMHG (ref 80–100)
PO2 BLDA: 50 MMHG (ref 80–100)
PO2 BLDA: 55 MMHG (ref 80–100)
PO2 BLDA: 56 MMHG (ref 80–100)
PO2 BLDA: 57 MMHG (ref 80–100)
PO2 BLDA: 57 MMHG (ref 80–100)
PO2 BLDA: 59 MMHG (ref 80–100)
PO2 BLDA: 598 MMHG (ref 80–100)
PO2 BLDA: 62 MMHG (ref 80–100)
PO2 BLDA: 63 MMHG (ref 80–100)
PO2 BLDA: 63 MMHG (ref 80–100)
PO2 BLDA: 64 MMHG (ref 80–100)
PO2 BLDA: 65 MMHG (ref 80–100)
PO2 BLDA: 66 MMHG (ref 80–100)
PO2 BLDA: 67 MMHG (ref 80–100)
PO2 BLDA: 68 MMHG (ref 80–100)
PO2 BLDA: 68 MMHG (ref 80–100)
PO2 BLDA: 69 MMHG (ref 80–100)
PO2 BLDA: 69 MMHG (ref 80–100)
PO2 BLDA: 70 MMHG (ref 80–100)
PO2 BLDA: 70 MMHG (ref 80–100)
PO2 BLDA: 71 MMHG (ref 80–100)
PO2 BLDA: 71 MMHG (ref 80–100)
PO2 BLDA: 72 MMHG (ref 80–100)
PO2 BLDA: 72 MMHG (ref 80–100)
PO2 BLDA: 73 MMHG (ref 80–100)
PO2 BLDA: 74 MMHG (ref 80–100)
PO2 BLDA: 74 MMHG (ref 80–100)
PO2 BLDA: 75 MMHG (ref 80–100)
PO2 BLDA: 75 MMHG (ref 80–100)
PO2 BLDA: 76 MMHG (ref 80–100)
PO2 BLDA: 77 MMHG (ref 80–100)
PO2 BLDA: 77 MMHG (ref 80–100)
PO2 BLDA: 79 MMHG (ref 80–100)
PO2 BLDA: 80 MMHG (ref 80–100)
PO2 BLDA: 81 MMHG (ref 80–100)
PO2 BLDA: 83 MMHG (ref 80–100)
PO2 BLDA: 83 MMHG (ref 80–100)
PO2 BLDA: 84 MMHG (ref 80–100)
PO2 BLDA: 87 MMHG (ref 80–100)
PO2 BLDA: 87 MMHG (ref 80–100)
PO2 BLDA: 89 MMHG (ref 80–100)
PO2 BLDA: 89 MMHG (ref 80–100)
PO2 BLDA: 91 MMHG (ref 80–100)
PO2 BLDA: 91 MMHG (ref 80–100)
PO2 BLDA: 92 MMHG (ref 80–100)
PO2 BLDA: 92 MMHG (ref 80–100)
PO2 BLDA: 93 MMHG (ref 40–60)
PO2 BLDA: 93 MMHG (ref 80–100)
PO2 BLDA: 94 MMHG (ref 80–100)
PO2 BLDA: 94 MMHG (ref 80–100)
PO2 BLDA: 95 MMHG (ref 80–100)
PO2 BLDA: 97 MMHG (ref 80–100)
PO2 BLDA: 98 MMHG (ref 80–100)
POC BE: -1 MMOL/L
POC BE: -10 MMOL/L
POC BE: -11 MMOL/L
POC BE: -13 MMOL/L
POC BE: -15 MMOL/L
POC BE: -2 MMOL/L
POC BE: -3 MMOL/L
POC BE: -4 MMOL/L
POC BE: -5 MMOL/L
POC BE: -6 MMOL/L
POC BE: -7 MMOL/L
POC BE: -8 MMOL/L
POC BE: -8 MMOL/L
POC BE: -9 MMOL/L
POC BE: 0 MMOL/L
POC BE: 1 MMOL/L
POC BE: 10 MMOL/L
POC BE: 12 MMOL/L
POC BE: 2 MMOL/L
POC BE: 3 MMOL/L
POC BE: 4 MMOL/L
POC BE: 5 MMOL/L
POC BE: 6 MMOL/L
POC BE: 8 MMOL/L
POC BE: 9 MMOL/L
POC IONIZED CALCIUM: 0.76 MMOL/L (ref 1.06–1.42)
POC IONIZED CALCIUM: 0.91 MMOL/L (ref 1.06–1.42)
POC IONIZED CALCIUM: 0.93 MMOL/L (ref 1.06–1.42)
POC IONIZED CALCIUM: 0.94 MMOL/L (ref 1.06–1.42)
POC IONIZED CALCIUM: 0.95 MMOL/L (ref 1.06–1.42)
POC IONIZED CALCIUM: 0.95 MMOL/L (ref 1.06–1.42)
POC IONIZED CALCIUM: 0.96 MMOL/L (ref 1.06–1.42)
POC IONIZED CALCIUM: 0.97 MMOL/L (ref 1.06–1.42)
POC IONIZED CALCIUM: 0.97 MMOL/L (ref 1.06–1.42)
POC IONIZED CALCIUM: 1 MMOL/L (ref 1.06–1.42)
POC IONIZED CALCIUM: 1.02 MMOL/L (ref 1.06–1.42)
POC IONIZED CALCIUM: 1.02 MMOL/L (ref 1.06–1.42)
POC IONIZED CALCIUM: 1.04 MMOL/L (ref 1.06–1.42)
POC IONIZED CALCIUM: 1.06 MMOL/L (ref 1.06–1.42)
POC IONIZED CALCIUM: 1.07 MMOL/L (ref 1.06–1.42)
POC IONIZED CALCIUM: 1.07 MMOL/L (ref 1.06–1.42)
POC IONIZED CALCIUM: 1.08 MMOL/L (ref 1.06–1.42)
POC IONIZED CALCIUM: 1.09 MMOL/L (ref 1.06–1.42)
POC IONIZED CALCIUM: 1.1 MMOL/L (ref 1.06–1.42)
POC IONIZED CALCIUM: 1.11 MMOL/L (ref 1.06–1.42)
POC IONIZED CALCIUM: 1.12 MMOL/L (ref 1.06–1.42)
POC IONIZED CALCIUM: 1.13 MMOL/L (ref 1.06–1.42)
POC IONIZED CALCIUM: 1.14 MMOL/L (ref 1.06–1.42)
POC IONIZED CALCIUM: 1.15 MMOL/L (ref 1.06–1.42)
POC IONIZED CALCIUM: 1.16 MMOL/L (ref 1.06–1.42)
POC IONIZED CALCIUM: 1.17 MMOL/L (ref 1.06–1.42)
POC IONIZED CALCIUM: 1.17 MMOL/L (ref 1.06–1.42)
POC IONIZED CALCIUM: 1.19 MMOL/L (ref 1.06–1.42)
POC IONIZED CALCIUM: 1.19 MMOL/L (ref 1.06–1.42)
POC IONIZED CALCIUM: 1.23 MMOL/L (ref 1.06–1.42)
POC IONIZED CALCIUM: 1.26 MMOL/L (ref 1.06–1.42)
POC SATURATED O2: 100 % (ref 95–100)
POC SATURATED O2: 53 % (ref 95–100)
POC SATURATED O2: 62 % (ref 95–100)
POC SATURATED O2: 71 % (ref 95–100)
POC SATURATED O2: 76 % (ref 95–100)
POC SATURATED O2: 76 % (ref 95–100)
POC SATURATED O2: 77 % (ref 95–100)
POC SATURATED O2: 78 % (ref 95–100)
POC SATURATED O2: 79 % (ref 95–100)
POC SATURATED O2: 81 % (ref 95–100)
POC SATURATED O2: 85 % (ref 95–100)
POC SATURATED O2: 85 % (ref 95–100)
POC SATURATED O2: 87 % (ref 95–100)
POC SATURATED O2: 88 % (ref 95–100)
POC SATURATED O2: 88 % (ref 95–100)
POC SATURATED O2: 89 % (ref 95–100)
POC SATURATED O2: 90 % (ref 95–100)
POC SATURATED O2: 91 % (ref 95–100)
POC SATURATED O2: 92 % (ref 95–100)
POC SATURATED O2: 93 % (ref 95–100)
POC SATURATED O2: 94 % (ref 95–100)
POC SATURATED O2: 95 % (ref 95–100)
POC SATURATED O2: 96 % (ref 95–100)
POC SATURATED O2: 97 % (ref 95–100)
POC SATURATED O2: 98 % (ref 95–100)
POC SATURATED O2: 99 % (ref 95–100)
POC TCO2: 14 MMOL/L (ref 23–27)
POC TCO2: 16 MMOL/L (ref 23–27)
POC TCO2: 17 MMOL/L (ref 23–27)
POC TCO2: 18 MMOL/L (ref 23–27)
POC TCO2: 19 MMOL/L (ref 23–27)
POC TCO2: 20 MMOL/L (ref 23–27)
POC TCO2: 21 MMOL/L (ref 23–27)
POC TCO2: 22 MMOL/L (ref 23–27)
POC TCO2: 23 MMOL/L (ref 23–27)
POC TCO2: 23 MMOL/L (ref 24–29)
POC TCO2: 24 MMOL/L (ref 23–27)
POC TCO2: 24 MMOL/L (ref 24–29)
POC TCO2: 25 MMOL/L (ref 23–27)
POC TCO2: 26 MMOL/L (ref 23–27)
POC TCO2: 26 MMOL/L (ref 24–29)
POC TCO2: 27 MMOL/L (ref 23–27)
POC TCO2: 27 MMOL/L (ref 24–29)
POC TCO2: 27 MMOL/L (ref 24–29)
POC TCO2: 28 MMOL/L (ref 23–27)
POC TCO2: 28 MMOL/L (ref 24–29)
POC TCO2: 29 MMOL/L (ref 23–27)
POC TCO2: 30 MMOL/L (ref 23–27)
POC TCO2: 31 MMOL/L (ref 23–27)
POC TCO2: 32 MMOL/L (ref 23–27)
POC TCO2: 32 MMOL/L (ref 24–29)
POC TCO2: 33 MMOL/L (ref 23–27)
POC TCO2: 35 MMOL/L (ref 23–27)
POC TCO2: 35 MMOL/L (ref 23–27)
POCT GLUCOSE: 100 MG/DL (ref 70–110)
POCT GLUCOSE: 106 MG/DL (ref 70–110)
POCT GLUCOSE: 107 MG/DL (ref 70–110)
POCT GLUCOSE: 107 MG/DL (ref 70–110)
POCT GLUCOSE: 108 MG/DL (ref 70–110)
POCT GLUCOSE: 108 MG/DL (ref 70–110)
POCT GLUCOSE: 109 MG/DL (ref 70–110)
POCT GLUCOSE: 109 MG/DL (ref 70–110)
POCT GLUCOSE: 112 MG/DL (ref 70–110)
POCT GLUCOSE: 113 MG/DL (ref 70–110)
POCT GLUCOSE: 114 MG/DL (ref 70–110)
POCT GLUCOSE: 115 MG/DL (ref 70–110)
POCT GLUCOSE: 116 MG/DL (ref 70–110)
POCT GLUCOSE: 118 MG/DL (ref 70–110)
POCT GLUCOSE: 119 MG/DL (ref 70–110)
POCT GLUCOSE: 119 MG/DL (ref 70–110)
POCT GLUCOSE: 120 MG/DL (ref 70–110)
POCT GLUCOSE: 121 MG/DL (ref 70–110)
POCT GLUCOSE: 121 MG/DL (ref 70–110)
POCT GLUCOSE: 123 MG/DL (ref 70–110)
POCT GLUCOSE: 123 MG/DL (ref 70–110)
POCT GLUCOSE: 124 MG/DL (ref 70–110)
POCT GLUCOSE: 126 MG/DL (ref 70–110)
POCT GLUCOSE: 126 MG/DL (ref 70–110)
POCT GLUCOSE: 127 MG/DL (ref 70–110)
POCT GLUCOSE: 128 MG/DL (ref 70–110)
POCT GLUCOSE: 129 MG/DL (ref 70–110)
POCT GLUCOSE: 130 MG/DL (ref 70–110)
POCT GLUCOSE: 130 MG/DL (ref 70–110)
POCT GLUCOSE: 131 MG/DL (ref 70–110)
POCT GLUCOSE: 132 MG/DL (ref 70–110)
POCT GLUCOSE: 134 MG/DL (ref 70–110)
POCT GLUCOSE: 135 MG/DL (ref 70–110)
POCT GLUCOSE: 135 MG/DL (ref 70–110)
POCT GLUCOSE: 136 MG/DL (ref 70–110)
POCT GLUCOSE: 136 MG/DL (ref 70–110)
POCT GLUCOSE: 137 MG/DL (ref 70–110)
POCT GLUCOSE: 138 MG/DL (ref 70–110)
POCT GLUCOSE: 139 MG/DL (ref 70–110)
POCT GLUCOSE: 140 MG/DL (ref 70–110)
POCT GLUCOSE: 141 MG/DL (ref 70–110)
POCT GLUCOSE: 142 MG/DL (ref 70–110)
POCT GLUCOSE: 142 MG/DL (ref 70–110)
POCT GLUCOSE: 144 MG/DL (ref 70–110)
POCT GLUCOSE: 145 MG/DL (ref 70–110)
POCT GLUCOSE: 145 MG/DL (ref 70–110)
POCT GLUCOSE: 146 MG/DL (ref 70–110)
POCT GLUCOSE: 146 MG/DL (ref 70–110)
POCT GLUCOSE: 147 MG/DL (ref 70–110)
POCT GLUCOSE: 147 MG/DL (ref 70–110)
POCT GLUCOSE: 148 MG/DL (ref 70–110)
POCT GLUCOSE: 148 MG/DL (ref 70–110)
POCT GLUCOSE: 150 MG/DL (ref 70–110)
POCT GLUCOSE: 150 MG/DL (ref 70–110)
POCT GLUCOSE: 151 MG/DL (ref 70–110)
POCT GLUCOSE: 151 MG/DL (ref 70–110)
POCT GLUCOSE: 152 MG/DL (ref 70–110)
POCT GLUCOSE: 152 MG/DL (ref 70–110)
POCT GLUCOSE: 153 MG/DL (ref 70–110)
POCT GLUCOSE: 154 MG/DL (ref 70–110)
POCT GLUCOSE: 155 MG/DL (ref 70–110)
POCT GLUCOSE: 156 MG/DL (ref 70–110)
POCT GLUCOSE: 157 MG/DL (ref 70–110)
POCT GLUCOSE: 157 MG/DL (ref 70–110)
POCT GLUCOSE: 158 MG/DL (ref 70–110)
POCT GLUCOSE: 159 MG/DL (ref 70–110)
POCT GLUCOSE: 160 MG/DL (ref 70–110)
POCT GLUCOSE: 160 MG/DL (ref 70–110)
POCT GLUCOSE: 161 MG/DL (ref 70–110)
POCT GLUCOSE: 162 MG/DL (ref 70–110)
POCT GLUCOSE: 163 MG/DL (ref 70–110)
POCT GLUCOSE: 164 MG/DL (ref 70–110)
POCT GLUCOSE: 164 MG/DL (ref 70–110)
POCT GLUCOSE: 166 MG/DL (ref 70–110)
POCT GLUCOSE: 166 MG/DL (ref 70–110)
POCT GLUCOSE: 167 MG/DL (ref 70–110)
POCT GLUCOSE: 167 MG/DL (ref 70–110)
POCT GLUCOSE: 168 MG/DL (ref 70–110)
POCT GLUCOSE: 170 MG/DL (ref 70–110)
POCT GLUCOSE: 173 MG/DL (ref 70–110)
POCT GLUCOSE: 174 MG/DL (ref 70–110)
POCT GLUCOSE: 176 MG/DL (ref 70–110)
POCT GLUCOSE: 176 MG/DL (ref 70–110)
POCT GLUCOSE: 177 MG/DL (ref 70–110)
POCT GLUCOSE: 178 MG/DL (ref 70–110)
POCT GLUCOSE: 178 MG/DL (ref 70–110)
POCT GLUCOSE: 179 MG/DL (ref 70–110)
POCT GLUCOSE: 179 MG/DL (ref 70–110)
POCT GLUCOSE: 181 MG/DL (ref 70–110)
POCT GLUCOSE: 182 MG/DL (ref 70–110)
POCT GLUCOSE: 183 MG/DL (ref 70–110)
POCT GLUCOSE: 183 MG/DL (ref 70–110)
POCT GLUCOSE: 184 MG/DL (ref 70–110)
POCT GLUCOSE: 186 MG/DL (ref 70–110)
POCT GLUCOSE: 187 MG/DL (ref 70–110)
POCT GLUCOSE: 188 MG/DL (ref 70–110)
POCT GLUCOSE: 189 MG/DL (ref 70–110)
POCT GLUCOSE: 189 MG/DL (ref 70–110)
POCT GLUCOSE: 191 MG/DL (ref 70–110)
POCT GLUCOSE: 193 MG/DL (ref 70–110)
POCT GLUCOSE: 194 MG/DL (ref 70–110)
POCT GLUCOSE: 195 MG/DL (ref 70–110)
POCT GLUCOSE: 195 MG/DL (ref 70–110)
POCT GLUCOSE: 196 MG/DL (ref 70–110)
POCT GLUCOSE: 197 MG/DL (ref 70–110)
POCT GLUCOSE: 197 MG/DL (ref 70–110)
POCT GLUCOSE: 198 MG/DL (ref 70–110)
POCT GLUCOSE: 199 MG/DL (ref 70–110)
POCT GLUCOSE: 200 MG/DL (ref 70–110)
POCT GLUCOSE: 201 MG/DL (ref 70–110)
POCT GLUCOSE: 201 MG/DL (ref 70–110)
POCT GLUCOSE: 202 MG/DL (ref 70–110)
POCT GLUCOSE: 202 MG/DL (ref 70–110)
POCT GLUCOSE: 203 MG/DL (ref 70–110)
POCT GLUCOSE: 204 MG/DL (ref 70–110)
POCT GLUCOSE: 207 MG/DL (ref 70–110)
POCT GLUCOSE: 208 MG/DL (ref 70–110)
POCT GLUCOSE: 209 MG/DL (ref 70–110)
POCT GLUCOSE: 210 MG/DL (ref 70–110)
POCT GLUCOSE: 210 MG/DL (ref 70–110)
POCT GLUCOSE: 211 MG/DL (ref 70–110)
POCT GLUCOSE: 212 MG/DL (ref 70–110)
POCT GLUCOSE: 212 MG/DL (ref 70–110)
POCT GLUCOSE: 213 MG/DL (ref 70–110)
POCT GLUCOSE: 217 MG/DL (ref 70–110)
POCT GLUCOSE: 217 MG/DL (ref 70–110)
POCT GLUCOSE: 219 MG/DL (ref 70–110)
POCT GLUCOSE: 222 MG/DL (ref 70–110)
POCT GLUCOSE: 223 MG/DL (ref 70–110)
POCT GLUCOSE: 224 MG/DL (ref 70–110)
POCT GLUCOSE: 226 MG/DL (ref 70–110)
POCT GLUCOSE: 227 MG/DL (ref 70–110)
POCT GLUCOSE: 228 MG/DL (ref 70–110)
POCT GLUCOSE: 228 MG/DL (ref 70–110)
POCT GLUCOSE: 231 MG/DL (ref 70–110)
POCT GLUCOSE: 236 MG/DL (ref 70–110)
POCT GLUCOSE: 239 MG/DL (ref 70–110)
POCT GLUCOSE: 240 MG/DL (ref 70–110)
POCT GLUCOSE: 241 MG/DL (ref 70–110)
POCT GLUCOSE: 242 MG/DL (ref 70–110)
POCT GLUCOSE: 243 MG/DL (ref 70–110)
POCT GLUCOSE: 248 MG/DL (ref 70–110)
POCT GLUCOSE: 248 MG/DL (ref 70–110)
POCT GLUCOSE: 249 MG/DL (ref 70–110)
POCT GLUCOSE: 255 MG/DL (ref 70–110)
POCT GLUCOSE: 256 MG/DL (ref 70–110)
POCT GLUCOSE: 257 MG/DL (ref 70–110)
POCT GLUCOSE: 265 MG/DL (ref 70–110)
POCT GLUCOSE: 267 MG/DL (ref 70–110)
POCT GLUCOSE: 268 MG/DL (ref 70–110)
POCT GLUCOSE: 269 MG/DL (ref 70–110)
POCT GLUCOSE: 272 MG/DL (ref 70–110)
POCT GLUCOSE: 276 MG/DL (ref 70–110)
POCT GLUCOSE: 277 MG/DL (ref 70–110)
POCT GLUCOSE: 287 MG/DL (ref 70–110)
POCT GLUCOSE: 303 MG/DL (ref 70–110)
POCT GLUCOSE: 308 MG/DL (ref 70–110)
POCT GLUCOSE: 320 MG/DL (ref 70–110)
POCT GLUCOSE: 351 MG/DL (ref 70–110)
POCT GLUCOSE: 64 MG/DL (ref 70–110)
POCT GLUCOSE: 66 MG/DL (ref 70–110)
POCT GLUCOSE: 69 MG/DL (ref 70–110)
POCT GLUCOSE: 70 MG/DL (ref 70–110)
POCT GLUCOSE: 74 MG/DL (ref 70–110)
POCT GLUCOSE: 74 MG/DL (ref 70–110)
POCT GLUCOSE: 84 MG/DL (ref 70–110)
POCT GLUCOSE: 89 MG/DL (ref 70–110)
POCT GLUCOSE: 95 MG/DL (ref 70–110)
POCT GLUCOSE: 97 MG/DL (ref 70–110)
POCT GLUCOSE: 98 MG/DL (ref 70–110)
POIKILOCYTOSIS BLD QL SMEAR: SLIGHT
POLYCHROMASIA BLD QL SMEAR: ABNORMAL
POTASSIUM BLD-SCNC: 2.7 MMOL/L (ref 3.5–5.1)
POTASSIUM BLD-SCNC: 2.8 MMOL/L (ref 3.5–5.1)
POTASSIUM BLD-SCNC: 2.9 MMOL/L (ref 3.5–5.1)
POTASSIUM BLD-SCNC: 3.2 MMOL/L (ref 3.5–5.1)
POTASSIUM BLD-SCNC: 3.3 MMOL/L (ref 3.5–5.1)
POTASSIUM BLD-SCNC: 3.3 MMOL/L (ref 3.5–5.1)
POTASSIUM BLD-SCNC: 3.4 MMOL/L (ref 3.5–5.1)
POTASSIUM BLD-SCNC: 3.4 MMOL/L (ref 3.5–5.1)
POTASSIUM BLD-SCNC: 3.5 MMOL/L (ref 3.5–5.1)
POTASSIUM BLD-SCNC: 3.6 MMOL/L (ref 3.5–5.1)
POTASSIUM BLD-SCNC: 3.7 MMOL/L (ref 3.5–5.1)
POTASSIUM BLD-SCNC: 3.7 MMOL/L (ref 3.5–5.1)
POTASSIUM BLD-SCNC: 3.8 MMOL/L (ref 3.5–5.1)
POTASSIUM BLD-SCNC: 3.9 MMOL/L (ref 3.5–5.1)
POTASSIUM BLD-SCNC: 4 MMOL/L (ref 3.5–5.1)
POTASSIUM BLD-SCNC: 4.1 MMOL/L (ref 3.5–5.1)
POTASSIUM BLD-SCNC: 4.2 MMOL/L (ref 3.5–5.1)
POTASSIUM BLD-SCNC: 4.2 MMOL/L (ref 3.5–5.1)
POTASSIUM BLD-SCNC: 4.3 MMOL/L (ref 3.5–5.1)
POTASSIUM BLD-SCNC: 4.4 MMOL/L (ref 3.5–5.1)
POTASSIUM BLD-SCNC: 4.5 MMOL/L (ref 3.5–5.1)
POTASSIUM BLD-SCNC: 4.5 MMOL/L (ref 3.5–5.1)
POTASSIUM BLD-SCNC: 4.6 MMOL/L (ref 3.5–5.1)
POTASSIUM BLD-SCNC: 4.7 MMOL/L (ref 3.5–5.1)
POTASSIUM BLD-SCNC: 4.8 MMOL/L (ref 3.5–5.1)
POTASSIUM BLD-SCNC: 5 MMOL/L (ref 3.5–5.1)
POTASSIUM SERPL-SCNC: 3.1 MMOL/L (ref 3.5–5.1)
POTASSIUM SERPL-SCNC: 3.2 MMOL/L (ref 3.5–5.1)
POTASSIUM SERPL-SCNC: 3.3 MMOL/L (ref 3.5–5.1)
POTASSIUM SERPL-SCNC: 3.4 MMOL/L (ref 3.5–5.1)
POTASSIUM SERPL-SCNC: 3.5 MMOL/L (ref 3.5–5.1)
POTASSIUM SERPL-SCNC: 3.6 MMOL/L (ref 3.5–5.1)
POTASSIUM SERPL-SCNC: 3.7 MMOL/L (ref 3.5–5.1)
POTASSIUM SERPL-SCNC: 3.8 MMOL/L (ref 3.5–5.1)
POTASSIUM SERPL-SCNC: 3.9 MMOL/L (ref 3.5–5.1)
POTASSIUM SERPL-SCNC: 4 MMOL/L (ref 3.5–5.1)
POTASSIUM SERPL-SCNC: 4.1 MMOL/L (ref 3.5–5.1)
POTASSIUM SERPL-SCNC: 4.2 MMOL/L (ref 3.5–5.1)
POTASSIUM SERPL-SCNC: 4.3 MMOL/L (ref 3.5–5.1)
POTASSIUM SERPL-SCNC: 4.4 MMOL/L (ref 3.5–5.1)
POTASSIUM SERPL-SCNC: 4.5 MMOL/L (ref 3.5–5.1)
POTASSIUM SERPL-SCNC: 4.6 MMOL/L (ref 3.5–5.1)
POTASSIUM SERPL-SCNC: 4.7 MMOL/L (ref 3.5–5.1)
POTASSIUM SERPL-SCNC: 4.8 MMOL/L (ref 3.5–5.1)
POTASSIUM SERPL-SCNC: 4.9 MMOL/L (ref 3.5–5.1)
POTASSIUM SERPL-SCNC: 5 MMOL/L (ref 3.5–5.1)
POTASSIUM SERPL-SCNC: 5 MMOL/L (ref 3.5–5.1)
POTASSIUM SERPL-SCNC: 5.1 MMOL/L (ref 3.5–5.1)
POTASSIUM SERPL-SCNC: 5.1 MMOL/L (ref 3.5–5.1)
POTASSIUM SERPL-SCNC: 5.3 MMOL/L (ref 3.5–5.1)
POTASSIUM SERPL-SCNC: 5.5 MMOL/L (ref 3.5–5.1)
POTASSIUM SERPL-SCNC: 5.5 MMOL/L (ref 3.5–5.1)
PREALB SERPL-MCNC: 11 MG/DL (ref 20–43)
PREALB SERPL-MCNC: 13 MG/DL (ref 20–43)
PREALB SERPL-MCNC: 16 MG/DL (ref 20–43)
PREALB SERPL-MCNC: <3 MG/DL (ref 20–43)
PREALB SERPL-MCNC: <3 MG/DL (ref 20–43)
PROT SERPL-MCNC: 4.7 G/DL (ref 6–8.4)
PROT SERPL-MCNC: 5.1 G/DL (ref 6–8.4)
PROT SERPL-MCNC: 5.5 G/DL (ref 6–8.4)
PROT SERPL-MCNC: 5.8 G/DL (ref 6–8.4)
PROT SERPL-MCNC: 5.9 G/DL (ref 6–8.4)
PROT SERPL-MCNC: 5.9 G/DL (ref 6–8.4)
PROT SERPL-MCNC: 6 G/DL (ref 6–8.4)
PROT SERPL-MCNC: 6.1 G/DL (ref 6–8.4)
PROT SERPL-MCNC: 6.3 G/DL (ref 6–8.4)
PROT SERPL-MCNC: 6.4 G/DL (ref 6–8.4)
PROT SERPL-MCNC: 6.5 G/DL (ref 6–8.4)
PROT SERPL-MCNC: 6.5 G/DL (ref 6–8.4)
PROT SERPL-MCNC: 7 G/DL (ref 6–8.4)
PROT SERPL-MCNC: 7.7 G/DL (ref 6–8.4)
PROT SERPL-MCNC: 8.7 G/DL (ref 6–8.4)
PROTHROMBIN TIME: 11.7 SEC (ref 9–12.5)
PROTHROMBIN TIME: 11.8 SEC (ref 9–12.5)
PROTHROMBIN TIME: 12 SEC (ref 9–12.5)
PROTHROMBIN TIME: 12.1 SEC (ref 9–12.5)
PROTHROMBIN TIME: 12.8 SEC (ref 9–12.5)
PROTHROMBIN TIME: 12.9 SEC (ref 9–12.5)
PROTHROMBIN TIME: 13 SEC (ref 9–12.5)
PROTHROMBIN TIME: 13 SEC (ref 9–12.5)
PROTHROMBIN TIME: 13.1 SEC (ref 9–12.5)
PROTHROMBIN TIME: 13.4 SEC (ref 9–12.5)
PROTHROMBIN TIME: 14.1 SEC (ref 9–12.5)
PROTHROMBIN TIME: 14.1 SEC (ref 9–12.5)
PROTHROMBIN TIME: 14.2 SEC (ref 9–12.5)
PROTHROMBIN TIME: 14.2 SEC (ref 9–12.5)
PROTHROMBIN TIME: 14.3 SEC (ref 9–12.5)
PROTHROMBIN TIME: 14.6 SEC (ref 9–12.5)
PROTHROMBIN TIME: 14.9 SEC (ref 9–12.5)
PROTHROMBIN TIME: 15 SEC (ref 9–12.5)
PROTHROMBIN TIME: 15.4 SEC (ref 9–12.5)
PROTHROMBIN TIME: 16.1 SEC (ref 9–12.5)
PROTHROMBIN TIME: 16.5 SEC (ref 9–12.5)
PROTHROMBIN TIME: 16.6 SEC (ref 9–12.5)
PROTHROMBIN TIME: 16.7 SEC (ref 9–12.5)
PROTHROMBIN TIME: 16.7 SEC (ref 9–12.5)
PROTHROMBIN TIME: 17.2 SEC (ref 9–12.5)
PROTHROMBIN TIME: 17.9 SEC (ref 9–12.5)
PROTHROMBIN TIME: 18.7 SEC (ref 9–12.5)
PROTHROMBIN TIME: 18.8 SEC (ref 9–12.5)
PROTHROMBIN TIME: 20.3 SEC (ref 9–12.5)
PROTHROMBIN TIME: 21.9 SEC (ref 9–12.5)
PROTHROMBIN TIME: 23.2 SEC (ref 9–12.5)
PROTHROMBIN TIME: 23.2 SEC (ref 9–12.5)
PROTHROMBIN TIME: 24.3 SEC (ref 9–12.5)
PROTHROMBIN TIME: 24.4 SEC (ref 9–12.5)
PROTHROMBIN TIME: 25.1 SEC (ref 9–12.5)
PROTHROMBIN TIME: 25.7 SEC (ref 9–12.5)
PROTHROMBIN TIME: 26.1 SEC (ref 9–12.5)
PROTHROMBIN TIME: 26.4 SEC (ref 9–12.5)
PROTHROMBIN TIME: 26.4 SEC (ref 9–12.5)
PROTHROMBIN TIME: 27 SEC (ref 9–12.5)
PROTHROMBIN TIME: 27.1 SEC (ref 9–12.5)
PROTHROMBIN TIME: 27.3 SEC (ref 9–12.5)
PROTHROMBIN TIME: 27.7 SEC (ref 9–12.5)
PROTHROMBIN TIME: 28.7 SEC (ref 9–12.5)
PROTHROMBIN TIME: 29.6 SEC (ref 9–12.5)
PROTHROMBIN TIME: 29.6 SEC (ref 9–12.5)
PROTHROMBIN TIME: 30.7 SEC (ref 9–12.5)
PROTHROMBIN TIME: 31.4 SEC (ref 9–12.5)
PROTHROMBIN TIME: 31.8 SEC (ref 9–12.5)
PROTHROMBIN TIME: 31.9 SEC (ref 9–12.5)
PROTHROMBIN TIME: 32.6 SEC (ref 9–12.5)
PROTHROMBIN TIME: 33.2 SEC (ref 9–12.5)
PROTHROMBIN TIME: 33.3 SEC (ref 9–12.5)
PROTHROMBIN TIME: 35 SEC (ref 9–12.5)
PROTHROMBIN TIME: 35 SEC (ref 9–12.5)
PROTHROMBIN TIME: 35.2 SEC (ref 9–12.5)
PROTHROMBIN TIME: 35.2 SEC (ref 9–12.5)
PROTHROMBIN TIME: 35.3 SEC (ref 9–12.5)
PROTHROMBIN TIME: 35.6 SEC (ref 9–12.5)
PROTHROMBIN TIME: 37.2 SEC (ref 9–12.5)
PROTHROMBIN TIME: 37.5 SEC (ref 9–12.5)
PROTHROMBIN TIME: 38.9 SEC (ref 9–12.5)
PROTHROMBIN TIME: 40.5 SEC (ref 9–12.5)
PROTHROMBIN TIME: 40.9 SEC (ref 9–12.5)
PROTHROMBIN TIME: 41.8 SEC (ref 9–12.5)
PROTHROMBIN TIME: 42.3 SEC (ref 9–12.5)
PROTHROMBIN TIME: 44.3 SEC (ref 9–12.5)
PROTHROMBIN TIME: 44.5 SEC (ref 9–12.5)
PROTHROMBIN TIME: 46.6 SEC (ref 9–12.5)
PROTHROMBIN TIME: 46.7 SEC (ref 9–12.5)
PROTHROMBIN TIME: 47.6 SEC (ref 9–12.5)
PROTHROMBIN TIME: 48.8 SEC (ref 9–12.5)
PROTHROMBIN TIME: 50.8 SEC (ref 9–12.5)
PROTHROMBIN TIME: 60.2 SEC (ref 9–12.5)
PROTHROMBIN TIME: 61.2 SEC (ref 9–12.5)
PROTHROMBIN TIME: 63.1 SEC (ref 9–12.5)
PROTHROMBIN TIME: 64.5 SEC (ref 9–12.5)
PROTHROMBIN TIME: 64.8 SEC (ref 9–12.5)
PROTHROMBIN TIME: 67.4 SEC (ref 9–12.5)
PROTHROMBIN TIME: 73.2 SEC (ref 9–12.5)
PROTHROMBIN TIME: 79 SEC (ref 9–12.5)
PS: 10
PS: 10
PS: 15
RA MAJOR: 5.86 CM
RA MAJOR: 6.9 CM
RA MAJOR: 7.13 CM
RA PRESSURE: 15 MMHG
RA WIDTH: 3.23 CM
RA WIDTH: 5.73 CM
RA WIDTH: 5.98 CM
RBC # BLD AUTO: 1.98 M/UL (ref 4–5.4)
RBC # BLD AUTO: 1.99 M/UL (ref 4–5.4)
RBC # BLD AUTO: 2.01 M/UL (ref 4–5.4)
RBC # BLD AUTO: 2.09 M/UL (ref 4–5.4)
RBC # BLD AUTO: 2.12 M/UL (ref 4–5.4)
RBC # BLD AUTO: 2.2 M/UL (ref 4–5.4)
RBC # BLD AUTO: 2.24 M/UL (ref 4–5.4)
RBC # BLD AUTO: 2.28 M/UL (ref 4–5.4)
RBC # BLD AUTO: 2.33 M/UL (ref 4–5.4)
RBC # BLD AUTO: 2.33 M/UL (ref 4–5.4)
RBC # BLD AUTO: 2.34 M/UL (ref 4–5.4)
RBC # BLD AUTO: 2.36 M/UL (ref 4–5.4)
RBC # BLD AUTO: 2.37 M/UL (ref 4–5.4)
RBC # BLD AUTO: 2.37 M/UL (ref 4–5.4)
RBC # BLD AUTO: 2.38 M/UL (ref 4–5.4)
RBC # BLD AUTO: 2.4 M/UL (ref 4–5.4)
RBC # BLD AUTO: 2.42 M/UL (ref 4–5.4)
RBC # BLD AUTO: 2.44 M/UL (ref 4–5.4)
RBC # BLD AUTO: 2.45 M/UL (ref 4–5.4)
RBC # BLD AUTO: 2.46 M/UL (ref 4–5.4)
RBC # BLD AUTO: 2.48 M/UL (ref 4–5.4)
RBC # BLD AUTO: 2.5 M/UL (ref 4–5.4)
RBC # BLD AUTO: 2.5 M/UL (ref 4–5.4)
RBC # BLD AUTO: 2.51 M/UL (ref 4–5.4)
RBC # BLD AUTO: 2.51 M/UL (ref 4–5.4)
RBC # BLD AUTO: 2.53 M/UL (ref 4–5.4)
RBC # BLD AUTO: 2.54 M/UL (ref 4–5.4)
RBC # BLD AUTO: 2.54 M/UL (ref 4–5.4)
RBC # BLD AUTO: 2.6 M/UL (ref 4–5.4)
RBC # BLD AUTO: 2.61 M/UL (ref 4–5.4)
RBC # BLD AUTO: 2.62 M/UL (ref 4–5.4)
RBC # BLD AUTO: 2.63 M/UL (ref 4–5.4)
RBC # BLD AUTO: 2.63 M/UL (ref 4–5.4)
RBC # BLD AUTO: 2.64 M/UL (ref 4–5.4)
RBC # BLD AUTO: 2.67 M/UL (ref 4–5.4)
RBC # BLD AUTO: 2.7 M/UL (ref 4–5.4)
RBC # BLD AUTO: 2.72 M/UL (ref 4–5.4)
RBC # BLD AUTO: 2.73 M/UL (ref 4–5.4)
RBC # BLD AUTO: 2.75 M/UL (ref 4–5.4)
RBC # BLD AUTO: 2.76 M/UL (ref 4–5.4)
RBC # BLD AUTO: 2.76 M/UL (ref 4–5.4)
RBC # BLD AUTO: 2.77 M/UL (ref 4–5.4)
RBC # BLD AUTO: 2.79 M/UL (ref 4–5.4)
RBC # BLD AUTO: 2.8 M/UL (ref 4–5.4)
RBC # BLD AUTO: 2.82 M/UL (ref 4–5.4)
RBC # BLD AUTO: 2.84 M/UL (ref 4–5.4)
RBC # BLD AUTO: 2.88 M/UL (ref 4–5.4)
RBC # BLD AUTO: 2.91 M/UL (ref 4–5.4)
RBC # BLD AUTO: 2.95 M/UL (ref 4–5.4)
RBC # BLD AUTO: 2.97 M/UL (ref 4–5.4)
RBC # BLD AUTO: 3.07 M/UL (ref 4–5.4)
RBC # BLD AUTO: 3.12 M/UL (ref 4–5.4)
RBC # BLD AUTO: 3.14 M/UL (ref 4–5.4)
RBC # BLD AUTO: 3.16 M/UL (ref 4–5.4)
RBC # BLD AUTO: 3.21 M/UL (ref 4–5.4)
RBC # BLD AUTO: 3.26 M/UL (ref 4–5.4)
RBC # BLD AUTO: 3.31 M/UL (ref 4–5.4)
RBC # BLD AUTO: 3.35 M/UL (ref 4–5.4)
RBC # BLD AUTO: 3.35 M/UL (ref 4–5.4)
RBC # BLD AUTO: 3.36 M/UL (ref 4–5.4)
RBC # BLD AUTO: 4.11 M/UL (ref 4–5.4)
RIGHT ATRIAL AREA: 36 CM2
RIGHT VENTRICULAR END-DIASTOLIC DIMENSION: 4.55 CM
RIGHT VENTRICULAR END-DIASTOLIC DIMENSION: 4.66 CM
RIGHT VENTRICULAR END-DIASTOLIC DIMENSION: 4.68 CM
RV TISSUE DOPPLER FREE WALL SYSTOLIC VELOCITY 1 (APICAL 4 CHAMBER VIEW): 5.07 CM/S
SAMPLE: ABNORMAL
SAMPLE: NORMAL
SARS-COV-2 RDRP RESP QL NAA+PROBE: NEGATIVE
SARS-COV-2 RNA RESP QL NAA+PROBE: NOT DETECTED
SARS-COV-2 RNA RESP QL NAA+PROBE: NOT DETECTED
SARS-COV-2- CYCLE NUMBER: NORMAL
SCHISTOCYTES BLD QL SMEAR: ABNORMAL
SCHISTOCYTES BLD QL SMEAR: ABNORMAL
SCHISTOCYTES BLD QL SMEAR: PRESENT
SINUS: 2.73 CM
SINUS: 2.93 CM
SINUS: 3.79 CM
SITE: ABNORMAL
SITE: NORMAL
SMUDGE CELLS BLD QL SMEAR: PRESENT
SODIUM BLD-SCNC: 124 MMOL/L (ref 136–145)
SODIUM BLD-SCNC: 125 MMOL/L (ref 136–145)
SODIUM BLD-SCNC: 127 MMOL/L (ref 136–145)
SODIUM BLD-SCNC: 128 MMOL/L (ref 136–145)
SODIUM BLD-SCNC: 129 MMOL/L (ref 136–145)
SODIUM BLD-SCNC: 130 MMOL/L (ref 136–145)
SODIUM BLD-SCNC: 130 MMOL/L (ref 136–145)
SODIUM BLD-SCNC: 131 MMOL/L (ref 136–145)
SODIUM BLD-SCNC: 132 MMOL/L (ref 136–145)
SODIUM BLD-SCNC: 132 MMOL/L (ref 136–145)
SODIUM BLD-SCNC: 133 MMOL/L (ref 136–145)
SODIUM BLD-SCNC: 134 MMOL/L (ref 136–145)
SODIUM BLD-SCNC: 134 MMOL/L (ref 136–145)
SODIUM BLD-SCNC: 135 MMOL/L (ref 136–145)
SODIUM BLD-SCNC: 136 MMOL/L (ref 136–145)
SODIUM BLD-SCNC: 137 MMOL/L (ref 136–145)
SODIUM BLD-SCNC: 138 MMOL/L (ref 136–145)
SODIUM BLD-SCNC: 138 MMOL/L (ref 136–145)
SODIUM BLD-SCNC: 139 MMOL/L (ref 136–145)
SODIUM BLD-SCNC: 139 MMOL/L (ref 136–145)
SODIUM BLD-SCNC: 140 MMOL/L (ref 136–145)
SODIUM BLD-SCNC: 141 MMOL/L (ref 136–145)
SODIUM BLD-SCNC: 141 MMOL/L (ref 136–145)
SODIUM BLD-SCNC: 143 MMOL/L (ref 136–145)
SODIUM BLD-SCNC: 144 MMOL/L (ref 136–145)
SODIUM SERPL-SCNC: 123 MMOL/L (ref 136–145)
SODIUM SERPL-SCNC: 124 MMOL/L (ref 136–145)
SODIUM SERPL-SCNC: 124 MMOL/L (ref 136–145)
SODIUM SERPL-SCNC: 125 MMOL/L (ref 136–145)
SODIUM SERPL-SCNC: 126 MMOL/L (ref 136–145)
SODIUM SERPL-SCNC: 126 MMOL/L (ref 136–145)
SODIUM SERPL-SCNC: 127 MMOL/L (ref 136–145)
SODIUM SERPL-SCNC: 127 MMOL/L (ref 136–145)
SODIUM SERPL-SCNC: 128 MMOL/L (ref 136–145)
SODIUM SERPL-SCNC: 129 MMOL/L (ref 136–145)
SODIUM SERPL-SCNC: 130 MMOL/L (ref 136–145)
SODIUM SERPL-SCNC: 131 MMOL/L (ref 136–145)
SODIUM SERPL-SCNC: 132 MMOL/L (ref 136–145)
SODIUM SERPL-SCNC: 133 MMOL/L (ref 136–145)
SODIUM SERPL-SCNC: 134 MMOL/L (ref 136–145)
SODIUM SERPL-SCNC: 135 MMOL/L (ref 136–145)
SODIUM SERPL-SCNC: 136 MMOL/L (ref 136–145)
SODIUM SERPL-SCNC: 137 MMOL/L (ref 136–145)
SODIUM SERPL-SCNC: 138 MMOL/L (ref 136–145)
SODIUM SERPL-SCNC: 139 MMOL/L (ref 136–145)
SODIUM SERPL-SCNC: 140 MMOL/L (ref 136–145)
SODIUM SERPL-SCNC: 141 MMOL/L (ref 136–145)
SODIUM SERPL-SCNC: 142 MMOL/L (ref 136–145)
SP02: 100
SP02: 87
SP02: 88
SP02: 90
SP02: 90
SP02: 91
SP02: 92
SP02: 93
SP02: 93
SP02: 94
SP02: 95
SP02: 95
SP02: 96
SP02: 97
SP02: 97
SP02: 99
SP02: 99
SPHEROCYTES BLD QL SMEAR: ABNORMAL
SPONT RATE: 18
SPONT RATE: 26
SPONT RATE: 28
STJ: 1.98 CM
STJ: 2.4 CM
STJ: 2.95 CM
STOMATOCYTES BLD QL SMEAR: PRESENT
TARGETS BLD QL SMEAR: ABNORMAL
TDI LATERAL: 0.05 M/S
TDI LATERAL: 0.06 M/S
TDI LATERAL: 0.1 M/S
TDI SEPTAL: 0.04 M/S
TDI SEPTAL: 0.05 M/S
TDI SEPTAL: 0.05 M/S
TDI: 0.05 M/S
TDI: 0.05 M/S
TDI: 0.08 M/S
TOXIC GRANULES BLD QL SMEAR: PRESENT
TR MAX PG: 27 MMHG
TR MAX PG: 39 MMHG
TR MAX PG: 45 MMHG
TRANS ERYTHROCYTES VOL PATIENT: NORMAL ML
TRICUSPID ANNULAR PLANE SYSTOLIC EXCURSION: 0.41 CM
TRICUSPID ANNULAR PLANE SYSTOLIC EXCURSION: 1.23 CM
TV REST PULMONARY ARTERY PRESSURE: 54 MMHG
UNIT NUMBER: NORMAL
UNIT NUMBER: NORMAL
UUN UR-MCNC: 289 MG/DL (ref 140–1050)
VANCOMYCIN SERPL-MCNC: 10.1 UG/ML
VANCOMYCIN SERPL-MCNC: 10.9 UG/ML
VANCOMYCIN SERPL-MCNC: 11.4 UG/ML
VANCOMYCIN SERPL-MCNC: 11.5 UG/ML
VANCOMYCIN SERPL-MCNC: 11.7 UG/ML
VANCOMYCIN SERPL-MCNC: 12.1 UG/ML
VANCOMYCIN SERPL-MCNC: 14.6 UG/ML
VANCOMYCIN SERPL-MCNC: 16.5 UG/ML
VANCOMYCIN SERPL-MCNC: 17.8 UG/ML
VANCOMYCIN SERPL-MCNC: 20.1 UG/ML
VANCOMYCIN SERPL-MCNC: 20.9 UG/ML
VANCOMYCIN SERPL-MCNC: 4.4 UG/ML
VANCOMYCIN SERPL-MCNC: 6.9 UG/ML
VANCOMYCIN SERPL-MCNC: 8 UG/ML
VERBAL RESULT READBACK PERFORMED: YES
VT: 250
VT: 280
VT: 300
VT: 320
VT: 320
VT: 330
VT: 330
VT: 340
VT: 340
VT: 350
WBC # BLD AUTO: 10.1 K/UL (ref 3.9–12.7)
WBC # BLD AUTO: 10.31 K/UL (ref 3.9–12.7)
WBC # BLD AUTO: 10.36 K/UL (ref 3.9–12.7)
WBC # BLD AUTO: 10.36 K/UL (ref 3.9–12.7)
WBC # BLD AUTO: 10.41 K/UL (ref 3.9–12.7)
WBC # BLD AUTO: 10.48 K/UL (ref 3.9–12.7)
WBC # BLD AUTO: 10.61 K/UL (ref 3.9–12.7)
WBC # BLD AUTO: 10.64 K/UL (ref 3.9–12.7)
WBC # BLD AUTO: 10.71 K/UL (ref 3.9–12.7)
WBC # BLD AUTO: 10.81 K/UL (ref 3.9–12.7)
WBC # BLD AUTO: 11.03 K/UL (ref 3.9–12.7)
WBC # BLD AUTO: 11.11 K/UL (ref 3.9–12.7)
WBC # BLD AUTO: 11.32 K/UL (ref 3.9–12.7)
WBC # BLD AUTO: 11.46 K/UL (ref 3.9–12.7)
WBC # BLD AUTO: 11.52 K/UL (ref 3.9–12.7)
WBC # BLD AUTO: 11.66 K/UL (ref 3.9–12.7)
WBC # BLD AUTO: 11.67 K/UL (ref 3.9–12.7)
WBC # BLD AUTO: 11.67 K/UL (ref 3.9–12.7)
WBC # BLD AUTO: 11.71 K/UL (ref 3.9–12.7)
WBC # BLD AUTO: 12.36 K/UL (ref 3.9–12.7)
WBC # BLD AUTO: 12.4 K/UL (ref 3.9–12.7)
WBC # BLD AUTO: 12.43 K/UL (ref 3.9–12.7)
WBC # BLD AUTO: 12.58 K/UL (ref 3.9–12.7)
WBC # BLD AUTO: 13.06 K/UL (ref 3.9–12.7)
WBC # BLD AUTO: 13.4 K/UL (ref 3.9–12.7)
WBC # BLD AUTO: 13.7 K/UL (ref 3.9–12.7)
WBC # BLD AUTO: 13.75 K/UL (ref 3.9–12.7)
WBC # BLD AUTO: 14.17 K/UL (ref 3.9–12.7)
WBC # BLD AUTO: 14.3 K/UL (ref 3.9–12.7)
WBC # BLD AUTO: 14.33 K/UL (ref 3.9–12.7)
WBC # BLD AUTO: 14.34 K/UL (ref 3.9–12.7)
WBC # BLD AUTO: 14.44 K/UL (ref 3.9–12.7)
WBC # BLD AUTO: 14.66 K/UL (ref 3.9–12.7)
WBC # BLD AUTO: 14.84 K/UL (ref 3.9–12.7)
WBC # BLD AUTO: 15.08 K/UL (ref 3.9–12.7)
WBC # BLD AUTO: 15.86 K/UL (ref 3.9–12.7)
WBC # BLD AUTO: 16.77 K/UL (ref 3.9–12.7)
WBC # BLD AUTO: 17.69 K/UL (ref 3.9–12.7)
WBC # BLD AUTO: 17.72 K/UL (ref 3.9–12.7)
WBC # BLD AUTO: 17.82 K/UL (ref 3.9–12.7)
WBC # BLD AUTO: 17.93 K/UL (ref 3.9–12.7)
WBC # BLD AUTO: 19.23 K/UL (ref 3.9–12.7)
WBC # BLD AUTO: 19.51 K/UL (ref 3.9–12.7)
WBC # BLD AUTO: 20.06 K/UL (ref 3.9–12.7)
WBC # BLD AUTO: 20.53 K/UL (ref 3.9–12.7)
WBC # BLD AUTO: 21.55 K/UL (ref 3.9–12.7)
WBC # BLD AUTO: 22.09 K/UL (ref 3.9–12.7)
WBC # BLD AUTO: 22.28 K/UL (ref 3.9–12.7)
WBC # BLD AUTO: 22.99 K/UL (ref 3.9–12.7)
WBC # BLD AUTO: 23.89 K/UL (ref 3.9–12.7)
WBC # BLD AUTO: 24.04 K/UL (ref 3.9–12.7)
WBC # BLD AUTO: 36.93 K/UL (ref 3.9–12.7)
WBC # BLD AUTO: 7.92 K/UL (ref 3.9–12.7)
WBC # BLD AUTO: 8.19 K/UL (ref 3.9–12.7)
WBC # BLD AUTO: 8.44 K/UL (ref 3.9–12.7)
WBC # BLD AUTO: 8.44 K/UL (ref 3.9–12.7)
WBC # BLD AUTO: 8.53 K/UL (ref 3.9–12.7)
WBC # BLD AUTO: 8.8 K/UL (ref 3.9–12.7)
WBC # BLD AUTO: 9.16 K/UL (ref 3.9–12.7)
WBC # BLD AUTO: 9.17 K/UL (ref 3.9–12.7)
WBC # BLD AUTO: 9.29 K/UL (ref 3.9–12.7)
WBC # BLD AUTO: 9.73 K/UL (ref 3.9–12.7)
WBC # BLD AUTO: 9.85 K/UL (ref 3.9–12.7)
WBC OTHER NFR BLD MANUAL: 1 %
WBC TOXIC VACUOLES BLD QL SMEAR: PRESENT

## 2022-01-01 PROCEDURE — 27000221 HC OXYGEN, UP TO 24 HOURS: Mod: HCNC

## 2022-01-01 PROCEDURE — 80053 COMPREHEN METABOLIC PANEL: CPT | Mod: HCNC | Performed by: STUDENT IN AN ORGANIZED HEALTH CARE EDUCATION/TRAINING PROGRAM

## 2022-01-01 PROCEDURE — 25000003 PHARM REV CODE 250: Mod: HCNC | Performed by: STUDENT IN AN ORGANIZED HEALTH CARE EDUCATION/TRAINING PROGRAM

## 2022-01-01 PROCEDURE — 63600175 PHARM REV CODE 636 W HCPCS: Mod: HCNC | Performed by: STUDENT IN AN ORGANIZED HEALTH CARE EDUCATION/TRAINING PROGRAM

## 2022-01-01 PROCEDURE — 3078F DIAST BP <80 MM HG: CPT | Mod: HCNC,CPTII,S$GLB, | Performed by: NURSE PRACTITIONER

## 2022-01-01 PROCEDURE — 31500 INSERT EMERGENCY AIRWAY: CPT | Mod: HCNC,,, | Performed by: ANESTHESIOLOGY

## 2022-01-01 PROCEDURE — 25000242 PHARM REV CODE 250 ALT 637 W/ HCPCS: Mod: HCNC | Performed by: STUDENT IN AN ORGANIZED HEALTH CARE EDUCATION/TRAINING PROGRAM

## 2022-01-01 PROCEDURE — 63600175 PHARM REV CODE 636 W HCPCS: Mod: HCNC | Performed by: ANESTHESIOLOGY

## 2022-01-01 PROCEDURE — 80069 RENAL FUNCTION PANEL: CPT | Mod: 91,HCNC | Performed by: INTERNAL MEDICINE

## 2022-01-01 PROCEDURE — 83735 ASSAY OF MAGNESIUM: CPT | Mod: 91,HCNC | Performed by: STUDENT IN AN ORGANIZED HEALTH CARE EDUCATION/TRAINING PROGRAM

## 2022-01-01 PROCEDURE — 27100171 HC OXYGEN HIGH FLOW UP TO 24 HOURS: Mod: HCNC

## 2022-01-01 PROCEDURE — 83050 HGB METHEMOGLOBIN QUAN: CPT | Mod: HCNC

## 2022-01-01 PROCEDURE — 83605 ASSAY OF LACTIC ACID: CPT | Mod: HCNC

## 2022-01-01 PROCEDURE — 85025 COMPLETE CBC W/AUTO DIFF WBC: CPT | Mod: HCNC | Performed by: STUDENT IN AN ORGANIZED HEALTH CARE EDUCATION/TRAINING PROGRAM

## 2022-01-01 PROCEDURE — 99999 PR PBB SHADOW E&M-EST. PATIENT-LVL III: CPT | Mod: PBBFAC,HCNC,, | Performed by: NURSE PRACTITIONER

## 2022-01-01 PROCEDURE — U0005 INFEC AGEN DETEC AMPLI PROBE: HCPCS | Performed by: THORACIC SURGERY (CARDIOTHORACIC VASCULAR SURGERY)

## 2022-01-01 PROCEDURE — 99291 PR CRITICAL CARE, E/M 30-74 MINUTES: ICD-10-PCS | Mod: HCNC,,, | Performed by: ANESTHESIOLOGY

## 2022-01-01 PROCEDURE — 37799 UNLISTED PX VASCULAR SURGERY: CPT | Mod: HCNC

## 2022-01-01 PROCEDURE — 25000003 PHARM REV CODE 250: Mod: HCNC | Performed by: SURGERY

## 2022-01-01 PROCEDURE — 99233 PR SUBSEQUENT HOSPITAL CARE,LEVL III: ICD-10-PCS | Mod: HCNC,,, | Performed by: INTERNAL MEDICINE

## 2022-01-01 PROCEDURE — 94003 VENT MGMT INPAT SUBQ DAY: CPT | Mod: HCNC

## 2022-01-01 PROCEDURE — 25000003 PHARM REV CODE 250: Mod: HCNC

## 2022-01-01 PROCEDURE — 99900026 HC AIRWAY MAINTENANCE (STAT): Mod: HCNC

## 2022-01-01 PROCEDURE — 80048 BASIC METABOLIC PNL TOTAL CA: CPT | Mod: HCNC | Performed by: STUDENT IN AN ORGANIZED HEALTH CARE EDUCATION/TRAINING PROGRAM

## 2022-01-01 PROCEDURE — 94640 AIRWAY INHALATION TREATMENT: CPT | Mod: HCNC

## 2022-01-01 PROCEDURE — 99232 SBSQ HOSP IP/OBS MODERATE 35: CPT | Mod: HCNC,,, | Performed by: INTERNAL MEDICINE

## 2022-01-01 PROCEDURE — 97530 THERAPEUTIC ACTIVITIES: CPT | Mod: HCNC

## 2022-01-01 PROCEDURE — 85610 PROTHROMBIN TIME: CPT | Mod: 91,HCNC | Performed by: STUDENT IN AN ORGANIZED HEALTH CARE EDUCATION/TRAINING PROGRAM

## 2022-01-01 PROCEDURE — P9045 ALBUMIN (HUMAN), 5%, 250 ML: HCPCS | Mod: JG,HCNC | Performed by: STUDENT IN AN ORGANIZED HEALTH CARE EDUCATION/TRAINING PROGRAM

## 2022-01-01 PROCEDURE — 99233 PR SUBSEQUENT HOSPITAL CARE,LEVL III: ICD-10-PCS | Mod: HCNC,,, | Performed by: ANESTHESIOLOGY

## 2022-01-01 PROCEDURE — 25000003 PHARM REV CODE 250: Mod: HCNC | Performed by: REGISTERED NURSE

## 2022-01-01 PROCEDURE — 84100 ASSAY OF PHOSPHORUS: CPT | Mod: HCNC | Performed by: STUDENT IN AN ORGANIZED HEALTH CARE EDUCATION/TRAINING PROGRAM

## 2022-01-01 PROCEDURE — 82803 BLOOD GASES ANY COMBINATION: CPT | Mod: HCNC

## 2022-01-01 PROCEDURE — 25000242 PHARM REV CODE 250 ALT 637 W/ HCPCS: Mod: HCNC | Performed by: THORACIC SURGERY (CARDIOTHORACIC VASCULAR SURGERY)

## 2022-01-01 PROCEDURE — 63600175 PHARM REV CODE 636 W HCPCS: Mod: HCNC

## 2022-01-01 PROCEDURE — 80100008 HC CRRT DAILY MAINTENANCE: Mod: HCNC

## 2022-01-01 PROCEDURE — 3078F DIAST BP <80 MM HG: CPT | Mod: HCNC,CPTII,S$GLB, | Performed by: THORACIC SURGERY (CARDIOTHORACIC VASCULAR SURGERY)

## 2022-01-01 PROCEDURE — 90945 DIALYSIS ONE EVALUATION: CPT | Mod: HCNC

## 2022-01-01 PROCEDURE — 82565 ASSAY OF CREATININE: CPT | Mod: HCNC

## 2022-01-01 PROCEDURE — 85027 COMPLETE CBC AUTOMATED: CPT | Mod: 91,HCNC | Performed by: STUDENT IN AN ORGANIZED HEALTH CARE EDUCATION/TRAINING PROGRAM

## 2022-01-01 PROCEDURE — 85610 PROTHROMBIN TIME: CPT | Mod: HCNC | Performed by: STUDENT IN AN ORGANIZED HEALTH CARE EDUCATION/TRAINING PROGRAM

## 2022-01-01 PROCEDURE — 3078F PR MOST RECENT DIASTOLIC BLOOD PRESSURE < 80 MM HG: ICD-10-PCS | Mod: HCNC,CPTII,S$GLB, | Performed by: THORACIC SURGERY (CARDIOTHORACIC VASCULAR SURGERY)

## 2022-01-01 PROCEDURE — 25000003 PHARM REV CODE 250: Mod: HCNC | Performed by: GENERAL PRACTICE

## 2022-01-01 PROCEDURE — 27200966 HC CLOSED SUCTION SYSTEM: Mod: HCNC

## 2022-01-01 PROCEDURE — 84295 ASSAY OF SERUM SODIUM: CPT | Mod: HCNC

## 2022-01-01 PROCEDURE — 32551 INSERTION OF CHEST TUBE: CPT | Mod: HCNC

## 2022-01-01 PROCEDURE — 99900035 HC TECH TIME PER 15 MIN (STAT): Mod: HCNC

## 2022-01-01 PROCEDURE — 94761 N-INVAS EAR/PLS OXIMETRY MLT: CPT | Mod: HCNC

## 2022-01-01 PROCEDURE — 63600175 PHARM REV CODE 636 W HCPCS: Mod: JG,HCNC | Performed by: NURSE PRACTITIONER

## 2022-01-01 PROCEDURE — 85730 THROMBOPLASTIN TIME PARTIAL: CPT | Mod: HCNC | Performed by: THORACIC SURGERY (CARDIOTHORACIC VASCULAR SURGERY)

## 2022-01-01 PROCEDURE — 80202 ASSAY OF VANCOMYCIN: CPT | Mod: HCNC | Performed by: THORACIC SURGERY (CARDIOTHORACIC VASCULAR SURGERY)

## 2022-01-01 PROCEDURE — C9113 INJ PANTOPRAZOLE SODIUM, VIA: HCPCS | Mod: HCNC | Performed by: STUDENT IN AN ORGANIZED HEALTH CARE EDUCATION/TRAINING PROGRAM

## 2022-01-01 PROCEDURE — 93010 ELECTROCARDIOGRAM REPORT: CPT | Mod: HCNC,,, | Performed by: INTERNAL MEDICINE

## 2022-01-01 PROCEDURE — P9017 PLASMA 1 DONOR FRZ W/IN 8 HR: HCPCS | Mod: HCNC | Performed by: STUDENT IN AN ORGANIZED HEALTH CARE EDUCATION/TRAINING PROGRAM

## 2022-01-01 PROCEDURE — 82330 ASSAY OF CALCIUM: CPT | Mod: HCNC

## 2022-01-01 PROCEDURE — 83735 ASSAY OF MAGNESIUM: CPT | Mod: 91,HCNC | Performed by: INTERNAL MEDICINE

## 2022-01-01 PROCEDURE — 20000000 HC ICU ROOM: Mod: HCNC

## 2022-01-01 PROCEDURE — 63600175 PHARM REV CODE 636 W HCPCS: Mod: JG,HCNC | Performed by: STUDENT IN AN ORGANIZED HEALTH CARE EDUCATION/TRAINING PROGRAM

## 2022-01-01 PROCEDURE — 80069 RENAL FUNCTION PANEL: CPT | Mod: HCNC | Performed by: INTERNAL MEDICINE

## 2022-01-01 PROCEDURE — 63600175 PHARM REV CODE 636 W HCPCS: Mod: HCNC | Performed by: THORACIC SURGERY (CARDIOTHORACIC VASCULAR SURGERY)

## 2022-01-01 PROCEDURE — 84132 ASSAY OF SERUM POTASSIUM: CPT | Mod: HCNC

## 2022-01-01 PROCEDURE — 85730 THROMBOPLASTIN TIME PARTIAL: CPT | Mod: HCNC | Performed by: STUDENT IN AN ORGANIZED HEALTH CARE EDUCATION/TRAINING PROGRAM

## 2022-01-01 PROCEDURE — 3288F PR FALLS RISK ASSESSMENT DOCUMENTED: ICD-10-PCS | Mod: HCNC,CPTII,S$GLB, | Performed by: NURSE PRACTITIONER

## 2022-01-01 PROCEDURE — 99233 SBSQ HOSP IP/OBS HIGH 50: CPT | Mod: HCNC,,, | Performed by: ANESTHESIOLOGY

## 2022-01-01 PROCEDURE — 99291 CRITICAL CARE FIRST HOUR: CPT | Mod: HCNC,,, | Performed by: ANESTHESIOLOGY

## 2022-01-01 PROCEDURE — 93010 EKG 12-LEAD: ICD-10-PCS | Mod: HCNC,,, | Performed by: INTERNAL MEDICINE

## 2022-01-01 PROCEDURE — 86140 C-REACTIVE PROTEIN: CPT | Mod: HCNC | Performed by: STUDENT IN AN ORGANIZED HEALTH CARE EDUCATION/TRAINING PROGRAM

## 2022-01-01 PROCEDURE — 83735 ASSAY OF MAGNESIUM: CPT | Mod: HCNC | Performed by: STUDENT IN AN ORGANIZED HEALTH CARE EDUCATION/TRAINING PROGRAM

## 2022-01-01 PROCEDURE — 86920 COMPATIBILITY TEST SPIN: CPT | Mod: HCNC | Performed by: ANESTHESIOLOGY

## 2022-01-01 PROCEDURE — 90945 PR DIALYSIS, NOT HEMO, 1 EVAL: ICD-10-PCS | Mod: HCNC,,, | Performed by: INTERNAL MEDICINE

## 2022-01-01 PROCEDURE — 85025 COMPLETE CBC W/AUTO DIFF WBC: CPT | Mod: HCNC | Performed by: NURSE PRACTITIONER

## 2022-01-01 PROCEDURE — P9021 RED BLOOD CELLS UNIT: HCPCS | Mod: HCNC | Performed by: THORACIC SURGERY (CARDIOTHORACIC VASCULAR SURGERY)

## 2022-01-01 PROCEDURE — 27200953 HC CARDIOPLEGIA SYSTEM: Mod: HCNC

## 2022-01-01 PROCEDURE — 25000003 PHARM REV CODE 250: Mod: HCNC | Performed by: ANESTHESIOLOGY

## 2022-01-01 PROCEDURE — U0003 INFECTIOUS AGENT DETECTION BY NUCLEIC ACID (DNA OR RNA); SEVERE ACUTE RESPIRATORY SYNDROME CORONAVIRUS 2 (SARS-COV-2) (CORONAVIRUS DISEASE [COVID-19]), AMPLIFIED PROBE TECHNIQUE, MAKING USE OF HIGH THROUGHPUT TECHNOLOGIES AS DESCRIBED BY CMS-2020-01-R: HCPCS | Performed by: THORACIC SURGERY (CARDIOTHORACIC VASCULAR SURGERY)

## 2022-01-01 PROCEDURE — 85014 HEMATOCRIT: CPT | Mod: HCNC

## 2022-01-01 PROCEDURE — 85027 COMPLETE CBC AUTOMATED: CPT | Mod: HCNC | Performed by: STUDENT IN AN ORGANIZED HEALTH CARE EDUCATION/TRAINING PROGRAM

## 2022-01-01 PROCEDURE — 63600367 HC NITRIC OXIDE PER HOUR: Mod: HCNC

## 2022-01-01 PROCEDURE — 80100014 HC HEMODIALYSIS 1:1: Mod: HCNC

## 2022-01-01 PROCEDURE — 80069 RENAL FUNCTION PANEL: CPT | Mod: 91,HCNC | Performed by: STUDENT IN AN ORGANIZED HEALTH CARE EDUCATION/TRAINING PROGRAM

## 2022-01-01 PROCEDURE — 71250 CT CHEST WITHOUT CONTRAST: ICD-10-PCS | Mod: 26,,, | Performed by: RADIOLOGY

## 2022-01-01 PROCEDURE — 84132 ASSAY OF SERUM POTASSIUM: CPT | Mod: HCNC | Performed by: NURSE PRACTITIONER

## 2022-01-01 PROCEDURE — 94760 N-INVAS EAR/PLS OXIMETRY 1: CPT | Mod: HCNC

## 2022-01-01 PROCEDURE — 97161 PT EVAL LOW COMPLEX 20 MIN: CPT | Mod: HCNC

## 2022-01-01 PROCEDURE — 93503 INSERT/PLACE HEART CATHETER: CPT | Mod: 59,HCNC,, | Performed by: ANESTHESIOLOGY

## 2022-01-01 PROCEDURE — 85730 THROMBOPLASTIN TIME PARTIAL: CPT | Mod: 91,HCNC | Performed by: STUDENT IN AN ORGANIZED HEALTH CARE EDUCATION/TRAINING PROGRAM

## 2022-01-01 PROCEDURE — 82800 BLOOD PH: CPT | Mod: HCNC

## 2022-01-01 PROCEDURE — 85610 PROTHROMBIN TIME: CPT | Mod: HCNC | Performed by: THORACIC SURGERY (CARDIOTHORACIC VASCULAR SURGERY)

## 2022-01-01 PROCEDURE — P9045 ALBUMIN (HUMAN), 5%, 250 ML: HCPCS | Mod: JG,HCNC | Performed by: NURSE PRACTITIONER

## 2022-01-01 PROCEDURE — C9113 INJ PANTOPRAZOLE SODIUM, VIA: HCPCS | Mod: HCNC | Performed by: ANESTHESIOLOGY

## 2022-01-01 PROCEDURE — 85730 THROMBOPLASTIN TIME PARTIAL: CPT | Mod: HCNC | Performed by: NURSE PRACTITIONER

## 2022-01-01 PROCEDURE — 99233 SBSQ HOSP IP/OBS HIGH 50: CPT | Mod: HCNC,,, | Performed by: INTERNAL MEDICINE

## 2022-01-01 PROCEDURE — 86850 RBC ANTIBODY SCREEN: CPT | Mod: HCNC | Performed by: STUDENT IN AN ORGANIZED HEALTH CARE EDUCATION/TRAINING PROGRAM

## 2022-01-01 PROCEDURE — 86901 BLOOD TYPING SEROLOGIC RH(D): CPT | Mod: HCNC | Performed by: STUDENT IN AN ORGANIZED HEALTH CARE EDUCATION/TRAINING PROGRAM

## 2022-01-01 PROCEDURE — 27200188 HC TRANSDUCER, ART ADULT/PEDS: Mod: HCNC

## 2022-01-01 PROCEDURE — 86900 BLOOD TYPING SEROLOGIC ABO: CPT | Mod: HCNC | Performed by: STUDENT IN AN ORGANIZED HEALTH CARE EDUCATION/TRAINING PROGRAM

## 2022-01-01 PROCEDURE — 93005 ELECTROCARDIOGRAM TRACING: CPT | Mod: HCNC

## 2022-01-01 PROCEDURE — 27000191 HC C-V MONITORING: Mod: HCNC

## 2022-01-01 PROCEDURE — 25000003 PHARM REV CODE 250: Mod: HCNC | Performed by: INTERNAL MEDICINE

## 2022-01-01 PROCEDURE — 33530 PR CABG, REOPERATE >1 MON ORIG: ICD-10-PCS | Mod: HCNC,,, | Performed by: THORACIC SURGERY (CARDIOTHORACIC VASCULAR SURGERY)

## 2022-01-01 PROCEDURE — 97110 THERAPEUTIC EXERCISES: CPT | Mod: HCNC

## 2022-01-01 PROCEDURE — 71250 CT THORAX DX C-: CPT | Mod: 26,,, | Performed by: RADIOLOGY

## 2022-01-01 PROCEDURE — 25000003 PHARM REV CODE 250: Mod: HCNC | Performed by: THORACIC SURGERY (CARDIOTHORACIC VASCULAR SURGERY)

## 2022-01-01 PROCEDURE — 99223 1ST HOSP IP/OBS HIGH 75: CPT | Mod: HCNC,,, | Performed by: INTERNAL MEDICINE

## 2022-01-01 PROCEDURE — 84134 ASSAY OF PREALBUMIN: CPT | Mod: HCNC | Performed by: STUDENT IN AN ORGANIZED HEALTH CARE EDUCATION/TRAINING PROGRAM

## 2022-01-01 PROCEDURE — 85610 PROTHROMBIN TIME: CPT | Mod: 91,HCNC | Performed by: THORACIC SURGERY (CARDIOTHORACIC VASCULAR SURGERY)

## 2022-01-01 PROCEDURE — P9021 RED BLOOD CELLS UNIT: HCPCS | Mod: HCNC | Performed by: STUDENT IN AN ORGANIZED HEALTH CARE EDUCATION/TRAINING PROGRAM

## 2022-01-01 PROCEDURE — 99232 PR SUBSEQUENT HOSPITAL CARE,LEVL II: ICD-10-PCS | Mod: HCNC,,, | Performed by: NURSE PRACTITIONER

## 2022-01-01 PROCEDURE — 1101F PT FALLS ASSESS-DOCD LE1/YR: CPT | Mod: HCNC,CPTII,S$GLB, | Performed by: NURSE PRACTITIONER

## 2022-01-01 PROCEDURE — 99233 PR SUBSEQUENT HOSPITAL CARE,LEVL III: ICD-10-PCS | Mod: HCNC,GC,, | Performed by: ANESTHESIOLOGY

## 2022-01-01 PROCEDURE — 3288F PR FALLS RISK ASSESSMENT DOCUMENTED: ICD-10-PCS | Mod: HCNC,CPTII,S$GLB, | Performed by: THORACIC SURGERY (CARDIOTHORACIC VASCULAR SURGERY)

## 2022-01-01 PROCEDURE — 82330 ASSAY OF CALCIUM: CPT | Mod: 91,HCNC | Performed by: GENERAL PRACTICE

## 2022-01-01 PROCEDURE — 93880 PR DUPLEX SCAN EXTRACRANIAL,BILAT: ICD-10-PCS | Mod: HCNC,S$GLB,, | Performed by: SURGERY

## 2022-01-01 PROCEDURE — 85025 COMPLETE CBC W/AUTO DIFF WBC: CPT | Mod: 91,HCNC | Performed by: THORACIC SURGERY (CARDIOTHORACIC VASCULAR SURGERY)

## 2022-01-01 PROCEDURE — 84540 ASSAY OF URINE/UREA-N: CPT | Mod: HCNC | Performed by: ANESTHESIOLOGY

## 2022-01-01 PROCEDURE — 94645 CONT INHLJ TX EACH ADDL HOUR: CPT | Mod: HCNC

## 2022-01-01 PROCEDURE — 36000707: Mod: HCNC | Performed by: SURGERY

## 2022-01-01 PROCEDURE — 83605 ASSAY OF LACTIC ACID: CPT | Mod: HCNC | Performed by: STUDENT IN AN ORGANIZED HEALTH CARE EDUCATION/TRAINING PROGRAM

## 2022-01-01 PROCEDURE — 25000003 PHARM REV CODE 250: Mod: HCNC | Performed by: NURSE PRACTITIONER

## 2022-01-01 PROCEDURE — 99233 SBSQ HOSP IP/OBS HIGH 50: CPT | Mod: HCNC,GC,, | Performed by: INTERNAL MEDICINE

## 2022-01-01 PROCEDURE — 84132 ASSAY OF SERUM POTASSIUM: CPT | Mod: HCNC | Performed by: SURGERY

## 2022-01-01 PROCEDURE — 93306 TTE W/DOPPLER COMPLETE: CPT | Mod: 26,HCNC,, | Performed by: INTERNAL MEDICINE

## 2022-01-01 PROCEDURE — 93005 ELECTROCARDIOGRAM TRACING: CPT | Mod: HCNC,S$GLB,, | Performed by: NURSE PRACTITIONER

## 2022-01-01 PROCEDURE — 85007 BL SMEAR W/DIFF WBC COUNT: CPT | Mod: 91,HCNC | Performed by: STUDENT IN AN ORGANIZED HEALTH CARE EDUCATION/TRAINING PROGRAM

## 2022-01-01 PROCEDURE — 31720 CLEARANCE OF AIRWAYS: CPT | Mod: HCNC

## 2022-01-01 PROCEDURE — 99232 PR SUBSEQUENT HOSPITAL CARE,LEVL II: ICD-10-PCS | Mod: HCNC,,, | Performed by: INTERNAL MEDICINE

## 2022-01-01 PROCEDURE — 32551 CHEST TUBE INSERTION: ICD-10-PCS | Mod: RT,,, | Performed by: ANESTHESIOLOGY

## 2022-01-01 PROCEDURE — 93010 RHYTHM STRIP: ICD-10-PCS | Mod: HCNC,S$GLB,, | Performed by: INTERNAL MEDICINE

## 2022-01-01 PROCEDURE — 86901 BLOOD TYPING SEROLOGIC RH(D): CPT | Mod: HCNC | Performed by: THORACIC SURGERY (CARDIOTHORACIC VASCULAR SURGERY)

## 2022-01-01 PROCEDURE — 27000190 HC CPAP FULL FACE MASK W/VALVE: Mod: HCNC

## 2022-01-01 PROCEDURE — 99900017 HC EXTUBATION W/PARAMETERS (STAT): Mod: HCNC

## 2022-01-01 PROCEDURE — 63600175 PHARM REV CODE 636 W HCPCS: Mod: HCNC | Performed by: NURSE PRACTITIONER

## 2022-01-01 PROCEDURE — 36415 COLL VENOUS BLD VENIPUNCTURE: CPT | Mod: HCNC | Performed by: STUDENT IN AN ORGANIZED HEALTH CARE EDUCATION/TRAINING PROGRAM

## 2022-01-01 PROCEDURE — 97535 SELF CARE MNGMENT TRAINING: CPT | Mod: HCNC

## 2022-01-01 PROCEDURE — U0003 INFECTIOUS AGENT DETECTION BY NUCLEIC ACID (DNA OR RNA); SEVERE ACUTE RESPIRATORY SYNDROME CORONAVIRUS 2 (SARS-COV-2) (CORONAVIRUS DISEASE [COVID-19]), AMPLIFIED PROBE TECHNIQUE, MAKING USE OF HIGH THROUGHPUT TECHNOLOGIES AS DESCRIBED BY CMS-2020-01-R: HCPCS | Mod: HCNC | Performed by: STUDENT IN AN ORGANIZED HEALTH CARE EDUCATION/TRAINING PROGRAM

## 2022-01-01 PROCEDURE — 80053 COMPREHEN METABOLIC PANEL: CPT | Mod: 91,HCNC | Performed by: STUDENT IN AN ORGANIZED HEALTH CARE EDUCATION/TRAINING PROGRAM

## 2022-01-01 PROCEDURE — 93312 ECHO TRANSESOPHAGEAL: CPT | Mod: 26,59,HCNC, | Performed by: ANESTHESIOLOGY

## 2022-01-01 PROCEDURE — 80069 RENAL FUNCTION PANEL: CPT | Mod: HCNC | Performed by: STUDENT IN AN ORGANIZED HEALTH CARE EDUCATION/TRAINING PROGRAM

## 2022-01-01 PROCEDURE — 86920 COMPATIBILITY TEST SPIN: CPT | Mod: HCNC | Performed by: THORACIC SURGERY (CARDIOTHORACIC VASCULAR SURGERY)

## 2022-01-01 PROCEDURE — 80069 RENAL FUNCTION PANEL: CPT | Mod: HCNC | Performed by: GENERAL PRACTICE

## 2022-01-01 PROCEDURE — 99999 PR PBB SHADOW E&M-EST. PATIENT-LVL IV: ICD-10-PCS | Mod: PBBFAC,,, | Performed by: THORACIC SURGERY (CARDIOTHORACIC VASCULAR SURGERY)

## 2022-01-01 PROCEDURE — 80069 RENAL FUNCTION PANEL: CPT | Mod: 91,HCNC | Performed by: GENERAL PRACTICE

## 2022-01-01 PROCEDURE — 99232 SBSQ HOSP IP/OBS MODERATE 35: CPT | Mod: HCNC,,, | Performed by: NURSE PRACTITIONER

## 2022-01-01 PROCEDURE — 36000713 HC OR TIME LEV V EA ADD 15 MIN: Mod: HCNC | Performed by: THORACIC SURGERY (CARDIOTHORACIC VASCULAR SURGERY)

## 2022-01-01 PROCEDURE — 90945 DIALYSIS ONE EVALUATION: CPT | Mod: HCNC,,, | Performed by: INTERNAL MEDICINE

## 2022-01-01 PROCEDURE — 27201423 OPTIME MED/SURG SUP & DEVICES STERILE SUPPLY: Mod: HCNC | Performed by: SURGERY

## 2022-01-01 PROCEDURE — 63600175 PHARM REV CODE 636 W HCPCS: Mod: HCNC | Performed by: INTERNAL MEDICINE

## 2022-01-01 PROCEDURE — 36556 PR INSERT NON-TUNNEL CV CATH 5+ YRS OLD: ICD-10-PCS | Mod: HCNC,LT,, | Performed by: SURGERY

## 2022-01-01 PROCEDURE — 85730 THROMBOPLASTIN TIME PARTIAL: CPT | Mod: 91,HCNC | Performed by: THORACIC SURGERY (CARDIOTHORACIC VASCULAR SURGERY)

## 2022-01-01 PROCEDURE — 85384 FIBRINOGEN ACTIVITY: CPT | Mod: HCNC | Performed by: STUDENT IN AN ORGANIZED HEALTH CARE EDUCATION/TRAINING PROGRAM

## 2022-01-01 PROCEDURE — 37000009 HC ANESTHESIA EA ADD 15 MINS: Mod: HCNC | Performed by: THORACIC SURGERY (CARDIOTHORACIC VASCULAR SURGERY)

## 2022-01-01 PROCEDURE — 3288F FALL RISK ASSESSMENT DOCD: CPT | Mod: HCNC,CPTII,S$GLB, | Performed by: THORACIC SURGERY (CARDIOTHORACIC VASCULAR SURGERY)

## 2022-01-01 PROCEDURE — P9038 RBC IRRADIATED: HCPCS | Mod: HCNC | Performed by: STUDENT IN AN ORGANIZED HEALTH CARE EDUCATION/TRAINING PROGRAM

## 2022-01-01 PROCEDURE — 88305 TISSUE EXAM BY PATHOLOGIST: CPT | Mod: 26,HCNC,, | Performed by: PATHOLOGY

## 2022-01-01 PROCEDURE — 36600 WITHDRAWAL OF ARTERIAL BLOOD: CPT | Mod: HCNC

## 2022-01-01 PROCEDURE — 84100 ASSAY OF PHOSPHORUS: CPT | Mod: HCNC | Performed by: THORACIC SURGERY (CARDIOTHORACIC VASCULAR SURGERY)

## 2022-01-01 PROCEDURE — 27100026 HC SHUNT SENSOR, TERUMO: Mod: HCNC

## 2022-01-01 PROCEDURE — 85025 COMPLETE CBC W/AUTO DIFF WBC: CPT | Mod: 91,HCNC | Performed by: STUDENT IN AN ORGANIZED HEALTH CARE EDUCATION/TRAINING PROGRAM

## 2022-01-01 PROCEDURE — 97112 NEUROMUSCULAR REEDUCATION: CPT | Mod: HCNC

## 2022-01-01 PROCEDURE — 36620 INSERTION CATHETER ARTERY: CPT | Mod: HCNC

## 2022-01-01 PROCEDURE — 36620 INSERTION CATHETER ARTERY: CPT | Mod: 59,HCNC,, | Performed by: ANESTHESIOLOGY

## 2022-01-01 PROCEDURE — 87081 CULTURE SCREEN ONLY: CPT | Mod: HCNC | Performed by: STUDENT IN AN ORGANIZED HEALTH CARE EDUCATION/TRAINING PROGRAM

## 2022-01-01 PROCEDURE — 99214 PR OFFICE/OUTPT VISIT, EST, LEVL IV, 30-39 MIN: ICD-10-PCS | Mod: HCNC,S$GLB,, | Performed by: NURSE PRACTITIONER

## 2022-01-01 PROCEDURE — 85610 PROTHROMBIN TIME: CPT | Mod: HCNC | Performed by: NURSE PRACTITIONER

## 2022-01-01 PROCEDURE — 93010 EKG 12-LEAD: ICD-10-PCS | Mod: HCNC,,, | Performed by: STUDENT IN AN ORGANIZED HEALTH CARE EDUCATION/TRAINING PROGRAM

## 2022-01-01 PROCEDURE — 87040 BLOOD CULTURE FOR BACTERIA: CPT | Mod: 59,HCNC | Performed by: INTERNAL MEDICINE

## 2022-01-01 PROCEDURE — 36620 RIGHT RADIAL ARTERIAL LINE: ICD-10-PCS | Mod: 59,HCNC,, | Performed by: ANESTHESIOLOGY

## 2022-01-01 PROCEDURE — 97165 OT EVAL LOW COMPLEX 30 MIN: CPT | Mod: HCNC

## 2022-01-01 PROCEDURE — 99223 PR INITIAL HOSPITAL CARE,LEVL III: ICD-10-PCS | Mod: HCNC,,, | Performed by: NURSE PRACTITIONER

## 2022-01-01 PROCEDURE — 82962 GLUCOSE BLOOD TEST: CPT | Mod: HCNC | Performed by: THORACIC SURGERY (CARDIOTHORACIC VASCULAR SURGERY)

## 2022-01-01 PROCEDURE — 99499 UNLISTED E&M SERVICE: CPT | Mod: HCNC,S$GLB,, | Performed by: THORACIC SURGERY (CARDIOTHORACIC VASCULAR SURGERY)

## 2022-01-01 PROCEDURE — 71046 X-RAY EXAM CHEST 2 VIEWS: CPT | Mod: 26,,, | Performed by: RADIOLOGY

## 2022-01-01 PROCEDURE — 85060 PATHOLOGIST REVIEW: ICD-10-PCS | Mod: HCNC,,, | Performed by: PATHOLOGY

## 2022-01-01 PROCEDURE — P9045 ALBUMIN (HUMAN), 5%, 250 ML: HCPCS | Mod: JG,HCNC

## 2022-01-01 PROCEDURE — 82570 ASSAY OF URINE CREATININE: CPT | Mod: HCNC | Performed by: ANESTHESIOLOGY

## 2022-01-01 PROCEDURE — 36000712 HC OR TIME LEV V 1ST 15 MIN: Mod: HCNC | Performed by: THORACIC SURGERY (CARDIOTHORACIC VASCULAR SURGERY)

## 2022-01-01 PROCEDURE — 63600175 PHARM REV CODE 636 W HCPCS: Mod: HCNC | Performed by: SURGERY

## 2022-01-01 PROCEDURE — 3075F SYST BP GE 130 - 139MM HG: CPT | Mod: HCNC,CPTII,S$GLB, | Performed by: THORACIC SURGERY (CARDIOTHORACIC VASCULAR SURGERY)

## 2022-01-01 PROCEDURE — 84132 ASSAY OF SERUM POTASSIUM: CPT | Mod: 91,HCNC | Performed by: NURSE PRACTITIONER

## 2022-01-01 PROCEDURE — 27000207 HC ISOLATION: Mod: HCNC

## 2022-01-01 PROCEDURE — 51702 INSERT TEMP BLADDER CATH: CPT | Mod: HCNC

## 2022-01-01 PROCEDURE — C1750 CATH, HEMODIALYSIS,LONG-TERM: HCPCS | Mod: HCNC | Performed by: SURGERY

## 2022-01-01 PROCEDURE — 99232 PR SUBSEQUENT HOSPITAL CARE,LEVL II: ICD-10-PCS | Mod: HCNC,GC,, | Performed by: INTERNAL MEDICINE

## 2022-01-01 PROCEDURE — 99232 SBSQ HOSP IP/OBS MODERATE 35: CPT | Mod: HCNC,,, | Performed by: HOSPITALIST

## 2022-01-01 PROCEDURE — 71250 CT THORAX DX C-: CPT | Mod: TC

## 2022-01-01 PROCEDURE — 84100 ASSAY OF PHOSPHORUS: CPT | Mod: 91,HCNC | Performed by: STUDENT IN AN ORGANIZED HEALTH CARE EDUCATION/TRAINING PROGRAM

## 2022-01-01 PROCEDURE — P9016 RBC LEUKOCYTES REDUCED: HCPCS | Mod: HCNC | Performed by: STUDENT IN AN ORGANIZED HEALTH CARE EDUCATION/TRAINING PROGRAM

## 2022-01-01 PROCEDURE — 99292 PR CRITICAL CARE, ADDL 30 MIN: ICD-10-PCS | Mod: HCNC,,, | Performed by: ANESTHESIOLOGY

## 2022-01-01 PROCEDURE — 99233 PR SUBSEQUENT HOSPITAL CARE,LEVL III: ICD-10-PCS | Mod: HCNC,GC,, | Performed by: INTERNAL MEDICINE

## 2022-01-01 PROCEDURE — 99499 UNLISTED E&M SERVICE: CPT | Mod: S$GLB,,, | Performed by: NURSE PRACTITIONER

## 2022-01-01 PROCEDURE — 32551 INSERTION OF CHEST TUBE: CPT | Mod: RT,,, | Performed by: ANESTHESIOLOGY

## 2022-01-01 PROCEDURE — 84100 ASSAY OF PHOSPHORUS: CPT | Mod: HCNC | Performed by: NURSE PRACTITIONER

## 2022-01-01 PROCEDURE — 82330 ASSAY OF CALCIUM: CPT | Mod: HCNC | Performed by: STUDENT IN AN ORGANIZED HEALTH CARE EDUCATION/TRAINING PROGRAM

## 2022-01-01 PROCEDURE — 93880 EXTRACRANIAL BILAT STUDY: CPT | Mod: HCNC,S$GLB,, | Performed by: SURGERY

## 2022-01-01 PROCEDURE — 80053 COMPREHEN METABOLIC PANEL: CPT | Mod: HCNC | Performed by: THORACIC SURGERY (CARDIOTHORACIC VASCULAR SURGERY)

## 2022-01-01 PROCEDURE — 1159F PR MEDICATION LIST DOCUMENTED IN MEDICAL RECORD: ICD-10-PCS | Mod: HCNC,CPTII,S$GLB, | Performed by: NURSE PRACTITIONER

## 2022-01-01 PROCEDURE — 99233 SBSQ HOSP IP/OBS HIGH 50: CPT | Mod: HCNC,GC,, | Performed by: ANESTHESIOLOGY

## 2022-01-01 PROCEDURE — 25500020 PHARM REV CODE 255: Mod: HCNC | Performed by: THORACIC SURGERY (CARDIOTHORACIC VASCULAR SURGERY)

## 2022-01-01 PROCEDURE — 27201247 HC HEMODIALYSIS, SET-UP & CANCEL: Mod: HCNC

## 2022-01-01 PROCEDURE — 83735 ASSAY OF MAGNESIUM: CPT | Mod: 91,HCNC | Performed by: THORACIC SURGERY (CARDIOTHORACIC VASCULAR SURGERY)

## 2022-01-01 PROCEDURE — 99223 PR INITIAL HOSPITAL CARE,LEVL III: ICD-10-PCS | Mod: HCNC,,, | Performed by: INTERNAL MEDICINE

## 2022-01-01 PROCEDURE — 90935 HEMODIALYSIS ONE EVALUATION: CPT | Mod: HCNC,,, | Performed by: INTERNAL MEDICINE

## 2022-01-01 PROCEDURE — 36410 VNPNXR 3YR/> PHY/QHP DX/THER: CPT | Mod: HCNC

## 2022-01-01 PROCEDURE — U0005 INFEC AGEN DETEC AMPLI PROBE: HCPCS | Performed by: STUDENT IN AN ORGANIZED HEALTH CARE EDUCATION/TRAINING PROGRAM

## 2022-01-01 PROCEDURE — 90935 PR HEMODIALYSIS, ONE EVALUATION: ICD-10-PCS | Mod: HCNC,,, | Performed by: INTERNAL MEDICINE

## 2022-01-01 PROCEDURE — P9021 RED BLOOD CELLS UNIT: HCPCS | Mod: HCNC | Performed by: ANESTHESIOLOGY

## 2022-01-01 PROCEDURE — 36430 TRANSFUSION BLD/BLD COMPNT: CPT | Mod: HCNC

## 2022-01-01 PROCEDURE — 83735 ASSAY OF MAGNESIUM: CPT | Mod: HCNC | Performed by: INTERNAL MEDICINE

## 2022-01-01 PROCEDURE — U0002 COVID-19 LAB TEST NON-CDC: HCPCS | Mod: HCNC | Performed by: STUDENT IN AN ORGANIZED HEALTH CARE EDUCATION/TRAINING PROGRAM

## 2022-01-01 PROCEDURE — 99999 PR PBB SHADOW E&M-EST. PATIENT-LVL IV: CPT | Mod: PBBFAC,,, | Performed by: THORACIC SURGERY (CARDIOTHORACIC VASCULAR SURGERY)

## 2022-01-01 PROCEDURE — 99223 1ST HOSP IP/OBS HIGH 75: CPT | Mod: HCNC,,, | Performed by: NURSE PRACTITIONER

## 2022-01-01 PROCEDURE — 94660 CPAP INITIATION&MGMT: CPT | Mod: HCNC

## 2022-01-01 PROCEDURE — 87077 CULTURE AEROBIC IDENTIFY: CPT | Mod: HCNC | Performed by: STUDENT IN AN ORGANIZED HEALTH CARE EDUCATION/TRAINING PROGRAM

## 2022-01-01 PROCEDURE — 33405 PR REPLACE AORT VALV,PROSTH VALV: ICD-10-PCS | Mod: HCNC,,, | Performed by: THORACIC SURGERY (CARDIOTHORACIC VASCULAR SURGERY)

## 2022-01-01 PROCEDURE — 77001 CHG FLUOROGUIDE CNTRL VEN ACCESS,PLACE,REPLACE,REMOVE: ICD-10-PCS | Mod: 26,HCNC,59, | Performed by: SURGERY

## 2022-01-01 PROCEDURE — 99999 PR PBB SHADOW E&M-EST. PATIENT-LVL III: ICD-10-PCS | Mod: PBBFAC,HCNC,, | Performed by: NURSE PRACTITIONER

## 2022-01-01 PROCEDURE — 93005 ELECTROCARDIOGRAM TRACING: CPT | Mod: HCNC,S$GLB,, | Performed by: THORACIC SURGERY (CARDIOTHORACIC VASCULAR SURGERY)

## 2022-01-01 PROCEDURE — U0002 COVID-19 LAB TEST NON-CDC: HCPCS | Mod: HCNC | Performed by: THORACIC SURGERY (CARDIOTHORACIC VASCULAR SURGERY)

## 2022-01-01 PROCEDURE — 83735 ASSAY OF MAGNESIUM: CPT | Mod: HCNC | Performed by: GENERAL PRACTICE

## 2022-01-01 PROCEDURE — 83735 ASSAY OF MAGNESIUM: CPT | Mod: HCNC | Performed by: NURSE PRACTITIONER

## 2022-01-01 PROCEDURE — 36430 TRANSFUSION BLD/BLD COMPNT: CPT | Mod: HCNC | Performed by: STUDENT IN AN ORGANIZED HEALTH CARE EDUCATION/TRAINING PROGRAM

## 2022-01-01 PROCEDURE — 88305 TISSUE EXAM BY PATHOLOGIST: ICD-10-PCS | Mod: 26,HCNC,, | Performed by: PATHOLOGY

## 2022-01-01 PROCEDURE — 71046 XR CHEST PA AND LATERAL: ICD-10-PCS | Mod: 26,,, | Performed by: RADIOLOGY

## 2022-01-01 PROCEDURE — C9248 INJ, CLEVIDIPINE BUTYRATE: HCPCS | Mod: JG,HCNC | Performed by: STUDENT IN AN ORGANIZED HEALTH CARE EDUCATION/TRAINING PROGRAM

## 2022-01-01 PROCEDURE — U0002 COVID-19 LAB TEST NON-CDC: HCPCS | Mod: HCNC | Performed by: SURGERY

## 2022-01-01 PROCEDURE — 86850 RBC ANTIBODY SCREEN: CPT | Mod: HCNC | Performed by: THORACIC SURGERY (CARDIOTHORACIC VASCULAR SURGERY)

## 2022-01-01 PROCEDURE — 3075F PR MOST RECENT SYSTOLIC BLOOD PRESS GE 130-139MM HG: ICD-10-PCS | Mod: HCNC,CPTII,S$GLB, | Performed by: NURSE PRACTITIONER

## 2022-01-01 PROCEDURE — 80202 ASSAY OF VANCOMYCIN: CPT | Mod: HCNC | Performed by: STUDENT IN AN ORGANIZED HEALTH CARE EDUCATION/TRAINING PROGRAM

## 2022-01-01 PROCEDURE — 99232 PR SUBSEQUENT HOSPITAL CARE,LEVL II: ICD-10-PCS | Mod: HCNC,,, | Performed by: HOSPITALIST

## 2022-01-01 PROCEDURE — 93005 RHYTHM STRIP: ICD-10-PCS | Mod: HCNC,S$GLB,, | Performed by: NURSE PRACTITIONER

## 2022-01-01 PROCEDURE — 85060 BLOOD SMEAR INTERPRETATION: CPT | Mod: HCNC,,, | Performed by: PATHOLOGY

## 2022-01-01 PROCEDURE — 86900 BLOOD TYPING SEROLOGIC ABO: CPT | Mod: HCNC | Performed by: THORACIC SURGERY (CARDIOTHORACIC VASCULAR SURGERY)

## 2022-01-01 PROCEDURE — 27201112: Mod: HCNC

## 2022-01-01 PROCEDURE — D9220A PRA ANESTHESIA: ICD-10-PCS | Mod: HCNC,ANES,, | Performed by: ANESTHESIOLOGY

## 2022-01-01 PROCEDURE — 3075F SYST BP GE 130 - 139MM HG: CPT | Mod: HCNC,CPTII,S$GLB, | Performed by: NURSE PRACTITIONER

## 2022-01-01 PROCEDURE — 85007 BL SMEAR W/DIFF WBC COUNT: CPT | Mod: HCNC | Performed by: STUDENT IN AN ORGANIZED HEALTH CARE EDUCATION/TRAINING PROGRAM

## 2022-01-01 PROCEDURE — 99214 OFFICE O/P EST MOD 30 MIN: CPT | Mod: HCNC,S$GLB,, | Performed by: NURSE PRACTITIONER

## 2022-01-01 PROCEDURE — 1126F AMNT PAIN NOTED NONE PRSNT: CPT | Mod: HCNC,CPTII,S$GLB, | Performed by: NURSE PRACTITIONER

## 2022-01-01 PROCEDURE — 27202608 HC CANNULA, MISC: Mod: HCNC

## 2022-01-01 PROCEDURE — 63600175 PHARM REV CODE 636 W HCPCS: Mod: HCNC | Performed by: GENERAL PRACTICE

## 2022-01-01 PROCEDURE — 36592 COLLECT BLOOD FROM PICC: CPT | Mod: HCNC

## 2022-01-01 PROCEDURE — P9047 ALBUMIN (HUMAN), 25%, 50ML: HCPCS | Mod: JG,HCNC | Performed by: STUDENT IN AN ORGANIZED HEALTH CARE EDUCATION/TRAINING PROGRAM

## 2022-01-01 PROCEDURE — 94002 VENT MGMT INPAT INIT DAY: CPT | Mod: HCNC

## 2022-01-01 PROCEDURE — 76937 US GUIDE VASCULAR ACCESS: CPT | Mod: HCNC

## 2022-01-01 PROCEDURE — 84100 ASSAY OF PHOSPHORUS: CPT | Mod: 91,HCNC | Performed by: THORACIC SURGERY (CARDIOTHORACIC VASCULAR SURGERY)

## 2022-01-01 PROCEDURE — 63600175 PHARM REV CODE 636 W HCPCS: Mod: JG,HCNC

## 2022-01-01 PROCEDURE — 33405 REPLACEMENT AORTIC VALVE OPN: CPT | Mod: HCNC,,, | Performed by: THORACIC SURGERY (CARDIOTHORACIC VASCULAR SURGERY)

## 2022-01-01 PROCEDURE — 86920 COMPATIBILITY TEST SPIN: CPT | Mod: HCNC | Performed by: STUDENT IN AN ORGANIZED HEALTH CARE EDUCATION/TRAINING PROGRAM

## 2022-01-01 PROCEDURE — 93312 PR ECHO HEART,TRANSESOPHAGEAL: ICD-10-PCS | Mod: 26,59,HCNC, | Performed by: ANESTHESIOLOGY

## 2022-01-01 PROCEDURE — 93010 ELECTROCARDIOGRAM REPORT: CPT | Mod: HCNC,,, | Performed by: STUDENT IN AN ORGANIZED HEALTH CARE EDUCATION/TRAINING PROGRAM

## 2022-01-01 PROCEDURE — D9220A PRA ANESTHESIA: ICD-10-PCS | Mod: HCNC,,, | Performed by: ANESTHESIOLOGY

## 2022-01-01 PROCEDURE — 27201423 OPTIME MED/SURG SUP & DEVICES STERILE SUPPLY: Mod: HCNC | Performed by: THORACIC SURGERY (CARDIOTHORACIC VASCULAR SURGERY)

## 2022-01-01 PROCEDURE — P9017 PLASMA 1 DONOR FRZ W/IN 8 HR: HCPCS | Mod: HCNC | Performed by: ANESTHESIOLOGY

## 2022-01-01 PROCEDURE — 93010 ELECTROCARDIOGRAM REPORT: CPT | Mod: HCNC,S$GLB,, | Performed by: INTERNAL MEDICINE

## 2022-01-01 PROCEDURE — 31600 PLANNED TRACHEOSTOMY: CPT | Mod: HCNC,,, | Performed by: SURGERY

## 2022-01-01 PROCEDURE — 33530 CORONARY ARTERY BYPASS/REOP: CPT | Mod: HCNC,,, | Performed by: THORACIC SURGERY (CARDIOTHORACIC VASCULAR SURGERY)

## 2022-01-01 PROCEDURE — A4306 DRUG DELIVERY SYSTEM <=50 ML: HCPCS | Mod: HCNC | Performed by: THORACIC SURGERY (CARDIOTHORACIC VASCULAR SURGERY)

## 2022-01-01 PROCEDURE — 83735 ASSAY OF MAGNESIUM: CPT | Mod: 91,HCNC | Performed by: NURSE PRACTITIONER

## 2022-01-01 PROCEDURE — 1101F PR PT FALLS ASSESS DOC 0-1 FALLS W/OUT INJ PAST YR: ICD-10-PCS | Mod: HCNC,CPTII,S$GLB, | Performed by: NURSE PRACTITIONER

## 2022-01-01 PROCEDURE — 36415 COLL VENOUS BLD VENIPUNCTURE: CPT | Mod: HCNC | Performed by: NURSE PRACTITIONER

## 2022-01-01 PROCEDURE — 99214 OFFICE O/P EST MOD 30 MIN: CPT | Mod: PBBFAC,25 | Performed by: THORACIC SURGERY (CARDIOTHORACIC VASCULAR SURGERY)

## 2022-01-01 PROCEDURE — 82330 ASSAY OF CALCIUM: CPT | Mod: HCNC | Performed by: THORACIC SURGERY (CARDIOTHORACIC VASCULAR SURGERY)

## 2022-01-01 PROCEDURE — 99499 RISK ADDL DX/OHS AUDIT: ICD-10-PCS | Mod: S$GLB,,, | Performed by: NURSE PRACTITIONER

## 2022-01-01 PROCEDURE — 43246 PR EGD, FLEX, W/PLCMT, GASTROSTOMY TUBE: ICD-10-PCS | Mod: 51,HCNC,, | Performed by: SURGERY

## 2022-01-01 PROCEDURE — C1751 CATH, INF, PER/CENT/MIDLINE: HCPCS | Mod: HCNC

## 2022-01-01 PROCEDURE — 1126F PR PAIN SEVERITY QUANTIFIED, NO PAIN PRESENT: ICD-10-PCS | Mod: HCNC,CPTII,S$GLB, | Performed by: THORACIC SURGERY (CARDIOTHORACIC VASCULAR SURGERY)

## 2022-01-01 PROCEDURE — 85027 COMPLETE CBC AUTOMATED: CPT | Mod: 91,HCNC | Performed by: THORACIC SURGERY (CARDIOTHORACIC VASCULAR SURGERY)

## 2022-01-01 PROCEDURE — 37000008 HC ANESTHESIA 1ST 15 MINUTES: Mod: HCNC | Performed by: THORACIC SURGERY (CARDIOTHORACIC VASCULAR SURGERY)

## 2022-01-01 PROCEDURE — 37000008 HC ANESTHESIA 1ST 15 MINUTES: Mod: HCNC | Performed by: SURGERY

## 2022-01-01 PROCEDURE — 93010 EKG 12-LEAD: ICD-10-PCS | Mod: HCNC,S$GLB,, | Performed by: INTERNAL MEDICINE

## 2022-01-01 PROCEDURE — 31500 PR INSERT, EMERGENCY ENDOTRACH AIRWAY: ICD-10-PCS | Mod: HCNC,,, | Performed by: ANESTHESIOLOGY

## 2022-01-01 PROCEDURE — 99499 UNLISTED E&M SERVICE: CPT | Mod: S$GLB,,, | Performed by: THORACIC SURGERY (CARDIOTHORACIC VASCULAR SURGERY)

## 2022-01-01 PROCEDURE — 37000009 HC ANESTHESIA EA ADD 15 MINS: Mod: HCNC | Performed by: SURGERY

## 2022-01-01 PROCEDURE — 99232 SBSQ HOSP IP/OBS MODERATE 35: CPT | Mod: HCNC,GC,, | Performed by: INTERNAL MEDICINE

## 2022-01-01 PROCEDURE — U0002 COVID-19 LAB TEST NON-CDC: HCPCS | Mod: 91,HCNC | Performed by: THORACIC SURGERY (CARDIOTHORACIC VASCULAR SURGERY)

## 2022-01-01 PROCEDURE — 43246 EGD PLACE GASTROSTOMY TUBE: CPT | Mod: 51,HCNC,, | Performed by: SURGERY

## 2022-01-01 PROCEDURE — 27800903 OPTIME MED/SURG SUP & DEVICES OTHER IMPLANTS: Mod: HCNC | Performed by: THORACIC SURGERY (CARDIOTHORACIC VASCULAR SURGERY)

## 2022-01-01 PROCEDURE — 93503 SWAN GANZ LINE: ICD-10-PCS | Mod: 59,HCNC,, | Performed by: ANESTHESIOLOGY

## 2022-01-01 PROCEDURE — 80053 COMPREHEN METABOLIC PANEL: CPT | Mod: 91,HCNC | Performed by: THORACIC SURGERY (CARDIOTHORACIC VASCULAR SURGERY)

## 2022-01-01 PROCEDURE — 93306 TTE W/DOPPLER COMPLETE: CPT | Mod: HCNC

## 2022-01-01 PROCEDURE — 3075F PR MOST RECENT SYSTOLIC BLOOD PRESS GE 130-139MM HG: ICD-10-PCS | Mod: HCNC,CPTII,S$GLB, | Performed by: THORACIC SURGERY (CARDIOTHORACIC VASCULAR SURGERY)

## 2022-01-01 PROCEDURE — D9220A PRA ANESTHESIA: Mod: HCNC,ANES,, | Performed by: ANESTHESIOLOGY

## 2022-01-01 PROCEDURE — 99292 CRITICAL CARE ADDL 30 MIN: CPT | Mod: HCNC,,, | Performed by: ANESTHESIOLOGY

## 2022-01-01 PROCEDURE — 1126F AMNT PAIN NOTED NONE PRSNT: CPT | Mod: HCNC,CPTII,S$GLB, | Performed by: THORACIC SURGERY (CARDIOTHORACIC VASCULAR SURGERY)

## 2022-01-01 PROCEDURE — 27800595 HC HEART VALVES: Mod: HCNC

## 2022-01-01 PROCEDURE — 87186 SC STD MICRODIL/AGAR DIL: CPT | Mod: HCNC | Performed by: STUDENT IN AN ORGANIZED HEALTH CARE EDUCATION/TRAINING PROGRAM

## 2022-01-01 PROCEDURE — 99900022: Mod: HCNC

## 2022-01-01 PROCEDURE — 88305 TISSUE EXAM BY PATHOLOGIST: CPT | Mod: HCNC | Performed by: PATHOLOGY

## 2022-01-01 PROCEDURE — 27800903 OPTIME MED/SURG SUP & DEVICES OTHER IMPLANTS: Mod: HCNC | Performed by: SURGERY

## 2022-01-01 PROCEDURE — C1729 CATH, DRAINAGE: HCPCS | Mod: HCNC | Performed by: THORACIC SURGERY (CARDIOTHORACIC VASCULAR SURGERY)

## 2022-01-01 PROCEDURE — 87070 CULTURE OTHR SPECIMN AEROBIC: CPT | Mod: HCNC | Performed by: STUDENT IN AN ORGANIZED HEALTH CARE EDUCATION/TRAINING PROGRAM

## 2022-01-01 PROCEDURE — 1159F MED LIST DOCD IN RCRD: CPT | Mod: HCNC,CPTII,S$GLB, | Performed by: NURSE PRACTITIONER

## 2022-01-01 PROCEDURE — P9035 PLATELET PHERES LEUKOREDUCED: HCPCS | Mod: HCNC | Performed by: ANESTHESIOLOGY

## 2022-01-01 PROCEDURE — D9220A PRA ANESTHESIA: Mod: HCNC,,, | Performed by: ANESTHESIOLOGY

## 2022-01-01 PROCEDURE — 92610 EVALUATE SWALLOWING FUNCTION: CPT | Mod: HCNC

## 2022-01-01 PROCEDURE — 85520 HEPARIN ASSAY: CPT | Mod: HCNC

## 2022-01-01 PROCEDURE — 87205 SMEAR GRAM STAIN: CPT | Mod: HCNC | Performed by: STUDENT IN AN ORGANIZED HEALTH CARE EDUCATION/TRAINING PROGRAM

## 2022-01-01 PROCEDURE — 87040 BLOOD CULTURE FOR BACTERIA: CPT | Mod: 59,HCNC | Performed by: STUDENT IN AN ORGANIZED HEALTH CARE EDUCATION/TRAINING PROGRAM

## 2022-01-01 PROCEDURE — 77001 FLUOROGUIDE FOR VEIN DEVICE: CPT | Mod: 26,HCNC,59, | Performed by: SURGERY

## 2022-01-01 PROCEDURE — 36556 INSERT NON-TUNNEL CV CATH: CPT | Mod: HCNC,LT,, | Performed by: SURGERY

## 2022-01-01 PROCEDURE — 93005 EKG 12-LEAD: ICD-10-PCS | Mod: HCNC,S$GLB,, | Performed by: THORACIC SURGERY (CARDIOTHORACIC VASCULAR SURGERY)

## 2022-01-01 PROCEDURE — 80048 BASIC METABOLIC PNL TOTAL CA: CPT | Mod: HCNC | Performed by: NURSE PRACTITIONER

## 2022-01-01 PROCEDURE — 3288F FALL RISK ASSESSMENT DOCD: CPT | Mod: HCNC,CPTII,S$GLB, | Performed by: NURSE PRACTITIONER

## 2022-01-01 PROCEDURE — 85025 COMPLETE CBC W/AUTO DIFF WBC: CPT | Mod: HCNC | Performed by: THORACIC SURGERY (CARDIOTHORACIC VASCULAR SURGERY)

## 2022-01-01 PROCEDURE — D9220A PRA ANESTHESIA: Mod: HCNC,CRNA,, | Performed by: REGISTERED NURSE

## 2022-01-01 PROCEDURE — 93306 ECHO (CUPID ONLY): ICD-10-PCS | Mod: 26,HCNC,, | Performed by: INTERNAL MEDICINE

## 2022-01-01 PROCEDURE — 80048 BASIC METABOLIC PNL TOTAL CA: CPT | Mod: 91,HCNC | Performed by: THORACIC SURGERY (CARDIOTHORACIC VASCULAR SURGERY)

## 2022-01-01 PROCEDURE — 1101F PT FALLS ASSESS-DOCD LE1/YR: CPT | Mod: HCNC,CPTII,S$GLB, | Performed by: THORACIC SURGERY (CARDIOTHORACIC VASCULAR SURGERY)

## 2022-01-01 PROCEDURE — 27201673 HC ANCILLARY CANNULA: Mod: HCNC

## 2022-01-01 PROCEDURE — 1101F PR PT FALLS ASSESS DOC 0-1 FALLS W/OUT INJ PAST YR: ICD-10-PCS | Mod: HCNC,CPTII,S$GLB, | Performed by: THORACIC SURGERY (CARDIOTHORACIC VASCULAR SURGERY)

## 2022-01-01 PROCEDURE — 63600175 PHARM REV CODE 636 W HCPCS: Mod: HCNC | Performed by: REGISTERED NURSE

## 2022-01-01 PROCEDURE — 82330 ASSAY OF CALCIUM: CPT | Mod: HCNC | Performed by: ANESTHESIOLOGY

## 2022-01-01 PROCEDURE — 85007 BL SMEAR W/DIFF WBC COUNT: CPT | Mod: 91,HCNC | Performed by: THORACIC SURGERY (CARDIOTHORACIC VASCULAR SURGERY)

## 2022-01-01 PROCEDURE — 36000706: Mod: HCNC | Performed by: SURGERY

## 2022-01-01 PROCEDURE — 3078F PR MOST RECENT DIASTOLIC BLOOD PRESSURE < 80 MM HG: ICD-10-PCS | Mod: HCNC,CPTII,S$GLB, | Performed by: NURSE PRACTITIONER

## 2022-01-01 PROCEDURE — 83735 ASSAY OF MAGNESIUM: CPT | Mod: HCNC | Performed by: THORACIC SURGERY (CARDIOTHORACIC VASCULAR SURGERY)

## 2022-01-01 PROCEDURE — 97116 GAIT TRAINING THERAPY: CPT | Mod: HCNC

## 2022-01-01 PROCEDURE — 31500 AD HOC INTUBATION: ICD-10-PCS | Mod: HCNC,,, | Performed by: ANESTHESIOLOGY

## 2022-01-01 PROCEDURE — 31600 PR TRACHEOSTOMY, PLANNED: ICD-10-PCS | Mod: HCNC,,, | Performed by: SURGERY

## 2022-01-01 PROCEDURE — 27100088 HC CELL SAVER: Mod: HCNC

## 2022-01-01 PROCEDURE — 27000175 HC ADULT BYPASS PUMP: Mod: HCNC

## 2022-01-01 PROCEDURE — 1126F PR PAIN SEVERITY QUANTIFIED, NO PAIN PRESENT: ICD-10-PCS | Mod: HCNC,CPTII,S$GLB, | Performed by: NURSE PRACTITIONER

## 2022-01-01 PROCEDURE — 71046 X-RAY EXAM CHEST 2 VIEWS: CPT | Mod: TC,FY

## 2022-01-01 PROCEDURE — 99499 RISK ADDL DX/OHS AUDIT: ICD-10-PCS | Mod: S$GLB,,, | Performed by: THORACIC SURGERY (CARDIOTHORACIC VASCULAR SURGERY)

## 2022-01-01 PROCEDURE — 27201037 HC PRESSURE MONITORING SET UP: Mod: HCNC

## 2022-01-01 PROCEDURE — D9220A PRA ANESTHESIA: ICD-10-PCS | Mod: HCNC,CRNA,, | Performed by: REGISTERED NURSE

## 2022-01-01 DEVICE — CATH HEMOSPLIT DL 14.5FR 24CM: Type: IMPLANTABLE DEVICE | Site: NECK | Status: FUNCTIONAL

## 2022-01-01 DEVICE — PUTTY HEMASORB BONE RESORBABLE: Type: IMPLANTABLE DEVICE | Site: CHEST | Status: FUNCTIONAL

## 2022-01-01 RX ORDER — HYDROMORPHONE HYDROCHLORIDE 1 MG/ML
1 INJECTION, SOLUTION INTRAMUSCULAR; INTRAVENOUS; SUBCUTANEOUS EVERY 4 HOURS PRN
Status: DISCONTINUED | OUTPATIENT
Start: 2022-01-01 | End: 2022-01-01

## 2022-01-01 RX ORDER — IPRATROPIUM BROMIDE AND ALBUTEROL SULFATE 2.5; .5 MG/3ML; MG/3ML
3 SOLUTION RESPIRATORY (INHALATION) EVERY 4 HOURS PRN
Status: ACTIVE | OUTPATIENT
Start: 2022-01-01 | End: 2022-01-01

## 2022-01-01 RX ORDER — POTASSIUM CHLORIDE 14.9 MG/ML
60 INJECTION INTRAVENOUS
Status: DISCONTINUED | OUTPATIENT
Start: 2022-01-01 | End: 2022-01-01

## 2022-01-01 RX ORDER — SODIUM CHLORIDE 9 MG/ML
INJECTION, SOLUTION INTRAVENOUS ONCE
Status: COMPLETED | OUTPATIENT
Start: 2022-01-01 | End: 2022-01-01

## 2022-01-01 RX ORDER — FUROSEMIDE 10 MG/ML
120 INJECTION INTRAMUSCULAR; INTRAVENOUS EVERY 8 HOURS
Status: DISCONTINUED | OUTPATIENT
Start: 2022-01-01 | End: 2022-01-01

## 2022-01-01 RX ORDER — QUETIAPINE FUMARATE 25 MG/1
25 TABLET, FILM COATED ORAL NIGHTLY
Status: DISCONTINUED | OUTPATIENT
Start: 2022-01-01 | End: 2022-01-01

## 2022-01-01 RX ORDER — NOREPINEPHRINE BITARTRATE/D5W 4MG/250ML
0-3 PLASTIC BAG, INJECTION (ML) INTRAVENOUS CONTINUOUS
Status: DISCONTINUED | OUTPATIENT
Start: 2022-01-01 | End: 2022-01-01

## 2022-01-01 RX ORDER — POTASSIUM CHLORIDE 29.8 MG/ML
40 INJECTION INTRAVENOUS
Status: DISCONTINUED | OUTPATIENT
Start: 2022-01-01 | End: 2022-01-01

## 2022-01-01 RX ORDER — LIDOCAINE HYDROCHLORIDE 10 MG/ML
1 INJECTION, SOLUTION EPIDURAL; INFILTRATION; INTRACAUDAL; PERINEURAL ONCE
Status: CANCELLED | OUTPATIENT
Start: 2022-01-01 | End: 2022-01-01

## 2022-01-01 RX ORDER — INSULIN ASPART 100 [IU]/ML
4 INJECTION, SOLUTION INTRAVENOUS; SUBCUTANEOUS
Status: DISCONTINUED | OUTPATIENT
Start: 2022-01-01 | End: 2022-01-01

## 2022-01-01 RX ORDER — CALCIUM ACETATE 667 MG/1
1334 CAPSULE ORAL ONCE
Status: COMPLETED | OUTPATIENT
Start: 2022-01-01 | End: 2022-01-01

## 2022-01-01 RX ORDER — HALOPERIDOL 5 MG/ML
2 INJECTION INTRAMUSCULAR NIGHTLY PRN
Status: DISCONTINUED | OUTPATIENT
Start: 2022-01-01 | End: 2022-01-01 | Stop reason: HOSPADM

## 2022-01-01 RX ORDER — ETOMIDATE 2 MG/ML
INJECTION INTRAVENOUS
Status: COMPLETED
Start: 2022-01-01 | End: 2022-01-01

## 2022-01-01 RX ORDER — SODIUM,POTASSIUM PHOSPHATES 280-250MG
2 POWDER IN PACKET (EA) ORAL ONCE
Status: COMPLETED | OUTPATIENT
Start: 2022-01-01 | End: 2022-01-01

## 2022-01-01 RX ORDER — WARFARIN SODIUM 5 MG/1
5 TABLET ORAL ONCE
Status: DISCONTINUED | OUTPATIENT
Start: 2022-01-01 | End: 2022-01-01

## 2022-01-01 RX ORDER — BISACODYL 10 MG
10 SUPPOSITORY, RECTAL RECTAL DAILY PRN
Status: CANCELLED | OUTPATIENT
Start: 2022-01-01

## 2022-01-01 RX ORDER — FENTANYL CITRATE 50 UG/ML
INJECTION, SOLUTION INTRAMUSCULAR; INTRAVENOUS
Status: DISCONTINUED | OUTPATIENT
Start: 2022-01-01 | End: 2022-01-01

## 2022-01-01 RX ORDER — HEPARIN SODIUM 10000 [USP'U]/100ML
700 INJECTION, SOLUTION INTRAVENOUS CONTINUOUS
Status: DISCONTINUED | OUTPATIENT
Start: 2022-01-01 | End: 2022-01-01

## 2022-01-01 RX ORDER — MIDAZOLAM HYDROCHLORIDE 1 MG/ML
INJECTION, SOLUTION INTRAMUSCULAR; INTRAVENOUS
Status: DISCONTINUED | OUTPATIENT
Start: 2022-01-01 | End: 2022-01-01

## 2022-01-01 RX ORDER — SODIUM CHLORIDE 9 MG/ML
INJECTION, SOLUTION INTRAVENOUS ONCE
Status: DISCONTINUED | OUTPATIENT
Start: 2022-01-01 | End: 2022-01-01

## 2022-01-01 RX ORDER — HYDROCODONE BITARTRATE AND ACETAMINOPHEN 500; 5 MG/1; MG/1
200 TABLET ORAL CONTINUOUS
Status: DISCONTINUED | OUTPATIENT
Start: 2022-01-01 | End: 2022-01-01

## 2022-01-01 RX ORDER — NEOSTIGMINE METHYLSULFATE 0.5 MG/ML
INJECTION, SOLUTION INTRAVENOUS
Status: DISCONTINUED | OUTPATIENT
Start: 2022-01-01 | End: 2022-01-01

## 2022-01-01 RX ORDER — GLUCAGON 1 MG
1 KIT INJECTION
Status: DISCONTINUED | OUTPATIENT
Start: 2022-01-01 | End: 2022-01-01

## 2022-01-01 RX ORDER — GABAPENTIN 300 MG/1
300 CAPSULE ORAL 3 TIMES DAILY
Status: DISCONTINUED | OUTPATIENT
Start: 2022-01-01 | End: 2022-01-01

## 2022-01-01 RX ORDER — HEPARIN SODIUM 10000 [USP'U]/100ML
500 INJECTION, SOLUTION INTRAVENOUS CONTINUOUS
Status: DISCONTINUED | OUTPATIENT
Start: 2022-01-01 | End: 2022-01-01

## 2022-01-01 RX ORDER — FUROSEMIDE 10 MG/ML
20 INJECTION INTRAMUSCULAR; INTRAVENOUS ONCE
Status: COMPLETED | OUTPATIENT
Start: 2022-01-01 | End: 2022-01-01

## 2022-01-01 RX ORDER — FAMOTIDINE 10 MG/ML
20 INJECTION INTRAVENOUS 2 TIMES DAILY
Status: DISCONTINUED | OUTPATIENT
Start: 2022-01-01 | End: 2022-01-01

## 2022-01-01 RX ORDER — DOCUSATE SODIUM 100 MG/1
100 CAPSULE, LIQUID FILLED ORAL 2 TIMES DAILY
Status: DISCONTINUED | OUTPATIENT
Start: 2022-01-01 | End: 2022-01-01

## 2022-01-01 RX ORDER — ACETAMINOPHEN 500 MG
1000 TABLET ORAL EVERY 8 HOURS
Status: DISCONTINUED | OUTPATIENT
Start: 2022-01-01 | End: 2022-01-01

## 2022-01-01 RX ORDER — INSULIN ASPART 100 [IU]/ML
3 INJECTION, SOLUTION INTRAVENOUS; SUBCUTANEOUS
Status: DISCONTINUED | OUTPATIENT
Start: 2022-01-01 | End: 2022-01-01

## 2022-01-01 RX ORDER — PROPOFOL 10 MG/ML
VIAL (ML) INTRAVENOUS
Status: DISCONTINUED | OUTPATIENT
Start: 2022-01-01 | End: 2022-01-01

## 2022-01-01 RX ORDER — FAMOTIDINE 10 MG/ML
20 INJECTION INTRAVENOUS 2 TIMES DAILY
Status: CANCELLED | OUTPATIENT
Start: 2022-01-01

## 2022-01-01 RX ORDER — MAGNESIUM SULFATE HEPTAHYDRATE 40 MG/ML
2 INJECTION, SOLUTION INTRAVENOUS
Status: DISPENSED | OUTPATIENT
Start: 2022-01-01 | End: 2022-01-01

## 2022-01-01 RX ORDER — INSULIN ASPART 100 [IU]/ML
0-5 INJECTION, SOLUTION INTRAVENOUS; SUBCUTANEOUS
Status: DISCONTINUED | OUTPATIENT
Start: 2022-01-01 | End: 2022-01-01

## 2022-01-01 RX ORDER — MORPHINE SULFATE IN 0.9 % NACL 30 MG/30ML
0-10 PATIENT CONTROLLED ANALGESIA SYRINGE INTRAVENOUS CONTINUOUS
Status: DISCONTINUED | OUTPATIENT
Start: 2022-01-01 | End: 2022-01-01 | Stop reason: HOSPADM

## 2022-01-01 RX ORDER — INDOMETHACIN 25 MG/1
CAPSULE ORAL
Status: COMPLETED
Start: 2022-01-01 | End: 2022-01-01

## 2022-01-01 RX ORDER — MAGNESIUM SULFATE HEPTAHYDRATE 40 MG/ML
2 INJECTION, SOLUTION INTRAVENOUS
Status: DISCONTINUED | OUTPATIENT
Start: 2022-01-01 | End: 2022-01-01

## 2022-01-01 RX ORDER — VASOPRESSIN 20 [USP'U]/ML
INJECTION, SOLUTION INTRAMUSCULAR; SUBCUTANEOUS
Status: DISCONTINUED | OUTPATIENT
Start: 2022-01-01 | End: 2022-01-01

## 2022-01-01 RX ORDER — LIDOCAINE HYDROCHLORIDE 10 MG/ML
1 INJECTION, SOLUTION EPIDURAL; INFILTRATION; INTRACAUDAL; PERINEURAL ONCE AS NEEDED
Status: CANCELLED | OUTPATIENT
Start: 2022-01-01 | End: 2033-06-02

## 2022-01-01 RX ORDER — FENTANYL CITRATE 50 UG/ML
25 INJECTION, SOLUTION INTRAMUSCULAR; INTRAVENOUS EVERY 4 HOURS PRN
Status: DISCONTINUED | OUTPATIENT
Start: 2022-01-01 | End: 2022-01-01

## 2022-01-01 RX ORDER — ROCURONIUM BROMIDE 10 MG/ML
60 INJECTION, SOLUTION INTRAVENOUS ONCE
Status: DISCONTINUED | OUTPATIENT
Start: 2022-01-01 | End: 2022-01-01

## 2022-01-01 RX ORDER — HYDROCODONE BITARTRATE AND ACETAMINOPHEN 500; 5 MG/1; MG/1
TABLET ORAL CONTINUOUS
Status: ACTIVE | OUTPATIENT
Start: 2022-01-01 | End: 2022-01-01

## 2022-01-01 RX ORDER — AMIODARONE HYDROCHLORIDE 200 MG/1
400 TABLET ORAL 2 TIMES DAILY
Status: DISCONTINUED | OUTPATIENT
Start: 2022-01-01 | End: 2022-01-01

## 2022-01-01 RX ORDER — FUROSEMIDE 10 MG/ML
40 INJECTION INTRAMUSCULAR; INTRAVENOUS
Status: DISCONTINUED | OUTPATIENT
Start: 2022-01-01 | End: 2022-01-01

## 2022-01-01 RX ORDER — DOCUSATE SODIUM 100 MG/1
100 CAPSULE, LIQUID FILLED ORAL 2 TIMES DAILY
Status: CANCELLED | OUTPATIENT
Start: 2022-01-01

## 2022-01-01 RX ORDER — ONDANSETRON 2 MG/ML
4 INJECTION INTRAMUSCULAR; INTRAVENOUS EVERY 12 HOURS PRN
Status: DISCONTINUED | OUTPATIENT
Start: 2022-01-01 | End: 2022-01-01 | Stop reason: HOSPADM

## 2022-01-01 RX ORDER — FENTANYL CITRATE 50 UG/ML
50 INJECTION, SOLUTION INTRAMUSCULAR; INTRAVENOUS
Status: CANCELLED | OUTPATIENT
Start: 2022-01-01

## 2022-01-01 RX ORDER — MAGNESIUM SULFATE HEPTAHYDRATE 40 MG/ML
2 INJECTION, SOLUTION INTRAVENOUS ONCE
Status: COMPLETED | OUTPATIENT
Start: 2022-01-01 | End: 2022-01-01

## 2022-01-01 RX ORDER — POTASSIUM CHLORIDE 14.9 MG/ML
60 INJECTION INTRAVENOUS
Status: CANCELLED | OUTPATIENT
Start: 2022-01-01

## 2022-01-01 RX ORDER — PHENYLEPHRINE HYDROCHLORIDE 10 MG/ML
INJECTION INTRAVENOUS
Status: DISCONTINUED | OUTPATIENT
Start: 2022-01-01 | End: 2022-01-01

## 2022-01-01 RX ORDER — GABAPENTIN 300 MG/1
300 CAPSULE ORAL 2 TIMES DAILY
Status: DISCONTINUED | OUTPATIENT
Start: 2022-01-01 | End: 2022-01-01

## 2022-01-01 RX ORDER — MIDODRINE HYDROCHLORIDE 5 MG/1
15 TABLET ORAL 3 TIMES DAILY
Status: DISCONTINUED | OUTPATIENT
Start: 2022-01-01 | End: 2022-01-01

## 2022-01-01 RX ORDER — METOPROLOL TARTRATE 25 MG/1
25 TABLET, FILM COATED ORAL
Status: CANCELLED | OUTPATIENT
Start: 2022-01-01

## 2022-01-01 RX ORDER — METOCLOPRAMIDE HYDROCHLORIDE 5 MG/ML
5 INJECTION INTRAMUSCULAR; INTRAVENOUS EVERY 6 HOURS PRN
Status: DISCONTINUED | OUTPATIENT
Start: 2022-01-01 | End: 2022-01-01

## 2022-01-01 RX ORDER — MAGNESIUM SULFATE HEPTAHYDRATE 40 MG/ML
2 INJECTION, SOLUTION INTRAVENOUS
Status: ACTIVE | OUTPATIENT
Start: 2022-01-01 | End: 2022-01-01

## 2022-01-01 RX ORDER — MEROPENEM AND SODIUM CHLORIDE 1 G/50ML
1 INJECTION, SOLUTION INTRAVENOUS
Status: DISCONTINUED | OUTPATIENT
Start: 2022-01-01 | End: 2022-01-01

## 2022-01-01 RX ORDER — SUCCINYLCHOLINE CHLORIDE 20 MG/ML
INJECTION INTRAMUSCULAR; INTRAVENOUS
Status: COMPLETED
Start: 2022-01-01 | End: 2022-01-01

## 2022-01-01 RX ORDER — FAMOTIDINE 10 MG/ML
20 INJECTION INTRAVENOUS DAILY
Status: DISCONTINUED | OUTPATIENT
Start: 2022-01-01 | End: 2022-01-01

## 2022-01-01 RX ORDER — TRANEXAMIC ACID 100 MG/ML
INJECTION, SOLUTION INTRAVENOUS CONTINUOUS PRN
Status: DISCONTINUED | OUTPATIENT
Start: 2022-01-01 | End: 2022-01-01

## 2022-01-01 RX ORDER — ROCURONIUM BROMIDE 10 MG/ML
INJECTION, SOLUTION INTRAVENOUS
Status: DISPENSED
Start: 2022-01-01 | End: 2022-01-01

## 2022-01-01 RX ORDER — NOREPINEPHRINE BITARTRATE/D5W 4MG/250ML
PLASTIC BAG, INJECTION (ML) INTRAVENOUS
Status: COMPLETED
Start: 2022-01-01 | End: 2022-01-01

## 2022-01-01 RX ORDER — DEXTROSE MONOHYDRATE 100 MG/ML
INJECTION, SOLUTION INTRAVENOUS CONTINUOUS PRN
Status: DISCONTINUED | OUTPATIENT
Start: 2022-01-01 | End: 2022-01-01 | Stop reason: HOSPADM

## 2022-01-01 RX ORDER — PROPOFOL 10 MG/ML
INJECTION, EMULSION INTRAVENOUS
Status: COMPLETED
Start: 2022-01-01 | End: 2022-01-01

## 2022-01-01 RX ORDER — HYDROMORPHONE HYDROCHLORIDE 1 MG/ML
0.2 INJECTION, SOLUTION INTRAMUSCULAR; INTRAVENOUS; SUBCUTANEOUS ONCE
Status: COMPLETED | OUTPATIENT
Start: 2022-01-01 | End: 2022-01-01

## 2022-01-01 RX ORDER — VANCOMYCIN HCL IN 5 % DEXTROSE 1G/250ML
1000 PLASTIC BAG, INJECTION (ML) INTRAVENOUS ONCE
Status: COMPLETED | OUTPATIENT
Start: 2022-01-01 | End: 2022-01-01

## 2022-01-01 RX ORDER — SODIUM CHLORIDE 0.9 % (FLUSH) 0.9 %
10 SYRINGE (ML) INJECTION
Status: DISCONTINUED | OUTPATIENT
Start: 2022-01-01 | End: 2022-01-01 | Stop reason: HOSPADM

## 2022-01-01 RX ORDER — MIDODRINE HYDROCHLORIDE 5 MG/1
15 TABLET ORAL 3 TIMES DAILY
Status: CANCELLED | OUTPATIENT
Start: 2022-01-01

## 2022-01-01 RX ORDER — ONDANSETRON 2 MG/ML
INJECTION INTRAMUSCULAR; INTRAVENOUS
Status: DISCONTINUED | OUTPATIENT
Start: 2022-01-01 | End: 2022-01-01

## 2022-01-01 RX ORDER — ALBUMIN HUMAN 50 G/1000ML
SOLUTION INTRAVENOUS
Status: COMPLETED
Start: 2022-01-01 | End: 2022-01-01

## 2022-01-01 RX ORDER — MAGNESIUM SULFATE HEPTAHYDRATE 40 MG/ML
2 INJECTION, SOLUTION INTRAVENOUS ONCE
Status: DISCONTINUED | OUTPATIENT
Start: 2022-01-01 | End: 2022-01-01

## 2022-01-01 RX ORDER — IPRATROPIUM BROMIDE AND ALBUTEROL SULFATE 2.5; .5 MG/3ML; MG/3ML
3 SOLUTION RESPIRATORY (INHALATION) EVERY 6 HOURS
Status: DISCONTINUED | OUTPATIENT
Start: 2022-01-01 | End: 2022-01-01 | Stop reason: HOSPADM

## 2022-01-01 RX ORDER — ASPIRIN 325 MG
325 TABLET ORAL ONCE
Status: CANCELLED | OUTPATIENT
Start: 2022-01-01 | End: 2022-01-01

## 2022-01-01 RX ORDER — HEPARIN SODIUM 10000 [USP'U]/100ML
400 INJECTION, SOLUTION INTRAVENOUS CONTINUOUS
Status: DISCONTINUED | OUTPATIENT
Start: 2022-01-01 | End: 2022-01-01

## 2022-01-01 RX ORDER — MIDODRINE HYDROCHLORIDE 5 MG/1
15 TABLET ORAL EVERY 8 HOURS
Status: DISCONTINUED | OUTPATIENT
Start: 2022-01-01 | End: 2022-01-01

## 2022-01-01 RX ORDER — SODIUM CHLORIDE 9 MG/ML
INJECTION, SOLUTION INTRAVENOUS
Status: DISCONTINUED | OUTPATIENT
Start: 2022-01-01 | End: 2022-01-01

## 2022-01-01 RX ORDER — DEXMEDETOMIDINE HYDROCHLORIDE 4 UG/ML
0-1.4 INJECTION, SOLUTION INTRAVENOUS CONTINUOUS
Status: DISCONTINUED | OUTPATIENT
Start: 2022-01-01 | End: 2022-01-01

## 2022-01-01 RX ORDER — WARFARIN 2 MG/1
2 TABLET ORAL DAILY
Status: DISCONTINUED | OUTPATIENT
Start: 2022-01-01 | End: 2022-01-01

## 2022-01-01 RX ORDER — PROPOFOL 10 MG/ML
0-50 INJECTION, EMULSION INTRAVENOUS CONTINUOUS
Status: DISCONTINUED | OUTPATIENT
Start: 2022-01-01 | End: 2022-01-01

## 2022-01-01 RX ORDER — CEFAZOLIN SODIUM 2 G/50ML
2 SOLUTION INTRAVENOUS
Status: CANCELLED | OUTPATIENT
Start: 2022-01-01

## 2022-01-01 RX ORDER — INSULIN ASPART 100 [IU]/ML
2 INJECTION, SOLUTION INTRAVENOUS; SUBCUTANEOUS
Status: DISCONTINUED | OUTPATIENT
Start: 2022-01-01 | End: 2022-01-01

## 2022-01-01 RX ORDER — SODIUM CHLORIDE 9 MG/ML
INJECTION, SOLUTION INTRAVENOUS CONTINUOUS
Status: CANCELLED | OUTPATIENT
Start: 2022-01-01

## 2022-01-01 RX ORDER — OXYCODONE HYDROCHLORIDE 5 MG/1
5 TABLET ORAL EVERY 4 HOURS PRN
Status: CANCELLED | OUTPATIENT
Start: 2022-01-01

## 2022-01-01 RX ORDER — PANTOPRAZOLE SODIUM 40 MG/10ML
40 INJECTION, POWDER, LYOPHILIZED, FOR SOLUTION INTRAVENOUS EVERY 12 HOURS
Status: DISCONTINUED | OUTPATIENT
Start: 2022-01-01 | End: 2022-01-01

## 2022-01-01 RX ORDER — HYDROCODONE BITARTRATE AND ACETAMINOPHEN 500; 5 MG/1; MG/1
TABLET ORAL
Status: DISCONTINUED | OUTPATIENT
Start: 2022-01-01 | End: 2022-01-01 | Stop reason: HOSPADM

## 2022-01-01 RX ORDER — METOPROLOL TARTRATE 1 MG/ML
5 INJECTION, SOLUTION INTRAVENOUS ONCE
Status: COMPLETED | OUTPATIENT
Start: 2022-01-01 | End: 2022-01-01

## 2022-01-01 RX ORDER — METOPROLOL TARTRATE 1 MG/ML
INJECTION, SOLUTION INTRAVENOUS
Status: COMPLETED
Start: 2022-01-01 | End: 2022-01-01

## 2022-01-01 RX ORDER — GLUCAGON 1 MG
1 KIT INJECTION
Status: DISCONTINUED | OUTPATIENT
Start: 2022-01-01 | End: 2022-01-01 | Stop reason: HOSPADM

## 2022-01-01 RX ORDER — HYDROMORPHONE HYDROCHLORIDE 1 MG/ML
0.1 INJECTION, SOLUTION INTRAMUSCULAR; INTRAVENOUS; SUBCUTANEOUS ONCE
Status: COMPLETED | OUTPATIENT
Start: 2022-01-01 | End: 2022-01-01

## 2022-01-01 RX ORDER — HYDROCODONE BITARTRATE AND ACETAMINOPHEN 500; 5 MG/1; MG/1
TABLET ORAL
Status: DISCONTINUED | OUTPATIENT
Start: 2022-01-01 | End: 2022-01-01

## 2022-01-01 RX ORDER — ASPIRIN 325 MG
325 TABLET, DELAYED RELEASE (ENTERIC COATED) ORAL DAILY
Status: CANCELLED | OUTPATIENT
Start: 2022-01-01

## 2022-01-01 RX ORDER — FENTANYL CITRATE 50 UG/ML
25 INJECTION, SOLUTION INTRAMUSCULAR; INTRAVENOUS
Status: DISCONTINUED | OUTPATIENT
Start: 2022-01-01 | End: 2022-01-01

## 2022-01-01 RX ORDER — MIDAZOLAM HYDROCHLORIDE 1 MG/ML
INJECTION INTRAMUSCULAR; INTRAVENOUS
Status: DISCONTINUED | OUTPATIENT
Start: 2022-01-01 | End: 2022-01-01

## 2022-01-01 RX ORDER — MAGNESIUM SULFATE HEPTAHYDRATE 40 MG/ML
4 INJECTION, SOLUTION INTRAVENOUS
Status: DISCONTINUED | OUTPATIENT
Start: 2022-01-01 | End: 2022-01-01

## 2022-01-01 RX ORDER — POTASSIUM CHLORIDE 14.9 MG/ML
20 INJECTION INTRAVENOUS
Status: DISCONTINUED | OUTPATIENT
Start: 2022-01-01 | End: 2022-01-01

## 2022-01-01 RX ORDER — PROPOFOL 10 MG/ML
VIAL (ML) INTRAVENOUS
Status: COMPLETED
Start: 2022-01-01 | End: 2022-01-01

## 2022-01-01 RX ORDER — HYDRALAZINE HYDROCHLORIDE 20 MG/ML
5 INJECTION INTRAMUSCULAR; INTRAVENOUS ONCE
Status: COMPLETED | OUTPATIENT
Start: 2022-01-01 | End: 2022-01-01

## 2022-01-01 RX ORDER — IPRATROPIUM BROMIDE AND ALBUTEROL SULFATE 2.5; .5 MG/3ML; MG/3ML
3 SOLUTION RESPIRATORY (INHALATION) EVERY 4 HOURS PRN
Status: CANCELLED | OUTPATIENT
Start: 2022-01-01 | End: 2022-01-01

## 2022-01-01 RX ORDER — LIDOCAINE HYDROCHLORIDE 20 MG/ML
JELLY TOPICAL
Status: DISCONTINUED | OUTPATIENT
Start: 2022-01-01 | End: 2022-01-01 | Stop reason: HOSPADM

## 2022-01-01 RX ORDER — CALCIUM GLUCONATE 98 MG/ML
INJECTION, SOLUTION INTRAVENOUS
Status: DISPENSED
Start: 2022-01-01 | End: 2022-01-01

## 2022-01-01 RX ORDER — HYDROMORPHONE HYDROCHLORIDE 1 MG/ML
0.5 INJECTION, SOLUTION INTRAMUSCULAR; INTRAVENOUS; SUBCUTANEOUS EVERY 4 HOURS PRN
Status: DISCONTINUED | OUTPATIENT
Start: 2022-01-01 | End: 2022-01-01

## 2022-01-01 RX ORDER — ALBUMIN HUMAN 50 G/1000ML
25 SOLUTION INTRAVENOUS ONCE
Status: COMPLETED | OUTPATIENT
Start: 2022-01-01 | End: 2022-01-01

## 2022-01-01 RX ORDER — HEPARIN SODIUM 10000 [USP'U]/100ML
600 INJECTION, SOLUTION INTRAVENOUS CONTINUOUS
Status: DISCONTINUED | OUTPATIENT
Start: 2022-01-01 | End: 2022-01-01

## 2022-01-01 RX ORDER — AMIODARONE HYDROCHLORIDE 200 MG/1
400 TABLET ORAL 3 TIMES DAILY
Status: DISCONTINUED | OUTPATIENT
Start: 2022-01-01 | End: 2022-01-01

## 2022-01-01 RX ORDER — OXYCODONE HYDROCHLORIDE 10 MG/1
10 TABLET ORAL EVERY 4 HOURS PRN
Status: CANCELLED | OUTPATIENT
Start: 2022-01-01

## 2022-01-01 RX ORDER — INSULIN ASPART 100 [IU]/ML
5 INJECTION, SOLUTION INTRAVENOUS; SUBCUTANEOUS
Status: DISCONTINUED | OUTPATIENT
Start: 2022-01-01 | End: 2022-01-01

## 2022-01-01 RX ORDER — POLYETHYLENE GLYCOL 3350 17 G/17G
17 POWDER, FOR SOLUTION ORAL DAILY
Status: CANCELLED | OUTPATIENT
Start: 2022-01-01

## 2022-01-01 RX ORDER — FAMOTIDINE 20 MG/1
20 TABLET, FILM COATED ORAL 2 TIMES DAILY
Status: CANCELLED | OUTPATIENT
Start: 2022-01-01

## 2022-01-01 RX ORDER — MAGNESIUM SULFATE HEPTAHYDRATE 40 MG/ML
4 INJECTION, SOLUTION INTRAVENOUS
Status: CANCELLED | OUTPATIENT
Start: 2022-01-01

## 2022-01-01 RX ORDER — FENTANYL CITRATE-0.9 % NACL/PF 10 MCG/ML
0-250 PLASTIC BAG, INJECTION (ML) INTRAVENOUS CONTINUOUS
Status: DISCONTINUED | OUTPATIENT
Start: 2022-01-01 | End: 2022-01-01

## 2022-01-01 RX ORDER — NOREPINEPHRINE BITARTRATE/D5W 4MG/250ML
0-.2 PLASTIC BAG, INJECTION (ML) INTRAVENOUS CONTINUOUS
Status: DISCONTINUED | OUTPATIENT
Start: 2022-01-01 | End: 2022-01-01

## 2022-01-01 RX ORDER — INDOMETHACIN 25 MG/1
100 CAPSULE ORAL ONCE
Status: DISCONTINUED | OUTPATIENT
Start: 2022-01-01 | End: 2022-01-01

## 2022-01-01 RX ORDER — WARFARIN 1 MG/1
1 TABLET ORAL ONCE
Status: COMPLETED | OUTPATIENT
Start: 2022-01-01 | End: 2022-01-01

## 2022-01-01 RX ORDER — LIDOCAINE 50 MG/G
1 PATCH TOPICAL
Status: DISCONTINUED | OUTPATIENT
Start: 2022-01-01 | End: 2022-01-01

## 2022-01-01 RX ORDER — VASOPRESSIN 20 [USP'U]/ML
INJECTION, SOLUTION INTRAMUSCULAR; SUBCUTANEOUS
Status: DISPENSED
Start: 2022-01-01 | End: 2022-01-01

## 2022-01-01 RX ORDER — ATORVASTATIN CALCIUM 10 MG/1
40 TABLET, FILM COATED ORAL DAILY
Status: DISCONTINUED | OUTPATIENT
Start: 2022-01-01 | End: 2022-01-01

## 2022-01-01 RX ORDER — ALBUMIN HUMAN 50 G/1000ML
12.5 SOLUTION INTRAVENOUS ONCE AS NEEDED
Status: CANCELLED | OUTPATIENT
Start: 2022-01-01 | End: 2033-06-02

## 2022-01-01 RX ORDER — OXYCODONE HYDROCHLORIDE 5 MG/1
10 TABLET ORAL EVERY 4 HOURS PRN
Status: DISCONTINUED | OUTPATIENT
Start: 2022-01-01 | End: 2022-01-01

## 2022-01-01 RX ORDER — AMLODIPINE BESYLATE 5 MG/1
5 TABLET ORAL DAILY
Status: DISCONTINUED | OUTPATIENT
Start: 2022-01-01 | End: 2022-01-01

## 2022-01-01 RX ORDER — IPRATROPIUM BROMIDE AND ALBUTEROL SULFATE 2.5; .5 MG/3ML; MG/3ML
3 SOLUTION RESPIRATORY (INHALATION) ONCE
Status: CANCELLED | OUTPATIENT
Start: 2022-01-01

## 2022-01-01 RX ORDER — AMIODARONE HYDROCHLORIDE 200 MG/1
200 TABLET ORAL DAILY
Status: DISCONTINUED | OUTPATIENT
Start: 2022-01-01 | End: 2022-01-01

## 2022-01-01 RX ORDER — TRANEXAMIC ACID 100 MG/ML
INJECTION, SOLUTION INTRAVENOUS
Status: DISCONTINUED | OUTPATIENT
Start: 2022-01-01 | End: 2022-01-01

## 2022-01-01 RX ORDER — POTASSIUM CHLORIDE 14.9 MG/ML
40 INJECTION INTRAVENOUS
Status: DISCONTINUED | OUTPATIENT
Start: 2022-01-01 | End: 2022-01-01

## 2022-01-01 RX ORDER — DEXTROSE MONOHYDRATE 100 MG/ML
INJECTION, SOLUTION INTRAVENOUS CONTINUOUS PRN
Status: DISCONTINUED | OUTPATIENT
Start: 2022-01-01 | End: 2022-01-01

## 2022-01-01 RX ORDER — POTASSIUM CHLORIDE 29.8 MG/ML
40 INJECTION INTRAVENOUS ONCE
Status: COMPLETED | OUTPATIENT
Start: 2022-01-01 | End: 2022-01-01

## 2022-01-01 RX ORDER — BISACODYL 10 MG
10 SUPPOSITORY, RECTAL RECTAL DAILY PRN
Status: DISCONTINUED | OUTPATIENT
Start: 2022-01-01 | End: 2022-01-01 | Stop reason: HOSPADM

## 2022-01-01 RX ORDER — INSULIN ASPART 100 [IU]/ML
0-5 INJECTION, SOLUTION INTRAVENOUS; SUBCUTANEOUS EVERY 4 HOURS PRN
Status: DISCONTINUED | OUTPATIENT
Start: 2022-01-01 | End: 2022-01-01

## 2022-01-01 RX ORDER — FENTANYL CITRATE 50 UG/ML
25 INJECTION, SOLUTION INTRAMUSCULAR; INTRAVENOUS
Status: CANCELLED | OUTPATIENT
Start: 2022-01-01

## 2022-01-01 RX ORDER — PANTOPRAZOLE SODIUM 40 MG/10ML
40 INJECTION, POWDER, LYOPHILIZED, FOR SOLUTION INTRAVENOUS DAILY
Status: DISCONTINUED | OUTPATIENT
Start: 2022-01-01 | End: 2022-01-01

## 2022-01-01 RX ORDER — METOPROLOL TARTRATE 1 MG/ML
5 INJECTION, SOLUTION INTRAVENOUS EVERY 5 MIN PRN
Status: DISCONTINUED | OUTPATIENT
Start: 2022-01-01 | End: 2022-01-01

## 2022-01-01 RX ORDER — BALSAM PERU/CASTOR OIL
OINTMENT (GRAM) TOPICAL 2 TIMES DAILY
Status: DISCONTINUED | OUTPATIENT
Start: 2022-01-01 | End: 2022-01-01

## 2022-01-01 RX ORDER — INSULIN ASPART 100 [IU]/ML
1-10 INJECTION, SOLUTION INTRAVENOUS; SUBCUTANEOUS
Status: DISCONTINUED | OUTPATIENT
Start: 2022-01-01 | End: 2022-01-01

## 2022-01-01 RX ORDER — INDOMETHACIN 25 MG/1
50 CAPSULE ORAL ONCE
Status: COMPLETED | OUTPATIENT
Start: 2022-01-01 | End: 2022-01-01

## 2022-01-01 RX ORDER — METOPROLOL TARTRATE 50 MG/1
50 TABLET ORAL 2 TIMES DAILY
Status: DISCONTINUED | OUTPATIENT
Start: 2022-01-01 | End: 2022-01-01

## 2022-01-01 RX ORDER — MIDAZOLAM HYDROCHLORIDE 1 MG/ML
2 INJECTION INTRAMUSCULAR; INTRAVENOUS ONCE
Status: DISCONTINUED | OUTPATIENT
Start: 2022-01-01 | End: 2022-01-01

## 2022-01-01 RX ORDER — MIDODRINE HYDROCHLORIDE 5 MG/1
15 TABLET ORAL EVERY 8 HOURS PRN
Status: DISCONTINUED | OUTPATIENT
Start: 2022-01-01 | End: 2022-01-01

## 2022-01-01 RX ORDER — SODIUM,POTASSIUM PHOSPHATES 280-250MG
2 POWDER IN PACKET (EA) ORAL 3 TIMES DAILY PRN
Status: DISCONTINUED | OUTPATIENT
Start: 2022-01-01 | End: 2022-01-01

## 2022-01-01 RX ORDER — POTASSIUM CHLORIDE 7.45 MG/ML
20 INJECTION INTRAVENOUS ONCE
Status: COMPLETED | OUTPATIENT
Start: 2022-01-01 | End: 2022-01-01

## 2022-01-01 RX ORDER — MUPIROCIN 20 MG/G
OINTMENT TOPICAL
Status: DISCONTINUED | OUTPATIENT
Start: 2022-01-01 | End: 2022-01-01

## 2022-01-01 RX ORDER — LIDOCAINE HYDROCHLORIDE 10 MG/ML
INJECTION INFILTRATION; PERINEURAL
Status: COMPLETED
Start: 2022-01-01 | End: 2022-01-01

## 2022-01-01 RX ORDER — SODIUM,POTASSIUM PHOSPHATES 280-250MG
1 POWDER IN PACKET (EA) ORAL ONCE
Status: DISCONTINUED | OUTPATIENT
Start: 2022-01-01 | End: 2022-01-01

## 2022-01-01 RX ORDER — HYDROMORPHONE HYDROCHLORIDE 1 MG/ML
0.1 INJECTION, SOLUTION INTRAMUSCULAR; INTRAVENOUS; SUBCUTANEOUS ONCE
Status: DISCONTINUED | OUTPATIENT
Start: 2022-01-01 | End: 2022-01-01

## 2022-01-01 RX ORDER — HYDROCODONE BITARTRATE AND ACETAMINOPHEN 500; 5 MG/1; MG/1
TABLET ORAL CONTINUOUS
Status: DISCONTINUED | OUTPATIENT
Start: 2022-01-01 | End: 2022-01-01

## 2022-01-01 RX ORDER — ACETAMINOPHEN 650 MG/20.3ML
650 LIQUID ORAL EVERY 4 HOURS PRN
Status: DISCONTINUED | OUTPATIENT
Start: 2022-01-01 | End: 2022-01-01 | Stop reason: HOSPADM

## 2022-01-01 RX ORDER — ONDANSETRON 2 MG/ML
INJECTION INTRAMUSCULAR; INTRAVENOUS
Status: DISPENSED
Start: 2022-01-01 | End: 2022-01-01

## 2022-01-01 RX ORDER — ASPIRIN 325 MG
325 TABLET ORAL DAILY
Status: CANCELLED | OUTPATIENT
Start: 2022-01-01

## 2022-01-01 RX ORDER — BUPIVACAINE HYDROCHLORIDE 5 MG/ML
INJECTION, SOLUTION EPIDURAL; INTRACAUDAL
Status: DISCONTINUED | OUTPATIENT
Start: 2022-01-01 | End: 2022-01-01 | Stop reason: HOSPADM

## 2022-01-01 RX ORDER — ATORVASTATIN CALCIUM 40 MG/1
40 TABLET, FILM COATED ORAL NIGHTLY
Status: CANCELLED | OUTPATIENT
Start: 2022-01-01

## 2022-01-01 RX ORDER — FUROSEMIDE 10 MG/ML
20 INJECTION INTRAMUSCULAR; INTRAVENOUS DAILY
Status: DISCONTINUED | OUTPATIENT
Start: 2022-01-01 | End: 2022-01-01

## 2022-01-01 RX ORDER — VANCOMYCIN HCL IN 5 % DEXTROSE 1G/250ML
20 PLASTIC BAG, INJECTION (ML) INTRAVENOUS ONCE
Status: COMPLETED | OUTPATIENT
Start: 2022-01-01 | End: 2022-01-01

## 2022-01-01 RX ORDER — METOCLOPRAMIDE HYDROCHLORIDE 5 MG/ML
5 INJECTION INTRAMUSCULAR; INTRAVENOUS EVERY 6 HOURS PRN
Status: CANCELLED | OUTPATIENT
Start: 2022-01-01

## 2022-01-01 RX ORDER — MUPIROCIN 20 MG/G
1 OINTMENT TOPICAL 2 TIMES DAILY
Status: CANCELLED | OUTPATIENT
Start: 2022-01-01 | End: 2022-01-01

## 2022-01-01 RX ORDER — IBUPROFEN 200 MG
24 TABLET ORAL
Status: DISCONTINUED | OUTPATIENT
Start: 2022-01-01 | End: 2022-01-01

## 2022-01-01 RX ORDER — LIDOCAINE HYDROCHLORIDE 10 MG/ML
INJECTION, SOLUTION EPIDURAL; INFILTRATION; INTRACAUDAL; PERINEURAL
Status: DISPENSED
Start: 2022-01-01 | End: 2022-01-01

## 2022-01-01 RX ORDER — ASPIRIN 325 MG
325 TABLET ORAL ONCE
Status: DISCONTINUED | OUTPATIENT
Start: 2022-01-01 | End: 2022-01-01

## 2022-01-01 RX ORDER — ONDANSETRON 2 MG/ML
4 INJECTION INTRAMUSCULAR; INTRAVENOUS EVERY 12 HOURS PRN
Status: CANCELLED | OUTPATIENT
Start: 2022-01-01

## 2022-01-01 RX ORDER — IBUPROFEN 200 MG
16 TABLET ORAL
Status: DISCONTINUED | OUTPATIENT
Start: 2022-01-01 | End: 2022-01-01

## 2022-01-01 RX ORDER — ASPIRIN 300 MG/1
300 SUPPOSITORY RECTAL ONCE AS NEEDED
Status: DISCONTINUED | OUTPATIENT
Start: 2022-01-01 | End: 2022-01-01

## 2022-01-01 RX ORDER — NOREPINEPHRINE BITARTRATE/D5W 4MG/250ML
0-3 PLASTIC BAG, INJECTION (ML) INTRAVENOUS CONTINUOUS
Status: DISCONTINUED | OUTPATIENT
Start: 2022-01-01 | End: 2022-01-01 | Stop reason: HOSPADM

## 2022-01-01 RX ORDER — ALBUMIN HUMAN 50 G/1000ML
12.5 SOLUTION INTRAVENOUS ONCE
Status: COMPLETED | OUTPATIENT
Start: 2022-01-01 | End: 2022-01-01

## 2022-01-01 RX ORDER — IPRATROPIUM BROMIDE AND ALBUTEROL SULFATE 2.5; .5 MG/3ML; MG/3ML
3 SOLUTION RESPIRATORY (INHALATION) EVERY 4 HOURS
Status: DISCONTINUED | OUTPATIENT
Start: 2022-01-01 | End: 2022-01-01

## 2022-01-01 RX ORDER — HYDROXYZINE HYDROCHLORIDE 10 MG/5ML
10 SYRUP ORAL EVERY 6 HOURS PRN
Status: DISCONTINUED | OUTPATIENT
Start: 2022-01-01 | End: 2022-01-01

## 2022-01-01 RX ORDER — POTASSIUM CHLORIDE 7.45 MG/ML
10 INJECTION INTRAVENOUS ONCE
Status: COMPLETED | OUTPATIENT
Start: 2022-01-01 | End: 2022-01-01

## 2022-01-01 RX ORDER — SODIUM CHLORIDE 9 MG/ML
INJECTION, SOLUTION INTRAVENOUS CONTINUOUS
Status: DISCONTINUED | OUTPATIENT
Start: 2022-01-01 | End: 2022-01-01

## 2022-01-01 RX ORDER — IPRATROPIUM BROMIDE AND ALBUTEROL SULFATE 2.5; .5 MG/3ML; MG/3ML
3 SOLUTION RESPIRATORY (INHALATION)
Status: DISCONTINUED | OUTPATIENT
Start: 2022-01-01 | End: 2022-01-01

## 2022-01-01 RX ORDER — AMIODARONE HYDROCHLORIDE 200 MG/1
200 TABLET ORAL 2 TIMES DAILY
Status: DISCONTINUED | OUTPATIENT
Start: 2022-01-01 | End: 2022-01-01

## 2022-01-01 RX ORDER — FENTANYL CITRATE 50 UG/ML
50 INJECTION, SOLUTION INTRAMUSCULAR; INTRAVENOUS
Status: DISCONTINUED | OUTPATIENT
Start: 2022-01-01 | End: 2022-01-01

## 2022-01-01 RX ORDER — FUROSEMIDE 10 MG/ML
80 INJECTION INTRAMUSCULAR; INTRAVENOUS
Status: DISCONTINUED | OUTPATIENT
Start: 2022-01-01 | End: 2022-01-01

## 2022-01-01 RX ORDER — HEPARIN SODIUM,PORCINE/D5W 25000/250
0-40 INTRAVENOUS SOLUTION INTRAVENOUS CONTINUOUS
Status: DISCONTINUED | OUTPATIENT
Start: 2022-01-01 | End: 2022-01-01

## 2022-01-01 RX ORDER — ACETAMINOPHEN 650 MG/20.3ML
1000 LIQUID ORAL EVERY 8 HOURS
Status: DISCONTINUED | OUTPATIENT
Start: 2022-01-01 | End: 2022-01-01

## 2022-01-01 RX ORDER — TALC
6 POWDER (GRAM) TOPICAL NIGHTLY
Status: DISCONTINUED | OUTPATIENT
Start: 2022-01-01 | End: 2022-01-01

## 2022-01-01 RX ORDER — POLYETHYLENE GLYCOL 3350 17 G/17G
17 POWDER, FOR SOLUTION ORAL DAILY
Status: DISCONTINUED | OUTPATIENT
Start: 2022-01-01 | End: 2022-01-01

## 2022-01-01 RX ORDER — POTASSIUM CHLORIDE 14.9 MG/ML
20 INJECTION INTRAVENOUS
Status: CANCELLED | OUTPATIENT
Start: 2022-01-01

## 2022-01-01 RX ORDER — MEROPENEM AND SODIUM CHLORIDE 500 MG/50ML
500 INJECTION, SOLUTION INTRAVENOUS
Status: DISCONTINUED | OUTPATIENT
Start: 2022-01-01 | End: 2022-01-01 | Stop reason: HOSPADM

## 2022-01-01 RX ORDER — ROCURONIUM BROMIDE 10 MG/ML
INJECTION, SOLUTION INTRAVENOUS
Status: DISCONTINUED | OUTPATIENT
Start: 2022-01-01 | End: 2022-01-01

## 2022-01-01 RX ORDER — GUAIFENESIN 100 MG/5ML
200 SOLUTION ORAL EVERY 4 HOURS PRN
Status: DISCONTINUED | OUTPATIENT
Start: 2022-01-01 | End: 2022-01-01

## 2022-01-01 RX ORDER — ASPIRIN 325 MG
325 TABLET ORAL DAILY
Status: DISCONTINUED | OUTPATIENT
Start: 2022-01-01 | End: 2022-01-01

## 2022-01-01 RX ORDER — LORAZEPAM 2 MG/ML
2 INJECTION INTRAMUSCULAR
Status: DISCONTINUED | OUTPATIENT
Start: 2022-01-01 | End: 2022-01-01 | Stop reason: HOSPADM

## 2022-01-01 RX ORDER — INSULIN ASPART 100 [IU]/ML
5 INJECTION, SOLUTION INTRAVENOUS; SUBCUTANEOUS ONCE
Status: COMPLETED | OUTPATIENT
Start: 2022-01-01 | End: 2022-01-01

## 2022-01-01 RX ORDER — BISACODYL 10 MG
10 SUPPOSITORY, RECTAL RECTAL ONCE
Status: COMPLETED | OUTPATIENT
Start: 2022-01-01 | End: 2022-01-01

## 2022-01-01 RX ORDER — MUPIROCIN 20 MG/G
OINTMENT TOPICAL 2 TIMES DAILY
Status: DISPENSED | OUTPATIENT
Start: 2022-01-01 | End: 2022-01-01

## 2022-01-01 RX ORDER — ACETAMINOPHEN 325 MG/1
650 TABLET ORAL EVERY 4 HOURS PRN
Status: CANCELLED | OUTPATIENT
Start: 2022-01-01

## 2022-01-01 RX ORDER — NOREPINEPHRINE BITARTRATE 1 MG/ML
INJECTION, SOLUTION INTRAVENOUS
Status: DISCONTINUED | OUTPATIENT
Start: 2022-01-01 | End: 2022-01-01

## 2022-01-01 RX ORDER — SEVELAMER CARBONATE FOR ORAL SUSPENSION 800 MG/1
0.8 POWDER, FOR SUSPENSION ORAL 3 TIMES DAILY
Status: DISCONTINUED | OUTPATIENT
Start: 2022-01-01 | End: 2022-01-01

## 2022-01-01 RX ORDER — ALBUMIN HUMAN 50 G/1000ML
25 SOLUTION INTRAVENOUS ONCE
Status: DISCONTINUED | OUTPATIENT
Start: 2022-01-01 | End: 2022-01-01

## 2022-01-01 RX ORDER — PHENYLEPHRINE HCL IN 0.9% NACL 1 MG/10 ML
SYRINGE (ML) INTRAVENOUS
Status: COMPLETED
Start: 2022-01-01 | End: 2022-01-01

## 2022-01-01 RX ORDER — IPRATROPIUM BROMIDE AND ALBUTEROL SULFATE 2.5; .5 MG/3ML; MG/3ML
3 SOLUTION RESPIRATORY (INHALATION) EVERY 4 HOURS
Status: CANCELLED | OUTPATIENT
Start: 2022-01-01 | End: 2022-01-01

## 2022-01-01 RX ORDER — HYDROCODONE BITARTRATE AND ACETAMINOPHEN 500; 5 MG/1; MG/1
200 TABLET ORAL CONTINUOUS
Status: ACTIVE | OUTPATIENT
Start: 2022-01-01 | End: 2022-01-01

## 2022-01-01 RX ORDER — ACETAMINOPHEN 325 MG/1
650 TABLET ORAL
Status: CANCELLED | OUTPATIENT
Start: 2022-01-01 | End: 2022-01-01

## 2022-01-01 RX ORDER — HEPARIN SODIUM 10000 [USP'U]/100ML
200 INJECTION, SOLUTION INTRAVENOUS CONTINUOUS
Status: DISCONTINUED | OUTPATIENT
Start: 2022-01-01 | End: 2022-01-01

## 2022-01-01 RX ORDER — FUROSEMIDE 10 MG/ML
120 INJECTION INTRAMUSCULAR; INTRAVENOUS
Status: DISCONTINUED | OUTPATIENT
Start: 2022-01-01 | End: 2022-01-01

## 2022-01-01 RX ORDER — OXYCODONE HYDROCHLORIDE 5 MG/1
5 TABLET ORAL EVERY 4 HOURS PRN
Status: DISCONTINUED | OUTPATIENT
Start: 2022-01-01 | End: 2022-01-01

## 2022-01-01 RX ORDER — WARFARIN SODIUM 5 MG/1
5 TABLET ORAL ONCE
Status: COMPLETED | OUTPATIENT
Start: 2022-01-01 | End: 2022-01-01

## 2022-01-01 RX ORDER — CEFAZOLIN SODIUM 1 G/3ML
INJECTION, POWDER, FOR SOLUTION INTRAMUSCULAR; INTRAVENOUS
Status: DISCONTINUED | OUTPATIENT
Start: 2022-01-01 | End: 2022-01-01

## 2022-01-01 RX ORDER — DEXTROSE MONOHYDRATE, SODIUM CHLORIDE, AND POTASSIUM CHLORIDE 50; 1.49; 4.5 G/1000ML; G/1000ML; G/1000ML
INJECTION, SOLUTION INTRAVENOUS CONTINUOUS
Status: CANCELLED | OUTPATIENT
Start: 2022-01-01

## 2022-01-01 RX ORDER — WARFARIN 1 MG/1
1 TABLET ORAL DAILY
Status: DISCONTINUED | OUTPATIENT
Start: 2022-01-01 | End: 2022-01-01

## 2022-01-01 RX ORDER — PROPOFOL 10 MG/ML
VIAL (ML) INTRAVENOUS CONTINUOUS PRN
Status: DISCONTINUED | OUTPATIENT
Start: 2022-01-01 | End: 2022-01-01

## 2022-01-01 RX ORDER — SODIUM BICARBONATE 650 MG/1
650 TABLET ORAL 3 TIMES DAILY
Status: DISCONTINUED | OUTPATIENT
Start: 2022-01-01 | End: 2022-01-01

## 2022-01-01 RX ORDER — DRONABINOL 2.5 MG/1
2.5 CAPSULE ORAL 2 TIMES DAILY
Status: DISCONTINUED | OUTPATIENT
Start: 2022-01-01 | End: 2022-01-01

## 2022-01-01 RX ORDER — MIDAZOLAM HYDROCHLORIDE 1 MG/ML
INJECTION INTRAMUSCULAR; INTRAVENOUS
Status: COMPLETED
Start: 2022-01-01 | End: 2022-01-01

## 2022-01-01 RX ORDER — AMLODIPINE BESYLATE 10 MG/1
10 TABLET ORAL DAILY
Status: DISCONTINUED | OUTPATIENT
Start: 2022-01-01 | End: 2022-01-01

## 2022-01-01 RX ORDER — ACETAMINOPHEN 10 MG/ML
1000 INJECTION, SOLUTION INTRAVENOUS
Status: DISCONTINUED | OUTPATIENT
Start: 2022-01-01 | End: 2022-01-01

## 2022-01-01 RX ORDER — ACETAMINOPHEN 650 MG/20.3ML
650 LIQUID ORAL EVERY 4 HOURS PRN
Status: DISCONTINUED | OUTPATIENT
Start: 2022-01-01 | End: 2022-01-01

## 2022-01-01 RX ORDER — LANOLIN ALCOHOL/MO/W.PET/CERES
400 CREAM (GRAM) TOPICAL ONCE
Status: COMPLETED | OUTPATIENT
Start: 2022-01-01 | End: 2022-01-01

## 2022-01-01 RX ORDER — OXYMETAZOLINE HCL 0.05 %
2 SPRAY, NON-AEROSOL (ML) NASAL 2 TIMES DAILY
Status: COMPLETED | OUTPATIENT
Start: 2022-01-01 | End: 2022-01-01

## 2022-01-01 RX ORDER — SODIUM CHLORIDE 9 MG/ML
INJECTION, SOLUTION INTRAVENOUS
Status: DISCONTINUED | OUTPATIENT
Start: 2022-01-01 | End: 2022-01-01 | Stop reason: HOSPADM

## 2022-01-01 RX ORDER — MIDODRINE HYDROCHLORIDE 5 MG/1
15 TABLET ORAL EVERY 12 HOURS PRN
Status: DISCONTINUED | OUTPATIENT
Start: 2022-01-01 | End: 2022-01-01

## 2022-01-01 RX ORDER — WARFARIN 2 MG/1
2 TABLET ORAL ONCE
Status: COMPLETED | OUTPATIENT
Start: 2022-01-01 | End: 2022-01-01

## 2022-01-01 RX ORDER — METOPROLOL TARTRATE 25 MG/1
25 TABLET, FILM COATED ORAL 2 TIMES DAILY
Status: DISCONTINUED | OUTPATIENT
Start: 2022-01-01 | End: 2022-01-01

## 2022-01-01 RX ORDER — LIDOCAINE HYDROCHLORIDE 10 MG/ML
1 INJECTION, SOLUTION EPIDURAL; INFILTRATION; INTRACAUDAL; PERINEURAL
Status: CANCELLED | OUTPATIENT
Start: 2022-01-01

## 2022-01-01 RX ORDER — POTASSIUM CHLORIDE 29.8 MG/ML
40 INJECTION INTRAVENOUS
Status: CANCELLED | OUTPATIENT
Start: 2022-01-01

## 2022-01-01 RX ORDER — ALBUMIN HUMAN 50 G/1000ML
SOLUTION INTRAVENOUS
Status: DISPENSED
Start: 2022-01-01 | End: 2022-01-01

## 2022-01-01 RX ORDER — FLUCONAZOLE 40 MG/ML
200 POWDER, FOR SUSPENSION ORAL DAILY
Status: DISCONTINUED | OUTPATIENT
Start: 2022-01-01 | End: 2022-01-01

## 2022-01-01 RX ORDER — LABETALOL HCL 20 MG/4 ML
10 SYRINGE (ML) INTRAVENOUS EVERY 4 HOURS PRN
Status: DISCONTINUED | OUTPATIENT
Start: 2022-01-01 | End: 2022-01-01

## 2022-01-01 RX ORDER — HYDRALAZINE HYDROCHLORIDE 20 MG/ML
5 INJECTION INTRAMUSCULAR; INTRAVENOUS EVERY 6 HOURS PRN
Status: DISCONTINUED | OUTPATIENT
Start: 2022-01-01 | End: 2022-01-01

## 2022-01-01 RX ORDER — CEFAZOLIN SODIUM 2 G/50ML
2 SOLUTION INTRAVENOUS
Status: CANCELLED | OUTPATIENT
Start: 2022-01-01 | End: 2022-01-01

## 2022-01-01 RX ORDER — LORAZEPAM 2 MG/ML
2 INJECTION INTRAMUSCULAR ONCE
Status: DISCONTINUED | OUTPATIENT
Start: 2022-01-01 | End: 2022-01-01 | Stop reason: HOSPADM

## 2022-01-01 RX ORDER — ALBUMIN HUMAN 50 G/1000ML
25 SOLUTION INTRAVENOUS ONCE
Status: DISCONTINUED | OUTPATIENT
Start: 2022-01-01 | End: 2022-01-01 | Stop reason: HOSPADM

## 2022-01-01 RX ORDER — FENTANYL CITRATE 50 UG/ML
25 INJECTION, SOLUTION INTRAMUSCULAR; INTRAVENOUS
Status: CANCELLED | OUTPATIENT
Start: 2022-01-01 | End: 2022-01-01

## 2022-01-01 RX ORDER — ALBUMIN HUMAN 50 G/1000ML
12.5 SOLUTION INTRAVENOUS ONCE AS NEEDED
Status: COMPLETED | OUTPATIENT
Start: 2022-01-01 | End: 2022-01-01

## 2022-01-01 RX ORDER — ACETAMINOPHEN 325 MG/1
650 TABLET ORAL EVERY 4 HOURS PRN
Status: DISCONTINUED | OUTPATIENT
Start: 2022-01-01 | End: 2022-01-01

## 2022-01-01 RX ORDER — ALBUMIN HUMAN 250 G/1000ML
12.5 SOLUTION INTRAVENOUS ONCE
Status: COMPLETED | OUTPATIENT
Start: 2022-01-01 | End: 2022-01-01

## 2022-01-01 RX ORDER — DEXTROSE MONOHYDRATE, SODIUM CHLORIDE, AND POTASSIUM CHLORIDE 50; 1.49; 4.5 G/1000ML; G/1000ML; G/1000ML
INJECTION, SOLUTION INTRAVENOUS CONTINUOUS
Status: DISCONTINUED | OUTPATIENT
Start: 2022-01-01 | End: 2022-01-01

## 2022-01-01 RX ORDER — INSULIN ASPART 100 [IU]/ML
0-5 INJECTION, SOLUTION INTRAVENOUS; SUBCUTANEOUS EVERY 6 HOURS PRN
Status: DISCONTINUED | OUTPATIENT
Start: 2022-01-01 | End: 2022-01-01

## 2022-01-01 RX ORDER — HEPARIN 100 UNIT/ML
SYRINGE INTRAVENOUS
Status: DISCONTINUED | OUTPATIENT
Start: 2022-01-01 | End: 2022-01-01 | Stop reason: HOSPADM

## 2022-01-01 RX ORDER — MIDODRINE HYDROCHLORIDE 5 MG/1
10 TABLET ORAL EVERY 8 HOURS PRN
Status: DISCONTINUED | OUTPATIENT
Start: 2022-01-01 | End: 2022-01-01

## 2022-01-01 RX ORDER — MIDODRINE HYDROCHLORIDE 5 MG/1
10 TABLET ORAL 3 TIMES DAILY
Status: DISCONTINUED | OUTPATIENT
Start: 2022-01-01 | End: 2022-01-01

## 2022-01-01 RX ORDER — PROPOFOL 10 MG/ML
0-50 INJECTION, EMULSION INTRAVENOUS CONTINUOUS
Status: DISCONTINUED | OUTPATIENT
Start: 2022-01-01 | End: 2022-01-01 | Stop reason: HOSPADM

## 2022-01-01 RX ORDER — PANTOPRAZOLE SODIUM 40 MG/10ML
40 INJECTION, POWDER, LYOPHILIZED, FOR SOLUTION INTRAVENOUS EVERY 12 HOURS
Status: DISCONTINUED | OUTPATIENT
Start: 2022-01-01 | End: 2022-01-01 | Stop reason: HOSPADM

## 2022-01-01 RX ORDER — ETOMIDATE 2 MG/ML
0.3 INJECTION INTRAVENOUS ONCE
Status: COMPLETED | OUTPATIENT
Start: 2022-01-01 | End: 2022-01-01

## 2022-01-01 RX ORDER — QUETIAPINE FUMARATE 25 MG/1
25 TABLET, FILM COATED ORAL ONCE
Status: DISCONTINUED | OUTPATIENT
Start: 2022-01-01 | End: 2022-01-01

## 2022-01-01 RX ORDER — ACETAMINOPHEN 650 MG/20.3ML
650 LIQUID ORAL EVERY 8 HOURS
Status: DISCONTINUED | OUTPATIENT
Start: 2022-01-01 | End: 2022-01-01

## 2022-01-01 RX ORDER — MIDODRINE HYDROCHLORIDE 5 MG/1
10 TABLET ORAL EVERY 12 HOURS PRN
Status: DISCONTINUED | OUTPATIENT
Start: 2022-01-01 | End: 2022-01-01

## 2022-01-01 RX ORDER — FLUCONAZOLE 40 MG/ML
400 POWDER, FOR SUSPENSION ORAL DAILY
Status: DISCONTINUED | OUTPATIENT
Start: 2022-01-01 | End: 2022-01-01

## 2022-01-01 RX ORDER — WARFARIN 1 MG/1
1 TABLET ORAL ONCE
Status: DISCONTINUED | OUTPATIENT
Start: 2022-01-01 | End: 2022-01-01

## 2022-01-01 RX ORDER — ONDANSETRON 2 MG/ML
4 INJECTION INTRAMUSCULAR; INTRAVENOUS EVERY 12 HOURS PRN
Status: DISCONTINUED | OUTPATIENT
Start: 2022-01-01 | End: 2022-01-01

## 2022-01-01 RX ORDER — MIDODRINE HYDROCHLORIDE 5 MG/1
15 TABLET ORAL EVERY 8 HOURS
Status: DISCONTINUED | OUTPATIENT
Start: 2022-01-01 | End: 2022-01-01 | Stop reason: HOSPADM

## 2022-01-01 RX ORDER — MIDODRINE HYDROCHLORIDE 5 MG/1
10 TABLET ORAL ONCE
Status: DISCONTINUED | OUTPATIENT
Start: 2022-01-01 | End: 2022-01-01

## 2022-01-01 RX ORDER — MIDODRINE HYDROCHLORIDE 5 MG/1
15 TABLET ORAL EVERY 12 HOURS
Status: DISCONTINUED | OUTPATIENT
Start: 2022-01-01 | End: 2022-01-01

## 2022-01-01 RX ORDER — CEFAZOLIN SODIUM 2 G/50ML
2 SOLUTION INTRAVENOUS
Status: DISCONTINUED | OUTPATIENT
Start: 2022-01-01 | End: 2022-01-01

## 2022-01-01 RX ORDER — HEPARIN SODIUM 1000 [USP'U]/ML
1000 INJECTION, SOLUTION INTRAVENOUS; SUBCUTANEOUS
Status: DISCONTINUED | OUTPATIENT
Start: 2022-01-01 | End: 2022-01-01 | Stop reason: HOSPADM

## 2022-01-01 RX ORDER — MUPIROCIN 20 MG/G
1 OINTMENT TOPICAL 2 TIMES DAILY
Status: DISCONTINUED | OUTPATIENT
Start: 2022-01-01 | End: 2022-01-01

## 2022-01-01 RX ORDER — QUETIAPINE FUMARATE 25 MG/1
25 TABLET, FILM COATED ORAL NIGHTLY PRN
Status: DISCONTINUED | OUTPATIENT
Start: 2022-01-01 | End: 2022-01-01

## 2022-01-01 RX ORDER — POTASSIUM CHLORIDE 20 MEQ/1
20 TABLET, EXTENDED RELEASE ORAL EVERY 12 HOURS
Status: CANCELLED | OUTPATIENT
Start: 2022-01-01

## 2022-01-01 RX ORDER — POLYETHYLENE GLYCOL 3350 17 G/17G
17 POWDER, FOR SOLUTION ORAL DAILY PRN
Status: DISCONTINUED | OUTPATIENT
Start: 2022-01-01 | End: 2022-01-01 | Stop reason: HOSPADM

## 2022-01-01 RX ORDER — MIDODRINE HYDROCHLORIDE 5 MG/1
10 TABLET ORAL EVERY 12 HOURS
Status: DISCONTINUED | OUTPATIENT
Start: 2022-01-01 | End: 2022-01-01

## 2022-01-01 RX ORDER — FENTANYL CITRATE 50 UG/ML
50 INJECTION, SOLUTION INTRAMUSCULAR; INTRAVENOUS ONCE
Status: COMPLETED | OUTPATIENT
Start: 2022-01-01 | End: 2022-01-01

## 2022-01-01 RX ORDER — ALBUMIN HUMAN 250 G/1000ML
12.5 SOLUTION INTRAVENOUS ONCE
Status: DISCONTINUED | OUTPATIENT
Start: 2022-01-01 | End: 2022-01-01

## 2022-01-01 RX ORDER — WARFARIN SODIUM 5 MG/1
5 TABLET ORAL DAILY
Status: DISCONTINUED | OUTPATIENT
Start: 2022-01-01 | End: 2022-01-01

## 2022-01-01 RX ORDER — HYDROMORPHONE HYDROCHLORIDE 1 MG/ML
0.5 INJECTION, SOLUTION INTRAMUSCULAR; INTRAVENOUS; SUBCUTANEOUS ONCE
Status: DISCONTINUED | OUTPATIENT
Start: 2022-01-01 | End: 2022-01-01

## 2022-01-01 RX ORDER — METOPROLOL TARTRATE 25 MG/1
12.5 TABLET ORAL 2 TIMES DAILY
Status: DISCONTINUED | OUTPATIENT
Start: 2022-01-01 | End: 2022-01-01

## 2022-01-01 RX ORDER — INSULIN ASPART 100 [IU]/ML
0-5 INJECTION, SOLUTION INTRAVENOUS; SUBCUTANEOUS EVERY 4 HOURS PRN
Status: DISCONTINUED | OUTPATIENT
Start: 2022-01-01 | End: 2022-01-01 | Stop reason: HOSPADM

## 2022-01-01 RX ORDER — FENTANYL CITRATE-0.9 % NACL/PF 10 MCG/ML
0-250 PLASTIC BAG, INJECTION (ML) INTRAVENOUS CONTINUOUS
Status: DISCONTINUED | OUTPATIENT
Start: 2022-01-01 | End: 2022-01-01 | Stop reason: HOSPADM

## 2022-01-01 RX ORDER — CALCIUM CHLORIDE INJECTION 100 MG/ML
1 INJECTION, SOLUTION INTRAVENOUS ONCE
Status: COMPLETED | OUTPATIENT
Start: 2022-01-01 | End: 2022-01-01

## 2022-01-01 RX ORDER — CEFAZOLIN SODIUM 2 G/50ML
2 SOLUTION INTRAVENOUS
Status: DISPENSED | OUTPATIENT
Start: 2022-01-01 | End: 2022-01-01

## 2022-01-01 RX ORDER — HEPARIN SODIUM 1000 [USP'U]/ML
INJECTION, SOLUTION INTRAVENOUS; SUBCUTANEOUS
Status: DISCONTINUED | OUTPATIENT
Start: 2022-01-01 | End: 2022-01-01

## 2022-01-01 RX ORDER — MIDODRINE HYDROCHLORIDE 5 MG/1
10 TABLET ORAL
Status: DISCONTINUED | OUTPATIENT
Start: 2022-01-01 | End: 2022-01-01

## 2022-01-01 RX ORDER — METOPROLOL TARTRATE 25 MG/1
25 TABLET, FILM COATED ORAL
Status: DISCONTINUED | OUTPATIENT
Start: 2022-01-01 | End: 2022-01-01 | Stop reason: HOSPADM

## 2022-01-01 RX ORDER — MAGNESIUM SULFATE HEPTAHYDRATE 40 MG/ML
2 INJECTION, SOLUTION INTRAVENOUS
Status: CANCELLED | OUTPATIENT
Start: 2022-01-01

## 2022-01-01 RX ORDER — NOREPINEPHRINE BITARTRATE/D5W 4MG/250ML
PLASTIC BAG, INJECTION (ML) INTRAVENOUS
Status: DISPENSED
Start: 2022-01-01 | End: 2022-01-01

## 2022-01-01 RX ORDER — ACETAMINOPHEN 10 MG/ML
1000 INJECTION, SOLUTION INTRAVENOUS EVERY 8 HOURS
Status: DISCONTINUED | OUTPATIENT
Start: 2022-01-01 | End: 2022-01-01

## 2022-01-01 RX ORDER — FUROSEMIDE 10 MG/ML
80 INJECTION INTRAMUSCULAR; INTRAVENOUS EVERY 8 HOURS
Status: DISCONTINUED | OUTPATIENT
Start: 2022-01-01 | End: 2022-01-01

## 2022-01-01 RX ORDER — ROCURONIUM BROMIDE 10 MG/ML
INJECTION, SOLUTION INTRAVENOUS
Status: COMPLETED
Start: 2022-01-01 | End: 2022-01-01

## 2022-01-01 RX ORDER — ACETAMINOPHEN 10 MG/ML
1000 INJECTION, SOLUTION INTRAVENOUS EVERY 8 HOURS
Status: DISPENSED | OUTPATIENT
Start: 2022-01-01 | End: 2022-01-01

## 2022-01-01 RX ORDER — OXYCODONE HCL 5 MG/5 ML
5 SOLUTION, ORAL ORAL EVERY 4 HOURS PRN
Status: DISCONTINUED | OUTPATIENT
Start: 2022-01-01 | End: 2022-01-01 | Stop reason: HOSPADM

## 2022-01-01 RX ORDER — FUROSEMIDE 10 MG/ML
100 INJECTION INTRAMUSCULAR; INTRAVENOUS ONCE
Status: COMPLETED | OUTPATIENT
Start: 2022-01-01 | End: 2022-01-01

## 2022-01-01 RX ORDER — MUPIROCIN 20 MG/G
1 OINTMENT TOPICAL
Status: CANCELLED | OUTPATIENT
Start: 2022-01-01

## 2022-01-01 RX ORDER — METOPROLOL TARTRATE 1 MG/ML
5 INJECTION, SOLUTION INTRAVENOUS EVERY 5 MIN PRN
Status: COMPLETED | OUTPATIENT
Start: 2022-01-01 | End: 2022-01-01

## 2022-01-01 RX ORDER — TALC
9 POWDER (GRAM) TOPICAL NIGHTLY PRN
Status: DISCONTINUED | OUTPATIENT
Start: 2022-01-01 | End: 2022-01-01

## 2022-01-01 RX ORDER — ASPIRIN 300 MG/1
300 SUPPOSITORY RECTAL ONCE AS NEEDED
Status: CANCELLED | OUTPATIENT
Start: 2022-01-01 | End: 2033-06-02

## 2022-01-01 RX ORDER — FLUCONAZOLE 2 MG/ML
200 INJECTION, SOLUTION INTRAVENOUS
Status: DISCONTINUED | OUTPATIENT
Start: 2022-01-01 | End: 2022-01-01

## 2022-01-01 RX ORDER — SODIUM CHLORIDE 0.9 % (FLUSH) 0.9 %
10 SYRINGE (ML) INJECTION
Status: CANCELLED | OUTPATIENT
Start: 2022-01-01

## 2022-01-01 RX ORDER — LIDOCAINE HYDROCHLORIDE 20 MG/ML
INJECTION, SOLUTION EPIDURAL; INFILTRATION; INTRACAUDAL; PERINEURAL
Status: DISCONTINUED | OUTPATIENT
Start: 2022-01-01 | End: 2022-01-01

## 2022-01-01 RX ORDER — CALCIUM ACETATE 667 MG/1
1334 CAPSULE ORAL ONCE
Status: DISCONTINUED | OUTPATIENT
Start: 2022-01-01 | End: 2022-01-01

## 2022-01-01 RX ORDER — PHENYLEPHRINE HCL IN 0.9% NACL 1 MG/10 ML
SYRINGE (ML) INTRAVENOUS
Status: DISPENSED
Start: 2022-01-01 | End: 2022-01-01

## 2022-01-01 RX ADMIN — FLUCONAZOLE 200 MG: 40 POWDER, FOR SUSPENSION ORAL at 09:01

## 2022-01-01 RX ADMIN — EPINEPHRINE 0.01 MCG/KG/MIN: 1 INJECTION INTRAMUSCULAR; INTRAVENOUS; SUBCUTANEOUS at 06:02

## 2022-01-01 RX ADMIN — NOREPINEPHRINE BITARTRATE 0.02 MCG/KG/MIN: 4 INJECTION, SOLUTION INTRAVENOUS at 03:02

## 2022-01-01 RX ADMIN — MEROPENEM AND SODIUM CHLORIDE 1 G: 1 INJECTION, SOLUTION INTRAVENOUS at 09:02

## 2022-01-01 RX ADMIN — Medication: at 08:01

## 2022-01-01 RX ADMIN — IPRATROPIUM BROMIDE AND ALBUTEROL SULFATE 3 ML: 2.5; .5 SOLUTION RESPIRATORY (INHALATION) at 01:02

## 2022-01-01 RX ADMIN — ROCURONIUM BROMIDE 60 MG: 10 INJECTION, SOLUTION INTRAVENOUS at 08:01

## 2022-01-01 RX ADMIN — PANTOPRAZOLE SODIUM 40 MG: 40 INJECTION, POWDER, FOR SOLUTION INTRAVENOUS at 08:02

## 2022-01-01 RX ADMIN — MIDODRINE HYDROCHLORIDE 10 MG: 5 TABLET ORAL at 08:01

## 2022-01-01 RX ADMIN — FUROSEMIDE 80 MG: 10 INJECTION, SOLUTION INTRAMUSCULAR; INTRAVENOUS at 09:02

## 2022-01-01 RX ADMIN — INSULIN ASPART 4 UNITS: 100 INJECTION, SOLUTION INTRAVENOUS; SUBCUTANEOUS at 12:02

## 2022-01-01 RX ADMIN — INSULIN ASPART 4 UNITS: 100 INJECTION, SOLUTION INTRAVENOUS; SUBCUTANEOUS at 11:02

## 2022-01-01 RX ADMIN — IPRATROPIUM BROMIDE AND ALBUTEROL SULFATE 3 ML: 2.5; .5 SOLUTION RESPIRATORY (INHALATION) at 07:02

## 2022-01-01 RX ADMIN — SODIUM CHLORIDE: 0.9 INJECTION, SOLUTION INTRAVENOUS at 04:01

## 2022-01-01 RX ADMIN — PIPERACILLIN AND TAZOBACTAM 4.5 G: 4; .5 INJECTION, POWDER, LYOPHILIZED, FOR SOLUTION INTRAVENOUS; PARENTERAL at 05:02

## 2022-01-01 RX ADMIN — EPINEPHRINE 0.04 MCG/KG/MIN: 1 INJECTION INTRAMUSCULAR; INTRAVENOUS; SUBCUTANEOUS at 10:01

## 2022-01-01 RX ADMIN — SODIUM PHOSPHATE, MONOBASIC, MONOHYDRATE 39.99 MMOL: 276; 142 INJECTION, SOLUTION INTRAVENOUS at 05:02

## 2022-01-01 RX ADMIN — VASOPRESSIN 0.04 UNITS/MIN: 20 INJECTION INTRAVENOUS at 04:02

## 2022-01-01 RX ADMIN — MIDODRINE HYDROCHLORIDE 15 MG: 5 TABLET ORAL at 02:02

## 2022-01-01 RX ADMIN — INSULIN ASPART 4 UNITS: 100 INJECTION, SOLUTION INTRAVENOUS; SUBCUTANEOUS at 07:02

## 2022-01-01 RX ADMIN — MAGNESIUM SULFATE IN WATER 2 G: 40 INJECTION, SOLUTION INTRAVENOUS at 11:02

## 2022-01-01 RX ADMIN — MIDODRINE HYDROCHLORIDE 15 MG: 5 TABLET ORAL at 05:02

## 2022-01-01 RX ADMIN — Medication: at 09:01

## 2022-01-01 RX ADMIN — FUROSEMIDE 80 MG: 10 INJECTION, SOLUTION INTRAMUSCULAR; INTRAVENOUS at 05:02

## 2022-01-01 RX ADMIN — POLYETHYLENE GLYCOL 3350 17 G: 17 POWDER, FOR SOLUTION ORAL at 09:01

## 2022-01-01 RX ADMIN — IPRATROPIUM BROMIDE AND ALBUTEROL SULFATE 3 ML: 2.5; .5 SOLUTION RESPIRATORY (INHALATION) at 12:01

## 2022-01-01 RX ADMIN — DEXMEDETOMIDINE HYDROCHLORIDE 0.2 MCG/KG/HR: 4 INJECTION, SOLUTION INTRAVENOUS at 06:01

## 2022-01-01 RX ADMIN — OXYCODONE HYDROCHLORIDE 5 MG: 5 SOLUTION ORAL at 09:02

## 2022-01-01 RX ADMIN — INSULIN ASPART 4 UNITS: 100 INJECTION, SOLUTION INTRAVENOUS; SUBCUTANEOUS at 08:01

## 2022-01-01 RX ADMIN — PIPERACILLIN AND TAZOBACTAM 4.5 G: 4; .5 INJECTION, POWDER, LYOPHILIZED, FOR SOLUTION INTRAVENOUS; PARENTERAL at 11:01

## 2022-01-01 RX ADMIN — METOPROLOL TARTRATE 25 MG: 25 TABLET, FILM COATED ORAL at 08:01

## 2022-01-01 RX ADMIN — HYDROCORTISONE SODIUM SUCCINATE 50 MG: 100 INJECTION, POWDER, FOR SOLUTION INTRAMUSCULAR; INTRAVENOUS at 02:02

## 2022-01-01 RX ADMIN — INSULIN ASPART 3 UNITS: 100 INJECTION, SOLUTION INTRAVENOUS; SUBCUTANEOUS at 11:02

## 2022-01-01 RX ADMIN — EPINEPHRINE 0.04 MCG/KG/MIN: 1 INJECTION INTRAMUSCULAR; INTRAVENOUS; SUBCUTANEOUS at 11:02

## 2022-01-01 RX ADMIN — HYDROCORTISONE SODIUM SUCCINATE 50 MG: 100 INJECTION, POWDER, FOR SOLUTION INTRAMUSCULAR; INTRAVENOUS at 09:02

## 2022-01-01 RX ADMIN — PANTOPRAZOLE SODIUM 40 MG: 40 INJECTION, POWDER, FOR SOLUTION INTRAVENOUS at 09:01

## 2022-01-01 RX ADMIN — PANTOPRAZOLE SODIUM 40 MG: 40 INJECTION, POWDER, FOR SOLUTION INTRAVENOUS at 08:01

## 2022-01-01 RX ADMIN — PROPOFOL 25 MCG/KG/MIN: 10 INJECTION, EMULSION INTRAVENOUS at 09:02

## 2022-01-01 RX ADMIN — SODIUM CHLORIDE: 0.9 INJECTION, SOLUTION INTRAVENOUS at 03:02

## 2022-01-01 RX ADMIN — METOPROLOL TARTRATE 12.5 MG: 25 TABLET, FILM COATED ORAL at 09:01

## 2022-01-01 RX ADMIN — AMLODIPINE BESYLATE 10 MG: 10 TABLET ORAL at 08:01

## 2022-01-01 RX ADMIN — INSULIN ASPART 2 UNITS: 100 INJECTION, SOLUTION INTRAVENOUS; SUBCUTANEOUS at 12:01

## 2022-01-01 RX ADMIN — ACETAMINOPHEN 650 MG: 160 SOLUTION ORAL at 09:01

## 2022-01-01 RX ADMIN — INSULIN ASPART 4 UNITS: 100 INJECTION, SOLUTION INTRAVENOUS; SUBCUTANEOUS at 07:01

## 2022-01-01 RX ADMIN — OXYCODONE HYDROCHLORIDE 5 MG: 5 SOLUTION ORAL at 12:02

## 2022-01-01 RX ADMIN — Medication 1 PACKET: at 11:02

## 2022-01-01 RX ADMIN — DEXMEDETOMIDINE HYDROCHLORIDE 0.6 MCG/KG/HR: 4 INJECTION, SOLUTION INTRAVENOUS at 12:01

## 2022-01-01 RX ADMIN — Medication 25 MCG/HR: at 10:02

## 2022-01-01 RX ADMIN — MIDODRINE HYDROCHLORIDE 15 MG: 5 TABLET ORAL at 01:02

## 2022-01-01 RX ADMIN — SODIUM BICARBONATE 650 MG TABLET 650 MG: at 11:02

## 2022-01-01 RX ADMIN — ATORVASTATIN CALCIUM 40 MG: 10 TABLET, FILM COATED ORAL at 08:01

## 2022-01-01 RX ADMIN — IPRATROPIUM BROMIDE AND ALBUTEROL SULFATE 3 ML: 2.5; .5 SOLUTION RESPIRATORY (INHALATION) at 02:01

## 2022-01-01 RX ADMIN — AMIODARONE HYDROCHLORIDE 200 MG: 200 TABLET ORAL at 09:01

## 2022-01-01 RX ADMIN — POTASSIUM & SODIUM PHOSPHATES POWDER PACK 280-160-250 MG 2 PACKET: 280-160-250 PACK at 08:01

## 2022-01-01 RX ADMIN — ACETAMINOPHEN 650 MG: 160 SOLUTION ORAL at 11:02

## 2022-01-01 RX ADMIN — LIDOCAINE 1 PATCH: 50 PATCH CUTANEOUS at 08:01

## 2022-01-01 RX ADMIN — VASOPRESSIN 0.04 UNITS/MIN: 20 INJECTION INTRAVENOUS at 08:01

## 2022-01-01 RX ADMIN — ACETAMINOPHEN 650 MG: 160 SOLUTION ORAL at 03:01

## 2022-01-01 RX ADMIN — HEPARIN SODIUM 200 UNITS/HR: 1000 INJECTION INTRAVENOUS; SUBCUTANEOUS at 06:01

## 2022-01-01 RX ADMIN — CALCIUM GLUCONATE 1 G: 98 INJECTION, SOLUTION INTRAVENOUS at 07:02

## 2022-01-01 RX ADMIN — ACETAMINOPHEN 1000 MG: 500 TABLET, FILM COATED ORAL at 09:01

## 2022-01-01 RX ADMIN — ATORVASTATIN CALCIUM 40 MG: 10 TABLET, FILM COATED ORAL at 08:02

## 2022-01-01 RX ADMIN — POTASSIUM CHLORIDE 10 MEQ: 7.46 INJECTION, SOLUTION INTRAVENOUS at 09:01

## 2022-01-01 RX ADMIN — IPRATROPIUM BROMIDE AND ALBUTEROL SULFATE 3 ML: 2.5; .5 SOLUTION RESPIRATORY (INHALATION) at 07:01

## 2022-01-01 RX ADMIN — VASOPRESSIN 0.04 UNITS/MIN: 20 INJECTION INTRAVENOUS at 12:02

## 2022-01-01 RX ADMIN — HEPARIN SODIUM 600 UNITS/HR: 1000 INJECTION INTRAVENOUS; SUBCUTANEOUS at 09:01

## 2022-01-01 RX ADMIN — POTASSIUM & SODIUM PHOSPHATES POWDER PACK 280-160-250 MG 2 PACKET: 280-160-250 PACK at 09:02

## 2022-01-01 RX ADMIN — MIDAZOLAM HYDROCHLORIDE 1 MG: 1 INJECTION, SOLUTION INTRAMUSCULAR; INTRAVENOUS at 07:01

## 2022-01-01 RX ADMIN — SODIUM CHLORIDE: 0.9 INJECTION, SOLUTION INTRAVENOUS at 08:01

## 2022-01-01 RX ADMIN — INSULIN ASPART 8 UNITS: 100 INJECTION, SOLUTION INTRAVENOUS; SUBCUTANEOUS at 03:02

## 2022-01-01 RX ADMIN — INSULIN ASPART 4 UNITS: 100 INJECTION, SOLUTION INTRAVENOUS; SUBCUTANEOUS at 04:01

## 2022-01-01 RX ADMIN — IPRATROPIUM BROMIDE AND ALBUTEROL SULFATE 3 ML: 2.5; .5 SOLUTION RESPIRATORY (INHALATION) at 12:02

## 2022-01-01 RX ADMIN — ACETAMINOPHEN 650 MG: 160 SOLUTION ORAL at 02:01

## 2022-01-01 RX ADMIN — Medication 1 PACKET: at 08:02

## 2022-01-01 RX ADMIN — METOROPROLOL TARTRATE 5 MG: 5 INJECTION, SOLUTION INTRAVENOUS at 08:01

## 2022-01-01 RX ADMIN — INSULIN ASPART 2 UNITS: 100 INJECTION, SOLUTION INTRAVENOUS; SUBCUTANEOUS at 11:01

## 2022-01-01 RX ADMIN — Medication 9 MG: at 09:01

## 2022-01-01 RX ADMIN — AMIODARONE HYDROCHLORIDE 0.5 MG/MIN: 50 INJECTION, SOLUTION INTRAVENOUS at 03:02

## 2022-01-01 RX ADMIN — POLYETHYLENE GLYCOL 3350 17 G: 17 POWDER, FOR SOLUTION ORAL at 10:01

## 2022-01-01 RX ADMIN — PIPERACILLIN AND TAZOBACTAM 4.5 G: 4; .5 INJECTION, POWDER, LYOPHILIZED, FOR SOLUTION INTRAVENOUS; PARENTERAL at 02:01

## 2022-01-01 RX ADMIN — CALCIUM GLUCONATE 1 G: 98 INJECTION, SOLUTION INTRAVENOUS at 04:01

## 2022-01-01 RX ADMIN — HYDROXYZINE HYDROCHLORIDE 10 MG: 10 SOLUTION ORAL at 06:02

## 2022-01-01 RX ADMIN — HYDROCORTISONE SODIUM SUCCINATE 100 MG: 100 INJECTION, POWDER, FOR SOLUTION INTRAMUSCULAR; INTRAVENOUS at 05:02

## 2022-01-01 RX ADMIN — VASOPRESSIN 0.04 UNITS/MIN: 20 INJECTION INTRAVENOUS at 07:02

## 2022-01-01 RX ADMIN — FENTANYL CITRATE 25 MCG: 50 INJECTION INTRAMUSCULAR; INTRAVENOUS at 09:02

## 2022-01-01 RX ADMIN — PANTOPRAZOLE SODIUM 40 MG: 40 INJECTION, POWDER, FOR SOLUTION INTRAVENOUS at 09:02

## 2022-01-01 RX ADMIN — VASOPRESSIN 0.04 UNITS/MIN: 20 INJECTION INTRAVENOUS at 01:01

## 2022-01-01 RX ADMIN — AMLODIPINE BESYLATE 10 MG: 10 TABLET ORAL at 09:01

## 2022-01-01 RX ADMIN — PROPOFOL 20 MCG/KG/MIN: 10 INJECTION, EMULSION INTRAVENOUS at 04:01

## 2022-01-01 RX ADMIN — METOPROLOL TARTRATE 25 MG: 25 TABLET, FILM COATED ORAL at 09:01

## 2022-01-01 RX ADMIN — FENTANYL CITRATE 100 MCG: 50 INJECTION, SOLUTION INTRAMUSCULAR; INTRAVENOUS at 08:01

## 2022-01-01 RX ADMIN — VANCOMYCIN HYDROCHLORIDE 500 MG: 500 INJECTION, POWDER, LYOPHILIZED, FOR SOLUTION INTRAVENOUS at 08:01

## 2022-01-01 RX ADMIN — HUMAN ALBUMIN MICROSPHERES AND PERFLUTREN 0.11 MG: 10; .22 INJECTION, SOLUTION INTRAVENOUS at 10:01

## 2022-01-01 RX ADMIN — VANCOMYCIN HYDROCHLORIDE 500 MG: 500 INJECTION, POWDER, LYOPHILIZED, FOR SOLUTION INTRAVENOUS at 02:02

## 2022-01-01 RX ADMIN — VASOPRESSIN 0.04 UNITS/MIN: 20 INJECTION INTRAVENOUS at 10:01

## 2022-01-01 RX ADMIN — FLUCONAZOLE 400 MG: 40 POWDER, FOR SUSPENSION ORAL at 09:01

## 2022-01-01 RX ADMIN — MUPIROCIN: 20 OINTMENT TOPICAL at 08:01

## 2022-01-01 RX ADMIN — SODIUM PHOSPHATE, MONOBASIC, MONOHYDRATE 39.99 MMOL: 276; 142 INJECTION, SOLUTION INTRAVENOUS at 10:01

## 2022-01-01 RX ADMIN — SODIUM PHOSPHATE, MONOBASIC, MONOHYDRATE 39.99 MMOL: 276; 142 INJECTION, SOLUTION INTRAVENOUS at 06:01

## 2022-01-01 RX ADMIN — METOPROLOL TARTRATE 12.5 MG: 25 TABLET, FILM COATED ORAL at 08:01

## 2022-01-01 RX ADMIN — FUROSEMIDE 200 MG: 10 INJECTION, SOLUTION INTRAMUSCULAR; INTRAVENOUS at 03:01

## 2022-01-01 RX ADMIN — FUROSEMIDE 80 MG: 10 INJECTION, SOLUTION INTRAMUSCULAR; INTRAVENOUS at 02:02

## 2022-01-01 RX ADMIN — FUROSEMIDE 80 MG: 10 INJECTION, SOLUTION INTRAMUSCULAR; INTRAVENOUS at 09:01

## 2022-01-01 RX ADMIN — CALCIUM CHLORIDE 0.5 MG: 100 INJECTION, SOLUTION INTRAVENOUS at 01:01

## 2022-01-01 RX ADMIN — INSULIN ASPART 4 UNITS: 100 INJECTION, SOLUTION INTRAVENOUS; SUBCUTANEOUS at 08:02

## 2022-01-01 RX ADMIN — VANCOMYCIN HYDROCHLORIDE 1000 MG: 1 INJECTION, POWDER, LYOPHILIZED, FOR SOLUTION INTRAVENOUS at 05:01

## 2022-01-01 RX ADMIN — HYDROXYZINE HYDROCHLORIDE 10 MG: 10 SOLUTION ORAL at 09:02

## 2022-01-01 RX ADMIN — INSULIN ASPART 2 UNITS: 100 INJECTION, SOLUTION INTRAVENOUS; SUBCUTANEOUS at 04:02

## 2022-01-01 RX ADMIN — FENTANYL CITRATE 25 MCG: 50 INJECTION, SOLUTION INTRAMUSCULAR; INTRAVENOUS at 03:02

## 2022-01-01 RX ADMIN — Medication 400 MG: at 05:01

## 2022-01-01 RX ADMIN — MIDODRINE HYDROCHLORIDE 15 MG: 5 TABLET ORAL at 08:01

## 2022-01-01 RX ADMIN — POTASSIUM CHLORIDE 20 MEQ: 200 INJECTION, SOLUTION INTRAVENOUS at 08:01

## 2022-01-01 RX ADMIN — INSULIN ASPART 2 UNITS: 100 INJECTION, SOLUTION INTRAVENOUS; SUBCUTANEOUS at 05:02

## 2022-01-01 RX ADMIN — LIDOCAINE 1 PATCH: 50 PATCH CUTANEOUS at 09:01

## 2022-01-01 RX ADMIN — PIPERACILLIN AND TAZOBACTAM 4.5 G: 4; .5 INJECTION, POWDER, LYOPHILIZED, FOR SOLUTION INTRAVENOUS; PARENTERAL at 10:01

## 2022-01-01 RX ADMIN — FLUCONAZOLE 200 MG: 40 POWDER, FOR SUSPENSION ORAL at 08:02

## 2022-01-01 RX ADMIN — AMIODARONE HYDROCHLORIDE 200 MG: 200 TABLET ORAL at 09:02

## 2022-01-01 RX ADMIN — INSULIN ASPART 4 UNITS: 100 INJECTION, SOLUTION INTRAVENOUS; SUBCUTANEOUS at 01:01

## 2022-01-01 RX ADMIN — INSULIN ASPART 2 UNITS: 100 INJECTION, SOLUTION INTRAVENOUS; SUBCUTANEOUS at 10:01

## 2022-01-01 RX ADMIN — IPRATROPIUM BROMIDE AND ALBUTEROL SULFATE 3 ML: 2.5; .5 SOLUTION RESPIRATORY (INHALATION) at 08:01

## 2022-01-01 RX ADMIN — MINERAL OIL AND WHITE PETROLATUM: 30; 940 OINTMENT OPHTHALMIC at 09:02

## 2022-01-01 RX ADMIN — AMIODARONE HYDROCHLORIDE 400 MG: 200 TABLET ORAL at 08:02

## 2022-01-01 RX ADMIN — AMIODARONE HYDROCHLORIDE 400 MG: 200 TABLET ORAL at 07:01

## 2022-01-01 RX ADMIN — HEPARIN SODIUM 600 UNITS/HR: 1000 INJECTION INTRAVENOUS; SUBCUTANEOUS at 06:01

## 2022-01-01 RX ADMIN — IPRATROPIUM BROMIDE AND ALBUTEROL SULFATE 3 ML: 2.5; .5 SOLUTION RESPIRATORY (INHALATION) at 08:02

## 2022-01-01 RX ADMIN — ATORVASTATIN CALCIUM 40 MG: 10 TABLET, FILM COATED ORAL at 09:02

## 2022-01-01 RX ADMIN — VASOPRESSIN 0.5 UNITS: 20 INJECTION, SOLUTION INTRAMUSCULAR; SUBCUTANEOUS at 09:01

## 2022-01-01 RX ADMIN — OXYMETAZOLINE HCL 2 SPRAY: 0.05 SPRAY NASAL at 08:02

## 2022-01-01 RX ADMIN — HYDROCORTISONE SODIUM SUCCINATE 100 MG: 100 INJECTION, POWDER, FOR SOLUTION INTRAMUSCULAR; INTRAVENOUS at 06:02

## 2022-01-01 RX ADMIN — ATORVASTATIN CALCIUM 40 MG: 10 TABLET, FILM COATED ORAL at 09:01

## 2022-01-01 RX ADMIN — VANCOMYCIN HYDROCHLORIDE 750 MG: 750 INJECTION, POWDER, LYOPHILIZED, FOR SOLUTION INTRAVENOUS at 10:02

## 2022-01-01 RX ADMIN — MIDODRINE HYDROCHLORIDE 10 MG: 5 TABLET ORAL at 07:01

## 2022-01-01 RX ADMIN — WARFARIN SODIUM 0.5 MG: 1 TABLET ORAL at 05:01

## 2022-01-01 RX ADMIN — Medication 1 PACKET: at 09:02

## 2022-01-01 RX ADMIN — INSULIN ASPART 2 UNITS: 100 INJECTION, SOLUTION INTRAVENOUS; SUBCUTANEOUS at 12:02

## 2022-01-01 RX ADMIN — HEPARIN SODIUM 600 UNITS/HR: 1000 INJECTION INTRAVENOUS; SUBCUTANEOUS at 05:01

## 2022-01-01 RX ADMIN — MEROPENEM AND SODIUM CHLORIDE 500 MG: 500 INJECTION, SOLUTION INTRAVENOUS at 09:02

## 2022-01-01 RX ADMIN — SEVELAMER CARBONATE 0.8 G: 0.8 POWDER, FOR SUSPENSION ORAL at 09:02

## 2022-01-01 RX ADMIN — MINERAL OIL AND WHITE PETROLATUM: 30; 940 OINTMENT OPHTHALMIC at 02:02

## 2022-01-01 RX ADMIN — INSULIN ASPART 4 UNITS: 100 INJECTION, SOLUTION INTRAVENOUS; SUBCUTANEOUS at 01:02

## 2022-01-01 RX ADMIN — FUROSEMIDE 80 MG: 10 INJECTION, SOLUTION INTRAMUSCULAR; INTRAVENOUS at 02:01

## 2022-01-01 RX ADMIN — INSULIN ASPART 6 UNITS: 100 INJECTION, SOLUTION INTRAVENOUS; SUBCUTANEOUS at 11:02

## 2022-01-01 RX ADMIN — INSULIN ASPART 4 UNITS: 100 INJECTION, SOLUTION INTRAVENOUS; SUBCUTANEOUS at 04:02

## 2022-01-01 RX ADMIN — FUROSEMIDE 120 MG: 10 INJECTION, SOLUTION INTRAVENOUS at 09:01

## 2022-01-01 RX ADMIN — METOPROLOL TARTRATE 25 MG: 25 TABLET, FILM COATED ORAL at 08:02

## 2022-01-01 RX ADMIN — INSULIN ASPART 1 UNITS: 100 INJECTION, SOLUTION INTRAVENOUS; SUBCUTANEOUS at 11:02

## 2022-01-01 RX ADMIN — LIDOCAINE 1 PATCH: 50 PATCH CUTANEOUS at 07:01

## 2022-01-01 RX ADMIN — INSULIN ASPART 2 UNITS: 100 INJECTION, SOLUTION INTRAVENOUS; SUBCUTANEOUS at 05:01

## 2022-01-01 RX ADMIN — ACETAMINOPHEN 1000 MG: 500 TABLET, FILM COATED ORAL at 06:01

## 2022-01-01 RX ADMIN — INSULIN ASPART 4 UNITS: 100 INJECTION, SOLUTION INTRAVENOUS; SUBCUTANEOUS at 06:01

## 2022-01-01 RX ADMIN — SODIUM PHOSPHATE, MONOBASIC, MONOHYDRATE 39.99 MMOL: 276; 142 INJECTION, SOLUTION INTRAVENOUS at 05:01

## 2022-01-01 RX ADMIN — POTASSIUM & SODIUM PHOSPHATES POWDER PACK 280-160-250 MG 2 PACKET: 280-160-250 PACK at 05:01

## 2022-01-01 RX ADMIN — SODIUM CHLORIDE: 0.9 INJECTION, SOLUTION INTRAVENOUS at 09:01

## 2022-01-01 RX ADMIN — PROPOFOL 40 MG: 10 INJECTION, EMULSION INTRAVENOUS at 08:01

## 2022-01-01 RX ADMIN — GABAPENTIN 300 MG: 300 CAPSULE ORAL at 09:01

## 2022-01-01 RX ADMIN — NOREPINEPHRINE BITARTRATE 16 MCG: 1 INJECTION, SOLUTION, CONCENTRATE INTRAVENOUS at 08:01

## 2022-01-01 RX ADMIN — VASOPRESSIN 0.04 UNITS/MIN: 20 INJECTION INTRAVENOUS at 06:01

## 2022-01-01 RX ADMIN — FLUCONAZOLE 400 MG: 40 POWDER, FOR SUSPENSION ORAL at 08:01

## 2022-01-01 RX ADMIN — MIDODRINE HYDROCHLORIDE 10 MG: 5 TABLET ORAL at 09:01

## 2022-01-01 RX ADMIN — OXYCODONE HYDROCHLORIDE 5 MG: 5 SOLUTION ORAL at 01:02

## 2022-01-01 RX ADMIN — FENTANYL CITRATE 25 MCG: 50 INJECTION INTRAMUSCULAR; INTRAVENOUS at 07:01

## 2022-01-01 RX ADMIN — NOREPINEPHRINE BITARTRATE 0.08 MCG/KG/MIN: 4 INJECTION, SOLUTION INTRAVENOUS at 07:02

## 2022-01-01 RX ADMIN — INSULIN ASPART 4 UNITS: 100 INJECTION, SOLUTION INTRAVENOUS; SUBCUTANEOUS at 03:02

## 2022-01-01 RX ADMIN — VASOPRESSIN 0.04 UNITS/MIN: 20 INJECTION INTRAVENOUS at 03:02

## 2022-01-01 RX ADMIN — CISATRACURIUM BESYLATE 4 MCG/KG/MIN: 10 INJECTION, SOLUTION INTRAVENOUS at 08:02

## 2022-01-01 RX ADMIN — FENTANYL CITRATE 25 MCG: 50 INJECTION, SOLUTION INTRAMUSCULAR; INTRAVENOUS at 11:01

## 2022-01-01 RX ADMIN — Medication 50 MCG/HR: at 05:02

## 2022-01-01 RX ADMIN — MIDODRINE HYDROCHLORIDE 10 MG: 5 TABLET ORAL at 03:01

## 2022-01-01 RX ADMIN — AMIODARONE HYDROCHLORIDE 200 MG: 200 TABLET ORAL at 08:02

## 2022-01-01 RX ADMIN — VASOPRESSIN 0.04 UNITS/MIN: 20 INJECTION INTRAVENOUS at 08:02

## 2022-01-01 RX ADMIN — PROPOFOL 20 MCG/KG/MIN: 10 INJECTION, EMULSION INTRAVENOUS at 02:01

## 2022-01-01 RX ADMIN — MIDODRINE HYDROCHLORIDE 15 MG: 5 TABLET ORAL at 09:02

## 2022-01-01 RX ADMIN — INSULIN ASPART 2 UNITS: 100 INJECTION, SOLUTION INTRAVENOUS; SUBCUTANEOUS at 10:02

## 2022-01-01 RX ADMIN — INSULIN ASPART 3 UNITS: 100 INJECTION, SOLUTION INTRAVENOUS; SUBCUTANEOUS at 07:01

## 2022-01-01 RX ADMIN — FLUCONAZOLE 200 MG: 40 POWDER, FOR SUSPENSION ORAL at 08:01

## 2022-01-01 RX ADMIN — FUROSEMIDE 80 MG: 10 INJECTION, SOLUTION INTRAMUSCULAR; INTRAVENOUS at 06:01

## 2022-01-01 RX ADMIN — BISACODYL 10 MG: 10 SUPPOSITORY RECTAL at 09:01

## 2022-01-01 RX ADMIN — INSULIN ASPART 2 UNITS: 100 INJECTION, SOLUTION INTRAVENOUS; SUBCUTANEOUS at 03:01

## 2022-01-01 RX ADMIN — ACETAMINOPHEN 1000 MG: 10 INJECTION, SOLUTION INTRAVENOUS at 06:01

## 2022-01-01 RX ADMIN — MINERAL OIL AND WHITE PETROLATUM: 30; 940 OINTMENT OPHTHALMIC at 08:02

## 2022-01-01 RX ADMIN — WARFARIN SODIUM 2 MG: 2 TABLET ORAL at 05:01

## 2022-01-01 RX ADMIN — LORAZEPAM 2 MG: 2 INJECTION INTRAMUSCULAR; INTRAVENOUS at 10:02

## 2022-01-01 RX ADMIN — AMIODARONE HYDROCHLORIDE 400 MG: 200 TABLET ORAL at 08:01

## 2022-01-01 RX ADMIN — HEPARIN SODIUM 8 UNITS/KG/HR: 1000 INJECTION, SOLUTION INTRAVENOUS; SUBCUTANEOUS at 10:02

## 2022-01-01 RX ADMIN — SODIUM CHLORIDE TAB 1 GM 2 G: 1 TAB at 01:02

## 2022-01-01 RX ADMIN — VASOPRESSIN 1 UNITS: 20 INJECTION, SOLUTION INTRAMUSCULAR; SUBCUTANEOUS at 10:01

## 2022-01-01 RX ADMIN — Medication 1 PACKET: at 09:01

## 2022-01-01 RX ADMIN — INSULIN ASPART 4 UNITS: 100 INJECTION, SOLUTION INTRAVENOUS; SUBCUTANEOUS at 05:02

## 2022-01-01 RX ADMIN — IPRATROPIUM BROMIDE AND ALBUTEROL SULFATE 3 ML: 2.5; .5 SOLUTION RESPIRATORY (INHALATION) at 09:02

## 2022-01-01 RX ADMIN — IPRATROPIUM BROMIDE AND ALBUTEROL SULFATE 3 ML: 2.5; .5 SOLUTION RESPIRATORY (INHALATION) at 11:02

## 2022-01-01 RX ADMIN — Medication 0.02 MCG/KG/MIN: at 11:02

## 2022-01-01 RX ADMIN — SODIUM CHLORIDE 5 ML/HR: 0.9 INJECTION, SOLUTION INTRAVENOUS at 06:02

## 2022-01-01 RX ADMIN — LIDOCAINE HYDROCHLORIDE 60 MG: 20 INJECTION, SOLUTION EPIDURAL; INFILTRATION; INTRACAUDAL; PERINEURAL at 01:01

## 2022-01-01 RX ADMIN — SEVELAMER CARBONATE 0.8 G: 0.8 POWDER, FOR SUSPENSION ORAL at 08:02

## 2022-01-01 RX ADMIN — DEXMEDETOMIDINE HYDROCHLORIDE 0.2 MCG/KG/HR: 4 INJECTION, SOLUTION INTRAVENOUS at 10:01

## 2022-01-01 RX ADMIN — VASOPRESSIN 0.04 UNITS/MIN: 20 INJECTION INTRAVENOUS at 11:02

## 2022-01-01 RX ADMIN — AMIODARONE HYDROCHLORIDE 400 MG: 200 TABLET ORAL at 09:01

## 2022-01-01 RX ADMIN — EPINEPHRINE 0.08 MCG/KG/MIN: 1 INJECTION INTRAMUSCULAR; INTRAVENOUS; SUBCUTANEOUS at 03:01

## 2022-01-01 RX ADMIN — DEXMEDETOMIDINE HYDROCHLORIDE 1 MCG/KG/HR: 4 INJECTION, SOLUTION INTRAVENOUS at 02:01

## 2022-01-01 RX ADMIN — POLYETHYLENE GLYCOL 3350 17 G: 17 POWDER, FOR SOLUTION ORAL at 08:01

## 2022-01-01 RX ADMIN — NOREPINEPHRINE BITARTRATE 0.1 MCG/KG/MIN: 4 INJECTION, SOLUTION INTRAVENOUS at 11:02

## 2022-01-01 RX ADMIN — ALBUMIN (HUMAN) 25 G: 12.5 SOLUTION INTRAVENOUS at 07:01

## 2022-01-01 RX ADMIN — MIDODRINE HYDROCHLORIDE 15 MG: 5 TABLET ORAL at 02:01

## 2022-01-01 RX ADMIN — PROPOFOL 5 MCG/KG/MIN: 10 INJECTION, EMULSION INTRAVENOUS at 06:02

## 2022-01-01 RX ADMIN — MIDAZOLAM 2 MG: 1 INJECTION INTRAMUSCULAR; INTRAVENOUS at 04:01

## 2022-01-01 RX ADMIN — INSULIN ASPART 2 UNITS: 100 INJECTION, SOLUTION INTRAVENOUS; SUBCUTANEOUS at 08:01

## 2022-01-01 RX ADMIN — VASOPRESSIN 0.02 UNITS/MIN: 20 INJECTION INTRAVENOUS at 09:01

## 2022-01-01 RX ADMIN — FUROSEMIDE 120 MG: 10 INJECTION, SOLUTION INTRAVENOUS at 02:01

## 2022-01-01 RX ADMIN — CEFAZOLIN SODIUM 2 G: 2 SOLUTION INTRAVENOUS at 04:01

## 2022-01-01 RX ADMIN — HYDROCORTISONE SODIUM SUCCINATE 100 MG: 100 INJECTION, POWDER, FOR SOLUTION INTRAMUSCULAR; INTRAVENOUS at 09:02

## 2022-01-01 RX ADMIN — FENTANYL CITRATE 25 MCG: 50 INJECTION INTRAMUSCULAR; INTRAVENOUS at 10:01

## 2022-01-01 RX ADMIN — WARFARIN SODIUM 2 MG: 2 TABLET ORAL at 04:02

## 2022-01-01 RX ADMIN — INSULIN ASPART 1 UNITS: 100 INJECTION, SOLUTION INTRAVENOUS; SUBCUTANEOUS at 08:02

## 2022-01-01 RX ADMIN — OXYCODONE 5 MG: 5 TABLET ORAL at 05:01

## 2022-01-01 RX ADMIN — SODIUM PHOSPHATE, MONOBASIC, MONOHYDRATE 30 MMOL: 276; 142 INJECTION, SOLUTION INTRAVENOUS at 01:01

## 2022-01-01 RX ADMIN — IPRATROPIUM BROMIDE AND ALBUTEROL SULFATE 3 ML: 2.5; .5 SOLUTION RESPIRATORY (INHALATION) at 06:02

## 2022-01-01 RX ADMIN — Medication 50 MCG/KG/MIN: at 01:01

## 2022-01-01 RX ADMIN — HYDROMORPHONE HYDROCHLORIDE 0.5 MG: 1 INJECTION, SOLUTION INTRAMUSCULAR; INTRAVENOUS; SUBCUTANEOUS at 10:01

## 2022-01-01 RX ADMIN — FUROSEMIDE 30 MG/HR: 10 INJECTION, SOLUTION INTRAMUSCULAR; INTRAVENOUS at 03:01

## 2022-01-01 RX ADMIN — METOROPROLOL TARTRATE 5 MG: 5 INJECTION, SOLUTION INTRAVENOUS at 04:02

## 2022-01-01 RX ADMIN — CALCIUM GLUCONATE 2 G: 98 INJECTION, SOLUTION INTRAVENOUS at 11:01

## 2022-01-01 RX ADMIN — SODIUM CHLORIDE 200 ML/HR: 0.9 INJECTION, SOLUTION INTRAVENOUS at 12:01

## 2022-01-01 RX ADMIN — MAGNESIUM SULFATE 2 G: 2 INJECTION INTRAVENOUS at 11:01

## 2022-01-01 RX ADMIN — AMIODARONE HYDROCHLORIDE 400 MG: 200 TABLET ORAL at 09:02

## 2022-01-01 RX ADMIN — SODIUM CHLORIDE, SODIUM LACTATE, POTASSIUM CHLORIDE, AND CALCIUM CHLORIDE 500 ML: .6; .31; .03; .02 INJECTION, SOLUTION INTRAVENOUS at 09:02

## 2022-01-01 RX ADMIN — POTASSIUM BICARBONATE 25 MEQ: 978 TABLET, EFFERVESCENT ORAL at 08:02

## 2022-01-01 RX ADMIN — WARFARIN SODIUM 1 MG: 1 TABLET ORAL at 05:02

## 2022-01-01 RX ADMIN — Medication 100 MCG/HR: at 08:02

## 2022-01-01 RX ADMIN — SODIUM CHLORIDE: 0.9 INJECTION, SOLUTION INTRAVENOUS at 11:02

## 2022-01-01 RX ADMIN — VASOPRESSIN 0.04 UNITS/MIN: 20 INJECTION INTRAVENOUS at 04:01

## 2022-01-01 RX ADMIN — SODIUM CHLORIDE 1 MG/KG/HR: 9 INJECTION, SOLUTION INTRAVENOUS at 08:01

## 2022-01-01 RX ADMIN — CISATRACURIUM BESYLATE 5 MCG/KG/MIN: 10 INJECTION, SOLUTION INTRAVENOUS at 04:02

## 2022-01-01 RX ADMIN — DRONABINOL 2.5 MG: 2.5 CAPSULE ORAL at 09:01

## 2022-01-01 RX ADMIN — FENTANYL CITRATE 25 MCG: 50 INJECTION INTRAMUSCULAR; INTRAVENOUS at 02:01

## 2022-01-01 RX ADMIN — OXYCODONE HYDROCHLORIDE 5 MG: 5 SOLUTION ORAL at 07:02

## 2022-01-01 RX ADMIN — DEXMEDETOMIDINE HYDROCHLORIDE 0.6 MCG/KG/HR: 4 INJECTION, SOLUTION INTRAVENOUS at 11:01

## 2022-01-01 RX ADMIN — METOROPROLOL TARTRATE 5 MG: 5 INJECTION, SOLUTION INTRAVENOUS at 03:02

## 2022-01-01 RX ADMIN — HEPARIN SODIUM 600 UNITS/HR: 1000 INJECTION, SOLUTION INTRAVENOUS; SUBCUTANEOUS at 01:01

## 2022-01-01 RX ADMIN — NOREPINEPHRINE BITARTRATE 0.2 MCG/KG/MIN: 4 INJECTION, SOLUTION INTRAVENOUS at 06:02

## 2022-01-01 RX ADMIN — ACETAMINOPHEN 650 MG: 160 SOLUTION ORAL at 05:01

## 2022-01-01 RX ADMIN — ACETAMINOPHEN 650 MG: 325 TABLET ORAL at 07:01

## 2022-01-01 RX ADMIN — POTASSIUM & SODIUM PHOSPHATES POWDER PACK 280-160-250 MG 2 PACKET: 280-160-250 PACK at 10:01

## 2022-01-01 RX ADMIN — MUPIROCIN: 20 OINTMENT TOPICAL at 09:01

## 2022-01-01 RX ADMIN — INSULIN ASPART 5 UNITS: 100 INJECTION, SOLUTION INTRAVENOUS; SUBCUTANEOUS at 04:02

## 2022-01-01 RX ADMIN — MINERAL OIL AND WHITE PETROLATUM: 30; 940 OINTMENT OPHTHALMIC at 03:02

## 2022-01-01 RX ADMIN — SODIUM PHOSPHATE, MONOBASIC, MONOHYDRATE 39.99 MMOL: 276; 142 INJECTION, SOLUTION INTRAVENOUS at 08:01

## 2022-01-01 RX ADMIN — MEROPENEM AND SODIUM CHLORIDE 1 G: 1 INJECTION, SOLUTION INTRAVENOUS at 08:02

## 2022-01-01 RX ADMIN — WARFARIN SODIUM 5 MG: 5 TABLET ORAL at 04:01

## 2022-01-01 RX ADMIN — DEXMEDETOMIDINE HYDROCHLORIDE 0.6 MCG/KG/HR: 4 INJECTION, SOLUTION INTRAVENOUS at 10:01

## 2022-01-01 RX ADMIN — OXYCODONE 5 MG: 5 TABLET ORAL at 03:01

## 2022-01-01 RX ADMIN — MAGNESIUM SULFATE IN WATER 2 G: 40 INJECTION, SOLUTION INTRAVENOUS at 04:02

## 2022-01-01 RX ADMIN — CALCIUM ACETATE 1334 MG: 667 CAPSULE ORAL at 11:01

## 2022-01-01 RX ADMIN — ROCURONIUM BROMIDE 40 MG: 10 INJECTION, SOLUTION INTRAVENOUS at 09:01

## 2022-01-01 RX ADMIN — IPRATROPIUM BROMIDE AND ALBUTEROL SULFATE 3 ML: 2.5; .5 SOLUTION RESPIRATORY (INHALATION) at 06:01

## 2022-01-01 RX ADMIN — INSULIN ASPART 2 UNITS: 100 INJECTION, SOLUTION INTRAVENOUS; SUBCUTANEOUS at 09:02

## 2022-01-01 RX ADMIN — SODIUM CHLORIDE: 0.9 INJECTION, SOLUTION INTRAVENOUS at 10:01

## 2022-01-01 RX ADMIN — VASOPRESSIN 0.08 UNITS/MIN: 20 INJECTION INTRAVENOUS at 09:02

## 2022-01-01 RX ADMIN — HYDROMORPHONE HYDROCHLORIDE 0.5 MG: 1 INJECTION, SOLUTION INTRAMUSCULAR; INTRAVENOUS; SUBCUTANEOUS at 12:01

## 2022-01-01 RX ADMIN — INSULIN ASPART 4 UNITS: 100 INJECTION, SOLUTION INTRAVENOUS; SUBCUTANEOUS at 09:02

## 2022-01-01 RX ADMIN — EPINEPHRINE 0.07 MCG/KG/MIN: 1 INJECTION INTRAMUSCULAR; INTRAVENOUS; SUBCUTANEOUS at 05:01

## 2022-01-01 RX ADMIN — ACETAMINOPHEN 650 MG: 160 SOLUTION ORAL at 10:01

## 2022-01-01 RX ADMIN — FENTANYL CITRATE 50 MCG: 50 INJECTION, SOLUTION INTRAMUSCULAR; INTRAVENOUS at 09:01

## 2022-01-01 RX ADMIN — INSULIN ASPART 2 UNITS: 100 INJECTION, SOLUTION INTRAVENOUS; SUBCUTANEOUS at 03:02

## 2022-01-01 RX ADMIN — VASOPRESSIN 0.04 UNITS/MIN: 20 INJECTION INTRAVENOUS at 09:01

## 2022-01-01 RX ADMIN — CALCIUM ACETATE 1334 MG: 667 CAPSULE ORAL at 04:01

## 2022-01-01 RX ADMIN — FENTANYL CITRATE 25 MCG: 50 INJECTION INTRAMUSCULAR; INTRAVENOUS at 03:01

## 2022-01-01 RX ADMIN — ETOMIDATE 8 MG: 2 INJECTION INTRAVENOUS at 05:01

## 2022-01-01 RX ADMIN — NOREPINEPHRINE BITARTRATE 0.17 MCG/KG/MIN: 4 INJECTION, SOLUTION INTRAVENOUS at 09:02

## 2022-01-01 RX ADMIN — HYDROXYZINE HYDROCHLORIDE 10 MG: 10 SOLUTION ORAL at 12:02

## 2022-01-01 RX ADMIN — INSULIN ASPART 1 UNITS: 100 INJECTION, SOLUTION INTRAVENOUS; SUBCUTANEOUS at 07:02

## 2022-01-01 RX ADMIN — METOROPROLOL TARTRATE 5 MG: 5 INJECTION, SOLUTION INTRAVENOUS at 12:01

## 2022-01-01 RX ADMIN — INSULIN ASPART 2 UNITS: 100 INJECTION, SOLUTION INTRAVENOUS; SUBCUTANEOUS at 06:01

## 2022-01-01 RX ADMIN — POTASSIUM & SODIUM PHOSPHATES POWDER PACK 280-160-250 MG 2 PACKET: 280-160-250 PACK at 05:02

## 2022-01-01 RX ADMIN — PIPERACILLIN AND TAZOBACTAM 4.5 G: 4; .5 INJECTION, POWDER, LYOPHILIZED, FOR SOLUTION INTRAVENOUS; PARENTERAL at 09:01

## 2022-01-01 RX ADMIN — CALCIUM GLUCONATE 1 G: 98 INJECTION, SOLUTION INTRAVENOUS at 09:02

## 2022-01-01 RX ADMIN — ROCURONIUM BROMIDE 60 MG: 10 INJECTION, SOLUTION INTRAVENOUS at 04:01

## 2022-01-01 RX ADMIN — NOREPINEPHRINE BITARTRATE 0.02 MCG/KG/MIN: 1 INJECTION, SOLUTION, CONCENTRATE INTRAVENOUS at 08:01

## 2022-01-01 RX ADMIN — Medication 100 MCG: at 10:02

## 2022-01-01 RX ADMIN — CISATRACURIUM BESYLATE 0.5 MCG/KG/MIN: 10 INJECTION, SOLUTION INTRAVENOUS at 05:02

## 2022-01-01 RX ADMIN — METOPROLOL TARTRATE 25 MG: 25 TABLET, FILM COATED ORAL at 09:02

## 2022-01-01 RX ADMIN — INSULIN ASPART 3 UNITS: 100 INJECTION, SOLUTION INTRAVENOUS; SUBCUTANEOUS at 08:01

## 2022-01-01 RX ADMIN — INSULIN ASPART 3 UNITS: 100 INJECTION, SOLUTION INTRAVENOUS; SUBCUTANEOUS at 05:02

## 2022-01-01 RX ADMIN — PIPERACILLIN AND TAZOBACTAM 4.5 G: 4; .5 INJECTION, POWDER, LYOPHILIZED, FOR SOLUTION INTRAVENOUS; PARENTERAL at 06:02

## 2022-01-01 RX ADMIN — QUETIAPINE FUMARATE 25 MG: 25 TABLET ORAL at 08:02

## 2022-01-01 RX ADMIN — CHLOROTHIAZIDE SODIUM 500 MG: 500 INJECTION, POWDER, LYOPHILIZED, FOR SOLUTION INTRAVENOUS at 01:01

## 2022-01-01 RX ADMIN — METOROPROLOL TARTRATE 5 MG: 5 INJECTION, SOLUTION INTRAVENOUS at 09:02

## 2022-01-01 RX ADMIN — INSULIN ASPART 3 UNITS: 100 INJECTION, SOLUTION INTRAVENOUS; SUBCUTANEOUS at 03:02

## 2022-01-01 RX ADMIN — Medication 1 MG: at 03:01

## 2022-01-01 RX ADMIN — AMIODARONE HYDROCHLORIDE 400 MG: 200 TABLET ORAL at 02:02

## 2022-01-01 RX ADMIN — AMIODARONE HYDROCHLORIDE 1 MG/MIN: 1.8 INJECTION, SOLUTION INTRAVENOUS at 05:01

## 2022-01-01 RX ADMIN — HYDROCORTISONE SODIUM SUCCINATE 50 MG: 100 INJECTION, POWDER, FOR SOLUTION INTRAMUSCULAR; INTRAVENOUS at 05:02

## 2022-01-01 RX ADMIN — FENTANYL CITRATE 50 MCG: 50 INJECTION INTRAMUSCULAR; INTRAVENOUS at 11:02

## 2022-01-01 RX ADMIN — HYDROMORPHONE HYDROCHLORIDE 0.5 MG: 1 INJECTION, SOLUTION INTRAMUSCULAR; INTRAVENOUS; SUBCUTANEOUS at 09:01

## 2022-01-01 RX ADMIN — INSULIN ASPART 2 UNITS: 100 INJECTION, SOLUTION INTRAVENOUS; SUBCUTANEOUS at 04:01

## 2022-01-01 RX ADMIN — SODIUM CHLORIDE: 0.9 INJECTION, SOLUTION INTRAVENOUS at 05:01

## 2022-01-01 RX ADMIN — Medication 50 MCG/HR: at 03:02

## 2022-01-01 RX ADMIN — SODIUM PHOSPHATE, MONOBASIC, MONOHYDRATE 39.99 MMOL: 276; 142 INJECTION, SOLUTION INTRAVENOUS at 04:01

## 2022-01-01 RX ADMIN — INSULIN ASPART 3 UNITS: 100 INJECTION, SOLUTION INTRAVENOUS; SUBCUTANEOUS at 01:02

## 2022-01-01 RX ADMIN — FENTANYL CITRATE 25 MCG: 50 INJECTION, SOLUTION INTRAMUSCULAR; INTRAVENOUS at 10:01

## 2022-01-01 RX ADMIN — MAGNESIUM SULFATE 2 G: 2 INJECTION INTRAVENOUS at 03:01

## 2022-01-01 RX ADMIN — FAMOTIDINE 20 MG: 10 INJECTION INTRAVENOUS at 08:01

## 2022-01-01 RX ADMIN — VASOPRESSIN 0.04 UNITS/MIN: 20 INJECTION INTRAVENOUS at 12:01

## 2022-01-01 RX ADMIN — WARFARIN SODIUM 1 MG: 1 TABLET ORAL at 04:02

## 2022-01-01 RX ADMIN — INSULIN ASPART 1 UNITS: 100 INJECTION, SOLUTION INTRAVENOUS; SUBCUTANEOUS at 08:01

## 2022-01-01 RX ADMIN — METOPROLOL TARTRATE 5 MG: 1 INJECTION, SOLUTION INTRAVENOUS at 12:01

## 2022-01-01 RX ADMIN — WARFARIN SODIUM 0.5 MG: 1 TABLET ORAL at 05:02

## 2022-01-01 RX ADMIN — HYDROMORPHONE HYDROCHLORIDE 0.5 MG: 1 INJECTION, SOLUTION INTRAMUSCULAR; INTRAVENOUS; SUBCUTANEOUS at 01:01

## 2022-01-01 RX ADMIN — NOREPINEPHRINE BITARTRATE 0.06 MCG/KG/MIN: 4 INJECTION, SOLUTION INTRAVENOUS at 12:01

## 2022-01-01 RX ADMIN — INSULIN ASPART 3 UNITS: 100 INJECTION, SOLUTION INTRAVENOUS; SUBCUTANEOUS at 11:01

## 2022-01-01 RX ADMIN — FUROSEMIDE 100 MG: 10 INJECTION, SOLUTION INTRAMUSCULAR; INTRAVENOUS at 09:01

## 2022-01-01 RX ADMIN — HYDROCORTISONE SODIUM SUCCINATE 100 MG: 100 INJECTION, POWDER, FOR SOLUTION INTRAMUSCULAR; INTRAVENOUS at 02:02

## 2022-01-01 RX ADMIN — POTASSIUM BICARBONATE 20 MEQ: 391 TABLET, EFFERVESCENT ORAL at 03:01

## 2022-01-01 RX ADMIN — AMIODARONE HYDROCHLORIDE 200 MG: 200 TABLET ORAL at 08:01

## 2022-01-01 RX ADMIN — ONDANSETRON 2 G: 2 INJECTION INTRAMUSCULAR; INTRAVENOUS at 07:01

## 2022-01-01 RX ADMIN — INSULIN ASPART 2 UNITS: 100 INJECTION, SOLUTION INTRAVENOUS; SUBCUTANEOUS at 09:01

## 2022-01-01 RX ADMIN — OXYCODONE HYDROCHLORIDE 5 MG: 5 SOLUTION ORAL at 08:01

## 2022-01-01 RX ADMIN — PIPERACILLIN AND TAZOBACTAM 4.5 G: 4; .5 INJECTION, POWDER, LYOPHILIZED, FOR SOLUTION INTRAVENOUS; PARENTERAL at 05:01

## 2022-01-01 RX ADMIN — PROPOFOL 10 MCG/KG/MIN: 10 INJECTION, EMULSION INTRAVENOUS at 05:01

## 2022-01-01 RX ADMIN — VASOPRESSIN 0.04 UNITS/MIN: 20 INJECTION INTRAVENOUS at 03:01

## 2022-01-01 RX ADMIN — SODIUM CHLORIDE: 0.9 INJECTION, SOLUTION INTRAVENOUS at 12:02

## 2022-01-01 RX ADMIN — HEPARIN SODIUM 500 UNITS/HR: 1000 INJECTION, SOLUTION INTRAVENOUS; SUBCUTANEOUS at 06:01

## 2022-01-01 RX ADMIN — FAMOTIDINE 20 MG: 10 INJECTION, SOLUTION INTRAVENOUS at 08:01

## 2022-01-01 RX ADMIN — POTASSIUM BICARBONATE 40 MEQ: 391 TABLET, EFFERVESCENT ORAL at 05:01

## 2022-01-01 RX ADMIN — EPINEPHRINE 0.08 MCG/KG/MIN: 1 INJECTION INTRAMUSCULAR; INTRAVENOUS; SUBCUTANEOUS at 12:01

## 2022-01-01 RX ADMIN — INSULIN ASPART 2 UNITS: 100 INJECTION, SOLUTION INTRAVENOUS; SUBCUTANEOUS at 02:02

## 2022-01-01 RX ADMIN — DEXMEDETOMIDINE HYDROCHLORIDE 0.2 MCG/KG/HR: 4 INJECTION, SOLUTION INTRAVENOUS at 07:01

## 2022-01-01 RX ADMIN — ACETAMINOPHEN 1000 MG: 10 INJECTION, SOLUTION INTRAVENOUS at 02:01

## 2022-01-01 RX ADMIN — OXYCODONE HYDROCHLORIDE 5 MG: 5 SOLUTION ORAL at 03:02

## 2022-01-01 RX ADMIN — AMIODARONE HYDROCHLORIDE 0.5 MG/MIN: 50 INJECTION, SOLUTION INTRAVENOUS at 02:02

## 2022-01-01 RX ADMIN — ACETAMINOPHEN 1000 MG: 10 INJECTION, SOLUTION INTRAVENOUS at 10:01

## 2022-01-01 RX ADMIN — Medication 9 MG: at 11:01

## 2022-01-01 RX ADMIN — IPRATROPIUM BROMIDE AND ALBUTEROL SULFATE 3 ML: 2.5; .5 SOLUTION RESPIRATORY (INHALATION) at 01:01

## 2022-01-01 RX ADMIN — GLYCOPYRROLATE 0.4 MG: 0.2 INJECTION, SOLUTION INTRAMUSCULAR; INTRAVITREAL at 02:01

## 2022-01-01 RX ADMIN — DEXMEDETOMIDINE HYDROCHLORIDE 1.2 MCG/KG/HR: 4 INJECTION, SOLUTION INTRAVENOUS at 08:01

## 2022-01-01 RX ADMIN — VASOPRESSIN 1 UNITS: 20 INJECTION, SOLUTION INTRAMUSCULAR; SUBCUTANEOUS at 01:01

## 2022-01-01 RX ADMIN — HEPARIN SODIUM 600 UNITS/HR: 1000 INJECTION INTRAVENOUS; SUBCUTANEOUS at 07:01

## 2022-01-01 RX ADMIN — NOREPINEPHRINE BITARTRATE 8 MCG: 1 INJECTION, SOLUTION, CONCENTRATE INTRAVENOUS at 08:01

## 2022-01-01 RX ADMIN — OXYCODONE 10 MG: 5 TABLET ORAL at 04:01

## 2022-01-01 RX ADMIN — QUETIAPINE FUMARATE 25 MG: 25 TABLET ORAL at 12:02

## 2022-01-01 RX ADMIN — DEXMEDETOMIDINE HYDROCHLORIDE 0.2 MCG/KG/HR: 4 INJECTION, SOLUTION INTRAVENOUS at 03:01

## 2022-01-01 RX ADMIN — SODIUM CHLORIDE, SODIUM GLUCONATE, SODIUM ACETATE, POTASSIUM CHLORIDE, MAGNESIUM CHLORIDE, SODIUM PHOSPHATE, DIBASIC, AND POTASSIUM PHOSPHATE: .53; .5; .37; .037; .03; .012; .00082 INJECTION, SOLUTION INTRAVENOUS at 08:01

## 2022-01-01 RX ADMIN — ALBUMIN HUMAN 12.5 G: 50 SOLUTION INTRAVENOUS at 06:01

## 2022-01-01 RX ADMIN — FUROSEMIDE 80 MG: 10 INJECTION, SOLUTION INTRAMUSCULAR; INTRAVENOUS at 06:02

## 2022-01-01 RX ADMIN — FENTANYL CITRATE 25 MCG: 50 INJECTION, SOLUTION INTRAMUSCULAR; INTRAVENOUS at 11:02

## 2022-01-01 RX ADMIN — INSULIN ASPART 5 UNITS: 100 INJECTION, SOLUTION INTRAVENOUS; SUBCUTANEOUS at 02:02

## 2022-01-01 RX ADMIN — OXYCODONE HYDROCHLORIDE 5 MG: 5 SOLUTION ORAL at 05:02

## 2022-01-01 RX ADMIN — NOREPINEPHRINE BITARTRATE 8 MCG: 1 INJECTION, SOLUTION, CONCENTRATE INTRAVENOUS at 10:01

## 2022-01-01 RX ADMIN — SODIUM CHLORIDE: 0.9 INJECTION, SOLUTION INTRAVENOUS at 07:01

## 2022-01-01 RX ADMIN — HYDROMORPHONE HYDROCHLORIDE 0.1 MG: 1 INJECTION, SOLUTION INTRAMUSCULAR; INTRAVENOUS; SUBCUTANEOUS at 08:01

## 2022-01-01 RX ADMIN — VANCOMYCIN HYDROCHLORIDE 1000 MG: 1 INJECTION, POWDER, LYOPHILIZED, FOR SOLUTION INTRAVENOUS at 06:02

## 2022-01-01 RX ADMIN — SODIUM CHLORIDE: 0.9 INJECTION, SOLUTION INTRAVENOUS at 07:02

## 2022-01-01 RX ADMIN — VASOPRESSIN 0.04 UNITS/MIN: 20 INJECTION INTRAVENOUS at 02:01

## 2022-01-01 RX ADMIN — NOREPINEPHRINE BITARTRATE 0.02 MCG/KG/MIN: 4 INJECTION, SOLUTION INTRAVENOUS at 02:02

## 2022-01-01 RX ADMIN — SODIUM CHLORIDE: 0.9 INJECTION, SOLUTION INTRAVENOUS at 10:02

## 2022-01-01 RX ADMIN — FUROSEMIDE 120 MG: 10 INJECTION, SOLUTION INTRAVENOUS at 03:01

## 2022-01-01 RX ADMIN — OXYMETAZOLINE HCL 2 SPRAY: 0.05 SPRAY NASAL at 09:02

## 2022-01-01 RX ADMIN — FUROSEMIDE 20 MG: 10 INJECTION, SOLUTION INTRAMUSCULAR; INTRAVENOUS at 07:01

## 2022-01-01 RX ADMIN — VASOPRESSIN 2 UNITS: 20 INJECTION, SOLUTION INTRAMUSCULAR; SUBCUTANEOUS at 09:01

## 2022-01-01 RX ADMIN — SODIUM CHLORIDE, SODIUM GLUCONATE, SODIUM ACETATE, POTASSIUM CHLORIDE, MAGNESIUM CHLORIDE, SODIUM PHOSPHATE, DIBASIC, AND POTASSIUM PHOSPHATE: .53; .5; .37; .037; .03; .012; .00082 INJECTION, SOLUTION INTRAVENOUS at 10:01

## 2022-01-01 RX ADMIN — GABAPENTIN 300 MG: 300 CAPSULE ORAL at 04:01

## 2022-01-01 RX ADMIN — HYDROMORPHONE HYDROCHLORIDE 0.5 MG: 1 INJECTION, SOLUTION INTRAMUSCULAR; INTRAVENOUS; SUBCUTANEOUS at 02:01

## 2022-01-01 RX ADMIN — INSULIN ASPART 6 UNITS: 100 INJECTION, SOLUTION INTRAVENOUS; SUBCUTANEOUS at 01:02

## 2022-01-01 RX ADMIN — AMIODARONE HYDROCHLORIDE 150 MG: 1.5 INJECTION, SOLUTION INTRAVENOUS at 09:02

## 2022-01-01 RX ADMIN — OXYCODONE HYDROCHLORIDE 5 MG: 5 SOLUTION ORAL at 11:02

## 2022-01-01 RX ADMIN — VASOPRESSIN 0.02 UNITS/MIN: 20 INJECTION INTRAVENOUS at 10:02

## 2022-01-01 RX ADMIN — HEPARIN SODIUM 12 UNITS/KG/HR: 1000 INJECTION, SOLUTION INTRAVENOUS; SUBCUTANEOUS at 02:02

## 2022-01-01 RX ADMIN — PHYTONADIONE 5 MG: 10 INJECTION, EMULSION INTRAMUSCULAR; INTRAVENOUS; SUBCUTANEOUS at 05:01

## 2022-01-01 RX ADMIN — Medication 1 PACKET: at 10:02

## 2022-01-01 RX ADMIN — SODIUM CHLORIDE 2 G: 9 INJECTION, SOLUTION INTRAVENOUS at 09:01

## 2022-01-01 RX ADMIN — FENTANYL CITRATE 25 MCG: 50 INJECTION INTRAMUSCULAR; INTRAVENOUS at 05:02

## 2022-01-01 RX ADMIN — POTASSIUM & SODIUM PHOSPHATES POWDER PACK 280-160-250 MG 2 PACKET: 280-160-250 PACK at 12:01

## 2022-01-01 RX ADMIN — PIPERACILLIN AND TAZOBACTAM 4.5 G: 4; .5 INJECTION, POWDER, LYOPHILIZED, FOR SOLUTION INTRAVENOUS; PARENTERAL at 01:01

## 2022-01-01 RX ADMIN — HYDROMORPHONE HYDROCHLORIDE 0.2 MG: 1 INJECTION, SOLUTION INTRAMUSCULAR; INTRAVENOUS; SUBCUTANEOUS at 01:02

## 2022-01-01 RX ADMIN — DEXMEDETOMIDINE HYDROCHLORIDE 1.2 MCG/KG/HR: 4 INJECTION, SOLUTION INTRAVENOUS at 12:01

## 2022-01-01 RX ADMIN — SODIUM CHLORIDE 1 UNITS/HR: 9 INJECTION, SOLUTION INTRAVENOUS at 11:01

## 2022-01-01 RX ADMIN — INSULIN ASPART 4 UNITS: 100 INJECTION, SOLUTION INTRAVENOUS; SUBCUTANEOUS at 11:01

## 2022-01-01 RX ADMIN — SODIUM PHOSPHATE, MONOBASIC, MONOHYDRATE 39.99 MMOL: 276; 142 INJECTION, SOLUTION INTRAVENOUS at 09:02

## 2022-01-01 RX ADMIN — VASOPRESSIN 0.04 UNITS/MIN: 20 INJECTION INTRAVENOUS at 05:02

## 2022-01-01 RX ADMIN — SODIUM CHLORIDE 200 ML/HR: 0.9 INJECTION, SOLUTION INTRAVENOUS at 04:01

## 2022-01-01 RX ADMIN — PANTOPRAZOLE SODIUM 40 MG: 40 INJECTION, POWDER, FOR SOLUTION INTRAVENOUS at 12:01

## 2022-01-01 RX ADMIN — INSULIN ASPART 5 UNITS: 100 INJECTION, SOLUTION INTRAVENOUS; SUBCUTANEOUS at 05:02

## 2022-01-01 RX ADMIN — NOREPINEPHRINE BITARTRATE 0.02 MCG/KG/MIN: 4 INJECTION, SOLUTION INTRAVENOUS at 11:02

## 2022-01-01 RX ADMIN — SUCCINYLCHOLINE CHLORIDE 100 MG: 20 INJECTION, SOLUTION INTRAMUSCULAR; INTRAVENOUS; PARENTERAL at 05:01

## 2022-01-01 RX ADMIN — FAMOTIDINE 20 MG: 10 INJECTION, SOLUTION INTRAVENOUS at 10:01

## 2022-01-01 RX ADMIN — NOREPINEPHRINE BITARTRATE 4 MG: 4 INJECTION, SOLUTION INTRAVENOUS at 05:01

## 2022-01-01 RX ADMIN — FUROSEMIDE 80 MG: 10 INJECTION, SOLUTION INTRAMUSCULAR; INTRAVENOUS at 05:01

## 2022-01-01 RX ADMIN — MAGNESIUM SULFATE IN WATER 2 G: 40 INJECTION, SOLUTION INTRAVENOUS at 11:01

## 2022-01-01 RX ADMIN — AMIODARONE HYDROCHLORIDE 0.5 MG/MIN: 1.8 INJECTION, SOLUTION INTRAVENOUS at 11:01

## 2022-01-01 RX ADMIN — MINERAL OIL AND WHITE PETROLATUM: 30; 940 OINTMENT OPHTHALMIC at 10:02

## 2022-01-01 RX ADMIN — INSULIN ASPART 4 UNITS: 100 INJECTION, SOLUTION INTRAVENOUS; SUBCUTANEOUS at 02:02

## 2022-01-01 RX ADMIN — INSULIN ASPART 4 UNITS: 100 INJECTION, SOLUTION INTRAVENOUS; SUBCUTANEOUS at 03:01

## 2022-01-01 RX ADMIN — OXYCODONE HYDROCHLORIDE 5 MG: 5 SOLUTION ORAL at 02:02

## 2022-01-01 RX ADMIN — ACETAMINOPHEN 650 MG: 160 SOLUTION ORAL at 06:02

## 2022-01-01 RX ADMIN — SODIUM CHLORIDE 200 ML/HR: 0.9 INJECTION, SOLUTION INTRAVENOUS at 02:02

## 2022-01-01 RX ADMIN — SODIUM CHLORIDE: 0.9 INJECTION, SOLUTION INTRAVENOUS at 05:02

## 2022-01-01 RX ADMIN — ALBUMIN (HUMAN) 12.5 G: 12.5 SOLUTION INTRAVENOUS at 06:01

## 2022-01-01 RX ADMIN — NEOSTIGMINE METHYLSULFATE 2 MG: 0.5 INJECTION INTRAVENOUS at 02:01

## 2022-01-01 RX ADMIN — FENTANYL CITRATE 25 MCG: 50 INJECTION INTRAMUSCULAR; INTRAVENOUS at 06:01

## 2022-01-01 RX ADMIN — VASOPRESSIN 0.02 UNITS/MIN: 20 INJECTION INTRAVENOUS at 01:02

## 2022-01-01 RX ADMIN — INSULIN ASPART 2 UNITS: 100 INJECTION, SOLUTION INTRAVENOUS; SUBCUTANEOUS at 02:01

## 2022-01-01 RX ADMIN — INSULIN ASPART 1 UNITS: 100 INJECTION, SOLUTION INTRAVENOUS; SUBCUTANEOUS at 12:02

## 2022-01-01 RX ADMIN — NOREPINEPHRINE BITARTRATE 0.1 MCG/KG/MIN: 4 INJECTION, SOLUTION INTRAVENOUS at 06:01

## 2022-01-01 RX ADMIN — INSULIN ASPART 3 UNITS: 100 INJECTION, SOLUTION INTRAVENOUS; SUBCUTANEOUS at 08:02

## 2022-01-01 RX ADMIN — INSULIN ASPART 6 UNITS: 100 INJECTION, SOLUTION INTRAVENOUS; SUBCUTANEOUS at 03:02

## 2022-01-01 RX ADMIN — NOREPINEPHRINE BITARTRATE 0.08 MCG/KG/MIN: 4 INJECTION, SOLUTION INTRAVENOUS at 03:02

## 2022-01-01 RX ADMIN — FUROSEMIDE 80 MG: 10 INJECTION, SOLUTION INTRAMUSCULAR; INTRAVENOUS at 01:01

## 2022-01-01 RX ADMIN — Medication 1 PACKET: at 08:01

## 2022-01-01 RX ADMIN — LIDOCAINE HYDROCHLORIDE 100 MG: 20 INJECTION, SOLUTION EPIDURAL; INFILTRATION; INTRACAUDAL; PERINEURAL at 10:01

## 2022-01-01 RX ADMIN — OXYCODONE HYDROCHLORIDE 5 MG: 5 SOLUTION ORAL at 06:02

## 2022-01-01 RX ADMIN — FUROSEMIDE 20 MG: 10 INJECTION, SOLUTION INTRAMUSCULAR; INTRAVENOUS at 09:01

## 2022-01-01 RX ADMIN — DEXTROSE 125 ML: 10 SOLUTION INTRAVENOUS at 08:02

## 2022-01-01 RX ADMIN — CALCIUM GLUCONATE 2 G: 98 INJECTION, SOLUTION INTRAVENOUS at 08:01

## 2022-01-01 RX ADMIN — ROCURONIUM BROMIDE 30 MG: 10 INJECTION, SOLUTION INTRAVENOUS at 11:01

## 2022-01-01 RX ADMIN — CISATRACURIUM BESYLATE 5.5 MCG/KG/MIN: 10 INJECTION, SOLUTION INTRAVENOUS at 05:02

## 2022-01-01 RX ADMIN — DEXTROSE 125 ML: 10 SOLUTION INTRAVENOUS at 09:02

## 2022-01-01 RX ADMIN — MIDODRINE HYDROCHLORIDE 10 MG: 5 TABLET ORAL at 02:01

## 2022-01-01 RX ADMIN — IPRATROPIUM BROMIDE AND ALBUTEROL SULFATE 3 ML: 2.5; .5 SOLUTION RESPIRATORY (INHALATION) at 03:01

## 2022-01-01 RX ADMIN — GUAIFENESIN 200 MG: 100 SOLUTION ORAL at 06:02

## 2022-01-01 RX ADMIN — SODIUM CHLORIDE: 0.9 INJECTION, SOLUTION INTRAVENOUS at 01:02

## 2022-01-01 RX ADMIN — INSULIN ASPART 2 UNITS: 100 INJECTION, SOLUTION INTRAVENOUS; SUBCUTANEOUS at 01:01

## 2022-01-01 RX ADMIN — LIDOCAINE HYDROCHLORIDE 50 MG: 20 INJECTION, SOLUTION EPIDURAL; INFILTRATION; INTRACAUDAL; PERINEURAL at 08:01

## 2022-01-01 RX ADMIN — PHYTONADIONE 2.5 MG: 10 INJECTION, EMULSION INTRAMUSCULAR; INTRAVENOUS; SUBCUTANEOUS at 10:01

## 2022-01-01 RX ADMIN — ALBUMIN (HUMAN) 12.5 G: 0.25 INJECTION, SOLUTION INTRAVENOUS at 05:01

## 2022-01-01 RX ADMIN — SODIUM PHOSPHATE, MONOBASIC, MONOHYDRATE 30 MMOL: 276; 142 INJECTION, SOLUTION INTRAVENOUS at 04:01

## 2022-01-01 RX ADMIN — EPINEPHRINE 0.04 MCG/KG/MIN: 1 INJECTION INTRAMUSCULAR; INTRAVENOUS; SUBCUTANEOUS at 03:01

## 2022-01-01 RX ADMIN — MAGNESIUM SULFATE IN WATER 2 G: 40 INJECTION, SOLUTION INTRAVENOUS at 05:02

## 2022-01-01 RX ADMIN — CALCIUM GLUCONATE 2 G: 98 INJECTION, SOLUTION INTRAVENOUS at 12:02

## 2022-01-01 RX ADMIN — HYDROXYZINE HYDROCHLORIDE 10 MG: 10 SOLUTION ORAL at 09:01

## 2022-01-01 RX ADMIN — FENTANYL CITRATE 25 MCG: 50 INJECTION INTRAMUSCULAR; INTRAVENOUS at 08:01

## 2022-01-01 RX ADMIN — SODIUM CHLORIDE, SODIUM LACTATE, POTASSIUM CHLORIDE, AND CALCIUM CHLORIDE 500 ML: .6; .31; .03; .02 INJECTION, SOLUTION INTRAVENOUS at 05:01

## 2022-01-01 RX ADMIN — OXYCODONE HYDROCHLORIDE 5 MG: 5 SOLUTION ORAL at 10:02

## 2022-01-01 RX ADMIN — SEVELAMER CARBONATE 0.8 G: 0.8 POWDER, FOR SUSPENSION ORAL at 02:02

## 2022-01-01 RX ADMIN — SODIUM CHLORIDE: 0.9 INJECTION, SOLUTION INTRAVENOUS at 11:01

## 2022-01-01 RX ADMIN — MIDODRINE HYDROCHLORIDE 15 MG: 5 TABLET ORAL at 03:02

## 2022-01-01 RX ADMIN — GUAIFENESIN 200 MG: 100 SOLUTION ORAL at 02:02

## 2022-01-01 RX ADMIN — INSULIN ASPART 3 UNITS: 100 INJECTION, SOLUTION INTRAVENOUS; SUBCUTANEOUS at 12:01

## 2022-01-01 RX ADMIN — HYDROMORPHONE HYDROCHLORIDE 0.5 MG: 1 INJECTION, SOLUTION INTRAMUSCULAR; INTRAVENOUS; SUBCUTANEOUS at 05:01

## 2022-01-01 RX ADMIN — OXYCODONE 10 MG: 5 TABLET ORAL at 10:01

## 2022-01-01 RX ADMIN — MAGNESIUM SULFATE IN WATER 2 G: 40 INJECTION, SOLUTION INTRAVENOUS at 09:02

## 2022-01-01 RX ADMIN — NOREPINEPHRINE BITARTRATE 0.02 MCG/KG/MIN: 4 INJECTION, SOLUTION INTRAVENOUS at 09:01

## 2022-01-01 RX ADMIN — CALCIUM CHLORIDE 0.5 MG: 100 INJECTION, SOLUTION INTRAVENOUS at 12:01

## 2022-01-01 RX ADMIN — PHYTONADIONE 5 MG: 10 INJECTION, EMULSION INTRAMUSCULAR; INTRAVENOUS; SUBCUTANEOUS at 10:01

## 2022-01-01 RX ADMIN — Medication 1 PACKET: at 11:01

## 2022-01-01 RX ADMIN — OXYCODONE 5 MG: 5 TABLET ORAL at 09:01

## 2022-01-01 RX ADMIN — MAGNESIUM SULFATE IN WATER 2 G: 40 INJECTION, SOLUTION INTRAVENOUS at 05:01

## 2022-01-01 RX ADMIN — FENTANYL CITRATE 25 MCG: 50 INJECTION INTRAMUSCULAR; INTRAVENOUS at 04:01

## 2022-01-01 RX ADMIN — NOREPINEPHRINE BITARTRATE 16 MCG: 1 INJECTION, SOLUTION, CONCENTRATE INTRAVENOUS at 09:01

## 2022-01-01 RX ADMIN — PHENYLEPHRINE HYDROCHLORIDE 50 MCG: 10 INJECTION INTRAVENOUS at 10:01

## 2022-01-01 RX ADMIN — INSULIN ASPART 4 UNITS: 100 INJECTION, SOLUTION INTRAVENOUS; SUBCUTANEOUS at 12:01

## 2022-01-01 RX ADMIN — WARFARIN SODIUM 2 MG: 2 TABLET ORAL at 04:01

## 2022-01-01 RX ADMIN — NOREPINEPHRINE BITARTRATE 8 MCG: 1 INJECTION, SOLUTION, CONCENTRATE INTRAVENOUS at 09:01

## 2022-01-01 RX ADMIN — INSULIN ASPART 2 UNITS: 100 INJECTION, SOLUTION INTRAVENOUS; SUBCUTANEOUS at 08:02

## 2022-01-01 RX ADMIN — AMIODARONE HYDROCHLORIDE 150 MG: 1.5 INJECTION, SOLUTION INTRAVENOUS at 05:01

## 2022-01-01 RX ADMIN — SODIUM CHLORIDE 200 ML/HR: 0.9 INJECTION, SOLUTION INTRAVENOUS at 10:01

## 2022-01-01 RX ADMIN — INSULIN ASPART 1 UNITS: 100 INJECTION, SOLUTION INTRAVENOUS; SUBCUTANEOUS at 09:01

## 2022-01-01 RX ADMIN — SODIUM CHLORIDE: 0.9 INJECTION, SOLUTION INTRAVENOUS at 12:01

## 2022-01-01 RX ADMIN — MUPIROCIN: 20 OINTMENT TOPICAL at 07:01

## 2022-01-01 RX ADMIN — POTASSIUM CHLORIDE 40 MEQ: 29.8 INJECTION, SOLUTION INTRAVENOUS at 05:01

## 2022-01-01 RX ADMIN — DEXMEDETOMIDINE HYDROCHLORIDE 0.2 MCG/KG/HR: 4 INJECTION, SOLUTION INTRAVENOUS at 09:01

## 2022-01-01 RX ADMIN — EPINEPHRINE 0.08 MCG/KG/MIN: 1 INJECTION INTRAMUSCULAR; INTRAVENOUS; SUBCUTANEOUS at 11:01

## 2022-01-01 RX ADMIN — PROPOFOL 25 MCG/KG/MIN: 10 INJECTION, EMULSION INTRAVENOUS at 10:02

## 2022-01-01 RX ADMIN — CISATRACURIUM BESYLATE 4 MCG/KG/MIN: 10 INJECTION, SOLUTION INTRAVENOUS at 12:02

## 2022-01-01 RX ADMIN — PROPOFOL 25 MCG/KG/MIN: 10 INJECTION, EMULSION INTRAVENOUS at 05:01

## 2022-01-01 RX ADMIN — HEPARIN SODIUM 100 UNITS: 1000 INJECTION, SOLUTION INTRAVENOUS; SUBCUTANEOUS at 01:01

## 2022-01-01 RX ADMIN — PROPOFOL 10 MCG/KG/MIN: 10 INJECTION, EMULSION INTRAVENOUS at 04:02

## 2022-01-01 RX ADMIN — PHYTONADIONE 5 MG: 10 INJECTION, EMULSION INTRAMUSCULAR; INTRAVENOUS; SUBCUTANEOUS at 06:01

## 2022-01-01 RX ADMIN — INSULIN ASPART 3 UNITS: 100 INJECTION, SOLUTION INTRAVENOUS; SUBCUTANEOUS at 03:01

## 2022-01-01 RX ADMIN — DEXMEDETOMIDINE HYDROCHLORIDE 1.4 MCG/KG/HR: 4 INJECTION, SOLUTION INTRAVENOUS at 01:01

## 2022-01-01 RX ADMIN — EPINEPHRINE 0.04 MCG/KG/MIN: 1 INJECTION INTRAMUSCULAR; INTRAVENOUS; SUBCUTANEOUS at 12:01

## 2022-01-01 RX ADMIN — VASOPRESSIN 0.08 UNITS/MIN: 20 INJECTION INTRAVENOUS at 05:02

## 2022-01-01 RX ADMIN — ROCURONIUM BROMIDE 20 MG: 10 INJECTION, SOLUTION INTRAVENOUS at 10:01

## 2022-01-01 RX ADMIN — NOREPINEPHRINE BITARTRATE 0.14 MCG/KG/MIN: 4 INJECTION, SOLUTION INTRAVENOUS at 02:02

## 2022-01-01 RX ADMIN — FLUCONAZOLE 200 MG: 2 INJECTION, SOLUTION INTRAVENOUS at 05:02

## 2022-01-01 RX ADMIN — ALBUMIN (HUMAN) 12.5 G: 12.5 SOLUTION INTRAVENOUS at 03:01

## 2022-01-01 RX ADMIN — VASOPRESSIN 3 UNITS: 20 INJECTION, SOLUTION INTRAMUSCULAR; SUBCUTANEOUS at 01:01

## 2022-01-01 RX ADMIN — AMIODARONE HYDROCHLORIDE 400 MG: 200 TABLET ORAL at 12:01

## 2022-01-01 RX ADMIN — ALBUMIN (HUMAN) 25 G: 12.5 INJECTION, SOLUTION INTRAVENOUS at 07:01

## 2022-01-01 RX ADMIN — INSULIN ASPART 2 UNITS: 100 INJECTION, SOLUTION INTRAVENOUS; SUBCUTANEOUS at 07:02

## 2022-01-01 RX ADMIN — MIDAZOLAM HYDROCHLORIDE 2 MG: 1 INJECTION, SOLUTION INTRAMUSCULAR; INTRAVENOUS at 11:01

## 2022-01-01 RX ADMIN — SODIUM CHLORIDE: 0.9 INJECTION, SOLUTION INTRAVENOUS at 01:01

## 2022-01-01 RX ADMIN — CEFAZOLIN 2 G: 330 INJECTION, POWDER, FOR SOLUTION INTRAMUSCULAR; INTRAVENOUS at 12:01

## 2022-01-01 RX ADMIN — SODIUM CHLORIDE: 0.9 INJECTION, SOLUTION INTRAVENOUS at 08:02

## 2022-01-01 RX ADMIN — INSULIN ASPART 1 UNITS: 100 INJECTION, SOLUTION INTRAVENOUS; SUBCUTANEOUS at 11:01

## 2022-01-01 RX ADMIN — AMIODARONE HYDROCHLORIDE 1 MG/MIN: 50 INJECTION, SOLUTION INTRAVENOUS at 09:02

## 2022-01-01 RX ADMIN — CLEVIPIDINE 2 MG/HR: 0.5 EMULSION INTRAVENOUS at 05:01

## 2022-01-01 RX ADMIN — INSULIN ASPART 3 UNITS: 100 INJECTION, SOLUTION INTRAVENOUS; SUBCUTANEOUS at 04:01

## 2022-01-01 RX ADMIN — CALCIUM GLUCONATE 1 G: 98 INJECTION, SOLUTION INTRAVENOUS at 12:01

## 2022-01-01 RX ADMIN — SODIUM PHOSPHATE, MONOBASIC, MONOHYDRATE 39.99 MMOL: 276; 142 INJECTION, SOLUTION INTRAVENOUS at 12:02

## 2022-01-01 RX ADMIN — HEPARIN SODIUM 500 UNITS/HR: 1000 INJECTION, SOLUTION INTRAVENOUS; SUBCUTANEOUS at 09:01

## 2022-01-01 RX ADMIN — FENTANYL CITRATE 25 MCG: 50 INJECTION, SOLUTION INTRAMUSCULAR; INTRAVENOUS at 06:02

## 2022-01-01 RX ADMIN — HEPARIN SODIUM 500 UNITS/HR: 1000 INJECTION, SOLUTION INTRAVENOUS; SUBCUTANEOUS at 01:01

## 2022-01-01 RX ADMIN — ATORVASTATIN CALCIUM 40 MG: 10 TABLET, FILM COATED ORAL at 10:02

## 2022-01-01 RX ADMIN — INSULIN ASPART 4 UNITS: 100 INJECTION, SOLUTION INTRAVENOUS; SUBCUTANEOUS at 05:01

## 2022-01-01 RX ADMIN — Medication 50 MCG/HR: at 12:02

## 2022-01-01 RX ADMIN — WARFARIN SODIUM 5 MG: 5 TABLET ORAL at 05:01

## 2022-01-01 RX ADMIN — EPINEPHRINE 0.02 MCG/KG/MIN: 1 INJECTION INTRAMUSCULAR; INTRAVENOUS; SUBCUTANEOUS at 05:02

## 2022-01-01 RX ADMIN — ACETAMINOPHEN 650 MG: 160 SOLUTION ORAL at 06:01

## 2022-01-01 RX ADMIN — Medication: at 02:01

## 2022-01-01 RX ADMIN — ACETAMINOPHEN 1000 MG: 10 INJECTION, SOLUTION INTRAVENOUS at 08:01

## 2022-01-01 RX ADMIN — DEXMEDETOMIDINE HYDROCHLORIDE 1.4 MCG/KG/HR: 4 INJECTION, SOLUTION INTRAVENOUS at 06:01

## 2022-01-01 RX ADMIN — PANTOPRAZOLE SODIUM 40 MG: 40 INJECTION, POWDER, FOR SOLUTION INTRAVENOUS at 10:02

## 2022-01-01 RX ADMIN — ROCURONIUM BROMIDE 50 MG: 10 INJECTION, SOLUTION INTRAVENOUS at 09:01

## 2022-01-01 RX ADMIN — FUROSEMIDE 30 MG/HR: 10 INJECTION, SOLUTION INTRAMUSCULAR; INTRAVENOUS at 09:01

## 2022-01-01 RX ADMIN — MIDODRINE HYDROCHLORIDE 15 MG: 5 TABLET ORAL at 06:02

## 2022-01-01 RX ADMIN — CALCIUM ACETATE 1334 MG: 667 CAPSULE ORAL at 07:01

## 2022-01-01 RX ADMIN — FUROSEMIDE 80 MG: 10 INJECTION, SOLUTION INTRAMUSCULAR; INTRAVENOUS at 01:02

## 2022-01-01 RX ADMIN — OXYCODONE 5 MG: 5 TABLET ORAL at 06:01

## 2022-01-01 RX ADMIN — HEPARIN SODIUM 15000 UNITS: 1000 INJECTION, SOLUTION INTRAVENOUS; SUBCUTANEOUS at 09:01

## 2022-01-01 RX ADMIN — METOROPROLOL TARTRATE 5 MG: 5 INJECTION, SOLUTION INTRAVENOUS at 07:01

## 2022-01-01 RX ADMIN — FUROSEMIDE 30 MG/HR: 10 INJECTION, SOLUTION INTRAMUSCULAR; INTRAVENOUS at 10:01

## 2022-01-01 RX ADMIN — MUPIROCIN: 20 OINTMENT TOPICAL at 10:01

## 2022-01-01 RX ADMIN — VASOPRESSIN 0.08 UNITS/MIN: 20 INJECTION INTRAVENOUS at 01:02

## 2022-01-01 RX ADMIN — INSULIN ASPART 3 UNITS: 100 INJECTION, SOLUTION INTRAVENOUS; SUBCUTANEOUS at 05:01

## 2022-01-01 RX ADMIN — METOPROLOL SUCCINATE 12.5 MG: 25 TABLET, EXTENDED RELEASE ORAL at 09:01

## 2022-01-01 RX ADMIN — INSULIN ASPART 6 UNITS: 100 INJECTION, SOLUTION INTRAVENOUS; SUBCUTANEOUS at 06:02

## 2022-01-01 RX ADMIN — CALCIUM GLUCONATE 2 G: 98 INJECTION, SOLUTION INTRAVENOUS at 10:01

## 2022-01-01 RX ADMIN — MAGNESIUM SULFATE 2 G: 2 INJECTION INTRAVENOUS at 05:01

## 2022-01-01 RX ADMIN — FUROSEMIDE 20 MG: 10 INJECTION, SOLUTION INTRAMUSCULAR; INTRAVENOUS at 08:01

## 2022-01-01 RX ADMIN — MAGNESIUM SULFATE 2 G: 2 INJECTION INTRAVENOUS at 04:01

## 2022-01-01 RX ADMIN — LIDOCAINE 1 PATCH: 50 PATCH CUTANEOUS at 10:01

## 2022-01-01 RX ADMIN — HEPARIN SODIUM AND DEXTROSE 500 UNITS/HR: 10000; 5 INJECTION INTRAVENOUS at 12:01

## 2022-01-01 RX ADMIN — CEFAZOLIN 2 G: 330 INJECTION, POWDER, FOR SOLUTION INTRAMUSCULAR; INTRAVENOUS at 08:01

## 2022-01-01 RX ADMIN — QUETIAPINE FUMARATE 25 MG: 25 TABLET ORAL at 09:02

## 2022-01-01 RX ADMIN — ASPIRIN 325 MG ORAL TABLET 325 MG: 325 PILL ORAL at 06:01

## 2022-01-01 RX ADMIN — VASOPRESSIN 0.02 UNITS/MIN: 20 INJECTION INTRAVENOUS at 12:01

## 2022-01-01 RX ADMIN — PROPOFOL 10 MCG/KG/MIN: 10 INJECTION, EMULSION INTRAVENOUS at 03:01

## 2022-01-01 RX ADMIN — VANCOMYCIN HYDROCHLORIDE 500 MG: 500 INJECTION, POWDER, LYOPHILIZED, FOR SOLUTION INTRAVENOUS at 03:02

## 2022-01-01 RX ADMIN — HEPARIN SODIUM 400 UNITS/HR: 1000 INJECTION INTRAVENOUS; SUBCUTANEOUS at 06:01

## 2022-01-01 RX ADMIN — Medication 75 MCG/HR: at 05:02

## 2022-01-01 RX ADMIN — POTASSIUM CHLORIDE 40 MEQ: 29.8 INJECTION, SOLUTION INTRAVENOUS at 06:01

## 2022-01-01 RX ADMIN — EPINEPHRINE 0.04 MCG/KG/MIN: 1 INJECTION INTRAMUSCULAR; INTRAVENOUS; SUBCUTANEOUS at 06:01

## 2022-01-01 RX ADMIN — HYDROMORPHONE HYDROCHLORIDE 0.2 MG: 1 INJECTION, SOLUTION INTRAMUSCULAR; INTRAVENOUS; SUBCUTANEOUS at 05:01

## 2022-01-01 RX ADMIN — HYDROCORTISONE SODIUM SUCCINATE 100 MG: 100 INJECTION, POWDER, FOR SOLUTION INTRAMUSCULAR; INTRAVENOUS at 01:02

## 2022-01-01 RX ADMIN — ALBUMIN (HUMAN) 25 G: 12.5 SOLUTION INTRAVENOUS at 04:01

## 2022-01-01 RX ADMIN — ACETAMINOPHEN 650 MG: 160 SOLUTION ORAL at 07:02

## 2022-01-01 RX ADMIN — VASOPRESSIN 0.02 UNITS/MIN: 20 INJECTION INTRAVENOUS at 02:02

## 2022-01-01 RX ADMIN — VANCOMYCIN HYDROCHLORIDE 500 MG: 500 INJECTION, POWDER, LYOPHILIZED, FOR SOLUTION INTRAVENOUS at 12:02

## 2022-01-01 RX ADMIN — Medication 0.02 MCG/KG/MIN: at 03:02

## 2022-01-01 RX ADMIN — POTASSIUM CHLORIDE 20 MEQ: 200 INJECTION, SOLUTION INTRAVENOUS at 04:01

## 2022-01-01 RX ADMIN — POTASSIUM CHLORIDE 10 MEQ: 7.46 INJECTION, SOLUTION INTRAVENOUS at 05:01

## 2022-01-01 RX ADMIN — MEROPENEM AND SODIUM CHLORIDE 500 MG: 500 INJECTION, SOLUTION INTRAVENOUS at 10:02

## 2022-01-01 RX ADMIN — CALCIUM CHLORIDE INJECTION 1 G: 100 INJECTION, SOLUTION INTRAVENOUS at 12:01

## 2022-01-01 RX ADMIN — MIDODRINE HYDROCHLORIDE 15 MG: 5 TABLET ORAL at 08:02

## 2022-01-01 RX ADMIN — HYDROXYZINE HYDROCHLORIDE 10 MG: 10 SOLUTION ORAL at 11:02

## 2022-01-01 RX ADMIN — ETOMIDATE 10 MG: 2 INJECTION INTRAVENOUS at 05:01

## 2022-01-01 RX ADMIN — METOROPROLOL TARTRATE 5 MG: 5 INJECTION, SOLUTION INTRAVENOUS at 10:01

## 2022-01-01 RX ADMIN — OXYCODONE HYDROCHLORIDE 5 MG: 5 SOLUTION ORAL at 08:02

## 2022-01-01 RX ADMIN — HALOPERIDOL LACTATE 2 MG: 5 INJECTION, SOLUTION INTRAMUSCULAR at 09:02

## 2022-01-01 RX ADMIN — ACETAMINOPHEN 650 MG: 160 SOLUTION ORAL at 01:01

## 2022-01-01 RX ADMIN — OXYCODONE HYDROCHLORIDE 5 MG: 5 SOLUTION ORAL at 11:01

## 2022-01-01 RX ADMIN — VASOPRESSIN 0.04 UNITS/MIN: 20 INJECTION INTRAVENOUS at 06:02

## 2022-01-01 RX ADMIN — FAMOTIDINE 20 MG: 10 INJECTION, SOLUTION INTRAVENOUS at 09:01

## 2022-01-01 RX ADMIN — CALCIUM GLUCONATE 1 G: 98 INJECTION, SOLUTION INTRAVENOUS at 11:02

## 2022-01-01 RX ADMIN — HYDROMORPHONE HYDROCHLORIDE 0.2 MG: 1 INJECTION, SOLUTION INTRAMUSCULAR; INTRAVENOUS; SUBCUTANEOUS at 02:02

## 2022-01-01 RX ADMIN — DEXMEDETOMIDINE HYDROCHLORIDE 0.1 MCG/KG/HR: 4 INJECTION, SOLUTION INTRAVENOUS at 12:01

## 2022-01-01 RX ADMIN — PROPOFOL 20 MCG/KG/MIN: 10 INJECTION, EMULSION INTRAVENOUS at 10:01

## 2022-01-01 RX ADMIN — HYDROXYZINE HYDROCHLORIDE 10 MG: 10 SOLUTION ORAL at 08:02

## 2022-01-01 RX ADMIN — FENTANYL CITRATE 25 MCG: 50 INJECTION INTRAMUSCULAR; INTRAVENOUS at 11:01

## 2022-01-01 RX ADMIN — FENTANYL CITRATE 50 MCG: 50 INJECTION, SOLUTION INTRAMUSCULAR; INTRAVENOUS at 01:01

## 2022-01-01 RX ADMIN — Medication: at 10:01

## 2022-01-01 RX ADMIN — Medication 50 MCG/HR: at 06:02

## 2022-01-01 RX ADMIN — SODIUM CHLORIDE TAB 1 GM 2 G: 1 TAB at 09:02

## 2022-01-01 RX ADMIN — MIDAZOLAM HYDROCHLORIDE 2 MG: 1 INJECTION, SOLUTION INTRAMUSCULAR; INTRAVENOUS at 09:01

## 2022-01-01 RX ADMIN — MIDODRINE HYDROCHLORIDE 15 MG: 5 TABLET ORAL at 04:01

## 2022-01-01 RX ADMIN — INSULIN ASPART 3 UNITS: 100 INJECTION, SOLUTION INTRAVENOUS; SUBCUTANEOUS at 12:02

## 2022-01-01 RX ADMIN — CEFAZOLIN SODIUM 2 G: 2 SOLUTION INTRAVENOUS at 08:01

## 2022-01-01 RX ADMIN — FENTANYL CITRATE 25 MCG: 50 INJECTION INTRAMUSCULAR; INTRAVENOUS at 05:01

## 2022-01-01 RX ADMIN — ACETAMINOPHEN 650 MG: 160 SOLUTION ORAL at 08:02

## 2022-01-01 RX ADMIN — FENTANYL CITRATE 25 MCG: 50 INJECTION INTRAMUSCULAR; INTRAVENOUS at 03:02

## 2022-01-01 RX ADMIN — METOPROLOL TARTRATE 25 MG: 25 TABLET, FILM COATED ORAL at 07:01

## 2022-01-01 RX ADMIN — VASOPRESSIN 0.08 UNITS/MIN: 20 INJECTION INTRAVENOUS at 03:02

## 2022-01-01 RX ADMIN — NOREPINEPHRINE BITARTRATE 0.04 MCG/KG/MIN: 4 INJECTION, SOLUTION INTRAVENOUS at 03:01

## 2022-01-01 RX ADMIN — Medication 9 MG: at 10:01

## 2022-01-01 RX ADMIN — Medication 1 PACKET: at 02:02

## 2022-01-01 RX ADMIN — Medication 2 MG/HR: at 10:02

## 2022-01-01 RX ADMIN — METOROPROLOL TARTRATE 5 MG: 5 INJECTION, SOLUTION INTRAVENOUS at 03:01

## 2022-01-01 RX ADMIN — VASOPRESSIN 0.04 UNITS/MIN: 20 INJECTION INTRAVENOUS at 07:01

## 2022-01-01 RX ADMIN — AMLODIPINE BESYLATE 5 MG: 5 TABLET ORAL at 09:01

## 2022-01-01 RX ADMIN — VASOPRESSIN 0.04 UNITS/MIN: 20 INJECTION INTRAVENOUS at 05:01

## 2022-01-01 RX ADMIN — INSULIN ASPART 6 UNITS: 100 INJECTION, SOLUTION INTRAVENOUS; SUBCUTANEOUS at 04:02

## 2022-01-01 RX ADMIN — PHENYLEPHRINE HYDROCHLORIDE 50 MCG: 10 INJECTION INTRAVENOUS at 11:01

## 2022-01-01 RX ADMIN — ACETAMINOPHEN 650 MG: 160 SOLUTION ORAL at 03:02

## 2022-01-01 RX ADMIN — HYDRALAZINE HYDROCHLORIDE 5 MG: 20 INJECTION INTRAMUSCULAR; INTRAVENOUS at 11:01

## 2022-01-01 RX ADMIN — TRANEXAMIC ACID 450 MG: 100 INJECTION, SOLUTION INTRAVENOUS at 08:01

## 2022-01-01 RX ADMIN — METOROPROLOL TARTRATE 5 MG: 5 INJECTION, SOLUTION INTRAVENOUS at 11:01

## 2022-01-01 RX ADMIN — IPRATROPIUM BROMIDE AND ALBUTEROL SULFATE 3 ML: 2.5; .5 SOLUTION RESPIRATORY (INHALATION) at 11:01

## 2022-01-01 RX ADMIN — HEPARIN SODIUM 600 UNITS/HR: 1000 INJECTION INTRAVENOUS; SUBCUTANEOUS at 03:01

## 2022-01-01 RX ADMIN — CHLOROTHIAZIDE SODIUM 500 MG: 500 INJECTION, POWDER, LYOPHILIZED, FOR SOLUTION INTRAVENOUS at 10:01

## 2022-01-01 RX ADMIN — SODIUM BICARBONATE 50 MEQ: 84 INJECTION, SOLUTION INTRAVENOUS at 07:02

## 2022-01-01 RX ADMIN — OXYCODONE HYDROCHLORIDE 5 MG: 5 SOLUTION ORAL at 04:02

## 2022-01-03 NOTE — PROGRESS NOTES
Ms. Ty is a patient of Dr. Ring and was last seen in clinic 7/10/2020.      Subjective:   Patient ID:  Orion Ty is a 76 y.o. female who presents for follow-up of Pacemaker Check and Atrial Fibrillation  .     HPI:    Ms. Ty is a 76 y.o. female with tachy-gail syndrome, chronic atrial fibrillation, PPM, Htn, Mechanical MVR and AVR, thrombocytopenia here for follow up.     Background:     Primary cardiologist is Dr. Boateng.  Has single chamber PPM, implanted 11/6/89.  Had undergone 3 generator changes.  Device has reached CHRISTI.  Device is right sided.  Notes stable dyspnea on exertion.  Denies syncope.  Notes fatigue.  Echo 12/10/19 normal biventricular structure and function, severe bi-atrial enlargement, well seated aortic and mitral prostheses.  Has chronic thrombocytopenia, with platelets generally in the 90's.  Device at CHRISTI.  Risks and benefits of generator change discussed with patient.  Continue coumadin veronika-procedure, targeting and INR of 2-3.    3/2/2020: Successful generator change.  Chronic persistent AF on warfarin. 13% RV pacing which is stable for her. Echo from 2/2020 showed normal EF.    Update (01/03/2022):    12/2020: CHF  1/2021: Dr. 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  We discussed that the only effective treatment would be re-replacement of the aortic valve.  She indicated that she feels very weak, and is mentally not prepared to undergo another heart operation at that time  12/21/2021: LHC in prep for AVR showed normal coronaries.    Today she says she has chronic MARTÍNEZ. Also fatigue and loss of appetite. AVR scheduled for 1/5/2022. No CP, palpitations, LH, syncope.    She is currently taking warfarin for stroke prophylaxis and denies significant bleeding episodes. She is currently being treated with lopressor 25mg BID for HR control.  Kidney function is stable, with a creatinine of 0.8 on 12/21/2021.    Device  Interrogation (1/3/2022) reveals an intrinsic AF with stable lead and device function. 100% AF (chronic). No ventricular arrhythmias. She paces 29% in the RV. Estimated battery longevity 12.8 years.     I have personally reviewed the patient's EKG today, which shows AF at 61bpm. QRS is 108. QTc is 416.    Relevant Cardiac Test Results:    2D Echo (10/7/2021):  · The left ventricle is normal in size with concentric hypertrophy and normal systolic function.  · The estimated ejection fraction is 60%.  · Indeterminate left ventricular diastolic function.  · Mild right ventricular enlargement with mildly reduced right ventricular systolic function.  · Severe left atrial enlargement.  · Severe right atrial enlargement.  · There is a mechanical aortic valve present. There is mild central aortic insufficiency present. Prosthetic aortic valve has the following abnormalities: pannus.  · The aortic valve mean gradient is 38 mmHg with a dimensionless index of 0.44.  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 Prosthetic mitral valve is normal.  · Moderate tricuspid regurgitation.  · Mild pulmonic regurgitation.  · Normal central venous pressure (3 mmHg).  · The estimated PA systolic pressure is 62 mmHg.  · There is pulmonary hypertension.    Current Outpatient Medications   Medication Sig    amLODIPine (NORVASC) 5 MG tablet Take 1 tablet (5 mg total) by mouth once daily.    amoxicillin (AMOXIL) 875 MG tablet     enoxaparin (LOVENOX) 40 mg/0.4 mL Syrg Inject 0.4 mLs (40 mg total) into the skin every 12 (twelve) hours.    furosemide (LASIX) 40 MG tablet Take 40 mg by mouth 2 (two) times daily.    losartan (COZAAR) 25 MG tablet Take 1 tablet (25 mg total) by mouth once daily.    metFORMIN (GLUCOPHAGE) 500 MG tablet TAKE 1 TABLET EVERY DAY    metoprolol tartrate (LOPRESSOR) 25 MG tablet TAKE 1 TABLET BY MOUTH TWICE DAILY    mv,calcium,min/iron/folic/vitK (ONE-A-DAY WOMEN'S COMPLETE ORAL) Take 1 tablet by mouth Daily.     nitroGLYCERIN (NITROSTAT) 0.4 MG SL tablet DISSOLVE 1 TABLET UNDER THE TONGUE EVERY 5 MINUTES AS NEEDED FOR CHEST PAINS    omega-3 acid ethyl esters (LOVAZA) 1 gram capsule TK 2 CS PO BID    pravastatin (PRAVACHOL) 20 MG tablet Take 1 tablet (20 mg total) by mouth once daily.    warfarin (COUMADIN) 4 MG tablet Take 1 tablet on Monday and Friday.  Take 1/2 tablet on all other days. (Patient taking differently: Take 1 tablet on Monday and Friday.  Take 1/2 tablet on all other days.)    lancets 30 gauge Misc Use as directed to check blood sugar twice a day (Patient not taking: No sig reported)     No current facility-administered medications for this visit.     Facility-Administered Medications Ordered in Other Visits   Medication    0.9%  NaCl infusion       Review of Systems   Constitutional: Positive for decreased appetite, malaise/fatigue and weight loss.   Cardiovascular: Positive for dyspnea on exertion. Negative for chest pain, irregular heartbeat, leg swelling and palpitations.   Respiratory: Positive for shortness of breath.    Hematologic/Lymphatic: Negative for bleeding problem.   Skin: Negative for rash.   Musculoskeletal: Negative for myalgias.   Gastrointestinal: Negative for hematemesis, hematochezia and nausea.   Genitourinary: Negative for hematuria.   Neurological: Negative for light-headedness.   Psychiatric/Behavioral: Negative for altered mental status.   Allergic/Immunologic: Negative for persistent infections.       Objective:          /62   Pulse (!) 54   Wt 43.5 kg (95 lb 14.4 oz)   BMI 18.12 kg/m²     Physical Exam  Vitals and nursing note reviewed.   Constitutional:       Appearance: Normal appearance. She is well-developed and well-nourished.   HENT:      Head: Normocephalic.      Nose: Nose normal.   Eyes:      Pupils: Pupils are equal, round, and reactive to light.   Cardiovascular:      Rate and Rhythm: Normal rate. Rhythm irregularly irregular.   Pulmonary:      Effort:  No respiratory distress.      Breath sounds: Normal breath sounds.   Chest:      Comments: Device to RUW. Incision and pocket in good repair.    Musculoskeletal:         General: No edema. Normal range of motion.   Skin:     General: Skin is warm and dry.      Findings: No erythema.   Neurological:      Mental Status: She is alert and oriented to person, place, and time.   Psychiatric:         Mood and Affect: Mood and affect normal.         Speech: Speech normal.         Behavior: Behavior normal.           Lab Results   Component Value Date     01/03/2022    K 4.1 01/03/2022    MG 1.8 12/12/2020    BUN 18 01/03/2022    CREATININE 0.9 01/03/2022    ALT 20 01/03/2022    AST 45 (H) 01/03/2022    HGB 10.8 (L) 01/03/2022    HCT 34.9 (L) 01/03/2022    TSH 5.783 (H) 06/25/2021    LDLCALC 93.8 06/25/2021       Recent Labs   Lab 12/10/21  1448 12/21/21  0728 12/27/21  0000 01/03/22  1246   INR 3.0 H 2.0 H 1.8 1.1         Assessment:     1. Cardiac pacemaker in situ    2. Chronic atrial fibrillation    3. Essential (primary) hypertension    4. Long term current use of anticoagulant      Plan:     In summary, Ms. Ty is a 76 y.o. female with tachy-gail syndrome, chronic atrial fibrillation, PPM, Htn, Mechanical MVR and AVR, thrombocytopenia here for follow up.  Ms. Ty is doing well from a device perspective with stable lead and device function. 100% AF on warfarin. No ventricular arrhythmia noted. 29% RV pacing. Echo 10/2021 shows preserved LV function.  Scheduled for AVR on Friday 1/5/2022.    Continue current medication regimen and device settings.   Follow up in device clinic as scheduled.   Follow up in EP clinic in 1 year, sooner as needed.     *A copy of this note has been sent to Dr. Ring*    Follow up in about 1 year (around 1/3/2023).    ------------------------------------------------------------------    GHAZALA White, NP-C  Cardiac Electrophysiology

## 2022-01-04 NOTE — H&P (VIEW-ONLY)
Subjective:      Patient ID: Orion Ty is a 76 y.o. female.    Chief Complaint: No chief complaint on file.      HPI:  Orion Ty is a 76 y.o. female who presents with  s/p MVR, AVR and TVr in 2010 by Dr. Haider. Pt had recent admission for HF and on ECHO showed stenosis or obstruction or prosthesis mismatch of aortic valve. Family states she is more fatigued, decreased appetite and fluid on lungs. Pt has increased symptoms and wants to proceed with cardiac surgery now and here for pre op     Current medications Reviewed    Review of Systems   Constitutional: Negative for activity change and fatigue.   Respiratory: Negative for cough and shortness of breath.    Cardiovascular: Negative for chest pain, palpitations and leg swelling.   Gastrointestinal: Negative for abdominal pain, nausea and vomiting.   Endocrine: Negative for polydipsia, polyphagia and polyuria.   Genitourinary: Negative for dysuria.   Musculoskeletal: Negative for gait problem.   Skin: Negative for rash.   Allergic/Immunologic: Negative for immunocompromised state.   Neurological: Negative for dizziness, syncope and weakness.   Hematological: Does not bruise/bleed easily.   Psychiatric/Behavioral: Negative for behavioral problems.     Objective:   Physical Exam  Constitutional:       Appearance: She is well-developed.   Cardiovascular:      Rate and Rhythm: Normal rate and regular rhythm.      Heart sounds: Normal heart sounds.   Pulmonary:      Effort: Pulmonary effort is normal.      Breath sounds: Normal breath sounds.   Abdominal:      Palpations: Abdomen is soft.   Musculoskeletal:         General: Normal range of motion.   Skin:     General: Skin is warm and dry.      Comments: Sternal Incision CDI   Neurological:      Mental Status: She is alert and oriented to person, place, and time.         Diagnotic Results:  ECHO: 10/7/21  · The left ventricle is normal in size with concentric hypertrophy and normal systolic function.  · The  estimated ejection fraction is 60%.  · Indeterminate left ventricular diastolic function.  · Mild right ventricular enlargement with mildly reduced right ventricular systolic function.  · Severe left atrial enlargement.  · Severe right atrial enlargement.  · There is a mechanical aortic valve present. There is mild central aortic insufficiency present. Prosthetic aortic valve has the following abnormalities: pannus.  · The aortic valve mean gradient is 38 mmHg with a dimensionless index of 0.44.  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 Prosthetic mitral valve is normal.  · Moderate tricuspid regurgitation.  · Mild pulmonic regurgitation.  · Normal central venous pressure (3 mmHg).  · The estimated PA systolic pressure is 62 mmHg.  · There is pulmonary hypertension.    Assessment:   1. S/p AVR, MVR and Tvr  2. Aortic stenosis of AVR  Plan:   Plans for redo AVR       CTS Attending Note:    I have personally taken the history and examined this patient and agree with the BRIDGET's note as stated above.  Very pleasant 76-year-old woman who underwent aortic valve replacement, mitral valve replacement, and tricuspid valve repair in 2010. She did well for several years.  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  She is symptomatic for this.  We plan redo sternotomy and re-replacement of her aortic valve.  As she has a mechanical prosthesis in the mitral position, we will plan to replace her aortic valve with a mechanical prosthesis.  I reviewed her CT scan.  The aorta is a reasonable distance from the sternum, but the right atrium, which is markedly enlarged, is in continuity with the posterior table of the sternum.  I think the likelihood of getting into the right atrium with sternal reentry is quite high.  I therefore discussed a groin incision with the patient and her family members.  We will have the option for femoral bypass should that be necessary at the  time of sternal reentry due to right atrial injury.  Her questions have been answered, and she wishes to proceed.

## 2022-01-04 NOTE — PRE-PROCEDURE INSTRUCTIONS
spoke with daughter and other children with son translating to pt.  Informed pt Metformin instruction of Hold AM of surgery (takes in AM only) - they voiced understanding

## 2022-01-04 NOTE — TELEPHONE ENCOUNTER
Informed pt of arrival time for surgery.  Pt instructed to report to [facility] at 6:00 tomorrow morning.  Pt reminded to perform Hibiclens shower tonight and tomorrow morning, and to become NPO at midnight.  Pt verbalized understanding.

## 2022-01-04 NOTE — ANESTHESIA PREPROCEDURE EVALUATION
01/04/2022  Orion Ty is a 76 y.o., female.    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    Essential (primary) hypertension    Thrombocytopenia    CAD (coronary artery disease)    PAH (pulmonary artery hypertension)    Tachy-gail syndrome    Debility    Right flank hematoma    Pleural effusion    Acute on chronic diastolic heart failure    Hyponatremia    Allergy to sulfa drugs    Prosthetic valve dysfunction    Fever    Screening procedure    Pre-op exam    Bilateral carotid artery stenosis       Review of patient's allergies indicates:  No Known Allergies    Current Outpatient Medications on File Prior to Encounter   Medication Sig Dispense Refill    amLODIPine (NORVASC) 5 MG tablet Take 1 tablet (5 mg total) by mouth once daily. 90 tablet 3    amoxicillin (AMOXIL) 875 MG tablet       enoxaparin (LOVENOX) 40 mg/0.4 mL Syrg Inject 0.4 mLs (40 mg total) into the skin every 12 (twelve) hours. 14 each 0    furosemide (LASIX) 40 MG tablet Take 40 mg by mouth 2 (two) times daily.      lancets 30 gauge Misc Use as directed to check blood sugar twice a day (Patient not taking: No sig reported) 200 each 11    losartan (COZAAR) 25 MG tablet Take 1 tablet (25 mg total) by mouth once daily. (Patient not taking: Reported on 1/4/2022) 90 tablet 1     metFORMIN (GLUCOPHAGE) 500 MG tablet TAKE 1 TABLET EVERY DAY 90 tablet 1    metoprolol tartrate (LOPRESSOR) 25 MG tablet TAKE 1 TABLET BY MOUTH TWICE DAILY 180 tablet 1    mv,calcium,min/iron/folic/vitK (ONE-A-DAY WOMEN'S COMPLETE ORAL) Take 1 tablet by mouth Daily.      nitroGLYCERIN (NITROSTAT) 0.4 MG SL tablet DISSOLVE 1 TABLET UNDER THE TONGUE EVERY 5 MINUTES AS NEEDED FOR CHEST PAINS 275 tablet 0    omega-3 acid ethyl esters (LOVAZA) 1 gram capsule TK 2 CS PO BID  2    pravastatin (PRAVACHOL) 20 MG tablet Take 1 tablet (20 mg total) by mouth once daily. (Patient not taking: Reported on 1/4/2022) 90 tablet 1    warfarin (COUMADIN) 4 MG tablet Take 1 tablet on Monday and Friday.  Take 1/2 tablet on all other days. (Patient not taking: Reported on 1/4/2022) 5 tablet 3     Current Facility-Administered Medications on File Prior to Encounter   Medication Dose Route Frequency Provider Last Rate Last Admin    0.9%  NaCl infusion   Intravenous Continuous Darlene Garcia NP   Stopped at 03/02/20 0926       Past Surgical History:   Procedure Laterality Date    CARDIAC PACEMAKER PLACEMENT      CARDIAC VALVE REPLACEMENT      CARDIAC VALVE SURGERY      CORONARY ANGIOGRAPHY N/A 12/21/2021    Procedure: ANGIOGRAM, CORONARY ARTERY;  Surgeon: Devonte Salazar MD;  Location: [facility] CATH LAB;  Service: Cardiology;  Laterality: N/A;    EYE SURGERY Bilateral     cataract    FLUOROSCOPY N/A 12/11/2020    Procedure: FLUOROSCOPY;  Surgeon: Mckay Calvo MD;  Location: [facility] CATH LAB;  Service: Cardiology;  Laterality: N/A;    REPLACEMENT OF PACEMAKER GENERATOR N/A 3/2/2020    Procedure: REPLACEMENT, PACEMAKER GENERATOR;  Surgeon: Mark Ring MD;  Location: [facility] EP LAB;  Service: Cardiology;  Laterality: N/A;  TBS/CHRISTI, Single PPM gen change, Right side, MDT, MAC, SK, 3 Prep    thryoid s         CBC:   Recent Labs     01/03/22  1246   WBC 5.53   RBC 3.78*   HGB 10.8*   HCT 34.9*   *   MCV 92   MCH 28.6    MCHC 30.9*       CMP:   Recent Labs     22  1244      K 4.1   CL 96   CO2 31*   BUN 18   CREATININE 0.9   *   CALCIUM 9.8   ALBUMIN 4.0   PROT 7.9   ALKPHOS 85   ALT 20   AST 45*   BILITOT 0.8       INR  Recent Labs     22  1246   INR 1.1   APTT 33.5*       EK21:  Ventricular-paced rhythm   Abnormal ECG   When compared with ECG of 2022 09:04,   Electronic ventricular pacemaker has replaced Atrial fibrillation       2D Echo:  Results for orders placed or performed during the hospital encounter of 18   2D Echo w/ Color Flow Doppler   Result Value Ref Range    EF + QEF 50 55 - 65    Mitral Valve Regurgitation MILD     Aortic Valve Regurgitation MILD     Est. PA Systolic Pressure 51.72 (A)     Tricuspid Valve Regurgitation TRIVIAL TO MILD          Anesthesia Evaluation    I have reviewed the Patient Summary Reports.    I have reviewed the Nursing Notes. I have reviewed the NPO Status.   I have reviewed the Medications.     Review of Systems  Anesthesia Hx:  No problems with previous Anesthesia Denies Hx of Anesthetic complications  History of prior surgery of interest to airway management or planning: heart surgery. Denies Family Hx of Anesthesia complications.   Denies Personal Hx of Anesthesia complications.   Social:  Non-Smoker    Hematology/Oncology:     Oncology Normal    -- Anemia:   EENT/Dental:EENT/Dental Normal   Cardiovascular:   Pacemaker Hypertension Valvular problems/Murmurs CAD  Dysrhythmias atrial fibrillation CHF MARTÍNEZ ECG has been reviewed.    Pulmonary:   Shortness of breath    Hepatic/GI:  Hepatic/GI Normal  Denies GERD.    Musculoskeletal:  Musculoskeletal Normal    Neurological:  Neurology Normal    Endocrine:   Diabetes, type 2 Hypothyroidism    Dermatological:  Skin Normal    Psych:  Psychiatric Normal           Physical Exam  General:  Well nourished    Airway/Jaw/Neck:  Airway Findings: Mouth Opening: Normal Tongue: Normal  General Airway  Assessment: Adult, Possible difficult intubation  Mallampati: III  Improves to II with phonation.  TM Distance: 4 - 6 cm  Jaw/Neck Findings:  Neck ROM: Extension Decreased, Mild      Dental:  Dental Findings: In tact   Chest/Lungs:  Chest/Lungs Findings: Normal Respiratory Rate, Expiratory Wheezes, Mild     Heart/Vascular:  Heart Findings: Rate: Normal  Rhythm: Regular Rhythm  Heart Murmur  Systolic        Mental Status:  Mental Status Findings:  Cooperative, Alert and Oriented         Anesthesia Plan  Type of Anesthesia, risks & benefits discussed:  Anesthesia Type:  general    Patient's Preference:   Plan Factors:          Intra-op Monitoring Plan: arterial line, central line, standard ASA monitors, Grethel-Diego and cardiac output  Intra-op Monitoring Plan Comments:   Post Op Pain Control Plan: multimodal analgesia, IV/PO Opioids PRN and peripheral nerve block  Post Op Pain Control Plan Comments:     Induction:   IV  Beta Blocker:  Patient is on a Beta-Blocker and has received one dose within the past 24 hours (No further documentation required).       Informed Consent: Patient understands risks and agrees with Anesthesia plan.  Questions answered. Anesthesia consent signed with patient.  ASA Score: 4     Day of Surgery Review of History & Physical:    H&P update referred to the surgeon.     Anesthesia Plan Notes: Consent obtained via  service and with daughter at bedside. Questions answered.         Ready For Surgery From Anesthesia Perspective.

## 2022-01-05 NOTE — H&P
[facility] - [facility]  Critical Care - Surgery  History & Physical    Patient Name: Orion Ty  MRN: [id number]  Admission Date: 1/5/2022  Code Status: Full Code  Attending Physician: Dennis Haider MD   Primary Care Provider: Otis Zimmer MD   Principal Problem: S/P aortic valve replacement    Subjective:     HPI:  Orion Ty is our 76 y.o. female with previous aortic valve replacement, mitral valve replacement, and tricuspid valve repair in 2010 who presents for redo aortic valve replacement. 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 She underwent aortic valve replacement with a mechanical valve on 1/5/22. She was cannulated in her right groin. She tolerated the procedure well. Her preoperative echo demonstrated good function with moderate aortic stenosis, gradient of 29. Intraoperatively, she received 2.5L crystalloid, 3U RBC, 2U FFP, 2 platelets, and 800 cell saver. She made 400 cc of urine throughout the case. She was able to be weaned off of bypass without difficulty. Postoperative echo demonstrated good function without an improved aortic gradient of 10. She was brought to the SICU postoperatively for further care and management. She arrived intubated and on 0.02 of epi and 0.02 of levo. Initial ABG demonstrated good oxygenation and ventilation. iStat lactic 3.4.     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 During the case, she intermittently went into transient SVT.    Hospital/ICU Course:  No notes on file    Follow-up For: Procedure(s) (LRB):  REPLACEMENT, AORTIC VALVE, WITH REPEAT STERNOTOMY (N/A)    Post-Operative Day: Day of Surgery     Past Medical History:   Diagnosis Date    A-fib     Anticoagulant long-term use     Closed displaced fracture of distal phalanx of lesser toe 10/20/2015    Right    Coronary artery disease     Diabetes mellitus     Diabetes mellitus, type 2      Hypertension     Mechanical heart valve present     X's two    Osteopenia     Pacemaker     Rheumatic heart disease        Past Surgical History:   Procedure Laterality Date    CARDIAC PACEMAKER PLACEMENT      CARDIAC VALVE REPLACEMENT      CARDIAC VALVE SURGERY      CORONARY ANGIOGRAPHY N/A 12/21/2021    Procedure: ANGIOGRAM, CORONARY ARTERY;  Surgeon: Devonte Salazar MD;  Location: [facility] CATH LAB;  Service: Cardiology;  Laterality: N/A;    EYE SURGERY Bilateral     cataract    FLUOROSCOPY N/A 12/11/2020    Procedure: FLUOROSCOPY;  Surgeon: Mckay Calvo MD;  Location: [facility] CATH LAB;  Service: Cardiology;  Laterality: N/A;    REPLACEMENT OF PACEMAKER GENERATOR N/A 3/2/2020    Procedure: REPLACEMENT, PACEMAKER GENERATOR;  Surgeon: Mark Ring MD;  Location: [facility] EP LAB;  Service: Cardiology;  Laterality: N/A;  TBS/CHRISTI, Single PPM gen change, Right side, MDT, MAC, SK, 3 Prep    thryoid s         Review of patient's allergies indicates:  No Known Allergies    Family History     Problem Relation (Age of Onset)    Breast cancer Maternal Aunt        Tobacco Use    Smoking status: Never Smoker    Smokeless tobacco: Never Used   Substance and Sexual Activity    Alcohol use: No    Drug use: Never    Sexual activity: Not on file      Review of Systems   Unable to perform ROS: Intubated     Objective:     Vital Signs (Most Recent):  Temp: 98.4 °F (36.9 °C) (01/05/22 0717)  Pulse: (!) 50 (01/05/22 1435)  Resp: 20 (01/05/22 1435)  BP: 131/63 (01/05/22 1434)  SpO2: 100 % (01/05/22 1435) Vital Signs (24h Range):  Temp:  [98.4 °F (36.9 °C)] 98.4 °F (36.9 °C)  Pulse:  [50] 50  Resp:  [18-20] 20  SpO2:  [95 %-100 %] 100 %  BP: (131-144)/(63-66) 131/63     Weight: 44 kg (97 lb)  Body mass index is 18.33 kg/m².      Intake/Output Summary (Last 24 hours) at 1/5/2022 1535  Last data filed at 1/5/2022 1439  Gross per 24 hour   Intake 4300 ml   Output 470 ml   Net 3830 ml       Physical Exam  Constitutional:        Comments: sedated   HENT:      Head: Normocephalic and atraumatic.   Neck:      Comments: R IJ CVC  Cardiovascular:      Rate and Rhythm: Normal rate and regular rhythm.      Pulses: Normal pulses.      Comments: Midline sternotomy incision c/d/I  Chest tubes in place with serosanguinous output  V wires in place, not connected  Pacemaker in place  Pulmonary:      Breath sounds: Normal breath sounds.      Comments: intubated  Abdominal:      General: Abdomen is flat. There is no distension.      Palpations: Abdomen is soft.      Tenderness: There is no abdominal tenderness.   Genitourinary:     Comments: Mireles in place with clear urine  R groin well incision, bandage in place. Thigh soft, no hematoma  Musculoskeletal:      Comments: R radial art line in place   Skin:     General: Skin is warm.      Capillary Refill: Capillary refill takes less than 2 seconds.         Vents:  Vent Mode: A/C (01/05/22 1430)  Ventilator Initiated: Yes (01/05/22 1427)  Set Rate: 18 BPM (01/05/22 1430)  Vt Set: (S) 350 mL (01/05/22 1430)  PEEP/CPAP: 5 cmH20 (01/05/22 1430)  Oxygen Concentration (%): 100 (01/05/22 1435)  Peak Airway Pressure: 33 cmH2O (01/05/22 1430)  Plateau Pressure: 0 cmH20 (01/05/22 1430)  Total Ve: 7.05 mL (01/05/22 1430)  Negative Inspiratory Force (cm H2O): 0 (01/05/22 1430)  F/VT Ratio<105 (RSBI): (!) 50.51 (01/05/22 1430)    Lines/Drains/Airways     Central Venous Catheter Line            Percutaneous Central Line Insertion/Assessment - Double Lumen  01/05/22 0910 right internal jugular <1 day    Pulmonary Artery Catheter Assessment  01/05/22 0908 <1 day          Drain                 Chest Tube 01/05/22 1334 3 Left Pleural 19 Fr. <1 day         Urethral Catheter 01/05/22 0825 Silicone;Straight-tip;Temperature probe 16 Fr. <1 day         Y Chest Tube 1 and 2 01/05/22 1333 1 Anterior Mediastinal 19 Fr. 2 Anterior Mediastinal 19 Fr. <1 day          Airway                 Airway - Non-Surgical 01/05/22 0812 <1 day           Arterial Line            Arterial Line 01/05/22 0855 Right Radial <1 day          Line                 Pacer Wires 01/05/22 1332 <1 day          Peripheral Intravenous Line                 Peripheral IV - Single Lumen 12/21/21 0740 18 G Anterior;Left;Proximal Forearm 15 days         Peripheral IV - Single Lumen 01/05/22 0711 18 G Right Forearm <1 day                Significant Labs:    CBC/Anemia Profile:  Recent Labs   Lab 01/05/22  1226 01/05/22  1245 01/05/22  1440   WBC  --   --  8.44   HGB  --   --  7.0*   HCT 21* 15* 21.7*   PLT  --   --  92*   MCV  --   --  88   RDW  --   --  14.3        Chemistries:  Recent Labs   Lab 01/05/22  1440      K 3.6      CO2 24   BUN 12   CREATININE 0.6   CALCIUM 9.2   MG 2.7*  2.7*   PHOS 3.5  3.5       ABGs:   Recent Labs   Lab 01/05/22  1245   PH 7.464*   PCO2 35.6   HCO3 25.5   POCSATURATED 100   BE 2       Significant Imaging: I have reviewed all pertinent imaging results/findings within the past 24 hours.    Assessment/Plan:     * S/P aortic valve replacement  Orion Ty is a 76 y.o. female who presents to the SICU s/p redo aortic valve replacement with mechanical valve on 1/5/22.      Neuro/Psych:   -- Sedation: propofol  -- Pain: IV narcotics until extubated. Will add orals when able             Cards:   -- Pressors: epi 0.02, levo 0.02   -- Wean epi as tolerated  -- Goal MAP: 60-80  -- SVT while in case. If she goes into SVT, load with amio  -- aspirin if CT output ok  -- will need heparin drip tomorrow if low concern for bleeding  -- pacemaker in place, interrogated and set to home settings  -- naturally bradycardic to 49 at home      Pulm:   -- Goal O2 sat > 90%  -- Wean as able  -- Daily CXR while intubated  -- ABG PRN, in 2 hours  -- Chest tubes include: 2 mediastinal, 1 left pleural to wall suction.      Renal:  -- Keep tejeda for strict I/O  -- BUN/Cr pending  -- Diuretics: none       FEN / GI:   -- Replace lytes as needed  -- Nutrition:  NPO  -- famotidine 20 BID      ID:   -- WBC pending  -- Antibiotics: periop ancef      Heme/Onc:   -- Hgb 7  -- repeat CBC soon after cell saver  -- Daily CBC  -- Transfused products: none  -- Anticoagulation: ASA      Endo:   -- Gluc goal 140-180  -- Insulin drip per endocrinology      PPx:   Feeding: NPO  Analgesia/Sedation: IV narcotics / propofol  Thromboembolic prevention: ASA  HOB >30: yes  Stress Ulcer ppx: famotidine 20 BID  Glucose control: Critical care goal 140-180 g/dl, ISS    Lines/Drains/Airway: RIJ, Art line, tejeda, ETT      Dispo/Code Status/Palliative:   -- SICU / Full Code             Critical care was time spent personally by me on the following activities: development of treatment plan with patient or surrogate and bedside caregivers, discussions with consultants, evaluation of patient's response to treatment, examination of patient, ordering and performing treatments and interventions, ordering and review of laboratory studies, ordering and review of radiographic studies, pulse oximetry, re-evaluation of patient's condition.  This critical care time did not overlap with that of any other provider or involve time for any procedures.     Caleb Garcia MD  Critical Care - Surgery  [facility] - [facility]

## 2022-01-05 NOTE — CONSULTS
[facility] - [facility] Intensive [facility]  Endocrinology  Diabetes Consult Note    Consult Requested by: Dennis Haider MD   Reason for admit: S/P aortic valve replacement    HISTORY OF PRESENT ILLNESS:  Reason for Consult: Management of T2DM, Hyperglycemia     Surgical Procedure and Date:   1/5/21  REPLACEMENT, AORTIC VALVE, WITH REPEAT STERNOTOMY (N/A)     Diabetes diagnosis year: MELIDA; intubated    Home Diabetes Medications:  Metformin 500 mg daily,     How often checking glucose at home?  MELIDA    BG readings on regimen: MELIDA  Hypoglycemia on the regimen?  MELIDA  Missed doses on regimen? MELIDA    Diabetes Complications include:     None    Complicating diabetes co morbidities:   CAD, HTN, a-fib       HPI:   Patient is a 76 y.o. female with a diagnosis of s/p MVR, AVR and TVr in 2010 by Dr. Haider. 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 Family states she is more fatigued, decreased appetite and fluid on lungs. Pt has increased symptoms and wants to proceed with cardiac surgery now and here for pre op. She is now s/p the above procedure. Endocrinology consulted for management of T2DM.     Lab Results   Component Value Date    HGBA1C 6.0 (H) 01/03/2022           Interval HPI:   Overnight events: Admitted to SICU. Day of Surgery. Remains intubated. BG reasonably well controlled on IV intensive insulin protocol.   Eating:   NPO  Nausea: No  Hypoglycemia and intervention: No  Fever: No  TPN and/or TF: No  If yes, type of TF/TPN and rate: n/a    PMH, PSH, FH, SH reviewed     ROS:  Unable to obtain due to: Sedation,Intubation,Altered mental status,Critical illness,Reviewed ROS from note dated 1/4/21 by Dennis Haider MD    Review of Systems    Current Medications and/or Treatments Impacting Glycemic Control  Immunotherapy:    Immunosuppressants     None        Steroids:   Hormones (From admission, onward)            None        Pressors:    Autonomic  Drugs (From admission, onward)            Start     Stop Route Frequency Ordered    01/05/22 1500  EPINEPHrine (ADRENALIN) 5 mg in dextrose 5 % 250 mL infusion        Question Answer Comment   Begin at (in mcg/kg/min): 0.02    Titrate by: (in mcg/kg/min) 0.02    Titrate interval: (in minutes) 20    Titrate to maintain: (SBP or MAP or Cardiac Index) MAP    Greater than: (in mmHg) 60    Maximum dose: (in mcg/kg/min) 0.1        -- IV Continuous 01/05/22 1356    01/05/22 1500  NORepinephrine 4 mg in dextrose 5% 250 mL infusion (premix) (titrating)        Question Answer Comment   Begin at (in mcg/kg/min): 0.02    Titrate by: (in mcg/kg/min) 0.02    Titrate interval: (in minutes) 5    Titrate to maintain: (MAP or SBP) MAP    Greater than: (in mmHg) 65    Maximum dose: (in mcg/kg/min) 3        -- IV Continuous 01/05/22 1356        Hyperglycemia/Diabetes Medications:   Antihyperglycemics (From admission, onward)            Start     Stop Route Frequency Ordered    01/05/22 1630  insulin regular in 0.9 % NaCl 100 unit/100 mL (1 unit/mL) infusion        Question:  Enter initial dose from Infusion Protocol Chart (Units/hr):  Answer:  1    -- IV Continuous 01/05/22 1515             PHYSICAL EXAMINATION:  Vitals:    01/05/22 1435   BP:    Pulse: (!) 50   Resp: 20   Temp:      Body mass index is 18.33 kg/m².    Physical Exam   Constitutional: Well developed, NAD.  ENT: External ears no masses with nose patent  Neck: Supple; trachea midline  Cardiovascular: Normal heart sounds, no LE edema. DP +2 bilaterally.  Lungs: Normal effort; lungs anterior bilaterally clear to auscultation.  Intubated on a ventilator.  Abdomen: Soft, no masses, no hernias.  Hypoactive BS noted.  MS: No clubbing or cyanosis of nails noted; unable to assess gait.  Skin: No rashes, lesions, or ulcers; no nodules.   Mid-sternal incision with telfa island dressing, CDI.  CT x 2.  LLE wrapped with ACE wrap.  Psychiatric: MELIDA  Neurological: MELIDA  Foot: Nails in  good condition, no amputations noted        Labs Reviewed and Include   Recent Labs   Lab 01/05/22  1440   *   CALCIUM 9.2      K 3.6   CO2 24      BUN 12   CREATININE 0.6     Lab Results   Component Value Date    WBC 8.44 01/05/2022    HGB 7.0 (L) 01/05/2022    HCT 21.7 (L) 01/05/2022    MCV 88 01/05/2022    PLT 92 (L) 01/05/2022     No results for input(s): TSH, FREET4 in the last 168 hours.  Lab Results   Component Value Date    HGBA1C 6.0 (H) 01/03/2022       Nutritional status:   Body mass index is 18.33 kg/m².  Lab Results   Component Value Date    ALBUMIN 4.0 01/03/2022    ALBUMIN 4.4 12/10/2020    ALBUMIN 4.2 10/01/2020     No results found for: PREALBUMIN    Estimated Creatinine Clearance: 54.5 mL/min (based on SCr of 0.6 mg/dL).    Accu-Checks  No results for input(s): POCTGLUCOSE in the last 72 hours.     ASSESSMENT and PLAN    * S/P aortic valve replacement  Managed per primary team  Optimize BG control        Type 2 diabetes mellitus with microalbuminuria, without long-term current use of insulin  BG goal 140 - 180  (discontinue CTS protocol given age)     Continue IV insulin infusion protocol  Requires intensive BG monitoring while on protocol     ** Please call Endocrine for any BG related issues **    Discharge planning: TBD        Chronic atrial fibrillation  May increase insulin resistance.             Plan discussed with RN at bedside.     Keyanna Crocker NP  Endocrinology  [facility] - Surgical Intensive Care

## 2022-01-05 NOTE — ANESTHESIA PROCEDURE NOTES
Parthenon Diego Line    Diagnosis: Aortic Stenosis  Patient location during procedure: done in OR    Staffing  Authorizing Provider: Reina Yi MD  Performing Provider: Mery Nava MD    Anesthesiologist was present at the time of the procedure.  Preanesthetic Checklist  Completed: patient identified, IV checked, site marked, risks and benefits discussed, surgical consent, monitors and equipment checked, pre-op evaluation, timeout performed and anesthesia consent given  Parthenon Diego Line  Skin Prep: chlorhexidine gluconate  Local Infiltration: none  Location: right,  internal jugular vein  Vessel Caliber: medium, patent, compressibility normal  Vascular Doppler:  not donemanometry not used.  Device: CCO/Oximetric Catheter  Catheter Size: 8 Fr  Catheter placement by yes. Heme positive aspiration all ports. PAC floated with balloon up not wedgedSterile sheath used  Indication: intravenous therapy, hemodynamic monitoring  Ultrasound Guidance  Needle advanced into vessel with real time Ultrasound guidance.  Image recorded and saved.  Guidewire confirmed in vessel.  Sterile sheath used.  Assessment  Central Line Bundle Protocol followed. Hand hygiene before procedure, surgical cap worn, surgical mask worn, sterile surgical gloves worn, large sterile drape used.  Verification: ultrasound and blood return  Dressing: sutured in place and taped and tegaderm  Patient: Tolerated Well

## 2022-01-05 NOTE — ANESTHESIA PROCEDURE NOTES
Central Line    Diagnosis: Aortic Stenosis  Patient location during procedure: done in OR    Staffing  Authorizing Provider: Reina Yi MD  Performing Provider: Mery Nava MD    Staffing  Performed: resident/CRNA   Anesthesiologist: Reina Yi MD  Resident/CRNA: Mery Nava MD  Anesthesiologist was present at the time of the procedure.  Preanesthetic Checklist  Completed: patient identified, IV checked, site marked, risks and benefits discussed, surgical consent, monitors and equipment checked, pre-op evaluation, timeout performed and anesthesia consent given  Indication   Indication: hemodynamic monitoring, vascular access, med administration     Anesthesia   general anesthesia    Central Line   Skin Prep: skin prepped with ChloraPrep, skin prep agent completely dried prior to procedure  Sterile Barriers Followed: Yes    All five maximal barriers used- gloves, gown, cap, mask, and large sterile sheet    hand hygiene performed prior to central venous catheter insertion  Location: right internal jugular.   Catheter type: double lumen  Catheter Size: 9 Fr  Ultrasound: vascular probe with ultrasound  Vessel Caliber: medium, patent, compressibility normal  Needle advanced into vessel with real time Ultrasound guidance.  Guidewire confirmed in vessel.  Image recorded and saved.  sterile gel and probe cover used in ultrasound-guided central venous catheter insertion  Manometry: none  Insertion Attempts: 1   Securement:chlorhexidine patch, line sutured, sterile dressing applied and blood return through all ports    Post-Procedure   Adverse Events:none      Guidewire Guidewire removed intact.

## 2022-01-05 NOTE — SUBJECTIVE & OBJECTIVE
Interval HPI:   Overnight events: Admitted to SICU. Day of Surgery. Remains intubated. BG reasonably well controlled on IV intensive insulin protocol.   Eating:   NPO  Nausea: No  Hypoglycemia and intervention: No  Fever: No  TPN and/or TF: No  If yes, type of TF/TPN and rate: n/a    PMH, PSH, FH, SH reviewed     ROS:  Unable to obtain due to: Sedation,Intubation,Altered mental status,Critical illness,Reviewed ROS from note dated 1/4/21 by Dennis Haider MD    Review of Systems    Current Medications and/or Treatments Impacting Glycemic Control  Immunotherapy:    Immunosuppressants     None        Steroids:   Hormones (From admission, onward)            None        Pressors:    Autonomic Drugs (From admission, onward)            Start     Stop Route Frequency Ordered    01/05/22 1500  EPINEPHrine (ADRENALIN) 5 mg in dextrose 5 % 250 mL infusion        Question Answer Comment   Begin at (in mcg/kg/min): 0.02    Titrate by: (in mcg/kg/min) 0.02    Titrate interval: (in minutes) 20    Titrate to maintain: (SBP or MAP or Cardiac Index) MAP    Greater than: (in mmHg) 60    Maximum dose: (in mcg/kg/min) 0.1        -- IV Continuous 01/05/22 1356    01/05/22 1500  NORepinephrine 4 mg in dextrose 5% 250 mL infusion (premix) (titrating)        Question Answer Comment   Begin at (in mcg/kg/min): 0.02    Titrate by: (in mcg/kg/min) 0.02    Titrate interval: (in minutes) 5    Titrate to maintain: (MAP or SBP) MAP    Greater than: (in mmHg) 65    Maximum dose: (in mcg/kg/min) 3        -- IV Continuous 01/05/22 1356        Hyperglycemia/Diabetes Medications:   Antihyperglycemics (From admission, onward)            Start     Stop Route Frequency Ordered    01/05/22 1630  insulin regular in 0.9 % NaCl 100 unit/100 mL (1 unit/mL) infusion        Question:  Enter initial dose from Infusion Protocol Chart (Units/hr):  Answer:  1    -- IV Continuous 01/05/22 1515             PHYSICAL EXAMINATION:  Vitals:    01/05/22 1435   BP:     Pulse: (!) 50   Resp: 20   Temp:      Body mass index is 18.33 kg/m².    Physical Exam   Constitutional: Well developed, NAD.  ENT: External ears no masses with nose patent  Neck: Supple; trachea midline  Cardiovascular: Normal heart sounds, no LE edema. DP +2 bilaterally.  Lungs: Normal effort; lungs anterior bilaterally clear to auscultation.  Intubated on a ventilator.  Abdomen: Soft, no masses, no hernias.  Hypoactive BS noted.  MS: No clubbing or cyanosis of nails noted; unable to assess gait.  Skin: No rashes, lesions, or ulcers; no nodules.   Mid-sternal incision with telfa island dressing, CDI.  CT x 2.  LLE wrapped with ACE wrap.  Psychiatric: MELIDA  Neurological: MELIDA  Foot: Nails in good condition, no amputations noted

## 2022-01-05 NOTE — RESPIRATORY THERAPY
Received patient from the OR intubated with 7.5 ETT secured at 23cm at the lips. Placed on ventilator with documented settings. ABG and EKG done. Will continue to monitor.

## 2022-01-05 NOTE — ANESTHESIA PROCEDURE NOTES
STUART    Diagnosis: Aortic Stenosis  Patient location during procedure: OR  Exam type: Baseline    Staffing  Performed: fellow, other anesthesia staff and anesthesiologist     Anesthesiologist: Reina Yi MD    Other anesthesia staff: Mery Nava MD    Anesthesiologist Present  Yes      Setup & Induction  Probe Insertion: easy  Exam: completeDoppler Echo: 2D, 3D, color flow mapping, continuous wave Doppler and pulse wave Doppler.  Exam     Left Heart  Left Atruim: dilated and severly enlarged (men >5.2; women >4.7)    Left Ventricle: 3.45 cm  LV Wall Thickness (posterior wall):1.11 cm    LVAD:no  Regional Wall Abnormalities: no RWMA        Left Ventricle Diastolic Function  E Velocity: 187 cm/s  A Velocity: 49.4 cm/s  E/A Ratio: 3.79, restrictive (>/=2)  Lateral E': 6.6 cm/s  S Velocity: 46  D Velocity: 71.4 cm/s    Right Heart  Right Ventricle: normal  Right Ventricle Function: normal  Right Atria:  severly enlarged    Intra Atrial Septum  PFO: no shunt by color flow doppler  no IAS aneurysm  no lipomatous hypertrophy  no Atrial Septal Defect (Asd)    Right Ventricle  Size: normal, Free Wall Thickness: normal    Aortic Valve:  Stenosis: moderate.  Morphology: prosthetic valve  Prosthesis: mechanical  LVOT: 2 cm   LVOT TVI: 52.7 cm  AV VTI: 92.2 cm  AV Vmax: 358 cm/s2  AV Mean Gradient: 29 mmHg, moderate (20-40)  AV Peak Gradient: 51 mmHg  YULIYA by Continuity Equation: 1.79 cm2  Regurgitation: no aortic valve regurgitation     Mitral Valve:  Prosthesis: mechanical  Morphology:prosthetic valve  Jet Description: mild-to-moderateStenosis: no significant stenosis.    Tricuspid Valve:  Morphology: TV Prosthesis  Prosthesis: ring  Regurgitation: moderate    Pulmonic Valve:  Morphology:normal  Regurgitation(color flow): mild    Great Vessels  Ascending Aorta Diameter: 3.58 cm   Ascending Aorta Atherosclerosis: 1=nl-min dz  Aortic Arch Atherosclerosis: 1=nl-min dz  IABP: no  Descending Aorta Atherosclerosis:  1=nl-min dz  Aorta    Descending aorta IABP: no    Effusions none    SummaryFindings discussed with surgeon.    Other Findings   Diagnostic intraoperative STUART performed at the request of Dr. Haider for redo sternotomy/AVR in pt with a h/o AVR, MVR, TVr.     Baseline exam:  Note: Echo exam limited by ultrasound artifacts and dropout of imaging from two mechanical valves  - Biventricular systolic function: appears within normal limits with no RWMA  - Aortic valve: mechanical prosthetic valve present in the aortic position.  Moderate AS noted with mean gradient of 29 mmHg, peak velocity 3.58 m/s. No AI or paravalvular leaks noted.   - Mitral valve: mechanical mitral valve present in the mitral position.  Mean gradient 3-4. Mild MR with small paravalvular leak at the 10 o'clock position with respect to the aortic valve that measures ~5% the circumference of the valve.  - Tricuspid valve: previous ring seen in the tricuspid position.  Moderate TR present.  Max TR jet velocity 2.82 m/s.  - Pulmonic valve: evidence of trace PI  - Aorta: no evidence of aortic dissection.  Minimal atherosclerotic disease present throughout aorta  - Effusions: no evidence of pericardial or pleural effusions  - Other: No PFO or ASD noted.     Post-procedure exam:  - s/p 21mm mechanical AVR  - On inotropic and pressure support with low dose epi and norepi gtts  - Normal biventricular function with no new RWMAs  - 21mm Carbomed[employer] bileaflet mechanical valve in Aortic position. Valve is well seated without rocking or sliding. There is was a small PVL immediately s/p CPB however hard to elucidate origin position, and it improved to trivial s/p protamine administration. Transvalvular gradient 9-13mmHg.  - MV unchanged from baseline exam  - There is improvement of TR, now appearing trace to mild.  - Pulmonic valve is unchanged in structure and function.  - There are no visible residual pericardial or pleural effusions.   - No new aortic  pathology    Stable s/p chest closure  Probe removed atraumatically

## 2022-01-05 NOTE — PROGRESS NOTES
Received a call from Samuel in [facility] that patient has a pacemaker and is going for an aortic valve replacement today.     Patient has been identified as having an implanted cardiac rhythm device (CRD); the implanted device is a [employer] pacemaker.      No noted pacer dependency.       PACEMAKERS ABOVE WAIST     The reported surgical procedure is above the umbilicus.  Per protocol, apply a magnet directly over the implanted device during entire surgical procedure if electrocautery will be used.    For additional questions, please contact the Arrhythmia Department at Ext [phone].

## 2022-01-05 NOTE — ASSESSMENT & PLAN NOTE
Orion Ty is a 76 y.o. female who presents to the SICU s/p redo aortic valve replacement with mechanical valve on 1/5/22.      Neuro/Psych:   -- Sedation: propofol  -- Pain: IV narcotics until extubated. Will add orals when able             Cards:   -- Pressors: epi 0.02, levo 0.02   -- Wean epi as tolerated  -- Goal MAP: 60-80  -- SVT while in case. If she goes into SVT, load with amio  -- aspirin if CT output ok  -- will need heparin drip tomorrow if low concern for bleeding  -- pacemaker in place, interrogated and set to home settings  -- naturally bradycardic to 49 at home      Pulm:   -- Goal O2 sat > 90%  -- Wean as able  -- Daily CXR while intubated  -- ABG PRN, in 2 hours  -- Chest tubes include: 2 mediastinal, 1 left pleural to wall suction.      Renal:  -- Keep tejeda for strict I/O  -- BUN/Cr pending  -- Diuretics: none       FEN / GI:   -- Replace lytes as needed  -- Nutrition: NPO  -- famotidine 20 BID      ID:   -- WBC pending  -- Antibiotics: periop ancef      Heme/Onc:   -- Hgb 7  -- repeat CBC soon after cell saver  -- Daily CBC  -- Transfused products: none  -- Anticoagulation: ASA      Endo:   -- Gluc goal 140-180  -- Insulin drip per endocrinology      PPx:   Feeding: NPO  Analgesia/Sedation: IV narcotics / propofol  Thromboembolic prevention: ASA  HOB >30: yes  Stress Ulcer ppx: famotidine 20 BID  Glucose control: Critical care goal 140-180 g/dl, ISS    Lines/Drains/Airway: RIJ, Art line, tejeda, ETT      Dispo/Code Status/Palliative:   -- SICU / Full Code

## 2022-01-05 NOTE — ASSESSMENT & PLAN NOTE
BG goal 140 - 180  (discontinue CTS protocol given age)     Continue IV insulin infusion protocol  Requires intensive BG monitoring while on protocol     ** Please call Endocrine for any BG related issues **    Discharge planning: ALFREDO

## 2022-01-05 NOTE — ANESTHESIA PROCEDURE NOTES
Intubation    Date/Time: 1/5/2022 8:12 AM  Performed by: Nate Martínez MD  Authorized by: Reina Yi MD     Intubation:     Induction:  Intravenous    Intubated:  Postinduction    Mask Ventilation:  Easy mask    Attempts:  1    Attempted By:  Staff anesthesiologist    Method of Intubation:  Direct    Blade:  [place] 3    Laryngeal View Grade: Grade IIb - only the arytenoids and epiglottis seen      Difficult Airway Encountered?: No      Complications:  None    Airway Device:  Oral endotracheal tube    Airway Device Size:  7.5    Style/Cuff Inflation:  Cuffed (inflated to minimal occlusive pressure)    Placement Verified By:  Capnometry    Complicating Factors:  None    Findings Post-Intubation:  Atraumatic/condition of teeth unchanged

## 2022-01-05 NOTE — HPI
Reason for Consult: Management of T2DM, Hyperglycemia     Surgical Procedure and Date:   1/5/22  REPLACEMENT, AORTIC VALVE, WITH REPEAT STERNOTOMY (N/A)     Diabetes diagnosis year: MELIDA; intubated    Home Diabetes Medications:  Metformin 500 mg daily,     How often checking glucose at home?  MELIDA    BG readings on regimen: MELIDA  Hypoglycemia on the regimen?  MELIDA  Missed doses on regimen? MELIDA    Diabetes Complications include:     None    Complicating diabetes co morbidities:   CAD, HTN, a-fib       HPI:   Patient is a 76 y.o. female with a diagnosis of s/p MVR, AVR and TVr in 2010 by Dr. Haider. 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 Family states she is more fatigued, decreased appetite and fluid on lungs. Pt has increased symptoms and wants to proceed with cardiac surgery now and here for pre op. She is now s/p the above procedure. Endocrinology consulted for management of T2DM.     Lab Results   Component Value Date    HGBA1C 6.0 (H) 01/03/2022

## 2022-01-05 NOTE — SUBJECTIVE & OBJECTIVE
Follow-up For: Procedure(s) (LRB):  REPLACEMENT, AORTIC VALVE, WITH REPEAT STERNOTOMY (N/A)    Post-Operative Day: Day of Surgery     Past Medical History:   Diagnosis Date    A-fib     Anticoagulant long-term use     Closed displaced fracture of distal phalanx of lesser toe 10/20/2015    Right    Coronary artery disease     Diabetes mellitus     Diabetes mellitus, type 2     Hypertension     Mechanical heart valve present     X's two    Osteopenia     Pacemaker     Rheumatic heart disease        Past Surgical History:   Procedure Laterality Date    CARDIAC PACEMAKER PLACEMENT      CARDIAC VALVE REPLACEMENT      CARDIAC VALVE SURGERY      CORONARY ANGIOGRAPHY N/A 12/21/2021    Procedure: ANGIOGRAM, CORONARY ARTERY;  Surgeon: Devonte Salazar MD;  Location: [facility] CATH LAB;  Service: Cardiology;  Laterality: N/A;    EYE SURGERY Bilateral     cataract    FLUOROSCOPY N/A 12/11/2020    Procedure: FLUOROSCOPY;  Surgeon: Mckay Calvo MD;  Location: [facility] CATH LAB;  Service: Cardiology;  Laterality: N/A;    REPLACEMENT OF PACEMAKER GENERATOR N/A 3/2/2020    Procedure: REPLACEMENT, PACEMAKER GENERATOR;  Surgeon: Mark Ring MD;  Location: [facility] EP LAB;  Service: Cardiology;  Laterality: N/A;  TBS/CHRISTI, Single PPM gen change, Right side, MDT, MAC, SK, 3 Prep    thryoid s         Review of patient's allergies indicates:  No Known Allergies    Family History     Problem Relation (Age of Onset)    Breast cancer Maternal Aunt        Tobacco Use    Smoking status: Never Smoker    Smokeless tobacco: Never Used   Substance and Sexual Activity    Alcohol use: No    Drug use: Never    Sexual activity: Not on file      Review of Systems   Unable to perform ROS: Intubated     Objective:     Vital Signs (Most Recent):  Temp: 98.4 °F (36.9 °C) (01/05/22 0717)  Pulse: (!) 50 (01/05/22 1435)  Resp: 20 (01/05/22 1435)  BP: 131/63 (01/05/22 1434)  SpO2: 100 % (01/05/22 1435) Vital Signs (24h Range):  Temp:  [98.4  °F (36.9 °C)] 98.4 °F (36.9 °C)  Pulse:  [50] 50  Resp:  [18-20] 20  SpO2:  [95 %-100 %] 100 %  BP: (131-144)/(63-66) 131/63     Weight: 44 kg (97 lb)  Body mass index is 18.33 kg/m².      Intake/Output Summary (Last 24 hours) at 1/5/2022 1535  Last data filed at 1/5/2022 1439  Gross per 24 hour   Intake 4300 ml   Output 470 ml   Net 3830 ml       Physical Exam  Constitutional:       Comments: sedated   HENT:      Head: Normocephalic and atraumatic.   Neck:      Comments: R IJ CVC  Cardiovascular:      Rate and Rhythm: Normal rate and regular rhythm.      Pulses: Normal pulses.      Comments: Midline sternotomy incision c/d/I  Chest tubes in place with serosanguinous output  V wires in place, not connected  Pacemaker in place  Pulmonary:      Breath sounds: Normal breath sounds.      Comments: intubated  Abdominal:      General: Abdomen is flat. There is no distension.      Palpations: Abdomen is soft.      Tenderness: There is no abdominal tenderness.   Genitourinary:     Comments: Mireles in place with clear urine  R groin well incision, bandage in place. Thigh soft, no hematoma  Musculoskeletal:      Comments: R radial art line in place   Skin:     General: Skin is warm.      Capillary Refill: Capillary refill takes less than 2 seconds.         Vents:  Vent Mode: A/C (01/05/22 1430)  Ventilator Initiated: Yes (01/05/22 1427)  Set Rate: 18 BPM (01/05/22 1430)  Vt Set: (S) 350 mL (01/05/22 1430)  PEEP/CPAP: 5 cmH20 (01/05/22 1430)  Oxygen Concentration (%): 100 (01/05/22 1435)  Peak Airway Pressure: 33 cmH2O (01/05/22 1430)  Plateau Pressure: 0 cmH20 (01/05/22 1430)  Total Ve: 7.05 mL (01/05/22 1430)  Negative Inspiratory Force (cm H2O): 0 (01/05/22 1430)  F/VT Ratio<105 (RSBI): (!) 50.51 (01/05/22 1430)    Lines/Drains/Airways     Central Venous Catheter Line            Percutaneous Central Line Insertion/Assessment - Double Lumen  01/05/22 0910 right internal jugular <1 day    Pulmonary Artery Catheter Assessment   01/05/22 0908 <1 day          Drain                 Chest Tube 01/05/22 1334 3 Left Pleural 19 Fr. <1 day         Urethral Catheter 01/05/22 0825 Silicone;Straight-tip;Temperature probe 16 Fr. <1 day         Y Chest Tube 1 and 2 01/05/22 1333 1 Anterior Mediastinal 19 Fr. 2 Anterior Mediastinal 19 Fr. <1 day          Airway                 Airway - Non-Surgical 01/05/22 0812 <1 day          Arterial Line            Arterial Line 01/05/22 0855 Right Radial <1 day          Line                 Pacer Wires 01/05/22 1332 <1 day          Peripheral Intravenous Line                 Peripheral IV - Single Lumen 12/21/21 0740 18 G Anterior;Left;Proximal Forearm 15 days         Peripheral IV - Single Lumen 01/05/22 0711 18 G Right Forearm <1 day                Significant Labs:    CBC/Anemia Profile:  Recent Labs   Lab 01/05/22  1226 01/05/22  1245 01/05/22  1440   WBC  --   --  8.44   HGB  --   --  7.0*   HCT 21* 15* 21.7*   PLT  --   --  92*   MCV  --   --  88   RDW  --   --  14.3        Chemistries:  Recent Labs   Lab 01/05/22  1440      K 3.6      CO2 24   BUN 12   CREATININE 0.6   CALCIUM 9.2   MG 2.7*  2.7*   PHOS 3.5  3.5       ABGs:   Recent Labs   Lab 01/05/22  1245   PH 7.464*   PCO2 35.6   HCO3 25.5   POCSATURATED 100   BE 2       Significant Imaging: I have reviewed all pertinent imaging results/findings within the past 24 hours.

## 2022-01-05 NOTE — PROGRESS NOTES
Autotransfusion/Rapid Infusion Record:      01/05/2022  Autotransfusionist:  Lissette Nielsen    Surgeon(s) and Role:     * Dennis Haider MD - Primary     * Caleb Garcia MD - Resident - Assisting     * Tony Aparicio MD - Fellow  Anesthesiologist:  Reina Yi MD    Past Medical History:   Diagnosis Date    A-fib     Anticoagulant long-term use     Closed displaced fracture of distal phalanx of lesser toe 10/20/2015    Right    Coronary artery disease     Diabetes mellitus     Diabetes mellitus, type 2     Hypertension     Mechanical heart valve present     X's two    Osteopenia     Pacemaker     Rheumatic heart disease        Procedure(s) (LRB):  REPLACEMENT, AORTIC VALVE, WITH REPEAT STERNOTOMY (N/A)     2:17 PM    Equipment:    Cell Saver     R.I.S.  : [employer] Model: CATSmart or CATSplus : Micah   Model: [id number]     Serial number: [id number]   Serial number:    Disposable lot #: [id number]   Disposable lot #:     Were extra cardiotomies used for cell saver?  No   if yes, #:      Solutions:  Anticoagulant: ACD-A   Expiration date: 3/23 Volume used: 100   Wash solution: 0.9% NaCl   Expiration date: 10/24 Volume used: 6729     Cell saver checklist  Time completed:           [x]   Circuit assembled correctly     [x]   Cell saver powered and operational     [x]   Vacuum connected, functional, adjust to max -150mmHg     [x]   Anticoagulant drip rate adjusted     [x]   Transfer bag properly labeled with patient name, expiration time, volume,       anticoagulant, OR number, and initials     [x]   Cell saver disinfected after use (completed at end of case)       Cell Saver volumes:    Total volume processed:     3706 mL     Total volume pRBCs recovered     820 mL     Volume pRBCs infused     820 mL         RIS checklist   Time completed:  []   RIS circuit assembled correctly     []   RIS power and operational     []   RIS disinfected after use (completed at end of case)        RIS volumes:    Total volume infused:    (see anesthesia record for blood       product information)    mL       Additional comments:

## 2022-01-05 NOTE — TRANSFER OF CARE
"Anesthesia Transfer of Care Note    Patient: Orion Ty    Procedure(s) Performed: Procedure(s) (LRB):  REPLACEMENT, AORTIC VALVE, WITH REPEAT STERNOTOMY (N/A)    Patient location: ICU    Anesthesia Type: general    Transport from OR: Continuous ECG monitoring in transport. Continuous SpO2 monitoring in transport. Continuos invasive BP monitoring in transport. Upon arrival to PACU/ICU, patient attached to ventilator and auscultated to confirm bilateral breath sounds and adequate TV    Post pain: adequate analgesia    Post assessment: no apparent anesthetic complications    Post vital signs: stable    Level of consciousness: sedated    Nausea/Vomiting: no nausea/vomiting    Complications: none    Transfer of care protocol was followed      Last vitals:   Visit Vitals  /63   Pulse (!) 50   Temp 36.9 °C (98.4 °F) (Oral)   Resp 20   Ht 5' 1" (1.549 m)   Wt 44 kg (97 lb)   SpO2 100%   Breastfeeding No   BMI 18.33 kg/m²     "

## 2022-01-05 NOTE — PROGRESS NOTES
IN HOSPITAL DEVICE INTERROGATION AND TESTING PERFORMED, s/p aortic valve replacement.  Single chamber ppm, VVI 50    Presenting egram demonstrates  at 50  Underlying rhythm c/w AF with slow ventricular conduction  Device fxn WNL  Aut[employer]ure algorithms are On   RV pacing 46%  Atrial arrhythmias:  Persistent AF  Anticoagulation Status:  Coumadin  Ventricular arrhythmias:  NONE  Battery Status/Longevity:  12 yrs.  Reprogramming at this visit:   NONE    Full interrogation can be found in media tab.

## 2022-01-05 NOTE — HPI
Orion Ty is our 76 y.o. female with previous aortic valve replacement, mitral valve replacement, and tricuspid valve repair in 2010 who presents for redo aortic valve replacement. 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 She underwent aortic valve replacement with a mechanical valve on 1/5/22. She was cannulated in her right groin. She tolerated the procedure well. Her preoperative echo demonstrated good function with moderate aortic stenosis, gradient of 29. Intraoperatively, she received 2.5L crystalloid, 3U RBC, 2U FFP, 2 platelets, and 800 cell saver. She made 400 cc of urine throughout the case. She was able to be weaned off of bypass without difficulty. Postoperative echo demonstrated good function without an improved aortic gradient of 10. She was brought to the SICU postoperatively for further care and management. She arrived intubated and on 0.02 of epi and 0.02 of levo. Initial ABG demonstrated good oxygenation and ventilation. iStat lactic 3.4.     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 During the case, she intermittently went into transient SVT.

## 2022-01-05 NOTE — PROGRESS NOTES
0615-Chart reviewed and Pathway orders are incorrect , all orders are listed as preop- all 164.   0620- call place via the  to Dr Haider's resident , was transferred to [facility] who then put the cardiac resident on the phone and I was advised that the cardia fellow had to fix the orders.   0630 call placed to cardiac fellow,  via the , . They were paged.   0645 call again placed to Cardiac fellow via the  .   Charge nurse Annie advised of the above   0650 call placed to DR Haider via the , she advised no answer.   0652 [facility] charge nurse advised , Edda Torres also advised.   Preceded with preop.  0732 chart reviewed and it was found that DR Aparicio was the Fellow, I called and had him paged via the .   0734 DR Aparicio returned called and ordered to disregard the Pathway and he would put in new orders .   0740 New orders noted

## 2022-01-05 NOTE — ANESTHESIA PROCEDURE NOTES
Right radial arterial line    Diagnosis: Aortic stenosis    Patient location during procedure: done in OR    Staffing  Authorizing Provider: Reina Yi MD  Performing Provider: Mery Nava MD    Anesthesiologist was present at the time of the procedure.    Preanesthetic Checklist  Completed: patient identified, IV checked, site marked, risks and benefits discussed, surgical consent, monitors and equipment checked, pre-op evaluation, timeout performed and anesthesia consent givenRight radial arterial line  Skin Prep: chlorhexidine gluconate  Local Infiltration: lidocaine  Orientation: right  Location: radial    Catheter Size: 20 G  Catheter placement by Ultrasound guidance. Heme positive aspiration all ports.  Vessel Caliber: medium, patent, compressibility normal  Needle advanced into vessel with real time Ultrasound guidance.Insertion Attempts: 1  Assessment  Dressing: secured with tape and tegaderm  Patient: Tolerated well

## 2022-01-05 NOTE — ANESTHESIA POSTPROCEDURE EVALUATION
Anesthesia Post Evaluation    Patient: Orion Ty    Procedure(s) Performed: Procedure(s) (LRB):  REPLACEMENT, AORTIC VALVE, WITH REPEAT STERNOTOMY (N/A)    Final Anesthesia Type: general      Patient location during evaluation: ICU  Patient participation: No - Unable to Participate, Sedation  Level of consciousness: sedated  Post-procedure vital signs: reviewed and stable  Pain management: adequate  Airway patency: patent    PONV status at discharge: No PONV  Anesthetic complications: no      Cardiovascular status: hemodynamically stable  Respiratory status: intubated and ventilator  Follow-up not needed.          Vitals Value Taken Time   /63 01/05/22 1434   Temp 98.0 01/05/22 1446   Pulse 50 01/05/22 1446   Resp 14 01/05/22 1446   SpO2 100 % 01/05/22 1446   Vitals shown include unvalidated device data.      No case tracking events are documented in the log.      Pain/Paul Score: Pain Rating Prior to Med Admin: 2 (1/5/2022  7:35 AM)

## 2022-01-06 NOTE — PLAN OF CARE
Discharge Recommendation: HHPT.    Evaluation completed today. PT goals appropriate.    Patient is safe to perform bed <> chair transfer with CGA and HHA with nursing staff.    Please continue Progressive Mobility Protocol as appropriate.    Emma Florentino, PT, DPT  2022  Pager: [phone]      Problem: Physical Therapy Goal  Goal: Physical Therapy Goal  Description: Goals to be met by: 2022     Patient will increase functional independence with mobility by performin. Sit to stand transfer with Supervision  2. Bed to chair transfer with Supervision using LRAD  3. Gait  x 250 feet with Supervision using LRAD with standing rest breaks prn.   4. Lower extremity exercise program x30 reps per handout, with independence  5. Recite 3/3 sternal precautions and remain complaint with precautions throughout session with no verbal cues      Outcome: Ongoing, Progressing

## 2022-01-06 NOTE — PROGRESS NOTES
"[facility]  Endocrinology  Progress Note    Admit Date: 1/5/2022     Reason for Consult: Management of T2DM, Hyperglycemia     Surgical Procedure and Date:   1/5/21  REPLACEMENT, AORTIC VALVE, WITH REPEAT STERNOTOMY (N/A)     Diabetes diagnosis year: MELIDA; intubated    Home Diabetes Medications:  Metformin 500 mg daily,     How often checking glucose at home?  MELIDA    BG readings on regimen: MELIDA  Hypoglycemia on the regimen?  MELIDA  Missed doses on regimen? MELIDA    Diabetes Complications include:     None    Complicating diabetes co morbidities:   CAD, HTN, a-fib       HPI:   Patient is a 76 y.o. female with a diagnosis of s/p MVR, AVR and TVr in 2010 by Dr. Haidre. 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 Family states she is more fatigued, decreased appetite and fluid on lungs. Pt has increased symptoms and wants to proceed with cardiac surgery now and here for pre op. She is now s/p the above procedure. Endocrinology consulted for management of T2DM.     Lab Results   Component Value Date    HGBA1C 6.0 (H) 01/03/2022           Interval HPI:   Overnight events:  BG stable but slightly above goal while on intensive IV insulin infusion protocol overnight. Patient has since been extubated, and is scheduled for diet advancement today. Endo will continue to follow, and manage glycemic control while inpatient.   Diet clear liquid Fluid - 1500mL  1 Day Post-Op    Eating:   <25%  Nausea: No  Hypoglycemia and intervention: No  Fever: No  TPN and/or TF: No  If yes, type of TF/TPN and rate: None    BP (!) 160/72   Pulse 64   Temp 97.5 °F (36.4 °C)   Resp (!) 28   Ht 5' 1" (1.549 m)   Wt 50.4 kg (111 lb 1.8 oz)   SpO2 99%   Breastfeeding No   BMI 20.99 kg/m²     Labs Reviewed and Include    Recent Labs   Lab 01/06/22  0351   *   CALCIUM 8.5*      K 4.1   CO2 23      BUN 20   CREATININE 0.8     Lab Results "   Component Value Date    WBC 11.46 01/06/2022    HGB 9.7 (L) 01/06/2022    HCT 29.6 (L) 01/06/2022    MCV 88 01/06/2022    PLT 65 (L) 01/06/2022     No results for input(s): TSH, FREET4 in the last 168 hours.  Lab Results   Component Value Date    HGBA1C 6.0 (H) 01/03/2022       Nutritional status:   Body mass index is 20.99 kg/m².  Lab Results   Component Value Date    ALBUMIN 4.0 01/03/2022    ALBUMIN 4.4 12/10/2020    ALBUMIN 4.2 10/01/2020     No results found for: PREALBUMIN    Estimated Creatinine Clearance: 44.4 mL/min (based on SCr of 0.8 mg/dL).    Accu-Checks  Recent Labs     01/05/22  2331 01/05/22  2355 01/06/22  0112 01/06/22  0215 01/06/22  0349 01/06/22  0507 01/06/22  0601 01/06/22  0809 01/06/22  0907 01/06/22  1010   POCTGLUCOSE 146* 147* 147* 138* 142* 153* 166* 154* 150* 148*       Current Medications and/or Treatments Impacting Glycemic Control  Immunotherapy:    Immunosuppressants     None        Steroids:   Hormones (From admission, onward)            None        Pressors:    Autonomic Drugs (From admission, onward)            None        Hyperglycemia/Diabetes Medications:   Antihyperglycemics (From admission, onward)            Start     Stop Route Frequency Ordered    01/05/22 1817  insulin regular in 0.9 % NaCl 100 unit/100 mL (1 unit/mL) infusion        Question:  Enter initial dose from Infusion Protocol Chart (Units/hr):  Answer:  1    -- IV Continuous 01/05/22 1817          ASSESSMENT and PLAN    * S/P aortic valve replacement  Managed per primary team  Optimize BG control        Type 2 diabetes mellitus with microalbuminuria, without long-term current use of insulin  BG goal 140 - 180       - Discontinue IIP  - Start Moderate Dose SQ Insulin Correction Scale PRN hyperglycemia.   - BG Monitoring AC/HS     ** Please call Endocrine for any BG related issues **  ** Please notify Endocrine for any change and/or advance in diet**    Discharge planning: TBD        Chronic atrial  fibrillation  May increase insulin resistance.               Vasquez Inman NP  Endocrinology  [facility]

## 2022-01-06 NOTE — PT/OT/SLP EVAL
Occupational Therapy  Co- Evaluation    Name: Orion Ty  MRN: [id number]  Admitting Diagnosis:  S/P aortic valve replacement  Recent Surgery: Procedure(s) (LRB):  REPLACEMENT, AORTIC VALVE, WITH REPEAT STERNOTOMY (N/A) 1 Day Post-Op    Recommendations:     Discharge Recommendations: HH    Assessment:     Orion Ty is a 76 y.o. female with a medical diagnosis of S/P aortic valve replacement.   Performance deficits affecting function: weakness,impaired endurance,impaired self care skills,impaired functional mobilty,gait instability,impaired balance,pain .  Pt tolerated session well with good effort and performance. Anticipate pt will continue to progress well and will be ready for d/c home with family support and HH services when medically stable.     Rehab Prognosis: Good; patient would benefit from acute skilled OT services to address these deficits and reach maximum level of function.       Plan:     Patient to be seen 5 x/week to address the above listed problems via therapeutic activities,therapeutic exercises  · Plan of Care Expires:    · Plan of Care Reviewed with: patient (Simultaneous filing. User may not have seen previous data.)    Subjective     Pt agreeable to therapy.   Pt reports increased pain following taking meds with nsg prior to OT arrival.     Occupational Profile:  Pt resides with spouse in one story home with no LORENZO. PT was independent prior to arrival. Pt has SW and rollator and shower chair in tub and tri-cane, but she does not use these items. Spouse will be able to assist as needed upon d/c.       Pain/Comfort:  · Pain Rating 1: 3/10  · Location 1: chest  Pain Addressed 1: Reposition,Distraction,Nurse notified     Patients cultural, spiritual, Ep[employer]alian conflicts given the current situation: no     Objective:     Communicated with: oracio prior to session.  Patient found in chair with tele, pulse ox, BP cuff, CT, tejeda and A-line.  Daughter present in room.   Coeval completed this  date to optimize functional performance and safety given impaired tolerance for activity and acuity of medical status in setting of ICU.     General Precautions: Standard, fall,sternal   Occupational Performance:    Functional Mobility/Transfers:  · Sit>Stand with MIN A with cues for sternal precautions.     Activities of Daily Living:  · Feeding: set-up  · UE/LE dressing: TOTAL A   · Limited ADl assessment this date as pt with increased coughing and appeared to be in increased pain despite pain rating of 3/10. Pt with increased respirations during activity.   · Simulated g/h skills with set-up seated and anticipate she would required MIN A for task in standing.     Cognitive/Visual Perceptual:  Pt awake, alert and following commands. Pt speaks Austrian but understands and speaks English. Daughter present in room and providing some translation at times when needed.     Physical Exam:  UE WFL strength/ROM within sternal precautions. Pt with intact coordination and light touch sensation.     AMPAC 6 Click ADL:  AMPAC Total Score: 14    Treatment & Education:  Education provided re: sternal precautions and implications on functional activity. Education provided to pt and family re: avoiding squeezing pillow all all times when at rest. Pillow to be used to brace self with coughing or assist with avoiding use of UE's with bed mobs/sit<>stand transitions. Pt and fly verb understanding.   Pt able to stand and take few steps in room with HHA/MIN A.   Education provided re: OT POc and safety with functional mobility/ADL skills.   Patient left up in chair with all lines intact, call button in reach and nsg and daughter present    GOALS:   Multidisciplinary Problems     Occupational Therapy Goals        Problem: Occupational Therapy Goal    Goal Priority Disciplines Outcome Interventions   Occupational Therapy Goal     OT, PT/OT Ongoing, Progressing    Description: Goals to be met by: 7 days 1/13/22     Patient will increase  functional independence with ADLs by performing:    Pt to complete UE dressing with set-up  Pt to complete LE dressing with SBA  Pt to complete standing g/h skills with SBA  Pt to complete toileting with SBA  Pt to completed t/f bed, chair and commode with SBA                   History:     Past Medical History:   Diagnosis Date    A-fib     Anticoagulant long-term use     Closed displaced fracture of distal phalanx of lesser toe 10/20/2015    Right    Coronary artery disease     Diabetes mellitus     Diabetes mellitus, type 2     Hypertension     Mechanical heart valve present     X's two    Osteopenia     Pacemaker     Rheumatic heart disease        Past Surgical History:   Procedure Laterality Date    CARDIAC PACEMAKER PLACEMENT      CARDIAC VALVE REPLACEMENT      CARDIAC VALVE SURGERY      CORONARY ANGIOGRAPHY N/A 12/21/2021    Procedure: ANGIOGRAM, CORONARY ARTERY;  Surgeon: Devonte Salazar MD;  Location: [facility] CATH LAB;  Service: Cardiology;  Laterality: N/A;    EYE SURGERY Bilateral     cataract    FLUOROSCOPY N/A 12/11/2020    Procedure: FLUOROSCOPY;  Surgeon: Mckay Calvo MD;  Location: [facility] CATH LAB;  Service: Cardiology;  Laterality: N/A;    REPLACEMENT OF AORTIC VALVE WITH REPEAT STERNOTOMY N/A 1/5/2022    Procedure: REPLACEMENT, AORTIC VALVE, WITH REPEAT STERNOTOMY;  Surgeon: Dennis Haider MD;  Location: [facility] OR [address] [place] [address];  Service: Cardiothoracic;  Laterality: N/A;    REPLACEMENT OF PACEMAKER GENERATOR N/A 3/2/2020    Procedure: REPLACEMENT, PACEMAKER GENERATOR;  Surgeon: Mark Ring MD;  Location: [facility] EP LAB;  Service: Cardiology;  Laterality: N/A;  TBS/CHRISTI, Single PPM gen change, Right side, MDT, MAC, SK, 3 Prep    thryoid s         Time Tracking:     OT Date of Treatment: 01/06/22  OT Start Time: 0914  OT Stop Time: 0936  OT Total Time (min): 22 min    Billable Minutes:Evaluation 11  Therapeutic Activity 11    1/6/2022

## 2022-01-06 NOTE — PLAN OF CARE
[facility]  Initial Discharge Assessment       Primary Care Provider: Otis Zimmer MD    Admission Diagnosis: Prosthetic valve dysfunction, sequela [T82.09XS]  Aortic stenosis [I35.0]    Admission Date: 1/5/2022  Expected Discharge Date:     Discharge Barriers Identified: None    Payor: HUMANA MANAGED MEDICARE / Plan: HUMANA SNP (SPECIAL NEEDS PLAN) / Product Type: Medicare Advantage /     Extended Emergency Contact Information  Primary Emergency Contact: TyCristobal maurcie  Address: [address]  Home Phone: [phone]  Mobile Phone: [phone]  Relation: Spouse  Secondary Emergency Contact: Ty,Phi  Mobile Phone: [phone]  Relation: Daughter  Preferred language: English   needed? No    Discharge Plan A: Home with family  Discharge Plan B: Home      [facility] - [place], [place] - [address]  [address] [address]  Phone: [phone] Fax: [phone]    [id number] - [place] - [address] AT [facility]  [address]  [place] [address]  Phone: [phone] Fax: [phone]      Initial Assessment (most recent)     Adult Discharge Assessment - 01/06/22 0930        Discharge Assessment    Assessment Type Discharge Planning Assessment     Confirmed/corrected address, phone number and insurance Yes     Source of Information patient;family     Reason For Admission S/P aortic valve replacement     Lives With spouse     Facility Arrived From: Home     Do you expect to return to your current living situation? Yes     Do you have help at home or someone to help you manage your care at home? Yes     Who are your caregiver(s) and their phone number(s)? Cristobal Rideren [phone]     Home Layout Able to live on 1st floor     Equipment Currently Used at Home none     Readmission within 30 days? No     Patient currently being followed by  outpatient case management? No     Do you currently have service(s) that help you manage your care at home? No     Do you take prescription medications? Yes     Is the patient taking medications as prescribed? yes     Who is going to help you get home at discharge? Cristobal Ty     How do you get to doctors appointments? family or friend will provide     Are you on dialysis? No     Do you take coumadin? Yes     Who monitors your labs? [facility]     Discharge Plan A Home with family     Discharge Plan B Home     DME Needed Upon Discharge  other (see comments)   TBD    Discharge Plan discussed with: Adult children;Patient     Discharge Barriers Identified None        Relationship/Environment    Name(s) of Who Lives With Patient Cristobal Ty                 The SW met with patient and her daughter in room 20815 to complete DPA. Questions answered / contact numbers provided.  Use PREFERRED PHARMACY / BEDSIDE DELIVERY for any necessary medications at time of discharge. The patient is independent with all ADLs - does not use DME, In-home equipment, is not on HD, or home oxygen.  The patient's family will be assisting with help upon discharge. The patient's family will be providing transportation home. Hospital follow up will be scheduled with PCP. Will continue to follow for course of hospitalization.     Shen Simmons LMSW  Case Management [facility]  Ext: [phone]

## 2022-01-06 NOTE — PROGRESS NOTES
[facility]  Critical Care - Surgery  Progress Note    Patient Name: Orion Ty  MRN: [id number]  Admission Date: 1/5/2022  Hospital Length of Stay: 1 days  Code Status: Full Code  Attending Provider: Dennis Haider MD  Primary Care Provider: Otis Zimmer MD   Principal Problem: S/P aortic valve replacement    Subjective:     Hospital/ICU Course:  No notes on file    Interval History/Significant Events:   Extubated last night  Pain well controlled  Up in chair this morning  1U RBC yesterday    Follow-up For: Procedure(s) (LRB):  REPLACEMENT, AORTIC VALVE, WITH REPEAT STERNOTOMY (N/A)    Post-Operative Day: 1 Day Post-Op    Objective:     Vital Signs (Most Recent):  Temp: 97.7 °F (36.5 °C) (01/06/22 0300)  Pulse: 69 (01/06/22 0545)  Resp: 20 (01/06/22 0545)  BP: (!) 116/57 (01/06/22 0500)  SpO2: 96 % (01/06/22 0545) Vital Signs (24h Range):  Temp:  [97.6 °F (36.4 °C)-98.7 °F (37.1 °C)] 97.7 °F (36.5 °C)  Pulse:  [] 69  Resp:  [13-48] 20  SpO2:  [93 %-100 %] 96 %  BP: ()/() 116/57  Arterial Line BP: ()/(41-58) 116/45     Weight: 50.4 kg (111 lb 1.8 oz)  Body mass index is 20.99 kg/m².      Intake/Output Summary (Last 24 hours) at 1/6/2022 0629  Last data filed at 1/6/2022 0500  Gross per 24 hour   Intake 6344.92 ml   Output 1475 ml   Net 4869.92 ml       Physical Exam  Constitutional:       General: She is not in acute distress.  HENT:      Head: Normocephalic and atraumatic.   Neck:      Comments: R IJ CVC  Cardiovascular:      Rate and Rhythm: Normal rate and regular rhythm.      Pulses: Normal pulses.      Comments: Midline sternotomy incision c/d/I  Chest tubes in place with serosanguinous output  V wires in place, not connected  Pacemaker in place  Pulmonary:      Effort: Pulmonary effort is normal. No respiratory distress.   Abdominal:      General: Abdomen is flat. There is no distension.      Palpations: Abdomen is soft.      Tenderness: There is no  abdominal tenderness.   Genitourinary:     Comments: Mireles in place with clear urine  R groin well incision, bandage in place. Thigh soft, no hematoma  Musculoskeletal:      Comments: R radial art line in place   Skin:     General: Skin is warm.      Capillary Refill: Capillary refill takes less than 2 seconds.   Neurological:      Mental Status: She is alert.         Vents:  Vent Mode: A/C (01/05/22 1430)  Ventilator Initiated: Yes (01/05/22 1427)  Set Rate: 18 BPM (01/05/22 1430)  Vt Set: (S) 350 mL (01/05/22 1430)  PEEP/CPAP: 5 cmH20 (01/05/22 1430)  Oxygen Concentration (%): 10 (01/05/22 1939)  Peak Airway Pressure: 33 cmH2O (01/05/22 1430)  Plateau Pressure: 0 cmH20 (01/05/22 1430)  Total Ve: 7.05 mL (01/05/22 1430)  Negative Inspiratory Force (cm H2O): 0 (01/05/22 1430)  F/VT Ratio<105 (RSBI): (!) 50.51 (01/05/22 1430)    Lines/Drains/Airways     Central Venous Catheter Line             Introducer with Double Lumen 01/05/22 right internal jugular 1 day    Percutaneous Central Line Insertion/Assessment - Triple Lumen  01/05/22 right internal jugular 1 day          Drain                 Chest Tube 01/05/22 1334 3 Left Pleural 19 Fr. <1 day         Urethral Catheter 01/05/22 0825 Silicone;Straight-tip;Temperature probe 16 Fr. <1 day         Y Chest Tube 1 and 2 01/05/22 1333 1 Anterior Mediastinal 19 Fr. 2 Anterior Mediastinal 19 Fr. <1 day          Arterial Line            Arterial Line 01/05/22 0855 Right Radial <1 day          Line                 Pacer Wires 01/05/22 1332 <1 day          Peripheral Intravenous Line                 Peripheral IV - Single Lumen 01/05/22 0711 18 G Left Forearm <1 day                Significant Labs:    CBC/Anemia Profile:  Recent Labs   Lab 01/05/22  1436 01/05/22  1440 01/06/22  0351   WBC  --  8.44 11.46   HGB  --  7.0* 9.7*   HCT 20* 21.7* 29.6*   PLT  --  92* 65*   MCV  --  88 88   RDW  --  14.3 15.9*        Chemistries:  Recent Labs   Lab 01/05/22  1440 01/05/22 2012  01/06/22  0351     --  138   K 3.6 3.6 4.1     --  105   CO2 24  --  23   BUN 12  --  20   CREATININE 0.6  --  0.8   CALCIUM 9.2  --  8.5*   MG 2.7*  2.7*  --  2.2   PHOS 3.5  3.5  --  4.2       ABGs:   Recent Labs   Lab 01/06/22  0358   PH 7.357   PCO2 48.7*   HCO3 27.3   POCSATURATED 100   BE 2     All pertinent labs within the past 24 hours have been reviewed.    Significant Imaging:  I have reviewed all pertinent imaging results/findings within the past 24 hours.    Assessment/Plan:     * S/P aortic valve replacement  Orion Ty is a 76 y.o. female who presents to the SICU s/p redo aortic valve replacement with mechanical valve on 1/5/22.      Neuro/Psych:   -- Sedation: off  -- Pain: multimodals             Cards:   -- Pressors: off  -- Goal MAP: 60-80  -- SVT yesterday, now on amio drip  -- aspirin  -- statin  -- heparin drip this AM  -- pacemaker in place, interrogated and set to home settings  -- naturally bradycardic to 49 at home      Pulm:   -- Goal O2 sat > 90%  -- Wean as able  -- ABG PRN  -- Chest tubes include: 2 mediastinal, 1 left pleural to wall suction.      Renal:  -- Keep tejeda for strict I/O  -- BUN/Cr 20/0.8  -- Diuretics: none       FEN / GI:   -- Replace lytes as needed  -- Nutrition: CLD, ADAT      ID:   -- WBC 11.5  -- Antibiotics: periop ancef      Heme/Onc:   -- Hgb 9.7  -- 3U RBC, 2U FFP, 2U platelets in OR  -- Daily CBC  -- Transfused products: 1U RBC on 1/5/22  -- Anticoagulation: ASA      Endo:   -- Gluc goal 140-180  -- Insulin drip per endocrinology      PPx:   Feeding: CLD, ADAT  Analgesia/Sedation: multimodal  Thromboembolic prevention: ASA, heparin drip today  HOB >30: yes  Stress Ulcer ppx: none  Glucose control: Critical care goal 140-180 g/dl, ISS    Lines/Drains/Airway: RIJ, Art line, tejeda      Dispo/Code Status/Palliative:   -- SICU / Full Code               Critical care was time spent personally by me on the following activities: development  of treatment plan with patient or surrogate and bedside caregivers, discussions with consultants, evaluation of patient's response to treatment, examination of patient, ordering and performing treatments and interventions, ordering and review of laboratory studies, ordering and review of radiographic studies, pulse oximetry, re-evaluation of patient's condition.  This critical care time did not overlap with that of any other provider or involve time for any procedures.     Caleb Garcia MD  Critical Care - Surgery  [facility]

## 2022-01-06 NOTE — PT/OT/SLP EVAL
Physical Therapy Co-Evaluation and Co-Treatment    Patient Name:  Orion Ty   MRN:  [id number]  Admit Date: 1/5/2022  Admitting Diagnosis:  S/P aortic valve replacement   Length of Stay: 1 days  Recent Surgery: Procedure(s) (LRB):  REPLACEMENT, AORTIC VALVE, WITH REPEAT STERNOTOMY (N/A) 1 Day Post-Op    Recommendations:      Discharge Recommendations:  home health PT,home health OT  Discharge Equipment Recommendations: none   Barriers to discharge: post-op, pain, impaired endurance, decreased activity tolerance, increased risk for falls    Assessment:     Orion Ty is a 76 y.o. female admitted with a medical diagnosis of S/P aortic valve replacement.  She presents with the following impairments/functional limitations: weakness,impaired endurance,impaired self care skills,impaired functional mobilty,gait instability,impaired balance,decreased upper extremity function,pain,impaired coordination,impaired cardiopulmonary response to activity,impaired skin. Pt tolerated initial evaluation well today. Patient is s/p redo aortic valve replacement on 1/5/2022. Pt demonstrated good functional strength and tolerance to upright activity on this date, requiring minimal physical assist for transfers and ambulation. VS remained stable throughout session. Pt did have generalized unsteadiness when standing requiring Min A and HHA throughout ambulation trial for patient safety. Pt with decreased overall endurance, however, and only able to ambulate a total of 16 ft TOTAL on this date. Pt will continue to benefit from skilled PT services during this hospital admit to continue to improve transfer ability and efficiency as well as continue to progress pt's ambulation distance and cardiopulmonary endurance to maximize pt's functional independence and return to PLOF. After discharge pt will benefit from HHPT to further progress functional mobility and cardiopulmonary endurance as well as for home safety and energy conservation  techniques.    Rehab Prognosis: Good; patient would benefit from acute skilled PT services to address these deficits and reach maximum level of function.      Plan:     During this hospitalization, patient to be seen 5 x/week to address the identified rehab impairments via gait training,therapeutic activities,therapeutic exercises and progress towards the established goals.    · Plan of Care Expires:  02/05/22    Subjective     RN notified prior to session. Daughter present upon PT entrance into room.    Chief Complaint: pt reporting chest pain. Pt rating it as 3/10 but pt grimacing in pain and with elevated heart rate and shallow breathing.  Patient/Family Comments/goals: get stronger  Pain/Comfort:  Pain Rating 1: 3/10  Location - Side 1: Bilateral  Location - Orientation 1: generalized  Location 1: chest  Pain Addressed 1: Reposition,Distraction,Nurse notified  Pain Rating Post-Intervention 1: 3/10    Social History:  Residence: lives with their spouse 1-story house with 0 LORENZO and no HR. Pt's bathroom has a tub/shower with shower chair.  Support available: family  Equipment Owned: walker, rolling,rollator,cane, straight,shower chair  Equipment Used: None  Prior level of function: independent  Hand Dominance: right.   Work: no.   Drive: no.   Managing Medicines/Managing Home: yes.     Patient reports they will have assistance from , family upon discharge.    Objective:     Additional staff present: OT for co-evaluation due to patient's medical complexities requiring two skilled therapists in order to appropriately assess patient's functional deficits as well as ensure patient safety, accommodate for limited activity tolerance, and provide appropriate, skilled assistance to maximize functional potential during evaluation.    Patient found up in chair with: telemetry,blood pressure cuff,pulse ox (continuous),central line,peripheral IV,arterial line,tejeda catheter,chest tube,oxygen     General Precautions:  "Standard, Cardiac fall,sternal   Orthopedic Precautions:N/A   Braces: N/A   Body mass index is 20.99 kg/m².  Oxygen Device: Nasal Cannula 2L  Vitals: BP (!) 160/72   Pulse 64   Temp 97.5 °F (36.4 °C)   Resp (!) 28   Ht 5' 1" (1.549 m)   Wt 50.4 kg (111 lb 1.8 oz)   SpO2 99%   Breastfeeding No   BMI 20.99 kg/m²     Exams:  · Cognition:   · Alert and Cooperative  · AxOx4  · Command following: Follows multistep verbal commands  · Fluency: clear/fluent  · Hearing: Intact  · Vision:  Intact  · Skin Integrity: Visible skin intact  · Postural Assessment: slouched posture, rounded shoulders and forward head  · Physical Exam:    Left UE Left LE Right UE Right LE   Edema absent absent absent absent   ROM AROM WFL AROM WFL AROM WFL AROM WFL   Modified Erica Scale 0: No increase in muscle tone 0: No increase in muscle tone 0: No increase in muscle tone 0: No increase in muscle tone   Strength within normal limits within normal limits within normal limits within normal limits   Sensation intact to light touch intact to light touch intact to light touch intact to light touch   Coordination normal normal normal normal     Outcome Measures:  AM-PAC 6 CLICK MOBILITY  Turning over in bed (including adjusting bedclothes, sheets and blankets)?: 2  Sitting down on and standing up from a chair with arms (e.g., wheelchair, bedside commode, etc.): 3  Moving from lying on back to sitting on the side of the bed?: 2  Moving to and from a bed to a chair (including a wheelchair)?: 3  Need to walk in hospital room?: 3  Climbing 3-5 steps with a railing?: 2  Basic Mobility Total Score: 15     Functional Mobility:    Bed Mobility:   · Pt found/returned to bedside chair    Transfers:   · Sit <> Stand Transfer: minimum assistance with hand-held assist   · Stand <> Sit Transfer: minimum assistance with hand-held assist   · [id number] from bedside chair    Standing Balance:   Static Standing Balance: Fair : able to stand unsupported without " UE support and without LOB for 1 minute   Dynamic Standing Balance: Poor+ : able to stand with minimal assistance and reach ipsilaterally. Unable to weight shift.   Assistance Level Required: Contact Guard Assistance   Patient used: hand-held assist       Gait:   · Patient ambulated: 8 ft fwd/8 ft bwd   · Patient required: minimal assist  · Patient used:  hand-held assist   · Gait Pattern observed: reciprocal gait  · Gait Deviation(s): decreased step length, wide base of support, decreased weight shift, decreased arm swing, shuffle gait and decreased aaron  · Impairments due to: pain and decreased endurance  · all lines remained intact throughout ambulation trial  · Comments: Pt able to ambulate with wide DEVYN and decreased step length. Required Min A due to pt pushing through therapists hand to clear feet in swing phase as well as maintain balance.    Education:   Time provided for education, counseling and discussion of health disposition in regards to patient's current status   All questions answered within PT scope of practice and to patient's satisfaction   PT role in POC to address current functional deficits   Pt educated on proper body mechanics, safety techniques, and energy conservation with PT facilitation and cueing throughout session   Call nursing/pct to transfer to chair/use bathroom. Pt stated understanding.   Whiteboard updated with therapist name and pt's current mobility status documented above   Safe to perform step transfer to/from chair/bedside commode assist x 1 and HHA w/ nursing/PCT present   Importance of OOB tolerance 8-10 hrs/day to improve lung ventilation and expansion as well as strengthen postural musculature   Pt educated on Post-op sternal precautions, including no lifting > 5 lbs, pulling or pushing with BUEs. Able to move arms within a pain free range.    Patient left up in chair with all lines intact, call button in reach and RN and daughter present.    GOALS:    Multidisciplinary Problems     Physical Therapy Goals        Problem: Physical Therapy Goal    Goal Priority Disciplines Outcome Goal Variances Interventions   Physical Therapy Goal     PT, PT/OT Ongoing, Progressing     Description: Goals to be met by: 2022     Patient will increase functional independence with mobility by performin. Sit to stand transfer with Supervision  2. Bed to chair transfer with Supervision using LRAD  3. Gait  x 250 feet with Supervision using LRAD with standing rest breaks prn.   4. Lower extremity exercise program x30 reps per handout, with independence  5. Recite 3/3 sternal precautions and remain complaint with precautions throughout session with no verbal cues                       History:     Past Medical History:   Diagnosis Date    A-fib     Anticoagulant long-term use     Closed displaced fracture of distal phalanx of lesser toe 10/20/2015    Right    Coronary artery disease     Diabetes mellitus     Diabetes mellitus, type 2     Hypertension     Mechanical heart valve present     X's two    Osteopenia     Pacemaker     Rheumatic heart disease        Past Surgical History:   Procedure Laterality Date    CARDIAC PACEMAKER PLACEMENT      CARDIAC VALVE REPLACEMENT      CARDIAC VALVE SURGERY      CORONARY ANGIOGRAPHY N/A 2021    Procedure: ANGIOGRAM, CORONARY ARTERY;  Surgeon: Devonte Salazar MD;  Location: [facility] CATH LAB;  Service: Cardiology;  Laterality: N/A;    EYE SURGERY Bilateral     cataract    FLUOROSCOPY N/A 2020    Procedure: FLUOROSCOPY;  Surgeon: Mckay Calvo MD;  Location: [facility] CATH LAB;  Service: Cardiology;  Laterality: N/A;    REPLACEMENT OF AORTIC VALVE WITH REPEAT STERNOTOMY N/A 2022    Procedure: REPLACEMENT, AORTIC VALVE, WITH REPEAT STERNOTOMY;  Surgeon: Dennis Haider MD;  Location: [facility] OR [address] [place] [address];  Service: Cardiothoracic;  Laterality: N/A;    REPLACEMENT OF PACEMAKER GENERATOR N/A 3/2/2020     Procedure: REPLACEMENT, PACEMAKER GENERATOR;  Surgeon: Mark Ring MD;  Location: [facility] EP LAB;  Service: Cardiology;  Laterality: N/A;  TBS/CHRISTI, Single PPM gen change, Right side, MDT, MAC, SK, 3 Prep    thryoid s         Time Tracking:     PT Received On: 01/06/22  PT Start Time: 0915     PT Stop Time: 0936  PT Total Time (min): 21 min     Billable Minutes: Evaluation 1 procedure and Therapeutic Exercise 9 minutes    Emma Florentino PT, DPT  1/6/2022  Pager: [phone]

## 2022-01-06 NOTE — PLAN OF CARE
"SICU PLAN OF CARE NOTE    Dx: S/P aortic valve replacement    Goals of Care:  MAP >65    Vital Signs:  BP (!) 116/58 (BP Location: Right arm, Patient Position: Lying)   Pulse 69   Temp 98.7 °F (37.1 °C) (Oral)   Resp 19   Ht 5' 1" (1.549 m)   Wt 44 kg (97 lb)   SpO2 99%   Breastfeeding No   BMI 18.33 kg/m²     Cardiac:  paced @ 50, some episodes of SVT    Resp:  SpO2 100% on 2L nasal cannula    Neuro:  AAO x4, Follows Commands, and Moves All Extremities    Gtts:  Insulin and Amiodarone    Urine Output:  Urinary Catheter 585 cc/shift    Drains:  Chest Tube, total output 330 cc /  shift    Diet:  NPO     Labs/Accuchecks:  H/H low, 1 PRBC given    Skin:  All skin remains free from injury.  Patient turned Q2, waffle mattress inflated, ICU bed working correctly.    Shift Events:  Chest tube output monitored and decreasing appropriately.  TEG sent and WNL. Weaned sedation and extubated to nasal cannula - tolerated well.  See flowsheet for further assessment/details.  Family updated on current condition/plan of care, questions answered, and emotional support provided.   MD updated on current condition, vitals, labs, and gtts.  No new orders received, will continue to monitor.     "

## 2022-01-06 NOTE — PLAN OF CARE
Problem: Occupational Therapy Goal  Goal: Occupational Therapy Goal  Description: Goals to be met by: 7 days 1/13/22     Patient will increase functional independence with ADLs by performing:    Pt to complete UE dressing with set-up  Pt to complete LE dressing with SBA  Pt to complete standing g/h skills with SBA  Pt to complete toileting with SBA  Pt to completed t/f bed, chair and commode with SBA  Outcome: Ongoing, Progressing

## 2022-01-06 NOTE — SUBJECTIVE & OBJECTIVE
"Interval HPI:   Overnight events:  BG stable but slightly above goal while on intensive IV insulin infusion protocol overnight. Patient has since been extubated, and is scheduled for diet advancement today. Endo will continue to follow, and manage glycemic control while inpatient.   Diet clear liquid Fluid - 1500mL  1 Day Post-Op    Eating:   <25%  Nausea: No  Hypoglycemia and intervention: No  Fever: No  TPN and/or TF: No  If yes, type of TF/TPN and rate: None    BP (!) 160/72   Pulse 64   Temp 97.5 °F (36.4 °C)   Resp (!) 28   Ht 5' 1" (1.549 m)   Wt 50.4 kg (111 lb 1.8 oz)   SpO2 99%   Breastfeeding No   BMI 20.99 kg/m²     Labs Reviewed and Include    Recent Labs   Lab 01/06/22  0351   *   CALCIUM 8.5*      K 4.1   CO2 23      BUN 20   CREATININE 0.8     Lab Results   Component Value Date    WBC 11.46 01/06/2022    HGB 9.7 (L) 01/06/2022    HCT 29.6 (L) 01/06/2022    MCV 88 01/06/2022    PLT 65 (L) 01/06/2022     No results for input(s): TSH, FREET4 in the last 168 hours.  Lab Results   Component Value Date    HGBA1C 6.0 (H) 01/03/2022       Nutritional status:   Body mass index is 20.99 kg/m².  Lab Results   Component Value Date    ALBUMIN 4.0 01/03/2022    ALBUMIN 4.4 12/10/2020    ALBUMIN 4.2 10/01/2020     No results found for: PREALBUMIN    Estimated Creatinine Clearance: 44.4 mL/min (based on SCr of 0.8 mg/dL).    Accu-Checks  Recent Labs     01/05/22  2331 01/05/22  2355 01/06/22  0112 01/06/22  0215 01/06/22  0349 01/06/22  0507 01/06/22  0601 01/06/22  0809 01/06/22  0907 01/06/22  1010   POCTGLUCOSE 146* 147* 147* 138* 142* 153* 166* 154* 150* 148*       Current Medications and/or Treatments Impacting Glycemic Control  Immunotherapy:    Immunosuppressants     None        Steroids:   Hormones (From admission, onward)            None        Pressors:    Autonomic Drugs (From admission, onward)            None        Hyperglycemia/Diabetes Medications:   Antihyperglycemics (From " admission, onward)            Start     Stop Route Frequency Ordered    01/05/22 1817  insulin regular in 0.9 % NaCl 100 unit/100 mL (1 unit/mL) infusion        Question:  Enter initial dose from Infusion Protocol Chart (Units/hr):  Answer:  1    -- IV Continuous 01/05/22 1817

## 2022-01-06 NOTE — ASSESSMENT & PLAN NOTE
Orion Ty is a 76 y.o. female who presents to the SICU s/p redo aortic valve replacement with mechanical valve on 1/5/22.      Neuro/Psych:   -- Sedation: off  -- Pain: multimodals             Cards:   -- Pressors: off  -- Goal MAP: 60-80  -- SVT yesterday, now on amio drip  -- aspirin  -- statin  -- heparin drip this AM  -- pacemaker in place, interrogated and set to home settings  -- naturally bradycardic to 49 at home      Pulm:   -- Goal O2 sat > 90%  -- Wean as able  -- ABG PRN  -- Chest tubes include: 2 mediastinal, 1 left pleural to wall suction.      Renal:  -- Keep tejeda for strict I/O  -- BUN/Cr 20/0.8  -- Diuretics: none       FEN / GI:   -- Replace lytes as needed  -- Nutrition: CLD, ADAT      ID:   -- WBC 11.5  -- Antibiotics: periop ancef      Heme/Onc:   -- Hgb 9.7  -- 3U RBC, 2U FFP, 2U platelets in OR  -- Daily CBC  -- Transfused products: 1U RBC on 1/5/22  -- Anticoagulation: ASA      Endo:   -- Gluc goal 140-180  -- Insulin drip per endocrinology      PPx:   Feeding: CLD, ADAT  Analgesia/Sedation: multimodal  Thromboembolic prevention: ASA, heparin drip today  HOB >30: yes  Stress Ulcer ppx: none  Glucose control: Critical care goal 140-180 g/dl, ISS    Lines/Drains/Airway: RIJ, Art line, tejeda      Dispo/Code Status/Palliative:   -- SICU / Full Code

## 2022-01-06 NOTE — PLAN OF CARE
Recommendations    1. ADAT to Cardiac     2. Add Optisource TID    Goals: Pt to meet >/= 75% EEN/EPN by f/u date  Nutrition Goal Status: new  Communication of RD Recs:  (POC)

## 2022-01-06 NOTE — SUBJECTIVE & OBJECTIVE
Interval History/Significant Events:   Extubated last night  Pain well controlled  Up in chair this morning  1U RBC yesterday    Follow-up For: Procedure(s) (LRB):  REPLACEMENT, AORTIC VALVE, WITH REPEAT STERNOTOMY (N/A)    Post-Operative Day: 1 Day Post-Op    Objective:     Vital Signs (Most Recent):  Temp: 97.7 °F (36.5 °C) (01/06/22 0300)  Pulse: 69 (01/06/22 0545)  Resp: 20 (01/06/22 0545)  BP: (!) 116/57 (01/06/22 0500)  SpO2: 96 % (01/06/22 0545) Vital Signs (24h Range):  Temp:  [97.6 °F (36.4 °C)-98.7 °F (37.1 °C)] 97.7 °F (36.5 °C)  Pulse:  [] 69  Resp:  [13-48] 20  SpO2:  [93 %-100 %] 96 %  BP: ()/() 116/57  Arterial Line BP: ()/(41-58) 116/45     Weight: 50.4 kg (111 lb 1.8 oz)  Body mass index is 20.99 kg/m².      Intake/Output Summary (Last 24 hours) at 1/6/2022 0629  Last data filed at 1/6/2022 0500  Gross per 24 hour   Intake 6344.92 ml   Output 1475 ml   Net 4869.92 ml       Physical Exam  Constitutional:       General: She is not in acute distress.  HENT:      Head: Normocephalic and atraumatic.   Neck:      Comments: R IJ CVC  Cardiovascular:      Rate and Rhythm: Normal rate and regular rhythm.      Pulses: Normal pulses.      Comments: Midline sternotomy incision c/d/I  Chest tubes in place with serosanguinous output  V wires in place, not connected  Pacemaker in place  Pulmonary:      Effort: Pulmonary effort is normal. No respiratory distress.   Abdominal:      General: Abdomen is flat. There is no distension.      Palpations: Abdomen is soft.      Tenderness: There is no abdominal tenderness.   Genitourinary:     Comments: Mireles in place with clear urine  R groin well incision, bandage in place. Thigh soft, no hematoma  Musculoskeletal:      Comments: R radial art line in place   Skin:     General: Skin is warm.      Capillary Refill: Capillary refill takes less than 2 seconds.   Neurological:      Mental Status: She is alert.         Vents:  Vent Mode: A/C (01/05/22  1430)  Ventilator Initiated: Yes (01/05/22 1427)  Set Rate: 18 BPM (01/05/22 1430)  Vt Set: (S) 350 mL (01/05/22 1430)  PEEP/CPAP: 5 cmH20 (01/05/22 1430)  Oxygen Concentration (%): 10 (01/05/22 1939)  Peak Airway Pressure: 33 cmH2O (01/05/22 1430)  Plateau Pressure: 0 cmH20 (01/05/22 1430)  Total Ve: 7.05 mL (01/05/22 1430)  Negative Inspiratory Force (cm H2O): 0 (01/05/22 1430)  F/VT Ratio<105 (RSBI): (!) 50.51 (01/05/22 1430)    Lines/Drains/Airways     Central Venous Catheter Line             Introducer with Double Lumen 01/05/22 right internal jugular 1 day    Percutaneous Central Line Insertion/Assessment - Triple Lumen  01/05/22 right internal jugular 1 day          Drain                 Chest Tube 01/05/22 1334 3 Left Pleural 19 Fr. <1 day         Urethral Catheter 01/05/22 0825 Silicone;Straight-tip;Temperature probe 16 Fr. <1 day         Y Chest Tube 1 and 2 01/05/22 1333 1 Anterior Mediastinal 19 Fr. 2 Anterior Mediastinal 19 Fr. <1 day          Arterial Line            Arterial Line 01/05/22 0855 Right Radial <1 day          Line                 Pacer Wires 01/05/22 1332 <1 day          Peripheral Intravenous Line                 Peripheral IV - Single Lumen 01/05/22 0711 18 G Left Forearm <1 day                Significant Labs:    CBC/Anemia Profile:  Recent Labs   Lab 01/05/22  1436 01/05/22 1440 01/06/22  0351   WBC  --  8.44 11.46   HGB  --  7.0* 9.7*   HCT 20* 21.7* 29.6*   PLT  --  92* 65*   MCV  --  88 88   RDW  --  14.3 15.9*        Chemistries:  Recent Labs   Lab 01/05/22  1440 01/05/22 2012 01/06/22  0351     --  138   K 3.6 3.6 4.1     --  105   CO2 24  --  23   BUN 12  --  20   CREATININE 0.6  --  0.8   CALCIUM 9.2  --  8.5*   MG 2.7*  2.7*  --  2.2   PHOS 3.5  3.5  --  4.2       ABGs:   Recent Labs   Lab 01/06/22  0358   PH 7.357   PCO2 48.7*   HCO3 27.3   POCSATURATED 100   BE 2     All pertinent labs within the past 24 hours have been reviewed.    Significant Imaging:  I  have reviewed all pertinent imaging results/findings within the past 24 hours.

## 2022-01-06 NOTE — PLAN OF CARE
"      SICU PLAN OF CARE NOTE    Dx: S/P aortic valve replacement    Shift Events: Pt's V/S stable over shift, no acute events over night. Pt received 500 ml of albumin over shift for decreased UOP, UOP responding OK with hourly output of 20-40 ml/hr. Pt has stable CT output, no S/S of bleeding over night. Pt able to rest intermittently over shift, pain controlled well with PRN meds. Pt OOBTC this AM, no complications noted. Pt tolerating weaning O2 over shift. Pt needing intermittent cleviprex over night. Plan to monitor hemodyamincs closely, further independence with PT/OT, maintain pt comfort. POC reviewed with pt and pt's daughter as , all questions answered and encouraged. Will continue to monitor.     Goals of Care: MAP 60-80    Neuro: AAO x4, Follows Commands and Moves All Extremities    Vital Signs: /62 (BP Location: Right arm, Patient Position: Lying)   Pulse 70   Temp 97.7 °F (36.5 °C) (Oral)   Resp (!) 23   Ht 5' 1" (1.549 m)   Wt 50.4 kg (111 lb 1.8 oz)   SpO2 97%   Breastfeeding No   BMI 20.99 kg/m²     Cardiac: Paced / Sinus Arrhythmia, VVI, backup @ 50 bpm, HR 50-60s     Respiratory: Nasal Cannula, 2L O2    Diet: Clear Liquid, 1500 FR    Gtts: Insulin and Amiodarone    Urine Output: Urinary Catheter 357 cc/shift    Drains: Chest tube meds/pleural, total output of 260 ml/shift    Labs: daily, 20 mEQ of K given IVPB / Accuchecks: Q1H    Skin: No skin breakdown noted over shift. Pt's incisional dressings remain CDI over shift. Pt turned Q2H, weight shift assistance and pillow support provided. Skin foams in place for prevention. Markus bed plugged in and working properly, waffle mattress inflated.             "

## 2022-01-06 NOTE — CONSULTS
"[facility]  Adult Nutrition  Consult Note    SUMMARY     Recommendations    1. ADAT to Cardiac     2. Add Optisource TID    Goals: Pt to meet >/= 75% EEN/EPN by f/u date  Nutrition Goal Status: new  Communication of RD Recs:  (POC)    Assessment and Plan    Nutrition Problem  Increased nutrient needs     Related to (etiology):   Physiological demands    Signs and Symptoms (as evidenced by):   S/p AVR     Interventions (treatment strategy):  Collaboration with other providers  Fat, Na modified diet  Commercial beverage     Nutrition Diagnosis Status:   New       Reason for Assessment    Reason For Assessment: consult  Diagnosis:  (S/P aortic valve replacement)  Relevant Medical History: CAD, DM, HTN, pacemaker    General Information Comments: 77 y/o female with a Hx of aortic valve replacement, mitral valve replacement, and tricuspid valve repair in 2010 who presents for redo aortic valve replacement. Tolerating clear liquids. No s/s of n/v/d/c. Wt stable PTA. Unable to assess PTA PO intake and nutrition hx. NFPE to be completed upon f/u.    Nutrition Discharge Planning: Cardiac/Diabetic diet    Nutrition Risk Screen    Nutrition Risk Screen: no indicators present    Nutrition/Diet History    Patient Reported Diet/Restrictions/Preferences: low salt  Spiritual, Cultural Beliefs, Judaism Practices, Values that Affect Care: no  Food Allergies: NKFA  Factors Affecting Nutritional Intake: clear liquid diet    Anthropometrics    Temp: 97.5 °F (36.4 °C)  Height: 5' 1" (154.9 cm)  Height (inches): 61 in  Weight Method: Bed Scale  Weight: 50.4 kg (111 lb 1.8 oz)  Weight (lb): 111.11 lb  Ideal Body Weight (IBW), Female: 105 lb  % Ideal Body Weight, Female (lb): 92.38 %  BMI (Calculated): 21  BMI Grade: 18.5-24.9 - normal       Lab/Procedures/Meds    Pertinent Labs Reviewed: reviewed  Pertinent Labs Comments: glu 145  Pertinent Medications Reviewed: reviewed  Pertinent Medications Comments: " amlodipine, lasix, metoprolol, warfarin, heparin    Estimated/Assessed Needs    Weight Used For Calorie Calculations: 50.4 kg (111 lb 1.8 oz)  Energy Calorie Requirements (kcal): 1158 kcal/day  Energy Need Method: [place]-St Jeor (PAL 1.25)  Protein Requirements: 61-76 g/day (1.2-1.5 g/kg)  Weight Used For Protein Calculations: 50.4 kg (111 lb 1.8 oz)  Fluid Requirements (mL): 1 mL/kcal or per MD  Estimated Fluid Requirement Method: RDA Method  RDA Method (mL): 1158  CHO Requirement: 150 gm      Nutrition Prescription Ordered    Current Diet Order: clear liquid  Nutrition Order Comments: 1500 mL FR    Evaluation of Received Nutrient/Fluid Intake    I/O: +5.4L since admit  Energy Calories Required: not meeting needs  Protein Required: not meeting needs  Fluid Required: not meeting needs  Comments: LBM: PTA  Tolerance: tolerating  % Intake of Estimated Energy Needs: 25 - 50 %  % Meal Intake: 25 - 50 %    Nutrition Risk    Level of Risk/Frequency of Follow-up: low       Monitor and Evaluation    Food and Nutrient Intake: energy intake  Food and Nutrient Adminstration: diet order  Anthropometric Measurements: weight  Biochemical Data, Medical Tests and Procedures: electrolyte and renal panel,gastrointestinal profile,glucose/endocrine profile,inflammatory profile,lipid profile  Nutrition-Focused Physical Findings: overall appearance       Nutrition Follow-Up    RD Follow-up?: Yes

## 2022-01-06 NOTE — ASSESSMENT & PLAN NOTE
BG goal 140 - 180       - Discontinue IIP  - Start Moderate Dose SQ Insulin Correction Scale PRN hyperglycemia.   - BG Monitoring AC/HS     ** Please call Endocrine for any BG related issues **  ** Please notify Endocrine for any change and/or advance in diet**    Discharge planning: TBD

## 2022-01-07 NOTE — PROGRESS NOTES
Pt reported difficulty breathing and dizziness. SpO2 low 90s on 1L NC and MAP 90s. O2 increased to 3L NC, legs elevated, and pt laid back. Pt instructed to take deep relaxing breaths. Approximately 5 minutes later pt is noted to be converting in and out of Afib with RVR with rate 120s-140s, MAPs 90s-100s. Dr. Shafer notified and discussing amio bolus and gtt with team before starting infusion. Pt provided with IS and instructed how to use. After IS administration the pt coughed and converted to BL rhythm of Afib < 100 bpm, MAPs remain 90s-100s. Dr. Shafer aware, orders not to treat blood pressure with PRN meds until the team makes a decision.

## 2022-01-07 NOTE — PROGRESS NOTES
Pt noted to have HR 140s-150s with irregular rhythm. Pt is acutely anxious with MAPs 90s - 100s, RR 30s-40s, and an impending sense of doom. Pt reports difficulty sleeping and no pain at this time. Her daughter is at her bedside and reassuring her. Chest tube output is still 0 cc this shift. Bleeding under dressing is surpassed the previously marked area but not yet saturated. Dr. Shafer notified of event and assessment findings, orders for melatonin, metoprolol, and labs. After receiving metoprolol IVP HR normalized to rate of 70s. Note amio bolus and gtt orders are also received with verbal order to have the gtts ready to infuse at the bedside if the pt converts back into Afib with RVR for a third time this shift, but do not infuse now.

## 2022-01-07 NOTE — OP NOTE
DATE OF PROCEDURE:  01/05/2022     ATTENDING SURGEON:  Dennis Haider M.D.     ASSISTANT:  Tony Aparicio MD, Cardiothoracic resident.     PREOPERATIVE DIAGNOSES:  1.  Prosthetic aortic valve dysfunction  2.  Previous aortic and mitral valve replacement with tricuspid repair in 2010  3.  Chronic systolic heart failure    POSTOPERATIVE DIAGNOSES:  1.  Prosthetic aortic valve dysfunction  2.  Previous aortic and mitral valve replacement with tricuspid repair in 2010  3.  Chronic systolic heart failure     OPERATION PERFORMED:      1)  Redo aortic valve replacement with a 21 mm Carbomedics TopHat mechanical prosthesis    2)  Redo sternotomy     ANESTHESIA:  General endotracheal.     ESTIMATED BLOOD LOSS:  100 mL.     BRIEF HISTORY:  Ms. Ty is a very pleasant 76-year-old woman who underwent aortic and mitral valve replacement as well as tricuspid valve repair and Maze procedure in 2010. She did well until recently when she was admitted for heart failure.  She had a thorough evaluation which demonstrated subvalvular pannus below the aortic prosthesis.  She remains profoundly symptomatic.  She now presents for redo aortic valve replacement.     PROCEDURE IN DETAIL:  After obtaining informed and written consent, the patient   was brought to the Operating Room and placed on the operating room table in   supine position.  After induction of adequate general endotracheal anesthesia,   the patient's chest, abdomen, pelvis, and bilateral lower extremities were   prepped and draped in the usual sterile fashion.  The preoperative CT scan had demonstrated continuity between the right atrium and the posterior table of the sternum.  As result, we had a high index of suspicion that an injury to the right atrium would occur and necessitate femoral cannulation.    An incision was made over the right groin.  Dissection was carried down until the right common femoral artery and vein were identified.  Pursestrings were  placed in the anterior surface of both vessels using a 5-0 Prolene suture.       We then turned our attention to the chest, where the patient's previous upper midline   skin incision was reopened.  Dissection was carried down to the level of the   sternum. The wires were divided, but not removed.     The oscillating saw was thenused to divide the anterior table of the sternum.  The wires were removed, and the posterior table of the sternum was divided using the straight Gonzales scissors.  In the course of dividing the posterior table of the sternum, the right atrium was entered.  The sternum was reapproximated using a penetrating towel clamp.  This resulted in good hemostasis.  The patient was systemically heparinized.    We turned our attention back to the groin where a wire was placed through the pursestring into the right common femoral artery using Seldinger technique.  Serial dilation was performed, and an 16 Telugu arterial cannula was inserted.  It was de-aired, and connected to the heart-lung machine circuit.  A wire was then inserted using Seldinger technique through the pursestring in the right common femoral vein.  Its position in the right atrium was confirmed using STUART.  The small dilator was advanced over the wire.  The wire was then exchanged for a superstiff wire.  Its position in the right atrium was confirmed using STUART.  Serial dilation was then performed, and a 21 Telugu venous cannula was inserted.  Its position in the right atrium was confirmed using STUART.  The patient was placed on cardiopulmonary bypass.    We then turned our attention back to the sternum, were we completed division of the posterior table.  There was excellent hemostasis with the patient on bypass.  Once the sternum had been safely traversed, the mammary retractor was placed   and the retrosternal adhesions were taken down first on the left and then on the   right using the electrocautery device.  The sternal retractor was then  placed,   and we began the intrapericardial portion of the dissection.  Not surprisingly, the   adhesions were at least moderate.  We began at the diaphragmatic surface of the   heart, and carefully worked our way down to the inferior vena cava.  We then   proceeded along the right atrial free wall.  The right atrial free wall was   fairly densely adherent to the pericardium.  Eventually, we were able to work our way to  the superior vena cava.  We   were able to expose the anterior surface of the superior vena cava.  We then   turned our attention to the aorta.  We were able to dissect distally   until we reached the origin of the innominate artery.  We were able to separate the aorta from the pulmonary artery.  Antegrade and retrograde cardioplegia catheters were placed.  The aortic cross-clamp was applied, and the heart was arrested using cold blood enhanced antegrade cardioplegia.  A prompt electromechanical arrest was achieved.    Once the cardioplegia was all in, an oblique aortotomy was made just distal to the previous aortotomy.  Exposure sutures were placed, and the aortic valve was visualized.  We were able to open the leaflets of the prosthesis and see the pearly white pannus below. The valve sutures were grasped with a tonsil clamp.  The pseudo endothelium was dissected from the sewing ring.  The valve sutures were then divided sharply using an 11 blade.  In this manner we carefully worked our way around the circumference of the valve, identifying a suture, exposing it, and then carefully dividing it.  Eventually, all the sutures were removed.  A [place] dissector was then used to carefully dissect the prosthesis away from the aortic annulus.  It was densely adherent to the annulus.  There was no indication of previous endocarditis at the annular level.  Eventually, the valve was dissected free.  Extensive annular debridement was then performed, including removal of the sub annular pannus.    Once the  annulus was satisfactory, the ventricle was irrigated with a liter   of cold saline.  The annulus was sized and a 21 mm valve was selected.  While   the valve was being retrieved, multiple non-pledgeted 2-0 Ethibond sutures were   placed circumferentially around the annulus.  Once the sutures were all in, the   valve was brought up, the sutures were passed through the sewing ring, and it   was slid into position.  It seated nicely.  The sutures were tied and then cut.    The aortotomy was then closed in two layers using running 4-0 Prolene suture.    Once the aortotomy was closed, the hot shot was given retrograde.  De-airing   maneuvers were performed, and the aortic crossclamp was removed.  The patient   was subsequently weaned from cardiopulmonary bypass.  The patient did separate   easily from bypass.  Once off bypass, all surgical sites were inspected.  There   was good hemostasis, other than the right atrial appendage.  There were multiple defects in the right atrial appendage, which was markedly dilated.  The appendage was resected using an echelon stapler. The valve was evaluated using STUART, and appeared to be   functioning properly.  The test dose of protamine was administered, and this was   well tolerated.  The total dose was then given.  senior living through the total   dose, all cannulas were removed.  All surgical sites were again inspected, and   again there was good hemostasis.  Ventricular pacing wires were placed.  Drains   were placed.  After again confirming adequate hemostasis, the patient's chest   was closed using #6 stainless steel wires to reapproximate the sternum.  The   overlying soft tissues were reapproximated using absorbable suture material.  The groin was closed in multiple layers of absorbable suture material.     The patient's chest was washed and dried, and a dry dressing was applied.  The   patient tolerated the procedure well, there were no complications.  At the   conclusion of the  case, sponge and instrument counts were correct.

## 2022-01-07 NOTE — PROGRESS NOTES
Pt reporting difficulty breathing. SpO2 100% on 3L NC. Shortly after this, pt noted to be in Afib with RVR again, rate 130s-140s. Pt instructed to cough, she coughed and returned to rate < 100 bpm. A few minutes later she converted back into RVR with rate 130s-140s and has sustained this rate with brief and intermittent conversion back to HR  60s-90s. Dr. Garcia aware and will be rounding shortly, no orders.

## 2022-01-07 NOTE — ASSESSMENT & PLAN NOTE
Orion Ty is a 76 y.o. female who presents to the SICU s/p redo aortic valve replacement with mechanical valve on 1/5/22.      Neuro/Psych:   -- Sedation: off  -- Pain: multimodals             Cards:   -- Pressors: off  -- Goal MAP: 60-80  -- oral amio  -- afib, metop and amio increased  -- aspirin  -- statin  -- heparin drip, goal PTT 60-80  -- pacemaker in place, interrogated and set to home settings  -- naturally bradycardic to 40s at home  -- home amlodipine      Pulm:   -- Goal O2 sat > 90%  -- Wean as able  -- ABG PRN  -- Chest tubes include: 2 mediastinal, 1 left pleural to wall suction.      Renal:  -- Keep tejeda for strict I/O  -- BUN/Cr 20/0.8  -- Diuretics: lasix 20      FEN / GI:   -- Replace lytes as needed  -- Nutrition: cardiac diet      ID:   -- WBC 13  -- Antibiotics: periop ancef      Heme/Onc:   -- Hgb 9  -- 3U RBC, 2U FFP, 2U platelets in OR  -- Daily CBC  -- Transfused products: 1U RBC on 1/5/22  -- Anticoagulation: ASA, heparin gtt      Endo:   -- Gluc goal 140-180  -- Insulin drip per endocrinology      PPx:   Feeding: cardiac diet, ADAT  Analgesia/Sedation: multimodal  Thromboembolic prevention: ASA, heparin drip  HOB >30: yes  Stress Ulcer ppx: none  Glucose control: Critical care goal 140-180 g/dl, ISS    Lines/Drains/Airway: Art line, ileana      Dispo/Code Status/Palliative:   -- SICU / Full Code

## 2022-01-07 NOTE — BRIEF OP NOTE
[facility]  Cardiothoracic Surgery  Operative Note    SUMMARY     Date of Procedure: 1/5/2022     Procedure: Procedure(s) (LRB):  1)  RE-REPLACEMENT, AORTIC VALVE, WITH a 21 mm Carbomedics TopHat mechanical prosthesis  47442-33    2)  Redo sternotomy  92525    Surgeon(s) and Role:     * Dennis Haider MD - Primary     * Caleb Garcia MD - Resident - Assisting     * Tony Aparicio MD - Fellow        Pre-Operative Diagnosis: Prosthetic valve dysfunction, sequela [T82.09XS]    Post-Operative Diagnosis: Post-Op Diagnosis Codes:     * Prosthetic valve dysfunction, sequela [T82.09XS]    Anesthesia: General    Operative Findings (including complications, if any): Subvalvular pannus.     Estimated Blood Loss (EBL): 100 mL         Implants:   Implant Name Type Inv. Item Serial No.  Lot No. LRB No. Used Action   CARBOMEDICS SUPER ANNULAR TOP HAT   [id number]   N/A 1 Implanted   PUTTY HEMASORB HEMOSTAT 4GM - [id number]  PUTTY HEMASORB HEMOSTAT 4GM  [id number]  N/A 1 Implanted       Specimens:   Specimen (24h ago, onward)            None                  Attestation:  I was present and scrubbed for the procedure.

## 2022-01-07 NOTE — PLAN OF CARE
"      SICU PLAN OF CARE NOTE    Dx: S/P aortic valve replacement    Shift Events: Afib with RVR x 4 this shift, see notes.     Goals of Care: MAPs < 90, HR < 120    Neuro: AAO x4, Follows Commands, and Moves All Extremities    Vital Signs: BP (!) 173/72 (BP Location: Right arm, Patient Position: Lying)   Pulse 70   Temp 97.9 °F (36.6 °C) (Oral)   Resp 18   Ht 5' 1" (1.549 m)   Wt 50.4 kg (111 lb 1.8 oz)   SpO2 100%   Breastfeeding No   BMI 20.99 kg/m²     Respiratory: Nasal Cannula    Diet: Cardiac Diet    Gtts: Heparin    Urine Output: Urinary Catheter 10-20 cc/hour    Drains: Chest tubes 70 cc / shift     Labs/Accuchecks: Daily labs. Accuchecks ACHS    Skin: Complete CHG bath done. Dressings changed as needed. See notes for details.      "

## 2022-01-07 NOTE — PLAN OF CARE
"SICU PLAN OF CARE NOTE    Dx: S/P aortic valve replacement    Goals of Care:  MAP >65    Vital Signs:  BP (!) 160/72   Pulse 82   Temp 98.3 °F (36.8 °C) (Oral)   Resp 16   Ht 5' 1" (1.549 m)   Wt 50.4 kg (111 lb 1.8 oz)   SpO2 100%   BMI 20.99 kg/m²     Cardiac:  underlying paced rhythm @ 50, most of the shift - NSR 70s-90s     Resp:  SpO2 100% on 1L nasal cannula, unable to wean to RA     Neuro:  AAO x4, Follows Commands, and Moves All Extremities     Gtts:  Heparin 700u/hr     Urine Output:  Urinary Catheter 430 cc/shift     Drains:  Chest Tube, total output 110 cc/shift     Diet:  clear liquid - requested not to advance yet, but will for breakfast     Labs/Accuchecks:  all WNL     Skin:  All skin remains free from injury.  Patient turned Q2, waffle mattress inflated, ICU bed working correctly. OOBTC majority of shift with chair cushion and repositioning.     Shift Events:   Amio gtt transitioned to PO.  PT/OT.  Patient progressing well, but does display anxiety, HTN, and tachypnea/wheezing after exertion; calming techniques reviewed with patient and family.  Great family support at bedside today.  All updated on current condition/plan of care, questions answered, and emotional support provided.   MD updated on current condition, vitals, labs, and gtts.  No new orders received, will continue to monitor.        "

## 2022-01-07 NOTE — CARE UPDATE
BG goal 140 - 180   Diet Cardiac [facility]; Fluid - 1500mL  2 Days Post-Op    BG stable and within goal on average without use of insulin therapy. Endo will continue to follow, and manage glycemic control while inpatient.     - Decrease to Low Dose SQ Insulin Correction Scale PRN hyperglycemia.  - BG Monitoring AC/HS.     ** Please call Endocrine for any BG related issues **  ** Please notify Endocrine for any change and/or advance in diet**     Lab Results   Component Value Date    HGBA1C 6.0 (H) 01/03/2022       Discharge Planning:   TBD. Please notify endocrinology prior to discharge.

## 2022-01-07 NOTE — PROGRESS NOTES
Dr. Garcia and Dr. Alfaro at bedside to assess. Orders to give 9 AM metop, amio, and lasix now, also received verbal order to decrease heparin to 600 units/hr.

## 2022-01-07 NOTE — PROGRESS NOTES
[facility]  Critical Care - Surgery  Progress Note    Patient Name: Orion Ty  MRN: [id number]  Admission Date: 1/5/2022  Hospital Length of Stay: 2 days  Code Status: Full Code  Attending Provider: Dennis Haider MD  Primary Care Provider: Otis Zimmer MD   Principal Problem: S/P aortic valve replacement    Subjective:     Hospital/ICU Course:  No notes on file    Interval History/Significant Events:   In and out of afib with RVR yesterday, remains hemodynamically stable  Pain well controlled  20cc UOP /hr    Follow-up For: Procedure(s) (LRB):  REPLACEMENT, AORTIC VALVE, WITH REPEAT STERNOTOMY (N/A)    Post-Operative Day: 1 Day Post-Op    Objective:     Vital Signs (Most Recent):  Temp: 97.9 °F (36.6 °C) (01/07/22 0330)  Pulse: (!) 115 (01/07/22 0600)  Resp: (!) 41 (01/07/22 0600)  BP: (!) 173/72 (01/07/22 0400)  SpO2: 100 % (01/07/22 0600) Vital Signs (24h Range):  Temp:  [97.9 °F (36.6 °C)-98.3 °F (36.8 °C)] 97.9 °F (36.6 °C)  Pulse:  [] 115  Resp:  [16-49] 41  SpO2:  [88 %-100 %] 100 %  BP: (135-173)/(63-91) 173/72  Arterial Line BP: (119-190)/(40-90) 157/68     Weight: 50.4 kg (111 lb 1.8 oz)  Body mass index is 20.99 kg/m².      Intake/Output Summary (Last 24 hours) at 1/7/2022 0708  Last data filed at 1/7/2022 0600  Gross per 24 hour   Intake 968.73 ml   Output 720 ml   Net 248.73 ml       Physical Exam  Constitutional:       General: She is not in acute distress.  HENT:      Head: Normocephalic and atraumatic.   Cardiovascular:      Rate and Rhythm: Normal rate and regular rhythm.      Pulses: Normal pulses.      Comments: Midline sternotomy incision c/d/I  Chest tubes in place with serosanguinous output  V wires in place, not connected  Pacemaker in place  Pulmonary:      Effort: Pulmonary effort is normal. No respiratory distress.   Abdominal:      General: Abdomen is flat. There is no distension.      Palpations: Abdomen is soft.      Tenderness: There is no  abdominal tenderness.   Genitourinary:     Comments: Mireles in place with clear urine  R groin well incision, bandage in place. Thigh soft, no hematoma  Musculoskeletal:      Comments: R radial art line in place   Skin:     General: Skin is warm.      Capillary Refill: Capillary refill takes less than 2 seconds.   Neurological:      Mental Status: She is alert.         Vents:  Vent Mode: A/C (01/05/22 1430)  Ventilator Initiated: Yes (01/05/22 1427)  Set Rate: 18 BPM (01/05/22 1430)  Vt Set: (S) 350 mL (01/05/22 1430)  PEEP/CPAP: 5 cmH20 (01/05/22 1430)  Oxygen Concentration (%): 10 (01/05/22 1939)  Peak Airway Pressure: 33 cmH2O (01/05/22 1430)  Plateau Pressure: 0 cmH20 (01/05/22 1430)  Total Ve: 7.05 mL (01/05/22 1430)  Negative Inspiratory Force (cm H2O): 0 (01/05/22 1430)  F/VT Ratio<105 (RSBI): (!) 50.51 (01/05/22 1430)    Lines/Drains/Airways     Drain                 Chest Tube 01/05/22 1334 3 Left Pleural 19 Fr. 1 day         Urethral Catheter 01/05/22 0825 Silicone;Straight-tip;Temperature probe 16 Fr. 1 day         Y Chest Tube 1 and 2 01/05/22 1333 1 Anterior Mediastinal 19 Fr. 2 Anterior Mediastinal 19 Fr. 1 day          Arterial Line            Arterial Line 01/05/22 0855 Right Radial 1 day          Line                 Pacer Wires 01/05/22 1332 1 day          Peripheral Intravenous Line                 Peripheral IV - Single Lumen 01/05/22 0711 18 G Left Forearm 1 day         Peripheral IV - Single Lumen 01/06/22 1833 20 G Left Antecubital <1 day                Significant Labs:    CBC/Anemia Profile:  Recent Labs   Lab 01/06/22  0351 01/06/22  2318 01/07/22  0418   WBC 11.46 13.40* 13.06*   HGB 9.7* 9.2* 9.0*   HCT 29.6* 28.1* 28.3*   PLT 65* 90* 93*   MCV 88 88 91   RDW 15.9* 16.0* 16.2*        Chemistries:  Recent Labs   Lab 01/06/22  0351 01/06/22  0351 01/06/22 1959 01/06/22  2318 01/07/22  0418      < > 139 138 135*   K 4.1   < > 3.7 4.6 4.6      < > 104 104 104   CO2 23  --  23 23   --    BUN 20  --  20 22  --    CREATININE 0.8  --  0.9 0.9  --    CALCIUM 8.5*   < > 8.7 8.3* 8.6*   ALBUMIN  --   --  3.5 3.3*  --    PROT  --   --  6.1 5.5*  --    BILITOT  --   --  1.2* 1.2*  --    ALKPHOS  --   --  48* 45*  --    ALT  --   --  7* 7*  --    AST  --   --  49* 52*  --    MG 2.2  --  2.1 2.1  --    PHOS 4.2  --  3.9 3.7  --     < > = values in this interval not displayed.       ABGs:   Recent Labs   Lab 01/06/22  0358   PH 7.357   PCO2 48.7*   HCO3 27.3   POCSATURATED 100   BE 2     All pertinent labs within the past 24 hours have been reviewed.    Significant Imaging:  I have reviewed all pertinent imaging results/findings within the past 24 hours.    Assessment/Plan:     * S/P aortic valve replacement  Orion Ty is a 76 y.o. female who presents to the SICU s/p redo aortic valve replacement with mechanical valve on 1/5/22.      Neuro/Psych:   -- Sedation: off  -- Pain: multimodals             Cards:   -- Pressors: off  -- Goal MAP: 60-80  -- oral amio  -- afib, metop and amio increased  -- aspirin  -- statin  -- heparin drip, goal PTT 60-80  -- pacemaker in place, interrogated and set to home settings  -- naturally bradycardic to 40s at home  -- home amlodipine      Pulm:   -- Goal O2 sat > 90%  -- Wean as able  -- ABG PRN  -- Chest tubes include: 2 mediastinal, 1 left pleural to wall suction.      Renal:  -- Keep tejeda for strict I/O  -- BUN/Cr 20/0.8  -- Diuretics: lasix 20      FEN / GI:   -- Replace lytes as needed  -- Nutrition: cardiac diet      ID:   -- WBC 13  -- Antibiotics: periop ancef      Heme/Onc:   -- Hgb 9  -- 3U RBC, 2U FFP, 2U platelets in OR  -- Daily CBC  -- Transfused products: 1U RBC on 1/5/22  -- Anticoagulation: ASA, heparin gtt      Endo:   -- Gluc goal 140-180  -- Insulin drip per endocrinology      PPx:   Feeding: cardiac diet, ADAT  Analgesia/Sedation: multimodal  Thromboembolic prevention: ASA, heparin drip  HOB >30: yes  Stress Ulcer ppx: none  Glucose  control: Critical care goal 140-180 g/dl, ISS    Lines/Drains/Airway: Art line, tejeda      Dispo/Code Status/Palliative:   -- SICU / Full Code             Critical care was time spent personally by me on the following activities: development of treatment plan with patient or surrogate and bedside caregivers, discussions with consultants, evaluation of patient's response to treatment, examination of patient, ordering and performing treatments and interventions, ordering and review of laboratory studies, ordering and review of radiographic studies, pulse oximetry, re-evaluation of patient's condition.  This critical care time did not overlap with that of any other provider or involve time for any procedures.     Caleb Garcia MD  Critical Care - Surgery  [facility]

## 2022-01-07 NOTE — PROGRESS NOTES
Pt noted to have frequent ectopy and HR 80s-140s with irregular rhythm. Pt is asymptomatic and BP is stable with MAPs 70s-90s. Dr. Shafer notifed, orders for labs and EKG received and carried out. MD interpreted EKG as Afib with RVR, metopolol ordered and administered. Pt converted into NSR with rate 60s-70s with notable decrease in ectopy frequency. MD also made aware of lab results and UOP. Per Dr. Shafer, ok to replace K. POC reviewed with Ms. Ty and her daughter. Questions and concerns addressed.

## 2022-01-07 NOTE — SUBJECTIVE & OBJECTIVE
Interval History/Significant Events:   In and out of afib with RVR yesterday, remains hemodynamically stable  Pain well controlled  20cc UOP /hr    Follow-up For: Procedure(s) (LRB):  REPLACEMENT, AORTIC VALVE, WITH REPEAT STERNOTOMY (N/A)    Post-Operative Day: 1 Day Post-Op    Objective:     Vital Signs (Most Recent):  Temp: 97.9 °F (36.6 °C) (01/07/22 0330)  Pulse: (!) 115 (01/07/22 0600)  Resp: (!) 41 (01/07/22 0600)  BP: (!) 173/72 (01/07/22 0400)  SpO2: 100 % (01/07/22 0600) Vital Signs (24h Range):  Temp:  [97.9 °F (36.6 °C)-98.3 °F (36.8 °C)] 97.9 °F (36.6 °C)  Pulse:  [] 115  Resp:  [16-49] 41  SpO2:  [88 %-100 %] 100 %  BP: (135-173)/(63-91) 173/72  Arterial Line BP: (119-190)/(40-90) 157/68     Weight: 50.4 kg (111 lb 1.8 oz)  Body mass index is 20.99 kg/m².      Intake/Output Summary (Last 24 hours) at 1/7/2022 0708  Last data filed at 1/7/2022 0600  Gross per 24 hour   Intake 968.73 ml   Output 720 ml   Net 248.73 ml       Physical Exam  Constitutional:       General: She is not in acute distress.  HENT:      Head: Normocephalic and atraumatic.   Cardiovascular:      Rate and Rhythm: Normal rate and regular rhythm.      Pulses: Normal pulses.      Comments: Midline sternotomy incision c/d/I  Chest tubes in place with serosanguinous output  V wires in place, not connected  Pacemaker in place  Pulmonary:      Effort: Pulmonary effort is normal. No respiratory distress.   Abdominal:      General: Abdomen is flat. There is no distension.      Palpations: Abdomen is soft.      Tenderness: There is no abdominal tenderness.   Genitourinary:     Comments: Mireles in place with clear urine  R groin well incision, bandage in place. Thigh soft, no hematoma  Musculoskeletal:      Comments: R radial art line in place   Skin:     General: Skin is warm.      Capillary Refill: Capillary refill takes less than 2 seconds.   Neurological:      Mental Status: She is alert.         Vents:  Vent Mode: A/C (01/05/22  1430)  Ventilator Initiated: Yes (01/05/22 1427)  Set Rate: 18 BPM (01/05/22 1430)  Vt Set: (S) 350 mL (01/05/22 1430)  PEEP/CPAP: 5 cmH20 (01/05/22 1430)  Oxygen Concentration (%): 10 (01/05/22 1939)  Peak Airway Pressure: 33 cmH2O (01/05/22 1430)  Plateau Pressure: 0 cmH20 (01/05/22 1430)  Total Ve: 7.05 mL (01/05/22 1430)  Negative Inspiratory Force (cm H2O): 0 (01/05/22 1430)  F/VT Ratio<105 (RSBI): (!) 50.51 (01/05/22 1430)    Lines/Drains/Airways     Drain                 Chest Tube 01/05/22 1334 3 Left Pleural 19 Fr. 1 day         Urethral Catheter 01/05/22 0825 Silicone;Straight-tip;Temperature probe 16 Fr. 1 day         Y Chest Tube 1 and 2 01/05/22 1333 1 Anterior Mediastinal 19 Fr. 2 Anterior Mediastinal 19 Fr. 1 day          Arterial Line            Arterial Line 01/05/22 0855 Right Radial 1 day          Line                 Pacer Wires 01/05/22 1332 1 day          Peripheral Intravenous Line                 Peripheral IV - Single Lumen 01/05/22 0711 18 G Left Forearm 1 day         Peripheral IV - Single Lumen 01/06/22 1833 20 G Left Antecubital <1 day                Significant Labs:    CBC/Anemia Profile:  Recent Labs   Lab 01/06/22 0351 01/06/22 2318 01/07/22  0418   WBC 11.46 13.40* 13.06*   HGB 9.7* 9.2* 9.0*   HCT 29.6* 28.1* 28.3*   PLT 65* 90* 93*   MCV 88 88 91   RDW 15.9* 16.0* 16.2*        Chemistries:  Recent Labs   Lab 01/06/22 0351 01/06/22 0351 01/06/22 1959 01/06/22 2318 01/07/22 0418      < > 139 138 135*   K 4.1   < > 3.7 4.6 4.6      < > 104 104 104   CO2 23  --  23 23  --    BUN 20  --  20 22  --    CREATININE 0.8  --  0.9 0.9  --    CALCIUM 8.5*   < > 8.7 8.3* 8.6*   ALBUMIN  --   --  3.5 3.3*  --    PROT  --   --  6.1 5.5*  --    BILITOT  --   --  1.2* 1.2*  --    ALKPHOS  --   --  48* 45*  --    ALT  --   --  7* 7*  --    AST  --   --  49* 52*  --    MG 2.2  --  2.1 2.1  --    PHOS 4.2  --  3.9 3.7  --     < > = values in this interval not displayed.       ABGs:    Recent Labs   Lab 01/06/22  0358   PH 7.357   PCO2 48.7*   HCO3 27.3   POCSATURATED 100   BE 2     All pertinent labs within the past 24 hours have been reviewed.    Significant Imaging:  I have reviewed all pertinent imaging results/findings within the past 24 hours.

## 2022-01-07 NOTE — PT/OT/SLP PROGRESS
Occupational Therapy   Co-Treatment    Name: Orion Ty  MRN: [id number]  Admitting Diagnosis:  S/P aortic valve replacement  2 Days Post-Op    Recommendations:     Discharge Recommendations: home health OT,home health PT  Discharge Equipment Recommendations:  none  Barriers to discharge:  None    Assessment:     Orion Ty is a 76 y.o. female with a medical diagnosis of S/P aortic valve replacement.  She presents sound asleep in am after getting meds. Pt seen in pm and remained lethargic, but arousable and agreeable to participate. Pt presents with progress towards goals as evidenced by ability to stand for simulated self-care tasks and increased mobility distance. Performance deficits affecting function are weakness,impaired functional mobilty,impaired cardiopulmonary response to activity,impaired endurance,gait instability,decreased upper extremity function,impaired self care skills,pain,decreased safety awareness. Pt appropriate for cotreat due to acuity and complexity of pt's medical status with 2 skilled disciplines needed to optimize pts functional performance in ICU setting.      Rehab Prognosis:  Good; patient would benefit from acute skilled OT services to address these deficits and reach maximum level of function.       Plan:     Patient to be seen 5 x/week to address the above listed problems via self-care/home management,therapeutic activities,therapeutic exercises  · Plan of Care Expires:    · Plan of Care Reviewed with: patient,daughter    Subjective     Pain/Comfort:  · Pain Rating 1: 0/10  · Pain Rating Post-Intervention 1: 0/10    Objective:     Communicated with: RN prior to session.  Patient found supine with arterial line,blood pressure cuff,chest tube,pulse ox (continuous),telemetry,tejeda catheter,oxygen upon OT entry to room.    General Precautions: Standard, fall,sternal   Orthopedic Precautions:    Braces:    Respiratory Status: Nasal cannula, flow 2 L/min     Occupational  Performance:     Bed Mobility:    · Patient completed Supine to Sit with maximal assistance     Functional Mobility/Transfers:  · Patient completed Sit <> Stand Transfer with contact guard assistance  with  hand-held assist   · Patient completed Bed <> Chair Transfer using Step Transfer technique with contact guard assistance with hand-held assist  · Functional Mobility: CGA to chair with HHA x ~12 feet    Activities of Daily Living:  · Grooming: stand by assistance    · Upper Body Dressing: minimum assistance    · Lower Body Dressing: moderate assistance        AMPAC 6 Click ADL: 14    Treatment & Education:  Pt ed on OT POC  Pt re-ed on sternal precautions  Pt ed on ROM ex's 3x daily for increased overall strength and endurance    Patient left up in chair with all lines intact, call button in reach, RN notified and daughter presentEducation:      GOALS:   Multidisciplinary Problems     Occupational Therapy Goals        Problem: Occupational Therapy Goal    Goal Priority Disciplines Outcome Interventions   Occupational Therapy Goal     OT, PT/OT Ongoing, Progressing    Description: Goals to be met by: 7 days 1/13/22     Patient will increase functional independence with ADLs by performing:    Pt to complete UE dressing with set-up  Pt to complete LE dressing with SBA  Pt to complete standing g/h skills with SBA  Pt to complete toileting with SBA  Pt to completed t/f bed, chair and commode with SBA                   Time Tracking:     OT Date of Treatment: 01/07/22  OT Start Time: 1304  OT Stop Time: 1322  OT Total Time (min): 18 min    Billable Minutes:Self Care/Home Management 18               1/7/2022

## 2022-01-07 NOTE — PT/OT/SLP PROGRESS
Physical Therapy Co-Treatment    Patient Name:  Orion Ty   MRN:  [id number]  Admitting Diagnosis:  S/P aortic valve replacement   Recent Surgery: Procedure(s) (LRB):  REPLACEMENT, AORTIC VALVE, WITH REPEAT STERNOTOMY (N/A) 2 Days Post-Op  Admit Date: 1/5/2022  Length of Stay: 2 days    Recommendations:     Discharge Recommendations:  home health OT,home health PT   Discharge Equipment Recommendations: none   Barriers to discharge: post-op, pain, decreased endurance, impaired cardiopulmonary response to  activity    Assessment:     Orion Ty is a 76 y.o. female admitted with a medical diagnosis of S/P aortic valve replacement.  She presents with the following impairments/functional limitations:  weakness,impaired endurance,impaired self care skills,impaired functional mobilty,gait instability,impaired balance,pain,decreased safety awareness,decreased upper extremity function,impaired cardiopulmonary response to activity. Pt tolerated session well today. Pt reporting increased lethargy from not sleeping last night but willing to participate with PT. Pt able to increase distance ambulated and decrease level of assist needed on this date pointing towards improvements in functional strength and endurance. Pt remains primarily limited by post-op pain and decreased cardiopulmonary response to activity 2/2 being s/p aortic valve replacement. Pt will continue to benefit from skilled PT services during this hospital admit to continue to improve transfer ability and efficiency as well as continue to progress pt's ambulation distance and cardiopulmonary endurance to maximize pt's functional independence and return to PLOF. After discharge pt will benefit from HHPT to further progress functional mobility and cardiopulmonary endurance as well as for home safety and energy conservation techniques.    Rehab Prognosis: Good; patient would benefit from acute skilled PT services to address these deficits and reach maximum  "level of function.    Recent Surgery: Procedure(s) (LRB):  REPLACEMENT, AORTIC VALVE, WITH REPEAT STERNOTOMY (N/A) 2 Days Post-Op    Plan:     During this hospitalization, patient to be seen 5 x/week to address the identified rehab impairments via gait training,therapeutic activities,therapeutic exercises and progress toward the following goals:    · Plan of Care Expires:  02/05/22    Subjective     RN notified prior to session. Daughter present upon PT entrance into room.    Chief Complaint: "I just woke up"  Patient/Family Comments/goals: go home, get stronger  Pain/Comfort:  · Pain Rating 1: other (see comments) (did not rate but reporting pain in chest)  · Pain Addressed 1: Reposition,Distraction,Nurse notified  · Pain Rating Post-Intervention 1: other (see comments) (pt not reporting worsening or improvement of pain)      Objective:     Additional staff present: OT for cotx due to pt's multiple medical comorbidities and functional deficits req'ing two skilled therapists to appropriately progress pt's musculoskeletal strength, neuromuscular control, and endurance while taking into consideration severe medical acuity in the ICU    Patient found HOB elevated with: telemetry,blood pressure cuff,pulse ox (continuous),peripheral IV,arterial line,chest tube,oxygen   Cognition:   · Alert and Cooperative  · AxOx4  · Command following: Follows two-step verbal commands  · Fluency: clear/fluent  General Precautions: Standard, Cardiac fall,sternal   Orthopedic Precautions:N/A   Braces: N/A   Body mass index is 20.99 kg/m².  Oxygen Device: Nasal Cannula 2L  Vitals: BP (!) 155/71   Pulse 75   Temp 98.6 °F (37 °C) (Oral)   Resp (!) 29   Ht 5' 1" (1.549 m)   Wt 50.4 kg (111 lb 1.8 oz)   SpO2 97%   Breastfeeding No   BMI 20.99 kg/m²     Outcome Measures:  AM-PAC 6 CLICK MOBILITY  Turning over in bed (including adjusting bedclothes, sheets and blankets)?: 2  Sitting down on and standing up from a chair with arms (e.g., " wheelchair, bedside commode, etc.): 3  Moving from lying on back to sitting on the side of the bed?: 2  Moving to and from a bed to a chair (including a wheelchair)?: 3  Need to walk in hospital room?: 3  Climbing 3-5 steps with a railing?: 3  Basic Mobility Total Score: 16     Functional Mobility:    Bed Mobility:   · Supine to Sit: maximal assistance; from Rt side of bed  · Scooting anteriorly to EOB to have both feet planted on floor: contact guard assistance    Sitting Balance at Edge of Bed:   Static Sitting Balance: Good : able to maintain balance against moderate resistance   Dynamic Sitting Balance: Good- : able to sit unsupported and weight shift across midline minimally   Assistance Level Required: Stand-by Assistance    Transfers:   · Sit <> Stand Transfer: contact guard assistance with no assistive device   · Stand <> Sit Transfer: contact guard assistance with no assistive device   · [id number] from EOB  · Bed <> Chair Transfer: Step Transfer technique with contact guard assistance with no assistive device    Standing Balance:   Static Standing Balance: Good- : able to maintain standing balance against minimal resistance   Dynamic Standing Balance: Fair : stand independently unsupported, weight shift, and reach ipsilaterally. LOB noted when crossing midline.   Assistance Level Required: Contact Guard Assistance   Patient used: hand-held assist       Gait:  · Patient ambulated: 12 ft   · Patient required: contact guard  · Patient used:  hand-held assist   · Gait Pattern observed: reciprocal gait  · Gait Deviation(s): decreased step length, wide base of support, decreased arm swing, steady gait, shuffle gait and decreased aaron  · Impairments due to: pain and decreased endurance  · all lines remained intact throughout ambulation trial  · Comments: Pt with wide DEVYN and decreased step length during ambulation likely due to impaired activity tolerance and decreased balance confidence. FFP and  decreased arm swing likely due to post-op pain.     Education:   Time provided for education, counseling and discussion of health disposition in regards to patient's current status   All questions answered within PT scope of practice and to patient's satisfaction   PT role in POC to address current functional deficits   Pt educated on proper body mechanics, safety techniques, and energy conservation with PT facilitation and cueing throughout session   Call nursing/pct to transfer to chair/use bathroom. Pt stated understanding.   Whiteboard updated with therapist name and pt's current mobility status documented above   Safe to perform step transfer to/from chair/bedside commode CGA and HHA w/ nursing/PCT present   Pt to ambulate 2-3x/day CGA and HHA with nsg/PCT in order to maintain functional mobility   Importance of OOB tolerance 8-10 hrs/day to improve lung ventilation and expansion as well as strengthen postural musculature    Patient left up in chair with all lines intact, call button in reach, RN notified and daughter present.    GOALS:   Multidisciplinary Problems     Physical Therapy Goals        Problem: Physical Therapy Goal    Goal Priority Disciplines Outcome Goal Variances Interventions   Physical Therapy Goal     PT, PT/OT Ongoing, Progressing     Description: Goals to be met by: 2022     Patient will increase functional independence with mobility by performin. Sit to stand transfer with Supervision  2. Bed to chair transfer with Supervision using LRAD  3. Gait  x 250 feet with Supervision using LRAD with standing rest breaks prn.   4. Lower extremity exercise program x30 reps per handout, with independence  5. Recite 3/3 sternal precautions and remain complaint with precautions throughout session with no verbal cues                     Time Tracking:     PT Received On: 22  PT Start Time: 1305     PT Stop Time: 1322  PT Total Time (min): 17 min     Billable Minutes:    · Therapeutic Activity 17 minutes    Treatment Type: Treatment  PT/PTA: PT       Emma Florentino PT, DPT  1/7/2022  Pager: [phone]

## 2022-01-08 PROBLEM — N17.9 AKI (ACUTE KIDNEY INJURY): Status: ACTIVE | Noted: 2022-01-01

## 2022-01-08 NOTE — ASSESSMENT & PLAN NOTE
Orion Ty is a 76 y.o. female who presents to the SICU s/p redo aortic valve replacement with mechanical valve on 1/5/22.      Neuro/Psych:   -- Sedation: off  -- Pain: multimodals             Cards:   -- Pressors: off  -- Goal MAP: 60-80  -- oral amio  -- Metop 25 BID  -- Amlodipine 10  -- statin  -- heparin drip, goal PTT 60-80  -- pacemaker in place, interrogated and set to home settings      Pulm:   -- Goal O2 sat > 90%  -- Wean as able  -- ABG PRN  -- Chest tubes include: 2 mediastinal, 1 left pleural to wall suction.      Renal:  -- Mireles removed 1/7  -- Now with Oliguria and JO. BUN/Cr 45/1.5  -- Did not respond to lasix 80.   --Will discuss starting lasix gtt today      FEN / GI:   -- Replace lytes as needed  -- Nutrition: cardiac diet 1500 cc   -- Bowel reg      ID:   -- WBC 14 from 13  -- Antibiotics: periop ancef compelte      Heme/Onc:   -- Hgb 8.1  -- 3U RBC, 2U FFP, 2U platelets in OR  -- Daily CBC  -- Transfused products: 1U RBC on 1/5/22  -- Anticoagulation: ASA, heparin gtt   -Coumadin 5 tonight. INR 1.5      Endo:   -- Gluc goal 140-180  -- Insulin per endocrinology      PPx:   Feeding: cardiac diet  Analgesia/Sedation: multimodal  Thromboembolic prevention: ASA, heparin drip  HOB >30: yes  Stress Ulcer ppx: none  Glucose control: Critical care goal 140-180 g/dl, ISS    Lines/Drains/Airway: Art line      Dispo/Code Status/Palliative:   -- SICU / Full Code

## 2022-01-08 NOTE — ASSESSMENT & PLAN NOTE
Orion Ty is our 76 y.o. female with previous aortic valve replacement, mitral valve replacement, and tricuspid valve repair in 2010 who presented on 1/5 for redo aortic valve replacement. Nephrology consulted on 1/8 for anuric JO. Patient is alert but tired, son at bedside. Stated she was tired prior to admission. During the hospital stay Cr increase from 1 to 1.5. In OR she received  2.5L crystalloid, 3U RBC, 2U FFP, 2 platelets, and 800 cell saver. she is +5 L since admit. UOP decrease overnight, per nurse she didn't urinate at all. She received diuril 500 mg twice, lasix 20 mg *2 on 1/7. This morning she urinate 225 immediately after placing the tejeda's cath. She received again lasix 100 mg this morning. Upor evaluation she was alert but repeatedly saying she was tired, denied any pain. Denied any previous hx of kidney disease.     Baseline Cr: 0.9  At presentation Cr: 1.5  Urinary symptoms: None    Ddx, ATN, vs hypoperfusion vs infections      Urine microscopy showed very few granular casts    Recommendations:     - Recommended holding diuretics or IVF at this time and continue to monitor   - Trend with BMP q6  - If urine output < 0.5 ml/hr, consider gentle diuresis   - Repeat Urine Studies: UA reflex to cultures, Urine NA, K, BUN, osm  - Monitor electrolytes closely, MG, Phos, K, replete at needed cautiously   - Obtain Renal US to assess for Post renal causes  - Recommend further work-up for infection given recent surgery and leukocytosis   - Renal dose of meds   - Optimize hemodynamic MAP and CO   - Diet: Renal   - Strict Is/Os  - Avoid: NSAID, ACEI, ARBs, Contrasts

## 2022-01-08 NOTE — PROCEDURES
"Orion Ty is a 76 y.o. female patient.    Temp: 97.8 °F (36.6 °C) (01/08/22 0300)  Pulse: 94 (01/08/22 0944)  Resp: 12 (01/08/22 0645)  BP: 134/65 (01/08/22 0944)  SpO2: 100 % (01/08/22 0645)  Weight: 51.1 kg (112 lb 10.5 oz) (01/08/22 0500)  Height: 5' 1" (154.9 cm) (01/05/22 1427)       Central Line    Date/Time: 1/8/2022 10:48 AM  Performed by: Graham Alfaro MD  Authorized by: Graham Alfaro MD     Supervisor Name:  Gavin Angeles MD  Location procedure was performed:  [facility] CRITICAL CARE SURGERY  Pre-operative diagnosis:  JO  Post-operative diagnosis:  Same  Consent Done ?:  Yes  Time out complete?: Verified correct patient, procedure, equipment, staff, and site/side    Indications:  Hemodialysis, vascular access and med administration  Anesthesia:  Local infiltration  Local anesthetic:  Lidocaine 1% without epinephrine  Preparation:  Skin prepped with ChloraPrep  Skin prep agent dried: Skin prep agent completely dried prior to procedure    Sterile barriers: All five maximal sterile barriers used - gloves, gown, cap, mask and large sterile sheet    Location:  Right internal jugular  Catheter type:  Trialysis  Catheter size:  13 Fr  Inserted Catheter Length (cm):  15  Ultrasound guidance: Yes    Vessel Caliber:  Medium   patent  Comprressibility:  Normal  Needle advanced into vessel with real time ultrasound guidance.    Guidewire confirmed in vessel.    Steril sheath on probe.    Sterile gel used.  Manometry: Yes    Number of attempts:  1  Securement:  Line sutured, chlorhexidine patch, sterile dressing applied and blood return through all ports  Complications: No    Post-procedure assessment: Pending.  Adverse Events:  None        Graham Alfaro MD  General Surgery PGY-2  01/08/2022      "

## 2022-01-08 NOTE — SUBJECTIVE & OBJECTIVE
Interval History/Significant Events:   -Brief episode of Afib RVR overnight. Resolved with 2 mg of Metop  -Oliguric with JO. Did not respond to Lasix 40, Diuril 500, or Lasix 80 yesterday.   -Cr 1.5 from 1.1. BUN 45  -Daughter who has been at bedside consistently now symptomatic and COVID+  -PTT therapeutic, INR 1.5 s/p Coumadin 5 yesterday    Follow-up For: Procedure(s) (LRB):  REPLACEMENT, AORTIC VALVE, WITH REPEAT STERNOTOMY (N/A)    Post-Operative Day: 3 Days Post-Op    Objective:     Vital Signs (Most Recent):  Temp: 97.8 °F (36.6 °C) (01/08/22 0300)  Pulse: 102 (01/08/22 0515)  Resp: 16 (01/08/22 0515)  BP: 126/61 (01/08/22 0000)  SpO2: (!) 89 % (01/08/22 0515) Vital Signs (24h Range):  Temp:  [97.7 °F (36.5 °C)-98.6 °F (37 °C)] 97.8 °F (36.6 °C)  Pulse:  [] 102  Resp:  [12-38] 16  SpO2:  [89 %-100 %] 89 %  BP: (126-155)/(61-96) 126/61  Arterial Line BP: (105-180)/(38-83) 131/64     Weight: 51.1 kg (112 lb 10.5 oz)  Body mass index is 21.29 kg/m².      Intake/Output Summary (Last 24 hours) at 1/8/2022 0634  Last data filed at 1/8/2022 0400  Gross per 24 hour   Intake 424.44 ml   Output 445 ml   Net -20.56 ml       Physical Exam  Constitutional:       General: She is not in acute distress.  HENT:      Head: Normocephalic and atraumatic.   Cardiovascular:      Rate and Rhythm: Normal rate and regular rhythm.      Pulses: Normal pulses.      Comments: Midline sternotomy incision c/d/I  Chest tubes in place with serosanguinous output  V wires in place, not connected  Pacemaker in place  Pulmonary:      Effort: Pulmonary effort is normal. No respiratory distress.   Abdominal:      General: Abdomen is flat. There is no distension.      Palpations: Abdomen is soft.      Tenderness: There is no abdominal tenderness.   Genitourinary:     Comments: R groin well incision, bandage in place. Thigh soft, no hematoma  Musculoskeletal:      Comments: R radial art line in place   Skin:     General: Skin is warm.       Capillary Refill: Capillary refill takes less than 2 seconds.   Neurological:      Mental Status: She is alert.         Vents:  Vent Mode: A/C (01/05/22 1430)  Ventilator Initiated: Yes (01/05/22 1427)  Set Rate: 18 BPM (01/05/22 1430)  Vt Set: (S) 350 mL (01/05/22 1430)  PEEP/CPAP: 5 cmH20 (01/05/22 1430)  Oxygen Concentration (%): 10 (01/05/22 1939)  Peak Airway Pressure: 33 cmH2O (01/05/22 1430)  Plateau Pressure: 0 cmH20 (01/05/22 1430)  Total Ve: 7.05 mL (01/05/22 1430)  Negative Inspiratory Force (cm H2O): 0 (01/05/22 1430)  F/VT Ratio<105 (RSBI): (!) 50.51 (01/05/22 1430)    Lines/Drains/Airways     Drain                 Chest Tube 01/05/22 1334 3 Left Pleural 19 Fr. 2 days         Y Chest Tube 1 and 2 01/05/22 1333 1 Anterior Mediastinal 19 Fr. 2 Anterior Mediastinal 19 Fr. 2 days    Female External Urinary Catheter 01/07/22 2200 <1 day          Arterial Line            Arterial Line 01/05/22 0855 Right Radial 2 days          Line                 Pacer Wires 01/05/22 1332 2 days          Peripheral Intravenous Line                 Peripheral IV - Single Lumen 01/05/22 0711 18 G Left Forearm 2 days         Peripheral IV - Single Lumen 01/06/22 1833 20 G Left Antecubital 1 day                Significant Labs:    CBC/Anemia Profile:  Recent Labs   Lab 01/06/22 2318 01/07/22 0418 01/08/22  0421   WBC 13.40* 13.06* 14.44*   HGB 9.2* 9.0* 8.1*   HCT 28.1* 28.3* 25.6*   PLT 90* 93* 102*   MCV 88 91 92   RDW 16.0* 16.2* 16.5*        Chemistries:  Recent Labs   Lab 01/06/22 1959 01/06/22 1959 01/06/22 2318 01/06/22 2318 01/07/22 0418 01/07/22  0418 01/07/22  0746 01/07/22 2039 01/08/22  0248      < > 138  --  135*  --   --   --  131*   K 3.7   < > 4.6   < > 4.6   < > 4.3 4.7 4.4      < > 104  --  104  --   --   --  97   CO2 23   < > 23  --  21*  --   --   --  24   BUN 20   < > 22  --  29*  --   --   --  45*   CREATININE 0.9   < > 0.9  --  1.1  --   --   --  1.5*   CALCIUM 8.7   < > 8.3*  --  8.6*   --   --   --  8.8   ALBUMIN 3.5  --  3.3*  --   --   --   --   --   --    PROT 6.1  --  5.5*  --   --   --   --   --   --    BILITOT 1.2*  --  1.2*  --   --   --   --   --   --    ALKPHOS 48*  --  45*  --   --   --   --   --   --    ALT 7*  --  7*  --   --   --   --   --   --    AST 49*  --  52*  --   --   --   --   --   --    MG 2.1   < > 2.1  --  2.3  --   --   --  2.3   PHOS 3.9   < > 3.7  --  4.2  --   --   --  4.2    < > = values in this interval not displayed.       All pertinent labs within the past 24 hours have been reviewed.    Significant Imaging:  I have reviewed all pertinent imaging results/findings within the past 24 hours.

## 2022-01-08 NOTE — CONSULTS
[facility]  Nephrology  Consult Note    Patient Name: Orion Ty  MRN: [id number]  Admission Date: 1/5/2022  Hospital Length of Stay: 3 days  Attending Provider: Dennis Haider MD   Primary Care Physician: Otis Zimmer MD  Principal Problem:S/P aortic valve replacement    Inpatient consult to Nephrology  Consult performed by: Fredi Raman MD  Consult ordered by: Graham Alfaro MD        Subjective:     HPI: Orion Ty is our 76 y.o. female with previous aortic valve replacement, mitral valve replacement, and tricuspid valve repair in 2010 who presented on 1/5 for redo aortic valve replacement. Nephrology consulted on 1/8 for anuric JO. Patient is alert but tired, son at bedside. Stated she was tired prior to admission. During the hospital stay Cr increase from 1 to 1.5. In OR she received  2.5L crystalloid, 3U RBC, 2U FFP, 2 platelets, and 800 cell saver. she is +5 L since admit. UOP decrease overnight, per nurse she didn't urinate at all. She received diuril 500 mg twice, lasix 20 mg *2 on 1/7. This morning she urinate 225 immediately after placing the tejeda's cath. She received again lasix 100 mg this morning. Upor evaluation she was alert but repeatedly saying she was tired, denied any pain. Denied any previous hx of kidney disease.       Past Medical History:   Diagnosis Date    A-fib     Anticoagulant long-term use     Closed displaced fracture of distal phalanx of lesser toe 10/20/2015    Right    Coronary artery disease     Diabetes mellitus     Diabetes mellitus, type 2     Hypertension     Mechanical heart valve present     X's two    Osteopenia     Pacemaker     Rheumatic heart disease        Past Surgical History:   Procedure Laterality Date    CARDIAC PACEMAKER PLACEMENT      CARDIAC VALVE REPLACEMENT      CARDIAC VALVE SURGERY      CORONARY ANGIOGRAPHY N/A 12/21/2021    Procedure: ANGIOGRAM, CORONARY ARTERY;  Surgeon: Devonte Salazar MD;   Location: [facility] CATH LAB;  Service: Cardiology;  Laterality: N/A;    EYE SURGERY Bilateral     cataract    FLUOROSCOPY N/A 12/11/2020    Procedure: FLUOROSCOPY;  Surgeon: Mckay Calvo MD;  Location: [facility] CATH LAB;  Service: Cardiology;  Laterality: N/A;    REPLACEMENT OF AORTIC VALVE WITH REPEAT STERNOTOMY N/A 1/5/2022    Procedure: REPLACEMENT, AORTIC VALVE, WITH REPEAT STERNOTOMY;  Surgeon: Dennis Haider MD;  Location: [facility] OR [address] [place] [address];  Service: Cardiothoracic;  Laterality: N/A;    REPLACEMENT OF PACEMAKER GENERATOR N/A 3/2/2020    Procedure: REPLACEMENT, PACEMAKER GENERATOR;  Surgeon: Mark Ring MD;  Location: [facility] EP LAB;  Service: Cardiology;  Laterality: N/A;  TBS/CHRISTI, Single PPM gen change, Right side, MDT, MAC, SK, 3 Prep    thryoid s         Review of patient's allergies indicates:  No Known Allergies  Current Facility-Administered Medications   Medication Frequency    0.9%  NaCl infusion Continuous    acetaminophen tablet 1,000 mg Q8H    albumin human 5% bottle 12.5 g Once PRN    amiodarone tablet 400 mg BID    amLODIPine tablet 10 mg Daily    aspirin suppository 300 mg Once PRN    atorvastatin tablet 40 mg Daily    bisacodyL suppository 10 mg Daily PRN    calcium gluconate 1 g in dextrose 5 % 100 mL IVPB PRN    calcium gluconate 2 g in dextrose 5 % 100 mL IVPB PRN    calcium gluconate 3 g in dextrose 5 % 100 mL IVPB PRN    chlorothiazide (DIURIL) 500 mg in dextrose 5 % 50 mL IVPB Once    dextrose 50% injection 12.5 g PRN    dextrose 50% injection 25 g PRN    dronabinoL capsule 2.5 mg BID    furosemide (LASIX) 200 mg in dextrose 5 % 100 mL continuous infusion (conc: 2 mg/mL) Continuous    gabapentin capsule 300 mg TID    glucagon (human recombinant) injection 1 mg PRN    glucose chewable tablet 16 g PRN    glucose chewable tablet 24 g PRN    heparin 25,000 units in dextrose 5% 250 mL (100 units/mL) infusion (heparin infusion - NO NOMOGRAM) Continuous    hydrALAZINE  injection 5 mg Q6H PRN    HYDROmorphone injection 0.5 mg Q4H PRN    insulin aspart U-100 pen 0-5 Units QID (AC + HS) PRN    labetalol 20 mg/4 mL (5 mg/mL) IV syring Q4H PRN    LIDOcaine 5 % patch 1 patch Q24H    magnesium sulfate 2g in water 50mL IVPB (premix) PRN    magnesium sulfate 2g in water 50mL IVPB (premix) PRN    melatonin tablet 9 mg Nightly PRN    metoclopramide HCl injection 5 mg Q6H PRN    metoprolol injection 5 mg Q5 Min PRN    metoprolol tartrate (LOPRESSOR) tablet 25 mg BID    mupirocin 2 % ointment BID    ondansetron injection 4 mg Q12H PRN    ondansetron injection 4 mg Q12H PRN    oxyCODONE immediate release tablet 10 mg Q4H PRN    oxyCODONE immediate release tablet 5 mg Q4H PRN    polyethylene glycol packet 17 g Daily    potassium chloride 20 mEq in 100 mL IVPB (FOR CENTRAL LINE ADMINISTRATION ONLY) PRN    potassium chloride 20 mEq in 100 mL IVPB (FOR CENTRAL LINE ADMINISTRATION ONLY) PRN    potassium chloride 40 mEq in 100 mL IVPB (FOR CENTRAL LINE ADMINISTRATION ONLY) PRN    sodium chloride 0.9% flush 10 mL PRN     Facility-Administered Medications Ordered in Other Encounters   Medication Frequency    0.9%  NaCl infusion Continuous     Family History     Problem Relation (Age of Onset)    Breast cancer Maternal Aunt        Tobacco Use    Smoking status: Never Smoker    Smokeless tobacco: Never Used   Substance and Sexual Activity    Alcohol use: No    Drug use: Never    Sexual activity: Not on file     Review of Systems   Constitutional: Positive for activity change and fatigue.   HENT: Negative for congestion.    Respiratory: Positive for shortness of breath.    Cardiovascular: Negative for chest pain.   Gastrointestinal: Negative for abdominal pain and nausea.   Genitourinary: Negative for difficulty urinating, dysuria, flank pain, frequency, hematuria and urgency.   Neurological: Negative for headaches.   Psychiatric/Behavioral: Negative for agitation.     Objective:      Vital Signs (Most Recent):  Temp: 97.8 °F (36.6 °C) (01/08/22 0300)  Pulse: 94 (01/08/22 0944)  Resp: 12 (01/08/22 0645)  BP: 134/65 (01/08/22 0944)  SpO2: 100 % (01/08/22 0645)  O2 Device (Oxygen Therapy): nasal cannula (01/08/22 0600) Vital Signs (24h Range):  Temp:  [97.7 °F (36.5 °C)-98.6 °F (37 °C)] 97.8 °F (36.6 °C)  Pulse:  [] 94  Resp:  [12-34] 12  SpO2:  [89 %-100 %] 100 %  BP: (126-155)/(61-73) 134/65  Arterial Line BP: (105-154)/(38-74) 117/45     Weight: 51.1 kg (112 lb 10.5 oz) (01/08/22 0500)  Body mass index is 21.29 kg/m².  Body surface area is 1.48 meters squared.    I/O last 3 completed shifts:  In: 716.2 [P.O.:240; I.V.:231.1; IV Piggyback:245.1]  Out: 705 [Urine:520; Chest Tube:185]    Physical Exam  Constitutional:       General: She is not in acute distress.     Appearance: She is ill-appearing.   HENT:      Head: Normocephalic and atraumatic.      Nose: Nose normal.   Cardiovascular:      Rate and Rhythm: Normal rate and regular rhythm.      Pulses: Normal pulses.      Comments: Midline sternotomy incision c/d/I  Chest tubes in place with serosanguinous output  V wires in place, not connected  Pacemaker in place  Pulmonary:      Effort: Pulmonary effort is normal. No respiratory distress.   Abdominal:      General: Abdomen is flat. There is no distension.      Palpations: Abdomen is soft.      Tenderness: There is no abdominal tenderness.   Genitourinary:     Comments: R groin well incision, bandage in place. Thigh soft, no hematoma  Musculoskeletal:      Right lower leg: Edema (+1 pitting edema bilateraly ) present.      Left lower leg: Edema present.      Comments: R radial art line in place   Skin:     General: Skin is warm.      Capillary Refill: Capillary refill takes less than 2 seconds.   Neurological:      Mental Status: She is alert.         Significant Labs:  CBC:   Recent Labs   Lab 01/08/22  0421   WBC 14.44*   RBC 2.77*   HGB 8.1*   HCT 25.6*   *   MCV 92   MCH  29.2   MCHC 31.6*     CMP:   Recent Labs   Lab 01/06/22  2318 01/07/22  0418 01/08/22  0248   *   < > 150*   CALCIUM 8.3*   < > 8.8   ALBUMIN 3.3*  --   --    PROT 5.5*  --   --       < > 131*   K 4.6   < > 4.4   CO2 23   < > 24      < > 97   BUN 22   < > 45*   CREATININE 0.9   < > 1.5*   ALKPHOS 45*  --   --    ALT 7*  --   --    AST 52*  --   --    BILITOT 1.2*  --   --     < > = values in this interval not displayed.     Recent Labs   Lab 01/03/22  1222   COLORU Yellow   SPECGRAV 1.020   PHUR 5.0   PROTEINUA Negative   BACTERIA Moderate*   NITRITE Negative   LEUKOCYTESUR Negative   HYALINECASTS 9*     All labs within the past 24 hours have been reviewed.    Significant Imaging:  Labs: Reviewed  US: Reviewed    Assessment/Plan:     JO (acute kidney injury)  Orion Ty is our 76 y.o. female with previous aortic valve replacement, mitral valve replacement, and tricuspid valve repair in 2010 who presented on 1/5 for redo aortic valve replacement. Nephrology consulted on 1/8 for anuric JO. Patient is alert but tired, son at bedside. Stated she was tired prior to admission. During the hospital stay Cr increase from 1 to 1.5. In OR she received  2.5L crystalloid, 3U RBC, 2U FFP, 2 platelets, and 800 cell saver. she is +5 L since admit. UOP decrease overnight, per nurse she didn't urinate at all. She received diuril 500 mg twice, lasix 20 mg *2 on 1/7. This morning she urinate 225 immediately after placing the tejeda's cath. She received again lasix 100 mg this morning. Upor evaluation she was alert but repeatedly saying she was tired, denied any pain. Denied any previous hx of kidney disease.     Baseline Cr: 0.9  At presentation Cr: 1.5  Urinary symptoms: None    Ddx, ATN, vs hypoperfusion vs infections      Urine microscopy showed very few granular casts    Recommendations:     - Recommended holding diuretics or IVF at this time and continue to monitor   - Trend with BMP q6  - If urine output  < 0.5 ml/hr, consider gentle diuresis   - Repeat Urine Studies: UA reflex to cultures, Urine NA, K, BUN, osm  - Monitor electrolytes closely, MG, Phos, K, replete at needed cautiously   - Obtain Renal US to assess for Post renal causes  - Recommend further work-up for infection given recent surgery and leukocytosis   - Renal dose of meds   - Optimize hemodynamic MAP and CO   - Diet: Renal   - Strict Is/Os  - Avoid: NSAID, ACEI, ARBs, Contrasts           Thank you for your consult. I will follow-up with patient. Please contact us if you have any additional questions.    Fredi Raman MD  Nephrology  [facility]

## 2022-01-08 NOTE — CARE UPDATE
BG goal 140 - 180   Diet Cardiac [facility]; Fluid - 1500mL  3 Days Post-Op    BG stable while on current SQ insulin regimen. Occasional BG excursions noted, but then patient's returns to euglycemia. Endo will continue to follow, and manage glycemic control while inpatient.     - Continue Low Dose SQ Insulin Correction Scale PRN hyperglycemia.  - BG Monitoring AC/HS.     ** Please call Endocrine for any BG related issues **  ** Please notify Endocrine for any change and/or advance in diet**     Lab Results   Component Value Date    HGBA1C 6.0 (H) 01/03/2022       Discharge Planning:   TBD. Please notify endocrinology prior to discharge.

## 2022-01-08 NOTE — PLAN OF CARE
"      SICU PLAN OF CARE NOTE    Dx: S/P aortic valve replacement    Shift Events: Pain, Afib with RVR x 3 treated with dilaudid x 2 and metop x 1    Goals of Care: HR < 120    Neuro: AAO x4, Follows Commands and Moves All Extremities    Vital Signs: /65 (BP Location: Right arm, Patient Position: Lying)   Pulse (!) 54   Temp 97.8 °F (36.6 °C) (Axillary)   Resp 12   Ht 5' 1" (1.549 m)   Wt 51.1 kg (112 lb 10.5 oz)   SpO2 100%   Breastfeeding No   BMI 21.29 kg/m²     Respiratory: Nasal Cannula    Diet: Cardiac Diet    Gtts: Heparin    Urine Output: Anuric 0 cc/shift    Drains: Chest Tube, total output 60 cc /  shift     Labs/Accuchecks: Daily labs. accuchecks ACHS    Skin: see flowsheets for details.      "

## 2022-01-08 NOTE — PLAN OF CARE
Pt HR stabilized. [id number] pt in a fib and fluct b/w ST & SB. 0.5 dilaudid given & relieved pts pain and anxiety. HR & BP stable since 1000. Hep gtt. Mireles d/c. UOP minimal even post lasix IVP (10-40cc/hr). Cardiac diet. Oriented x4. Linen changed. BG ACHS (no coverage needed this shift). Med CT's: ~60ccs/shift. Pleur CT: ~15ccs/shift. 4L NC for comfort. POC reviewed w/ pt & pts daughter @ bedside. Questions encouraged. Communication facilitated with help of pts daughter. Emotional support provided.

## 2022-01-08 NOTE — HPI
Orion Ty is our 76 y.o. female with previous aortic valve replacement, mitral valve replacement, and tricuspid valve repair in 2010 who presented on 1/5 for redo aortic valve replacement. Nephrology consulted on 1/8 for anuric JO. Patient is alert but tired, son at bedside. Stated she was tired prior to admission. During the hospital stay Cr increase from 1 to 1.5. In OR she received  2.5L crystalloid, 3U RBC, 2U FFP, 2 platelets, and 800 cell saver. she is +5 L since admit. UOP decrease overnight, per nurse she didn't urinate at all. She received diuril 500 mg twice, lasix 20 mg *2 on 1/7. This morning she urinate 225 immediately after placing the tejeda's cath. She received again lasix 100 mg this morning. Upor evaluation she was alert but repeatedly saying she was tired, denied any pain. Denied any previous hx of kidney disease.

## 2022-01-08 NOTE — PROGRESS NOTES
[facility]  Critical Care - Surgery  Progress Note    Patient Name: Orion Ty  MRN: [id number]  Admission Date: 1/5/2022  Hospital Length of Stay: 3 days  Code Status: Full Code  Attending Provider: Dennis Haider MD  Primary Care Provider: Otis Zimmer MD   Principal Problem: S/P aortic valve replacement    Subjective:     Hospital/ICU Course:  No notes on file    Interval History/Significant Events:   -Brief episode of Afib RVR overnight. Resolved with 2 mg of Metop  -Oliguric with JO. Did not respond to Lasix 40, Diuril 500, or Lasix 80 yesterday.   -Cr 1.5 from 1.1. BUN 45  -Daughter who has been at bedside consistently now symptomatic and COVID+  -PTT therapeutic, INR 1.5 s/p Coumadin 5 yesterday    Follow-up For: Procedure(s) (LRB):  REPLACEMENT, AORTIC VALVE, WITH REPEAT STERNOTOMY (N/A)    Post-Operative Day: 3 Days Post-Op    Objective:     Vital Signs (Most Recent):  Temp: 97.8 °F (36.6 °C) (01/08/22 0300)  Pulse: 102 (01/08/22 0515)  Resp: 16 (01/08/22 0515)  BP: 126/61 (01/08/22 0000)  SpO2: (!) 89 % (01/08/22 0515) Vital Signs (24h Range):  Temp:  [97.7 °F (36.5 °C)-98.6 °F (37 °C)] 97.8 °F (36.6 °C)  Pulse:  [] 102  Resp:  [12-38] 16  SpO2:  [89 %-100 %] 89 %  BP: (126-155)/(61-96) 126/61  Arterial Line BP: (105-180)/(38-83) 131/64     Weight: 51.1 kg (112 lb 10.5 oz)  Body mass index is 21.29 kg/m².      Intake/Output Summary (Last 24 hours) at 1/8/2022 0634  Last data filed at 1/8/2022 0400  Gross per 24 hour   Intake 424.44 ml   Output 445 ml   Net -20.56 ml       Physical Exam  Constitutional:       General: She is not in acute distress.  HENT:      Head: Normocephalic and atraumatic.   Cardiovascular:      Rate and Rhythm: Normal rate and regular rhythm.      Pulses: Normal pulses.      Comments: Midline sternotomy incision c/d/I  Chest tubes in place with serosanguinous output  V wires in place, not connected  Pacemaker in place  Pulmonary:      Effort:  Pulmonary effort is normal. No respiratory distress.   Abdominal:      General: Abdomen is flat. There is no distension.      Palpations: Abdomen is soft.      Tenderness: There is no abdominal tenderness.   Genitourinary:     Comments: R groin well incision, bandage in place. Thigh soft, no hematoma  Musculoskeletal:      Comments: R radial art line in place   Skin:     General: Skin is warm.      Capillary Refill: Capillary refill takes less than 2 seconds.   Neurological:      Mental Status: She is alert.         Vents:  Vent Mode: A/C (01/05/22 1430)  Ventilator Initiated: Yes (01/05/22 1427)  Set Rate: 18 BPM (01/05/22 1430)  Vt Set: (S) 350 mL (01/05/22 1430)  PEEP/CPAP: 5 cmH20 (01/05/22 1430)  Oxygen Concentration (%): 10 (01/05/22 1939)  Peak Airway Pressure: 33 cmH2O (01/05/22 1430)  Plateau Pressure: 0 cmH20 (01/05/22 1430)  Total Ve: 7.05 mL (01/05/22 1430)  Negative Inspiratory Force (cm H2O): 0 (01/05/22 1430)  F/VT Ratio<105 (RSBI): (!) 50.51 (01/05/22 1430)    Lines/Drains/Airways     Drain                 Chest Tube 01/05/22 1334 3 Left Pleural 19 Fr. 2 days         Y Chest Tube 1 and 2 01/05/22 1333 1 Anterior Mediastinal 19 Fr. 2 Anterior Mediastinal 19 Fr. 2 days    Female External Urinary Catheter 01/07/22 2200 <1 day          Arterial Line            Arterial Line 01/05/22 0855 Right Radial 2 days          Line                 Pacer Wires 01/05/22 1332 2 days          Peripheral Intravenous Line                 Peripheral IV - Single Lumen 01/05/22 0711 18 G Left Forearm 2 days         Peripheral IV - Single Lumen 01/06/22 1833 20 G Left Antecubital 1 day                Significant Labs:    CBC/Anemia Profile:  Recent Labs   Lab 01/06/22  2318 01/07/22  0418 01/08/22  0421   WBC 13.40* 13.06* 14.44*   HGB 9.2* 9.0* 8.1*   HCT 28.1* 28.3* 25.6*   PLT 90* 93* 102*   MCV 88 91 92   RDW 16.0* 16.2* 16.5*        Chemistries:  Recent Labs   Lab 01/06/22 1959 01/06/22 1959 01/06/22  2489  01/06/22 2318 01/07/22 0418 01/07/22 0418 01/07/22  0746 01/07/22 2039 01/08/22  0248      < > 138  --  135*  --   --   --  131*   K 3.7   < > 4.6   < > 4.6   < > 4.3 4.7 4.4      < > 104  --  104  --   --   --  97   CO2 23   < > 23  --  21*  --   --   --  24   BUN 20   < > 22  --  29*  --   --   --  45*   CREATININE 0.9   < > 0.9  --  1.1  --   --   --  1.5*   CALCIUM 8.7   < > 8.3*  --  8.6*  --   --   --  8.8   ALBUMIN 3.5  --  3.3*  --   --   --   --   --   --    PROT 6.1  --  5.5*  --   --   --   --   --   --    BILITOT 1.2*  --  1.2*  --   --   --   --   --   --    ALKPHOS 48*  --  45*  --   --   --   --   --   --    ALT 7*  --  7*  --   --   --   --   --   --    AST 49*  --  52*  --   --   --   --   --   --    MG 2.1   < > 2.1  --  2.3  --   --   --  2.3   PHOS 3.9   < > 3.7  --  4.2  --   --   --  4.2    < > = values in this interval not displayed.       All pertinent labs within the past 24 hours have been reviewed.    Significant Imaging:  I have reviewed all pertinent imaging results/findings within the past 24 hours.    Assessment/Plan:     * S/P aortic valve replacement  Orion Ty is a 76 y.o. female who presents to the SICU s/p redo aortic valve replacement with mechanical valve on 1/5/22.      Neuro/Psych:   -- Sedation: off  -- Pain: multimodals             Cards:   -- Pressors: off  -- Goal MAP: 60-80  -- oral amio  -- Metop 25 BID  -- Amlodipine 10  -- statin  -- heparin drip, goal PTT 60-80  -- pacemaker in place, interrogated and set to home settings      Pulm:   -- Goal O2 sat > 90%  -- Wean as able  -- ABG PRN  -- Chest tubes include: 2 mediastinal, 1 left pleural to wall suction.      Renal:  -- Mireles removed 1/7  -- Now with Oliguria and JO. BUN/Cr 45/1.5  -- Did not respond to lasix 80.   --Will discuss starting lasix gtt today      FEN / GI:   -- Replace lytes as needed  -- Nutrition: cardiac diet 1500 cc   -- Bowel reg      ID:   -- WBC 14 from 13  --  Antibiotics: periop ancef compelte      Heme/Onc:   -- Hgb 8.1  -- 3U RBC, 2U FFP, 2U platelets in OR  -- Daily CBC  -- Transfused products: 1U RBC on 1/5/22  -- Anticoagulation: ASA, heparin gtt   -Coumadin 5 tonight. INR 1.5      Endo:   -- Gluc goal 140-180  -- Insulin per endocrinology      PPx:   Feeding: cardiac diet  Analgesia/Sedation: multimodal  Thromboembolic prevention: ASA, heparin drip  HOB >30: yes  Stress Ulcer ppx: none  Glucose control: Critical care goal 140-180 g/dl, ISS    Lines/Drains/Airway: Art line      Dispo/Code Status/Palliative:   -- SICU / Full Code            Critical care was time spent personally by me on the following activities: development of treatment plan with patient or surrogate and bedside caregivers, discussions with consultants, evaluation of patient's response to treatment, examination of patient, ordering and performing treatments and interventions, ordering and review of laboratory studies, ordering and review of radiographic studies, pulse oximetry, re-evaluation of patient's condition.  This critical care time did not overlap with that of any other provider or involve time for any procedures.     Graham Alfaro MD  Critical Care - Surgery  [facility]

## 2022-01-08 NOTE — SUBJECTIVE & OBJECTIVE
Past Medical History:   Diagnosis Date    A-fib     Anticoagulant long-term use     Closed displaced fracture of distal phalanx of lesser toe 10/20/2015    Right    Coronary artery disease     Diabetes mellitus     Diabetes mellitus, type 2     Hypertension     Mechanical heart valve present     X's two    Osteopenia     Pacemaker     Rheumatic heart disease        Past Surgical History:   Procedure Laterality Date    CARDIAC PACEMAKER PLACEMENT      CARDIAC VALVE REPLACEMENT      CARDIAC VALVE SURGERY      CORONARY ANGIOGRAPHY N/A 12/21/2021    Procedure: ANGIOGRAM, CORONARY ARTERY;  Surgeon: Devonte Salazar MD;  Location: [facility] CATH LAB;  Service: Cardiology;  Laterality: N/A;    EYE SURGERY Bilateral     cataract    FLUOROSCOPY N/A 12/11/2020    Procedure: FLUOROSCOPY;  Surgeon: Mckay Calvo MD;  Location: [facility] CATH LAB;  Service: Cardiology;  Laterality: N/A;    REPLACEMENT OF AORTIC VALVE WITH REPEAT STERNOTOMY N/A 1/5/2022    Procedure: REPLACEMENT, AORTIC VALVE, WITH REPEAT STERNOTOMY;  Surgeon: Dennis Haider MD;  Location: [facility] OR [address] [place] [address];  Service: Cardiothoracic;  Laterality: N/A;    REPLACEMENT OF PACEMAKER GENERATOR N/A 3/2/2020    Procedure: REPLACEMENT, PACEMAKER GENERATOR;  Surgeon: Mark Ring MD;  Location: [facility] EP LAB;  Service: Cardiology;  Laterality: N/A;  TBS/CHRISTI, Single PPM gen change, Right side, MDT, MAC, SK, 3 Prep    thryoid s         Review of patient's allergies indicates:  No Known Allergies  Current Facility-Administered Medications   Medication Frequency    0.9%  NaCl infusion Continuous    acetaminophen tablet 1,000 mg Q8H    albumin human 5% bottle 12.5 g Once PRN    amiodarone tablet 400 mg BID    amLODIPine tablet 10 mg Daily    aspirin suppository 300 mg Once PRN    atorvastatin tablet 40 mg Daily    bisacodyL suppository 10 mg Daily PRN    calcium gluconate 1 g in dextrose 5 % 100 mL IVPB PRN    calcium gluconate 2 g in dextrose 5 %  100 mL IVPB PRN    calcium gluconate 3 g in dextrose 5 % 100 mL IVPB PRN    chlorothiazide (DIURIL) 500 mg in dextrose 5 % 50 mL IVPB Once    dextrose 50% injection 12.5 g PRN    dextrose 50% injection 25 g PRN    dronabinoL capsule 2.5 mg BID    furosemide (LASIX) 200 mg in dextrose 5 % 100 mL continuous infusion (conc: 2 mg/mL) Continuous    gabapentin capsule 300 mg TID    glucagon (human recombinant) injection 1 mg PRN    glucose chewable tablet 16 g PRN    glucose chewable tablet 24 g PRN    heparin 25,000 units in dextrose 5% 250 mL (100 units/mL) infusion (heparin infusion - NO NOMOGRAM) Continuous    hydrALAZINE injection 5 mg Q6H PRN    HYDROmorphone injection 0.5 mg Q4H PRN    insulin aspart U-100 pen 0-5 Units QID (AC + HS) PRN    labetalol 20 mg/4 mL (5 mg/mL) IV syring Q4H PRN    LIDOcaine 5 % patch 1 patch Q24H    magnesium sulfate 2g in water 50mL IVPB (premix) PRN    magnesium sulfate 2g in water 50mL IVPB (premix) PRN    melatonin tablet 9 mg Nightly PRN    metoclopramide HCl injection 5 mg Q6H PRN    metoprolol injection 5 mg Q5 Min PRN    metoprolol tartrate (LOPRESSOR) tablet 25 mg BID    mupirocin 2 % ointment BID    ondansetron injection 4 mg Q12H PRN    ondansetron injection 4 mg Q12H PRN    oxyCODONE immediate release tablet 10 mg Q4H PRN    oxyCODONE immediate release tablet 5 mg Q4H PRN    polyethylene glycol packet 17 g Daily    potassium chloride 20 mEq in 100 mL IVPB (FOR CENTRAL LINE ADMINISTRATION ONLY) PRN    potassium chloride 20 mEq in 100 mL IVPB (FOR CENTRAL LINE ADMINISTRATION ONLY) PRN    potassium chloride 40 mEq in 100 mL IVPB (FOR CENTRAL LINE ADMINISTRATION ONLY) PRN    sodium chloride 0.9% flush 10 mL PRN     Facility-Administered Medications Ordered in Other Encounters   Medication Frequency    0.9%  NaCl infusion Continuous     Family History     Problem Relation (Age of Onset)    Breast cancer Maternal Aunt        Tobacco Use    Smoking  status: Never Smoker    Smokeless tobacco: Never Used   Substance and Sexual Activity    Alcohol use: No    Drug use: Never    Sexual activity: Not on file     Review of Systems   Constitutional: Positive for activity change and fatigue.   HENT: Negative for congestion.    Respiratory: Positive for shortness of breath.    Cardiovascular: Negative for chest pain.   Gastrointestinal: Negative for abdominal pain and nausea.   Genitourinary: Negative for difficulty urinating, dysuria, flank pain, frequency, hematuria and urgency.   Neurological: Negative for headaches.   Psychiatric/Behavioral: Negative for agitation.     Objective:     Vital Signs (Most Recent):  Temp: 97.8 °F (36.6 °C) (01/08/22 0300)  Pulse: 94 (01/08/22 0944)  Resp: 12 (01/08/22 0645)  BP: 134/65 (01/08/22 0944)  SpO2: 100 % (01/08/22 0645)  O2 Device (Oxygen Therapy): nasal cannula (01/08/22 0600) Vital Signs (24h Range):  Temp:  [97.7 °F (36.5 °C)-98.6 °F (37 °C)] 97.8 °F (36.6 °C)  Pulse:  [] 94  Resp:  [12-34] 12  SpO2:  [89 %-100 %] 100 %  BP: (126-155)/(61-73) 134/65  Arterial Line BP: (105-154)/(38-74) 117/45     Weight: 51.1 kg (112 lb 10.5 oz) (01/08/22 0500)  Body mass index is 21.29 kg/m².  Body surface area is 1.48 meters squared.    I/O last 3 completed shifts:  In: 716.2 [P.O.:240; I.V.:231.1; IV Piggyback:245.1]  Out: 705 [Urine:520; Chest Tube:185]    Physical Exam  Constitutional:       General: She is not in acute distress.     Appearance: She is ill-appearing.   HENT:      Head: Normocephalic and atraumatic.      Nose: Nose normal.   Cardiovascular:      Rate and Rhythm: Normal rate and regular rhythm.      Pulses: Normal pulses.      Comments: Midline sternotomy incision c/d/I  Chest tubes in place with serosanguinous output  V wires in place, not connected  Pacemaker in place  Pulmonary:      Effort: Pulmonary effort is normal. No respiratory distress.   Abdominal:      General: Abdomen is flat. There is no distension.       Palpations: Abdomen is soft.      Tenderness: There is no abdominal tenderness.   Genitourinary:     Comments: R groin well incision, bandage in place. Thigh soft, no hematoma  Musculoskeletal:      Right lower leg: Edema (+1 pitting edema bilateraly ) present.      Left lower leg: Edema present.      Comments: R radial art line in place   Skin:     General: Skin is warm.      Capillary Refill: Capillary refill takes less than 2 seconds.   Neurological:      Mental Status: She is alert.         Significant Labs:  CBC:   Recent Labs   Lab 01/08/22  0421   WBC 14.44*   RBC 2.77*   HGB 8.1*   HCT 25.6*   *   MCV 92   MCH 29.2   MCHC 31.6*     CMP:   Recent Labs   Lab 01/06/22  2318 01/07/22  0418 01/08/22  0248   *   < > 150*   CALCIUM 8.3*   < > 8.8   ALBUMIN 3.3*  --   --    PROT 5.5*  --   --       < > 131*   K 4.6   < > 4.4   CO2 23   < > 24      < > 97   BUN 22   < > 45*   CREATININE 0.9   < > 1.5*   ALKPHOS 45*  --   --    ALT 7*  --   --    AST 52*  --   --    BILITOT 1.2*  --   --     < > = values in this interval not displayed.     Recent Labs   Lab 01/03/22  1222   COLORU Yellow   SPECGRAV 1.020   PHUR 5.0   PROTEINUA Negative   BACTERIA Moderate*   NITRITE Negative   LEUKOCYTESUR Negative   HYALINECASTS 9*     All labs within the past 24 hours have been reviewed.    Significant Imaging:  Labs: Reviewed  US: Reviewed

## 2022-01-09 NOTE — PROGRESS NOTES
Unsuccessful attempt to insert PIV x 3 by 2 different RNs. Will keep L wrist 18G for additional access considering the possibility of diverting gtts peripherally to free trialysis for dialysis treatment.

## 2022-01-09 NOTE — PROGRESS NOTES
Increased pressor requirements, Dr. Barr called to bedside. Labs and lactic sent. ABG done, RR increased to 20 on BiPap. Lasix increased to 40 mg/hr per Nephrology.

## 2022-01-09 NOTE — PROGRESS NOTES
Call received from Dr Corrales @ [phone] regarding HD orders on this patient. Received report from pt's primary nurse that pt is too unstable to tolerate HD and SLED would be more beneficial. Awaiting notification on how to proceed forward

## 2022-01-09 NOTE — PROGRESS NOTES
Dialysis orders received, Dr. Barr and Dialysis RN updated on pt condition and pressor requirements. Holding HD until order is clarified with Nephrology.

## 2022-01-09 NOTE — ASSESSMENT & PLAN NOTE
Orion Ty is a 76 y.o. female who presents to the SICU s/p redo aortic valve replacement with mechanical valve on 1/5/22.      Neuro/Psych:   -- Sedation: off  -- Pain: multimodals             Cards:   -- Pressors: off  -- Goal MAP: 60-80  -- oral amio  -- Metop 25 BID  -- Amlodipine 10  -- Holding oral antihypertensives this morning  -- statin  -- heparin drip, goal PTT 60-80  -- pacemaker in place, interrogated and set to home settings      Pulm:   -- Goal O2 sat > 90%  -- Placed on BiPAP overnight on 1/8/21 for hypercapnia and respiratory distress. FiO2 able to be weaned. ABGs improving.  -- ABG PRN  -- Chest tubes include: 2 mediastinal. L Pleural CT removed 1/8      Renal:  -- Mireles in place  -- UOP improving, but JO with oliguria.   -- Nephrology following  -- CRRT to start this morning for volume      FEN / GI:   -- Replace lytes as needed  -- Nutrition: cardiac diet 1500 cc when not on BiPAP and mental status improved  -- Bowel reg      ID:   -- WBC 14  -- Antibiotics: periop ancef compelte      Heme/Onc:   -- Hgb 7.4  -- 3U RBC, 2U FFP, 2U platelets in OR  -- Daily CBC  -- Transfused products: 1U RBC on 1/5/22  -- Anticoagulation: ASA, heparin gtt   -INR 6.6 this morning.   -Holding Coumadin   -2u FFP this morning while on CRRT      Endo:   -- Gluc goal 140-180  -- Insulin per endocrinology      PPx:   Feeding: NPO while on BiPAP. cardiac diet  Analgesia/Sedation: multimodal  Thromboembolic prevention: ASA, heparin drip  HOB >30: yes  Stress Ulcer ppx: none  Glucose control: Critical care goal 140-180 g/dl, ISS    Lines/Drains/Airway: Art line, RIJ Trialysis, Mireles. Will need L IJ CVC for additional access      Dispo/Code Status/Palliative:   -- SICU / Full Code

## 2022-01-09 NOTE — CARE UPDATE
Anuric and volumune overloaded.  No response to fluid challenge or high dose diuretics.  Worsening RF on BiPAP.  Hyponatremia. Na 124.  AMS    Plan/Recs    Pt needs emergent dialysis for clearance and volume management.  -iHD ordered  -Duration: 2 hours  -Goal Net UF: 0.5L as tolerated with a MAP>65  -Monitor for tolerance and side effets.  -Discussed with attending physician and primary team. HD team notified.

## 2022-01-09 NOTE — CARE UPDATE
Notified about pt's worsening respiratory status.   Pt with tachypnea, dyspnea, and lethargic after LR 500mL IV bolus.   ABGs with respiratory acidosis and hypercarbia, which are improving with BiPAP. Repeat ABGs with adequate oxygen saturation.  Pt currently on Lasix drip at 30 mg/hr.     Plan/Recommendations    -Increase Lasix drip to 50mg/hr for 4 hours.  -Hold all other IVFs.  -Continue BiPAP and repeat ABGs in 4 hours.   -If oxygen saturation < 94% on BiPAP, we will recommend HD for 2 hrs with net UF of 0.5L  -Case discussed with attending physician and primary team.    Quang Corrales-Roberto  Nephrology  [facility] [address]

## 2022-01-09 NOTE — PROGRESS NOTES
Dr. Dominic guidry at bedside, updated on pt condition and made aware that the patient's dialysis treatment has been delayed because there is still no clarification of dialysis orders.

## 2022-01-09 NOTE — CARE UPDATE
CRITICAL CARE SURGERY  CARE UPDATE    Patient with JO, minimal UOP despite diuresis today with lasix  + gtt at 30mg/hr with diurel. Patient had been stable at 3L NC, however noted to have an increase in respiratory requirements and tachypnea after a fluid challenge 500 cc bolus. ABG obtained for tachycardia revealed a respiratory acidosis, pCO2 65. BiPAP was initiated and interval ABG 1 hour later with interval decrease in CO2 with improved mental status. However patient at that time developing hypotension with pressor requirement. Levo 0.02 began, however is quickly increasing to now 0.2 with addition of vaso 0.02.     Most recent labs with worsening hyponatremia (127) and hyperphosphatemia. Anuric at this time despite increase lasix gtt to 40.     Discussed with nephrology urgent need for CRRT throughout the evening, given volume overloaded state, electrolyte derangements, as well as new onset poor mental status. Closely monitoring respiratory and renal status. Low threshold to intubate if mental status continues to decline of hypercarbia worsens.         - Tamiko Barr M.D  General Surgery  Pager: [phone]

## 2022-01-09 NOTE — NURSING
Abdominal dressing saturated, bright red blood. Site care performed, dressing changed. MD notified of findinds, came to bedside to assess. No new orders. Will continue to monitor dressing for new drainage.

## 2022-01-09 NOTE — SUBJECTIVE & OBJECTIVE
Interval History/Significant Events:   -Poor response to diuretic challenge.   -Increasing respiratory requirements with hypercapnia since yesterday evening. Placed on BiPAP with improvement of ABGs.  -Requiring Levo 0.12 and Vaso 0.04 to maintain MAP >60  -Emergent HD orders placed overnight. Initiation delayed until this morning due to patient requiring CRRT orders, not HD.  -INR 6.6 this morning. 2u FFP ordered to start while on CRRT.  -Hgb stable at 7.4    Follow-up For: Procedure(s) (LRB):  REPLACEMENT, AORTIC VALVE, WITH REPEAT STERNOTOMY (N/A)    Post-Operative Day: 4 Days Post-Op    Objective:     Vital Signs (Most Recent):  Temp: 97.6 °F (36.4 °C) (01/09/22 0315)  Pulse: 62 (01/09/22 0645)  Resp: 15 (01/09/22 0645)  BP: (!) 112/48 (01/09/22 0351)  SpO2: 100 % (01/09/22 0645) Vital Signs (24h Range):  Temp:  [97.6 °F (36.4 °C)-98.3 °F (36.8 °C)] 97.6 °F (36.4 °C)  Pulse:  [] 62  Resp:  [10-47] 15  SpO2:  [89 %-100 %] 100 %  BP: ()/(48-65) 112/48  Arterial Line BP: ()/(35-86) 122/50     Weight: 53 kg (116 lb 13.5 oz)  Body mass index is 22.08 kg/m².      Intake/Output Summary (Last 24 hours) at 1/9/2022 0743  Last data filed at 1/9/2022 0600  Gross per 24 hour   Intake 1314.88 ml   Output 894 ml   Net 420.88 ml       Physical Exam  Constitutional:       General: She is not in acute distress.  HENT:      Head: Normocephalic and atraumatic.      Comments: BiPAP  Neck:      Comments: R IJ Trialysis  Cardiovascular:      Rate and Rhythm: Normal rate and regular rhythm.      Pulses: Normal pulses.      Comments: Midline sternotomy incision c/d/I  Mediastinal Chest tubes in place with serosanguinous output  V wires in place, not connected  Pacemaker in place  Pulmonary:      Effort: Pulmonary effort is normal. No respiratory distress.      Comments: BiPAP  Abdominal:      General: Abdomen is flat. There is no distension.      Palpations: Abdomen is soft.      Tenderness: There is no abdominal  tenderness.   Genitourinary:     Comments: R groin well incision, bandage in place. Thigh soft, no hematoma  Musculoskeletal:      Comments: R radial art line in place   Skin:     General: Skin is warm.      Capillary Refill: Capillary refill takes less than 2 seconds.   Neurological:      Mental Status: She is alert.         Vents:  Vent Mode: A/C (01/05/22 1430)  Ventilator Initiated: Yes (01/05/22 1427)  Set Rate: 18 BPM (01/05/22 1430)  Vt Set: (S) 350 mL (01/05/22 1430)  PEEP/CPAP: 5 cmH20 (01/05/22 1430)  Oxygen Concentration (%): 40 (01/09/22 0600)  Peak Airway Pressure: 33 cmH2O (01/05/22 1430)  Plateau Pressure: 0 cmH20 (01/05/22 1430)  Total Ve: 7.05 mL (01/05/22 1430)  Negative Inspiratory Force (cm H2O): 0 (01/05/22 1430)  F/VT Ratio<105 (RSBI): (!) 50.51 (01/05/22 1430)    Lines/Drains/Airways     Central Venous Catheter Line            Trialysis (Dialysis) Catheter 01/08/22 1027 right internal jugular <1 day          Drain                 Y Chest Tube 1 and 2 01/05/22 1333 1 Anterior Mediastinal 19 Fr. 2 Anterior Mediastinal 19 Fr. 3 days         Urethral Catheter 01/08/22 0922 Non-latex <1 day          Arterial Line            Arterial Line 01/05/22 0855 Right Radial 3 days          Line                 Pacer Wires 01/05/22 1332 3 days          Peripheral Intravenous Line                 Peripheral IV - Single Lumen 01/05/22 0711 18 G Left Forearm 4 days         Peripheral IV - Single Lumen 01/06/22 1833 20 G Left Antecubital 2 days                Significant Labs:    CBC/Anemia Profile:  Recent Labs   Lab 01/08/22  0421 01/08/22  2025 01/09/22  0008 01/09/22  0126 01/09/22  0319 01/09/22  0322 01/09/22  0612   WBC 14.44*  --  14.66*  --   --  14.17*  --    HGB 8.1*  --  7.5*  --   --  7.4*  --    HCT 25.6*   < > 24.2*   < > 23* 23.5* 22*   *  --  111*  --   --  113*  --    MCV 92  --  92  --   --  93  --    RDW 16.5*  --  16.5*  --   --  16.5*  --     < > = values in this interval not  displayed.        Chemistries:  Recent Labs   Lab 01/08/22  0248 01/08/22  0947 01/08/22  1604 01/08/22  1604 01/08/22 2023 01/09/22  0008 01/09/22  0322   *  --  127*  --   --  124* 124*   K 4.4   < > 5.3*   < > 5.0 4.8 4.4   CL 97  --  94*  --   --  91* 90*   CO2 24  --  21*  --   --  25 24   BUN 45*  --  56*  --   --  55* 57*   CREATININE 1.5*  --  2.2*  --   --  2.2* 2.1*   CALCIUM 8.8  --  8.1*  --   --  8.2* 8.1*   ALBUMIN  --   --  3.2*  --   --  3.1*  --    MG 2.3  --   --   --   --   --  2.2   PHOS 4.2  --  5.9*  --   --  6.1* 5.7*    < > = values in this interval not displayed.       ABGs:   Recent Labs   Lab 01/09/22  0612   PH 7.346*   PCO2 49.9*   HCO3 27.3   POCSATURATED 97   BE 2     Coagulation:   Recent Labs   Lab 01/09/22  0541   INR 6.6*   APTT 88.9*       Significant Imaging:  I have reviewed all pertinent imaging results/findings within the past 24 hours.

## 2022-01-09 NOTE — PROGRESS NOTES
On initial assessment MAPs 50s-60s and HR 50s paced. Pt is lethargic, tachypneic with increased WOB, SpO2 90s at rest with desats to 80s when talking. Coarse crackles audible from doorway. +1 pitting edema noted to all extremities. UOP < 0.5 mL/kg on 30 mg/hr Lasix gtt. K and ABG done. Dr. Barr called to bedside to assess and review ABG results. Orders received for BiPap and Levo. Patient and her son are updated on her POC for the night.

## 2022-01-09 NOTE — CARE UPDATE
BG goal 140 - 180   Diet Cardiac [facility]; Fluid - 1500mL  4 Days Post-Op    BG stable while on current SQ insulin regimen. Occasional BG excursions noted, but then patient's returns to euglycemia with minimal SQ insulin correction. Endo will continue to follow, and manage glycemic control while inpatient.     - Continue Low Dose SQ Insulin Correction Scale PRN hyperglycemia.  - BG Monitoring AC/HS.     ** Please call Endocrine for any BG related issues **  ** Please notify Endocrine for any change and/or advance in diet**     Lab Results   Component Value Date    HGBA1C 6.0 (H) 01/03/2022       Discharge Planning:   TBD. Please notify endocrinology prior to discharge.

## 2022-01-09 NOTE — PROGRESS NOTES
[facility]  Critical Care - Surgery  Progress Note    Patient Name: Orion Ty  MRN: [id number]  Admission Date: 1/5/2022  Hospital Length of Stay: 4 days  Code Status: Full Code  Attending Provider: Dennis Haider MD  Primary Care Provider: Otis Zimmer MD   Principal Problem: S/P aortic valve replacement    Subjective:     Hospital/ICU Course:  No notes on file    Interval History/Significant Events:   -Poor response to diuretic challenge.   -Increasing respiratory requirements with hypercapnia since yesterday evening. Placed on BiPAP with improvement of ABGs.  -Requiring Levo 0.12 and Vaso 0.04 to maintain MAP >60  -Emergent HD orders placed overnight. Initiation delayed until this morning due to patient requiring CRRT orders, not HD.  -INR 6.6 this morning. 2u FFP ordered to start while on CRRT.  -Hgb stable at 7.4    Follow-up For: Procedure(s) (LRB):  REPLACEMENT, AORTIC VALVE, WITH REPEAT STERNOTOMY (N/A)    Post-Operative Day: 4 Days Post-Op    Objective:     Vital Signs (Most Recent):  Temp: 97.6 °F (36.4 °C) (01/09/22 0315)  Pulse: 62 (01/09/22 0645)  Resp: 15 (01/09/22 0645)  BP: (!) 112/48 (01/09/22 0351)  SpO2: 100 % (01/09/22 0645) Vital Signs (24h Range):  Temp:  [97.6 °F (36.4 °C)-98.3 °F (36.8 °C)] 97.6 °F (36.4 °C)  Pulse:  [] 62  Resp:  [10-47] 15  SpO2:  [89 %-100 %] 100 %  BP: ()/(48-65) 112/48  Arterial Line BP: ()/(35-86) 122/50     Weight: 53 kg (116 lb 13.5 oz)  Body mass index is 22.08 kg/m².      Intake/Output Summary (Last 24 hours) at 1/9/2022 0743  Last data filed at 1/9/2022 0600  Gross per 24 hour   Intake 1314.88 ml   Output 894 ml   Net 420.88 ml       Physical Exam  Constitutional:       General: She is not in acute distress.  HENT:      Head: Normocephalic and atraumatic.      Comments: BiPAP  Neck:      Comments: R IJ Trialysis  Cardiovascular:      Rate and Rhythm: Normal rate and regular rhythm.      Pulses: Normal pulses.       Comments: Midline sternotomy incision c/d/I  Mediastinal Chest tubes in place with serosanguinous output  V wires in place, not connected  Pacemaker in place  Pulmonary:      Effort: Pulmonary effort is normal. No respiratory distress.      Comments: BiPAP  Abdominal:      General: Abdomen is flat. There is no distension.      Palpations: Abdomen is soft.      Tenderness: There is no abdominal tenderness.   Genitourinary:     Comments: R groin well incision, bandage in place. Thigh soft, no hematoma  Musculoskeletal:      Comments: R radial art line in place   Skin:     General: Skin is warm.      Capillary Refill: Capillary refill takes less than 2 seconds.   Neurological:      Mental Status: She is alert.         Vents:  Vent Mode: A/C (01/05/22 1430)  Ventilator Initiated: Yes (01/05/22 1427)  Set Rate: 18 BPM (01/05/22 1430)  Vt Set: (S) 350 mL (01/05/22 1430)  PEEP/CPAP: 5 cmH20 (01/05/22 1430)  Oxygen Concentration (%): 40 (01/09/22 0600)  Peak Airway Pressure: 33 cmH2O (01/05/22 1430)  Plateau Pressure: 0 cmH20 (01/05/22 1430)  Total Ve: 7.05 mL (01/05/22 1430)  Negative Inspiratory Force (cm H2O): 0 (01/05/22 1430)  F/VT Ratio<105 (RSBI): (!) 50.51 (01/05/22 1430)    Lines/Drains/Airways     Central Venous Catheter Line            Trialysis (Dialysis) Catheter 01/08/22 1027 right internal jugular <1 day          Drain                 Y Chest Tube 1 and 2 01/05/22 1333 1 Anterior Mediastinal 19 Fr. 2 Anterior Mediastinal 19 Fr. 3 days         Urethral Catheter 01/08/22 0922 Non-latex <1 day          Arterial Line            Arterial Line 01/05/22 0855 Right Radial 3 days          Line                 Pacer Wires 01/05/22 1332 3 days          Peripheral Intravenous Line                 Peripheral IV - Single Lumen 01/05/22 0711 18 G Left Forearm 4 days         Peripheral IV - Single Lumen 01/06/22 1833 20 G Left Antecubital 2 days                Significant Labs:    CBC/Anemia Profile:  Recent Labs   Lab  01/08/22  0421 01/08/22 2025 01/09/22  0008 01/09/22  0126 01/09/22 0319 01/09/22 0322 01/09/22  0612   WBC 14.44*  --  14.66*  --   --  14.17*  --    HGB 8.1*  --  7.5*  --   --  7.4*  --    HCT 25.6*   < > 24.2*   < > 23* 23.5* 22*   *  --  111*  --   --  113*  --    MCV 92  --  92  --   --  93  --    RDW 16.5*  --  16.5*  --   --  16.5*  --     < > = values in this interval not displayed.        Chemistries:  Recent Labs   Lab 01/08/22  0248 01/08/22 0947 01/08/22  1604 01/08/22  1604 01/08/22 2023 01/09/22 0008 01/09/22 0322   *  --  127*  --   --  124* 124*   K 4.4   < > 5.3*   < > 5.0 4.8 4.4   CL 97  --  94*  --   --  91* 90*   CO2 24  --  21*  --   --  25 24   BUN 45*  --  56*  --   --  55* 57*   CREATININE 1.5*  --  2.2*  --   --  2.2* 2.1*   CALCIUM 8.8  --  8.1*  --   --  8.2* 8.1*   ALBUMIN  --   --  3.2*  --   --  3.1*  --    MG 2.3  --   --   --   --   --  2.2   PHOS 4.2  --  5.9*  --   --  6.1* 5.7*    < > = values in this interval not displayed.       ABGs:   Recent Labs   Lab 01/09/22  0612   PH 7.346*   PCO2 49.9*   HCO3 27.3   POCSATURATED 97   BE 2     Coagulation:   Recent Labs   Lab 01/09/22  0541   INR 6.6*   APTT 88.9*       Significant Imaging:  I have reviewed all pertinent imaging results/findings within the past 24 hours.    Assessment/Plan:     * S/P aortic valve replacement  Orion Ty is a 76 y.o. female who presents to the SICU s/p redo aortic valve replacement with mechanical valve on 1/5/22.      Neuro/Psych:   -- Sedation: off  -- Pain: multimodals             Cards:   -- Pressors: off  -- Goal MAP: 60-80  -- oral amio  -- Metop 25 BID  -- Amlodipine 10  -- Holding oral antihypertensives this morning  -- statin  -- heparin drip, goal PTT 60-80  -- pacemaker in place, interrogated and set to home settings      Pulm:   -- Goal O2 sat > 90%  -- Placed on BiPAP overnight on 1/8/21 for hypercapnia and respiratory distress. FiO2 able to be weaned. ABGs  improving.  -- ABG PRN  -- Chest tubes include: 2 mediastinal. L Pleural CT removed 1/8      Renal:  -- Mireles in place  -- UOP improving, but JO with oliguria.   -- Nephrology following  -- CRRT to start this morning for volume      FEN / GI:   -- Replace lytes as needed  -- Nutrition: cardiac diet 1500 cc when not on BiPAP and mental status improved  -- Bowel reg      ID:   -- WBC 14  -- Antibiotics: periop ancef compelte      Heme/Onc:   -- Hgb 7.4  -- 3U RBC, 2U FFP, 2U platelets in OR  -- Daily CBC  -- Transfused products: 1U RBC on 1/5/22  -- Anticoagulation: ASA, heparin gtt   -INR 6.6 this morning.   -Holding Coumadin   -2u FFP this morning while on CRRT      Endo:   -- Gluc goal 140-180  -- Insulin per endocrinology      PPx:   Feeding: NPO while on BiPAP. cardiac diet  Analgesia/Sedation: multimodal  Thromboembolic prevention: ASA, heparin drip  HOB >30: yes  Stress Ulcer ppx: none  Glucose control: Critical care goal 140-180 g/dl, ISS    Lines/Drains/Airway: Art line, RIJ Trialysis, Mireles. Will need L IJ CVC for additional access      Dispo/Code Status/Palliative:   -- SICU / Full Code             Critical care was time spent personally by me on the following activities: development of treatment plan with patient or surrogate and bedside caregivers, discussions with consultants, evaluation of patient's response to treatment, examination of patient, ordering and performing treatments and interventions, ordering and review of laboratory studies, ordering and review of radiographic studies, pulse oximetry, re-evaluation of patient's condition.  This critical care time did not overlap with that of any other provider or involve time for any procedures.     Graham Alfaro MD  Critical Care - Surgery  [facility] - Surgical Intensive Care

## 2022-01-09 NOTE — PROGRESS NOTES
ABG and labs done. Dialysis RN has the machine in the pt's room and primed for treatment. Still waiting on dialysis order clarification before starting treatment. Dr. Barr updated on ABG results, gtt rates, and assessment findings including arousability. Dr. Barr is also aware that I am still waiting on dialysis order clarification.

## 2022-01-09 NOTE — PROGRESS NOTES
Pt is restless and attempting to remove BiPap. Repeat ABG done. Dr. Barr at bedside, orders for precedex.

## 2022-01-10 PROBLEM — N17.0 ATN (ACUTE TUBULAR NECROSIS): Status: ACTIVE | Noted: 2022-01-01

## 2022-01-10 NOTE — PROGRESS NOTES
Pt still tachypneic and has increased WOB, still using accessory muscles and abdominal muscles. I/O breaking even this hour. ABG done. FiO2 increased to 60%. Dr. Alfaro aware, no orders.

## 2022-01-10 NOTE — PROGRESS NOTES
On assessment pt is AAO x 4. Pt is lethargic but arouses to sound. She reports tiredness and pain to her sternal incision site. She is tacypneic, with increased WOB. Coarse crackles in BL upper lobes and coarse diminished in BL lower lobes. SpO2 97% on 5L HFNC. +1 pitting edema noted to all extremities. No UOP noted. Dr. Alfaro at bedside and updated on assessments, gtts, UOP, and other results. ABG and SvO2 done. Orders received for BiPap. Discussed potential for dialysis treatment tonight.

## 2022-01-10 NOTE — PROGRESS NOTES
Dr. Alfaro updated with ABG and SvO2 results. SLED orders received per Nephrology. Dialysis RN notified.

## 2022-01-10 NOTE — PROGRESS NOTES
[facility]  Nephrology  Progress Note     Patient Name: Orion Ty  MRN: [id number]  Admission Date: 1/5/2022  Hospital Length of Stay: 4 days  Attending Provider: Dennis Haider MD   Primary Care Physician: Otis Zimmer MD  Principal Problem: S/P aortic valve replacement    Subjective:     HPI:   Orion Ty is our 76 y.o. female with previous aortic valve replacement, mitral valve replacement, and tricuspid valve repair in 2010 who presented on 1/5 for redo aortic valve replacement. Nephrology consulted on 1/8 for anuric JO. Patient is alert but tired, son at bedside. Stated she was tired prior to admission. During the hospital stay Cr increase from 1 to 1.5. In OR she received  2.5L crystalloid, 3U RBC, 2U FFP, 2 platelets, and 800 cell saver. she is +5 L since admit. UOP decrease overnight, per nurse she didn't urinate at all. She received diuril 500 mg twice, lasix 20 mg *2 on 1/7. This morning she urinate 225 immediately after placing the tejeda's cath. She received again lasix 100 mg this morning. Upor evaluation she was alert but repeatedly saying she was tired, denied any pain. Denied any previous hx of kidney disease.       Interval History:   Review of patient's allergies indicates:  No Known Allergies   Current Facility-Administered Medications   Medication Frequency    0.9%  NaCl infusion (CRRT USE ONLY) Continuous    0.9%  NaCl infusion (for blood administration) Q24H PRN    0.9%  NaCl infusion (for blood administration) Q24H PRN    0.9%  NaCl infusion (for blood administration) Q24H PRN    0.9%  NaCl infusion Continuous    0.9%  NaCl infusion Once    acetaminophen tablet 1,000 mg Q8H    albumin human 5% bottle 12.5 g Once PRN    amiodarone tablet 400 mg BID    aspirin suppository 300 mg Once PRN    atorvastatin tablet 40 mg Daily    bisacodyL suppository 10 mg Daily PRN    calcium gluconate 1 g in dextrose 5 % 100 mL IVPB PRN    calcium gluconate 2 g  in dextrose 5 % 100 mL IVPB PRN    calcium gluconate 3 g in dextrose 5 % 100 mL IVPB PRN    dexmedetomidine (PRECEDEX) 400mcg/100mL 0.9% NaCL infusion Continuous    dextrose 50% injection 12.5 g PRN    dextrose 50% injection 25 g PRN    dronabinoL capsule 2.5 mg BID    EPINEPHrine (ADRENALIN) 5 mg in dextrose 5 % 250 mL infusion Continuous    gabapentin capsule 300 mg BID    glucagon (human recombinant) injection 1 mg PRN    glucose chewable tablet 16 g PRN    glucose chewable tablet 24 g PRN    heparin 25,000 units in dextrose 5% 250 mL (100 units/mL) infusion (heparin infusion - NO NOMOGRAM) Continuous    HYDROmorphone injection 0.5 mg Q4H PRN    insulin aspart U-100 pen 0-5 Units QID (AC + HS) PRN    LIDOcaine 5 % patch 1 patch Q24H    magnesium sulfate 2g in water 50mL IVPB (premix) PRN    magnesium sulfate 2g in water 50mL IVPB (premix) PRN    magnesium sulfate 2g in water 50mL IVPB (premix) PRN    melatonin tablet 9 mg Nightly PRN    metoclopramide HCl injection 5 mg Q6H PRN    mupirocin 2 % ointment BID    NORepinephrine 4 mg in dextrose 5% 250 mL infusion (premix) (titrating) Continuous    ondansetron injection 4 mg Q12H PRN    ondansetron injection 4 mg Q12H PRN    oxyCODONE immediate release tablet 10 mg Q4H PRN    oxyCODONE immediate release tablet 5 mg Q4H PRN    polyethylene glycol packet 17 g Daily    potassium chloride 20 mEq in 100 mL IVPB (FOR CENTRAL LINE ADMINISTRATION ONLY) PRN    potassium chloride 20 mEq in 100 mL IVPB (FOR CENTRAL LINE ADMINISTRATION ONLY) PRN    potassium chloride 40 mEq in 100 mL IVPB (FOR CENTRAL LINE ADMINISTRATION ONLY) PRN    sodium chloride 0.9% bolus 250 mL PRN    sodium chloride 0.9% flush 10 mL PRN    sodium phosphate 20.01 mmol in dextrose 5 % 250 mL IVPB PRN    sodium phosphate 30 mmol in dextrose 5 % 250 mL IVPB PRN    sodium phosphate 39.99 mmol in dextrose 5 % 250 mL IVPB PRN    vasopressin (PITRESSIN) 0.2 Units/mL in dextrose  5 % 100 mL infusion Continuous     Facility-Administered Medications Ordered in Other Encounters   Medication Frequency    0.9%  NaCl infusion Continuous       Objective:     Vital Signs (Most Recent):  Temp: 97.9 °F (36.6 °C) (01/09/22 1501)  Pulse: 72 (01/09/22 1700)  Resp: (!) 27 (01/09/22 1700)  BP: (!) 110/43 (01/09/22 1501)  SpO2: 97 % (01/09/22 1700)  O2 Device (Oxygen Therapy): High Flow nasal Cannula (01/09/22 1242)   Vital Signs (24h Range):  Temp:  [97.5 °F (36.4 °C)-98 °F (36.7 °C)] 97.9 °F (36.6 °C)  Pulse:  [50-98] 72  Resp:  [10-47] 27  SpO2:  [96 %-100 %] 97 %  BP: ()/(43-62) 110/43  Arterial Line BP: ()/(35-63) 128/55     Weight: 53 kg (116 lb 13.5 oz) (01/09/22 0402)  Body mass index is 22.08 kg/m².  Body surface area is 1.51 meters squared.      Intake/Output Summary (Last 24 hours) at 1/9/2022 2038  Last data filed at 1/9/2022 1845  Gross per 24 hour   Intake 4049.82 ml   Output 3684 ml   Net 365.82 ml     I/O last 3 completed shifts:  In: 4813.9 [P.O.:598; I.V.:3103.9; Blood:563; IV Piggyback:549]  Out: 4023 [Urine:1074; Other:2909; Chest Tube:40]  Net IO Since Admission: 6,403.64 mL [01/09/22 2038]     Physical Exam    Significant Labs:  All labs within the past 24 hours have been reviewed.    Assessment/Plan:     JO (acute kidney injury)  Orion Ty is our 76 y.o. female with previous aortic valve replacement, mitral valve replacement, and tricuspid valve repair in 2010 who presented on 1/5 for redo aortic valve replacement. Nephrology consulted on 1/8 for anuric JO. Patient is alert but tired, son at bedside. Stated she was tired prior to admission. During the hospital stay Cr increase from 1 to 1.5. In OR she received  2.5L crystalloid, 3U RBC, 2U FFP, 2 platelets, and 800 cell saver. she is +5 L since admit. UOP decrease overnight, per nurse she didn't urinate at all. She received diuril 500 mg twice, lasix 20 mg *2 on 1/7. This morning she urinate 225 immediately after  placing the tejeda's cath. She received again lasix 100 mg this morning. Upor evaluation she was alert but repeatedly saying she was tired, denied any pain. Denied any previous hx of kidney disease.     Baseline Cr: 0.9  At presentation Cr: 1.5  Urinary symptoms: None    Ddx, ATN, vs hypoperfusion vs infections      Urine microscopy showed very few granular casts    Recommendations:     - Recommended holding diuretics or IVF at this time and continue to monitor   - Trend with BMP q6  - If urine output < 0.5 ml/hr, consider gentle diuresis   - Repeat Urine Studies: UA reflex to cultures, Urine NA, K, BUN, osm  - Monitor electrolytes closely, MG, Phos, K, replete at needed cautiously   - Obtain Renal US to assess for Post renal causes  - Recommend further work-up for infection given recent surgery and leukocytosis   - Renal dose of meds   - Optimize hemodynamic MAP and CO   - Diet: Renal   - Strict Is/Os  - Avoid: NSAID, ACEI, ARBs, Contrasts            Thank you for your consult.   Quang Pinzon MD  Nephrology  [facility]

## 2022-01-10 NOTE — PT/OT/SLP PROGRESS
Physical Therapy      Patient Name:  Orion Ty   MRN:  [id number]    Patient not seen today secondary to  (pt on hold due to being on CRRT, pressors and being put back on BiPap. PT was not able to make 2nd attempt to see pt.). Will follow-up at a later date.    1/10/2022  .

## 2022-01-10 NOTE — SUBJECTIVE & OBJECTIVE
Interval History: urine output decreased significantly over last 24 hours and patient maintained near net even fluid balance overnight with increase in UF today leading to negative fluid balance. She is on bipap, vaso and epi, has had significant clinical improvement overnight    Review of patient's allergies indicates:  No Known Allergies  Current Facility-Administered Medications   Medication Frequency    0.9%  NaCl infusion (CRRT USE ONLY) Continuous    0.9%  NaCl infusion (for blood administration) Q24H PRN    0.9%  NaCl infusion Continuous    acetaminophen tablet 1,000 mg Q8H    albumin human 5% bottle 12.5 g Once PRN    amiodarone tablet 400 mg BID    aspirin suppository 300 mg Once PRN    atorvastatin tablet 40 mg Daily    balsam peru-castor oiL Oint BID    bisacodyL suppository 10 mg Daily PRN    dexmedetomidine (PRECEDEX) 400mcg/100mL 0.9% NaCL infusion Continuous    dextrose 50% injection 12.5 g PRN    dextrose 50% injection 25 g PRN    dronabinoL capsule 2.5 mg BID    EPINEPHrine (ADRENALIN) 5 mg in dextrose 5 % 250 mL infusion Continuous    gabapentin capsule 300 mg BID    glucagon (human recombinant) injection 1 mg PRN    glucose chewable tablet 16 g PRN    glucose chewable tablet 24 g PRN    HYDROmorphone injection 0.5 mg Q4H PRN    insulin aspart U-100 pen 0-5 Units QID (AC + HS) PRN    LIDOcaine 5 % patch 1 patch Q24H    melatonin tablet 9 mg Nightly PRN    metoclopramide HCl injection 5 mg Q6H PRN    mupirocin 2 % ointment BID    ondansetron injection 4 mg Q12H PRN    oxyCODONE immediate release tablet 10 mg Q4H PRN    oxyCODONE immediate release tablet 5 mg Q4H PRN    polyethylene glycol packet 17 g Daily    sodium chloride 0.9% bolus 250 mL PRN    sodium chloride 0.9% flush 10 mL PRN    vasopressin (PITRESSIN) 0.2 Units/mL in dextrose 5 % 100 mL infusion Continuous     Facility-Administered Medications Ordered in Other Encounters   Medication Frequency    0.9%   NaCl infusion Continuous       Objective:     Vital Signs (Most Recent):  Temp: 96.6 °F (35.9 °C) (01/10/22 1420)  Pulse: (!) 112 (01/10/22 1500)  Resp: (!) 32 (01/10/22 1500)  BP: (!) 118/57 (01/10/22 1420)  SpO2: 98 % (01/10/22 1500)  O2 Device (Oxygen Therapy): nasal cannula w/ humidification (01/10/22 1500) Vital Signs (24h Range):  Temp:  [96.6 °F (35.9 °C)-98.5 °F (36.9 °C)] 96.6 °F (35.9 °C)  Pulse:  [] 112  Resp:  [18-45] 32  SpO2:  [89 %-100 %] 98 %  BP: (105-130)/(50-58) 118/57  Arterial Line BP: (105-135)/(39-66) 122/57     Weight: 53 kg (116 lb 13.5 oz) (01/09/22 0402)  Body mass index is 22.08 kg/m².  Body surface area is 1.51 meters squared.    I/O last 3 completed shifts:  In: 6646.5 [P.O.:798; I.V.:4750.5; Blood:563; IV Piggyback:535]  Out: 6667 [Urine:845; Other:5812; Chest Tube:10]    Physical Exam  Constitutional:       General: She is not in acute distress.     Appearance: She is ill-appearing.   HENT:      Mouth/Throat:      Mouth: Mucous membranes are moist.   Eyes:      General: No scleral icterus.     Extraocular Movements: Extraocular movements intact.      Pupils: Pupils are equal, round, and reactive to light.   Cardiovascular:      Rate and Rhythm: Normal rate and regular rhythm.   Pulmonary:      Comments: bipap  Abdominal:      General: There is no distension.      Palpations: Abdomen is soft.   Musculoskeletal:         General: No deformity.      Right lower leg: No edema.      Left lower leg: No edema.   Skin:     Coloration: Skin is not jaundiced.   Neurological:      General: No focal deficit present.      Mental Status: She is alert.         Significant Labs:  All labs within the past 24 hours have been reviewed.     Significant Imaging:  Labs: Reviewed  X-Ray: Reviewed

## 2022-01-10 NOTE — ASSESSMENT & PLAN NOTE
Orion Ty is a 76 y.o. female who presents to the SICU s/p redo aortic valve replacement with mechanical valve on 1/5/22.      Neuro/Psych:   -- Sedation: Intermittently on precedex for agitation while on BiPAP  -- Pain: multimodals             Cards:   -- Pressors: off  -- Goal MAP: 60-80  -- oral amio  -- Metop 25 daily  -- Amlodipine 10  -- Holding oral antihypertensives again this morning  -- statin  -- heparin drip, goal PTT 60-80  -- pacemaker in place, interrogated and set to home settings      Pulm:   -- Goal O2 sat > 90%  -- Currently on BiPAP. Wean as able.  -- ABG PRN  -- 2 mediastinal removed 1/9 L Pleural CT removed 1/8      Renal:  -- Mireles in place  -- Oliguric   -- Nephrology following  -- CRRT currently running until 11am. Would like to continue CRRT after this session for volume management given her continued respiratory distress and less than ideal volume removal in the past 36h.      FEN / GI:   -- Replace lytes as needed  -- Nutrition: cardiac diet 1500 cc when not on BiPAP and mental status improved  -- Bowel reg      ID:   -- WBC 10 from 14   -- Antibiotics: periop ancef compelte      Heme/Onc:   -- Hgb 7.8 from 8.0  -- 3U RBC, 2U FFP, 2U platelets in OR  -- Daily CBC  -- Transfused products: 1U RBC on 1/5/22. 1u RBC and 1u FFP on 1/9/21  -- Anticoagulation: ASA, heparin gtt   -INR 6.7 this morning.   -Holding Coumadin      Endo:   -- Gluc goal 140-180  -- Insulin per endocrinology      PPx:   Feeding: NPO while on BiPAP. cardiac diet otherwise  Analgesia/Sedation: multimodal  Thromboembolic prevention: ASA, heparin drip  HOB >30: yes  Stress Ulcer ppx: none  Glucose control: Critical care goal 140-180 g/dl, ISS    Lines/Drains/Airway: Art line, RIJ Trialysis, Mireles.       Dispo/Code Status/Palliative:   -- SICU / Full Code

## 2022-01-10 NOTE — PROGRESS NOTES
[facility]  Nephrology  Progress Note    Patient Name: Orion Ty  MRN: [id number]  Admission Date: 1/5/2022  Hospital Length of Stay: 5 days  Attending Provider: Dennis Haider MD   Primary Care Physician: Otis Zimmer MD  Principal Problem:S/P aortic valve replacement    Subjective:     HPI: Orion Ty is our 76 y.o. female with previous aortic valve replacement, mitral valve replacement, and tricuspid valve repair in 2010 who presented on 1/5 for redo aortic valve replacement. Nephrology consulted on 1/8 for anuric JO. Patient is alert but tired, son at bedside. Stated she was tired prior to admission. During the hospital stay Cr increase from 1 to 1.5. In OR she received  2.5L crystalloid, 3U RBC, 2U FFP, 2 platelets, and 800 cell saver. she is +5 L since admit. UOP decrease overnight, per nurse she didn't urinate at all. She received diuril 500 mg twice, lasix 20 mg *2 on 1/7. This morning she urinate 225 immediately after placing the tejeda's cath. She received again lasix 100 mg this morning. Upor evaluation she was alert but repeatedly saying she was tired, denied any pain. Denied any previous hx of kidney disease.       Interval History: urine output decreased significantly over last 24 hours and patient maintained near net even fluid balance overnight with increase in UF today leading to negative fluid balance. She is on bipap, vaso and epi, has had significant clinical improvement overnight    Review of patient's allergies indicates:  No Known Allergies  Current Facility-Administered Medications   Medication Frequency    0.9%  NaCl infusion (CRRT USE ONLY) Continuous    0.9%  NaCl infusion (for blood administration) Q24H PRN    0.9%  NaCl infusion Continuous    acetaminophen tablet 1,000 mg Q8H    albumin human 5% bottle 12.5 g Once PRN    amiodarone tablet 400 mg BID    aspirin suppository 300 mg Once PRN    atorvastatin tablet 40 mg Daily    balsam  peru-castor oiL Oint BID    bisacodyL suppository 10 mg Daily PRN    dexmedetomidine (PRECEDEX) 400mcg/100mL 0.9% NaCL infusion Continuous    dextrose 50% injection 12.5 g PRN    dextrose 50% injection 25 g PRN    dronabinoL capsule 2.5 mg BID    EPINEPHrine (ADRENALIN) 5 mg in dextrose 5 % 250 mL infusion Continuous    gabapentin capsule 300 mg BID    glucagon (human recombinant) injection 1 mg PRN    glucose chewable tablet 16 g PRN    glucose chewable tablet 24 g PRN    HYDROmorphone injection 0.5 mg Q4H PRN    insulin aspart U-100 pen 0-5 Units QID (AC + HS) PRN    LIDOcaine 5 % patch 1 patch Q24H    melatonin tablet 9 mg Nightly PRN    metoclopramide HCl injection 5 mg Q6H PRN    mupirocin 2 % ointment BID    ondansetron injection 4 mg Q12H PRN    oxyCODONE immediate release tablet 10 mg Q4H PRN    oxyCODONE immediate release tablet 5 mg Q4H PRN    polyethylene glycol packet 17 g Daily    sodium chloride 0.9% bolus 250 mL PRN    sodium chloride 0.9% flush 10 mL PRN    vasopressin (PITRESSIN) 0.2 Units/mL in dextrose 5 % 100 mL infusion Continuous     Facility-Administered Medications Ordered in Other Encounters   Medication Frequency    0.9%  NaCl infusion Continuous       Objective:     Vital Signs (Most Recent):  Temp: 96.6 °F (35.9 °C) (01/10/22 1420)  Pulse: (!) 112 (01/10/22 1500)  Resp: (!) 32 (01/10/22 1500)  BP: (!) 118/57 (01/10/22 1420)  SpO2: 98 % (01/10/22 1500)  O2 Device (Oxygen Therapy): nasal cannula w/ humidification (01/10/22 1500) Vital Signs (24h Range):  Temp:  [96.6 °F (35.9 °C)-98.5 °F (36.9 °C)] 96.6 °F (35.9 °C)  Pulse:  [] 112  Resp:  [18-45] 32  SpO2:  [89 %-100 %] 98 %  BP: (105-130)/(50-58) 118/57  Arterial Line BP: (105-135)/(39-66) 122/57     Weight: 53 kg (116 lb 13.5 oz) (01/09/22 0402)  Body mass index is 22.08 kg/m².  Body surface area is 1.51 meters squared.    I/O last 3 completed shifts:  In: 6646.5 [P.O.:798; I.V.:4750.5; Blood:563; IV  Piggyback:535]  Out: 6667 [Urine:845; Other:5812; Chest Tube:10]    Physical Exam  Constitutional:       General: She is not in acute distress.     Appearance: She is ill-appearing.   HENT:      Mouth/Throat:      Mouth: Mucous membranes are moist.   Eyes:      General: No scleral icterus.     Extraocular Movements: Extraocular movements intact.      Pupils: Pupils are equal, round, and reactive to light.   Cardiovascular:      Rate and Rhythm: Normal rate and regular rhythm.   Pulmonary:      Comments: bipap  Abdominal:      General: There is no distension.      Palpations: Abdomen is soft.   Musculoskeletal:         General: No deformity.      Right lower leg: No edema.      Left lower leg: No edema.   Skin:     Coloration: Skin is not jaundiced.   Neurological:      General: No focal deficit present.      Mental Status: She is alert.         Significant Labs:  All labs within the past 24 hours have been reviewed.     Significant Imaging:  Labs: Reviewed  X-Ray: Reviewed    Assessment/Plan:     * S/P aortic valve replacement  -with severe intraoperative hypotension   -per primary    JO (acute kidney injury)  -oliguric ischemic ATN likely secondary to severe intraoperative hypotension requiring epi, norepi, and vaso intraoperatively 1/5/22  -BL sCr 0.8  -creatinine worsened post op delayed likely due to hemodilution  -failed high dose duretic therapy and KRT started 1/9/22 due to volume overload  -still on vaso and epi  -on bipap with significant clinical improvement despite only 300cc negative fluid balance overnight  -ABG benign with no severe electrolyte abnormalities  -plan to give break on dialysis today after blood products and restart SLED tonight, goal 0.5-1L net negative fluid balance at most    ATN (acute tubular necrosis)  -see jo    Volume overload  -improved with KRT started 1/9/22          Oj Clifford MD  Nephrology  [facility]

## 2022-01-10 NOTE — PROGRESS NOTES
Team rounds. Discussed pt condition, gtts, I/O, VS, and overnight events. Suggested 24hr SLED instead of stopping at 12hr, tolerating well. EPI off and vaso still on @ 0.04, MAPs 65-70. UF @ 450/hr and UOP 50 cc this shift. I/IO net -600 cc. Unable to tolerate BiPap off for > 2 min. See notes for further detail.

## 2022-01-10 NOTE — SUBJECTIVE & OBJECTIVE
Interval History/Significant Events:   -Ran on CRRT during the day yesterday for 8h. Unable to significantly improve volume status during this time due to requiring 1u FFP and 1u RBC yesterday. Placed back on CRRT around 11pm last night.  -Placed on BiPAP overnight  -Coumadin held for supratherapeutic INR yesterday (6.0). 6.7 today  -Hgb stable  -Additional family members who have been visiting have also tested positive for COVID. Patient's PCR test is pending.    Follow-up For: Procedure(s) (LRB):  REPLACEMENT, AORTIC VALVE, WITH REPEAT STERNOTOMY (N/A)    Post-Operative Day: 5 Days Post-Op    Objective:     Vital Signs (Most Recent):  Temp: 97.8 °F (36.6 °C) (01/10/22 0700)  Pulse: 62 (01/10/22 0730)  Resp: (!) 35 (01/10/22 0730)  BP: (!) 130/50 (01/10/22 0339)  SpO2: (!) 89 % (01/10/22 0730) Vital Signs (24h Range):  Temp:  [97.3 °F (36.3 °C)-98.5 °F (36.9 °C)] 97.8 °F (36.6 °C)  Pulse:  [] 62  Resp:  [18-45] 35  SpO2:  [89 %-100 %] 89 %  BP: (103-130)/(43-50) 130/50  Arterial Line BP: (102-135)/(37-59) 117/43     Weight: 53 kg (116 lb 13.5 oz)  Body mass index is 22.08 kg/m².      Intake/Output Summary (Last 24 hours) at 1/10/2022 0741  Last data filed at 1/10/2022 0705  Gross per 24 hour   Intake 6101.34 ml   Output 6625 ml   Net -523.66 ml       Physical Exam  Constitutional:       General: She is not in acute distress.  HENT:      Head: Normocephalic and atraumatic.      Comments: BiPAP  Neck:      Comments: R IJ Trialysis  Cardiovascular:      Rate and Rhythm: Normal rate and regular rhythm.      Pulses: Normal pulses.      Comments: Midline sternotomy incision c/d/I  Mediastinal Chest tubes in place with serosanguinous output  V wires in place, not connected  Pacemaker in place  Pulmonary:      Effort: Pulmonary effort is normal. No respiratory distress.      Comments: BiPAP  Abdominal:      General: Abdomen is flat. There is no distension.      Palpations: Abdomen is soft.      Tenderness: There is  no abdominal tenderness.   Genitourinary:     Comments: R groin well incision, bandage in place. Thigh soft, no hematoma  Musculoskeletal:      Comments: R radial art line in place   Skin:     General: Skin is warm.      Capillary Refill: Capillary refill takes less than 2 seconds.   Neurological:      Mental Status: She is alert.         Vents:  Vent Mode: A/C (01/05/22 1430)  Ventilator Initiated: Yes (01/05/22 1427)  Set Rate: 18 BPM (01/05/22 1430)  Vt Set: (S) 350 mL (01/05/22 1430)  PEEP/CPAP: 5 cmH20 (01/05/22 1430)  Oxygen Concentration (%): 50 (01/10/22 0700)  Peak Airway Pressure: 33 cmH2O (01/05/22 1430)  Plateau Pressure: 0 cmH20 (01/05/22 1430)  Total Ve: 7.05 mL (01/05/22 1430)  Negative Inspiratory Force (cm H2O): 0 (01/05/22 1430)  F/VT Ratio<105 (RSBI): (!) 50.51 (01/05/22 1430)    Lines/Drains/Airways     Central Venous Catheter Line            Trialysis (Dialysis) Catheter 01/08/22 1027 right internal jugular 1 day          Drain                 Urethral Catheter 01/08/22 0922 Non-latex 1 day          Arterial Line            Arterial Line 01/05/22 0855 Right Radial 4 days          Line                 Pacer Wires 01/05/22 1332 4 days          Peripheral Intravenous Line                 Peripheral IV - Single Lumen 01/05/22 0711 18 G Left Forearm 5 days         Peripheral IV - Single Lumen 01/06/22 1833 20 G Left Antecubital 3 days                Significant Labs:    CBC/Anemia Profile:  Recent Labs   Lab 01/09/22  0322 01/09/22  0612 01/09/22  1434 01/09/22  2035 01/10/22  0322   WBC 14.17*  --  20.06*  --  10.36   HGB 7.4*  --  8.0*  --  7.8*   HCT 23.5*   < > 25.9* 27* 24.1*   *  --  114*  --  100*   MCV 93  --  95  --  91   RDW 16.5*  --  15.9*  --  16.5*    < > = values in this interval not displayed.        Chemistries:  Recent Labs   Lab 01/09/22  0322 01/09/22  0850 01/09/22  2133 01/09/22  2133 01/10/22  0006 01/10/22  0322 01/10/22  0503   *   < > 129*   < > 132* 135* 136    K 4.4   < > 4.1   < > 4.2 4.2 4.2   CL 90*   < > 92*   < > 95 98 99   CO2 24   < > 26   < > 26 24 26   BUN 57*   < > 14   < > 10 6* 5*   CREATININE 2.1*   < > 0.8   < > 0.7 0.6 0.5   CALCIUM 8.1*   < > 7.8*   < > 8.4* 7.9* 8.2*   ALBUMIN  --    < > 3.3*  --  3.2*  --  3.3*   MG 2.2  --   --   --   --  1.8  --    PHOS 5.7*   < > 2.0*   < > 1.8* 3.8 2.8    < > = values in this interval not displayed.       All pertinent labs within the past 24 hours have been reviewed.    Significant Imaging:  I have reviewed all pertinent imaging results/findings within the past 24 hours.

## 2022-01-10 NOTE — PROGRESS NOTES
[facility]  Critical Care - Surgery  Progress Note    Patient Name: Orion Ty  MRN: [id number]  Admission Date: 1/5/2022  Hospital Length of Stay: 5 days  Code Status: Full Code  Attending Provider: Dennis Haider MD  Primary Care Provider: Otis Zimmer MD   Principal Problem: S/P aortic valve replacement    Subjective:     Hospital/ICU Course:  No notes on file    Interval History/Significant Events:   -Ran on CRRT during the day yesterday for 8h. Unable to significantly improve volume status during this time due to requiring 1u FFP and 1u RBC yesterday. Placed back on CRRT around 11pm last night.  -Placed on BiPAP overnight  -Coumadin held for supratherapeutic INR yesterday (6.0). 6.7 today  -Hgb stable  -Additional family members who have been visiting have also tested positive for COVID. Patient's PCR test is pending.    Follow-up For: Procedure(s) (LRB):  REPLACEMENT, AORTIC VALVE, WITH REPEAT STERNOTOMY (N/A)    Post-Operative Day: 5 Days Post-Op    Objective:     Vital Signs (Most Recent):  Temp: 97.8 °F (36.6 °C) (01/10/22 0700)  Pulse: 62 (01/10/22 0730)  Resp: (!) 35 (01/10/22 0730)  BP: (!) 130/50 (01/10/22 0339)  SpO2: (!) 89 % (01/10/22 0730) Vital Signs (24h Range):  Temp:  [97.3 °F (36.3 °C)-98.5 °F (36.9 °C)] 97.8 °F (36.6 °C)  Pulse:  [] 62  Resp:  [18-45] 35  SpO2:  [89 %-100 %] 89 %  BP: (103-130)/(43-50) 130/50  Arterial Line BP: (102-135)/(37-59) 117/43     Weight: 53 kg (116 lb 13.5 oz)  Body mass index is 22.08 kg/m².      Intake/Output Summary (Last 24 hours) at 1/10/2022 0741  Last data filed at 1/10/2022 0705  Gross per 24 hour   Intake 6101.34 ml   Output 6625 ml   Net -523.66 ml       Physical Exam  Constitutional:       General: She is not in acute distress.  HENT:      Head: Normocephalic and atraumatic.      Comments: BiPAP  Neck:      Comments: R IJ Trialysis  Cardiovascular:      Rate and Rhythm: Normal rate and regular rhythm.      Pulses:  Normal pulses.      Comments: Midline sternotomy incision c/d/I  Mediastinal Chest tubes in place with serosanguinous output  V wires in place, not connected  Pacemaker in place  Pulmonary:      Effort: Pulmonary effort is normal. No respiratory distress.      Comments: BiPAP  Abdominal:      General: Abdomen is flat. There is no distension.      Palpations: Abdomen is soft.      Tenderness: There is no abdominal tenderness.   Genitourinary:     Comments: R groin well incision, bandage in place. Thigh soft, no hematoma  Musculoskeletal:      Comments: R radial art line in place   Skin:     General: Skin is warm.      Capillary Refill: Capillary refill takes less than 2 seconds.   Neurological:      Mental Status: She is alert.         Vents:  Vent Mode: A/C (01/05/22 1430)  Ventilator Initiated: Yes (01/05/22 1427)  Set Rate: 18 BPM (01/05/22 1430)  Vt Set: (S) 350 mL (01/05/22 1430)  PEEP/CPAP: 5 cmH20 (01/05/22 1430)  Oxygen Concentration (%): 50 (01/10/22 0700)  Peak Airway Pressure: 33 cmH2O (01/05/22 1430)  Plateau Pressure: 0 cmH20 (01/05/22 1430)  Total Ve: 7.05 mL (01/05/22 1430)  Negative Inspiratory Force (cm H2O): 0 (01/05/22 1430)  F/VT Ratio<105 (RSBI): (!) 50.51 (01/05/22 1430)    Lines/Drains/Airways     Central Venous Catheter Line            Trialysis (Dialysis) Catheter 01/08/22 1027 right internal jugular 1 day          Drain                 Urethral Catheter 01/08/22 0922 Non-latex 1 day          Arterial Line            Arterial Line 01/05/22 0855 Right Radial 4 days          Line                 Pacer Wires 01/05/22 1332 4 days          Peripheral Intravenous Line                 Peripheral IV - Single Lumen 01/05/22 0711 18 G Left Forearm 5 days         Peripheral IV - Single Lumen 01/06/22 1833 20 G Left Antecubital 3 days                Significant Labs:    CBC/Anemia Profile:  Recent Labs   Lab 01/09/22  0322 01/09/22  0612 01/09/22  1434 01/09/22  2035 01/10/22  0322   WBC 14.17*  --   20.06*  --  10.36   HGB 7.4*  --  8.0*  --  7.8*   HCT 23.5*   < > 25.9* 27* 24.1*   *  --  114*  --  100*   MCV 93  --  95  --  91   RDW 16.5*  --  15.9*  --  16.5*    < > = values in this interval not displayed.        Chemistries:  Recent Labs   Lab 01/09/22 0322 01/09/22  0850 01/09/22  2133 01/09/22  2133 01/10/22  0006 01/10/22  0322 01/10/22  0503   *   < > 129*   < > 132* 135* 136   K 4.4   < > 4.1   < > 4.2 4.2 4.2   CL 90*   < > 92*   < > 95 98 99   CO2 24   < > 26   < > 26 24 26   BUN 57*   < > 14   < > 10 6* 5*   CREATININE 2.1*   < > 0.8   < > 0.7 0.6 0.5   CALCIUM 8.1*   < > 7.8*   < > 8.4* 7.9* 8.2*   ALBUMIN  --    < > 3.3*  --  3.2*  --  3.3*   MG 2.2  --   --   --   --  1.8  --    PHOS 5.7*   < > 2.0*   < > 1.8* 3.8 2.8    < > = values in this interval not displayed.       All pertinent labs within the past 24 hours have been reviewed.    Significant Imaging:  I have reviewed all pertinent imaging results/findings within the past 24 hours.    Assessment/Plan:     * S/P aortic valve replacement  Orion Ty is a 76 y.o. female who presents to the SICU s/p redo aortic valve replacement with mechanical valve on 1/5/22.      Neuro/Psych:   -- Sedation: Intermittently on precedex for agitation while on BiPAP  -- Pain: multimodals             Cards:   -- Pressors: off  -- Goal MAP: 60-80  -- oral amio  -- Metop 25 daily  -- Amlodipine 10  -- Holding oral antihypertensives again this morning  -- statin  -- heparin drip, goal PTT 60-80  -- pacemaker in place, interrogated and set to home settings      Pulm:   -- Goal O2 sat > 90%  -- Currently on BiPAP. Wean as able.  -- ABG PRN  -- 2 mediastinal removed 1/9 L Pleural CT removed 1/8      Renal:  -- Mireles in place  -- Oliguric   -- Nephrology following  -- CRRT currently running until 11am. Would like to continue CRRT after this session for volume management given her continued respiratory distress and less than ideal volume removal in  the past 36h.      FEN / GI:   -- Replace lytes as needed  -- Nutrition: cardiac diet 1500 cc when not on BiPAP and mental status improved  -- Bowel reg      ID:   -- WBC 10 from 14   -- Antibiotics: periop ancef compelte      Heme/Onc:   -- Hgb 7.8 from 8.0  -- 3U RBC, 2U FFP, 2U platelets in OR  -- Daily CBC  -- Transfused products: 1U RBC on 1/5/22. 1u RBC and 1u FFP on 1/9/21  -- Anticoagulation: ASA, heparin gtt   -INR 6.7 this morning.   -Holding Coumadin      Endo:   -- Gluc goal 140-180  -- Insulin per endocrinology      PPx:   Feeding: NPO while on BiPAP. cardiac diet otherwise  Analgesia/Sedation: multimodal  Thromboembolic prevention: ASA, heparin drip  HOB >30: yes  Stress Ulcer ppx: none  Glucose control: Critical care goal 140-180 g/dl, ISS    Lines/Drains/Airway: Art line, RIJ Trialysis, Mireles.       Dispo/Code Status/Palliative:   -- SICU / Full Code          Critical care was time spent personally by me on the following activities: development of treatment plan with patient or surrogate and bedside caregivers, discussions with consultants, evaluation of patient's response to treatment, examination of patient, ordering and performing treatments and interventions, ordering and review of laboratory studies, ordering and review of radiographic studies, pulse oximetry, re-evaluation of patient's condition.  This critical care time did not overlap with that of any other provider or involve time for any procedures.     Graham Alfaro MD  Critical Care - Surgery  [facility]

## 2022-01-10 NOTE — PT/OT/SLP PROGRESS
Occupational Therapy      Patient Name:  Orion Ty   MRN:  [id number]     Pt not seen this date due to CRRT with pressor support. Nsg also noted respiratory concerns with potential need to return to BiPAP. OT to check status at later date to continue with therapy services with appropriate.     1/10/2022

## 2022-01-10 NOTE — PROGRESS NOTES
I/O -650 cc since beginning of shift. Tachypenia (RR 30s) and WOB unchanged from previous assessments. Wheezes noted to upper and lower lobes of L lung. ABG done. FiO2 decreased to 50%, IPAP increased to 12, EPAP increased to 6. BiPap removed for oral hygiene. Pt desated to mid 80s approximately 2 minutes without BiPap, mask reapplied. Dr. Alfaro notified, orders to repeat ABG at 5AM.

## 2022-01-10 NOTE — CONSULTS
Single lumen 18G x 8CM midline placed left cephalic vein. Max dwell date 2/8/22, Lot# [id number].  Needle advanced into the vessel under real time ultrasound guidance.  Image recorded and saved.

## 2022-01-10 NOTE — PLAN OF CARE
Patient weaned from BiPAP today, currently on 4L HF NC. ABGs monitored throughout day, most recent CO2 42. Plan is to monitor patient's WOB and ABGs plan is to place patient on BiPAP overnight if needed. Heparin infusion turned off today due to elevated INR of 6.4, 2 Units FFP given. CBC and INR monitored q6. Patient remained on CRRT today for volume removal. CRRT stopped at 1500, plan to restart CRRT overnight for 24 hours. Renal, Mg q8hr, electrolytes replaced PRN. Patient diet changed to clear liquid due to possibility of being placed on BiPAP overnight. Precedex restarted today due to anxiety. 1 BM today. Dressings changed per SICU RN. Plan of care reviewed with patient and family at bedside.

## 2022-01-10 NOTE — PROGRESS NOTES
Critical INR received from lab. PT and aPTT also high. Heparin gtt is @ 500 units/hr. Dr. Alfaro notified, no orders.

## 2022-01-10 NOTE — PROGRESS NOTES
WOB increased, now using abdominal muscles, tachypneic, breath sounds unchanged from previous assessment, SpO2 95% on BiPap 10/5 40% RR 14. Dr. Alfaro notified. No orders.

## 2022-01-10 NOTE — CARE UPDATE
BG goal 140 - 180   Diet Cardiac [facility]; Fluid - 1500mL  5 Days Post-Op    BG stable while on current SQ insulin regimen.  Endo will continue to follow, and manage glycemic control while inpatient.     - Continue Low Dose SQ Insulin Correction Scale PRN hyperglycemia.  - BG Monitoring AC/HS.     ** Please call Endocrine for any BG related issues **  ** Please notify Endocrine for any change and/or advance in diet**     Lab Results   Component Value Date    HGBA1C 6.0 (H) 01/03/2022       Discharge Planning:   TBD. Please notify endocrinology prior to discharge. Will most likely have patient continue previous home regimen of metformin 500 mg daily, and follow-up with PCP as needed for continued DM management and care.

## 2022-01-10 NOTE — PLAN OF CARE
[facility]  Discharge Reassessment    Primary Care Provider: Otis Zimmer MD    Expected Discharge Date: 1/11/2022    Reassessment (most recent)     Discharge Reassessment - 01/10/22 1152        Discharge Reassessment    Assessment Type Discharge Planning Reassessment     Discharge Plan A Home Health     Discharge Plan B Home with family     DME Needed Upon Discharge  other (see comments)   TBD    Discharge Barriers Identified None     Why the patient remains in the hospital Requires continued medical care        Post-Acute Status    Post-Acute Authorization Home Health     Home Health Status Pending medical clearance/testing               Per MD's Note, -Ran on CRRT during the day yesterday for 8h. Unable to significantly improve volume status during this time due to requiring 1u FFP and 1u RBC yesterday. Placed back on CRRT around 11pm last night.  -Placed on BiPAP overnight  -Coumadin held for supratherapeutic INR yesterday (6.0). 6.7 today  -Hgb stable  -Additional family members who have been visiting have also tested positive for COVID. Patient's PCR test is pending.    The patient remains in the SICU. The SW will continue to follow-up with the patient to assist with discharge needs.       Shen Simmons LMSW  Case Management [facility]  Ext: [phone]

## 2022-01-10 NOTE — ASSESSMENT & PLAN NOTE
-oliguric ischemic ATN likely secondary to severe intraoperative hypotension requiring epi, norepi, and vaso intraoperatively 1/5/22  -BL sCr 0.8  -creatinine worsened post op delayed likely due to hemodilution  -failed high dose duretic therapy and KRT started 1/9/22 due to volume overload  -still on vaso and epi  -on bipap with significant clinical improvement despite only 300cc negative fluid balance overnight  -ABG benign with no severe electrolyte abnormalities  -plan to give break on dialysis today after blood products and restart SLED tonight, goal 0.5-1L net negative fluid balance at most

## 2022-01-10 NOTE — PROGRESS NOTES
Dr. Alfaro updated on most recent ABG on BiPAP FIO2 35%. Received order to place patient on Nasal Cannula. Repeat ABG in 1 hour.

## 2022-01-10 NOTE — PROGRESS NOTES
Dr. Alfaro notified of critical INR. Received order to repeat INR at 1200 and monitored q6hr. Will continue to monitor.

## 2022-01-11 NOTE — SUBJECTIVE & OBJECTIVE
Interval History/Significant Events:   - Tolerated BIPAP overnight. Not tachypneic with increased precedex  - INR 3.1 today  - SLED overnight  - required pressors, 0.06 epi and 0.04 vaso  - Hgb stable  - Patient's PCR test is pending    Follow-up For: Procedure(s) (LRB):  REPLACEMENT, AORTIC VALVE, WITH REPEAT STERNOTOMY (N/A)    Post-Operative Day: 5 Days Post-Op    Objective:     Vital Signs (Most Recent):  Temp: 97.6 °F (36.4 °C) (01/11/22 0300)  Pulse: (!) 50 (01/11/22 0645)  Resp: 20 (01/11/22 0645)  BP: (!) 110/48 (01/11/22 0145)  SpO2: 100 % (01/11/22 0645) Vital Signs (24h Range):  Temp:  [96.6 °F (35.9 °C)-98.5 °F (36.9 °C)] 97.6 °F (36.4 °C)  Pulse:  [] 50  Resp:  [18-59] 20  SpO2:  [92 %-100 %] 100 %  BP: (100-119)/(46-58) 110/48  Arterial Line BP: (101-149)/(38-66) 113/39     Weight: 53 kg (116 lb 13.5 oz)  Body mass index is 22.08 kg/m².      Intake/Output Summary (Last 24 hours) at 1/11/2022 0845  Last data filed at 1/11/2022 0600  Gross per 24 hour   Intake 3697.21 ml   Output 4649 ml   Net -951.79 ml       Physical Exam  Constitutional:       General: She is not in acute distress.  HENT:      Head: Normocephalic and atraumatic.      Comments: BiPAP  Neck:      Comments: R IJ Trialysis  Cardiovascular:      Rate and Rhythm: Normal rate and regular rhythm.      Pulses: Normal pulses.      Comments: Midline sternotomy incision c/d/I  Mediastinal Chest tubes in place with serosanguinous output  V wires in place, not connected  Pacemaker in place  Pulmonary:      Effort: Pulmonary effort is normal. No respiratory distress.      Comments: BiPAP  Abdominal:      General: Abdomen is flat. There is no distension.      Palpations: Abdomen is soft.      Tenderness: There is no abdominal tenderness.   Genitourinary:     Comments: R groin well incision, bandage in place. Thigh soft, no hematoma  Musculoskeletal:      Comments: R radial art line in place   Skin:     General: Skin is warm.      Capillary  Refill: Capillary refill takes less than 2 seconds.   Neurological:      Mental Status: She is alert.         Vents:  Vent Mode: A/C (01/05/22 1430)  Ventilator Initiated: Yes (01/05/22 1427)  Set Rate: 18 BPM (01/05/22 1430)  Vt Set: (S) 350 mL (01/05/22 1430)  PEEP/CPAP: 5 cmH20 (01/05/22 1430)  Oxygen Concentration (%): 60 (01/11/22 0600)  Peak Airway Pressure: 33 cmH2O (01/05/22 1430)  Plateau Pressure: 0 cmH20 (01/05/22 1430)  Total Ve: 7.05 mL (01/05/22 1430)  Negative Inspiratory Force (cm H2O): 0 (01/05/22 1430)  F/VT Ratio<105 (RSBI): (!) 50.51 (01/05/22 1430)    Lines/Drains/Airways     Central Venous Catheter Line            Trialysis (Dialysis) Catheter 01/08/22 1027 right internal jugular 2 days          Drain                 Urethral Catheter 01/08/22 0922 Non-latex 2 days          Arterial Line            Arterial Line 01/05/22 0855 Right Radial 5 days          Peripheral Intravenous Line                 Peripheral IV - Single Lumen 01/06/22 1833 20 G Left Antecubital 4 days         Midline Catheter Insertion/Assessment  - Single Lumen 01/10/22 1300 Left cephalic vein (lateral side of arm) 18g x 8cm <1 day                Significant Labs:    CBC/Anemia Profile:  Recent Labs   Lab 01/10/22  2308 01/10/22  2310 01/11/22  0205 01/11/22  0316 01/11/22  0615   WBC  --  9.85  --  11.52 11.03   HGB  --  7.0*  --  7.2* 7.3*   HCT   < > 22.9* 22* 23.1* 23.9*   PLT  --  89*  --  91* 97*   MCV  --  96  --  96 98   RDW  --  17.8*  --  18.4* 18.6*    < > = values in this interval not displayed.        Chemistries:  Recent Labs   Lab 01/10/22  0006 01/10/22  0006 01/10/22  0322 01/10/22  0322 01/10/22  0503 01/10/22  0759 01/10/22  1409 01/10/22  1410 01/10/22  2034 01/11/22  0316   *   < > 135*   < > 136  --  139  --   --  138   K 4.2   < > 4.2   < > 4.2   < > 4.2  --  4.0 4.3   CL 95   < > 98   < > 99  --  99  --   --  102   CO2 26   < > 24   < > 26  --  28  --   --  25   BUN 10   < > 6*   < > 5*  --  4*   --   --  6*   CREATININE 0.7   < > 0.6   < > 0.5  --  0.5  --   --  0.6   CALCIUM 8.4*   < > 7.9*   < > 8.2*  --  8.3*  --   --  8.3*   ALBUMIN 3.2*  --   --   --  3.3*  --  3.5  --   --   --    MG  --   --  1.8  --   --   --   --  1.9  --  2.4   PHOS 1.8*   < > 3.8   < > 2.8  --  1.4*  --   --  3.0    < > = values in this interval not displayed.       All pertinent labs within the past 24 hours have been reviewed.    Significant Imaging:  I have reviewed all pertinent imaging results/findings within the past 24 hours.

## 2022-01-11 NOTE — CONSULTS
"[facility] - [facility]  Adult Nutrition  Progress Note    SUMMARY       Recommendations    1. ADAT to Cardiac     2. Add Optisource TID    3. Add MVI, vitamin C, and Zinc to aid in skin preservation    4. If TF warranted, rec Impact Peptide 1.5 advancing as tolerated until goal of 35 mL/hr providing pt with 1260 kcal, 79 g protein, and 647 mL free water    Goals: Pt to meet >/= 75% EEN/EPN by f/u date  Nutrition Goal Status: goal not met  Communication of RD Recs:  (POC)    Assessment and Plan    Nutrition Problem  Increased nutrient needs      Related to (etiology):   Physiological demands     Signs and Symptoms (as evidenced by):   S/p AVR      Interventions (treatment strategy):  Collaboration with other providers  Fat, Na modified diet  Commercial beverage      Nutrition Diagnosis Status:   Continues    Reason for Assessment    Reason For Assessment: consult  Diagnosis:  (S/P aortic valve replacement)  Relevant Medical History: CAD, DM, HTN, pacemaker    General Information Comments: S/P aortic valve replacement. Tolerating clears. SLED tx overnight. Also receiving BiPAP tx. Wt stable PTA. Unable to assess PTA PO intake and nutrition hx due to on BiPAP at time of visit. Per visual inspection, age related wasting noted. Will continue to monitor intake and wt changes.    Nutrition Discharge Planning: Cardiac/Diabetic diet    Nutrition Risk Screen    Nutrition Risk Screen: no indicators present    Nutrition/Diet History    Patient Reported Diet/Restrictions/Preferences: low salt  Spiritual, Cultural Beliefs, Mandaeism Practices, Values that Affect Care: no  Food Allergies: NKFA  Factors Affecting Nutritional Intake: clear liquid diet    Anthropometrics    Temp: 97.5 °F (36.4 °C)  Height: 5' 1" (154.9 cm)  Height (inches): 61 in  Weight Method: Bed Scale  Weight: 53 kg (116 lb 13.5 oz)  Weight (lb): 116.84 lb  Ideal Body Weight (IBW), Female: 105 lb  % Ideal Body Weight, Female (lb): 92.38 %  BMI " (Calculated): 22.1  BMI Grade: 18.5-24.9 - normal       Lab/Procedures/Meds    Pertinent Labs Reviewed: reviewed  Pertinent Labs Comments: BUN 6, glu 146  Pertinent Medications Reviewed: reviewed  Pertinent Medications Comments: statin, gabapentin, warfarin, NaCl, precedex, epi, vasopressin    Estimated/Assessed Needs    Weight Used For Calorie Calculations: 50.4 kg (111 lb 1.8 oz)  Energy Calorie Requirements (kcal): 1158 kcal/day  Energy Need Method: [place]-St Jeor (PAL 1.25)  Protein Requirements: 61-76 g/day (1.2-1.5 g/kg)  Weight Used For Protein Calculations: 50.4 kg (111 lb 1.8 oz)  Fluid Requirements (mL): 1 mL/kcal or per MD  Estimated Fluid Requirement Method: RDA Method  RDA Method (mL): 1158  CHO Requirement: 150 gm      Nutrition Prescription Ordered    Current Diet Order: clear liquid  Nutrition Order Comments: 1500 mL FR    Evaluation of Received Nutrient/Fluid Intake    I/O: +4.3L since admit  Energy Calories Required: not meeting needs  Protein Required: not meeting needs  Fluid Required: not meeting needs  Comments: LBM: 1/10  Tolerance: tolerating  % Intake of Estimated Energy Needs: 0 - 25 %  % Meal Intake: 0 - 25 %    Nutrition Risk    Level of Risk/Frequency of Follow-up: low     Monitor and Evaluation    Food and Nutrient Intake: energy intake  Food and Nutrient Adminstration: diet order  Anthropometric Measurements: weight  Biochemical Data, Medical Tests and Procedures: electrolyte and renal panel,gastrointestinal profile,glucose/endocrine profile,inflammatory profile,lipid profile  Nutrition-Focused Physical Findings: overall appearance     Nutrition Follow-Up    RD Follow-up?: Yes

## 2022-01-11 NOTE — SUBJECTIVE & OBJECTIVE
Interval History: net negative 2 L fluid balance by examination time this morning, urine output decreased, breathing less labored    Review of patient's allergies indicates:  No Known Allergies  Current Facility-Administered Medications   Medication Frequency    0.9%  NaCl infusion (CRRT USE ONLY) Continuous    0.9%  NaCl infusion (for blood administration) Q24H PRN    0.9%  NaCl infusion Continuous    acetaminophen tablet 1,000 mg Q8H    albumin human 5% bottle 12.5 g Once PRN    albuterol-ipratropium 2.5 mg-0.5 mg/3 mL nebulizer solution 3 mL Q6H WAKE    amiodarone tablet 400 mg BID    aspirin suppository 300 mg Once PRN    atorvastatin tablet 40 mg Daily    balsam peru-castor oiL Oint BID    bisacodyL suppository 10 mg Daily PRN    dexmedetomidine (PRECEDEX) 400mcg/100mL 0.9% NaCL infusion Continuous    dextrose 50% injection 12.5 g PRN    dextrose 50% injection 25 g PRN    EPINEPHrine (ADRENALIN) 5 mg in dextrose 5 % 250 mL infusion Continuous    gabapentin capsule 300 mg BID    glucagon (human recombinant) injection 1 mg PRN    glucose chewable tablet 16 g PRN    glucose chewable tablet 24 g PRN    HYDROmorphone injection 0.2 mg Once    HYDROmorphone injection 0.5 mg Q4H PRN    insulin aspart U-100 pen 0-5 Units QID (AC + HS) PRN    LIDOcaine 5 % patch 1 patch Q24H    magnesium sulfate 2g in water 50mL IVPB (premix) PRN    melatonin tablet 9 mg Nightly PRN    metoclopramide HCl injection 5 mg Q6H PRN    ondansetron injection 4 mg Q12H PRN    oxyCODONE immediate release tablet 10 mg Q4H PRN    oxyCODONE immediate release tablet 5 mg Q4H PRN    polyethylene glycol packet 17 g Daily    sodium chloride 0.9% bolus 250 mL PRN    sodium chloride 0.9% flush 10 mL PRN    vasopressin (PITRESSIN) 0.2 Units/mL in dextrose 5 % 100 mL infusion Continuous    [START ON 1/12/2022] warfarin (COUMADIN) tablet 2 mg Daily     Facility-Administered Medications Ordered in Other Encounters    Medication Frequency    0.9%  NaCl infusion Continuous       Objective:     Vital Signs (Most Recent):  Temp: 97.5 °F (36.4 °C) (01/11/22 1000)  Pulse: 67 (01/11/22 1700)  Resp: (!) 49 (01/11/22 1700)  BP: (!) 110/48 (01/11/22 0145)  SpO2: (!) 92 % (01/11/22 1700)  O2 Device (Oxygen Therapy): High Flow nasal Cannula (01/11/22 1306) Vital Signs (24h Range):  Temp:  [97.5 °F (36.4 °C)-98.5 °F (36.9 °C)] 97.5 °F (36.4 °C)  Pulse:  [50-97] 67  Resp:  [18-59] 49  SpO2:  [91 %-100 %] 92 %  BP: (100-110)/(46-48) 110/48  Arterial Line BP: (101-149)/(33-56) 121/42     Weight: 53 kg (116 lb 13.5 oz) (01/09/22 0402)  Body mass index is 22.08 kg/m².  Body surface area is 1.51 meters squared.    I/O last 3 completed shifts:  In: 6498.3 [P.O.:200; I.V.:5146.1; Blood:449; IV Piggyback:703.2]  Out: 8578 [Urine:115; Other:8463]    Physical Exam  Constitutional:       General: She is not in acute distress.     Appearance: She is ill-appearing.   HENT:      Mouth/Throat:      Mouth: Mucous membranes are moist.   Eyes:      General: No scleral icterus.     Extraocular Movements: Extraocular movements intact.      Pupils: Pupils are equal, round, and reactive to light.   Cardiovascular:      Rate and Rhythm: Normal rate and regular rhythm.      Comments: JVD  Pulmonary:      Comments: High flow  Abdominal:      General: There is no distension.      Palpations: Abdomen is soft.   Musculoskeletal:         General: No deformity.      Right lower leg: No edema.      Left lower leg: No edema.   Skin:     Coloration: Skin is not jaundiced.   Neurological:      General: No focal deficit present.      Mental Status: She is alert.         Significant Labs:  All labs within the past 24 hours have been reviewed.     Significant Imaging:  Labs: Reviewed

## 2022-01-11 NOTE — PT/OT/SLP PROGRESS
Physical Therapy      Patient Name:  Orion Ty   MRN:  [id number]    Patient not seen today secondary to  (pt on hold due to being on CRRT, pressors, just coming off of BiPap and being swabbed to r/o Covid). Will follow-up at a later date    1/11/2022  .

## 2022-01-11 NOTE — PHYSICIAN QUERY
PT Name: Orion Ty  MR #: [id number]    DOCUMENTATION CLARIFICATION      CDS/: Ayah Mason RN, CCDS             Contact information: [email or web address]    This form is a permanent document in the medical record.      Query Date: January 11, 2022    By submitting this query, we are merely seeking further clarification of documentation. Please utilize your independent clinical judgment when addressing the question(s) below.    The Medical Record contains the following:   Indicators  Supporting Clinical Findings Location in Medical Record    Anemia documented     X H&H 10.8/34.9-->7.0/21.7-->9.2/28.1-->9.0/28.3-->8.1/25.6  7.4/23.5-->7.0/22.9-->7.2/23.1 1/3-1/11 lab    BP                    HR      GI bleeding documented      Acute bleeding (Non GI site)     X Transfusion(s) 3U RBC, 2U FFP, 2 platelets, and 800 cell saver intraop  Transfused products: 1U RBC on 1/5/22 1/5 cc h/p    1/6 cc note   X Acute/Chronic illness Prosthetic aortic valve dysfunction  Redo aortic valve replacement , Redo sternotomy 1/5 op note   X Treatments Daily CBC 1/5 cc h/p    Other       Provider, please specify diagnosis or diagnoses associated with above clinical findings.   [ X  ] Acute blood loss anemia expected post-operatively    [   ] Other Hematological Diagnosis (please specify): _________________   [   ] Clinically Undetermined       Please document in your progress notes daily for the duration of treatment, until resolved, and include in your discharge summary.    Form No. [id number]

## 2022-01-11 NOTE — ASSESSMENT & PLAN NOTE
-oliguric ischemic ATN likely secondary to severe intraoperative hypotension requiring epi, norepi, and vaso intraoperatively 1/5/22  -BL sCr 0.8  -creatinine worsened post op delayed likely due to hemodilution  -failed high dose duretic therapy and KRT started 1/9/22 due to volume overload  -still on vaso and epi  -on high flow with net negative 2L fluid balance  -ABG benign with no severe electrolyte abnormalities  -plan to give break on dialysis today after blood products and restart SLED tonight, goal 0.5-1L net negative fluid balance this evening  -12h nocturnal SLED

## 2022-01-11 NOTE — CARE UPDATE
Dr. Garcia and Dr. Alfaro at the bedside and aware of concerns of patient's workload of breathing.  Refer to flow sheet for vital signs, ggts, and assessments.  Will continue to monitor.

## 2022-01-11 NOTE — ASSESSMENT & PLAN NOTE
Orion Ty is a 76 y.o. female who presents to the SICU s/p redo aortic valve replacement with mechanical valve on 1/5/22.      Neuro/Psych:   -- Sedation: Intermittently on precedex for agitation while on BiPAP  -- Pain: multimodals             Cards:   -- Pressors: vaso 0.04, epi 0.06  -- Goal MAP: 60-80  -- oral amio  -- Metop 25 daily  -- Amlodipine 10  -- Holding oral antihypertensives this morning  -- statin  -- heparin drip, goal PTT 60-80  -- pacemaker in place, interrogated and set to home settings      Pulm:   -- Goal O2 sat > 90%  -- Currently on BiPAP. Wean as able.  -- ABG PRN  -- 2 mediastinal removed 1/9 L Pleural CT removed 1/8      Renal:  -- Tejeda in place  -- Oliguric   -- Nephrology following  -- CRRT. Would like to continue CRRT for volume management given her continued respiratory distress and suspected fluid overload      FEN / GI:   -- Replace lytes as needed  -- Nutrition: dysphagia diet 1500 cc when not on BiPAP and mental status improved  -- Bowel reg  -- dc tejeda      ID:   -- WBC 10 from 14   -- Antibiotics: periop ancef compelte      Heme/Onc:   -- Hgb 7.3  -- 3U RBC, 2U FFP, 2U platelets in OR  -- Daily CBC  -- Transfused products: 1U RBC on 1/5/22. 1u RBC and 1u FFP on 1/9/21  -- Anticoagulation: ASA, heparin gtt   -INR 3.1 this morning.   -Coumadin 2      Endo:   -- Gluc goal 140-180  -- Insulin per endocrinology      PPx:   Feeding: NPO while on BiPAP. Dysphagia diet otherwise  Analgesia/Sedation: multimodal  Thromboembolic prevention: ASA, heparin drip held while INR supratherapeutic  HOB >30: yes  Stress Ulcer ppx: none  Glucose control: Critical care goal 140-180 g/dl, ISS    Lines/Drains/Airway: Art line, RIJ Trialysis, Tejeda.       Dispo/Code Status/Palliative:   -- SICU / Full Code

## 2022-01-11 NOTE — PROGRESS NOTES
[facility]  Nephrology  Progress Note    Patient Name: Orion Ty  MRN: [id number]  Admission Date: 1/5/2022  Hospital Length of Stay: 6 days  Attending Provider: Dennis Haider MD   Primary Care Physician: Otis Zimmer MD  Principal Problem:S/P aortic valve replacement    Subjective:     HPI: Orion Ty is our 76 y.o. female with previous aortic valve replacement, mitral valve replacement, and tricuspid valve repair in 2010 who presented on 1/5 for redo aortic valve replacement. Nephrology consulted on 1/8 for anuric JO. Patient is alert but tired, son at bedside. Stated she was tired prior to admission. During the hospital stay Cr increase from 1 to 1.5. In OR she received  2.5L crystalloid, 3U RBC, 2U FFP, 2 platelets, and 800 cell saver. she is +5 L since admit. UOP decrease overnight, per nurse she didn't urinate at all. She received diuril 500 mg twice, lasix 20 mg *2 on 1/7. This morning she urinate 225 immediately after placing the tejeda's cath. She received again lasix 100 mg this morning. Upor evaluation she was alert but repeatedly saying she was tired, denied any pain. Denied any previous hx of kidney disease.       Interval History: net negative 2 L fluid balance by examination time this morning, urine output decreased, breathing less labored    Review of patient's allergies indicates:  No Known Allergies  Current Facility-Administered Medications   Medication Frequency    0.9%  NaCl infusion (CRRT USE ONLY) Continuous    0.9%  NaCl infusion (for blood administration) Q24H PRN    0.9%  NaCl infusion Continuous    acetaminophen tablet 1,000 mg Q8H    albumin human 5% bottle 12.5 g Once PRN    albuterol-ipratropium 2.5 mg-0.5 mg/3 mL nebulizer solution 3 mL Q6H WAKE    amiodarone tablet 400 mg BID    aspirin suppository 300 mg Once PRN    atorvastatin tablet 40 mg Daily    balsam peru-castor oiL Oint BID    bisacodyL suppository 10 mg Daily PRN     dexmedetomidine (PRECEDEX) 400mcg/100mL 0.9% NaCL infusion Continuous    dextrose 50% injection 12.5 g PRN    dextrose 50% injection 25 g PRN    EPINEPHrine (ADRENALIN) 5 mg in dextrose 5 % 250 mL infusion Continuous    gabapentin capsule 300 mg BID    glucagon (human recombinant) injection 1 mg PRN    glucose chewable tablet 16 g PRN    glucose chewable tablet 24 g PRN    HYDROmorphone injection 0.2 mg Once    HYDROmorphone injection 0.5 mg Q4H PRN    insulin aspart U-100 pen 0-5 Units QID (AC + HS) PRN    LIDOcaine 5 % patch 1 patch Q24H    magnesium sulfate 2g in water 50mL IVPB (premix) PRN    melatonin tablet 9 mg Nightly PRN    metoclopramide HCl injection 5 mg Q6H PRN    ondansetron injection 4 mg Q12H PRN    oxyCODONE immediate release tablet 10 mg Q4H PRN    oxyCODONE immediate release tablet 5 mg Q4H PRN    polyethylene glycol packet 17 g Daily    sodium chloride 0.9% bolus 250 mL PRN    sodium chloride 0.9% flush 10 mL PRN    vasopressin (PITRESSIN) 0.2 Units/mL in dextrose 5 % 100 mL infusion Continuous    [START ON 1/12/2022] warfarin (COUMADIN) tablet 2 mg Daily     Facility-Administered Medications Ordered in Other Encounters   Medication Frequency    0.9%  NaCl infusion Continuous       Objective:     Vital Signs (Most Recent):  Temp: 97.5 °F (36.4 °C) (01/11/22 1000)  Pulse: 67 (01/11/22 1700)  Resp: (!) 49 (01/11/22 1700)  BP: (!) 110/48 (01/11/22 0145)  SpO2: (!) 92 % (01/11/22 1700)  O2 Device (Oxygen Therapy): High Flow nasal Cannula (01/11/22 1306) Vital Signs (24h Range):  Temp:  [97.5 °F (36.4 °C)-98.5 °F (36.9 °C)] 97.5 °F (36.4 °C)  Pulse:  [50-97] 67  Resp:  [18-59] 49  SpO2:  [91 %-100 %] 92 %  BP: (100-110)/(46-48) 110/48  Arterial Line BP: (101-149)/(33-56) 121/42     Weight: 53 kg (116 lb 13.5 oz) (01/09/22 0402)  Body mass index is 22.08 kg/m².  Body surface area is 1.51 meters squared.    I/O last 3 completed shifts:  In: 6498.3 [P.O.:200; I.V.:5146.1;  Blood:449; IV Piggyback:703.2]  Out: 8578 [Urine:115; Other:8463]    Physical Exam  Constitutional:       General: She is not in acute distress.     Appearance: She is ill-appearing.   HENT:      Mouth/Throat:      Mouth: Mucous membranes are moist.   Eyes:      General: No scleral icterus.     Extraocular Movements: Extraocular movements intact.      Pupils: Pupils are equal, round, and reactive to light.   Cardiovascular:      Rate and Rhythm: Normal rate and regular rhythm.      Comments: JVD  Pulmonary:      Comments: High flow  Abdominal:      General: There is no distension.      Palpations: Abdomen is soft.   Musculoskeletal:         General: No deformity.      Right lower leg: No edema.      Left lower leg: No edema.   Skin:     Coloration: Skin is not jaundiced.   Neurological:      General: No focal deficit present.      Mental Status: She is alert.         Significant Labs:  All labs within the past 24 hours have been reviewed.     Significant Imaging:  Labs: Reviewed    Assessment/Plan:     * S/P aortic valve replacement  -with severe intraoperative hypotension   -per primary    JO (acute kidney injury)  -oliguric ischemic ATN likely secondary to severe intraoperative hypotension requiring epi, norepi, and vaso intraoperatively 1/5/22  -BL sCr 0.8  -creatinine worsened post op delayed likely due to hemodilution  -failed high dose duretic therapy and KRT started 1/9/22 due to volume overload  -still on vaso and epi  -on high flow with net negative 2L fluid balance  -ABG benign with no severe electrolyte abnormalities  -plan to give break on dialysis today after blood products and restart SLED tonight, goal 0.5-1L net negative fluid balance this evening  -12h nocturnal SLED    ATN (acute tubular necrosis)  -see jo    Volume overload  -improved with KRT started 1/9/22        Oj Clifford MD  Nephrology  [facility]

## 2022-01-11 NOTE — PT/OT/SLP PROGRESS
Occupational Therapy      Patient Name:  Orion Ty   MRN:  [id number]    Pt not appropriate for therapy services. Pt on CRRT with pressor support. Pt with tenuous respiratory status with COVID swab pending.   OT to check status at later date for continued therapy services when appropriate.   1/11/2022

## 2022-01-11 NOTE — PROGRESS NOTES
[facility]  Critical Care - Surgery  Progress Note    Patient Name: Orion Ty  MRN: [id number]  Admission Date: 1/5/2022  Hospital Length of Stay: 6 days  Code Status: Full Code  Attending Provider: Dennis Haider MD  Primary Care Provider: Otis Zimmer MD   Principal Problem: S/P aortic valve replacement    Subjective:     Hospital/ICU Course:  No notes on file    Interval History/Significant Events:   - Tolerated BIPAP overnight. Not tachypneic with increased precedex  - INR 3.1 today  - SLED overnight  - required pressors, 0.06 epi and 0.04 vaso  - Hgb stable  - Patient's PCR test is pending    Follow-up For: Procedure(s) (LRB):  REPLACEMENT, AORTIC VALVE, WITH REPEAT STERNOTOMY (N/A)    Post-Operative Day: 5 Days Post-Op    Objective:     Vital Signs (Most Recent):  Temp: 97.6 °F (36.4 °C) (01/11/22 0300)  Pulse: (!) 50 (01/11/22 0645)  Resp: 20 (01/11/22 0645)  BP: (!) 110/48 (01/11/22 0145)  SpO2: 100 % (01/11/22 0645) Vital Signs (24h Range):  Temp:  [96.6 °F (35.9 °C)-98.5 °F (36.9 °C)] 97.6 °F (36.4 °C)  Pulse:  [] 50  Resp:  [18-59] 20  SpO2:  [92 %-100 %] 100 %  BP: (100-119)/(46-58) 110/48  Arterial Line BP: (101-149)/(38-66) 113/39     Weight: 53 kg (116 lb 13.5 oz)  Body mass index is 22.08 kg/m².      Intake/Output Summary (Last 24 hours) at 1/11/2022 0845  Last data filed at 1/11/2022 0600  Gross per 24 hour   Intake 3697.21 ml   Output 4649 ml   Net -951.79 ml       Physical Exam  Constitutional:       General: She is not in acute distress.  HENT:      Head: Normocephalic and atraumatic.      Comments: BiPAP  Neck:      Comments: R IJ Trialysis  Cardiovascular:      Rate and Rhythm: Normal rate and regular rhythm.      Pulses: Normal pulses.      Comments: Midline sternotomy incision c/d/I  Mediastinal Chest tubes in place with serosanguinous output  V wires in place, not connected  Pacemaker in place  Pulmonary:      Effort: Pulmonary effort is normal. No  respiratory distress.      Comments: BiPAP  Abdominal:      General: Abdomen is flat. There is no distension.      Palpations: Abdomen is soft.      Tenderness: There is no abdominal tenderness.   Genitourinary:     Comments: R groin well incision, bandage in place. Thigh soft, no hematoma  Musculoskeletal:      Comments: R radial art line in place   Skin:     General: Skin is warm.      Capillary Refill: Capillary refill takes less than 2 seconds.   Neurological:      Mental Status: She is alert.         Vents:  Vent Mode: A/C (01/05/22 1430)  Ventilator Initiated: Yes (01/05/22 1427)  Set Rate: 18 BPM (01/05/22 1430)  Vt Set: (S) 350 mL (01/05/22 1430)  PEEP/CPAP: 5 cmH20 (01/05/22 1430)  Oxygen Concentration (%): 60 (01/11/22 0600)  Peak Airway Pressure: 33 cmH2O (01/05/22 1430)  Plateau Pressure: 0 cmH20 (01/05/22 1430)  Total Ve: 7.05 mL (01/05/22 1430)  Negative Inspiratory Force (cm H2O): 0 (01/05/22 1430)  F/VT Ratio<105 (RSBI): (!) 50.51 (01/05/22 1430)    Lines/Drains/Airways     Central Venous Catheter Line            Trialysis (Dialysis) Catheter 01/08/22 1027 right internal jugular 2 days          Drain                 Urethral Catheter 01/08/22 0922 Non-latex 2 days          Arterial Line            Arterial Line 01/05/22 0855 Right Radial 5 days          Peripheral Intravenous Line                 Peripheral IV - Single Lumen 01/06/22 1833 20 G Left Antecubital 4 days         Midline Catheter Insertion/Assessment  - Single Lumen 01/10/22 1300 Left cephalic vein (lateral side of arm) 18g x 8cm <1 day                Significant Labs:    CBC/Anemia Profile:  Recent Labs   Lab 01/10/22  2308 01/10/22  2310 01/11/22  0205 01/11/22  0316 01/11/22  0615   WBC  --  9.85  --  11.52 11.03   HGB  --  7.0*  --  7.2* 7.3*   HCT   < > 22.9* 22* 23.1* 23.9*   PLT  --  89*  --  91* 97*   MCV  --  96  --  96 98   RDW  --  17.8*  --  18.4* 18.6*    < > = values in this interval not displayed.         Chemistries:  Recent Labs   Lab 01/10/22  0006 01/10/22  0006 01/10/22  0322 01/10/22  0322 01/10/22  0503 01/10/22  0759 01/10/22  1409 01/10/22  1410 01/10/22  2034 01/11/22  0316   *   < > 135*   < > 136  --  139  --   --  138   K 4.2   < > 4.2   < > 4.2   < > 4.2  --  4.0 4.3   CL 95   < > 98   < > 99  --  99  --   --  102   CO2 26   < > 24   < > 26  --  28  --   --  25   BUN 10   < > 6*   < > 5*  --  4*  --   --  6*   CREATININE 0.7   < > 0.6   < > 0.5  --  0.5  --   --  0.6   CALCIUM 8.4*   < > 7.9*   < > 8.2*  --  8.3*  --   --  8.3*   ALBUMIN 3.2*  --   --   --  3.3*  --  3.5  --   --   --    MG  --   --  1.8  --   --   --   --  1.9  --  2.4   PHOS 1.8*   < > 3.8   < > 2.8  --  1.4*  --   --  3.0    < > = values in this interval not displayed.       All pertinent labs within the past 24 hours have been reviewed.    Significant Imaging:  I have reviewed all pertinent imaging results/findings within the past 24 hours.    Assessment/Plan:     * S/P aortic valve replacement  Orion Ty is a 76 y.o. female who presents to the SICU s/p redo aortic valve replacement with mechanical valve on 1/5/22.      Neuro/Psych:   -- Sedation: Intermittently on precedex for agitation while on BiPAP  -- Pain: multimodals             Cards:   -- Pressors: vaso 0.04, epi 0.06  -- Goal MAP: 60-80  -- oral amio  -- Metop 25 daily  -- Amlodipine 10  -- Holding oral antihypertensives this morning  -- statin  -- heparin drip, goal PTT 60-80  -- pacemaker in place, interrogated and set to home settings      Pulm:   -- Goal O2 sat > 90%  -- Currently on BiPAP. Wean as able.  -- ABG PRN  -- 2 mediastinal removed 1/9 L Pleural CT removed 1/8      Renal:  -- Mireles in place  -- Oliguric   -- Nephrology following  -- CRRT. Would like to continue CRRT for volume management given her continued respiratory distress and suspected fluid overload      FEN / GI:   -- Replace lytes as needed  -- Nutrition: dysphagia diet 1500 cc  when not on BiPAP and mental status improved  -- Bowel reg  -- dc tejeda      ID:   -- WBC 10 from 14   -- Antibiotics: periop ancef compelte      Heme/Onc:   -- Hgb 7.3  -- 3U RBC, 2U FFP, 2U platelets in OR  -- Daily CBC  -- Transfused products: 1U RBC on 1/5/22. 1u RBC and 1u FFP on 1/9/21  -- Anticoagulation: ASA, heparin gtt   -INR 3.1 this morning.   -Coumadin 2      Endo:   -- Gluc goal 140-180  -- Insulin per endocrinology      PPx:   Feeding: NPO while on BiPAP. Dysphagia diet otherwise  Analgesia/Sedation: multimodal  Thromboembolic prevention: ASA, heparin drip held while INR supratherapeutic  HOB >30: yes  Stress Ulcer ppx: none  Glucose control: Critical care goal 140-180 g/dl, ISS    Lines/Drains/Airway: Art line, RIJ Trialysis, Tejeda.       Dispo/Code Status/Palliative:   -- SICU / Full Code               Critical care was time spent personally by me on the following activities: development of treatment plan with patient or surrogate and bedside caregivers, discussions with consultants, evaluation of patient's response to treatment, examination of patient, ordering and performing treatments and interventions, ordering and review of laboratory studies, ordering and review of radiographic studies, pulse oximetry, re-evaluation of patient's condition.  This critical care time did not overlap with that of any other provider or involve time for any procedures.     Caleb Garcia MD  Critical Care - Surgery  [facility] - Surgical Intensive Care

## 2022-01-12 NOTE — PT/OT/SLP EVAL
Speech Language Pathology Evaluation  Bedside Swallow    Patient Name:  Orion Ty   MRN:  [id number]  Admitting Diagnosis: S/P aortic valve replacement    Recommendations:                 General Recommendations:  Ongoing swallow assessment  Diet recommendations:   meds crushed in puree, ice chips sparingly  Aspiration Precautions: 1 bite/sip at a time, Feed only when awake/alert, HOB to 90 degrees, Ice chips sparingly, Meds crushed in puree, Monitor for s/s of aspiration, Small bites/sips and Strict aspiration precautions   General Precautions: Standard, aspiration,fall,respiratory,airborne,contact,droplet,sternal  Communication strategies:  provide increased time to answer, go to room if call light pushed and Mohawk speaker    History:     Past Medical History:   Diagnosis Date    A-fib     Anticoagulant long-term use     Closed displaced fracture of distal phalanx of lesser toe 10/20/2015    Right    Coronary artery disease     Diabetes mellitus     Diabetes mellitus, type 2     Hypertension     Mechanical heart valve present     X's two    Osteopenia     Pacemaker     Rheumatic heart disease        Past Surgical History:   Procedure Laterality Date    CARDIAC PACEMAKER PLACEMENT      CARDIAC VALVE REPLACEMENT      CARDIAC VALVE SURGERY      CORONARY ANGIOGRAPHY N/A 12/21/2021    Procedure: ANGIOGRAM, CORONARY ARTERY;  Surgeon: Devonte Salazar MD;  Location: [facility] CATH LAB;  Service: Cardiology;  Laterality: N/A;    EYE SURGERY Bilateral     cataract    FLUOROSCOPY N/A 12/11/2020    Procedure: FLUOROSCOPY;  Surgeon: Mckay Calvo MD;  Location: [facility] CATH LAB;  Service: Cardiology;  Laterality: N/A;    REPLACEMENT OF AORTIC VALVE WITH REPEAT STERNOTOMY N/A 1/5/2022    Procedure: REPLACEMENT, AORTIC VALVE, WITH REPEAT STERNOTOMY;  Surgeon: Dennis Haider MD;  Location: [address];  Service: Cardiothoracic;  Laterality: N/A;    REPLACEMENT OF PACEMAKER GENERATOR N/A 3/2/2020  "   Procedure: REPLACEMENT, PACEMAKER GENERATOR;  Surgeon: Mark Ring MD;  [facility];  Service: Cardiology;  Laterality: N/A;  TBS/CHRISTI, Single PPM gen change, Right side, MDT, MAC, SK, 3 Prep    thryoid s       Prior Intubation HX:  12 hours on 1/5/21    Modified Barium Swallow: None on file    Chest X-Rays: 1/12/21 The lungs are well expanded.  There are bilateral interstitial and patchy airspace opacities, similar to prior.  There are suspected small bilateral pleural effusions.  There is no pneumothorax.    Prior diet: reg/thin    Subjective     Patient awake and alert  "Water."  Communicated with nurse prior to session     Pain/Comfort:  · Pain Rating 1: 0/10  · Pain Rating Post-Intervention 1: 0/10    Respiratory Status: High flow, flow 7 L/min, concentration 50%    Objective:     Oral Musculature Evaluation  · Oral Musculature: WFL  · Dentition: present and adequate  · Secretion Management: adequate  · Mucosal Quality: adequate  · Mandibular Strength and Mobility: WFL  · Oral Labial Strength and Mobility: WFL  · Lingual Strength and Mobility: WFL  · Volitional Cough: reduced  · Volitional Swallow: delayed; reduced laryngeal elevation  · Voice Prior to PO Intake: strained; reduced intensity    Bedside Swallow Eval:   Consistencies Assessed:  · Ice chips x3  · Thin liquids x2 (via tsp)  · Puree x2 (tsp pudding)     Oral Phase:   · WFL    Pharyngeal Phase:   · Throat clearing and reduced voice following thin liquid trials    · Multiple spontaneous swallows per bolus  · Significantly increased WOB as trials progressed    Compensatory Strategies  · None    Treatment: Patient sitting up in bed with son present during session. Patient requested son serve as interpretor during session. SLP educated patient, son and family (on facetime) regarding POC for ST. Patient left in bed with call light within reach. Recs communicated to RN.     Assessment:     Orion Ty is a 76 y.o. female with an SLP " diagnosis of Dysphagia and Dysphonia. ST to continue to follow.     Goals:   Multidisciplinary Problems     SLP Goals        Problem: SLP Goal    Goal Priority Disciplines Outcome   SLP Goal     SLP Ongoing, Progressing   Description: Speech-Language Pathology Goals  Goals to be met by 1/19/22  1. Patient will participate in ongoing swallow assessment in order to determine safest least restrictive diet.                    Plan:     · Patient to be seen:  5 x/week   · Plan of Care expires:  02/11/22  · Plan of Care reviewed with:  patient,family   · SLP Follow-Up:  Yes       Discharge recommendations:   (pending)   Barriers to Discharge:  Level of Skilled Assistance Needed     Time Tracking:     SLP Treatment Date:   01/12/22  Speech Start Time:  1000  Speech Stop Time:  1019     Speech Total Time (min):  19 min    Billable Minutes: Eval Swallow and Oral Function 11 and Self Care/Home Management Training 8    Wilner Vazquez CCC-SLP  Speech-Language Pathology  Pager: [phone]   01/12/2022

## 2022-01-12 NOTE — ANESTHESIA PROCEDURE NOTES
Ad Hoc Intubation  Performed by: Oleg Flores MD  Authorized by: Tahir Beltran MD     Indications:  Respiratory failure and hypercapnia  Diagnosis:  Hypercapneic repiratory failure  Patient Location:  ICU  Timeout:  1/12/2022 2:58 PM  Procedure Start Time:  1/12/2022 2:59 PM  Procedure End Time:  1/12/2022 3:05 PM  Staff:     Anesthesiologist Present: Yes    Intubation:     Induction:  Intravenous    Intubated:  Postinduction    Mask Ventilation:  Easy mask    Attempts:  1    Attempted By:  Resident anesthesiologist    Method of Intubation:  Video laryngoscopy    Blade:  Glidescope 3    Laryngeal View Grade: Grade I - full view of chords      Difficult Airway Encountered?: No      Complications:  None    Airway Device:  Oral endotracheal tube    Airway Device Size:  7.0    Style/Cuff Inflation:  Cuffed (inflated to minimal occlusive pressure)    Tube secured:  21    Secured at:  The teeth    Placement Verified By:  Colorimetric ETCO2 device and Revisualization with laryngoscopy    Complicating Factors:  None    Findings Post-Intubation:  BS equal bilateral and atraumatic/condition of teeth unchanged

## 2022-01-12 NOTE — ASSESSMENT & PLAN NOTE
Orion Ty is a 76 y.o. female who presents to the SICU s/p redo aortic valve replacement with mechanical valve on 1/5/22.    This patient MUST start mobilizing with PT and working with RT. She is at extremely high risk of decompensation due to her debility.      Neuro/Psych:   -- Sedation: DC Precedex  -- Pain: IV Tylenol ordered. Multimodals             Cards:   -- Pressors: vaso off, epi 0.04  -- Goal MAP: 60-80  -- oral amio  -- Metop 25 daily  -- Amlodipine 10  -- Holding oral antihypertensives again this morning  -- statin  -- heparin drip DC'd now that INR is therapeutic   -- pacemaker in place, interrogated and set to home settings      Pulm:   -- Goal O2 sat > 90%  -- Currently on HFNC  -- ABG PRN  -- 2 mediastinal removed 1/9 L Pleural CT removed 1/8      Renal:  -- Mireles in place  -- Oliguric   -- Nephrology following  -- CRRT. Discussed with nephrology. May continue this morning's run if giving blood. Otherwise, plan to restart overnight.      FEN / GI:   -- Replace lytes as needed  -- Nutrition: dysphagia diet 1500 cc when not on BiPAP and mental status improved  -- Bowel reg  -- Optisource supplementation when able per nutrition       ID:   -- WBC 12.4 from 10  -- Antibiotics: periop ancef compelte      Heme/Onc:   -- Hgb 7.6 from 7.3  -- 3U RBC, 2U FFP, 2U platelets in OR  -- Daily CBC  -- Transfused products: 1U RBC on 1/5/22. 1u RBC and 1u FFP on 1/9/21  -- Anticoagulation: ASA   -INR 4.0 this morning.   -Coumadin held today      Endo:   -- Gluc goal 140-180  -- Insulin per endocrinology      PPx:   Feeding: NPO while on BiPAP. Dysphagia diet otherwise  Analgesia/Sedation: multimodal  Thromboembolic prevention: ASA, coumadin  HOB >30: yes  Stress Ulcer ppx: none  Glucose control: Critical care goal 140-180 g/dl, ISS    Lines/Drains/Airway: Art line, RIJ Trialysis       Dispo/Code Status/Palliative:   -- SICU / Full Code

## 2022-01-12 NOTE — PLAN OF CARE
Problem: SLP Goal  Goal: SLP Goal  Description: Speech-Language Pathology Goals  Goals to be met by 1/19/22  1. Patient will participate in ongoing swallow assessment in order to determine safest least restrictive diet.   Outcome: Ongoing, Progressing     Patient seen for a bedside swallow eval. SLP recommending NPO with meds crushed in puree and ice chips sparingly with strict aspiration precautions.     Wilner Vazquez CCC-SLP  Speech-Language Pathology  Pager: [phone]

## 2022-01-12 NOTE — PROGRESS NOTES
[facility]  Critical Care - Surgery  Progress Note    Patient Name: Orion Ty  MRN: [id number]  Admission Date: 1/5/2022  Hospital Length of Stay: 7 days  Code Status: Full Code  Attending Provider: Dennis Haider MD  Primary Care Provider: Otis Zimmer MD   Principal Problem: S/P aortic valve replacement    Subjective:     Hospital/ICU Course:  No notes on file    Interval History/Significant Events:   -Placed on BiPAP yesterday evening for hypercapnia with a respiratory acidosis.  -Net negative 1.9L yesterday, but lungs still appear wet on this morning's CXR  -Rapid COVID tests continue to be negative. PCR test sent but apparently lost in the lab.  -Coumadin held yesterday, INR up to 4.0 today.  -Hgb stable at 7.5  -Repleting Phos    Follow-up For: Procedure(s) (LRB):  REPLACEMENT, AORTIC VALVE, WITH REPEAT STERNOTOMY (N/A)    Post-Operative Day: 7 Days Post-Op    Objective:     Vital Signs (Most Recent):  Temp: 97.6 °F (36.4 °C) (01/12/22 0700)  Pulse: 61 (01/12/22 0808)  Resp: (!) 33 (01/12/22 0808)  BP: (!) 110/48 (01/11/22 0145)  SpO2: 100 % (01/12/22 0808) Vital Signs (24h Range):  Temp:  [96.2 °F (35.7 °C)-97.6 °F (36.4 °C)] 97.6 °F (36.4 °C)  Pulse:  [52-89] 61  Resp:  [21-55] 33  SpO2:  [91 %-100 %] 100 %  Arterial Line BP: ()/(33-50) 121/40     Weight: 53 kg (116 lb 13.5 oz)  Body mass index is 22.08 kg/m².      Intake/Output Summary (Last 24 hours) at 1/12/2022 0854  Last data filed at 1/12/2022 0800  Gross per 24 hour   Intake 3570.65 ml   Output 5139 ml   Net -1568.35 ml       Physical Exam  Constitutional:       General: She is not in acute distress.  HENT:      Head: Normocephalic and atraumatic.      Comments: High Flow NC  Neck:      Comments: R IJ Trialysis  Cardiovascular:      Rate and Rhythm: Normal rate and regular rhythm.      Pulses: Normal pulses.      Comments: Midline sternotomy incision c/d/I  Pacemaker in place  Pulmonary:      Effort:  Pulmonary effort is normal. No respiratory distress.      Comments: High flow  Abdominal:      General: Abdomen is flat. There is no distension.      Palpations: Abdomen is soft.      Tenderness: There is no abdominal tenderness.   Genitourinary:     Comments: R groin well incision, bandage in place. Thigh soft, no hematoma  Musculoskeletal:      Comments: R radial art line in place   Skin:     General: Skin is warm.      Capillary Refill: Capillary refill takes less than 2 seconds.   Neurological:      Mental Status: She is alert.         Vents:  Vent Mode: A/C (01/05/22 1430)  Ventilator Initiated: Yes (01/05/22 1427)  Set Rate: 18 BPM (01/05/22 1430)  Vt Set: (S) 350 mL (01/05/22 1430)  PEEP/CPAP: 5 cmH20 (01/05/22 1430)  Oxygen Concentration (%): 50 (01/12/22 0805)  Peak Airway Pressure: 33 cmH2O (01/05/22 1430)  Plateau Pressure: 0 cmH20 (01/05/22 1430)  Total Ve: 7.05 mL (01/05/22 1430)  Negative Inspiratory Force (cm H2O): 0 (01/05/22 1430)  F/VT Ratio<105 (RSBI): (!) 50.51 (01/05/22 1430)    Lines/Drains/Airways     Central Venous Catheter Line            Trialysis (Dialysis) Catheter 01/08/22 1027 right internal jugular 3 days          Arterial Line            Arterial Line 01/05/22 0855 Right Radial 6 days          Peripheral Intravenous Line                 Peripheral IV - Single Lumen 01/06/22 1833 20 G Left Antecubital 5 days         Midline Catheter Insertion/Assessment  - Single Lumen 01/10/22 1300 Left cephalic vein (lateral side of arm) 18g x 8cm 1 day                Significant Labs:    CBC/Anemia Profile:  Recent Labs   Lab 01/11/22  1837 01/12/22  0405 01/12/22  0733   WBC 10.64 11.32 12.40   HGB 7.6* 7.5* 7.6*   HCT 24.9* 24.6* 25.1*   PLT 88* 76* 91*   MCV 98 98 99*   RDW 18.9* 19.2* 19.6*        Chemistries:  Recent Labs   Lab 01/10/22  1409 01/10/22  1410 01/11/22  0316 01/11/22  0316 01/11/22  1239 01/11/22  1239 01/11/22  2152 01/12/22  0405 01/12/22  0733      < > 138   < > 136  --   138 136  136  --    K 4.2   < > 4.3   < > 4.5   < > 4.5 4.4  4.4 4.6   CL 99   < > 102   < > 104  --  107 106  106  --    CO2 28   < > 25   < > 21*  --  19* 21*  21*  --    BUN 4*   < > 6*   < > 4*  --  9 4*  4*  --    CREATININE 0.5   < > 0.6   < > 0.5  --  0.6 0.4*  0.4*  --    CALCIUM 8.3*   < > 8.3*   < > 8.3*  --  7.8* 8.1*  8.0*  --    ALBUMIN 3.5  --   --   --   --   --  3.2* 3.3*  --    MG  --    < > 2.4   < > 2.0  --  1.9 2.0  --    PHOS 1.4*   < > 3.0  --   --   --  2.4* 1.5*  1.5*  --     < > = values in this interval not displayed.       ABGs:   Recent Labs   Lab 01/12/22  0744   PH 7.355   PCO2 42.0   HCO3 23.4*   POCSATURATED 97   BE -2       Significant Imaging:  I have reviewed all pertinent imaging results/findings within the past 24 hours.    Assessment/Plan:     * S/P aortic valve replacement  Orion Ty is a 76 y.o. female who presents to the SICU s/p redo aortic valve replacement with mechanical valve on 1/5/22.    This patient MUST start mobilizing with PT and working with RT. She is at extremely high risk of decompensation due to her debility.      Neuro/Psych:   -- Sedation: DC Precedex  -- Pain: IV Tylenol ordered. Multimodals             Cards:   -- Pressors: vaso off, epi 0.04  -- Goal MAP: 60-80  -- oral amio  -- Metop 25 daily  -- Amlodipine 10  -- Holding oral antihypertensives again this morning  -- statin  -- heparin drip DC'd now that INR is therapeutic   -- pacemaker in place, interrogated and set to home settings      Pulm:   -- Goal O2 sat > 90%  -- Currently on HFNC  -- ABG PRN  -- 2 mediastinal removed 1/9 L Pleural CT removed 1/8      Renal:  -- Mireles in place  -- Oliguric   -- Nephrology following  -- CRRT. Discussed with nephrology. May continue this morning's run if giving blood. Otherwise, plan to restart overnight.      FEN / GI:   -- Replace lytes as needed  -- Nutrition: dysphagia diet 1500 cc when not on BiPAP and mental status improved  -- Bowel  reg  -- Optisource supplementation when able per nutrition       ID:   -- WBC 12.4 from 10  -- Antibiotics: periop ancef compelte      Heme/Onc:   -- Hgb 7.6 from 7.3  -- 3U RBC, 2U FFP, 2U platelets in OR  -- Daily CBC  -- Transfused products: 1U RBC on 1/5/22. 1u RBC and 1u FFP on 1/9/21  -- Anticoagulation: ASA   -INR 4.0 this morning.   -Coumadin held today      Endo:   -- Gluc goal 140-180  -- Insulin per endocrinology      PPx:   Feeding: NPO while on BiPAP. Dysphagia diet otherwise  Analgesia/Sedation: multimodal  Thromboembolic prevention: ASA, coumadin  HOB >30: yes  Stress Ulcer ppx: none  Glucose control: Critical care goal 140-180 g/dl, ISS    Lines/Drains/Airway: Art line, RIJ Trialysis       Dispo/Code Status/Palliative:   -- SICU / Full Code            Critical care was time spent personally by me on the following activities: development of treatment plan with patient or surrogate and bedside caregivers, discussions with consultants, evaluation of patient's response to treatment, examination of patient, ordering and performing treatments and interventions, ordering and review of laboratory studies, ordering and review of radiographic studies, pulse oximetry, re-evaluation of patient's condition.  This critical care time did not overlap with that of any other provider or involve time for any procedures.     Graham Alfaro MD  Critical Care - Surgery  [facility]

## 2022-01-12 NOTE — PLAN OF CARE
Patient alternating between HFNC and prn bipap.  CRRT on hold for the day and will restart for PM shift.  Dr. Haider at bedside this AM for patient rounds and plan of care.  Questions and concerns addressed with patient and the family at the bedside througout the shift.

## 2022-01-12 NOTE — SUBJECTIVE & OBJECTIVE
Interval History/Significant Events:   -Placed on BiPAP yesterday evening for hypercapnia with a respiratory acidosis.  -Net negative 1.9L yesterday, but lungs still appear wet on this morning's CXR  -Rapid COVID tests continue to be negative. PCR test sent but apparently lost in the lab.  -Coumadin held yesterday, INR up to 4.0 today.  -Hgb stable at 7.5  -Repleting Phos    Follow-up For: Procedure(s) (LRB):  REPLACEMENT, AORTIC VALVE, WITH REPEAT STERNOTOMY (N/A)    Post-Operative Day: 7 Days Post-Op    Objective:     Vital Signs (Most Recent):  Temp: 97.6 °F (36.4 °C) (01/12/22 0700)  Pulse: 61 (01/12/22 0808)  Resp: (!) 33 (01/12/22 0808)  BP: (!) 110/48 (01/11/22 0145)  SpO2: 100 % (01/12/22 0808) Vital Signs (24h Range):  Temp:  [96.2 °F (35.7 °C)-97.6 °F (36.4 °C)] 97.6 °F (36.4 °C)  Pulse:  [52-89] 61  Resp:  [21-55] 33  SpO2:  [91 %-100 %] 100 %  Arterial Line BP: ()/(33-50) 121/40     Weight: 53 kg (116 lb 13.5 oz)  Body mass index is 22.08 kg/m².      Intake/Output Summary (Last 24 hours) at 1/12/2022 0854  Last data filed at 1/12/2022 0800  Gross per 24 hour   Intake 3570.65 ml   Output 5139 ml   Net -1568.35 ml       Physical Exam  Constitutional:       General: She is not in acute distress.  HENT:      Head: Normocephalic and atraumatic.      Comments: High Flow NC  Neck:      Comments: R IJ Trialysis  Cardiovascular:      Rate and Rhythm: Normal rate and regular rhythm.      Pulses: Normal pulses.      Comments: Midline sternotomy incision c/d/I  Pacemaker in place  Pulmonary:      Effort: Pulmonary effort is normal. No respiratory distress.      Comments: High flow  Abdominal:      General: Abdomen is flat. There is no distension.      Palpations: Abdomen is soft.      Tenderness: There is no abdominal tenderness.   Genitourinary:     Comments: R groin well incision, bandage in place. Thigh soft, no hematoma  Musculoskeletal:      Comments: R radial art line in place   Skin:     General: Skin  is warm.      Capillary Refill: Capillary refill takes less than 2 seconds.   Neurological:      Mental Status: She is alert.         Vents:  Vent Mode: A/C (01/05/22 1430)  Ventilator Initiated: Yes (01/05/22 1427)  Set Rate: 18 BPM (01/05/22 1430)  Vt Set: (S) 350 mL (01/05/22 1430)  PEEP/CPAP: 5 cmH20 (01/05/22 1430)  Oxygen Concentration (%): 50 (01/12/22 0805)  Peak Airway Pressure: 33 cmH2O (01/05/22 1430)  Plateau Pressure: 0 cmH20 (01/05/22 1430)  Total Ve: 7.05 mL (01/05/22 1430)  Negative Inspiratory Force (cm H2O): 0 (01/05/22 1430)  F/VT Ratio<105 (RSBI): (!) 50.51 (01/05/22 1430)    Lines/Drains/Airways     Central Venous Catheter Line            Trialysis (Dialysis) Catheter 01/08/22 1027 right internal jugular 3 days          Arterial Line            Arterial Line 01/05/22 0855 Right Radial 6 days          Peripheral Intravenous Line                 Peripheral IV - Single Lumen 01/06/22 1833 20 G Left Antecubital 5 days         Midline Catheter Insertion/Assessment  - Single Lumen 01/10/22 1300 Left cephalic vein (lateral side of arm) 18g x 8cm 1 day                Significant Labs:    CBC/Anemia Profile:  Recent Labs   Lab 01/11/22  1837 01/12/22  0405 01/12/22  0733   WBC 10.64 11.32 12.40   HGB 7.6* 7.5* 7.6*   HCT 24.9* 24.6* 25.1*   PLT 88* 76* 91*   MCV 98 98 99*   RDW 18.9* 19.2* 19.6*        Chemistries:  Recent Labs   Lab 01/10/22  1409 01/10/22  1410 01/11/22  0316 01/11/22  0316 01/11/22  1239 01/11/22  1239 01/11/22  2152 01/12/22  0405 01/12/22  0733      < > 138   < > 136  --  138 136  136  --    K 4.2   < > 4.3   < > 4.5   < > 4.5 4.4  4.4 4.6   CL 99   < > 102   < > 104  --  107 106  106  --    CO2 28   < > 25   < > 21*  --  19* 21*  21*  --    BUN 4*   < > 6*   < > 4*  --  9 4*  4*  --    CREATININE 0.5   < > 0.6   < > 0.5  --  0.6 0.4*  0.4*  --    CALCIUM 8.3*   < > 8.3*   < > 8.3*  --  7.8* 8.1*  8.0*  --    ALBUMIN 3.5  --   --   --   --   --  3.2* 3.3*  --    MG  --     < > 2.4   < > 2.0  --  1.9 2.0  --    PHOS 1.4*   < > 3.0  --   --   --  2.4* 1.5*  1.5*  --     < > = values in this interval not displayed.       ABGs:   Recent Labs   Lab 01/12/22  0744   PH 7.355   PCO2 42.0   HCO3 23.4*   POCSATURATED 97   BE -2       Significant Imaging:  I have reviewed all pertinent imaging results/findings within the past 24 hours.

## 2022-01-12 NOTE — CONSULTS
[facility]  Wound Care    Patient Name:  Orion Ty   MRN:  [id number]  Date: 1/12/2022  Diagnosis: S/P aortic valve replacement    History:     Past Medical History:   Diagnosis Date    A-fib     Anticoagulant long-term use     Closed displaced fracture of distal phalanx of lesser toe 10/20/2015    Right    Coronary artery disease     Diabetes mellitus     Diabetes mellitus, type 2     Hypertension     Mechanical heart valve present     X's two    Osteopenia     Pacemaker     Rheumatic heart disease        Social History     Socioeconomic History    Marital status:    Tobacco Use    Smoking status: Never Smoker    Smokeless tobacco: Never Used   Substance and Sexual Activity    Alcohol use: No    Drug use: Never       Precautions:     Allergies as of 11/24/2021 - Reviewed 11/23/2021   No Active Allergies   Allergen Reaction Noted    Aspirin Other (See Comments) 12/03/2019       [facility] Assessment Details/Treatment     Wound care consult received from MD and RN for assessment of sacrum. Assessment and pictures listed below.     Sacrum/bilateral buttocks- present on admit- noted 2 areas of purple maroon discoloration- appears to be more related to friction/ shearing than pressure- no open wound at this time- will continue to monitor           Recommendations made to primary team for balsam peru castor oil ointment BID to stimulate capillary blood flow to promote healing and prevent further skin breakdown. Nursing and MD team notified. Nursing to continue care and maintain pressure injury prevention interventions. Wound care to assist with pt prn. .     01/12/2022

## 2022-01-12 NOTE — PROGRESS NOTES
"Worsening hypercapnia with hypoxemia not responding to current pulmonary therapy. Required orotracheal intubation. Appreciate Anesthesia Service. Current VS are BP (!) 105/58   Pulse (!) 116   Temp 36.4 °C (97.6 °F) (Axillary)   Resp (!) 33   Ht 5' 1" (1.549 m)   Wt 53 kg (116 lb 13.5 oz)   SpO2 98%   Breastfeeding No   BMI 22.08 kg/m² and improving oxygenation. ABG pending.  "

## 2022-01-12 NOTE — PT/OT/SLP PROGRESS
Physical Therapy      Patient Name:  Orion Ty   MRN:  [id number]    Patient not seen today secondary to  (PM Pt intubated and sedated.). Will follow-up when medically appropriate.     1/12/2022  .

## 2022-01-12 NOTE — PT/OT/SLP PROGRESS
Physical Therapy partial co-Treatment with OT    Patient Name:  Orion Ty   MRN:  [id number]    Recommendations:     Discharge Recommendations:  rehabilitation facility   Discharge Equipment Recommendations:  (will determine DME needs closer to discharge)   Barriers to discharge: Decreased caregiver support family will not be able to discharge at current functional level.     Assessment:     Orion Ty is a 76 y.o. female admitted with a medical diagnosis of S/P aortic valve replacement.  She presents with the following impairments/functional limitations:  weakness,impaired endurance,impaired functional mobilty,gait instability,impaired balance,decreased safety awareness,decreased lower extremity function  Pt tolerated treatment fair but is at a very low functional level. Pt will benefit from cont skilled PT 5x/wk to progress physically. Pt will need inpt rehab when medically stable. Pt is s/p AVR 1/5/22.    Rehab Prognosis: Good; patient would benefit from acute skilled PT services to address these deficits and reach maximum level of function.    Recent Surgery: Procedure(s) (LRB):  REPLACEMENT, AORTIC VALVE, WITH REPEAT STERNOTOMY (N/A) 7 Days Post-Op    Plan:     During this hospitalization, patient to be seen 5 x/week to address the identified rehab impairments via gait training,therapeutic activities,therapeutic exercises and progress toward the following goals:    · Plan of Care Expires:  02/05/22    Subjective     Chief Complaint: pt had no complaints during treatment but had increased respiratory rate and increased WOB with sitting on EOB.   Patient/Family Comments/goals: family goals:pt to return to previous functional level.   Pain/Comfort:  · Pain Rating 1: 0/10  · Pain Rating Post-Intervention 1: 0/10      Objective:     Communicated with nurse prior to session.  Patient found supine with PICC line,oxygen (R IJ dialysis catheter) upon PT entry to room.     General Precautions: Standard,  droplet,fall,contact,airborne   Orthopedic Precautions:N/A   Braces:    Respiratory Status: Nasal cannula, flow 6 L/min     Functional Mobility:  · Bed Mobility:     · Rolling Right: total assistance  · Supine to Sit: total assistance  · Sit to Supine: total assistance  ·   · Transfers:     · Sit to Stand:  moderate assistance with no AD from bed and max assist from chair. Pt O2 sats 90% during treatment on 6L NC. Pt sat in chair x 1 hour.   · Bed to/from chair: max assist   ·   · Balance: pt sat on EOB with moderate assist. pt leaned to L side with sitting.     Due to pt complex medical condition, the skill of 2 licensed therapists is needed to maximize rehab potential.       AM-PAC 6 CLICK MOBILITY  Turning over in bed (including adjusting bedclothes, sheets and blankets)?: 2  Sitting down on and standing up from a chair with arms (e.g., wheelchair, bedside commode, etc.): 2  Moving from lying on back to sitting on the side of the bed?: 2  Moving to and from a bed to a chair (including a wheelchair)?: 2  Need to walk in hospital room?: 1  Climbing 3-5 steps with a railing?: 1  Basic Mobility Total Score: 10       Therapeutic Activities and Exercises:   pt and family member received verbal instruction in PT POC and verbally expressed understanding of such.     Patient left supine with all lines intact, call button in reach, RN notified and family member present..    GOALS:   Multidisciplinary Problems     Physical Therapy Goals        Problem: Physical Therapy Goal    Goal Priority Disciplines Outcome Goal Variances Interventions   Physical Therapy Goal     PT, PT/OT Ongoing, Progressing     Description: Goals to be met by: 2022     Patient will increase functional independence with mobility by performin. Sit to stand transfer with Supervision -not met  2. Bed to chair transfer with minimum -not met  3. Gait  x 100 feet with minimum  standing rest breaks prn. -not met  4. Lower extremity exercise  program x30 reps per handout, with independence -not met  5. Recite 3/3 sternal precautions and remain complaint with precautions throughout session with no verbal cues -not met  6. Pt sit on EOB x 10 min with CGA - not met                       Time Tracking:     PT Received On: 01/12/22  PT Start Time: 1238     PT Stop Time: 1256  PT Total Time (min): 18 min     2nd PT start time: 13:50  2nd PT end time: 14:11  18 min + 20 min + 38 min     Billable Minutes: Therapeutic Activity 20 min and Neuromuscular Re-education 18       PT/PTA: PT     PTA Visit Number: 0     01/12/2022

## 2022-01-12 NOTE — CARE UPDATE
BG goal 140 - 180     Diet NPO Except for: Sips with Medication  7 Days Post-Op    BG stable without use of insulin therapy.  Endo will continue to follow, and manage glycemic control while inpatient.     - Continue Low Dose SQ Insulin Correction Scale PRN hyperglycemia.  - BG Monitoring AC/HS.     ** Please call Endocrine for any BG related issues **  ** Please notify Endocrine for any change and/or advance in diet**     Lab Results   Component Value Date    HGBA1C 6.0 (H) 01/03/2022       Discharge Planning:   TBD. Please notify endocrinology prior to discharge. Will most likely have patient continue previous home regimen of metformin 500 mg daily, and follow-up with PCP as needed for continued DM management and care.

## 2022-01-12 NOTE — EICU
Rounding (Video Assessment):  No    Intervention Initiated From:  Bedside    Osmany Communicated with Bedside Nurse regarding:  Respiratory    Comments: call received for documentation of intubation. Dr VENESSA Flores @ bs for procedure.  Intubated @ 1505 7.0 ett,21 cm depth of insertion marked at the teeth.  +etco2 color change, bbs.  Stat cxr ordered.  Placed on vent with ordered settings by RT.    Procedure end 1780

## 2022-01-12 NOTE — PT/OT/SLP PROGRESS
Occupational Therapy  Co Treatment    Name: Orion Ty  MRN: [id number]  Admitting Diagnosis:  S/P aortic valve replacement  7 Days Post-Op    Recommendations:     Discharge Recommendations: home health OT,home health PT      Assessment:     Orion Ty is a 76 y.o. female with a medical diagnosis of S/P aortic valve replacement.   Performance deficits affecting function are weakness,impaired endurance,impaired self care skills,impaired functional mobilty,gait instability,impaired balance. Pt tolerated session fair. Pt with labored breathing with oxygenation stable at 90%.   May need to update d/c recs pending medical status and functional progress. OT to continue to assess.       Rehab Prognosis:  Good; patient would benefit from acute skilled OT services to address these deficits and reach maximum level of function.       Plan:     Patient to be seen 5 x/week to address the above listed problems via self-care/home management,therapeutic activities,therapeutic exercises  · Plan of Care Expires:    · Plan of Care Reviewed with: patient,family    Subjective   Pt agreeable to therapy and denies pain.   Pain/Comfort:  · Pain Rating 1: 0/10    Objective:     Communicated with: nsg  prior to session.  Patient found supine in bed with HFNC 6 LPM, tele, pulse ox, BP cuff, A-line and central line.  Cotx completed this date to optimize functional performance and safety given impaired tolerance for activity and acuity of medical status in the ICU.     General Precautions: Standard, fall,droplet,contact,airborne,sternal (awaiting covid results)    Occupational Performance:     Bed Mobility:    Supine>sit with TOTAL A with cues for hand placement.     Functional Mobility/Transfers:  Sit>stand with MOD A   MAX A x stand pivot       Activities of Daily Living:  Pt requiring TOTAL A for ADLs this date.     [facility] 6 Click ADL: 6    Treatment & Education:  Medical team and nsg present in room eager for pt to be OOB to chair.    CRRT was running earlier this AM but not upon OT arrival.     Education provided re: sternal precautions and pt to benefit from further education.   Pt awake, but lethargic. Pt following simple commands.  UE edema noted with grossly 3-3+/5 UE strength with Fair .   Pt demo Poor sitting balance with left lateral lean. Pt t/f to chair with MAX A .  VSS remained stable during session with oxygenation 90%. However, pt with increased respiratory rate and shallow breathing noted. Medical team present and aware.    Education provided re: OT POC and safety with functional mobility/ADL skills.     Patient left up in chair with all lines intact, call button in reach and family and nsg  presentEducation:      GOALS:   Multidisciplinary Problems     Occupational Therapy Goals        Problem: Occupational Therapy Goal    Goal Priority Disciplines Outcome Interventions   Occupational Therapy Goal     OT, PT/OT Ongoing, Progressing    Description: Goals to be met by: 7 days 1/13/22     Patient will increase functional independence with ADLs by performing:    Pt to complete UE dressing with set-up  Pt to complete LE dressing with SBA  Pt to complete standing g/h skills with SBA  Pt to complete toileting with SBA  Pt to completed t/f bed, chair and commode with SBA                   Time Tracking:     OT Date of Treatment: 01/12/22  OT Start Time: 1238  OT Stop Time: 1256  OT Total Time (min): 18 min    Billable Minutes:Therapeutic Activity 18    OT/SUE: OT          1/12/2022

## 2022-01-12 NOTE — PLAN OF CARE
Problem: Physical Therapy Goal  Goal: Physical Therapy Goal  Description: Goals to be met by: 2022     Patient will increase functional independence with mobility by performin. Sit to stand transfer with Supervision -not met  2. Bed to chair transfer with minimum -not met  3. Gait  x 100 feet with minimum  standing rest breaks prn. -not met  4. Lower extremity exercise program x30 reps per handout, with independence -not met  5. Recite 3/3 sternal precautions and remain complaint with precautions throughout session with no verbal cues -not met  6. Pt sit on EOB x 10 min with CGA - not met      Outcome: Ongoing, Progressing   Goals updated for content and are appropriate. 2022

## 2022-01-13 NOTE — SUBJECTIVE & OBJECTIVE
Interval History: despite 4L net negative fluid balance over prior 48 hours, patient was intubated yesterday     Review of patient's allergies indicates:  No Known Allergies  Current Facility-Administered Medications   Medication Frequency    0.9%  NaCl infusion (CRRT USE ONLY) Continuous    0.9%  NaCl infusion (for blood administration) Q24H PRN    0.9%  NaCl infusion Continuous    albuterol-ipratropium 2.5 mg-0.5 mg/3 mL nebulizer solution 3 mL Q6H WAKE    amiodarone tablet 400 mg BID    aspirin suppository 300 mg Once PRN    atorvastatin tablet 40 mg Daily    balsam peru-castor oiL Oint BID    bisacodyL suppository 10 mg Daily PRN    dexmedetomidine (PRECEDEX) 400mcg/100mL 0.9% NaCL infusion Continuous    dextrose 50% injection 12.5 g PRN    dextrose 50% injection 25 g PRN    EPINEPHrine (ADRENALIN) 5 mg in dextrose 5 % 250 mL infusion Continuous    famotidine (PF) injection 20 mg Daily    fentaNYL 50 mcg/mL injection 25 mcg Q2H PRN    gabapentin capsule 300 mg BID    glucagon (human recombinant) injection 1 mg PRN    glucose chewable tablet 16 g PRN    glucose chewable tablet 24 g PRN    insulin aspart U-100 pen 0-5 Units QID (AC + HS) PRN    LIDOcaine 5 % patch 1 patch Q24H    magnesium sulfate 2g in water 50mL IVPB (premix) PRN    melatonin tablet 9 mg Nightly PRN    NORepinephrine 4 mg in dextrose 5% 250 mL infusion (premix) (titrating) Continuous    ondansetron injection 4 mg Q12H PRN    oxyCODONE immediate release tablet 10 mg Q4H PRN    oxyCODONE immediate release tablet 5 mg Q4H PRN    polyethylene glycol packet 17 g Daily    sodium chloride 0.9% bolus 250 mL PRN    sodium chloride 0.9% flush 10 mL PRN    vasopressin (PITRESSIN) 0.2 Units/mL in dextrose 5 % 100 mL infusion Continuous     Facility-Administered Medications Ordered in Other Encounters   Medication Frequency    0.9%  NaCl infusion Continuous       Objective:     Vital Signs (Most Recent):  Temp: 97.6 °F (36.4  °C) (01/13/22 1100)  Pulse: 62 (01/13/22 1230)  Resp: (!) 31 (01/13/22 1230)  BP: (!) 100/49 (01/13/22 1200)  SpO2: 98 % (01/13/22 1230)  O2 Device (Oxygen Therapy): ventilator (01/13/22 1207) Vital Signs (24h Range):  Temp:  [97.6 °F (36.4 °C)-98.9 °F (37.2 °C)] 97.6 °F (36.4 °C)  Pulse:  [] 62  Resp:  [17-62] 31  SpO2:  [74 %-100 %] 98 %  BP: (100-124)/(49-59) 100/49  Arterial Line BP: ()/(33-49) 123/39     Weight: 50.8 kg (111 lb 15.9 oz) (01/13/22 0515)  Body mass index is 21.16 kg/m².  Body surface area is 1.48 meters squared.    I/O last 3 completed shifts:  In: 8426 [I.V.:7386.7; Blood:456; IV Piggyback:583.3]  Out: 7951 [Other:7951]    Physical Exam  Constitutional:       General: She is not in acute distress.     Appearance: She is ill-appearing.   HENT:      Mouth/Throat:      Mouth: Mucous membranes are moist.   Eyes:      General: No scleral icterus.     Extraocular Movements: Extraocular movements intact.      Pupils: Pupils are equal, round, and reactive to light.   Cardiovascular:      Rate and Rhythm: Normal rate and regular rhythm.      Comments: JVD  Pulmonary:      Comments: intubated  Abdominal:      General: There is no distension.      Palpations: Abdomen is soft.   Musculoskeletal:         General: No deformity.      Right lower leg: No edema.      Left lower leg: No edema.   Skin:     Coloration: Skin is not jaundiced.   Neurological:      General: No focal deficit present.      Mental Status: She is alert.         Significant Labs:  All labs within the past 24 hours have been reviewed.     Significant Imaging:  Labs: Reviewed  X-Ray: Reviewed

## 2022-01-13 NOTE — PROGRESS NOTES
Upon arrival, Pt sating in the high 70s with a good wave form. ABG showing PO2 in the 40s. Patient suctioned and chest xray obtained. Vent settings changed to ACVC 100 AND 10. Hans at bedside. Will get another ABG at 1200.

## 2022-01-13 NOTE — PLAN OF CARE
[facility]  Discharge Reassessment    Primary Care Provider: Otis Zimmer MD    Expected Discharge Date: 1/14/2022    Reassessment (most recent)     Discharge Reassessment - 01/13/22 1243        Discharge Reassessment    Assessment Type Discharge Planning Reassessment     Communicated MENDEL with patient/caregiver Date not available/Unable to determine     Discharge Plan A Home Health     Discharge Plan B Home with family     DME Needed Upon Discharge  other (see comments)   TBD    Discharge Barriers Identified None     Why the patient remains in the hospital Requires continued medical care        Post-Acute Status    Post-Acute Authorization Home Health     Home Health Status Pending medical clearance/testing                The patient remains in SICU. The SW will continue to follow-up with the patient to assist the patient with discharge planning assessment.       Shen Simmons LMSW  Case Management [facility]  Ext: [phone]

## 2022-01-13 NOTE — PROGRESS NOTES
[facility]  Critical Care - Surgery  Progress Note    Patient Name: Orion Ty  MRN: [id number]  Admission Date: 1/5/2022  Hospital Length of Stay: 8 days  Code Status: Full Code  Attending Provider: Dennis Haider MD  Primary Care Provider: Otis Zimmer MD   Principal Problem: S/P aortic valve replacement    Subjective:     Hospital/ICU Course:  No notes on file    Interval History/Significant Events:   -intubated yesterday evening due to hypercarbia  -lungs stable on CXR, net positive 1L   -COVID negative  -Coumadin held yesterday, INR up to 4.4 today.  -Increased pressor requirements, now on 0.06 epi and 0.04 vaso    Follow-up For: Procedure(s) (LRB):  REPLACEMENT, AORTIC VALVE, WITH REPEAT STERNOTOMY (N/A)    Post-Operative Day: 7 Days Post-Op    Objective:     Vital Signs (Most Recent):  Temp: 97.8 °F (36.6 °C) (01/13/22 0300)  Pulse: (!) 50 (01/13/22 0630)  Resp: (!) 22 (01/13/22 0630)  BP: (!) 124/59 (01/12/22 1715)  SpO2: 95 % (01/13/22 0630) Vital Signs (24h Range):  Temp:  [97.6 °F (36.4 °C)-98.6 °F (37 °C)] 97.8 °F (36.6 °C)  Pulse:  [] 50  Resp:  [18-69] 22  SpO2:  [89 %-100 %] 95 %  BP: (105-124)/(58-59) 124/59  Arterial Line BP: ()/(35-49) 126/40     Weight: 50.8 kg (111 lb 15.9 oz)  Body mass index is 21.16 kg/m².      Intake/Output Summary (Last 24 hours) at 1/13/2022 0656  Last data filed at 1/13/2022 0600  Gross per 24 hour   Intake 6251.43 ml   Output 5140 ml   Net 1111.43 ml       Physical Exam  Constitutional:       General: She is not in acute distress.     Comments: Intubated and sedated   HENT:      Head: Normocephalic and atraumatic.   Neck:      Comments: R IJ Trialysis  Cardiovascular:      Rate and Rhythm: Normal rate and regular rhythm.      Pulses: Normal pulses.      Comments: Midline sternotomy incision c/d/I  Pacemaker in place  Pulmonary:      Effort: Pulmonary effort is normal. No respiratory distress.      Comments:  intubated  Abdominal:      General: Abdomen is flat. There is no distension.      Palpations: Abdomen is soft.      Tenderness: There is no abdominal tenderness.   Genitourinary:     Comments: R groin well incision, bandage in place. Thigh soft, no hematoma  Musculoskeletal:      Comments: R radial art line in place   Skin:     General: Skin is warm.      Capillary Refill: Capillary refill takes less than 2 seconds.   Neurological:      Mental Status: She is alert.         Vents:  Vent Mode: A/C (01/13/22 0317)  Ventilator Initiated: Yes (01/05/22 1427)  Set Rate: 22 BPM (01/13/22 0317)  Vt Set: 320 mL (01/13/22 0317)  PEEP/CPAP: 5 cmH20 (01/13/22 0317)  Oxygen Concentration (%): 30 (01/13/22 0630)  Peak Airway Pressure: 28 cmH2O (01/13/22 0317)  Plateau Pressure: 29 cmH20 (01/13/22 0317)  Total Ve: 7.39 mL (01/13/22 0317)  Negative Inspiratory Force (cm H2O): 0 (01/13/22 0317)  F/VT Ratio<105 (RSBI): (!) 65.9 (01/13/22 0317)    Lines/Drains/Airways     Central Venous Catheter Line            Trialysis (Dialysis) Catheter 01/08/22 1027 right internal jugular 4 days          Airway                 Airway - Non-Surgical 01/12/22 1505 Endotracheal Tube <1 day       Airway Anesthesia 01/12/22 <1 day          Arterial Line            Arterial Line 01/05/22 0855 Right Radial 7 days          Peripheral Intravenous Line                 Peripheral IV - Single Lumen 01/06/22 1833 20 G Left Antecubital 6 days         Midline Catheter Insertion/Assessment  - Single Lumen 01/10/22 1300 Left cephalic vein (lateral side of arm) 18g x 8cm 2 days                Significant Labs:    CBC/Anemia Profile:  Recent Labs   Lab 01/12/22  0405 01/12/22  0405 01/12/22  0733 01/12/22  1432 01/13/22  0347   WBC 11.32  --  12.40  --  11.67   HGB 7.5*  --  7.6*  --  10.0*   HCT 24.6*   < > 25.1* 34* 32.0*   PLT 76*  --  91*  --  67*   MCV 98  --  99*  --  97   RDW 19.2*  --  19.6*  --  18.2*    < > = values in this interval not displayed.         Chemistries:  Recent Labs   Lab 01/12/22  0405 01/12/22  0405 01/12/22  0733 01/12/22  2209 01/13/22  0347     136  --   --  137 138  139   K 4.4  4.4   < > 4.6 3.6 3.6  3.5     106  --   --  109 106  106   CO2 21*  21*  --   --  17* 20*  20*   BUN 4*  4*  --   --  6* <3*  3*   CREATININE 0.4*  0.4*  --   --  0.5 0.4*  0.4*   CALCIUM 8.1*  8.0*  --   --  8.5* 8.9  8.9   ALBUMIN 3.3*  --   --  3.5 3.9   MG 2.0  --   --  1.8 2.3   PHOS 1.5*  1.5*  --   --  2.2* 1.4*  1.4*    < > = values in this interval not displayed.       ABGs:   Recent Labs   Lab 01/13/22  0413   PH 7.311*   PCO2 47.4*   HCO3 23.9*   POCSATURATED 98   BE -2       Significant Imaging:  I have reviewed all pertinent imaging results/findings within the past 24 hours.    Assessment/Plan:     * S/P aortic valve replacement  Orion Ty is a 76 y.o. female who presents to the SICU s/p redo aortic valve replacement with mechanical valve on 1/5/22.    This patient MUST start mobilizing with PT and working with RT. She is at extremely high risk of decompensation due to her debility.      Neuro/Psych:   -- Sedation: Precedex  -- Pain: fentanyl pushes q2             Cards:   -- Pressors: epi 0.06 and vaso 0.04  -- Goal MAP: 60-80  -- holding amio due to lack of enteral access. No need for drip  -- holding metop   -- holding amlodipine  -- statin  -- heparin drip DC'd now that INR is therapeutic   -- will need heparin drip. Consider Vitamin K to correct INR, then restart heparin drip  -- pacemaker in place, interrogated and set to home settings      Pulm:   -- Goal O2 sat > 90%  -- intubated 1/12  -- minimal vent settings, now on 40%/5PEEP  -- ABG PRN  -- 2 mediastinal removed 1/9 L Pleural CT removed 1/8  -- possible consult gen surg for trach/peg/permacath      Renal:  -- Mireles removed  -- Oliguric   -- Nephrology following  -- CRRT. Discuss with Nephrology, plan to remove 500-1000cc fluid       FEN / GI:   -- Replace  lytes as needed  -- Nutrition: Will consider TPN vs tube feeds. Maybe able to insert OG after correcting INR  -- Bowel reg      ID:   -- WBC 11.7 from 12.4  -- Antibiotics: periop ancef complete  -- Some concern for aspiration. If clinically worsens, consider broad spectrum antibiotics      Heme/Onc:   -- Hgb 10 from 7.6  -- platelets 67   -- 3U RBC, 2U FFP, 2U platelets in OR  -- Daily CBC  -- Transfused products: 1U RBC on 1/5/22. 1u RBC and 1u FFP on 1/9/21. 2U RBC on 1/12  -- Anticoagulation: ASA   -INR 4.4 this morning.   -Coumadin held today  -- consider vitamin K today and restart heparin drip      Endo:   -- Gluc goal 140-180  -- Insulin per endocrinology      PPx:   Feeding: NPO. Discuss tube feeds vs TPN  Analgesia/Sedation: fent pushes / precedex  Thromboembolic prevention: ASA, heparin drip today  HOB >30: yes  Stress Ulcer ppx: famotidine BID  Glucose control: Critical care goal 140-180 g/dl, ISS    Lines/Drains/Airway: Art line, RIJ Trialysis, ETT       Dispo/Code Status/Palliative:   -- SICU / Full Code             Critical care was time spent personally by me on the following activities: development of treatment plan with patient or surrogate and bedside caregivers, discussions with consultants, evaluation of patient's response to treatment, examination of patient, ordering and performing treatments and interventions, ordering and review of laboratory studies, ordering and review of radiographic studies, pulse oximetry, re-evaluation of patient's condition.  This critical care time did not overlap with that of any other provider or involve time for any procedures.     Caleb Garcia MD  Critical Care - Surgery  [facility]

## 2022-01-13 NOTE — PROGRESS NOTES
01/13/22 1400   Treatment   Treatment Type SLED   Treatment Status Discontinued treatment;Blood lost;Clotting   Dialysis Machine Number [id number]   Dialyzer Time (hours) 17.13   BVP (Liters) 204.2 L   Access Temporary Cath;Right;IJ   Catheter Dressing Intact  Yes   CRRT Comments CRRT clotted, blood lost

## 2022-01-13 NOTE — SUBJECTIVE & OBJECTIVE
Interval History: 2L negative fluid balance, CXR with persistent but improved pulmonary edema, given PRBC    Review of patient's allergies indicates:  No Known Allergies  Current Facility-Administered Medications   Medication Frequency    0.9%  NaCl infusion (CRRT USE ONLY) Continuous    0.9%  NaCl infusion (for blood administration) Q24H PRN    0.9%  NaCl infusion (for blood administration) Q24H PRN    0.9%  NaCl infusion (for blood administration) Q24H PRN    0.9%  NaCl infusion Continuous    acetaminophen 1,000 mg/100 mL (10 mg/mL) injection 1,000 mg Q8H    albumin human 5% bottle 25 g Once    albuterol-ipratropium 2.5 mg-0.5 mg/3 mL nebulizer solution 3 mL Q6H WAKE    amiodarone tablet 400 mg BID    aspirin suppository 300 mg Once PRN    atorvastatin tablet 40 mg Daily    balsam peru-castor oiL Oint BID    bisacodyL suppository 10 mg Daily PRN    calcium chloride 2 g in dextrose 5 % 100 mL IVPB Once    dexmedetomidine (PRECEDEX) 400mcg/100mL 0.9% NaCL infusion Continuous    dextrose 50% injection 12.5 g PRN    dextrose 50% injection 25 g PRN    EPINEPHrine (ADRENALIN) 5 mg in dextrose 5 % 250 mL infusion Continuous    fentaNYL 50 mcg/mL injection 25 mcg Q2H PRN    gabapentin capsule 300 mg BID    glucagon (human recombinant) injection 1 mg PRN    glucose chewable tablet 16 g PRN    glucose chewable tablet 24 g PRN    insulin aspart U-100 pen 0-5 Units QID (AC + HS) PRN    LIDOcaine 5 % patch 1 patch Q24H    magnesium sulfate 2g in water 50mL IVPB (premix) PRN    melatonin tablet 9 mg Nightly PRN    NORepinephrine 4 mg in dextrose 5% 250 mL infusion (premix) (titrating) Continuous    ondansetron injection 4 mg Q12H PRN    oxyCODONE immediate release tablet 10 mg Q4H PRN    oxyCODONE immediate release tablet 5 mg Q4H PRN    polyethylene glycol packet 17 g Daily    propofol (DIPRIVAN) 10 mg/mL IVP injection     rocuronium 10 mg/mL injection     sodium chloride 0.9% bolus 250 mL  PRN    sodium chloride 0.9% flush 10 mL PRN    vasopressin (PITRESSIN) 0.2 Units/mL in dextrose 5 % 100 mL infusion Continuous    [START ON 1/13/2022] warfarin (COUMADIN) tablet 2 mg Daily     Facility-Administered Medications Ordered in Other Encounters   Medication Frequency    0.9%  NaCl infusion Continuous       Objective:     Vital Signs (Most Recent):  Temp: 98.6 °F (37 °C) (01/12/22 1600)  Pulse: (!) 54 (01/12/22 1730)  Resp: 18 (01/12/22 1739)  BP: (!) 124/59 (01/12/22 1715)  SpO2: 99 % (01/12/22 1730)  O2 Device (Oxygen Therapy): (S) ventilator (01/12/22 1519) Vital Signs (24h Range):  Temp:  [96.2 °F (35.7 °C)-98.6 °F (37 °C)] 98.6 °F (37 °C)  Pulse:  [] 54  Resp:  [18-69] 18  SpO2:  [89 %-100 %] 99 %  BP: (105-124)/(58-59) 124/59  Arterial Line BP: ()/(33-50) 101/36     Weight: 53 kg (116 lb 13.5 oz) (01/09/22 0402)  Body mass index is 22.08 kg/m².  Body surface area is 1.51 meters squared.    I/O last 3 completed shifts:  In: 5186.5 [I.V.:4918.1; IV Piggyback:268.5]  Out: 7046 [Urine:15; Other:7031]    Physical Exam  Constitutional:       General: She is not in acute distress.     Appearance: She is ill-appearing.   HENT:      Mouth/Throat:      Mouth: Mucous membranes are moist.   Eyes:      General: No scleral icterus.     Extraocular Movements: Extraocular movements intact.      Pupils: Pupils are equal, round, and reactive to light.   Cardiovascular:      Rate and Rhythm: Normal rate and regular rhythm.      Comments: JVD  Pulmonary:      Comments: bipap  Abdominal:      General: There is no distension.      Palpations: Abdomen is soft.   Musculoskeletal:         General: No deformity.      Right lower leg: No edema.      Left lower leg: No edema.   Skin:     Coloration: Skin is not jaundiced.   Neurological:      General: No focal deficit present.      Mental Status: She is alert.         Significant Labs:  All labs within the past 24 hours have been reviewed.     Significant  Imaging:  Labs: Reviewed  X-Ray: Reviewed

## 2022-01-13 NOTE — PT/OT/SLP PROGRESS
Occupational Therapy      Patient Name:  Orion Ty   MRN:  [id number]    Patient not seen today secondary to patient intubated. Will follow-up for OT session.    1/13/2022

## 2022-01-13 NOTE — ASSESSMENT & PLAN NOTE
Orion Ty is a 76 y.o. female who presents to the SICU s/p redo aortic valve replacement with mechanical valve on 1/5/22.    This patient MUST start mobilizing with PT and working with RT. She is at extremely high risk of decompensation due to her debility.      Neuro/Psych:   -- Sedation: Precedex  -- Pain: fentanyl pushes q2             Cards:   -- Pressors: epi 0.06 and vaso 0.04  -- Goal MAP: 60-80  -- holding amio due to lack of enteral access. No need for drip  -- holding metop   -- holding amlodipine  -- statin  -- heparin drip DC'd now that INR is therapeutic   -- will need heparin drip. Consider Vitamin K to correct INR, then restart heparin drip  -- pacemaker in place, interrogated and set to home settings      Pulm:   -- Goal O2 sat > 90%  -- intubated 1/12  -- minimal vent settings, now on 40%/5PEEP  -- ABG PRN  -- 2 mediastinal removed 1/9 L Pleural CT removed 1/8  -- possible consult gen surg for trach/peg/permacath      Renal:  -- Mireles removed  -- Oliguric   -- Nephrology following  -- CRRT. Discuss with Nephrology, plan to remove 500-1000cc fluid       FEN / GI:   -- Replace lytes as needed  -- Nutrition: Will consider TPN vs tube feeds. Maybe able to insert OG after correcting INR  -- Bowel reg      ID:   -- WBC 11.7 from 12.4  -- Antibiotics: periop ancef complete  -- Some concern for aspiration. If clinically worsens, consider broad spectrum antibiotics      Heme/Onc:   -- Hgb 10 from 7.6  -- platelets 67   -- 3U RBC, 2U FFP, 2U platelets in OR  -- Daily CBC  -- Transfused products: 1U RBC on 1/5/22. 1u RBC and 1u FFP on 1/9/21. 2U RBC on 1/12  -- Anticoagulation: ASA   -INR 4.4 this morning.   -Coumadin held today  -- consider vitamin K today and restart heparin drip      Endo:   -- Gluc goal 140-180  -- Insulin per endocrinology      PPx:   Feeding: NPO. Discuss tube feeds vs TPN  Analgesia/Sedation: fent pushes / precedex  Thromboembolic prevention: ASA, heparin drip today  HOB  >30: yes  Stress Ulcer ppx: famotidine BID  Glucose control: Critical care goal 140-180 g/dl, ISS    Lines/Drains/Airway: Art line, RIJ Trialysis, ETT       Dispo/Code Status/Palliative:   -- SICU / Full Code

## 2022-01-13 NOTE — SUBJECTIVE & OBJECTIVE
Interval History/Significant Events:   -intubated yesterday evening due to hypercarbia  -lungs stable on CXR, net positive 1L   -COVID negative  -Coumadin held yesterday, INR up to 4.4 today.  -Increased pressor requirements, now on 0.06 epi and 0.04 vaso    Follow-up For: Procedure(s) (LRB):  REPLACEMENT, AORTIC VALVE, WITH REPEAT STERNOTOMY (N/A)    Post-Operative Day: 7 Days Post-Op    Objective:     Vital Signs (Most Recent):  Temp: 97.8 °F (36.6 °C) (01/13/22 0300)  Pulse: (!) 50 (01/13/22 0630)  Resp: (!) 22 (01/13/22 0630)  BP: (!) 124/59 (01/12/22 1715)  SpO2: 95 % (01/13/22 0630) Vital Signs (24h Range):  Temp:  [97.6 °F (36.4 °C)-98.6 °F (37 °C)] 97.8 °F (36.6 °C)  Pulse:  [] 50  Resp:  [18-69] 22  SpO2:  [89 %-100 %] 95 %  BP: (105-124)/(58-59) 124/59  Arterial Line BP: ()/(35-49) 126/40     Weight: 50.8 kg (111 lb 15.9 oz)  Body mass index is 21.16 kg/m².      Intake/Output Summary (Last 24 hours) at 1/13/2022 0656  Last data filed at 1/13/2022 0600  Gross per 24 hour   Intake 6251.43 ml   Output 5140 ml   Net 1111.43 ml       Physical Exam  Constitutional:       General: She is not in acute distress.     Comments: Intubated and sedated   HENT:      Head: Normocephalic and atraumatic.   Neck:      Comments: R IJ Trialysis  Cardiovascular:      Rate and Rhythm: Normal rate and regular rhythm.      Pulses: Normal pulses.      Comments: Midline sternotomy incision c/d/I  Pacemaker in place  Pulmonary:      Effort: Pulmonary effort is normal. No respiratory distress.      Comments: intubated  Abdominal:      General: Abdomen is flat. There is no distension.      Palpations: Abdomen is soft.      Tenderness: There is no abdominal tenderness.   Genitourinary:     Comments: R groin well incision, bandage in place. Thigh soft, no hematoma  Musculoskeletal:      Comments: R radial art line in place   Skin:     General: Skin is warm.      Capillary Refill: Capillary refill takes less than 2 seconds.    Neurological:      Mental Status: She is alert.         Vents:  Vent Mode: A/C (01/13/22 0317)  Ventilator Initiated: Yes (01/05/22 1427)  Set Rate: 22 BPM (01/13/22 0317)  Vt Set: 320 mL (01/13/22 0317)  PEEP/CPAP: 5 cmH20 (01/13/22 0317)  Oxygen Concentration (%): 30 (01/13/22 0630)  Peak Airway Pressure: 28 cmH2O (01/13/22 0317)  Plateau Pressure: 29 cmH20 (01/13/22 0317)  Total Ve: 7.39 mL (01/13/22 0317)  Negative Inspiratory Force (cm H2O): 0 (01/13/22 0317)  F/VT Ratio<105 (RSBI): (!) 65.9 (01/13/22 0317)    Lines/Drains/Airways     Central Venous Catheter Line            Trialysis (Dialysis) Catheter 01/08/22 1027 right internal jugular 4 days          Airway                 Airway - Non-Surgical 01/12/22 1505 Endotracheal Tube <1 day       Airway Anesthesia 01/12/22 <1 day          Arterial Line            Arterial Line 01/05/22 0855 Right Radial 7 days          Peripheral Intravenous Line                 Peripheral IV - Single Lumen 01/06/22 1833 20 G Left Antecubital 6 days         Midline Catheter Insertion/Assessment  - Single Lumen 01/10/22 1300 Left cephalic vein (lateral side of arm) 18g x 8cm 2 days                Significant Labs:    CBC/Anemia Profile:  Recent Labs   Lab 01/12/22  0405 01/12/22  0405 01/12/22  0733 01/12/22  1432 01/13/22 0347   WBC 11.32  --  12.40  --  11.67   HGB 7.5*  --  7.6*  --  10.0*   HCT 24.6*   < > 25.1* 34* 32.0*   PLT 76*  --  91*  --  67*   MCV 98  --  99*  --  97   RDW 19.2*  --  19.6*  --  18.2*    < > = values in this interval not displayed.        Chemistries:  Recent Labs   Lab 01/12/22  0405 01/12/22  0405 01/12/22  0733 01/12/22  2209 01/13/22 0347     136  --   --  137 138  139   K 4.4  4.4   < > 4.6 3.6 3.6  3.5     106  --   --  109 106  106   CO2 21*  21*  --   --  17* 20*  20*   BUN 4*  4*  --   --  6* <3*  3*   CREATININE 0.4*  0.4*  --   --  0.5 0.4*  0.4*   CALCIUM 8.1*  8.0*  --   --  8.5* 8.9  8.9   ALBUMIN 3.3*  --   --   3.5 3.9   MG 2.0  --   --  1.8 2.3   PHOS 1.5*  1.5*  --   --  2.2* 1.4*  1.4*    < > = values in this interval not displayed.       ABGs:   Recent Labs   Lab 01/13/22  0413   PH 7.311*   PCO2 47.4*   HCO3 23.9*   POCSATURATED 98   BE -2       Significant Imaging:  I have reviewed all pertinent imaging results/findings within the past 24 hours.

## 2022-01-13 NOTE — ASSESSMENT & PLAN NOTE
-oliguric ischemic ATN likely secondary to severe intraoperative hypotension requiring epi, norepi, and vaso intraoperatively 1/5/22  -BL sCr 0.8  -creatinine worsened post op delayed likely due to hemodilution  -failed high dose duretic therapy and KRT started 1/9/22 due to volume overload  -off preseers  -on bipap with net negative 2L fluid balance  -ABG benign with no severe electrolyte abnormalities  -plan to give break on dialysis today after blood products and restart SLED tonight, goal 1-2L net negative fluid balance this evening  -10h nocturnal SLED

## 2022-01-13 NOTE — CARE UPDATE
BG goal 140 - 180     Diet NPO Except for: Sips with Medication  8 Days Post-Op    BG stable without use of insulin therapy.  Endo will continue to follow, and manage glycemic control while inpatient.     - Continue Low Dose SQ Insulin Correction Scale PRN hyperglycemia.  - BG Monitoring AC/HS.     ** Please call Endocrine for any BG related issues **    Lab Results   Component Value Date    HGBA1C 6.0 (H) 01/03/2022       Discharge Planning:   TBD. Please notify endocrinology prior to discharge. Will most likely have patient continue previous home regimen of metformin 500 mg daily, and follow-up with PCP as needed for continued DM management and care.

## 2022-01-13 NOTE — ASSESSMENT & PLAN NOTE
-oliguric ischemic ATN likely secondary to severe intraoperative hypotension requiring epi, norepi, and vaso intraoperatively 1/5/22  -BL sCr 0.8  -creatinine worsened post op delayed likely due to hemodilution  -failed high dose duretic therapy and KRT started 1/9/22 due to volume overload  -back on pressers and intubated yesterday despite 4L net negative fluid balance over prior 48 hours  -last 24 hours is 1L positive with stable fio2 40  -she is getting blood products today and we will continue KRT to help with their volume removal during the day as she has little need for clearance  -SLED continue until 4pm with net 0-500cc fluid balance goal

## 2022-01-13 NOTE — PROGRESS NOTES
[facility]  Nephrology  Progress Note    Patient Name: Orion Ty  MRN: [id number]  Admission Date: 1/5/2022  Hospital Length of Stay: 7 days  Attending Provider: Dennis Haider MD   Primary Care Physician: Otis Zimmer MD  Principal Problem:S/P aortic valve replacement    Subjective:     HPI: Orion Ty is our 76 y.o. female with previous aortic valve replacement, mitral valve replacement, and tricuspid valve repair in 2010 who presented on 1/5 for redo aortic valve replacement. Nephrology consulted on 1/8 for anuric JO. Patient is alert but tired, son at bedside. Stated she was tired prior to admission. During the hospital stay Cr increase from 1 to 1.5. In OR she received  2.5L crystalloid, 3U RBC, 2U FFP, 2 platelets, and 800 cell saver. she is +5 L since admit. UOP decrease overnight, per nurse she didn't urinate at all. She received diuril 500 mg twice, lasix 20 mg *2 on 1/7. This morning she urinate 225 immediately after placing the tejeda's cath. She received again lasix 100 mg this morning. Upor evaluation she was alert but repeatedly saying she was tired, denied any pain. Denied any previous hx of kidney disease.       Interval History: 2L negative fluid balance, CXR with persistent but improved pulmonary edema, given PRBC    Review of patient's allergies indicates:  No Known Allergies  Current Facility-Administered Medications   Medication Frequency    0.9%  NaCl infusion (CRRT USE ONLY) Continuous    0.9%  NaCl infusion (for blood administration) Q24H PRN    0.9%  NaCl infusion (for blood administration) Q24H PRN    0.9%  NaCl infusion (for blood administration) Q24H PRN    0.9%  NaCl infusion Continuous    acetaminophen 1,000 mg/100 mL (10 mg/mL) injection 1,000 mg Q8H    albumin human 5% bottle 25 g Once    albuterol-ipratropium 2.5 mg-0.5 mg/3 mL nebulizer solution 3 mL Q6H WAKE    amiodarone tablet 400 mg BID    aspirin suppository 300 mg Once PRN     atorvastatin tablet 40 mg Daily    balsam peru-castor oiL Oint BID    bisacodyL suppository 10 mg Daily PRN    calcium chloride 2 g in dextrose 5 % 100 mL IVPB Once    dexmedetomidine (PRECEDEX) 400mcg/100mL 0.9% NaCL infusion Continuous    dextrose 50% injection 12.5 g PRN    dextrose 50% injection 25 g PRN    EPINEPHrine (ADRENALIN) 5 mg in dextrose 5 % 250 mL infusion Continuous    fentaNYL 50 mcg/mL injection 25 mcg Q2H PRN    gabapentin capsule 300 mg BID    glucagon (human recombinant) injection 1 mg PRN    glucose chewable tablet 16 g PRN    glucose chewable tablet 24 g PRN    insulin aspart U-100 pen 0-5 Units QID (AC + HS) PRN    LIDOcaine 5 % patch 1 patch Q24H    magnesium sulfate 2g in water 50mL IVPB (premix) PRN    melatonin tablet 9 mg Nightly PRN    NORepinephrine 4 mg in dextrose 5% 250 mL infusion (premix) (titrating) Continuous    ondansetron injection 4 mg Q12H PRN    oxyCODONE immediate release tablet 10 mg Q4H PRN    oxyCODONE immediate release tablet 5 mg Q4H PRN    polyethylene glycol packet 17 g Daily    propofol (DIPRIVAN) 10 mg/mL IVP injection     rocuronium 10 mg/mL injection     sodium chloride 0.9% bolus 250 mL PRN    sodium chloride 0.9% flush 10 mL PRN    vasopressin (PITRESSIN) 0.2 Units/mL in dextrose 5 % 100 mL infusion Continuous    [START ON 1/13/2022] warfarin (COUMADIN) tablet 2 mg Daily     Facility-Administered Medications Ordered in Other Encounters   Medication Frequency    0.9%  NaCl infusion Continuous       Objective:     Vital Signs (Most Recent):  Temp: 98.6 °F (37 °C) (01/12/22 1600)  Pulse: (!) 54 (01/12/22 1730)  Resp: 18 (01/12/22 1739)  BP: (!) 124/59 (01/12/22 1715)  SpO2: 99 % (01/12/22 1730)  O2 Device (Oxygen Therapy): (S) ventilator (01/12/22 1519) Vital Signs (24h Range):  Temp:  [96.2 °F (35.7 °C)-98.6 °F (37 °C)] 98.6 °F (37 °C)  Pulse:  [] 54  Resp:  [18-69] 18  SpO2:  [89 %-100 %] 99 %  BP: (105-124)/(58-59)  124/59  Arterial Line BP: ()/(33-50) 101/36     Weight: 53 kg (116 lb 13.5 oz) (01/09/22 0402)  Body mass index is 22.08 kg/m².  Body surface area is 1.51 meters squared.    I/O last 3 completed shifts:  In: 5186.5 [I.V.:4918.1; IV Piggyback:268.5]  Out: 7046 [Urine:15; Other:7031]    Physical Exam  Constitutional:       General: She is not in acute distress.     Appearance: She is ill-appearing.   HENT:      Mouth/Throat:      Mouth: Mucous membranes are moist.   Eyes:      General: No scleral icterus.     Extraocular Movements: Extraocular movements intact.      Pupils: Pupils are equal, round, and reactive to light.   Cardiovascular:      Rate and Rhythm: Normal rate and regular rhythm.      Comments: JVD  Pulmonary:      Comments: bipap  Abdominal:      General: There is no distension.      Palpations: Abdomen is soft.   Musculoskeletal:         General: No deformity.      Right lower leg: No edema.      Left lower leg: No edema.   Skin:     Coloration: Skin is not jaundiced.   Neurological:      General: No focal deficit present.      Mental Status: She is alert.         Significant Labs:  All labs within the past 24 hours have been reviewed.     Significant Imaging:  Labs: Reviewed  X-Ray: Reviewed    Assessment/Plan:     * S/P aortic valve replacement  -with severe intraoperative hypotension   -per primary    JO (acute kidney injury)  -oliguric ischemic ATN likely secondary to severe intraoperative hypotension requiring epi, norepi, and vaso intraoperatively 1/5/22  -BL sCr 0.8  -creatinine worsened post op delayed likely due to hemodilution  -failed high dose duretic therapy and KRT started 1/9/22 due to volume overload  -off preseers  -on bipap with net negative 2L fluid balance  -ABG benign with no severe electrolyte abnormalities  -plan to give break on dialysis today after blood products and restart SLED tonight, goal 1-2L net negative fluid balance this evening  -10h nocturnal SLED    ATN (acute  tubular necrosis)  -see kailey    Volume overload  -improved with KRT started 1/9/22          Oj Clifford MD  Nephrology  [facility]

## 2022-01-13 NOTE — PROGRESS NOTES
[facility]  Nephrology  Progress Note    Patient Name: Orion Ty  MRN: [id number]  Admission Date: 1/5/2022  Hospital Length of Stay: 8 days  Attending Provider: Dennis Haider MD   Primary Care Physician: Otis Zimmer MD  Principal Problem:S/P aortic valve replacement    Subjective:     HPI: Orion Ty is our 76 y.o. female with previous aortic valve replacement, mitral valve replacement, and tricuspid valve repair in 2010 who presented on 1/5 for redo aortic valve replacement. Nephrology consulted on 1/8 for anuric JO. Patient is alert but tired, son at bedside. Stated she was tired prior to admission. During the hospital stay Cr increase from 1 to 1.5. In OR she received  2.5L crystalloid, 3U RBC, 2U FFP, 2 platelets, and 800 cell saver. she is +5 L since admit. UOP decrease overnight, per nurse she didn't urinate at all. She received diuril 500 mg twice, lasix 20 mg *2 on 1/7. This morning she urinate 225 immediately after placing the tejeda's cath. She received again lasix 100 mg this morning. Upor evaluation she was alert but repeatedly saying she was tired, denied any pain. Denied any previous hx of kidney disease.       Interval History: despite 4L net negative fluid balance over prior 48 hours, patient was intubated yesterday     Review of patient's allergies indicates:  No Known Allergies  Current Facility-Administered Medications   Medication Frequency    0.9%  NaCl infusion (CRRT USE ONLY) Continuous    0.9%  NaCl infusion (for blood administration) Q24H PRN    0.9%  NaCl infusion Continuous    albuterol-ipratropium 2.5 mg-0.5 mg/3 mL nebulizer solution 3 mL Q6H WAKE    amiodarone tablet 400 mg BID    aspirin suppository 300 mg Once PRN    atorvastatin tablet 40 mg Daily    balsam peru-castor oiL Oint BID    bisacodyL suppository 10 mg Daily PRN    dexmedetomidine (PRECEDEX) 400mcg/100mL 0.9% NaCL infusion Continuous    dextrose 50% injection 12.5 g  PRN    dextrose 50% injection 25 g PRN    EPINEPHrine (ADRENALIN) 5 mg in dextrose 5 % 250 mL infusion Continuous    famotidine (PF) injection 20 mg Daily    fentaNYL 50 mcg/mL injection 25 mcg Q2H PRN    gabapentin capsule 300 mg BID    glucagon (human recombinant) injection 1 mg PRN    glucose chewable tablet 16 g PRN    glucose chewable tablet 24 g PRN    insulin aspart U-100 pen 0-5 Units QID (AC + HS) PRN    LIDOcaine 5 % patch 1 patch Q24H    magnesium sulfate 2g in water 50mL IVPB (premix) PRN    melatonin tablet 9 mg Nightly PRN    NORepinephrine 4 mg in dextrose 5% 250 mL infusion (premix) (titrating) Continuous    ondansetron injection 4 mg Q12H PRN    oxyCODONE immediate release tablet 10 mg Q4H PRN    oxyCODONE immediate release tablet 5 mg Q4H PRN    polyethylene glycol packet 17 g Daily    sodium chloride 0.9% bolus 250 mL PRN    sodium chloride 0.9% flush 10 mL PRN    vasopressin (PITRESSIN) 0.2 Units/mL in dextrose 5 % 100 mL infusion Continuous     Facility-Administered Medications Ordered in Other Encounters   Medication Frequency    0.9%  NaCl infusion Continuous       Objective:     Vital Signs (Most Recent):  Temp: 97.6 °F (36.4 °C) (01/13/22 1100)  Pulse: 62 (01/13/22 1230)  Resp: (!) 31 (01/13/22 1230)  BP: (!) 100/49 (01/13/22 1200)  SpO2: 98 % (01/13/22 1230)  O2 Device (Oxygen Therapy): ventilator (01/13/22 1207) Vital Signs (24h Range):  Temp:  [97.6 °F (36.4 °C)-98.9 °F (37.2 °C)] 97.6 °F (36.4 °C)  Pulse:  [] 62  Resp:  [17-62] 31  SpO2:  [74 %-100 %] 98 %  BP: (100-124)/(49-59) 100/49  Arterial Line BP: ()/(33-49) 123/39     Weight: 50.8 kg (111 lb 15.9 oz) (01/13/22 0515)  Body mass index is 21.16 kg/m².  Body surface area is 1.48 meters squared.    I/O last 3 completed shifts:  In: 8426 [I.V.:7386.7; Blood:456; IV Piggyback:583.3]  Out: 7951 [Other:7951]    Physical Exam  Constitutional:       General: She is not in acute distress.     Appearance: She  is ill-appearing.   HENT:      Mouth/Throat:      Mouth: Mucous membranes are moist.   Eyes:      General: No scleral icterus.     Extraocular Movements: Extraocular movements intact.      Pupils: Pupils are equal, round, and reactive to light.   Cardiovascular:      Rate and Rhythm: Normal rate and regular rhythm.      Comments: JVD  Pulmonary:      Comments: intubated  Abdominal:      General: There is no distension.      Palpations: Abdomen is soft.   Musculoskeletal:         General: No deformity.      Right lower leg: No edema.      Left lower leg: No edema.   Skin:     Coloration: Skin is not jaundiced.   Neurological:      General: No focal deficit present.      Mental Status: She is alert.         Significant Labs:  All labs within the past 24 hours have been reviewed.     Significant Imaging:  Labs: Reviewed  X-Ray: Reviewed    Assessment/Plan:     * S/P aortic valve replacement  -with severe intraoperative hypotension   -per primary    JO (acute kidney injury)  -oliguric ischemic ATN likely secondary to severe intraoperative hypotension requiring epi, norepi, and vaso intraoperatively 1/5/22  -BL sCr 0.8  -creatinine worsened post op delayed likely due to hemodilution  -failed high dose duretic therapy and KRT started 1/9/22 due to volume overload  -back on pressers and intubated yesterday despite 4L net negative fluid balance over prior 48 hours  -last 24 hours is 1L positive with stable fio2 40  -she is getting blood products today and we will continue KRT to help with their volume removal during the day as she has little need for clearance  -SLED continue until 4pm with net 0-500cc fluid balance goal    ATN (acute tubular necrosis)  -see jo    Volume overload  -improved with KRT started 1/9/22        Oj Clifford MD  Nephrology  [facility]

## 2022-01-13 NOTE — PT/OT/SLP PROGRESS
Speech Language Pathology  Discharge    Orion Ty  MRN: [id number]    Patient not seen today secondary to Other (Pt currently intubated). Please re-consult once pt extubated and appropriate for SLP services.

## 2022-01-13 NOTE — PROGRESS NOTES
Dominic SCOTT and Jose SCOTT at bedside. Repeat gas completed. Please see flowsheet. ACVC 80 and 7, rate 28. MAPs in the mid 50s related to a HR of 50. OK is MAPs are in the 50s as long as systolic BP is > 100.

## 2022-01-13 NOTE — PLAN OF CARE
"      SICU PLAN OF CARE NOTE    Dx: S/P aortic valve replacement    Shift Events: Pt on CRRT for beginning of shift, and rinsed back per MD request. Pt up to chair with RN, Pt and MD at bedside, and tolerated well for one hour. After an hour, Pt got back to bed, and began appearing lethargic with labored breathing. Abg was drawn with a PH of 1.7 and CO2 of 70. MD notified. Anesthesia came to bedside to intubate pt. Pt remains intubated and requiring multiple pressors at the moment.     Goals of Care: Map > 65    Neuro: Sedated, Follows Commands, and Moves All Extremities    Vital Signs: BP (!) 124/59   Pulse 61   Temp 98.6 °F (37 °C) (Axillary)   Resp (!) 38   Ht 5' 1" (1.549 m)   Wt 53 kg (116 lb 13.5 oz)   SpO2 99%   Breastfeeding No   BMI 22.08 kg/m²     Respiratory: Ventilator 30%, 5.0 PEEP    Diet: NPO    Gtts: Precedex, Norepinephrine, Vasopressin, and Epinephrine    Urine Output: Anuric 0 cc/shift    Drains:     Restraints Q2 restraints check. See flowsheets for more details.     CRRT : Pt rinsed back around Noon. Pt negative 4 L     Labs/Accuchecks: .    Skin: Noted skin breakdown- venelex applied to sacral wound. Heels elevated off bed. Pt repositioned q2 hours. Lines and devices free from skin contact.       "

## 2022-01-14 NOTE — ASSESSMENT & PLAN NOTE
Orion Ty is a 76 y.o. female who presents to the SICU s/p redo aortic valve replacement with mechanical valve on 1/5/22.      Neuro/Psych:   -- Sedation: propofol  -- Pain: fentanyl pushes q2             Cards:   -- Pressors: epi 0.06 and vaso 0.04  -- Goal MAP: 60-80  -- holding metop   -- holding amlodipine  -- statin  -- heparin drip 500 U today, PTT goal 60-80  -- interrogate cardiac device today. Request HR 80 instead of 50  -- s/p 4U FFP on 1/13 with good response of INR      Pulm:   -- Goal O2 sat > 90%  -- intubated 1/12  -- now on 50%/7PEEP  -- ABG PRN  -- 2 mediastinal removed 1/9 L Pleural CT removed 1/8  -- consult gen surg for trach/peg/permacath next week. Need to tolerate tube feeds and decrease settings on vent and pressor requirements      Renal:  -- Mireles removed  -- Oliguric   -- Nephrology following  -- CRRT. Discuss with Nephrology, plan to be net negative 500cc fluid tonight      FEN / GI:   -- Replace lytes as needed  -- OG IN PLACE!!!  -- Nutrition: tube feeds today impact peptide 1.5, goal of 35      ID:   -- WBC 14.3from 11.7  -- Antibiotics: periop ancef complete  -- Some concern for aspiration. Now on zosyn and vanc      Heme/Onc:   -- Hgb 9.5 from 10  -- platelets 59   -- 3U RBC, 2U FFP, 2U platelets in OR  -- Daily CBC  -- Transfused products: 1U RBC on 1/5/22. 1u RBC and 1u FFP on 1/9/21. 2U RBC on 1/12. 4 FFP on 1/13  -- Anticoagulation: ASA   -INR 2.4 this morning.  -- heparin drip today, PTT 60-80      Endo:   -- Gluc goal 140-180  -- Insulin per endocrinology      PPx:   Feeding: tube feeds  Analgesia/Sedation: fent pushes / propofol  Thromboembolic prevention: ASA, heparin drip today  HOB >30: yes  Stress Ulcer ppx: famotidine BID  Glucose control: Critical care goal 140-180 g/dl, ISS    Lines/Drains/Airway: Art line, RIJ Trialysis, ETT       Dispo/Code Status/Palliative:   -- SICU / Full Code

## 2022-01-14 NOTE — ASSESSMENT & PLAN NOTE
-oliguric ischemic ATN likely secondary to severe intraoperative hypotension requiring epi, norepi, and vaso intraoperatively 1/5/22  -BL sCr 0.8  -creatinine worsened post op delayed likely due to hemodilution  -failed high dose duretic therapy and KRT started 1/9/22 due to volume overload  -reintubated 1/12/22  -last 24 hours 600cc net negative  -sled restarted last night due to hypoxia after giving ffp  -hold KRT today and restart tonight with goal 24 h net negative 500cc fluid balance  -SLED 10h this evening  -please try to give blood products while on dialysis and please aggressively replace phosphorus

## 2022-01-14 NOTE — PT/OT/SLP PROGRESS
Physical Therapy      Patient Name:  Orion Ty   MRN:  [id number]    Patient not seen today secondary to  (pt not seen due to being intubated/sedated/pressors and on CRRT.). Will follow-up at a later date    1/14/2022  .

## 2022-01-14 NOTE — PROGRESS NOTES
Pharmacokinetic Initial Assessment: IV Vancomycin    Assessment/Plan:    Initiate intravenous vancomycin with loading dose of 1000 mg once with subsequent doses when random concentrations are less than 20 mcg/mL  Desired empiric serum trough concentration is 10 to 20 mcg/mL  Draw vancomycin random level on 01/15 at AM labs.  Pharmacy will continue to follow and monitor vancomycin.      Please contact pharmacy at [email or web address] [phone] with any questions regarding this assessment.     Thank you for the consult,   Raman Orta       Patient brief summary:  Orion Ty is a 76 y.o. female initiated on antimicrobial therapy with IV Vancomycin for treatment of suspected pneumonia    Drug Allergies:   Review of patient's allergies indicates:  No Known Allergies    Actual Body Weight:   49.7kg    Renal Function:   Estimated Creatinine Clearance: 59.3 mL/min (based on SCr of 0.6 mg/dL).,     Dialysis Method (if applicable):  SLED    CBC (last 72 hours):  Recent Labs   Lab Result Units 01/11/22  1837 01/12/22  0405 01/12/22  0733 01/13/22  0347 01/14/22  0313   WBC K/uL 10.64 11.32 12.40 11.67 14.33*   Hemoglobin g/dL 7.6* 7.5* 7.6* 10.0* 9.5*   Hematocrit % 24.9* 24.6* 25.1* 32.0* 30.9*   Platelets K/uL 88* 76* 91* 67* 59*   Gran % % 87.0* 87.4* 88.7* 85.9* 89.0*   Lymph % % 3.5* 3.8* 3.6* 7.8* 4.0*   Mono % % 8.0 7.0 6.0 3.9* 3.0*   Eosinophil % % 0.1 0.1 0.2 0.9 2.0   Basophil % % 0.1 0.2 0.1 0.3 0.0   Differential Method  Automated Automated Automated Automated Manual       Metabolic Panel (last 72 hours):  Recent Labs   Lab Result Units 01/11/22  2152 01/12/22  0405 01/12/22  0733 01/12/22  2209 01/13/22  0347 01/13/22  0816 01/13/22  1310 01/13/22  1723 01/13/22  2051 01/13/22  2233 01/14/22  0313 01/14/22  0807 01/14/22  1439   Sodium mmol/L 138 136  136  --  137 138  139 138 140 141 141 137 137  --  137  137   Potassium mmol/L 4.5 4.4  4.4 4.6 3.6 3.6  3.5 3.7  3.7 3.4* 3.4* 3.3* 3.3* 4.0 4.1 3.7  3.7    Chloride mmol/L 107 106  106  --  109 106  106 105 106 105 105 103 105  --  104  104   CO2 mmol/L 19* 21*  21*  --  17* 20*  20* 22* 25 21* 19* 21* 20*  --  18*  18*   Glucose mg/dL 112* 101  102  --  134* 112*  113* 117* 92 95 122* 130* 106  --  149*  149*   BUN mg/dL 9 4*  4*  --  6* <3*  3* <3* <3* 4* 6* 7* 3*  --  5*  5*   Creatinine mg/dL 0.6 0.4*  0.4*  --  0.5 0.4*  0.4* 0.4* 0.4* 0.6 0.8 0.9 0.5  --  0.6  0.6   Albumin g/dL 3.2* 3.3*  --  3.5 3.9 3.9 3.8  --  3.6 3.6  --   --  3.2*  3.2*   Total Bilirubin mg/dL  --   --   --   --   --  2.6*  --   --   --   --   --   --   --    Alkaline Phosphatase U/L  --   --   --   --   --  70  --   --   --   --   --   --   --    AST U/L  --   --   --   --   --  41*  --   --   --   --   --   --   --    ALT U/L  --   --   --   --   --  12  --   --   --   --   --   --   --    Magnesium mg/dL 1.9 2.0  --  1.8 2.3  --   --   --   --  1.7 1.7  --   --    Phosphorus mg/dL 2.4* 1.5*  1.5*  --  2.2* 1.4*  1.4*  --  1.9*  --  1.9* 2.2* 1.2*  --  3.7  3.7       Drug levels (last 3 results):  No results for input(s): VANCOMYCINRA, VANCOMYCINPE, VANCOMYCINTR in the last 72 hours.    Microbiologic Results:  Microbiology Results (last 7 days)     Procedure Component Value Units Date/Time    Respiratory Infection Panel (PCR), Nasopharyngeal [[id number]]     Order Status: Canceled Specimen: Nasopharyngeal Swab

## 2022-01-14 NOTE — CARE UPDATE
BG goal 140 - 180     Diet NPO Except for: Sips with Medication  9 Days Post-Op    BG stable without use of insulin therapy.  Remains intubated and sedated. Endo will continue to follow, and manage glycemic control while inpatient.     Plan:  - Continue Low Dose SQ Insulin Correction Scale PRN hyperglycemia.  - Change BG Monitoring to q 6 hrs.     ** Please call Endocrine for any BG related issues **    Lab Results   Component Value Date    HGBA1C 6.0 (H) 01/03/2022       Discharge Planning:   TBD. Please notify endocrinology prior to discharge.

## 2022-01-14 NOTE — PROGRESS NOTES
[facility]  Nephrology  Progress Note    Patient Name: Orion Ty  MRN: [id number]  Admission Date: 1/5/2022  Hospital Length of Stay: 9 days  Attending Provider: Dennis Haider MD   Primary Care Physician: Otis Zimmer MD  Principal Problem:S/P aortic valve replacement    Subjective:     HPI: Orion Ty is our 76 y.o. female with previous aortic valve replacement, mitral valve replacement, and tricuspid valve repair in 2010 who presented on 1/5 for redo aortic valve replacement. Nephrology consulted on 1/8 for anuric JO. Patient is alert but tired, son at bedside. Stated she was tired prior to admission. During the hospital stay Cr increase from 1 to 1.5. In OR she received  2.5L crystalloid, 3U RBC, 2U FFP, 2 platelets, and 800 cell saver. she is +5 L since admit. UOP decrease overnight, per nurse she didn't urinate at all. She received diuril 500 mg twice, lasix 20 mg *2 on 1/7. This morning she urinate 225 immediately after placing the tejeda's cath. She received again lasix 100 mg this morning. Upor evaluation she was alert but repeatedly saying she was tired, denied any pain. Denied any previous hx of kidney disease.       Interval History: near ideal 600cc negative fluid balance in last 24 hours, sled restarted last night as patient was given ffp for elevated inr; on epi, norepi, vaso; fio2 from 60 to 50    Review of patient's allergies indicates:  No Known Allergies  Current Facility-Administered Medications   Medication Frequency    0.9%  NaCl infusion (CRRT USE ONLY) Continuous    0.9%  NaCl infusion (for blood administration) Q24H PRN    0.9%  NaCl infusion (for blood administration) Q24H PRN    0.9%  NaCl infusion Continuous    albuterol-ipratropium 2.5 mg-0.5 mg/3 mL nebulizer solution 3 mL Q6H WAKE    amiodarone tablet 400 mg BID    aspirin suppository 300 mg Once PRN    atorvastatin tablet 40 mg Daily    balsam peru-castor oiL Oint BID    bisacodyL  suppository 10 mg Daily PRN    dextrose 50% injection 12.5 g PRN    EPINEPHrine (ADRENALIN) 5 mg in dextrose 5 % 250 mL infusion Continuous    famotidine (PF) injection 20 mg Daily    fentaNYL 50 mcg/mL injection 25 mcg Q2H PRN    glucagon (human recombinant) injection 1 mg PRN    heparin 25,000 units in dextrose 5% 250 mL (100 units/mL) infusion (heparin infusion - NO NOMOGRAM) Continuous    insulin aspart U-100 pen 0-5 Units Q6H PRN    LIDOcaine 5 % patch 1 patch Q24H    magnesium sulfate 2g in water 50mL IVPB (premix) PRN    melatonin tablet 9 mg Nightly PRN    NORepinephrine 4 mg in dextrose 5% 250 mL infusion (premix) (titrating) Continuous    ondansetron injection 4 mg Q12H PRN    oxyCODONE immediate release tablet 10 mg Q4H PRN    oxyCODONE immediate release tablet 5 mg Q4H PRN    piperacillin-tazobactam 4.5 g in sodium chloride 0.9% 100 mL IVPB (ready to mix system) Q8H    polyethylene glycol packet 17 g Daily    propofol (DIPRIVAN) 10 mg/mL infusion Continuous    sodium chloride 0.9% bolus 250 mL PRN    sodium chloride 0.9% flush 10 mL PRN    vasopressin (PITRESSIN) 0.2 Units/mL in dextrose 5 % 100 mL infusion Continuous     Facility-Administered Medications Ordered in Other Encounters   Medication Frequency    0.9%  NaCl infusion Continuous       Objective:     Vital Signs (Most Recent):  Temp: 98.9 °F (37.2 °C) (01/14/22 0315)  Pulse: (!) 122 (01/14/22 0900)  Resp: (!) 29 (01/14/22 0900)  BP: (!) 120/57 (01/14/22 0900)  SpO2: 95 % (01/14/22 0900)  O2 Device (Oxygen Therapy): ventilator (01/14/22 0749) Vital Signs (24h Range):  Temp:  [97.6 °F (36.4 °C)-99.8 °F (37.7 °C)] 98.9 °F (37.2 °C)  Pulse:  [] 122  Resp:  [17-40] 29  SpO2:  [93 %-100 %] 95 %  BP: (100-120)/(49-57) 120/57  Arterial Line BP: (102-151)/(34-54) 116/51     Weight: 49.7 kg (109 lb 9.1 oz) (01/14/22 9075)  Body mass index is 20.7 kg/m².  Body surface area is 1.46 meters squared.    I/O last 3 completed  shifts:  In: 8616.7 [I.V.:7150; Blood:687.5; IV Piggyback:779.3]  Out: 9706 [Other:9706]    Physical Exam  Constitutional:       General: She is not in acute distress.     Appearance: She is ill-appearing.   HENT:      Mouth/Throat:      Mouth: Mucous membranes are moist.   Eyes:      General: No scleral icterus.     Pupils: Pupils are equal, round, and reactive to light.   Cardiovascular:      Rate and Rhythm: Normal rate and regular rhythm.      Comments: JVD  Pulmonary:      Comments: intubated  Abdominal:      General: There is no distension.      Palpations: Abdomen is soft.   Musculoskeletal:         General: No deformity.      Right lower leg: No edema.      Left lower leg: No edema.   Skin:     Coloration: Skin is not jaundiced.         Significant Labs:  All labs within the past 24 hours have been reviewed.     Significant Imaging:  Labs: Reviewed  X-Ray: Reviewed    Assessment/Plan:     * S/P aortic valve replacement  -with severe intraoperative hypotension   -per primary    JO (acute kidney injury)  -oliguric ischemic ATN likely secondary to severe intraoperative hypotension requiring epi, norepi, and vaso intraoperatively 1/5/22  -BL sCr 0.8  -creatinine worsened post op delayed likely due to hemodilution  -failed high dose duretic therapy and KRT started 1/9/22 due to volume overload  -reintubated 1/12/22  -last 24 hours 600cc net negative  -sled restarted last night due to hypoxia after giving ffp  -hold KRT today and restart tonight with goal 24 h net negative 500cc fluid balance  -SLED 10h this evening  -please try to give blood products while on dialysis and please aggressively replace phosphorus      ATN (acute tubular necrosis)  -see jo    Volume overload  -improved with KRT started 1/9/22          Oj Clifford MD  Nephrology  [facility]

## 2022-01-14 NOTE — PROGRESS NOTES
01/14/22 1300   Treatment   Treatment Type SLED   Treatment Status Discontinued treatment;Blood returned   Dialysis Machine Number [id number]   Dialyzer Time (hours) 10.51   BVP (Liters) 130.3 L   Access Temporary Cath;Right;IJ   Catheter Dressing Intact  Yes   CRRT Comments CRRT tx completed

## 2022-01-14 NOTE — PROGRESS NOTES
Inpatient device interrogation and/or reprogramming orders received; the industry representative was contacted and provided with patient's name and room number.

## 2022-01-14 NOTE — PLAN OF CARE
"      SICU PLAN OF CARE NOTE    Dx: S/P aortic valve replacement    Shift Events: 12-hour SLED treatment restarted overnight. ABGs Q3H. Labs trended. Pressor titrated to keep MAPs 60-80 and SBP > 100. Patient follows commands and moves all extremities. Dr. Barr notified of patient's status throughout night.      Goals of Care: MAPs 60-80; SBP > 100    Neuro: Sedated, Arouses to Voice, Follows Commands, and Moves All Extremities    Vital Signs: BP (!) 116/56   Pulse 107   Temp 98.9 °F (37.2 °C) (Oral)   Resp (!) 39   Ht 5' 1" (1.549 m)   Wt 49.7 kg (109 lb 9.1 oz)   SpO2 99%   Breastfeeding No   BMI 20.70 kg/m²     Respiratory: Ventilator; AV/VC+; 50% FiO2; 7 PEEP    Diet: NPO    Gtts: Propfol, Precedex, Norepinephrine, Vasopressin, and Epinephrine    Urine Output: Anuric     Drains: NG/OG Tube 0 cc /  shift     Labs/Accuchecks: Labs trended overnight. Accuchecks ACHS. Electrolytes replaced as ordered.     Skin: Sacrum remains purple/maroon. Turned Q2H, venelex applied. No other skin breakdown noted. Full CHG bath given, linens changed.       "

## 2022-01-14 NOTE — SUBJECTIVE & OBJECTIVE
Interval History: near ideal 600cc negative fluid balance in last 24 hours, sled restarted last night as patient was given ffp for elevated inr; on epi, norepi, vaso; fio2 from 60 to 50    Review of patient's allergies indicates:  No Known Allergies  Current Facility-Administered Medications   Medication Frequency    0.9%  NaCl infusion (CRRT USE ONLY) Continuous    0.9%  NaCl infusion (for blood administration) Q24H PRN    0.9%  NaCl infusion (for blood administration) Q24H PRN    0.9%  NaCl infusion Continuous    albuterol-ipratropium 2.5 mg-0.5 mg/3 mL nebulizer solution 3 mL Q6H WAKE    amiodarone tablet 400 mg BID    aspirin suppository 300 mg Once PRN    atorvastatin tablet 40 mg Daily    balsam peru-castor oiL Oint BID    bisacodyL suppository 10 mg Daily PRN    dextrose 50% injection 12.5 g PRN    EPINEPHrine (ADRENALIN) 5 mg in dextrose 5 % 250 mL infusion Continuous    famotidine (PF) injection 20 mg Daily    fentaNYL 50 mcg/mL injection 25 mcg Q2H PRN    glucagon (human recombinant) injection 1 mg PRN    heparin 25,000 units in dextrose 5% 250 mL (100 units/mL) infusion (heparin infusion - NO NOMOGRAM) Continuous    insulin aspart U-100 pen 0-5 Units Q6H PRN    LIDOcaine 5 % patch 1 patch Q24H    magnesium sulfate 2g in water 50mL IVPB (premix) PRN    melatonin tablet 9 mg Nightly PRN    NORepinephrine 4 mg in dextrose 5% 250 mL infusion (premix) (titrating) Continuous    ondansetron injection 4 mg Q12H PRN    oxyCODONE immediate release tablet 10 mg Q4H PRN    oxyCODONE immediate release tablet 5 mg Q4H PRN    piperacillin-tazobactam 4.5 g in sodium chloride 0.9% 100 mL IVPB (ready to mix system) Q8H    polyethylene glycol packet 17 g Daily    propofol (DIPRIVAN) 10 mg/mL infusion Continuous    sodium chloride 0.9% bolus 250 mL PRN    sodium chloride 0.9% flush 10 mL PRN    vasopressin (PITRESSIN) 0.2 Units/mL in dextrose 5 % 100 mL infusion Continuous      Facility-Administered Medications Ordered in Other Encounters   Medication Frequency    0.9%  NaCl infusion Continuous       Objective:     Vital Signs (Most Recent):  Temp: 98.9 °F (37.2 °C) (01/14/22 0315)  Pulse: (!) 122 (01/14/22 0900)  Resp: (!) 29 (01/14/22 0900)  BP: (!) 120/57 (01/14/22 0900)  SpO2: 95 % (01/14/22 0900)  O2 Device (Oxygen Therapy): ventilator (01/14/22 0749) Vital Signs (24h Range):  Temp:  [97.6 °F (36.4 °C)-99.8 °F (37.7 °C)] 98.9 °F (37.2 °C)  Pulse:  [] 122  Resp:  [17-40] 29  SpO2:  [93 %-100 %] 95 %  BP: (100-120)/(49-57) 120/57  Arterial Line BP: (102-151)/(34-54) 116/51     Weight: 49.7 kg (109 lb 9.1 oz) (01/14/22 0439)  Body mass index is 20.7 kg/m².  Body surface area is 1.46 meters squared.    I/O last 3 completed shifts:  In: 8616.7 [I.V.:7150; Blood:687.5; IV Piggyback:779.3]  Out: 9706 [Other:9706]    Physical Exam  Constitutional:       General: She is not in acute distress.     Appearance: She is ill-appearing.   HENT:      Mouth/Throat:      Mouth: Mucous membranes are moist.   Eyes:      General: No scleral icterus.     Pupils: Pupils are equal, round, and reactive to light.   Cardiovascular:      Rate and Rhythm: Normal rate and regular rhythm.      Comments: JVD  Pulmonary:      Comments: intubated  Abdominal:      General: There is no distension.      Palpations: Abdomen is soft.   Musculoskeletal:         General: No deformity.      Right lower leg: No edema.      Left lower leg: No edema.   Skin:     Coloration: Skin is not jaundiced.         Significant Labs:  All labs within the past 24 hours have been reviewed.     Significant Imaging:  Labs: Reviewed  X-Ray: Reviewed

## 2022-01-14 NOTE — PROGRESS NOTES
[facility]  Critical Care - Surgery  Progress Note    Patient Name: Orion Ty  MRN: [id number]  Admission Date: 1/5/2022  Hospital Length of Stay: 9 days  Code Status: Full Code  Attending Provider: Dennis Haider MD  Primary Care Provider: Otis Zimmer MD   Principal Problem: S/P aortic valve replacement    Subjective:     Hospital/ICU Course:  No notes on file    Interval History/Significant Events:   - INR corrected with 4 FFP, but going up again. Now at 2.4  - OG placed overnight  - lungs stable on CXR, net negative 600cc  - Coumadin held yesterday, INR now 2.4 today.  - pressor requirements, now on 0.06 epi and 0.04 vaso     Follow-up For: Procedure(s) (LRB):  REPLACEMENT, AORTIC VALVE, WITH REPEAT STERNOTOMY (N/A)    Post-Operative Day: 7 Days Post-Op    Objective:     Vital Signs (Most Recent):  Temp: 98.9 °F (37.2 °C) (01/14/22 0315)  Pulse: (!) 122 (01/14/22 0900)  Resp: (!) 29 (01/14/22 0900)  BP: (!) 120/57 (01/14/22 0900)  SpO2: 95 % (01/14/22 0900) Vital Signs (24h Range):  Temp:  [97.6 °F (36.4 °C)-99.8 °F (37.7 °C)] 98.9 °F (37.2 °C)  Pulse:  [] 122  Resp:  [17-40] 29  SpO2:  [74 %-100 %] 95 %  BP: (100-120)/(49-57) 120/57  Arterial Line BP: (102-151)/(33-54) 116/51     Weight: 49.7 kg (109 lb 9.1 oz)  Body mass index is 20.7 kg/m².      Intake/Output Summary (Last 24 hours) at 1/14/2022 0956  Last data filed at 1/14/2022 0900  Gross per 24 hour   Intake 4894.66 ml   Output 5808 ml   Net -913.34 ml       Physical Exam  Constitutional:       General: She is not in acute distress.     Comments: Intubated and sedated   HENT:      Head: Normocephalic and atraumatic.   Neck:      Comments: R IJ Trialysis  Cardiovascular:      Rate and Rhythm: Normal rate and regular rhythm.      Pulses: Normal pulses.      Comments: Midline sternotomy incision c/d/I  Pacemaker in place  Pulmonary:      Effort: Pulmonary effort is normal. No respiratory distress.      Comments:  intubated  Abdominal:      General: Abdomen is flat. There is no distension.      Palpations: Abdomen is soft.      Tenderness: There is no abdominal tenderness.   Genitourinary:     Comments: R groin well incision, bandage in place. Thigh soft, no hematoma  Musculoskeletal:      Comments: R radial art line in place   Skin:     General: Skin is warm.      Capillary Refill: Capillary refill takes less than 2 seconds.   Neurological:      Mental Status: She is alert.         Vents:  Vent Mode: A/C (01/14/22 0749)  Ventilator Initiated: Yes (01/05/22 1427)  Set Rate: 30 BPM (01/14/22 0749)  Vt Set: 300 mL (01/14/22 0749)  PEEP/CPAP: 7 cmH20 (01/14/22 0749)  Oxygen Concentration (%): 50 (01/14/22 0900)  Peak Airway Pressure: 30 cmH2O (01/14/22 0749)  Plateau Pressure: 17 cmH20 (01/14/22 0749)  Total Ve: 11.2 mL (01/14/22 0749)  Negative Inspiratory Force (cm H2O): 0 (01/14/22 0749)  F/VT Ratio<105 (RSBI): (!) 77.12 (01/14/22 0749)    Lines/Drains/Airways     Central Venous Catheter Line            Trialysis (Dialysis) Catheter 01/08/22 1027 right internal jugular 5 days          Airway                 Airway - Non-Surgical 01/12/22 1505 Endotracheal Tube 1 day       Airway Anesthesia 01/12/22 1 day          Arterial Line            Arterial Line 01/05/22 0855 Right Radial 9 days          Peripheral Intravenous Line                 Peripheral IV - Single Lumen 01/06/22 1833 20 G Left Antecubital 7 days         Midline Catheter Insertion/Assessment  - Single Lumen 01/10/22 1300 Left cephalic vein (lateral side of arm) 18g x 8cm 3 days                Significant Labs:    CBC/Anemia Profile:  Recent Labs   Lab 01/12/22  1432 01/13/22  0347 01/14/22  0313   WBC  --  11.67 14.33*   HGB  --  10.0* 9.5*   HCT 34* 32.0* 30.9*   PLT  --  67* 59*   MCV  --  97 98   RDW  --  18.2* 18.3*        Chemistries:  Recent Labs   Lab 01/13/22  0347 01/13/22  0347 01/13/22  0816 01/13/22  0816 01/13/22  1310 01/13/22  1723 01/13/22 2051  01/13/22 2051 01/13/22 2233 01/14/22  0313 01/14/22  0807     139   < > 138   < > 140   < > 141  --  137 137  --    K 3.6  3.5   < > 3.7  3.7   < > 3.4*   < > 3.3*   < > 3.3* 4.0 4.1     106   < > 105   < > 106   < > 105  --  103 105  --    CO2 20*  20*   < > 22*   < > 25   < > 19*  --  21* 20*  --    BUN <3*  3*   < > <3*   < > <3*   < > 6*  --  7* 3*  --    CREATININE 0.4*  0.4*   < > 0.4*   < > 0.4*   < > 0.8  --  0.9 0.5  --    CALCIUM 8.9  8.9   < > 8.8   < > 8.3*   < > 8.3*  --  8.4* 7.9*  --    ALBUMIN 3.9   < > 3.9   < > 3.8  --  3.6  --  3.6  --   --    PROT  --   --  6.3  --   --   --   --   --   --   --   --    BILITOT  --   --  2.6*  --   --   --   --   --   --   --   --    ALKPHOS  --   --  70  --   --   --   --   --   --   --   --    ALT  --   --  12  --   --   --   --   --   --   --   --    AST  --   --  41*  --   --   --   --   --   --   --   --    MG 2.3  --   --   --   --   --   --   --  1.7 1.7  --    PHOS 1.4*  1.4*  --   --    < > 1.9*   < > 1.9*  --  2.2* 1.2*  --     < > = values in this interval not displayed.       ABGs:   Recent Labs   Lab 01/14/22  0751   PH 7.332*   PCO2 43.4   HCO3 23.0*   POCSATURATED 99   BE -3       Significant Imaging:  I have reviewed all pertinent imaging results/findings within the past 24 hours.    Assessment/Plan:     * S/P aortic valve replacement  Orion Ty is a 76 y.o. female who presents to the SICU s/p redo aortic valve replacement with mechanical valve on 1/5/22.      Neuro/Psych:   -- Sedation: propofol  -- Pain: fentanyl pushes q2             Cards:   -- Pressors: epi 0.06 and vaso 0.04  -- Goal MAP: 60-80  -- holding metop   -- holding amlodipine  -- statin  -- heparin drip 500 U today, PTT goal 60-80  -- interrogate cardiac device today. Request HR 80 instead of 50  -- s/p 4U FFP on 1/13 with good response of INR      Pulm:   -- Goal O2 sat > 90%  -- intubated 1/12  -- now on 50%/7PEEP  -- ABG PRN  -- 2 mediastinal  removed 1/9 L Pleural CT removed 1/8  -- consult gen surg for trach/peg/permacath next week. Need to tolerate tube feeds and decrease settings on vent and pressor requirements      Renal:  -- Mireles removed  -- Oliguric   -- Nephrology following  -- CRRT. Discuss with Nephrology, plan to be net negative 500cc fluid tonight      FEN / GI:   -- Replace lytes as needed  -- OG IN PLACE!!!  -- Nutrition: tube feeds today impact peptide 1.5, goal of 35      ID:   -- WBC 14.3from 11.7  -- Antibiotics: periop ancef complete  -- Some concern for aspiration. Now on zosyn and vanc      Heme/Onc:   -- Hgb 9.5 from 10  -- platelets 59   -- 3U RBC, 2U FFP, 2U platelets in OR  -- Daily CBC  -- Transfused products: 1U RBC on 1/5/22. 1u RBC and 1u FFP on 1/9/21. 2U RBC on 1/12. 4 FFP on 1/13  -- Anticoagulation: ASA   -INR 2.4 this morning.  -- heparin drip today, PTT 60-80      Endo:   -- Gluc goal 140-180  -- Insulin per endocrinology      PPx:   Feeding: tube feeds  Analgesia/Sedation: fent pushes / propofol  Thromboembolic prevention: ASA, heparin drip today  HOB >30: yes  Stress Ulcer ppx: famotidine BID  Glucose control: Critical care goal 140-180 g/dl, ISS    Lines/Drains/Airway: Art line, RIJ Trialysis, ETT       Dispo/Code Status/Palliative:   -- SICU / Full Code               Critical care was time spent personally by me on the following activities: development of treatment plan with patient or surrogate and bedside caregivers, discussions with consultants, evaluation of patient's response to treatment, examination of patient, ordering and performing treatments and interventions, ordering and review of laboratory studies, ordering and review of radiographic studies, pulse oximetry, re-evaluation of patient's condition.  This critical care time did not overlap with that of any other provider or involve time for any procedures.     Caleb Garcia MD  Critical Care - Surgery  [facility]

## 2022-01-14 NOTE — SUBJECTIVE & OBJECTIVE
Interval History/Significant Events:   - INR corrected with 4 FFP, but going up again. Now at 2.4  - OG placed overnight  - lungs stable on CXR, net negative 600cc  - Coumadin held yesterday, INR now 2.4 today.  - pressor requirements, now on 0.06 epi and 0.04 vaso     Follow-up For: Procedure(s) (LRB):  REPLACEMENT, AORTIC VALVE, WITH REPEAT STERNOTOMY (N/A)    Post-Operative Day: 7 Days Post-Op    Objective:     Vital Signs (Most Recent):  Temp: 98.9 °F (37.2 °C) (01/14/22 0315)  Pulse: (!) 122 (01/14/22 0900)  Resp: (!) 29 (01/14/22 0900)  BP: (!) 120/57 (01/14/22 0900)  SpO2: 95 % (01/14/22 0900) Vital Signs (24h Range):  Temp:  [97.6 °F (36.4 °C)-99.8 °F (37.7 °C)] 98.9 °F (37.2 °C)  Pulse:  [] 122  Resp:  [17-40] 29  SpO2:  [74 %-100 %] 95 %  BP: (100-120)/(49-57) 120/57  Arterial Line BP: (102-151)/(33-54) 116/51     Weight: 49.7 kg (109 lb 9.1 oz)  Body mass index is 20.7 kg/m².      Intake/Output Summary (Last 24 hours) at 1/14/2022 0956  Last data filed at 1/14/2022 0900  Gross per 24 hour   Intake 4894.66 ml   Output 5808 ml   Net -913.34 ml       Physical Exam  Constitutional:       General: She is not in acute distress.     Comments: Intubated and sedated   HENT:      Head: Normocephalic and atraumatic.   Neck:      Comments: R IJ Trialysis  Cardiovascular:      Rate and Rhythm: Normal rate and regular rhythm.      Pulses: Normal pulses.      Comments: Midline sternotomy incision c/d/I  Pacemaker in place  Pulmonary:      Effort: Pulmonary effort is normal. No respiratory distress.      Comments: intubated  Abdominal:      General: Abdomen is flat. There is no distension.      Palpations: Abdomen is soft.      Tenderness: There is no abdominal tenderness.   Genitourinary:     Comments: R groin well incision, bandage in place. Thigh soft, no hematoma  Musculoskeletal:      Comments: R radial art line in place   Skin:     General: Skin is warm.      Capillary Refill: Capillary refill takes less than  2 seconds.   Neurological:      Mental Status: She is alert.         Vents:  Vent Mode: A/C (01/14/22 0749)  Ventilator Initiated: Yes (01/05/22 1427)  Set Rate: 30 BPM (01/14/22 0749)  Vt Set: 300 mL (01/14/22 0749)  PEEP/CPAP: 7 cmH20 (01/14/22 0749)  Oxygen Concentration (%): 50 (01/14/22 0900)  Peak Airway Pressure: 30 cmH2O (01/14/22 0749)  Plateau Pressure: 17 cmH20 (01/14/22 0749)  Total Ve: 11.2 mL (01/14/22 0749)  Negative Inspiratory Force (cm H2O): 0 (01/14/22 0749)  F/VT Ratio<105 (RSBI): (!) 77.12 (01/14/22 0749)    Lines/Drains/Airways     Central Venous Catheter Line            Trialysis (Dialysis) Catheter 01/08/22 1027 right internal jugular 5 days          Airway                 Airway - Non-Surgical 01/12/22 1505 Endotracheal Tube 1 day       Airway Anesthesia 01/12/22 1 day          Arterial Line            Arterial Line 01/05/22 0855 Right Radial 9 days          Peripheral Intravenous Line                 Peripheral IV - Single Lumen 01/06/22 1833 20 G Left Antecubital 7 days         Midline Catheter Insertion/Assessment  - Single Lumen 01/10/22 1300 Left cephalic vein (lateral side of arm) 18g x 8cm 3 days                Significant Labs:    CBC/Anemia Profile:  Recent Labs   Lab 01/12/22  1432 01/13/22  0347 01/14/22  0313   WBC  --  11.67 14.33*   HGB  --  10.0* 9.5*   HCT 34* 32.0* 30.9*   PLT  --  67* 59*   MCV  --  97 98   RDW  --  18.2* 18.3*        Chemistries:  Recent Labs   Lab 01/13/22  0347 01/13/22  0347 01/13/22  0816 01/13/22  0816 01/13/22  1310 01/13/22  1723 01/13/22  2051 01/13/22  2051 01/13/22  2233 01/14/22  0313 01/14/22  0807     139   < > 138   < > 140   < > 141  --  137 137  --    K 3.6  3.5   < > 3.7  3.7   < > 3.4*   < > 3.3*   < > 3.3* 4.0 4.1     106   < > 105   < > 106   < > 105  --  103 105  --    CO2 20*  20*   < > 22*   < > 25   < > 19*  --  21* 20*  --    BUN <3*  3*   < > <3*   < > <3*   < > 6*  --  7* 3*  --    CREATININE 0.4*  0.4*   < >  0.4*   < > 0.4*   < > 0.8  --  0.9 0.5  --    CALCIUM 8.9  8.9   < > 8.8   < > 8.3*   < > 8.3*  --  8.4* 7.9*  --    ALBUMIN 3.9   < > 3.9   < > 3.8  --  3.6  --  3.6  --   --    PROT  --   --  6.3  --   --   --   --   --   --   --   --    BILITOT  --   --  2.6*  --   --   --   --   --   --   --   --    ALKPHOS  --   --  70  --   --   --   --   --   --   --   --    ALT  --   --  12  --   --   --   --   --   --   --   --    AST  --   --  41*  --   --   --   --   --   --   --   --    MG 2.3  --   --   --   --   --   --   --  1.7 1.7  --    PHOS 1.4*  1.4*  --   --    < > 1.9*   < > 1.9*  --  2.2* 1.2*  --     < > = values in this interval not displayed.       ABGs:   Recent Labs   Lab 01/14/22  0751   PH 7.332*   PCO2 43.4   HCO3 23.0*   POCSATURATED 99   BE -3       Significant Imaging:  I have reviewed all pertinent imaging results/findings within the past 24 hours.

## 2022-01-14 NOTE — PT/OT/SLP PROGRESS
Occupational Therapy      Patient Name:  Orion Ty   MRN:  [id number]    Pt remains intubated and will check status at later date to continue with therapy services when appropriate.     1/14/2022

## 2022-01-14 NOTE — PROGRESS NOTES
01/13/22 2304   Treatment   Treatment Type SLED   Treatment Status Restart   Dialysis Machine Number [id number]   Dialyzer Time (hours) 0   BVP (Liters) 0 L   Solutions Labeled and Current  Yes   Access Right;IJ;Left   Catheter Dressing Intact  Yes   Alarms Engaged Yes   CRRT Comments SLED restarted   Prescription   Time (Hours) 12   Dialysate K + (mEq/L) 4   Dialysate CA + (mEq/L) 2.25   Dialysate HCO3 - (Bicarb) (mEq/L) 30   Dialysate Na + (mEq/L) 138   Cartridge Type Other  (f16)   Dialysate Flow Rate (mL/min) 200   UF Goal Rate 400 mL/hr   CRRT Hourly Documentation   Blood Flow (mL/min) 200   UF Rate 200 cc/hr   Arterial Pressure (mmHg) -50 mmHg   Venous Pressure (mmHg) 40 mmHg   Effluent Pressure (EP) (mmHg) 60 mmHg     SLED restarted. UF rate set at 200. Report given to primary nurse.

## 2022-01-15 NOTE — PLAN OF CARE
"      SICU PLAN OF CARE NOTE    Dx: S/P aortic valve replacement    Shift Events: vasopressors weaned    Goals of Care: MAP > 65    Neuro: Arouses to Voice, Follows Commands and Moves All Extremities    Vital Signs: BP (!) 90/54 (BP Location: Right arm, Patient Position: Lying)   Pulse 80   Temp 97.4 °F (36.3 °C) (Oral)   Resp (!) 43   Ht 5' 1" (1.549 m)   Wt 49.7 kg (109 lb 9.1 oz)   SpO2 96%   Breastfeeding No   BMI 20.70 kg/m²     Respiratory: Ventilator    Diet: Tube Feeds    Gtts: Propfol, Vasopressin and Epinephrine    Urine Output: oliguric, no voids witnessed, on CRRT         Labs/Accuchecks: Q4 accuchecks, trending RFP/PT inr/PTT    Skin: no further breakdown noted, patient turned Q2H, patient wound care completed per castor oil to sacrum     "

## 2022-01-15 NOTE — PROGRESS NOTES
Pharmacokinetic Assessment Follow Up: IV Vancomycin    Vancomycin serum concentration assessment(s):    - The random level resulted at 11.7 mcg/mL and is within the desired definitive target range of 10 to 20 mcg/mL.  - Nephrology following and ordered 10 hour SLED today    Vancomycin Regimen Plan:  - Give 500 mg IV once this morning  - Re-dose when the random level is less than 20 mcg/mL, next level to be drawn tomorrow with AM labs    Drug levels (last 3 results):  Recent Labs   Lab Result Units 01/15/22  0326   Vancomycin, Random ug/mL 11.7       Pharmacy will continue to follow and monitor vancomycin.    Please contact pharmacy at extension [phone] for questions regarding this assessment.    Thank you for the consult,   Tequila Escalona       Patient brief summary:  Orion Ty is a 76 y.o. female initiated on antimicrobial therapy with IV Vancomycin for treatment of lower respiratory infection    The patient's current regimen is pulse dose based on level and RRT plans    Drug Allergies:   Review of patient's allergies indicates:  No Known Allergies    Actual Body Weight:   49.7 kg    Renal Function:   Estimated Creatinine Clearance: 71.1 mL/min (based on SCr of 0.5 mg/dL).,     Dialysis Method (if applicable):  SLED 10 hours    CBC (last 72 hours):  Recent Labs   Lab Result Units 01/13/22  0347 01/14/22  0313 01/15/22  0326   WBC K/uL 11.67 14.33* 15.08*   Hemoglobin g/dL 10.0* 9.5* 8.5*   Hematocrit % 32.0* 30.9* 26.6*   Platelets K/uL 67* 59* 56*   Gran % % 85.9* 89.0* 92.3*   Lymph % % 7.8* 4.0* 2.9*   Mono % % 3.9* 3.0* 3.2*   Eosinophil % % 0.9 2.0 0.8   Basophil % % 0.3 0.0 0.1   Differential Method  Automated Manual Automated       Metabolic Panel (last 72 hours):  Recent Labs   Lab Result Units 01/12/22  2209 01/13/22  0347 01/13/22  0816 01/13/22  1310 01/13/22  1723 01/13/22  2051 01/13/22  2233 01/14/22  0313 01/14/22  0807 01/14/22  1439 01/14/22  1934 01/14/22  2159 01/15/22  0326 01/15/22  0826    Sodium mmol/L 137 138  139 138 140 141 141 137 137  --  137  137 134* 133* 137  134* 136   Potassium mmol/L 3.6 3.6  3.5 3.7  3.7 3.4* 3.4* 3.3* 3.3* 4.0 4.1 3.7  3.7 3.3*  3.4* 3.3* 3.7  3.6 3.9   Chloride mmol/L 109 106  106 105 106 105 105 103 105  --  104  104 101 100 100  98 98   CO2 mmol/L 17* 20*  20* 22* 25 21* 19* 21* 20*  --  18*  18* 19* 20* 25  24 25   Glucose mg/dL 134* 112*  113* 117* 92 95 122* 130* 106  --  149*  149* 199* 169* 122*  122* 136*   BUN mg/dL 6* <3*  3* <3* <3* 4* 6* 7* 3*  --  5*  5* 8 9 4*  4* 3*   Creatinine mg/dL 0.5 0.4*  0.4* 0.4* 0.4* 0.6 0.8 0.9 0.5  --  0.6  0.6 0.9 0.9 0.6  0.6 0.5   Albumin g/dL 3.5 3.9 3.9 3.8  --  3.6 3.6  --   --  3.2*  3.2* 3.2* 3.1* 3.2* 3.3*   Total Bilirubin mg/dL  --   --  2.6*  --   --   --   --   --   --   --   --   --   --   --    Alkaline Phosphatase U/L  --   --  70  --   --   --   --   --   --   --   --   --   --   --    AST U/L  --   --  41*  --   --   --   --   --   --   --   --   --   --   --    ALT U/L  --   --  12  --   --   --   --   --   --   --   --   --   --   --    Magnesium mg/dL 1.8 2.3  --   --   --   --  1.7 1.7  --   --   --   --  1.7  --    Phosphorus mg/dL 2.2* 1.4*  1.4*  --  1.9*  --  1.9* 2.2* 1.2*  --  3.7  3.7 3.2 2.7 1.3*  1.3* <1.0*       Vancomycin Administrations:  vancomycin given in the last 96 hours                   vancomycin 500 mg in dextrose 5 % 100 mL IVPB (ready to mix system) (mg) 500 mg New Bag 01/15/22 0844    vancomycin in dextrose 5 % 1 gram/250 mL IVPB 1,000 mg (mg) 1,000 mg New Bag 01/14/22 1747                Microbiologic Results:  Microbiology Results (last 7 days)     Procedure Component Value Units Date/Time    Respiratory Infection Panel (PCR), Nasopharyngeal [[id number]]     Order Status: Canceled Specimen: Nasopharyngeal Swab

## 2022-01-15 NOTE — ASSESSMENT & PLAN NOTE
Orion Ty is a 76 y.o. female who presents to the SICU s/p redo aortic valve replacement with mechanical valve on 1/5/22.      Neuro/Psych:   -- Sedation: propofol  -- Pain: fentanyl pushes q2             Cards:   -- Pressors: epi 0.04 and vaso 0.02  -- Goal MAP: 60-80  -- holding metop   -- holding amlodipine  -- statin  -- heparin drip, PTT goal 60-80  -- Paced HR 80  -- s/p 4U FFP on 1/13 with good response of INR      Pulm:   -- Goal O2 sat > 90%  -- intubated 1/12  -- now on 35%/5PEEP  -- ABG PRN  -- 2 mediastinal removed 1/9 L Pleural CT removed 1/8  -- consult gen surg for trach/peg/permacath next week. Need to tolerate tube feeds and decrease settings on vent and pressor requirements      Renal:  -- Mireles removed  -- bladder scan every shift  -- Oliguric   -- Nephrology following  -- CRRT. Discuss with Nephrology, plan to be net negative 500cc fluid tonight      FEN / GI:   -- Replace lytes as needed  -- Og in place  -- Nutrition: tube feeds novasource renal, goal of 35      ID:   -- WBC 15 from 14.3  -- Antibiotics: periop ancef complete  -- Some concern for aspiration. Now on zosyn and vanc      Heme/Onc:   -- Hgb 8.5 from 9.5  -- platelets 56   -- 3U RBC, 2U FFP, 2U platelets in OR  -- Daily CBC  -- Transfused products: 1U RBC on 1/5/22. 1u RBC and 1u FFP on 1/9/21. 2U RBC on 1/12. 4 FFP on 1/13  -- Anticoagulation: ASA   -INR 2.6 this morning.  -- heparin drip, PTT 60-80      Endo:   -- Gluc goal 140-180  -- Insulin per endocrinology      PPx:   Feeding: tube feeds  Analgesia/Sedation: fent pushes / propofol  Thromboembolic prevention: ASA, heparin drip today  HOB >30: yes  Stress Ulcer ppx: famotidine BID  Glucose control: Critical care goal 140-180 g/dl, ISS    Lines/Drains/Airway: Art line, RIJ Trialysis, ETT       Dispo/Code Status/Palliative:   -- SICU / Full Code

## 2022-01-15 NOTE — PROGRESS NOTES
Changed from SLED to SCUF per MD order   01/15/22 1028   Treatment   Treatment Type SCUF   Treatment Status Order change

## 2022-01-15 NOTE — CARE UPDATE
Care Update:     No acute events overnight. Patient on the SICU in room 97926 CVICU/92746 CVICU A. Blood glucose stable. BG at goal on current insulin regimen. Steroid use- None. 10 Days Post-Op  Renal function- on CRRT  Vasopressors-  Epinephrine and Norepinephrine       Diet NPO Except for: Sips with Medication     TF started overnight  TF Novasource Renal @ 10 ml/hr to advance to 35 ml/hr.     POCT Glucose   Date Value Ref Range Status   01/14/2022 144 (H) 70 - 110 mg/dL Final   01/14/2022 137 (H) 70 - 110 mg/dL Final   01/14/2022 132 (H) 70 - 110 mg/dL Final   01/13/2022 123 (H) 70 - 110 mg/dL Final   01/13/2022 128 (H) 70 - 110 mg/dL Final     Lab Results   Component Value Date    HGBA1C 6.0 (H) 01/03/2022       Endocrinology consulted for BG management.   BG goal 140-180    - Continue Low Dose SQ Insulin Correction Scale PRN hyperglycemia.  - BG Monitoring to q 4 hrs while on TF.     ** Please notify Endocrine for any change and/or advance in diet**  ** Please call Endocrine for any BG related issues **    Discharge Planning:   TBD. Please notify endocrinology prior to discharge.      Maikel Aragon DNP, FNP-C  Department of Endocrinology  Inpatient Glycemic Management

## 2022-01-15 NOTE — SUBJECTIVE & OBJECTIVE
Interval History/Significant Events:   - Tube feeds started, now at 35  - Pressors weaned to epi 0.04 and vaso 0.02  - CRRT going, net positive 700 yesterday  - Afebrile     Follow-up For: Procedure(s) (LRB):  REPLACEMENT, AORTIC VALVE, WITH REPEAT STERNOTOMY (N/A)    Post-Operative Day: 7 Days Post-Op    Objective:     Vital Signs (Most Recent):  Temp: 97.7 °F (36.5 °C) (01/15/22 0701)  Pulse: 80 (01/15/22 0945)  Resp: (!) 30 (01/15/22 0945)  BP: (!) 116/57 (01/15/22 0901)  SpO2: 100 % (01/15/22 0945) Vital Signs (24h Range):  Temp:  [97.7 °F (36.5 °C)-98.7 °F (37.1 °C)] 97.7 °F (36.5 °C)  Pulse:  [] 80  Resp:  [30-47] 30  SpO2:  [96 %-100 %] 100 %  BP: ()/(50-61) 116/57  Arterial Line BP: (103-131)/(41-53) 121/49     Weight: 49.7 kg (109 lb 9.1 oz)  Body mass index is 20.7 kg/m².      Intake/Output Summary (Last 24 hours) at 1/15/2022 1003  Last data filed at 1/15/2022 0900  Gross per 24 hour   Intake 3499.97 ml   Output 3726 ml   Net -226.03 ml       Physical Exam  Constitutional:       General: She is not in acute distress.     Comments: Intubated and sedated   HENT:      Head: Normocephalic and atraumatic.   Neck:      Comments: R IJ Trialysis  Cardiovascular:      Rate and Rhythm: Normal rate and regular rhythm.      Pulses: Normal pulses.      Comments: Midline sternotomy incision c/d/I  Pacemaker in place  Pulmonary:      Effort: Pulmonary effort is normal. No respiratory distress.      Comments: intubated  Abdominal:      General: Abdomen is flat. There is no distension.      Palpations: Abdomen is soft.      Tenderness: There is no abdominal tenderness.   Genitourinary:     Comments: R groin well incision, bandage in place. Thigh soft, no hematoma  Musculoskeletal:      Comments: R radial art line in place   Skin:     General: Skin is warm.      Capillary Refill: Capillary refill takes less than 2 seconds.   Neurological:      Mental Status: She is alert.         Vents:  Vent Mode: A/C  (01/15/22 0746)  Ventilator Initiated: Yes (01/05/22 1427)  Set Rate: 30 BPM (01/15/22 0746)  Vt Set: 300 mL (01/15/22 0746)  PEEP/CPAP: 5 cmH20 (01/15/22 0746)  Oxygen Concentration (%): 35 (01/15/22 0945)  Peak Airway Pressure: 29 cmH2O (01/15/22 0746)  Plateau Pressure: 24 cmH20 (01/15/22 0746)  Total Ve: 9.76 mL (01/15/22 0746)  Negative Inspiratory Force (cm H2O): 0 (01/15/22 0746)  F/VT Ratio<105 (RSBI): (!) 98.68 (01/15/22 0746)    Lines/Drains/Airways     Central Venous Catheter Line            Trialysis (Dialysis) Catheter 01/08/22 1027 right internal jugular 6 days          Drain                 NG/OG Tube 01/14/22 1520 Left mouth <1 day          Airway                 Airway - Non-Surgical 01/12/22 1505 Endotracheal Tube 2 days       Airway Anesthesia 01/12/22 2 days          Arterial Line            Arterial Line 01/05/22 0855 Right Radial 10 days          Peripheral Intravenous Line                 Peripheral IV - Single Lumen 01/06/22 1833 20 G Left Antecubital 8 days         Midline Catheter Insertion/Assessment  - Single Lumen 01/10/22 1300 Left cephalic vein (lateral side of arm) 18g x 8cm 4 days                Significant Labs:    CBC/Anemia Profile:  Recent Labs   Lab 01/14/22 0313 01/14/22  1940 01/15/22  0326 01/15/22  0400   WBC 14.33*  --  15.08*  --    HGB 9.5*  --  8.5*  --    HCT 30.9* 26* 26.6* 26*   PLT 59*  --  56*  --    MCV 98  --  92  --    RDW 18.3*  --  18.1*  --         Chemistries:  Recent Labs   Lab 01/13/22 2233 01/13/22 2233 01/14/22  0313 01/14/22  0807 01/14/22  2159 01/15/22  0326 01/15/22  0826      < > 137   < > 133* 137  134* 136   K 3.3*   < > 4.0   < > 3.3* 3.7  3.6 3.9      < > 105   < > 100 100  98 98   CO2 21*   < > 20*   < > 20* 25  24 25   BUN 7*   < > 3*   < > 9 4*  4* 3*   CREATININE 0.9   < > 0.5   < > 0.9 0.6  0.6 0.5   CALCIUM 8.4*   < > 7.9*   < > 7.9* 7.7*  7.6* 8.1*   ALBUMIN 3.6  --   --    < > 3.1* 3.2* 3.3*   MG 1.7  --  1.7  --    --  1.7  --    PHOS 2.2*   < > 1.2*   < > 2.7 1.3*  1.3* <1.0*    < > = values in this interval not displayed.       ABGs:   Recent Labs   Lab 01/15/22  0400   PH 7.477*   PCO2 37.1   HCO3 27.4   POCSATURATED 98   BE 4       Significant Imaging:  I have reviewed all pertinent imaging results/findings within the past 24 hours.

## 2022-01-15 NOTE — PROGRESS NOTES
Cardiac Surgery Progress Note    JUSTO overnight    I/O  3663/3186 net: +477  CRRT 3186  BMx2    Gtts  Vaso 0.04  Epi 0.04  Propofol 25    A/P  76F POD10 from redo AVR (mechanical) for subvalvular pannus. Post operative course complicated by acute respiratory distress and JO. Re intubated 01/13/22 and now on CRRT    Progressing    Continue current care   Replace phos  CRRT per renal  Wean vasoactive gtts to keep MAP>60  Increase enteral feeds  Continue broad spectrum antibiotics   INR 2.6 today, hold coumadin   Cont ICU

## 2022-01-15 NOTE — PROGRESS NOTES
01/14/22 4060   Treatment   Treatment Type SLED   Treatment Status Restart   Dialysis Machine Number [id number]   Dialyzer Time (hours) 0   BVP (Liters) 0 L   Solutions Labeled and Current  Yes   Access Right;IJ;Temporary Cath   Catheter Dressing Intact  Yes   Alarms Engaged Yes   CRRT Comments SLED restarted   Prescription   Time (Hours) 10   Dialysate K + (mEq/L) 4   Dialysate CA + (mEq/L) 2.25   Dialysate HCO3 - (Bicarb) (mEq/L) 35   Dialysate Na + (mEq/L) 140   Cartridge Type Other  (f16)   Dialysate Flow Rate (mL/min) 200   UF Goal Rate 450 mL/hr   CRRT Hourly Documentation   Blood Flow (mL/min) 200   UF Rate 350 cc/hr   Arterial Pressure (mmHg) -30 mmHg   Venous Pressure (mmHg) 40 mmHg   Effluent Pressure (EP) (mmHg) 50 mmHg     SLED restarted. UF rate set at 350. Report given to primary nurse.

## 2022-01-15 NOTE — NURSING
"      SICU PLAN OF CARE NOTE    SHIFT EVENTS:  VSS. Epi and Vaso both remain at 0.04. Heparin d/c overnight. Propofol titrated for sedation. TF at 10cc/hr. CRRT; pt tolerating well; UF advanced to 400 (goal of 450). Pt remains anuric. BMx4 overnight. POC reviewed with patient and family; questions and concerns addressed. See flow sheets for full assessment details. Will continue to monitor patient closely.      Dx: S/P aortic valve replacement    Vital Signs: /60 (BP Location: Right arm, Patient Position: Lying)   Pulse 80   Temp 98.3 °F (36.8 °C) (Oral)   Resp (!) 30   Ht 5' 1" (1.549 m)   Wt 49.7 kg (109 lb 9.1 oz)   SpO2 100%   Breastfeeding No   BMI 20.70 kg/m²     Neuro: Follows Commands and Moves All Extremities    Respiratory: Ventilator 40% FiO2/5 PEEP/ 30 RR    Cardiac: Afib      Diet: Tube Feeds @ 10    Gtts: Propofol, Vasopressin and Epinephrine     Urine Output: Anuric    Labs/Accuchecks: ACHS; daily labs.    "

## 2022-01-15 NOTE — PLAN OF CARE
Pt vss, afebrile, sats 100% on AC 40/5. Epi/Vaso/Propofol/Heparin gtts infusing. MAP 60-80, SBP >100.  Ac/hs, no insulin coverage required. No complaints of pain or discomfort reported at this time. No  new skin breakdown noted, wound to sacrum addressed per order,  turn q2. POC reviewed with pt and family, no questions/concerns at  this time. Will continue to monitor.

## 2022-01-15 NOTE — PROGRESS NOTES
[facility]  Critical Care - Surgery  Progress Note    Patient Name: Orion Ty  MRN: [id number]  Admission Date: 1/5/2022  Hospital Length of Stay: 10 days  Code Status: Full Code  Attending Provider: Dennis Haider MD  Primary Care Provider: Otis Zimmer MD   Principal Problem: S/P aortic valve replacement    Subjective:     Hospital/ICU Course:  No notes on file    Interval History/Significant Events:   - Tube feeds started, now at 35  - Pressors weaned to epi 0.04 and vaso 0.02  - CRRT going, net positive 700 yesterday  - Afebrile     Follow-up For: Procedure(s) (LRB):  REPLACEMENT, AORTIC VALVE, WITH REPEAT STERNOTOMY (N/A)    Post-Operative Day: 7 Days Post-Op    Objective:     Vital Signs (Most Recent):  Temp: 97.7 °F (36.5 °C) (01/15/22 0701)  Pulse: 80 (01/15/22 0945)  Resp: (!) 30 (01/15/22 0945)  BP: (!) 116/57 (01/15/22 0901)  SpO2: 100 % (01/15/22 0945) Vital Signs (24h Range):  Temp:  [97.7 °F (36.5 °C)-98.7 °F (37.1 °C)] 97.7 °F (36.5 °C)  Pulse:  [] 80  Resp:  [30-47] 30  SpO2:  [96 %-100 %] 100 %  BP: ()/(50-61) 116/57  Arterial Line BP: (103-131)/(41-53) 121/49     Weight: 49.7 kg (109 lb 9.1 oz)  Body mass index is 20.7 kg/m².      Intake/Output Summary (Last 24 hours) at 1/15/2022 1003  Last data filed at 1/15/2022 0900  Gross per 24 hour   Intake 3499.97 ml   Output 3726 ml   Net -226.03 ml       Physical Exam  Constitutional:       General: She is not in acute distress.     Comments: Intubated and sedated   HENT:      Head: Normocephalic and atraumatic.   Neck:      Comments: R IJ Trialysis  Cardiovascular:      Rate and Rhythm: Normal rate and regular rhythm.      Pulses: Normal pulses.      Comments: Midline sternotomy incision c/d/I  Pacemaker in place  Pulmonary:      Effort: Pulmonary effort is normal. No respiratory distress.      Comments: intubated  Abdominal:      General: Abdomen is flat. There is no distension.      Palpations: Abdomen  is soft.      Tenderness: There is no abdominal tenderness.   Genitourinary:     Comments: R groin well incision, bandage in place. Thigh soft, no hematoma  Musculoskeletal:      Comments: R radial art line in place   Skin:     General: Skin is warm.      Capillary Refill: Capillary refill takes less than 2 seconds.   Neurological:      Mental Status: She is alert.         Vents:  Vent Mode: A/C (01/15/22 0746)  Ventilator Initiated: Yes (01/05/22 1427)  Set Rate: 30 BPM (01/15/22 0746)  Vt Set: 300 mL (01/15/22 0746)  PEEP/CPAP: 5 cmH20 (01/15/22 0746)  Oxygen Concentration (%): 35 (01/15/22 0945)  Peak Airway Pressure: 29 cmH2O (01/15/22 0746)  Plateau Pressure: 24 cmH20 (01/15/22 0746)  Total Ve: 9.76 mL (01/15/22 0746)  Negative Inspiratory Force (cm H2O): 0 (01/15/22 0746)  F/VT Ratio<105 (RSBI): (!) 98.68 (01/15/22 0746)    Lines/Drains/Airways     Central Venous Catheter Line            Trialysis (Dialysis) Catheter 01/08/22 1027 right internal jugular 6 days          Drain                 NG/OG Tube 01/14/22 1520 Left mouth <1 day          Airway                 Airway - Non-Surgical 01/12/22 1505 Endotracheal Tube 2 days       Airway Anesthesia 01/12/22 2 days          Arterial Line            Arterial Line 01/05/22 0855 Right Radial 10 days          Peripheral Intravenous Line                 Peripheral IV - Single Lumen 01/06/22 1833 20 G Left Antecubital 8 days         Midline Catheter Insertion/Assessment  - Single Lumen 01/10/22 1300 Left cephalic vein (lateral side of arm) 18g x 8cm 4 days                Significant Labs:    CBC/Anemia Profile:  Recent Labs   Lab 01/14/22  0313 01/14/22  1940 01/15/22  0326 01/15/22  0400   WBC 14.33*  --  15.08*  --    HGB 9.5*  --  8.5*  --    HCT 30.9* 26* 26.6* 26*   PLT 59*  --  56*  --    MCV 98  --  92  --    RDW 18.3*  --  18.1*  --         Chemistries:  Recent Labs   Lab 01/13/22 2233 01/13/22 2233 01/14/22  0313 01/14/22  0807 01/14/22  2159 01/15/22  0326  01/15/22  0826      < > 137   < > 133* 137  134* 136   K 3.3*   < > 4.0   < > 3.3* 3.7  3.6 3.9      < > 105   < > 100 100  98 98   CO2 21*   < > 20*   < > 20* 25  24 25   BUN 7*   < > 3*   < > 9 4*  4* 3*   CREATININE 0.9   < > 0.5   < > 0.9 0.6  0.6 0.5   CALCIUM 8.4*   < > 7.9*   < > 7.9* 7.7*  7.6* 8.1*   ALBUMIN 3.6  --   --    < > 3.1* 3.2* 3.3*   MG 1.7  --  1.7  --   --  1.7  --    PHOS 2.2*   < > 1.2*   < > 2.7 1.3*  1.3* <1.0*    < > = values in this interval not displayed.       ABGs:   Recent Labs   Lab 01/15/22  0400   PH 7.477*   PCO2 37.1   HCO3 27.4   POCSATURATED 98   BE 4       Significant Imaging:  I have reviewed all pertinent imaging results/findings within the past 24 hours.    Assessment/Plan:     * S/P aortic valve replacement  Orion Ty is a 76 y.o. female who presents to the SICU s/p redo aortic valve replacement with mechanical valve on 1/5/22.      Neuro/Psych:   -- Sedation: propofol  -- Pain: fentanyl pushes q2             Cards:   -- Pressors: epi 0.04 and vaso 0.02  -- Goal MAP: 60-80  -- holding metop   -- holding amlodipine  -- statin  -- heparin drip, PTT goal 60-80  -- Paced HR 80  -- s/p 4U FFP on 1/13 with good response of INR      Pulm:   -- Goal O2 sat > 90%  -- intubated 1/12  -- now on 35%/5PEEP  -- ABG PRN  -- 2 mediastinal removed 1/9 L Pleural CT removed 1/8  -- consult gen surg for trach/peg/permacath next week. Need to tolerate tube feeds and decrease settings on vent and pressor requirements      Renal:  -- Mireles removed  -- bladder scan every shift  -- Oliguric   -- Nephrology following  -- CRRT. Discuss with Nephrology, plan to be net negative 500cc fluid tonight      FEN / GI:   -- Replace lytes as needed  -- Og in place  -- Nutrition: tube feeds novasource renal, goal of 35      ID:   -- WBC 15 from 14.3  -- Antibiotics: periop ancef complete  -- Some concern for aspiration. Now on zosyn and vanc      Heme/Onc:   -- Hgb 8.5 from  9.5  -- platelets 56   -- 3U RBC, 2U FFP, 2U platelets in OR  -- Daily CBC  -- Transfused products: 1U RBC on 1/5/22. 1u RBC and 1u FFP on 1/9/21. 2U RBC on 1/12. 4 FFP on 1/13  -- Anticoagulation: ASA   -INR 2.6 this morning.  -- heparin drip, PTT 60-80      Endo:   -- Gluc goal 140-180  -- Insulin per endocrinology      PPx:   Feeding: tube feeds  Analgesia/Sedation: fent pushes / propofol  Thromboembolic prevention: ASA, heparin drip today  HOB >30: yes  Stress Ulcer ppx: famotidine BID  Glucose control: Critical care goal 140-180 g/dl, ISS    Lines/Drains/Airway: Art line, RIJ Trialysis, ETT       Dispo/Code Status/Palliative:   -- SICU / Full Code               Critical care was time spent personally by me on the following activities: development of treatment plan with patient or surrogate and bedside caregivers, discussions with consultants, evaluation of patient's response to treatment, examination of patient, ordering and performing treatments and interventions, ordering and review of laboratory studies, ordering and review of radiographic studies, pulse oximetry, re-evaluation of patient's condition.  This critical care time did not overlap with that of any other provider or involve time for any procedures.     Caleb Garcia MD  Critical Care - Surgery  [facility]

## 2022-01-15 NOTE — NURSING
notified of elevated aptt; verbal orders to hold heparin transfusion until after next check at 1am. All orders received and implemented. No further needs noted at this time.

## 2022-01-16 NOTE — SUBJECTIVE & OBJECTIVE
Interval History/Significant Events:   - Tube feeds at 35  - Pressors weaned to epi 0.01 and vaso 0.04  - CRRT going, net negative 1.7L yesterday  - Afebrile     Follow-up For: Procedure(s) (LRB):  REPLACEMENT, AORTIC VALVE, WITH REPEAT STERNOTOMY (N/A)    Post-Operative Day: 7 Days Post-Op    Objective:     Vital Signs (Most Recent):  Temp: 97.7 °F (36.5 °C) (01/16/22 1000)  Pulse: 79 (01/16/22 1000)  Resp: 16 (01/16/22 1000)  BP: (!) 93/51 (01/16/22 1000)  SpO2: 100 % (01/16/22 1000) Vital Signs (24h Range):  Temp:  [93.02 °F (33.9 °C)-98.06 °F (36.7 °C)] 97.7 °F (36.5 °C)  Pulse:  [76-95] 79  Resp:  [16-61] 16  SpO2:  [90 %-100 %] 100 %  BP: ()/(50-59) 93/51  Arterial Line BP: ()/(43-56) 104/46     Weight: 49.7 kg (109 lb 9.1 oz)  Body mass index is 20.7 kg/m².      Intake/Output Summary (Last 24 hours) at 1/16/2022 1022  Last data filed at 1/16/2022 1000  Gross per 24 hour   Intake 5294.72 ml   Output 5995 ml   Net -700.28 ml     Physical Exam  Constitutional:       General: She is not in acute distress.     Comments: Intubated and sedated   HENT:      Head: Normocephalic and atraumatic.   Neck:      Comments: R IJ Trialysis  Cardiovascular:      Rate and Rhythm: Normal rate and regular rhythm.      Pulses: Normal pulses.      Comments: Midline sternotomy incision c/d/I  Pacemaker in place  Pulmonary:      Effort: Pulmonary effort is normal. No respiratory distress.      Comments: intubated  Abdominal:      General: Abdomen is flat. There is no distension.      Palpations: Abdomen is soft.      Tenderness: There is no abdominal tenderness.   Genitourinary:     Comments: R groin well incision, bandage in place. Thigh soft, no hematoma  Musculoskeletal:      Comments: R radial art line in place   Skin:     General: Skin is warm.      Capillary Refill: Capillary refill takes less than 2 seconds.   Neurological:      Mental Status: She is alert.         Vents:  Vent Mode: Spont (01/16/22  0900)  Ventilator Initiated: Yes (01/05/22 1427)  Set Rate: 30 BPM (01/16/22 0545)  Vt Set: 300 mL (01/16/22 0545)  PEEP/CPAP: 5 cmH20 (01/16/22 0900)  Oxygen Concentration (%): 35 (01/16/22 1000)  Peak Airway Pressure: 25 cmH2O (01/16/22 0900)  Plateau Pressure: 30 cmH20 (01/16/22 0900)  Total Ve: 6.86 mL (01/16/22 0900)  Negative Inspiratory Force (cm H2O): 0 (01/16/22 0900)  F/VT Ratio<105 (RSBI): (!) 50.26 (01/16/22 0900)    Lines/Drains/Airways     Central Venous Catheter Line            Trialysis (Dialysis) Catheter 01/08/22 1027 right internal jugular 7 days          Drain                 NG/OG Tube 01/14/22 1520 Left mouth 1 day          Airway                 Airway - Non-Surgical 01/12/22 1505 Endotracheal Tube 3 days       Airway Anesthesia 01/12/22 3 days          Arterial Line            Arterial Line 01/05/22 0855 Right Radial 11 days          Peripheral Intravenous Line                 Midline Catheter Insertion/Assessment  - Single Lumen 01/10/22 1300 Left cephalic vein (lateral side of arm) 18g x 8cm 5 days         Peripheral IV - Single Lumen 01/15/22 1445 18 G;1 1/4 in Anterior;Left Forearm <1 day         Peripheral IV - Single Lumen 01/16/22 0430 20 G Anterior;Distal;Right Wrist <1 day                Significant Labs:    CBC/Anemia Profile:  Recent Labs   Lab 01/15/22  0326 01/15/22  0400 01/16/22  0324   WBC 15.08*  --  10.81   HGB 8.5*  --  8.8*   HCT 26.6* 26* 27.6*   PLT 56*  --  59*   MCV 92  --  93   RDW 18.1*  --  17.9*        Chemistries:  Recent Labs   Lab 01/15/22  0326 01/15/22  0826 01/15/22  1406 01/15/22  1950 01/15/22  2143 01/15/22  2143 01/16/22  0324 01/16/22  0541 01/16/22  0827     134*   < > 136   < > 139  --  137 140  --    K 3.7  3.6   < > 3.8   < > 3.7   < > 3.5 3.4* 3.5     98   < > 99   < > 99  --  100 103  --    CO2 25  24   < > 25   < > 23  --  22* 20*  --    BUN 4*  4*   < > 7*   < > 12  --  16 21  --    CREATININE 0.6  0.6   < > 0.7   < > 0.8  --   1.0 1.1  --    CALCIUM 7.7*  7.6*   < > 8.5*   < > 7.8*  --  6.9* 6.7*  --    ALBUMIN 3.2*   < > 3.2*  --  3.4*  --   --  3.0*  --    MG 1.7  --   --   --   --   --  1.6  --   --    PHOS 1.3*  1.3*   < > 6.2*   < > 11.4*  --  8.7* 8.0*  --     < > = values in this interval not displayed.       ABGs:   Recent Labs   Lab 01/16/22  0900   PH 7.365   PCO2 45.7*   HCO3 26.1   POCSATURATED 98   BE 1       Significant Imaging:  I have reviewed all pertinent imaging results/findings within the past 24 hours.

## 2022-01-16 NOTE — PROGRESS NOTES
[facility]  Critical Care - Surgery  Progress Note    Patient Name: Orion Ty  MRN: [id number]  Admission Date: 1/5/2022  Hospital Length of Stay: 11 days  Code Status: Full Code  Attending Provider: Dennis Haider MD  Primary Care Provider: Otis Zimmer MD   Principal Problem: S/P aortic valve replacement    Subjective:     Hospital/ICU Course:  No notes on file    Interval History/Significant Events:   - Tube feeds at 35  - Pressors weaned to epi 0.01 and vaso 0.04  - CRRT going, net negative 1.7L yesterday  - Afebrile     Follow-up For: Procedure(s) (LRB):  REPLACEMENT, AORTIC VALVE, WITH REPEAT STERNOTOMY (N/A)    Post-Operative Day: 7 Days Post-Op    Objective:     Vital Signs (Most Recent):  Temp: 97.7 °F (36.5 °C) (01/16/22 1000)  Pulse: 79 (01/16/22 1000)  Resp: 16 (01/16/22 1000)  BP: (!) 93/51 (01/16/22 1000)  SpO2: 100 % (01/16/22 1000) Vital Signs (24h Range):  Temp:  [93.02 °F (33.9 °C)-98.06 °F (36.7 °C)] 97.7 °F (36.5 °C)  Pulse:  [76-95] 79  Resp:  [16-61] 16  SpO2:  [90 %-100 %] 100 %  BP: ()/(50-59) 93/51  Arterial Line BP: ()/(43-56) 104/46     Weight: 49.7 kg (109 lb 9.1 oz)  Body mass index is 20.7 kg/m².      Intake/Output Summary (Last 24 hours) at 1/16/2022 1022  Last data filed at 1/16/2022 1000  Gross per 24 hour   Intake 5294.72 ml   Output 5995 ml   Net -700.28 ml     Physical Exam  Constitutional:       General: She is not in acute distress.     Comments: Intubated and sedated   HENT:      Head: Normocephalic and atraumatic.   Neck:      Comments: R IJ Trialysis  Cardiovascular:      Rate and Rhythm: Normal rate and regular rhythm.      Pulses: Normal pulses.      Comments: Midline sternotomy incision c/d/I  Pacemaker in place  Pulmonary:      Effort: Pulmonary effort is normal. No respiratory distress.      Comments: intubated  Abdominal:      General: Abdomen is flat. There is no distension.      Palpations: Abdomen is soft.       Tenderness: There is no abdominal tenderness.   Genitourinary:     Comments: R groin well incision, bandage in place. Thigh soft, no hematoma  Musculoskeletal:      Comments: R radial art line in place   Skin:     General: Skin is warm.      Capillary Refill: Capillary refill takes less than 2 seconds.   Neurological:      Mental Status: She is alert.         Vents:  Vent Mode: Spont (01/16/22 0900)  Ventilator Initiated: Yes (01/05/22 1427)  Set Rate: 30 BPM (01/16/22 0545)  Vt Set: 300 mL (01/16/22 0545)  PEEP/CPAP: 5 cmH20 (01/16/22 0900)  Oxygen Concentration (%): 35 (01/16/22 1000)  Peak Airway Pressure: 25 cmH2O (01/16/22 0900)  Plateau Pressure: 30 cmH20 (01/16/22 0900)  Total Ve: 6.86 mL (01/16/22 0900)  Negative Inspiratory Force (cm H2O): 0 (01/16/22 0900)  F/VT Ratio<105 (RSBI): (!) 50.26 (01/16/22 0900)    Lines/Drains/Airways     Central Venous Catheter Line            Trialysis (Dialysis) Catheter 01/08/22 1027 right internal jugular 7 days          Drain                 NG/OG Tube 01/14/22 1520 Left mouth 1 day          Airway                 Airway - Non-Surgical 01/12/22 1505 Endotracheal Tube 3 days       Airway Anesthesia 01/12/22 3 days          Arterial Line            Arterial Line 01/05/22 0855 Right Radial 11 days          Peripheral Intravenous Line                 Midline Catheter Insertion/Assessment  - Single Lumen 01/10/22 1300 Left cephalic vein (lateral side of arm) 18g x 8cm 5 days         Peripheral IV - Single Lumen 01/15/22 1445 18 G;1 1/4 in Anterior;Left Forearm <1 day         Peripheral IV - Single Lumen 01/16/22 0430 20 G Anterior;Distal;Right Wrist <1 day                Significant Labs:    CBC/Anemia Profile:  Recent Labs   Lab 01/15/22  0326 01/15/22  0400 01/16/22  0324   WBC 15.08*  --  10.81   HGB 8.5*  --  8.8*   HCT 26.6* 26* 27.6*   PLT 56*  --  59*   MCV 92  --  93   RDW 18.1*  --  17.9*        Chemistries:  Recent Labs   Lab 01/15/22  0326 01/15/22  0826  01/15/22  1406 01/15/22  1950 01/15/22  2143 01/15/22  2143 01/16/22  0324 01/16/22  0541 01/16/22  0827     134*   < > 136   < > 139  --  137 140  --    K 3.7  3.6   < > 3.8   < > 3.7   < > 3.5 3.4* 3.5     98   < > 99   < > 99  --  100 103  --    CO2 25  24   < > 25   < > 23  --  22* 20*  --    BUN 4*  4*   < > 7*   < > 12  --  16 21  --    CREATININE 0.6  0.6   < > 0.7   < > 0.8  --  1.0 1.1  --    CALCIUM 7.7*  7.6*   < > 8.5*   < > 7.8*  --  6.9* 6.7*  --    ALBUMIN 3.2*   < > 3.2*  --  3.4*  --   --  3.0*  --    MG 1.7  --   --   --   --   --  1.6  --   --    PHOS 1.3*  1.3*   < > 6.2*   < > 11.4*  --  8.7* 8.0*  --     < > = values in this interval not displayed.       ABGs:   Recent Labs   Lab 01/16/22  0900   PH 7.365   PCO2 45.7*   HCO3 26.1   POCSATURATED 98   BE 1       Significant Imaging:  I have reviewed all pertinent imaging results/findings within the past 24 hours.    Assessment/Plan:     * S/P aortic valve replacement  Orion Ty is a 76 y.o. female who presents to the SICU s/p redo aortic valve replacement with mechanical valve on 1/5/22.      Neuro/Psych:   -- Sedation: propofol  -- Pain: fentanyl pushes q2     Cards:   -- Pressors: vaso 0.04  -- Goal MAP: 60-80  -- holding metop   -- holding amlodipine  -- statin  -- heparin drip, PTT goal 60-80. Holding while INR is therapeutic  -- Paced HR 80  -- s/p 4U FFP on 1/13 with good response of INR      Pulm:   -- Goal O2 sat > 90%  -- reintubated 1/12  -- now on 35%/5PEEP  -- ABG PRN  -- 2 mediastinal removed 1/9 L Pleural CT removed 1/8  -- consult gen surg for trach/peg/permacath next week. Need to tolerate tube feeds and decrease settings on vent and pressor requirements  -- may be able to tolerate a trial of extubation  -- PAV+ today       Renal:  -- Mireles removed  -- net negative 1.7L yesterday  -- bladder scan every shift  -- Oliguric   -- Nephrology following  -- CRRT. Discuss with Nephrology, plan to be net  negative 500cc fluid tonight      FEN / GI:   -- Replace lytes as needed  -- OG in place  -- Nutrition: tube feeds novasource renal, goal of 35      ID:   -- WBC 10.8  -- Antibiotics: periop ancef complete  -- Some concern for aspiration. Now on zosyn and vanc      Heme/Onc:   -- Hgb 8.8  -- platelets 59   -- 3U RBC, 2U FFP, 2U platelets in OR  -- Daily CBC  -- Transfused products: 1U RBC on 1/5/22. 1u RBC and 1u FFP on 1/9/21. 2U RBC on 1/12. 4 FFP on 1/13  -- Anticoagulation: ASA   -INR 4.5 this morning.  -- heparin drip, PTT 60-80, holding for now      Endo:   -- Gluc goal 140-180  -- Insulin per endocrinology      PPx:   Feeding: tube feeds  Analgesia/Sedation: fent pushes / propofol  Thromboembolic prevention: ASA, heparin drip   HOB >30: yes  Stress Ulcer ppx: famotidine BID  Glucose control: Critical care goal 140-180 g/dl, ISS    Lines/Drains/Airway: Art line, RIJ Trialysis, ETT       Dispo/Code Status/Palliative:   -- SICU / Full Code               Critical care was time spent personally by me on the following activities: development of treatment plan with patient or surrogate and bedside caregivers, discussions with consultants, evaluation of patient's response to treatment, examination of patient, ordering and performing treatments and interventions, ordering and review of laboratory studies, ordering and review of radiographic studies, pulse oximetry, re-evaluation of patient's condition.  This critical care time did not overlap with that of any other provider or involve time for any procedures.     Caleb Garcia MD  Critical Care - Surgery  [facility]

## 2022-01-16 NOTE — SUBJECTIVE & OBJECTIVE
Interval History:   Family at bedside  Pt remains intubated with fio2 35% and Peep of 5     Review of patient's allergies indicates:  No Known Allergies  Current Facility-Administered Medications   Medication Frequency    0.9%  NaCl infusion (CRRT USE ONLY) Continuous    0.9%  NaCl infusion (for blood administration) Q24H PRN    0.9%  NaCl infusion (for blood administration) Q24H PRN    0.9%  NaCl infusion Continuous    albuterol-ipratropium 2.5 mg-0.5 mg/3 mL nebulizer solution 3 mL Q6H    amiodarone tablet 400 mg BID    aspirin suppository 300 mg Once PRN    atorvastatin tablet 40 mg Daily    balsam peru-castor oiL Oint BID    bisacodyL suppository 10 mg Daily PRN    dextrose 10 % infusion Continuous PRN    dextrose 50% injection 12.5 g PRN    EPINEPHrine (ADRENALIN) 5 mg in dextrose 5 % 250 mL infusion Continuous    famotidine (PF) injection 20 mg Daily    fentaNYL 50 mcg/mL injection 25 mcg Q2H PRN    glucagon (human recombinant) injection 1 mg PRN    insulin aspart U-100 pen 0-5 Units Q4H PRN    LIDOcaine 5 % patch 1 patch Q24H    magnesium sulfate 2g in water 50mL IVPB (premix) PRN    melatonin tablet 9 mg Nightly PRN    midodrine tablet 10 mg TID    NORepinephrine 4 mg in dextrose 5% 250 mL infusion (premix) (titrating) Continuous    ondansetron injection 4 mg Q12H PRN    oxyCODONE immediate release tablet 10 mg Q4H PRN    oxyCODONE immediate release tablet 5 mg Q4H PRN    piperacillin-tazobactam 4.5 g in sodium chloride 0.9% 100 mL IVPB (ready to mix system) Q8H    polyethylene glycol packet 17 g Daily    propofol (DIPRIVAN) 10 mg/mL infusion Continuous    sodium chloride 0.9% bolus 250 mL PRN    sodium chloride 0.9% flush 10 mL PRN    vancomycin - pharmacy to dose pharmacy to manage frequency    vasopressin (PITRESSIN) 0.2 Units/mL in dextrose 5 % 100 mL infusion Continuous     Facility-Administered Medications Ordered in Other Encounters   Medication Frequency    0.9%  NaCl  infusion Continuous       Objective:     Vital Signs (Most Recent):  Temp: (!) 95.9 °F (35.5 °C) (01/15/22 2315)  Pulse: 82 (01/15/22 2315)  Resp: (!) 42 (01/15/22 2315)  BP: (!) 117/58 (01/15/22 2300)  SpO2: 100 % (01/15/22 2315)  O2 Device (Oxygen Therapy): ventilator (01/15/22 2300) Vital Signs (24h Range):  Temp:  [93.02 °F (33.9 °C)-98 °F (36.7 °C)] 95.9 °F (35.5 °C)  Pulse:  [76-95] 82  Resp:  [23-48] 42  SpO2:  [90 %-100 %] 100 %  BP: ()/(50-61) 117/58  Arterial Line BP: ()/(43-56) 115/49     Weight: 49.7 kg (109 lb 9.1 oz) (01/14/22 0439)  Body mass index is 20.7 kg/m².  Body surface area is 1.46 meters squared.    I/O last 3 completed shifts:  In: 7740.4 [I.V.:5629.3; NG/GT:585; IV Piggyback:1526.1]  Out: 9034 [Other:9034]    Physical Exam  Vitals and nursing note reviewed.   Constitutional:       General: She is not in acute distress.     Appearance: She is ill-appearing. She is not toxic-appearing or diaphoretic.   Eyes:      General: No scleral icterus.  Cardiovascular:      Rate and Rhythm: Normal rate and regular rhythm.      Pulses: Normal pulses.      Heart sounds: No murmur heard.      Pulmonary:      Effort: Pulmonary effort is normal. No respiratory distress.      Breath sounds: No wheezing, rhonchi or rales.      Comments: Intubated   Abdominal:      General: Bowel sounds are normal. There is distension.      Palpations: Abdomen is soft. There is no mass.      Comments: Mildly distented    Musculoskeletal:         General: Swelling present. No tenderness or deformity.      Right lower leg: No edema.      Left lower leg: No edema.   Skin:     General: Skin is warm.      Capillary Refill: Capillary refill takes 2 to 3 seconds.      Coloration: Skin is pale. Skin is not jaundiced.      Findings: No bruising, erythema, lesion or rash.      Comments: R hand swelling    Neurological:      Comments: Intubated      Psychiatric:      Comments: Intubated          Significant Labs:  ABGs:    Recent Labs   Lab 01/15/22  0400   PH 7.477*   PCO2 37.1   HCO3 27.4   POCSATURATED 98   BE 4     Cardiac Markers: No results for input(s): CKMB, TROPONINT, MYOGLOBIN in the last 168 hours.  CBC:   Recent Labs   Lab 01/14/22  1940 01/15/22  0326 01/15/22  0400   WBC  --  15.08*  --    RBC  --  2.88*  --    HGB  --  8.5*  --    HCT   < > 26.6* 26*   PLT  --  56*  --    MCV  --  92  --    MCH  --  29.5  --    MCHC  --  32.0  --     < > = values in this interval not displayed.     CMP:   Recent Labs   Lab 01/13/22  0816 01/13/22  1310 01/15/22  2143   *   < > 199*   CALCIUM 8.8   < > 7.8*   ALBUMIN 3.9   < > 3.4*   PROT 6.3  --   --       < > 139   K 3.7  3.7   < > 3.7   CO2 22*   < > 23      < > 99   BUN <3*   < > 12   CREATININE 0.4*   < > 0.8   ALKPHOS 70  --   --    ALT 12  --   --    AST 41*  --   --    BILITOT 2.6*  --   --     < > = values in this interval not displayed.     Coagulation:   Recent Labs   Lab 01/15/22  0826 01/15/22  1603 01/15/22  1753   INR   < >  --  2.8*   APTT  --  46.4*  --     < > = values in this interval not displayed.     LFTs:   Recent Labs   Lab 01/13/22  0816 01/13/22  1310 01/15/22  2143   ALT 12  --   --    AST 41*  --   --    ALKPHOS 70  --   --    BILITOT 2.6*  --   --    PROT 6.3  --   --    ALBUMIN 3.9   < > 3.4*    < > = values in this interval not displayed.     Microbiology Results (last 7 days)     Procedure Component Value Units Date/Time    Respiratory Infection Panel (PCR), Nasopharyngeal [[id number]]     Order Status: Canceled Specimen: Nasopharyngeal Swab         Specimen (24h ago, onward)            None        PTH: No results for input(s): PTH in the last 168 hours.  TSH: No results for input(s): TSH in the last 168 hours.  All labs within the past 24 hours have been reviewed.     Significant Imaging:  Labs: Reviewed  X-Ray: Reviewed

## 2022-01-16 NOTE — PLAN OF CARE
SCUF treatment completed without complication. Critical Phos, phos binders in use. Epi titrated to maintain MAPs > 65. Vaso off for now. Follows Commands and Moves All Extremities.Ventilator ACVC 35/5 RR 30, transitioned to PAV+ this AM. Tube Feeds @ goal. Propfol, Vasopressin and Epinephrine. Anuric. 2 small BMs. Daily labs, Q8 renals, Accuchecks Q4. Wound care per order. Turned Q2. Complete CHG bath and linen change.

## 2022-01-16 NOTE — SUBJECTIVE & OBJECTIVE
"Interval HPI:   Overnight events: Remains in SICU. BG above goal ranges with prn SQ insulin correction scale. TFs increased to goal rate of 35 ml/hr.   Eating:   NPO  Nausea: No  Hypoglycemia and intervention: No  Fever: No  TPN and/or TF: Yes  If yes, type of TF/TPN and rate: Novasource Renal at 35 ml/hr     BP (!) 93/52 (BP Location: Left arm, Patient Position: Lying)   Pulse 79   Temp 96.98 °F (36.1 °C)   Resp (!) 25   Ht 5' 1" (1.549 m)   Wt 49.7 kg (109 lb 9.1 oz)   SpO2 100%   Breastfeeding No   BMI 20.70 kg/m²     Labs Reviewed and Include    Recent Labs   Lab 01/16/22  0541   *   CALCIUM 6.7*   ALBUMIN 3.0*      K 3.4*   CO2 20*      BUN 21   CREATININE 1.1     Lab Results   Component Value Date    WBC 10.81 01/16/2022    HGB 8.8 (L) 01/16/2022    HCT 27.6 (L) 01/16/2022    MCV 93 01/16/2022    PLT 59 (L) 01/16/2022     No results for input(s): TSH, FREET4 in the last 168 hours.  Lab Results   Component Value Date    HGBA1C 6.0 (H) 01/03/2022       Nutritional status:   Body mass index is 20.7 kg/m².  Lab Results   Component Value Date    ALBUMIN 3.0 (L) 01/16/2022    ALBUMIN 3.4 (L) 01/15/2022    ALBUMIN 3.2 (L) 01/15/2022     Lab Results   Component Value Date    PREALBUMIN 11 (L) 01/10/2022       Estimated Creatinine Clearance: 32.3 mL/min (based on SCr of 1.1 mg/dL).    Accu-Checks  Recent Labs     01/13/22  2057 01/14/22  0808 01/14/22  1212 01/14/22  1551 01/15/22  1235 01/15/22  1601 01/15/22  1757 01/15/22  2143 01/16/22  0313 01/16/22  0542   POCTGLUCOSE 123* 132* 137* 144* 212* 183* 207* 204* 219* 255*       Current Medications and/or Treatments Impacting Glycemic Control  Immunotherapy:    Immunosuppressants     None        Steroids:   Hormones (From admission, onward)            Start     Stop Route Frequency Ordered    01/12/22 1830  vasopressin (PITRESSIN) 0.2 Units/mL in dextrose 5 % 100 mL infusion         -- IV Continuous 01/12/22 1719    01/06/22 9094  melatonin " tablet 9 mg         -- Oral Nightly PRN 01/06/22 2255        Pressors:    Autonomic Drugs (From admission, onward)            Start     Stop Route Frequency Ordered    01/14/22 1500  midodrine tablet 10 mg         -- Oral 3 times daily 01/14/22 1453    01/12/22 1700  NORepinephrine 4 mg in dextrose 5% 250 mL infusion (premix) (titrating)        Question Answer Comment   Begin at (in mcg/kg/min): 0.02    Titrate by: (in mcg/kg/min) 0.01    Titrate interval: (in minutes) 10    Titrate to maintain: (MAP or SBP) MAP    Greater than: (in mmHg) 65    Maximum dose: (in mcg/kg/min) 3        -- IV Continuous 01/12/22 1548    01/12/22 1441  rocuronium 10 mg/mL injection        Note to Pharmacy: Created by cabinet override    01/13 0244   01/12/22 1441    01/09/22 1100  EPINEPHrine (ADRENALIN) 5 mg in dextrose 5 % 250 mL infusion        Question Answer Comment   Begin at (in mcg/kg/min): 0.04    Titrate by: (in mcg/kg/min) 0.01    Titrate interval: (in minutes) 5    Titrate to maintain: (SBP or MAP or Cardiac Index) MAP    Greater than: (in mmHg) 60    Maximum dose: (in mcg/kg/min) 0.08        -- IV Continuous 01/09/22 0956        Hyperglycemia/Diabetes Medications:   Antihyperglycemics (From admission, onward)            Start     Stop Route Frequency Ordered    01/15/22 1415  insulin aspart U-100 pen 0-5 Units         -- SubQ Every 4 hours PRN 01/15/22 1315

## 2022-01-16 NOTE — ASSESSMENT & PLAN NOTE
-oliguric ischemic ATN likely secondary to severe intraoperative hypotension requiring epi, norepi, and vaso intraoperatively 1/5/22  -BL sCr 0.8  -creatinine worsened post op delayed likely due to hemodilution  -failed high dose duretic therapy and KRT started 1/9/22 due to volume overload  -reintubated 1/12/22  Recommendations:   -start SCUFF x8 hours tonight, she has good clearance, plan for UF 500ml-1L if blood pressure tolerates   -replace electrolytes, including phosphate   -anemia: hgb 8.8, transfuse if <7  -renal diet   -strict I&O

## 2022-01-16 NOTE — PROGRESS NOTES
Cardiac Surgery Progress Note     JUSTO overnight     I/O  5941/7704 net: -[id number]  CRRT 7594     Gtts  Epi 0.08  Propofol 20     A/P  76F POD11 from redo AVR (mechanical) for subvalvular pannus. Post operative course complicated by acute respiratory distress and JO. Re intubated 01/13/22 and now on CRRT     Progressing     Continue current care   Replace calcium   CRRT per renal  Restart vaso 0.04  Wean epinephrine gtt to keep MAP>60  Continue enteral feeds  Continue broad spectrum antibiotics   Will consider extubation trial if continues to improve  Cont ICU

## 2022-01-16 NOTE — PROGRESS NOTES
Pharmacist Renal Dose Adjustment Note    Orion Ty is a 76 y.o. female being treated with the medication Zosyn (piperacillin-tazobactam)    Patient Data:    Vital Signs (Most Recent):  Temp: 97.88 °F (36.6 °C) (01/16/22 1315)  Pulse: 78 (01/16/22 1315)  Resp: (!) 27 (01/16/22 1315)  BP: (!) 100/55 (01/16/22 1300)  SpO2: (!) 90 % (01/16/22 1315)   Vital Signs (72h Range):  Temp:  [93.02 °F (33.9 °C)-99.8 °F (37.7 °C)]   Pulse:  []   Resp:  [16-61]   BP: ()/(50-61)   SpO2:  [88 %-100 %]   Arterial Line BP: ()/(35-56)      Recent Labs   Lab 01/16/22  0324 01/16/22  0541 01/16/22  1021   CREATININE 1.0 1.1 1.2     Serum creatinine: 1.2 mg/dL 01/16/22 1021  Estimated creatinine clearance: 29.6 mL/min    Patient has been on continuous SLED with plans to change to SCUF today    Piperacillin-tazobactam 4.5 g IV q 8 h will be changed to 4.5 g IV q 12 h     Pharmacist's Name: Tequila Escalona  Pharmacist's Extension: [phone]

## 2022-01-16 NOTE — PT/OT/SLP PROGRESS
Physical Therapy      Patient Name:  Orion Ty   MRN:  [id number]    Patient not seen today secondary to  (pt not seen due to being intubated and sedated.). Will follow-up at a later date    1/16/2022  .

## 2022-01-16 NOTE — PROGRESS NOTES
Pharmacokinetic Assessment Follow Up: IV Vancomycin    Vancomycin serum concentration assessment(s):    - The random level resulted at 14.6 mcg/mL and is within the desired definitive target range of 10 to 20 mcg/mL.  - Nephrology following and ordered SCUF today, expect minimal vancomycin clearance    Vancomycin Regimen Plan:  - Hold on redosing  - Re-dose when the random level is less than 20 mcg/mL, next level to be drawn tomorrow with AM labs    Drug levels (last 3 results):  Recent Labs   Lab Result Units 01/15/22  0326 01/16/22  0324   Vancomycin, Random ug/mL 11.7 14.6       Pharmacy will continue to follow and monitor vancomycin.    Please contact pharmacy at extension [phone] for questions regarding this assessment.    Thank you for the consult,   Tequila Escalona       Patient brief summary:  Orion Ty is a 76 y.o. female initiated on antimicrobial therapy with IV Vancomycin for treatment of lower respiratory infection    The patient's current regimen is pulse dose based on level and RRT plans    Drug Allergies:   Review of patient's allergies indicates:  No Known Allergies    Actual Body Weight:   49.7 kg    Renal Function:   Estimated Creatinine Clearance: 29.6 mL/min (based on SCr of 1.2 mg/dL).,     Dialysis Method (if applicable):  SLED 10 hours    CBC (last 72 hours):  Recent Labs   Lab Result Units 01/14/22  0313 01/15/22  0326 01/16/22  0324   WBC K/uL 14.33* 15.08* 10.81   Hemoglobin g/dL 9.5* 8.5* 8.8*   Hematocrit % 30.9* 26.6* 27.6*   Platelets K/uL 59* 56* 59*   Gran % % 89.0* 92.3* 93.0*   Lymph % % 4.0* 2.9* 1.9*   Mono % % 3.0* 3.2* 3.5*   Eosinophil % % 2.0 0.8 1.0   Basophil % % 0.0 0.1 0.0   Differential Method  Manual Automated Automated       Metabolic Panel (last 72 hours):  Recent Labs   Lab Result Units 01/13/22  1310 01/13/22  1723 01/13/22  2051 01/13/22  2233 01/14/22  0313 01/14/22  0807 01/14/22  1439 01/14/22  1934 01/14/22  2159 01/15/22  0326 01/15/22  0826 01/15/22  1406  01/15/22  1950 01/15/22  2143 01/16/22  0324 01/16/22  0541 01/16/22  0827 01/16/22  1021   Sodium mmol/L 140 141 141 137 137  --  137  137 134* 133* 137  134* 136 136  --  139 137 140  --  142   Potassium mmol/L 3.4* 3.4* 3.3* 3.3* 4.0 4.1 3.7  3.7 3.3*  3.4* 3.3* 3.7  3.6 3.9 3.8 3.8 3.7 3.5 3.4* 3.5 3.5   Chloride mmol/L 106 105 105 103 105  --  104  104 101 100 100  98 98 99  --  99 100 103  --  103   CO2 mmol/L 25 21* 19* 21* 20*  --  18*  18* 19* 20* 25  24 25 25  --  23 22* 20*  --  24   Glucose mg/dL 92 95 122* 130* 106  --  149*  149* 199* 169* 122*  122* 136* 211*  --  199* 224* 216*  --  163*   BUN mg/dL <3* 4* 6* 7* 3*  --  5*  5* 8 9 4*  4* 3* 7*  --  12 16 21  --  22   Creatinine mg/dL 0.4* 0.6 0.8 0.9 0.5  --  0.6  0.6 0.9 0.9 0.6  0.6 0.5 0.7  --  0.8 1.0 1.1  --  1.2   Albumin g/dL 3.8  --  3.6 3.6  --   --  3.2*  3.2* 3.2* 3.1* 3.2* 3.3* 3.2*  --  3.4*  --  3.0*  --  2.9*   Total Bilirubin mg/dL  --   --   --   --   --   --   --   --   --   --   --   --   --   --   --   --   --  1.5*   Alkaline Phosphatase U/L  --   --   --   --   --   --   --   --   --   --   --   --   --   --   --   --   --  77   AST U/L  --   --   --   --   --   --   --   --   --   --   --   --   --   --   --   --   --  32   ALT U/L  --   --   --   --   --   --   --   --   --   --   --   --   --   --   --   --   --  12   Magnesium mg/dL  --   --   --  1.7 1.7  --   --   --   --  1.7  --   --   --   --  1.6  --   --   --    Phosphorus mg/dL 1.9*  --  1.9* 2.2* 1.2*  --  3.7  3.7 3.2 2.7 1.3*  1.3* <1.0* 6.2*  --  11.4* 8.7* 8.0*  --   --        Vancomycin Administrations:  vancomycin given in the last 96 hours                   vancomycin 500 mg in dextrose 5 % 100 mL IVPB (ready to mix system) (mg) 500 mg New Bag 01/15/22 0870    vancomycin in dextrose 5 % 1 gram/250 mL IVPB 1,000 mg (mg) 1,000 mg New Bag 01/14/22 5637                Microbiologic Results:  Microbiology Results (last 7 days)     Procedure  Component Value Units Date/Time    Respiratory Infection Panel (PCR), Nasopharyngeal [[id number]]     Order Status: Canceled Specimen: Nasopharyngeal Swab

## 2022-01-16 NOTE — PROGRESS NOTES
[facility]  Nephrology  Progress Note    Patient Name: Orion Ty  MRN: [id number]  Admission Date: 1/5/2022  Hospital Length of Stay: 11 days  Attending Provider: Dennis Haider MD   Primary Care Physician: Otis Zimmer MD  Principal Problem:S/P aortic valve replacement    Subjective:     HPI: Orion Ty is our 76 y.o. female with previous aortic valve replacement, mitral valve replacement, and tricuspid valve repair in 2010 who presented on 1/5 for redo aortic valve replacement. Nephrology consulted on 1/8 for anuric JO. Patient is alert but tired, son at bedside. Stated she was tired prior to admission. During the hospital stay Cr increase from 1 to 1.5. In OR she received  2.5L crystalloid, 3U RBC, 2U FFP, 2 platelets, and 800 cell saver. she is +5 L since admit. UOP decrease overnight, per nurse she didn't urinate at all. She received diuril 500 mg twice, lasix 20 mg *2 on 1/7. This morning she urinate 225 immediately after placing the tejeda's cath. She received again lasix 100 mg this morning. Upor evaluation she was alert but repeatedly saying she was tired, denied any pain. Denied any previous hx of kidney disease.       Interval History:   Family at bedside  Pt remains intubated with fio2 35% and Peep of 5     Review of patient's allergies indicates:  No Known Allergies  Current Facility-Administered Medications   Medication Frequency    0.9%  NaCl infusion (CRRT USE ONLY) Continuous    0.9%  NaCl infusion (for blood administration) Q24H PRN    0.9%  NaCl infusion (for blood administration) Q24H PRN    0.9%  NaCl infusion Continuous    albuterol-ipratropium 2.5 mg-0.5 mg/3 mL nebulizer solution 3 mL Q6H    amiodarone tablet 400 mg BID    aspirin suppository 300 mg Once PRN    atorvastatin tablet 40 mg Daily    balsam peru-castor oiL Oint BID    bisacodyL suppository 10 mg Daily PRN    dextrose 10 % infusion Continuous PRN    dextrose 50% injection 12.5 g  PRN    EPINEPHrine (ADRENALIN) 5 mg in dextrose 5 % 250 mL infusion Continuous    famotidine (PF) injection 20 mg Daily    fentaNYL 50 mcg/mL injection 25 mcg Q2H PRN    glucagon (human recombinant) injection 1 mg PRN    insulin aspart U-100 pen 0-5 Units Q4H PRN    LIDOcaine 5 % patch 1 patch Q24H    magnesium sulfate 2g in water 50mL IVPB (premix) PRN    melatonin tablet 9 mg Nightly PRN    midodrine tablet 10 mg TID    NORepinephrine 4 mg in dextrose 5% 250 mL infusion (premix) (titrating) Continuous    ondansetron injection 4 mg Q12H PRN    oxyCODONE immediate release tablet 10 mg Q4H PRN    oxyCODONE immediate release tablet 5 mg Q4H PRN    piperacillin-tazobactam 4.5 g in sodium chloride 0.9% 100 mL IVPB (ready to mix system) Q8H    polyethylene glycol packet 17 g Daily    propofol (DIPRIVAN) 10 mg/mL infusion Continuous    sodium chloride 0.9% bolus 250 mL PRN    sodium chloride 0.9% flush 10 mL PRN    vancomycin - pharmacy to dose pharmacy to manage frequency    vasopressin (PITRESSIN) 0.2 Units/mL in dextrose 5 % 100 mL infusion Continuous     Facility-Administered Medications Ordered in Other Encounters   Medication Frequency    0.9%  NaCl infusion Continuous       Objective:     Vital Signs (Most Recent):  Temp: (!) 95.9 °F (35.5 °C) (01/15/22 2315)  Pulse: 82 (01/15/22 2315)  Resp: (!) 42 (01/15/22 2315)  BP: (!) 117/58 (01/15/22 2300)  SpO2: 100 % (01/15/22 2315)  O2 Device (Oxygen Therapy): ventilator (01/15/22 2300) Vital Signs (24h Range):  Temp:  [93.02 °F (33.9 °C)-98 °F (36.7 °C)] 95.9 °F (35.5 °C)  Pulse:  [76-95] 82  Resp:  [23-48] 42  SpO2:  [90 %-100 %] 100 %  BP: ()/(50-61) 117/58  Arterial Line BP: ()/(43-56) 115/49     Weight: 49.7 kg (109 lb 9.1 oz) (01/14/22 0439)  Body mass index is 20.7 kg/m².  Body surface area is 1.46 meters squared.    I/O last 3 completed shifts:  In: 7740.4 [I.V.:5629.3; NG/GT:585; IV Piggyback:1526.1]  Out: 9034  [Other:9034]    Physical Exam  Vitals and nursing note reviewed.   Constitutional:       General: She is not in acute distress.     Appearance: She is ill-appearing. She is not toxic-appearing or diaphoretic.   Eyes:      General: No scleral icterus.  Cardiovascular:      Rate and Rhythm: Normal rate and regular rhythm.      Pulses: Normal pulses.      Heart sounds: No murmur heard.      Pulmonary:      Effort: Pulmonary effort is normal. No respiratory distress.      Breath sounds: No wheezing, rhonchi or rales.      Comments: Intubated   Abdominal:      General: Bowel sounds are normal. There is distension.      Palpations: Abdomen is soft. There is no mass.      Comments: Mildly distented    Musculoskeletal:         General: Swelling present. No tenderness or deformity.      Right lower leg: No edema.      Left lower leg: No edema.   Skin:     General: Skin is warm.      Capillary Refill: Capillary refill takes 2 to 3 seconds.      Coloration: Skin is pale. Skin is not jaundiced.      Findings: No bruising, erythema, lesion or rash.      Comments: R hand swelling    Neurological:      Comments: Intubated      Psychiatric:      Comments: Intubated          Significant Labs:  ABGs:   Recent Labs   Lab 01/15/22  0400   PH 7.477*   PCO2 37.1   HCO3 27.4   POCSATURATED 98   BE 4     Cardiac Markers: No results for input(s): CKMB, TROPONINT, MYOGLOBIN in the last 168 hours.  CBC:   Recent Labs   Lab 01/14/22  1940 01/15/22  0326 01/15/22  0400   WBC  --  15.08*  --    RBC  --  2.88*  --    HGB  --  8.5*  --    HCT   < > 26.6* 26*   PLT  --  56*  --    MCV  --  92  --    MCH  --  29.5  --    MCHC  --  32.0  --     < > = values in this interval not displayed.     CMP:   Recent Labs   Lab 01/13/22  0816 01/13/22  1310 01/15/22  2143   *   < > 199*   CALCIUM 8.8   < > 7.8*   ALBUMIN 3.9   < > 3.4*   PROT 6.3  --   --       < > 139   K 3.7  3.7   < > 3.7   CO2 22*   < > 23      < > 99   BUN <3*   < > 12    CREATININE 0.4*   < > 0.8   ALKPHOS 70  --   --    ALT 12  --   --    AST 41*  --   --    BILITOT 2.6*  --   --     < > = values in this interval not displayed.     Coagulation:   Recent Labs   Lab 01/15/22  0826 01/15/22  1603 01/15/22  1753   INR   < >  --  2.8*   APTT  --  46.4*  --     < > = values in this interval not displayed.     LFTs:   Recent Labs   Lab 01/13/22  0816 01/13/22  1310 01/15/22  2143   ALT 12  --   --    AST 41*  --   --    ALKPHOS 70  --   --    BILITOT 2.6*  --   --    PROT 6.3  --   --    ALBUMIN 3.9   < > 3.4*    < > = values in this interval not displayed.     Microbiology Results (last 7 days)     Procedure Component Value Units Date/Time    Respiratory Infection Panel (PCR), Nasopharyngeal [[id number]]     Order Status: Canceled Specimen: Nasopharyngeal Swab         Specimen (24h ago, onward)            None        PTH: No results for input(s): PTH in the last 168 hours.  TSH: No results for input(s): TSH in the last 168 hours.  All labs within the past 24 hours have been reviewed.     Significant Imaging:  Labs: Reviewed  X-Ray: Reviewed    Assessment/Plan:     * S/P aortic valve replacement  -with severe intraoperative hypotension   -per primary    ATN (acute tubular necrosis)  -see jo    JO (acute kidney injury)  -oliguric ischemic ATN likely secondary to severe intraoperative hypotension requiring epi, norepi, and vaso intraoperatively 1/5/22  -BL sCr 0.8  -creatinine worsened post op delayed likely due to hemodilution  -failed high dose duretic therapy and KRT started 1/9/22 due to volume overload  -reintubated 1/12/22  Recommendations:   -start SCUFF x12 hours, she has good clearance, plan for UF at least 1.5L if blood pressure tolerates   -replace electrolytes, including phosphate   -anemia: hgb 8.5, transfuse if <7  -renal diet   -strict I&O        Thank you for your consult. I will follow-up with patient. Please contact us if you have any additional questions.    Heather Sweet,  MD  Nephrology  [facility]

## 2022-01-16 NOTE — PLAN OF CARE
"      SICU PLAN OF CARE NOTE    Dx: S/P aortic valve replacement    Shift Events: Epi weaned off, PAV setting for vent    Goals of Care: Comfort, MAP >65, ABX, Vent mgmt    Neuro: Arouses to Voice, Follows Commands and Moves All Extremities    Vital Signs: BP (!) 97/55 (BP Location: Left arm, Patient Position: Lying)   Pulse 80   Temp 97.7 °F (36.5 °C)   Resp (!) 21   Ht 5' 1" (1.549 m)   Wt 49.7 kg (109 lb 9.1 oz)   SpO2 99%   Breastfeeding No   BMI 20.70 kg/m²     Respiratory: Ventilator    Diet: Tube Feeds    Gtts: Propfol and Vasopressin    Urine Output: Oliguric (on CRRT)      Labs/Accuchecks: Trending RFP, PT/INR, PTT    Skin: No further breakdown noted, ointment applied to sacrum, turn Q2H     "

## 2022-01-16 NOTE — PROGRESS NOTES
Dr. Garcia at bedside, transitioned pt to PAV+ trial, orders for ABG and repeat phos in a few hours.

## 2022-01-16 NOTE — PROGRESS NOTES
Unable to obtain oral or axillary temperature. Esophageal temperature probe inserted with temperature reading of 34 C. Luz Maria larkin on. Dr. Heredia aware. Per Dr. Heredia, ok to titrate Vaso 0.02 units/min.

## 2022-01-16 NOTE — ASSESSMENT & PLAN NOTE
BG goal 140 - 180       -Start Novolog 2 units q 4 hrs while on tube feeds. HOLD if tube feeds are stoped or BG < 100.  - Low Dose SQ Insulin Correction Scale PRN hyperglycemia.   - BG Monitoring q 4 hrs while NPO/tube feeds    ** Please call Endocrine for any BG related issues **  ** Please notify Endocrine for any change and/or advance in diet**    Discharge planning: TBD

## 2022-01-16 NOTE — ASSESSMENT & PLAN NOTE
Orion Ty is a 76 y.o. female who presents to the SICU s/p redo aortic valve replacement with mechanical valve on 1/5/22.      Neuro/Psych:   -- Sedation: propofol  -- Pain: fentanyl pushes q2     Cards:   -- Pressors: vaso 0.04  -- Goal MAP: 60-80  -- holding metop   -- holding amlodipine  -- statin  -- heparin drip, PTT goal 60-80. Holding while INR is therapeutic  -- Paced HR 80  -- s/p 4U FFP on 1/13 with good response of INR      Pulm:   -- Goal O2 sat > 90%  -- reintubated 1/12  -- now on 35%/5PEEP  -- ABG PRN  -- 2 mediastinal removed 1/9 L Pleural CT removed 1/8  -- consult gen surg for trach/peg/permacath next week. Need to tolerate tube feeds and decrease settings on vent and pressor requirements  -- may be able to tolerate a trial of extubation  -- PAV+ today       Renal:  -- Mireles removed  -- net negative 1.7L yesterday  -- bladder scan every shift  -- Oliguric   -- Nephrology following  -- CRRT. Discuss with Nephrology, plan to be net negative 500cc fluid tonight      FEN / GI:   -- Replace lytes as needed  -- OG in place  -- Nutrition: tube feeds novasource renal, goal of 35      ID:   -- WBC 10.8  -- Antibiotics: periop ancef complete  -- Some concern for aspiration. Now on zosyn and vanc      Heme/Onc:   -- Hgb 8.8  -- platelets 59   -- 3U RBC, 2U FFP, 2U platelets in OR  -- Daily CBC  -- Transfused products: 1U RBC on 1/5/22. 1u RBC and 1u FFP on 1/9/21. 2U RBC on 1/12. 4 FFP on 1/13  -- Anticoagulation: ASA   -INR 4.5 this morning.  -- heparin drip, PTT 60-80, holding for now      Endo:   -- Gluc goal 140-180  -- Insulin per endocrinology      PPx:   Feeding: tube feeds  Analgesia/Sedation: fent pushes / propofol  Thromboembolic prevention: ASA, heparin drip   HOB >30: yes  Stress Ulcer ppx: famotidine BID  Glucose control: Critical care goal 140-180 g/dl, ISS    Lines/Drains/Airway: Art line, RIJ Trialysis, ETT       Dispo/Code Status/Palliative:   -- SICU / Full Code

## 2022-01-16 NOTE — PROGRESS NOTES
"[facility]  Endocrinology  Progress Note    Admit Date: 1/5/2022     Reason for Consult: Management of T2DM, Hyperglycemia     Surgical Procedure and Date:   1/5/21  REPLACEMENT, AORTIC VALVE, WITH REPEAT STERNOTOMY (N/A)     Diabetes diagnosis year: MELIDA; intubated    Home Diabetes Medications:  Metformin 500 mg daily,     How often checking glucose at home?  MELIDA    BG readings on regimen: MELIDA  Hypoglycemia on the regimen?  MELIDA  Missed doses on regimen? MELIDA    Diabetes Complications include:     None    Complicating diabetes co morbidities:   CAD, HTN, a-fib       HPI:   Patient is a 76 y.o. female with a diagnosis of s/p MVR, AVR and TVr in 2010 by Dr. Haider. 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 Family states she is more fatigued, decreased appetite and fluid on lungs. Pt has increased symptoms and wants to proceed with cardiac surgery now and here for pre op. She is now s/p the above procedure. Endocrinology consulted for management of T2DM.     Lab Results   Component Value Date    HGBA1C 6.0 (H) 01/03/2022           Interval HPI:   Overnight events: Remains in SICU. BG above goal ranges with prn SQ insulin correction scale. TFs increased to goal rate of 35 ml/hr.   Eating:   NPO  Nausea: No  Hypoglycemia and intervention: No  Fever: No  TPN and/or TF: Yes  If yes, type of TF/TPN and rate: Novasource Renal at 35 ml/hr     BP (!) 93/52 (BP Location: Left arm, Patient Position: Lying)   Pulse 79   Temp 96.98 °F (36.1 °C)   Resp (!) 25   Ht 5' 1" (1.549 m)   Wt 49.7 kg (109 lb 9.1 oz)   SpO2 100%   Breastfeeding No   BMI 20.70 kg/m²     Labs Reviewed and Include    Recent Labs   Lab 01/16/22  0541   *   CALCIUM 6.7*   ALBUMIN 3.0*      K 3.4*   CO2 20*      BUN 21   CREATININE 1.1     Lab Results   Component Value Date    WBC 10.81 01/16/2022    HGB 8.8 (L) 01/16/2022    HCT 27.6 (L) " 01/16/2022    MCV 93 01/16/2022    PLT 59 (L) 01/16/2022     No results for input(s): TSH, FREET4 in the last 168 hours.  Lab Results   Component Value Date    HGBA1C 6.0 (H) 01/03/2022       Nutritional status:   Body mass index is 20.7 kg/m².  Lab Results   Component Value Date    ALBUMIN 3.0 (L) 01/16/2022    ALBUMIN 3.4 (L) 01/15/2022    ALBUMIN 3.2 (L) 01/15/2022     Lab Results   Component Value Date    PREALBUMIN 11 (L) 01/10/2022       Estimated Creatinine Clearance: 32.3 mL/min (based on SCr of 1.1 mg/dL).    Accu-Checks  Recent Labs     01/13/22  2057 01/14/22  0808 01/14/22  1212 01/14/22  1551 01/15/22  1235 01/15/22  1601 01/15/22  1757 01/15/22  2143 01/16/22  0313 01/16/22  0542   POCTGLUCOSE 123* 132* 137* 144* 212* 183* 207* 204* 219* 255*       Current Medications and/or Treatments Impacting Glycemic Control  Immunotherapy:    Immunosuppressants     None        Steroids:   Hormones (From admission, onward)            Start     Stop Route Frequency Ordered    01/12/22 1830  vasopressin (PITRESSIN) 0.2 Units/mL in dextrose 5 % 100 mL infusion         -- IV Continuous 01/12/22 1719    01/06/22 2255  melatonin tablet 9 mg         -- Oral Nightly PRN 01/06/22 2255        Pressors:    Autonomic Drugs (From admission, onward)            Start     Stop Route Frequency Ordered    01/14/22 1500  midodrine tablet 10 mg         -- Oral 3 times daily 01/14/22 1453    01/12/22 1700  NORepinephrine 4 mg in dextrose 5% 250 mL infusion (premix) (titrating)        Question Answer Comment   Begin at (in mcg/kg/min): 0.02    Titrate by: (in mcg/kg/min) 0.01    Titrate interval: (in minutes) 10    Titrate to maintain: (MAP or SBP) MAP    Greater than: (in mmHg) 65    Maximum dose: (in mcg/kg/min) 3        -- IV Continuous 01/12/22 1548    01/12/22 1441  rocuronium 10 mg/mL injection        Note to Pharmacy: Created by cabinet override    01/13 0244   01/12/22 1441    01/09/22 1100  EPINEPHrine (ADRENALIN) 5 mg in  dextrose 5 % 250 mL infusion        Question Answer Comment   Begin at (in mcg/kg/min): 0.04    Titrate by: (in mcg/kg/min) 0.01    Titrate interval: (in minutes) 5    Titrate to maintain: (SBP or MAP or Cardiac Index) MAP    Greater than: (in mmHg) 60    Maximum dose: (in mcg/kg/min) 0.08        -- IV Continuous 01/09/22 0956        Hyperglycemia/Diabetes Medications:   Antihyperglycemics (From admission, onward)            Start     Stop Route Frequency Ordered    01/15/22 1415  insulin aspart U-100 pen 0-5 Units         -- SubQ Every 4 hours PRN 01/15/22 1315          ASSESSMENT and PLAN    * S/P aortic valve replacement  Managed per primary team  Optimize BG control        Type 2 diabetes mellitus with microalbuminuria, without long-term current use of insulin  BG goal 140 - 180       -Start Novolog 2 units q 4 hrs while on tube feeds. HOLD if tube feeds are stoped or BG < 100.  - Low Dose SQ Insulin Correction Scale PRN hyperglycemia.   - BG Monitoring q 4 hrs while NPO/tube feeds    ** Please call Endocrine for any BG related issues **  ** Please notify Endocrine for any change and/or advance in diet**    Discharge planning: TBD        Chronic atrial fibrillation  May increase insulin resistance.             Keyanna Crocker, NP  Endocrinology  [facility]

## 2022-01-16 NOTE — PT/OT/SLP PROGRESS
Occupational Therapy      Patient Name:  Orion Ty   MRN:  [id number]    Pt remains intubated. OT will check status at later date and continue with therapy services when appropriate.     1/16/2022

## 2022-01-16 NOTE — CONSULTS
Consult received for updated TF recs. Pt now intubated/sedated on CRRT. Last full assessment 1/11. Please see updated recs below. RD to monitor.    Recommendations     1. Modify TF: Peptamen Intense VHP @ goal rate 45 mL/hr   - This provides 1080 kcal (1238 kcal with current Propofol), 99 g protein, and 907 mL free water   - Additional water per MD, hold for n/v or residuals >500 mL    2. Add MVI, vitamin C, and Zinc to aid in skin preservation     3. RD to monitor and f/u    Susu Terrell, MS, RD, LDN

## 2022-01-17 NOTE — ASSESSMENT & PLAN NOTE
BG goal 140 - 180       - Continue Novolog 2 units q 4 hrs while on tube feeds. HOLD if tube feeds are stoped or BG < 100.  - Low Dose SQ Insulin Correction Scale PRN hyperglycemia.   - BG Monitoring q 4 hrs while NPO/tube feeds    ** Please call Endocrine for any BG related issues **  ** Please notify Endocrine for any change and/or advance in diet**    Discharge planning: TBD

## 2022-01-17 NOTE — SUBJECTIVE & OBJECTIVE
"Interval HPI:   Overnight events: Remains in SICU. NAEON. BG reasonably well controlled on current SQ insulin regimen. Remains on tube feeds. Diet NPO Except for: Sips with Medication  Eating:   NPO  Nausea: No  Hypoglycemia and intervention: No  Fever: No  TPN and/or TF: Yes  If yes, type of TF/TPN and rate: Novasource Renal at 35 ml/hr     BP (!) 102/54 (BP Location: Right arm, Patient Position: Lying)   Pulse 80   Temp 98.42 °F (36.9 °C)   Resp (!) 24   Ht 5' 1" (1.549 m)   Wt 49.7 kg (109 lb 9.1 oz)   SpO2 97%   Breastfeeding No   BMI 20.70 kg/m²     Labs Reviewed and Include    Recent Labs   Lab 01/17/22 0330 01/17/22 0330 01/17/22  0454 01/17/22  0454 01/17/22  0824   *   < > 213*  --   --    CALCIUM 7.0*   < > 6.9*  --   --    ALBUMIN 2.8*   < > 2.7*  --   --    PROT 5.9*  --   --   --   --       < > 138  --   --    K 3.2*   < > 3.1*   < > 3.5   CO2 21*   < > 19*  --   --       < > 104  --   --    BUN 33*   < > 35*  --   --    CREATININE 1.7*   < > 1.7*  --   --    ALKPHOS 102  --   --   --   --    ALT 13  --   --   --   --    AST 37  --   --   --   --    BILITOT 1.3*  --   --   --   --     < > = values in this interval not displayed.     Lab Results   Component Value Date    WBC 9.73 01/17/2022    HGB 8.4 (L) 01/17/2022    HCT 26.4 (L) 01/17/2022    MCV 94 01/17/2022    PLT 52 (L) 01/17/2022     No results for input(s): TSH, FREET4 in the last 168 hours.  Lab Results   Component Value Date    HGBA1C 6.0 (H) 01/03/2022       Nutritional status:   Body mass index is 20.7 kg/m².  Lab Results   Component Value Date    ALBUMIN 2.7 (L) 01/17/2022    ALBUMIN 2.8 (L) 01/17/2022    ALBUMIN 3.0 (L) 01/16/2022     Lab Results   Component Value Date    PREALBUMIN 11 (L) 01/10/2022       Estimated Creatinine Clearance: 20.9 mL/min (A) (based on SCr of 1.7 mg/dL (H)).    Accu-Checks  Recent Labs     01/15/22  2143 01/16/22  0313 01/16/22  0542 01/16/22  1025 01/16/22  1400 01/16/22  1726 " 01/16/22  2158 01/16/22  2347 01/17/22  0331 01/17/22  1001   POCTGLUCOSE 204* 219* 255* 153* 148* 158* 129* 116* 208* 198*       Current Medications and/or Treatments Impacting Glycemic Control  Immunotherapy:    Immunosuppressants     None        Steroids:   Hormones (From admission, onward)            Start     Stop Route Frequency Ordered    01/12/22 1830  vasopressin (PITRESSIN) 0.2 Units/mL in dextrose 5 % 100 mL infusion         -- IV Continuous 01/12/22 1719    01/06/22 2255  melatonin tablet 9 mg         -- Oral Nightly PRN 01/06/22 2255        Pressors:    Autonomic Drugs (From admission, onward)            Start     Stop Route Frequency Ordered    01/14/22 1500  midodrine tablet 10 mg         -- Oral 3 times daily 01/14/22 1453    01/12/22 1441  rocuronium 10 mg/mL injection        Note to Pharmacy: Created by cabinet override    01/13 0244   01/12/22 1441    01/09/22 1100  EPINEPHrine (ADRENALIN) 5 mg in dextrose 5 % 250 mL infusion        Question Answer Comment   Begin at (in mcg/kg/min): 0.04    Titrate by: (in mcg/kg/min) 0.01    Titrate interval: (in minutes) 5    Titrate to maintain: (SBP or MAP or Cardiac Index) MAP    Greater than: (in mmHg) 60    Maximum dose: (in mcg/kg/min) 0.08        -- IV Continuous 01/09/22 0956        Hyperglycemia/Diabetes Medications:   Antihyperglycemics (From admission, onward)            Start     Stop Route Frequency Ordered    01/17/22 0800  insulin aspart U-100 pen 2 Units         -- SubQ Every 24 hours (non-standard times) 01/16/22 0825    01/17/22 0400  insulin aspart U-100 pen 2 Units         -- SubQ Every 24 hours (non-standard times) 01/16/22 0825    01/17/22 0000  insulin aspart U-100 pen 2 Units         -- SubQ Every 24 hours (non-standard times) 01/16/22 0825    01/16/22 2000  insulin aspart U-100 pen 2 Units         -- SubQ Every 24 hours (non-standard times) 01/16/22 0825    01/16/22 1600  insulin aspart U-100 pen 2 Units         -- SubQ Every 24 hours  (non-standard times) 01/16/22 0825    01/16/22 1200  insulin aspart U-100 pen 2 Units         -- SubQ Every 24 hours (non-standard times) 01/16/22 0825    01/15/22 1415  insulin aspart U-100 pen 0-5 Units         -- SubQ Every 4 hours PRN 01/15/22 1319

## 2022-01-17 NOTE — PROGRESS NOTES
Cardiac Surgery Progress Note     JUSTO overnight     I/O  Net:  +1L     Gtts  Vaso 0.04       A/P  76F POD12 from redo AVR (mechanical) for subvalvular pannus. Post operative course complicated by acute respiratory distress and JO. Re intubated 01/13/22 and now on CRRT     Progressing     Continue current care   Replace calcium   CRRT per renal  Restart vaso 0.04  Continue enteral feeds  Continue broad spectrum antibiotics   Will consider extubation trial if continues to improve  Cont ICU     Jojo Kauffman MD  Cardiothoracic Surgery Fellow  [phone]

## 2022-01-17 NOTE — PROGRESS NOTES
Pharmacist Renal Dose Adjustment Note    Orion Ty is a 76 y.o. female being treated with the medication Zosyn (piperacillin-tazobactam)    Patient Data:    Vital Signs (Most Recent):  Temp: 97.16 °F (36.2 °C) (01/17/22 0807)  Pulse: 80 (01/17/22 0807)  Resp: (!) 22 (01/17/22 0807)  BP: (!) 103/59 (01/17/22 0800)  SpO2: 99 % (01/17/22 0807)   Vital Signs (72h Range):  Temp:  [93.02 °F (33.9 °C)-98.7 °F (37.1 °C)]   Pulse:  []   Resp:  [14-61]   BP: ()/(50-62)   SpO2:  [88 %-100 %]   Arterial Line BP: ()/(41-59)      Recent Labs   Lab 01/16/22  2156 01/17/22  0330 01/17/22  0454   CREATININE 1.6* 1.7* 1.7*     Serum creatinine: 1.7 mg/dL (H) 01/17/22 0454  Estimated creatinine clearance: 20.9 mL/min (A)    Patient SCUF to be changed to 12-hour SLED     Piperacillin-tazobactam 4.5 g IV q 12 h will be changed to 4.5 g IV q 8 h     Pharmacist's Name: Tequila Escalona  Pharmacist's Extension: [phone]

## 2022-01-17 NOTE — SUBJECTIVE & OBJECTIVE
Interval History/Significant Events:   -NAEO  -Remained on Vaso 0.04 while on CRRT. Tolerating .   -Tube feeds at goal  -INR continuing to rise. 5.1 this am. 1u FFP and VitK ordered  -Will likely end this CRRT run slightly net negative for past 24h    Follow-up For: Procedure(s) (LRB):  REPLACEMENT, AORTIC VALVE, WITH REPEAT STERNOTOMY (N/A)    Post-Operative Day: 12 Days Post-Op    Objective:     Vital Signs (Most Recent):  Temp: 96.44 °F (35.8 °C) (01/17/22 0645)  Pulse: 82 (01/17/22 0645)  Resp: (!) 30 (01/17/22 0645)  BP: 112/62 (01/17/22 0615)  SpO2: 100 % (01/17/22 0645) Vital Signs (24h Range):  Temp:  [96.08 °F (35.6 °C)-98.6 °F (37 °C)] 96.44 °F (35.8 °C)  Pulse:  [76-91] 82  Resp:  [14-41] 30  SpO2:  [90 %-100 %] 100 %  BP: ()/(51-62) 112/62  Arterial Line BP: ()/(43-59) 115/52     Weight: 49.7 kg (109 lb 9.1 oz)  Body mass index is 20.7 kg/m².      Intake/Output Summary (Last 24 hours) at 1/17/2022 0655  Last data filed at 1/17/2022 0630  Gross per 24 hour   Intake 3444.45 ml   Output 2462 ml   Net 982.45 ml       Physical Exam  Constitutional:       General: She is not in acute distress.     Comments: Intubated and sedated   HENT:      Head: Normocephalic and atraumatic.   Neck:      Comments: R IJ Trialysis  Cardiovascular:      Rate and Rhythm: Normal rate and regular rhythm.      Pulses: Normal pulses.      Comments: Midline sternotomy incision c/d/I  Pacemaker in place. Paced at 80  Pulmonary:      Effort: Pulmonary effort is normal. No respiratory distress.      Comments: intubated  Abdominal:      General: Abdomen is flat. There is no distension.      Palpations: Abdomen is soft.      Tenderness: There is no abdominal tenderness.   Genitourinary:     Comments: R groin well incision, bandage in place. Thigh soft, no hematoma  Musculoskeletal:      Comments: R radial art line in place   Skin:     General: Skin is warm.      Capillary Refill: Capillary refill takes less than 2  seconds.   Neurological:      Mental Status: She is alert.         Vents:  Vent Mode: A/C (01/17/22 0357)  Ventilator Initiated: Yes (01/05/22 1427)  Set Rate: 30 BPM (01/17/22 0357)  Vt Set: 300 mL (01/17/22 0357)  PEEP/CPAP: 5 cmH20 (01/17/22 0357)  Oxygen Concentration (%): 35 (01/17/22 0645)  Peak Airway Pressure: 23 cmH2O (01/17/22 0357)  Plateau Pressure: 30 cmH20 (01/17/22 0357)  Total Ve: 9.21 mL (01/17/22 0357)  Negative Inspiratory Force (cm H2O): 0 (01/17/22 0357)  F/VT Ratio<105 (RSBI): (!) 98.41 (01/17/22 0357)    Lines/Drains/Airways     Central Venous Catheter Line            Trialysis (Dialysis) Catheter 01/08/22 1027 right internal jugular 8 days          Drain                 NG/OG Tube 01/14/22 1520 Left mouth 2 days          Airway                 Airway - Non-Surgical 01/12/22 1505 Endotracheal Tube 4 days       Airway Anesthesia 01/12/22 4 days          Arterial Line            Arterial Line 01/05/22 0855 Right Radial 11 days          Peripheral Intravenous Line                 Midline Catheter Insertion/Assessment  - Single Lumen 01/10/22 1300 Left cephalic vein (lateral side of arm) 18g x 8cm 6 days         Peripheral IV - Single Lumen 01/15/22 1445 18 G;1 1/4 in Anterior;Left Forearm 1 day         Peripheral IV - Single Lumen 01/16/22 0430 20 G Anterior;Distal;Right Wrist 1 day                Significant Labs:    CBC/Anemia Profile:  Recent Labs   Lab 01/16/22  0324 01/16/22 2158 01/17/22  0330   WBC 10.81  --  9.73   HGB 8.8*  --  8.4*   HCT 27.6* 29* 26.4*   PLT 59*  --  52*   MCV 93  --  94   RDW 17.9*  --  18.2*        Chemistries:  Recent Labs   Lab 01/16/22  0324 01/16/22  0541 01/16/22  1021 01/16/22  1354 01/16/22  2156 01/17/22  0330 01/17/22  0454      < > 142   < > 140 138 138   K 3.5   < > 3.5   < > 3.5 3.2* 3.1*      < > 103   < > 101 102 104   CO2 22*   < > 24   < > 23 21* 19*   BUN 16   < > 22   < > 31* 33* 35*   CREATININE 1.0   < > 1.2   < > 1.6* 1.7* 1.7*    CALCIUM 6.9*   < > 7.2*   < > 7.5* 7.0* 6.9*   ALBUMIN  --    < > 2.9*   < > 3.0* 2.8* 2.7*   PROT  --   --  5.8*  --   --  5.9*  --    BILITOT  --   --  1.5*  --   --  1.3*  --    ALKPHOS  --   --  77  --   --  102  --    ALT  --   --  12  --   --  13  --    AST  --   --  32  --   --  37  --    MG 1.6  --  1.5*  --   --  1.5*  --    PHOS 8.7*   < > 8.2*   < > 8.0* 5.8* 5.4*    < > = values in this interval not displayed.       All pertinent labs within the past 24 hours have been reviewed.    Significant Imaging:  I have reviewed all pertinent imaging results/findings within the past 24 hours.

## 2022-01-17 NOTE — PROGRESS NOTES
[facility]  Nephrology  Progress Note    Patient Name: Orion Ty  MRN: [id number]  Admission Date: 1/5/2022  Hospital Length of Stay: 12 days  Attending Provider: Dennis Haider MD   Primary Care Physician: Otis Zimmer MD  Principal Problem:S/P aortic valve replacement    Subjective:     HPI: Orion Ty is our 76 y.o. female with previous aortic valve replacement, mitral valve replacement, and tricuspid valve repair in 2010 who presented on 1/5 for redo aortic valve replacement. Nephrology consulted on 1/8 for anuric JO. Patient is alert but tired, son at bedside. Stated she was tired prior to admission. During the hospital stay Cr increase from 1 to 1.5. In OR she received  2.5L crystalloid, 3U RBC, 2U FFP, 2 platelets, and 800 cell saver. she is +5 L since admit. UOP decrease overnight, per nurse she didn't urinate at all. She received diuril 500 mg twice, lasix 20 mg *2 on 1/7. This morning she urinate 225 immediately after placing the tejeda's cath. She received again lasix 100 mg this morning. Upor evaluation she was alert but repeatedly saying she was tired, denied any pain. Denied any previous hx of kidney disease.       Interval History:   Son at bedside  Pt remains intubated with fio2 35% and Peep of 5    Tolerated 12 hours of SCUF overnight, with 1.7L net negative     Review of patient's allergies indicates:  No Known Allergies  Current Facility-Administered Medications   Medication Frequency    0.9%  NaCl infusion (CRRT USE ONLY) Continuous    0.9%  NaCl infusion (for blood administration) Q24H PRN    0.9%  NaCl infusion (for blood administration) Q24H PRN    0.9%  NaCl infusion Continuous    albuterol-ipratropium 2.5 mg-0.5 mg/3 mL nebulizer solution 3 mL Q6H    amiodarone tablet 400 mg BID    aspirin suppository 300 mg Once PRN    atorvastatin tablet 40 mg Daily    balsam peru-castor oiL Oint BID    bisacodyL suppository 10 mg Daily PRN    calcium  acetate(phosphat bind) capsule 1,334 mg Once    calcium gluconate 1 g in dextrose 5 % 100 mL IVPB Once    dextrose 10 % infusion Continuous PRN    dextrose 50% injection 12.5 g PRN    EPINEPHrine (ADRENALIN) 5 mg in dextrose 5 % 250 mL infusion Continuous    fentaNYL 50 mcg/mL injection 25 mcg Q2H PRN    glucagon (human recombinant) injection 1 mg PRN    insulin aspart U-100 pen 0-5 Units Q4H PRN    insulin aspart U-100 pen 2 Units Q24H    insulin aspart U-100 pen 2 Units Q24H    insulin aspart U-100 pen 2 Units Q24H    insulin aspart U-100 pen 2 Units Q24H    insulin aspart U-100 pen 2 Units Q24H    insulin aspart U-100 pen 2 Units Q24H    LIDOcaine 5 % patch 1 patch Q24H    magnesium sulfate 2g in water 50mL IVPB (premix) PRN    melatonin tablet 9 mg Nightly PRN    midodrine tablet 10 mg TID    NORepinephrine 4 mg in dextrose 5% 250 mL infusion (premix) (titrating) Continuous    ondansetron injection 4 mg Q12H PRN    oxyCODONE immediate release tablet 10 mg Q4H PRN    oxyCODONE immediate release tablet 5 mg Q4H PRN    pantoprazole injection 40 mg Q12H    piperacillin-tazobactam 4.5 g in sodium chloride 0.9% 100 mL IVPB (ready to mix system) Q12H    polyethylene glycol packet 17 g Daily    propofol (DIPRIVAN) 10 mg/mL infusion Continuous    sodium chloride 0.9% bolus 250 mL PRN    sodium chloride 0.9% flush 10 mL PRN    vancomycin - pharmacy to dose pharmacy to manage frequency    vasopressin (PITRESSIN) 0.2 Units/mL in dextrose 5 % 100 mL infusion Continuous     Facility-Administered Medications Ordered in Other Encounters   Medication Frequency    0.9%  NaCl infusion Continuous       Objective:     Vital Signs (Most Recent):  Temp: 98.42 °F (36.9 °C) (01/17/22 0015)  Pulse: 80 (01/17/22 0015)  Resp: (!) 30 (01/17/22 0015)  BP: (!) 98/55 (01/17/22 0000)  SpO2: 100 % (01/17/22 0015)  O2 Device (Oxygen Therapy): ventilator (01/17/22 0000) Vital Signs (24h Range):  Temp:  [96.62 °F (35.9  °C)-98.6 °F (37 °C)] 98.42 °F (36.9 °C)  Pulse:  [76-88] 80  Resp:  [14-61] 30  SpO2:  [90 %-100 %] 100 %  BP: ()/(51-60) 98/55  Arterial Line BP: ()/(43-59) 133/55     Weight: 49.7 kg (109 lb 9.1 oz) (01/14/22 0439)  Body mass index is 20.7 kg/m².  Body surface area is 1.46 meters squared.    I/O last 3 completed shifts:  In: 6965.6 [I.V.:4053; NG/GT:1460; IV Piggyback:1452.6]  Out: 7805 [Drains:210; Other:7594; Stool:1]    Physical Exam  Vitals and nursing note reviewed.   Constitutional:       General: She is not in acute distress.     Appearance: She is ill-appearing. She is not toxic-appearing or diaphoretic.   Eyes:      General: No scleral icterus.  Cardiovascular:      Rate and Rhythm: Normal rate and regular rhythm.      Pulses: Normal pulses.      Heart sounds: No murmur heard.      Pulmonary:      Effort: Pulmonary effort is normal. No respiratory distress.      Breath sounds: No wheezing, rhonchi or rales.      Comments: Intubated   Abdominal:      General: Bowel sounds are normal. There is distension.      Palpations: Abdomen is soft. There is no mass.      Comments: Mildly distented    Musculoskeletal:         General: Swelling present. No tenderness or deformity.      Right lower leg: No edema.      Left lower leg: No edema.   Skin:     General: Skin is warm.      Capillary Refill: Capillary refill takes 2 to 3 seconds.      Coloration: Skin is pale. Skin is not jaundiced.      Findings: No bruising, erythema, lesion or rash.      Comments: R hand swelling    Neurological:      Comments: Intubated      Psychiatric:      Comments: Intubated          Significant Labs:  ABGs:   Recent Labs   Lab 01/16/22 2158   PH 7.341*   PCO2 49.8*   HCO3 26.9   POCSATURATED 97   BE 1     Cardiac Markers: No results for input(s): CKMB, TROPONINT, MYOGLOBIN in the last 168 hours.  CBC:   Recent Labs   Lab 01/15/22  0400 01/16/22  0324 01/16/22 2158   WBC  --  10.81  --    RBC  --  2.97*  --    HGB  --   8.8*  --    HCT   < > 27.6* 29*   PLT  --  59*  --    MCV  --  93  --    MCH  --  29.6  --    MCHC  --  31.9*  --     < > = values in this interval not displayed.     CMP:   Recent Labs   Lab 01/16/22  1021 01/16/22  1354 01/16/22  2156   *   < > 138*   CALCIUM 7.2*   < > 7.5*   ALBUMIN 2.9*   < > 3.0*   PROT 5.8*  --   --       < > 140   K 3.5   < > 3.5   CO2 24   < > 23      < > 101   BUN 22   < > 31*   CREATININE 1.2   < > 1.6*   ALKPHOS 77  --   --    ALT 12  --   --    AST 32  --   --    BILITOT 1.5*  --   --     < > = values in this interval not displayed.     Coagulation:   Recent Labs   Lab 01/16/22  1548 01/16/22  1724 01/16/22  2346   INR  --  4.7*  --    APTT   < >  --  62.4*    < > = values in this interval not displayed.     LFTs:   Recent Labs   Lab 01/16/22  1021 01/16/22  1354 01/16/22  2156   ALT 12  --   --    AST 32  --   --    ALKPHOS 77  --   --    BILITOT 1.5*  --   --    PROT 5.8*  --   --    ALBUMIN 2.9*   < > 3.0*    < > = values in this interval not displayed.     Microbiology Results (last 7 days)     Procedure Component Value Units Date/Time    Respiratory Infection Panel (PCR), Nasopharyngeal [[id number]]     Order Status: Canceled Specimen: Nasopharyngeal Swab         Specimen (24h ago, onward)            None        PTH: No results for input(s): PTH in the last 168 hours.  TSH: No results for input(s): TSH in the last 168 hours.  All labs within the past 24 hours have been reviewed.     Significant Imaging:  Labs: Reviewed  X-Ray: Reviewed    Assessment/Plan:     * S/P aortic valve replacement  -with severe intraoperative hypotension   -per primary    ATN (acute tubular necrosis)  -see jo    JO (acute kidney injury)  -oliguric ischemic ATN likely secondary to severe intraoperative hypotension requiring epi, norepi, and vaso intraoperatively 1/5/22  -BL sCr 0.8  -creatinine worsened post op delayed likely due to hemodilution  -failed high dose duretic therapy and KRT  started 1/9/22 due to volume overload  -reintubated 1/12/22  Recommendations:   -start SCUFF x8 hours tonight, she has good clearance, plan for UF 500ml-1L if blood pressure tolerates   -replace electrolytes, including phosphate   -anemia: hgb 8.8, transfuse if <7  -renal diet   -strict I&O        Thank you for your consult. I will follow-up with patient. Please contact us if you have any additional questions.    Heather Sweet MD  Nephrology  [facility]

## 2022-01-17 NOTE — SUBJECTIVE & OBJECTIVE
Interval History:   Patient positive one liter in last 24 hours. On SCUFF has one more to go to complete an 8 hour treatment. Will need to switch to SLED for 12 hours needs clearance and to keep even to 500 cc neg. CV is 9   Review of patient's allergies indicates:  No Known Allergies  Current Facility-Administered Medications   Medication Frequency    0.9%  NaCl infusion (CRRT USE ONLY) Continuous    0.9%  NaCl infusion (for blood administration) Q24H PRN    0.9%  NaCl infusion (for blood administration) Q24H PRN    0.9%  NaCl infusion (for blood administration) Q24H PRN    0.9%  NaCl infusion Continuous    albuterol-ipratropium 2.5 mg-0.5 mg/3 mL nebulizer solution 3 mL Q6H    amiodarone tablet 400 mg BID    aspirin suppository 300 mg Once PRN    atorvastatin tablet 40 mg Daily    balsam peru-castor oiL Oint BID    bisacodyL suppository 10 mg Daily PRN    dextrose 10 % infusion Continuous PRN    dextrose 50% injection 12.5 g PRN    EPINEPHrine (ADRENALIN) 5 mg in dextrose 5 % 250 mL infusion Continuous    fentaNYL 50 mcg/mL injection 25 mcg Q2H PRN    glucagon (human recombinant) injection 1 mg PRN    insulin aspart U-100 pen 0-5 Units Q4H PRN    insulin aspart U-100 pen 2 Units Q24H    insulin aspart U-100 pen 2 Units Q24H    insulin aspart U-100 pen 2 Units Q24H    insulin aspart U-100 pen 2 Units Q24H    insulin aspart U-100 pen 2 Units Q24H    insulin aspart U-100 pen 2 Units Q24H    LIDOcaine 5 % patch 1 patch Q24H    magnesium sulfate 2g in water 50mL IVPB (premix) PRN    melatonin tablet 9 mg Nightly PRN    midodrine tablet 10 mg TID    NORepinephrine 4 mg in dextrose 5% 250 mL infusion (premix) (titrating) Continuous    ondansetron injection 4 mg Q12H PRN    oxyCODONE immediate release tablet 10 mg Q4H PRN    oxyCODONE immediate release tablet 5 mg Q4H PRN    pantoprazole injection 40 mg Q12H    piperacillin-tazobactam 4.5 g in sodium chloride 0.9% 100 mL IVPB (ready to mix  system) Q12H    polyethylene glycol packet 17 g Daily    propofol (DIPRIVAN) 10 mg/mL infusion Continuous    sodium chloride 0.9% bolus 250 mL PRN    sodium chloride 0.9% flush 10 mL PRN    vancomycin - pharmacy to dose pharmacy to manage frequency    vancomycin 500 mg in dextrose 5 % 100 mL IVPB (ready to mix system) Once    vasopressin (PITRESSIN) 0.2 Units/mL in dextrose 5 % 100 mL infusion Continuous     Facility-Administered Medications Ordered in Other Encounters   Medication Frequency    0.9%  NaCl infusion Continuous       Objective:     Vital Signs (Most Recent):  Temp: 96.98 °F (36.1 °C) (01/17/22 0730)  Pulse: 80 (01/17/22 0730)  Resp: (!) 22 (01/17/22 0730)  BP: (!) 108/57 (01/17/22 0701)  SpO2: 100 % (01/17/22 0730)  O2 Device (Oxygen Therapy): ventilator (01/17/22 0701) Vital Signs (24h Range):  Temp:  [96.08 °F (35.6 °C)-98.6 °F (37 °C)] 96.98 °F (36.1 °C)  Pulse:  [76-91] 80  Resp:  [14-41] 22  SpO2:  [90 %-100 %] 100 %  BP: ()/(51-62) 108/57  Arterial Line BP: ()/(43-59) 111/48     Weight: 49.7 kg (109 lb 9.1 oz) (01/14/22 0439)  Body mass index is 20.7 kg/m².  Body surface area is 1.46 meters squared.    I/O last 3 completed shifts:  In: 5327 [I.V.:2470.1; Blood:275.2; NG/GT:1725; IV Piggyback:856.7]  Out: 4208 [Drains:210; Other:3997; Stool:1]    Physical Exam  Vitals and nursing note reviewed.   Constitutional:       General: She is not in acute distress.     Appearance: She is ill-appearing. She is not toxic-appearing or diaphoretic.   Eyes:      General: No scleral icterus.  Cardiovascular:      Rate and Rhythm: Normal rate and regular rhythm.      Pulses: Normal pulses.      Heart sounds: No murmur heard.      Pulmonary:      Effort: Pulmonary effort is normal. No respiratory distress.      Breath sounds: No wheezing, rhonchi or rales.      Comments: Intubated   Abdominal:      General: Bowel sounds are normal. There is distension.      Palpations: Abdomen is soft. There is  no mass.      Comments: Mildly distented    Musculoskeletal:         General: Swelling present. No tenderness or deformity.      Right lower leg: No edema.      Left lower leg: No edema.   Skin:     General: Skin is warm.      Capillary Refill: Capillary refill takes 2 to 3 seconds.      Coloration: Skin is pale. Skin is not jaundiced.      Findings: No bruising, erythema, lesion or rash.      Comments: R hand swelling    Neurological:      Comments: Intubated      Psychiatric:      Comments: Intubated          Significant Labs:  ABGs:   Recent Labs   Lab 01/17/22 0452   PH 7.353   PCO2 42.6   HCO3 23.7*   POCSATURATED 99   BE -2     Cardiac Markers: No results for input(s): CKMB, TROPONINT, MYOGLOBIN in the last 168 hours.  CBC:   Recent Labs   Lab 01/17/22 0330   WBC 9.73   RBC 2.82*   HGB 8.4*   HCT 26.4*   PLT 52*   MCV 94   MCH 29.8   MCHC 31.8*     CMP:   Recent Labs   Lab 01/17/22 0330 01/17/22 0330 01/17/22 0454   *   < > 213*   CALCIUM 7.0*   < > 6.9*   ALBUMIN 2.8*   < > 2.7*   PROT 5.9*  --   --       < > 138   K 3.2*   < > 3.1*   CO2 21*   < > 19*      < > 104   BUN 33*   < > 35*   CREATININE 1.7*   < > 1.7*   ALKPHOS 102  --   --    ALT 13  --   --    AST 37  --   --    BILITOT 1.3*  --   --     < > = values in this interval not displayed.     Coagulation:   Recent Labs   Lab 01/17/22 0337   INR 5.1*   APTT 65.0*     LFTs:   Recent Labs   Lab 01/17/22 0330 01/17/22 0330 01/17/22 0454   ALT 13  --   --    AST 37  --   --    ALKPHOS 102  --   --    BILITOT 1.3*  --   --    PROT 5.9*  --   --    ALBUMIN 2.8*   < > 2.7*    < > = values in this interval not displayed.     Microbiology Results (last 7 days)     Procedure Component Value Units Date/Time    Respiratory Infection Panel (PCR), Nasopharyngeal [[id number]]     Order Status: Canceled Specimen: Nasopharyngeal Swab         Specimen (24h ago, onward)            None        PTH: No results for input(s): PTH in the last 168  hours.  TSH: No results for input(s): TSH in the last 168 hours.  All labs within the past 24 hours have been reviewed.     Significant Imaging:  Labs: Reviewed  X-Ray: Reviewed

## 2022-01-17 NOTE — PROGRESS NOTES
"[facility]  Endocrinology  Progress Note    Admit Date: 1/5/2022     Reason for Consult: Management of T2DM, Hyperglycemia     Surgical Procedure and Date:   1/5/21  REPLACEMENT, AORTIC VALVE, WITH REPEAT STERNOTOMY (N/A)     Diabetes diagnosis year: MELIDA; intubated    Home Diabetes Medications:  Metformin 500 mg daily,     How often checking glucose at home?  MELIDA    BG readings on regimen: MELIDA  Hypoglycemia on the regimen?  MELIDA  Missed doses on regimen? MELIDA    Diabetes Complications include:     None    Complicating diabetes co morbidities:   CAD, HTN, a-fib       HPI:   Patient is a 76 y.o. female with a diagnosis of s/p MVR, AVR and TVr in 2010 by Dr. Haider. 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 Family states she is more fatigued, decreased appetite and fluid on lungs. Pt has increased symptoms and wants to proceed with cardiac surgery now and here for pre op. She is now s/p the above procedure. Endocrinology consulted for management of T2DM.     Lab Results   Component Value Date    HGBA1C 6.0 (H) 01/03/2022           Interval HPI:   Overnight events: Remains in SICU. NAEON. BG reasonably well controlled on current SQ insulin regimen. Remains on tube feeds. Diet NPO Except for: Sips with Medication  Eating:   NPO  Nausea: No  Hypoglycemia and intervention: No  Fever: No  TPN and/or TF: Yes  If yes, type of TF/TPN and rate: Novasource Renal at 35 ml/hr     BP (!) 102/54 (BP Location: Right arm, Patient Position: Lying)   Pulse 80   Temp 98.42 °F (36.9 °C)   Resp (!) 24   Ht 5' 1" (1.549 m)   Wt 49.7 kg (109 lb 9.1 oz)   SpO2 97%   Breastfeeding No   BMI 20.70 kg/m²     Labs Reviewed and Include    Recent Labs   Lab 01/17/22  0330 01/17/22  0330 01/17/22  0454 01/17/22  0454 01/17/22  0824   *   < > 213*  --   --    CALCIUM 7.0*   < > 6.9*  --   --    ALBUMIN 2.8*   < > 2.7*  --   --    PROT 5.9*  -- "   --   --   --       < > 138  --   --    K 3.2*   < > 3.1*   < > 3.5   CO2 21*   < > 19*  --   --       < > 104  --   --    BUN 33*   < > 35*  --   --    CREATININE 1.7*   < > 1.7*  --   --    ALKPHOS 102  --   --   --   --    ALT 13  --   --   --   --    AST 37  --   --   --   --    BILITOT 1.3*  --   --   --   --     < > = values in this interval not displayed.     Lab Results   Component Value Date    WBC 9.73 01/17/2022    HGB 8.4 (L) 01/17/2022    HCT 26.4 (L) 01/17/2022    MCV 94 01/17/2022    PLT 52 (L) 01/17/2022     No results for input(s): TSH, FREET4 in the last 168 hours.  Lab Results   Component Value Date    HGBA1C 6.0 (H) 01/03/2022       Nutritional status:   Body mass index is 20.7 kg/m².  Lab Results   Component Value Date    ALBUMIN 2.7 (L) 01/17/2022    ALBUMIN 2.8 (L) 01/17/2022    ALBUMIN 3.0 (L) 01/16/2022     Lab Results   Component Value Date    PREALBUMIN 11 (L) 01/10/2022       Estimated Creatinine Clearance: 20.9 mL/min (A) (based on SCr of 1.7 mg/dL (H)).    Accu-Checks  Recent Labs     01/15/22  2143 01/16/22  0313 01/16/22  0542 01/16/22  1025 01/16/22  1400 01/16/22  1726 01/16/22  2158 01/16/22  2347 01/17/22  0331 01/17/22  1001   POCTGLUCOSE 204* 219* 255* 153* 148* 158* 129* 116* 208* 198*       Current Medications and/or Treatments Impacting Glycemic Control  Immunotherapy:    Immunosuppressants     None        Steroids:   Hormones (From admission, onward)            Start     Stop Route Frequency Ordered    01/12/22 1830  vasopressin (PITRESSIN) 0.2 Units/mL in dextrose 5 % 100 mL infusion         -- IV Continuous 01/12/22 1719    01/06/22 2255  melatonin tablet 9 mg         -- Oral Nightly PRN 01/06/22 2255        Pressors:    Autonomic Drugs (From admission, onward)            Start     Stop Route Frequency Ordered    01/14/22 1500  midodrine tablet 10 mg         -- Oral 3 times daily 01/14/22 1453    01/12/22 1441  rocuronium 10 mg/mL injection        Note to  Pharmacy: Created by cabinet override    01/13 0244   01/12/22 1441    01/09/22 1100  EPINEPHrine (ADRENALIN) 5 mg in dextrose 5 % 250 mL infusion        Question Answer Comment   Begin at (in mcg/kg/min): 0.04    Titrate by: (in mcg/kg/min) 0.01    Titrate interval: (in minutes) 5    Titrate to maintain: (SBP or MAP or Cardiac Index) MAP    Greater than: (in mmHg) 60    Maximum dose: (in mcg/kg/min) 0.08        -- IV Continuous 01/09/22 0956        Hyperglycemia/Diabetes Medications:   Antihyperglycemics (From admission, onward)            Start     Stop Route Frequency Ordered    01/17/22 0800  insulin aspart U-100 pen 2 Units         -- SubQ Every 24 hours (non-standard times) 01/16/22 0825    01/17/22 0400  insulin aspart U-100 pen 2 Units         -- SubQ Every 24 hours (non-standard times) 01/16/22 0825    01/17/22 0000  insulin aspart U-100 pen 2 Units         -- SubQ Every 24 hours (non-standard times) 01/16/22 0825    01/16/22 2000  insulin aspart U-100 pen 2 Units         -- SubQ Every 24 hours (non-standard times) 01/16/22 0825    01/16/22 1600  insulin aspart U-100 pen 2 Units         -- SubQ Every 24 hours (non-standard times) 01/16/22 0825    01/16/22 1200  insulin aspart U-100 pen 2 Units         -- SubQ Every 24 hours (non-standard times) 01/16/22 0825    01/15/22 1415  insulin aspart U-100 pen 0-5 Units         -- SubQ Every 4 hours PRN 01/15/22 1315          ASSESSMENT and PLAN    * S/P aortic valve replacement  Managed per primary team  Optimize BG control        Type 2 diabetes mellitus with microalbuminuria, without long-term current use of insulin  BG goal 140 - 180       - Continue Novolog 2 units q 4 hrs while on tube feeds. HOLD if tube feeds are stoped or BG < 100.  - Low Dose SQ Insulin Correction Scale PRN hyperglycemia.   - BG Monitoring q 4 hrs while NPO/tube feeds    ** Please call Endocrine for any BG related issues **  ** Please notify Endocrine for any change and/or advance in  diet**    Discharge planning: TBD        Chronic atrial fibrillation  May increase insulin resistance.             Keyanna Crocker NP  Endocrinology  [facility]

## 2022-01-17 NOTE — ASSESSMENT & PLAN NOTE
-oliguric ischemic ATN likely secondary to severe intraoperative hypotension requiring epi, norepi, and vaso intraoperatively 1/5/22  -BL sCr 0.8  -creatinine worsened post op delayed likely due to hemodilution  -failed high dose duretic therapy and KRT started 1/9/22 due to volume overload  -reintubated 1/12/22  Recommendations:   -patient on SCUFF will complete 8 hours treatment in an hour. Will switch to SLED for 12 hours needs clearance. Will keep even to neg 500 cc in next 24 hours as per primary request. CVP is 9   -replace electrolytes, including phosphate   -renal diet   -strict I&O

## 2022-01-17 NOTE — ASSESSMENT & PLAN NOTE
Orion Ty is a 76 y.o. female who presents to the SICU s/p redo aortic valve replacement with mechanical valve on 1/5/22.      Neuro/Psych:   -- Sedation: propofol  -- Pain: fentanyl pushes q2     Cards:   -- Pressors: vaso 0.04 when on dialysis  -- Goal MAP: 60-80  -- holding metop   -- holding amlodipine  -- statin  -- heparin drip, PTT goal 60-80. Holding while INR is therapeutic  -- Paced HR 80  -- s/p 4U FFP on 1/13 with good response of INR  -- INR 5.1. Now s/p 1U FFP and vitamin K on 1/17/21      Pulm:   -- Goal O2 sat > 90%  -- reintubated 1/12  -- now on 35%/5PEEP  -- ABG PRN  -- 2 mediastinal removed 1/9 L Pleural CT removed 1/8  -- Possible consult gen surg for trach/peg/permacath this week.  -- may be able to tolerate a trial of extubation  -- PAV+ today       Renal:  -- Mireles removed  -- On track to become net negative 500cc yesterday  -- bladder scan every shift  -- Oliguric   -- Nephrology following  -- CRRT. Discuss with Nephrology, plan to be net negative 500cc fluid tonight      FEN / GI:   -- Replace lytes as needed  -- OG in place  -- Nutrition: tube feeds novasource renal, goal of 35      ID:   -- WBC 9.7  -- Antibiotics: periop ancef complete. Zosyn and vanc  -- Some concern for aspiration      Heme/Onc:   -- Hgb 8.4  -- platelets 52   -- 3U RBC, 2U FFP, 2U platelets in OR  -- Daily CBC  -- Transfused products: 1U RBC on 1/5/22. 1u RBC and 1u FFP on 1/9/21. 2U RBC on 1/12. 4 FFP on 1/13. 1 FFP on 1/17  -- Anticoagulation: INR supratherapeutic, transition to heparin drip   -INR 5.1 this morning. Giving 1 FFP  -- heparin drip, PTT 60-80, holding for now      Endo:   -- Gluc goal 140-180  -- Insulin per endocrinology      PPx:   Feeding: tube feeds  Analgesia/Sedation: fent pushes / propofol  Thromboembolic prevention: heparin drip (holding)  HOB >30: yes  Stress Ulcer ppx: protonix BID  Glucose control: Critical care goal 140-180 g/dl, ISS    Lines/Drains/Airway: Art line, RIJ  Trialysis, ETT       Dispo/Code Status/Palliative:   -- SICU / Full Code

## 2022-01-17 NOTE — PROGRESS NOTES
[facility]  Critical Care - Surgery  Progress Note    Patient Name: Orion Ty  MRN: [id number]  Admission Date: 1/5/2022  Hospital Length of Stay: 12 days  Code Status: Full Code  Attending Provider: Dennis Haider MD  Primary Care Provider: Otis Zimmer MD   Principal Problem: S/P aortic valve replacement    Subjective:     Hospital/ICU Course:  No notes on file    Interval History/Significant Events:   -NAEO  -Remained on Vaso 0.04 while on CRRT. Tolerating .   -Tube feeds at goal  -INR continuing to rise. 5.1 this am. 1u FFP and VitK ordered  -Will likely end this CRRT run slightly net negative for past 24h    Follow-up For: Procedure(s) (LRB):  REPLACEMENT, AORTIC VALVE, WITH REPEAT STERNOTOMY (N/A)    Post-Operative Day: 12 Days Post-Op    Objective:     Vital Signs (Most Recent):  Temp: 96.44 °F (35.8 °C) (01/17/22 0645)  Pulse: 82 (01/17/22 0645)  Resp: (!) 30 (01/17/22 0645)  BP: 112/62 (01/17/22 0615)  SpO2: 100 % (01/17/22 0645) Vital Signs (24h Range):  Temp:  [96.08 °F (35.6 °C)-98.6 °F (37 °C)] 96.44 °F (35.8 °C)  Pulse:  [76-91] 82  Resp:  [14-41] 30  SpO2:  [90 %-100 %] 100 %  BP: ()/(51-62) 112/62  Arterial Line BP: ()/(43-59) 115/52     Weight: 49.7 kg (109 lb 9.1 oz)  Body mass index is 20.7 kg/m².      Intake/Output Summary (Last 24 hours) at 1/17/2022 0655  Last data filed at 1/17/2022 0630  Gross per 24 hour   Intake 3444.45 ml   Output 2462 ml   Net 982.45 ml       Physical Exam  Constitutional:       General: She is not in acute distress.     Comments: Intubated and sedated   HENT:      Head: Normocephalic and atraumatic.   Neck:      Comments: R IJ Trialysis  Cardiovascular:      Rate and Rhythm: Normal rate and regular rhythm.      Pulses: Normal pulses.      Comments: Midline sternotomy incision c/d/I  Pacemaker in place. Paced at 80  Pulmonary:      Effort: Pulmonary effort is normal. No respiratory distress.      Comments:  intubated  Abdominal:      General: Abdomen is flat. There is no distension.      Palpations: Abdomen is soft.      Tenderness: There is no abdominal tenderness.   Genitourinary:     Comments: R groin well incision, bandage in place. Thigh soft, no hematoma  Musculoskeletal:      Comments: R radial art line in place   Skin:     General: Skin is warm.      Capillary Refill: Capillary refill takes less than 2 seconds.   Neurological:      Mental Status: She is alert.         Vents:  Vent Mode: A/C (01/17/22 0357)  Ventilator Initiated: Yes (01/05/22 1427)  Set Rate: 30 BPM (01/17/22 0357)  Vt Set: 300 mL (01/17/22 0357)  PEEP/CPAP: 5 cmH20 (01/17/22 0357)  Oxygen Concentration (%): 35 (01/17/22 0645)  Peak Airway Pressure: 23 cmH2O (01/17/22 0357)  Plateau Pressure: 30 cmH20 (01/17/22 0357)  Total Ve: 9.21 mL (01/17/22 0357)  Negative Inspiratory Force (cm H2O): 0 (01/17/22 0357)  F/VT Ratio<105 (RSBI): (!) 98.41 (01/17/22 0357)    Lines/Drains/Airways     Central Venous Catheter Line            Trialysis (Dialysis) Catheter 01/08/22 1027 right internal jugular 8 days          Drain                 NG/OG Tube 01/14/22 1520 Left mouth 2 days          Airway                 Airway - Non-Surgical 01/12/22 1505 Endotracheal Tube 4 days       Airway Anesthesia 01/12/22 4 days          Arterial Line            Arterial Line 01/05/22 0855 Right Radial 11 days          Peripheral Intravenous Line                 Midline Catheter Insertion/Assessment  - Single Lumen 01/10/22 1300 Left cephalic vein (lateral side of arm) 18g x 8cm 6 days         Peripheral IV - Single Lumen 01/15/22 1445 18 G;1 1/4 in Anterior;Left Forearm 1 day         Peripheral IV - Single Lumen 01/16/22 0430 20 G Anterior;Distal;Right Wrist 1 day                Significant Labs:    CBC/Anemia Profile:  Recent Labs   Lab 01/16/22  0324 01/16/22  2158 01/17/22  0330   WBC 10.81  --  9.73   HGB 8.8*  --  8.4*   HCT 27.6* 29* 26.4*   PLT 59*  --  52*   MCV 93   --  94   RDW 17.9*  --  18.2*        Chemistries:  Recent Labs   Lab 01/16/22  0324 01/16/22  0541 01/16/22  1021 01/16/22  1354 01/16/22  2156 01/17/22  0330 01/17/22  0454      < > 142   < > 140 138 138   K 3.5   < > 3.5   < > 3.5 3.2* 3.1*      < > 103   < > 101 102 104   CO2 22*   < > 24   < > 23 21* 19*   BUN 16   < > 22   < > 31* 33* 35*   CREATININE 1.0   < > 1.2   < > 1.6* 1.7* 1.7*   CALCIUM 6.9*   < > 7.2*   < > 7.5* 7.0* 6.9*   ALBUMIN  --    < > 2.9*   < > 3.0* 2.8* 2.7*   PROT  --   --  5.8*  --   --  5.9*  --    BILITOT  --   --  1.5*  --   --  1.3*  --    ALKPHOS  --   --  77  --   --  102  --    ALT  --   --  12  --   --  13  --    AST  --   --  32  --   --  37  --    MG 1.6  --  1.5*  --   --  1.5*  --    PHOS 8.7*   < > 8.2*   < > 8.0* 5.8* 5.4*    < > = values in this interval not displayed.       All pertinent labs within the past 24 hours have been reviewed.    Significant Imaging:  I have reviewed all pertinent imaging results/findings within the past 24 hours.    Assessment/Plan:     * S/P aortic valve replacement  Orion Ty is a 76 y.o. female who presents to the SICU s/p redo aortic valve replacement with mechanical valve on 1/5/22.      Neuro/Psych:   -- Sedation: propofol  -- Pain: fentanyl pushes q2     Cards:   -- Pressors: vaso 0.04 when on dialysis  -- Goal MAP: 60-80  -- holding metop   -- holding amlodipine  -- statin  -- heparin drip, PTT goal 60-80. Holding while INR is therapeutic  -- Paced HR 80  -- s/p 4U FFP on 1/13 with good response of INR  -- INR 5.1. Now s/p 1U FFP and vitamin K on 1/17/21      Pulm:   -- Goal O2 sat > 90%  -- reintubated 1/12  -- now on 35%/5PEEP  -- ABG PRN  -- 2 mediastinal removed 1/9 L Pleural CT removed 1/8  -- Possible consult gen surg for trach/peg/permacath this week.  -- may be able to tolerate a trial of extubation  -- PAV+ today       Renal:  -- Mireles removed  -- On track to become net negative 500cc yesterday  -- bladder  scan every shift  -- Oliguric   -- Nephrology following  -- CRRT. Discuss with Nephrology, plan to be net negative 500cc fluid tonight      FEN / GI:   -- Replace lytes as needed  -- OG in place  -- Nutrition: tube feeds novasource renal, goal of 35      ID:   -- WBC 9.7  -- Antibiotics: periop ancef complete. Zosyn and vanc  -- Some concern for aspiration      Heme/Onc:   -- Hgb 8.4  -- platelets 52   -- 3U RBC, 2U FFP, 2U platelets in OR  -- Daily CBC  -- Transfused products: 1U RBC on 1/5/22. 1u RBC and 1u FFP on 1/9/21. 2U RBC on 1/12. 4 FFP on 1/13. 1 FFP on 1/17  -- Anticoagulation: INR supratherapeutic, transition to heparin drip   -INR 5.1 this morning. Giving 1 FFP  -- heparin drip, PTT 60-80, holding for now      Endo:   -- Gluc goal 140-180  -- Insulin per endocrinology      PPx:   Feeding: tube feeds  Analgesia/Sedation: fent pushes / propofol  Thromboembolic prevention: heparin drip (holding)  HOB >30: yes  Stress Ulcer ppx: protonix BID  Glucose control: Critical care goal 140-180 g/dl, ISS    Lines/Drains/Airway: Art line, RIJ Trialysis, ETT       Dispo/Code Status/Palliative:   -- SICU / Full Code             Critical care was time spent personally by me on the following activities: development of treatment plan with patient or surrogate and bedside caregivers, discussions with consultants, evaluation of patient's response to treatment, examination of patient, ordering and performing treatments and interventions, ordering and review of laboratory studies, ordering and review of radiographic studies, pulse oximetry, re-evaluation of patient's condition.  This critical care time did not overlap with that of any other provider or involve time for any procedures.     Caleb Garcia MD  Critical Care - Surgery  [facility]

## 2022-01-17 NOTE — PROGRESS NOTES
Pharmacokinetic Assessment Follow Up: IV Vancomycin    Vancomycin serum concentration assessment(s):    - The random level resulted at 10.9 mcg/mL and is within the desired definitive target range of 10 to 20 mcg/mL.  - Nephrology following and ordered 12 hours of SLED today  Vancomycin Regimen Plan:  - Re-dose 500 mg IV once today  - Re-dose when the random level is less than 20 mcg/mL, next level to be drawn tomorrow with AM labs    Drug levels (last 3 results):  Recent Labs   Lab Result Units 01/15/22  0326 01/16/22  0324 01/17/22  0330   Vancomycin, Random ug/mL 11.7 14.6 10.9       Pharmacy will continue to follow and monitor vancomycin.    Please contact pharmacy at extension [phone] for questions regarding this assessment.    Thank you for the consult,   Tequila Escalona       Patient brief summary:  Orion Ty is a 76 y.o. female initiated on antimicrobial therapy with IV Vancomycin for treatment of lower respiratory infection    The patient's current regimen is pulse dose based on level and RRT plans    Drug Allergies:   Review of patient's allergies indicates:  No Known Allergies    Actual Body Weight:   49.7 kg    Renal Function:   Estimated Creatinine Clearance: 20.9 mL/min (A) (based on SCr of 1.7 mg/dL (H)).,     Dialysis Method (if applicable):  SLED 10 hours    CBC (last 72 hours):  Recent Labs   Lab Result Units 01/15/22  0326 01/16/22  0324 01/17/22  0330   WBC K/uL 15.08* 10.81 9.73   Hemoglobin g/dL 8.5* 8.8* 8.4*   Hematocrit % 26.6* 27.6* 26.4*   Platelets K/uL 56* 59* 52*   Gran % % 92.3* 93.0* 89.1*   Lymph % % 2.9* 1.9* 3.2*   Mono % % 3.2* 3.5* 5.7   Eosinophil % % 0.8 1.0 1.6   Basophil % % 0.1 0.0 0.0   Differential Method  Automated Automated Automated       Metabolic Panel (last 72 hours):  Recent Labs   Lab Result Units 01/14/22  1439 01/14/22  1934 01/14/22  2159 01/15/22  0326 01/15/22  0826 01/15/22  1406 01/15/22  1950 01/15/22  2143 01/16/22  0324 01/16/22  0541 01/16/22  0827  01/16/22  1021 01/16/22  1354 01/16/22  2156 01/17/22  0330 01/17/22  0454   Sodium mmol/L 137  137 134* 133* 137  134* 136 136  --  139 137 140  --  142 141 140 138 138   Potassium mmol/L 3.7  3.7 3.3*  3.4* 3.3* 3.7  3.6 3.9 3.8 3.8 3.7 3.5 3.4* 3.5 3.5 3.6 3.5 3.2* 3.1*   Chloride mmol/L 104  104 101 100 100  98 98 99  --  99 100 103  --  103 103 101 102 104   CO2 mmol/L 18*  18* 19* 20* 25  24 25 25  --  23 22* 20*  --  24 23 23 21* 19*   Glucose mg/dL 149*  149* 199* 169* 122*  122* 136* 211*  --  199* 224* 216*  --  163* 161* 138* 216* 213*   BUN mg/dL 5*  5* 8 9 4*  4* 3* 7*  --  12 16 21  --  22 24* 31* 33* 35*   Creatinine mg/dL 0.6  0.6 0.9 0.9 0.6  0.6 0.5 0.7  --  0.8 1.0 1.1  --  1.2 1.3 1.6* 1.7* 1.7*   Albumin g/dL 3.2*  3.2* 3.2* 3.1* 3.2* 3.3* 3.2*  --  3.4*  --  3.0*  --  2.9* 2.9* 3.0* 2.8* 2.7*   Total Bilirubin mg/dL  --   --   --   --   --   --   --   --   --   --   --  1.5*  --   --  1.3*  --    Alkaline Phosphatase U/L  --   --   --   --   --   --   --   --   --   --   --  77  --   --  102  --    AST U/L  --   --   --   --   --   --   --   --   --   --   --  32  --   --  37  --    ALT U/L  --   --   --   --   --   --   --   --   --   --   --  12  --   --  13  --    Magnesium mg/dL  --   --   --  1.7  --   --   --   --  1.6  --   --  1.5*  --   --  1.5*  --    Phosphorus mg/dL 3.7  3.7 3.2 2.7 1.3*  1.3* <1.0* 6.2*  --  11.4* 8.7* 8.0*  --  8.2* 8.2* 8.0* 5.8* 5.4*       Vancomycin Administrations:  vancomycin given in the last 96 hours                   vancomycin 500 mg in dextrose 5 % 100 mL IVPB (ready to mix system) (mg) 500 mg New Bag 01/15/22 0844    vancomycin in dextrose 5 % 1 gram/250 mL IVPB 1,000 mg (mg) 1,000 mg New Bag 01/14/22 1744                Microbiologic Results:  Microbiology Results (last 7 days)     Procedure Component Value Units Date/Time    Respiratory Infection Panel (PCR), Nasopharyngeal [[id number]]     Order Status: Canceled Specimen:  Nasopharyngeal Swab

## 2022-01-17 NOTE — PROGRESS NOTES
[facility]  Nephrology  Progress Note    Patient Name: Orion Ty  MRN: [id number]  Admission Date: 1/5/2022  Hospital Length of Stay: 12 days  Attending Provider: Dennis Haider MD   Primary Care Physician: Otis Zimmer MD  Principal Problem:S/P aortic valve replacement    Subjective:     HPI: Orion Ty is our 76 y.o. female with previous aortic valve replacement, mitral valve replacement, and tricuspid valve repair in 2010 who presented on 1/5 for redo aortic valve replacement. Nephrology consulted on 1/8 for anuric JO. Patient is alert but tired, son at bedside. Stated she was tired prior to admission. During the hospital stay Cr increase from 1 to 1.5. In OR she received  2.5L crystalloid, 3U RBC, 2U FFP, 2 platelets, and 800 cell saver. she is +5 L since admit. UOP decrease overnight, per nurse she didn't urinate at all. She received diuril 500 mg twice, lasix 20 mg *2 on 1/7. This morning she urinate 225 immediately after placing the tejeda's cath. She received again lasix 100 mg this morning. Upor evaluation she was alert but repeatedly saying she was tired, denied any pain. Denied any previous hx of kidney disease.       Interval History:   Patient positive one liter in last 24 hours. On SCUFF has one more to go to complete an 8 hour treatment. Will need to switch to SLED for 12 hours needs clearance and to keep even to 500 cc neg. CV is 9   Review of patient's allergies indicates:  No Known Allergies  Current Facility-Administered Medications   Medication Frequency    0.9%  NaCl infusion (CRRT USE ONLY) Continuous    0.9%  NaCl infusion (for blood administration) Q24H PRN    0.9%  NaCl infusion (for blood administration) Q24H PRN    0.9%  NaCl infusion (for blood administration) Q24H PRN    0.9%  NaCl infusion Continuous    albuterol-ipratropium 2.5 mg-0.5 mg/3 mL nebulizer solution 3 mL Q6H    amiodarone tablet 400 mg BID    aspirin suppository 300 mg Once  PRN    atorvastatin tablet 40 mg Daily    balsam peru-castor oiL Oint BID    bisacodyL suppository 10 mg Daily PRN    dextrose 10 % infusion Continuous PRN    dextrose 50% injection 12.5 g PRN    EPINEPHrine (ADRENALIN) 5 mg in dextrose 5 % 250 mL infusion Continuous    fentaNYL 50 mcg/mL injection 25 mcg Q2H PRN    glucagon (human recombinant) injection 1 mg PRN    insulin aspart U-100 pen 0-5 Units Q4H PRN    insulin aspart U-100 pen 2 Units Q24H    insulin aspart U-100 pen 2 Units Q24H    insulin aspart U-100 pen 2 Units Q24H    insulin aspart U-100 pen 2 Units Q24H    insulin aspart U-100 pen 2 Units Q24H    insulin aspart U-100 pen 2 Units Q24H    LIDOcaine 5 % patch 1 patch Q24H    magnesium sulfate 2g in water 50mL IVPB (premix) PRN    melatonin tablet 9 mg Nightly PRN    midodrine tablet 10 mg TID    NORepinephrine 4 mg in dextrose 5% 250 mL infusion (premix) (titrating) Continuous    ondansetron injection 4 mg Q12H PRN    oxyCODONE immediate release tablet 10 mg Q4H PRN    oxyCODONE immediate release tablet 5 mg Q4H PRN    pantoprazole injection 40 mg Q12H    piperacillin-tazobactam 4.5 g in sodium chloride 0.9% 100 mL IVPB (ready to mix system) Q12H    polyethylene glycol packet 17 g Daily    propofol (DIPRIVAN) 10 mg/mL infusion Continuous    sodium chloride 0.9% bolus 250 mL PRN    sodium chloride 0.9% flush 10 mL PRN    vancomycin - pharmacy to dose pharmacy to manage frequency    vancomycin 500 mg in dextrose 5 % 100 mL IVPB (ready to mix system) Once    vasopressin (PITRESSIN) 0.2 Units/mL in dextrose 5 % 100 mL infusion Continuous     Facility-Administered Medications Ordered in Other Encounters   Medication Frequency    0.9%  NaCl infusion Continuous       Objective:     Vital Signs (Most Recent):  Temp: 96.98 °F (36.1 °C) (01/17/22 0730)  Pulse: 80 (01/17/22 0730)  Resp: (!) 22 (01/17/22 0730)  BP: (!) 108/57 (01/17/22 0701)  SpO2: 100 % (01/17/22 0730)  O2 Device  (Oxygen Therapy): ventilator (01/17/22 0701) Vital Signs (24h Range):  Temp:  [96.08 °F (35.6 °C)-98.6 °F (37 °C)] 96.98 °F (36.1 °C)  Pulse:  [76-91] 80  Resp:  [14-41] 22  SpO2:  [90 %-100 %] 100 %  BP: ()/(51-62) 108/57  Arterial Line BP: ()/(43-59) 111/48     Weight: 49.7 kg (109 lb 9.1 oz) (01/14/22 0439)  Body mass index is 20.7 kg/m².  Body surface area is 1.46 meters squared.    I/O last 3 completed shifts:  In: 5327 [I.V.:2470.1; Blood:275.2; NG/GT:1725; IV Piggyback:856.7]  Out: 4208 [Drains:210; Other:3997; Stool:1]    Physical Exam  Vitals and nursing note reviewed.   Constitutional:       General: She is not in acute distress.     Appearance: She is ill-appearing. She is not toxic-appearing or diaphoretic.   Eyes:      General: No scleral icterus.  Cardiovascular:      Rate and Rhythm: Normal rate and regular rhythm.      Pulses: Normal pulses.      Heart sounds: No murmur heard.      Pulmonary:      Effort: Pulmonary effort is normal. No respiratory distress.      Breath sounds: No wheezing, rhonchi or rales.      Comments: Intubated   Abdominal:      General: Bowel sounds are normal. There is distension.      Palpations: Abdomen is soft. There is no mass.      Comments: Mildly distented    Musculoskeletal:         General: Swelling present. No tenderness or deformity.      Right lower leg: No edema.      Left lower leg: No edema.   Skin:     General: Skin is warm.      Capillary Refill: Capillary refill takes 2 to 3 seconds.      Coloration: Skin is pale. Skin is not jaundiced.      Findings: No bruising, erythema, lesion or rash.      Comments: R hand swelling    Neurological:      Comments: Intubated      Psychiatric:      Comments: Intubated          Significant Labs:  ABGs:   Recent Labs   Lab 01/17/22  0452   PH 7.353   PCO2 42.6   HCO3 23.7*   POCSATURATED 99   BE -2     Cardiac Markers: No results for input(s): CKMB, TROPONINT, MYOGLOBIN in the last 168 hours.  CBC:   Recent Labs    Lab 01/17/22 0330   WBC 9.73   RBC 2.82*   HGB 8.4*   HCT 26.4*   PLT 52*   MCV 94   MCH 29.8   MCHC 31.8*     CMP:   Recent Labs   Lab 01/17/22 0330 01/17/22 0330 01/17/22  0454   *   < > 213*   CALCIUM 7.0*   < > 6.9*   ALBUMIN 2.8*   < > 2.7*   PROT 5.9*  --   --       < > 138   K 3.2*   < > 3.1*   CO2 21*   < > 19*      < > 104   BUN 33*   < > 35*   CREATININE 1.7*   < > 1.7*   ALKPHOS 102  --   --    ALT 13  --   --    AST 37  --   --    BILITOT 1.3*  --   --     < > = values in this interval not displayed.     Coagulation:   Recent Labs   Lab 01/17/22 0337   INR 5.1*   APTT 65.0*     LFTs:   Recent Labs   Lab 01/17/22 0330 01/17/22 0330 01/17/22 0454   ALT 13  --   --    AST 37  --   --    ALKPHOS 102  --   --    BILITOT 1.3*  --   --    PROT 5.9*  --   --    ALBUMIN 2.8*   < > 2.7*    < > = values in this interval not displayed.     Microbiology Results (last 7 days)     Procedure Component Value Units Date/Time    Respiratory Infection Panel (PCR), Nasopharyngeal [[id number]]     Order Status: Canceled Specimen: Nasopharyngeal Swab         Specimen (24h ago, onward)            None        PTH: No results for input(s): PTH in the last 168 hours.  TSH: No results for input(s): TSH in the last 168 hours.  All labs within the past 24 hours have been reviewed.     Significant Imaging:  Labs: Reviewed  X-Ray: Reviewed    Assessment/Plan:     * S/P aortic valve replacement  -with severe intraoperative hypotension   -per primary    ATN (acute tubular necrosis)  -see jo    JO (acute kidney injury)  -oliguric ischemic ATN likely secondary to severe intraoperative hypotension requiring epi, norepi, and vaso intraoperatively 1/5/22  -BL sCr 0.8  -creatinine worsened post op delayed likely due to hemodilution  -failed high dose duretic therapy and KRT started 1/9/22 due to volume overload  -reintubated 1/12/22  Recommendations:   -patient on SCUFF will complete 8 hours treatment in an hour.  Will switch to SLED for 12 hours needs clearance. Will keep even to neg 500 cc in next 24 hours as per primary request. CVP is 9   -replace electrolytes, including phosphate   -renal diet   -strict I&O            Arelis Morejon MD  Nephrology  [facility]

## 2022-01-17 NOTE — PROGRESS NOTES
01/17/22 0027   Treatment   Treatment Type SCUF   Treatment Status Restart   Dialysis Machine Number [id number]   Dialyzer Time (hours) 0   BVP (Liters) 0 L   Solutions Labeled and Current  Yes   Access Right;IJ;Temporary Cath   Catheter Dressing Intact  No   Alarms Engaged Yes   CRRT Comments SCUF restarted   Prescription   Time (Hours) 8   Dialysate K + (mEq/L) Other (Comment)   Dialysate CA + (mEq/L) Other (Comment)   Dialysate HCO3 - (Bicarb) (mEq/L) 40   Dialysate Na + (mEq/L) Other (comment)   Cartridge Type Other  (f16)   Dialysate Flow Rate (mL/min) 0(seq)   UF Goal Rate 350 mL/hr   CRRT Hourly Documentation   Blood Flow (mL/min) 150   UF Rate 250 cc/hr   Arterial Pressure (mmHg) -20 mmHg   Venous Pressure (mmHg) 30 mmHg   Effluent Pressure (EP) (mmHg) 50 mmHg     SCUF restarted. UF rate set at 250. Report given to primary nurse.

## 2022-01-17 NOTE — PLAN OF CARE
Critical Coags: 1 Unit FFP, PO Vitamin K, Calcium replaced, Phos binded. Vaso on to maintain MAPs > 65. Propofol titrated to maintain RASS -2, transitioned to titrate prop to maintain RASS 0 this AM for SBT.Follows Commands and Moves All Extremities. Ventilator ACVC overnight, Spont this AM. NPO and Tube Feeds @ goalAnuric 0cc this shift, BM x 4 this shift. Daily labs, Q8 renals, Q8 coags, Q4 Accuchecks. Lytes replaced and insulin coverage PRN. Skin tear to sacrum, Triad applied. Venelex on redness. Turned Q2. Complete CHG bath and linen change. See flowsheets for details.

## 2022-01-17 NOTE — SUBJECTIVE & OBJECTIVE
Interval History:   Son at bedside  Pt remains intubated with fio2 35% and Peep of 5    Tolerated 12 hours of SCUF overnight, with 1.7L net negative     Review of patient's allergies indicates:  No Known Allergies  Current Facility-Administered Medications   Medication Frequency    0.9%  NaCl infusion (CRRT USE ONLY) Continuous    0.9%  NaCl infusion (for blood administration) Q24H PRN    0.9%  NaCl infusion (for blood administration) Q24H PRN    0.9%  NaCl infusion Continuous    albuterol-ipratropium 2.5 mg-0.5 mg/3 mL nebulizer solution 3 mL Q6H    amiodarone tablet 400 mg BID    aspirin suppository 300 mg Once PRN    atorvastatin tablet 40 mg Daily    balsam peru-castor oiL Oint BID    bisacodyL suppository 10 mg Daily PRN    calcium acetate(phosphat bind) capsule 1,334 mg Once    calcium gluconate 1 g in dextrose 5 % 100 mL IVPB Once    dextrose 10 % infusion Continuous PRN    dextrose 50% injection 12.5 g PRN    EPINEPHrine (ADRENALIN) 5 mg in dextrose 5 % 250 mL infusion Continuous    fentaNYL 50 mcg/mL injection 25 mcg Q2H PRN    glucagon (human recombinant) injection 1 mg PRN    insulin aspart U-100 pen 0-5 Units Q4H PRN    insulin aspart U-100 pen 2 Units Q24H    insulin aspart U-100 pen 2 Units Q24H    insulin aspart U-100 pen 2 Units Q24H    insulin aspart U-100 pen 2 Units Q24H    insulin aspart U-100 pen 2 Units Q24H    insulin aspart U-100 pen 2 Units Q24H    LIDOcaine 5 % patch 1 patch Q24H    magnesium sulfate 2g in water 50mL IVPB (premix) PRN    melatonin tablet 9 mg Nightly PRN    midodrine tablet 10 mg TID    NORepinephrine 4 mg in dextrose 5% 250 mL infusion (premix) (titrating) Continuous    ondansetron injection 4 mg Q12H PRN    oxyCODONE immediate release tablet 10 mg Q4H PRN    oxyCODONE immediate release tablet 5 mg Q4H PRN    pantoprazole injection 40 mg Q12H    piperacillin-tazobactam 4.5 g in sodium chloride 0.9% 100 mL IVPB (ready to mix system) Q12H     polyethylene glycol packet 17 g Daily    propofol (DIPRIVAN) 10 mg/mL infusion Continuous    sodium chloride 0.9% bolus 250 mL PRN    sodium chloride 0.9% flush 10 mL PRN    vancomycin - pharmacy to dose pharmacy to manage frequency    vasopressin (PITRESSIN) 0.2 Units/mL in dextrose 5 % 100 mL infusion Continuous     Facility-Administered Medications Ordered in Other Encounters   Medication Frequency    0.9%  NaCl infusion Continuous       Objective:     Vital Signs (Most Recent):  Temp: 98.42 °F (36.9 °C) (01/17/22 0015)  Pulse: 80 (01/17/22 0015)  Resp: (!) 30 (01/17/22 0015)  BP: (!) 98/55 (01/17/22 0000)  SpO2: 100 % (01/17/22 0015)  O2 Device (Oxygen Therapy): ventilator (01/17/22 0000) Vital Signs (24h Range):  Temp:  [96.62 °F (35.9 °C)-98.6 °F (37 °C)] 98.42 °F (36.9 °C)  Pulse:  [76-88] 80  Resp:  [14-61] 30  SpO2:  [90 %-100 %] 100 %  BP: ()/(51-60) 98/55  Arterial Line BP: ()/(43-59) 133/55     Weight: 49.7 kg (109 lb 9.1 oz) (01/14/22 0439)  Body mass index is 20.7 kg/m².  Body surface area is 1.46 meters squared.    I/O last 3 completed shifts:  In: 6965.6 [I.V.:4053; NG/GT:1460; IV Piggyback:1452.6]  Out: 7805 [Drains:210; Other:7594; Stool:1]    Physical Exam  Vitals and nursing note reviewed.   Constitutional:       General: She is not in acute distress.     Appearance: She is ill-appearing. She is not toxic-appearing or diaphoretic.   Eyes:      General: No scleral icterus.  Cardiovascular:      Rate and Rhythm: Normal rate and regular rhythm.      Pulses: Normal pulses.      Heart sounds: No murmur heard.      Pulmonary:      Effort: Pulmonary effort is normal. No respiratory distress.      Breath sounds: No wheezing, rhonchi or rales.      Comments: Intubated   Abdominal:      General: Bowel sounds are normal. There is distension.      Palpations: Abdomen is soft. There is no mass.      Comments: Mildly distented    Musculoskeletal:         General: Swelling present. No  tenderness or deformity.      Right lower leg: No edema.      Left lower leg: No edema.   Skin:     General: Skin is warm.      Capillary Refill: Capillary refill takes 2 to 3 seconds.      Coloration: Skin is pale. Skin is not jaundiced.      Findings: No bruising, erythema, lesion or rash.      Comments: R hand swelling    Neurological:      Comments: Intubated      Psychiatric:      Comments: Intubated          Significant Labs:  ABGs:   Recent Labs   Lab 01/16/22 2158   PH 7.341*   PCO2 49.8*   HCO3 26.9   POCSATURATED 97   BE 1     Cardiac Markers: No results for input(s): CKMB, TROPONINT, MYOGLOBIN in the last 168 hours.  CBC:   Recent Labs   Lab 01/15/22  0400 01/16/22  0324 01/16/22 2158   WBC  --  10.81  --    RBC  --  2.97*  --    HGB  --  8.8*  --    HCT   < > 27.6* 29*   PLT  --  59*  --    MCV  --  93  --    MCH  --  29.6  --    MCHC  --  31.9*  --     < > = values in this interval not displayed.     CMP:   Recent Labs   Lab 01/16/22  1021 01/16/22  1354 01/16/22 2156   *   < > 138*   CALCIUM 7.2*   < > 7.5*   ALBUMIN 2.9*   < > 3.0*   PROT 5.8*  --   --       < > 140   K 3.5   < > 3.5   CO2 24   < > 23      < > 101   BUN 22   < > 31*   CREATININE 1.2   < > 1.6*   ALKPHOS 77  --   --    ALT 12  --   --    AST 32  --   --    BILITOT 1.5*  --   --     < > = values in this interval not displayed.     Coagulation:   Recent Labs   Lab 01/16/22  1548 01/16/22  1724 01/16/22  2346   INR  --  4.7*  --    APTT   < >  --  62.4*    < > = values in this interval not displayed.     LFTs:   Recent Labs   Lab 01/16/22  1021 01/16/22  1354 01/16/22  2156   ALT 12  --   --    AST 32  --   --    ALKPHOS 77  --   --    BILITOT 1.5*  --   --    PROT 5.8*  --   --    ALBUMIN 2.9*   < > 3.0*    < > = values in this interval not displayed.     Microbiology Results (last 7 days)     Procedure Component Value Units Date/Time    Respiratory Infection Panel (PCR), Nasopharyngeal [[id number]]     Order Status:  Canceled Specimen: Nasopharyngeal Swab         Specimen (24h ago, onward)            None        PTH: No results for input(s): PTH in the last 168 hours.  TSH: No results for input(s): TSH in the last 168 hours.  All labs within the past 24 hours have been reviewed.     Significant Imaging:  Labs: Reviewed  X-Ray: Reviewed

## 2022-01-17 NOTE — PLAN OF CARE
"      SICU PLAN OF CARE NOTE    Dx: S/P aortic valve replacement    Shift Events: FFP one unit, vasopressin weaned off, PAV for 10 hours, SLED until 21:30    Goals of Care: CRRT, MAP >65, vent mgmt, possible extubation tomorrow    Neuro: Arouses to Voice, Follows Commands and Moves All Extremities    Vital Signs: BP (!) 110/58 (BP Location: Right arm, Patient Position: Lying)   Pulse 80   Temp 98.96 °F (37.2 °C) (Core Esophageal)   Resp (!) 30   Ht 5' 1" (1.549 m)   Wt 49.7 kg (109 lb 9.1 oz)   SpO2 97%   Breastfeeding No   BMI 20.70 kg/m²     Respiratory: Ventilator    Diet: Tube Feeds    Gtts: Propfol    Urine Output: Anuric        Labs/Accuchecks: Accuchekcs, trending RFP, Coags.    Skin: Woundcare for sacrum completed, patient turned Q2H, No further breakdown noted on shift.     "

## 2022-01-17 NOTE — PROGRESS NOTES
Discussed the CRRT treatment as ordered with Dr. Garcia. Pt will be positive nearly 1L if treatment is done as ordered. Per Dr. Garcia, ok to have UF rate goal of 450 cc per hour for tonight's treatment.

## 2022-01-18 NOTE — PLAN OF CARE
[facility]  Discharge Reassessment    Primary Care Provider: Otis Zimmer MD    Expected Discharge Date: 1/21/2022    Reassessment (most recent)     Discharge Reassessment - 01/18/22 1324        Discharge Reassessment    Assessment Type Discharge Planning Reassessment     Communicated MENDEL with patient/caregiver Date not available/Unable to determine     Discharge Plan A Rehab     Discharge Plan B Home Health     DME Needed Upon Discharge  other (see comments)   TBD    Discharge Barriers Identified None     Why the patient remains in the hospital Requires continued medical care        Post-Acute Status    Post-Acute Authorization Placement     Post-Acute Placement Status Pending medical clearance/testing               Per MD's Note, - Required some epi and vaso overnight while on CRRT, weaning now  - Net negative 2L yesterday  - Tube feeds at goal  - Tolerated PAV yesterday  - INR 1.5    The patient is not medically stable to discharge at this time. The patient remains in SICU. The SW will continue to follow-up with the patient to assist with discharge planning.     Shen Simmons LMSW  Case Management [facility]  Ext: [phone]

## 2022-01-18 NOTE — PROCEDURES
"Orion Ty is a 76 y.o. female patient.    Temp: 98.6 °F (37 °C) (01/18/22 1500)  Pulse: 104 (01/18/22 1606)  Resp: (!) 35 (01/18/22 1606)  BP: 120/70 (01/18/22 1606)  SpO2: 99 % (01/18/22 1606)  Weight: 44.8 kg (98 lb 12.3 oz) (01/18/22 0420)  Height: 5' 1" (154.9 cm) (01/13/22 1927)       Intubation    Date/Time: 1/18/2022 4:35 PM  Location procedure was performed: [facility] CRITICAL CARE SURGERY  Performed by: Caleb Garcia MD  Authorized by: Caleb Garcia MD   Consent Done: Yes  Consent: Verbal consent obtained.  Risks and benefits: risks, benefits and alternatives were discussed  Time out: Immediately prior to procedure a "time out" was called to verify the correct patient, procedure, equipment, support staff and site/side marked as required.  Indications: respiratory failure and  hypercapnia  Intubation method: video-assisted  Patient status: sedated  Preoxygenation: bag valve mask  Sedatives: etomidate and midazolam  Paralytic: rocuronium  Laryngoscope size: Glide 3  Tube size: 7.0 mm  Tube type: cuffed  Number of attempts: 1  Cords visualized: yes  Post-procedure assessment: chest rise and CO2 detector  Breath sounds: clear  Cuff inflated: yes  ETT to lip: 22 cm  Tube secured with: ETT tang  Complications: No  Comments: CXR pending          1/18/2022  "

## 2022-01-18 NOTE — PLAN OF CARE
"      SICU PLAN OF CARE NOTE    Dx: S/P aortic valve replacement    Shift Events: 12-hr SLED treatment stopped at 2130 per orders. Vaso and Epi gtt titrated to keep MAPs 60-80 and SBP > 100. Patient with SBT this AM, propofol weaned off. Labs trended overnight. Plan of care reviewed with daughter and spouse, daughter at bedside overnight. Plan for potential extubation this AM if successful SBT trial.     Goals of Care: MAPs 60-80; SBT this AM     Neuro: Arouses to Voice, Follows Commands and Moves All Extremities    Vital Signs: BP (!) 113/57   Pulse 80   Temp 98.96 °F (37.2 °C) (Core Esophageal)   Resp (!) 30   Ht 5' 1" (1.549 m)   Wt 49.7 kg (109 lb 9.1 oz)   SpO2 99%   Breastfeeding No   BMI 20.70 kg/m²     Respiratory: Ventilator    Diet: NPO and Tube Feeds    Gtts: Vasopressin and Epinephrine    Urine Output: Anuric ; 12-hr SLED treatment ended overnight.     Labs/Accuchecks: Electrolytes replaced as ordered. Labs trended overnight.     Skin: No new skin breakdown noted. Turned Q2H with wedge and/or pillows. Sacral wound pink and moist, wound care performed as ordered. Multiple loose BM's overnight.       "

## 2022-01-18 NOTE — SUBJECTIVE & OBJECTIVE
"Interval HPI:   Overnight events: Remains in SICU. NAEON. BG at higher end or slightly above goal ranges on current SQ insulin regimen. Remains on tube feeds. Diet NPO Except for: Sips with Medication  Eating:   NPO  Nausea: No  Hypoglycemia and intervention: No  Fever: No  TPN and/or TF: Yes  If yes, type of TF/TPN and rate: Novasource Renal at 35 ml/hr     /76 (BP Location: Right arm, Patient Position: Lying)   Pulse 89   Temp 99.32 °F (37.4 °C)   Resp (!) 37   Ht 5' 1" (1.549 m)   Wt 44.8 kg (98 lb 12.3 oz)   SpO2 97%   Breastfeeding No   BMI 18.66 kg/m²     Labs Reviewed and Include    Recent Labs   Lab 01/18/22 0305 01/18/22 0305 01/18/22  0756   *  207*  --   --    CALCIUM 8.4*  8.1*  --   --    ALBUMIN 2.9*  2.9*  --   --    PROT 6.3  --   --      137  --   --    K 4.7  4.6   < > 4.8   CO2 23  24  --   --      101  --   --    BUN 14  14  --   --    CREATININE 0.9  1.0  --   --    ALKPHOS 112  --   --    ALT 17  --   --    AST 45*  --   --    BILITOT 1.3*  --   --     < > = values in this interval not displayed.     Lab Results   Component Value Date    WBC 8.19 01/18/2022    HGB 8.8 (L) 01/18/2022    HCT 27.7 (L) 01/18/2022    MCV 94 01/18/2022    PLT 58 (L) 01/18/2022     No results for input(s): TSH, FREET4 in the last 168 hours.  Lab Results   Component Value Date    HGBA1C 6.0 (H) 01/03/2022       Nutritional status:   Body mass index is 18.66 kg/m².  Lab Results   Component Value Date    ALBUMIN 2.9 (L) 01/18/2022    ALBUMIN 2.9 (L) 01/18/2022    ALBUMIN 3.1 (L) 01/17/2022     Lab Results   Component Value Date    PREALBUMIN 11 (L) 01/10/2022       Estimated Creatinine Clearance: 33.3 mL/min (based on SCr of 1 mg/dL).    Accu-Checks  Recent Labs     01/17/22  1001 01/17/22  1433 01/17/22  1741 01/17/22  2040 01/17/22  2159 01/17/22  2358 01/18/22  0202 01/18/22  0419 01/18/22  0555 01/18/22  0755   POCTGLUCOSE 198* 163* 178* 156* 119* 170* 189* 227* 228* 202* "       Current Medications and/or Treatments Impacting Glycemic Control  Immunotherapy:    Immunosuppressants     None        Steroids:   Hormones (From admission, onward)            Start     Stop Route Frequency Ordered    01/12/22 1830  vasopressin (PITRESSIN) 0.2 Units/mL in dextrose 5 % 100 mL infusion         -- IV Continuous 01/12/22 1719    01/06/22 2255  melatonin tablet 9 mg         -- Oral Nightly PRN 01/06/22 2255        Pressors:    Autonomic Drugs (From admission, onward)            Start     Stop Route Frequency Ordered    01/14/22 1500  midodrine tablet 10 mg         -- Oral 3 times daily 01/14/22 1453    01/12/22 1441  rocuronium 10 mg/mL injection        Note to Pharmacy: Created by cabinet override    01/13 0244   01/12/22 1441    01/09/22 1100  EPINEPHrine (ADRENALIN) 5 mg in dextrose 5 % 250 mL infusion        Question Answer Comment   Begin at (in mcg/kg/min): 0.04    Titrate by: (in mcg/kg/min) 0.01    Titrate interval: (in minutes) 5    Titrate to maintain: (SBP or MAP or Cardiac Index) MAP    Greater than: (in mmHg) 60    Maximum dose: (in mcg/kg/min) 0.08        -- IV Continuous 01/09/22 0956        Hyperglycemia/Diabetes Medications:   Antihyperglycemics (From admission, onward)            Start     Stop Route Frequency Ordered    01/17/22 0800  insulin aspart U-100 pen 2 Units         -- SubQ Every 24 hours (non-standard times) 01/16/22 0825    01/17/22 0400  insulin aspart U-100 pen 2 Units         -- SubQ Every 24 hours (non-standard times) 01/16/22 0825    01/17/22 0000  insulin aspart U-100 pen 2 Units         -- SubQ Every 24 hours (non-standard times) 01/16/22 0825    01/16/22 2000  insulin aspart U-100 pen 2 Units         -- SubQ Every 24 hours (non-standard times) 01/16/22 0825    01/16/22 1600  insulin aspart U-100 pen 2 Units         -- SubQ Every 24 hours (non-standard times) 01/16/22 0825    01/16/22 1200  insulin aspart U-100 pen 2 Units         -- SubQ Every 24 hours  (non-standard times) 01/16/22 0825    01/15/22 1415  insulin aspart U-100 pen 0-5 Units         -- SubQ Every 4 hours PRN 01/15/22 1312

## 2022-01-18 NOTE — PROGRESS NOTES
[facility] [address]  Nephrology  Progress Note     Patient Name: Orion Ty  MRN: [id number]  Admission Date: 1/5/2022  Hospital Length of Stay: 13 days  Attending Provider: Dennis Haider MD   Primary Care Physician: Otis Zimmer MD  Principal Problem: S/P aortic valve replacement    Subjective:     HPI: Orion Ty is our 76 y.o. female with previous aortic valve replacement, mitral valve replacement, and tricuspid valve repair in 2010 who presented on 1/5 for redo aortic valve replacement. Nephrology consulted on 1/8 for anuric JO. Patient is alert but tired, son at bedside. Stated she was tired prior to admission. During the hospital stay Cr increase from 1 to 1.5. In OR she received  2.5L crystalloid, 3U RBC, 2U FFP, 2 platelets, and 800 cell saver. she is +5 L since admit. UOP decrease overnight, per nurse she didn't urinate at all. She received diuril 500 mg twice, lasix 20 mg *2 on 1/7. This morning she urinate 225 immediately after placing the tejeda's cath. She received again lasix 100 mg this morning. Upor evaluation she was alert but repeatedly saying she was tired, denied any pain. Denied any previous hx of kidney disease.       Interval History: Seen at the bedside no event overnight       Review of patient's allergies indicates:  No Known Allergies   Current Facility-Administered Medications   Medication Frequency    0.9%  NaCl infusion (CRRT USE ONLY) Continuous    0.9%  NaCl infusion (for blood administration) Q24H PRN    0.9%  NaCl infusion (for blood administration) Q24H PRN    0.9%  NaCl infusion (for blood administration) Q24H PRN    0.9%  NaCl infusion Continuous    albuterol-ipratropium 2.5 mg-0.5 mg/3 mL nebulizer solution 3 mL Q6H    amiodarone tablet 400 mg BID    aspirin suppository 300 mg Once PRN    atorvastatin tablet 40 mg Daily    balsam peru-castor oiL Oint BID    bisacodyL suppository 10 mg Daily PRN    dexmedetomidine (PRECEDEX) 400mcg/100mL  0.9% NaCL infusion Continuous    dextrose 10 % infusion Continuous PRN    dextrose 50% injection 12.5 g PRN    EPINEPHrine (ADRENALIN) 5 mg in dextrose 5 % 250 mL infusion Continuous    fentaNYL 50 mcg/mL injection 25 mcg Q2H PRN    glucagon (human recombinant) injection 1 mg PRN    heparin 25,000 units in dextrose 5% 250 mL (100 units/mL) infusion (heparin infusion - NO NOMOGRAM) Continuous    HYDROmorphone injection 0.1 mg Once    insulin aspart U-100 pen 0-5 Units Q4H PRN    [START ON 1/19/2022] insulin aspart U-100 pen 3 Units Q24H    [START ON 1/19/2022] insulin aspart U-100 pen 3 Units Q24H    [START ON 1/19/2022] insulin aspart U-100 pen 3 Units Q24H    insulin aspart U-100 pen 3 Units Q24H    insulin aspart U-100 pen 3 Units Q24H    insulin aspart U-100 pen 3 Units Q24H    LIDOcaine 5 % patch 1 patch Q24H    LIDOcaine HCL 2% jelly PRN    melatonin tablet 9 mg Nightly PRN    midodrine tablet 10 mg TID    ondansetron injection 4 mg Q12H PRN    oxyCODONE immediate release tablet 10 mg Q4H PRN    oxyCODONE immediate release tablet 5 mg Q4H PRN    pantoprazole injection 40 mg Q12H    piperacillin-tazobactam 4.5 g in sodium chloride 0.9% 100 mL IVPB (ready to mix system) Q8H    polyethylene glycol packet 17 g Daily    potassium, sodium phosphates 280-160-250 mg packet 2 packet TID PRN    propofol (DIPRIVAN) 10 mg/mL infusion Continuous    sodium chloride 0.9% bolus 250 mL PRN    sodium chloride 0.9% flush 10 mL PRN    vancomycin - pharmacy to dose pharmacy to manage frequency    vasopressin (PITRESSIN) 0.2 Units/mL in dextrose 5 % 100 mL infusion Continuous     Facility-Administered Medications Ordered in Other Encounters   Medication Frequency    0.9%  NaCl infusion Continuous       Objective:     Vital Signs (Most Recent):  Temp: 99.5 °F (37.5 °C) (01/18/22 0930)  Pulse: 97 (01/18/22 0930)  Resp: (!) 30 (01/18/22 0941)  BP: 125/65 (01/18/22 0900)  SpO2: 97 % (01/18/22 0930)  O2  Device (Oxygen Therapy): ventilator (01/18/22 0747) Vital Signs (24h Range):  Temp:  [98.42 °F (36.9 °C)-99.86 °F (37.7 °C)] 99.5 °F (37.5 °C)  Pulse:  [] 97  Resp:  [23-63] 30  SpO2:  [93 %-100 %] 97 %  BP: (107-154)/(54-77) 125/65  Arterial Line BP: ()/(45-66) 135/59   Weight: 44.8 kg (98 lb 12.3 oz) (01/18/22 0420)  Body mass index is 18.66 kg/m².  Body surface area is 1.39 meters squared.      Intake/Output Summary (Last 24 hours) at 1/18/2022 1019  Last data filed at 1/18/2022 0900  Gross per 24 hour   Intake 3625.77 ml   Output 5035 ml   Net -1409.23 ml     I/O last 3 completed shifts:  In: 7139.9 [I.V.:4599.7; Blood:275.2; NG/GT:1665; IV Piggyback:599.9]  Out: 8997 [Other:8997]  Net IO Since Admission: 914.24 mL [01/18/22 1019]     Physical Exam  Constitutional:       Appearance: She is well-developed. She is ill-appearing.   Cardiovascular:      Rate and Rhythm: Normal rate and regular rhythm.      Heart sounds: Normal heart sounds.   Pulmonary:      Comments: Intubated mechanically ventilated   Abdominal:      General: Abdomen is flat.      Palpations: Abdomen is soft.   Musculoskeletal:         General: Normal range of motion.   Skin:     General: Skin is warm and dry.      Comments: Sternal Incision CDI   Neurological:      Comments: On sedation         Recent Labs   Lab 01/15/22  0400 01/16/22  0324 01/16/22  0324 01/16/22  2158 01/17/22  0330 01/18/22  0305   WBC  --  10.81  --   --  9.73 8.19   HGB  --  8.8*  --   --  8.4* 8.8*   HCT   < > 27.6*  --  29* 26.4* 27.7*   PLT  --  59*  --   --  52* 58*   MONO  --  3.5*  0.4   < >  --  5.7  0.6 6.0  0.5    < > = values in this interval not displayed.      Recent Labs   Lab 01/17/22  0330 01/17/22  0454 01/17/22  1613 01/17/22  1943 01/17/22  2200 01/18/22  0305 01/18/22  0756      < > 138  --  138 137  137  --    K 3.2*   < > 4.4   < > 4.3 4.7  4.6 4.8      < > 101  --  104 102  101  --    CO2 21*   < > 26  --  23 23  24  --     BUN 33*   < > 9  --  6* 14  14  --    CREATININE 1.7*   < > 0.7  --  0.7 0.9  1.0  --    CALCIUM 7.0*   < > 8.8  --  8.4* 8.4*  8.1*  --    PROT 5.9*  --  7.7  --   --  6.3  --    BILITOT 1.3*  --  1.6*  --   --  1.3*  --    ALKPHOS 102  --  131  --   --  112  --    ALT 13  --  19  --   --  17  --    AST 37  --  51*  --   --  45*  --     < > = values in this interval not displayed.      Recent Labs     01/18/22  0304 01/18/22  0556 01/18/22  0756   PH 7.455* 7.384 7.377   PCO2 39.4 47.0* 47.6*   PO2 110* 95 92   HCO3 27.7 28.1* 28.0   POCSATURATED 99 97 97   BE 4 3 3       Assessment/Plan:       S/P aortic valve replacement    Chronic atrial fibrillation    Type 2 diabetes mellitus with microalbuminuria, without long-term current use of insulin    Volume overload    JO (acute kidney injury)    ATN (acute tubular necrosis)     JO  acute kidney injury  Orion Ty is our 76 y.o. female with previous aortic valve replacement, mitral valve replacement, and tricuspid valve repair in 2010 who presented on 1/5 for redo aortic valve replacement. Nephrology consulted on 1/8 for anuric JO. During the hospital stay Cr increase from 1 to 1.5. In OR she received  2.5L crystalloid, 3U RBC, 2U FFP, 2 platelets, and 800 cell saver. she is +5 L since admit. UOP decrease ,She received diuril 500 mg twice, lasix 20 mg *2 on 1/7.  With no adequate response to diuretics     Plan   oliguric ischemic ATN likely secondary to severe intraoperative hypotension requiring vasopressor on 01/05/2022  Failed high-dose diuretics and renal replacement therapy started on 01/09/2022 due to volume overload  Status post SLED for 12 hours for clearance for the last 24 hour  Net negative of 2 L volume status  No urine output, no sign kidney recovery  Will hold off on CRRT for tonight and will reassess patient in the morning for the need for CRRT      * S/P aortic valve replacement  -with severe intraoperative hypotension   -per  primary     ATN (acute tubular necrosis)  -see kailey      Thank you for your consult. I will follow-up with patient. Please contact us if you have any additional questions.    Rose Mary Walsh MD  Nephrology  [facility]

## 2022-01-18 NOTE — PROGRESS NOTES
"[facility]  Endocrinology  Progress Note    Admit Date: 1/5/2022     Reason for Consult: Management of T2DM, Hyperglycemia     Surgical Procedure and Date:   1/5/21  REPLACEMENT, AORTIC VALVE, WITH REPEAT STERNOTOMY (N/A)     Diabetes diagnosis year: MELIDA; intubated    Home Diabetes Medications:  Metformin 500 mg daily,     How often checking glucose at home?  MELIDA    BG readings on regimen: MELIDA  Hypoglycemia on the regimen?  MELIDA  Missed doses on regimen? MELIDA    Diabetes Complications include:     None    Complicating diabetes co morbidities:   CAD, HTN, a-fib       HPI:   Patient is a 76 y.o. female with a diagnosis of s/p MVR, AVR and TVr in 2010 by Dr. Haider. 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 Family states she is more fatigued, decreased appetite and fluid on lungs. Pt has increased symptoms and wants to proceed with cardiac surgery now and here for pre op. She is now s/p the above procedure. Endocrinology consulted for management of T2DM.     Lab Results   Component Value Date    HGBA1C 6.0 (H) 01/03/2022           Interval HPI:   Overnight events: Remains in SICU. NAEON. BG at higher end or slightly above goal ranges on current SQ insulin regimen. Remains on tube feeds. Diet NPO Except for: Sips with Medication  Eating:   NPO  Nausea: No  Hypoglycemia and intervention: No  Fever: No  TPN and/or TF: Yes  If yes, type of TF/TPN and rate: Novasource Renal at 35 ml/hr     /76 (BP Location: Right arm, Patient Position: Lying)   Pulse 89   Temp 99.32 °F (37.4 °C)   Resp (!) 37   Ht 5' 1" (1.549 m)   Wt 44.8 kg (98 lb 12.3 oz)   SpO2 97%   Breastfeeding No   BMI 18.66 kg/m²     Labs Reviewed and Include    Recent Labs   Lab 01/18/22  0305 01/18/22  0305 01/18/22  0756   *  207*  --   --    CALCIUM 8.4*  8.1*  --   --    ALBUMIN 2.9*  2.9*  --   --    PROT 6.3  --   --      137  --   -- "    K 4.7  4.6   < > 4.8   CO2 23  24  --   --      101  --   --    BUN 14  14  --   --    CREATININE 0.9  1.0  --   --    ALKPHOS 112  --   --    ALT 17  --   --    AST 45*  --   --    BILITOT 1.3*  --   --     < > = values in this interval not displayed.     Lab Results   Component Value Date    WBC 8.19 01/18/2022    HGB 8.8 (L) 01/18/2022    HCT 27.7 (L) 01/18/2022    MCV 94 01/18/2022    PLT 58 (L) 01/18/2022     No results for input(s): TSH, FREET4 in the last 168 hours.  Lab Results   Component Value Date    HGBA1C 6.0 (H) 01/03/2022       Nutritional status:   Body mass index is 18.66 kg/m².  Lab Results   Component Value Date    ALBUMIN 2.9 (L) 01/18/2022    ALBUMIN 2.9 (L) 01/18/2022    ALBUMIN 3.1 (L) 01/17/2022     Lab Results   Component Value Date    PREALBUMIN 11 (L) 01/10/2022       Estimated Creatinine Clearance: 33.3 mL/min (based on SCr of 1 mg/dL).    Accu-Checks  Recent Labs     01/17/22  1001 01/17/22  1433 01/17/22  1741 01/17/22  2040 01/17/22  2159 01/17/22  2358 01/18/22  0202 01/18/22  0419 01/18/22  0555 01/18/22  0755   POCTGLUCOSE 198* 163* 178* 156* 119* 170* 189* 227* 228* 202*       Current Medications and/or Treatments Impacting Glycemic Control  Immunotherapy:    Immunosuppressants     None        Steroids:   Hormones (From admission, onward)            Start     Stop Route Frequency Ordered    01/12/22 1830  vasopressin (PITRESSIN) 0.2 Units/mL in dextrose 5 % 100 mL infusion         -- IV Continuous 01/12/22 1719    01/06/22 2255  melatonin tablet 9 mg         -- Oral Nightly PRN 01/06/22 2255        Pressors:    Autonomic Drugs (From admission, onward)            Start     Stop Route Frequency Ordered    01/14/22 1500  midodrine tablet 10 mg         -- Oral 3 times daily 01/14/22 1453    01/12/22 1441  rocuronium 10 mg/mL injection        Note to Pharmacy: Created by cabinet override    01/13 0244   01/12/22 1441    01/09/22 1100  EPINEPHrine (ADRENALIN) 5 mg in  dextrose 5 % 250 mL infusion        Question Answer Comment   Begin at (in mcg/kg/min): 0.04    Titrate by: (in mcg/kg/min) 0.01    Titrate interval: (in minutes) 5    Titrate to maintain: (SBP or MAP or Cardiac Index) MAP    Greater than: (in mmHg) 60    Maximum dose: (in mcg/kg/min) 0.08        -- IV Continuous 01/09/22 0956        Hyperglycemia/Diabetes Medications:   Antihyperglycemics (From admission, onward)            Start     Stop Route Frequency Ordered    01/17/22 0800  insulin aspart U-100 pen 2 Units         -- SubQ Every 24 hours (non-standard times) 01/16/22 0825    01/17/22 0400  insulin aspart U-100 pen 2 Units         -- SubQ Every 24 hours (non-standard times) 01/16/22 0825    01/17/22 0000  insulin aspart U-100 pen 2 Units         -- SubQ Every 24 hours (non-standard times) 01/16/22 0825    01/16/22 2000  insulin aspart U-100 pen 2 Units         -- SubQ Every 24 hours (non-standard times) 01/16/22 0825    01/16/22 1600  insulin aspart U-100 pen 2 Units         -- SubQ Every 24 hours (non-standard times) 01/16/22 0825    01/16/22 1200  insulin aspart U-100 pen 2 Units         -- SubQ Every 24 hours (non-standard times) 01/16/22 0825    01/15/22 1415  insulin aspart U-100 pen 0-5 Units         -- SubQ Every 4 hours PRN 01/15/22 1315          ASSESSMENT and PLAN    * S/P aortic valve replacement  Managed per primary team  Optimize BG control        Type 2 diabetes mellitus with microalbuminuria, without long-term current use of insulin  BG goal 140 - 180       - Increase Novolog to 3 units q 4 hrs while on tube feeds. HOLD if tube feeds are stoped or BG < 100. 20% dose increase  - Low Dose SQ Insulin Correction Scale PRN hyperglycemia.   - BG Monitoring q 4 hrs while NPO/tube feeds    ** Please call Endocrine for any BG related issues **  ** Please notify Endocrine for any change and/or advance in diet**    Discharge planning: TBD        Chronic atrial fibrillation  May increase insulin resistance.              Keyanna Crocker, NP  Endocrinology  [facility]

## 2022-01-18 NOTE — PROGRESS NOTES
[facility]  Critical Care - Surgery  Progress Note    Patient Name: Orion Ty  MRN: [id number]  Admission Date: 1/5/2022  Hospital Length of Stay: 13 days  Code Status: Full Code  Attending Provider: Dennis Haider MD  Primary Care Provider: Otis Zimmer MD   Principal Problem: S/P aortic valve replacement    Subjective:     Hospital/ICU Course:  No notes on file    Interval History/Significant Events:   - Required some epi and vaso overnight while on CRRT, weaning now  - Net negative 2L yesterday  - Tube feeds at goal  - Tolerated PAV yesterday  - INR 1.5    Follow-up For: Procedure(s) (LRB):  REPLACEMENT, AORTIC VALVE, WITH REPEAT STERNOTOMY (N/A)    Post-Operative Day: 12 Days Post-Op    Objective:     Vital Signs (Most Recent):  Temp: 99.5 °F (37.5 °C) (01/18/22 0600)  Pulse: 87 (01/18/22 0600)  Resp: (!) 29 (01/18/22 0600)  BP: (!) 154/77 (01/18/22 0600)  SpO2: 97 % (01/18/22 0600) Vital Signs (24h Range):  Temp:  [96.26 °F (35.7 °C)-99.68 °F (37.6 °C)] 99.5 °F (37.5 °C)  Pulse:  [] 87  Resp:  [21-60] 29  SpO2:  [93 %-100 %] 97 %  BP: (102-154)/(54-77) 154/77  Arterial Line BP: ()/(45-62) 136/58     Weight: 44.8 kg (98 lb 12.3 oz)  Body mass index is 18.66 kg/m².      Intake/Output Summary (Last 24 hours) at 1/18/2022 0633  Last data filed at 1/18/2022 0500  Gross per 24 hour   Intake 4747.73 ml   Output 6746 ml   Net -1998.27 ml       Physical Exam  Constitutional:       General: She is not in acute distress.     Comments: Intubated and sedated   HENT:      Head: Normocephalic and atraumatic.   Neck:      Comments: R IJ Trialysis  Cardiovascular:      Rate and Rhythm: Normal rate and regular rhythm.      Pulses: Normal pulses.      Comments: Midline sternotomy incision c/d/I  Pacemaker in place. Paced at 80  Pulmonary:      Effort: Pulmonary effort is normal. No respiratory distress.      Comments: intubated  Abdominal:      General: Abdomen is flat. There is no  distension.      Palpations: Abdomen is soft.      Tenderness: There is no abdominal tenderness.   Genitourinary:     Comments: R groin well incision, bandage in place. Thigh soft, no hematoma  Musculoskeletal:      Comments: R radial art line in place   Skin:     General: Skin is warm.      Capillary Refill: Capillary refill takes less than 2 seconds.   Neurological:      Mental Status: She is alert.         Vents:  Vent Mode: Spont (01/18/22 0504)  Ventilator Initiated: Yes (01/05/22 1427)  Set Rate: 30 BPM (01/18/22 0304)  Vt Set: 300 mL (01/18/22 0304)  Pressure Support: 10 cmH20 (01/18/22 0504)  PEEP/CPAP: 5 cmH20 (01/18/22 0504)  Oxygen Concentration (%): 35 (01/18/22 0600)  Peak Airway Pressure: 16 cmH2O (01/18/22 0504)  Plateau Pressure: 30 cmH20 (01/18/22 0504)  Total Ve: 7.16 mL (01/18/22 0504)  Negative Inspiratory Force (cm H2O): 0 (01/18/22 0504)  F/VT Ratio<105 (RSBI): (!) 91.55 (01/18/22 0504)    Lines/Drains/Airways     Central Venous Catheter Line            Trialysis (Dialysis) Catheter 01/08/22 1027 right internal jugular 9 days          Drain                 NG/OG Tube 01/14/22 1520 Left mouth 3 days          Airway                 Airway - Non-Surgical 01/12/22 1505 Endotracheal Tube 5 days       Airway Anesthesia 01/12/22 5 days          Arterial Line            Arterial Line 01/05/22 0855 Right Radial 12 days          Peripheral Intravenous Line                 Midline Catheter Insertion/Assessment  - Single Lumen 01/10/22 1300 Left cephalic vein (lateral side of arm) 18g x 8cm 7 days         Peripheral IV - Single Lumen 01/15/22 1445 18 G;1 1/4 in Anterior;Left Forearm 2 days         Peripheral IV - Single Lumen 01/16/22 0430 20 G Anterior;Distal;Right Wrist 2 days                Significant Labs:    CBC/Anemia Profile:  Recent Labs   Lab 01/16/22  2158 01/17/22  0330 01/18/22  0305   WBC  --  9.73 8.19   HGB  --  8.4* 8.8*   HCT 29* 26.4* 27.7*   PLT  --  52* 58*   MCV  --  94 94   RDW  --   18.2* 17.6*        Chemistries:  Recent Labs   Lab 01/17/22  0330 01/17/22  0454 01/17/22  1613 01/17/22  1613 01/17/22  1943 01/17/22  2200 01/18/22  0305      < > 138  --   --  138 137  137   K 3.2*   < > 4.4   < > 4.5 4.3 4.7  4.6      < > 101  --   --  104 102  101   CO2 21*   < > 26  --   --  23 23  24   BUN 33*   < > 9  --   --  6* 14  14   CREATININE 1.7*   < > 0.7  --   --  0.7 0.9  1.0   CALCIUM 7.0*   < > 8.8  --   --  8.4* 8.4*  8.1*   ALBUMIN 2.8*   < > 3.5  --   --  3.1* 2.9*  2.9*   PROT 5.9*  --  7.7  --   --   --  6.3   BILITOT 1.3*  --  1.6*  --   --   --  1.3*   ALKPHOS 102  --  131  --   --   --  112   ALT 13  --  19  --   --   --  17   AST 37  --  51*  --   --   --  45*   MG 1.5*   < > 1.8  --   --  2.1 2.0  2.0   PHOS 5.8*   < > 1.8*  --   --  1.6* 2.6*  2.6*    < > = values in this interval not displayed.       All pertinent labs within the past 24 hours have been reviewed.    Significant Imaging:  I have reviewed all pertinent imaging results/findings within the past 24 hours.    Assessment/Plan:     * S/P aortic valve replacement  Orion Ty is a 76 y.o. female who presents to the SICU s/p redo aortic valve replacement with mechanical valve on 1/5/22.      Neuro/Psych:   -- Sedation: switch to precedex  -- Pain: fentanyl pushes q2     Cards:   -- Pressors: vaso 0.02  -- Goal MAP: 60-80  -- holding metop   -- holding amlodipine  -- statin  -- restart heparin drip, PTT goal 60-80  -- Paced HR 80  -- INR 1.5      Pulm:   -- Goal O2 sat > 90%  -- reintubated 1/12  -- now on 35%/5PEEP  -- SBT this AM, attempt extubation if tolerates  -- ABG at 7AM  -- 2 mediastinal removed 1/9 L Pleural CT removed 1/8  -- Possible consult gen surg for trach/peg/permacath this week.      Renal:  -- Mireles removed  -- net negative 2L yesterday  -- bladder scan every shift  -- Oliguric   -- Nephrology following  -- CRRT. Discuss with Nephrology. Continue removing fluid      FEN / GI:    -- Replace lytes as needed  -- OG in place  -- Nutrition: tube feeds novasource renal, goal of 35      ID:   -- WBC 8  -- Antibiotics: periop ancef complete. Zosyn and vanc until 1/20  -- Some concern for aspiration      Heme/Onc:   -- Hgb 8.8  -- platelets 58   -- 3U RBC, 2U FFP, 2U platelets in OR  -- Daily CBC  -- Transfused products: 1U RBC on 1/5/22. 1u RBC and 1u FFP on 1/9/21. 2U RBC on 1/12. 4 FFP on 1/13. 1 FFP on 1/17  -- Anticoagulation: INR normalizing, transition to heparin drip   -- INR 1.5   -- heparin drip, PTT 60-80      Endo:   -- Gluc goal 140-180  -- Insulin per endocrinology      PPx:   Feeding: tube feeds  Analgesia/Sedation: fent pushes / precedex  Thromboembolic prevention: heparin drip  HOB >30: yes  Stress Ulcer ppx: protonix BID  Glucose control: Critical care goal 140-180 g/dl, ISS    Lines/Drains/Airway: Art line, RIJ Trialysis, ETT       Dispo/Code Status/Palliative:   -- SICU / Full Code             Critical care was time spent personally by me on the following activities: development of treatment plan with patient or surrogate and bedside caregivers, discussions with consultants, evaluation of patient's response to treatment, examination of patient, ordering and performing treatments and interventions, ordering and review of laboratory studies, ordering and review of radiographic studies, pulse oximetry, re-evaluation of patient's condition.  This critical care time did not overlap with that of any other provider or involve time for any procedures.     Caleb Garcia MD  Critical Care - Surgery  [facility]

## 2022-01-18 NOTE — NURSING
Dr. Alfaro notified of patient's CO2 of 57. Will obtain additional abg at 1600. Will continue to monitor patient.

## 2022-01-18 NOTE — EICU
Prior to Intubation:  16:27Versed 2mg IVP given                                  16:28 Etomidate 8mg IVP given                                   And Rocuronium 60mg IVP given    Tolerated well

## 2022-01-18 NOTE — NURSING
Dr. West notified of patient's tachypnea in the 30-37 RR/minute range. No new orders received. Will continue to monitor patient.

## 2022-01-18 NOTE — PT/OT/SLP PROGRESS
Physical Therapy      Patient Name:  Orion Ty   MRN:  [id number]    Patient not seen today. Pt intubated on attempt. Pt extubated. Writing therapist unable to return today.  Will follow-up when appropriate.

## 2022-01-18 NOTE — ASSESSMENT & PLAN NOTE
BG goal 140 - 180       - Increase Novolog to 3 units q 4 hrs while on tube feeds. HOLD if tube feeds are stoped or BG < 100. 20% dose increase  - Low Dose SQ Insulin Correction Scale PRN hyperglycemia.   - BG Monitoring q 4 hrs while NPO/tube feeds    ** Please call Endocrine for any BG related issues **  ** Please notify Endocrine for any change and/or advance in diet**    Discharge planning: TBD

## 2022-01-18 NOTE — PT/OT/SLP PROGRESS
Occupational Therapy      Patient Name:  Orion Ty   MRN:  [id number]    Pt was extubated late AM to [facility]. OT unable to return for session this afternoon and will check status next date to continue with therapy services as appropriate.   1/18/2022

## 2022-01-18 NOTE — PROGRESS NOTES
Pharmacokinetic Assessment Follow Up: IV Vancomycin    Vancomycin serum concentration assessment(s):    - The random level resulted at 12.1 mcg/mL (drawn 6 hours after SLED completed, adequate time for redistribution) and is within the desired definitive target range of 10 to 20 mcg/mL.   - Nephrology following and plans to hold RRT today  - No residual kidney function    Vancomycin Regimen Plan:  - Level within goal of 10-20, will hold on redosing vancomycin as level will likely remain stable  - Re-dose based on level and RRT plans  - next level to be drawn tomorrow with AM labs    Drug levels (last 3 results):  Recent Labs   Lab Result Units 01/16/22  0324 01/17/22  0330 01/18/22  0305   Vancomycin, Random ug/mL 14.6 10.9 12.1       Pharmacy will continue to follow and monitor vancomycin.    Please contact pharmacy at extension [phone] for questions regarding this assessment.    Thank you for the consult,   Tequila Escalona       Patient brief summary:  Orion Ty is a 76 y.o. female initiated on antimicrobial therapy with IV Vancomycin for treatment of lower respiratory infection    The patient's current regimen is pulse dose based on level and RRT plans    Drug Allergies:   Review of patient's allergies indicates:  No Known Allergies    Actual Body Weight:   49.7 kg    Renal Function:   Estimated Creatinine Clearance: 33.3 mL/min (based on SCr of 1 mg/dL).,     Dialysis Method (if applicable):  SLED 10 hours    CBC (last 72 hours):  Recent Labs   Lab Result Units 01/16/22  0324 01/17/22  0330 01/18/22  0305   WBC K/uL 10.81 9.73 8.19   Hemoglobin g/dL 8.8* 8.4* 8.8*   Hematocrit % 27.6* 26.4* 27.7*   Platelets K/uL 59* 52* 58*   Gran % % 93.0* 89.1* 88.2*   Lymph % % 1.9* 3.2* 4.4*   Mono % % 3.5* 5.7 6.0   Eosinophil % % 1.0 1.6 0.7   Basophil % % 0.0 0.0 0.2   Differential Method  Automated Automated Automated       Metabolic Panel (last 72 hours):  Recent Labs   Lab Result Units 01/15/22  1406 01/15/22  1950  01/15/22  2143 01/16/22  0324 01/16/22  0541 01/16/22  0827 01/16/22  1021 01/16/22  1354 01/16/22  2156 01/17/22  0330 01/17/22  0454 01/17/22  0824 01/17/22  1353 01/17/22  1613 01/17/22  1943 01/17/22  2200 01/18/22  0305 01/18/22  0756   Sodium mmol/L 136  --  139 137 140  --  142 141 140 138 138  --  139 138  --  138 137  137  --    Potassium mmol/L 3.8 3.8 3.7 3.5 3.4* 3.5 3.5 3.6 3.5 3.2* 3.1* 3.5 4.3 4.4 4.5 4.3 4.7  4.6 4.8   Chloride mmol/L 99  --  99 100 103  --  103 103 101 102 104  --  102 101  --  104 102  101  --    CO2 mmol/L 25  --  23 22* 20*  --  24 23 23 21* 19*  --  25 26  --  23 23  24  --    Glucose mg/dL 211*  --  199* 224* 216*  --  163* 161* 138* 216* 213*  --  160* 170*  --  130* 213*  207*  --    BUN mg/dL 7*  --  12 16 21  --  22 24* 31* 33* 35*  --  12 9  --  6* 14  14  --    Creatinine mg/dL 0.7  --  0.8 1.0 1.1  --  1.2 1.3 1.6* 1.7* 1.7*  --  0.8 0.7  --  0.7 0.9  1.0  --    Albumin g/dL 3.2*  --  3.4*  --  3.0*  --  2.9* 2.9* 3.0* 2.8* 2.7*  --  3.3* 3.5  --  3.1* 2.9*  2.9*  --    Total Bilirubin mg/dL  --   --   --   --   --   --  1.5*  --   --  1.3*  --   --   --  1.6*  --   --  1.3*  --    Alkaline Phosphatase U/L  --   --   --   --   --   --  77  --   --  102  --   --   --  131  --   --  112  --    AST U/L  --   --   --   --   --   --  32  --   --  37  --   --   --  51*  --   --  45*  --    ALT U/L  --   --   --   --   --   --  12  --   --  13  --   --   --  19  --   --  17  --    Magnesium mg/dL  --   --   --  1.6  --   --  1.5*  --   --  1.5*  --   --  1.7 1.8  --  2.1 2.0  2.0  --    Phosphorus mg/dL 6.2*  --  11.4* 8.7* 8.0*  --  8.2* 8.2* 8.0* 5.8* 5.4*  --  1.9* 1.8*  --  1.6* 2.6*  2.6*  --        Vancomycin Administrations:  vancomycin given in the last 96 hours                   vancomycin 500 mg in dextrose 5 % 100 mL IVPB (ready to mix system) (mg) 500 mg New Bag 01/15/22 0844    vancomycin in dextrose 5 % 1 gram/250 mL IVPB 1,000 mg (mg) 1,000 mg New Bag  01/14/22 1747                Microbiologic Results:  Microbiology Results (last 7 days)     Procedure Component Value Units Date/Time    Respiratory Infection Panel (PCR), Nasopharyngeal [[id number]]     Order Status: Canceled Specimen: Nasopharyngeal Swab

## 2022-01-18 NOTE — ASSESSMENT & PLAN NOTE
Orion Ty is a 76 y.o. female who presents to the SICU s/p redo aortic valve replacement with mechanical valve on 1/5/22.      Neuro/Psych:   -- Sedation: switch to precedex  -- Pain: fentanyl pushes q2     Cards:   -- Pressors: vaso 0.02  -- Goal MAP: 60-80  -- holding metop   -- holding amlodipine  -- statin  -- restart heparin drip, PTT goal 60-80  -- Paced HR 80  -- INR 1.5      Pulm:   -- Goal O2 sat > 90%  -- reintubated 1/12  -- now on 35%/5PEEP  -- SBT this AM, attempt extubation if tolerates  -- ABG at 7AM  -- 2 mediastinal removed 1/9 L Pleural CT removed 1/8  -- Possible consult gen surg for trach/peg/permacath this week.      Renal:  -- Mireles removed  -- net negative 2L yesterday  -- bladder scan every shift  -- Oliguric   -- Nephrology following  -- CRRT. Discuss with Nephrology. Continue removing fluid      FEN / GI:   -- Replace lytes as needed  -- OG in place  -- Nutrition: tube feeds novasource renal, goal of 35      ID:   -- WBC 8  -- Antibiotics: periop ancef complete. Zosyn and vanc until 1/20  -- Some concern for aspiration      Heme/Onc:   -- Hgb 8.8  -- platelets 58   -- 3U RBC, 2U FFP, 2U platelets in OR  -- Daily CBC  -- Transfused products: 1U RBC on 1/5/22. 1u RBC and 1u FFP on 1/9/21. 2U RBC on 1/12. 4 FFP on 1/13. 1 FFP on 1/17  -- Anticoagulation: INR normalizing, transition to heparin drip   -- INR 1.5   -- heparin drip, PTT 60-80      Endo:   -- Gluc goal 140-180  -- Insulin per endocrinology      PPx:   Feeding: tube feeds  Analgesia/Sedation: fent pushes / precedex  Thromboembolic prevention: heparin drip  HOB >30: yes  Stress Ulcer ppx: protonix BID  Glucose control: Critical care goal 140-180 g/dl, ISS    Lines/Drains/Airway: Art line, RIJ Trialysis, ETT       Dispo/Code Status/Palliative:   -- SICU / Full Code

## 2022-01-18 NOTE — NURSING
Patient extubated per Dr. West, with Dr. Alfaro at bedside. Patient placed on 10 L wall high flow. Patient with o2sats 97%. Patient with tachypnea, follows commands weakly. Patient with son and daughter at bedside. Will continue to monitor patient.

## 2022-01-18 NOTE — SUBJECTIVE & OBJECTIVE
Interval History/Significant Events:   - Required some epi and vaso overnight while on CRRT, weaning now  - Net negative 2L yesterday  - Tube feeds at goal  - Tolerated PAV yesterday  - INR 1.5    Follow-up For: Procedure(s) (LRB):  REPLACEMENT, AORTIC VALVE, WITH REPEAT STERNOTOMY (N/A)    Post-Operative Day: 12 Days Post-Op    Objective:     Vital Signs (Most Recent):  Temp: 99.5 °F (37.5 °C) (01/18/22 0600)  Pulse: 87 (01/18/22 0600)  Resp: (!) 29 (01/18/22 0600)  BP: (!) 154/77 (01/18/22 0600)  SpO2: 97 % (01/18/22 0600) Vital Signs (24h Range):  Temp:  [96.26 °F (35.7 °C)-99.68 °F (37.6 °C)] 99.5 °F (37.5 °C)  Pulse:  [] 87  Resp:  [21-60] 29  SpO2:  [93 %-100 %] 97 %  BP: (102-154)/(54-77) 154/77  Arterial Line BP: ()/(45-62) 136/58     Weight: 44.8 kg (98 lb 12.3 oz)  Body mass index is 18.66 kg/m².      Intake/Output Summary (Last 24 hours) at 1/18/2022 0633  Last data filed at 1/18/2022 0500  Gross per 24 hour   Intake 4747.73 ml   Output 6746 ml   Net -1998.27 ml       Physical Exam  Constitutional:       General: She is not in acute distress.     Comments: Intubated and sedated   HENT:      Head: Normocephalic and atraumatic.   Neck:      Comments: R IJ Trialysis  Cardiovascular:      Rate and Rhythm: Normal rate and regular rhythm.      Pulses: Normal pulses.      Comments: Midline sternotomy incision c/d/I  Pacemaker in place. Paced at 80  Pulmonary:      Effort: Pulmonary effort is normal. No respiratory distress.      Comments: intubated  Abdominal:      General: Abdomen is flat. There is no distension.      Palpations: Abdomen is soft.      Tenderness: There is no abdominal tenderness.   Genitourinary:     Comments: R groin well incision, bandage in place. Thigh soft, no hematoma  Musculoskeletal:      Comments: R radial art line in place   Skin:     General: Skin is warm.      Capillary Refill: Capillary refill takes less than 2 seconds.   Neurological:      Mental Status: She is alert.          Vents:  Vent Mode: Spont (01/18/22 0504)  Ventilator Initiated: Yes (01/05/22 1427)  Set Rate: 30 BPM (01/18/22 0304)  Vt Set: 300 mL (01/18/22 0304)  Pressure Support: 10 cmH20 (01/18/22 0504)  PEEP/CPAP: 5 cmH20 (01/18/22 0504)  Oxygen Concentration (%): 35 (01/18/22 0600)  Peak Airway Pressure: 16 cmH2O (01/18/22 0504)  Plateau Pressure: 30 cmH20 (01/18/22 0504)  Total Ve: 7.16 mL (01/18/22 0504)  Negative Inspiratory Force (cm H2O): 0 (01/18/22 0504)  F/VT Ratio<105 (RSBI): (!) 91.55 (01/18/22 0504)    Lines/Drains/Airways     Central Venous Catheter Line            Trialysis (Dialysis) Catheter 01/08/22 1027 right internal jugular 9 days          Drain                 NG/OG Tube 01/14/22 1520 Left mouth 3 days          Airway                 Airway - Non-Surgical 01/12/22 1505 Endotracheal Tube 5 days       Airway Anesthesia 01/12/22 5 days          Arterial Line            Arterial Line 01/05/22 0855 Right Radial 12 days          Peripheral Intravenous Line                 Midline Catheter Insertion/Assessment  - Single Lumen 01/10/22 1300 Left cephalic vein (lateral side of arm) 18g x 8cm 7 days         Peripheral IV - Single Lumen 01/15/22 1445 18 G;1 1/4 in Anterior;Left Forearm 2 days         Peripheral IV - Single Lumen 01/16/22 0430 20 G Anterior;Distal;Right Wrist 2 days                Significant Labs:    CBC/Anemia Profile:  Recent Labs   Lab 01/16/22  2158 01/17/22  0330 01/18/22  0305   WBC  --  9.73 8.19   HGB  --  8.4* 8.8*   HCT 29* 26.4* 27.7*   PLT  --  52* 58*   MCV  --  94 94   RDW  --  18.2* 17.6*        Chemistries:  Recent Labs   Lab 01/17/22  0330 01/17/22  0454 01/17/22  1613 01/17/22  1613 01/17/22  1943 01/17/22  2200 01/18/22  0305      < > 138  --   --  138 137  137   K 3.2*   < > 4.4   < > 4.5 4.3 4.7  4.6      < > 101  --   --  104 102  101   CO2 21*   < > 26  --   --  23 23  24   BUN 33*   < > 9  --   --  6* 14  14   CREATININE 1.7*   < > 0.7  --   --  0.7  0.9  1.0   CALCIUM 7.0*   < > 8.8  --   --  8.4* 8.4*  8.1*   ALBUMIN 2.8*   < > 3.5  --   --  3.1* 2.9*  2.9*   PROT 5.9*  --  7.7  --   --   --  6.3   BILITOT 1.3*  --  1.6*  --   --   --  1.3*   ALKPHOS 102  --  131  --   --   --  112   ALT 13  --  19  --   --   --  17   AST 37  --  51*  --   --   --  45*   MG 1.5*   < > 1.8  --   --  2.1 2.0  2.0   PHOS 5.8*   < > 1.8*  --   --  1.6* 2.6*  2.6*    < > = values in this interval not displayed.       All pertinent labs within the past 24 hours have been reviewed.    Significant Imaging:  I have reviewed all pertinent imaging results/findings within the past 24 hours.

## 2022-01-18 NOTE — NURSING
Respiratory therapy notified Dr. Alfaro of patient's abg results, and a CO2 of 64. Dr. Alfaro and Dr. West at bedside to inform family of patient's re intubation. Verbal consent obtained per Dr. West and Dr. Alfaro at bedside. Will continue to monitor.

## 2022-01-19 NOTE — ASSESSMENT & PLAN NOTE
Orion Ty is a 76 y.o. lady s/p redo aortic valve replacement. She has failed extubation trials due to debility and general surgery is consulted for trach/peg/permacath    - Will discuss timing with staff  - pause heparin drip midnight before procedure  - pause tube feeds midnight before procedure  - Will obtain consent later

## 2022-01-19 NOTE — PROGRESS NOTES
Pharmacokinetic Assessment Follow Up: IV Vancomycin    Vancomycin serum concentration assessment(s):    Vancomycin random level resulted at 8 mcg/mL. Goal level is 10 to 20 mcg/mL.     Nephrology is consulted for JO and plans to hold RRT today.    Drug levels (last 3 results):  Recent Labs   Lab Result Units 01/17/22  0330 01/18/22  0305 01/19/22  0314   Vancomycin, Random ug/mL 10.9 12.1 8.0     Vancomycin Regimen Plan:    Administer vancomycin  mg and redose when the random level is less than 20 mcg/mL or as needed for dialysis. Next level to be drawn with morning labs on 1/20.     Pharmacy will continue to follow and monitor vancomycin.    Please contact pharmacy at extension [phone] for questions regarding this assessment.    Thank you for the consult,   Crystal Sutherland, PharmD, BCCCP             Patient brief summary:  Orion Ty is a 76 y.o. female initiated on antimicrobial therapy with IV vancomycin for treatment of sepsis    Drug Allergies:   Review of patient's allergies indicates:  No Known Allergies    Actual Body Weight:   44.8 kg     Renal Function:   Estimated Creatinine Clearance: 47.6 mL/min (based on SCr of 0.7 mg/dL).    Dialysis Method (if applicable):  On hold    CBC (last 72 hours):  Recent Labs   Lab Result Units 01/17/22  0330 01/18/22  0305 01/19/22  0314   WBC K/uL 9.73 8.19 10.36   Hemoglobin g/dL 8.4* 8.8* 8.1*   Hematocrit % 26.4* 27.7* 26.5*   Platelets K/uL 52* 58* 71*   Gran % % 89.1* 88.2* 85.5*   Lymph % % 3.2* 4.4* 5.8*   Mono % % 5.7 6.0 7.6   Eosinophil % % 1.6 0.7 0.3   Basophil % % 0.0 0.2 0.2   Differential Method  Automated Automated Automated       Metabolic Panel (last 72 hours):  Recent Labs   Lab Result Units 01/16/22  2156 01/17/22  0330 01/17/22  0454 01/17/22  0824 01/17/22  1353 01/17/22  1613 01/17/22  1943 01/17/22  2200 01/18/22  0305 01/18/22  0756 01/18/22  1459 01/18/22  1947 01/18/22  2032 01/19/22  0314 01/19/22  0509 01/19/22  0805   Sodium mmol/L  140 138 138  --  139 138  --  138 137  137  --  140  --  138 141  --   --    Potassium mmol/L 3.5 3.2* 3.1* 3.5 4.3 4.4 4.5 4.3 4.7  4.6 4.8 5.5* 5.1 4.9 4.3  --  4.6   Chloride mmol/L 101 102 104  --  102 101  --  104 102  101  --  104  --  101 106  --   --    CO2 mmol/L 23 21* 19*  --  25 26  --  23 23  24  --  21*  --  21* 23  --   --    Glucose mg/dL 138* 216* 213*  --  160* 170*  --  130* 213*  207*  --  147*  --  166* 104  --   --    BUN mg/dL 31* 33* 35*  --  12 9  --  6* 14  14  --  30*  --  40* 10  --   --    Creatinine mg/dL 1.6* 1.7* 1.7*  --  0.8 0.7  --  0.7 0.9  1.0  --  1.7*  --  2.0* 0.7  --   --    Albumin g/dL 3.0* 2.8* 2.7*  --  3.3* 3.5  --  3.1* 2.9*  2.9*  --  3.1*  --  2.9* 2.8*  --   --    Total Bilirubin mg/dL  --  1.3*  --   --   --  1.6*  --   --  1.3*  --   --   --   --  1.3*  --   --    Alkaline Phosphatase U/L  --  102  --   --   --  131  --   --  112  --   --   --   --  104  --   --    AST U/L  --  37  --   --   --  51*  --   --  45*  --   --   --   --  43*  --   --    ALT U/L  --  13  --   --   --  19  --   --  17  --   --   --   --  16  --   --    Magnesium mg/dL  --  1.5*  --   --  1.7 1.8  --  2.1 2.0  2.0  --  2.2  --  2.2  --  2.0  --    Phosphorus mg/dL 8.0* 5.8* 5.4*  --  1.9* 1.8*  --  1.6* 2.6*  2.6*  --  7.2*  --  6.9* 2.5*  --   --        Vancomycin Administrations:  vancomycin given in the last 96 hours                   vancomycin 500 mg in dextrose 5 % 100 mL IVPB (ready to mix system) ()  Restarted 01/19/22 0822     500 mg New Bag  0816    vancomycin 500 mg in dextrose 5 % 100 mL IVPB (ready to mix system) (mg) 500 mg New Bag 01/17/22 0832                Microbiologic Results:  Microbiology Results (last 7 days)     ** No results found for the last 168 hours. **

## 2022-01-19 NOTE — HPI
Orion Ty is our 76 y.o. female with previous aortic valve replacement, mitral valve replacement, and tricuspid valve repair in 2010 who presents for redo aortic valve replacement. She underwent aortic valve replacement with a mechanical valve on 1/5/22. She was doing well, but then required reintubation on 1/12. She progressed again and was given a trial of extubation on 1/18 which she failed. General surgery consulted for trach/peg/permacath. She is tolerating CRRT and tube feeds.     TRACH/PEG/PERMACATH CONSULT     MAIN CONDITION (WITH PROGNOSIS):     Is family aware of consult / are they amenable to these procedures? yes    Code status: Full    Neuro Exam: Follows commands off sedation    On anticoagulation? If so, type: heparin drip    Plts / INR / aPTT: 71/1.2/54    Tolerating enteral feeding? Yes, at goal    On pressors? Intermittently vaso when on CRRT    COVID+? No    On CRRT or HD? CRRT    Current lines/tubes/drains: NISHA Castañeda      TRACH    Days intubated: 7 total    Extubation attempts? Yes, failed 1/18    Prior neck surgery or radiation? No    Obesity of neck? No    ETT size? 7    Vent settings over the past 24h: 35%/5 PEEP    Most recent ABG or O2 saturation over the past 24h: 7.44 / 37 / 131 / 1 / 25 / 99%    PEG    Prior abdominal surgery / abdominal anatomy? No    Any available abdominal imaging? No    Ascites or history of varices? No    PERMACATH    Bacteremia / blood cultures? No    Line holiday / removal of line before placement? No    Prior lines or history of central stenosis? RIJ trialysis

## 2022-01-19 NOTE — PLAN OF CARE
"      SICU PLAN OF CARE NOTE    Dx: S/P aortic valve replacement    Shift Events: No acute events overnight.    Goals of Care: Plan for tracheostomy this week possibly. MAP 60-80.    Neuro: Arouses to Voice, Follows Commands, and Moves All Extremities    Cardiac: NSR. HR 79-80. Permanently paced. SBP 's. All pulses palpable.    Vital Signs: BP (!) 107/57 (BP Location: Right arm, Patient Position: Lying)   Pulse 80   Temp 98 °F (36.7 °C) (Oral)   Resp (!) 30   Ht 5' 1" (1.549 m)   Wt 44.8 kg (98 lb 12.3 oz)   SpO2 99%   Breastfeeding No   BMI 18.66 kg/m²     Respiratory: Ventilator 30/5    Diet: NPO    Gtts: Vasopressin and Heparin    Urine Output: Anuric     Drains: Farrukh tube retracted overnight per Bassem SCOTT due to kinking seen in KUB. Morning KUB to be reviewed with day team. Bassem SCOTT aware.    CRRT 8h SLED tx overnight. .    Labs/Accuchecks: Daily labs. K Q12. Accucheck Q4.    Skin: No new skin breakdown noted. Sacral skin tear cleaned and ointment applied. Bilateral boots on. Pt turned Q2 on immerse bed. Bed plugged in and working, set for pt weight.      "

## 2022-01-19 NOTE — ASSESSMENT & PLAN NOTE
Orion Ty is a 76 y.o. female who presents to the SICU s/p redo aortic valve replacement with mechanical valve on 1/5/22.      Neuro/Psych:   -- Sedation: none  -- Pain: fentanyl pushes q2     Cards:   -- Pressors: vaso PRN  -- Goal MAP: 60-80  -- holding metop   -- holding amlodipine  -- statin  -- heparin drip, PTT goal 60-80  -- Paced HR 80  -- INR 1.5      Pulm:   -- Goal O2 sat > 90%  -- reintubated 1/12 then 1/18  -- now on 35%/5PEEP  -- ABG PRN  -- SBT  -- 2 mediastinal removed 1/9 L Pleural CT removed 1/8  -- Possible consult gen surg for trach/peg/permacath today      Renal:  -- Mireles removed  -- net negative 2L yesterday  -- bladder scan every shift  -- Oliguric   -- Nephrology following  -- CRRT. Discuss with Nephrology. Continue removing fluid      FEN / GI:   -- Replace lytes as needed  -- OG in place  -- Nutrition: tube feeds novasource renal, goal of 35      ID:   -- WBC 11  -- Antibiotics: periop ancef complete. Zosyn and vanc until 1/20  -- Some concern for aspiration      Heme/Onc:   -- Hgb 8  -- platelets 71   -- 3U RBC, 2U FFP, 2U platelets in OR  -- Daily CBC  -- Transfused products: 1U RBC on 1/5/22. 1u RBC and 1u FFP on 1/9/21. 2U RBC on 1/12. 4 FFP on 1/13. 1 FFP on 1/17  -- Anticoagulation: INR normalizing, transition to heparin drip   -- INR 1.5   -- heparin drip, PTT 60-80      Endo:   -- Gluc goal 140-180  -- Insulin per endocrinology      PPx:   Feeding: tube feeds  Analgesia/Sedation: fent pushes / none  Thromboembolic prevention: heparin drip  HOB >30: yes  Stress Ulcer ppx: protonix BID  Glucose control: Critical care goal 140-180 g/dl, ISS    Lines/Drains/Airway: Art line, RIJ Trialysis, ETT       Dispo/Code Status/Palliative:   -- SICU / Full Code

## 2022-01-19 NOTE — PLAN OF CARE
"SICU PLAN OF CARE NOTE    Dx: S/P aortic valve replacement    Goals of Care:  MAP >60    Vital Signs:  BP 96/60   Pulse 80   Temp 98.6 °F (37 °C) (Oral)   Resp (!) 30   Ht 5' 1" (1.549 m)   Wt 44.8 kg (98 lb 12.3 oz)   SpO2 97%   Breastfeeding No   BMI 18.66 kg/m²     Cardiac:  NSR    Resp:  SpO2 100% on ventilator with fio2 35%    Neuro:  Sedated    Gtts:  Vasopressin and Heparin    Urine Output:  Anuric 0 cc/shift    Drains:  NG/OG Tube 0 cc /  shift        Diet:  NPO     Labs/Accuchecks:  daily, abg as needed, and aptt every 6 hours    Skin:  All skin remains free from injury.  Patient turned Q2, waffle mattress inflated, ICU bed working correctly.    Shift Events:  Patient with family at bedside. Patient extubated per , Dr. Haider aware. Patient re intubated in the evening. Dr. Haider and Dr. West updated patient's sons, and daughter throughout the day. All of patient's families questions answered per Dr. West, and Dr. Haider.  See flowsheet for further assessment/details.  Family updated on current condition/plan of care, questions answered, and emotional support provided.   MD updated on current condition, vitals, labs, and gtts.  No new orders received, will continue to monitor.     "

## 2022-01-19 NOTE — CONSULTS
[facility] - [facility]  General Surgery  Consult Note    Patient Name: Orion Ty  MRN: [id number]  Code Status: Full Code  Admission Date: 1/5/2022  Hospital Length of Stay: 14 days  Attending Physician: Dennis Haider MD  Primary Care Provider: Otis Zimmer MD    Patient information was obtained from past medical records and primary team.     Inpatient consult to General Surgery  Consult performed by: Rae Menjivar MD  Consult ordered by: Graham Alfaro MD        Subjective:     Principal Problem: S/P aortic valve replacement    History of Present Illness: Orion Ty is our 76 y.o. female with previous aortic valve replacement, mitral valve replacement, and tricuspid valve repair in 2010 who presents for redo aortic valve replacement. She underwent aortic valve replacement with a mechanical valve on 1/5/22. She was doing well, but then required reintubation on 1/12. She progressed again and was given a trial of extubation on 1/18 which she failed. General surgery consulted for trach/peg/permacath. She is tolerating CRRT and tube feeds.     TRACH/PEG/PERMACATH CONSULT     MAIN CONDITION (WITH PROGNOSIS):     Is family aware of consult / are they amenable to these procedures? yes    Code status: Full    Neuro Exam: Follows commands off sedation    On anticoagulation? If so, type: heparin drip    Plts / INR / aPTT: 71/1.2/54    Tolerating enteral feeding? Yes, at goal    On pressors? Intermittently vaso when on CRRT    COVID+? No    On CRRT or HD? CRRT    Current lines/tubes/drains: RIJ trialysis, LUE midline      TRACH    Days intubated: 7 total    Extubation attempts? Yes, failed 1/18    Prior neck surgery or radiation? No    Obesity of neck? No    ETT size? 7    Vent settings over the past 24h: 35%/5 PEEP    Most recent ABG or O2 saturation over the past 24h: 7.44 / 37 / 131 / 1 / 25 / 99%    PEG    Prior abdominal surgery / abdominal anatomy? No    Any available abdominal imaging?  No    Ascites or history of varices? No    PERMACATH    Bacteremia / blood cultures? No    Line holiday / removal of line before placement? No    Prior lines or history of central stenosis? RIJ trialysis        Current Facility-Administered Medications on File Prior to Encounter   Medication    0.9%  NaCl infusion     Current Outpatient Medications on File Prior to Encounter   Medication Sig    metFORMIN (GLUCOPHAGE) 500 MG tablet TAKE 1 TABLET EVERY DAY (Patient taking differently: daily with breakfast.)    metoprolol tartrate (LOPRESSOR) 25 MG tablet TAKE 1 TABLET BY MOUTH TWICE DAILY    omega-3 acid ethyl esters (LOVAZA) 1 gram capsule TK 2 CS PO BID    amLODIPine (NORVASC) 5 MG tablet Take 1 tablet (5 mg total) by mouth once daily.    amoxicillin (AMOXIL) 875 MG tablet     furosemide (LASIX) 40 MG tablet Take 40 mg by mouth 2 (two) times daily.    lancets 30 gauge Misc Use as directed to check blood sugar twice a day (Patient not taking: No sig reported)    losartan (COZAAR) 25 MG tablet Take 1 tablet (25 mg total) by mouth once daily. (Patient not taking: No sig reported)    nitroGLYCERIN (NITROSTAT) 0.4 MG SL tablet DISSOLVE 1 TABLET UNDER THE TONGUE EVERY 5 MINUTES AS NEEDED FOR CHEST PAINS    pravastatin (PRAVACHOL) 20 MG tablet Take 1 tablet (20 mg total) by mouth once daily. (Patient not taking: No sig reported)    warfarin (COUMADIN) 4 MG tablet Take 1 tablet on Monday and Friday.  Take 1/2 tablet on all other days. (Patient not taking: No sig reported)       Review of patient's allergies indicates:  No Known Allergies    Past Medical History:   Diagnosis Date    A-fib     Anticoagulant long-term use     Closed displaced fracture of distal phalanx of lesser toe 10/20/2015    Right    Coronary artery disease     Diabetes mellitus     Diabetes mellitus, type 2     Hypertension     Mechanical heart valve present     X's two    Osteopenia     Pacemaker     Rheumatic heart disease       Past Surgical History:   Procedure Laterality Date    CARDIAC PACEMAKER PLACEMENT      CARDIAC VALVE REPLACEMENT      CARDIAC VALVE SURGERY      CORONARY ANGIOGRAPHY N/A 12/21/2021    Procedure: ANGIOGRAM, CORONARY ARTERY;  Surgeon: Devonte Salazar MD;  Location: [facility] CATH LAB;  Service: Cardiology;  Laterality: N/A;    EYE SURGERY Bilateral     cataract    FLUOROSCOPY N/A 12/11/2020    Procedure: FLUOROSCOPY;  Surgeon: Mckay Calvo MD;  Location: [facility] CATH LAB;  Service: Cardiology;  Laterality: N/A;    REPLACEMENT OF AORTIC VALVE WITH REPEAT STERNOTOMY N/A 1/5/2022    Procedure: REPLACEMENT, AORTIC VALVE, WITH REPEAT STERNOTOMY;  Surgeon: Dennis Haider MD;  [facility];  Service: Cardiothoracic;  Laterality: N/A;    REPLACEMENT OF PACEMAKER GENERATOR N/A 3/2/2020    Procedure: REPLACEMENT, PACEMAKER GENERATOR;  Surgeon: Mark Ring MD;  Location: [facility] EP LAB;  Service: Cardiology;  Laterality: N/A;  TBS/CHRISTI, Single PPM gen change, Right side, MDT, MAC, SK, 3 Prep    thryoid s       Family History     Problem Relation (Age of Onset)    Breast cancer Maternal Aunt        Tobacco Use    Smoking status: Never Smoker    Smokeless tobacco: Never Used   Substance and Sexual Activity    Alcohol use: No    Drug use: Never    Sexual activity: Not on file     Review of Systems   Unable to perform ROS: Intubated     Objective:     Vital Signs (Most Recent):  Temp: 97.6 °F (36.4 °C) (01/19/22 0715)  Pulse: 102 (01/19/22 1015)  Resp: (!) 29 (01/19/22 1015)  BP: (!) 157/74 (01/19/22 1000)  SpO2: 96 % (01/19/22 1015) Vital Signs (24h Range):  Temp:  [97.6 °F (36.4 °C)-99.86 °F (37.7 °C)] 97.6 °F (36.4 °C)  Pulse:  [] 102  Resp:  [18-53] 29  SpO2:  [94 %-100 %] 96 %  BP: ()/(55-79) 157/74  Arterial Line BP: ()/(45-67) 150/67     Weight: 44.8 kg (98 lb 12.3 oz)  Body mass index is 18.66 kg/m².    Physical Exam  Constitutional:       General: She is not in acute  distress.     Comments: Intubated and sedated   HENT:      Head: Normocephalic and atraumatic.   Neck:      Comments: R IJ Trialysis  Cardiovascular:      Rate and Rhythm: Normal rate and regular rhythm.      Pulses: Normal pulses.      Comments: Midline sternotomy incision c/d/I  Pacemaker in place. Paced at 80  Pulmonary:      Effort: Pulmonary effort is normal. No respiratory distress.      Comments: intubated  Abdominal:      General: Abdomen is flat. There is no distension.      Palpations: Abdomen is soft.      Tenderness: There is no abdominal tenderness.   Genitourinary:     Comments: R groin well incision, bandage in place. Thigh soft, no hematoma  Musculoskeletal:      Comments: R radial art line in place   Skin:     General: Skin is warm.      Capillary Refill: Capillary refill takes less than 2 seconds.   Neurological:      Mental Status: She is alert.         Significant Labs:  I have reviewed all pertinent lab results within the past 24 hours.  CBC:   Recent Labs   Lab 01/16/22 2158 01/19/22 0314 01/19/22 0331   WBC  --  10.36  --    RBC  --  2.76*  --    HGB  --  8.1*  --    HCT   < > 26.5* 29*   PLT  --  71*  --    MCV  --  96  --    MCH  --  29.3  --    MCHC  --  30.6*  --     < > = values in this interval not displayed.     CMP:   Recent Labs   Lab 01/19/22 0314 01/19/22 0314 01/19/22  0805     --   --    CALCIUM 8.8  --   --    ALBUMIN 2.8*  --   --    PROT 6.3  --   --      --   --    K 4.3   < > 4.6   CO2 23  --   --      --   --    BUN 10  --   --    CREATININE 0.7  --   --    ALKPHOS 104  --   --    ALT 16  --   --    AST 43*  --   --    BILITOT 1.3*  --   --     < > = values in this interval not displayed.     ABGs:   Recent Labs   Lab 01/19/22 0331   PH 7.439   PCO2 37.0   PO2 131*   HCO3 25.1   POCSATURATED 99   BE 1       Significant Diagnostics:  I have reviewed all pertinent imaging results/findings within the past 24 hours.    Assessment/Plan:     * S/P aortic  "valve replacement  Orion Ty is a 76 y.o. lady s/p redo aortic valve replacement. She has failed extubation trials due to debility and general surgery is consulted for trach/peg/permacath    - Will plan for Friday, 01/21/2022  - pause heparin drip midnight before procedure  - pause tube feeds midnight before procedure  - Will obtain consent      VTE Risk Mitigation (From admission, onward)         Ordered     heparin 25,000 units in dextrose 5% 250 mL (100 units/mL) infusion (heparin infusion - NO NOMOGRAM)  Continuous        "And" Linked Group Details    01/19/22 0433     IP VTE HIGH RISK PATIENT  Once         01/05/22 1433     Place sequential compression device  Until discontinued         01/05/22 1356                Thank you for your consult. I will follow-up with patient. Please contact us if you have any additional questions.    Rae Menjivar MD  General Surgery  [facility] - [facility]  "

## 2022-01-19 NOTE — SUBJECTIVE & OBJECTIVE
Interval History/Significant Events:   -Failed extubation yesterday. General Surgery consulted for trach/PEG/permcath  -On vaso while on CRRT  -Tube feeds advancing    Follow-up For: Procedure(s) (LRB):  REPLACEMENT, AORTIC VALVE, WITH REPEAT STERNOTOMY (N/A)    Post-Operative Day: 14 Days Post-Op    Objective:     Vital Signs (Most Recent):  Temp: 97.6 °F (36.4 °C) (01/19/22 0715)  Pulse: 102 (01/19/22 1015)  Resp: (!) 29 (01/19/22 1015)  BP: (!) 157/74 (01/19/22 1000)  SpO2: 96 % (01/19/22 1015) Vital Signs (24h Range):  Temp:  [97.6 °F (36.4 °C)-99.86 °F (37.7 °C)] 97.6 °F (36.4 °C)  Pulse:  [] 102  Resp:  [18-53] 29  SpO2:  [94 %-100 %] 96 %  BP: ()/(55-79) 157/74  Arterial Line BP: ()/(45-67) 150/67     Weight: 44.8 kg (98 lb 12.3 oz)  Body mass index is 18.66 kg/m².      Intake/Output Summary (Last 24 hours) at 1/19/2022 1027  Last data filed at 1/19/2022 0701  Gross per 24 hour   Intake 2090.42 ml   Output 1633 ml   Net 457.42 ml       Physical Exam  Constitutional:       General: She is not in acute distress.     Comments: Intubated and sedated   HENT:      Head: Normocephalic and atraumatic.   Neck:      Comments: R IJ Trialysis  Cardiovascular:      Rate and Rhythm: Normal rate and regular rhythm.      Pulses: Normal pulses.      Comments: Midline sternotomy incision c/d/I  Pacemaker in place. Paced at 80  Pulmonary:      Effort: Pulmonary effort is normal. No respiratory distress.      Comments: intubated  Abdominal:      General: Abdomen is flat. There is no distension.      Palpations: Abdomen is soft.      Tenderness: There is no abdominal tenderness.   Genitourinary:     Comments: R groin well incision, bandage in place. Thigh soft, no hematoma  Musculoskeletal:      Comments: R radial art line in place   Skin:     General: Skin is warm.      Capillary Refill: Capillary refill takes less than 2 seconds.   Neurological:      Mental Status: She is alert.         Vents:  Vent Mode: A/C  (01/19/22 0754)  Ventilator Initiated: Yes (01/18/22 1636)  Set Rate: 30 BPM (01/19/22 0754)  Vt Set: 300 mL (01/19/22 0754)  Pressure Support: 10 cmH20 (01/19/22 0000)  PEEP/CPAP: 5 cmH20 (01/19/22 0754)  Oxygen Concentration (%): 30 (01/19/22 1015)  Peak Airway Pressure: 24 cmH2O (01/19/22 0754)  Plateau Pressure: 30 cmH20 (01/19/22 0754)  Total Ve: 9.25 mL (01/19/22 0754)  Negative Inspiratory Force (cm H2O): 0 (01/19/22 0754)  F/VT Ratio<105 (RSBI): (!) 94.94 (01/19/22 0754)    Lines/Drains/Airways     Central Venous Catheter Line            Trialysis (Dialysis) Catheter 01/08/22 1027 right internal jugular 11 days          Drain                 NG/OG Tube 01/18/22 1030 Left nostril <1 day          Airway               Airway Anesthesia 01/12/22 6 days         Airway - Non-Surgical 01/18/22 1634 Endotracheal Tube <1 day          Arterial Line            Arterial Line 01/05/22 0855 Right Radial 14 days          Peripheral Intravenous Line                 Midline Catheter Insertion/Assessment  - Single Lumen 01/10/22 1300 Left cephalic vein (lateral side of arm) 18g x 8cm 8 days         Peripheral IV - Single Lumen 01/15/22 1445 18 G;1 1/4 in Anterior;Left Forearm 3 days         Peripheral IV - Single Lumen 01/16/22 0430 20 G Anterior;Distal;Right Wrist 3 days                Significant Labs:    CBC/Anemia Profile:  Recent Labs   Lab 01/18/22  0305 01/19/22  0314 01/19/22  0331   WBC 8.19 10.36  --    HGB 8.8* 8.1*  --    HCT 27.7* 26.5* 29*   PLT 58* 71*  --    MCV 94 96  --    RDW 17.6* 17.9*  --         Chemistries:  Recent Labs   Lab 01/17/22  1613 01/17/22  1943 01/18/22  0305 01/18/22  0756 01/18/22  1459 01/18/22  1947 01/18/22 2032 01/19/22 0314 01/19/22  0509 01/19/22  0805      < > 137  137   < > 140  --  138 141  --   --    K 4.4   < > 4.7  4.6   < > 5.5*   < > 4.9 4.3  --  4.6      < > 102  101   < > 104  --  101 106  --   --    CO2 26   < > 23  24   < > 21*  --  21* 23  --   --     BUN 9   < > 14  14   < > 30*  --  40* 10  --   --    CREATININE 0.7   < > 0.9  1.0   < > 1.7*  --  2.0* 0.7  --   --    CALCIUM 8.8   < > 8.4*  8.1*   < > 8.1*  --  8.3* 8.8  --   --    ALBUMIN 3.5   < > 2.9*  2.9*   < > 3.1*  --  2.9* 2.8*  --   --    PROT 7.7  --  6.3  --   --   --   --  6.3  --   --    BILITOT 1.6*  --  1.3*  --   --   --   --  1.3*  --   --    ALKPHOS 131  --  112  --   --   --   --  104  --   --    ALT 19  --  17  --   --   --   --  16  --   --    AST 51*  --  45*  --   --   --   --  43*  --   --    MG 1.8   < > 2.0  2.0   < > 2.2  --  2.2  --  2.0  --    PHOS 1.8*   < > 2.6*  2.6*   < > 7.2*  --  6.9* 2.5*  --   --     < > = values in this interval not displayed.       All pertinent labs within the past 24 hours have been reviewed.    Significant Imaging:  I have reviewed all pertinent imaging results/findings within the past 24 hours.

## 2022-01-19 NOTE — PT/OT/SLP PROGRESS
Occupational Therapy  Co Treatment  Adjustment of d/c recs.     Name: Orion Ty  MRN: [id number]  Admitting Diagnosis:  S/P aortic valve replacement  14 Days Post-Op    Recommendations:     Discharge Recommendations: rehabilitation facility      Assessment:     Orion Ty is a 76 y.o. female with a medical diagnosis of S/P aortic valve replacement.   Performance deficits affecting function are weakness,impaired endurance,impaired self care skills,impaired functional mobilty,gait instability,impaired balance,decreased upper extremity function,decreased lower extremity function,decreased coordination. Pt tolerated session fairly well with vital signs stable throughout session. OT updated POC and d/c recommendations to reflect current functional performance. Pt may benefit from post acute therapy prior to return home.     Rehab Prognosis:  Fair; patient would benefit from acute skilled OT services to address these deficits and reach maximum level of function.       Plan:     Patient to be seen 5 x/week to address the above listed problems via self-care/home management,therapeutic exercises,therapeutic activities  · Plan of Care Expires:    · Plan of Care Reviewed with: patient,son    Subjective   Pt agreeable to therapy.   Pt denies pain.   Pain/Comfort:  · Pain Rating 1: 0/10    Objective:     Communicated with: nsg  prior to session.  Patient found supine in bed with tele, BP cuff, pulse ox, A-line, central line, Vent via ETT.  RT present at start and end of session with ETT marked at 22 at lip throughout session.   No adjustments made to vent setting with VSS throughout session.     Co-tx completed this date to optimize functional performance and safety given impaired tolerance for activity and acuity/medical complexity of pt in setting of ICU.     General Precautions: Standard, fall , sternal  Occupational Performance:     Bed Mobility:    TOTAL A supine<>sit     Activities of Daily Living:  · TOTA LA  For  all ADL skills at this time given impaired functional strength of UE's, impaired alertness and impaired endurance for functional activity.       [facility] 6 Click ADL: 6    Treatment & Education:  Pt able to open eyes on commands. Pt following some simple commands.   TOTAL A for bed mobility required. Pt tolerated sitting EOB approx 6 min with MOD A for postural control. Pt did engage with AAROM B UE's and AROM LE's 4 planes total to increase functional ROM within sternal precautions.   Orientation provided throughout session.     Education provided re: OT POC and safety with functional mobility/ADl skills.    Patient left supine with all lines intact, call button in reach and son and nsg  presentEducation:      GOALS:   Multidisciplinary Problems     Occupational Therapy Goals        Problem: Occupational Therapy Goal    Goal Priority Disciplines Outcome Interventions   Occupational Therapy Goal     OT, PT/OT Ongoing, Progressing    Description: Goals to be met by: 7 days 2/2/22     Patient will increase functional independence with ADLs by performing:    Pt to complete UE dressing with set-up  Pt to complete LE dressing with SBA  Pt to complete standing g/h skills with SBA  Pt to complete toileting with SBA  Pt to completed t/f bed, chair and commode with SBA                   Time Tracking:     OT Date of Treatment: 01/19/22  OT Start Time: 0934  OT Stop Time: 0957  OT Total Time (min): 23 min    Billable Minutes:Therapeutic Activity 11  Therapeutic Exercise 12    OT/SUE: OT          1/19/2022

## 2022-01-19 NOTE — SUBJECTIVE & OBJECTIVE
"Interval HPI:   Overnight events: Remains in SICU. NAEON. BG well controlled on current SQ insulin regimen. Diet NPO Except for: Sips with Medication  Eating:   NPO  Nausea: No  Hypoglycemia and intervention: No  Fever: No  TPN and/or TF: Yes  If yes, type of TF/TPN and rate: Novasource Renal at 35 ml/hr    BP (!) 114/59   Pulse 79   Temp 97.6 °F (36.4 °C) (Oral)   Resp (!) 30   Ht 5' 1" (1.549 m)   Wt 44.8 kg (98 lb 12.3 oz)   SpO2 99%   Breastfeeding No   BMI 18.66 kg/m²     Labs Reviewed and Include    Recent Labs   Lab 01/19/22  0314 01/19/22  0314 01/19/22  0805     --   --    CALCIUM 8.8  --   --    ALBUMIN 2.8*  --   --    PROT 6.3  --   --      --   --    K 4.3   < > 4.6   CO2 23  --   --      --   --    BUN 10  --   --    CREATININE 0.7  --   --    ALKPHOS 104  --   --    ALT 16  --   --    AST 43*  --   --    BILITOT 1.3*  --   --     < > = values in this interval not displayed.     Lab Results   Component Value Date    WBC 10.36 01/19/2022    HGB 8.1 (L) 01/19/2022    HCT 29 (L) 01/19/2022    MCV 96 01/19/2022    PLT 71 (L) 01/19/2022     No results for input(s): TSH, FREET4 in the last 168 hours.  Lab Results   Component Value Date    HGBA1C 6.0 (H) 01/03/2022       Nutritional status:   Body mass index is 18.66 kg/m².  Lab Results   Component Value Date    ALBUMIN 2.8 (L) 01/19/2022    ALBUMIN 2.9 (L) 01/18/2022    ALBUMIN 3.1 (L) 01/18/2022     Lab Results   Component Value Date    PREALBUMIN 11 (L) 01/10/2022       Estimated Creatinine Clearance: 47.6 mL/min (based on SCr of 0.7 mg/dL).    Accu-Checks  Recent Labs     01/18/22  0202 01/18/22  0419 01/18/22  0555 01/18/22  0755 01/18/22  1218 01/18/22  1719 01/18/22  1950 01/18/22  2234 01/19/22  0311 01/19/22  0804   POCTGLUCOSE 189* 227* 228* 202* 135* 134* 161* 126* 112* 131*       Current Medications and/or Treatments Impacting Glycemic Control  Immunotherapy:    Immunosuppressants     None        Steroids:   Hormones " (From admission, onward)            Start     Stop Route Frequency Ordered    01/12/22 1830  vasopressin (PITRESSIN) 0.2 Units/mL in dextrose 5 % 100 mL infusion         -- IV Continuous 01/12/22 1719    01/06/22 2255  melatonin tablet 9 mg         -- Oral Nightly PRN 01/06/22 2255        Pressors:    Autonomic Drugs (From admission, onward)            Start     Stop Route Frequency Ordered    01/18/22 1645  rocuronium injection 60 mg         -- IV Once 01/18/22 1714    01/14/22 1500  midodrine tablet 10 mg         -- Oral 3 times daily 01/14/22 1453    01/12/22 1441  rocuronium 10 mg/mL injection        Note to Pharmacy: Created by cabinet override    01/13 0244   01/12/22 1441    01/09/22 1100  EPINEPHrine (ADRENALIN) 5 mg in dextrose 5 % 250 mL infusion        Question Answer Comment   Begin at (in mcg/kg/min): 0.04    Titrate by: (in mcg/kg/min) 0.01    Titrate interval: (in minutes) 5    Titrate to maintain: (SBP or MAP or Cardiac Index) MAP    Greater than: (in mmHg) 60    Maximum dose: (in mcg/kg/min) 0.08        -- IV Continuous 01/09/22 0956        Hyperglycemia/Diabetes Medications:   Antihyperglycemics (From admission, onward)            Start     Stop Route Frequency Ordered    01/19/22 0800  insulin aspart U-100 pen 3 Units         -- SubQ Every 24 hours (non-standard times) 01/18/22 0921    01/19/22 0400  insulin aspart U-100 pen 3 Units         -- SubQ Every 24 hours (non-standard times) 01/18/22 0921    01/19/22 0000  insulin aspart U-100 pen 3 Units         -- SubQ Every 24 hours (non-standard times) 01/18/22 0921    01/18/22 2000  insulin aspart U-100 pen 3 Units         -- SubQ Every 24 hours (non-standard times) 01/18/22 0921    01/18/22 1600  insulin aspart U-100 pen 3 Units         -- SubQ Every 24 hours (non-standard times) 01/18/22 0921    01/18/22 1200  insulin aspart U-100 pen 3 Units         -- SubQ Every 24 hours (non-standard times) 01/18/22 0921    01/15/22 1415  insulin aspart U-100  pen 0-5 Units         -- SubQ Every 4 hours PRN 01/15/22 9894

## 2022-01-19 NOTE — SUBJECTIVE & OBJECTIVE
Current Facility-Administered Medications on File Prior to Encounter   Medication    0.9%  NaCl infusion     Current Outpatient Medications on File Prior to Encounter   Medication Sig    metFORMIN (GLUCOPHAGE) 500 MG tablet TAKE 1 TABLET EVERY DAY (Patient taking differently: daily with breakfast.)    metoprolol tartrate (LOPRESSOR) 25 MG tablet TAKE 1 TABLET BY MOUTH TWICE DAILY    omega-3 acid ethyl esters (LOVAZA) 1 gram capsule TK 2 CS PO BID    amLODIPine (NORVASC) 5 MG tablet Take 1 tablet (5 mg total) by mouth once daily.    amoxicillin (AMOXIL) 875 MG tablet     furosemide (LASIX) 40 MG tablet Take 40 mg by mouth 2 (two) times daily.    lancets 30 gauge Misc Use as directed to check blood sugar twice a day (Patient not taking: No sig reported)    losartan (COZAAR) 25 MG tablet Take 1 tablet (25 mg total) by mouth once daily. (Patient not taking: No sig reported)    nitroGLYCERIN (NITROSTAT) 0.4 MG SL tablet DISSOLVE 1 TABLET UNDER THE TONGUE EVERY 5 MINUTES AS NEEDED FOR CHEST PAINS    pravastatin (PRAVACHOL) 20 MG tablet Take 1 tablet (20 mg total) by mouth once daily. (Patient not taking: No sig reported)    warfarin (COUMADIN) 4 MG tablet Take 1 tablet on Monday and Friday.  Take 1/2 tablet on all other days. (Patient not taking: No sig reported)       Review of patient's allergies indicates:  No Known Allergies    Past Medical History:   Diagnosis Date    A-fib     Anticoagulant long-term use     Closed displaced fracture of distal phalanx of lesser toe 10/20/2015    Right    Coronary artery disease     Diabetes mellitus     Diabetes mellitus, type 2     Hypertension     Mechanical heart valve present     X's two    Osteopenia     Pacemaker     Rheumatic heart disease      Past Surgical History:   Procedure Laterality Date    CARDIAC PACEMAKER PLACEMENT      CARDIAC VALVE REPLACEMENT      CARDIAC VALVE SURGERY      CORONARY ANGIOGRAPHY N/A 12/21/2021    Procedure: ANGIOGRAM,  CORONARY ARTERY;  Surgeon: Devonte Salazar MD;  Location: [facility] CATH LAB;  Service: Cardiology;  Laterality: N/A;    EYE SURGERY Bilateral     cataract    FLUOROSCOPY N/A 12/11/2020    Procedure: FLUOROSCOPY;  Surgeon: Mckay Calvo MD;  Location: [facility] CATH LAB;  Service: Cardiology;  Laterality: N/A;    REPLACEMENT OF AORTIC VALVE WITH REPEAT STERNOTOMY N/A 1/5/2022    Procedure: REPLACEMENT, AORTIC VALVE, WITH REPEAT STERNOTOMY;  Surgeon: Dennis Haider MD;  Location: [facility] OR [facility] FLR;  Service: Cardiothoracic;  Laterality: N/A;    REPLACEMENT OF PACEMAKER GENERATOR N/A 3/2/2020    Procedure: REPLACEMENT, PACEMAKER GENERATOR;  Surgeon: Mark Ring MD;  Location: [facility] EP LAB;  Service: Cardiology;  Laterality: N/A;  TBS/CHRISTI, Single PPM gen change, Right side, MDT, MAC, SK, 3 Prep    thryoid s       Family History     Problem Relation (Age of Onset)    Breast cancer Maternal Aunt        Tobacco Use    Smoking status: Never Smoker    Smokeless tobacco: Never Used   Substance and Sexual Activity    Alcohol use: No    Drug use: Never    Sexual activity: Not on file     Review of Systems   Unable to perform ROS: Intubated     Objective:     Vital Signs (Most Recent):  Temp: 97.6 °F (36.4 °C) (01/19/22 0715)  Pulse: 102 (01/19/22 1015)  Resp: (!) 29 (01/19/22 1015)  BP: (!) 157/74 (01/19/22 1000)  SpO2: 96 % (01/19/22 1015) Vital Signs (24h Range):  Temp:  [97.6 °F (36.4 °C)-99.86 °F (37.7 °C)] 97.6 °F (36.4 °C)  Pulse:  [] 102  Resp:  [18-53] 29  SpO2:  [94 %-100 %] 96 %  BP: ()/(55-79) 157/74  Arterial Line BP: ()/(45-67) 150/67     Weight: 44.8 kg (98 lb 12.3 oz)  Body mass index is 18.66 kg/m².    Physical Exam  Constitutional:       General: She is not in acute distress.     Comments: Intubated and sedated   HENT:      Head: Normocephalic and atraumatic.   Neck:      Comments: R IJ Trialysis  Cardiovascular:      Rate and Rhythm: Normal rate and regular rhythm.      Pulses:  Normal pulses.      Comments: Midline sternotomy incision c/d/I  Pacemaker in place. Paced at 80  Pulmonary:      Effort: Pulmonary effort is normal. No respiratory distress.      Comments: intubated  Abdominal:      General: Abdomen is flat. There is no distension.      Palpations: Abdomen is soft.      Tenderness: There is no abdominal tenderness.   Genitourinary:     Comments: R groin well incision, bandage in place. Thigh soft, no hematoma  Musculoskeletal:      Comments: R radial art line in place   Skin:     General: Skin is warm.      Capillary Refill: Capillary refill takes less than 2 seconds.   Neurological:      Mental Status: She is alert.         Significant Labs:  I have reviewed all pertinent lab results within the past 24 hours.  CBC:   Recent Labs   Lab 01/16/22 2158 01/19/22 0314 01/19/22 0331   WBC  --  10.36  --    RBC  --  2.76*  --    HGB  --  8.1*  --    HCT   < > 26.5* 29*   PLT  --  71*  --    MCV  --  96  --    MCH  --  29.3  --    MCHC  --  30.6*  --     < > = values in this interval not displayed.     CMP:   Recent Labs   Lab 01/19/22 0314 01/19/22 0314 01/19/22  0805     --   --    CALCIUM 8.8  --   --    ALBUMIN 2.8*  --   --    PROT 6.3  --   --      --   --    K 4.3   < > 4.6   CO2 23  --   --      --   --    BUN 10  --   --    CREATININE 0.7  --   --    ALKPHOS 104  --   --    ALT 16  --   --    AST 43*  --   --    BILITOT 1.3*  --   --     < > = values in this interval not displayed.     ABGs:   Recent Labs   Lab 01/19/22 0331   PH 7.439   PCO2 37.0   PO2 131*   HCO3 25.1   POCSATURATED 99   BE 1       Significant Diagnostics:  I have reviewed all pertinent imaging results/findings within the past 24 hours.

## 2022-01-19 NOTE — PROGRESS NOTES
[facility]  Critical Care - Surgery  Progress Note    Patient Name: Orion Ty  MRN: [id number]  Admission Date: 1/5/2022  Hospital Length of Stay: 14 days  Code Status: Full Code  Attending Provider: Dennis Haider MD  Primary Care Provider: Otis Zimmer MD   Principal Problem: S/P aortic valve replacement    Subjective:     Hospital/ICU Course:  No notes on file    Interval History/Significant Events:   -Failed extubation yesterday. General Surgery consulted for trach/PEG/permcath  -On vaso while on CRRT  -Tube feeds advancing    Follow-up For: Procedure(s) (LRB):  REPLACEMENT, AORTIC VALVE, WITH REPEAT STERNOTOMY (N/A)    Post-Operative Day: 14 Days Post-Op    Objective:     Vital Signs (Most Recent):  Temp: 97.6 °F (36.4 °C) (01/19/22 0715)  Pulse: 102 (01/19/22 1015)  Resp: (!) 29 (01/19/22 1015)  BP: (!) 157/74 (01/19/22 1000)  SpO2: 96 % (01/19/22 1015) Vital Signs (24h Range):  Temp:  [97.6 °F (36.4 °C)-99.86 °F (37.7 °C)] 97.6 °F (36.4 °C)  Pulse:  [] 102  Resp:  [18-53] 29  SpO2:  [94 %-100 %] 96 %  BP: ()/(55-79) 157/74  Arterial Line BP: ()/(45-67) 150/67     Weight: 44.8 kg (98 lb 12.3 oz)  Body mass index is 18.66 kg/m².      Intake/Output Summary (Last 24 hours) at 1/19/2022 1027  Last data filed at 1/19/2022 0701  Gross per 24 hour   Intake 2090.42 ml   Output 1633 ml   Net 457.42 ml       Physical Exam  Constitutional:       General: She is not in acute distress.     Comments: Intubated and sedated   HENT:      Head: Normocephalic and atraumatic.   Neck:      Comments: R IJ Trialysis  Cardiovascular:      Rate and Rhythm: Normal rate and regular rhythm.      Pulses: Normal pulses.      Comments: Midline sternotomy incision c/d/I  Pacemaker in place. Paced at 80  Pulmonary:      Effort: Pulmonary effort is normal. No respiratory distress.      Comments: intubated  Abdominal:      General: Abdomen is flat. There is no distension.      Palpations:  Abdomen is soft.      Tenderness: There is no abdominal tenderness.   Genitourinary:     Comments: R groin well incision, bandage in place. Thigh soft, no hematoma  Musculoskeletal:      Comments: R radial art line in place   Skin:     General: Skin is warm.      Capillary Refill: Capillary refill takes less than 2 seconds.   Neurological:      Mental Status: She is alert.         Vents:  Vent Mode: A/C (01/19/22 0754)  Ventilator Initiated: Yes (01/18/22 1636)  Set Rate: 30 BPM (01/19/22 0754)  Vt Set: 300 mL (01/19/22 0754)  Pressure Support: 10 cmH20 (01/19/22 0000)  PEEP/CPAP: 5 cmH20 (01/19/22 0754)  Oxygen Concentration (%): 30 (01/19/22 1015)  Peak Airway Pressure: 24 cmH2O (01/19/22 0754)  Plateau Pressure: 30 cmH20 (01/19/22 0754)  Total Ve: 9.25 mL (01/19/22 0754)  Negative Inspiratory Force (cm H2O): 0 (01/19/22 0754)  F/VT Ratio<105 (RSBI): (!) 94.94 (01/19/22 0754)    Lines/Drains/Airways     Central Venous Catheter Line            Trialysis (Dialysis) Catheter 01/08/22 1027 right internal jugular 11 days          Drain                 NG/OG Tube 01/18/22 1030 Left nostril <1 day          Airway               Airway Anesthesia 01/12/22 6 days         Airway - Non-Surgical 01/18/22 1634 Endotracheal Tube <1 day          Arterial Line            Arterial Line 01/05/22 0855 Right Radial 14 days          Peripheral Intravenous Line                 Midline Catheter Insertion/Assessment  - Single Lumen 01/10/22 1300 Left cephalic vein (lateral side of arm) 18g x 8cm 8 days         Peripheral IV - Single Lumen 01/15/22 1445 18 G;1 1/4 in Anterior;Left Forearm 3 days         Peripheral IV - Single Lumen 01/16/22 0430 20 G Anterior;Distal;Right Wrist 3 days                Significant Labs:    CBC/Anemia Profile:  Recent Labs   Lab 01/18/22  0305 01/19/22  0314 01/19/22  0331   WBC 8.19 10.36  --    HGB 8.8* 8.1*  --    HCT 27.7* 26.5* 29*   PLT 58* 71*  --    MCV 94 96  --    RDW 17.6* 17.9*  --          Chemistries:  Recent Labs   Lab 01/17/22  1613 01/17/22  1943 01/18/22  0305 01/18/22  0756 01/18/22  1459 01/18/22 1947 01/18/22 2032 01/19/22  0314 01/19/22  0509 01/19/22  0805      < > 137  137   < > 140  --  138 141  --   --    K 4.4   < > 4.7  4.6   < > 5.5*   < > 4.9 4.3  --  4.6      < > 102  101   < > 104  --  101 106  --   --    CO2 26   < > 23  24   < > 21*  --  21* 23  --   --    BUN 9   < > 14  14   < > 30*  --  40* 10  --   --    CREATININE 0.7   < > 0.9  1.0   < > 1.7*  --  2.0* 0.7  --   --    CALCIUM 8.8   < > 8.4*  8.1*   < > 8.1*  --  8.3* 8.8  --   --    ALBUMIN 3.5   < > 2.9*  2.9*   < > 3.1*  --  2.9* 2.8*  --   --    PROT 7.7  --  6.3  --   --   --   --  6.3  --   --    BILITOT 1.6*  --  1.3*  --   --   --   --  1.3*  --   --    ALKPHOS 131  --  112  --   --   --   --  104  --   --    ALT 19  --  17  --   --   --   --  16  --   --    AST 51*  --  45*  --   --   --   --  43*  --   --    MG 1.8   < > 2.0  2.0   < > 2.2  --  2.2  --  2.0  --    PHOS 1.8*   < > 2.6*  2.6*   < > 7.2*  --  6.9* 2.5*  --   --     < > = values in this interval not displayed.       All pertinent labs within the past 24 hours have been reviewed.    Significant Imaging:  I have reviewed all pertinent imaging results/findings within the past 24 hours.    Assessment/Plan:     * S/P aortic valve replacement  Orion Ty is a 76 y.o. female who presents to the SICU s/p redo aortic valve replacement with mechanical valve on 1/5/22.      Neuro/Psych:   -- Sedation: none  -- Pain: fentanyl pushes q2     Cards:   -- Pressors: vaso PRN  -- Goal MAP: 60-80  -- holding metop   -- holding amlodipine  -- statin  -- heparin drip, PTT goal 60-80  -- Paced HR 80  -- INR 1.5      Pulm:   -- Goal O2 sat > 90%  -- reintubated 1/12 then 1/18  -- now on 35%/5PEEP  -- ABG PRN  -- SBT  -- 2 mediastinal removed 1/9 L Pleural CT removed 1/8  -- Possible consult gen surg for trach/peg/permacath today       Renal:  -- Mireles removed  -- net negative 2L yesterday  -- bladder scan every shift  -- Oliguric   -- Nephrology following  -- CRRT. Discuss with Nephrology. Continue removing fluid      FEN / GI:   -- Replace lytes as needed  -- OG in place  -- Nutrition: tube feeds novasource renal, goal of 35      ID:   -- WBC 11  -- Antibiotics: periop ancef complete. Zosyn and vanc until 1/20  -- Some concern for aspiration      Heme/Onc:   -- Hgb 8  -- platelets 71   -- 3U RBC, 2U FFP, 2U platelets in OR  -- Daily CBC  -- Transfused products: 1U RBC on 1/5/22. 1u RBC and 1u FFP on 1/9/21. 2U RBC on 1/12. 4 FFP on 1/13. 1 FFP on 1/17  -- Anticoagulation: INR normalizing, transition to heparin drip   -- INR 1.5   -- heparin drip, PTT 60-80      Endo:   -- Gluc goal 140-180  -- Insulin per endocrinology      PPx:   Feeding: tube feeds  Analgesia/Sedation: fent pushes / none  Thromboembolic prevention: heparin drip  HOB >30: yes  Stress Ulcer ppx: protonix BID  Glucose control: Critical care goal 140-180 g/dl, ISS    Lines/Drains/Airway: Art line, RIJ Trialysis, ETT       Dispo/Code Status/Palliative:   -- SICU / Full Code               Critical care was time spent personally by me on the following activities: development of treatment plan with patient or surrogate and bedside caregivers, discussions with consultants, evaluation of patient's response to treatment, examination of patient, ordering and performing treatments and interventions, ordering and review of laboratory studies, ordering and review of radiographic studies, pulse oximetry, re-evaluation of patient's condition.  This critical care time did not overlap with that of any other provider or involve time for any procedures.     Caleb Garcia MD  Critical Care - Surgery  [facility]

## 2022-01-19 NOTE — ASSESSMENT & PLAN NOTE
BG goal 140 - 180       - Novolog 3 units q 4 hrs while on tube feeds. HOLD if tube feeds are stoped or BG < 100.   - Low Dose SQ Insulin Correction Scale PRN hyperglycemia.   - BG Monitoring q 4 hrs while NPO/tube feeds    ** Please call Endocrine for any BG related issues **  ** Please notify Endocrine for any change and/or advance in diet**    Discharge planning: TBD

## 2022-01-19 NOTE — PT/OT/SLP PROGRESS
Physical Therapy  Co-treatment with OT    Patient Name:  Orion Ty   MRN:  [id number]    Recommendations:     Discharge Recommendations:  rehabilitation facility   Discharge Equipment Recommendations:  (will determine DME needs closer to discharge)   Barriers to discharge: Decreased caregiver support family will not be able to assist at current functional level.     Assessment:     Orion Ty is a 76 y.o. female admitted with a medical diagnosis of S/P aortic valve replacement.  She presents with the following impairments/functional limitations:  impaired endurance,weakness,impaired functional mobilty,gait instability,impaired balance,decreased safety awareness,decreased lower extremity function,decreased coordination,impaired cognition pt tolerated treatment well being able to begin sitting on EOB. Pt will benefit from cont skilled PT 3x/wk to progress physically. Pt will need inpt rehab when medically stable to maximize rehab potential. Pt is s/p AVR 1/5/22.    Rehab Prognosis: Good; patient would benefit from acute skilled PT services to address these deficits and reach maximum level of function.    Recent Surgery: Procedure(s) (LRB):  REPLACEMENT, AORTIC VALVE, WITH REPEAT STERNOTOMY (N/A) 14 Days Post-Op    Plan:     During this hospitalization, patient to be seen 3 x/week to address the identified rehab impairments via gait training,therapeutic activities,therapeutic exercises,neuromuscular re-education and progress toward the following goals:    · Plan of Care Expires:  02/05/22    Subjective     Chief Complaint: pt had no complaints during treatment.   Patient/Family Comments/goals:  Family goals:pt to return to previous functional level.   Pain/Comfort:  · Pain Rating 1: 0/10 (no facial grimaces during treatment.)  · Pain Rating Post-Intervention 1: 0/10      Objective:     Communicated with nurse prior to session.  Patient found supine with telemetry,pulse ox (continuous),arterial line,blood  pressure cuff,ventilator,NG tube (R IJ dialysis catheter, CVP, vent to ET tube) upon PT entry to room.     General Precautions: Standard, fall   Orthopedic Precautions:N/A   Braces:    Respiratory Status: vent to ET tube     Functional Mobility:  · Bed Mobility: pt needed verbal cues for hand placement and sequencing for functional mobility.     · Rolling Left:  total assistance and of 2 persons  · Supine to Sit: total assistance and of 2 persons  · Sit to Supine: total assistance and of 2 persons  ·   · Balance: pt sat on EOB x 6 min with mod assist. pt was able to hold head upright with sitting.     Due to pt complex medical condition, the skill of 2 licensed therapists is needed to maximize treatment session and progression towards goals.       AM-PAC 6 CLICK MOBILITY  Turning over in bed (including adjusting bedclothes, sheets and blankets)?: 2  Sitting down on and standing up from a chair with arms (e.g., wheelchair, bedside commode, etc.): 1  Moving from lying on back to sitting on the side of the bed?: 2  Moving to and from a bed to a chair (including a wheelchair)?: 2  Need to walk in hospital room?: 1  Climbing 3-5 steps with a railing?: 1  Basic Mobility Total Score: 9       Therapeutic Activities and Exercises:   pt son received verbal instructions in role of PT and POC.  Son verbally expressed understanding of such.     Patient left supine with all lines intact, call button in reach, RN notified and son present..    GOALS:   Multidisciplinary Problems     Physical Therapy Goals        Problem: Physical Therapy Goal    Goal Priority Disciplines Outcome Goal Variances Interventions   Physical Therapy Goal     PT, PT/OT Ongoing, Progressing     Description: Goals to be met by: 2022     Patient will increase functional independence with mobility by performin. Sit to stand transfer with Supervision -not met  2. Bed to chair transfer with minimum -not met  3. Gait  x 100 feet with minimum  standing  rest breaks prn. -not met  4. Lower extremity exercise program x30 reps per handout, with independence -not met  5. Recite 3/3 sternal precautions and remain complaint with precautions throughout session with no verbal cues -not met  6. Pt sit on EOB x 10 min with CGA - not met                       Time Tracking:     PT Received On: 01/19/22  PT Start Time: 0933     PT Stop Time: 0956  PT Total Time (min): 23 min     Billable Minutes: Neuromuscular Re-education 23 min       PT/PTA: PT     PTA Visit Number: 0     01/19/2022

## 2022-01-19 NOTE — NURSING
Bassem SCOTT notified of . Heparin stopped ~2200. Ok per MD to continue trending aPTTs Q6. Will continue to monitor.

## 2022-01-19 NOTE — PROGRESS NOTES
[facility]  Nephrology  Progress Note     Patient Name: Orion Ty  MRN: [id number]  Admission Date: 1/5/2022  Hospital Length of Stay: 14 days  Attending Provider: Dennis Haider MD   Primary Care Physician: Otis Zimmer MD  Principal Problem: S/P aortic valve replacement    Subjective:     HPI: Orion Ty is our 76 y.o. female with previous aortic valve replacement, mitral valve replacement, and tricuspid valve repair in 2010 who presented on 1/5 for redo aortic valve replacement. Nephrology consulted on 1/8 for anuric JO. Patient is alert but tired, son at bedside. Stated she was tired prior to admission. During the hospital stay Cr increase from 1 to 1.5. In OR she received  2.5L crystalloid, 3U RBC, 2U FFP, 2 platelets, and 800 cell saver. she is +5 L since admit. UOP decrease overnight, per nurse she didn't urinate at all. She received diuril 500 mg twice, lasix 20 mg *2 on 1/7. This morning she urinate 225 immediately after placing the tejeda's cath. She received again lasix 100 mg this morning. Upor evaluation she was alert but repeatedly saying she was tired, denied any pain. Denied any previous hx of kidney disease.       Interval History: Seen at the bedside no event overnight       Review of patient's allergies indicates:  No Known Allergies   Current Facility-Administered Medications   Medication Frequency    0.9%  NaCl infusion (CRRT USE ONLY) Continuous    0.9%  NaCl infusion (for blood administration) Q24H PRN    0.9%  NaCl infusion Continuous    albuterol-ipratropium 2.5 mg-0.5 mg/3 mL nebulizer solution 3 mL Q6H    aspirin suppository 300 mg Once PRN    atorvastatin tablet 40 mg Daily    bisacodyL suppository 10 mg Daily PRN    dexmedetomidine (PRECEDEX) 400mcg/100mL 0.9% NaCL infusion Continuous    dextrose 10 % infusion Continuous PRN    dextrose 50% injection 12.5 g PRN    EPINEPHrine (ADRENALIN) 5 mg in dextrose 5 % 250 mL infusion Continuous     fentaNYL 50 mcg/mL injection 25 mcg Q2H PRN    glucagon (human recombinant) injection 1 mg PRN    heparin 25,000 units in dextrose 5% 250 mL (100 units/mL) infusion (heparin infusion - NO NOMOGRAM) Continuous    insulin aspart U-100 pen 0-5 Units Q4H PRN    insulin aspart U-100 pen 3 Units Q24H    insulin aspart U-100 pen 3 Units Q24H    insulin aspart U-100 pen 3 Units Q24H    insulin aspart U-100 pen 3 Units Q24H    insulin aspart U-100 pen 3 Units Q24H    insulin aspart U-100 pen 3 Units Q24H    LIDOcaine 5 % patch 1 patch Q24H    LIDOcaine HCL 2% jelly PRN    magnesium sulfate 2g in water 50mL IVPB (premix) PRN    melatonin tablet 9 mg Nightly PRN    midodrine tablet 15 mg TID    ondansetron injection 4 mg Q12H PRN    oxyCODONE immediate release tablet 10 mg Q4H PRN    oxyCODONE immediate release tablet 5 mg Q4H PRN    pantoprazole injection 40 mg Daily    piperacillin-tazobactam 4.5 g in sodium chloride 0.9% 100 mL IVPB (ready to mix system) Q12H    polyethylene glycol packet 17 g Daily    potassium, sodium phosphates 280-160-250 mg packet 2 packet TID PRN    rocuronium injection 60 mg Once    sodium chloride 0.9% bolus 250 mL PRN    sodium chloride 0.9% flush 10 mL PRN    [START ON 1/20/2022] vancomycin - pharmacy to dose pharmacy to manage frequency    vasopressin (PITRESSIN) 0.2 Units/mL in dextrose 5 % 100 mL infusion Continuous     Facility-Administered Medications Ordered in Other Encounters   Medication Frequency    0.9%  NaCl infusion Continuous       Objective:     Vital Signs (Most Recent):  Temp: 97.7 °F (36.5 °C) (01/19/22 1130)  Pulse: 95 (01/19/22 1308)  Resp: (!) 30 (01/19/22 1308)  BP: (!) 118/57 (01/19/22 1300)  SpO2: 99 % (01/19/22 1308)  O2 Device (Oxygen Therapy): ventilator (01/19/22 1308) Vital Signs (24h Range):  Temp:  [97.6 °F (36.4 °C)-98.6 °F (37 °C)] 97.7 °F (36.5 °C)  Pulse:  [] 95  Resp:  [18-53] 30  SpO2:  [94 %-100 %] 99 %  BP: ()/(55-79)  118/57  Arterial Line BP: ()/(45-79) 128/57   Weight: 44.8 kg (98 lb 12.3 oz) (01/18/22 0420)  Body mass index is 18.66 kg/m².  Body surface area is 1.39 meters squared.      Intake/Output Summary (Last 24 hours) at 1/19/2022 1321  Last data filed at 1/19/2022 0701  Gross per 24 hour   Intake 2018.68 ml   Output 1633 ml   Net 385.68 ml     I/O last 3 completed shifts:  In: 3247.5 [I.V.:2402.9; NG/GT:480; IV Piggyback:364.6]  Out: 2572 [Other:2572]  Net IO Since Admission: 1,380.03 mL [01/19/22 1321]     Physical Exam  Constitutional:       Appearance: She is well-developed. She is ill-appearing.   Cardiovascular:      Rate and Rhythm: Normal rate and regular rhythm.      Heart sounds: Normal heart sounds.   Pulmonary:      Comments: Intubated mechanically ventilated   Abdominal:      General: Abdomen is flat.      Palpations: Abdomen is soft.   Musculoskeletal:         General: Normal range of motion.   Skin:     General: Skin is warm and dry.      Comments: Sternal Incision CDI   Neurological:      Comments: On sedation         Recent Labs   Lab 01/17/22  0330 01/17/22  0330 01/18/22 0305 01/19/22  0314 01/19/22  0331   WBC 9.73  --  8.19 10.36  --    HGB 8.4*  --  8.8* 8.1*  --    HCT 26.4*   < > 27.7* 26.5* 29*   PLT 52*  --  58* 71*  --    MONO 5.7  0.6   < > 6.0  0.5 7.6  0.8  --     < > = values in this interval not displayed.      Recent Labs   Lab 01/17/22  1613 01/17/22  1943 01/18/22  0305 01/18/22  0756 01/18/22  1459 01/18/22 1947 01/18/22  2032 01/19/22  0314 01/19/22  0805      < > 137  137   < > 140  --  138 141  --    K 4.4   < > 4.7  4.6   < > 5.5*   < > 4.9 4.3 4.6      < > 102  101   < > 104  --  101 106  --    CO2 26   < > 23  24   < > 21*  --  21* 23  --    BUN 9   < > 14  14   < > 30*  --  40* 10  --    CREATININE 0.7   < > 0.9  1.0   < > 1.7*  --  2.0* 0.7  --    CALCIUM 8.8   < > 8.4*  8.1*   < > 8.1*  --  8.3* 8.8  --    PROT 7.7  --  6.3  --   --   --   --  6.3   --    BILITOT 1.6*  --  1.3*  --   --   --   --  1.3*  --    ALKPHOS 131  --  112  --   --   --   --  104  --    ALT 19  --  17  --   --   --   --  16  --    AST 51*  --  45*  --   --   --   --  43*  --     < > = values in this interval not displayed.      Recent Labs     01/18/22  1434 01/18/22  1608 01/19/22  0331   PH 7.299* 7.244* 7.439   PCO2 57.4* 64.8* 37.0   PO2 208* 227* 131*   HCO3 28.1* 28.0 25.1   POCSATURATED 100 100 99   BE 2 1 1       Assessment/Plan:       S/P aortic valve replacement    Chronic atrial fibrillation    Type 2 diabetes mellitus with microalbuminuria, without long-term current use of insulin    Volume overload    JO (acute kidney injury)    ATN (acute tubular necrosis)     JO  acute kidney injury  Orion Ty is our 76 y.o. female with previous aortic valve replacement, mitral valve replacement, and tricuspid valve repair in 2010 who presented on 1/5 for redo aortic valve replacement. Nephrology consulted on 1/8 for anuric JO. During the hospital stay Cr increase from 1 to 1.5. In OR she received  2.5L crystalloid, 3U RBC, 2U FFP, 2 platelets, and 800 cell saver. she is +5 L since admit. UOP decrease ,She received diuril 500 mg twice, lasix 20 mg *2 on 1/7.  With no adequate response to diuretics     Plan   oliguric ischemic ATN likely secondary to severe intraoperative hypotension requiring vasopressor on 01/05/2022  Failed high-dose diuretics and renal replacement therapy started on 01/09/2022 due to volume overload  Patient been treated with RRT   Last session was last night 01/18/2022   Patient clinically not overloaded  Labs were today reviewed stable  Will continue monitor today will assess patient in a.m. for the need CRRT treatment      * S/P aortic valve replacement  -with severe intraoperative hypotension   -per primary     ATN (acute tubular necrosis)  -see jo      Thank you for your consult. I will follow-up with patient. Please contact us if you have any additional  questions.    Rose Mary Walsh MD  Nephrology  [facility]    ATTENDING PHYSICIAN ATTESTATION  I have personally verified the history and examined the patient. I thoroughly reviewed the demographic, clinical, laboratorial and imaging information available in medical records. I agree with the assessment and recommendations provided by the subspecialty resident who was under my supervision.

## 2022-01-19 NOTE — PROGRESS NOTES
01/19/22 0644   Treatment   Treatment Status Blood returned;Discontinued treatment   Dialyzer Time (hours) 7.46   BVP (Liters) 92.7 L   CRRT Comments SLED rinsed back     Rinsed back per primary RN.

## 2022-01-19 NOTE — PROGRESS NOTES
Per [facility] nephrology staff patient will need SLED for 8 hours tonight because hyperkalemia 5.5 and patient is anuric. SLED Ordered for 8 hours

## 2022-01-19 NOTE — PROGRESS NOTES
"[facility] - [facility]  Endocrinology  Progress Note    Admit Date: 1/5/2022     Reason for Consult: Management of T2DM, Hyperglycemia     Surgical Procedure and Date:   1/5/21  REPLACEMENT, AORTIC VALVE, WITH REPEAT STERNOTOMY (N/A)     Diabetes diagnosis year: MELIDA; intubated    Home Diabetes Medications:  Metformin 500 mg daily,     How often checking glucose at home?  MELIDA    BG readings on regimen: MELIDA  Hypoglycemia on the regimen?  MELIDA  Missed doses on regimen? MELIDA    Diabetes Complications include:     None    Complicating diabetes co morbidities:   CAD, HTN, a-fib       HPI:   Patient is a 76 y.o. female with a diagnosis of s/p MVR, AVR and TVr in 2010 by Dr. Haider. 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 Family states she is more fatigued, decreased appetite and fluid on lungs. Pt has increased symptoms and wants to proceed with cardiac surgery now and here for pre op. She is now s/p the above procedure. Endocrinology consulted for management of T2DM.     Lab Results   Component Value Date    HGBA1C 6.0 (H) 01/03/2022           Interval HPI:   Overnight events: Remains in SICU. NAEON. BG well controlled on current SQ insulin regimen. Diet NPO Except for: Sips with Medication  Eating:   NPO  Nausea: No  Hypoglycemia and intervention: No  Fever: No  TPN and/or TF: Yes  If yes, type of TF/TPN and rate: Novasource Renal at 35 ml/hr    BP (!) 114/59   Pulse 79   Temp 97.6 °F (36.4 °C) (Oral)   Resp (!) 30   Ht 5' 1" (1.549 m)   Wt 44.8 kg (98 lb 12.3 oz)   SpO2 99%   Breastfeeding No   BMI 18.66 kg/m²     Labs Reviewed and Include    Recent Labs   Lab 01/19/22  0314 01/19/22  0314 01/19/22  0805     --   --    CALCIUM 8.8  --   --    ALBUMIN 2.8*  --   --    PROT 6.3  --   --      --   --    K 4.3   < > 4.6   CO2 23  --   --      --   --    BUN 10  --   --    CREATININE 0.7  --   --    ALKPHOS 104  -- "   --    ALT 16  --   --    AST 43*  --   --    BILITOT 1.3*  --   --     < > = values in this interval not displayed.     Lab Results   Component Value Date    WBC 10.36 01/19/2022    HGB 8.1 (L) 01/19/2022    HCT 29 (L) 01/19/2022    MCV 96 01/19/2022    PLT 71 (L) 01/19/2022     No results for input(s): TSH, FREET4 in the last 168 hours.  Lab Results   Component Value Date    HGBA1C 6.0 (H) 01/03/2022       Nutritional status:   Body mass index is 18.66 kg/m².  Lab Results   Component Value Date    ALBUMIN 2.8 (L) 01/19/2022    ALBUMIN 2.9 (L) 01/18/2022    ALBUMIN 3.1 (L) 01/18/2022     Lab Results   Component Value Date    PREALBUMIN 11 (L) 01/10/2022       Estimated Creatinine Clearance: 47.6 mL/min (based on SCr of 0.7 mg/dL).    Accu-Checks  Recent Labs     01/18/22  0202 01/18/22  0419 01/18/22  0555 01/18/22  0755 01/18/22  1218 01/18/22  1719 01/18/22  1950 01/18/22  2234 01/19/22  0311 01/19/22  0804   POCTGLUCOSE 189* 227* 228* 202* 135* 134* 161* 126* 112* 131*       Current Medications and/or Treatments Impacting Glycemic Control  Immunotherapy:    Immunosuppressants     None        Steroids:   Hormones (From admission, onward)            Start     Stop Route Frequency Ordered    01/12/22 1830  vasopressin (PITRESSIN) 0.2 Units/mL in dextrose 5 % 100 mL infusion         -- IV Continuous 01/12/22 1719    01/06/22 2255  melatonin tablet 9 mg         -- Oral Nightly PRN 01/06/22 2255        Pressors:    Autonomic Drugs (From admission, onward)            Start     Stop Route Frequency Ordered    01/18/22 1645  rocuronium injection 60 mg         -- IV Once 01/18/22 1714    01/14/22 1500  midodrine tablet 10 mg         -- Oral 3 times daily 01/14/22 1453    01/12/22 1441  rocuronium 10 mg/mL injection        Note to Pharmacy: Created by cabinet override    01/13 0244   01/12/22 1441    01/09/22 1100  EPINEPHrine (ADRENALIN) 5 mg in dextrose 5 % 250 mL infusion        Question Answer Comment   Begin at (in  mcg/kg/min): 0.04    Titrate by: (in mcg/kg/min) 0.01    Titrate interval: (in minutes) 5    Titrate to maintain: (SBP or MAP or Cardiac Index) MAP    Greater than: (in mmHg) 60    Maximum dose: (in mcg/kg/min) 0.08        -- IV Continuous 01/09/22 0956        Hyperglycemia/Diabetes Medications:   Antihyperglycemics (From admission, onward)            Start     Stop Route Frequency Ordered    01/19/22 0800  insulin aspart U-100 pen 3 Units         -- SubQ Every 24 hours (non-standard times) 01/18/22 0921 01/19/22 0400  insulin aspart U-100 pen 3 Units         -- SubQ Every 24 hours (non-standard times) 01/18/22 0921 01/19/22 0000  insulin aspart U-100 pen 3 Units         -- SubQ Every 24 hours (non-standard times) 01/18/22 0921    01/18/22 2000  insulin aspart U-100 pen 3 Units         -- SubQ Every 24 hours (non-standard times) 01/18/22 0921    01/18/22 1600  insulin aspart U-100 pen 3 Units         -- SubQ Every 24 hours (non-standard times) 01/18/22 0921    01/18/22 1200  insulin aspart U-100 pen 3 Units         -- SubQ Every 24 hours (non-standard times) 01/18/22 0921    01/15/22 1415  insulin aspart U-100 pen 0-5 Units         -- SubQ Every 4 hours PRN 01/15/22 1315          ASSESSMENT and PLAN    * S/P aortic valve replacement  Managed per primary team  Optimize BG control        Type 2 diabetes mellitus with microalbuminuria, without long-term current use of insulin  BG goal 140 - 180       - Novolog 3 units q 4 hrs while on tube feeds. HOLD if tube feeds are stoped or BG < 100.   - Low Dose SQ Insulin Correction Scale PRN hyperglycemia.   - BG Monitoring q 4 hrs while NPO/tube feeds    ** Please call Endocrine for any BG related issues **  ** Please notify Endocrine for any change and/or advance in diet**    Discharge planning: TBD        Chronic atrial fibrillation  May increase insulin resistance.             Keyanna Crocker NP  Endocrinology  [facility] - Surgical Intensive Care

## 2022-01-20 PROBLEM — L30.8 DERMATITIS ASSOCIATED WITH MOISTURE: Status: ACTIVE | Noted: 2022-01-01

## 2022-01-20 NOTE — SUBJECTIVE & OBJECTIVE
Interval History/Significant Events:   NAEO  Scheduled for trach/PEG/permcath on Friday  PTT therapeutic  Tolerating tube feeds    Follow-up For: Procedure(s) (LRB):  REPLACEMENT, AORTIC VALVE, WITH REPEAT STERNOTOMY (N/A)    Post-Operative Day: 15 Days Post-Op    Objective:     Vital Signs (Most Recent):  Temp: 98.6 °F (37 °C) (01/20/22 0300)  Pulse: 80 (01/20/22 0515)  Resp: (!) 30 (01/20/22 0515)  BP: 123/64 (01/20/22 0500)  SpO2: 99 % (01/20/22 0515) Vital Signs (24h Range):  Temp:  [97.6 °F (36.4 °C)-99.8 °F (37.7 °C)] 98.6 °F (37 °C)  Pulse:  [] 80  Resp:  [16-44] 30  SpO2:  [58 %-100 %] 99 %  BP: (103-157)/() 123/64  Arterial Line BP: ()/(44-79) 133/58     Weight: 44.8 kg (98 lb 12.3 oz)  Body mass index is 18.66 kg/m².      Intake/Output Summary (Last 24 hours) at 1/20/2022 0550  Last data filed at 1/20/2022 0500  Gross per 24 hour   Intake 1766.73 ml   Output 412 ml   Net 1354.73 ml       Physical Exam  Constitutional:       General: She is not in acute distress.     Comments: Intubated and sedated   HENT:      Head: Normocephalic and atraumatic.   Neck:      Comments: R IJ Trialysis  Cardiovascular:      Rate and Rhythm: Normal rate and regular rhythm.      Pulses: Normal pulses.      Comments: Midline sternotomy incision c/d/I  Pacemaker in place. Paced at 80  Pulmonary:      Effort: Pulmonary effort is normal. No respiratory distress.      Comments: intubated  Abdominal:      General: Abdomen is flat. There is no distension.      Palpations: Abdomen is soft.      Tenderness: There is no abdominal tenderness.   Musculoskeletal:      Right lower leg: No edema.      Left lower leg: No edema.      Comments: R radial art line in place   Skin:     General: Skin is warm.      Capillary Refill: Capillary refill takes less than 2 seconds.   Neurological:      General: No focal deficit present.      Mental Status: She is alert.         Vents:  Vent Mode: A/C (01/20/22 0400)  Ventilator Initiated:  Yes (01/18/22 1636)  Set Rate: 30 BPM (01/20/22 0400)  Vt Set: 330 mL (01/20/22 0400)  Pressure Support: 10 cmH20 (01/20/22 0330)  PEEP/CPAP: 7 cmH20 (01/20/22 0400)  Oxygen Concentration (%): 30 (01/20/22 0515)  Peak Airway Pressure: 35 cmH2O (01/20/22 0400)  Plateau Pressure: 30 cmH20 (01/20/22 0400)  Total Ve: 10.1 mL (01/20/22 0400)  Negative Inspiratory Force (cm H2O): 0 (01/20/22 0400)  F/VT Ratio<105 (RSBI): (!) 85.47 (01/20/22 0400)    Lines/Drains/Airways     Central Venous Catheter Line            Trialysis (Dialysis) Catheter 01/08/22 1027 right internal jugular 11 days          Drain                 NG/OG Tube 01/18/22 1030 Left nostril 1 day          Airway               Airway Anesthesia 01/12/22 7 days         Airway - Non-Surgical 01/18/22 1634 Endotracheal Tube 1 day          Arterial Line            Arterial Line 01/05/22 0855 Right Radial 14 days          Peripheral Intravenous Line                 Midline Catheter Insertion/Assessment  - Single Lumen 01/10/22 1300 Left cephalic vein (lateral side of arm) 18g x 8cm 9 days         Peripheral IV - Single Lumen 01/15/22 1445 18 G;1 1/4 in Anterior;Left Forearm 4 days         Peripheral IV - Single Lumen 01/16/22 0430 20 G Anterior;Distal;Right Wrist 4 days                Significant Labs:    CBC/Anemia Profile:  Recent Labs   Lab 01/19/22  0314 01/19/22  0331 01/20/22  0316 01/20/22  0429   WBC 10.36  --  12.43  --    HGB 8.1*  --  8.2*  --    HCT 26.5* 29* 26.5* 25*   PLT 71*  --  115*  --    MCV 96  --  96  --    RDW 17.9*  --  17.9*  --         Chemistries:  Recent Labs   Lab 01/19/22  0314 01/19/22  0509 01/19/22  1359 01/19/22  1359 01/19/22 2001 01/19/22  2206 01/20/22 0316      < > 139  --   --  142  142 139   K 4.3   < > 4.8   < > 4.5 4.2  4.2 3.9      < > 103  --   --  107  107 104   CO2 23   < > 24  --   --  20*  20* 21*   BUN 10   < > 16  --   --  28*  28* 35*   CREATININE 0.7   < > 1.1  --   --  1.6*  1.6* 1.8*    CALCIUM 8.8   < > 8.6*  --   --  8.2*  8.2* 8.0*   ALBUMIN 2.8*   < > 2.9*  --   --  2.8*  2.8* 2.6*   PROT 6.3  --   --   --   --   --  6.1   BILITOT 1.3*  --   --   --   --   --  1.0   ALKPHOS 104  --   --   --   --   --  111   ALT 16  --   --   --   --   --  14   AST 43*  --   --   --   --   --  44*   MG  --    < > 2.1  --   --  1.9  2.0  1.9 2.0   PHOS 2.5*   < > 3.8  --   --  4.1  4.1 4.4    < > = values in this interval not displayed.       All pertinent labs within the past 24 hours have been reviewed.    Significant Imaging:  I have reviewed all pertinent imaging results/findings within the past 24 hours.

## 2022-01-20 NOTE — NURSING
SICU PLAN OF CARE NOTE    Dx: S/P aortic valve replacement    Shift Events: VSS, pt de sated to 86%, Reparatory notified, pt lavage, sats returned to normal. APTT is within goal.     Goals of Care: MAP 60-80, accuchecks q4, APTT goal  60-80    Neuro: Follows Commands and Moves All Extremities    Cardiac: Atrial Fibrillation 80s-110s, Intermittently paced w/[employer] pacer @80bpm    Respiratory: Ventilator 30%, 7 PEEP    Diet: Tube Feeds 35ml/hr, @goal    Gtts: Heparin    Urine Output: Anuric 0 cc/shift    Labs/Accuchecks: accuchecks q4, APTT q8, potassium q12    Skin:  Bed plugged in, wheels locked, call light in reach, daughter at bedside attentive to pt needs.SCDs on, foams in place, patient turned throughout shift, Immerse bed utilized. Triad cream and foam applied to sacral wound, wound appears pink and moist with irregular boarders.

## 2022-01-20 NOTE — ASSESSMENT & PLAN NOTE
Orion Ty is a 76 y.o. female who presents to the SICU s/p redo aortic valve replacement with mechanical valve on 1/5/22.      Neuro/Psych:   -- Sedation: none  -- Pain: Liquid Tiffani, breakthrough fentanyl pushes q2     Cards:   -- Pressors: Off  -- Goal MAP: 60-80  -- statin  -- heparin drip, PTT goal 60-80  -- Paced HR 80  -- INR 1.2      Pulm:   -- Goal O2 sat > 90%  -- reintubated 1/12 then 1/18  -- now on 30%/5PEEP  -- ABG PRN  -- SBT  -- 2 mediastinal removed 1/9 L Pleural CT removed 1/8  -- Plan for trach/peg/permacath Friday with Gen surg      Renal:  -- Mireles removed  -- net positive 1.5L yesterday  -- bladder scan every shift  -- Oliguric   -- Nephrology following      FEN / GI:   -- Replace lytes as needed  -- OG in place  -- Nutrition: tube feeds novasource renal, goal of 35      ID:   -- WBC 12.4  -- Antibiotics: periop ancef complete. Zosyn and vanc until 1/20  -- Some concern for aspiration      Heme/Onc:   -- Hgb 8.2  -- platelets 115  -- 3U RBC, 2U FFP, 2U platelets in OR  -- Daily CBC  -- Transfused products: 1U RBC on 1/5/22. 1u RBC and 1u FFP on 1/9/21. 2U RBC on 1/12. 4 FFP on 1/13. 1 FFP on 1/17  -- Anticoagulation: INR normalizing, transition to heparin drip   -- INR 1.2   -- heparin drip, PTT 60-80      Endo:   -- Gluc goal 140-180  -- Insulin per endocrinology      PPx:   Feeding: tube feeds  Analgesia/Sedation: Tiffani, fent pushes / none  Thromboembolic prevention: heparin drip  HOB >30: yes  Stress Ulcer ppx: protonix BID  Glucose control: Critical care goal 140-180 g/dl, ISS    Lines/Drains/Airway: Art line, RIJ Trialysis, ETT       Dispo/Code Status/Palliative:   -- SICU / Full Code

## 2022-01-20 NOTE — PROGRESS NOTES
Cardiac Surgery Progress Note     JUSTO overnight     I/O  [id number] net: -[id number]  CRRT 7594     Gtts  Epi 0.08  Propofol 20     A/P  76F POD15 from redo AVR (mechanical) for subvalvular pannus. Post operative course complicated by acute respiratory distress and JO. Re intubated 01/13 and 01/18, also started on CRRT.      Progressing     Continue current care   Tentative plans for trach, peg, permacath tomorrow  RRT per renal   Cont ICU

## 2022-01-20 NOTE — SUBJECTIVE & OBJECTIVE
Scheduled Meds:   acetaminophen  650 mg Oral Q8H    albuterol-ipratropium  3 mL Nebulization Q6H    atorvastatin  40 mg Oral Daily    [START ON 1/21/2022] insulin aspart U-100  4 Units Subcutaneous Q24H    [START ON 1/21/2022] insulin aspart U-100  4 Units Subcutaneous Q24H    [START ON 1/21/2022] insulin aspart U-100  4 Units Subcutaneous Q24H    insulin aspart U-100  4 Units Subcutaneous Q24H    insulin aspart U-100  4 Units Subcutaneous Q24H    insulin aspart U-100  4 Units Subcutaneous Q24H    LIDOcaine  1 patch Transdermal Q24H    pantoprazole  40 mg Intravenous Daily    piperacillin-tazobactam (ZOSYN) IVPB  4.5 g Intravenous Q12H    polyethylene glycol  17 g Oral Daily     Continuous Infusions:   sodium chloride 0.9% Stopped (01/19/22 0643)    sodium chloride 0.9% Stopped (01/08/22 1054)    dexmedetomidine (PRECEDEX) infusion      dextrose 10 % in water (D10W)      heparin (porcine) in 5 % dex 600 Units/hr (01/20/22 1300)     PRN Meds:sodium chloride, aspirin, bisacodyL, dextrose 10 % in water (D10W), dextrose 50%, fentaNYL, glucagon (human recombinant), insulin aspart U-100, LIDOcaine HCL 2%, melatonin, midodrine, ondansetron, oxyCODONE, potassium, sodium phosphates, sodium chloride 0.9%, sodium chloride 0.9%    Review of patient's allergies indicates:  No Known Allergies     Past Medical History:   Diagnosis Date    A-fib     Anticoagulant long-term use     Closed displaced fracture of distal phalanx of lesser toe 10/20/2015    Right    Coronary artery disease     Diabetes mellitus     Diabetes mellitus, type 2     Hypertension     Mechanical heart valve present     X's two    Osteopenia     Pacemaker     Rheumatic heart disease      Past Surgical History:   Procedure Laterality Date    CARDIAC PACEMAKER PLACEMENT      CARDIAC VALVE REPLACEMENT      CARDIAC VALVE SURGERY      CORONARY ANGIOGRAPHY N/A 12/21/2021    Procedure: ANGIOGRAM, CORONARY ARTERY;  Surgeon: Devonte Saldaña  MD Martin;  Location: [facility] CATH LAB;  Service: Cardiology;  Laterality: N/A;    EYE SURGERY Bilateral     cataract    FLUOROSCOPY N/A 12/11/2020    Procedure: FLUOROSCOPY;  Surgeon: Mckay Calvo MD;  Location: [facility] CATH LAB;  Service: Cardiology;  Laterality: N/A;    REPLACEMENT OF AORTIC VALVE WITH REPEAT STERNOTOMY N/A 1/5/2022    Procedure: REPLACEMENT, AORTIC VALVE, WITH REPEAT STERNOTOMY;  Surgeon: Dennis Haider MD;  Location: [facility] OR [facility] FLR;  Service: Cardiothoracic;  Laterality: N/A;    REPLACEMENT OF PACEMAKER GENERATOR N/A 3/2/2020    Procedure: REPLACEMENT, PACEMAKER GENERATOR;  Surgeon: Mark Ring MD;  Location: [facility] EP LAB;  Service: Cardiology;  Laterality: N/A;  TBS/CHRISTI, Single PPM gen change, Right side, MDT, MAC, SK, 3 Prep    thryoid s         Family History     Problem Relation (Age of Onset)    Breast cancer Maternal Aunt        Tobacco Use    Smoking status: Never Smoker    Smokeless tobacco: Never Used   Substance and Sexual Activity    Alcohol use: No    Drug use: Never    Sexual activity: Not on file     Review of Systems   Skin: Positive for wound.       Objective:     Vital Signs (Most Recent):  Temp: 98.6 °F (37 °C) (01/20/22 1300)  Pulse: 80 (01/20/22 1345)  Resp: (!) 27 (01/20/22 1345)  BP: (!) 123/58 (01/20/22 1300)  SpO2: 95 % (01/20/22 1345) Vital Signs (24h Range):  Temp:  [98 °F (36.7 °C)-99.8 °F (37.7 °C)] 98.6 °F (37 °C)  Pulse:  [] 80  Resp:  [16-35] 27  SpO2:  [95 %-100 %] 95 %  BP: (106-156)/() 123/58  Arterial Line BP: (104-163)/(44-76) 138/56     Weight: 44.8 kg (98 lb 12.3 oz)  Body mass index is 18.66 kg/m².     Physical Exam  Constitutional:       Appearance: Normal appearance.   Skin:     General: Skin is warm and dry.      Findings: Lesion present.   Neurological:      Mental Status: She is alert.         Laboratory:  All pertinent labs reviewed within the last 24 hours.    Diagnostic Results:  None

## 2022-01-20 NOTE — HPI
Orion Ty is a 76 year old female with previous aortic valve replacement, mitral valve replacement, and tricuspid valve repair in 2010 who presents for redo aortic valve replacement. 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 She underwent aortic valve replacement with a mechanical valve on 1/5/22. Wound care is following for assessment of buttocks skin injury.

## 2022-01-20 NOTE — PROGRESS NOTES
[facility]  Nephrology  Progress Note     Patient Name: Orion Ty  MRN: [id number]  Admission Date: 1/5/2022  Hospital Length of Stay: 15 days  Attending Provider: Dennis Haider MD   Primary Care Physician: Otis Zimmer MD  Principal Problem: S/P aortic valve replacement    Subjective:     HPI: Orion Ty is our 76 y.o. female with previous aortic valve replacement, mitral valve replacement, and tricuspid valve repair in 2010 who presented on 1/5 for redo aortic valve replacement. Nephrology consulted on 1/8 for anuric JO. Patient is alert but tired, son at bedside. Stated she was tired prior to admission. During the hospital stay Cr increase from 1 to 1.5. In OR she received  2.5L crystalloid, 3U RBC, 2U FFP, 2 platelets, and 800 cell saver. she is +5 L since admit. UOP decrease overnight, per nurse she didn't urinate at all. She received diuril 500 mg twice, lasix 20 mg *2 on 1/7. This morning she urinate 225 immediately after placing the tejeda's cath. She received again lasix 100 mg this morning. Upor evaluation she was alert but repeatedly saying she was tired, denied any pain. Denied any previous hx of kidney disease.       Interval History: Seen at the bedside no event overnight       Review of patient's allergies indicates:  No Known Allergies   Current Facility-Administered Medications   Medication Frequency    0.9%  NaCl infusion (CRRT USE ONLY) Continuous    0.9%  NaCl infusion (for blood administration) Q24H PRN    0.9%  NaCl infusion Continuous    acetaminophen oral solution 650 mg Q8H    albuterol-ipratropium 2.5 mg-0.5 mg/3 mL nebulizer solution 3 mL Q6H    aspirin suppository 300 mg Once PRN    atorvastatin tablet 40 mg Daily    bisacodyL suppository 10 mg Daily PRN    dextrose 10 % infusion Continuous PRN    dextrose 50% injection 12.5 g PRN    fentaNYL 50 mcg/mL injection 25 mcg Q2H PRN    glucagon (human recombinant) injection 1 mg PRN    heparin  25,000 units in dextrose 5% 250 mL (100 units/mL) infusion (heparin infusion - NO NOMOGRAM) Continuous    insulin aspart U-100 pen 0-5 Units Q4H PRN    [START ON 1/21/2022] insulin aspart U-100 pen 4 Units Q24H    [START ON 1/21/2022] insulin aspart U-100 pen 4 Units Q24H    [START ON 1/21/2022] insulin aspart U-100 pen 4 Units Q24H    insulin aspart U-100 pen 4 Units Q24H    insulin aspart U-100 pen 4 Units Q24H    insulin aspart U-100 pen 4 Units Q24H    LIDOcaine 5 % patch 1 patch Q24H    LIDOcaine HCL 2% jelly PRN    melatonin tablet 9 mg Nightly PRN    midodrine tablet 10 mg Q8H PRN    ondansetron injection 4 mg Q12H PRN    oxyCODONE 5 mg/5 mL solution 5 mg Q4H PRN    pantoprazole injection 40 mg Daily    piperacillin-tazobactam 4.5 g in sodium chloride 0.9% 100 mL IVPB (ready to mix system) Q12H    polyethylene glycol packet 17 g Daily    potassium, sodium phosphates 280-160-250 mg packet 2 packet TID PRN    sodium chloride 0.9% bolus 250 mL PRN    sodium chloride 0.9% flush 10 mL PRN     Facility-Administered Medications Ordered in Other Encounters   Medication Frequency    0.9%  NaCl infusion Continuous       Objective:     Vital Signs (Most Recent):  Temp: 98.6 °F (37 °C) (01/20/22 1100)  Pulse: 80 (01/20/22 1130)  Resp: (!) 28 (01/20/22 1130)  BP: (!) 146/69 (01/20/22 1100)  SpO2: 96 % (01/20/22 1130)  O2 Device (Oxygen Therapy): ventilator (01/20/22 1120) Vital Signs (24h Range):  Temp:  [98 °F (36.7 °C)-99.8 °F (37.7 °C)] 98.6 °F (37 °C)  Pulse:  [] 80  Resp:  [16-35] 28  SpO2:  [95 %-100 %] 96 %  BP: (106-156)/() 146/69  Arterial Line BP: (104-163)/(44-76) 142/58   Weight: 44.8 kg (98 lb 12.3 oz) (01/18/22 0420)  Body mass index is 18.66 kg/m².  Body surface area is 1.39 meters squared.      Intake/Output Summary (Last 24 hours) at 1/20/2022 1138  Last data filed at 1/20/2022 1100  Gross per 24 hour   Intake 1470.82 ml   Output --   Net 1470.82 ml     I/O last 3 completed  shifts:  In: 3288.9 [I.V.:1911.9; NG/GT:985; IV Piggyback:392.1]  Out: 1633 [Other:1633]  Net IO Since Admission: 3,015.85 mL [01/20/22 1138]     Physical Exam  Constitutional:       Appearance: She is well-developed. She is ill-appearing.   Cardiovascular:      Rate and Rhythm: Normal rate and regular rhythm.      Heart sounds: Normal heart sounds.   Pulmonary:      Comments: Intubated mechanically ventilated   Abdominal:      General: Abdomen is flat.      Palpations: Abdomen is soft.   Musculoskeletal:         General: Normal range of motion.   Skin:     General: Skin is warm and dry.      Comments: Sternal Incision CDI   Neurological:      Comments: On sedation         Recent Labs   Lab 01/16/22  2158 01/18/22  0305 01/18/22  0305 01/19/22  0314 01/19/22  0331 01/20/22  0316 01/20/22  0429   WBC  --  8.19  --  10.36  --  12.43  --    HGB  --  8.8*  --  8.1*  --  8.2*  --    HCT   < > 27.7*   < > 26.5* 29* 26.5* 25*   PLT  --  58*  --  71*  --  115*  --    MONO  --  6.0  0.5   < > 7.6  0.8  --  8.1  1.0  --     < > = values in this interval not displayed.      Recent Labs   Lab 01/18/22  0305 01/18/22  0756 01/19/22  0314 01/19/22  0805 01/19/22  1359 01/19/22 2001 01/19/22  2206 01/20/22  0316 01/20/22  0822     137   < > 141   < > 139  --  142  142 139  --    K 4.7  4.6   < > 4.3   < > 4.8   < > 4.2  4.2 3.9 4.3     101   < > 106   < > 103  --  107  107 104  --    CO2 23  24   < > 23   < > 24  --  20*  20* 21*  --    BUN 14  14   < > 10   < > 16  --  28*  28* 35*  --    CREATININE 0.9  1.0   < > 0.7   < > 1.1  --  1.6*  1.6* 1.8*  --    CALCIUM 8.4*  8.1*   < > 8.8   < > 8.6*  --  8.2*  8.2* 8.0*  --    PROT 6.3  --  6.3  --   --   --   --  6.1  --    BILITOT 1.3*  --  1.3*  --   --   --   --  1.0  --    ALKPHOS 112  --  104  --   --   --   --  111  --    ALT 17  --  16  --   --   --   --  14  --    AST 45*  --  43*  --   --   --   --  44*  --     < > = values in this interval not  displayed.      Recent Labs     01/18/22  1608 01/19/22  0331 01/20/22  0429   PH 7.244* 7.439 7.382   PCO2 64.8* 37.0 39.9   PO2 227* 131* 115*   HCO3 28.0 25.1 23.7*   POCSATURATED 100 99 98   BE 1 1 -1       Assessment/Plan:       S/P aortic valve replacement    Chronic atrial fibrillation    Type 2 diabetes mellitus with microalbuminuria, without long-term current use of insulin    Volume overload    JO (acute kidney injury)    ATN (acute tubular necrosis)    Acute hypoxemic respiratory failure     JO  acute kidney injury  Orion Ty is our 76 y.o. female with previous aortic valve replacement, mitral valve replacement, and tricuspid valve repair in 2010 who presented on 1/5 for redo aortic valve replacement. Nephrology consulted on 1/8 for anuric JO. During the hospital stay Cr increase from 1 to 1.5. In OR she received  2.5L crystalloid, 3U RBC, 2U FFP, 2 platelets, and 800 cell saver. she is +5 L since admit. UOP decrease ,She received diuril 500 mg twice, lasix 20 mg *2 on 1/7.  With no adequate response to diuretics     Plan   oliguric ischemic ATN likely secondary to severe intraoperative hypotension requiring vasopressor on 01/05/2022  Failed high-dose diuretics and renal replacement therapy started on 01/09/2022 due to volume overload  Patient clinically not overloaded  Labs were today reviewed stable  Will plan for short session of SLED tonight 8 hrs       * S/P aortic valve replacement  -with severe intraoperative hypotension   -per primary     ATN (acute tubular necrosis)  -see jo      Thank you for your consult. I will follow-up with patient. Please contact us if you have any additional questions.    Rose Mary Walsh MD  Nephrology  [facility]    ATTENDING PHYSICIAN ATTESTATION  I have personally verified the history and examined the patient. I thoroughly reviewed the demographic, clinical, laboratorial and imaging information available in medical records. I agree with the  assessment and recommendations provided by the subspecialty resident who was under my supervision.

## 2022-01-20 NOTE — SUBJECTIVE & OBJECTIVE
"Interval HPI:   Overnight events:  BG stable but slightly above goal overnight while on current SQ insulin regimen in setting of TF. Patient remains intubated and sedated in SICU. Endo will continue to follow, and manage glycemic control while inpatient.   Diet NPO Except for: Sips with Medication  15 Days Post-Op    Eating:   NPO  Nausea: No  Hypoglycemia and intervention: No  Fever: No  TPN and/or TF: Yes  If yes, type of TF/TPN and rate: TF at 35 cc/hr.     BP (!) 146/69 (BP Location: Right arm, Patient Position: Lying)   Pulse 80   Temp 98.6 °F (37 °C) (Oral)   Resp (!) 31   Ht 5' 1" (1.549 m)   Wt 44.8 kg (98 lb 12.3 oz)   SpO2 100%   Breastfeeding No   BMI 18.66 kg/m²     Labs Reviewed and Include    Recent Labs   Lab 01/20/22 0316 01/20/22 0316 01/20/22 0822   *  --   --    CALCIUM 8.0*  --   --    ALBUMIN 2.6*  --   --    PROT 6.1  --   --      --   --    K 3.9   < > 4.3   CO2 21*  --   --      --   --    BUN 35*  --   --    CREATININE 1.8*  --   --    ALKPHOS 111  --   --    ALT 14  --   --    AST 44*  --   --    BILITOT 1.0  --   --     < > = values in this interval not displayed.     Lab Results   Component Value Date    WBC 12.43 01/20/2022    HGB 8.2 (L) 01/20/2022    HCT 25 (L) 01/20/2022    MCV 96 01/20/2022     (L) 01/20/2022     No results for input(s): TSH, FREET4 in the last 168 hours.  Lab Results   Component Value Date    HGBA1C 6.0 (H) 01/03/2022       Nutritional status:   Body mass index is 18.66 kg/m².  Lab Results   Component Value Date    ALBUMIN 2.6 (L) 01/20/2022    ALBUMIN 2.8 (L) 01/19/2022    ALBUMIN 2.8 (L) 01/19/2022     Lab Results   Component Value Date    PREALBUMIN 11 (L) 01/10/2022       Estimated Creatinine Clearance: 18.5 mL/min (A) (based on SCr of 1.8 mg/dL (H)).    Accu-Checks  Recent Labs     01/19/22  0804 01/19/22  1109 01/19/22  1358 01/19/22  1623 01/19/22  2000 01/19/22  2205 01/20/22  0006 01/20/22  0315 01/20/22  0606 " 01/20/22  1023   POCTGLUCOSE 131* 141* 207* 242* 268* 203* 177* 224* 195* 173*       Current Medications and/or Treatments Impacting Glycemic Control  Immunotherapy:    Immunosuppressants     None        Steroids:   Hormones (From admission, onward)            Start     Stop Route Frequency Ordered    01/06/22 2255  melatonin tablet 9 mg         -- Oral Nightly PRN 01/06/22 2255        Pressors:    Autonomic Drugs (From admission, onward)            Start     Stop Route Frequency Ordered    01/20/22 1030  midodrine tablet 10 mg         -- Oral Every 8 hours PRN 01/20/22 0929    01/12/22 1441  rocuronium 10 mg/mL injection        Note to Pharmacy: Created by cabinet override    01/13 0244   01/12/22 1441        Hyperglycemia/Diabetes Medications:   Antihyperglycemics (From admission, onward)            Start     Stop Route Frequency Ordered    01/19/22 0800  insulin aspart U-100 pen 3 Units         -- SubQ Every 24 hours (non-standard times) 01/18/22 0921    01/19/22 0400  insulin aspart U-100 pen 3 Units         -- SubQ Every 24 hours (non-standard times) 01/18/22 0921    01/19/22 0000  insulin aspart U-100 pen 3 Units         -- SubQ Every 24 hours (non-standard times) 01/18/22 0921    01/18/22 2000  insulin aspart U-100 pen 3 Units         -- SubQ Every 24 hours (non-standard times) 01/18/22 0921    01/18/22 1600  insulin aspart U-100 pen 3 Units         -- SubQ Every 24 hours (non-standard times) 01/18/22 0921    01/18/22 1200  insulin aspart U-100 pen 3 Units         -- SubQ Every 24 hours (non-standard times) 01/18/22 0921    01/15/22 1415  insulin aspart U-100 pen 0-5 Units         -- SubQ Every 4 hours PRN 01/15/22 1315

## 2022-01-20 NOTE — ASSESSMENT & PLAN NOTE
BG goal 140 - 180       - Increase Novolog to 4 units q 4 hrs while on tube feeds. HOLD if tube feeds are stoped or BG < 100.   - Low Dose SQ Insulin Correction Scale PRN hyperglycemia.   - BG Monitoring q 4 hrs while NPO/tube feeds    ** Please call Endocrine for any BG related issues **  ** Please notify Endocrine for any change and/or advance in diet**  Lab Results   Component Value Date    HGBA1C 6.0 (H) 01/03/2022       Discharge Planning:   TBD. Please notify endocrinology prior to discharge.

## 2022-01-20 NOTE — PROGRESS NOTES
[facility]  Critical Care - Surgery  Progress Note    Patient Name: Orion Ty  MRN: [id number]  Admission Date: 1/5/2022  Hospital Length of Stay: 15 days  Code Status: Full Code  Attending Provider: Dennis Haider MD  Primary Care Provider: Otis Zimmer MD   Principal Problem: S/P aortic valve replacement    Subjective:     Hospital/ICU Course:  No notes on file    Interval History/Significant Events:   NAEO  Scheduled for trach/PEG/permcath on Friday  PTT therapeutic  Tolerating tube feeds    Follow-up For: Procedure(s) (LRB):  REPLACEMENT, AORTIC VALVE, WITH REPEAT STERNOTOMY (N/A)    Post-Operative Day: 15 Days Post-Op    Objective:     Vital Signs (Most Recent):  Temp: 98.6 °F (37 °C) (01/20/22 0300)  Pulse: 80 (01/20/22 0515)  Resp: (!) 30 (01/20/22 0515)  BP: 123/64 (01/20/22 0500)  SpO2: 99 % (01/20/22 0515) Vital Signs (24h Range):  Temp:  [97.6 °F (36.4 °C)-99.8 °F (37.7 °C)] 98.6 °F (37 °C)  Pulse:  [] 80  Resp:  [16-44] 30  SpO2:  [58 %-100 %] 99 %  BP: (103-157)/() 123/64  Arterial Line BP: ()/(44-79) 133/58     Weight: 44.8 kg (98 lb 12.3 oz)  Body mass index is 18.66 kg/m².      Intake/Output Summary (Last 24 hours) at 1/20/2022 0550  Last data filed at 1/20/2022 0500  Gross per 24 hour   Intake 1766.73 ml   Output 412 ml   Net 1354.73 ml       Physical Exam  Constitutional:       General: She is not in acute distress.     Comments: Intubated and sedated   HENT:      Head: Normocephalic and atraumatic.   Neck:      Comments: R IJ Trialysis  Cardiovascular:      Rate and Rhythm: Normal rate and regular rhythm.      Pulses: Normal pulses.      Comments: Midline sternotomy incision c/d/I  Pacemaker in place. Paced at 80  Pulmonary:      Effort: Pulmonary effort is normal. No respiratory distress.      Comments: intubated  Abdominal:      General: Abdomen is flat. There is no distension.      Palpations: Abdomen is soft.      Tenderness: There is no  abdominal tenderness.   Musculoskeletal:      Right lower leg: No edema.      Left lower leg: No edema.      Comments: R radial art line in place   Skin:     General: Skin is warm.      Capillary Refill: Capillary refill takes less than 2 seconds.   Neurological:      General: No focal deficit present.      Mental Status: She is alert.         Vents:  Vent Mode: A/C (01/20/22 0400)  Ventilator Initiated: Yes (01/18/22 1636)  Set Rate: 30 BPM (01/20/22 0400)  Vt Set: 330 mL (01/20/22 0400)  Pressure Support: 10 cmH20 (01/20/22 0330)  PEEP/CPAP: 7 cmH20 (01/20/22 0400)  Oxygen Concentration (%): 30 (01/20/22 0515)  Peak Airway Pressure: 35 cmH2O (01/20/22 0400)  Plateau Pressure: 30 cmH20 (01/20/22 0400)  Total Ve: 10.1 mL (01/20/22 0400)  Negative Inspiratory Force (cm H2O): 0 (01/20/22 0400)  F/VT Ratio<105 (RSBI): (!) 85.47 (01/20/22 0400)    Lines/Drains/Airways     Central Venous Catheter Line            Trialysis (Dialysis) Catheter 01/08/22 1027 right internal jugular 11 days          Drain                 NG/OG Tube 01/18/22 1030 Left nostril 1 day          Airway               Airway Anesthesia 01/12/22 7 days         Airway - Non-Surgical 01/18/22 1634 Endotracheal Tube 1 day          Arterial Line            Arterial Line 01/05/22 0855 Right Radial 14 days          Peripheral Intravenous Line                 Midline Catheter Insertion/Assessment  - Single Lumen 01/10/22 1300 Left cephalic vein (lateral side of arm) 18g x 8cm 9 days         Peripheral IV - Single Lumen 01/15/22 1445 18 G;1 1/4 in Anterior;Left Forearm 4 days         Peripheral IV - Single Lumen 01/16/22 0430 20 G Anterior;Distal;Right Wrist 4 days                Significant Labs:    CBC/Anemia Profile:  Recent Labs   Lab 01/19/22  0314 01/19/22  0331 01/20/22  0316 01/20/22  0429   WBC 10.36  --  12.43  --    HGB 8.1*  --  8.2*  --    HCT 26.5* 29* 26.5* 25*   PLT 71*  --  115*  --    MCV 96  --  96  --    RDW 17.9*  --  17.9*  --          Chemistries:  Recent Labs   Lab 01/19/22  0314 01/19/22  0509 01/19/22  1359 01/19/22  1359 01/19/22 2001 01/19/22 2206 01/20/22 0316      < > 139  --   --  142  142 139   K 4.3   < > 4.8   < > 4.5 4.2  4.2 3.9      < > 103  --   --  107  107 104   CO2 23   < > 24  --   --  20*  20* 21*   BUN 10   < > 16  --   --  28*  28* 35*   CREATININE 0.7   < > 1.1  --   --  1.6*  1.6* 1.8*   CALCIUM 8.8   < > 8.6*  --   --  8.2*  8.2* 8.0*   ALBUMIN 2.8*   < > 2.9*  --   --  2.8*  2.8* 2.6*   PROT 6.3  --   --   --   --   --  6.1   BILITOT 1.3*  --   --   --   --   --  1.0   ALKPHOS 104  --   --   --   --   --  111   ALT 16  --   --   --   --   --  14   AST 43*  --   --   --   --   --  44*   MG  --    < > 2.1  --   --  1.9  2.0  1.9 2.0   PHOS 2.5*   < > 3.8  --   --  4.1  4.1 4.4    < > = values in this interval not displayed.       All pertinent labs within the past 24 hours have been reviewed.    Significant Imaging:  I have reviewed all pertinent imaging results/findings within the past 24 hours.    Assessment/Plan:     * S/P aortic valve replacement  Orion Ty is a 76 y.o. female who presents to the SICU s/p redo aortic valve replacement with mechanical valve on 1/5/22.      Neuro/Psych:   -- Sedation: none  -- Pain: Liquid Tiffani, breakthrough fentanyl pushes q2     Cards:   -- Pressors: Off  -- Goal MAP: 60-80  -- statin  -- heparin drip, PTT goal 60-80  -- Paced HR 80  -- INR 1.2      Pulm:   -- Goal O2 sat > 90%  -- reintubated 1/12 then 1/18  -- now on 30%/5PEEP  -- ABG PRN  -- SBT  -- 2 mediastinal removed 1/9 L Pleural CT removed 1/8  -- Plan for trach/peg/permacath Friday with Gen surg      Renal:  -- Mireles removed  -- net positive 1.5L yesterday  -- bladder scan every shift  -- Oliguric   -- Nephrology following      FEN / GI:   -- Replace lytes as needed  -- OG in place  -- Nutrition: tube feeds novasource renal, goal of 35      ID:   -- WBC 12.4  -- Antibiotics: periop  ancef complete. Zosyn and vanc until 1/20  -- Some concern for aspiration      Heme/Onc:   -- Hgb 8.2  -- platelets 115  -- 3U RBC, 2U FFP, 2U platelets in OR  -- Daily CBC  -- Transfused products: 1U RBC on 1/5/22. 1u RBC and 1u FFP on 1/9/21. 2U RBC on 1/12. 4 FFP on 1/13. 1 FFP on 1/17  -- Anticoagulation: INR normalizing, transition to heparin drip   -- INR 1.2   -- heparin drip, PTT 60-80      Endo:   -- Gluc goal 140-180  -- Insulin per endocrinology      PPx:   Feeding: tube feeds  Analgesia/Sedation: Tiffani, fent pushes / none  Thromboembolic prevention: heparin drip  HOB >30: yes  Stress Ulcer ppx: protonix BID  Glucose control: Critical care goal 140-180 g/dl, ISS    Lines/Drains/Airway: Art line, RIJ Trialysis, ETT       Dispo/Code Status/Palliative:   -- SICU / Full Code          Critical care was time spent personally by me on the following activities: development of treatment plan with patient or surrogate and bedside caregivers, discussions with consultants, evaluation of patient's response to treatment, examination of patient, ordering and performing treatments and interventions, ordering and review of laboratory studies, ordering and review of radiographic studies, pulse oximetry, re-evaluation of patient's condition.  This critical care time did not overlap with that of any other provider or involve time for any procedures.     Graham Alfaro MD  Critical Care - Surgery  [facility]

## 2022-01-20 NOTE — NURSING
Pt hypertensive,Notified Callier MD, PRN oxy and melatonin given. Pt responded well, MAPS within goal after interventions.

## 2022-01-20 NOTE — PLAN OF CARE
Problem: Physical Therapy Goal  Goal: Physical Therapy Goal  Description: Goals to be met by: 2022     Patient will increase functional independence with mobility by performin. Sit to stand transfer with Supervision -not met  2. Bed to chair transfer with minimum -not met  3. Gait  x 100 feet with minimum  standing rest breaks prn. -not met  4. Lower extremity exercise program x30 reps per handout, with independence -not met  5. Recite 3/3 sternal precautions and remain complaint with precautions throughout session with no verbal cues -not met  6. Pt sit on EOB x 10 min with CGA - not met      Outcome: Ongoing, Progressing   Goals remain appropriate. 2022

## 2022-01-20 NOTE — PROGRESS NOTES
Pharmacokinetic Assessment Follow Up: IV Vancomycin    Therapy with vancomycin complete. Pharmacy will sign off, please re-consult as needed.    Crystal Sutherland, PharmD, BCCCP  [id number]

## 2022-01-20 NOTE — PROGRESS NOTES
"[facility]  Endocrinology  Progress Note    Admit Date: 1/5/2022     Reason for Consult: Management of T2DM, Hyperglycemia     Surgical Procedure and Date:   1/5/21  REPLACEMENT, AORTIC VALVE, WITH REPEAT STERNOTOMY (N/A)     Diabetes diagnosis year: MELIDA; intubated    Home Diabetes Medications:  Metformin 500 mg daily,     How often checking glucose at home?  MELIDA    BG readings on regimen: MELIDA  Hypoglycemia on the regimen?  MELIDA  Missed doses on regimen? MELIDA    Diabetes Complications include:     None    Complicating diabetes co morbidities:   CAD, HTN, a-fib       HPI:   Patient is a 76 y.o. female with a diagnosis of s/p MVR, AVR and TVr in 2010 by Dr. Haider. 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 Family states she is more fatigued, decreased appetite and fluid on lungs. Pt has increased symptoms and wants to proceed with cardiac surgery now and here for pre op. She is now s/p the above procedure. Endocrinology consulted for management of T2DM.     Lab Results   Component Value Date    HGBA1C 6.0 (H) 01/03/2022           Interval HPI:   Overnight events:  BG stable but slightly above goal overnight while on current SQ insulin regimen in setting of TF. Patient remains intubated and sedated in SICU. Endo will continue to follow, and manage glycemic control while inpatient.   Diet NPO Except for: Sips with Medication  15 Days Post-Op    Eating:   NPO  Nausea: No  Hypoglycemia and intervention: No  Fever: No  TPN and/or TF: Yes  If yes, type of TF/TPN and rate: TF at 35 cc/hr.     BP (!) 146/69 (BP Location: Right arm, Patient Position: Lying)   Pulse 80   Temp 98.6 °F (37 °C) (Oral)   Resp (!) 31   Ht 5' 1" (1.549 m)   Wt 44.8 kg (98 lb 12.3 oz)   SpO2 100%   Breastfeeding No   BMI 18.66 kg/m²     Labs Reviewed and Include    Recent Labs   Lab 01/20/22  0316 01/20/22  0316 01/20/22  0822   *  --   --  "   CALCIUM 8.0*  --   --    ALBUMIN 2.6*  --   --    PROT 6.1  --   --      --   --    K 3.9   < > 4.3   CO2 21*  --   --      --   --    BUN 35*  --   --    CREATININE 1.8*  --   --    ALKPHOS 111  --   --    ALT 14  --   --    AST 44*  --   --    BILITOT 1.0  --   --     < > = values in this interval not displayed.     Lab Results   Component Value Date    WBC 12.43 01/20/2022    HGB 8.2 (L) 01/20/2022    HCT 25 (L) 01/20/2022    MCV 96 01/20/2022     (L) 01/20/2022     No results for input(s): TSH, FREET4 in the last 168 hours.  Lab Results   Component Value Date    HGBA1C 6.0 (H) 01/03/2022       Nutritional status:   Body mass index is 18.66 kg/m².  Lab Results   Component Value Date    ALBUMIN 2.6 (L) 01/20/2022    ALBUMIN 2.8 (L) 01/19/2022    ALBUMIN 2.8 (L) 01/19/2022     Lab Results   Component Value Date    PREALBUMIN 11 (L) 01/10/2022       Estimated Creatinine Clearance: 18.5 mL/min (A) (based on SCr of 1.8 mg/dL (H)).    Accu-Checks  Recent Labs     01/19/22  0804 01/19/22  1109 01/19/22  1358 01/19/22  1623 01/19/22  2000 01/19/22  2205 01/20/22  0006 01/20/22  0315 01/20/22  0606 01/20/22  1023   POCTGLUCOSE 131* 141* 207* 242* 268* 203* 177* 224* 195* 173*       Current Medications and/or Treatments Impacting Glycemic Control  Immunotherapy:    Immunosuppressants     None        Steroids:   Hormones (From admission, onward)            Start     Stop Route Frequency Ordered    01/06/22 2255  melatonin tablet 9 mg         -- Oral Nightly PRN 01/06/22 2255        Pressors:    Autonomic Drugs (From admission, onward)            Start     Stop Route Frequency Ordered    01/20/22 1030  midodrine tablet 10 mg         -- Oral Every 8 hours PRN 01/20/22 0929    01/12/22 1441  rocuronium 10 mg/mL injection        Note to Pharmacy: Created by cabinet override    01/13 0244   01/12/22 1441        Hyperglycemia/Diabetes Medications:   Antihyperglycemics (From admission, onward)            Start      Stop Route Frequency Ordered    01/19/22 0800  insulin aspart U-100 pen 3 Units         -- SubQ Every 24 hours (non-standard times) 01/18/22 0921    01/19/22 0400  insulin aspart U-100 pen 3 Units         -- SubQ Every 24 hours (non-standard times) 01/18/22 0921    01/19/22 0000  insulin aspart U-100 pen 3 Units         -- SubQ Every 24 hours (non-standard times) 01/18/22 0921    01/18/22 2000  insulin aspart U-100 pen 3 Units         -- SubQ Every 24 hours (non-standard times) 01/18/22 0921    01/18/22 1600  insulin aspart U-100 pen 3 Units         -- SubQ Every 24 hours (non-standard times) 01/18/22 0921 01/18/22 1200  insulin aspart U-100 pen 3 Units         -- SubQ Every 24 hours (non-standard times) 01/18/22 0921    01/15/22 1415  insulin aspart U-100 pen 0-5 Units         -- SubQ Every 4 hours PRN 01/15/22 1315          ASSESSMENT and PLAN    * S/P aortic valve replacement  Managed per primary team  Optimize BG control        Type 2 diabetes mellitus with microalbuminuria, without long-term current use of insulin  BG goal 140 - 180       - Increase Novolog to 4 units q 4 hrs while on tube feeds. HOLD if tube feeds are stoped or BG < 100.   - Low Dose SQ Insulin Correction Scale PRN hyperglycemia.   - BG Monitoring q 4 hrs while NPO/tube feeds    ** Please call Endocrine for any BG related issues **  ** Please notify Endocrine for any change and/or advance in diet**  Lab Results   Component Value Date    HGBA1C 6.0 (H) 01/03/2022       Discharge Planning:   TBD. Please notify endocrinology prior to discharge.           Chronic atrial fibrillation  May increase insulin resistance.               Vasquez Inman, NP  Endocrinology  [facility]

## 2022-01-20 NOTE — PROGRESS NOTES
[facility]  Adult Nutrition  Progress Note    SUMMARY       Recommendations    1. Rec decreasing Novasource Renal to 30 mL/hr x 24 hrs to provide 1440 kcal, 65 g protein, and 516 mL free water   2. Monitor and f/u    Goals: Pt to meet >/= 75% EEN/EPN by f/u date  Nutrition Goal Status: new  Communication of RD Recs: other (comment) (POC)    Assessment and Plan    Nutrition Problem  Severe Protein-Calorie Malnutrition  Malnutrition in the context of Chronic Illness    Related to (etiology):   Inability to consume sufficient energy and protein intake     Signs and Symptoms (as evidenced by):   Energy Intake: Suspected </= 75% of estimated energy requirement for >/= 1 month    Body Fat Depletion: Severe depletion of orbitals    Muscle Mass Depletion: Severe depletion of temples and clavicle region    Weight Loss: 13% x 1 month      Interventions/Recommendations (treatment strategy):  1. Enteral nutrition  2. Collaboration of nutrition care with other providers    Nutrition Diagnosis Status:   New         Malnutrition Assessment  Malnutrition Type: chronic illness  Energy Intake: severe energy intake          Weight Loss (Malnutrition): greater than 5% in 1 month  Energy Intake (Malnutrition): less than 75% for greater than or equal to 1 month  Subcutaneous Fat (Malnutrition): severe depletion  Muscle Mass (Malnutrition): severe depletion   Orbital Region (Subcutaneous Fat Loss): severe depletion   [place] Region (Muscle Loss): severe depletion  Clavicle Bone Region (Muscle Loss): severe depletion       Subcutaneous Fat Loss (Final Summary): severe protein-calorie malnutrition  Muscle Loss Evaluation (Final Summary): severe protein-calorie malnutrition    Severe Weight Loss (Malnutrition): greater than 5% in 1 month    Reason for Assessment    Reason For Assessment: RD follow-up  Diagnosis: cardiac disease  Relevant Medical History: CAD, DM, HTN, pacemaker  Interdisciplinary Rounds: did not  "attend  General Information Comments: S/P aortic valve replacement. Receiving TF via NG, possible plans for trach/PEG placement. Tolerating TF, no residuals. Pt resting in bed with son at bedside. Reported discomfort with NG tube. Son reports pt's -115 lbs "before getting sick" but new normal might be lower. ~15 lbs wt loss x 1 month (13% BW). Visually pt appears weak, malnourished. Pt meets criteria for malnutrition in the context of chronic illness.  Nutrition Discharge Planning: Cardiac/diabetic diet    Nutrition Risk Screen    Nutrition Risk Screen: tube feeding or parenteral nutrition    Nutrition/Diet History    Patient Reported Diet/Restrictions/Preferences: low salt  Spiritual, Cultural Beliefs, Jew Practices, Values that Affect Care: no  Food Allergies: NKFA  Factors Affecting Nutritional Intake: NPO    Anthropometrics    Temp: 98.6 °F (37 °C)  Height: 5' 1" (154.9 cm)  Height (inches): 61 in  Weight Method: Bed Scale  Weight: 44.8 kg (98 lb 12.3 oz)  Weight (lb): 98.77 lb  Ideal Body Weight (IBW), Female: 105 lb  % Ideal Body Weight, Female (lb): 94.07 %  BMI (Calculated): 18.7  BMI Grade: 18.5-24.9 - normal  Usual Body Weight (UBW), k.25 kg  % Usual Body Weight: 87.6  % Weight Change From Usual Weight: -12.59 %       Lab/Procedures/Meds    Pertinent Labs Reviewed: reviewed  Pertinent Labs Comments: BUN 35, Cre 1.8, GFR 26.8, Ca 8.0, Alb 2.6, AST 44, A1c 6.0  Pertinent Medications Reviewed: reviewed  Pertinent Medications Comments: Atorvastatin, insulin, vasopressin, heparin    Estimated/Assessed Needs    Weight Used For Calorie Calculations: 44.8 kg (98 lb 12.3 oz)  Energy Calorie Requirements (kcal): 1440  Energy Need Method: [place] (x 1.25)  Protein Requirements: 54-67 g/day (1.2-1.5 g/kg)  Weight Used For Protein Calculations: 44.8 kg (98 lb 12.3 oz)  Fluid Requirements (mL): 1 mL/kcal or per MD  Estimated Fluid Requirement Method: RDA Method  RDA Method (mL): 1440  CHO " Requirement: 150 g      Nutrition Prescription Ordered    Current Diet Order: NPO  Nutrition Order Comments: 1500 mL FR    Evaluation of Received Nutrient/Fluid Intake    I/O: -2353.4 mL since admit  Energy Calories Required: not meeting needs  Protein Required: not meeting needs  Fluid Required: not meeting needs  Comments: LBM 1/19  Tolerance: tolerating  % Intake of Estimated Energy Needs: 75 - 100 %  % Meal Intake: NPO    Nutrition Risk    Level of Risk/Frequency of Follow-up: low     Monitor and Evaluation    Food and Nutrient Intake: energy intake  Food and Nutrient Adminstration: diet order  Anthropometric Measurements: weight  Biochemical Data, Medical Tests and Procedures: electrolyte and renal panel,gastrointestinal profile,glucose/endocrine profile,inflammatory profile,lipid profile  Nutrition-Focused Physical Findings: overall appearance     Nutrition Follow-Up    RD Follow-up?: Yes

## 2022-01-20 NOTE — ASSESSMENT & PLAN NOTE
- pt is being evaluating for skin injuries.  - Noted to have two open areas on buttocks related to moisture/friction/shearing.  - pt is incontinent of urine and currently using moisture wicking pads.  - Recommend treating areas with Triad bid/prn. Applied per NP.  - currently on Immerse mattress with heel boots in place.  - nursing to maintain pressure injury prevention measures.

## 2022-01-20 NOTE — PLAN OF CARE
"SICU PLAN OF CARE NOTE    Dx: S/P aortic valve replacement    Goals of Care:  MAP >60; Plan for Trach Friday 1/21/2022; Feedings at goal rate; Increase Heparin until patient is at therapeutic aptt level of 60-80; Turn Q2.     Vital Signs:  /69   Pulse (!) 111   Temp 97.8 °F (36.6 °C) (Oral)   Resp (!) 35   Ht 5' 1" (1.549 m)   Wt 44.8 kg (98 lb 12.3 oz)   SpO2 98%   Breastfeeding No   BMI 18.66 kg/m²     Cardiac:  HR . Irregular. Intermittently paced this shift. All pulses palpable. Vaso turned off this shift. Patient BP noted in chart     Resp:  SpO2 % on Ventilator AC/VC+ 30%/Peep 7    Neuro:  Follows Commands and Moves All Extremities    Gtts:  Heparin 600 units/hr; KVO    Urine Output:  Anuric 0 cc/shift    Drains: Farrukh tube to left nare      Diet:  Tube Feeds Novasource renal @ 35 ml/hr and tolerating with maximum 20 cc residuals      Labs/Accuchecks:  Accucheck Q4; Labs reviewed.Next Aptt 2330. Aptt goal 60-80    Skin:  Wound care provided to wound to patient left buttock area. Triad cream applied. mepalex applied.  Patient turned Q2, Specialty bed intact, ICU bed working correctly.    Shift Events: 1020 Note patient desaturating. Currently on sponanious setting. Patient placed on 100% O2, HOB elevated, patient suctioned. Respiratory notified to come to bedside. Note patient with large mucous plug suctioned from patient intubation tube. Lowest saturation of 57% on 100% ventilator. Following mucous plug extraction, patient returned to %. Patient placed back on a rate at this time. Note at time of even patient became tachycardic and hypertensive. MD Dr. Garcia notified of patient persistent tachycardia. Instructions to give pain medication and promote comfort at this time. MD Dr. Garcia notified following pain medication administration of patient continued increased HR. Patient sat up with PT. tolerated with weakness. Tube feeding restarted. Vaso titrated off and patient tolerated.  See " flowsheet for further assessment/details.  Family daughter, , son updated on current condition/plan of care, questions answered, and emotional support provided.   MD updated on current condition, vitals, labs, and gtts.  No new orders received, will continue to monitor.

## 2022-01-20 NOTE — PLAN OF CARE
No acute events today. Heparin remains at 600 Units/hr. Plan is to turn off infusion at midnight in preparation for OR tomorrow.  Plan for tracheostomy in morning. TF at goal of 35ml/hr, tolerating well, no residuals noted. Plan to hold tube feeds at midnight as well. Patient remains on ventilator, spontaneous setting FiO2 30% and Peep 7. Low dose Precedex restarted today due to anxiety. Plan for 8 hour run of CRRT tonight. Follow up with wound care today, triad cream applied to sacrum and patient turned q2hr. Plan of care reviewed with patient and son at bedside. All questions answered per SICU RN.

## 2022-01-20 NOTE — PT/OT/SLP PROGRESS
"Physical Therapy  Co-Treatment with OT    Patient Name:  Orion Ty   MRN:  [id number]    Recommendations:     Discharge Recommendations:  rehabilitation facility   Discharge Equipment Recommendations:  (will determine DME needs closer to discharge)   Barriers to discharge: Decreased caregiver support family will not be able to assist at current functional level    Assessment:     Orion Ty is a 76 y.o. female admitted with a medical diagnosis of S/P aortic valve replacement.  She presents with the following impairments/functional limitations:  weakness,impaired endurance,impaired functional mobilty,gait instability,impaired balance,decreased safety awareness,decreased lower extremity function,decreased coordination,impaired cognition pt tolerated treatment better being able to sit on EOB longer. Pt will benefit from cont skilled PT 3x/wk to progress physically. Pt will need inpt rehab when medically stable to maximize rehab potential. Pt is s/p AVR 1/5.    Rehab Prognosis: Good; patient would benefit from acute skilled PT services to address these deficits and reach maximum level of function.    Recent Surgery: Procedure(s) (LRB):  REPLACEMENT, AORTIC VALVE, WITH REPEAT STERNOTOMY (N/A) 15 Days Post-Op    Plan:     During this hospitalization, patient to be seen 3 x/week to address the identified rehab impairments via gait training,therapeutic activities,therapeutic exercises,neuromuscular re-education and progress toward the following goals:    · Plan of Care Expires:  02/05/22    Subjective     Chief Complaint: pt had no complaints during treatment but was lethargic and needed verbal cues to keep eyes open.   Patient/Family Comments/goals: family goals:pt to return to previous functional level.   Pain/Comfort:  · Pain Rating 1: 0/10 (pt shook her head "no" when asked if she had pain.)  · Pain Rating Post-Intervention 1: 0/10      Objective:     Communicated with nurse prior to session.  Patient found " supine with telemetry,arterial line,blood pressure cuff,pulse ox (continuous),ventilator,NG tube,SCD (vent to ET tube, R IJ dialysis catheter, B Alfredo Nicolle boots) upon PT entry to room.     General Precautions: Standard, fall,sternal   Orthopedic Precautions:N/A   Braces:    Respiratory Status: vent to ET tube, Respiratory therapist was in room during treatment.      Functional Mobility:  · Bed Mobility:   Respiratory therapist was in room during treatment to manage ventilator  · Rolling Left:  maximal assistance  · Supine to Sit: maximal assistance  · Sit to Supine: maximal assistance  ·   · Balance: pt sat on EOB x 10 min with min assist for static sitting balance and moderate assistance for dynamic sitting balance. pt had brief episode of CGA for sitting balance. PT provided tacile cues at the trunk for mid line posture with sitting on EOB.     Due to pt complex medical condition, the skill of 2 licensed therapists is needed to maximize treatment session and progression towards goals.       AM-PAC 6 CLICK MOBILITY  Turning over in bed (including adjusting bedclothes, sheets and blankets)?: 2  Sitting down on and standing up from a chair with arms (e.g., wheelchair, bedside commode, etc.): 1  Moving from lying on back to sitting on the side of the bed?: 2  Moving to and from a bed to a chair (including a wheelchair)?: 2  Need to walk in hospital room?: 1  Climbing 3-5 steps with a railing?: 1  Basic Mobility Total Score: 9       Therapeutic Activities and Exercises:   pt son and daughter received verbal instructions in PT POC and both verbally expressed understanding of such.     Patient left supine with all lines intact, call button in reach and RN and pt son and daughter present..    GOALS:   Multidisciplinary Problems     Physical Therapy Goals        Problem: Physical Therapy Goal    Goal Priority Disciplines Outcome Goal Variances Interventions   Physical Therapy Goal     PT, PT/OT Ongoing, Progressing      Description: Goals to be met by: 2022     Patient will increase functional independence with mobility by performin. Sit to stand transfer with Supervision -not met  2. Bed to chair transfer with minimum -not met  3. Gait  x 100 feet with minimum  standing rest breaks prn. -not met  4. Lower extremity exercise program x30 reps per handout, with independence -not met  5. Recite 3/3 sternal precautions and remain complaint with precautions throughout session with no verbal cues -not met  6. Pt sit on EOB x 10 min with CGA - not met                       Time Tracking:     PT Received On: 22  PT Start Time: 911     PT Stop Time: 932  PT Total Time (min): 21 min     Billable Minutes: Therapeutic Activity 21 min        PT/PTA: PT     PTA Visit Number: 0     2022

## 2022-01-20 NOTE — NURSING
CTS team notified of patient EKG results. Results noted in chart.    1180 Dr. Jose SCOTT notified of patient aptt of 54.1 with apttt goal of 60-80. No new orders received at this time    1700 Dr. Jose SCOTT notified of patient aptt of 49.7 with aptt goal of 60-80. New orders received for an increase of heparin gtt to 600 units/hr

## 2022-01-20 NOTE — PT/OT/SLP PROGRESS
Occupational Therapy   Co-Treatment w PT  Co-treat with PT due to medical complexity of pt and need for skilled hands for safe intervention.      Patient Name:  Orion Ty   MRN:  [id number]  Admit Date: 1/5/2022  Admitting Diagnosis:  S/P aortic valve replacement   Length of Stay: 15 days  Recent Surgery: Procedure(s) (LRB):  REPLACEMENT, AORTIC VALVE, WITH REPEAT STERNOTOMY (N/A) 15 Days Post-Op    Recommendations:     Discharge Recommendations: rehabilitation facility  Discharge Equipment Recommendations:  other (see comments) (TBD pending extubation and progress)  Barriers to discharge:  Inaccessible home environment,Decreased caregiver support    Plan:     Patient to be seen 3 x/week to address the above listed problems via self-care/home management,therapeutic exercises,therapeutic activities  · Plan of Care Expires: 02/05/22  · Plan of Care Reviewed with: patient,family    Assessment:   Orion Ty is a 76 y.o. female with a medical diagnosis of S/P aortic valve replacement.  She presents with the following performance deficits affecting function: weakness,impaired endurance,impaired self care skills,impaired functional mobilty,gait instability,impaired balance,decreased upper extremity function,decreased lower extremity function,decreased coordination,impaired coordination,impaired cardiopulmonary response to activity.  Pt continues to benefit from a collaborative PT/OT/SLP program to improve quality of life and focus on recovery of impairments.     Pt alert and following commands, generalized debility present though pt participated well. Pt intubated, RT at bedside to monitor lines. Pt tolerated 10 min EOB with Mod-brief CGA, completing UE exercises in seated and grooming with Sb.   Occupational Therapy recommends a Inpatient Rehab Facility upon discharge to maximize return to PLOF and address deficits listed above.       Rehab Prognosis: Good; patient would benefit from acute skilled OT services to  "address these deficits and reach maximum level of function.        Subjective   Communicated with: RN prior to session.  Patient found HOB elevated with arterial line,bed alarm,blood pressure cuff,NG tube,oxygen,peripheral IV,telemetry,ventilator upon OT entry to room.    Patient: nonverbal 2* intubated; appropriately following commands and answering yes/no questions    Pain/Comfort:  · Pain Rating 1: other (see comments) (not able to verbalize, no sign of discomfort.)  · Location - Orientation 1: generalized  · Pain Rating Post-Intervention 1: 0/10    Objective:   Patient found with: arterial line,bed alarm,blood pressure cuff,NG tube,oxygen,peripheral IV,telemetry,ventilator     General Precautions: Standard, Cardiac fall   Orthopedic Precautions:N/A   Braces: N/A   Respiratory Status:    intubated  Vitals: BP (!) 123/58 (BP Location: Right arm, Patient Position: Lying)   Pulse 81   Temp 98.6 °F (37 °C)   Resp (!) 29   Ht 5' 1" (1.549 m)   Wt 44.8 kg (98 lb 12.3 oz)   SpO2 95%   Breastfeeding No   BMI 18.66 kg/m²     Outcome Measures:  [facility] 6 Click ADL: 8    Cognition:   · Oriented X 4 and Alert per head nods; orientation provided  · Command following: follows one-step commands  · Communication: appropriately nods yes/no    Occupational Performance:  Bed Mobility:    · Patient completed Rolling/Turning to Left with  maximal assistance  · Patient completed Rolling/Turning to Right with maximal assistance  · Scooting to HOB in supine: total assistance and 2 persons  · Patient completed Supine to Sit with maximal assistance on L side of bed  · Scooting anteriorly to EOB to have both feet planted on floor: maximal assistance  · Patient completed Sit to Supine with maximal assistance on L side of bed    Functional Mobility/Transfers:   Static Sitting EOB: Min-CGA   Dynamic Sitting EOB: Mod-Sb during UE exercise and self-care (grooming EOB)    Further mobility deferred 2* patient fatigue sitting EOB with " staff assistance. Note: pt tolerated sitting EOB aprx 10 min    Activities of Daily Living:  · Feeding:   NPO  · Grooming: maximal assistance hand over hand with RUE washing face with cloth  · Upper Body Dressing: moderate assistance donning fresh gown at bed level  · Lower Body Dressing: total assistance donning socks at bed level    AMPAC 6 Click ADL:  [facility] Total Score: 8    Treatment & Education:  -BUE exercises to bolster ADL performance seated EOB: shoulder flexion/extension, elbow flexion/extension, functional dynamic reaching. Mild RUE weakness as compared to LUE, likely related to Shelbi placement. Mod A for sitting balance during exercises.   -OT POC, safety during ADLs and mobility   -Education on energy conservation and task modification to maximize safety and independence  -Questions answered within OT scope of practice.      Patient left HOB elevated with all lines intact, call button in reach and RN notified, family at bedside    GOALS:   Multidisciplinary Problems     Occupational Therapy Goals        Problem: Occupational Therapy Goal    Goal Priority Disciplines Outcome Interventions   Occupational Therapy Goal     OT, PT/OT Ongoing, Progressing    Description: Goals to be met by:  2/2/22     Patient will increase functional independence with ADLs by performing:    Pt to complete UE dressing with set-up  Pt to complete LE dressing with SBA  Pt to complete standing g/h skills with SBA  Pt to complete toileting with SBA  Pt to completed t/f bed, chair and commode with SBA                   Time Tracking:     OT Date of Treatment: 01/20/22  OT Start Time: 0910  OT Stop Time: 0933  OT Total Time (min): 23 min  Additional staff present: PT      Billable Minutes:Self Care/Home Management 23 1/20/2022

## 2022-01-20 NOTE — CONSULTS
[facility]  Skin Integrity BRIDGET  Consult Note    Patient Name: Orion Ty  MRN: [id number]  Admission Date: 1/5/2022  Hospital Length of Stay: 15 days  Attending Physician: Dennis Haider MD  Primary Care Provider: Otis Zimmer MD     Consults  Subjective:     History of Present Illness:  Orion Ty is a 76 year old female with previous aortic valve replacement, mitral valve replacement, and tricuspid valve repair in 2010 who presents for redo aortic valve replacement. 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 She underwent aortic valve replacement with a mechanical valve on 1/5/22. Wound care has been consulted for evaluation of buttocks skin injury.      Scheduled Meds:   acetaminophen  650 mg Oral Q8H    albuterol-ipratropium  3 mL Nebulization Q6H    atorvastatin  40 mg Oral Daily    [START ON 1/21/2022] insulin aspart U-100  4 Units Subcutaneous Q24H    [START ON 1/21/2022] insulin aspart U-100  4 Units Subcutaneous Q24H    [START ON 1/21/2022] insulin aspart U-100  4 Units Subcutaneous Q24H    insulin aspart U-100  4 Units Subcutaneous Q24H    insulin aspart U-100  4 Units Subcutaneous Q24H    insulin aspart U-100  4 Units Subcutaneous Q24H    LIDOcaine  1 patch Transdermal Q24H    pantoprazole  40 mg Intravenous Daily    piperacillin-tazobactam (ZOSYN) IVPB  4.5 g Intravenous Q12H    polyethylene glycol  17 g Oral Daily     Continuous Infusions:   sodium chloride 0.9% Stopped (01/19/22 0643)    sodium chloride 0.9% Stopped (01/08/22 1054)    dexmedetomidine (PRECEDEX) infusion      dextrose 10 % in water (D10W)      heparin (porcine) in 5 % dex 600 Units/hr (01/20/22 1300)     PRN Meds:sodium chloride, aspirin, bisacodyL, dextrose 10 % in water (D10W), dextrose 50%, fentaNYL, glucagon (human recombinant), insulin aspart U-100, LIDOcaine HCL 2%, melatonin,  midodrine, ondansetron, oxyCODONE, potassium, sodium phosphates, sodium chloride 0.9%, sodium chloride 0.9%    Review of patient's allergies indicates:  No Known Allergies     Past Medical History:   Diagnosis Date    A-fib     Anticoagulant long-term use     Closed displaced fracture of distal phalanx of lesser toe 10/20/2015    Right    Coronary artery disease     Diabetes mellitus     Diabetes mellitus, type 2     Hypertension     Mechanical heart valve present     X's two    Osteopenia     Pacemaker     Rheumatic heart disease      Past Surgical History:   Procedure Laterality Date    CARDIAC PACEMAKER PLACEMENT      CARDIAC VALVE REPLACEMENT      CARDIAC VALVE SURGERY      CORONARY ANGIOGRAPHY N/A 12/21/2021    Procedure: ANGIOGRAM, CORONARY ARTERY;  Surgeon: Devonte Salazar MD;  Location: [facility] CATH LAB;  Service: Cardiology;  Laterality: N/A;    EYE SURGERY Bilateral     cataract    FLUOROSCOPY N/A 12/11/2020    Procedure: FLUOROSCOPY;  Surgeon: Mckay Calvo MD;  Location: [facility] CATH LAB;  Service: Cardiology;  Laterality: N/A;    REPLACEMENT OF AORTIC VALVE WITH REPEAT STERNOTOMY N/A 1/5/2022    Procedure: REPLACEMENT, AORTIC VALVE, WITH REPEAT STERNOTOMY;  Surgeon: Dennis Haider MD;  Location: [facility] OR [facility] FLR;  Service: Cardiothoracic;  Laterality: N/A;    REPLACEMENT OF PACEMAKER GENERATOR N/A 3/2/2020    Procedure: REPLACEMENT, PACEMAKER GENERATOR;  Surgeon: Mark Ring MD;  Location: [facility] EP LAB;  Service: Cardiology;  Laterality: N/A;  TBS/CHRISTI, Single PPM gen change, Right side, MDT, MAC, SK, 3 Prep    thryoid s         Family History     Problem Relation (Age of Onset)    Breast cancer Maternal Aunt        Tobacco Use    Smoking status: Never Smoker    Smokeless tobacco: Never Used   Substance and Sexual Activity    Alcohol use: No    Drug use: Never    Sexual activity: Not on file     Review of Systems   Skin: Positive for wound.       Objective:     Vital Signs  (Most Recent):  Temp: 98.6 °F (37 °C) (01/20/22 1300)  Pulse: 80 (01/20/22 1345)  Resp: (!) 27 (01/20/22 1345)  BP: (!) 123/58 (01/20/22 1300)  SpO2: 95 % (01/20/22 1345) Vital Signs (24h Range):  Temp:  [98 °F (36.7 °C)-99.8 °F (37.7 °C)] 98.6 °F (37 °C)  Pulse:  [] 80  Resp:  [16-35] 27  SpO2:  [95 %-100 %] 95 %  BP: (106-156)/() 123/58  Arterial Line BP: (104-163)/(44-76) 138/56     Weight: 44.8 kg (98 lb 12.3 oz)  Body mass index is 18.66 kg/m².     Physical Exam  Constitutional:       Appearance: Normal appearance.   Skin:     General: Skin is warm and dry.      Findings: Lesion present.   Neurological:      Mental Status: She is alert.         Laboratory:  All pertinent labs reviewed within the last 24 hours.    Diagnostic Results:  None      Assessment/Plan:         BRIDGET Skin Integrity Evaluation    Skin Integrity BRIDGET evaluation of patient as part of the comprehensive skin care team.   She has been admitted for 15 days. Skin injury was noted on 1/9/22. POA No. PT and RD are involved in her care. Currently receiving TFs.      Sacrum/buttocks          Dermatitis associated with moisture  - pt is being evaluating for skin injuries.  - Noted to have two open areas on buttocks related to moisture/friction/shearing.  - pt is incontinent of urine and currently using moisture wicking pads.  - Recommend treating areas with Triad bid/prn. Applied per NP.  - currently on Immerse mattress with heel boots in place.  - nursing to maintain pressure injury prevention measures.         Thank you for your consult. I will follow-up with patient. Please contact us if you have any additional questions.         Shyla Ortez NP  Skin Integrity BRIDGET  [facility] - [facility] Intensive [facility]

## 2022-01-20 NOTE — ANESTHESIA PREPROCEDURE EVALUATION
[facility]  Anesthesia Pre-Operative Evaluation         Patient Name: Orion Ty  YOB: 1945  MRN: [id number]    SUBJECTIVE:     Pre-operative Evaluation for Procedure(s) (LRB):  CREATION, TRACHEOSTOMY (N/A)  EGD, WITH PEG TUBE INSERTION (N/A)  INSERTION-CATHETER-ROSELINE (N/A)     01/20/2022    Orion Ty is a 76 y.o. female with a PMHx significant for previous aortic valve replacement, mitral valve replacement, and tricuspid valve repair in 2010 who presented on 1/5 for redo aortic valve replacement. Post-op course complicated by JO now on CRRT and multiple re-intubations 1/13 and 1/18.    She now presents for the above procedure(s).    Vent Mode: Spont  Oxygen Concentration (%):  [30] 30  Resp Rate Total:  [15 br/min-39 br/min] 25 br/min  Vt Set:  [330 mL] 330 mL  PEEP/CPAP:  [7 cmH20] 7 cmH20  Pressure Support:  [10 cmH20] 10 cmH20  Mean Airway Pressure:  [11 akR40-50 cmH20] 11 cmH20    Drips:    sodium chloride 0.9% Stopped (01/19/22 0643)    sodium chloride 0.9% Stopped (01/08/22 1054)    dexmedetomidine (PRECEDEX) infusion 1 mcg/kg/hr (01/20/22 1411)    dextrose 10 % in water (D10W)      heparin (porcine) in 5 % dex 600 Units/hr (01/20/22 1500)       LDA:   Trialysis (Dialysis) Catheter 01/08/22 1027 right internal jugular (Active)            Peripheral IV - Single Lumen 01/16/22 0430 20 G Anterior;Distal;Right Wrist (Active)            Midline Catheter Insertion/Assessment  - Single Lumen 01/10/22 1300 Left cephalic vein (lateral side of arm) 18g x 8cm (Active)       Arterial Line 01/05/22 0855 Right Radial (Active)            NG/OG Tube 01/18/22 1030 Left nostril (Active)       Patient Active Problem List   Diagnosis    Chronic atrial fibrillation    Cardiac pacemaker in situ    Rheumatic heart disease    Subclinical hypothyroidism    Osteopenia    Atherosclerosis of aorta    Mixed hyperlipidemia    Long term current use of anticoagulant    S/P aortic  valve replacement    S/P MVR (mitral valve replacement)    Type 2 diabetes mellitus with microalbuminuria, without long-term current use of insulin    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    Essential (primary) hypertension    Thrombocytopenia    CAD (coronary artery disease)    Volume overload    PAH (pulmonary artery hypertension)    Tachy-gail syndrome    Debility    Right flank hematoma    Pleural effusion    Acute on chronic diastolic heart failure    Hyponatremia    Allergy to sulfa drugs    Prosthetic valve dysfunction    Fever    Screening procedure    Pre-op exam    Bilateral carotid artery stenosis    JO (acute kidney injury)    ATN (acute tubular necrosis)    Acute hypoxemic respiratory failure    Dermatitis associated with moisture       Review of patient's allergies indicates:  No Known Allergies    Current Inpatient Medications:   acetaminophen  650 mg Oral Q8H    albuterol-ipratropium  3 mL Nebulization Q6H    atorvastatin  40 mg Oral Daily    [START ON 1/21/2022] insulin aspart U-100  4 Units Subcutaneous Q24H    [START ON 1/21/2022] insulin aspart U-100  4 Units Subcutaneous Q24H    [START ON 1/21/2022] insulin aspart U-100  4 Units Subcutaneous Q24H    insulin aspart U-100  4 Units Subcutaneous Q24H    insulin aspart U-100  4 Units Subcutaneous Q24H    insulin aspart U-100  4 Units Subcutaneous Q24H    LIDOcaine  1 patch Transdermal Q24H    pantoprazole  40 mg Intravenous Daily    piperacillin-tazobactam (ZOSYN) IVPB  4.5 g Intravenous Q12H    polyethylene glycol  17 g Oral Daily       Current Outpatient Medications   Medication Instructions    amLODIPine (NORVASC) 5 mg, Oral, Daily    amoxicillin (AMOXIL) 875 MG tablet No dose, route, or frequency recorded.    enoxaparin (LOVENOX) 40 mg, Subcutaneous, Every 12 hours    furosemide (LASIX) 40 mg, Oral, 2 times daily    lancets 30 gauge Misc Use as directed to check blood sugar  twice a day    losartan (COZAAR) 25 mg, Oral, Daily    metFORMIN (GLUCOPHAGE) 500 MG tablet TAKE 1 TABLET EVERY DAY    metoprolol tartrate (LOPRESSOR) 25 MG tablet TAKE 1 TABLET BY MOUTH TWICE DAILY    mv,calcium,min/iron/folic/vitK (ONE-A-DAY WOMEN'S COMPLETE ORAL) 830 tablets, Oral, Daily    nitroGLYCERIN (NITROSTAT) 0.4 MG SL tablet DISSOLVE 1 TABLET UNDER THE TONGUE EVERY 5 MINUTES AS NEEDED FOR CHEST PAINS    omega-3 acid ethyl esters (LOVAZA) 1 gram capsule TK 2 CS PO BID    pravastatin (PRAVACHOL) 20 mg, Oral, Daily    warfarin (COUMADIN) 4 MG tablet Take 1 tablet on Monday and Friday.  Take 1/2 tablet on all other days.       Past Surgical History:   Procedure Laterality Date    CARDIAC PACEMAKER PLACEMENT      CARDIAC VALVE REPLACEMENT      CARDIAC VALVE SURGERY      CORONARY ANGIOGRAPHY N/A 12/21/2021    Procedure: ANGIOGRAM, CORONARY ARTERY;  Surgeon: Devonte Salazar MD;  Location: [facility] CATH LAB;  Service: Cardiology;  Laterality: N/A;    EYE SURGERY Bilateral     cataract    FLUOROSCOPY N/A 12/11/2020    Procedure: FLUOROSCOPY;  Surgeon: Mckay Calvo MD;  Location: [facility] CATH LAB;  Service: Cardiology;  Laterality: N/A;    REPLACEMENT OF AORTIC VALVE WITH REPEAT STERNOTOMY N/A 1/5/2022    Procedure: REPLACEMENT, AORTIC VALVE, WITH REPEAT STERNOTOMY;  Surgeon: Dennis Haider MD;  Location: [facility] OR [address] [place] [address];  Service: Cardiothoracic;  Laterality: N/A;    REPLACEMENT OF PACEMAKER GENERATOR N/A 3/2/2020    Procedure: REPLACEMENT, PACEMAKER GENERATOR;  Surgeon: Mark Ring MD;  Location: [facility] EP LAB;  Service: Cardiology;  Laterality: N/A;  TBS/CHRISTI, Single PPM gen change, Right side, MDT, MAC, SK, 3 Prep    thryoid s         Social History     Substance and Sexual Activity   Drug Use Never     Tobacco Use: Low Risk     Smoking Tobacco Use: Never Smoker    Smokeless Tobacco Use: Never Used     Alcohol Use: Not on file         OBJECTIVE:     Vital Signs Range (Last 24H):  Temp:   [36.7 °C (98 °F)-37.7 °C (99.8 °F)]   Pulse:  []   Resp:  [16-35]   BP: (106-156)/()   SpO2:  [95 %-100 %]   Arterial Line BP: (104-163)/(44-76)       Significant Labs    Heme Profile  Lab Results   Component Value Date    WBC 12.43 01/20/2022    HGB 8.2 (L) 01/20/2022    HCT 25 (L) 01/20/2022     (L) 01/20/2022       Coagulation Studies  Lab Results   Component Value Date    LABPROT 12.9 (H) 01/20/2022    INR 1.2 01/20/2022    APTT 66.7 (H) 01/20/2022       BMP  Lab Results   Component Value Date     01/20/2022    K 4.3 01/20/2022     01/20/2022    CO2 21 (L) 01/20/2022    BUN 35 (H) 01/20/2022    CREATININE 1.8 (H) 01/20/2022    MG 2.0 01/20/2022    PHOS 4.4 01/20/2022    CAION 0.99 (L) 01/10/2022       Liver Function Tests  Lab Results   Component Value Date    AST 44 (H) 01/20/2022    ALT 14 01/20/2022    ALKPHOS 111 01/20/2022    BILITOT 1.0 01/20/2022    PROT 6.1 01/20/2022    ALBUMIN 2.6 (L) 01/20/2022       Lipid Profile  Lab Results   Component Value Date    CHOL 162 06/25/2021    HDL 55 06/25/2021    TRIG 66 06/25/2021       Endocrine Profile  Lab Results   Component Value Date    HGBA1C 6.0 (H) 01/03/2022    TSH 5.783 (H) 06/25/2021       Cardiac Studies    EKG:   Results for orders placed or performed during the hospital encounter of 01/05/22   EKG 12-lead    Collection Time: 01/19/22  5:47 PM    Narrative    Test Reason : R00.0,    Vent. Rate : 109 BPM     Atrial Rate : 000 BPM     P-R Int : 000 ms          QRS Dur : 112 ms      QT Int : 410 ms       P-R-T Axes : 000 024 254 degrees     QTc Int : 552 ms    Atrial fibrillation with rapid ventricular response  Incomplete left bundle branch block  Minimal voltage criteria for LVH, may be normal variant ( M[place]isco product )  ST and Marked T wave abnormality, consider anterolateral ischemia  Prolonged QT  Abnormal ECG  When compared with ECG of 06-JAN-2022 20:02,  Incomplete left bundle branch block is now Present  Marked T wave  inversion anteriorly is now present  Confirmed by Lionel SCOTT, Ludwig BHARDWAJ ([id number]) on 1/20/2022 1:45:05 PM    Referred By: APRIL WEBER           Confirmed By:Ludwig Flowers MD       TTE (1/9/22):  Interpretation Summary  · The left ventricle is normal in size with concentric hypertrophy and normal systolic function. The estimated ejection fraction is 60%.  · There is abnormal septal wall motion.  · Mild right ventricular enlargement with normal right ventricular systolic function.  · Severe biatrial enlargement.  · There is a 21 mm [employer] TopHat bileaflet tilting disc mechanical aortic valve present. There is trivial central aortic insufficiency present.  · The aortic valve mean gradient is 18 mmHg with a dimensionless index of 0.48.  · There is a 29 mm St Giovanny bileaflet mechanical mitral valve prosthesis. The MG is 6mmHg at the heart rate 90bpm.  · There is a tricupsid annular ring present.  · Mild tricuspid regurgitation.  · Atrial fibrillation observed.  · The estimated ejection fraction is 60%.  · Indeterminate central venous pressure. Estimated PA systolic pressure is at least 45 mmHg.  · Suspect that slightly increased gradients across mitral and aortic prostheses are related to recent surgery, volume status and blood loss. If clinically indicated suggest repeat study once hemodynamic status has normalized.      STUART  No results found for this or any previous visit.      Cardiac Catheterization (12/21/21):  Conclusion  · The estimated blood loss was <50 mL.  · The coronary arteries were normal..    The procedure log was documented by Documenter: RT Mel and verified by Devonte Saldaña MD.    Date: 12/21/2021  Time: 3:15 PM      ASSESSMENT/PLAN:       Anesthesia Evaluation    I have reviewed the Patient Summary Reports.    I have reviewed the Nursing Notes. I have reviewed the NPO Status.   I have reviewed the Medications.     Review of Systems  Anesthesia Hx:  No problems with previous Anesthesia Denies  Hx of Anesthetic complications  History of prior surgery of interest to airway management or planning: heart surgery. Denies Family Hx of Anesthesia complications.   Denies Personal Hx of Anesthesia complications.   Social:  Non-Smoker    Hematology/Oncology:     Oncology Normal    -- Anemia:   EENT/Dental:EENT/Dental Normal   Cardiovascular:   Pacemaker Hypertension Valvular problems/Murmurs CAD  Dysrhythmias atrial fibrillation CHF MARTÍNEZ ECG has been reviewed.    Pulmonary:   Shortness of breath    Hepatic/GI:  Hepatic/GI Normal  Denies GERD.    Musculoskeletal:  Musculoskeletal Normal    Neurological:  Neurology Normal    Endocrine:   Diabetes, type 2 Hypothyroidism    Dermatological:  Skin Normal    Psych:  Psychiatric Normal           Physical Exam  General:  Well nourished    Airway/Jaw/Neck:  Airway Findings: Pre-Existing Airway Tube(s): Oral Endotracheal tube General Airway Assessment: Adult  Jaw/Neck Findings:  Neck ROM: Extension Decreased, Mild      Dental:  Dental Findings: In tact    Heart/Vascular:  Heart Findings: Rate: Normal        Mental Status:  Mental Status Findings:  Sedated        Anesthesia Plan  Type of Anesthesia, risks & benefits discussed:  Anesthesia Type:  general    Patient's Preference:   Plan Factors:          Intra-op Monitoring Plan: standard ASA monitors  Intra-op Monitoring Plan Comments:   Post Op Pain Control Plan: multimodal analgesia, IV/PO Opioids PRN and per primary service following discharge from PACU  Post Op Pain Control Plan Comments:     Induction:   IV  Beta Blocker:  Patient is not currently on a Beta-Blocker (No further documentation required).       Informed Consent: Patient representative understands risks and agrees with Anesthesia plan.  Questions answered. Anesthesia consent signed with patient representative.  ASA Score: 4     Day of Surgery Review of History & Physical:    H&P update referred to the surgeon.     Anesthesia Plan Notes: Consent signed with son  at bedside         Ready For Surgery From Anesthesia Perspective.

## 2022-01-21 NOTE — PROGRESS NOTES
01/20/22 2111   Treatment   Treatment Type SLED   Treatment Status Restart   Dialysis Machine Number [id number]   Solutions Labeled and Current  Yes   Access Right;IJ;Temporary Cath   Catheter Dressing Intact  Yes   Alarms Engaged Yes   CRRT Comments SLED started   Prescription   Time (Hours) 8   Dialysate K + (mEq/L) 4   Dialysate CA + (mEq/L) 2.25   Dialysate HCO3 - (Bicarb) (mEq/L) 30   Dialysate Na + (mEq/L) 138   Cartridge Type   (f16)   Dialysate Flow Rate (mL/min) 200   UF Goal Rate 400 mL/hr   CRRT Hourly Documentation   Blood Flow (mL/min) 150   UF Rate 250 cc/hr   Arterial Pressure (mmHg) -20 mmHg   Venous Pressure (mmHg) 30 mmHg   Effluent Pressure (EP) (mmHg) 80 mmHg   SLED initiated via right IJ catheter. Tx time 8 hours. UF rate @ 250/hr. Report given to primary nurse

## 2022-01-21 NOTE — OP NOTE
Date of procedure - 01/21/2022  Preoperative diagnosis - renal failure and ventilator dependence  Postoperative diagnosis - same  Procedure - left internal jugular PermCath insertion, tracheostomy, percutaneous endoscopic gastrostomy  Surgeon - Cordelia  Assist - Jerry and Callier  Anesthesia - general  Blood loss - minimal  Minimal  Indications - this 76-year-old patient status post cardiac surgery has had a complicated recovery that includes ventilator dependency as well as acute kidney injury renal failure therefore tracheostomy and PermCath were required gastrostomy tube was provided for enteral access to maximize nutritional management  Operative report in detail - patient brought the operating room placed in supine position prepped draped sterile fashion after satisfactory general anesthesia was induced  Left internal jugular vein was identified by ultrasound and accessed with an  18 gauge needle  Wire was advanced into the right heart under fluoroscopy  A 24 cm long 14 and half Japanese PermCath was brought in through stab incision in the left upper chest tunneled above the clavicle to the internal jugular venous access site  Progressively larger dilators were utilized to widen the tract from the skin to the vein  Ultimately a peel-away sheath introducer was placed into the venous system under fluoroscopy  PermCath was placed into the venous system through the peel-away sheath with its tip ultimately and the superior vena cava  In this position the catheter was noted aspirate blood freely was flushed with heparinized solution  Catheter was secured to the skin with nylon  Next the neck was approached by making a vertical incision in the midline just above the suprasternal notch  Cautery utilized to dissect the soft tissues including the inferior portion of the thyroid  Isthmus exposing the cricoid cartilage  Trach hook was inserted below the cricoid cartilage and the airway  Was opened through the 2nd tracheal  ring a 7.5 [place]an external diameter tracheostomy was inserted through the defect in the trachea  End-tidal CO2 was confirmed  The trachea was secured with a trach tie and sutures  The patient remained in stable condition and satisfactory ventilating  Next EGD was advanced into the stomach  A suitable position was identified with palpation in the left upper quadrant  Patient was able to be visualized endoscopically and a needle and catheter were placed into the stomach through the skin  Wire was advanced into the stomach through the catheter grasped with the scope endoscope and pulled back out through the mouth were was attached to a percutaneous endoscopic gastrostomy tube which was then pulled back into the stomach and exteriorized  In this very thin patient the tube was secured at 2 cm  Patient tolerated the procedure well was stable throughout the operation

## 2022-01-21 NOTE — PROGRESS NOTES
Patient has been identified as having an implanted cardiac rhythm device (CRD); the implanted device is a MDT pacemaker.  Having trach and port  No noted pacer dependency.     PACEMAKERS/NON-DEPENDENT    Per protocol, no pacemaker reprogramming is required in this non-dependent patient.        For additional questions, please contact the Arrhythmia Department at Ext [phone].

## 2022-01-21 NOTE — TRANSFER OF CARE
"Anesthesia Transfer of Care Note    Patient: Orion Ty    Procedure(s) Performed: Procedure(s) (LRB):  CREATION, TRACHEOSTOMY (N/A)  EGD, WITH PEG TUBE INSERTION (N/A)  INSERTION-CATHETER-ROSELINE (N/A)    Anesthesia PACU Handoff    Last vitals:   Visit Vitals  BP (!) 103/53 (BP Location: Right arm, Patient Position: Lying)   Pulse 80   Temp 37.1 °C (98.8 °F) (Oral)   Resp (!) 30   Ht 5' 1" (1.549 m)   Wt 44.8 kg (98 lb 12.3 oz)   SpO2 97%   Breastfeeding No   BMI 18.66 kg/m²     "

## 2022-01-21 NOTE — PROGRESS NOTES
Patient has been identified as having an implanted cardiac rhythm device (CRD); the implanted device is a [employer] pacemaker.    Planned procedure is Tracheostomy and PEG tube insertion.     No noted pacer dependency.       PACEMAKERS/NON-DEPENDENT    Per protocol, no pacemaker reprogramming is required in this non-dependent patient.    For additional questions, please contact the Arrhythmia Department at Ext [phone].

## 2022-01-21 NOTE — PLAN OF CARE
"      SICU PLAN OF CARE NOTE    Dx: S/P aortic valve replacement    Shift Events: Pt's V/S stable over night, no acute events over shift. Pt had 8 hr CRRT treatment, completed and rinsed back @ 0530 this AM. Pt requiring vasopressin support during treatment and afterwards, MD aware. Pt maintain on Precedex sedation for comfort over night, pt able to rest well over shift, PRN pain and sleep meds given x1 dose. Pt's TF and Heparin gtt turned off @ 0000 for OR this AM. Pt had large BM x3 over shift. Pt's pre-op checklist completed, CHG bath given. Plan for OR for Trach/PEG/Permacath this AM, re-evaluate need for CRRT / pressor support. POC reviewed with pt and pt's daughter at bedside, all questions answered and encouraged.     Goals of Care: MAP 60-80    Neuro: AAO x4, Follows Commands and Moves All Extremities    Vital Signs: BP (!) 107/56 (BP Location: Right arm, Patient Position: Lying)   Pulse 82   Temp 98 °F (36.7 °C) (Oral)   Resp (!) 29   Ht 5' 1" (1.549 m)   Wt 44.8 kg (98 lb 12.3 oz)   SpO2 100%   Breastfeeding No   BMI 18.66 kg/m²     Respiratory: Ventilator, AC/VC+, FiO2 % 40, PEEP 7, RR 30    Diet: NPO and Tube Feeds off at this time for OR    Gtts: Precedex and Vasopressin    Urine Output: Anuric    Drains: L nare AZALEA    CRRT: SLED, 8 hr treatment, -400 ml/hr     Labs: daily / CRRT labs, 2 packets of Phos given / Accuchecks: Q4H    Skin: No new skin breakdown over night. Wound care orders implemented. Pt turned Q2H, pillow support provided. Heel boots in place for prevention. Immerse bed plugged in, inflated, and working properly.         "

## 2022-01-21 NOTE — PROGRESS NOTES
[facility]  Critical Care - Surgery  Progress Note    Patient Name: Orion Ty  MRN: [id number]  Admission Date: 1/5/2022  Hospital Length of Stay: 16 days  Code Status: Full Code  Attending Provider: Dennis Haider MD  Primary Care Provider: Otis Zimmer MD   Principal Problem: S/P aortic valve replacement    Subjective:     Hospital/ICU Course:  No notes on file    Interval History/Significant Events:   NAEO  Scheduled for trach/PEG/permcath today  Tube feeds and heparin off since midnight    Follow-up For: Procedure(s) (LRB):  REPLACEMENT, AORTIC VALVE, WITH REPEAT STERNOTOMY (N/A)    Post-Operative Day: 15 Days Post-Op    Objective:     Vital Signs (Most Recent):  Temp: 98 °F (36.7 °C) (01/21/22 0300)  Pulse: 80 (01/21/22 0742)  Resp: (!) 30 (01/21/22 0742)  BP: (!) 107/56 (01/21/22 0600)  SpO2: 100 % (01/21/22 0742) Vital Signs (24h Range):  Temp:  [97.5 °F (36.4 °C)-98.6 °F (37 °C)] 98 °F (36.7 °C)  Pulse:  [79-84] 80  Resp:  [16-38] 30  SpO2:  [92 %-100 %] 100 %  BP: ()/(53-69) 107/56  Arterial Line BP: ()/(43-65) 111/55     Weight: 44.8 kg (98 lb 12.3 oz)  Body mass index is 18.66 kg/m².      Intake/Output Summary (Last 24 hours) at 1/21/2022 0954  Last data filed at 1/21/2022 0600  Gross per 24 hour   Intake 2687.31 ml   Output 2860 ml   Net -172.69 ml       Physical Exam  Constitutional:       General: She is not in acute distress.     Comments: Intubated and sedated   HENT:      Head: Normocephalic and atraumatic.   Neck:      Comments: R IJ Trialysis  Cardiovascular:      Rate and Rhythm: Normal rate and regular rhythm.      Pulses: Normal pulses.      Comments: Midline sternotomy incision c/d/I  Pacemaker in place. Paced at 80  Pulmonary:      Effort: Pulmonary effort is normal. No respiratory distress.      Comments: intubated  Abdominal:      General: Abdomen is flat. There is no distension.      Palpations: Abdomen is soft.      Tenderness: There is no  abdominal tenderness.   Musculoskeletal:      Right lower leg: No edema.      Left lower leg: No edema.      Comments: R radial art line in place   Skin:     General: Skin is warm.      Capillary Refill: Capillary refill takes less than 2 seconds.   Neurological:      General: No focal deficit present.      Mental Status: She is alert.         Vents:  Vent Mode: A/C (01/21/22 0742)  Ventilator Initiated: Yes (01/18/22 1636)  Set Rate: 30 BPM (01/21/22 0742)  Vt Set: 330 mL (01/21/22 0742)  Pressure Support: 10 cmH20 (01/20/22 0330)  PEEP/CPAP: 7 cmH20 (01/21/22 0742)  Oxygen Concentration (%): 40 (01/21/22 0742)  Peak Airway Pressure: 30 cmH2O (01/21/22 0742)  Plateau Pressure: 30 cmH20 (01/21/22 0742)  Total Ve: 10.5 mL (01/21/22 0742)  Negative Inspiratory Force (cm H2O): 0 (01/21/22 0742)  F/VT Ratio<105 (RSBI): (!) 85.96 (01/21/22 0742)    Lines/Drains/Airways     Central Venous Catheter Line            Trialysis (Dialysis) Catheter 01/08/22 1027 right internal jugular 12 days          Drain                 NG/OG Tube 01/18/22 1030 Left nostril 2 days          Airway               Airway Anesthesia 01/12/22 8 days         Airway - Non-Surgical 01/18/22 1634 Endotracheal Tube 2 days          Arterial Line            Arterial Line 01/05/22 0855 Right Radial 16 days          Peripheral Intravenous Line                 Midline Catheter Insertion/Assessment  - Single Lumen 01/10/22 1300 Left cephalic vein (lateral side of arm) 18g x 8cm 10 days         Peripheral IV - Single Lumen 01/16/22 0430 20 G Anterior;Distal;Right Wrist 5 days                Significant Labs:    CBC/Anemia Profile:  Recent Labs   Lab 01/20/22  0316 01/20/22  0429 01/21/22  0317 01/21/22  0355   WBC 12.43  --  10.61  --    HGB 8.2*  --  8.0*  --    HCT 26.5* 25* 25.5* 25*   *  --  102*  --    MCV 96  --  96  --    RDW 17.9*  --  17.9*  --         Chemistries:  Recent Labs   Lab 01/20/22  0316 01/20/22  0822 01/20/22  2214 01/21/22  0317  01/21/22  0825     --  139 136  --    K 3.9   < > 4.2  4.2 4.3 5.5*     --  106 104  --    CO2 21*  --  20* 22*  --    BUN 35*  --  34* 12  --    CREATININE 1.8*  --  1.7* 0.8  --    CALCIUM 8.0*  --  8.1* 8.6*  --    ALBUMIN 2.6*  --  2.6* 2.7*  --    PROT 6.1  --   --  6.5  --    BILITOT 1.0  --   --  1.0  --    ALKPHOS 111  --   --  104  --    ALT 14  --   --  24  --    AST 44*  --   --  50*  --    MG 2.0  --  1.9 1.9  --    PHOS 4.4  --  4.1 2.0*  --     < > = values in this interval not displayed.       All pertinent labs within the past 24 hours have been reviewed.    Significant Imaging:  I have reviewed all pertinent imaging results/findings within the past 24 hours.    Assessment/Plan:     * S/P aortic valve replacement  Orion Ty is a 76 y.o. female who presents to the SICU s/p redo aortic valve replacement with mechanical valve on 1/5/22.      Neuro/Psych:   -- Sedation: none  -- Pain: Liquid Tiffani, breakthrough fentanyl pushes q2     Cards:   -- Pressors: Off  -- Goal MAP: 60-80  -- statin  -- heparin drip, PTT goal 60-80. Off since midnight  -- Paced HR 80      Pulm:   -- Goal O2 sat > 90%  -- reintubated 1/12 then 1/18  -- now on 30%/5PEEP  -- ABG PRN  -- SBT  -- 2 mediastinal removed 1/9 L Pleural CT removed 1/8  -- Plan for trach/peg/permacath today with Gen surg      Renal:  -- Mireles removed  -- net even yesterday  -- bladder scan every shift  -- Oliguric   -- Nephrology following      FEN / GI:   -- Replace lytes as needed  -- OG in place  -- Nutrition: tube feeds novasource renal, goal of 35      ID:   -- WBC 10.6  -- Antibiotics: periop ancef complete. Zosyn and vanc, stopped 1/20      Heme/Onc:   -- Hgb 8  -- platelets 102  -- 3U RBC, 2U FFP, 2U platelets in OR  -- Daily CBC  -- Transfused products: 1U RBC on 1/5/22. 1u RBC and 1u FFP on 1/9/21. 2U RBC on 1/12. 4 FFP on 1/13. 1 FFP on 1/17  -- Anticoagulation:heparin drip   -- INR 1.2   -- heparin drip, PTT 60-80 after  OR      Endo:   -- Gluc goal 140-180  -- Insulin per endocrinology      PPx:   Feeding: tube feeds  Analgesia/Sedation: Tiffani, fent pushes / none  Thromboembolic prevention: heparin drip  HOB >30: yes  Stress Ulcer ppx: protonix BID  Glucose control: Critical care goal 140-180 g/dl, ISS    Lines/Drains/Airway: Art line, RIJ Trialysis, ETT       Dispo/Code Status/Palliative:   -- SICU / Full Code                 Critical care was time spent personally by me on the following activities: development of treatment plan with patient or surrogate and bedside caregivers, discussions with consultants, evaluation of patient's response to treatment, examination of patient, ordering and performing treatments and interventions, ordering and review of laboratory studies, ordering and review of radiographic studies, pulse oximetry, re-evaluation of patient's condition.  This critical care time did not overlap with that of any other provider or involve time for any procedures.     Caleb Garcia MD  Critical Care - Surgery  [facility]

## 2022-01-21 NOTE — CARE UPDATE
BG goal 140 - 180   Diet NPO Except for: Sips with Medication  Day of Surgery- Trach/ PEG/ Permacath placement.    BG stable this AM. TF held at midnight for procedure today. Endo will continue to follow, and manage glycemic control while inpatient.     - Novolog to 4 units q 4 hrs while on tube feeds. HOLD if tube feeds are stoped or BG < 100.   - Low Dose SQ Insulin Correction Scale PRN hyperglycemia.   - BG Monitoring q 4 hrs while NPO/tube feeds    ** Please call Endocrine for any BG related issues **  ** Please notify Endocrine for any change and/or advance in diet**    Lab Results   Component Value Date    HGBA1C 6.0 (H) 01/03/2022       Discharge Planning:   TBD. Please notify endocrinology prior to discharge.

## 2022-01-21 NOTE — ASSESSMENT & PLAN NOTE
Orion Ty is a 76 y.o. female who presents to the SICU s/p redo aortic valve replacement with mechanical valve on 1/5/22.      Neuro/Psych:   -- Sedation: none  -- Pain: Liquid Tiffani, breakthrough fentanyl pushes q2     Cards:   -- Pressors: Off  -- Goal MAP: 60-80  -- statin  -- heparin drip, PTT goal 60-80. Off since midnight  -- Paced HR 80      Pulm:   -- Goal O2 sat > 90%  -- reintubated 1/12 then 1/18  -- now on 30%/5PEEP  -- ABG PRN  -- SBT  -- 2 mediastinal removed 1/9 L Pleural CT removed 1/8  -- Plan for trach/peg/permacath today with Gen surg      Renal:  -- Mireles removed  -- net even yesterday  -- bladder scan every shift  -- Oliguric   -- Nephrology following      FEN / GI:   -- Replace lytes as needed  -- OG in place  -- Nutrition: tube feeds novasource renal, goal of 35      ID:   -- WBC 10.6  -- Antibiotics: periop ancef complete. Zosyn and vanc, stopped 1/20      Heme/Onc:   -- Hgb 8  -- platelets 102  -- 3U RBC, 2U FFP, 2U platelets in OR  -- Daily CBC  -- Transfused products: 1U RBC on 1/5/22. 1u RBC and 1u FFP on 1/9/21. 2U RBC on 1/12. 4 FFP on 1/13. 1 FFP on 1/17  -- Anticoagulation:heparin drip   -- INR 1.2   -- heparin drip, PTT 60-80 after OR      Endo:   -- Gluc goal 140-180  -- Insulin per endocrinology      PPx:   Feeding: tube feeds  Analgesia/Sedation: Tiffani, fent pushes / none  Thromboembolic prevention: heparin drip  HOB >30: yes  Stress Ulcer ppx: protonix BID  Glucose control: Critical care goal 140-180 g/dl, ISS    Lines/Drains/Airway: Art line, RIJ Trialysis, ETT       Dispo/Code Status/Palliative:   -- SICU / Full Code

## 2022-01-21 NOTE — TREATMENT PLAN
General Surgery Treatment Plan    NAEON  Patient to go to OR today for trach, PEG, permacath  Vent at 40/7  Tube feeds and heparin gtt held    Routine Post-Op PEG Care:   · Please keep tube to gravity for the next 24 hours.  · May administer meds after 6 hours and clamp for 30 minutes after medication administration.  · We will give further recs about when to restart tube feeds.   · Please notify General Surgery with any significant changes in patient's vital signs within the first 24 hours after PEG placement.  · Please call with questions about PEG tube.     Case discussed with Dr. Storey.         Karthik Oakes PA-C  General Surgery   [facility]

## 2022-01-21 NOTE — SUBJECTIVE & OBJECTIVE
Interval History/Significant Events:   NAEO  Scheduled for trach/PEG/permcath today  Tube feeds and heparin off since midnight    Follow-up For: Procedure(s) (LRB):  REPLACEMENT, AORTIC VALVE, WITH REPEAT STERNOTOMY (N/A)    Post-Operative Day: 15 Days Post-Op    Objective:     Vital Signs (Most Recent):  Temp: 98 °F (36.7 °C) (01/21/22 0300)  Pulse: 80 (01/21/22 0742)  Resp: (!) 30 (01/21/22 0742)  BP: (!) 107/56 (01/21/22 0600)  SpO2: 100 % (01/21/22 0742) Vital Signs (24h Range):  Temp:  [97.5 °F (36.4 °C)-98.6 °F (37 °C)] 98 °F (36.7 °C)  Pulse:  [79-84] 80  Resp:  [16-38] 30  SpO2:  [92 %-100 %] 100 %  BP: ()/(53-69) 107/56  Arterial Line BP: ()/(43-65) 111/55     Weight: 44.8 kg (98 lb 12.3 oz)  Body mass index is 18.66 kg/m².      Intake/Output Summary (Last 24 hours) at 1/21/2022 0954  Last data filed at 1/21/2022 0600  Gross per 24 hour   Intake 2687.31 ml   Output 2860 ml   Net -172.69 ml       Physical Exam  Constitutional:       General: She is not in acute distress.     Comments: Intubated and sedated   HENT:      Head: Normocephalic and atraumatic.   Neck:      Comments: R IJ Trialysis  Cardiovascular:      Rate and Rhythm: Normal rate and regular rhythm.      Pulses: Normal pulses.      Comments: Midline sternotomy incision c/d/I  Pacemaker in place. Paced at 80  Pulmonary:      Effort: Pulmonary effort is normal. No respiratory distress.      Comments: intubated  Abdominal:      General: Abdomen is flat. There is no distension.      Palpations: Abdomen is soft.      Tenderness: There is no abdominal tenderness.   Musculoskeletal:      Right lower leg: No edema.      Left lower leg: No edema.      Comments: R radial art line in place   Skin:     General: Skin is warm.      Capillary Refill: Capillary refill takes less than 2 seconds.   Neurological:      General: No focal deficit present.      Mental Status: She is alert.         Vents:  Vent Mode: A/C (01/21/22 0742)  Ventilator  Initiated: Yes (01/18/22 1636)  Set Rate: 30 BPM (01/21/22 0742)  Vt Set: 330 mL (01/21/22 0742)  Pressure Support: 10 cmH20 (01/20/22 0330)  PEEP/CPAP: 7 cmH20 (01/21/22 0742)  Oxygen Concentration (%): 40 (01/21/22 0742)  Peak Airway Pressure: 30 cmH2O (01/21/22 0742)  Plateau Pressure: 30 cmH20 (01/21/22 0742)  Total Ve: 10.5 mL (01/21/22 0742)  Negative Inspiratory Force (cm H2O): 0 (01/21/22 0742)  F/VT Ratio<105 (RSBI): (!) 85.96 (01/21/22 0742)    Lines/Drains/Airways     Central Venous Catheter Line            Trialysis (Dialysis) Catheter 01/08/22 1027 right internal jugular 12 days          Drain                 NG/OG Tube 01/18/22 1030 Left nostril 2 days          Airway               Airway Anesthesia 01/12/22 8 days         Airway - Non-Surgical 01/18/22 1634 Endotracheal Tube 2 days          Arterial Line            Arterial Line 01/05/22 0855 Right Radial 16 days          Peripheral Intravenous Line                 Midline Catheter Insertion/Assessment  - Single Lumen 01/10/22 1300 Left cephalic vein (lateral side of arm) 18g x 8cm 10 days         Peripheral IV - Single Lumen 01/16/22 0430 20 G Anterior;Distal;Right Wrist 5 days                Significant Labs:    CBC/Anemia Profile:  Recent Labs   Lab 01/20/22 0316 01/20/22  0429 01/21/22 0317 01/21/22  0355   WBC 12.43  --  10.61  --    HGB 8.2*  --  8.0*  --    HCT 26.5* 25* 25.5* 25*   *  --  102*  --    MCV 96  --  96  --    RDW 17.9*  --  17.9*  --         Chemistries:  Recent Labs   Lab 01/20/22 0316 01/20/22  0822 01/20/22  2214 01/21/22 0317 01/21/22  0825     --  139 136  --    K 3.9   < > 4.2  4.2 4.3 5.5*     --  106 104  --    CO2 21*  --  20* 22*  --    BUN 35*  --  34* 12  --    CREATININE 1.8*  --  1.7* 0.8  --    CALCIUM 8.0*  --  8.1* 8.6*  --    ALBUMIN 2.6*  --  2.6* 2.7*  --    PROT 6.1  --   --  6.5  --    BILITOT 1.0  --   --  1.0  --    ALKPHOS 111  --   --  104  --    ALT 14  --   --  24  --    AST 44*   --   --  50*  --    MG 2.0  --  1.9 1.9  --    PHOS 4.4  --  4.1 2.0*  --     < > = values in this interval not displayed.       All pertinent labs within the past 24 hours have been reviewed.    Significant Imaging:  I have reviewed all pertinent imaging results/findings within the past 24 hours.

## 2022-01-21 NOTE — PLAN OF CARE
[facility]  Discharge Reassessment    Primary Care Provider: Otis Zimmer MD    Expected Discharge Date: 1/25/2022    Reassessment (most recent)     Discharge Reassessment - 01/21/22 1109        Discharge Reassessment    Assessment Type Discharge Planning Brief Assessment     Did the patient's condition or plan change since previous assessment? Yes   OR 1-21-22    Communicated MENDEL with patient/caregiver Date not available/Unable to determine     Discharge Plan A Long-term acute care facility (LTAC)     Discharge Plan B Skilled Nursing Facility     DME Needed Upon Discharge  other (see comments)   TBD    Discharge Barriers Identified None     Why the patient remains in the hospital Requires continued medical care        Post-Acute Status    Post-Acute Authorization Placement     Post-Acute Placement Status Pending medical clearance/testing               Per MD's Note, NAEO  Scheduled for trach/PEG/permcath today  Tube feeds and heparin off since midnight    The patient is not medically stable to discharge at this time and remains in SICU. The patient is in the OR 1-21-22. The SW will continue to follow-up with the patient as needed to assist with discharge planning needs.    Shen Simmons LMSW  Case Management [facility]  Ext: [phone]

## 2022-01-22 NOTE — PROGRESS NOTES
[facility]  Critical Care - Surgery  Progress Note    Patient Name: Orion Ty  MRN: [id number]  Admission Date: 1/5/2022  Hospital Length of Stay: 17 days  Code Status: Full Code  Attending Provider: Dennis Haider MD  Primary Care Provider: Otis Zimmer MD   Principal Problem: S/P aortic valve replacement    Subjective:     Hospital/ICU Course:  No notes on file    Interval History/Significant Events:   NAEO  S/p Trach, PEG, Permcath today  Off pressors  Heparin gtt restarted yesterday evening. Approaching therapeutic level.    Follow-up For: Procedure(s) (LRB):  CREATION, TRACHEOSTOMY (N/A)  EGD, WITH PEG TUBE INSERTION (N/A)  INSERTION-CATHETER-ROSELINE (N/A)    Post-Operative Day: 1 Day Post-Op    Objective:     Vital Signs (Most Recent):  Temp: 98.1 °F (36.7 °C) (01/22/22 0300)  Pulse: 80 (01/22/22 0800)  Resp: (!) 29 (01/22/22 0800)  BP: (!) 119/58 (01/22/22 0800)  SpO2: 100 % (01/22/22 0800) Vital Signs (24h Range):  Temp:  [98 °F (36.7 °C)-98.5 °F (36.9 °C)] 98.1 °F (36.7 °C)  Pulse:  [] 80  Resp:  [20-43] 29  SpO2:  [95 %-100 %] 100 %  BP: ()/(49-83) 119/58  Arterial Line BP: ()/(38-76) 124/56     Weight: 44.8 kg (98 lb 12.3 oz)  Body mass index is 18.66 kg/m².      Intake/Output Summary (Last 24 hours) at 1/22/2022 0930  Last data filed at 1/22/2022 0700  Gross per 24 hour   Intake 2999.26 ml   Output 3282 ml   Net -282.74 ml       Physical Exam  Constitutional:       General: She is not in acute distress.  HENT:      Head: Normocephalic and atraumatic.   Neck:      Comments: R IJ Trialysis  Trach  Cardiovascular:      Rate and Rhythm: Normal rate and regular rhythm.      Pulses: Normal pulses.      Comments: Midline sternotomy incision c/d/I  Pacemaker in place. Paced at 80  Pulmonary:      Effort: Pulmonary effort is normal. No respiratory distress.      Comments: intubated  Abdominal:      General: Abdomen is flat. There is no distension.       Palpations: Abdomen is soft.      Tenderness: There is no abdominal tenderness.      Comments: Peg site c/d/i  Soft abdomen   Musculoskeletal:      Right lower leg: No edema.      Left lower leg: No edema.      Comments: R radial art line in place   Skin:     General: Skin is warm.      Capillary Refill: Capillary refill takes less than 2 seconds.   Neurological:      General: No focal deficit present.      Mental Status: She is alert.         Vents:  Vent Mode: A/C (01/22/22 0752)  Ventilator Initiated: Yes (01/18/22 1636)  Set Rate: 28 BPM (01/22/22 0752)  Vt Set: 330 mL (01/22/22 0752)  Pressure Support: 10 cmH20 (01/22/22 0107)  PEEP/CPAP: 5 cmH20 (01/22/22 0752)  Oxygen Concentration (%): 30 (01/22/22 0800)  Peak Airway Pressure: 28 cmH2O (01/22/22 0752)  Plateau Pressure: 30 cmH20 (01/22/22 0752)  Total Ve: 9.6 mL (01/22/22 0752)  Negative Inspiratory Force (cm H2O): 0 (01/21/22 1600)  F/VT Ratio<105 (RSBI): (!) 83.82 (01/22/22 0752)    Lines/Drains/Airways     Central Venous Catheter Line            Trialysis (Dialysis) Catheter 01/08/22 1027 right internal jugular 13 days    Permacath 01/21/22 1013 left internal jugular <1 day          Drain                 Gastrostomy/Enterostomy 01/21/22 1112 Percutaneous endoscopic gastrostomy (PEG) <1 day          Airway               Airway Anesthesia 01/12/22 9 days         Surgical Airway 01/21/22 1043 Shiley Cuffed <1 day          Arterial Line            Arterial Line 01/05/22 0855 Right Radial 17 days          Peripheral Intravenous Line                 Midline Catheter Insertion/Assessment  - Single Lumen 01/10/22 1300 Left cephalic vein (lateral side of arm) 18g x 8cm 11 days         Peripheral IV - Single Lumen 01/16/22 0430 20 G Anterior;Distal;Right Wrist 6 days                Significant Labs:    CBC/Anemia Profile:  Recent Labs   Lab 01/21/22  0317 01/21/22  0355 01/21/22  1440 01/22/22  0317 01/22/22  0401   WBC 10.61  --  9.16 17.93*  --    HGB 8.0*  --   7.5* 8.7*  --    HCT 25.5*   < > 24.2* 28.8* 37   *  --  92* 134*  --    MCV 96  --  96 99*  --    RDW 17.9*  --  18.0* 18.5*  --     < > = values in this interval not displayed.        Chemistries:  Recent Labs   Lab 01/21/22  0317 01/21/22  0825 01/21/22  1440 01/21/22  2326 01/22/22 0317      < > 135*  135* 137 140   K 4.3   < > 4.8  4.8 4.9 5.0      < > 102  102 103 106   CO2 22*   < > 22*  22* 20* 19*   BUN 12   < > 17  17 8 4*   CREATININE 0.8   < > 1.3  1.3 0.7 0.6   CALCIUM 8.6*   < > 10.0  10.0 8.3* 8.5*   ALBUMIN 2.7*   < > 2.5*  2.5* 3.0* 3.1*   PROT 6.5  --   --   --   --    BILITOT 1.0  --   --   --   --    ALKPHOS 104  --   --   --   --    ALT 24  --   --   --   --    AST 50*  --   --   --   --    MG 1.9   < > 2.0  2.0 2.1 2.0   PHOS 2.0*   < > 5.3*  5.3* 3.0 2.1*  2.1*    < > = values in this interval not displayed.       All pertinent labs within the past 24 hours have been reviewed.    Significant Imaging:  I have reviewed all pertinent imaging results/findings within the past 24 hours.    Assessment/Plan:     * S/P aortic valve replacement  Orion Ty is a 76 y.o. female who presents to the SICU s/p redo aortic valve replacement with mechanical valve on 1/5/22.      Neuro/Psych:   -- Sedation: none  -- Pain: Liquid Tiffani, breakthrough fentanyl pushes q2     Cards:   -- Pressors: Off  -- Goal MAP: 60-80  -- statin  -- heparin drip, PTT goal 60-80. Off since midnight  -- Paced HR 80      Pulm:   -- Goal O2 sat > 90%  -- reintubated 1/12 then 1/18  -- Spontaneous when able  -- ABG PRN  -- 2 mediastinal removed 1/9 L Pleural CT removed 1/8  -- S/p trach 1/21      Renal:  -- Mireles removed  -- net negative 700 cc  -- bladder scan every shift  -- Oliguric   -- Nephrology following  -- Permcath 1/21      FEN / GI:   -- Replace lytes as needed  -- OG in place  -- Nutrition: tube feeds novasource renal, advance back to goal today      ID:   -- WBC 17.9  --  Antibiotics: periop ancef complete. Zosyn and vanc, stopped 1/20      Heme/Onc:   -- Hgb stable  -- 3U RBC, 2U FFP, 2U platelets in OR  -- Daily CBC  -- Transfused products: 1U RBC on 1/5/22. 1u RBC and 1u FFP on 1/9/21. 2U RBC on 1/12. 4 FFP on 1/13. 1 FFP on 1/17  -- Anticoagulation: heparin drip   -- heparin drip, PTT 60-80 after OR      Endo:   -- Gluc goal 140-180  -- Insulin per endocrinology      PPx:   Feeding: tube feeds  Analgesia/Sedation: Tiffani, fent pushes / none  Thromboembolic prevention: heparin drip  HOB >30: yes  Stress Ulcer ppx: protonix BID  Glucose control: Critical care goal 140-180 g/dl, ISS    Lines/Drains/Airway: Art line, RIJ Trialysis, Trach, Permcath, PEG      Dispo/Code Status/Palliative:   -- SICU / Full Code          Critical care was time spent personally by me on the following activities: development of treatment plan with patient or surrogate and bedside caregivers, discussions with consultants, evaluation of patient's response to treatment, examination of patient, ordering and performing treatments and interventions, ordering and review of laboratory studies, ordering and review of radiographic studies, pulse oximetry, re-evaluation of patient's condition.  This critical care time did not overlap with that of any other provider or involve time for any procedures.     Graham Alfaro MD  Critical Care - Surgery  [facility]

## 2022-01-22 NOTE — PLAN OF CARE
Plan of Care Note  Cardiothoracic Surgery    S/p redo AVR on 1/5/22    Specific issues: trach/peg completed, restart tube feeds, move to spontaneous ventilation, rehab    Plan of care for patient was discussed with ICU staff including nurses, residents, and faculty and appropriate consulting services.    Will continue to monitor patient's hemodynamics, functionality, neuro status, fluid status and renal function, and labs and will adjust medications and fluids as necessary while monitoring appropriateness for de-escalation of support and monitoring and transport to stepdown unit.    Jojo Kauffman MD, PGY-VI  Cardiothoracic Surgery  [phone]

## 2022-01-22 NOTE — SUBJECTIVE & OBJECTIVE
Interval History/Significant Events:   NAEO  S/p Trach, PEG, Permcath today  Off pressors  Heparin gtt restarted yesterday evening. Approaching therapeutic level.    Follow-up For: Procedure(s) (LRB):  CREATION, TRACHEOSTOMY (N/A)  EGD, WITH PEG TUBE INSERTION (N/A)  INSERTION-CATHETER-ROSELINE (N/A)    Post-Operative Day: 1 Day Post-Op    Objective:     Vital Signs (Most Recent):  Temp: 98.1 °F (36.7 °C) (01/22/22 0300)  Pulse: 80 (01/22/22 0800)  Resp: (!) 29 (01/22/22 0800)  BP: (!) 119/58 (01/22/22 0800)  SpO2: 100 % (01/22/22 0800) Vital Signs (24h Range):  Temp:  [98 °F (36.7 °C)-98.5 °F (36.9 °C)] 98.1 °F (36.7 °C)  Pulse:  [] 80  Resp:  [20-43] 29  SpO2:  [95 %-100 %] 100 %  BP: ()/(49-83) 119/58  Arterial Line BP: ()/(38-76) 124/56     Weight: 44.8 kg (98 lb 12.3 oz)  Body mass index is 18.66 kg/m².      Intake/Output Summary (Last 24 hours) at 1/22/2022 0930  Last data filed at 1/22/2022 0700  Gross per 24 hour   Intake 2999.26 ml   Output 3282 ml   Net -282.74 ml       Physical Exam  Constitutional:       General: She is not in acute distress.  HENT:      Head: Normocephalic and atraumatic.   Neck:      Comments: R IJ Trialysis  Trach  Cardiovascular:      Rate and Rhythm: Normal rate and regular rhythm.      Pulses: Normal pulses.      Comments: Midline sternotomy incision c/d/I  Pacemaker in place. Paced at 80  Pulmonary:      Effort: Pulmonary effort is normal. No respiratory distress.      Comments: intubated  Abdominal:      General: Abdomen is flat. There is no distension.      Palpations: Abdomen is soft.      Tenderness: There is no abdominal tenderness.      Comments: Peg site c/d/i  Soft abdomen   Musculoskeletal:      Right lower leg: No edema.      Left lower leg: No edema.      Comments: R radial art line in place   Skin:     General: Skin is warm.      Capillary Refill: Capillary refill takes less than 2 seconds.   Neurological:      General: No focal deficit present.       Mental Status: She is alert.         Vents:  Vent Mode: A/C (01/22/22 0752)  Ventilator Initiated: Yes (01/18/22 1636)  Set Rate: 28 BPM (01/22/22 0752)  Vt Set: 330 mL (01/22/22 0752)  Pressure Support: 10 cmH20 (01/22/22 0107)  PEEP/CPAP: 5 cmH20 (01/22/22 0752)  Oxygen Concentration (%): 30 (01/22/22 0800)  Peak Airway Pressure: 28 cmH2O (01/22/22 0752)  Plateau Pressure: 30 cmH20 (01/22/22 0752)  Total Ve: 9.6 mL (01/22/22 0752)  Negative Inspiratory Force (cm H2O): 0 (01/21/22 1600)  F/VT Ratio<105 (RSBI): (!) 83.82 (01/22/22 0752)    Lines/Drains/Airways     Central Venous Catheter Line            Trialysis (Dialysis) Catheter 01/08/22 1027 right internal jugular 13 days    Permacath 01/21/22 1013 left internal jugular <1 day          Drain                 Gastrostomy/Enterostomy 01/21/22 1112 Percutaneous endoscopic gastrostomy (PEG) <1 day          Airway               Airway Anesthesia 01/12/22 9 days         Surgical Airway 01/21/22 1043 Shiley Cuffed <1 day          Arterial Line            Arterial Line 01/05/22 0855 Right Radial 17 days          Peripheral Intravenous Line                 Midline Catheter Insertion/Assessment  - Single Lumen 01/10/22 1300 Left cephalic vein (lateral side of arm) 18g x 8cm 11 days         Peripheral IV - Single Lumen 01/16/22 0430 20 G Anterior;Distal;Right Wrist 6 days                Significant Labs:    CBC/Anemia Profile:  Recent Labs   Lab 01/21/22  0317 01/21/22  0355 01/21/22  1440 01/22/22  0317 01/22/22  0401   WBC 10.61  --  9.16 17.93*  --    HGB 8.0*  --  7.5* 8.7*  --    HCT 25.5*   < > 24.2* 28.8* 37   *  --  92* 134*  --    MCV 96  --  96 99*  --    RDW 17.9*  --  18.0* 18.5*  --     < > = values in this interval not displayed.        Chemistries:  Recent Labs   Lab 01/21/22  0317 01/21/22  0825 01/21/22  1440 01/21/22  2326 01/22/22 0317      < > 135*  135* 137 140   K 4.3   < > 4.8  4.8 4.9 5.0      < > 102  102 103 106   CO2 22*    < > 22*  22* 20* 19*   BUN 12   < > 17  17 8 4*   CREATININE 0.8   < > 1.3  1.3 0.7 0.6   CALCIUM 8.6*   < > 10.0  10.0 8.3* 8.5*   ALBUMIN 2.7*   < > 2.5*  2.5* 3.0* 3.1*   PROT 6.5  --   --   --   --    BILITOT 1.0  --   --   --   --    ALKPHOS 104  --   --   --   --    ALT 24  --   --   --   --    AST 50*  --   --   --   --    MG 1.9   < > 2.0  2.0 2.1 2.0   PHOS 2.0*   < > 5.3*  5.3* 3.0 2.1*  2.1*    < > = values in this interval not displayed.       All pertinent labs within the past 24 hours have been reviewed.    Significant Imaging:  I have reviewed all pertinent imaging results/findings within the past 24 hours.

## 2022-01-22 NOTE — ASSESSMENT & PLAN NOTE
-oliguric ischemic ATN likely secondary to severe intraoperative hypotension requiring epi, norepi, and vaso intraoperatively 1/5/22  -BL sCr 0.8  -creatinine worsened post op delayed likely due to hemodilution  -failed high dose duretic therapy and KRT started 1/9/22 due to volume overload  -reintubated 1/12/22  -last 24 hours 200cc net negative  -persistent pleural effusion and prominent vasculature on CXR though stable fio2 of 30  -repeat KRT tonight for UF and clearance, decrease k to 3 and increase bicarb to 35  -SLED 8h goal net neg 0-500cc fluid balance

## 2022-01-22 NOTE — CARE UPDATE
BG goal 140 - 180   Diet NPO Except for: Sips with Medication  1 Day Post-Op- Trach/ PEG/ Permacath placement.    BG stable this AM.  Endo will continue to follow, and manage glycemic control while inpatient.     - Novolog to 4 units q 4 hrs while on tube feeds. HOLD if tube feeds are stoped or BG < 100.   - Low Dose SQ Insulin Correction Scale PRN hyperglycemia.   - BG Monitoring q 4 hrs while NPO/tube feeds    ** Please call Endocrine for any BG related issues **  ** Please notify Endocrine for any change and/or advance in diet**    Lab Results   Component Value Date    HGBA1C 6.0 (H) 01/03/2022       Discharge Planning:   TBD. Please notify endocrinology prior to discharge.

## 2022-01-22 NOTE — PROGRESS NOTES
General Surgery Brief Progress Note    Patient seen and examined at bedside. No major issues overnight. Some discomfort off sedation and on Vent    -- Ok to begin using PEG for tube feedings today.   -- Please flush tube to maintain patency after every feeding or med admin.   -- 6 Fr cuffed shiley in place. Ok to downsize to cuffless at LTAC after 2 weeks if off ventilator    Call with questions, will sign off    Nate Edwards MD   Pager: [phone]  General Surgery PGY-V  [facility]

## 2022-01-22 NOTE — PROGRESS NOTES
01/22/22 0400   Treatment   Treatment Type SLED   Treatment Status Discontinued treatment   Dialysis Machine Number [id number]   Dialyzer Time (hours) 8.19   BVP (Liters) 75.5 L   Solutions Labeled and Current  Yes   Access Temporary Cath   Catheter Dressing Intact  Yes   Alarms Engaged Yes   CRRT Comments SLED completed   CRRT Hourly Documentation   Total UF (Hourly Cleared) (mL) 199   CRRT Sodium Chloride   CRRT Sodium Chloride Volume 200.07 mL   SLED 8 hour tx completed. All blood rinsed back by primary nurse. No reported issues

## 2022-01-22 NOTE — NURSING
MD Jose notified patient in AFib with HR 130s ans sustaining. 5mg metoprolol given. Patient responded appropriately and converted to NSR with HR 80-90s. Precedex started for increased restlessness.

## 2022-01-22 NOTE — PROGRESS NOTES
[facility]  Nephrology  Progress Note    Patient Name: Orion Ty  MRN: [id number]  Admission Date: 1/5/2022  Hospital Length of Stay: 17 days  Attending Provider: Dennis Haider MD   Primary Care Physician: Otis Zimmer MD  Principal Problem:S/P aortic valve replacement    Subjective:     HPI: Orion Ty is our 76 y.o. female with previous aortic valve replacement, mitral valve replacement, and tricuspid valve repair in 2010 who presented on 1/5 for redo aortic valve replacement. Nephrology consulted on 1/8 for anuric JO. Patient is alert but tired, son at bedside. Stated she was tired prior to admission. During the hospital stay Cr increase from 1 to 1.5. In OR she received  2.5L crystalloid, 3U RBC, 2U FFP, 2 platelets, and 800 cell saver. she is +5 L since admit. UOP decrease overnight, per nurse she didn't urinate at all. She received diuril 500 mg twice, lasix 20 mg *2 on 1/7. This morning she urinate 225 immediately after placing the tejeda's cath. She received again lasix 100 mg this morning. Upor evaluation she was alert but repeatedly saying she was tired, denied any pain. Denied any previous hx of kidney disease.       Interval History: negative 200 cc last 24 hours, CXR poor though likely persistent effusion and pulmonary edema, required vaso over night, fio2 stable at 30    Review of patient's allergies indicates:  No Known Allergies  Current Facility-Administered Medications   Medication Frequency    0.9%  NaCl infusion (CRRT USE ONLY) Continuous    0.9%  NaCl infusion (for blood administration) Q24H PRN    acetaminophen oral solution 650 mg Q8H    albuterol-ipratropium 2.5 mg-0.5 mg/3 mL nebulizer solution 3 mL Q6H    aspirin suppository 300 mg Once PRN    atorvastatin tablet 40 mg Daily    bisacodyL suppository 10 mg Daily PRN    dexmedetomidine (PRECEDEX) 400mcg/100mL 0.9% NaCL infusion Continuous    dextrose 10 % infusion Continuous PRN    dextrose  50% injection 12.5 g PRN    fentaNYL 50 mcg/mL injection 25 mcg Q2H PRN    fluconazole 40 mg/ml suspension 400 mg Daily    glucagon (human recombinant) injection 1 mg PRN    heparin 25,000 units in dextrose 5% 250 mL (100 units/mL) infusion (heparin infusion - NO NOMOGRAM) Continuous    insulin aspart U-100 pen 0-5 Units Q4H PRN    insulin aspart U-100 pen 4 Units Q24H    insulin aspart U-100 pen 4 Units Q24H    insulin aspart U-100 pen 4 Units Q24H    insulin aspart U-100 pen 4 Units Q24H    insulin aspart U-100 pen 4 Units Q24H    insulin aspart U-100 pen 4 Units Q24H    LIDOcaine 5 % patch 1 patch Q24H    LIDOcaine HCL 2% jelly PRN    magnesium sulfate 2g in water 50mL IVPB (premix) PRN    melatonin tablet 9 mg Nightly PRN    metoprolol tartrate (LOPRESSOR) split tablet 12.5 mg BID    midodrine tablet 10 mg Q12H    ondansetron injection 4 mg Q12H PRN    oxyCODONE 5 mg/5 mL solution 5 mg Q4H PRN    pantoprazole injection 40 mg Daily    polyethylene glycol packet 17 g Daily    potassium, sodium phosphates 280-160-250 mg packet 2 packet TID PRN    sodium chloride 0.9% bolus 250 mL PRN    sodium chloride 0.9% flush 10 mL PRN    vasopressin (PITRESSIN) 0.2 Units/mL in dextrose 5 % 100 mL infusion Continuous    warfarin (COUMADIN) tablet 2 mg Daily     Facility-Administered Medications Ordered in Other Encounters   Medication Frequency    0.9%  NaCl infusion Continuous       Objective:     Vital Signs (Most Recent):  Temp: 98.1 °F (36.7 °C) (01/22/22 0300)  Pulse: 80 (01/22/22 1207)  Resp: (!) 28 (01/22/22 1207)  BP: (!) 115/56 (01/22/22 1207)  SpO2: 100 % (01/22/22 1207)  O2 Device (Oxygen Therapy): venti mask (01/22/22 1207) Vital Signs (24h Range):  Temp:  [98 °F (36.7 °C)-98.5 °F (36.9 °C)] 98.1 °F (36.7 °C)  Pulse:  [] 80  Resp:  [20-43] 28  SpO2:  [95 %-100 %] 100 %  BP: ()/(49-83) 115/56  Arterial Line BP: ()/(38-76) 125/51     Weight: 44.8 kg (98 lb 12.3 oz)  (01/20/22 1300)  Body mass index is 18.66 kg/m².  Body surface area is 1.39 meters squared.    I/O last 3 completed shifts:  In: 5340.4 [I.V.:3950.4; Blood:250; NG/GT:440; IV Piggyback:700]  Out: 6142 [Other:6142]    Physical Exam  Constitutional:       General: She is not in acute distress.     Appearance: She is ill-appearing.   HENT:      Mouth/Throat:      Mouth: Mucous membranes are moist.   Eyes:      General: No scleral icterus.  Cardiovascular:      Rate and Rhythm: Tachycardia present.   Pulmonary:      Comments: trach  Abdominal:      General: There is no distension.      Palpations: Abdomen is soft.   Musculoskeletal:         General: No deformity.      Right lower leg: No edema.      Left lower leg: No edema.   Skin:     Coloration: Skin is not jaundiced.         Significant Labs:  All labs within the past 24 hours have been reviewed.     Significant Imaging:  Labs: Reviewed  X-Ray: Reviewed    Assessment/Plan:     * S/P aortic valve replacement  -with severe intraoperative hypotension   -per primary    JO (acute kidney injury)  -oliguric ischemic ATN likely secondary to severe intraoperative hypotension requiring epi, norepi, and vaso intraoperatively 1/5/22  -BL sCr 0.8  -creatinine worsened post op delayed likely due to hemodilution  -failed high dose duretic therapy and KRT started 1/9/22 due to volume overload  -reintubated 1/12/22  -last 24 hours 200cc net negative  -persistent pleural effusion and prominent vasculature on CXR though stable fio2 of 30  -repeat KRT tonight for UF and clearance, decrease k to 3 and increase bicarb to 35  -SLED 8h goal net neg 0-500cc fluid balance    ATN (acute tubular necrosis)  -see jo        Oj Clifford MD  Nephrology  [facility]

## 2022-01-22 NOTE — PROGRESS NOTES
01/21/22 1950   Treatment   Treatment Type SLED   Treatment Status Restart   Dialysis Machine Number [id number]   Dialyzer Time (hours) 0   BVP (Liters) 0 L   Solutions Labeled and Current  Yes   Access Temporary Cath;Right;IJ   Catheter Dressing Intact  Yes   Alarms Engaged Yes   CRRT Comments SLED initiated w/o difficulty   Prescription   Time (Hours) 8   Dialysate K + (mEq/L) 4   Dialysate CA + (mEq/L) 2.25   Dialysate HCO3 - (Bicarb) (mEq/L) 30   Dialysate Na + (mEq/L) 138   Cartridge Type   (f16)   Dialysate Flow Rate (mL/min) 200   UF Goal Rate 400 mL/hr   CRRT Hourly Documentation   Blood Flow (mL/min) 150   UF Rate 300 cc/hr   Arterial Pressure (mmHg) -20 mmHg   Venous Pressure (mmHg) 40 mmHg   Effluent Pressure (EP) (mmHg) 90 mmHg   Report received/ SLED 8 hour tx initiated via right IJ cath. UF rate at 300/ml hr. Primary nurse @ bedside

## 2022-01-22 NOTE — PLAN OF CARE
"Shift Events: Patient to OR this am for Trach/PEG/permacath placement.     Neuro: Arouses to Voice, Follows Commands and Moves All Extremities    Vital Signs: /67   Pulse 84   Temp 98.5 °F (36.9 °C) (Oral)   Resp (!) 30   Ht 5' 1" (1.549 m)   Wt 44.8 kg (98 lb 12.3 oz)   SpO2 99%   Breastfeeding No   BMI 18.66 kg/m²     Respiratory: Ventilator Ac 40% 7 PEEP    Diet: NPO    Gtts: Vasopressin and Heparin    Urine Output: anuric on CRRT            "

## 2022-01-22 NOTE — SUBJECTIVE & OBJECTIVE
Interval History: negative 200 cc last 24 hours, CXR poor though likely persistent effusion and pulmonary edema, required vaso over night, fio2 stable at 30    Review of patient's allergies indicates:  No Known Allergies  Current Facility-Administered Medications   Medication Frequency    0.9%  NaCl infusion (CRRT USE ONLY) Continuous    0.9%  NaCl infusion (for blood administration) Q24H PRN    acetaminophen oral solution 650 mg Q8H    albuterol-ipratropium 2.5 mg-0.5 mg/3 mL nebulizer solution 3 mL Q6H    aspirin suppository 300 mg Once PRN    atorvastatin tablet 40 mg Daily    bisacodyL suppository 10 mg Daily PRN    dexmedetomidine (PRECEDEX) 400mcg/100mL 0.9% NaCL infusion Continuous    dextrose 10 % infusion Continuous PRN    dextrose 50% injection 12.5 g PRN    fentaNYL 50 mcg/mL injection 25 mcg Q2H PRN    fluconazole 40 mg/ml suspension 400 mg Daily    glucagon (human recombinant) injection 1 mg PRN    heparin 25,000 units in dextrose 5% 250 mL (100 units/mL) infusion (heparin infusion - NO NOMOGRAM) Continuous    insulin aspart U-100 pen 0-5 Units Q4H PRN    insulin aspart U-100 pen 4 Units Q24H    insulin aspart U-100 pen 4 Units Q24H    insulin aspart U-100 pen 4 Units Q24H    insulin aspart U-100 pen 4 Units Q24H    insulin aspart U-100 pen 4 Units Q24H    insulin aspart U-100 pen 4 Units Q24H    LIDOcaine 5 % patch 1 patch Q24H    LIDOcaine HCL 2% jelly PRN    magnesium sulfate 2g in water 50mL IVPB (premix) PRN    melatonin tablet 9 mg Nightly PRN    metoprolol tartrate (LOPRESSOR) split tablet 12.5 mg BID    midodrine tablet 10 mg Q12H    ondansetron injection 4 mg Q12H PRN    oxyCODONE 5 mg/5 mL solution 5 mg Q4H PRN    pantoprazole injection 40 mg Daily    polyethylene glycol packet 17 g Daily    potassium, sodium phosphates 280-160-250 mg packet 2 packet TID PRN    sodium chloride 0.9% bolus 250 mL PRN    sodium chloride 0.9% flush 10 mL PRN    vasopressin  (PITRESSIN) 0.2 Units/mL in dextrose 5 % 100 mL infusion Continuous    warfarin (COUMADIN) tablet 2 mg Daily     Facility-Administered Medications Ordered in Other Encounters   Medication Frequency    0.9%  NaCl infusion Continuous       Objective:     Vital Signs (Most Recent):  Temp: 98.1 °F (36.7 °C) (01/22/22 0300)  Pulse: 80 (01/22/22 1207)  Resp: (!) 28 (01/22/22 1207)  BP: (!) 115/56 (01/22/22 1207)  SpO2: 100 % (01/22/22 1207)  O2 Device (Oxygen Therapy): venti mask (01/22/22 1207) Vital Signs (24h Range):  Temp:  [98 °F (36.7 °C)-98.5 °F (36.9 °C)] 98.1 °F (36.7 °C)  Pulse:  [] 80  Resp:  [20-43] 28  SpO2:  [95 %-100 %] 100 %  BP: ()/(49-83) 115/56  Arterial Line BP: ()/(38-76) 125/51     Weight: 44.8 kg (98 lb 12.3 oz) (01/20/22 1300)  Body mass index is 18.66 kg/m².  Body surface area is 1.39 meters squared.    I/O last 3 completed shifts:  In: 5340.4 [I.V.:3950.4; Blood:250; NG/GT:440; IV Piggyback:700]  Out: 6142 [Other:6142]    Physical Exam  Constitutional:       General: She is not in acute distress.     Appearance: She is ill-appearing.   HENT:      Mouth/Throat:      Mouth: Mucous membranes are moist.   Eyes:      General: No scleral icterus.  Cardiovascular:      Rate and Rhythm: Tachycardia present.   Pulmonary:      Comments: trach  Abdominal:      General: There is no distension.      Palpations: Abdomen is soft.   Musculoskeletal:         General: No deformity.      Right lower leg: No edema.      Left lower leg: No edema.   Skin:     Coloration: Skin is not jaundiced.         Significant Labs:  All labs within the past 24 hours have been reviewed.     Significant Imaging:  Labs: Reviewed  X-Ray: Reviewed

## 2022-01-22 NOTE — ASSESSMENT & PLAN NOTE
Orion Ty is a 76 y.o. female who presents to the SICU s/p redo aortic valve replacement with mechanical valve on 1/5/22.      Neuro/Psych:   -- Sedation: none  -- Pain: Liquid Tiffani, breakthrough fentanyl pushes q2     Cards:   -- Pressors: Off  -- Goal MAP: 60-80  -- statin  -- heparin drip, PTT goal 60-80. Off since midnight  -- Paced HR 80      Pulm:   -- Goal O2 sat > 90%  -- reintubated 1/12 then 1/18  -- Spontaneous when able  -- ABG PRN  -- 2 mediastinal removed 1/9 L Pleural CT removed 1/8  -- S/p trach 1/21      Renal:  -- Mireles removed  -- net negative 700 cc  -- bladder scan every shift  -- Oliguric   -- Nephrology following  -- Permcath 1/21      FEN / GI:   -- Replace lytes as needed  -- OG in place  -- Nutrition: tube feeds novasource renal, advance back to goal today      ID:   -- WBC 17.9  -- Antibiotics: periop ancef complete. Zosyn and vanc, stopped 1/20      Heme/Onc:   -- Hgb stable  -- 3U RBC, 2U FFP, 2U platelets in OR  -- Daily CBC  -- Transfused products: 1U RBC on 1/5/22. 1u RBC and 1u FFP on 1/9/21. 2U RBC on 1/12. 4 FFP on 1/13. 1 FFP on 1/17  -- Anticoagulation: heparin drip   -- heparin drip, PTT 60-80 after OR      Endo:   -- Gluc goal 140-180  -- Insulin per endocrinology      PPx:   Feeding: tube feeds  Analgesia/Sedation: Tiffani, fent pushes / none  Thromboembolic prevention: heparin drip  HOB >30: yes  Stress Ulcer ppx: protonix BID  Glucose control: Critical care goal 140-180 g/dl, ISS    Lines/Drains/Airway: Art line, RIJ Trialysis, Trach, Permcath, PEG      Dispo/Code Status/Palliative:   -- SICU / Full Code

## 2022-01-22 NOTE — TREATMENT PLAN
General Surgery    S: Successful placement of trach/peg/permacath yesterday. Tolerated well. Getting meds per G tube.     O:  CV: RRR  Neck: Trach, permacath in place clean and dry.   Respit: Mechanically ventilated  GI: G-tube in place, currently clamped from getting meds. Minimal output in bag    A/P:  76F s/p redo AVR with MVR 1/5/22 with complicated post op course. Now s/p trach, peg, and permacath on 1/21/22.     Okay to use G tube for feedings and permacath for dialysis    Laverne Dorman MD  General Surgery, PGY-3  [phone]

## 2022-01-23 NOTE — PROGRESS NOTES
[facility]  Critical Care - Surgery  Progress Note    Patient Name: Orion Ty  MRN: [id number]  Admission Date: 1/5/2022  Hospital Length of Stay: 18 days  Code Status: Full Code  Attending Provider: Dennis Haider MD  Primary Care Provider: Otis Zimmer MD   Principal Problem: S/P aortic valve replacement    Subjective:     Hospital/ICU Course:  No notes on file    Interval History/Significant Events:   NAEO  Off vaso for >24h  More interactive  PTT therapeutic     Follow-up For: Procedure(s) (LRB):  CREATION, TRACHEOSTOMY (N/A)  EGD, WITH PEG TUBE INSERTION (N/A)  INSERTION-CATHETER-ROSELINE (N/A)    Post-Operative Day: 2 Days Post-Op    Objective:     Vital Signs (Most Recent):  Temp: 97.8 °F (36.6 °C) (01/23/22 1500)  Pulse: 84 (01/23/22 1546)  Resp: (!) 36 (01/23/22 1546)  BP: (!) 143/66 (01/23/22 1500)  SpO2: 98 % (01/23/22 1546) Vital Signs (24h Range):  Temp:  [97.7 °F (36.5 °C)-98 °F (36.7 °C)] 97.8 °F (36.6 °C)  Pulse:  [79-88] 84  Resp:  [25-49] 36  SpO2:  [93 %-100 %] 98 %  BP: (111-155)/(56-72) 143/66  Arterial Line BP: (116-165)/(41-66) 160/57     Weight: 44.8 kg (98 lb 12.3 oz)  Body mass index is 18.66 kg/m².      Intake/Output Summary (Last 24 hours) at 1/23/2022 1635  Last data filed at 1/23/2022 1500  Gross per 24 hour   Intake 2163.22 ml   Output 2600 ml   Net -436.78 ml       Physical Exam  Constitutional:       General: She is not in acute distress.  HENT:      Head: Normocephalic and atraumatic.   Neck:      Comments: Trach  Cardiovascular:      Rate and Rhythm: Normal rate and regular rhythm.      Pulses: Normal pulses.      Comments: Midline sternotomy incision c/d/I  Pacemaker in place. Paced at 80  Pulmonary:      Effort: Pulmonary effort is normal. No respiratory distress.      Comments: intubated  Abdominal:      General: Abdomen is flat. There is no distension.      Palpations: Abdomen is soft.      Tenderness: There is no abdominal tenderness.       Comments: Peg site c/d/i  Soft abdomen   Musculoskeletal:      Right lower leg: No edema.      Left lower leg: No edema.      Comments: R radial art line in place   Skin:     General: Skin is warm.      Capillary Refill: Capillary refill takes less than 2 seconds.   Neurological:      General: No focal deficit present.      Mental Status: She is alert.         Vents:  Vent Mode: Spont (01/23/22 1546)  Ventilator Initiated: Yes (01/18/22 1636)  Set Rate: 28 BPM (01/23/22 0409)  Vt Set: 330 mL (01/23/22 0409)  Pressure Support: 10 cmH20 (01/23/22 1546)  PEEP/CPAP: 5 cmH20 (01/23/22 1546)  Oxygen Concentration (%): 30 (01/23/22 1546)  Peak Airway Pressure: 16 cmH2O (01/23/22 1546)  Plateau Pressure: 30 cmH20 (01/23/22 1546)  Total Ve: 12.7 mL (01/23/22 1546)  Negative Inspiratory Force (cm H2O): 0 (01/23/22 0538)  F/VT Ratio<105 (RSBI): 122.03 (01/23/22 1546)    Lines/Drains/Airways     Central Venous Catheter Line            Permacath 01/21/22 1013 left internal jugular 2 days          Drain                 Gastrostomy/Enterostomy 01/21/22 1112 Percutaneous endoscopic gastrostomy (PEG) 2 days          Airway                 Surgical Airway 01/21/22 1043 Shiley Cuffed 2 days          Arterial Line            Arterial Line 01/05/22 0855 Right Radial 18 days          Peripheral Intravenous Line                 Midline Catheter Insertion/Assessment  - Single Lumen 01/10/22 1300 Left cephalic vein (lateral side of arm) 18g x 8cm 13 days         Peripheral IV - Single Lumen 01/22/22 1600 20 G Anterior;Proximal;Right Forearm 1 day                Significant Labs:    CBC/Anemia Profile:  Recent Labs   Lab 01/22/22  0317 01/22/22  0401 01/23/22  0408   WBC 17.93*  --  14.34*   HGB 8.7*  --  7.8*   HCT 28.8* 37 25.8*   *  --  120*   MCV 99*  --  99*   RDW 18.5*  --  19.8*        Chemistries:  Recent Labs   Lab 01/22/22  2130 01/23/22  0408 01/23/22  1426    139 138   K 4.3 3.4* 3.9    100 100   CO2 21* 23 24    BUN 18 5* 9   CREATININE 1.4 0.6 0.8   CALCIUM 9.1 9.3 9.3   ALBUMIN 3.2* 3.2* 3.2*   MG 2.1 1.8 2.0   PHOS 4.4 1.7*  1.7* 2.7       All pertinent labs within the past 24 hours have been reviewed.    Significant Imaging:  I have reviewed all pertinent imaging results/findings within the past 24 hours.    Assessment/Plan:     * S/P aortic valve replacement  Orion Ty is a 76 y.o. female who presents to the SICU s/p redo aortic valve replacement with mechanical valve on 1/5/22.      Neuro/Psych:   -- Sedation: none  -- Pain: Liquid Tiffani, breakthrough fentanyl pushes q2     Cards:   -- Pressors: Off  -- Goal MAP: 60-80  -- statin  -- heparin drip, PTT goal 60-80.   -- Coumadin 2mg  -- Paced HR 80      Pulm:   -- Goal O2 sat > 90%  -- reintubated 1/12 then 1/18  -- Spontaneous when able  -- ABG PRN  -- 2 mediastinal removed 1/9 L Pleural CT removed 1/8  -- S/p trach 1/21      Renal:  -- Mireles removed  -- net even goal  -- bladder scan every shift  -- Oliguric   -- Nephrology following  -- Permcath 1/21  -- Remove R IJ trialysis 1/23      FEN / GI:   -- Replace lytes as needed  -- OG in place  -- Nutrition: tube feeds novasource renal, advance back to goal      ID:   -- WBC 14 from 18  -- Antibiotics: periop ancef complete. Zosyn and vanc, stopped 1/20      Heme/Onc:   -- Hgb stable  -- 3U RBC, 2U FFP, 2U platelets in OR  -- Daily CBC  -- Transfused products: 1U RBC on 1/5/22. 1u RBC and 1u FFP on 1/9/21. 2U RBC on 1/12. 4 FFP on 1/13. 1 FFP on 1/17  -- Anticoagulation: heparin drip   -- heparin drip, PTT 60-80 after OR   -- 2mg coumadin tonight      Endo:   -- Gluc goal 140-180  -- Insulin per endocrinology      PPx:   Feeding: tube feeds  Analgesia/Sedation: Tiffani, fent pushes / none  Thromboembolic prevention: heparin drip  HOB >30: yes  Stress Ulcer ppx: protonix BID  Glucose control: Critical care goal 140-180 g/dl, ISS    Lines/Drains/Airway: Art line, Trach, Permcath, PEG      Dispo/Code  Status/Palliative:   -- SICU / Full Code            Critical care was time spent personally by me on the following activities: development of treatment plan with patient or surrogate and bedside caregivers, discussions with consultants, evaluation of patient's response to treatment, examination of patient, ordering and performing treatments and interventions, ordering and review of laboratory studies, ordering and review of radiographic studies, pulse oximetry, re-evaluation of patient's condition.  This critical care time did not overlap with that of any other provider or involve time for any procedures.     Graham Alfaro MD  Critical Care - Surgery  [facility]

## 2022-01-23 NOTE — PLAN OF CARE
Plan of Care Note  Cardiothoracic Surgery    S/p redo AVR on 1/5/22    Specific issues: Advance tube feeds to goal, trial trach collar, rehab    Plan of care for patient was discussed with ICU staff including nurses, residents, and faculty and appropriate consulting services.    Will continue to monitor patient's hemodynamics, functionality, neuro status, fluid status and renal function, and labs and will adjust medications and fluids as necessary while monitoring appropriateness for de-escalation of support and monitoring and transport to stepdown unit.    Jojo Kauffman MD, PGY-VI  Cardiothoracic Surgery  [phone]

## 2022-01-23 NOTE — PROGRESS NOTES
01/22/22 2248   Treatment   Treatment Type SLED   Treatment Status Restart   Dialysis Machine Number [id number]   Dialyzer Time (hours) 0   BVP (Liters) 0 L   Solutions Labeled and Current  Yes   Access Right;IJ;Temporary Cath   Catheter Dressing Intact  Yes   Alarms Engaged Yes   CRRT Comments SLED restarted   Prescription   Time (Hours) 8   Dialysate K + (mEq/L) 3   Dialysate CA + (mEq/L) 2.25   Dialysate HCO3 - (Bicarb) (mEq/L) 35   Dialysate Na + (mEq/L) 138   Cartridge Type   (f16)   Dialysate Flow Rate (mL/min) 200   UF Goal Rate 400 mL/hr   CRRT Hourly Documentation   Blood Flow (mL/min) 200   UF Rate 200 cc/hr   Arterial Pressure (mmHg) -40 mmHg   Venous Pressure (mmHg) 30 mmHg   Effluent Pressure (EP) (mmHg) 80 mmHg       SLED restarted. UF rate set at 200. Report given to primary nurse.

## 2022-01-23 NOTE — PROGRESS NOTES
[facility]  Nephrology  Progress Note    Patient Name: Orion Ty  MRN: [id number]  Admission Date: 1/5/2022  Hospital Length of Stay: 18 days  Attending Provider: Dennis Haider MD   Primary Care Physician: Otis Zimmer MD  Principal Problem:S/P aortic valve replacement    Subjective:     HPI: Oiron Ty is our 76 y.o. female with previous aortic valve replacement, mitral valve replacement, and tricuspid valve repair in 2010 who presented on 1/5 for redo aortic valve replacement. Nephrology consulted on 1/8 for anuric JO. Patient is alert but tired, son at bedside. Stated she was tired prior to admission. During the hospital stay Cr increase from 1 to 1.5. In OR she received  2.5L crystalloid, 3U RBC, 2U FFP, 2 platelets, and 800 cell saver. she is +5 L since admit. UOP decrease overnight, per nurse she didn't urinate at all. She received diuril 500 mg twice, lasix 20 mg *2 on 1/7. This morning she urinate 225 immediately after placing the tejeda's cath. She received again lasix 100 mg this morning. Upor evaluation she was alert but repeatedly saying she was tired, denied any pain. Denied any previous hx of kidney disease.       Interval History: pos 300cc last 24 hours, fio2 30    Review of patient's allergies indicates:  No Known Allergies  Current Facility-Administered Medications   Medication Frequency    0.9%  NaCl infusion (CRRT USE ONLY) Continuous    0.9%  NaCl infusion (for blood administration) Q24H PRN    acetaminophen oral solution 650 mg Q8H    albuterol-ipratropium 2.5 mg-0.5 mg/3 mL nebulizer solution 3 mL Q6H    aspirin suppository 300 mg Once PRN    atorvastatin tablet 40 mg Daily    bisacodyL suppository 10 mg Daily PRN    dexmedetomidine (PRECEDEX) 400mcg/100mL 0.9% NaCL infusion Continuous    dextrose 10 % infusion Continuous PRN    dextrose 50% injection 12.5 g PRN    fentaNYL 50 mcg/mL injection 25 mcg Q2H PRN    fluconazole 40 mg/ml  suspension 400 mg Daily    glucagon (human recombinant) injection 1 mg PRN    heparin 25,000 units in dextrose 5% 250 mL (100 units/mL) infusion (heparin infusion - NO NOMOGRAM) Continuous    insulin aspart U-100 pen 0-5 Units Q4H PRN    insulin aspart U-100 pen 4 Units Q24H    insulin aspart U-100 pen 4 Units Q24H    insulin aspart U-100 pen 4 Units Q24H    insulin aspart U-100 pen 4 Units Q24H    insulin aspart U-100 pen 4 Units Q24H    insulin aspart U-100 pen 4 Units Q24H    LIDOcaine 5 % patch 1 patch Q24H    LIDOcaine HCL 2% jelly PRN    magnesium sulfate 2g in water 50mL IVPB (premix) Once    melatonin tablet 9 mg Nightly PRN    metoprolol tartrate (LOPRESSOR) split tablet 12.5 mg BID    midodrine tablet 10 mg Q12H    ondansetron injection 4 mg Q12H PRN    oxyCODONE 5 mg/5 mL solution 5 mg Q4H PRN    pantoprazole injection 40 mg Daily    polyethylene glycol packet 17 g Daily    potassium, sodium phosphates 280-160-250 mg packet 2 packet TID PRN    sodium chloride 0.9% bolus 250 mL PRN    sodium chloride 0.9% flush 10 mL PRN    vasopressin (PITRESSIN) 0.2 Units/mL in dextrose 5 % 100 mL infusion Continuous    warfarin (COUMADIN) tablet 2 mg Daily     Facility-Administered Medications Ordered in Other Encounters   Medication Frequency    0.9%  NaCl infusion Continuous       Objective:     Vital Signs (Most Recent):  Temp: 97.8 °F (36.6 °C) (01/23/22 0700)  Pulse: 82 (01/23/22 1216)  Resp: (!) 34 (01/23/22 1216)  BP: (!) 150/70 (01/23/22 1000)  SpO2: 98 % (01/23/22 1216)  O2 Device (Oxygen Therapy): ventilator (01/23/22 1216) Vital Signs (24h Range):  Temp:  [97.7 °F (36.5 °C)-98 °F (36.7 °C)] 97.8 °F (36.6 °C)  Pulse:  [80-88] 82  Resp:  [25-49] 34  SpO2:  [93 %-100 %] 98 %  BP: (111-151)/(56-70) 150/70  Arterial Line BP: (116-165)/(41-66) 146/55     Weight: 44.8 kg (98 lb 12.3 oz) (01/20/22 1300)  Body mass index is 18.66 kg/m².  Body surface area is 1.39 meters squared.    I/O last 3  completed shifts:  In: 4418.4 [I.V.:3805.1; Blood:250; NG/GT:345; IV Piggyback:18.3]  Out: 5682 [Other:5682]    Physical Exam  Constitutional:       General: She is not in acute distress.     Appearance: She is ill-appearing.   HENT:      Mouth/Throat:      Mouth: Mucous membranes are moist.   Eyes:      General: No scleral icterus.  Cardiovascular:      Rate and Rhythm: Tachycardia present.   Pulmonary:      Comments: trach  Abdominal:      General: There is no distension.      Palpations: Abdomen is soft.   Musculoskeletal:         General: No deformity.      Right lower leg: No edema.      Left lower leg: No edema.   Skin:     Coloration: Skin is not jaundiced.         Significant Labs:  All labs within the past 24 hours have been reviewed.     Significant Imaging:  Labs: Reviewed    Assessment/Plan:     * S/P aortic valve replacement  -with severe intraoperative hypotension   -per primary    JO (acute kidney injury)  -oliguric ischemic ATN likely secondary to severe intraoperative hypotension requiring epi, norepi, and vaso intraoperatively 1/5/22  -BL sCr 0.8  -creatinine worsened post op delayed likely due to hemodilution  -failed high dose duretic therapy and KRT started 1/9/22 due to volume overload  -reintubated 1/12/22  -last 24 hours 300cc net pos fluid balance  -stable fio2 of 30  -repeat KRT tonight for UF and clearance  -SLED 6h goal net neg 0-500cc fluid balance    ATN (acute tubular necrosis)  -see jo    Volume overload  -improved with KRT started 1/9/22        Oj Clifford MD  Nephrology  [facility]

## 2022-01-23 NOTE — ASSESSMENT & PLAN NOTE
-oliguric ischemic ATN likely secondary to severe intraoperative hypotension requiring epi, norepi, and vaso intraoperatively 1/5/22  -BL sCr 0.8  -creatinine worsened post op delayed likely due to hemodilution  -failed high dose duretic therapy and KRT started 1/9/22 due to volume overload  -reintubated 1/12/22  -last 24 hours 300cc net pos fluid balance  -stable fio2 of 30  -repeat KRT tonight for UF and clearance  -SLED 6h goal net neg 0-500cc fluid balance

## 2022-01-23 NOTE — SUBJECTIVE & OBJECTIVE
Interval History: pos 300cc last 24 hours, fio2 30    Review of patient's allergies indicates:  No Known Allergies  Current Facility-Administered Medications   Medication Frequency    0.9%  NaCl infusion (CRRT USE ONLY) Continuous    0.9%  NaCl infusion (for blood administration) Q24H PRN    acetaminophen oral solution 650 mg Q8H    albuterol-ipratropium 2.5 mg-0.5 mg/3 mL nebulizer solution 3 mL Q6H    aspirin suppository 300 mg Once PRN    atorvastatin tablet 40 mg Daily    bisacodyL suppository 10 mg Daily PRN    dexmedetomidine (PRECEDEX) 400mcg/100mL 0.9% NaCL infusion Continuous    dextrose 10 % infusion Continuous PRN    dextrose 50% injection 12.5 g PRN    fentaNYL 50 mcg/mL injection 25 mcg Q2H PRN    fluconazole 40 mg/ml suspension 400 mg Daily    glucagon (human recombinant) injection 1 mg PRN    heparin 25,000 units in dextrose 5% 250 mL (100 units/mL) infusion (heparin infusion - NO NOMOGRAM) Continuous    insulin aspart U-100 pen 0-5 Units Q4H PRN    insulin aspart U-100 pen 4 Units Q24H    insulin aspart U-100 pen 4 Units Q24H    insulin aspart U-100 pen 4 Units Q24H    insulin aspart U-100 pen 4 Units Q24H    insulin aspart U-100 pen 4 Units Q24H    insulin aspart U-100 pen 4 Units Q24H    LIDOcaine 5 % patch 1 patch Q24H    LIDOcaine HCL 2% jelly PRN    magnesium sulfate 2g in water 50mL IVPB (premix) Once    melatonin tablet 9 mg Nightly PRN    metoprolol tartrate (LOPRESSOR) split tablet 12.5 mg BID    midodrine tablet 10 mg Q12H    ondansetron injection 4 mg Q12H PRN    oxyCODONE 5 mg/5 mL solution 5 mg Q4H PRN    pantoprazole injection 40 mg Daily    polyethylene glycol packet 17 g Daily    potassium, sodium phosphates 280-160-250 mg packet 2 packet TID PRN    sodium chloride 0.9% bolus 250 mL PRN    sodium chloride 0.9% flush 10 mL PRN    vasopressin (PITRESSIN) 0.2 Units/mL in dextrose 5 % 100 mL infusion Continuous    warfarin (COUMADIN) tablet 2 mg Daily      Facility-Administered Medications Ordered in Other Encounters   Medication Frequency    0.9%  NaCl infusion Continuous       Objective:     Vital Signs (Most Recent):  Temp: 97.8 °F (36.6 °C) (01/23/22 0700)  Pulse: 82 (01/23/22 1216)  Resp: (!) 34 (01/23/22 1216)  BP: (!) 150/70 (01/23/22 1000)  SpO2: 98 % (01/23/22 1216)  O2 Device (Oxygen Therapy): ventilator (01/23/22 1216) Vital Signs (24h Range):  Temp:  [97.7 °F (36.5 °C)-98 °F (36.7 °C)] 97.8 °F (36.6 °C)  Pulse:  [80-88] 82  Resp:  [25-49] 34  SpO2:  [93 %-100 %] 98 %  BP: (111-151)/(56-70) 150/70  Arterial Line BP: (116-165)/(41-66) 146/55     Weight: 44.8 kg (98 lb 12.3 oz) (01/20/22 1300)  Body mass index is 18.66 kg/m².  Body surface area is 1.39 meters squared.    I/O last 3 completed shifts:  In: 4418.4 [I.V.:3805.1; Blood:250; NG/GT:345; IV Piggyback:18.3]  Out: 5682 [Other:5682]    Physical Exam  Constitutional:       General: She is not in acute distress.     Appearance: She is ill-appearing.   HENT:      Mouth/Throat:      Mouth: Mucous membranes are moist.   Eyes:      General: No scleral icterus.  Cardiovascular:      Rate and Rhythm: Tachycardia present.   Pulmonary:      Comments: trach  Abdominal:      General: There is no distension.      Palpations: Abdomen is soft.   Musculoskeletal:         General: No deformity.      Right lower leg: No edema.      Left lower leg: No edema.   Skin:     Coloration: Skin is not jaundiced.         Significant Labs:  All labs within the past 24 hours have been reviewed.     Significant Imaging:  Labs: Reviewed

## 2022-01-23 NOTE — PLAN OF CARE
"Shift Events: No acute events. RIJ trialysis line removed per order. On spontaneous on the vent for duration of shift. TF increased to goal of 35, residuals <100. CRRT treatment began this afternoon.     Neuro: AAO x4, Arouses to Voice, Follows Commands and Moves All Extremities    Vital Signs: BP (!) 147/70 (BP Location: Right arm, Patient Position: Lying)   Pulse 85   Temp 97.8 °F (36.6 °C) (Oral)   Resp (!) 39   Ht 5' 1" (1.549 m)   Wt 44.8 kg (98 lb 12.3 oz)   SpO2 98%   Breastfeeding No   BMI 18.66 kg/m²     Respiratory: Ventilator 30% 5 PEEP sats >92%    Diet: NPO and Tube Feeds    Gtts: Heparin    Urine Output: anuric on CRRT     Labs/Accuchecks: accuchecks q4, renal labs q8, aPTT q6.     Skin: Patient turned q2, triad applied to sacral wound and surrounding area per orders. Patient on immerse mattress, heel boots in place to prevent further wounds.         "

## 2022-01-23 NOTE — NURSING
SICU PLAN OF CARE NOTE    Dx: S/P aortic valve replacement    Neuro: Awake and alert. Follows commands  Cardiac: NSR 80s  Respiratory: Spont 30%/+5  Diet: TF @ 10  Gtts: Heparin  Urine Output: anuric on CRRT  Labs/Accuchecks: see labs; accuchecks Q4  Skin: Turned Q2. Mattress inflated. Foam dsg in place. Heel boots on. Sacral wound cleansed and cream applied

## 2022-01-23 NOTE — ASSESSMENT & PLAN NOTE
Orion Ty is a 76 y.o. female who presents to the SICU s/p redo aortic valve replacement with mechanical valve on 1/5/22.      Neuro/Psych:   -- Sedation: none  -- Pain: Liquid Tiffani, breakthrough fentanyl pushes q2     Cards:   -- Pressors: Off  -- Goal MAP: 60-80  -- statin  -- heparin drip, PTT goal 60-80.   -- Coumadin 2mg  -- Paced HR 80      Pulm:   -- Goal O2 sat > 90%  -- reintubated 1/12 then 1/18  -- Spontaneous when able  -- ABG PRN  -- 2 mediastinal removed 1/9 L Pleural CT removed 1/8  -- S/p trach 1/21      Renal:  -- Mireles removed  -- net even goal  -- bladder scan every shift  -- Oliguric   -- Nephrology following  -- Permcath 1/21  -- Remove R IJ trialysis 1/23      FEN / GI:   -- Replace lytes as needed  -- OG in place  -- Nutrition: tube feeds novasource renal, advance back to goal      ID:   -- WBC 14 from 18  -- Antibiotics: periop ancef complete. Zosyn and vanc, stopped 1/20      Heme/Onc:   -- Hgb stable  -- 3U RBC, 2U FFP, 2U platelets in OR  -- Daily CBC  -- Transfused products: 1U RBC on 1/5/22. 1u RBC and 1u FFP on 1/9/21. 2U RBC on 1/12. 4 FFP on 1/13. 1 FFP on 1/17  -- Anticoagulation: heparin drip   -- heparin drip, PTT 60-80 after OR   -- 2mg coumadin tonight      Endo:   -- Gluc goal 140-180  -- Insulin per endocrinology      PPx:   Feeding: tube feeds  Analgesia/Sedation: Tiffani, fent pushes / none  Thromboembolic prevention: heparin drip  HOB >30: yes  Stress Ulcer ppx: protonix BID  Glucose control: Critical care goal 140-180 g/dl, ISS    Lines/Drains/Airway: Art line, Trach, Permcath, PEG      Dispo/Code Status/Palliative:   -- SICU / Full Code

## 2022-01-23 NOTE — ANESTHESIA POSTPROCEDURE EVALUATION
Anesthesia Post Evaluation    Patient: Orion Ty    Procedure(s) Performed: Procedure(s) (LRB):  CREATION, TRACHEOSTOMY (N/A)  EGD, WITH PEG TUBE INSERTION (N/A)  INSERTION-CATHETER-ROSELINE (N/A)    Final Anesthesia Type: general      Patient location during evaluation: PACU  Patient participation: Yes- Able to Participate  Level of consciousness: awake  Post-procedure vital signs: reviewed and stable  Pain management: adequate  Airway patency: patent    PONV status at discharge: No PONV  Anesthetic complications: no      Cardiovascular status: blood pressure returned to baseline  Respiratory status: unassisted  Hydration status: euvolemic  Follow-up not needed.          Vitals Value Taken Time   /63 01/22/22 1502   Temp 36.9 °C (98.4 °F) 01/22/22 1500   Pulse 83 01/22/22 1907   Resp 33 01/22/22 1907   SpO2 98 % 01/22/22 1907   Vitals shown include unvalidated device data.      No case tracking events are documented in the log.      Pain/Paul Score: Pain Rating Prior to Med Admin: 4 (1/22/2022  2:20 PM)

## 2022-01-23 NOTE — CARE UPDATE
BG goal 140 - 180   Diet NPO Except for: Sips with Medication  2 Days Post-Op- Trach/ PEG/ Permacath placement.    BG stable this AM.  Endo will continue to follow, and manage glycemic control while inpatient. TF restarted at 10 ml/hr with goal of 35 ml/hr.     - Novolog to 4 units q 4 hrs while on tube feeds. HOLD if tube feeds are stoped or BG < 100.   - Low Dose SQ Insulin Correction Scale PRN hyperglycemia.   - BG Monitoring q 4 hrs while NPO/tube feeds    ** Please call Endocrine for any BG related issues **  ** Please notify Endocrine for any change and/or advance in diet**    Lab Results   Component Value Date    HGBA1C 6.0 (H) 01/03/2022       Discharge Planning:   TBD. Please notify endocrinology prior to discharge.

## 2022-01-23 NOTE — PLAN OF CARE
"Shift Events: no acute events. Patient on spontaneous mode on vent for majority of shift. Patient hypertensive- discussed with MDs- no new orders. Pts aPTT supratherapeutic- trailysis line removal held per Dr. Heredia. CRRT planned for PM.     Neuro: Arouses to Voice, Follows Commands and Moves All Extremities    Vital Signs: BP (!) 150/63 (BP Location: Right arm, Patient Position: Lying)   Pulse 88   Temp 98.4 °F (36.9 °C) (Oral)   Resp (!) 37   Ht 5' 1" (1.549 m)   Wt 44.8 kg (98 lb 12.3 oz)   SpO2 97%   Breastfeeding No   BMI 18.66 kg/m²     Respiratory: Ventilator    Diet: Tube Feeds    Gtts: Heparin           "

## 2022-01-23 NOTE — SUBJECTIVE & OBJECTIVE
Interval History/Significant Events:   NAEO  Off vaso for >24h  More interactive  PTT therapeutic     Follow-up For: Procedure(s) (LRB):  CREATION, TRACHEOSTOMY (N/A)  EGD, WITH PEG TUBE INSERTION (N/A)  INSERTION-CATHETER-ROSELINE (N/A)    Post-Operative Day: 2 Days Post-Op    Objective:     Vital Signs (Most Recent):  Temp: 97.8 °F (36.6 °C) (01/23/22 1500)  Pulse: 84 (01/23/22 1546)  Resp: (!) 36 (01/23/22 1546)  BP: (!) 143/66 (01/23/22 1500)  SpO2: 98 % (01/23/22 1546) Vital Signs (24h Range):  Temp:  [97.7 °F (36.5 °C)-98 °F (36.7 °C)] 97.8 °F (36.6 °C)  Pulse:  [79-88] 84  Resp:  [25-49] 36  SpO2:  [93 %-100 %] 98 %  BP: (111-155)/(56-72) 143/66  Arterial Line BP: (116-165)/(41-66) 160/57     Weight: 44.8 kg (98 lb 12.3 oz)  Body mass index is 18.66 kg/m².      Intake/Output Summary (Last 24 hours) at 1/23/2022 1635  Last data filed at 1/23/2022 1500  Gross per 24 hour   Intake 2163.22 ml   Output 2600 ml   Net -436.78 ml       Physical Exam  Constitutional:       General: She is not in acute distress.  HENT:      Head: Normocephalic and atraumatic.   Neck:      Comments: Trach  Cardiovascular:      Rate and Rhythm: Normal rate and regular rhythm.      Pulses: Normal pulses.      Comments: Midline sternotomy incision c/d/I  Pacemaker in place. Paced at 80  Pulmonary:      Effort: Pulmonary effort is normal. No respiratory distress.      Comments: intubated  Abdominal:      General: Abdomen is flat. There is no distension.      Palpations: Abdomen is soft.      Tenderness: There is no abdominal tenderness.      Comments: Peg site c/d/i  Soft abdomen   Musculoskeletal:      Right lower leg: No edema.      Left lower leg: No edema.      Comments: R radial art line in place   Skin:     General: Skin is warm.      Capillary Refill: Capillary refill takes less than 2 seconds.   Neurological:      General: No focal deficit present.      Mental Status: She is alert.         Vents:  Vent Mode: Spont (01/23/22  1546)  Ventilator Initiated: Yes (01/18/22 1636)  Set Rate: 28 BPM (01/23/22 0409)  Vt Set: 330 mL (01/23/22 0409)  Pressure Support: 10 cmH20 (01/23/22 1546)  PEEP/CPAP: 5 cmH20 (01/23/22 1546)  Oxygen Concentration (%): 30 (01/23/22 1546)  Peak Airway Pressure: 16 cmH2O (01/23/22 1546)  Plateau Pressure: 30 cmH20 (01/23/22 1546)  Total Ve: 12.7 mL (01/23/22 1546)  Negative Inspiratory Force (cm H2O): 0 (01/23/22 0538)  F/VT Ratio<105 (RSBI): 122.03 (01/23/22 1546)    Lines/Drains/Airways     Central Venous Catheter Line            Permacath 01/21/22 1013 left internal jugular 2 days          Drain                 Gastrostomy/Enterostomy 01/21/22 1112 Percutaneous endoscopic gastrostomy (PEG) 2 days          Airway                 Surgical Airway 01/21/22 1043 Shiley Cuffed 2 days          Arterial Line            Arterial Line 01/05/22 0855 Right Radial 18 days          Peripheral Intravenous Line                 Midline Catheter Insertion/Assessment  - Single Lumen 01/10/22 1300 Left cephalic vein (lateral side of arm) 18g x 8cm 13 days         Peripheral IV - Single Lumen 01/22/22 1600 20 G Anterior;Proximal;Right Forearm 1 day                Significant Labs:    CBC/Anemia Profile:  Recent Labs   Lab 01/22/22  0317 01/22/22  0401 01/23/22  0408   WBC 17.93*  --  14.34*   HGB 8.7*  --  7.8*   HCT 28.8* 37 25.8*   *  --  120*   MCV 99*  --  99*   RDW 18.5*  --  19.8*        Chemistries:  Recent Labs   Lab 01/22/22  2130 01/23/22  0408 01/23/22  1426    139 138   K 4.3 3.4* 3.9    100 100   CO2 21* 23 24   BUN 18 5* 9   CREATININE 1.4 0.6 0.8   CALCIUM 9.1 9.3 9.3   ALBUMIN 3.2* 3.2* 3.2*   MG 2.1 1.8 2.0   PHOS 4.4 1.7*  1.7* 2.7       All pertinent labs within the past 24 hours have been reviewed.    Significant Imaging:  I have reviewed all pertinent imaging results/findings within the past 24 hours.

## 2022-01-23 NOTE — PLAN OF CARE
SICU Staff Addendum--> Please link to resident progress note 1/23/22  I have reviewed and concur with the resident's history, physical, assessment, and plan.  I have personally interviewed and examined the patient at bedside.  See below for any additional findings.    Reason for admission:  S/P aortic valve replacement  Present on Admission:   Type 2 diabetes mellitus with microalbuminuria, without long-term current use of insulin   Chronic atrial fibrillation      Goals for Today:   - Attempt trach collar today  - No vasoconstrictor need past 24 hours  - Remove RIJ trialysis line  - Continue heparin bridge to warfarin for aortic and mitral mechanical valve prosthesis    35 minutes of critical care time was spent personally by me on the following activities: development of treatment plan with patient or surrogate and bedside caregivers, discussions with consultants, evaluation of patient's response to treatment, examination of patient, ordering and performing treatments and interventions, ordering and review of laboratory studies, ordering and review of radiographic studies, pulse oximetry, re-evaluation of patient's condition.  This critical care time did not overlap with that of any other provider or involve time for any procedures.    Clinton West MD  Anesthesia Critical Care  Spectra [phone]

## 2022-01-24 NOTE — PT/OT/SLP PROGRESS
Occupational Therapy  Co-treatment    Name: Orion Ty  MRN: [id number]  Admitting Diagnosis:  S/P aortic valve replacement  3 Days Post-Op  Pt is s/p Trach and PEG placement since last session.   Recommendations:     Discharge Recommendations: rehabilitation facility    Assessment:     Orion Ty is a 76 y.o. female with a medical diagnosis of S/P aortic valve replacement. Performance deficits affecting function are weakness,impaired endurance,impaired self care skills,impaired functional mobilty,gait instability,impaired balance.   Pt tolerated session well with good effort and performance. OT updated POC to reflect current performance.   Anticipate pt will benefit from post acute therapy prior to return home.     Rehab Prognosis:  Good; patient would benefit from acute skilled OT services to address these deficits and reach maximum level of function.       Plan:     Patient to be seen 4 x/week to address the above listed problems via self-care/home management,therapeutic activities,therapeutic exercises  · Plan of Care Expires: 02/05/22  · Plan of Care Reviewed with: patient,daughter    Subjective     Pt agreeable to therapy.   Pt denies pain.     Pain/Comfort:  · Pain Rating 1: 0/10    Objective:     Communicated with: nsg prior to session.  Patient found supine in bed with trach to vent, tele, pulse ox, BP cuff, A-line  Co-tx completed this date to optimize functional performance and safety given impaired tolerance for activity and acuity of medical status in the ICU.   RT notified of therapy plans prior to session and in agreement with plan. No changes with vent during session and VSS stable throughout session.     General Precautions: Standard, fall,sternal     Occupational Performance:     Bed Mobility:    Supine>sit with MAX A  Sit>supine with MAX A     Functional Mobility/Transfers:  · Sit>stand with MAX A x 2 for two trials. Pt able to clear buttocks from bed, but does not stand fully upright.      Activities of Daily Living:  · UE/LE dressing: TOTAL A   · G/H: Pt able to wash face and hands with set-up for task but required MIN A for postural control during dynamic task.     [facility] 6 Click ADL: 7    Treatment & Education:  Pt tolerated sitting EOB approx 15 min with SBA for static sitting and MIN A for dynamic sitting. Pt needing cues for midline orientation.   Pt engaged with basic self care skills and completed 2 trials of standing.   Pt awake throughout session and following commands. VSS throughout session.     Education provided to pt and family re: OT POC and UE HEP to perform throughout the day with family. Family reports no further question and demo good understanding.       Patient left with bed in chair position with all lines intact, call button in reach, nsg notified and daughter presentEducation:      GOALS:   Multidisciplinary Problems     Occupational Therapy Goals        Problem: Occupational Therapy Goal    Goal Priority Disciplines Outcome Interventions   Occupational Therapy Goal     OT, PT/OT Ongoing, Progressing    Description: Goals to be met by:  2/2/22     Patient will increase functional independence with ADLs by performing:    Pt to complete UE dressing with set-up  Pt to complete LE dressing with SBA  Pt to complete standing g/h skills with SBA  Pt to complete toileting with SBA  Pt to completed t/f bed, chair and commode with SBA                   Time Tracking:     OT Date of Treatment: 01/24/22  OT Start Time: 0919  OT Stop Time: 0948  OT Total Time (min): 29 min    Billable Minutes:Self Care/Home Management 10  Therapeutic Activity 19    OT/SUE: OT          1/24/2022

## 2022-01-24 NOTE — ASSESSMENT & PLAN NOTE
Orion Ty is a 76 y.o. female who presents to the SICU s/p redo aortic valve replacement with mechanical valve on 1/5/22.      Neuro/Psych:   -- Sedation: none  -- Pain: Liquid Tiffani, breakthrough fentanyl pushes q2     Cards:   -- Pressors: Off  -- Goal MAP: 60-80  -- statin  -- heparin drip, PTT goal 60-80.   -- Coumadin 2mg  -- Paced HR 80      Pulm:   -- Goal O2 sat > 90%  -- reintubated 1/12 then 1/18  -- Spontaneous when able  -- ABG PRN  -- 2 mediastinal removed 1/9 L Pleural CT removed 1/8  -- S/p trach 1/21      Renal:  -- Mireles removed anuria  -- net negative goal 500   -- bladder scan every shift  -- Oliguric   -- Nephrology following  -- Permcath 1/21  -- Removed R IJ trialysis 1/23      FEN / GI:   -- Replace lytes as needed  -- s/p PEG 1/21  -- Nutrition: tube feeds novasource renal, at goal      ID:   -- WBC stable at 14  -- Antibiotics: periop ancef complete. Zosyn and vanc, stopped 1/20      Heme/Onc:   -- Hgb stable  -- 3U RBC, 2U FFP, 2U platelets in OR  -- Daily CBC  -- Transfused products: 1U RBC on 1/5/22. 1u RBC and 1u FFP on 1/9/21. 2U RBC on 1/12. 4 FFP on 1/13. 1 FFP on 1/17  -- Anticoagulation: heparin drip   -- heparin drip, PTT 60-80 after OR   -- 2mg coumadin tonight      Endo:   -- Gluc goal 140-180  -- Insulin per endocrinology      PPx:   Feeding: tube feeds  Analgesia/Sedation: Tiffani, fent pushes / none  Thromboembolic prevention: heparin drip  HOB >30: yes  Stress Ulcer ppx: protonix BID  Glucose control: Critical care goal 140-180 g/dl, ISS    Lines/Drains/Airway: Art line, Trach, Permcath, PEG      Dispo/Code Status/Palliative:   -- SICU / Full Code

## 2022-01-24 NOTE — PT/OT/SLP PROGRESS
Physical Therapy Treatment    Patient Name:  Orion Ty   MRN:  [id number]  Admit Date: 1/5/2022  Admitting Diagnosis:  S/P aortic valve replacement   Length of Stay: 19 days  Recent Surgery: Procedure(s) (LRB):  CREATION, TRACHEOSTOMY (N/A)  EGD, WITH PEG TUBE INSERTION (N/A)  INSERTION-CATHETER-ROSELINE (N/A) 3 Days Post-Op    Recommendations:     Discharge Recommendations:  rehabilitation facility   Discharge Equipment Recommendations: bedside commode   Barriers to discharge: None    Plan:     During this hospitalization, patient to be seen 4 x/week to address the listed problems via gait training,therapeutic activities,therapeutic exercises,neuromuscular re-education  · Plan of Care Expires:  02/21/22   Plan of Care Reviewed with: patient,daughter    Assessment:     Orion Ty is a 76 y.o. female admitted with a medical diagnosis of S/P aortic valve replacement. Pt found alert and cooperative. Pt with great improvement with functional mobility today and excellent participation. Pt able able to sit EOB and perform sit to stand transfer today. Pt required Max - total A for all functional mobility performed. Pt with fair-good sitting balance with good head control. Pt is an excellent IP rehab candidate due to good activity tolerance, decline from PLOF, good family support, and excellent motivation.     declined with family preference to translate for patient. Pt understands english and was able to follow commands and participate with minimal translation.    RT notified prior to session. No incidents occurred. Pt on Spontaneous with VSS.          Problem List: weakness,impaired endurance,impaired functional mobilty,impaired balance,impaired cardiopulmonary response to activity.  Rehab Prognosis: Good     GOALS:   Multidisciplinary Problems     Physical Therapy Goals        Problem: Physical Therapy Goal    Goal Priority Disciplines Outcome Goal Variances Interventions   Physical Therapy Goal     PT,  "PT/OT Ongoing, Progressing     Description: Goals to be met by: 2022     Patient will increase functional independence with mobility by performin. Sit to stand transfer with Supervision -not met  2. Bed to chair transfer with minimum -not met  3. Gait  x 100 feet with minimum  standing rest breaks prn. -not met  4. Lower extremity exercise program x30 reps per handout, with independence -not met  5. Recite 3/3 sternal precautions and remain complaint with precautions throughout session with no verbal cues -not met  6. Pt sit on EOB x 10 min with CGA - not met                       Subjective   Communicated with RN prior to session.  Patient found HOB elevated upon PT entry to room, agreeable to evaluation. Orion Ty's daughter present during session.    Chief Complaint: increased anxiety with mobility   Patient/Family Comments/goals: to get better and return home   Pain/Comfort:  · Pain Rating 1: 0/10  · Pain Rating Post-Intervention 1: 0/10    Objective:   Patient found with: telemetry,pulse ox (continuous),blood pressure cuff,central line,peripheral IV,PEG Tube,arterial line,ventilator,Tracheostomy   General Precautions: Standard, Cardiac fall,sternal   Orthopedic Precautions:N/A   Braces: N/A   Oxygen Device: Vent Settings: Vent Mode: Spont  Oxygen Concentration (%):  [30] 30  Resp Rate Total:  [24 br/min-42 br/min] 29 br/min  Vt Set:  [330 mL] 330 mL  PEEP/CPAP:  [5 cmH20] 5 cmH20  Pressure Support:  [10 cmH20] 10 cmH20  Mean Airway Pressure:  [8.8 [id number] cmH20] 9.2 cmH20  Vitals: /64   Pulse 81   Temp 98.3 °F (36.8 °C) (Oral)   Resp (!) 28   Ht 5' 1" (1.549 m)   Wt 44.8 kg (98 lb 12.3 oz)   SpO2 96%   Breastfeeding No   BMI 18.66 kg/m²     Outcome Measures:  AM-PAC 6 CLICK MOBILITY  Turning over in bed (including adjusting bedclothes, sheets and blankets)?: 2  Sitting down on and standing up from a chair with arms (e.g., wheelchair, bedside commode, etc.): 2  Moving from lying " on back to sitting on the side of the bed?: 2  Moving to and from a bed to a chair (including a wheelchair)?: 1  Need to walk in hospital room?: 1  Climbing 3-5 steps with a railing?: 1  Basic Mobility Total Score: 9       Functional Mobility:  Additional staff present: OT -patient with multiple impairments requiring two skilled therapists to appropriately progress and facilitate patient's musculoskeletal strength, musculoskeletal endurance, and neuromuscular postural balance and control    Bed Mobility:    Supine to Sit: maximal assistance; HOB elevated   Scooting anteriorly to EOB to have both feet planted on floor: total assistance   Sit to Supine: maximal assistance and 2 persons; HOB flat    Sitting Balance at Edge of Bed:   Assistance Level Required:   o SBA-CGA for static  o Min A for dynamic    Time: 15 minutes   Comments:   o Worked on activity tolerance sitting EOB, worked on tolerance to positional change, worked on sitting balance  o Pt with a tendency to sit with posterior left lean; verbal and tactile cuing to correct to and maintain midline  o Pt's posterior lean increased with dynamic sitting; physical assistance provided to correct and maintain midline  o VSS    Transfers:    Sit <> Stand Transfer: maximal assistance and of 2 persons with no assistive device from EOB x 2 trials  o Facilitation of hip and thoracic extension   o Facilitation of B knee extension and knee blocking due to B buckling       Gait:  Not performed 2nd to pt still working on standing     Therapeutic Activities, Exercises, and Education:   Educated pt on PT role/POC  Educated pt on importance of chair position in bed and daily exercise with family   Pt and daughter verbalized understanding     Therapeutic Exercise  Mobility for generalized strengthening   Position: sitting EOB  Ankle pumps, LAQs, modified leg press x 5 reps with AAROM   Initiation and activation of core muscles during sitting balance work       Patient  left HOB elevated with all lines intact, call button in reach and RN notified and daughter present     Time Tracking:     PT Received On: 01/24/22  PT Start Time: 1302     PT Stop Time: 1340  PT Total Time (min): 38 min       Billable Minutes:   · Therapeutic Activity 15 and Therapeutic Exercise 23    Treatment Type: Treatment  PT/PTA: PT

## 2022-01-24 NOTE — PROGRESS NOTES
[facility]  Critical Care - Surgery  Progress Note    Patient Name: Orion Ty  MRN: [id number]  Admission Date: 1/5/2022  Hospital Length of Stay: 19 days  Code Status: Full Code  Attending Provider: Dennis Haider MD  Primary Care Provider: Otis Zimmer MD   Principal Problem: S/P aortic valve replacement    Subjective:     Hospital/ICU Course:  No notes on file    Interval History/Significant Events:   NAEO  Afeb, WBC downtrending  Repleting phos this morning  Heparin gtt therapeutic, INR remains 1.1 s/p 2mg coumadin last night  Did well on PAV throughout the day yesterday    Follow-up For: Procedure(s) (LRB):  CREATION, TRACHEOSTOMY (N/A)  EGD, WITH PEG TUBE INSERTION (N/A)  INSERTION-CATHETER-ROSELINE (N/A)    Post-Operative Day: 3 Days Post-Op    Objective:     Vital Signs (Most Recent):  Temp: 98.5 °F (36.9 °C) (01/24/22 0300)  Pulse: 80 (01/24/22 0645)  Resp: (!) 28 (01/24/22 0645)  BP: (!) 104/53 (01/24/22 0600)  SpO2: 97 % (01/24/22 0645) Vital Signs (24h Range):  Temp:  [97.8 °F (36.6 °C)-99 °F (37.2 °C)] 98.5 °F (36.9 °C)  Pulse:  [79-90] 80  Resp:  [20-42] 28  SpO2:  [94 %-100 %] 97 %  BP: ()/() 104/53  Arterial Line BP: ()/(39-65) 142/56     Weight: 44.8 kg (98 lb 12.3 oz)  Body mass index is 18.66 kg/m².      Intake/Output Summary (Last 24 hours) at 1/24/2022 0652  Last data filed at 1/24/2022 0600  Gross per 24 hour   Intake 2643.15 ml   Output 3351 ml   Net -707.85 ml       Physical Exam  Constitutional:       General: She is not in acute distress.  HENT:      Head: Normocephalic and atraumatic.   Neck:      Comments: Trach  Cardiovascular:      Rate and Rhythm: Normal rate and regular rhythm.      Pulses: Normal pulses.      Comments: Midline sternotomy incision c/d/I  Pacemaker in place. Paced at 80  Pulmonary:      Effort: Pulmonary effort is normal. No respiratory distress.      Comments: intubated  Abdominal:      General: Abdomen is flat.  There is no distension.      Palpations: Abdomen is soft.      Tenderness: There is no abdominal tenderness.      Comments: Peg site c/d/i  Soft abdomen   Musculoskeletal:      Right lower leg: No edema.      Left lower leg: No edema.      Comments: R radial art line in place   Skin:     General: Skin is warm.      Capillary Refill: Capillary refill takes less than 2 seconds.   Neurological:      General: No focal deficit present.      Mental Status: She is alert.         Vents:  Vent Mode: A/C (01/24/22 0400)  Ventilator Initiated: Yes (01/18/22 1636)  Set Rate: 28 BPM (01/24/22 0400)  Vt Set: 330 mL (01/24/22 0400)  Pressure Support: 10 cmH20 (01/24/22 0100)  PEEP/CPAP: 5 cmH20 (01/24/22 0400)  Oxygen Concentration (%): 30 (01/24/22 0645)  Peak Airway Pressure: 22 cmH2O (01/24/22 0400)  Plateau Pressure: 30 cmH20 (01/24/22 0400)  Total Ve: 10.7 mL (01/24/22 0400)  Negative Inspiratory Force (cm H2O): 0 (01/24/22 0400)  F/VT Ratio<105 (RSBI): (!) 82.89 (01/24/22 0400)    Lines/Drains/Airways     Central Venous Catheter Line            Permacath 01/21/22 1013 left internal jugular 2 days          Drain                 Gastrostomy/Enterostomy 01/21/22 1112 Percutaneous endoscopic gastrostomy (PEG) 2 days          Airway                 Surgical Airway 01/21/22 1043 Shiley Cuffed 2 days          Arterial Line            Arterial Line 01/05/22 0855 Right Radial 18 days          Peripheral Intravenous Line                 Midline Catheter Insertion/Assessment  - Single Lumen 01/10/22 1300 Left cephalic vein (lateral side of arm) 18g x 8cm 13 days         Peripheral IV - Single Lumen 01/22/22 1600 20 G Anterior;Proximal;Right Forearm 1 day                Significant Labs:    CBC/Anemia Profile:  Recent Labs   Lab 01/23/22  0408 01/24/22  0323 01/24/22  0326   WBC 14.34* 13.70*  --    HGB 7.8* 8.3*  --    HCT 25.8* 27.0* 28*   * 136*  --    MCV 99* 97  --    RDW 19.8* 19.4*  --         Chemistries:  Recent Labs    Lab 01/23/22  1426 01/23/22  2108 01/24/22  0327    136 135*   K 3.9 4.0 3.9    101 101   CO2 24 23 23   BUN 9 4* 4*   CREATININE 0.8 0.5 0.5   CALCIUM 9.3 8.6* 9.1   ALBUMIN 3.2* 3.0* 3.0*   MG 2.0 1.9 2.1   PHOS 2.7 1.1* <1.0*       All pertinent labs within the past 24 hours have been reviewed.    Significant Imaging:  I have reviewed all pertinent imaging results/findings within the past 24 hours.    Assessment/Plan:     * S/P aortic valve replacement  Orion Ty is a 76 y.o. female who presents to the SICU s/p redo aortic valve replacement with mechanical valve on 1/5/22.      Neuro/Psych:   -- Sedation: none  -- Pain: Liquid Tiffani, breakthrough fentanyl pushes q2     Cards:   -- Pressors: Off  -- Goal MAP: 60-80  -- statin  -- heparin drip, PTT goal 60-80.   -- Coumadin 2mg  -- Paced HR 80      Pulm:   -- Goal O2 sat > 90%  -- reintubated 1/12 then 1/18  -- Spontaneous when able  -- ABG PRN  -- 2 mediastinal removed 1/9 L Pleural CT removed 1/8  -- S/p trach 1/21      Renal:  -- Mireles removed anuria  -- net negative goal 500   -- bladder scan every shift  -- Oliguric   -- Nephrology following  -- Permcath 1/21  -- Removed R IJ trialysis 1/23      FEN / GI:   -- Replace lytes as needed  -- s/p PEG 1/21  -- Nutrition: tube feeds novasource renal, at goal      ID:   -- WBC stable at 14  -- Antibiotics: periop ancef complete. Zosyn and vanc, stopped 1/20      Heme/Onc:   -- Hgb stable  -- 3U RBC, 2U FFP, 2U platelets in OR  -- Daily CBC  -- Transfused products: 1U RBC on 1/5/22. 1u RBC and 1u FFP on 1/9/21. 2U RBC on 1/12. 4 FFP on 1/13. 1 FFP on 1/17  -- Anticoagulation: heparin drip   -- heparin drip, PTT 60-80 after OR   -- 2mg coumadin tonight      Endo:   -- Gluc goal 140-180  -- Insulin per endocrinology      PPx:   Feeding: tube feeds  Analgesia/Sedation: Tiffani, fent pushes / none  Thromboembolic prevention: heparin drip  HOB >30: yes  Stress Ulcer ppx: protonix BID  Glucose  control: Critical care goal 140-180 g/dl, ISS    Lines/Drains/Airway: Art line, Trach, Permcath, PEG      Dispo/Code Status/Palliative:   -- SICU / Full Code             Critical care was time spent personally by me on the following activities: development of treatment plan with patient or surrogate and bedside caregivers, discussions with consultants, evaluation of patient's response to treatment, examination of patient, ordering and performing treatments and interventions, ordering and review of laboratory studies, ordering and review of radiographic studies, pulse oximetry, re-evaluation of patient's condition.  This critical care time did not overlap with that of any other provider or involve time for any procedures.     Caleb Garcia MD  Critical Care - Surgery  [facility] - Surgical Intensive Care

## 2022-01-24 NOTE — PLAN OF CARE
01/24/22 1516   Post-Acute Status   Post-Acute Authorization Placement   Post-Acute Placement Status Patient List Provided     SW met with Pt and her daughter at bedside. Discussed therapy recs for rehab and provided list. Addressed questions and reported need to send referrals to obtain accepting options. The patient's daughter agreeable and will review the list for preferences asap.     Shen Simmons LMSW  Case Management [facility]  Ext: [phone]

## 2022-01-24 NOTE — ASSESSMENT & PLAN NOTE
BG goal 140 - 180       - Continue Novolog to 4 units q 4 hrs while on tube feeds. HOLD if tube feeds are stoped or BG < 100.   - Low Dose SQ Insulin Correction Scale PRN hyperglycemia.   - BG Monitoring q 4 hrs while NPO/tube feeds    ** Please call Endocrine for any BG related issues **  ** Please notify Endocrine for any change and/or advance in diet**  Lab Results   Component Value Date    HGBA1C 6.0 (H) 01/03/2022       Discharge Planning:   TBD. Please notify endocrinology prior to discharge.

## 2022-01-24 NOTE — PROGRESS NOTES
"[facility]  Endocrinology  Progress Note    Admit Date: 1/5/2022     Reason for Consult: Management of T2DM, Hyperglycemia     Surgical Procedure and Date:   1/5/21  REPLACEMENT, AORTIC VALVE, WITH REPEAT STERNOTOMY (N/A)     Diabetes diagnosis year: MELIDA; intubated    Home Diabetes Medications:  Metformin 500 mg daily,     How often checking glucose at home?  MELIDA    BG readings on regimen: MELIDA  Hypoglycemia on the regimen?  MELIDA  Missed doses on regimen? MELIDA    Diabetes Complications include:     None    Complicating diabetes co morbidities:   CAD, HTN, a-fib       HPI:   Patient is a 76 y.o. female with a diagnosis of s/p MVR, AVR and TVr in 2010 by Dr. Haider. 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 Family states she is more fatigued, decreased appetite and fluid on lungs. Pt has increased symptoms and wants to proceed with cardiac surgery now and here for pre op. She is now s/p the above procedure. Endocrinology consulted for management of T2DM.     Lab Results   Component Value Date    HGBA1C 6.0 (H) 01/03/2022           Interval HPI:   Overnight events:   BG stable and overall within goal while on current SQ insulin regime. TF have trended back to goal at 30 ml/hr. Occasional BG excursions noted. However, patient is responding well to SQ insulin. Endo will continue to follow, and manage glycemic control while inpatient.   Diet NPO Except for: Sips with Medication  3 Days Post-Op    Eating:   NPO  Nausea: No  Hypoglycemia and intervention: No  Fever: No  TPN and/or TF: No  If yes, type of TF/TPN and rate: None    /64   Pulse 81   Temp 98.3 °F (36.8 °C) (Oral)   Resp (!) 28   Ht 5' 1" (1.549 m)   Wt 44.8 kg (98 lb 12.3 oz)   SpO2 96%   Breastfeeding No   BMI 18.66 kg/m²     Labs Reviewed and Include    Recent Labs   Lab 01/24/22  0327   *   CALCIUM 9.1   ALBUMIN 3.0*   *   K 3.9   CO2 23 "      BUN 4*   CREATININE 0.5     Lab Results   Component Value Date    WBC 13.70 (H) 01/24/2022    HGB 8.3 (L) 01/24/2022    HCT 28 (L) 01/24/2022    MCV 97 01/24/2022     (L) 01/24/2022     No results for input(s): TSH, FREET4 in the last 168 hours.  Lab Results   Component Value Date    HGBA1C 6.0 (H) 01/03/2022       Nutritional status:   Body mass index is 18.66 kg/m².  Lab Results   Component Value Date    ALBUMIN 3.0 (L) 01/24/2022    ALBUMIN 3.0 (L) 01/23/2022    ALBUMIN 3.2 (L) 01/23/2022     Lab Results   Component Value Date    PREALBUMIN 13 (L) 01/21/2022    PREALBUMIN 11 (L) 01/10/2022       Estimated Creatinine Clearance: 66.6 mL/min (based on SCr of 0.5 mg/dL).    Accu-Checks  Recent Labs     01/22/22  2329 01/23/22  0404 01/23/22  0735 01/23/22  1237 01/23/22  1721 01/23/22  1918 01/23/22  2306 01/24/22  0321 01/24/22  0827 01/24/22  1159   POCTGLUCOSE 95 97 137* 116* 108 118* 176* 135* 240* 174*       Current Medications and/or Treatments Impacting Glycemic Control  Immunotherapy:    Immunosuppressants     None        Steroids:   Hormones (From admission, onward)            Start     Stop Route Frequency Ordered    01/20/22 2134  vasopressin (PITRESSIN) 0.2 Units/mL in dextrose 5 % 100 mL infusion         -- IV Continuous 01/20/22 2135    01/06/22 2255  melatonin tablet 9 mg         -- Oral Nightly PRN 01/06/22 2255        Pressors:    Autonomic Drugs (From admission, onward)            Start     Stop Route Frequency Ordered    01/22/22 0900  metoprolol tartrate (LOPRESSOR) split tablet 12.5 mg         -- PER G TUBE 2 times daily 01/22/22 0646    01/21/22 1115  midodrine tablet 10 mg         -- Oral Every 12 hours 01/21/22 1007    01/12/22 1441  rocuronium 10 mg/mL injection        Note to Pharmacy: Created by cabinet override    01/13 0244   01/12/22 1444        Hyperglycemia/Diabetes Medications:   Antihyperglycemics (From admission, onward)            Start     Stop Route Frequency  Ordered    01/21/22 0800  insulin aspart U-100 pen 4 Units         -- SubQ Every 24 hours (non-standard times) 01/20/22 1133    01/21/22 0400  insulin aspart U-100 pen 4 Units         -- SubQ Every 24 hours (non-standard times) 01/20/22 1133    01/21/22 0000  insulin aspart U-100 pen 4 Units         -- SubQ Every 24 hours (non-standard times) 01/20/22 1133    01/20/22 2000  insulin aspart U-100 pen 4 Units         -- SubQ Every 24 hours (non-standard times) 01/20/22 1133    01/20/22 1600  insulin aspart U-100 pen 4 Units         -- SubQ Every 24 hours (non-standard times) 01/20/22 1133    01/20/22 1200  insulin aspart U-100 pen 4 Units         -- SubQ Every 24 hours (non-standard times) 01/20/22 1133    01/15/22 1415  insulin aspart U-100 pen 0-5 Units         -- SubQ Every 4 hours PRN 01/15/22 1315          ASSESSMENT and PLAN    * S/P aortic valve replacement  Managed per primary team  Optimize BG control        Type 2 diabetes mellitus with microalbuminuria, without long-term current use of insulin  BG goal 140 - 180       - Continue Novolog to 4 units q 4 hrs while on tube feeds. HOLD if tube feeds are stoped or BG < 100.   - Low Dose SQ Insulin Correction Scale PRN hyperglycemia.   - BG Monitoring q 4 hrs while NPO/tube feeds    ** Please call Endocrine for any BG related issues **  ** Please notify Endocrine for any change and/or advance in diet**  Lab Results   Component Value Date    HGBA1C 6.0 (H) 01/03/2022       Discharge Planning:   TBD. Please notify endocrinology prior to discharge.           Chronic atrial fibrillation  May increase insulin resistance.               Vasquez Inman, NP  Endocrinology  [facility] - [facility]

## 2022-01-24 NOTE — SUBJECTIVE & OBJECTIVE
Interval History/Significant Events:   NAEO  Afeb, WBC downtrending  Repleting phos this morning  Heparin gtt therapeutic, INR remains 1.1 s/p 2mg coumadin last night  Did well on PAV throughout the day yesterday    Follow-up For: Procedure(s) (LRB):  CREATION, TRACHEOSTOMY (N/A)  EGD, WITH PEG TUBE INSERTION (N/A)  INSERTION-CATHETER-ROSELINE (N/A)    Post-Operative Day: 3 Days Post-Op    Objective:     Vital Signs (Most Recent):  Temp: 98.5 °F (36.9 °C) (01/24/22 0300)  Pulse: 80 (01/24/22 0645)  Resp: (!) 28 (01/24/22 0645)  BP: (!) 104/53 (01/24/22 0600)  SpO2: 97 % (01/24/22 0645) Vital Signs (24h Range):  Temp:  [97.8 °F (36.6 °C)-99 °F (37.2 °C)] 98.5 °F (36.9 °C)  Pulse:  [79-90] 80  Resp:  [20-42] 28  SpO2:  [94 %-100 %] 97 %  BP: ()/() 104/53  Arterial Line BP: ()/(39-65) 142/56     Weight: 44.8 kg (98 lb 12.3 oz)  Body mass index is 18.66 kg/m².      Intake/Output Summary (Last 24 hours) at 1/24/2022 0652  Last data filed at 1/24/2022 0600  Gross per 24 hour   Intake 2643.15 ml   Output 3351 ml   Net -707.85 ml       Physical Exam  Constitutional:       General: She is not in acute distress.  HENT:      Head: Normocephalic and atraumatic.   Neck:      Comments: Trach  Cardiovascular:      Rate and Rhythm: Normal rate and regular rhythm.      Pulses: Normal pulses.      Comments: Midline sternotomy incision c/d/I  Pacemaker in place. Paced at 80  Pulmonary:      Effort: Pulmonary effort is normal. No respiratory distress.      Comments: intubated  Abdominal:      General: Abdomen is flat. There is no distension.      Palpations: Abdomen is soft.      Tenderness: There is no abdominal tenderness.      Comments: Peg site c/d/i  Soft abdomen   Musculoskeletal:      Right lower leg: No edema.      Left lower leg: No edema.      Comments: R radial art line in place   Skin:     General: Skin is warm.      Capillary Refill: Capillary refill takes less than 2 seconds.   Neurological:      General:  No focal deficit present.      Mental Status: She is alert.         Vents:  Vent Mode: A/C (01/24/22 0400)  Ventilator Initiated: Yes (01/18/22 1636)  Set Rate: 28 BPM (01/24/22 0400)  Vt Set: 330 mL (01/24/22 0400)  Pressure Support: 10 cmH20 (01/24/22 0100)  PEEP/CPAP: 5 cmH20 (01/24/22 0400)  Oxygen Concentration (%): 30 (01/24/22 0645)  Peak Airway Pressure: 22 cmH2O (01/24/22 0400)  Plateau Pressure: 30 cmH20 (01/24/22 0400)  Total Ve: 10.7 mL (01/24/22 0400)  Negative Inspiratory Force (cm H2O): 0 (01/24/22 0400)  F/VT Ratio<105 (RSBI): (!) 82.89 (01/24/22 0400)    Lines/Drains/Airways     Central Venous Catheter Line            Permacath 01/21/22 1013 left internal jugular 2 days          Drain                 Gastrostomy/Enterostomy 01/21/22 1112 Percutaneous endoscopic gastrostomy (PEG) 2 days          Airway                 Surgical Airway 01/21/22 1043 Shiley Cuffed 2 days          Arterial Line            Arterial Line 01/05/22 0855 Right Radial 18 days          Peripheral Intravenous Line                 Midline Catheter Insertion/Assessment  - Single Lumen 01/10/22 1300 Left cephalic vein (lateral side of arm) 18g x 8cm 13 days         Peripheral IV - Single Lumen 01/22/22 1600 20 G Anterior;Proximal;Right Forearm 1 day                Significant Labs:    CBC/Anemia Profile:  Recent Labs   Lab 01/23/22  0408 01/24/22  0323 01/24/22  0326   WBC 14.34* 13.70*  --    HGB 7.8* 8.3*  --    HCT 25.8* 27.0* 28*   * 136*  --    MCV 99* 97  --    RDW 19.8* 19.4*  --         Chemistries:  Recent Labs   Lab 01/23/22  1426 01/23/22  2108 01/24/22  0327    136 135*   K 3.9 4.0 3.9    101 101   CO2 24 23 23   BUN 9 4* 4*   CREATININE 0.8 0.5 0.5   CALCIUM 9.3 8.6* 9.1   ALBUMIN 3.2* 3.0* 3.0*   MG 2.0 1.9 2.1   PHOS 2.7 1.1* <1.0*       All pertinent labs within the past 24 hours have been reviewed.    Significant Imaging:  I have reviewed all pertinent imaging results/findings within the past 24  hours.

## 2022-01-24 NOTE — SUBJECTIVE & OBJECTIVE
"Interval HPI:   Overnight events:   BG stable and overall within goal while on current SQ insulin regime. TF have trended back to goal at 30 ml/hr. Occasional BG excursions noted. However, patient is responding well to SQ insulin. Endo will continue to follow, and manage glycemic control while inpatient.   Diet NPO Except for: Sips with Medication  3 Days Post-Op    Eating:   NPO  Nausea: No  Hypoglycemia and intervention: No  Fever: No  TPN and/or TF: No  If yes, type of TF/TPN and rate: None    /64   Pulse 81   Temp 98.3 °F (36.8 °C) (Oral)   Resp (!) 28   Ht 5' 1" (1.549 m)   Wt 44.8 kg (98 lb 12.3 oz)   SpO2 96%   Breastfeeding No   BMI 18.66 kg/m²     Labs Reviewed and Include    Recent Labs   Lab 01/24/22 0327   *   CALCIUM 9.1   ALBUMIN 3.0*   *   K 3.9   CO2 23      BUN 4*   CREATININE 0.5     Lab Results   Component Value Date    WBC 13.70 (H) 01/24/2022    HGB 8.3 (L) 01/24/2022    HCT 28 (L) 01/24/2022    MCV 97 01/24/2022     (L) 01/24/2022     No results for input(s): TSH, FREET4 in the last 168 hours.  Lab Results   Component Value Date    HGBA1C 6.0 (H) 01/03/2022       Nutritional status:   Body mass index is 18.66 kg/m².  Lab Results   Component Value Date    ALBUMIN 3.0 (L) 01/24/2022    ALBUMIN 3.0 (L) 01/23/2022    ALBUMIN 3.2 (L) 01/23/2022     Lab Results   Component Value Date    PREALBUMIN 13 (L) 01/21/2022    PREALBUMIN 11 (L) 01/10/2022       Estimated Creatinine Clearance: 66.6 mL/min (based on SCr of 0.5 mg/dL).    Accu-Checks  Recent Labs     01/22/22  2329 01/23/22  0404 01/23/22  0735 01/23/22  1237 01/23/22  1721 01/23/22  1918 01/23/22  2306 01/24/22  0321 01/24/22  0827 01/24/22  1159   POCTGLUCOSE 95 97 137* 116* 108 118* 176* 135* 240* 174*       Current Medications and/or Treatments Impacting Glycemic Control  Immunotherapy:    Immunosuppressants     None        Steroids:   Hormones (From admission, onward)            Start     Stop Route " Frequency Ordered    01/20/22 2134  vasopressin (PITRESSIN) 0.2 Units/mL in dextrose 5 % 100 mL infusion         -- IV Continuous 01/20/22 2135 01/06/22 2255  melatonin tablet 9 mg         -- Oral Nightly PRN 01/06/22 2255        Pressors:    Autonomic Drugs (From admission, onward)            Start     Stop Route Frequency Ordered    01/22/22 0900  metoprolol tartrate (LOPRESSOR) split tablet 12.5 mg         -- PER G TUBE 2 times daily 01/22/22 0646    01/21/22 1115  midodrine tablet 10 mg         -- Oral Every 12 hours 01/21/22 1007    01/12/22 1441  rocuronium 10 mg/mL injection        Note to Pharmacy: Created by cabinet override    01/13 0244   01/12/22 1441        Hyperglycemia/Diabetes Medications:   Antihyperglycemics (From admission, onward)            Start     Stop Route Frequency Ordered    01/21/22 0800  insulin aspart U-100 pen 4 Units         -- SubQ Every 24 hours (non-standard times) 01/20/22 1133    01/21/22 0400  insulin aspart U-100 pen 4 Units         -- SubQ Every 24 hours (non-standard times) 01/20/22 1133    01/21/22 0000  insulin aspart U-100 pen 4 Units         -- SubQ Every 24 hours (non-standard times) 01/20/22 1133    01/20/22 2000  insulin aspart U-100 pen 4 Units         -- SubQ Every 24 hours (non-standard times) 01/20/22 1133    01/20/22 1600  insulin aspart U-100 pen 4 Units         -- SubQ Every 24 hours (non-standard times) 01/20/22 1133    01/20/22 1200  insulin aspart U-100 pen 4 Units         -- SubQ Every 24 hours (non-standard times) 01/20/22 1133    01/15/22 1415  insulin aspart U-100 pen 0-5 Units         -- SubQ Every 4 hours PRN 01/15/22 1315

## 2022-01-24 NOTE — CARE UPDATE
Changed back to VC+ due to RR climbing upper 30s to >40 consistently and patient looking more dyspneic.

## 2022-01-24 NOTE — PLAN OF CARE
[facility]  Discharge Reassessment    Primary Care Provider: Otis Zimmer MD    Expected Discharge Date: 1/25/2022    Reassessment (most recent)     Discharge Reassessment - 01/24/22 1518        Discharge Reassessment    Assessment Type Discharge Planning Reassessment     Discharge Plan discussed with: Adult children     Communicated MENDEL with patient/caregiver Date not available/Unable to determine     Discharge Plan A Long-term acute care facility (LTAC)     Discharge Plan B Rehab     DME Needed Upon Discharge  other (see comments)   TBD    Discharge Barriers Identified None     Why the patient remains in the hospital Requires continued medical care        Post-Acute Status    Post-Acute Authorization Placement     Post-Acute Placement Status Patient List Provided               Per MD's Note, Afeb, WBC downtrending  Repleting phos this morning  Heparin gtt therapeutic, INR remains 1.1 s/p 2mg coumadin last night  Did well on PAV throughout the day yesterday      The patient is not medically stable to discharge at this time and remains in the SICU. The SW provided a list of rehab facilities to the patient's family. The SW will continue to follow-up with the patient to assist with discharge planning.     Shen Simmons LMSW  Case Management [facility]  Ext: [phone]\

## 2022-01-24 NOTE — ASSESSMENT & PLAN NOTE
-oliguric ischemic ATN likely secondary to severe intraoperative hypotension requiring epi, norepi, and vaso intraoperatively 1/5/22  -BL sCr 0.8  -creatinine worsened post op delayed likely due to hemodilution  -failed high dose duretic therapy and KRT started 1/9/22 due to volume overload  -reintubated 1/12/22  -last 24 hours neg 700cc fluid balance after SLED last night not requiring and vasopressors   -more alert today with stable fio2 of 30  -not volume overloaded, no severe acidosis, no severe electrolyte abnormalities  -hold dialysis today

## 2022-01-24 NOTE — PROGRESS NOTES
[facility]  Nephrology  Progress Note    Patient Name: Orion Ty  MRN: [id number]  Admission Date: 1/5/2022  Hospital Length of Stay: 19 days  Attending Provider: Dennis Haider MD   Primary Care Physician: Otis Zimmer MD  Principal Problem:S/P aortic valve replacement    Subjective:     HPI: Orion Ty is our 76 y.o. female with previous aortic valve replacement, mitral valve replacement, and tricuspid valve repair in 2010 who presented on 1/5 for redo aortic valve replacement. Nephrology consulted on 1/8 for anuric JO. Patient is alert but tired, son at bedside. Stated she was tired prior to admission. During the hospital stay Cr increase from 1 to 1.5. In OR she received  2.5L crystalloid, 3U RBC, 2U FFP, 2 platelets, and 800 cell saver. she is +5 L since admit. UOP decrease overnight, per nurse she didn't urinate at all. She received diuril 500 mg twice, lasix 20 mg *2 on 1/7. This morning she urinate 225 immediately after placing the tejeda's cath. She received again lasix 100 mg this morning. Upor evaluation she was alert but repeatedly saying she was tired, denied any pain. Denied any previous hx of kidney disease.       Interval History: negative 700cc fluid balance in last 24 hours, has remained off pressers with fio2 of 30 and no severe electrolyte abnormalities    Review of patient's allergies indicates:  No Known Allergies  Current Facility-Administered Medications   Medication Frequency    0.9%  NaCl infusion (CRRT USE ONLY) Continuous    0.9%  NaCl infusion (for blood administration) Q24H PRN    acetaminophen oral solution 650 mg Q8H    albuterol-ipratropium 2.5 mg-0.5 mg/3 mL nebulizer solution 3 mL Q6H    aspirin suppository 300 mg Once PRN    atorvastatin tablet 40 mg Daily    bisacodyL suppository 10 mg Daily PRN    dexmedetomidine (PRECEDEX) 400mcg/100mL 0.9% NaCL infusion Continuous    dextrose 10 % infusion Continuous PRN    dextrose 50%  injection 12.5 g PRN    fentaNYL 50 mcg/mL injection 25 mcg Q2H PRN    [START ON 1/25/2022] fluconazole 40 mg/ml suspension 200 mg Daily    glucagon (human recombinant) injection 1 mg PRN    heparin 25,000 units in dextrose 5% 250 mL (100 units/mL) infusion (heparin infusion - NO NOMOGRAM) Continuous    insulin aspart U-100 pen 0-5 Units Q4H PRN    insulin aspart U-100 pen 4 Units Q24H    insulin aspart U-100 pen 4 Units Q24H    insulin aspart U-100 pen 4 Units Q24H    insulin aspart U-100 pen 4 Units Q24H    insulin aspart U-100 pen 4 Units Q24H    insulin aspart U-100 pen 4 Units Q24H    LIDOcaine 5 % patch 1 patch Q24H    LIDOcaine HCL 2% jelly PRN    magnesium sulfate 2g in water 50mL IVPB (premix) Once    melatonin tablet 9 mg Nightly PRN    metoprolol tartrate (LOPRESSOR) split tablet 12.5 mg BID    midodrine tablet 10 mg Q12H    ondansetron injection 4 mg Q12H PRN    oxyCODONE 5 mg/5 mL solution 5 mg Q4H PRN    pantoprazole injection 40 mg Daily    polyethylene glycol packet 17 g Daily    potassium, sodium phosphates 280-160-250 mg packet 2 packet TID PRN    sodium chloride 0.9% bolus 250 mL PRN    sodium chloride 0.9% flush 10 mL PRN    vasopressin (PITRESSIN) 0.2 Units/mL in dextrose 5 % 100 mL infusion Continuous    warfarin (COUMADIN) tablet 2 mg Daily     Facility-Administered Medications Ordered in Other Encounters   Medication Frequency    0.9%  NaCl infusion Continuous       Objective:     Vital Signs (Most Recent):  Temp: 98.3 °F (36.8 °C) (01/24/22 0800)  Pulse: 80 (01/24/22 1245)  Resp: (!) 28 (01/24/22 1245)  BP: (!) 128/59 (01/24/22 1200)  SpO2: 98 % (01/24/22 1245)  O2 Device (Oxygen Therapy): ventilator (01/24/22 1245) Vital Signs (24h Range):  Temp:  [97.8 °F (36.6 °C)-99 °F (37.2 °C)] 98.3 °F (36.8 °C)  Pulse:  [79-90] 80  Resp:  [20-40] 28  SpO2:  [94 %-100 %] 98 %  BP: ()/() 128/59  Arterial Line BP: ()/(39-62) 128/52     Weight: 44.8 kg (98 lb  12.3 oz) (01/20/22 1300)  Body mass index is 18.66 kg/m².  Body surface area is 1.39 meters squared.    I/O last 3 completed shifts:  In: 4369.7 [I.V.:3614.7; NG/GT:615; IV Piggyback:140]  Out: 5751 [Drains:200; Other:5551]    Physical Exam  Constitutional:       General: She is not in acute distress.     Appearance: She is ill-appearing.   HENT:      Mouth/Throat:      Mouth: Mucous membranes are moist.   Eyes:      General: No scleral icterus.  Cardiovascular:      Rate and Rhythm: Tachycardia present.   Pulmonary:      Comments: trach  Abdominal:      General: There is no distension.      Palpations: Abdomen is soft.   Musculoskeletal:         General: No deformity.      Right lower leg: No edema.      Left lower leg: No edema.   Skin:     Coloration: Skin is not jaundiced.         Significant Labs:  All labs within the past 24 hours have been reviewed.     Significant Imaging:  Labs: Reviewed    Assessment/Plan:     * S/P aortic valve replacement  -with severe intraoperative hypotension   -per primary    JO (acute kidney injury)  -oliguric ischemic ATN likely secondary to severe intraoperative hypotension requiring epi, norepi, and vaso intraoperatively 1/5/22  -BL sCr 0.8  -creatinine worsened post op delayed likely due to hemodilution  -failed high dose duretic therapy and KRT started 1/9/22 due to volume overload  -reintubated 1/12/22  -last 24 hours neg 700cc fluid balance after SLED last night not requiring and vasopressors   -more alert today with stable fio2 of 30  -not volume overloaded, no severe acidosis, no severe electrolyte abnormalities  -hold dialysis today      ATN (acute tubular necrosis)  -see jo    Volume overload  -improved with KRT started 1/9/22        Oj Clifford MD  Nephrology  [facility]    ATTENDING PHYSICIAN ATTESTATION  I have personally verified the history and examined the patient. I thoroughly reviewed the demographic, clinical, laboratorial and  imaging information available in medical records. I agree with the assessment and recommendations provided by the subspecialty resident who was under my supervision.

## 2022-01-24 NOTE — SUBJECTIVE & OBJECTIVE
Interval History: negative 700cc fluid balance in last 24 hours, has remained off pressers with fio2 of 30 and no severe electrolyte abnormalities    Review of patient's allergies indicates:  No Known Allergies  Current Facility-Administered Medications   Medication Frequency    0.9%  NaCl infusion (CRRT USE ONLY) Continuous    0.9%  NaCl infusion (for blood administration) Q24H PRN    acetaminophen oral solution 650 mg Q8H    albuterol-ipratropium 2.5 mg-0.5 mg/3 mL nebulizer solution 3 mL Q6H    aspirin suppository 300 mg Once PRN    atorvastatin tablet 40 mg Daily    bisacodyL suppository 10 mg Daily PRN    dexmedetomidine (PRECEDEX) 400mcg/100mL 0.9% NaCL infusion Continuous    dextrose 10 % infusion Continuous PRN    dextrose 50% injection 12.5 g PRN    fentaNYL 50 mcg/mL injection 25 mcg Q2H PRN    [START ON 1/25/2022] fluconazole 40 mg/ml suspension 200 mg Daily    glucagon (human recombinant) injection 1 mg PRN    heparin 25,000 units in dextrose 5% 250 mL (100 units/mL) infusion (heparin infusion - NO NOMOGRAM) Continuous    insulin aspart U-100 pen 0-5 Units Q4H PRN    insulin aspart U-100 pen 4 Units Q24H    insulin aspart U-100 pen 4 Units Q24H    insulin aspart U-100 pen 4 Units Q24H    insulin aspart U-100 pen 4 Units Q24H    insulin aspart U-100 pen 4 Units Q24H    insulin aspart U-100 pen 4 Units Q24H    LIDOcaine 5 % patch 1 patch Q24H    LIDOcaine HCL 2% jelly PRN    magnesium sulfate 2g in water 50mL IVPB (premix) Once    melatonin tablet 9 mg Nightly PRN    metoprolol tartrate (LOPRESSOR) split tablet 12.5 mg BID    midodrine tablet 10 mg Q12H    ondansetron injection 4 mg Q12H PRN    oxyCODONE 5 mg/5 mL solution 5 mg Q4H PRN    pantoprazole injection 40 mg Daily    polyethylene glycol packet 17 g Daily    potassium, sodium phosphates 280-160-250 mg packet 2 packet TID PRN    sodium chloride 0.9% bolus 250 mL PRN    sodium chloride 0.9% flush 10 mL PRN     vasopressin (PITRESSIN) 0.2 Units/mL in dextrose 5 % 100 mL infusion Continuous    warfarin (COUMADIN) tablet 2 mg Daily     Facility-Administered Medications Ordered in Other Encounters   Medication Frequency    0.9%  NaCl infusion Continuous       Objective:     Vital Signs (Most Recent):  Temp: 98.3 °F (36.8 °C) (01/24/22 0800)  Pulse: 80 (01/24/22 1245)  Resp: (!) 28 (01/24/22 1245)  BP: (!) 128/59 (01/24/22 1200)  SpO2: 98 % (01/24/22 1245)  O2 Device (Oxygen Therapy): ventilator (01/24/22 1245) Vital Signs (24h Range):  Temp:  [97.8 °F (36.6 °C)-99 °F (37.2 °C)] 98.3 °F (36.8 °C)  Pulse:  [79-90] 80  Resp:  [20-40] 28  SpO2:  [94 %-100 %] 98 %  BP: ()/() 128/59  Arterial Line BP: ()/(39-62) 128/52     Weight: 44.8 kg (98 lb 12.3 oz) (01/20/22 1300)  Body mass index is 18.66 kg/m².  Body surface area is 1.39 meters squared.    I/O last 3 completed shifts:  In: 4369.7 [I.V.:3614.7; NG/GT:615; IV Piggyback:140]  Out: 5751 [Drains:200; Other:5551]    Physical Exam  Constitutional:       General: She is not in acute distress.     Appearance: She is ill-appearing.   HENT:      Mouth/Throat:      Mouth: Mucous membranes are moist.   Eyes:      General: No scleral icterus.  Cardiovascular:      Rate and Rhythm: Tachycardia present.   Pulmonary:      Comments: trach  Abdominal:      General: There is no distension.      Palpations: Abdomen is soft.   Musculoskeletal:         General: No deformity.      Right lower leg: No edema.      Left lower leg: No edema.   Skin:     Coloration: Skin is not jaundiced.         Significant Labs:  All labs within the past 24 hours have been reviewed.     Significant Imaging:  Labs: Reviewed

## 2022-01-25 NOTE — PT/OT/SLP PROGRESS
"Occupational Therapy  Co- Treatment    Name: Orion Ty  MRN: [id number]  Admitting Diagnosis:  History of mechanical aortic valve replacement  4 Days Post-Op    Recommendations:     Discharge Recommendations: rehabilitation facility      Assessment:     Orion Ty is a 76 y.o. female with a medical diagnosis of History of mechanical aortic valve replacement.   Performance deficits affecting function are weakness,impaired endurance,impaired self care skills,impaired functional mobilty,gait instability,impaired balance. Pt tolerated session fair. Pt fatigued this afternoon. Pt continues to demo appropriateness for post acute therapy prior to return home to increase independence and safety.     Rehab Prognosis:  Good; patient would benefit from acute skilled OT services to address these deficits and reach maximum level of function.       Plan:     Patient to be seen 4 x/week to address the above listed problems via self-care/home management,therapeutic activities,therapeutic exercises  · Plan of Care Expires: 02/05/22  · Plan of Care Reviewed with: patient,caregiver    Subjective   Pt reports, "I'm tired."  Pain/Comfort:  · Pain Rating 1: 0/10    Objective:     Communicated with: nsg prior to session.  Patient found supine in bed with vent via trach, tele, pulse ox, BP cuff, tejeda, central line, [place]. Family present in room.   Co-tx completed this date to optimize functional performance and safety given impaired tolerance for activity in setting of ICU.     RT present in room this AM for trach collar trial. OT returned in PM for therapy per nsg request.     General Precautions: Standard, fall,sternal     Occupational Performance:     Bed Mobility:    Supine>sit with MAX A   Sit>supine with TOTAL A     Functional Mobility/Transfers:  · One standing trial with MAX A x 2 for partial stand.     Activities of Daily Living:  · Pt able to wash hands and face with SBA seated  · TOTAL A for remainder of ADLs      [facility] " 6 Click ADL: 7    Treatment & Education:  Pt lethargic this afternoon. Nsg reports trach collar this AM and only tolerated it about 10 min due to increased respirations and slow desaturation. Pt was able to sit EOB approx 5 min with SBA and engaged with basic self care skills.  One standing trial completed with MAX A x 2.   Pt limited by dizziness in sitting/standing and overall fatigue. VS remained stable throughout session with nsg increasing oxygen support during session following a deep suction.     OT discussed therapy plan for next date with nsg and family. Pt appropriate for t/f to medichair next date with nsg staff and additional trach collar trial;  OT/PT to resume 1/27/22.   Education also provided current d/c recommendations with pt's daughter including post acute therapy to allow for increased strength and independence prior to return home.     Education provided re: OT POC and safety with functional mobility/ADL skills.       Patient left supine with all lines intact, call button in reach and nsg and family  presentEducation:      GOALS:   Multidisciplinary Problems     Occupational Therapy Goals        Problem: Occupational Therapy Goal    Goal Priority Disciplines Outcome Interventions   Occupational Therapy Goal     OT, PT/OT Ongoing, Progressing    Description: Goals to be met by:  2/2/22     Patient will increase functional independence with ADLs by performing:    Pt to complete UE dressing with set-up  Pt to complete LE dressing with SBA  Pt to complete standing g/h skills with SBA  Pt to complete toileting with SBA  Pt to completed t/f bed, chair and commode with SBA                   Time Tracking:     OT Date of Treatment: 01/25/22  OT Start Time: 1310  OT Stop Time: 1335  OT Total Time (min): 25 min    Billable Minutes:Self Care/Home Management 12  Therapeutic Activity 13    OT/SUE: OT          1/25/2022

## 2022-01-25 NOTE — PT/OT/SLP PROGRESS
Physical Therapy Co-Treatment    Patient Name:  Orion Ty   MRN:  [id number]  Admitting Diagnosis:  History of mechanical aortic valve replacement   Recent Surgery: Procedure(s) (LRB):  CREATION, TRACHEOSTOMY (N/A)  EGD, WITH PEG TUBE INSERTION (N/A)  INSERTION-CATHETER-ROSELINE (N/A) 4 Days Post-Op  Admit Date: 1/5/2022  Length of Stay: 20 days    Recommendations:     Discharge Recommendations:  rehabilitation facility   Discharge Equipment Recommendations: bedside commode   Barriers to discharge: weakness, impaired activity tolerance, decreased balance, increased fall risk    Assessment:     Orion Ty is a 76 y.o. female admitted with a medical diagnosis of History of mechanical aortic valve replacement.  She presents with the following impairments/functional limitations:  weakness,impaired endurance,impaired functional mobilty,gait instability,impaired balance,impaired self care skills,decreased upper extremity function,decreased lower extremity function,decreased safety awareness,impaired fine motor,impaired coordination,impaired cardiopulmonary response to activity. Pt tolerated session fairly today. PT with improved sitting balance on this date but demonstrating increased fatigue with eyes closed while EOB despite max cueing from therapist and pt's family member. Pt still with weakness and pain and unable to reach full stand on this date. Pt will continue to benefit from skilled PT services during this hospital admit to continue to improve transfer ability and efficiency as well as continue to progress pt's ambulation distance and cardiopulmonary endurance to maximize pt's functional independence and return to PLOF. Pt is an excellent candidate for inpatient rehabilitation, as pt has a qualifying diagnosis, displays good activity tolerance, has experienced decline from PLOF, has good family support, and is motivated to participate in therapy. Pt's clinical condition meets full inpatient rehab criteria.  "Inpatient rehab will provide to total interdisciplinary treatment approach needed. Pt is at high risk of unplanned readmission due to fall risk. The lower level of care cannot provide total interdisciplinary approach needed.     Rehab Prognosis: Good; patient would benefit from acute skilled PT services to address these deficits and reach maximum level of function.    Recent Surgery: Procedure(s) (LRB):  CREATION, TRACHEOSTOMY (N/A)  EGD, WITH PEG TUBE INSERTION (N/A)  INSERTION-CATHETER-ROSELINE (N/A) 4 Days Post-Op    Plan:     During this hospitalization, patient to be seen 4 x/week to address the identified rehab impairments via gait training,therapeutic activities,therapeutic exercises,neuromuscular re-education and progress toward the following goals:    · Plan of Care Expires:  02/21/22    Subjective     RN notified prior to session. Family present upon PT entrance into room.    Chief Complaint: "tired"  Patient/Family Comments/goals: get stronger  Pain/Comfort:  · Pain Rating 1: 0/10  · Pain Rating Post-Intervention 1: 0/10      Objective:     Additional staff present: OT for cotx due to pt's multiple medical comorbidities and functional deficits req'ing two skilled therapists to appropriately progress pt's musculoskeletal strength, neuromuscular control, and endurance while taking into consideration severe medical acuity in the ICU    Patient found HOB elevated with: telemetry,blood pressure cuff,pulse ox (continuous),Tracheostomy,ventilator,PEG Tube,arterial line,peripheral IV (midline; permacath)   Cognition:   · Alert and Cooperative  · AxOx3  · Command following: Follows multistep verbal commands  · Fluency: clear/fluent  General Precautions: Standard, Cardiac fall,sternal   Orthopedic Precautions:N/A   Braces: N/A   Body mass index is 18.66 kg/m².  Oxygen Device: trach to vent   Vent Mode: Spont  Oxygen Concentration (%):  [30] 30  Resp Rate Total:  [19 br/min-35 br/min] 25 br/min  Vt Set:  [330 mL] 330 " "mL  PEEP/CPAP:  [5 cmH20] 5 cmH20  Pressure Support:  [10 cmH20] 10 cmH20  Mean Airway Pressure:  [8.5 mwV22-75 cmH20] 8.5 cmH20  Vitals: BP (!) 107/59 (BP Location: Right arm, Patient Position: Lying)   Pulse 80   Temp 98.7 °F (37.1 °C) (Oral)   Resp (!) 25   Ht 5' 1" (1.549 m)   Wt 44.8 kg (98 lb 12.3 oz)   SpO2 98%   Breastfeeding No   BMI 18.66 kg/m²     Outcome Measures:  AM-PAC 6 CLICK MOBILITY  Turning over in bed (including adjusting bedclothes, sheets and blankets)?: 2  Sitting down on and standing up from a chair with arms (e.g., wheelchair, bedside commode, etc.): 2  Moving from lying on back to sitting on the side of the bed?: 2  Moving to and from a bed to a chair (including a wheelchair)?: 1  Need to walk in hospital room?: 1  Climbing 3-5 steps with a railing?: 1  Basic Mobility Total Score: 9     Functional Mobility:    Bed Mobility:   · Scooting to HOB via supine bridge: total assistance and 2 persons  · Supine to Sit: maximal assistance; from Lt side of bed  · Scooting anteriorly to EOB to have both feet planted on floor: total assistance  · Sit to Supine: total assistance; to Lt side of bed    Sitting Balance at Edge of Bed:   Static Sitting Balance: Good- : able to accept minimal resistance   Dynamic Sitting Balance: Fair : minimal weight shifting ipsilaterally or anteriorly. Difficulty crossing midline   Assistance Level Required: Stand-by Assistance   Time: ~5 minutes   Postural deviations noted: no deviations noted   Encouraged: keep eyes open   Comments: Time EOB focused primarily on tolerance to upright positioning, cardiopulmonary response and endurance for activities, and strength of postural musculature to perform dynamic sitting to prepare for tasks in the home. Pt able to accept internalperturbations to balance while seated EOB with appropriate trunk response to remain upright with no LOB and BUE support. Able to perform reaches inside DEVYN. Pt endorsing dizziness while " EOB and keeping eyes closed despite max cueing.    Transfers:   · Sit <> Stand Transfer: maximal assistance and of 2 persons with hand-held assist   · Stand <> Sit Transfer: maximal assistance and of 2 persons with hand-held assist   · [id number] from EOB   · Only able to reach 75% of full standing    Standing Balance:   Static Standing Balance: Poor-: requires maximal assistance and UE support to maintain standing balance without loss   Dynamic Standing Balance: Poor : able to stand with moderate assistance and minimally reach ipsilaterally. Unable to cross midline.   Assistance Level Required: Maximum Assistance   Patient used: hand-held assist    Time: ~20 seconds   Postural deviations noted: slouched posture, rounded shoulders and forward head   Comments: Only able to reach 75% of full stand; pt resistant to facilitation at sacrum. Reporting increased dizziness.      Education:   Time provided for education, counseling and discussion of health disposition in regards to patient's current status   All questions answered within PT scope of practice and to patient's satisfaction   PT role in POC to address current functional deficits   Pt educated on proper body mechanics, safety techniques, and energy conservation with PT facilitation and cueing throughout session   Call nursing/pct to transfer to chair/use bathroom. Pt stated understanding.   Whiteboard updated with therapist name and pt's current mobility status documented above   Patient is appropriate for drawsheet transfer to/from cardiac chair with nursing staff    Patient left HOB elevated with all lines intact, call button in reach and RN and family present.    GOALS:   Multidisciplinary Problems     Physical Therapy Goals        Problem: Physical Therapy Goal    Goal Priority Disciplines Outcome Goal Variances Interventions   Physical Therapy Goal     PT, PT/OT Ongoing, Progressing     Description: Goals to be met by: 2/13/2022     Patient will  increase functional independence with mobility by performin. Sit to stand transfer with Supervision -not met  2. Bed to chair transfer with minimum -not met  3. Gait  x 100 feet with minimum  standing rest breaks prn. -not met  4. Lower extremity exercise program x30 reps per handout, with independence -not met  5. Recite 3/3 sternal precautions and remain complaint with precautions throughout session with no verbal cues -not met  6. Pt sit on EOB x 10 min with CGA - not met                     Time Tracking:     PT Received On: 22  PT Start Time: 1310     PT Stop Time: 1335  PT Total Time (min): 25 min     Billable Minutes:   · Therapeutic Activity 15 minutes and Neuromuscular Re-education 10 minutes    Treatment Type: Treatment  PT/PTA: PT       Emma Florentino PT, DPT  2022  Pager: [phone]

## 2022-01-25 NOTE — RESPIRATORY THERAPY
Patient was placed on a 40 % aerosol trach collar for her initial trial. Patient was able to tolerate approximately 15 min of trach collar before her work of breathing significantly increased. Patient was placed back on ventilator support on previous settings. RN notified.

## 2022-01-25 NOTE — PROGRESS NOTES
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 Post operative course complicated by acute respiratory distress and JO. Re intubated 01/13 and 01/18, also started on CRRT. Trach, PEG, permacath placed Jan 21, 2022.      Progressing     Continue current care   RRT per renal   Trach collar as able   2mg coumadin tonight   Cont ICU

## 2022-01-25 NOTE — PLAN OF CARE
No acute events this shift. Patient tolerated PAV+ throughout the day. Tolerated trach collar for 10-15 minutes, placed back on vent for increased work of breathing. Tejeda placed, lasix challenge per nephrology.     HR 80s Paced rhythm. MAP 60-80. SpO2 >95% on PAV+.  Follows commands moves all extremities. Nods and answers questions appropriately.   Bladder scan residual 345cc. UOP 300cc/shift after tejeda placed. X1 large liquid BM.     Heparin infusing @600units/hr per  order. aPTT monitored.   TF @35cc/hr. No residuals.   BG monitored q4.     Skin: Partial thickness noted to perineum. Triad cream applied. Repositioned with pillows q2. Immerse bed in use. Green boots applied to heels bilaterally.   Bilateral UE elevated throughout the day.     Plan: Tolerate HD. OOB in cardiac chair. Tolerate trach collar.

## 2022-01-25 NOTE — ASSESSMENT & PLAN NOTE
Orion Ty is a 76 y.o. female who presents to the SICU s/p redo aortic valve replacement with mechanical valve on 1/5/22.      Neuro/Psych:   -- Sedation: none  -- Pain: Liquid Tiffani, breakthrough fentanyl pushes q2     Cards:   -- Pressors: Off  -- Goal MAP: 60-80  -- statin  -- heparin drip, PTT goal 60-80.   -- Coumadin 2mg  -- Paced HR 80  -- INR 1.7      Pulm:   -- Goal O2 sat > 90%  -- reintubated 1/12 then 1/18. S/p trach 1/21  -- Spontaneous when able  -- ABG PRN  -- 2 mediastinal removed 1/9 L Pleural CT removed 1/8      Renal:  -- Mireles removed anuria  -- net negative goal 500   -- discuss with nephro about HD trial  -- bladder scan every shift  -- Oliguric   -- Nephrology following  -- Permcath 1/21  -- Removed R IJ trialysis 1/23      FEN / GI:   -- Replace lytes as needed  -- s/p PEG 1/21  -- Nutrition: tube feeds novasource renal, at goal      ID:   -- WBC stable at 8  -- Antibiotics: periop ancef complete. Zosyn and vanc, stopped 1/20      Heme/Onc:   -- Hgb stable  -- 3U RBC, 2U FFP, 2U platelets in OR  -- Daily CBC  -- Transfused products: 1U RBC on 1/5/22. 1u RBC and 1u FFP on 1/9/21. 2U RBC on 1/12. 4 FFP on 1/13. 1 FFP on 1/17  -- Anticoagulation: heparin drip   -- heparin drip, PTT 60-80 after OR   -- 2mg coumadin tonight      Endo:   -- Gluc goal 140-180  -- Insulin per endocrinology      PPx:   Feeding: tube feeds  Analgesia/Sedation: Itffani, fent pushes / none  Thromboembolic prevention: heparin drip, coumadin 2  HOB >30: yes  Stress Ulcer ppx: protonix BID  Glucose control: Critical care goal 140-180 g/dl, ISS    Lines/Drains/Airway: Art line, Trach, Permcath, PEG      Dispo/Code Status/Palliative:   -- SICU / Full Code

## 2022-01-25 NOTE — PROGRESS NOTES
"[facility]  Endocrinology  Progress Note    Admit Date: 1/5/2022     Reason for Consult: Management of T2DM, Hyperglycemia     Surgical Procedure and Date:   1/5/21  REPLACEMENT, AORTIC VALVE, WITH REPEAT STERNOTOMY (N/A)     Diabetes diagnosis year: MELIDA; intubated    Home Diabetes Medications:  Metformin 500 mg daily,     How often checking glucose at home?  MELIDA    BG readings on regimen: MELIDA  Hypoglycemia on the regimen?  MELIDA  Missed doses on regimen? MELIDA    Diabetes Complications include:     None    Complicating diabetes co morbidities:   CAD, HTN, a-fib       HPI:   Patient is a 76 y.o. female with a diagnosis of s/p MVR, AVR and TVr in 2010 by Dr. Haider. 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 Family states she is more fatigued, decreased appetite and fluid on lungs. Pt has increased symptoms and wants to proceed with cardiac surgery now and here for pre op. She is now s/p the above procedure. Endocrinology consulted for management of T2DM.     Lab Results   Component Value Date    HGBA1C 6.0 (H) 01/03/2022           Interval HPI:   Overnight events:  BG has trended downwards below goal overnight. Morning scheduled novolog insulin held this morning so as to prevent hypoglycemia. Patient remains in SICU on Vent with Trach. Endo will continue to follow, and manage glycemic control.   Diet NPO Except for: Sips with Medication  4 Days Post-Op    Eating:   NPO  Nausea: No  Hypoglycemia and intervention: No  Fever: No  TPN and/or TF: Yes  If yes, type of TF/TPN and rate: TF at 35 cc/hr.     BP (!) 106/59 (BP Location: Right arm, Patient Position: Lying)   Pulse 81   Temp 97.8 °F (36.6 °C) (Oral)   Resp (!) 25   Ht 5' 1" (1.549 m)   Wt 44.8 kg (98 lb 12.3 oz)   SpO2 98%   Breastfeeding No   BMI 18.66 kg/m²     Labs Reviewed and Include    Recent Labs   Lab 01/25/22  0213   *   CALCIUM 8.1*      K " 3.4*   CO2 22*      BUN 26*   CREATININE 1.4     Lab Results   Component Value Date    WBC 11.67 01/25/2022    HGB 8.0 (L) 01/25/2022    HCT 25 (L) 01/25/2022    MCV 99 (H) 01/25/2022     (L) 01/25/2022     No results for input(s): TSH, FREET4 in the last 168 hours.  Lab Results   Component Value Date    HGBA1C 6.0 (H) 01/03/2022       Nutritional status:   Body mass index is 18.66 kg/m².  Lab Results   Component Value Date    ALBUMIN 3.0 (L) 01/24/2022    ALBUMIN 3.0 (L) 01/23/2022    ALBUMIN 3.2 (L) 01/23/2022     Lab Results   Component Value Date    PREALBUMIN 13 (L) 01/21/2022    PREALBUMIN 11 (L) 01/10/2022       Estimated Creatinine Clearance: 23.8 mL/min (based on SCr of 1.4 mg/dL).    Accu-Checks  Recent Labs     01/23/22  1918 01/23/22  2306 01/24/22  0321 01/24/22  0827 01/24/22  1159 01/24/22  1640 01/24/22  2037 01/25/22  0016 01/25/22  0424 01/25/22  0853   POCTGLUCOSE 118* 176* 135* 240* 174* 156* 203* 116* 186* 121*       Current Medications and/or Treatments Impacting Glycemic Control  Immunotherapy:    Immunosuppressants     None        Steroids:   Hormones (From admission, onward)            Start     Stop Route Frequency Ordered    01/20/22 2134  vasopressin (PITRESSIN) 0.2 Units/mL in dextrose 5 % 100 mL infusion         -- IV Continuous 01/20/22 2135    01/06/22 2255  melatonin tablet 9 mg         -- Oral Nightly PRN 01/06/22 2255        Pressors:    Autonomic Drugs (From admission, onward)            Start     Stop Route Frequency Ordered    01/22/22 0900  metoprolol tartrate (LOPRESSOR) split tablet 12.5 mg         -- PER G TUBE 2 times daily 01/22/22 0646    01/21/22 1115  midodrine tablet 10 mg         -- Oral Every 12 hours 01/21/22 1007    01/12/22 1441  rocuronium 10 mg/mL injection        Note to Pharmacy: Created by cabinet override    01/13 0244   01/12/22 1441        Hyperglycemia/Diabetes Medications:   Antihyperglycemics (From admission, onward)            Start      Stop Route Frequency Ordered    01/21/22 0800  insulin aspart U-100 pen 4 Units         -- SubQ Every 24 hours (non-standard times) 01/20/22 1133    01/21/22 0400  insulin aspart U-100 pen 4 Units         -- SubQ Every 24 hours (non-standard times) 01/20/22 1133    01/21/22 0000  insulin aspart U-100 pen 4 Units         -- SubQ Every 24 hours (non-standard times) 01/20/22 1133    01/20/22 2000  insulin aspart U-100 pen 4 Units         -- SubQ Every 24 hours (non-standard times) 01/20/22 1133    01/20/22 1600  insulin aspart U-100 pen 4 Units         -- SubQ Every 24 hours (non-standard times) 01/20/22 1133    01/20/22 1200  insulin aspart U-100 pen 4 Units         -- SubQ Every 24 hours (non-standard times) 01/20/22 1133    01/15/22 1415  insulin aspart U-100 pen 0-5 Units         -- SubQ Every 4 hours PRN 01/15/22 1315          ASSESSMENT and PLAN    * History of mechanical aortic valve replacement  Managed per primary team  Optimize BG control        Type 2 diabetes mellitus with microalbuminuria, without long-term current use of insulin  BG goal 140 - 180       - Discontinue Novolog 4 units q 4 hrs while on tube feeds. BG trending below goal this AM. Will challenge pancrease today.   - Increase to Moderate Dose SQ Insulin Correction Scale PRN hyperglycemia.   - BG Monitoring q 4 hrs while NPO/tube feeds    ** Please call Endocrine for any BG related issues **  ** Please notify Endocrine for any change and/or advance in diet**  Lab Results   Component Value Date    HGBA1C 6.0 (H) 01/03/2022       Discharge Planning:   TBD. Please notify endocrinology prior to discharge.           Chronic atrial fibrillation  May increase insulin resistance.               Vasquez Inman, NP  Endocrinology  [facility]

## 2022-01-25 NOTE — SUBJECTIVE & OBJECTIVE
Interval History/Significant Events:   USMAN  Had one urine occurrence yesterday  INR 1.7 this morning  Doing well on PAV. Plan to trial trach collar today     Follow-up For: Procedure(s) (LRB):  CREATION, TRACHEOSTOMY (N/A)  EGD, WITH PEG TUBE INSERTION (N/A)  INSERTION-CATHETER-ROSELINE (N/A)    Post-Operative Day: 4 Days Post-Op    Objective:     Vital Signs (Most Recent):  Temp: 97.8 °F (36.6 °C) (01/25/22 0330)  Pulse: 80 (01/25/22 0739)  Resp: (!) 34 (01/25/22 0739)  BP: (!) 115/56 (01/25/22 0600)  SpO2: 97 % (01/25/22 0739) Vital Signs (24h Range):  Temp:  [97.5 °F (36.4 °C)-98.3 °F (36.8 °C)] 97.8 °F (36.6 °C)  Pulse:  [79-86] 80  Resp:  [21-42] 34  SpO2:  [95 %-100 %] 97 %  BP: ()/(52-64) 115/56  Arterial Line BP: ()/(38-62) 103/39     Weight: 44.8 kg (98 lb 12.3 oz)  Body mass index is 18.66 kg/m².      Intake/Output Summary (Last 24 hours) at 1/25/2022 0753  Last data filed at 1/25/2022 0600  Gross per 24 hour   Intake 744.84 ml   Output --   Net 744.84 ml       Physical Exam  Constitutional:       General: She is not in acute distress.  HENT:      Head: Normocephalic and atraumatic.   Neck:      Comments: Trach  Cardiovascular:      Rate and Rhythm: Normal rate and regular rhythm.      Pulses: Normal pulses.      Comments: Midline sternotomy incision c/d/I  Pacemaker in place. Paced at 80  Pulmonary:      Effort: Pulmonary effort is normal. No respiratory distress.      Comments: intubated  Abdominal:      General: Abdomen is flat. There is no distension.      Palpations: Abdomen is soft.      Tenderness: There is no abdominal tenderness.      Comments: Peg site c/d/i  Soft abdomen   Musculoskeletal:      Right lower leg: No edema.      Left lower leg: No edema.      Comments: R radial art line in place   Skin:     General: Skin is warm.      Capillary Refill: Capillary refill takes less than 2 seconds.   Neurological:      General: No focal deficit present.      Mental Status: She is alert.          Vents:  Vent Mode: Spont (01/25/22 0739)  Ventilator Initiated: Yes (01/18/22 1636)  Set Rate: 28 BPM (01/25/22 0500)  Vt Set: 330 mL (01/25/22 0500)  Pressure Support: 10 cmH20 (01/25/22 0115)  PEEP/CPAP: 5 cmH20 (01/25/22 0739)  Oxygen Concentration (%): 30 (01/25/22 0739)  Peak Airway Pressure: 20 cmH2O (01/25/22 0739)  Plateau Pressure: 30 cmH20 (01/25/22 0739)  Total Ve: 9.76 mL (01/25/22 0739)  Negative Inspiratory Force (cm H2O): 0 (01/25/22 0739)  F/VT Ratio<105 (RSBI): (!) 98.55 (01/25/22 0739)    Lines/Drains/Airways     Central Venous Catheter Line            Permacath 01/21/22 1013 left subclavian 3 days          Drain                 Gastrostomy/Enterostomy 01/21/22 1112 Percutaneous endoscopic gastrostomy (PEG) 3 days          Airway                 Surgical Airway 01/21/22 1043 Shiley Cuffed 3 days          Arterial Line            Arterial Line 01/05/22 0855 Right Radial 19 days          Peripheral Intravenous Line                 Midline Catheter Insertion/Assessment  - Single Lumen 01/10/22 1300 Left cephalic vein (lateral side of arm) 18g x 8cm 14 days         Peripheral IV - Single Lumen 01/22/22 1600 20 G Anterior;Proximal;Right Forearm 2 days                Significant Labs:    CBC/Anemia Profile:  Recent Labs   Lab 01/24/22  0323 01/24/22  0326 01/25/22  0213 01/25/22  0300 01/25/22  0425   WBC 13.70*  --  14.30* 11.67  --    HGB 8.3*  --  6.4* 8.0*  --    HCT 27.0*   < > 20.8* 25.9* 25*   *  --  125* 113*  --    MCV 97  --  98 99*  --    RDW 19.4*  --  19.8* 19.8*  --     < > = values in this interval not displayed.        Chemistries:  Recent Labs   Lab 01/23/22  1426 01/23/22  1426 01/23/22 2108 01/23/22 2108 01/24/22  0327 01/24/22  1200 01/25/22  0213      < > 136  --  135*  --  136   K 3.9   < > 4.0  --  3.9  --  3.4*      < > 101  --  101  --  100   CO2 24   < > 23  --  23  --  22*   BUN 9   < > 4*  --  4*  --  26*   CREATININE 0.8   < > 0.5  --  0.5  --   1.4   CALCIUM 9.3   < > 8.6*  --  9.1  --  8.1*   ALBUMIN 3.2*  --  3.0*  --  3.0*  --   --    MG 2.0   < > 1.9  --  2.1  --  1.9   PHOS 2.7   < > 1.1*   < > <1.0* 5.7* 5.2*    < > = values in this interval not displayed.       All pertinent labs within the past 24 hours have been reviewed.    Significant Imaging:  I have reviewed all pertinent imaging results/findings within the past 24 hours.

## 2022-01-25 NOTE — SUBJECTIVE & OBJECTIVE
"Interval HPI:   Overnight events:  BG has trended downwards below goal overnight. Morning scheduled novolog insulin held this morning so as to prevent hypoglycemia. Patient remains in SICU on Vent with Trach. Endo will continue to follow, and manage glycemic control.   Diet NPO Except for: Sips with Medication  4 Days Post-Op    Eating:   NPO  Nausea: No  Hypoglycemia and intervention: No  Fever: No  TPN and/or TF: Yes  If yes, type of TF/TPN and rate: TF at 35 cc/hr.     BP (!) 106/59 (BP Location: Right arm, Patient Position: Lying)   Pulse 81   Temp 97.8 °F (36.6 °C) (Oral)   Resp (!) 25   Ht 5' 1" (1.549 m)   Wt 44.8 kg (98 lb 12.3 oz)   SpO2 98%   Breastfeeding No   BMI 18.66 kg/m²     Labs Reviewed and Include    Recent Labs   Lab 01/25/22  0213   *   CALCIUM 8.1*      K 3.4*   CO2 22*      BUN 26*   CREATININE 1.4     Lab Results   Component Value Date    WBC 11.67 01/25/2022    HGB 8.0 (L) 01/25/2022    HCT 25 (L) 01/25/2022    MCV 99 (H) 01/25/2022     (L) 01/25/2022     No results for input(s): TSH, FREET4 in the last 168 hours.  Lab Results   Component Value Date    HGBA1C 6.0 (H) 01/03/2022       Nutritional status:   Body mass index is 18.66 kg/m².  Lab Results   Component Value Date    ALBUMIN 3.0 (L) 01/24/2022    ALBUMIN 3.0 (L) 01/23/2022    ALBUMIN 3.2 (L) 01/23/2022     Lab Results   Component Value Date    PREALBUMIN 13 (L) 01/21/2022    PREALBUMIN 11 (L) 01/10/2022       Estimated Creatinine Clearance: 23.8 mL/min (based on SCr of 1.4 mg/dL).    Accu-Checks  Recent Labs     01/23/22  1918 01/23/22  2306 01/24/22  0321 01/24/22  0827 01/24/22  1159 01/24/22  1640 01/24/22  2037 01/25/22  0016 01/25/22  0424 01/25/22  0853   POCTGLUCOSE 118* 176* 135* 240* 174* 156* 203* 116* 186* 121*       Current Medications and/or Treatments Impacting Glycemic Control  Immunotherapy:    Immunosuppressants     None        Steroids:   Hormones (From admission, onward)            " Start     Stop Route Frequency Ordered    01/20/22 2134  vasopressin (PITRESSIN) 0.2 Units/mL in dextrose 5 % 100 mL infusion         -- IV Continuous 01/20/22 2135 01/06/22 2255  melatonin tablet 9 mg         -- Oral Nightly PRN 01/06/22 2255        Pressors:    Autonomic Drugs (From admission, onward)            Start     Stop Route Frequency Ordered    01/22/22 0900  metoprolol tartrate (LOPRESSOR) split tablet 12.5 mg         -- PER G TUBE 2 times daily 01/22/22 0646    01/21/22 1115  midodrine tablet 10 mg         -- Oral Every 12 hours 01/21/22 1007    01/12/22 1441  rocuronium 10 mg/mL injection        Note to Pharmacy: Created by cabinet override    01/13 0244 01/12/22 1441        Hyperglycemia/Diabetes Medications:   Antihyperglycemics (From admission, onward)            Start     Stop Route Frequency Ordered    01/21/22 0800  insulin aspart U-100 pen 4 Units         -- SubQ Every 24 hours (non-standard times) 01/20/22 1133    01/21/22 0400  insulin aspart U-100 pen 4 Units         -- SubQ Every 24 hours (non-standard times) 01/20/22 1133    01/21/22 0000  insulin aspart U-100 pen 4 Units         -- SubQ Every 24 hours (non-standard times) 01/20/22 1133    01/20/22 2000  insulin aspart U-100 pen 4 Units         -- SubQ Every 24 hours (non-standard times) 01/20/22 1133    01/20/22 1600  insulin aspart U-100 pen 4 Units         -- SubQ Every 24 hours (non-standard times) 01/20/22 1133    01/20/22 1200  insulin aspart U-100 pen 4 Units         -- SubQ Every 24 hours (non-standard times) 01/20/22 1133    01/15/22 1415  insulin aspart U-100 pen 0-5 Units         -- SubQ Every 4 hours PRN 01/15/22 1315

## 2022-01-25 NOTE — ASSESSMENT & PLAN NOTE
BG goal 140 - 180       - Discontinue Novolog 4 units q 4 hrs while on tube feeds. BG trending below goal this AM. Will challenge pancrease today.   - Increase to Moderate Dose SQ Insulin Correction Scale PRN hyperglycemia.   - BG Monitoring q 4 hrs while NPO/tube feeds    ** Please call Endocrine for any BG related issues **  ** Please notify Endocrine for any change and/or advance in diet**  Lab Results   Component Value Date    HGBA1C 6.0 (H) 01/03/2022       Discharge Planning:   TBD. Please notify endocrinology prior to discharge.

## 2022-01-25 NOTE — PLAN OF CARE
"      SICU PLAN OF CARE NOTE    Dx: History of mechanical aortic valve replacement    Shift Events: Replaced lytes. BM x2. No acute events. Titrated Heparin gtt per APPT results and MD Giovanna order.    Goals of Care: MAP 60-80, PTT 60-80    Neuro: Arouses to Voice, Follows Commands, and Moves All Extremities    Cardiac: Paced @ 80, NSR    Vital Signs: BP (!) 115/56 (BP Location: Right arm, Patient Position: Lying)   Pulse 80   Temp 97.8 °F (36.6 °C) (Oral)   Resp (!) 31   Ht 5' 1" (1.549 m)   Wt 44.8 kg (98 lb 12.3 oz)   SpO2 97%   Breastfeeding No   BMI 18.66 kg/m²     Respiratory: Ventilator ACVC 30%, 5 PEEP    Diet: NPO and Tube Feeds @ 35 ml/hr    Gtts: Heparin @ 700 units/hr    Urine Output: Anuric, 1 urine occurrence. BM x2     Labs/Accuchecks: labs trended and reviewed. Replaced K per MD order. Accuchecks and insulin administered per MAR    Skin: skin warm and dry. Partial thickness loss to R buttock. Cleansed and triad cream applied PRN. Scant bloody drainage. Incontinence care provided PRN. Turned q2hr. Bed plugged in and working. Heel boots secure. Call light within reach.     POC reviewed with pt and daughter. Answered all questions. See flowsheet for full assessment.       "

## 2022-01-25 NOTE — PROGRESS NOTES
[facility]  Nephrology  Progress Note     Patient Name: Orion Ty  MRN: [id number]  Admission Date: 1/5/2022  Hospital Length of Stay: 20 days  Attending Provider: Dennis Haider MD   Primary Care Physician: Otis Zimmer MD  Principal Problem: History of mechanical aortic valve replacement    Subjective:     HPI: Orion Ty is our 76 y.o. female with previous aortic valve replacement, mitral valve replacement, and tricuspid valve repair in 2010 who presented on 1/5 for redo aortic valve replacement. Nephrology consulted on 1/8 for anuric JO. Patient is alert but tired, son at bedside. Stated she was tired prior to admission. During the hospital stay Cr increase from 1 to 1.5. In OR she received  2.5L crystalloid, 3U RBC, 2U FFP, 2 platelets, and 800 cell saver. she is +5 L since admit. UOP decrease overnight, per nurse she didn't urinate at all. She received diuril 500 mg twice, lasix 20 mg *2 on 1/7. This morning she urinate 225 immediately after placing the tejeda's cath. She received again lasix 100 mg this morning. Upor evaluation she was alert but repeatedly saying she was tired, denied any pain. Denied any previous hx of kidney disease.       Interval History: Seen at the bedside no event overnight       Review of patient's allergies indicates:  No Known Allergies   Current Facility-Administered Medications   Medication Frequency    0.9%  NaCl infusion (CRRT USE ONLY) Continuous    0.9%  NaCl infusion (for blood administration) Q24H PRN    acetaminophen oral solution 650 mg Q8H    albuterol-ipratropium 2.5 mg-0.5 mg/3 mL nebulizer solution 3 mL Q6H    aspirin suppository 300 mg Once PRN    atorvastatin tablet 40 mg Daily    bisacodyL suppository 10 mg Daily PRN    dexmedetomidine (PRECEDEX) 400mcg/100mL 0.9% NaCL infusion Continuous    dextrose 10 % infusion Continuous PRN    dextrose 50% injection 12.5 g PRN    fentaNYL 50 mcg/mL injection 25 mcg Q2H PRN     fluconazole 40 mg/ml suspension 200 mg Daily    glucagon (human recombinant) injection 1 mg PRN    heparin 25,000 units in dextrose 5% 250 mL (100 units/mL) infusion (heparin infusion - NO NOMOGRAM) Continuous    insulin aspart U-100 pen 1-10 Units PRN    LIDOcaine 5 % patch 1 patch Q24H    LIDOcaine HCL 2% jelly PRN    magnesium sulfate 2g in water 50mL IVPB (premix) Once    melatonin tablet 9 mg Nightly PRN    metoprolol tartrate (LOPRESSOR) split tablet 12.5 mg BID    midodrine tablet 10 mg Q12H    ondansetron injection 4 mg Q12H PRN    oxyCODONE 5 mg/5 mL solution 5 mg Q4H PRN    pantoprazole injection 40 mg Daily    polyethylene glycol packet 17 g Daily    potassium, sodium phosphates 280-160-250 mg packet 2 packet TID PRN    sodium chloride 0.9% bolus 250 mL PRN    sodium chloride 0.9% flush 10 mL PRN    vasopressin (PITRESSIN) 0.2 Units/mL in dextrose 5 % 100 mL infusion Continuous    warfarin (COUMADIN) tablet 2 mg Daily     Facility-Administered Medications Ordered in Other Encounters   Medication Frequency    0.9%  NaCl infusion Continuous       Objective:     Vital Signs (Most Recent):  Temp: 97.8 °F (36.6 °C) (01/25/22 0330)  Pulse: 81 (01/25/22 0815)  Resp: (!) 25 (01/25/22 0815)  BP: (!) 106/59 (01/25/22 0800)  SpO2: 98 % (01/25/22 0815)  O2 Device (Oxygen Therapy): ventilator (01/25/22 0800) Vital Signs (24h Range):  Temp:  [97.5 °F (36.4 °C)-97.9 °F (36.6 °C)] 97.8 °F (36.6 °C)  Pulse:  [79-83] 81  Resp:  [21-42] 25  SpO2:  [95 %-100 %] 98 %  BP: ()/(52-64) 106/59  Arterial Line BP: ()/(38-73) 106/44   Weight: 44.8 kg (98 lb 12.3 oz) (01/20/22 1300)  Body mass index is 18.66 kg/m².  Body surface area is 1.39 meters squared.      Intake/Output Summary (Last 24 hours) at 1/25/2022 1009  Last data filed at 1/25/2022 0800  Gross per 24 hour   Intake 596.99 ml   Output --   Net 596.99 ml     I/O last 3 completed shifts:  In: 3033.3 [I.V.:1908.6; NG/GT:875; IV  Piggyback:249.7]  Out: 3151 [Other:3151]  Net IO Since Admission: 2,294.46 mL [01/25/22 1009]     Physical Exam  Constitutional:       Appearance: She is well-developed. She is ill-appearing.   Cardiovascular:      Rate and Rhythm: Normal rate and regular rhythm.      Heart sounds: Normal heart sounds.   Pulmonary:      Comments: tracheostomy   Abdominal:      General: Abdomen is flat.      Palpations: Abdomen is soft.   Musculoskeletal:         General: Normal range of motion.   Skin:     General: Skin is warm and dry.      Comments: Sternal Incision CDI   Neurological:      Mental Status: She is alert.   Psychiatric:         Mood and Affect: Mood normal.         Behavior: Behavior normal.         Recent Labs   Lab 01/24/22  0323 01/24/22  0326 01/25/22  0213 01/25/22  0300 01/25/22  0425   WBC 13.70*  --  14.30* 11.67  --    HGB 8.3*  --  6.4* 8.0*  --    HCT 27.0*   < > 20.8* 25.9* 25*   *  --  125* 113*  --    MONO 3.9*  0.5   < > 3.1*  0.5 2.7*  0.3  --     < > = values in this interval not displayed.      Recent Labs   Lab 01/19/22  0314 01/19/22  0805 01/20/22  0316 01/20/22  0822 01/21/22  0317 01/21/22  0825 01/23/22  2108 01/24/22  0327 01/25/22  0213      < > 139   < > 136   < > 136 135* 136   K 4.3   < > 3.9   < > 4.3   < > 4.0 3.9 3.4*      < > 104   < > 104   < > 101 101 100   CO2 23   < > 21*   < > 22*   < > 23 23 22*   BUN 10   < > 35*   < > 12   < > 4* 4* 26*   CREATININE 0.7   < > 1.8*   < > 0.8   < > 0.5 0.5 1.4   CALCIUM 8.8   < > 8.0*   < > 8.6*   < > 8.6* 9.1 8.1*   PROT 6.3  --  6.1  --  6.5  --   --   --   --    BILITOT 1.3*  --  1.0  --  1.0  --   --   --   --    ALKPHOS 104  --  111  --  104  --   --   --   --    ALT 16  --  14  --  24  --   --   --   --    AST 43*  --  44*  --  50*  --   --   --   --     < > = values in this interval not displayed.      Recent Labs     01/23/22  1823 01/24/22  0326 01/25/22  0425   PH 7.431 7.535* 7.430   PCO2 41.9 37.1 42.0   PO2 71*  84 87   HCO3 27.9 31.3* 27.9   POCSATURATED 95 97 97   BE 4 9 4       Assessment/Plan:       History of mechanical aortic valve replacement    Chronic atrial fibrillation    Type 2 diabetes mellitus with microalbuminuria, without long-term current use of insulin    Volume overload    JO (acute kidney injury)    ATN (acute tubular necrosis)    Acute hypoxemic respiratory failure    Dermatitis associated with moisture     JO  acute kidney injury  Orion Ty is our 76 y.o. female with previous aortic valve replacement, mitral valve replacement, and tricuspid valve repair in 2010 who presented on 1/5 for redo aortic valve replacement. Nephrology consulted on 1/8 for anuric JO. During the hospital stay Cr increase from 1 to 1.5. In OR she received  2.5L crystalloid, 3U RBC, 2U FFP, 2 platelets, and 800 cell saver. she is +5 L since admit. UOP decrease ,She received diuril 500 mg twice, lasix 20 mg *2 on 1/7.  With no adequate response to diuretics     Plan   oliguric ischemic ATN likely secondary to severe intraoperative hypotension requiring vasopressor on 01/05/2022  Failed high-dose diuretics and renal replacement therapy started on 01/09/2022 due to volume overload  Patient clinically not overloaded  Labs were today reviewed stable  Will plan for short session iHD with 1 L UF       * S/P aortic valve replacement  -with severe intraoperative hypotension   -per primary     ATN (acute tubular necrosis)  -see jo      Thank you for your consult. I will follow-up with patient. Please contact us if you have any additional questions.    Roes Mary Walsh MD  Nephrology  [facility]      ATTENDING PHYSICIAN ATTESTATION  I have personally verified the history and examined the patient. I thoroughly reviewed the demographic, clinical, laboratorial and imaging information available in medical records. I agree with the assessment and recommendations provided by the subspecialty resident who was under my  supervision.

## 2022-01-25 NOTE — PROGRESS NOTES
[facility]  Critical Care - Surgery  Progress Note    Patient Name: Orion Ty  MRN: [id number]  Admission Date: 1/5/2022  Hospital Length of Stay: 20 days  Code Status: Full Code  Attending Provider: Dennis Haider MD  Primary Care Provider: Otis Zimmer MD   Principal Problem: History of mechanical aortic valve replacement    Subjective:     Hospital/ICU Course:  No notes on file    Interval History/Significant Events:   SYLVAINEO  Had one urine occurrence yesterday  INR 1.7 this morning  Doing well on PAV. Plan to trial trach collar today     Follow-up For: Procedure(s) (LRB):  CREATION, TRACHEOSTOMY (N/A)  EGD, WITH PEG TUBE INSERTION (N/A)  INSERTION-CATHETER-ROSELINE (N/A)    Post-Operative Day: 4 Days Post-Op    Objective:     Vital Signs (Most Recent):  Temp: 97.8 °F (36.6 °C) (01/25/22 0330)  Pulse: 80 (01/25/22 0739)  Resp: (!) 34 (01/25/22 0739)  BP: (!) 115/56 (01/25/22 0600)  SpO2: 97 % (01/25/22 0739) Vital Signs (24h Range):  Temp:  [97.5 °F (36.4 °C)-98.3 °F (36.8 °C)] 97.8 °F (36.6 °C)  Pulse:  [79-86] 80  Resp:  [21-42] 34  SpO2:  [95 %-100 %] 97 %  BP: ()/(52-64) 115/56  Arterial Line BP: ()/(38-62) 103/39     Weight: 44.8 kg (98 lb 12.3 oz)  Body mass index is 18.66 kg/m².      Intake/Output Summary (Last 24 hours) at 1/25/2022 0753  Last data filed at 1/25/2022 0600  Gross per 24 hour   Intake 744.84 ml   Output --   Net 744.84 ml       Physical Exam  Constitutional:       General: She is not in acute distress.  HENT:      Head: Normocephalic and atraumatic.   Neck:      Comments: Trach  Cardiovascular:      Rate and Rhythm: Normal rate and regular rhythm.      Pulses: Normal pulses.      Comments: Midline sternotomy incision c/d/I  Pacemaker in place. Paced at 80  Pulmonary:      Effort: Pulmonary effort is normal. No respiratory distress.      Comments: intubated  Abdominal:      General: Abdomen is flat. There is no distension.      Palpations:  Abdomen is soft.      Tenderness: There is no abdominal tenderness.      Comments: Peg site c/d/i  Soft abdomen   Musculoskeletal:      Right lower leg: No edema.      Left lower leg: No edema.      Comments: R radial art line in place   Skin:     General: Skin is warm.      Capillary Refill: Capillary refill takes less than 2 seconds.   Neurological:      General: No focal deficit present.      Mental Status: She is alert.         Vents:  Vent Mode: Spont (01/25/22 0739)  Ventilator Initiated: Yes (01/18/22 1636)  Set Rate: 28 BPM (01/25/22 0500)  Vt Set: 330 mL (01/25/22 0500)  Pressure Support: 10 cmH20 (01/25/22 0115)  PEEP/CPAP: 5 cmH20 (01/25/22 0739)  Oxygen Concentration (%): 30 (01/25/22 0739)  Peak Airway Pressure: 20 cmH2O (01/25/22 0739)  Plateau Pressure: 30 cmH20 (01/25/22 0739)  Total Ve: 9.76 mL (01/25/22 0739)  Negative Inspiratory Force (cm H2O): 0 (01/25/22 0739)  F/VT Ratio<105 (RSBI): (!) 98.55 (01/25/22 0739)    Lines/Drains/Airways     Central Venous Catheter Line            Permacath 01/21/22 1013 left subclavian 3 days          Drain                 Gastrostomy/Enterostomy 01/21/22 1112 Percutaneous endoscopic gastrostomy (PEG) 3 days          Airway                 Surgical Airway 01/21/22 1043 Shiley Cuffed 3 days          Arterial Line            Arterial Line 01/05/22 0855 Right Radial 19 days          Peripheral Intravenous Line                 Midline Catheter Insertion/Assessment  - Single Lumen 01/10/22 1300 Left cephalic vein (lateral side of arm) 18g x 8cm 14 days         Peripheral IV - Single Lumen 01/22/22 1600 20 G Anterior;Proximal;Right Forearm 2 days                Significant Labs:    CBC/Anemia Profile:  Recent Labs   Lab 01/24/22  0323 01/24/22  0326 01/25/22  0213 01/25/22  0300 01/25/22  0425   WBC 13.70*  --  14.30* 11.67  --    HGB 8.3*  --  6.4* 8.0*  --    HCT 27.0*   < > 20.8* 25.9* 25*   *  --  125* 113*  --    MCV 97  --  98 99*  --    RDW 19.4*  --  19.8*  19.8*  --     < > = values in this interval not displayed.        Chemistries:  Recent Labs   Lab 01/23/22  1426 01/23/22  1426 01/23/22  2108 01/23/22  2108 01/24/22  0327 01/24/22  1200 01/25/22  0213      < > 136  --  135*  --  136   K 3.9   < > 4.0  --  3.9  --  3.4*      < > 101  --  101  --  100   CO2 24   < > 23  --  23  --  22*   BUN 9   < > 4*  --  4*  --  26*   CREATININE 0.8   < > 0.5  --  0.5  --  1.4   CALCIUM 9.3   < > 8.6*  --  9.1  --  8.1*   ALBUMIN 3.2*  --  3.0*  --  3.0*  --   --    MG 2.0   < > 1.9  --  2.1  --  1.9   PHOS 2.7   < > 1.1*   < > <1.0* 5.7* 5.2*    < > = values in this interval not displayed.       All pertinent labs within the past 24 hours have been reviewed.    Significant Imaging:  I have reviewed all pertinent imaging results/findings within the past 24 hours.    Assessment/Plan:     * History of mechanical aortic valve replacement  Orion Ty is a 76 y.o. female who presents to the SICU s/p redo aortic valve replacement with mechanical valve on 1/5/22.      Neuro/Psych:   -- Sedation: none  -- Pain: Liquid Tiffani, breakthrough fentanyl pushes q2     Cards:   -- Pressors: Off  -- Goal MAP: 60-80  -- statin  -- heparin drip, PTT goal 60-80.   -- Coumadin 2mg  -- Paced HR 80  -- INR 1.7      Pulm:   -- Goal O2 sat > 90%  -- reintubated 1/12 then 1/18. S/p trach 1/21  -- Spontaneous when able  -- ABG PRN  -- 2 mediastinal removed 1/9 L Pleural CT removed 1/8      Renal:  -- Mireles removed anuria  -- net negative goal 500   -- discuss with nephro about HD trial  -- bladder scan every shift  -- Oliguric   -- Nephrology following  -- Permcath 1/21  -- Removed R IJ trialysis 1/23      FEN / GI:   -- Replace lytes as needed  -- s/p PEG 1/21  -- Nutrition: tube feeds novasource renal, at goal      ID:   -- WBC stable at 8  -- Antibiotics: periop ancef complete. Zosyn and vanc, stopped 1/20      Heme/Onc:   -- Hgb stable  -- 3U RBC, 2U FFP, 2U platelets in OR  --  Daily CBC  -- Transfused products: 1U RBC on 1/5/22. 1u RBC and 1u FFP on 1/9/21. 2U RBC on 1/12. 4 FFP on 1/13. 1 FFP on 1/17  -- Anticoagulation: heparin drip   -- heparin drip, PTT 60-80 after OR   -- 2mg coumadin tonight      Endo:   -- Gluc goal 140-180  -- Insulin per endocrinology      PPx:   Feeding: tube feeds  Analgesia/Sedation: Tiffani, fent pushes / none  Thromboembolic prevention: heparin drip, coumadin 2  HOB >30: yes  Stress Ulcer ppx: protonix BID  Glucose control: Critical care goal 140-180 g/dl, ISS    Lines/Drains/Airway: Art line, Trach, Permcath, PEG      Dispo/Code Status/Palliative:   -- SICU / Full Code             Critical care was time spent personally by me on the following activities: development of treatment plan with patient or surrogate and bedside caregivers, discussions with consultants, evaluation of patient's response to treatment, examination of patient, ordering and performing treatments and interventions, ordering and review of laboratory studies, ordering and review of radiographic studies, pulse oximetry, re-evaluation of patient's condition.  This critical care time did not overlap with that of any other provider or involve time for any procedures.     Caleb Garcia MD  Critical Care - Surgery  [facility]

## 2022-01-26 NOTE — SUBJECTIVE & OBJECTIVE
Interval History/Significant Events:   -NAEO  -Mireles placed yesterday afternoon for 300 cc on bladder scan. 300 cc of urine returned. Patient given 200 mg Lasix challenge. 110 cc UOP since.  -INR 7.0 from 1.7 after 2mg coumadin last night. No bleeding.  -Planning for iHD trial today    Follow-up For: Procedure(s) (LRB):  CREATION, TRACHEOSTOMY (N/A)  EGD, WITH PEG TUBE INSERTION (N/A)  INSERTION-CATHETER-ROSELINE (N/A)    Post-Operative Day: 5 Days Post-Op    Objective:     Vital Signs (Most Recent):  Temp: 98.8 °F (37.1 °C) (01/26/22 0730)  Pulse: 79 (01/26/22 0742)  Resp: (!) 29 (01/26/22 0742)  BP: (!) 116/56 (01/26/22 0700)  SpO2: 98 % (01/26/22 0742) Vital Signs (24h Range):  Temp:  [98.2 °F (36.8 °C)-99.2 °F (37.3 °C)] 98.8 °F (37.1 °C)  Pulse:  [77-83] 79  Resp:  [22-39] 29  SpO2:  [94 %-100 %] 98 %  BP: ()/(49-63) 116/56  Arterial Line BP: ()/(41-59) 109/42     Weight: 44.8 kg (98 lb 12.3 oz)  Body mass index is 18.66 kg/m².      Intake/Output Summary (Last 24 hours) at 1/26/2022 0742  Last data filed at 1/26/2022 0700  Gross per 24 hour   Intake 1034.61 ml   Output 585 ml   Net 449.61 ml       Physical Exam  Constitutional:       General: She is not in acute distress.  HENT:      Head: Normocephalic and atraumatic.   Neck:      Comments: Trach  Cardiovascular:      Rate and Rhythm: Normal rate and regular rhythm.      Pulses: Normal pulses.      Comments: Midline sternotomy incision c/d/I  Pacemaker in place. Paced at 80  Pulmonary:      Effort: Pulmonary effort is normal. No respiratory distress.   Abdominal:      General: Abdomen is flat. There is no distension.      Palpations: Abdomen is soft.      Tenderness: There is no abdominal tenderness.      Comments: Peg site c/d/i  Soft abdomen   Musculoskeletal:      Right lower leg: No edema.      Left lower leg: No edema.      Comments: R radial art line in place   Skin:     General: Skin is warm.      Capillary Refill: Capillary refill takes less  than 2 seconds.   Neurological:      General: No focal deficit present.      Mental Status: She is alert.         Vents:  Vent Mode: A/C (01/26/22 0305)  Ventilator Initiated: Yes (01/18/22 1636)  Set Rate: 28 BPM (01/26/22 0305)  Vt Set: 330 mL (01/26/22 0305)  Pressure Support: 10 cmH20 (01/25/22 0115)  PEEP/CPAP: 5 cmH20 (01/26/22 0305)  Oxygen Concentration (%): 30 (01/26/22 0730)  Peak Airway Pressure: 27 cmH2O (01/26/22 0305)  Plateau Pressure: 32 cmH20 (01/26/22 0305)  Total Ve: 9.81 mL (01/26/22 0305)  Negative Inspiratory Force (cm H2O): 0 (01/26/22 0119)  F/VT Ratio<105 (RSBI): (!) 81.84 (01/26/22 0119)    Lines/Drains/Airways     Central Venous Catheter Line            Permacath 01/21/22 1013 left subclavian 4 days          Drain                 Gastrostomy/Enterostomy 01/21/22 1112 Percutaneous endoscopic gastrostomy (PEG) 4 days         Urethral Catheter 01/25/22 1341 Non-latex <1 day          Airway                 Surgical Airway 01/21/22 1043 Shiley Cuffed 4 days          Arterial Line            Arterial Line 01/05/22 0855 Right Radial 20 days          Peripheral Intravenous Line                 Midline Catheter Insertion/Assessment  - Single Lumen 01/10/22 1300 Left cephalic vein (lateral side of arm) 18g x 8cm 15 days                Significant Labs:    CBC/Anemia Profile:  Recent Labs   Lab 01/25/22  0213 01/25/22  0213 01/25/22  0300 01/25/22  0425 01/26/22  0343 01/26/22  0343   WBC 14.30*  --  11.67  --  10.41  --    HGB 6.4*  --  8.0*  --  7.2*  --    HCT 20.8*   < > 25.9* 25* 23.3* 26*   *  --  113*  --  90*  --    MCV 98  --  99*  --  99*  --    RDW 19.8*  --  19.8*  --  19.9*  --     < > = values in this interval not displayed.        Chemistries:  Recent Labs   Lab 01/24/22  1200 01/25/22  0213 01/26/22  0343   NA  --  136 135*   K  --  3.4* 4.5   CL  --  100 99   CO2  --  22* 19*   BUN  --  26* 55*   CREATININE  --  1.4 2.5*   CALCIUM  --  8.1* 8.8   ALBUMIN  --   --  2.6*   PROT   --   --  6.1   BILITOT  --   --  0.6   ALKPHOS  --   --  116   ALT  --   --  17   AST  --   --  45*   MG  --  1.9 2.0   PHOS 5.7* 5.2* 5.3*       All pertinent labs within the past 24 hours have been reviewed.    Significant Imaging:  I have reviewed all pertinent imaging results/findings within the past 24 hours.

## 2022-01-26 NOTE — HOSPITAL COURSE
Interval history: Pt remains sedated and intubated. Developed spontaneous hemothorax overnight requiring CT placement and increasing pressor requirements. RN and family at bedside during wound assessment.

## 2022-01-26 NOTE — RESPIRATORY THERAPY
Placed patient on PAV+ settings.  Patient tolerating well @ this time.  Will continue to monitor.

## 2022-01-26 NOTE — ASSESSMENT & PLAN NOTE
Orion Ty is a 76 y.o. female who presents to the SICU s/p redo aortic valve replacement with mechanical valve on 1/5/22.      Neuro/Psych:   -- Sedation: none  -- Pain: Liquid Tiffani, breakthrough fentanyl pushes q2     Cards:   -- Pressors: Off  -- Goal MAP: 60-80  -- statin  -- heparin drip off due to supratherapeutic INR  -- Holding Coumadin  -- Paced HR 80  -- INR 7.0   -- Will give PO Vit K today      Pulm:   -- Goal O2 sat > 90%  -- reintubated 1/12 then 1/18. S/p trach 1/21  -- Spontaneous when able  -- ABG PRN  -- 2 mediastinal removed 1/9 L Pleural CT removed 1/8      Renal:  -- Mireles replaced 1/25  -- net negative goal 500   -- discuss with nephro about HD trial. Would like to hold today if possible  -- Oliguric   -- Nephrology following  -- Permcath 1/21  -- Removed R IJ trialysis 1/23      FEN / GI:   -- Replace lytes as needed  -- s/p PEG 1/21  -- Nutrition: tube feeds novasource renal, at goal      ID:   -- WBC stable at 10  -- Antibiotics: periop ancef complete. Zosyn and vanc, stopped 1/20      Heme/Onc:   -- Hgb stable  -- 3U RBC, 2U FFP, 2U platelets in OR  -- Daily CBC  -- Transfused products: 1U RBC on 1/5/22. 1u RBC and 1u FFP on 1/9/21. 2U RBC on 1/12. 4 FFP on 1/13. 1 FFP on 1/17  -- Anticoagulation:   -- heparin drip off due to supratherapeutic INR   -- INR 7.0. Hold coumadin   -- PO Vit K this morning      Endo:   -- Gluc goal 140-180  -- Insulin per endocrinology      PPx:   Feeding: tube feeds  Analgesia/Sedation: Tiffani, fent pushes / none  Thromboembolic prevention: INR supratherapeutic  HOB >30: yes  Stress Ulcer ppx: protonix BID  Glucose control: Critical care goal 140-180 g/dl, ISS    Lines/Drains/Airway: Art line, Trach, Permcath, PEG      Dispo/Code Status/Palliative:   -- SICU / Full Code

## 2022-01-26 NOTE — PLAN OF CARE
Recommendations    1. Continue Novasource Renal @ 35 mL/hr - 1680 kcal, 76 g protein, and 602 mL free water    2. Monitor and follow-up    Goals: Pt to meet >/= 75% EEN/EPN by f/u date  Nutrition Goal Status: goal met  Communication of RD Recs: reviewed with physician (RN, POC)

## 2022-01-26 NOTE — CARE UPDATE
BG goal 140 -180   Diet NPO Except for: Sips with Medication  5 Days Post-Op    BG stable and below goal with minimal use of insulin therapy overnight. Patient remains on Vent, on TF,  and NPO. Endo will continue to follow, and manage glycemic control while inpatient.     - Continiue Moderate Dose SQ Insulin Correction Scale PRN hyperglycemia.   - BG Monitoring every 4 hours while NPO.     ** Please call Endocrine for any BG related issues **  ** Please notify Endocrine for any change and/or advance in diet**  Lab Results   Component Value Date    HGBA1C 6.0 (H) 01/03/2022       Discharge Planning:   TBD. Please notify endocrinology prior to discharge.   Pending Plan:   - Insurance preferred diabetes testing supplies   - Will most likely have patient continue metformin 500 mg daily.

## 2022-01-26 NOTE — PROGRESS NOTES
[facility]  Nephrology  Progress Note     Patient Name: Orion Ty  MRN: [id number]  Admission Date: 1/5/2022  Hospital Length of Stay: 21 days  Attending Provider: Dennis Haider MD   Primary Care Physician: Otis Zimmer MD  Principal Problem: S/P aortic valve replacement    Subjective:     HPI: Orion Ty is our 76 y.o. female with previous aortic valve replacement, mitral valve replacement, and tricuspid valve repair in 2010 who presented on 1/5 for redo aortic valve replacement. Nephrology consulted on 1/8 for anuric JO. Patient is alert but tired, son at bedside. Stated she was tired prior to admission. During the hospital stay Cr increase from 1 to 1.5. In OR she received  2.5L crystalloid, 3U RBC, 2U FFP, 2 platelets, and 800 cell saver. she is +5 L since admit. UOP decrease overnight, per nurse she didn't urinate at all. She received diuril 500 mg twice, lasix 20 mg *2 on 1/7. This morning she urinate 225 immediately after placing the tejeda's cath. She received again lasix 100 mg this morning. Upor evaluation she was alert but repeatedly saying she was tired, denied any pain. Denied any previous hx of kidney disease.       Interval History: Seen at the bedside no event overnight       Review of patient's allergies indicates:  No Known Allergies   Current Facility-Administered Medications   Medication Frequency    0.9%  NaCl infusion (CRRT USE ONLY) Continuous    0.9%  NaCl infusion (for blood administration) Q24H PRN    0.9%  NaCl infusion PRN    0.9%  NaCl infusion Once    acetaminophen oral solution 650 mg Q8H    albuterol-ipratropium 2.5 mg-0.5 mg/3 mL nebulizer solution 3 mL Q6H    aspirin suppository 300 mg Once PRN    atorvastatin tablet 40 mg Daily    bisacodyL suppository 10 mg Daily PRN    dextrose 10 % infusion Continuous PRN    dextrose 50% injection 12.5 g PRN    fentaNYL 50 mcg/mL injection 25 mcg Q2H PRN    fluconazole 40 mg/ml suspension 200  mg Daily    furosemide injection 120 mg Q12H    glucagon (human recombinant) injection 1 mg PRN    insulin aspart U-100 pen 1-10 Units PRN    LIDOcaine 5 % patch 1 patch Q24H    LIDOcaine HCL 2% jelly PRN    magnesium sulfate 2g in water 50mL IVPB (premix) Once    melatonin tablet 9 mg Nightly PRN    metoprolol tartrate (LOPRESSOR) split tablet 12.5 mg BID    midodrine tablet 10 mg Q12H    ondansetron injection 4 mg Q12H PRN    oxyCODONE 5 mg/5 mL solution 5 mg Q4H PRN    pantoprazole injection 40 mg Q12H    polyethylene glycol packet 17 g Daily    potassium, sodium phosphates 280-160-250 mg packet 2 packet TID PRN    psyllium husk (aspartame) 3.4 gram packet 1 packet Daily    sodium chloride 0.9% bolus 250 mL PRN    sodium chloride 0.9% flush 10 mL PRN     Facility-Administered Medications Ordered in Other Encounters   Medication Frequency    0.9%  NaCl infusion Continuous       Objective:     Vital Signs (Most Recent):  Temp: 98.6 °F (37 °C) (01/26/22 1115)  Pulse: 80 (01/26/22 1300)  Resp: (!) 29 (01/26/22 1300)  BP: 131/82 (01/26/22 1259)  SpO2: 97 % (01/26/22 1300)  O2 Device (Oxygen Therapy): Trach Collar (01/26/22 1155) Vital Signs (24h Range):  Temp:  [98.2 °F (36.8 °C)-99.2 °F (37.3 °C)] 98.6 °F (37 °C)  Pulse:  [77-83] 80  Resp:  [22-39] 29  SpO2:  [94 %-100 %] 97 %  BP: ()/(49-82) 131/82  Arterial Line BP: ()/(38-59) 140/59   Weight: 44.8 kg (98 lb 12.3 oz) (01/20/22 1300)  Body mass index is 18.66 kg/m².  Body surface area is 1.39 meters squared.      Intake/Output Summary (Last 24 hours) at 1/26/2022 1335  Last data filed at 1/26/2022 1300  Gross per 24 hour   Intake 1359.85 ml   Output 325 ml   Net 1034.85 ml     I/O last 3 completed shifts:  In: 1635.7 [I.V.:250.7; NG/GT:1285; IV Piggyback:100.1]  Out: 595 [Urine:420; Drains:175]  Net IO Since Admission: 3,394.27 mL [01/26/22 1335]     Physical Exam  Constitutional:       Appearance: She is well-developed. She is  ill-appearing.   Cardiovascular:      Rate and Rhythm: Normal rate and regular rhythm.      Heart sounds: Normal heart sounds.   Pulmonary:      Comments: tracheostomy   Abdominal:      General: Abdomen is flat.      Palpations: Abdomen is soft.   Musculoskeletal:         General: Normal range of motion.   Skin:     General: Skin is warm and dry.      Comments: Sternal Incision CDI   Neurological:      Mental Status: She is alert.   Psychiatric:         Mood and Affect: Mood normal.         Behavior: Behavior normal.         Recent Labs   Lab 01/24/22  0326 01/25/22  0213 01/25/22  0213 01/25/22  0300 01/25/22  0425 01/26/22  0343 01/26/22  0343   WBC  --  14.30*  --  11.67  --  10.41  --    HGB  --  6.4*  --  8.0*  --  7.2*  --    HCT   < > 20.8*   < > 25.9* 25* 23.3* 26*   PLT  --  125*  --  113*  --  90*  --    MONO  --  3.1*  0.5   < > 2.7*  0.3  --  3.7*  0.4  --     < > = values in this interval not displayed.      Recent Labs   Lab 01/20/22  0316 01/20/22  0822 01/21/22  0317 01/21/22  0825 01/24/22 0327 01/25/22 0213 01/26/22  0343      < > 136   < > 135* 136 135*   K 3.9   < > 4.3   < > 3.9 3.4* 4.5      < > 104   < > 101 100 99   CO2 21*   < > 22*   < > 23 22* 19*   BUN 35*   < > 12   < > 4* 26* 55*   CREATININE 1.8*   < > 0.8   < > 0.5 1.4 2.5*   CALCIUM 8.0*   < > 8.6*   < > 9.1 8.1* 8.8   PROT 6.1  --  6.5  --   --   --  6.1   BILITOT 1.0  --  1.0  --   --   --  0.6   ALKPHOS 111  --  104  --   --   --  116   ALT 14  --  24  --   --   --  17   AST 44*  --  50*  --   --   --  45*    < > = values in this interval not displayed.      Recent Labs     01/25/22  0425 01/25/22  1338 01/26/22  0343   PH 7.430 7.389 7.410   PCO2 42.0 35.6 37.4   PO2 87 97 94   HCO3 27.9 21.5* 23.7*   POCSATURATED 97 98 97   BE 4 -3 -1       Assessment/Plan:       S/P aortic valve replacement    Chronic atrial fibrillation    Type 2 diabetes mellitus with microalbuminuria, without long-term current use of  insulin    Volume overload    JO (acute kidney injury)    ATN (acute tubular necrosis)    Acute hypoxemic respiratory failure    Dermatitis associated with moisture     JO  acute kidney injury  Orion Ty is our 76 y.o. female with previous aortic valve replacement, mitral valve replacement, and tricuspid valve repair in 2010 who presented on 1/5 for redo aortic valve replacement. Nephrology consulted on 1/8 for anuric JO. During the hospital stay Cr increase from 1 to 1.5. In OR she received  2.5L crystalloid, 3U RBC, 2U FFP, 2 platelets, and 800 cell saver. she is +5 L since admit. UOP decrease ,She received diuril 500 mg twice, lasix 20 mg *2 on 1/7.  With no adequate response to diuretics     Plan   oliguric ischemic ATN likely secondary to severe intraoperative hypotension requiring vasopressor on 01/05/2022  Failed high-dose diuretics and renal replacement therapy started on 01/09/2022 due to volume overload  Patient clinically not overloaded  Labs were today reviewed stable  With 410 UOP after 200mg of lasix   Spoke with primary team we will hold HD and they agree to start lasix 120 mg or above q12 for now       * S/P aortic valve replacement  -with severe intraoperative hypotension   -per primary     ATN (acute tubular necrosis)  -see jo      Thank you for your consult. I will follow-up with patient. Please contact us if you have any additional questions.    Rose Mary Walsh MD  Nephrology  [facility]    ATTENDING PHYSICIAN ATTESTATION  I have personally verified the history and examined the patient. I thoroughly reviewed the demographic, clinical, laboratorial and imaging information available in medical records. I agree with the assessment and recommendations provided by the subspecialty resident who was under my supervision.

## 2022-01-26 NOTE — SUBJECTIVE & OBJECTIVE
Subjective:     HPI:  Orion Ty is a 76 year old female with previous aortic valve replacement, mitral valve replacement, and tricuspid valve repair in 2010 who presents for redo aortic valve replacement. 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 She underwent aortic valve replacement with a mechanical valve on 1/5/22. Wound care is following for assessment of buttocks skin injury.      Hospital Course:   Interval history: Pt is seen resting in bed with family at bedside. Agrees to turn for assessment. No complaints.         Follow-up For: Procedure(s) (LRB):  CREATION, TRACHEOSTOMY (N/A)  EGD, WITH PEG TUBE INSERTION (N/A)  INSERTION-CATHETER-ROSELINE (N/A)    Post-Operative Day: 5 Days Post-Op    Scheduled Meds:   sodium chloride 0.9%   Intravenous Once    acetaminophen  650 mg Oral Q8H    albuterol-ipratropium  3 mL Nebulization Q6H    atorvastatin  40 mg Oral Daily    fluconazole 40 mg/ml  200 mg Per G Tube Daily    furosemide (LASIX) injection  120 mg Intravenous Q12H    LIDOcaine  1 patch Transdermal Q24H    magnesium sulfate IVPB  2 g Intravenous Once    metoprolol tartrate  12.5 mg Per G Tube BID    midodrine  10 mg Oral Q12H    pantoprazole  40 mg Intravenous Q12H    polyethylene glycol  17 g Oral Daily    psyllium husk (aspartame)  1 packet Per G Tube Daily     Continuous Infusions:   sodium chloride 0.9% Stopped (01/24/22 0204)    dextrose 10 % in water (D10W)       PRN Meds:sodium chloride, sodium chloride 0.9%, aspirin, bisacodyL, dextrose 10 % in water (D10W), dextrose 50%, fentaNYL, glucagon (human recombinant), insulin aspart U-100, LIDOcaine HCL 2%, melatonin, ondansetron, oxyCODONE, potassium, sodium phosphates, sodium chloride 0.9%, sodium chloride 0.9%    Review of Systems   Skin: Positive for wound.     Objective:     Vital Signs (Most Recent):  Temp: 98.6 °F (37 °C) (01/26/22  1115)  Pulse: 80 (01/26/22 1300)  Resp: (!) 29 (01/26/22 1300)  BP: 131/82 (01/26/22 1259)  SpO2: 97 % (01/26/22 1300) Vital Signs (24h Range):  Temp:  [98.2 °F (36.8 °C)-99.2 °F (37.3 °C)] 98.6 °F (37 °C)  Pulse:  [79-83] 80  Resp:  [22-39] 29  SpO2:  [97 %-100 %] 97 %  BP: ()/(49-82) 131/82  Arterial Line BP: ()/(38-59) 140/59     Weight: 44.8 kg (98 lb 12.3 oz)  Body mass index is 18.66 kg/m².    Physical Exam  Constitutional:       Appearance: Normal appearance.   Skin:     General: Skin is warm and dry.      Findings: Lesion present.   Neurological:      Mental Status: She is alert.         Laboratory:  All pertinent labs reviewed within the last 24 hours.    Diagnostic Results:  None

## 2022-01-26 NOTE — RESPIRATORY THERAPY
Placed patient on trach collar trial.  Patient currently on 10L/40%.  Sats are currently 99%.  Will continue to monitor.

## 2022-01-26 NOTE — RESPIRATORY THERAPY
Patient previously placed on trach collar trial @ 1155.  Placed patient back on MV set on documented settings due to tachypnea.  Will continue to monitor.

## 2022-01-26 NOTE — NURSING
Dr. Alfaro notified of recent aPTT results. Hold heparin gtt until 0830, then restart @ 500 units/h. Re-draw aPTT at 0830.

## 2022-01-26 NOTE — PROGRESS NOTES
[facility]  Critical Care - Surgery  Progress Note    Patient Name: Orion Ty  MRN: [id number]  Admission Date: 1/5/2022  Hospital Length of Stay: 21 days  Code Status: Full Code  Attending Provider: Dennis Haider MD  Primary Care Provider: Otis Zimmer MD   Principal Problem: S/P aortic valve replacement    Subjective:     Hospital/ICU Course:  No notes on file    Interval History/Significant Events:   -NAEO  -Mireles placed yesterday afternoon for 300 cc on bladder scan. 300 cc of urine returned. Patient given 200 mg Lasix challenge. 110 cc UOP since.  -INR 7.0 from 1.7 after 2mg coumadin last night. No bleeding.  -Planning for iHD trial today    Follow-up For: Procedure(s) (LRB):  CREATION, TRACHEOSTOMY (N/A)  EGD, WITH PEG TUBE INSERTION (N/A)  INSERTION-CATHETER-ROSELINE (N/A)    Post-Operative Day: 5 Days Post-Op    Objective:     Vital Signs (Most Recent):  Temp: 98.8 °F (37.1 °C) (01/26/22 0730)  Pulse: 79 (01/26/22 0742)  Resp: (!) 29 (01/26/22 0742)  BP: (!) 116/56 (01/26/22 0700)  SpO2: 98 % (01/26/22 0742) Vital Signs (24h Range):  Temp:  [98.2 °F (36.8 °C)-99.2 °F (37.3 °C)] 98.8 °F (37.1 °C)  Pulse:  [77-83] 79  Resp:  [22-39] 29  SpO2:  [94 %-100 %] 98 %  BP: ()/(49-63) 116/56  Arterial Line BP: ()/(41-59) 109/42     Weight: 44.8 kg (98 lb 12.3 oz)  Body mass index is 18.66 kg/m².      Intake/Output Summary (Last 24 hours) at 1/26/2022 0742  Last data filed at 1/26/2022 0700  Gross per 24 hour   Intake 1034.61 ml   Output 585 ml   Net 449.61 ml       Physical Exam  Constitutional:       General: She is not in acute distress.  HENT:      Head: Normocephalic and atraumatic.   Neck:      Comments: Trach  Cardiovascular:      Rate and Rhythm: Normal rate and regular rhythm.      Pulses: Normal pulses.      Comments: Midline sternotomy incision c/d/I  Pacemaker in place. Paced at 80  Pulmonary:      Effort: Pulmonary effort is normal. No respiratory distress.    Abdominal:      General: Abdomen is flat. There is no distension.      Palpations: Abdomen is soft.      Tenderness: There is no abdominal tenderness.      Comments: Peg site c/d/i  Soft abdomen   Musculoskeletal:      Right lower leg: No edema.      Left lower leg: No edema.      Comments: R radial art line in place   Skin:     General: Skin is warm.      Capillary Refill: Capillary refill takes less than 2 seconds.   Neurological:      General: No focal deficit present.      Mental Status: She is alert.         Vents:  Vent Mode: A/C (01/26/22 0305)  Ventilator Initiated: Yes (01/18/22 1636)  Set Rate: 28 BPM (01/26/22 0305)  Vt Set: 330 mL (01/26/22 0305)  Pressure Support: 10 cmH20 (01/25/22 0115)  PEEP/CPAP: 5 cmH20 (01/26/22 0305)  Oxygen Concentration (%): 30 (01/26/22 0730)  Peak Airway Pressure: 27 cmH2O (01/26/22 0305)  Plateau Pressure: 32 cmH20 (01/26/22 0305)  Total Ve: 9.81 mL (01/26/22 0305)  Negative Inspiratory Force (cm H2O): 0 (01/26/22 0119)  F/VT Ratio<105 (RSBI): (!) 81.84 (01/26/22 0119)    Lines/Drains/Airways     Central Venous Catheter Line            Permacath 01/21/22 1013 left subclavian 4 days          Drain                 Gastrostomy/Enterostomy 01/21/22 1112 Percutaneous endoscopic gastrostomy (PEG) 4 days         Urethral Catheter 01/25/22 1341 Non-latex <1 day          Airway                 Surgical Airway 01/21/22 1043 Shiley Cuffed 4 days          Arterial Line            Arterial Line 01/05/22 0855 Right Radial 20 days          Peripheral Intravenous Line                 Midline Catheter Insertion/Assessment  - Single Lumen 01/10/22 1300 Left cephalic vein (lateral side of arm) 18g x 8cm 15 days                Significant Labs:    CBC/Anemia Profile:  Recent Labs   Lab 01/25/22  0213 01/25/22  0213 01/25/22  0300 01/25/22  0425 01/26/22  0343 01/26/22  0343   WBC 14.30*  --  11.67  --  10.41  --    HGB 6.4*  --  8.0*  --  7.2*  --    HCT 20.8*   < > 25.9* 25* 23.3* 26*   PLT  125*  --  113*  --  90*  --    MCV 98  --  99*  --  99*  --    RDW 19.8*  --  19.8*  --  19.9*  --     < > = values in this interval not displayed.        Chemistries:  Recent Labs   Lab 01/24/22  1200 01/25/22  0213 01/26/22  0343   NA  --  136 135*   K  --  3.4* 4.5   CL  --  100 99   CO2  --  22* 19*   BUN  --  26* 55*   CREATININE  --  1.4 2.5*   CALCIUM  --  8.1* 8.8   ALBUMIN  --   --  2.6*   PROT  --   --  6.1   BILITOT  --   --  0.6   ALKPHOS  --   --  116   ALT  --   --  17   AST  --   --  45*   MG  --  1.9 2.0   PHOS 5.7* 5.2* 5.3*       All pertinent labs within the past 24 hours have been reviewed.    Significant Imaging:  I have reviewed all pertinent imaging results/findings within the past 24 hours.    Assessment/Plan:     * S/P aortic valve replacement  Orion Ty is a 76 y.o. female who presents to the SICU s/p redo aortic valve replacement with mechanical valve on 1/5/22.      Neuro/Psych:   -- Sedation: none  -- Pain: Liquid Tiffani, breakthrough fentanyl pushes q2     Cards:   -- Pressors: Off  -- Goal MAP: 60-80  -- statin  -- heparin drip off due to supratherapeutic INR  -- Holding Coumadin  -- Paced HR 80  -- INR 7.0   -- Will give PO Vit K today      Pulm:   -- Goal O2 sat > 90%  -- reintubated 1/12 then 1/18. S/p trach 1/21  -- Spontaneous when able  -- ABG PRN  -- 2 mediastinal removed 1/9 L Pleural CT removed 1/8      Renal:  -- Mireles replaced 1/25  -- net negative goal 500   -- discuss with nephro about HD trial. Would like to hold today if possible  -- Oliguric   -- Nephrology following  -- Permcath 1/21  -- Removed R IJ trialysis 1/23      FEN / GI:   -- Replace lytes as needed  -- s/p PEG 1/21  -- Nutrition: tube feeds novasource renal, at goal      ID:   -- WBC stable at 10  -- Antibiotics: periop ancef complete. Zosyn and vanc, stopped 1/20      Heme/Onc:   -- Hgb stable  -- 3U RBC, 2U FFP, 2U platelets in OR  -- Daily CBC  -- Transfused products: 1U RBC on 1/5/22. 1u RBC  and 1u FFP on 1/9/21. 2U RBC on 1/12. 4 FFP on 1/13. 1 FFP on 1/17  -- Anticoagulation:   -- heparin drip off due to supratherapeutic INR   -- INR 7.0. Hold coumadin   -- PO Vit K this morning      Endo:   -- Gluc goal 140-180  -- Insulin per endocrinology      PPx:   Feeding: tube feeds  Analgesia/Sedation: Tiffani, fent pushes / none  Thromboembolic prevention: INR supratherapeutic  HOB >30: yes  Stress Ulcer ppx: protonix BID  Glucose control: Critical care goal 140-180 g/dl, ISS    Lines/Drains/Airway: Art line, Trach, Permcath, PEG      Dispo/Code Status/Palliative:   -- SICU / Full Code          Critical care was time spent personally by me on the following activities: development of treatment plan with patient or surrogate and bedside caregivers, discussions with consultants, evaluation of patient's response to treatment, examination of patient, ordering and performing treatments and interventions, ordering and review of laboratory studies, ordering and review of radiographic studies, pulse oximetry, re-evaluation of patient's condition.  This critical care time did not overlap with that of any other provider or involve time for any procedures.     Graham Alfaro MD  Critical Care - Surgery  [facility]

## 2022-01-26 NOTE — PLAN OF CARE
"      SICU PLAN OF CARE NOTE    Dx: S/P aortic valve replacement    Shift Events: Placed on PAV+ this AM @ 0600, unable to tolerate and placed back on ACVC @ 30%, 5 PEEP.     Goals of Care: MAP 60-80, PTT 60-80, trach collar trial today, HD trial today    Neuro: Arouses to Voice, Follows Commands and Moves All Extremities, Alert, answers questions appropriately    Cardiac: V paced @ 80    Vital Signs: BP (!) 107/52 (BP Location: Right arm, Patient Position: Lying)   Pulse 80   Temp 98.2 °F (36.8 °C) (Oral)   Resp (!) 28   Ht 5' 1" (1.549 m)   Wt 44.8 kg (98 lb 12.3 oz)   SpO2 98%   Breastfeeding No   BMI 18.66 kg/m²     Respiratory: Ventilator 30%, 5 PEEP    Diet: NPO and Tube Feeds    Gtts: Heparin    Urine Output: Urinary Catheter 5-10 cc/hour. 110 cc/shift    BM x2.     Labs/Accuchecks: labs trended and reviewed. Notified MD Jose of AM values, including critical PT/INR. Accuchecks and insulin administered and completed per MAR.     Skin: skin warm and dry. Partial thickness loss to R buttock. Cleansed and triad cream applied PRN. Scant bloody drainage. Incontinence care provided PRN. Complete bath and linen change complete. Mid sternal incision with steri-strips intact.Turned q2hr. Bed plugged in and working. Heel boots secure. Call light within reach.      POC reviewed with pt and daughter. Answered all questions. See flowsheet for full assessment.       "

## 2022-01-26 NOTE — PLAN OF CARE
VSS, afebrile. Trach collar x1h today. Now on PAV+ 65%. Plan to place patient back on AC/VC+ overnight. Tolerating tube feeds. INR uptrending despite PO vit K. IVP Vit K pending. Multiple BM s today, added fiber to TF. Turned q2h and PRN. Heels elevated. Pain controlled.     Plan of care reviewed with patient, family at bedside.

## 2022-01-26 NOTE — ASSESSMENT & PLAN NOTE
- Injury noted to buttocks appears to be related to moisture/friction/shearing.  - pt is incontinent of urine/stool and currently using moisture wicking pads.  - Recommend continuing Triad bid/prn. Applied per NP.  - currently on Immerse mattress with heel boots in place.  - nursing to maintain pressure injury prevention measures.

## 2022-01-26 NOTE — PROGRESS NOTES
[facility]  Adult Nutrition  Progress Note    SUMMARY       Recommendations    1. Continue Novasource Renal @ 35 mL/hr - 1680 kcal, 76 g protein, and 602 mL free water    2. Monitor and follow-up    Goals: Pt to meet >/= 75% EEN/EPN by f/u date  Nutrition Goal Status: goal met  Communication of RD Recs: reviewed with physician (RN, YAMILETH)    Assessment and Plan    Nutrition Problem  Severe Protein-Calorie Malnutrition  Malnutrition in the context of Chronic Illness     Related to (etiology):   Inability to consume sufficient energy and protein intake      Signs and Symptoms (as evidenced by):   Energy Intake: Suspected </= 75% of estimated energy requirement for >/= 1 month     Body Fat Depletion: Severe depletion of orbitals     Muscle Mass Depletion: Severe depletion of temples and clavicle region     Weight Loss: 13% x 1 month        Interventions/Recommendations (treatment strategy):  1. Enteral nutrition  2. Collaboration of nutrition care with other providers     Nutrition Diagnosis Status:   Continues     Malnutrition Assessment  Malnutrition Type: chronic illness  Energy Intake: severe energy intake          Weight Loss (Malnutrition): greater than 5% in 1 month  Energy Intake (Malnutrition): less than 75% for greater than or equal to 1 month  Subcutaneous Fat (Malnutrition): severe depletion  Muscle Mass (Malnutrition): severe depletion   Orbital Region (Subcutaneous Fat Loss): severe depletion   [place] (Muscle Loss): severe depletion  Clavicle Bone Region (Muscle Loss): severe depletion       Subcutaneous Fat Loss (Final Summary): severe protein-calorie malnutrition  Muscle Loss Evaluation (Final Summary): severe protein-calorie malnutrition    Severe Weight Loss (Malnutrition): greater than 5% in 1 month    Reason for Assessment    Reason For Assessment: RD follow-up  Diagnosis: cardiac disease  Relevant Medical History: CAD, DM, HTN, pacemaker  Interdisciplinary Rounds: did  "not attend    General Information Comments: S/p trach/PEG POD5. Tolerating trach collar. Receiving TFs; tolerating well. No s/s of n/v. Plans for HD trial. NFPE completed on ; pt continues with severe malnutrition in the context of chronic illness per ASPEN guidelines.    Update: spoke with resident, will now do novasource @ 35 mL/hr.    Nutrition Discharge Planning: Pending medical course    Nutrition Risk Screen    Nutrition Risk Screen: tube feeding or parenteral nutrition    Nutrition/Diet History    Patient Reported Diet/Restrictions/Preferences: low salt  Spiritual, Cultural Beliefs, Orthodox Practices, Values that Affect Care: no  Food Allergies: NKFA  Factors Affecting Nutritional Intake: NPO    Anthropometrics    Temp: 98.6 °F (37 °C)  Height: 5' 1" (154.9 cm)  Height (inches): 61 in  Weight Method: Bed Scale  Weight: 44.8 kg (98 lb 12.3 oz)  Weight (lb): 98.77 lb  Ideal Body Weight (IBW), Female: 105 lb  % Ideal Body Weight, Female (lb): 94.07 %  BMI (Calculated): 18.7  BMI Grade: 18.5-24.9 - normal  Usual Body Weight (UBW), k.25 kg  % Usual Body Weight: 87.6  % Weight Change From Usual Weight: -12.59 %       Lab/Procedures/Meds    Pertinent Labs Reviewed: reviewed  Pertinent Labs Comments: Na 135, BUN 55, Creat 2.5, GFR 18.0, phos 5.3, AST 45  Pertinent Medications Reviewed: reviewed  Pertinent Medications Comments: lasix, mg, metoprolol, pantoprazole, psyllium husk, NaCl    Estimated/Assessed Needs    Weight Used For Calorie Calculations: 44.8 kg (98 lb 12.3 oz)  Energy Calorie Requirements (kcal): 0932-7135 kcal/day  Energy Need Method: Kcal/kg (30-35; HD)  Protein Requirements: 54-67 g/day (1.2-1.5 g/kg)  Weight Used For Protein Calculations: 44.8 kg (98 lb 12.3 oz)  Fluid Requirements (mL): 1 mL/kcal or per MD  Estimated Fluid Requirement Method: RDA Method  RDA Method (mL): 1344  CHO Requirement: 150 g      Nutrition Prescription Ordered    Current Diet Order: NPO  Current Nutrition Support " Formula Ordered: Novasource Renal  Current Nutrition Support Rate Ordered: 40 (ml)  Current Nutrition Support Frequency Ordered: mL/hr x 24 hrs    Evaluation of Received Nutrient/Fluid Intake    Enteral Calories (kcal): 1920  Enteral Protein (gm): 87  Enteral (Free Water) Fluid (mL): 688  Other Calories (kcal): 50  Total Calories (kcal): 1970  % Kcal Needs: 126  Other Protein (gm): 12  Total Protein (gm): 99  % Protein Needs: 148  I/O: +2.7L since admit  Energy Calories Required: exceeds needs  Protein Required: exceeds needs  Fluid Required: exceeds needs  Comments: LBM: 1/26  Tolerance: tolerating  % Intake of Estimated Energy Needs: 126%  % Meal Intake: NPO    Nutrition Risk    Level of Risk/Frequency of Follow-up: low     Monitor and Evaluation    Food and Nutrient Intake: energy intake  Food and Nutrient Adminstration: diet order  Anthropometric Measurements: weight  Biochemical Data, Medical Tests and Procedures: electrolyte and renal panel,gastrointestinal profile,glucose/endocrine profile,inflammatory profile,lipid profile  Nutrition-Focused Physical Findings: overall appearance     Nutrition Follow-Up    RD Follow-up?: Yes

## 2022-01-26 NOTE — PROGRESS NOTES
[facility]  Skin Integrity [facility]  Progress Note    Patient Name: Orion Ty  MRN: [id number]  Admission Date: 1/5/2022  Hospital Length of Stay: 21 days  Attending Physician: Dennis Haider MD  Primary Care Provider: Otis Zimmer MD         Subjective:     HPI:  Orion Ty is a 76 year old female with previous aortic valve replacement, mitral valve replacement, and tricuspid valve repair in 2010 who presents for redo aortic valve replacement. 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 She underwent aortic valve replacement with a mechanical valve on 1/5/22. Wound care is following for assessment of buttocks skin injury.      Hospital Course:   Interval history: Pt is seen resting in bed with family at bedside. Agrees to turn for assessment. No complaints.         Follow-up For: Procedure(s) (LRB):  CREATION, TRACHEOSTOMY (N/A)  EGD, WITH PEG TUBE INSERTION (N/A)  INSERTION-CATHETER-ROSELINE (N/A)    Post-Operative Day: 5 Days Post-Op    Scheduled Meds:   sodium chloride 0.9%   Intravenous Once    acetaminophen  650 mg Oral Q8H    albuterol-ipratropium  3 mL Nebulization Q6H    atorvastatin  40 mg Oral Daily    fluconazole 40 mg/ml  200 mg Per G Tube Daily    furosemide (LASIX) injection  120 mg Intravenous Q12H    LIDOcaine  1 patch Transdermal Q24H    magnesium sulfate IVPB  2 g Intravenous Once    metoprolol tartrate  12.5 mg Per G Tube BID    midodrine  10 mg Oral Q12H    pantoprazole  40 mg Intravenous Q12H    polyethylene glycol  17 g Oral Daily    psyllium husk (aspartame)  1 packet Per G Tube Daily     Continuous Infusions:   sodium chloride 0.9% Stopped (01/24/22 0204)    dextrose 10 % in water (D10W)       PRN Meds:sodium chloride, sodium chloride 0.9%, aspirin, bisacodyL, dextrose 10 % in water (D10W), dextrose 50%, fentaNYL, glucagon (human recombinant), insulin  aspart U-100, LIDOcaine HCL 2%, melatonin, ondansetron, oxyCODONE, potassium, sodium phosphates, sodium chloride 0.9%, sodium chloride 0.9%    Review of Systems   Skin: Positive for wound.     Objective:     Vital Signs (Most Recent):  Temp: 98.6 °F (37 °C) (01/26/22 1115)  Pulse: 80 (01/26/22 1300)  Resp: (!) 29 (01/26/22 1300)  BP: 131/82 (01/26/22 1259)  SpO2: 97 % (01/26/22 1300) Vital Signs (24h Range):  Temp:  [98.2 °F (36.8 °C)-99.2 °F (37.3 °C)] 98.6 °F (37 °C)  Pulse:  [79-83] 80  Resp:  [22-39] 29  SpO2:  [97 %-100 %] 97 %  BP: ()/(49-82) 131/82  Arterial Line BP: ()/(38-59) 140/59     Weight: 44.8 kg (98 lb 12.3 oz)  Body mass index is 18.66 kg/m².    Physical Exam  Constitutional:       Appearance: Normal appearance.   Skin:     General: Skin is warm and dry.      Findings: Lesion present.   Neurological:      Mental Status: She is alert.         Laboratory:  All pertinent labs reviewed within the last 24 hours.    Diagnostic Results:  None    Assessment/Plan:         [facility]IDGET Skin Integrity Evaluation    Skin Integrity BRIDGET evaluation of patient as part of the comprehensive skin care team.   She has been admitted for 21 days. Skin injury was noted on 1/9/22. POA No. PT and RD are involved in her care.     Sacrum          Dermatitis associated with moisture  - Injury noted to buttocks appears to be related to moisture/friction/shearing.  - pt is incontinent of urine/stool and currently using moisture wicking pads.  - Recommend continuing Triad bid/prn. Applied per NP.  - currently on Immerse mattress with heel boots in place.  - nursing to maintain pressure injury prevention measures.       Pt will be followed weekly.      Shyla Ortez NP  Skin Integrity [facility]  [facility]

## 2022-01-27 NOTE — PT/OT/SLP PROGRESS
"Occupational Therapy  Co Treatment    Name: Orion Ty  MRN: [id number]  Admitting Diagnosis:  S/P aortic valve replacement  6 Days Post-Op    Recommendations:     Discharge Recommendations: rehabilitation facility      Assessment:     Orion Ty is a 76 y.o. female with a medical diagnosis of S/P aortic valve replacement.   Performance deficits affecting function are weakness,impaired endurance,impaired self care skills,impaired functional mobilty,gait instability,impaired balance,decreased upper extremity function,decreased lower extremity function. Pt tolerated session fairly well. Stable vital signs, but pt reports fatigue throughout session.   Pt to benefit from cont OT and continued to anticipate pt will require post acute therapy prior to return home.     Rehab Prognosis:  Good; patient would benefit from acute skilled OT services to address these deficits and reach maximum level of function.       Plan:     Patient to be seen 4 x/week to address the above listed problems via self-care/home management,therapeutic activities,therapeutic exercises  · Plan of Care Expires: 02/05/22  · Plan of Care Reviewed with: patient    Subjective   Pt reports "i'm tired." several times during session.   Pt denies pain.     Pain/Comfort:  · Pain Rating 1: 0/10    Objective:     Communicated with: nsg prior to session.  Patient found supine in bed with trach to vent, tele, pulse ox, BP cuff, A-line, central line.  Daughter present.  Cotx completed this date to optimize functional performance and safety given impaired tolerance for activity   RT notified of therapy session and in agreement with plan.  Bloody secretions noted from trach at end of session with nsg notified and arrived to room to address.       General Precautions: Standard, fall,sternal,NPO     Occupational Performance:     Bed Mobility:    Supine<>sit with TOTAL A     Functional Mobility/Transfers:  2 standing trials with MAX A x 2 and able to clear " buttocks from bed but unable to stand fully upright.     Activities of Daily Living:  Feeding: NPO  G/H; Pt able to wash face and hands with set-up for task and MOD  A for balance. Pt required MAX A to brush hair with right hand   TOTAL A for remainder of ADL    [facility] 6 Click ADL: 7    Treatment & Education:  Education provided re: sternal precautions and implications on functional mobility/activity.   Pt tolerated sitting EOB approx 10 min with SBA for static sitting and MOD A for dynamic sitting.   Light AAROM completed for UEs while seated EOB. Also completed lateral trunk weight shifting to allow for  initiation of core muscles to return to midline with MOD A to complete.     Education provided re: OT POC and safety with functional mobiltiy/ADl skills.   Recommended t/f to cardiac chair with nsg later this date for 1-2 hours as tolerance.   Pt underwent trach collar trial yesterday and tolerated for one hour. Pt remained on vent support throughout this morning an during session today.       Patient left supine with all lines intact, call button in reach and nsg notifiedEducation:      GOALS:   Multidisciplinary Problems     Occupational Therapy Goals        Problem: Occupational Therapy Goal    Goal Priority Disciplines Outcome Interventions   Occupational Therapy Goal     OT, PT/OT Ongoing, Progressing    Description: Goals to be met by:  2/2/22     Patient will increase functional independence with ADLs by performing:    Pt to complete UE dressing with set-up  Pt to complete LE dressing with SBA  Pt to complete standing g/h skills with SBA  Pt to complete toileting with SBA  Pt to completed t/f bed, chair and commode with SBA                   Time Tracking:     OT Date of Treatment: 01/27/22  OT Start Time: 0805  OT Stop Time: 0839  OT Total Time (min): 34 min    Billable Minutes:Self Care/Home Management 16  Therapeutic Activity 18    OT/SUE: OT          1/27/2022

## 2022-01-27 NOTE — PT/OT/SLP PROGRESS
Physical Therapy Treatment    Patient Name:  Orion Ty   MRN:  [id number]  Admit Date: 1/5/2022  Admitting Diagnosis:  S/P aortic valve replacement   Length of Stay: 22 days  Recent Surgery: Procedure(s) (LRB):  CREATION, TRACHEOSTOMY (N/A)  EGD, WITH PEG TUBE INSERTION (N/A)  INSERTION-CATHETER-ROSELINE (N/A) 6 Days Post-Op    Recommendations:     Discharge Recommendations:  rehabilitation facility   Discharge Equipment Recommendations: bedside commode   Barriers to discharge: None    Plan:     During this hospitalization, patient to be seen 4 x/week to address the listed problems via gait training,therapeutic activities,therapeutic exercises,neuromuscular re-education  · Plan of Care Expires:  02/05/22   Plan of Care Reviewed with: patient,daughter    Assessment:     Orion Ty is a 76 y.o. female admitted with a medical diagnosis of S/P aortic valve replacement. Pt demo'd excellent motivation and participation in session today. Pt demo'd good improvement in mobility but showed increased generalized lethargy. Pt able to sit EOB, participate in balance, strengthening, and mobility exercises, and perform 2 sit to stand transfers with mod-total A. Pt was ind at her PLOF. Pt now presents with generalized weakness, impaired endurance, and impaired ability to perform functional tasks. Pt will require IP Rehab to assist pt with returning to PLOF to be able to actively participate in her family and community. Pt is an excellent IP rehab candidate due to good activity tolerance, decline from PLOF, good family support, and excellent motivation.    RT notified prior to session. No incidents occurred.         Problem List: weakness,impaired endurance,impaired functional mobilty,impaired self care skills,impaired balance,impaired cardiopulmonary response to activity.  Rehab Prognosis: Good     GOALS:   Multidisciplinary Problems     Physical Therapy Goals        Problem: Physical Therapy Goal    Goal Priority  "Disciplines Outcome Goal Variances Interventions   Physical Therapy Goal     PT, PT/OT Ongoing, Progressing     Description: Goals to be met by: 2022     Patient will increase functional independence with mobility by performin. Sit to stand transfer with Supervision -not met  2. Bed to chair transfer with minimum -not met  3. Gait  x 100 feet with minimum  standing rest breaks prn. -not met  4. Lower extremity exercise program x30 reps per handout, with independence -not met  5. Recite 3/3 sternal precautions and remain complaint with precautions throughout session with no verbal cues -not met  6. Pt sit on EOB x 10 min with CGA - not met                       Subjective   Communicated with RN prior to session.  Patient found HOB elevated upon PT entry to room, agreeable to evaluation. Orion Ty's daughter present during session.    Chief Complaint: being tired   Patient/Family Comments/goals: to get better and return home   Pain/Comfort:  · Pain Rating 1: 0/10  · Pain Rating Post-Intervention 1: 0/10    Objective:   Patient found with: telemetry,pulse ox (continuous),blood pressure cuff,central line,peripheral IV,PEG Tube,tejeda catheter,arterial line,ventilator,Tracheostomy   General Precautions: Standard, Cardiac fall,sternal   Orthopedic Precautions:N/A   Braces: N/A   Oxygen Device: Vent Settings: Vent Mode: Spont  Oxygen Concentration (%):  [30-40] 30  Resp Rate Total:  [22 br/min-40 br/min] 29 br/min  Vt Set:  [330 mL] 330 mL  PEEP/CPAP:  [5 cmH20] 5 cmH20  Mean Airway Pressure:  [7.7 jbL62-75 cmH20] 11 cmH20  Vitals: /69   Pulse 80   Temp 99 °F (37.2 °C) (Oral)   Resp (!) 28   Ht 5' 1" (1.549 m)   Wt 44.8 kg (98 lb 12.3 oz)   SpO2 95%   Breastfeeding No   BMI 18.66 kg/m²     Outcome Measures:  AM-PAC 6 CLICK MOBILITY  Turning over in bed (including adjusting bedclothes, sheets and blankets)?: 2  Sitting down on and standing up from a chair with arms (e.g., wheelchair, bedside " commode, etc.): 2  Moving from lying on back to sitting on the side of the bed?: 2  Moving to and from a bed to a chair (including a wheelchair)?: 2  Need to walk in hospital room?: 1  Climbing 3-5 steps with a railing?: 1  Basic Mobility Total Score: 10   Functional Mobility:  Additional staff present: OT -  patient with multiple impairments requiring two skilled therapists to appropriately progress and facilitate patient's musculoskeletal strength, musculoskeletal endurance, and neuromuscular postural balance and control  Bed Mobility:    Supine to Sit: total assistance and 2 persons; HOB elevated   o Pt able to initiate B LE movement    Scooting anteriorly to EOB to have both feet planted on floor: total assistance   Sit to Supine: total assistance and 2 persons; HOB flat    Sitting Balance at Edge of Bed:   Assistance Level Required:   o SBA for static sitting with B UE and 1 UE support  o Mod A for dynamic sitting with posterior lean    Time: 10 minutes   Comments:   o Worked on activity tolerance sitting EOB, worked on tolerance to positional change, worked on sitting balance re: maintaining midline and correcting to midline, worked on reaching crossing midline in transverse plane to worked on dissociation of trunk and pelvis.   o Verbal cuing to keep eyes open during session due to lethargy  o VSS    Transfers:    Sit <> Stand Transfer: maximal assistance and of 2 persons with no assistive device from EOB x 2 trials       Gait:  Not performed 2nd to pt still working on standing     Therapeutic Activities, Exercises, and Education:   Educated pt on PT role/POC  Educated pt and daughter on raising HOB periodically through out the day and working on bed level exercises to worked on tolerance to upright posture, mobility, and strengthening  Educated pt on sternal precautions  Pt verbalized understanding     Therapeutic Exercise  Mobility for generalized strengthening   B reaching across midline to work on  pelvis/trunk disassociation and strengthening core and B UEs  Sit to stand x 2 reps to increase B LE strength and improve standing tolerance  Initiation and purposeful use of core muscles during sitting balance        Patient left HOB elevated with all lines intact, call button in reach, RN notified and daughter present..    Time Tracking:     PT Received On: 01/27/22  PT Start Time: 0805     PT Stop Time: 0836  PT Total Time (min): 31 min       Billable Minutes:   · Therapeutic Exercise 23    Treatment Type: Treatment  PT/PTA: PT

## 2022-01-27 NOTE — PLAN OF CARE
[facility]  Discharge Reassessment    Primary Care Provider: Otis Zimmer MD    Expected Discharge Date: 2/1/2022    Reassessment (most recent)     Discharge Reassessment - 01/27/22 1205        Discharge Reassessment    Assessment Type Discharge Planning Reassessment     Discharge Plan discussed with: Adult children     Communicated MENDEL with patient/caregiver Date not available/Unable to determine     Discharge Plan A Rehab     Discharge Plan B Long-term acute care facility (LTAC)     DME Needed Upon Discharge  other (see comments)   TBD    Discharge Barriers Identified None     Why the patient remains in the hospital Requires continued medical care        Post-Acute Status    Post-Acute Authorization Placement     Post-Acute Placement Status Patient List Provided               Per MD's Note,  NAEO, Afebrile  Lasted 1h on trach collar yesterday  UOP peaked at 40/hr after lasix dose. Total 265 cc in past 24h.  Net positive 1.2L today  Likely iHD trial today  INR corrected with Vit K yesterday. AM check in process    The patient is not medically stable to discharge at this time and remains in the SICU. The SW will continue to follow-up with the patient to assist as needed with discharge planning.       Shen Simmons LMSW  Case Management [facility]  Ext: [phone]

## 2022-01-27 NOTE — SUBJECTIVE & OBJECTIVE
Interval History/Significant Events:   NAEO, Afebrile  Lasted 1h on trach collar yesterday  UOP peaked at 40/hr after lasix dose. Total 265 cc in past 24h.  Net positive 1.2L today  Likely iHD trial today  INR corrected with Vit K yesterday. AM check in process      Follow-up For: Procedure(s) (LRB):  CREATION, TRACHEOSTOMY (N/A)  EGD, WITH PEG TUBE INSERTION (N/A)  INSERTION-CATHETER-ROSELINE (N/A)    Post-Operative Day: 6 Days Post-Op    Objective:     Vital Signs (Most Recent):  Temp: 97.9 °F (36.6 °C) (01/27/22 0315)  Pulse: 80 (01/27/22 0600)  Resp: (!) 39 (01/27/22 0600)  BP: 132/68 (01/27/22 0600)  SpO2: 97 % (01/27/22 0600) Vital Signs (24h Range):  Temp:  [97.9 °F (36.6 °C)-99.1 °F (37.3 °C)] 97.9 °F (36.6 °C)  Pulse:  [79-81] 80  Resp:  [22-40] 39  SpO2:  [95 %-100 %] 97 %  BP: ()/(50-82) 132/68  Arterial Line BP: (100-143)/(38-60) 122/46     Weight: 44.8 kg (98 lb 12.3 oz)  Body mass index is 18.66 kg/m².      Intake/Output Summary (Last 24 hours) at 1/27/2022 0746  Last data filed at 1/27/2022 0600  Gross per 24 hour   Intake 1497.85 ml   Output 265 ml   Net 1232.85 ml       Physical Exam  Constitutional:       General: She is not in acute distress.  HENT:      Head: Normocephalic and atraumatic.   Neck:      Comments: Trach  Cardiovascular:      Rate and Rhythm: Normal rate and regular rhythm.      Pulses: Normal pulses.      Comments: Midline sternotomy incision c/d/I  Pacemaker in place. Paced at 80  Pulmonary:      Effort: Pulmonary effort is normal. No respiratory distress.   Abdominal:      General: Abdomen is flat. There is no distension.      Palpations: Abdomen is soft.      Tenderness: There is no abdominal tenderness.      Comments: Peg site c/d/i  Soft abdomen   Genitourinary:     Comments: Mireles in place. Urine now clear  Musculoskeletal:      Right lower leg: No edema.      Left lower leg: No edema.      Comments: R radial art line in place   Skin:     General: Skin is warm.       Capillary Refill: Capillary refill takes less than 2 seconds.   Neurological:      General: No focal deficit present.      Mental Status: She is alert.         Vents:  Vent Mode: A/C (01/27/22 0500)  Ventilator Initiated: Yes (01/18/22 1636)  Set Rate: 28 BPM (01/27/22 0500)  Vt Set: 330 mL (01/27/22 0500)  Pressure Support: 10 cmH20 (01/25/22 0115)  PEEP/CPAP: 5 cmH20 (01/27/22 0500)  Oxygen Concentration (%): 30 (01/27/22 0600)  Peak Airway Pressure: 16 cmH2O (01/27/22 0500)  Plateau Pressure: 30 cmH20 (01/27/22 0500)  Total Ve: 10.5 mL (01/27/22 0500)  Negative Inspiratory Force (cm H2O): 0 (01/27/22 0500)  F/VT Ratio<105 (RSBI): (!) 101.78 (01/27/22 0500)    Lines/Drains/Airways     Central Venous Catheter Line            Permacath 01/21/22 1013 left subclavian 5 days          Drain                 Gastrostomy/Enterostomy 01/21/22 1112 Percutaneous endoscopic gastrostomy (PEG) 5 days         Urethral Catheter 01/25/22 1341 Non-latex 1 day          Airway                 Surgical Airway 01/21/22 1043 Shiley Cuffed 5 days          Arterial Line            Arterial Line 01/05/22 0855 Right Radial 21 days          Peripheral Intravenous Line                 Midline Catheter Insertion/Assessment  - Single Lumen 01/10/22 1300 Left cephalic vein (lateral side of arm) 18g x 8cm 16 days                Significant Labs:    CBC/Anemia Profile:  Recent Labs   Lab 01/26/22  0343 01/26/22  0343 01/27/22  0254 01/27/22  0414   WBC 10.41  --  10.48  --    HGB 7.2*  --  7.8*  --    HCT 23.3* 26* 25.6* 25*   PLT 90*  --  103*  --    MCV 99*  --  99*  --    RDW 19.9*  --  19.8*  --         Chemistries:  Recent Labs   Lab 01/26/22  0343 01/27/22  0254 01/27/22  0631   *  --  132*   K 4.5  --  4.3   CL 99  --  99   CO2 19*  --  18*   BUN 55*  --  78*   CREATININE 2.5*  --  3.2*   CALCIUM 8.8  --  8.8   ALBUMIN 2.6*  --  2.6*   PROT 6.1  --  6.0   BILITOT 0.6  --  0.5   ALKPHOS 116  --  128   ALT 17  --  22   AST 45*  --  52*    MG 2.0 2.0  --    PHOS 5.3* 6.2*  --        All pertinent labs within the past 24 hours have been reviewed.    Significant Imaging:  I have reviewed all pertinent imaging results/findings within the past 24 hours.

## 2022-01-27 NOTE — CARE UPDATE
BG goal 140 -180   Diet NPO Except for: Sips with Medication  6 Days Post-Op    BG stable and below goal with minimal use of insulin therapy. Patient remains on Vent, on TF,  and NPO. Endo will continue to follow, and manage glycemic control while inpatient.     - Continiue Moderate Dose SQ Insulin Correction Scale PRN hyperglycemia.   - Change BG Monitoring to every 6 hours while NPO.     ** Please call Endocrine for any BG related issues **  ** Please notify Endocrine for any change and/or advance in diet**    Lab Results   Component Value Date    HGBA1C 6.0 (H) 01/03/2022       Discharge Planning:   TBD. Please notify endocrinology prior to discharge.   Pending Plan:   - Insurance preferred diabetes testing supplies   - Will most likely have patient continue metformin 500 mg daily.

## 2022-01-27 NOTE — PLAN OF CARE
"      SICU PLAN OF CARE NOTE    SHIFT EVENTS:  POC reviewed with patient and family; questions and concerns addressed. ABGs obtained this AM. See flow sheets for full assessment details. Will continue to monitor patient closely.      Dx: S/P aortic valve replacement    Vital Signs: /68 (BP Location: Right arm, Patient Position: Lying)   Pulse 80   Temp 97.9 °F (36.6 °C) (Oral)   Resp (!) 39   Ht 5' 1" (1.549 m)   Wt 44.8 kg (98 lb 12.3 oz)   SpO2 97%   Breastfeeding No   BMI 18.66 kg/m²     Neuro: AxOx3-4, follows commands, moves all extremities    Respiratory: Ventilator ACVC 3 0FiO2 /5 Peep    Cardiac: VPaced    Diet: Tube Feeds infusing goal rate 35 cc/hr      Urine Output: Urinary Catheter 200 cc/shift       APTT / INR trended and daily labs/Accuchecks q4h    SKIN NOTE:  Skin: Pt has breakdown to sacrum, steristrips on sternal incision, SUE, and R groin site with steristrips, SUE.     Skin precautions maintained including:  Frequent weight shift assistance provided Q2 hr to prevent breakdown. Adhesive use limited. Heels elevated off bed. Positioned off wounds. Pressure points protected and positioning supports utilized.  Skin-to-device areas padded. Skin-to-skin areas padded    "

## 2022-01-27 NOTE — PROGRESS NOTES
[facility]  Critical Care - Surgery  Progress Note    Patient Name: Orion Ty  MRN: [id number]  Admission Date: 1/5/2022  Hospital Length of Stay: 22 days  Code Status: Full Code  Attending Provider: Dennis Haider MD  Primary Care Provider: Otis Zimmer MD   Principal Problem: S/P aortic valve replacement    Subjective:     Hospital/ICU Course:  No notes on file    Interval History/Significant Events:   NAEO, Afebrile  Lasted 1h on trach collar yesterday  UOP peaked at 40/hr after lasix dose. Total 265 cc in past 24h.  Net positive 1.2L today  Likely iHD trial today  INR corrected with Vit K yesterday. AM check in process      Follow-up For: Procedure(s) (LRB):  CREATION, TRACHEOSTOMY (N/A)  EGD, WITH PEG TUBE INSERTION (N/A)  INSERTION-CATHETER-ROSELINE (N/A)    Post-Operative Day: 6 Days Post-Op    Objective:     Vital Signs (Most Recent):  Temp: 97.9 °F (36.6 °C) (01/27/22 0315)  Pulse: 80 (01/27/22 0600)  Resp: (!) 39 (01/27/22 0600)  BP: 132/68 (01/27/22 0600)  SpO2: 97 % (01/27/22 0600) Vital Signs (24h Range):  Temp:  [97.9 °F (36.6 °C)-99.1 °F (37.3 °C)] 97.9 °F (36.6 °C)  Pulse:  [79-81] 80  Resp:  [22-40] 39  SpO2:  [95 %-100 %] 97 %  BP: ()/(50-82) 132/68  Arterial Line BP: (100-143)/(38-60) 122/46     Weight: 44.8 kg (98 lb 12.3 oz)  Body mass index is 18.66 kg/m².      Intake/Output Summary (Last 24 hours) at 1/27/2022 0746  Last data filed at 1/27/2022 0600  Gross per 24 hour   Intake 1497.85 ml   Output 265 ml   Net 1232.85 ml       Physical Exam  Constitutional:       General: She is not in acute distress.  HENT:      Head: Normocephalic and atraumatic.   Neck:      Comments: Trach  Cardiovascular:      Rate and Rhythm: Normal rate and regular rhythm.      Pulses: Normal pulses.      Comments: Midline sternotomy incision c/d/I  Pacemaker in place. Paced at 80  Pulmonary:      Effort: Pulmonary effort is normal. No respiratory distress.   Abdominal:       General: Abdomen is flat. There is no distension.      Palpations: Abdomen is soft.      Tenderness: There is no abdominal tenderness.      Comments: Peg site c/d/i  Soft abdomen   Genitourinary:     Comments: Mireles in place. Urine now clear  Musculoskeletal:      Right lower leg: No edema.      Left lower leg: No edema.      Comments: R radial art line in place   Skin:     General: Skin is warm.      Capillary Refill: Capillary refill takes less than 2 seconds.   Neurological:      General: No focal deficit present.      Mental Status: She is alert.         Vents:  Vent Mode: A/C (01/27/22 0500)  Ventilator Initiated: Yes (01/18/22 1636)  Set Rate: 28 BPM (01/27/22 0500)  Vt Set: 330 mL (01/27/22 0500)  Pressure Support: 10 cmH20 (01/25/22 0115)  PEEP/CPAP: 5 cmH20 (01/27/22 0500)  Oxygen Concentration (%): 30 (01/27/22 0600)  Peak Airway Pressure: 16 cmH2O (01/27/22 0500)  Plateau Pressure: 30 cmH20 (01/27/22 0500)  Total Ve: 10.5 mL (01/27/22 0500)  Negative Inspiratory Force (cm H2O): 0 (01/27/22 0500)  F/VT Ratio<105 (RSBI): (!) 101.78 (01/27/22 0500)    Lines/Drains/Airways     Central Venous Catheter Line            Permacath 01/21/22 1013 left subclavian 5 days          Drain                 Gastrostomy/Enterostomy 01/21/22 1112 Percutaneous endoscopic gastrostomy (PEG) 5 days         Urethral Catheter 01/25/22 1341 Non-latex 1 day          Airway                 Surgical Airway 01/21/22 1043 Shiley Cuffed 5 days          Arterial Line            Arterial Line 01/05/22 0855 Right Radial 21 days          Peripheral Intravenous Line                 Midline Catheter Insertion/Assessment  - Single Lumen 01/10/22 1300 Left cephalic vein (lateral side of arm) 18g x 8cm 16 days                Significant Labs:    CBC/Anemia Profile:  Recent Labs   Lab 01/26/22  0343 01/26/22  0343 01/27/22  0254 01/27/22  0414   WBC 10.41  --  10.48  --    HGB 7.2*  --  7.8*  --    HCT 23.3* 26* 25.6* 25*   PLT 90*  --  103*  --     MCV 99*  --  99*  --    RDW 19.9*  --  19.8*  --         Chemistries:  Recent Labs   Lab 01/26/22  0343 01/27/22  0254 01/27/22  0631   *  --  132*   K 4.5  --  4.3   CL 99  --  99   CO2 19*  --  18*   BUN 55*  --  78*   CREATININE 2.5*  --  3.2*   CALCIUM 8.8  --  8.8   ALBUMIN 2.6*  --  2.6*   PROT 6.1  --  6.0   BILITOT 0.6  --  0.5   ALKPHOS 116  --  128   ALT 17  --  22   AST 45*  --  52*   MG 2.0 2.0  --    PHOS 5.3* 6.2*  --        All pertinent labs within the past 24 hours have been reviewed.    Significant Imaging:  I have reviewed all pertinent imaging results/findings within the past 24 hours.    Assessment/Plan:     * S/P aortic valve replacement  Orion Ty is a 76 y.o. female who presents to the SICU s/p redo aortic valve replacement with mechanical valve on 1/5/22.      Neuro/Psych:   -- Sedation: none  -- Pain: Liquid Tiffani, breakthrough fentanyl pushes q2     Cards:   -- Pressors: Off  -- Goal MAP: 60-80  -- statin  -- heparin drip off due to therapeutic INR  -- Will likely give very low dose coumadin today as patient is extremely sensitive. 0.5 mg every other day may be sufficient for now.  -- Paced HR 80  -- INR pending this morning after correction with Vit K 1/26      Pulm:   -- Goal O2 sat > 90%  -- reintubated 1/12 then 1/18. S/p trach 1/21  -- Spontaneous when able.   -- Tolerated trach collar for one hour on 1/27  -- ABG PRN  -- 2 mediastinal removed 1/9 L Pleural CT removed 1/8      Renal:  -- Mireles replaced 1/25  -- Anticipate iHD trial today.  -- Oliguric   -- Nephrology following  -- Permcath 1/21  -- Removed R IJ trialysis 1/23      FEN / GI:   -- Replace lytes as needed  -- s/p PEG 1/21  -- Nutrition: tube feeds novasource renal, at goal      ID:   -- WBC stable at 10.5  -- Antibiotics: periop ancef complete. Zosyn and vanc, stopped 1/20      Heme/Onc:   -- Hgb 7.8  -- 3U RBC, 2U FFP, 2U platelets in OR  -- Daily CBC  -- Transfused products: 1U RBC on 1/5/22. 1u RBC  and 1u FFP on 1/9/21. 2U RBC on 1/12. 4 FFP on 1/13. 1 FFP on 1/17  -- Anticoagulation:   -- heparin drip off due to therapeutic INR   -- INR pending. Last 2.4.    -- Anticipate low dose coumadin today      Endo:   -- Gluc goal 140-180  -- Insulin per endocrinology      PPx:   Feeding: tube feeds  Analgesia/Sedation: Tiffani, fent pushes / none  Thromboembolic prevention: Coumadin, ASA  HOB >30: yes  Stress Ulcer ppx: protonix BID  Glucose control: Critical care goal 140-180 g/dl, ISS    Lines/Drains/Airway: Art line, Trach, Permcath, PEG      Dispo/Code Status/Palliative:   -- SICU / Full Code          Critical care was time spent personally by me on the following activities: development of treatment plan with patient or surrogate and bedside caregivers, discussions with consultants, evaluation of patient's response to treatment, examination of patient, ordering and performing treatments and interventions, ordering and review of laboratory studies, ordering and review of radiographic studies, pulse oximetry, re-evaluation of patient's condition.  This critical care time did not overlap with that of any other provider or involve time for any procedures.     Graham Alfaro MD  Critical Care - Surgery  [facility]

## 2022-01-27 NOTE — PROGRESS NOTES
[facility]  Nephrology  Progress Note     Patient Name: Orion Ty  MRN: [id number]  Admission Date: 1/5/2022  Hospital Length of Stay: 22 days  Attending Provider: Dennis Haider MD   Primary Care Physician: Otis Zimmer MD  Principal Problem: S/P aortic valve replacement    Subjective:     HPI: Orion Ty is our 76 y.o. female with previous aortic valve replacement, mitral valve replacement, and tricuspid valve repair in 2010 who presented on 1/5 for redo aortic valve replacement. Nephrology consulted on 1/8 for anuric JO. Patient is alert but tired, son at bedside. Stated she was tired prior to admission. During the hospital stay Cr increase from 1 to 1.5. In OR she received  2.5L crystalloid, 3U RBC, 2U FFP, 2 platelets, and 800 cell saver. she is +5 L since admit. UOP decrease overnight, per nurse she didn't urinate at all. She received diuril 500 mg twice, lasix 20 mg *2 on 1/7. This morning she urinate 225 immediately after placing the tejeda's cath. She received again lasix 100 mg this morning. Upor evaluation she was alert but repeatedly saying she was tired, denied any pain. Denied any previous hx of kidney disease.       Interval History: Seen at the bedside no event overnight       Review of patient's allergies indicates:  No Known Allergies   Current Facility-Administered Medications   Medication Frequency    0.9%  NaCl infusion (CRRT USE ONLY) Continuous    0.9%  NaCl infusion (for blood administration) Q24H PRN    0.9%  NaCl infusion (for blood administration) Q24H PRN    0.9%  NaCl infusion PRN    0.9%  NaCl infusion Once    0.9%  NaCl infusion PRN    acetaminophen oral solution 650 mg Q8H    albuterol-ipratropium 2.5 mg-0.5 mg/3 mL nebulizer solution 3 mL Q6H    aspirin suppository 300 mg Once PRN    atorvastatin tablet 40 mg Daily    bisacodyL suppository 10 mg Daily PRN    dextrose 10 % infusion Continuous PRN    dextrose 50% injection 12.5 g PRN     fentaNYL 50 mcg/mL injection 25 mcg Q2H PRN    fluconazole 40 mg/ml suspension 200 mg Daily    furosemide injection 120 mg Q8H    glucagon (human recombinant) injection 1 mg PRN    heparin 25,000 units in dextrose 5% 250 mL (100 units/mL) infusion (heparin infusion - NO NOMOGRAM) Continuous    insulin aspart U-100 pen 1-10 Units PRN    LIDOcaine 5 % patch 1 patch Q24H    LIDOcaine HCL 2% jelly PRN    magnesium sulfate 2g in water 50mL IVPB (premix) Once    melatonin tablet 9 mg Nightly PRN    metoprolol tartrate (LOPRESSOR) split tablet 12.5 mg BID    midodrine tablet 10 mg Q12H    ondansetron injection 4 mg Q12H PRN    oxyCODONE 5 mg/5 mL solution 5 mg Q4H PRN    pantoprazole injection 40 mg Q12H    polyethylene glycol packet 17 g Daily    potassium, sodium phosphates 280-160-250 mg packet 2 packet TID PRN    psyllium husk (aspartame) 3.4 gram packet 1 packet Daily    sodium chloride 0.9% bolus 250 mL PRN    sodium chloride 0.9% flush 10 mL PRN    warfarin (COUMADIN) split tablet 0.5 mg Daily     Facility-Administered Medications Ordered in Other Encounters   Medication Frequency    0.9%  NaCl infusion Continuous       Objective:     Vital Signs (Most Recent):  Temp: 98.2 °F (36.8 °C) (01/27/22 1135)  Pulse: 80 (01/27/22 1135)  Resp: (!) 27 (01/27/22 1135)  BP: (!) 110/58 (01/27/22 1000)  SpO2: 98 % (01/27/22 1135)  O2 Device (Oxygen Therapy): ventilator (01/27/22 1135) Vital Signs (24h Range):  Temp:  [97.9 °F (36.6 °C)-99.1 °F (37.3 °C)] 98.2 °F (36.8 °C)  Pulse:  [79-81] 80  Resp:  [22-41] 27  SpO2:  [93 %-100 %] 98 %  BP: ()/(53-82) 110/58  Arterial Line BP: ()/(38-60) 111/43   Weight: 44.8 kg (98 lb 12.3 oz) (01/20/22 1300)  Body mass index is 18.66 kg/m².  Body surface area is 1.39 meters squared.      Intake/Output Summary (Last 24 hours) at 1/27/2022 1154  Last data filed at 1/27/2022 1000  Gross per 24 hour   Intake 1460.06 ml   Output 300 ml   Net 1160.06 ml     I/O  last 3 completed shifts:  In: 2132.4 [I.V.:137.8; NG/GT:1945; IV Piggyback:49.6]  Out: 595 [Urine:420; Drains:175]  Net IO Since Admission: 4,284.33 mL [01/27/22 1154]     Physical Exam  Constitutional:       Appearance: She is well-developed. She is ill-appearing.   Cardiovascular:      Rate and Rhythm: Normal rate and regular rhythm.      Heart sounds: Normal heart sounds.   Pulmonary:      Comments: tracheostomy   Abdominal:      General: Abdomen is flat.      Palpations: Abdomen is soft.   Musculoskeletal:         General: Normal range of motion.   Skin:     General: Skin is warm and dry.      Comments: Sternal Incision CDI   Neurological:      Mental Status: She is alert.   Psychiatric:         Mood and Affect: Mood normal.         Behavior: Behavior normal.         Recent Labs   Lab 01/25/22  0300 01/25/22  0425 01/26/22  0343 01/26/22  0343 01/27/22  0254 01/27/22  0414   WBC 11.67  --  10.41  --  10.48  --    HGB 8.0*  --  7.2*  --  7.8*  --    HCT 25.9*   < > 23.3* 26* 25.6* 25*   *  --  90*  --  103*  --    MONO 2.7*  0.3   < > 3.7*  0.4  --  4.0  0.4  --     < > = values in this interval not displayed.      Recent Labs   Lab 01/21/22  0317 01/21/22  0825 01/25/22  0213 01/26/22  0343 01/27/22  0631      < > 136 135* 132*   K 4.3   < > 3.4* 4.5 4.3      < > 100 99 99   CO2 22*   < > 22* 19* 18*   BUN 12   < > 26* 55* 78*   CREATININE 0.8   < > 1.4 2.5* 3.2*   CALCIUM 8.6*   < > 8.1* 8.8 8.8   PROT 6.5  --   --  6.1 6.0   BILITOT 1.0  --   --  0.6 0.5   ALKPHOS 104  --   --  116 128   ALT 24  --   --  17 22   AST 50*  --   --  45* 52*    < > = values in this interval not displayed.      Recent Labs     01/25/22  1338 01/26/22  0343 01/27/22  0414   PH 7.389 7.410 7.340*   PCO2 35.6 37.4 34.8*   PO2 97 94 79*   HCO3 21.5* 23.7* 18.8*   POCSATURATED 98 97 95   BE -3 -1 -7       Assessment/Plan:       S/P aortic valve replacement    Chronic atrial fibrillation    Type 2 diabetes mellitus  with microalbuminuria, without long-term current use of insulin    Volume overload    JO (acute kidney injury)    ATN (acute tubular necrosis)    Acute hypoxemic respiratory failure    Dermatitis associated with moisture     JO  acute kidney injury  Orion Ty is our 76 y.o. female with previous aortic valve replacement, mitral valve replacement, and tricuspid valve repair in 2010 who presented on 1/5 for redo aortic valve replacement. Nephrology consulted on 1/8 for anuric JO. During the hospital stay Cr increase from 1 to 1.5. In OR she received  2.5L crystalloid, 3U RBC, 2U FFP, 2 platelets, and 800 cell saver. she is +5 L since admit. UOP decrease ,She received diuril 500 mg twice, lasix 20 mg *2 on 1/7.  With no adequate response to diuretics     Plan   oliguric ischemic ATN likely secondary to severe intraoperative hypotension requiring vasopressor on 01/05/2022  Failed high-dose diuretics and renal replacement therapy started on 01/09/2022 due to volume overload  Patient clinically not overloaded  Labs were today reviewed stable  S/p 120 mg of lasix with only 210 cc of urine   Pt Is net positive for the last 24 hrs   Will plan for 1 session of iHD today for metabolic clearance and volume management and we recs continue diuresis       * S/P aortic valve replacement  -with severe intraoperative hypotension   -per primary     ATN (acute tubular necrosis)  -see jo      Thank you for your consult. I will follow-up with patient. Please contact us if you have any additional questions.    Rose Mary Walsh MD  Nephrology  [facility]    ATTENDING PHYSICIAN ATTESTATION  I have personally verified the history and examined the patient. I thoroughly reviewed the demographic, clinical, laboratorial and imaging information available in medical records. I agree with the assessment and recommendations provided by the subspecialty resident who was under my supervision.

## 2022-01-27 NOTE — PLAN OF CARE
01/27/22 1601   Post-Acute Status   Post-Acute Authorization Placement  (LTACH)   Post-Acute Placement Status Patient List Provided       SW met with Pt and Pt's daughter in law at bedside. Discussed recs for  LTACH placement and provided list. The SW along with the patient's daughter -in - law and patient's bedside nurse discussed the patient's transition into LTACH placement in regards to milestones she has to made before LTACH placement and admission into LTACH facility.  Addressed questions and reported need to send referrals to obtain accepting options. The patient's daughter in law agreeable and will review the list for preferences. The SW will follow-up with the patient's family to discuss their options regarding LTACH placement.        Shen Simmons LMSW  Case Management [facility]  Ext: [phone]

## 2022-01-27 NOTE — ASSESSMENT & PLAN NOTE
Orion Ty is a 76 y.o. female who presents to the SICU s/p redo aortic valve replacement with mechanical valve on 1/5/22.      Neuro/Psych:   -- Sedation: none  -- Pain: Liquid Tiffani, breakthrough fentanyl pushes q2     Cards:   -- Pressors: Off  -- Goal MAP: 60-80  -- statin  -- heparin drip off due to therapeutic INR  -- Will likely give very low dose coumadin today as patient is extremely sensitive. 0.5 mg every other day may be sufficient for now.  -- Paced HR 80  -- INR pending this morning after correction with Vit K 1/26      Pulm:   -- Goal O2 sat > 90%  -- reintubated 1/12 then 1/18. S/p trach 1/21  -- Spontaneous when able.   -- Tolerated trach collar for one hour on 1/27  -- ABG PRN  -- 2 mediastinal removed 1/9 L Pleural CT removed 1/8      Renal:  -- Mireles replaced 1/25  -- Anticipate iHD trial today.  -- Oliguric   -- Nephrology following  -- Permcath 1/21  -- Removed R IJ trialysis 1/23      FEN / GI:   -- Replace lytes as needed  -- s/p PEG 1/21  -- Nutrition: tube feeds novasource renal, at goal      ID:   -- WBC stable at 10.5  -- Antibiotics: periop ancef complete. Zosyn and vanc, stopped 1/20      Heme/Onc:   -- Hgb 7.8  -- 3U RBC, 2U FFP, 2U platelets in OR  -- Daily CBC  -- Transfused products: 1U RBC on 1/5/22. 1u RBC and 1u FFP on 1/9/21. 2U RBC on 1/12. 4 FFP on 1/13. 1 FFP on 1/17  -- Anticoagulation:   -- heparin drip off due to therapeutic INR   -- INR pending. Last 2.4.    -- Anticipate low dose coumadin today      Endo:   -- Gluc goal 140-180  -- Insulin per endocrinology      PPx:   Feeding: tube feeds  Analgesia/Sedation: Tiffani, fent pushes / none  Thromboembolic prevention: Coumadin, ASA  HOB >30: yes  Stress Ulcer ppx: protonix BID  Glucose control: Critical care goal 140-180 g/dl, ISS    Lines/Drains/Airway: Art line, Trach, Permcath, PEG      Dispo/Code Status/Palliative:   -- SICU / Full Code

## 2022-01-28 NOTE — PLAN OF CARE
Problem: Adult Inpatient Plan of Care  Goal: Plan of Care Review  1/28/2022 0326 by Amy Orta RN  Outcome: Ongoing, Progressing  1/28/2022 0325 by Amy Orta RN  Outcome: Ongoing, Progressing  Goal: Patient-Specific Goal (Individualized)  1/28/2022 0326 by Amy Orta RN  Outcome: Ongoing, Progressing  1/28/2022 0325 by Amy Orta RN  Outcome: Ongoing, Progressing  Goal: Absence of Hospital-Acquired Illness or Injury  1/28/2022 0326 by Amy Orta RN  Outcome: Ongoing, Progressing  1/28/2022 0325 by Amy Orta RN  Outcome: Ongoing, Progressing  Goal: Optimal Comfort and Wellbeing  1/28/2022 0326 by Amy Orta RN  Outcome: Ongoing, Progressing  1/28/2022 0325 by Amy Orta RN  Outcome: Ongoing, Progressing  Goal: Readiness for Transition of Care  1/28/2022 0326 by Amy Orta RN  Outcome: Ongoing, Progressing  1/28/2022 0325 by Amy Orta RN  Outcome: Ongoing, Progressing     Problem: Diabetes Comorbidity  Goal: Blood Glucose Level Within Targeted Range  Outcome: Ongoing, Progressing     Problem: Activity Intolerance (Cardiovascular Surgery)  Goal: Improved Activity Tolerance  Outcome: Ongoing, Progressing     Problem: Fluid and Electrolyte Imbalance (Cardiovascular Surgery)  Goal: Fluid and Electrolyte Balance (Cardiovascular Surgery)  Outcome: Ongoing, Progressing     Problem: Glycemic Control Impaired (Cardiovascular Surgery)  Goal: Blood Glucose Level Within Targeted Range (Cardiovascular Surgery)  1/28/2022 0326 by Amy Orta RN  Outcome: Ongoing, Progressing  1/28/2022 0325 by Amy Orta RN  Outcome: Ongoing, Progressing     Problem: Infection (Cardiovascular Surgery)  Goal: Absence of Infection Signs and Symptoms  Outcome: Ongoing, Progressing     Problem: Skin Injury Risk Increased  Goal: Skin Health and Integrity  Outcome: Ongoing, Progressing     Problem: Oral Intake Inadequate (Acute Kidney Injury/Impairment)  Goal: Optimal Nutrition  Intake  Outcome: Ongoing, Progressing     Problem: Device-Related Complication Risk (Hemodialysis)  Goal: Safe, Effective Therapy Delivery  Outcome: Ongoing, Progressing     Problem: Impaired Wound Healing  Goal: Optimal Wound Healing  Outcome: Ongoing, Progressing

## 2022-01-28 NOTE — PLAN OF CARE
Problem: Adult Inpatient Plan of Care  Goal: Plan of Care Review  Outcome: Ongoing, Progressing  Goal: Patient-Specific Goal (Individualized)  Outcome: Ongoing, Progressing  Goal: Absence of Hospital-Acquired Illness or Injury  Outcome: Ongoing, Progressing  Goal: Optimal Comfort and Wellbeing  Outcome: Ongoing, Progressing  Goal: Readiness for Transition of Care  Outcome: Ongoing, Progressing     Problem: Diabetes Comorbidity  Goal: Blood Glucose Level Within Targeted Range  Outcome: Ongoing, Progressing     Problem: Activity Intolerance (Cardiovascular Surgery)  Goal: Improved Activity Tolerance  Outcome: Ongoing, Progressing     Problem: Fluid and Electrolyte Imbalance (Cardiovascular Surgery)  Goal: Fluid and Electrolyte Balance (Cardiovascular Surgery)  Outcome: Ongoing, Progressing     Problem: Glycemic Control Impaired (Cardiovascular Surgery)  Goal: Blood Glucose Level Within Targeted Range (Cardiovascular Surgery)  Outcome: Ongoing, Progressing     Problem: Infection (Cardiovascular Surgery)  Goal: Absence of Infection Signs and Symptoms  Outcome: Ongoing, Progressing

## 2022-01-28 NOTE — NURSING
Notified MD Dominic of tube feeding residuals of 300 mL; instructed to hold tube feedings for one hour and then resume.

## 2022-01-28 NOTE — PROGRESS NOTES
Bedside hd 1:1 tx started. Lt cw hd cath aspirates and flushed easily via arterial and venous port. Lines connected and secured. Following orders:   Duration of Treatment 3 hours   Dialyzer F160   Dialysate Temperature (C) 37   BFR-As tolerated to a maximum of: 400 mL/min    mL/min   K+ Potassium per Protocol   Ca++ Calcium per Protocol   Na+ Sodium per Protocol   Bicarb Bicarbonate per Protocol   Access AVF   Fluid Removal (L): 1-2 l as tolerated keep map above 65   Tubing 8 mm     Results for CANDIS VANN (MRN [id number]) as of 1/27/2022 20:19   Ref. Range 1/27/2022 15:20   Sodium Latest Ref Range: 136 - 145 mmol/L 130 (L)   Potassium Latest Ref Range: 3.5 - 5.1 mmol/L 4.3   Chloride Latest Ref Range: 95 - 110 mmol/L 97   CO2 Latest Ref Range: 23 - 29 mmol/L 15 (L)   Anion Gap Latest Ref Range: 8 - 16 mmol/L 18 (H)   BUN Latest Ref Range: 8 - 23 mg/dL 82 (H)   Creatinine Latest Ref Range: 0.5 - 1.4 mg/dL 3.5 (H)   eGFR if non African American Latest Ref Range: >60 mL/min/1.73 m^2 12.0 (A)   eGFR if African American Latest Ref Range: >60 mL/min/1.73 m^2 13.8 (A)   Glucose Latest Ref Range: 70 - 110 mg/dL 213 (H)   Calcium Latest Ref Range: 8.7 - 10.5 mg/dL 9.7   Phosphorus Latest Ref Range: 2.7 - 4.5 mg/dL 7.0 (H)   Albumin Latest Ref Range: 3.5 - 5.2 g/dL 2.8 (L)

## 2022-01-28 NOTE — PROGRESS NOTES
[facility]  Critical Care - Surgery  Progress Note    Patient Name: Orion Ty  MRN: [id number]  Admission Date: 1/5/2022  Hospital Length of Stay: 23 days  Code Status: Full Code  Attending Provider: Dennis Haider MD  Primary Care Provider: Otis Zimmer MD   Principal Problem: History of mechanical aortic valve replacement    Subjective:     Hospital/ICU Course:  No notes on file    Interval History/Significant Events:   -NAEO.  -Was not tolerating being on a rate overnight due to anxiety. Switched back to PAV and no further issues.  -iHD overnight. Tolerated the first 45 minutes but then required Vaso. She was run even near the end of the session. 875 cc removed.  -PTT therapeutic  -INR 1.6 this morning    Follow-up For: Procedure(s) (LRB):  CREATION, TRACHEOSTOMY (N/A)  EGD, WITH PEG TUBE INSERTION (N/A)  INSERTION-CATHETER-ROSELINE (N/A)    Post-Operative Day: 7 Days Post-Op    Objective:     Vital Signs (Most Recent):  Temp: 98.7 °F (37.1 °C) (01/28/22 0300)  Pulse: 80 (01/28/22 0645)  Resp: (!) 28 (01/28/22 0645)  BP: (!) 105/54 (01/28/22 0600)  SpO2: 98 % (01/28/22 0645) Vital Signs (24h Range):  Temp:  [97.7 °F (36.5 °C)-98.7 °F (37.1 °C)] 98.7 °F (37.1 °C)  Pulse:  [80-93] 80  Resp:  [19-41] 28  SpO2:  [89 %-100 %] 98 %  BP: ()/(50-73) 105/54  Arterial Line BP: ()/(21-63) 97/42     Weight: 44.8 kg (98 lb 12.3 oz)  Body mass index is 18.66 kg/m².      Intake/Output Summary (Last 24 hours) at 1/28/2022 0732  Last data filed at 1/28/2022 0600  Gross per 24 hour   Intake 2445.46 ml   Output 1660 ml   Net 785.46 ml       Physical Exam  Constitutional:       General: She is not in acute distress.  HENT:      Head: Normocephalic and atraumatic.   Neck:      Comments: Trach  Cardiovascular:      Rate and Rhythm: Normal rate and regular rhythm.      Pulses: Normal pulses.      Comments: Midline sternotomy incision c/d/I  Pacemaker in place. Paced at 80  Pulmonary:       Effort: Pulmonary effort is normal. No respiratory distress.   Abdominal:      General: Abdomen is flat. There is no distension.      Palpations: Abdomen is soft.      Tenderness: There is no abdominal tenderness.      Comments: Peg site c/d/i  Soft abdomen   Genitourinary:     Comments: Mireles in place. Urine clear  Musculoskeletal:      Right lower leg: No edema.      Left lower leg: No edema.      Comments: R radial art line in place   Skin:     General: Skin is warm.      Capillary Refill: Capillary refill takes less than 2 seconds.   Neurological:      General: No focal deficit present.      Mental Status: She is alert.         Vents:  Vent Mode: Spont (01/28/22 0310)  Ventilator Initiated: Yes (01/18/22 1636)  Set Rate: 28 BPM (01/27/22 2100)  Vt Set: 330 mL (01/27/22 2100)  Pressure Support: 10 cmH20 (01/28/22 0310)  PEEP/CPAP: 5 cmH20 (01/28/22 0310)  Oxygen Concentration (%): 40 (01/28/22 0305)  Peak Airway Pressure: 12 cmH2O (01/28/22 0310)  Plateau Pressure: 30 cmH20 (01/28/22 0310)  Total Ve: 10.8 mL (01/28/22 0310)  Negative Inspiratory Force (cm H2O): 0 (01/27/22 2100)  F/VT Ratio<105 (RSBI): (!) 75.95 (01/28/22 0005)    Lines/Drains/Airways     Central Venous Catheter Line            Permacath 01/21/22 1013 left subclavian 6 days          Drain                 Gastrostomy/Enterostomy 01/21/22 1112 Percutaneous endoscopic gastrostomy (PEG) 6 days         Urethral Catheter 01/25/22 1341 Non-latex 2 days          Airway                 Surgical Airway 01/21/22 1043 Shiley Cuffed 6 days          Arterial Line            Arterial Line 01/05/22 0855 Right Radial 22 days          Peripheral Intravenous Line                 Midline Catheter Insertion/Assessment  - Single Lumen 01/10/22 1300 Left cephalic vein (lateral side of arm) 18g x 8cm 17 days                Significant Labs:    CBC/Anemia Profile:  Recent Labs   Lab 01/27/22  0254 01/27/22  0414 01/28/22  0428 01/28/22  0428   WBC 10.48  --  10.71  --     HGB 7.8*  --  10.1*  --    HCT 25.6* 25* 31.3* 32*   *  --  75*  --    MCV 99*  --  93  --    RDW 19.8*  --  17.2*  --         Chemistries:  Recent Labs   Lab 01/27/22  0254 01/27/22  0631 01/27/22  0631 01/27/22  1520 01/27/22  2357 01/28/22  0428   NA  --  132*   < > 130* 133* 130*   K  --  4.3   < > 4.3 3.5 3.6   CL  --  99   < > 97 91* 88*   CO2  --  18*   < > 15* 25 24   BUN  --  78*   < > 82* 25* 37*   CREATININE  --  3.2*   < > 3.5* 1.5* 2.1*   CALCIUM  --  8.8   < > 9.7 8.0* 8.2*   ALBUMIN  --  2.6*   < > 2.8* 2.9* 2.6*   PROT  --  6.0  --   --   --  6.3   BILITOT  --  0.5  --   --   --  0.6   ALKPHOS  --  128  --   --   --  150*   ALT  --  22  --   --   --  25   AST  --  52*  --   --   --  65*   MG 2.0  --   --   --   --  1.7   PHOS 6.2*  --    < > 7.0* 2.2* 2.7    < > = values in this interval not displayed.       All pertinent labs within the past 24 hours have been reviewed.    Significant Imaging:  I have reviewed all pertinent imaging results/findings within the past 24 hours.    Assessment/Plan:     * History of mechanical aortic valve replacement  Orion Ty is a 76 y.o. female who presents to the SICU s/p redo aortic valve replacement with mechanical valve on 1/5/22.      Neuro/Psych:   -- Sedation: none  -- Pain: Liquid Tiffani, breakthrough fentanyl pushes q2     Cards:   -- Pressors: 0.04 Vaso  -- Goal MAP: 60-80  -- statin  -- heparin drip for PTT goal 60-80  -- 0.5 Coumadin yesterday. INR 1.6. Will discuss today's dose with Pharmacy  -- Paced HR 80      Pulm:   -- Goal O2 sat > 90%  -- reintubated 1/12 then 1/18. S/p trach 1/21  -- Spontaneous when able.   -- Tolerated trach collar for one hour on 1/27  -- ABG PRN  -- 2 mediastinal removed 1/9 L Pleural CT removed 1/8      Renal:  -- Mireles replaced 1/25  -- S/p iHD trial 1/27.  -- Oliguric   -- Nephrology following  -- Permcath 1/21  -- Removed R IJ trialysis 1/23      FEN / GI:   -- Replace lytes as needed  -- s/p PEG 1/21  --  Nutrition: tube feeds novasource renal, at goal      ID:   -- WBC stable at 10.7  -- Antibiotics: periop ancef complete. Zosyn and vanc, stopped 1/20      Heme/Onc:   -- Hgb 10.1  -- 3U RBC, 2U FFP, 2U platelets in OR  -- Daily CBC  -- Transfused products: 1U RBC on 1/5/22. 1u RBC and 1u FFP on 1/9/21. 2U RBC on 1/12. 4 FFP on 1/13. 1 FFP on 1/17, 2u pRBC 1/27  -- Anticoagulation:   -- heparin drip therapeutic   -- INR 1.6      Endo:   -- Gluc goal 140-180  -- Insulin per endocrinology      PPx:   Feeding: tube feeds  Analgesia/Sedation: Tiffani, fent pushes / none  Thromboembolic prevention: Coumadin, ASA  HOB >30: yes  Stress Ulcer ppx: protonix BID  Glucose control: Critical care goal 140-180 g/dl, ISS    Lines/Drains/Airway: Art line, Trach, Permcath, PEG      Dispo/Code Status/Palliative:   -- SICU / Full Code            Critical care was time spent personally by me on the following activities: development of treatment plan with patient or surrogate and bedside caregivers, discussions with consultants, evaluation of patient's response to treatment, examination of patient, ordering and performing treatments and interventions, ordering and review of laboratory studies, ordering and review of radiographic studies, pulse oximetry, re-evaluation of patient's condition.  This critical care time did not overlap with that of any other provider or involve time for any procedures.     Graham Alfaro MD  Critical Care - Surgery  [facility] - [facility]

## 2022-01-28 NOTE — NURSING
Patient's spouse called RN into room because patient indicated she could not breathe. Patient spo2 96%; Respiratory rate 21; tidal volumes ~350. Patient does not appeaer to be in distress but nods when asked if she is having difficulty breathing. Tracheal suction provided via in-line suction catheter; bright red bloody secretions present. Patient denies feeling much improvement after suctioning. MD Dominic notified of blood. Discussed possibility of mouth as source of bleeding. Heparin continues at 400 units/hr. Previous aPTT 76.3.  INR and aPTT pending.     Follow up: INR 1.9 and aPTT 76.3. Will continue heparin gtt at current rate.    Patient became more tachypnic; RR 30's. Triny RT increased pressure support back up to 70% from 55%.

## 2022-01-28 NOTE — PROGRESS NOTES
Pt tolerated entire 3 hour tx well. 820 ml removed net to keep map above 65. Lines flushed and locked with saline.

## 2022-01-28 NOTE — PT/OT/SLP PROGRESS
Occupational Therapy      Patient Name:  Orion Ty   MRN:  [id number]    Therapy session deferred this date as nsg reports poor night's sleep, increased pressor support needed after HD and increased vent settings. Pt with medical fragility and instability and therapy to continue with services at later date.   Education provided to nsg re: OOB to medichair with nsg would be appropriate over weekend pending medical stability and level of alertness. OT to check status at later date to proceed with therapy services.     1/28/2022

## 2022-01-28 NOTE — NURSING
Notified Dr Shafer with CTS that the pts MAPs were running between 55-62 and sustaining.  The fluid removal on the HD tx was paused for the last 30-45 min r/t the MAP dropping.  Dr Shafer gave VO to start Vaso @ .04.  No other orders given at this time.

## 2022-01-28 NOTE — NURSING
Notified Dr Shafer with CTS that the pt was anxious with RR around 40 and O2 sat ranging from 88-91.  After being put on a rate on the vent.  Decision to place the pt back on PAV+ was made.  Oxycodone liq given to relieve discomfort and decrease anxiety.  O2 sat now 94% and RR 32.  Pt calmer and currently resting.

## 2022-01-28 NOTE — PLAN OF CARE
VSS, afebrile. 2 PRBC given. PT/OT. Trach collar x1 hour. OOB to cardiac chair x1.5h. Restart heparin today, low dose coumadin this evening. Tolerating tube feeds. Turned q2h and PRN. Wound care per order.     POC reviewed with patient and family at bedside.

## 2022-01-28 NOTE — NURSING
MD Dominic notified of K 3.3. Cr 2.5 and only 25 mL urine output so far this shift. Instructed to not replace K at this time. Renal Function Panel to be collected again around 1600.

## 2022-01-28 NOTE — ASSESSMENT & PLAN NOTE
Orion Ty is a 76 y.o. female who presents to the SICU s/p redo aortic valve replacement with mechanical valve on 1/5/22.      Neuro/Psych:   -- Sedation: none  -- Pain: Liquid Tiffani, breakthrough fentanyl pushes q2     Cards:   -- Pressors: 0.04 Vaso  -- Goal MAP: 60-80  -- statin  -- heparin drip for PTT goal 60-80  -- 0.5 Coumadin yesterday. INR 1.6. Will discuss today's dose with Pharmacy  -- Paced HR 80      Pulm:   -- Goal O2 sat > 90%  -- reintubated 1/12 then 1/18. S/p trach 1/21  -- Spontaneous when able.   -- Tolerated trach collar for one hour on 1/27  -- ABG PRN  -- 2 mediastinal removed 1/9 L Pleural CT removed 1/8      Renal:  -- Mireles replaced 1/25  -- S/p iHD trial 1/27.  -- Oliguric   -- Nephrology following  -- Permcath 1/21  -- Removed R IJ trialysis 1/23      FEN / GI:   -- Replace lytes as needed  -- s/p PEG 1/21  -- Nutrition: tube feeds novasource renal, at goal      ID:   -- WBC stable at 10.7  -- Antibiotics: periop ancef complete. Zosyn and vanc, stopped 1/20      Heme/Onc:   -- Hgb 10.1  -- 3U RBC, 2U FFP, 2U platelets in OR  -- Daily CBC  -- Transfused products: 1U RBC on 1/5/22. 1u RBC and 1u FFP on 1/9/21. 2U RBC on 1/12. 4 FFP on 1/13. 1 FFP on 1/17, 2u pRBC 1/27  -- Anticoagulation:   -- heparin drip therapeutic   -- INR 1.6      Endo:   -- Gluc goal 140-180  -- Insulin per endocrinology      PPx:   Feeding: tube feeds  Analgesia/Sedation: Tiffani, fent pushes / none  Thromboembolic prevention: Coumadin, ASA  HOB >30: yes  Stress Ulcer ppx: protonix BID  Glucose control: Critical care goal 140-180 g/dl, ISS    Lines/Drains/Airway: Art line, Trach, Permcath, PEG      Dispo/Code Status/Palliative:   -- SICU / Full Code

## 2022-01-28 NOTE — NURSING
"      SICU PLAN OF CARE NOTE    Dx: History of mechanical aortic valve replacement    Shift Events: Unable to tolerate rate on vent throughout the shift.  Placed pt back on PAV + with 70% supp, 40% fio2/5 Peep.  Vaso started @ .04 after HD r/t MAPs sustaining below 60.  AM dose of Lasix held per MD BAÑUELOS order. Heparin remains @ 400u/hr.  Spouse remains at bedside and assist with translation.     Goals of Care: MAP 60-80    Neuro: AAO x4, Arouses to Voice, Follows Commands and Moves All Extremities    Vital Signs: BP (!) 105/54   Pulse 80   Temp 98.7 °F (37.1 °C) (Oral)   Resp (!) 22   Ht 5' 1" (1.549 m)   Wt 44.8 kg (98 lb 12.3 oz)   SpO2 98%   Breastfeeding No   BMI 18.66 kg/m²     Respiratory: Ventilator    Diet: NPO and Tube Feeds    Gtts: Vasopressin and Heparin    Urine Output: Urinary Catheter 135cc/shift    Drains: G tube patent with TF continuous     Labs/Accuchecks: Q 8 INR, Q 6 APtt, Accucheck Q 6     Skin: Wound to sacrum clean and dry.  Turned Q 2 as tolerated.  Heels floated.  On specialty bed.        "

## 2022-01-28 NOTE — CARE UPDATE
BG goal 140 -180   Diet NPO Except for: Sips with Medication  7 Days Post-Op    BG stable with minimal use of prn SQ insulin therapy. Patient remains on Vent, on TF,  and NPO. Endo will continue to follow, and manage glycemic control while inpatient.     Plan:  - Continiue Moderate Dose SQ Insulin Correction Scale PRN hyperglycemia.   -  BG Monitoring every 6 hours while NPO.     ** Please call Endocrine for any BG related issues **  ** Please notify Endocrine for any change and/or advance in diet**    Lab Results   Component Value Date    HGBA1C 6.0 (H) 01/03/2022       Discharge Planning:   TBD. Please notify endocrinology prior to discharge.   Pending Plan:   - Insurance preferred diabetes testing supplies   - Will most likely have patient continue metformin 500 mg daily.

## 2022-01-28 NOTE — PROGRESS NOTES
[facility]  Nephrology  Progress Note     Patient Name: Orion Ty  MRN: [id number]  Admission Date: 1/5/2022  Hospital Length of Stay: 23 days  Attending Provider: Dennis Haider MD   Primary Care Physician: Otis Zimmer MD  Principal Problem: History of mechanical aortic valve replacement    Subjective:     HPI: Orion Ty is our 76 y.o. female with previous aortic valve replacement, mitral valve replacement, and tricuspid valve repair in 2010 who presented on 1/5 for redo aortic valve replacement. Nephrology consulted on 1/8 for anuric JO. Patient is alert but tired, son at bedside. Stated she was tired prior to admission. During the hospital stay Cr increase from 1 to 1.5. In OR she received  2.5L crystalloid, 3U RBC, 2U FFP, 2 platelets, and 800 cell saver. she is +5 L since admit. UOP decrease overnight, per nurse she didn't urinate at all. She received diuril 500 mg twice, lasix 20 mg *2 on 1/7. This morning she urinate 225 immediately after placing the tejeda's cath. She received again lasix 100 mg this morning. Upor evaluation she was alert but repeatedly saying she was tired, denied any pain. Denied any previous hx of kidney disease.       Interval History: Seen at the bedside no event overnight       Review of patient's allergies indicates:  No Known Allergies   Current Facility-Administered Medications   Medication Frequency    0.9%  NaCl infusion (CRRT USE ONLY) Continuous    0.9%  NaCl infusion (for blood administration) Q24H PRN    0.9%  NaCl infusion (for blood administration) Q24H PRN    0.9%  NaCl infusion PRN    0.9%  NaCl infusion Once    0.9%  NaCl infusion PRN    0.9%  NaCl infusion PRN    0.9%  NaCl infusion Once    acetaminophen oral solution 650 mg Q8H    albuterol-ipratropium 2.5 mg-0.5 mg/3 mL nebulizer solution 3 mL Q6H    aspirin suppository 300 mg Once PRN    atorvastatin tablet 40 mg Daily    bisacodyL suppository 10 mg Daily PRN     dextrose 10 % infusion Continuous PRN    dextrose 50% injection 12.5 g PRN    fentaNYL 50 mcg/mL injection 25 mcg Q2H PRN    fluconazole 40 mg/ml suspension 200 mg Daily    furosemide injection 80 mg Q8H    glucagon (human recombinant) injection 1 mg PRN    heparin 25,000 units in dextrose 5% 250 mL (100 units/mL) infusion (heparin infusion - NO NOMOGRAM) Continuous    insulin aspart U-100 pen 1-10 Units PRN    LIDOcaine 5 % patch 1 patch Q24H    LIDOcaine HCL 2% jelly PRN    magnesium sulfate 2g in water 50mL IVPB (premix) Once    melatonin tablet 9 mg Nightly PRN    metoprolol tartrate (LOPRESSOR) split tablet 12.5 mg BID    midodrine tablet 10 mg Q12H    ondansetron injection 4 mg Q12H PRN    oxyCODONE 5 mg/5 mL solution 5 mg Q4H PRN    pantoprazole injection 40 mg Q12H    polyethylene glycol packet 17 g Daily    potassium, sodium phosphates 280-160-250 mg packet 2 packet TID PRN    psyllium husk (aspartame) 3.4 gram packet 1 packet Daily    sodium chloride 0.9% bolus 250 mL PRN    sodium chloride 0.9% bolus 250 mL PRN    sodium chloride 0.9% flush 10 mL PRN    vasopressin (PITRESSIN) 0.2 Units/mL in dextrose 5 % 100 mL infusion Continuous    warfarin (COUMADIN) split tablet 0.5 mg Daily     Facility-Administered Medications Ordered in Other Encounters   Medication Frequency    0.9%  NaCl infusion Continuous       Objective:     Vital Signs (Most Recent):  Temp: 98.6 °F (37 °C) (01/28/22 1500)  Pulse: 80 (01/28/22 1515)  Resp: (!) 26 (01/28/22 1515)  BP: (!) 111/56 (01/28/22 1500)  SpO2: 96 % (01/28/22 1515)  O2 Device (Oxygen Therapy): ventilator (01/28/22 1500) Vital Signs (24h Range):  Temp:  [98.1 °F (36.7 °C)-98.7 °F (37.1 °C)] 98.6 °F (37 °C)  Pulse:  [79-93] 80  Resp:  [19-40] 26  SpO2:  [89 %-100 %] 96 %  BP: ()/(50-64) 111/56  Arterial Line BP: ()/(21-62) 129/56   Weight: 44.8 kg (98 lb 12.3 oz) (01/20/22 1300)  Body mass index is 18.66 kg/m².  Body surface area is  1.39 meters squared.      Intake/Output Summary (Last 24 hours) at 1/28/2022 1524  Last data filed at 1/28/2022 1500  Gross per 24 hour   Intake 1835.55 ml   Output 1595 ml   Net 240.55 ml     I/O last 3 completed shifts:  In: 3197.2 [I.V.:222.6; Blood:1050; Other:250; NG/GT:1625; IV Piggyback:49.6]  Out: 1910 [Urine:590; Other:1320]  Net IO Since Admission: 5,339.88 mL [01/28/22 1524]     Physical Exam  Constitutional:       Appearance: She is well-developed. She is ill-appearing.   Cardiovascular:      Rate and Rhythm: Normal rate and regular rhythm.      Heart sounds: Normal heart sounds.   Pulmonary:      Comments: tracheostomy   Abdominal:      General: Abdomen is flat.      Palpations: Abdomen is soft.   Musculoskeletal:         General: Normal range of motion.   Skin:     General: Skin is warm and dry.      Comments: Sternal Incision CDI   Neurological:      Mental Status: She is alert.   Psychiatric:         Mood and Affect: Mood normal.         Behavior: Behavior normal.         Recent Labs   Lab 01/26/22  0343 01/26/22  0343 01/27/22  0254 01/27/22  0414 01/28/22  0428 01/28/22 0428   WBC 10.41  --  10.48  --  10.71  --    HGB 7.2*  --  7.8*  --  10.1*  --    HCT 23.3*   < > 25.6* 25* 31.3* 32*   PLT 90*  --  103*  --  75*  --    MONO 3.7*  0.4   < > 4.0  0.4  --  3.9*  0.4  --     < > = values in this interval not displayed.      Recent Labs   Lab 01/26/22  0343 01/26/22  0343 01/27/22  0631 01/27/22  1520 01/27/22  2357 01/28/22  0428 01/28/22  1106   *   < > 132*   < > 133* 130* 132*   K 4.5   < > 4.3   < > 3.5 3.6 3.3*   CL 99   < > 99   < > 91* 88* 90*   CO2 19*   < > 18*   < > 25 24 26   BUN 55*   < > 78*   < > 25* 37* 45*   CREATININE 2.5*   < > 3.2*   < > 1.5* 2.1* 2.5*   CALCIUM 8.8   < > 8.8   < > 8.0* 8.2* 8.5*   PROT 6.1  --  6.0  --   --  6.3  --    BILITOT 0.6  --  0.5  --   --  0.6  --    ALKPHOS 116  --  128  --   --  150*  --    ALT 17  --  22  --   --  25  --    AST 45*  --  52*   --   --  65*  --     < > = values in this interval not displayed.      Recent Labs     01/26/22  0343 01/27/22  0414 01/28/22  0428   PH 7.410 7.340* 7.491*   PCO2 37.4 34.8* 40.8   PO2 94 79* 84   HCO3 23.7* 18.8* 31.2*   POCSATURATED 97 95 97   BE -1 -7 8       Assessment/Plan:       History of mechanical aortic valve replacement    Chronic atrial fibrillation    Type 2 diabetes mellitus with microalbuminuria, without long-term current use of insulin    Volume overload    JO (acute kidney injury)    ATN (acute tubular necrosis)    Acute hypoxemic respiratory failure    Dermatitis associated with moisture     JO  acute kidney injury  Orion Ty is our 76 y.o. female with previous aortic valve replacement, mitral valve replacement, and tricuspid valve repair in 2010 who presented on 1/5 for redo aortic valve replacement. Nephrology consulted on 1/8 for anuric JO. During the hospital stay Cr increase from 1 to 1.5. In OR she received  2.5L crystalloid, 3U RBC, 2U FFP, 2 platelets, and 800 cell saver. she is +5 L since admit. UOP decrease ,She received diuril 500 mg twice, lasix 20 mg *2 on 1/7.  With no adequate response to diuretics     Plan   oliguric ischemic ATN likely secondary to severe intraoperative hypotension requiring vasopressor on 01/05/2022  Failed high-dose diuretics and renal replacement therapy started on 01/09/2022 due to volume overload  Patient clinically not overloaded  Labs were today reviewed stable  Held lasix as bp was low overnight all happened after HD 1/27/22   we will hold on HD for now and reassess in AM       * S/P aortic valve replacement  -with severe intraoperative hypotension   -per primary     ATN (acute tubular necrosis)  -see jo      Thank you for your consult. I will follow-up with patient. Please contact us if you have any additional questions.    Rose Mary Walsh MD  Nephrology  [facility]      ATTENDING PHYSICIAN ATTESTATION  I have personally  verified the history and examined the patient. I thoroughly reviewed the demographic, clinical, laboratorial and imaging information available in medical records. I agree with the assessment and recommendations provided by the subspecialty resident who was under my supervision.

## 2022-01-28 NOTE — PT/OT/SLP PROGRESS
Physical Therapy      Patient Name:  Orion Ty   MRN:  [id number]    Patient not seen today (1/28/22) secondary to pt medically labile last night including increased pressor support required after HD, increased vent support settings required and poor sleep. PT will follow up at later date.

## 2022-01-28 NOTE — SUBJECTIVE & OBJECTIVE
Interval History/Significant Events:   -NAEO.  -Was not tolerating being on a rate overnight due to anxiety. Switched back to PAV and no further issues.  -iHD overnight. Tolerated the first 45 minutes but then required Vaso. She was run even near the end of the session. 875 cc removed.  -PTT therapeutic  -INR 1.6 this morning    Follow-up For: Procedure(s) (LRB):  CREATION, TRACHEOSTOMY (N/A)  EGD, WITH PEG TUBE INSERTION (N/A)  INSERTION-CATHETER-ROSELINE (N/A)    Post-Operative Day: 7 Days Post-Op    Objective:     Vital Signs (Most Recent):  Temp: 98.7 °F (37.1 °C) (01/28/22 0300)  Pulse: 80 (01/28/22 0645)  Resp: (!) 28 (01/28/22 0645)  BP: (!) 105/54 (01/28/22 0600)  SpO2: 98 % (01/28/22 0645) Vital Signs (24h Range):  Temp:  [97.7 °F (36.5 °C)-98.7 °F (37.1 °C)] 98.7 °F (37.1 °C)  Pulse:  [80-93] 80  Resp:  [19-41] 28  SpO2:  [89 %-100 %] 98 %  BP: ()/(50-73) 105/54  Arterial Line BP: ()/(21-63) 97/42     Weight: 44.8 kg (98 lb 12.3 oz)  Body mass index is 18.66 kg/m².      Intake/Output Summary (Last 24 hours) at 1/28/2022 0732  Last data filed at 1/28/2022 0600  Gross per 24 hour   Intake 2445.46 ml   Output 1660 ml   Net 785.46 ml       Physical Exam  Constitutional:       General: She is not in acute distress.  HENT:      Head: Normocephalic and atraumatic.   Neck:      Comments: Trach  Cardiovascular:      Rate and Rhythm: Normal rate and regular rhythm.      Pulses: Normal pulses.      Comments: Midline sternotomy incision c/d/I  Pacemaker in place. Paced at 80  Pulmonary:      Effort: Pulmonary effort is normal. No respiratory distress.   Abdominal:      General: Abdomen is flat. There is no distension.      Palpations: Abdomen is soft.      Tenderness: There is no abdominal tenderness.      Comments: Peg site c/d/i  Soft abdomen   Genitourinary:     Comments: Mireles in place. Urine clear  Musculoskeletal:      Right lower leg: No edema.      Left lower leg: No edema.      Comments: PEÑA rice  art line in place   Skin:     General: Skin is warm.      Capillary Refill: Capillary refill takes less than 2 seconds.   Neurological:      General: No focal deficit present.      Mental Status: She is alert.         Vents:  Vent Mode: Spont (01/28/22 0310)  Ventilator Initiated: Yes (01/18/22 1636)  Set Rate: 28 BPM (01/27/22 2100)  Vt Set: 330 mL (01/27/22 2100)  Pressure Support: 10 cmH20 (01/28/22 0310)  PEEP/CPAP: 5 cmH20 (01/28/22 0310)  Oxygen Concentration (%): 40 (01/28/22 0305)  Peak Airway Pressure: 12 cmH2O (01/28/22 0310)  Plateau Pressure: 30 cmH20 (01/28/22 0310)  Total Ve: 10.8 mL (01/28/22 0310)  Negative Inspiratory Force (cm H2O): 0 (01/27/22 2100)  F/VT Ratio<105 (RSBI): (!) 75.95 (01/28/22 0005)    Lines/Drains/Airways     Central Venous Catheter Line            Permacath 01/21/22 1013 left subclavian 6 days          Drain                 Gastrostomy/Enterostomy 01/21/22 1112 Percutaneous endoscopic gastrostomy (PEG) 6 days         Urethral Catheter 01/25/22 1341 Non-latex 2 days          Airway                 Surgical Airway 01/21/22 1043 Shiley Cuffed 6 days          Arterial Line            Arterial Line 01/05/22 0855 Right Radial 22 days          Peripheral Intravenous Line                 Midline Catheter Insertion/Assessment  - Single Lumen 01/10/22 1300 Left cephalic vein (lateral side of arm) 18g x 8cm 17 days                Significant Labs:    CBC/Anemia Profile:  Recent Labs   Lab 01/27/22  0254 01/27/22  0414 01/28/22  0428 01/28/22  0428   WBC 10.48  --  10.71  --    HGB 7.8*  --  10.1*  --    HCT 25.6* 25* 31.3* 32*   *  --  75*  --    MCV 99*  --  93  --    RDW 19.8*  --  17.2*  --         Chemistries:  Recent Labs   Lab 01/27/22  0254 01/27/22  0631 01/27/22  0631 01/27/22  1520 01/27/22  2357 01/28/22  0428   NA  --  132*   < > 130* 133* 130*   K  --  4.3   < > 4.3 3.5 3.6   CL  --  99   < > 97 91* 88*   CO2  --  18*   < > 15* 25 24   BUN  --  78*   < > 82* 25* 37*    CREATININE  --  3.2*   < > 3.5* 1.5* 2.1*   CALCIUM  --  8.8   < > 9.7 8.0* 8.2*   ALBUMIN  --  2.6*   < > 2.8* 2.9* 2.6*   PROT  --  6.0  --   --   --  6.3   BILITOT  --  0.5  --   --   --  0.6   ALKPHOS  --  128  --   --   --  150*   ALT  --  22  --   --   --  25   AST  --  52*  --   --   --  65*   MG 2.0  --   --   --   --  1.7   PHOS 6.2*  --    < > 7.0* 2.2* 2.7    < > = values in this interval not displayed.       All pertinent labs within the past 24 hours have been reviewed.    Significant Imaging:  I have reviewed all pertinent imaging results/findings within the past 24 hours.

## 2022-01-29 NOTE — NURSING
SICU PLAN OF CARE NOTE    Dx: S/P aortic valve replacement    Shift Events: VSS.Pt became hypertensive and agitated, pt suctioned and lavaged multiple times, large red clot suctioned out of ET tube. Pt vitals stabilized. MD Jose notified and at bedside, no new orders received. Pt put back on ACVC.    Goals of Care: MAP>60-80, PTT 60-80, INR 2.5-3    Neuro: Arouses to Voice, Follows Commands and Moves All Extremities    Cardiac: Vpaced @ 80bpm    Respiratory: Ventilator ACVC 40%, PEEP 5    Diet: Tube Feeds @35ml/hr    Gtts: Heparin @200u/hr    Urine Output: Urinary Catheter 15 cc/shift    Labs/Accuchecks: accuchecks q6, renal labs q8    Skin: wound care consulted again for worsening sacral wound, triad  And venelex applied.Pt turned throughout shift, green booties on heel foams in place. Immerse bed utilized, bed plugged in, wheels locked call light In reach. Daughter at bedside.

## 2022-01-29 NOTE — ASSESSMENT & PLAN NOTE
Orion Ty is a 76 y.o. female who presents to the SICU s/p redo aortic valve replacement with mechanical valve on 1/5/22.      Neuro/Psych:   -- Sedation: none  -- Pain: Liquid Tiffani, breakthrough fentanyl pushes q2     Cards:   -- Pressors: off, vaso if needed  -- Goal MAP: 60-80  -- statin  -- dc heparin drip  -- 0.5 Coumadin yesterday. INR 2.7. 0.5 coumadin every other day  -- Paced HR 80      Pulm:   -- Goal O2 sat > 90%  -- reintubated 1/12 then 1/18. S/p trach 1/21  -- Spontaneous when able.   -- Tolerated trach collar for one hour on 1/27  -- ABG PRN  -- 2 mediastinal removed 1/9 L Pleural CT removed 1/8      Renal:  -- Mireles replaced 1/25  -- S/p iHD trial 1/27.  -- Oliguric   -- Nephrology following  -- likely needs dialysis today  -- Permcath 1/21  -- Removed R IJ trialysis 1/23      FEN / GI:   -- Replace lytes as needed  -- s/p PEG 1/21  -- Nutrition: tube feeds novasource renal, at goal      ID:   -- WBC stable at 8.4  -- Antibiotics: periop ancef complete. Zosyn and vanc, stopped 1/20      Heme/Onc:   -- Hgb 10.2  -- 3U RBC, 2U FFP, 2U platelets in OR  -- Daily CBC  -- Transfused products: 1U RBC on 1/5/22. 1u RBC and 1u FFP on 1/9/21. 2U RBC on 1/12. 4 FFP on 1/13. 1 FFP on 1/17, 2u pRBC 1/27  -- Anticoagulation:   -- dc heparin drip   -- INR 2.7      Endo:   -- Gluc goal 140-180  -- Insulin per endocrinology      PPx:   Feeding: tube feeds  Analgesia/Sedation: Tiffani, fent pushes / none  Thromboembolic prevention: Coumadin, ASA  HOB >30: yes  Stress Ulcer ppx: protonix BID  Glucose control: Critical care goal 140-180 g/dl, ISS    Lines/Drains/Airway: Art line, Trach, Permcath, PEG      Dispo/Code Status/Palliative:   -- SICU / Full Code

## 2022-01-29 NOTE — PROGRESS NOTES
[facility]  Nephrology  Progress Note     Patient Name: Orion Ty  MRN: [id number]  Admission Date: 1/5/2022  Hospital Length of Stay: 24 days  Attending Provider: Dennis Haider MD   Primary Care Physician: Otis Zimmer MD  Principal Problem: S/P aortic valve replacement    Subjective:     HPI: Orion Ty is our 76 y.o. female with previous aortic valve replacement, mitral valve replacement, and tricuspid valve repair in 2010 who presented on 1/5 for redo aortic valve replacement. Nephrology consulted on 1/8 for anuric JO. Patient is alert but tired, son at bedside. Stated she was tired prior to admission. During the hospital stay Cr increase from 1 to 1.5. In OR she received  2.5L crystalloid, 3U RBC, 2U FFP, 2 platelets, and 800 cell saver. she is +5 L since admit. UOP decrease overnight, per nurse she didn't urinate at all. She received diuril 500 mg twice, lasix 20 mg *2 on 1/7. This morning she urinate 225 immediately after placing the tejeda's cath. She received again lasix 100 mg this morning. Upor evaluation she was alert but repeatedly saying she was tired, denied any pain. Denied any previous hx of kidney disease.       Interval History: Net positive 1.2L over the past 24h. UOP of 70mLs. iHD held yesterday.       Review of patient's allergies indicates:  No Known Allergies   Current Facility-Administered Medications   Medication Frequency    0.9%  NaCl infusion (CRRT USE ONLY) Continuous    0.9%  NaCl infusion (for blood administration) Q24H PRN    0.9%  NaCl infusion (for blood administration) Q24H PRN    0.9%  NaCl infusion PRN    0.9%  NaCl infusion Once    0.9%  NaCl infusion PRN    0.9%  NaCl infusion PRN    0.9%  NaCl infusion Once    acetaminophen oral solution 650 mg Q8H    albuterol-ipratropium 2.5 mg-0.5 mg/3 mL nebulizer solution 3 mL Q6H    aspirin suppository 300 mg Once PRN    atorvastatin tablet 40 mg Daily    bisacodyL suppository 10 mg  Daily PRN    dextrose 10 % infusion Continuous PRN    dextrose 50% injection 12.5 g PRN    fentaNYL 50 mcg/mL injection 25 mcg Q2H PRN    fluconazole 40 mg/ml suspension 200 mg Daily    furosemide injection 80 mg Q8H    glucagon (human recombinant) injection 1 mg PRN    heparin 25,000 units in dextrose 5% 250 mL (100 units/mL) infusion (heparin infusion - NO NOMOGRAM) Continuous    insulin aspart U-100 pen 1-10 Units PRN    LIDOcaine 5 % patch 1 patch Q24H    LIDOcaine HCL 2% jelly PRN    magnesium sulfate 2g in water 50mL IVPB (premix) Once    melatonin tablet 9 mg Nightly PRN    metoprolol tartrate (LOPRESSOR) split tablet 12.5 mg BID    midodrine tablet 10 mg Q12H    ondansetron injection 4 mg Q12H PRN    oxyCODONE 5 mg/5 mL solution 5 mg Q4H PRN    pantoprazole injection 40 mg Q12H    polyethylene glycol packet 17 g Daily    potassium, sodium phosphates 280-160-250 mg packet 2 packet TID PRN    psyllium husk (aspartame) 3.4 gram packet 1 packet Daily    sodium chloride 0.9% bolus 250 mL PRN    sodium chloride 0.9% bolus 250 mL PRN    sodium chloride 0.9% flush 10 mL PRN    vasopressin (PITRESSIN) 0.2 Units/mL in dextrose 5 % 100 mL infusion Continuous    [START ON 1/31/2022] warfarin (COUMADIN) split tablet 0.5 mg Every Mon, Wed, Fri     Facility-Administered Medications Ordered in Other Encounters   Medication Frequency    0.9%  NaCl infusion Continuous       Objective:     Vital Signs (Most Recent):  Temp: 97.6 °F (36.4 °C) (01/29/22 0700)  Pulse: 80 (01/29/22 1000)  Resp: (!) 32 (01/29/22 1000)  BP: (!) 101/52 (01/29/22 1000)  SpO2: 96 % (01/29/22 1000)  O2 Device (Oxygen Therapy): ventilator (01/29/22 0900) Vital Signs (24h Range):  Temp:  [97.6 °F (36.4 °C)-98.6 °F (37 °C)] 97.6 °F (36.4 °C)  Pulse:  [80-85] 80  Resp:  [15-48] 32  SpO2:  [93 %-100 %] 96 %  BP: ()/(50-72) 101/52  Arterial Line BP: ()/(39-63) 108/47   Weight: 44.8 kg (98 lb 12.3 oz) (01/20/22 1300)  Body  mass index is 18.66 kg/m².  Body surface area is 1.39 meters squared.      Intake/Output Summary (Last 24 hours) at 1/29/2022 1022  Last data filed at 1/29/2022 1000  Gross per 24 hour   Intake 1238.59 ml   Output 75 ml   Net 1163.59 ml     I/O last 3 completed shifts:  In: 2050.6 [I.V.:225.6; Other:250; NG/GT:1575]  Out: 1540 [Urine:220; Other:1320]  Net IO Since Admission: 6,268.52 mL [01/29/22 1022]     Physical Exam  Constitutional:       Appearance: She is well-developed. She is ill-appearing.   Cardiovascular:      Rate and Rhythm: Normal rate and regular rhythm.      Heart sounds: Normal heart sounds.   Pulmonary:      Comments: tracheostomy   Abdominal:      General: Abdomen is flat.      Palpations: Abdomen is soft.   Musculoskeletal:         General: Normal range of motion.      Right lower leg: No edema.      Left lower leg: No edema.   Skin:     General: Skin is warm and dry.      Comments: Sternal Incision CDI   Neurological:      Mental Status: She is alert.   Psychiatric:         Mood and Affect: Mood normal.         Behavior: Behavior normal.         Recent Labs   Lab 01/27/22  0254 01/27/22  0414 01/28/22  0428 01/28/22  0428 01/29/22  0317   WBC 10.48  --  10.71  --  8.44   HGB 7.8*  --  10.1*  --  10.2*   HCT 25.6*   < > 31.3* 32* 32.1*   *  --  75*  --  80*   MONO 4.0  0.4   < > 3.9*  0.4  --  4.4  0.4    < > = values in this interval not displayed.      Recent Labs   Lab 01/27/22  0631 01/27/22  1520 01/28/22  0428 01/28/22  1106 01/28/22  1544 01/28/22  1544 01/29/22  0058 01/29/22  0317 01/29/22  0601   *   < > 130*   < > 131*  --  130* 128*  --    K 4.3   < > 3.6   < > 3.9   < > 3.4* 3.5 3.9   CL 99   < > 88*   < > 89*  --  88* 87*  --    CO2 18*   < > 24   < > 26  --  25 25  --    BUN 78*   < > 37*   < > 48*  --  58* 60*  --    CREATININE 3.2*   < > 2.1*   < > 2.5*  --  3.0* 2.9*  --    CALCIUM 8.8   < > 8.2*   < > 8.8  --  9.4 9.5  --    PROT 6.0  --  6.3  --   --   --   --   6.4  --    BILITOT 0.5  --  0.6  --   --   --   --  0.5  --    ALKPHOS 128  --  150*  --   --   --   --  147*  --    ALT 22  --  25  --   --   --   --  24  --    AST 52*  --  65*  --   --   --   --  56*  --     < > = values in this interval not displayed.      Recent Labs     01/27/22  0414 01/28/22  0428   PH 7.340* 7.491*   PCO2 34.8* 40.8   PO2 79* 84   HCO3 18.8* 31.2*   POCSATURATED 95 97   BE -7 8       Assessment/Plan:       S/P aortic valve replacement    Chronic atrial fibrillation    Type 2 diabetes mellitus with microalbuminuria, without long-term current use of insulin    Volume overload    JO (acute kidney injury)    ATN (acute tubular necrosis)    Acute hypoxemic respiratory failure    Dermatitis associated with moisture     JO  acute kidney injury  Orion Ty is our 76 y.o. female with previous aortic valve replacement, mitral valve replacement, and tricuspid valve repair in 2010 who presented on 1/5 for redo aortic valve replacement. Nephrology consulted on 1/8 for anuric JO. During the hospital stay Cr increase from 1 to 1.5. In OR she received  2.5L crystalloid, 3U RBC, 2U FFP, 2 platelets, and 800 cell saver. she is +5 L since admit. UOP decreased. She received diuril 500 mg twice, lasix 20 mg x2 on 1/7 with no adequate response to diuretics     Plan   Oliguric ischemic ATN likely secondary to severe intraoperative hypotension requiring vasopressor on 01/05/2022  Failed high-dose diuretics and renal replacement therapy started on 01/09/2022 due to volume overload  Patient clinically not overloaded  Labs were today reviewed stable  Does not appears grossly hypervolemic however pt is anuric and primary team requesting additional fluid removal for increased WOB; will schedule for HD treatment today goal UF 2L   Strict I/Os   Renally dose medications       S/P aortic valve replacement  With severe intraoperative hypotension   Management per primary     ATN (acute tubular necrosis)  See  kailey      Thank you for your consult. I will follow-up with patient. Please contact us if you have any additional questions.    Skip Wallis MD  Nephrology  [facility]    ATTENDING PHYSICIAN ATTESTATION  I have personally verified the history and examined the patient. I thoroughly reviewed the demographic, clinical, laboratorial and imaging information available in medical records. I agree with the assessment and recommendations provided by the subspecialty resident who was under my supervision.

## 2022-01-29 NOTE — SUBJECTIVE & OBJECTIVE
Interval History/Significant Events:   -NAEO.  -Appears fluid overloaded. Hyponatremic, urine output dropping off  -PTT therapeutic  -INR 2.7 this morning    Follow-up For: Procedure(s) (LRB):  CREATION, TRACHEOSTOMY (N/A)  EGD, WITH PEG TUBE INSERTION (N/A)  INSERTION-CATHETER-ROSELINE (N/A)    Post-Operative Day: 7 Days Post-Op    Objective:     Vital Signs (Most Recent):  Temp: 97.6 °F (36.4 °C) (01/29/22 0700)  Pulse: 80 (01/29/22 1000)  Resp: (!) 32 (01/29/22 1000)  BP: (!) 101/52 (01/29/22 1000)  SpO2: 96 % (01/29/22 1000) Vital Signs (24h Range):  Temp:  [97.6 °F (36.4 °C)-98.6 °F (37 °C)] 97.6 °F (36.4 °C)  Pulse:  [80-85] 80  Resp:  [15-48] 32  SpO2:  [93 %-100 %] 96 %  BP: ()/(50-72) 101/52  Arterial Line BP: ()/(39-63) 108/47     Weight: 44.8 kg (98 lb 12.3 oz)  Body mass index is 18.66 kg/m².      Intake/Output Summary (Last 24 hours) at 1/29/2022 1028  Last data filed at 1/29/2022 1000  Gross per 24 hour   Intake 1238.59 ml   Output 75 ml   Net 1163.59 ml       Physical Exam  Constitutional:       General: She is not in acute distress.  HENT:      Head: Normocephalic and atraumatic.   Neck:      Comments: Trach  Cardiovascular:      Rate and Rhythm: Normal rate and regular rhythm.      Pulses: Normal pulses.      Comments: Midline sternotomy incision c/d/I  Pacemaker in place. Paced at 80  Pulmonary:      Effort: Pulmonary effort is normal. No respiratory distress.   Abdominal:      General: Abdomen is flat. There is no distension.      Palpations: Abdomen is soft.      Tenderness: There is no abdominal tenderness.      Comments: Peg site c/d/i  Soft abdomen   Genitourinary:     Comments: Mireles in place. Urine clear  Musculoskeletal:      Right lower leg: No edema.      Left lower leg: No edema.      Comments: R radial art line in place   Skin:     General: Skin is warm.      Capillary Refill: Capillary refill takes less than 2 seconds.   Neurological:      General: No focal deficit present.       Mental Status: She is alert.         Vents:  Vent Mode: A/C (01/29/22 0815)  Ventilator Initiated: Yes (01/18/22 1636)  Set Rate: 18 BPM (01/29/22 0815)  Vt Set: 300 mL (01/29/22 0815)  Pressure Support: 10 cmH20 (01/28/22 0310)  PEEP/CPAP: 5 cmH20 (01/29/22 0815)  Oxygen Concentration (%): 40 (01/29/22 1000)  Peak Airway Pressure: 12 cmH2O (01/29/22 0815)  Plateau Pressure: 30 cmH20 (01/29/22 0815)  Total Ve: 7.79 mL (01/29/22 0815)  Negative Inspiratory Force (cm H2O): 0 (01/28/22 1652)  F/VT Ratio<105 (RSBI): (!) 68.86 (01/29/22 0815)    Lines/Drains/Airways     Central Venous Catheter Line            Permacath 01/21/22 1013 left subclavian 8 days          Drain                 Gastrostomy/Enterostomy 01/21/22 1112 Percutaneous endoscopic gastrostomy (PEG) 7 days         Urethral Catheter 01/25/22 1341 Non-latex 3 days          Airway                 Surgical Airway 01/21/22 1043 Shiley Cuffed 7 days          Arterial Line            Arterial Line 01/05/22 0855 Right Radial 24 days          Peripheral Intravenous Line                 Midline Catheter Insertion/Assessment  - Single Lumen 01/10/22 1300 Left cephalic vein (lateral side of arm) 18g x 8cm 18 days                Significant Labs:    CBC/Anemia Profile:  Recent Labs   Lab 01/28/22 0428 01/28/22  0428 01/29/22 0317   WBC 10.71  --  8.44   HGB 10.1*  --  10.2*   HCT 31.3* 32* 32.1*   PLT 75*  --  80*   MCV 93  --  96   RDW 17.2*  --  17.9*        Chemistries:  Recent Labs   Lab 01/28/22  0428 01/28/22  1106 01/28/22  1544 01/28/22  1544 01/29/22  0058 01/29/22  0317 01/29/22  0601   *   < > 131*  --  130* 128*  --    K 3.6   < > 3.9   < > 3.4* 3.5 3.9   CL 88*   < > 89*  --  88* 87*  --    CO2 24   < > 26  --  25 25  --    BUN 37*   < > 48*  --  58* 60*  --    CREATININE 2.1*   < > 2.5*  --  3.0* 2.9*  --    CALCIUM 8.2*   < > 8.8  --  9.4 9.5  --    ALBUMIN 2.6*   < > 2.7*  --  2.4* 2.5*  --    PROT 6.3  --   --   --   --  6.4  --    BILITOT  0.6  --   --   --   --  0.5  --    ALKPHOS 150*  --   --   --   --  147*  --    ALT 25  --   --   --   --  24  --    AST 65*  --   --   --   --  56*  --    MG 1.7  --   --   --   --  1.7  --    PHOS 2.7   < > 3.4  --  3.8 4.0  --     < > = values in this interval not displayed.       All pertinent labs within the past 24 hours have been reviewed.    Significant Imaging:  I have reviewed all pertinent imaging results/findings within the past 24 hours.

## 2022-01-29 NOTE — CARE UPDATE
BG goal 140 -180   Diet NPO Except for: Sips with Medication  8 Days Post-Op    BG stable with minimal use of prn SQ insulin therapy. Patient remains on Vent, on TF,  and NPO. Endo will continue to follow, and manage glycemic control while inpatient.     Plan:  - Continiue Moderate Dose SQ Insulin Correction    Scale PRN hyperglycemia.   -  BG Monitoring every 6 hours while NPO.     ** Please call Endocrine for any BG related issues **  ** Please notify Endocrine for any change and/or advance in diet**    Lab Results   Component Value Date    HGBA1C 6.0 (H) 01/03/2022       Discharge Planning:   TBD. Please notify endocrinology prior to discharge.   Pending Plan:   - Insurance preferred diabetes testing supplies   - Will most likely have patient continue metformin 500 mg daily.

## 2022-01-29 NOTE — PROGRESS NOTES
[facility]  Critical Care - Surgery  Progress Note    Patient Name: Orion Ty  MRN: [id number]  Admission Date: 1/5/2022  Hospital Length of Stay: 24 days  Code Status: Full Code  Attending Provider: Dennis Haider MD  Primary Care Provider: Otis Zimmer MD   Principal Problem: S/P aortic valve replacement    Subjective:     Hospital/ICU Course:  No notes on file    Interval History/Significant Events:   -NAEO.  -Appears fluid overloaded. Hyponatremic, urine output dropping off  -PTT therapeutic  -INR 2.7 this morning    Follow-up For: Procedure(s) (LRB):  CREATION, TRACHEOSTOMY (N/A)  EGD, WITH PEG TUBE INSERTION (N/A)  INSERTION-CATHETER-ROSELINE (N/A)    Post-Operative Day: 7 Days Post-Op    Objective:     Vital Signs (Most Recent):  Temp: 97.6 °F (36.4 °C) (01/29/22 0700)  Pulse: 80 (01/29/22 1000)  Resp: (!) 32 (01/29/22 1000)  BP: (!) 101/52 (01/29/22 1000)  SpO2: 96 % (01/29/22 1000) Vital Signs (24h Range):  Temp:  [97.6 °F (36.4 °C)-98.6 °F (37 °C)] 97.6 °F (36.4 °C)  Pulse:  [80-85] 80  Resp:  [15-48] 32  SpO2:  [93 %-100 %] 96 %  BP: ()/(50-72) 101/52  Arterial Line BP: ()/(39-63) 108/47     Weight: 44.8 kg (98 lb 12.3 oz)  Body mass index is 18.66 kg/m².      Intake/Output Summary (Last 24 hours) at 1/29/2022 1028  Last data filed at 1/29/2022 1000  Gross per 24 hour   Intake 1238.59 ml   Output 75 ml   Net 1163.59 ml       Physical Exam  Constitutional:       General: She is not in acute distress.  HENT:      Head: Normocephalic and atraumatic.   Neck:      Comments: Trach  Cardiovascular:      Rate and Rhythm: Normal rate and regular rhythm.      Pulses: Normal pulses.      Comments: Midline sternotomy incision c/d/I  Pacemaker in place. Paced at 80  Pulmonary:      Effort: Pulmonary effort is normal. No respiratory distress.   Abdominal:      General: Abdomen is flat. There is no distension.      Palpations: Abdomen is soft.      Tenderness: There is no  abdominal tenderness.      Comments: Peg site c/d/i  Soft abdomen   Genitourinary:     Comments: Imreles in place. Urine clear  Musculoskeletal:      Right lower leg: No edema.      Left lower leg: No edema.      Comments: R radial art line in place   Skin:     General: Skin is warm.      Capillary Refill: Capillary refill takes less than 2 seconds.   Neurological:      General: No focal deficit present.      Mental Status: She is alert.         Vents:  Vent Mode: A/C (01/29/22 0815)  Ventilator Initiated: Yes (01/18/22 1636)  Set Rate: 18 BPM (01/29/22 0815)  Vt Set: 300 mL (01/29/22 0815)  Pressure Support: 10 cmH20 (01/28/22 0310)  PEEP/CPAP: 5 cmH20 (01/29/22 0815)  Oxygen Concentration (%): 40 (01/29/22 1000)  Peak Airway Pressure: 12 cmH2O (01/29/22 0815)  Plateau Pressure: 30 cmH20 (01/29/22 0815)  Total Ve: 7.79 mL (01/29/22 0815)  Negative Inspiratory Force (cm H2O): 0 (01/28/22 1652)  F/VT Ratio<105 (RSBI): (!) 68.86 (01/29/22 0815)    Lines/Drains/Airways     Central Venous Catheter Line            Permacath 01/21/22 1013 left subclavian 8 days          Drain                 Gastrostomy/Enterostomy 01/21/22 1112 Percutaneous endoscopic gastrostomy (PEG) 7 days         Urethral Catheter 01/25/22 1341 Non-latex 3 days          Airway                 Surgical Airway 01/21/22 1043 Shiley Cuffed 7 days          Arterial Line            Arterial Line 01/05/22 0855 Right Radial 24 days          Peripheral Intravenous Line                 Midline Catheter Insertion/Assessment  - Single Lumen 01/10/22 1300 Left cephalic vein (lateral side of arm) 18g x 8cm 18 days                Significant Labs:    CBC/Anemia Profile:  Recent Labs   Lab 01/28/22  0428 01/28/22  0428 01/29/22  0317   WBC 10.71  --  8.44   HGB 10.1*  --  10.2*   HCT 31.3* 32* 32.1*   PLT 75*  --  80*   MCV 93  --  96   RDW 17.2*  --  17.9*        Chemistries:  Recent Labs   Lab 01/28/22  0428 01/28/22  1106 01/28/22  1544 01/28/22  1544  01/29/22  0058 01/29/22  0317 01/29/22  0601   *   < > 131*  --  130* 128*  --    K 3.6   < > 3.9   < > 3.4* 3.5 3.9   CL 88*   < > 89*  --  88* 87*  --    CO2 24   < > 26  --  25 25  --    BUN 37*   < > 48*  --  58* 60*  --    CREATININE 2.1*   < > 2.5*  --  3.0* 2.9*  --    CALCIUM 8.2*   < > 8.8  --  9.4 9.5  --    ALBUMIN 2.6*   < > 2.7*  --  2.4* 2.5*  --    PROT 6.3  --   --   --   --  6.4  --    BILITOT 0.6  --   --   --   --  0.5  --    ALKPHOS 150*  --   --   --   --  147*  --    ALT 25  --   --   --   --  24  --    AST 65*  --   --   --   --  56*  --    MG 1.7  --   --   --   --  1.7  --    PHOS 2.7   < > 3.4  --  3.8 4.0  --     < > = values in this interval not displayed.       All pertinent labs within the past 24 hours have been reviewed.    Significant Imaging:  I have reviewed all pertinent imaging results/findings within the past 24 hours.    Assessment/Plan:     * S/P aortic valve replacement  Orion Ty is a 76 y.o. female who presents to the SICU s/p redo aortic valve replacement with mechanical valve on 1/5/22.      Neuro/Psych:   -- Sedation: none  -- Pain: Liquid Tiffani, breakthrough fentanyl pushes q2     Cards:   -- Pressors: off, vaso if needed  -- Goal MAP: 60-80  -- statin  -- dc heparin drip  -- 0.5 Coumadin yesterday. INR 2.7. 0.5 coumadin every other day  -- Paced HR 80      Pulm:   -- Goal O2 sat > 90%  -- reintubated 1/12 then 1/18. S/p trach 1/21  -- Spontaneous when able.   -- Tolerated trach collar for one hour on 1/27  -- ABG PRN  -- 2 mediastinal removed 1/9 L Pleural CT removed 1/8      Renal:  -- Mireles replaced 1/25  -- S/p iHD trial 1/27.  -- Oliguric   -- Nephrology following  -- likely needs dialysis today  -- Permcath 1/21  -- Removed R IJ trialysis 1/23      FEN / GI:   -- Replace lytes as needed  -- s/p PEG 1/21  -- Nutrition: tube feeds novasource renal, at goal      ID:   -- WBC stable at 8.4  -- Antibiotics: periop ancef complete. Zosyn and vanc,  stopped 1/20      Heme/Onc:   -- Hgb 10.2  -- 3U RBC, 2U FFP, 2U platelets in OR  -- Daily CBC  -- Transfused products: 1U RBC on 1/5/22. 1u RBC and 1u FFP on 1/9/21. 2U RBC on 1/12. 4 FFP on 1/13. 1 FFP on 1/17, 2u pRBC 1/27  -- Anticoagulation:   -- dc heparin drip   -- INR 2.7      Endo:   -- Gluc goal 140-180  -- Insulin per endocrinology      PPx:   Feeding: tube feeds  Analgesia/Sedation: Tiffani, fent pushes / none  Thromboembolic prevention: Coumadin, ASA  HOB >30: yes  Stress Ulcer ppx: protonix BID  Glucose control: Critical care goal 140-180 g/dl, ISS    Lines/Drains/Airway: Art line, Trach, Permcath, PEG      Dispo/Code Status/Palliative:   -- SICU / Full Code             Critical care was time spent personally by me on the following activities: development of treatment plan with patient or surrogate and bedside caregivers, discussions with consultants, evaluation of patient's response to treatment, examination of patient, ordering and performing treatments and interventions, ordering and review of laboratory studies, ordering and review of radiographic studies, pulse oximetry, re-evaluation of patient's condition.  This critical care time did not overlap with that of any other provider or involve time for any procedures.     Caleb Garcia MD  Critical Care - Surgery  [facility]

## 2022-01-29 NOTE — PLAN OF CARE
"      SICU PLAN OF CARE NOTE    Dx: S/P aortic valve replacement    Shift Events: Plan of care discussed with patient and family. Some gum bleeding during shift; heparin gtt continued; aptt therapeutic. Unable to trial trach collar because patient became tachypnic and short of breath while after RT weaned pressure support on vent. 0.5 mg coumadin administered per order. Heparin bridge at 400 units/hr. X2 loose BM this shift.     Goals of Care: MAP 60-80. Ptt 60-80.     Neuro: AAO x4, Follows Commands, and Moves All Extremities    Vital Signs: BP (!) 92/51 (BP Location: Right arm, Patient Position: Lying)   Pulse 80   Temp 98.6 °F (37 °C) (Oral)   Resp 19   Ht 5' 1" (1.549 m)   Wt 44.8 kg (98 lb 12.3 oz)   SpO2 96%   Breastfeeding No   BMI 18.66 kg/m²     Respiratory: Ventilator PAV +    Diet: NPO and Tube Feeds 35 cc/hr (goal)    Gtts: Heparin    Urine Output: Urinary Catheter 50 cc/shift     Labs/Accuchecks: accuchecks q6h. INR and renal function panel q8h..    Skin: turn q2h. Wound care orders followed. Triad to sacrum. Heels elevated off bed. See documentation for full assessment.       "

## 2022-01-29 NOTE — PLAN OF CARE
Plan of Care Note  Cardiothoracic Surgery    S/p repeat AVR    Specific issues: need volume off with CRRT, wean respiratory support, coumadin    Plan of care for patient was discussed with ICU staff including nurses, residents, and faculty and appropriate consulting services.    Will continue to monitor patient's hemodynamics, functionality, neuro status, fluid status and renal function, and labs and will adjust medications and fluids as necessary while monitoring appropriateness for de-escalation of support and monitoring and transport to stepdown unit.    Jojo Kauffman MD, PGY-VI  Cardiothoracic Surgery  [phone]

## 2022-01-30 NOTE — ASSESSMENT & PLAN NOTE
Orion Ty is a 76 y.o. female who presents to the SICU s/p redo aortic valve replacement with mechanical valve on 1/5/22.      Neuro/Psych:   -- Sedation: none  -- Pain: Liquid Tiffani, breakthrough fentanyl pushes q2     Cards:   -- Pressors: off, vaso if needed  -- Goal MAP: 60-80  -- statin  -- INR 3.5. 0.5 coumadin every other day  -- Paced HR 80  -- metoprolol 25 BID      Pulm:   -- Goal O2 sat > 90%  -- reintubated 1/12 then 1/18. S/p trach 1/21  -- Spontaneous when able.   -- Tolerated trach collar for one hour on 1/27  -- ABG PRN  -- 2 mediastinal removed 1/9 L Pleural CT removed 1/8      Renal:  -- Mireles replaced 1/25, UOP 10 cc/hr  -- S/p iHD trial 1/30.  -- Oliguric   -- Nephrology following  -- Permcath 1/21  -- Removed R IJ trialysis 1/23      FEN / GI:   -- Replace lytes as needed  -- s/p PEG 1/21  -- Nutrition: tube feeds novasource renal, at goal      ID:   -- WBC stable at 8.4  -- Antibiotics: periop ancef complete. Zosyn and vanc, stopped 1/20      Heme/Onc:   -- Hgb 10.2  -- 3U RBC, 2U FFP, 2U platelets in OR  -- Daily CBC  -- Transfused products: 1U RBC on 1/5/22. 1u RBC and 1u FFP on 1/9/21. 2U RBC on 1/12. 4 FFP on 1/13. 1 FFP on 1/17, 2u pRBC 1/27  -- Anticoagulation:   -- INR 3.5  -- coumadin 0.5 mevery other day. Partially due to fluconazole      Endo:   -- Gluc goal 140-180  -- Insulin per endocrinology      PPx:   Feeding: tube feeds  Analgesia/Sedation: Tiffani, fent pushes / none  Thromboembolic prevention: Coumadin, ASA  HOB >30: yes  Stress Ulcer ppx: protonix BID  Glucose control: Critical care goal 140-180 g/dl, ISS    Lines/Drains/Airway: Art line, Trach, Permcath, PEG      Dispo/Code Status/Palliative:   -- SICU / Full Code

## 2022-01-30 NOTE — NURSING
Pt complaining of increasing fatigue, and having increased work of breathing. Monitor showing AFIB.MD Hector notified, orders received to put pt back on a rate. RT notified, pt deep suctioned.

## 2022-01-30 NOTE — NURSING
Pt becoming increasingly tachypneic, RR 35-40 breaths per minute. MD Hector notified orders received for IV metoprolol for A-fib.

## 2022-01-30 NOTE — PROGRESS NOTES
1:1 beside HD tx started via L permacath without difficulty. Lines connected and secure. Orders verified. Net UF goal 2000 mL.

## 2022-01-30 NOTE — PROGRESS NOTES
[facility]  Nephrology  Progress Note     Patient Name: Orion Ty  MRN: [id number]  Admission Date: 1/5/2022  Hospital Length of Stay: 25 days  Attending Provider: Dennis Haider MD   Primary Care Physician: Otis Zimmer MD  Principal Problem: S/P aortic valve replacement    Subjective:     HPI: Orion Ty is our 76 y.o. female with previous aortic valve replacement, mitral valve replacement, and tricuspid valve repair in 2010 who presented on 1/5 for redo aortic valve replacement. Nephrology consulted on 1/8 for anuric JO. Patient is alert but tired, son at bedside. Stated she was tired prior to admission. During the hospital stay Cr increase from 1 to 1.5. In OR she received  2.5L crystalloid, 3U RBC, 2U FFP, 2 platelets, and 800 cell saver. she is +5 L since admit. UOP decrease overnight, per nurse she didn't urinate at all. She received diuril 500 mg twice, lasix 20 mg *2 on 1/7. This morning she urinate 225 immediately after placing the tejeda's cath. She received again lasix 100 mg this morning. Upor evaluation she was alert but repeatedly saying she was tired, denied any pain. Denied any previous hx of kidney disease.       Interval History: Net negative 867mL over the past 24h. UOP of 265mLs.  iHD completed yesterday; 1.5L removed.      Review of patient's allergies indicates:  No Known Allergies   Current Facility-Administered Medications   Medication Frequency    0.9%  NaCl infusion PRN    acetaminophen oral solution 650 mg Q8H    albuterol-ipratropium 2.5 mg-0.5 mg/3 mL nebulizer solution 3 mL Q6H    aspirin suppository 300 mg Once PRN    atorvastatin tablet 40 mg Daily    bisacodyL suppository 10 mg Daily PRN    dextrose 10 % infusion Continuous PRN    dextrose 50% injection 12.5 g PRN    fluconazole 40 mg/ml suspension 200 mg Daily    furosemide injection 80 mg Q8H    glucagon (human recombinant) injection 1 mg PRN    heparin (porcine) injection 1,000  Units PRN    HYDROmorphone injection 0.5 mg Once    hydrOXYzine 10 mg/5 mL syrup 10 mg Q6H PRN    insulin aspart U-100 pen 1-10 Units PRN    LIDOcaine HCL 2% jelly PRN    magnesium sulfate 2g in water 50mL IVPB (premix) Once    melatonin tablet 9 mg Nightly PRN    metoprolol tartrate (LOPRESSOR) tablet 25 mg BID    midodrine tablet 10 mg Q12H PRN    ondansetron injection 4 mg Q12H PRN    oxyCODONE 5 mg/5 mL solution 5 mg Q4H PRN    pantoprazole injection 40 mg Q12H    polyethylene glycol packet 17 g Daily PRN    potassium, sodium phosphates 280-160-250 mg packet 2 packet TID PRN    psyllium husk (aspartame) 3.4 gram packet 1 packet Daily    sodium chloride 0.9% bolus 250 mL PRN    sodium chloride 0.9% bolus 250 mL PRN    sodium chloride 0.9% bolus 250 mL PRN    sodium chloride 0.9% flush 10 mL PRN    vasopressin (PITRESSIN) 0.2 Units/mL in dextrose 5 % 100 mL infusion Continuous    [START ON 1/31/2022] warfarin (COUMADIN) split tablet 0.5 mg Every Mon, Wed, Fri     Facility-Administered Medications Ordered in Other Encounters   Medication Frequency    0.9%  NaCl infusion Continuous       Objective:     Vital Signs (Most Recent):  Temp: 98.5 °F (36.9 °C) (01/30/22 0700)  Pulse: 80 (01/30/22 0900)  Resp: (!) 25 (01/30/22 0900)  BP: (!) 91/50 (01/30/22 0900)  SpO2: 97 % (01/30/22 0900)  O2 Device (Oxygen Therapy): ventilator (01/30/22 0900) Vital Signs (24h Range):  Temp:  [97.7 °F (36.5 °C)-99.7 °F (37.6 °C)] 98.5 °F (36.9 °C)  Pulse:  [] 80  Resp:  [19-56] 25  SpO2:  [90 %-100 %] 97 %  BP: ()/(50-75) 91/50  Arterial Line BP: ()/(43-63) 104/45   Weight: 44.8 kg (98 lb 12.3 oz) (01/20/22 1300)  Body mass index is 18.66 kg/m².  Body surface area is 1.39 meters squared.      Intake/Output Summary (Last 24 hours) at 1/30/2022 0907  Last data filed at 1/30/2022 0900  Gross per 24 hour   Intake 1546.19 ml   Output 2475 ml   Net -928.81 ml     I/O last 3 completed shifts:  In: 2117.9  [I.V.:92.9; Other:650; NG/GT:1375]  Out: 2500 [Urine:285; Other:2215]  Net IO Since Admission: 5,319.71 mL [01/30/22 0907]     Physical Exam  Constitutional:       Appearance: She is well-developed. She is ill-appearing.   Cardiovascular:      Rate and Rhythm: Normal rate and regular rhythm.      Heart sounds: Normal heart sounds.   Pulmonary:      Comments: tracheostomy   Abdominal:      General: Abdomen is flat.      Palpations: Abdomen is soft.   Musculoskeletal:         General: Normal range of motion.      Right lower leg: No edema.      Left lower leg: No edema.   Skin:     General: Skin is warm and dry.      Comments: Sternal Incision CDI   Neurological:      Mental Status: She is alert.   Psychiatric:         Mood and Affect: Mood normal.         Behavior: Behavior normal.         Recent Labs   Lab 01/27/22  0414 01/28/22  0428 01/28/22  0428 01/28/22  0428 01/29/22  0317 01/30/22  0308   WBC  --  10.71  --   --  8.44 8.80   HGB  --  10.1*  --   --  10.2* 10.1*   HCT   < > 31.3*  --  32* 32.1* 31.6*   PLT  --  75*  --   --  80* 89*   MONO  --  3.9*  0.4   < >  --  4.4  0.4 4.2  0.4    < > = values in this interval not displayed.      Recent Labs   Lab 01/28/22  0428 01/28/22  1106 01/29/22  0317 01/29/22  0601 01/29/22  1558 01/29/22  2321 01/30/22  0308   *   < > 128*   < > 128* 134* 131*   K 3.6   < > 3.5   < > 3.8 3.7 3.6   CL 88*   < > 87*   < > 87* 98 95   CO2 24   < > 25   < > 24 21* 22*   BUN 37*   < > 60*   < > 70* 27* 38*   CREATININE 2.1*   < > 2.9*   < > 3.3* 1.6* 2.0*   CALCIUM 8.2*   < > 9.5   < > 9.9 8.6* 8.5*   PROT 6.3  --  6.4  --   --   --  6.3   BILITOT 0.6  --  0.5  --   --   --  0.5   ALKPHOS 150*  --  147*  --   --   --  190*   ALT 25  --  24  --   --   --  29   AST 65*  --  56*  --   --   --  63*    < > = values in this interval not displayed.      Recent Labs     01/28/22  0428   PH 7.491*   PCO2 40.8   PO2 84   HCO3 31.2*   POCSATURATED 97   BE 8       Assessment/Plan:         JO  acute kidney injury  Orion Ty is our 76 y.o. female with previous aortic valve replacement, mitral valve replacement, and tricuspid valve repair in 2010 who presented on 1/5 for redo aortic valve replacement. Nephrology consulted on 1/8 for anuric JO. During the hospital stay Cr increase from 1 to 1.5. In OR she received  2.5L crystalloid, 3U RBC, 2U FFP, 2 platelets, and 800 cell saver. she is +5 L since admit. UOP decreased. She received diuril 500 mg twice, lasix 20 mg x2 on 1/7 with no adequate response to diuretics     Plan   Oliguric ischemic ATN likely secondary to severe intraoperative hypotension requiring vasopressor on 01/05/2022  Failed high-dose diuretics and renal replacement therapy started on 1/9/22 due to volume overload  Patient clinically not overloaded  Labs reviewed; no emergent electrolyte abnormalities noted   S/p iHD yesterday; 1.5L removed; will continue evaluating daily for KRT needs.   Strict I/Os   Renally dose medications to GFR      S/P aortic valve replacement  With severe intraoperative hypotension   Management per primary     ATN (acute tubular necrosis)  See jo      Skip Wallis MD  Nephrology  [facility]      ATTENDING PHYSICIAN ATTESTATION  I have personally verified the history and examined the patient. I thoroughly reviewed the demographic, clinical, laboratorial and imaging information available in medical records. I agree with the assessment and recommendations provided by the subspecialty resident who was under my supervision.

## 2022-01-30 NOTE — SUBJECTIVE & OBJECTIVE
Interval History/Significant Events:   -NAEO.  -Did well with HD yesterday, but had to go on vaso yesterday  -INR 3.5 this morning  -UOP 10-15 cc/hr  -afib RVR that converted with metop. Daily metop increased    Follow-up For: Procedure(s) (LRB):  CREATION, TRACHEOSTOMY (N/A)  EGD, WITH PEG TUBE INSERTION (N/A)  INSERTION-CATHETER-ROSELINE (N/A)    Post-Operative Day: 7 Days Post-Op    Objective:     Vital Signs (Most Recent):  Temp: 98.5 °F (36.9 °C) (01/30/22 0700)  Pulse: 80 (01/30/22 0915)  Resp: (!) 47 (01/30/22 0915)  BP: (!) 91/50 (01/30/22 0900)  SpO2: 96 % (01/30/22 0915) Vital Signs (24h Range):  Temp:  [97.7 °F (36.5 °C)-99.7 °F (37.6 °C)] 98.5 °F (36.9 °C)  Pulse:  [] 80  Resp:  [19-56] 47  SpO2:  [90 %-100 %] 96 %  BP: ()/(50-75) 91/50  Arterial Line BP: ()/(43-63) 112/48     Weight: 44.8 kg (98 lb 12.3 oz)  Body mass index is 18.66 kg/m².      Intake/Output Summary (Last 24 hours) at 1/30/2022 0923  Last data filed at 1/30/2022 0900  Gross per 24 hour   Intake 1546.19 ml   Output 2475 ml   Net -928.81 ml       Physical Exam  Constitutional:       General: She is not in acute distress.  HENT:      Head: Normocephalic and atraumatic.   Neck:      Comments: Trach  Cardiovascular:      Rate and Rhythm: Normal rate and regular rhythm.      Pulses: Normal pulses.      Comments: Midline sternotomy incision c/d/I  Pacemaker in place. Paced at 80  Pulmonary:      Effort: Pulmonary effort is normal. No respiratory distress.   Abdominal:      General: Abdomen is flat. There is no distension.      Palpations: Abdomen is soft.      Tenderness: There is no abdominal tenderness.      Comments: Peg site c/d/i  Soft abdomen   Genitourinary:     Comments: Mireles in place. Urine clear  Musculoskeletal:      Right lower leg: No edema.      Left lower leg: No edema.      Comments: R radial art line in place   Skin:     General: Skin is warm.      Capillary Refill: Capillary refill takes less than 2 seconds.    Neurological:      General: No focal deficit present.      Mental Status: She is alert.         Vents:  Vent Mode: A/C (01/30/22 0759)  Ventilator Initiated: Yes (01/18/22 1636)  Set Rate: 20 BPM (01/30/22 0759)  Vt Set: 330 mL (01/30/22 0759)  Pressure Support: 10 cmH20 (01/28/22 0310)  PEEP/CPAP: 5 cmH20 (01/30/22 0759)  Oxygen Concentration (%): 40 (01/30/22 0915)  Peak Airway Pressure: 16 cmH2O (01/30/22 0759)  Plateau Pressure: 30 cmH20 (01/30/22 0759)  Total Ve: 8.1 mL (01/30/22 0759)  Negative Inspiratory Force (cm H2O): 0 (01/30/22 0429)  F/VT Ratio<105 (RSBI): (!) 65.93 (01/30/22 0759)    Lines/Drains/Airways     Central Venous Catheter Line            Permacath 01/21/22 1013 left subclavian 8 days          Drain                 Gastrostomy/Enterostomy 01/21/22 1112 Percutaneous endoscopic gastrostomy (PEG) 8 days         Urethral Catheter 01/25/22 1341 Non-latex 4 days          Airway                 Surgical Airway 01/21/22 1043 Shiley Cuffed 8 days          Arterial Line            Arterial Line 01/05/22 0855 Right Radial 25 days          Peripheral Intravenous Line                 Midline Catheter Insertion/Assessment  - Single Lumen 01/10/22 1300 Left cephalic vein (lateral side of arm) 18g x 8cm 19 days                Significant Labs:    CBC/Anemia Profile:  Recent Labs   Lab 01/29/22 0317 01/30/22  0308   WBC 8.44 8.80   HGB 10.2* 10.1*   HCT 32.1* 31.6*   PLT 80* 89*   MCV 96 97   RDW 17.9* 17.6*        Chemistries:  Recent Labs   Lab 01/29/22  0317 01/29/22  0601 01/29/22  1558 01/29/22  2321 01/30/22  0308   *   < > 128* 134* 131*   K 3.5   < > 3.8 3.7 3.6   CL 87*   < > 87* 98 95   CO2 25   < > 24 21* 22*   BUN 60*   < > 70* 27* 38*   CREATININE 2.9*   < > 3.3* 1.6* 2.0*   CALCIUM 9.5   < > 9.9 8.6* 8.5*   ALBUMIN 2.5*   < > 2.4* 2.8* 2.5*   PROT 6.4  --   --   --  6.3   BILITOT 0.5  --   --   --  0.5   ALKPHOS 147*  --   --   --  190*   ALT 24  --   --   --  29   AST 56*  --   --   --   63*   MG 1.7  --   --   --  1.8   PHOS 4.0   < > 4.0 2.0* 3.0    < > = values in this interval not displayed.       All pertinent labs within the past 24 hours have been reviewed.    Significant Imaging:  I have reviewed all pertinent imaging results/findings within the past 24 hours.

## 2022-01-30 NOTE — PROGRESS NOTES
[facility]  Critical Care - Surgery  Progress Note    Patient Name: Orion Ty  MRN: [id number]  Admission Date: 1/5/2022  Hospital Length of Stay: 25 days  Code Status: Full Code  Attending Provider: Dennis Haider MD  Primary Care Provider: Otis Zimmer MD   Principal Problem: S/P aortic valve replacement    Subjective:     Hospital/ICU Course:  No notes on file    Interval History/Significant Events:   -NAEO.  -Did well with HD yesterday, but had to go on vaso yesterday  -INR 3.5 this morning  -UOP 10-15 cc/hr  -afib RVR that converted with metop. Daily metop increased    Follow-up For: Procedure(s) (LRB):  CREATION, TRACHEOSTOMY (N/A)  EGD, WITH PEG TUBE INSERTION (N/A)  INSERTION-CATHETER-ROSELINE (N/A)    Post-Operative Day: 7 Days Post-Op    Objective:     Vital Signs (Most Recent):  Temp: 98.5 °F (36.9 °C) (01/30/22 0700)  Pulse: 80 (01/30/22 0915)  Resp: (!) 47 (01/30/22 0915)  BP: (!) 91/50 (01/30/22 0900)  SpO2: 96 % (01/30/22 0915) Vital Signs (24h Range):  Temp:  [97.7 °F (36.5 °C)-99.7 °F (37.6 °C)] 98.5 °F (36.9 °C)  Pulse:  [] 80  Resp:  [19-56] 47  SpO2:  [90 %-100 %] 96 %  BP: ()/(50-75) 91/50  Arterial Line BP: ()/(43-63) 112/48     Weight: 44.8 kg (98 lb 12.3 oz)  Body mass index is 18.66 kg/m².      Intake/Output Summary (Last 24 hours) at 1/30/2022 0923  Last data filed at 1/30/2022 0900  Gross per 24 hour   Intake 1546.19 ml   Output 2475 ml   Net -928.81 ml       Physical Exam  Constitutional:       General: She is not in acute distress.  HENT:      Head: Normocephalic and atraumatic.   Neck:      Comments: Trach  Cardiovascular:      Rate and Rhythm: Normal rate and regular rhythm.      Pulses: Normal pulses.      Comments: Midline sternotomy incision c/d/I  Pacemaker in place. Paced at 80  Pulmonary:      Effort: Pulmonary effort is normal. No respiratory distress.   Abdominal:      General: Abdomen is flat. There is no distension.       Palpations: Abdomen is soft.      Tenderness: There is no abdominal tenderness.      Comments: Peg site c/d/i  Soft abdomen   Genitourinary:     Comments: Mireles in place. Urine clear  Musculoskeletal:      Right lower leg: No edema.      Left lower leg: No edema.      Comments: R radial art line in place   Skin:     General: Skin is warm.      Capillary Refill: Capillary refill takes less than 2 seconds.   Neurological:      General: No focal deficit present.      Mental Status: She is alert.         Vents:  Vent Mode: A/C (01/30/22 0759)  Ventilator Initiated: Yes (01/18/22 1636)  Set Rate: 20 BPM (01/30/22 0759)  Vt Set: 330 mL (01/30/22 0759)  Pressure Support: 10 cmH20 (01/28/22 0310)  PEEP/CPAP: 5 cmH20 (01/30/22 0759)  Oxygen Concentration (%): 40 (01/30/22 0915)  Peak Airway Pressure: 16 cmH2O (01/30/22 0759)  Plateau Pressure: 30 cmH20 (01/30/22 0759)  Total Ve: 8.1 mL (01/30/22 0759)  Negative Inspiratory Force (cm H2O): 0 (01/30/22 0429)  F/VT Ratio<105 (RSBI): (!) 65.93 (01/30/22 0759)    Lines/Drains/Airways     Central Venous Catheter Line            Permacath 01/21/22 1013 left subclavian 8 days          Drain                 Gastrostomy/Enterostomy 01/21/22 1112 Percutaneous endoscopic gastrostomy (PEG) 8 days         Urethral Catheter 01/25/22 1341 Non-latex 4 days          Airway                 Surgical Airway 01/21/22 1043 Shiley Cuffed 8 days          Arterial Line            Arterial Line 01/05/22 0855 Right Radial 25 days          Peripheral Intravenous Line                 Midline Catheter Insertion/Assessment  - Single Lumen 01/10/22 1300 Left cephalic vein (lateral side of arm) 18g x 8cm 19 days                Significant Labs:    CBC/Anemia Profile:  Recent Labs   Lab 01/29/22  0317 01/30/22  0308   WBC 8.44 8.80   HGB 10.2* 10.1*   HCT 32.1* 31.6*   PLT 80* 89*   MCV 96 97   RDW 17.9* 17.6*        Chemistries:  Recent Labs   Lab 01/29/22  0317 01/29/22  0601 01/29/22  1558 01/29/22  8213  01/30/22  0308   *   < > 128* 134* 131*   K 3.5   < > 3.8 3.7 3.6   CL 87*   < > 87* 98 95   CO2 25   < > 24 21* 22*   BUN 60*   < > 70* 27* 38*   CREATININE 2.9*   < > 3.3* 1.6* 2.0*   CALCIUM 9.5   < > 9.9 8.6* 8.5*   ALBUMIN 2.5*   < > 2.4* 2.8* 2.5*   PROT 6.4  --   --   --  6.3   BILITOT 0.5  --   --   --  0.5   ALKPHOS 147*  --   --   --  190*   ALT 24  --   --   --  29   AST 56*  --   --   --  63*   MG 1.7  --   --   --  1.8   PHOS 4.0   < > 4.0 2.0* 3.0    < > = values in this interval not displayed.       All pertinent labs within the past 24 hours have been reviewed.    Significant Imaging:  I have reviewed all pertinent imaging results/findings within the past 24 hours.    Assessment/Plan:     * S/P aortic valve replacement  Orion Ty is a 76 y.o. female who presents to the SICU s/p redo aortic valve replacement with mechanical valve on 1/5/22.      Neuro/Psych:   -- Sedation: none  -- Pain: Liquid Tiffani, breakthrough fentanyl pushes q2     Cards:   -- Pressors: off, vaso if needed  -- Goal MAP: 60-80  -- statin  -- INR 3.5. 0.5 coumadin every other day  -- Paced HR 80  -- metoprolol 25 BID      Pulm:   -- Goal O2 sat > 90%  -- reintubated 1/12 then 1/18. S/p trach 1/21  -- Spontaneous when able.   -- Tolerated trach collar for one hour on 1/27  -- ABG PRN  -- 2 mediastinal removed 1/9 L Pleural CT removed 1/8      Renal:  -- Mireles replaced 1/25, UOP 10 cc/hr  -- S/p iHD trial 1/30.  -- Oliguric   -- Nephrology following  -- Permcath 1/21  -- Removed R IJ trialysis 1/23      FEN / GI:   -- Replace lytes as needed  -- s/p PEG 1/21  -- Nutrition: tube feeds novasource renal, at goal      ID:   -- WBC stable at 8.4  -- Antibiotics: periop ancef complete. Zosyn and vanc, stopped 1/20      Heme/Onc:   -- Hgb 10.2  -- 3U RBC, 2U FFP, 2U platelets in OR  -- Daily CBC  -- Transfused products: 1U RBC on 1/5/22. 1u RBC and 1u FFP on 1/9/21. 2U RBC on 1/12. 4 FFP on 1/13. 1 FFP on 1/17, 2u pRBC  1/27  -- Anticoagulation:   -- INR 3.5  -- coumadin 0.5 mevery other day. Partially due to fluconazole      Endo:   -- Gluc goal 140-180  -- Insulin per endocrinology      PPx:   Feeding: tube feeds  Analgesia/Sedation: Tiffani, fent pushes / none  Thromboembolic prevention: Coumadin, ASA  HOB >30: yes  Stress Ulcer ppx: protonix BID  Glucose control: Critical care goal 140-180 g/dl, ISS    Lines/Drains/Airway: Art line, Trach, Permcath, PEG      Dispo/Code Status/Palliative:   -- SICU / Full Code               Critical care was time spent personally by me on the following activities: development of treatment plan with patient or surrogate and bedside caregivers, discussions with consultants, evaluation of patient's response to treatment, examination of patient, ordering and performing treatments and interventions, ordering and review of laboratory studies, ordering and review of radiographic studies, pulse oximetry, re-evaluation of patient's condition.  This critical care time did not overlap with that of any other provider or involve time for any procedures.     Caleb Garcia MD  Critical Care - Surgery  [facility]

## 2022-01-30 NOTE — NURSING
"      SICU PLAN OF CARE NOTE    Dx: S/P aortic valve replacement    Shift Events: Trach collar 90% 10L for 1.5 hours then back on SBT.     Goals of Care: MAP 60-80; Trach collar trial tomorrow. Place back on ACVC mode tonight for rest.     Neuro: Follows Commands and Moves All Extremities, AAO/primarily Citizen of [place] speaking. Family at bedside    Vital Signs: /60 (BP Location: Right arm, Patient Position: Lying)   Pulse 80   Temp 98.2 °F (36.8 °C) (Oral)   Resp (!) 33   Ht 5' 1" (1.549 m)   Wt 44.8 kg (98 lb 12.3 oz)   SpO2 (!) 92%   Breastfeeding No   BMI 18.66 kg/m²     Cardiac: Paced rythym    Respiratory: Ventilator Spontaneous 60% support, 40% +5-Place on AC VC mode tonight.    Diet: Tube Feeds    Urine Output: Urinary Catheter 25 cc/shift; oliguric. HD last night.    Drains: G-tube/J-tube, total output 0 cc /  shift; TF in progress    Accuchecks: q6h    Skin: breakdown to sacrum. Triad applied. Specialty bed in use. Turned and repositioned q2h. Heel and elbows offloaded.        "

## 2022-01-30 NOTE — NURSING
SICU PLAN OF CARE NOTE    Dx: S/P aortic valve replacement    Shift Events: VSS, refer to nursing notes for shift events.    Goals of Care: MAP 60-80, PTT 60-80, INR 2.5-3    Neuro: Arouses to Voice, Follows Commands and Moves All Extremities    Cardiac: NSR 80s-90s    Respiratory: Ventilator    Diet: Tube Feeds @35ml/hr    Gtts: Vasopressin    Urine Output: Urinary Catheter 150 cc/shift    Labs/Accuchecks: renal labs q8, PTT q6, PT-INR q8    Skin: Triad and venelex applied to sacral wound. Pt turned throughout shift, green booties on, Immerse bed in use, bed plugged in wheels locked, call light in reach, daughter at bedside.

## 2022-01-30 NOTE — PLAN OF CARE
Plan of Care Note  Cardiothoracic Surgery    S/p repeat AVR    Specific issues: wean respiratory support, hold coumadin for supratherapeutic INR    Plan of care for patient was discussed with ICU staff including nurses, residents, and faculty and appropriate consulting services.    Will continue to monitor patient's hemodynamics, functionality, neuro status, fluid status and renal function, and labs and will adjust medications and fluids as necessary while monitoring appropriateness for de-escalation of support and monitoring and transport to stepdown unit.    Jojo Kauffman MD, PGY-VI  Cardiothoracic Surgery  [phone]

## 2022-01-30 NOTE — NURSING
Pt tachypenic with increasing anxiety, O2 sat 92-93%. MD Hector notified and at bedside. Orders received for hydroxyzine. RRT notified and at bedside, pt deep suctioned.

## 2022-01-30 NOTE — PROGRESS NOTES
Tx complete. Blood returned to patient. Lines clamped. L permacath flushed with NS and capped. Net UF 1565mL     01/29/22 2257   Post-Hemodialysis Assessment   Rinseback Volume (mL) 250 mL   Blood Volume Processed (Liters) 60.2 L   Dialyzer Clearance Lightly streaked   Duration of Treatment (minutes) 180 minutes   Hemodialysis Intake (mL) 650 mL   Total UF (mL) 2215 mL   Net Fluid Removal 1565   Patient Response to Treatment BP dropped during tx   Post-Hemodialysis Comments see note

## 2022-01-30 NOTE — PLAN OF CARE
"      SICU PLAN OF CARE NOTE    Dx: History of mechanical aortic valve replacement    Shift Events: Plan of care discussed with patient and family. Patient tolerating PAV+ ventilatory mode; did not wean to trach collar today. Performed exercises in bed; patient agreed to get to cardiac chair tomorrow. Small loose BM this shift. Urine output slightly improved from yesterday; HD ordered. Heparin gtt discontinued. Last INR 3.5.     Goals of Care: MAP 60-80    Neuro: AAO x4, Follows Commands, and Moves All Extremities    Vital Signs: BP (!) 145/65   Pulse 86   Temp 98.8 °F (37.1 °C) (Oral)   Resp (!) 36   Ht 5' 1" (1.549 m)   Wt 44.8 kg (98 lb 12.3 oz)   SpO2 99%   Breastfeeding No   BMI 18.66 kg/m²     Respiratory: Ventilator/trach PAV+    Diet: NPO and Tube Feeds    Gtts: none    Urine Output: Urinary Catheter 115 cc/shift     Labs/Accuchecks: accuchecks q6h. Renal labs and INR q8h.    Skin: see documentation for full assessment. Wound care orders completed. Turn q2h. Immerse bed in use. Heels elevated.       "

## 2022-01-30 NOTE — CARE UPDATE
BG goal 140 -180   Diet NPO Except for: Sips with Medication  9 Days Post-Op    BG stable with prn SQ insulin therapy. Patient remains on Vent, on TF,  and NPO. Endo will continue to follow, and manage glycemic control while inpatient.     Plan:  - Continiue Moderate Dose SQ Insulin Correction    Scale PRN hyperglycemia.   -  BG Monitoring every 6 hours while NPO.     ** Please call Endocrine for any BG related issues **  ** Please notify Endocrine for any change and/or advance in diet**    Lab Results   Component Value Date    HGBA1C 6.0 (H) 01/03/2022       Discharge Planning:   TBD. Please notify endocrinology prior to discharge.   Pending Plan:   - Insurance preferred diabetes testing supplies   - Will most likely have patient continue metformin 500 mg daily.

## 2022-01-31 NOTE — NURSING
SICU PLAN OF CARE NOTE    Dx: S/P aortic valve replacement    Shift Events: VSS, no acute events during shift    Goals of Care: MAP 60-80, PTT goal 60-80, INR 2.5-3    Neuro: Arouses to Voice, Follows Commands and Moves All Extremities    Cardiac:  V paced @ 80bpm    Respiratory: Ventilator    Diet: Tube Feeds @35ml/hr, Goal =35ml/hr    Urine Output: Urinary Catheter 50 cc/shift    Labs/Accuchecks: accuchecks q6    Skin: Triad and venelex applies to sacral wound. Pt turned throughout shift, green booties on, immerse bed in use, bed plugged in, wheels locked call light in reach. Daughter at bedside.

## 2022-01-31 NOTE — PROGRESS NOTES
[facility]  Critical Care - Surgery  Progress Note    Patient Name: Orion Ty  MRN: [id number]  Admission Date: 1/5/2022  Hospital Length of Stay: 26 days  Code Status: Full Code  Attending Provider: Dennis Haider MD  Primary Care Provider: Otis Zimmer MD   Principal Problem: S/P aortic valve replacement    Subjective:     Hospital/ICU Course:  No notes on file    Interval History/Significant Events:   NAEON  Tolerating HD  Off pressors  Continuing planning for LTAC   Trach collar trial again today    Follow-up For: Procedure(s) (LRB):  CREATION, TRACHEOSTOMY (N/A)  EGD, WITH PEG TUBE INSERTION (N/A)  INSERTION-CATHETER-ROSELINE (N/A)    Post-Operative Day: 7 Days Post-Op    Objective:     Vital Signs (Most Recent):  Temp: 98.9 °F (37.2 °C) (01/31/22 0715)  Pulse: 80 (01/31/22 1015)  Resp: (!) 27 (01/31/22 1015)  BP: (!) 111/55 (01/31/22 1000)  SpO2: (!) 94 % (01/31/22 1015) Vital Signs (24h Range):  Temp:  [98.2 °F (36.8 °C)-98.9 °F (37.2 °C)] 98.9 °F (37.2 °C)  Pulse:  [79-84] 80  Resp:  [20-54] 27  SpO2:  [90 %-100 %] 94 %  BP: ()/(49-65) 111/55  Arterial Line BP: ()/(42-63) 121/51     Weight: 44.8 kg (98 lb 12.3 oz)  Body mass index is 18.66 kg/m².      Intake/Output Summary (Last 24 hours) at 1/31/2022 1021  Last data filed at 1/31/2022 1000  Gross per 24 hour   Intake 840 ml   Output 140 ml   Net 700 ml       Physical Exam  Constitutional:       General: She is not in acute distress.  HENT:      Head: Normocephalic and atraumatic.   Neck:      Comments: Trach  Cardiovascular:      Rate and Rhythm: Normal rate and regular rhythm.      Pulses: Normal pulses.      Comments: Midline sternotomy incision c/d/I  Pacemaker in place. Paced at 80  Pulmonary:      Effort: Pulmonary effort is normal. No respiratory distress.   Abdominal:      General: Abdomen is flat. There is no distension.      Palpations: Abdomen is soft.      Tenderness: There is no abdominal  tenderness.      Comments: Peg site c/d/i  Soft abdomen   Genitourinary:     Comments: Mireles in place. Urine clear  Musculoskeletal:      Right lower leg: No edema.      Left lower leg: No edema.      Comments: R radial art line in place   Skin:     General: Skin is warm.      Capillary Refill: Capillary refill takes less than 2 seconds.   Neurological:      General: No focal deficit present.      Mental Status: She is alert.         Vents:  Vent Mode: Spont (01/31/22 0845)  Ventilator Initiated: Yes (01/18/22 1636)  Set Rate: 20 BPM (01/31/22 0724)  Vt Set: 330 mL (01/31/22 0724)  Pressure Support: 10 cmH20 (01/31/22 0845)  PEEP/CPAP: 5 cmH20 (01/31/22 0845)  Oxygen Concentration (%): 45 (01/31/22 1015)  Peak Airway Pressure: 36 cmH2O (01/31/22 0845)  Plateau Pressure: 29 cmH20 (01/31/22 0845)  Total Ve: 4.36 mL (01/31/22 0845)  Negative Inspiratory Force (cm H2O): 0 (01/31/22 0845)  F/VT Ratio<105 (RSBI): (!) 101.89 (01/31/22 0724)    Lines/Drains/Airways     Central Venous Catheter Line            Permacath 01/21/22 1013 left subclavian 10 days          Drain                 Gastrostomy/Enterostomy 01/21/22 1112 Percutaneous endoscopic gastrostomy (PEG) 9 days         Urethral Catheter 01/25/22 1341 Non-latex 5 days          Airway                 Surgical Airway 01/21/22 1043 Shiley Cuffed 9 days          Arterial Line            Arterial Line 01/05/22 0855 Right Radial 26 days          Peripheral Intravenous Line                 Midline Catheter Insertion/Assessment  - Single Lumen 01/10/22 1300 Left cephalic vein (lateral side of arm) 18g x 8cm 20 days                Significant Labs:    CBC/Anemia Profile:  Recent Labs   Lab 01/30/22  0308 01/31/22  0259   WBC 8.80 7.92   HGB 10.1* 9.4*   HCT 31.6* 29.6*   PLT 89* 129*   MCV 97 94   RDW 17.6* 17.8*        Chemistries:  Recent Labs   Lab 01/29/22  2321 01/30/22  0308 01/31/22  0259   * 131* 131*   K 3.7 3.6 3.6   CL 98 95 94*   CO2 21* 22* 19*   BUN 27*  38* 66*   CREATININE 1.6* 2.0* 3.0*   CALCIUM 8.6* 8.5* 9.0   ALBUMIN 2.8* 2.5* 2.3*   PROT  --  6.3 6.4   BILITOT  --  0.5 0.5   ALKPHOS  --  190* 170*   ALT  --  29 30   AST  --  63* 67*   MG  --  1.8 1.9   PHOS 2.0* 3.0 4.6*       All pertinent labs within the past 24 hours have been reviewed.    Significant Imaging:  I have reviewed all pertinent imaging results/findings within the past 24 hours.    Assessment/Plan:     * S/P aortic valve replacement  Orion Ty is a 76 y.o. female who presents to the SICU s/p redo aortic valve replacement with mechanical valve on 1/5/22.      Neuro/Psych:   -- Sedation: none  -- Pain: Liquid Tiffani, breakthrough fentanyl pushes q2     Cards:   -- Pressors: off, vaso if needed  -- Goal MAP: 60-80  -- statin  -- INR 2.6 this morning, 1mg coumadin today  -- Paced HR 80  -- metoprolol 25 BID      Pulm:   -- Goal O2 sat > 90%  -- reintubated 1/12 then 1/18. S/p trach 1/21  -- Spontaneous when able.   -- Tolerated trach collar for one hour on 1/27  -- ABG PRN  -- 2 mediastinal removed 1/9 L Pleural CT removed 1/8      Renal:  -- Mireles replaced 1/25, UOP 10 cc/hr  -- S/p iHD trial 1/30.  -- Tolerating iHD, midodrine as needed for hypotension  -- Oliguric   -- Nephrology following  -- Permcath 1/21  -- Removed R IJ trialysis 1/23      FEN / GI:   -- Replace lytes as needed  -- s/p PEG 1/21  -- Nutrition: tube feeds novasource renal, at goal      ID:   -- WBC stable  -- Antibiotics: periop ancef complete. Zosyn and vanc, stopped 1/20        Heme/Onc:   -- Hgb 9.4  -- Daily CBC  -- Transfused products: 1U RBC on 1/5/22. 1u RBC and 1u FFP on 1/9/21. 2U RBC on 1/12. 4 FFP on 1/13. 1 FFP on 1/17, 2u pRBC 1/27  -- Anticoagulation: coumadin  -- coumadin 0.5 mevery other day      Endo:   -- Gluc goal 140-180  -- Insulin per endocrinology      PPx:   Feeding: tube feeds  Analgesia/Sedation: Tiffani, fent pushes / none  Thromboembolic prevention: Coumadin, ASA  HOB >30: yes  Stress  Ulcer ppx: protonix BID  Glucose control: Critical care goal 140-180 g/dl, ISS    Lines/Drains/Airway: Art line, Trach, Permcath, PEG      Dispo/Code Status/Palliative:   -- SICU / Full Code                 Sarah Shafer MD  Critical Care - Surgery  [facility]

## 2022-01-31 NOTE — PT/OT/SLP PROGRESS
Physical Therapy Treatment    Patient Name:  Orion Ty   MRN:  [id number]  Admit Date: 1/5/2022  Admitting Diagnosis:  S/P aortic valve replacement   Length of Stay: 26 days  Recent Surgery: Procedure(s) (LRB):  CREATION, TRACHEOSTOMY (N/A)  EGD, WITH PEG TUBE INSERTION (N/A)  INSERTION-CATHETER-ROSELINE (N/A) 10 Days Post-Op    Recommendations:     Discharge Recommendations:  rehabilitation facility   Discharge Equipment Recommendations: bedside commode   Barriers to discharge: None    Plan:     During this hospitalization, patient to be seen 4 x/week to address the listed problems via gait training,therapeutic activities,therapeutic exercises,neuromuscular re-education  · Plan of Care Expires:  02/05/22   Plan of Care Reviewed with: patient,daughter    Assessment:     Orion Ty is a 76 y.o. female admitted with a medical diagnosis of S/P aortic valve replacement. Pt found alert and cooperative. Pt medically stable per RN. Pt able to perform sit to stand x2 trials with total A x2. Pt still with SOB and dizziness with activity. Pt requires increased time when performing activities d/t fatigue and increased exertion. Pt tolerated whole session with trach collar. Pt's PLOF was independent. Pt now presents with decreased endurance, generalized weakness, and impaired ability to perform daily tasks and difficulty initiating ambulation. Pt would benefit from IP rehab to improve functional mobility, increase endurance, and general strengthening to be able to return to participating in her family and community. POC next session will be to perform Beronica for UEs/LEs and sit to stands for strengthening and endurance.    Problem List: weakness,gait instability,impaired endurance,impaired balance,impaired cardiopulmonary response to activity,impaired self care skills,impaired functional mobilty,decreased lower extremity function,decreased upper extremity function.  Rehab Prognosis: Good     GOALS:   Multidisciplinary  "Problems     Physical Therapy Goals        Problem: Physical Therapy Goal    Goal Priority Disciplines Outcome Goal Variances Interventions   Physical Therapy Goal     PT, PT/OT Ongoing, Progressing     Description: Goals to be met by: 2022     Patient will increase functional independence with mobility by performin. Sit to stand transfer with min A -not met  2. Bed to chair transfer with minimum A -not met  3. Gait  x 50 feet with minimum A standing rest breaks prn. -not met  4. Lower extremity exercise program x30 reps per handout, with independence -not met  5. Recite 3/3 sternal precautions and remain complaint with precautions throughout session with no verbal cues -not met  6. Pt sit on EOB x 10 min with CGA - not met                       Subjective   Communicated with RN prior to session.  Patient found HOB elevated upon PT entry to room, agreeable to evaluation. Orion Ty's daughter's present during session.    Chief Complaint: dizziness and fatigue  Patient/Family Comments/goals: return home  Pain/Comfort:  Pain Rating 1: 0/10  Pain Rating Post-Intervention 1: 0/10    Objective:   Patient found with: pulse ox (continuous),blood pressure cuff,telemetry,arterial line,G/J tube,Tracheostomy,ventilator,central line,tejeda catheter,PEG Tube   General Precautions: Standard, Cardiac fall,sternal   Orthopedic Precautions:N/A   Braces: N/A   Oxygen Device: Trach Collar   Vitals: BP (!) 105/55 (BP Location: Right arm, Patient Position: Lying)   Pulse 80   Temp 98.5 °F (36.9 °C) (Oral)   Resp (!) 24   Ht 5' 1" (1.549 m)   Wt 44.8 kg (98 lb 12.3 oz)   SpO2 (!) 94%   Breastfeeding No   BMI 18.66 kg/m²     Outcome Measures:  AM-PAC 6 CLICK MOBILITY  Turning over in bed (including adjusting bedclothes, sheets and blankets)?: 2  Sitting down on and standing up from a chair with arms (e.g., wheelchair, bedside commode, etc.): 2  Moving from lying on back to sitting on the side of the bed?: 2  Moving " to and from a bed to a chair (including a wheelchair)?: 2  Need to walk in hospital room?: 1  Climbing 3-5 steps with a railing?: 1  Basic Mobility Total Score: 10       Functional Mobility:  Additional staff present: OT - multiple professions required d/t pt's assistance levels needed, v/s monitoring, fatigue and impaired cardiopulmonary response to activity.    Bed Mobility:    Rolling/Turning to Right: moderate assistance   Scooting to HOB via drawsheet: total assistance and 2 persons   Supine to Sit: moderate assistance; HOB elevated   Scooting anteriorly to EOB to have both feet planted on floor: total assistance   Sit to Supine: total assistance; HOB flat    Sitting Balance at Edge of Bed:   Assistance Level Required: SBA and Minimal Assistance   Time: 20 min   Postural deviations noted: slouched posture and posterior pelvic tilt, posterior lean    Comments: Pt able to sit EOB with BUE support with SBA. Pt demo'd posterior lean that required physical and v/c to correct. Pt required v/c to facilitate core musculature to improve sitting balance.    Transfers:    Sit <> Stand Transfer: total assistance and of 2 persons with no assistive device x2 trials   o Pt only able to get ~50-60% upright. Pt required v/c and facilitation at knees and hips to improve flexed posture. Pt demo'd increased fatigue with second trial. Pt leaned head on PT and was given v/c to return head to midline. Pt unable to lift head and was returned to sitting d/t increased fatigue.      Therapeutic Activities, Exercises, and Education:   Pt educated on role of PT/POC  Pt educated on importance of daily activity   Pt educated on therapeutic exercises and given v/c during performance of TE to facilitate muscles  Pt educated on sternal precautions    Therapeutic exercise: for LE strengthening   Ankle pumps x10 BLE  LAQ x5 with slight resistance from PT into knee flexion BLE  Sit to stand x2 trials from EOB       Patient left HOB  elevated with all lines intact, call button in reach and RN present..    Time Tracking:     PT Received On: 01/31/22  PT Start Time: 1040     PT Stop Time: 1115  PT Total Time (min): 35 min     Billable Minutes:   · Therapeutic Activity 8 and Therapeutic Exercise 15    Treatment Type: Treatment  PT/PTA: PT

## 2022-01-31 NOTE — SUBJECTIVE & OBJECTIVE
Interval History/Significant Events:   NAEON  Tolerating HD  Off pressors  Continuing planning for LTAC   Trach collar trial again today    Follow-up For: Procedure(s) (LRB):  CREATION, TRACHEOSTOMY (N/A)  EGD, WITH PEG TUBE INSERTION (N/A)  INSERTION-CATHETER-ROSELINE (N/A)    Post-Operative Day: 7 Days Post-Op    Objective:     Vital Signs (Most Recent):  Temp: 98.9 °F (37.2 °C) (01/31/22 0715)  Pulse: 80 (01/31/22 1015)  Resp: (!) 27 (01/31/22 1015)  BP: (!) 111/55 (01/31/22 1000)  SpO2: (!) 94 % (01/31/22 1015) Vital Signs (24h Range):  Temp:  [98.2 °F (36.8 °C)-98.9 °F (37.2 °C)] 98.9 °F (37.2 °C)  Pulse:  [79-84] 80  Resp:  [20-54] 27  SpO2:  [90 %-100 %] 94 %  BP: ()/(49-65) 111/55  Arterial Line BP: ()/(42-63) 121/51     Weight: 44.8 kg (98 lb 12.3 oz)  Body mass index is 18.66 kg/m².      Intake/Output Summary (Last 24 hours) at 1/31/2022 1021  Last data filed at 1/31/2022 1000  Gross per 24 hour   Intake 840 ml   Output 140 ml   Net 700 ml       Physical Exam  Constitutional:       General: She is not in acute distress.  HENT:      Head: Normocephalic and atraumatic.   Neck:      Comments: Trach  Cardiovascular:      Rate and Rhythm: Normal rate and regular rhythm.      Pulses: Normal pulses.      Comments: Midline sternotomy incision c/d/I  Pacemaker in place. Paced at 80  Pulmonary:      Effort: Pulmonary effort is normal. No respiratory distress.   Abdominal:      General: Abdomen is flat. There is no distension.      Palpations: Abdomen is soft.      Tenderness: There is no abdominal tenderness.      Comments: Peg site c/d/i  Soft abdomen   Genitourinary:     Comments: Mireles in place. Urine clear  Musculoskeletal:      Right lower leg: No edema.      Left lower leg: No edema.      Comments: R radial art line in place   Skin:     General: Skin is warm.      Capillary Refill: Capillary refill takes less than 2 seconds.   Neurological:      General: No focal deficit present.      Mental Status:  She is alert.         Vents:  Vent Mode: Spont (01/31/22 0845)  Ventilator Initiated: Yes (01/18/22 1636)  Set Rate: 20 BPM (01/31/22 0724)  Vt Set: 330 mL (01/31/22 0724)  Pressure Support: 10 cmH20 (01/31/22 0845)  PEEP/CPAP: 5 cmH20 (01/31/22 0845)  Oxygen Concentration (%): 45 (01/31/22 1015)  Peak Airway Pressure: 36 cmH2O (01/31/22 0845)  Plateau Pressure: 29 cmH20 (01/31/22 0845)  Total Ve: 4.36 mL (01/31/22 0845)  Negative Inspiratory Force (cm H2O): 0 (01/31/22 0845)  F/VT Ratio<105 (RSBI): (!) 101.89 (01/31/22 0724)    Lines/Drains/Airways     Central Venous Catheter Line            Permacath 01/21/22 1013 left subclavian 10 days          Drain                 Gastrostomy/Enterostomy 01/21/22 1112 Percutaneous endoscopic gastrostomy (PEG) 9 days         Urethral Catheter 01/25/22 1341 Non-latex 5 days          Airway                 Surgical Airway 01/21/22 1043 Shiley Cuffed 9 days          Arterial Line            Arterial Line 01/05/22 0855 Right Radial 26 days          Peripheral Intravenous Line                 Midline Catheter Insertion/Assessment  - Single Lumen 01/10/22 1300 Left cephalic vein (lateral side of arm) 18g x 8cm 20 days                Significant Labs:    CBC/Anemia Profile:  Recent Labs   Lab 01/30/22  0308 01/31/22  0259   WBC 8.80 7.92   HGB 10.1* 9.4*   HCT 31.6* 29.6*   PLT 89* 129*   MCV 97 94   RDW 17.6* 17.8*        Chemistries:  Recent Labs   Lab 01/29/22  2321 01/30/22  0308 01/31/22  0259   * 131* 131*   K 3.7 3.6 3.6   CL 98 95 94*   CO2 21* 22* 19*   BUN 27* 38* 66*   CREATININE 1.6* 2.0* 3.0*   CALCIUM 8.6* 8.5* 9.0   ALBUMIN 2.8* 2.5* 2.3*   PROT  --  6.3 6.4   BILITOT  --  0.5 0.5   ALKPHOS  --  190* 170*   ALT  --  29 30   AST  --  63* 67*   MG  --  1.8 1.9   PHOS 2.0* 3.0 4.6*       All pertinent labs within the past 24 hours have been reviewed.    Significant Imaging:  I have reviewed all pertinent imaging results/findings within the past 24 hours.

## 2022-01-31 NOTE — PLAN OF CARE
Problem: Physical Therapy Goal  Goal: Physical Therapy Goal  Description: Goals to be met by: 2022     Patient will increase functional independence with mobility by performin. Sit to stand transfer with mod A -not met  2. Bed to chair transfer with mod A -not met  3. Gait  x 10 feet with mod A standing rest breaks prn. -not met  4. Lower extremity exercise program x30 reps per handout, with independence -not met  6. Pt sit on EOB x 10 min with CGA - not met      Outcome: Ongoing, Progressing   Treatment performed today. No goals met. Goals still appropriate.

## 2022-01-31 NOTE — PLAN OF CARE
[facility]  Discharge Reassessment    Primary Care Provider: Otis Zimmer MD    Expected Discharge Date: 2/1/2022    Reassessment (most recent)     Discharge Reassessment - 01/31/22 1240        Discharge Reassessment    Assessment Type Discharge Planning Reassessment     Discharge Plan discussed with: Adult children     Communicated MENDEL with patient/caregiver Date not available/Unable to determine     Discharge Plan A Long-term acute care facility (LTAC)     Discharge Plan B Long-term acute care facility (LTAC)     DME Needed Upon Discharge  other (see comments)   TBD    Discharge Barriers Identified None     Why the patient remains in the hospital Requires continued medical care        Post-Acute Status    Post-Acute Authorization Placement     Post-Acute Placement Status Referrals Sent               Per MD's Note, SYLVAINEON  Tolerating HD  Off pressors  Continuing planning for LTAC   Trach collar trial again today     The patient is not medically stable to discharge at this time. The SW [facility]. The SW will continue to follow-up with the patient to assist with discharge planning.     Shen Simmons LMSW  Case Management [facility]  Ext: [phone]

## 2022-01-31 NOTE — PLAN OF CARE
01/31/22 1238   Post-Acute Status   Post-Acute Authorization Placement  (LTACH)   Post-Acute Placement Status Referrals Sent       The SW faxed LTACH referrals to [facility] and [facility] via [facility] [place] for review. The SW will continue to follow.    Shen Simmons LMSW  Case Management [facility]  Ext: [phone]

## 2022-01-31 NOTE — PT/OT/SLP PROGRESS
Occupational Therapy   Treatment    Name: Orion Ty  MRN: [id number]  Admitting Diagnosis:  S/P aortic valve replacement  10 Days Post-Op    Recommendations:     Discharge Recommendations: rehabilitation facility  Discharge Equipment Recommendations:  other (see comments) (TBD pending extubation and progress)  Barriers to discharge:       Assessment:     Orion Ty is a 76 y.o. female with a medical diagnosis of S/P aortic valve replacement.  She presents with  Decreased ROM/Strength. Pt completed sit <> stand with Total A x 2 trials. Bed mobility MOD- Total A.  Pt completed  BUE exercises, requiring increased rest breaks. Performance deficits affecting function are weakness,impaired endurance,impaired self care skills,impaired functional mobilty,impaired sensation,decreased upper extremity function,decreased lower extremity function,decreased ROM,decreased safety awareness.     Rehab Prognosis:  Good; patient would benefit from acute skilled OT services to address these deficits and reach maximum level of function.       Plan:     Patient to be seen 4 x/week to address the above listed problems via self-care/home management,therapeutic activities,therapeutic exercises  · Plan of Care Expires: 02/05/22  · Plan of Care Reviewed with: patient,family    Subjective     Pain/Comfort:  ·      Objective:     Communicated with: RN prior to session.  Patient found supine with Tracheostomy,peripheral IV,pulse ox (continuous),telemetry,PEG Tube,G/J tube,arterial line,central line,blood pressure cuff upon OT entry to room.    General Precautions: Standard, fall,sternal   Orthopedic Precautions:N/A   Braces:    Respiratory Status: Room air     Occupational Performance:     Bed Mobility:    · Patient completed Rolling/Turning to Right with moderate assistance and 2 persons  · Patient completed Scooting/Bridging with total assistance  · Patient completed Supine to Sit with moderate assistance and 2 persons  · Patient  completed Sit to Supine with moderate assistance and 2 persons     Functional Mobility/Transfers:  · Patient completed Sit <> Stand Transfer with total assistance  with  no assistive device  x 2 standing trials       Activities of Daily Living:  · Grooming: stand by assistance for washing face seated EOB      AMPAC 6 Click ADL: 12    Treatment & Education:  Transfer Training  BUE Exercises (10x Shoulder flexion,10x  bicep curls)      Patient left supine with all lines intact, call button in reach and family presentEducation:      GOALS:   Multidisciplinary Problems     Occupational Therapy Goals        Problem: Occupational Therapy Goal    Goal Priority Disciplines Outcome Interventions   Occupational Therapy Goal     OT, PT/OT Ongoing, Progressing    Description: Goals to be met by:  2/2/22     Patient will increase functional independence with ADLs by performing:    Pt to complete UE dressing with set-up  Pt to complete LE dressing with SBA  Pt to complete standing g/h skills with SBA  Pt to complete toileting with SBA  Pt to completed t/f bed, chair and commode with SBA                   Time Tracking:     OT Date of Treatment: 01/31/22  OT Start Time: 1040  OT Stop Time: 1113  OT Total Time (min): 33 min    Billable Minutes:Therapeutic Activity 20  Therapeutic Exercise 13    OT/SUE: OT          1/31/2022

## 2022-01-31 NOTE — PROGRESS NOTES
[facility]  Nephrology  Progress Note     Patient Name: Orion Ty  MRN: [id number]  Admission Date: 1/5/2022  Hospital Length of Stay: 26 days  Attending Provider: Dennis Haider MD   Primary Care Physician: Otis Zimmer MD  Principal Problem: S/P aortic valve replacement    Subjective:     HPI: Orion Ty is our 76 y.o. female with previous aortic valve replacement, mitral valve replacement, and tricuspid valve repair in 2010 who presented on 1/5 for redo aortic valve replacement. Nephrology consulted on 1/8 for anuric JO. Patient is alert but tired, son at bedside. Stated she was tired prior to admission. During the hospital stay Cr increase from 1 to 1.5. In OR she received  2.5L crystalloid, 3U RBC, 2U FFP, 2 platelets, and 800 cell saver. she is +5 L since admit. UOP decrease overnight, per nurse she didn't urinate at all. She received diuril 500 mg twice, lasix 20 mg *2 on 1/7. This morning she urinate 225 immediately after placing the tejeda's cath. She received again lasix 100 mg this morning. Upor evaluation she was alert but repeatedly saying she was tired, denied any pain. Denied any previous hx of kidney disease.       Interval History: Net negative 867mL over the past 24h. UOP of 265mLs.  iHD completed yesterday; 1.5L removed.      Review of patient's allergies indicates:  No Known Allergies   Current Facility-Administered Medications   Medication Frequency    0.9%  NaCl infusion PRN    0.9%  NaCl infusion PRN    0.9%  NaCl infusion Once    acetaminophen oral solution 650 mg Q4H PRN    albuterol-ipratropium 2.5 mg-0.5 mg/3 mL nebulizer solution 3 mL Q6H    atorvastatin tablet 40 mg Daily    bisacodyL suppository 10 mg Daily PRN    dextrose 10 % infusion Continuous PRN    dextrose 50% injection 12.5 g PRN    fluconazole 40 mg/ml suspension 200 mg Daily    furosemide injection 80 mg Q8H    glucagon (human recombinant) injection 1 mg PRN    heparin  (porcine) injection 1,000 Units PRN    hydrOXYzine 10 mg/5 mL syrup 10 mg Q6H PRN    insulin aspart U-100 pen 1-10 Units PRN    LIDOcaine HCL 2% jelly PRN    melatonin tablet 9 mg Nightly PRN    metoprolol tartrate (LOPRESSOR) tablet 25 mg BID    midodrine tablet 15 mg Q12H PRN    ondansetron injection 4 mg Q12H PRN    oxyCODONE 5 mg/5 mL solution 5 mg Q4H PRN    pantoprazole injection 40 mg Q12H    polyethylene glycol packet 17 g Daily PRN    psyllium husk (aspartame) 3.4 gram packet 1 packet Daily    sodium chloride 0.9% bolus 250 mL PRN    sodium chloride 0.9% flush 10 mL PRN    vasopressin (PITRESSIN) 0.2 Units/mL in dextrose 5 % 100 mL infusion Continuous    warfarin (COUMADIN) split tablet 0.5 mg Every Mon, Wed, Fri     Facility-Administered Medications Ordered in Other Encounters   Medication Frequency    0.9%  NaCl infusion Continuous       Objective:     Vital Signs (Most Recent):  Temp: 98.5 °F (36.9 °C) (01/31/22 1100)  Pulse: 80 (01/31/22 1300)  Resp: (!) 25 (01/31/22 1300)  BP: (!) 92/54 (01/31/22 1300)  SpO2: (!) 93 % (01/31/22 1300)  O2 Device (Oxygen Therapy): ventilator (01/31/22 1300) Vital Signs (24h Range):  Temp:  [98.2 °F (36.8 °C)-98.9 °F (37.2 °C)] 98.5 °F (36.9 °C)  Pulse:  [79-84] 80  Resp:  [21-56] 25  SpO2:  [90 %-100 %] 93 %  BP: ()/(49-65) 92/54  Arterial Line BP: ()/(38-63) 92/38   Weight: 44.8 kg (98 lb 12.3 oz) (01/20/22 1300)  Body mass index is 18.66 kg/m².  Body surface area is 1.39 meters squared.      Intake/Output Summary (Last 24 hours) at 1/31/2022 1358  Last data filed at 1/31/2022 1300  Gross per 24 hour   Intake 840 ml   Output 135 ml   Net 705 ml     I/O last 3 completed shifts:  In: 2071.2 [I.V.:46.2; Other:650; NG/GT:1375]  Out: 2440 [Urine:225; Other:2215]  Net IO Since Admission: 6,244.71 mL [01/31/22 1358]     Physical Exam  Constitutional:       Appearance: She is well-developed. She is ill-appearing.   Cardiovascular:      Rate and  Rhythm: Normal rate and regular rhythm.      Heart sounds: Normal heart sounds.   Pulmonary:      Comments: tracheostomy   Abdominal:      General: Abdomen is flat.      Palpations: Abdomen is soft.   Musculoskeletal:         General: Normal range of motion.      Right lower leg: No edema.      Left lower leg: No edema.   Skin:     General: Skin is warm and dry.      Comments: Sternal Incision CDI   Neurological:      Mental Status: She is alert.   Psychiatric:         Mood and Affect: Mood normal.         Behavior: Behavior normal.         Recent Labs   Lab 01/29/22 0317 01/29/22 0317 01/30/22  0308 01/31/22  0259   WBC 8.44  --  8.80 7.92   HGB 10.2*  --  10.1* 9.4*   HCT 32.1*  --  31.6* 29.6*   PLT 80*  --  89* 129*   MONO 4.4  0.4   < > 4.2  0.4 4.7  0.4    < > = values in this interval not displayed.      Recent Labs   Lab 01/29/22 0317 01/29/22  0601 01/29/22  2321 01/30/22 0308 01/31/22  0259   *   < > 134* 131* 131*   K 3.5   < > 3.7 3.6 3.6   CL 87*   < > 98 95 94*   CO2 25   < > 21* 22* 19*   BUN 60*   < > 27* 38* 66*   CREATININE 2.9*   < > 1.6* 2.0* 3.0*   CALCIUM 9.5   < > 8.6* 8.5* 9.0   PROT 6.4  --   --  6.3 6.4   BILITOT 0.5  --   --  0.5 0.5   ALKPHOS 147*  --   --  190* 170*   ALT 24  --   --  29 30   AST 56*  --   --  63* 67*    < > = values in this interval not displayed.      No results for input(s): PH, PCO2, PO2, HCO3, POCSATURATED, BE in the last 72 hours.    Assessment/Plan:        JO  acute kidney injury  Orion Ty is our 76 y.o. female with previous aortic valve replacement, mitral valve replacement, and tricuspid valve repair in 2010 who presented on 1/5 for redo aortic valve replacement. Nephrology consulted on 1/8 for anuric JO. During the hospital stay Cr increase from 1 to 1.5. In OR she received  2.5L crystalloid, 3U RBC, 2U FFP, 2 platelets, and 800 cell saver. she is +5 L since admit. UOP decreased. She received diuril 500 mg twice, lasix 20 mg x2 on 1/7 with  no adequate response to diuretics     Plan   Oliguric ischemic ATN likely secondary to severe intraoperative hypotension requiring vasopressor on 01/05/2022  Failed high-dose diuretics and renal replacement therapy started on 1/9/22 due to volume overload  Patient clinically not overloaded  Labs reviewed; no emergent electrolyte abnormalities noted   Will plan for 1 session intermittent hemodialysis for today with ultrafiltration target of 1-2 L per  Strict I/Os   Renally dose medications to GFR      S/P aortic valve replacement  With severe intraoperative hypotension   Management per primary     ATN (acute tubular necrosis)  See kailey      Rose Mary Walsh MD  Nephrology  [facility]    ATTENDING PHYSICIAN ATTESTATION  I have personally verified the history and examined the patient. I thoroughly reviewed the demographic, clinical, laboratorial and imaging information available in medical records. I agree with the assessment and recommendations provided by the subspecialty resident who was under my supervision.

## 2022-01-31 NOTE — ASSESSMENT & PLAN NOTE
Orion Ty is a 76 y.o. female who presents to the SICU s/p redo aortic valve replacement with mechanical valve on 1/5/22.      Neuro/Psych:   -- Sedation: none  -- Pain: Liquid Tiffani, breakthrough fentanyl pushes q2     Cards:   -- Pressors: off, vaso if needed  -- Goal MAP: 60-80  -- statin  -- INR 2.6 this morning, 1mg coumadin today  -- Paced HR 80  -- metoprolol 25 BID      Pulm:   -- Goal O2 sat > 90%  -- reintubated 1/12 then 1/18. S/p trach 1/21  -- Spontaneous when able.   -- Tolerated trach collar for one hour on 1/27  -- ABG PRN  -- 2 mediastinal removed 1/9 L Pleural CT removed 1/8      Renal:  -- Mireles replaced 1/25, UOP 10 cc/hr  -- S/p iHD trial 1/30.  -- Tolerating iHD, midodrine as needed for hypotension  -- Oliguric   -- Nephrology following  -- Permcath 1/21  -- Removed R IJ trialysis 1/23      FEN / GI:   -- Replace lytes as needed  -- s/p PEG 1/21  -- Nutrition: tube feeds novasource renal, at goal      ID:   -- WBC stable  -- Antibiotics: periop ancef complete. Zosyn and vanc, stopped 1/20        Heme/Onc:   -- Hgb 9.4  -- Daily CBC  -- Transfused products: 1U RBC on 1/5/22. 1u RBC and 1u FFP on 1/9/21. 2U RBC on 1/12. 4 FFP on 1/13. 1 FFP on 1/17, 2u pRBC 1/27  -- Anticoagulation: coumadin  -- coumadin 0.5 mevery other day      Endo:   -- Gluc goal 140-180  -- Insulin per endocrinology      PPx:   Feeding: tube feeds  Analgesia/Sedation: Tiffani, fent pushes / none  Thromboembolic prevention: Coumadin, ASA  HOB >30: yes  Stress Ulcer ppx: protonix BID  Glucose control: Critical care goal 140-180 g/dl, ISS    Lines/Drains/Airway: Art line, Trach, Permcath, PEG      Dispo/Code Status/Palliative:   -- SICU / Full Code

## 2022-02-01 NOTE — ASSESSMENT & PLAN NOTE
Orion Ty is a 76 y.o. female who presents to the SICU s/p redo aortic valve replacement with mechanical valve on 1/5/22.      Neuro/Psych:   -- Sedation: none  -- Pain: Liquid Tiffani, breakthrough fentanyl pushes q2     Cards:   -- Pressors: off, vaso if needed  -- Goal MAP: 60-80  -- A-fib rate controlled  -- statin  -- INR 1.4 this morning, transitioning to 0.5mg Daily  -- metoprolol 25 BID      Pulm:   -- Goal O2 sat > 90%  -- reintubated 1/12 then 1/18. S/p trach 1/21  -- Spontaneous when able.   -- Tolerated trach collar for one hour on 1/27, 4 hours on 1/31  -- ABG PRN  -- 2 mediastinal removed 1/9 L Pleural CT removed 1/8      Renal:  -- Mireles replaced 1/25, UOP 370cc yesterday  -- iHD  -- Tolerating iHD, midodrine as needed for hypotension  -- Permcath 1/21  -- Removed R IJ trialysis 1/23      FEN / GI:   -- Replace lytes as needed  -- s/p PEG 1/21  -- Nutrition: tube feeds novasource renal, at goal      ID:   -- WBC stable  -- Antibiotics: periop ancef complete. Zosyn and vanc, stopped 1/20        Heme/Onc:   -- Hgb stable  -- Daily CBC  -- Transfused products: 1U RBC on 1/5/22. 1u RBC and 1u FFP on 1/9/21. 2U RBC on 1/12. 4 FFP on 1/13. 1 FFP on 1/17, 2u pRBC 1/27  -- Anticoagulation: coumadin      Endo:   -- Gluc goal 140-180  -- Insulin per endocrinology      PPx:   Feeding: tube feeds  Analgesia/Sedation: Tiffani, fent pushes / none  Thromboembolic prevention: Coumadin, ASA  HOB >30: yes  Stress Ulcer ppx: protonix BID  Glucose control: Critical care goal 140-180 g/dl, ISS    Lines/Drains/Airway: Art line, Trach, Permcath, PEG      Dispo/Code Status/Palliative:   -- SICU / Full Code

## 2022-02-01 NOTE — PROGRESS NOTES
[facility]  Nephrology  Progress Note     Patient Name: Orion Ty  MRN: [id number]  Admission Date: 1/5/2022  Hospital Length of Stay: 27 days  Attending Provider: Dennis Haider MD   Primary Care Physician: Otis Zimmer MD  Principal Problem: S/P aortic valve replacement    Subjective:     HPI: Orion Ty is our 76 y.o. female with previous aortic valve replacement, mitral valve replacement, and tricuspid valve repair in 2010 who presented on 1/5 for redo aortic valve replacement. Nephrology consulted on 1/8 for anuric JO. Patient is alert but tired, son at bedside. Stated she was tired prior to admission. During the hospital stay Cr increase from 1 to 1.5. In OR she received  2.5L crystalloid, 3U RBC, 2U FFP, 2 platelets, and 800 cell saver. she is +5 L since admit. UOP decrease overnight, per nurse she didn't urinate at all. She received diuril 500 mg twice, lasix 20 mg *2 on 1/7. This morning she urinate 225 immediately after placing the tejeda's cath. She received again lasix 100 mg this morning. Upor evaluation she was alert but repeatedly saying she was tired, denied any pain. Denied any previous hx of kidney disease.       Interval History: Net negative 867mL over the past 24h. UOP of 265mLs.  iHD completed yesterday; 1.5L removed.      Review of patient's allergies indicates:  No Known Allergies   Current Facility-Administered Medications   Medication Frequency    0.9%  NaCl infusion PRN    0.9%  NaCl infusion PRN    0.9%  NaCl infusion Once    0.9%  NaCl infusion PRN    0.9%  NaCl infusion Once    acetaminophen oral solution 650 mg Q4H PRN    albuterol-ipratropium 2.5 mg-0.5 mg/3 mL nebulizer solution 3 mL Q6H    atorvastatin tablet 40 mg Daily    bisacodyL suppository 10 mg Daily PRN    dextrose 10 % infusion Continuous PRN    dextrose 50% injection 12.5 g PRN    furosemide injection 80 mg Q8H    glucagon (human recombinant) injection 1 mg PRN    heparin  (porcine) injection 1,000 Units PRN    heparin 25,000 units in dextrose 5% (100 units/ml) IV bolus from bag - ADDITIONAL PRN BOLUS - 30 units/kg PRN    heparin 25,000 units in dextrose 5% (100 units/ml) IV bolus from bag - ADDITIONAL PRN BOLUS - 60 units/kg PRN    heparin 25,000 units in dextrose 5% (100 units/ml) IV bolus from bag INITIAL BOLUS Once    heparin 25,000 units in dextrose 5% 250 mL (100 units/mL) infusion LOW INTENSITY nomogram - [facility] Continuous    hydrOXYzine 10 mg/5 mL syrup 10 mg Q6H PRN    insulin aspart U-100 pen 1-10 Units PRN    [START ON 2/2/2022] insulin aspart U-100 pen 3 Units Q24H    [START ON 2/2/2022] insulin aspart U-100 pen 3 Units Q24H    [START ON 2/2/2022] insulin aspart U-100 pen 3 Units Q24H    insulin aspart U-100 pen 3 Units Q24H    insulin aspart U-100 pen 3 Units Q24H    insulin aspart U-100 pen 3 Units Q24H    LIDOcaine HCL 2% jelly PRN    metoprolol injection 5 mg Q5 Min PRN    metoprolol tartrate (LOPRESSOR) tablet 25 mg BID    midodrine tablet 15 mg Q8H    ondansetron injection 4 mg Q12H PRN    oxyCODONE 5 mg/5 mL solution 5 mg Q4H PRN    pantoprazole injection 40 mg Q12H    polyethylene glycol packet 17 g Daily PRN    psyllium husk (aspartame) 3.4 gram packet 1 packet Daily    QUEtiapine tablet 25 mg QHS    sodium chloride 0.9% bolus 250 mL PRN    sodium chloride 0.9% bolus 250 mL PRN    sodium chloride 0.9% flush 10 mL PRN    sodium phosphate 39.99 mmol in dextrose 5 % 250 mL IVPB Once    vasopressin (PITRESSIN) 0.2 Units/mL in dextrose 5 % 100 mL infusion Continuous    warfarin (COUMADIN) split tablet 0.5 mg Daily     Facility-Administered Medications Ordered in Other Encounters   Medication Frequency    0.9%  NaCl infusion Continuous       Objective:     Vital Signs (Most Recent):  Temp: 99 °F (37.2 °C) (02/01/22 1100)  Pulse: 80 (02/01/22 1303)  Resp: (!) 47 (02/01/22 1303)  BP: (!) 98/58 (02/01/22 1303)  SpO2: 98 % (02/01/22 1303)  O2  Device (Oxygen Therapy): ventilator (02/01/22 1100) Vital Signs (24h Range):  Temp:  [96.5 °F (35.8 °C)-99.4 °F (37.4 °C)] 99 °F (37.2 °C)  Pulse:  [] 80  Resp:  [20-49] 47  SpO2:  [90 %-100 %] 98 %  BP: ()/(48-69) 98/58  Arterial Line BP: ()/(38-59) 87/40   Weight: 47.6 kg (105 lb) (02/01/22 0424)  Body mass index is 19.84 kg/m².  Body surface area is 1.43 meters squared.      Intake/Output Summary (Last 24 hours) at 2/1/2022 1320  Last data filed at 2/1/2022 1200  Gross per 24 hour   Intake 966.62 ml   Output 2790 ml   Net -1823.38 ml     I/O last 3 completed shifts:  In: 1264.9 [I.V.:4.9; NG/GT:1260]  Out: 2925 [Urine:425; Other:2500]  Net IO Since Admission: 4,421.33 mL [02/01/22 1320]     Physical Exam  Constitutional:       Appearance: She is well-developed. She is ill-appearing.   Cardiovascular:      Rate and Rhythm: Normal rate and regular rhythm.      Heart sounds: Normal heart sounds.   Pulmonary:      Comments: tracheostomy   Abdominal:      General: Abdomen is flat.      Palpations: Abdomen is soft.   Musculoskeletal:         General: Normal range of motion.      Right lower leg: No edema.      Left lower leg: No edema.   Skin:     General: Skin is warm and dry.      Comments: Sternal Incision CDI   Neurological:      Mental Status: She is alert.   Psychiatric:         Mood and Affect: Mood normal.         Behavior: Behavior normal.         Recent Labs   Lab 01/30/22  0308 01/30/22  0308 01/31/22  0259 02/01/22  0315 02/01/22  0330   WBC 8.80  --  7.92 12.36  --    HGB 10.1*  --  9.4* 12.3  --    HCT 31.6*   < > 29.6* 38.0 37   PLT 89*  --  129* 138*  --    MONO 4.2  0.4   < > 4.7  0.4 2.4*  0.3  --     < > = values in this interval not displayed.      Recent Labs   Lab 01/30/22  0308 01/31/22  0259 02/01/22 0315   * 131* 133*   K 3.6 3.6 3.5   CL 95 94* 95   CO2 22* 19* 23   BUN 38* 66* 18   CREATININE 2.0* 3.0* 1.0   CALCIUM 8.5* 9.0 8.4*   PROT 6.3 6.4 8.7*   BILITOT 0.5  0.5 0.9   ALKPHOS 190* 170* 255*   ALT 29 30 48*   AST 63* 67* 91*      Recent Labs     02/01/22  0330   PH 7.489*   PCO2 34.6*   PO2 59*   HCO3 26.3   POCSATURATED 92*   BE 3       Assessment/Plan:        JO  acute kidney injury  Orion Ty is our 76 y.o. female with previous aortic valve replacement, mitral valve replacement, and tricuspid valve repair in 2010 who presented on 1/5 for redo aortic valve replacement. Nephrology consulted on 1/8 for anuric JO. During the hospital stay Cr increase from 1 to 1.5. In OR she received  2.5L crystalloid, 3U RBC, 2U FFP, 2 platelets, and 800 cell saver. she is +5 L since admit. UOP decreased. She received diuril 500 mg twice, lasix 20 mg x2 on 1/7 with no adequate response to diuretics     Plan   Oliguric ischemic ATN likely secondary to severe intraoperative hypotension requiring vasopressor on 01/05/2022  Failed high-dose diuretics and renal replacement therapy started on 1/9/22 due to volume overload  Patient clinically not overloaded  Labs reviewed; no emergent electrolyte abnormalities noted   Pt had episode of hypotension last night for which she need it vasopressor ( she is off now)   We will hold on iHD and will assess pt on the daily basis for the need for iHD   Strict I/Os   Renally dose medications to GFR      S/P aortic valve replacement  With severe intraoperative hypotension   Management per primary     ATN (acute tubular necrosis)  See jo      Rose Mary Walsh MD  Nephrology  [facility]      ATTENDING PHYSICIAN ATTESTATION  I have personally verified the history and examined the patient. I thoroughly reviewed the demographic, clinical, laboratorial and imaging information available in medical records. I agree with the assessment and recommendations provided by the subspecialty resident who was under my supervision.

## 2022-02-01 NOTE — SUBJECTIVE & OBJECTIVE
"Interval HPI:   Overnight events: Remains intubated in SICU. POD 10. Received HD overnight. BG above goal ranges on prn SQ insulin correction scale. TFs infusing at 35 ml/hr. Diet NPO Except for: Sips with Medication  Eating:   NPO  Nausea: No  Hypoglycemia and intervention: No  Fever: No  TPN and/or TF: Yes  If yes, type of TF/TPN and rate: Novasource Renal at 35 ml/hr    BP (!) 110/59 (BP Location: Right arm, Patient Position: Lying)   Pulse 82   Temp 98.8 °F (37.1 °C) (Oral)   Resp (!) 35   Ht 5' 1" (1.549 m)   Wt 47.6 kg (105 lb)   SpO2 96%   Breastfeeding No   BMI 19.84 kg/m²     Labs Reviewed and Include    Recent Labs   Lab 02/01/22  0315   *   CALCIUM 8.4*   ALBUMIN 3.2*   PROT 8.7*   *   K 3.5   CO2 23   CL 95   BUN 18   CREATININE 1.0   ALKPHOS 255*   ALT 48*   AST 91*   BILITOT 0.9     Lab Results   Component Value Date    WBC 12.36 02/01/2022    HGB 12.3 02/01/2022    HCT 37 02/01/2022    MCV 93 02/01/2022     (L) 02/01/2022     No results for input(s): TSH, FREET4 in the last 168 hours.  Lab Results   Component Value Date    HGBA1C 6.0 (H) 01/03/2022       Nutritional status:   Body mass index is 19.84 kg/m².  Lab Results   Component Value Date    ALBUMIN 3.2 (L) 02/01/2022    ALBUMIN 2.3 (L) 01/31/2022    ALBUMIN 2.5 (L) 01/30/2022     Lab Results   Component Value Date    PREALBUMIN <3 (L) 01/28/2022    PREALBUMIN 13 (L) 01/21/2022    PREALBUMIN 11 (L) 01/10/2022       Estimated Creatinine Clearance: 35.4 mL/min (based on SCr of 1 mg/dL).    Accu-Checks  Recent Labs     01/30/22  0550 01/30/22  0755 01/30/22  1313 01/30/22  1726 01/31/22  0002 01/31/22  0600 01/31/22  1214 01/31/22  1801 02/01/22  0008 02/01/22  0554   POCTGLUCOSE 194* 219* 179* 211* 153* 226* 191* 207* 256* 197*       Current Medications and/or Treatments Impacting Glycemic Control  Immunotherapy:    Immunosuppressants     None        Steroids:   Hormones (From admission, onward)            Start     Stop " Route Frequency Ordered    02/01/22 2100  melatonin tablet 6 mg         -- Oral Nightly 02/01/22 0831    01/28/22 0230  vasopressin (PITRESSIN) 0.2 Units/mL in dextrose 5 % 100 mL infusion         -- IV Continuous 01/28/22 0117    01/28/22 0207  vasopressin (PITRESSIN) 20 unit/mL injection        Note to Pharmacy: Created by cabinet override    01/28 1414   01/28/22 0207        Pressors:    Autonomic Drugs (From admission, onward)            Start     Stop Route Frequency Ordered    02/01/22 2100  midodrine tablet 15 mg         -- Oral Every 12 hours 02/01/22 0824    01/12/22 1441  rocuronium 10 mg/mL injection        Note to Pharmacy: Created by cabinet override    01/13 0244   01/12/22 1441        Hyperglycemia/Diabetes Medications:   Antihyperglycemics (From admission, onward)            Start     Stop Route Frequency Ordered    02/02/22 0800  insulin aspart U-100 pen 3 Units         -- SubQ Every 24 hours (non-standard times) 02/01/22 0903    02/02/22 0400  insulin aspart U-100 pen 3 Units         -- SubQ Every 24 hours (non-standard times) 02/01/22 0903    02/02/22 0000  insulin aspart U-100 pen 3 Units         -- SubQ Every 24 hours (non-standard times) 02/01/22 0903    02/01/22 2000  insulin aspart U-100 pen 3 Units         -- SubQ Every 24 hours (non-standard times) 02/01/22 0903    02/01/22 1600  insulin aspart U-100 pen 3 Units         -- SubQ Every 24 hours (non-standard times) 02/01/22 0903    02/01/22 1200  insulin aspart U-100 pen 3 Units         -- SubQ Every 24 hours (non-standard times) 02/01/22 0903    01/27/22 0851  insulin aspart U-100 pen 1-10 Units         -- SubQ As needed (PRN) 01/27/22 0853

## 2022-02-01 NOTE — PROGRESS NOTES
Hemodialysis Treatment completed.     02/01/22 0326   Post-Hemodialysis Assessment   Duration of Treatment (minutes) 180 minutes   Total UF (mL) 2500 mL   Net Fluid Removal 1800   Patient Response to Treatment Not tolerated, issues with BPs and body/mind response to Tx.   Post-Hemodialysis Comments Pt is awake, family at bedside

## 2022-02-01 NOTE — PT/OT/SLP PROGRESS
Physical Therapy      Patient Name:  Orion Ty   MRN:  [id number]    Patient not seen today 2/1/22 secondary to prioritizing rest in order to prepare for 4 hr trach collar trial today. Pt became hypotensive last night while on HD. Pt tachy this morning and had to be paced. Family in agreement with plan. Will follow up per POC.

## 2022-02-01 NOTE — PLAN OF CARE
Dr. Dorman notified of monitor reading Afib this morning. HR 90-110s. EKG ordered. Metoprolol given x2. Pt completed 3 hours of HD overnight. Pt was anxious and tachypneic throughout HD session     Tmax 99.4. Now 100% V paced at 80bpm. MAP 60-80 on and off Vaso overnight. SpO2 >90% on ventilator. Vent settings: AC/ VC 60%/5 PEEP/ Rate20. Tv 330. Pt follows commands and moves all extremities.     Gtts:   Vasopressin 2 units/min     PEG tube in place. TF @ goal of 35cc/hr   Mireles in place. UOP 195cc total/shift       POC reviewed with patient and family. All questions/concerns addressed.     Skin: Turn q2h. Pillows in place. On specialty bed. Triad applied to sacrum.

## 2022-02-01 NOTE — PLAN OF CARE
9:56 AM     The SW met with the patient and her daughter in law at bedside to discuss LTACH placement options. The SW informed the patient and her daughter in law that [facility] is willing to accept her.  The patient's daughter reported that she has an appointment scheduled to at [facility] for 1:00 pm today. The patient's daughter in law stated that she also has an appointment with [facility] today for 3:00 pm.      [facility] is willing to accept the patient pending auth and bed availability.     12:10 PM    [place] - Declined    SW will continue to follow.       Shen Simmons LMSW  Case Management [facility]  Ext: [phone]

## 2022-02-01 NOTE — PT/OT/SLP PROGRESS
Occupational Therapy      Patient Name:  Orion Ty   MRN:  [id number]    Pt not seen this date. Pt with poor night sleep with increased pressor support needed after dialysis with tachypnea leading to restless night. Pt sleeping on arrival this AM. Family in agreement with plan to defer therapy this date and to progress with trach collar tolerance later this date with nsg/RT. OT to check status at later date to continue with therapy services as indicated.       2/1/2022

## 2022-02-01 NOTE — CONSULTS
[facility] - Surgical Intensive Care  Wound Care    Patient Name:  Orion Ty   MRN:  [id number]  Date: 2/1/2022  Diagnosis: S/P aortic valve replacement    History:     Past Medical History:   Diagnosis Date    A-fib     Anticoagulant long-term use     Closed displaced fracture of distal phalanx of lesser toe 10/20/2015    Right    Coronary artery disease     Diabetes mellitus     Diabetes mellitus, type 2     Hypertension     Mechanical heart valve present     X's two    Osteopenia     Pacemaker     Rheumatic heart disease        Social History     Socioeconomic History    Marital status:    Tobacco Use    Smoking status: Never Smoker    Smokeless tobacco: Never Used   Substance and Sexual Activity    Alcohol use: No    Drug use: Never       Precautions:     Allergies as of 11/24/2021 - Reviewed 11/23/2021   No Active Allergies   Allergen Reaction Noted    Aspirin Other (See Comments) 12/03/2019       [facility] Assessment Details/Treatment     Wound care consult received from RN for assessment of sacrum. Patient known to wound care team from this admission and is also being followed by the skin integrity NP. Assessment and picture listed below.     Bilateral buttocks- previously documented as moisture/ friction/ shearing- red granulation- increased epithelium to wound bed- no odor- moderate serosanguinous drainage           Recommendations made to primary team for 3M tegaderm hydrocolloid sacral [id number]/week to bilateral buttocks to promote healing, protect skin breakdown and periwound skin from contaminants,body fluids and manages drainage. Orders placed. Will discontinue Triad ointment. Nursing and MD team notified. Nursing to maintain pressure injury prevention interventions. Wound care to continue to follow pt prn.             02/01/2022

## 2022-02-01 NOTE — ASSESSMENT & PLAN NOTE
Lab Results   Component Value Date    CREATININE 1.0 02/01/2022     Avoid insulin stacking  Titrate insulin slowly

## 2022-02-01 NOTE — PROGRESS NOTES
Hemodialysis initiated as ordered, Plan of care communicated to pt. and her daughter. Total fluid goal is 2.5L with Net goal of 2 L.

## 2022-02-01 NOTE — NURSING
Dr. Dorman notified of patient becoming hypotensive while on HD. Ok to start Vasopressin at 0.02 units/min for MAP <60.

## 2022-02-01 NOTE — PROGRESS NOTES
"[facility]  Endocrinology  Progress Note    Admit Date: 1/5/2022     Reason for Consult: Management of T2DM, Hyperglycemia     Surgical Procedure and Date:   1/5/21  REPLACEMENT, AORTIC VALVE, WITH REPEAT STERNOTOMY (N/A)     Diabetes diagnosis year: MELIDA; intubated    Home Diabetes Medications:  Metformin 500 mg daily,     How often checking glucose at home?  MELIDA    BG readings on regimen: MELIDA  Hypoglycemia on the regimen?  MELIDA  Missed doses on regimen? MELIDA    Diabetes Complications include:     None    Complicating diabetes co morbidities:   CAD, HTN, a-fib       HPI:   Patient is a 76 y.o. female with a diagnosis of s/p MVR, AVR and TVr in 2010 by Dr. Haider. 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 Family states she is more fatigued, decreased appetite and fluid on lungs. Pt has increased symptoms and wants to proceed with cardiac surgery now and here for pre op. She is now s/p the above procedure. Endocrinology consulted for management of T2DM.     Lab Results   Component Value Date    HGBA1C 6.0 (H) 01/03/2022           Interval HPI:   Overnight events: Remains intubated in SICU. POD 10. Received HD overnight. BG above goal ranges on prn SQ insulin correction scale. TFs infusing at 35 ml/hr. Diet NPO Except for: Sips with Medication  Eating:   NPO  Nausea: No  Hypoglycemia and intervention: No  Fever: No  TPN and/or TF: Yes  If yes, type of TF/TPN and rate: Novasource Renal at 35 ml/hr    BP (!) 110/59 (BP Location: Right arm, Patient Position: Lying)   Pulse 82   Temp 98.8 °F (37.1 °C) (Oral)   Resp (!) 35   Ht 5' 1" (1.549 m)   Wt 47.6 kg (105 lb)   SpO2 96%   Breastfeeding No   BMI 19.84 kg/m²     Labs Reviewed and Include    Recent Labs   Lab 02/01/22  0315   *   CALCIUM 8.4*   ALBUMIN 3.2*   PROT 8.7*   *   K 3.5   CO2 23   CL 95   BUN 18   CREATININE 1.0   ALKPHOS 255*   ALT 48*   AST 91* "   BILITOT 0.9     Lab Results   Component Value Date    WBC 12.36 02/01/2022    HGB 12.3 02/01/2022    HCT 37 02/01/2022    MCV 93 02/01/2022     (L) 02/01/2022     No results for input(s): TSH, FREET4 in the last 168 hours.  Lab Results   Component Value Date    HGBA1C 6.0 (H) 01/03/2022       Nutritional status:   Body mass index is 19.84 kg/m².  Lab Results   Component Value Date    ALBUMIN 3.2 (L) 02/01/2022    ALBUMIN 2.3 (L) 01/31/2022    ALBUMIN 2.5 (L) 01/30/2022     Lab Results   Component Value Date    PREALBUMIN <3 (L) 01/28/2022    PREALBUMIN 13 (L) 01/21/2022    PREALBUMIN 11 (L) 01/10/2022       Estimated Creatinine Clearance: 35.4 mL/min (based on SCr of 1 mg/dL).    Accu-Checks  Recent Labs     01/30/22  0550 01/30/22  0755 01/30/22  1313 01/30/22  1726 01/31/22  0002 01/31/22  0600 01/31/22  1214 01/31/22  1801 02/01/22  0008 02/01/22  0554   POCTGLUCOSE 194* 219* 179* 211* 153* 226* 191* 207* 256* 197*       Current Medications and/or Treatments Impacting Glycemic Control  Immunotherapy:    Immunosuppressants     None        Steroids:   Hormones (From admission, onward)            Start     Stop Route Frequency Ordered    02/01/22 2100  melatonin tablet 6 mg         -- Oral Nightly 02/01/22 0831    01/28/22 0230  vasopressin (PITRESSIN) 0.2 Units/mL in dextrose 5 % 100 mL infusion         -- IV Continuous 01/28/22 0117    01/28/22 0207  vasopressin (PITRESSIN) 20 unit/mL injection        Note to Pharmacy: Created by cabinet override    01/28 1414   01/28/22 0207        Pressors:    Autonomic Drugs (From admission, onward)            Start     Stop Route Frequency Ordered    02/01/22 2100  midodrine tablet 15 mg         -- Oral Every 12 hours 02/01/22 0824    01/12/22 1441  rocuronium 10 mg/mL injection        Note to Pharmacy: Created by cabinet override    01/13 0244   01/12/22 1448        Hyperglycemia/Diabetes Medications:   Antihyperglycemics (From admission, onward)            Start      Stop Route Frequency Ordered    02/02/22 0800  insulin aspart U-100 pen 3 Units         -- SubQ Every 24 hours (non-standard times) 02/01/22 0903    02/02/22 0400  insulin aspart U-100 pen 3 Units         -- SubQ Every 24 hours (non-standard times) 02/01/22 0903    02/02/22 0000  insulin aspart U-100 pen 3 Units         -- SubQ Every 24 hours (non-standard times) 02/01/22 0903    02/01/22 2000  insulin aspart U-100 pen 3 Units         -- SubQ Every 24 hours (non-standard times) 02/01/22 0903    02/01/22 1600  insulin aspart U-100 pen 3 Units         -- SubQ Every 24 hours (non-standard times) 02/01/22 0903    02/01/22 1200  insulin aspart U-100 pen 3 Units         -- SubQ Every 24 hours (non-standard times) 02/01/22 0903    01/27/22 0851  insulin aspart U-100 pen 1-10 Units         -- SubQ As needed (PRN) 01/27/22 0853          ASSESSMENT and PLAN    * S/P aortic valve replacement  Managed per primary team  Optimize BG control        Type 2 diabetes mellitus with microalbuminuria, without long-term current use of insulin  BG goal 140 - 180       - Start Novolog 3 units q 4 hrs while on tube feeds. (HOLD if tube feeds are stopped or BG < 100)   -  Moderate Dose SQ Insulin Correction Scale PRN hyperglycemia.   - BG Monitoring q 4 hrs while NPO/tube feeds    ** Please call Endocrine for any BG related issues **  ** Please notify Endocrine for any change and/or advance in diet**  Lab Results   Component Value Date    HGBA1C 6.0 (H) 01/03/2022       Discharge Planning:   TBD. Please notify endocrinology prior to discharge.           Chronic atrial fibrillation  May increase insulin resistance.         JO (acute kidney injury)  Lab Results   Component Value Date    CREATININE 1.0 02/01/2022     Avoid insulin stacking  Titrate insulin slowly          Keyanna Crocker, NP  Endocrinology  [facility]

## 2022-02-01 NOTE — PROGRESS NOTES
[facility]  Critical Care - Surgery  Progress Note    Patient Name: Orion Ty  MRN: [id number]  Admission Date: 1/5/2022  Hospital Length of Stay: 27 days  Code Status: Full Code  Attending Provider: Dennis Haider MD  Primary Care Provider: Otis Zimmer MD   Principal Problem: S/P aortic valve replacement    Subjective:     Hospital/ICU Course:  No notes on file    Interval History/Significant Events:   NAEON  Some hypotension yesterday following HD  Anxiety, tachypnea overnight. Did not sleep well.   A-fib rate 120's, now rate controlled    Follow-up For: Procedure(s) (LRB):  CREATION, TRACHEOSTOMY (N/A)  EGD, WITH PEG TUBE INSERTION (N/A)  INSERTION-CATHETER-ROSELINE (N/A)    Post-Operative Day: 7 Days Post-Op    Objective:     Vital Signs (Most Recent):  Temp: 98.8 °F (37.1 °C) (02/01/22 0700)  Pulse: 80 (02/01/22 1030)  Resp: (!) 27 (02/01/22 1030)  BP: (!) 90/51 (02/01/22 1030)  SpO2: 97 % (02/01/22 1030) Vital Signs (24h Range):  Temp:  [96.5 °F (35.8 °C)-99.4 °F (37.4 °C)] 98.8 °F (37.1 °C)  Pulse:  [] 80  Resp:  [22-56] 27  SpO2:  [90 %-100 %] 97 %  BP: ()/(48-69) 90/51  Arterial Line BP: ()/(38-59) 87/40     Weight: 47.6 kg (105 lb)  Body mass index is 19.84 kg/m².      Intake/Output Summary (Last 24 hours) at 2/1/2022 1042  Last data filed at 2/1/2022 0900  Gross per 24 hour   Intake 829.14 ml   Output 2810 ml   Net -1980.86 ml       Physical Exam  Constitutional:       General: She is not in acute distress.  HENT:      Head: Normocephalic and atraumatic.   Neck:      Comments: Trach  Cardiovascular:      Rate and Rhythm: Normal rate and regular rhythm.      Pulses: Normal pulses.      Comments: Midline sternotomy incision c/d/I  Pacemaker in place. Paced at 80  Pulmonary:      Effort: Pulmonary effort is normal. No respiratory distress.   Abdominal:      General: Abdomen is flat. There is no distension.      Palpations: Abdomen is soft.       Tenderness: There is no abdominal tenderness.      Comments: Peg site c/d/i  Soft abdomen   Genitourinary:     Comments: Mireles in place. Urine clear  Musculoskeletal:      Right lower leg: No edema.      Left lower leg: No edema.      Comments: R radial art line in place   Skin:     General: Skin is warm.      Capillary Refill: Capillary refill takes less than 2 seconds.   Neurological:      General: No focal deficit present.      Mental Status: She is alert.         Vents:  Vent Mode: Spont (02/01/22 0754)  Ventilator Initiated: Yes (01/18/22 1636)  Set Rate: 20 BPM (02/01/22 0500)  Vt Set: 330 mL (02/01/22 0500)  Pressure Support: 10 cmH20 (02/01/22 0500)  PEEP/CPAP: 5 cmH20 (02/01/22 0754)  Oxygen Concentration (%): 60 (02/01/22 1030)  Peak Airway Pressure: 24 cmH2O (02/01/22 0754)  Plateau Pressure: 29 cmH20 (02/01/22 0754)  Total Ve: 11.3 mL (02/01/22 0754)  Negative Inspiratory Force (cm H2O): 0 (02/01/22 0754)  F/VT Ratio<105 (RSBI): 111.82 (02/01/22 0754)    Lines/Drains/Airways     Central Venous Catheter Line            Permacath 01/21/22 1013 left subclavian 11 days          Drain                 Gastrostomy/Enterostomy 01/21/22 1112 Percutaneous endoscopic gastrostomy (PEG) 10 days         Urethral Catheter 01/25/22 1341 Non-latex 6 days          Airway                 Surgical Airway 01/21/22 1043 Shiley Cuffed 10 days          Peripheral Intravenous Line                 Midline Catheter Insertion/Assessment  - Single Lumen 01/10/22 1300 Left cephalic vein (lateral side of arm) 18g x 8cm 21 days                Significant Labs:    CBC/Anemia Profile:  Recent Labs   Lab 01/31/22  0259 02/01/22  0315 02/01/22  0330   WBC 7.92 12.36  --    HGB 9.4* 12.3  --    HCT 29.6* 38.0 37   * 138*  --    MCV 94 93  --    RDW 17.8* 17.8*  --         Chemistries:  Recent Labs   Lab 01/31/22  0259 02/01/22  0315   * 133*   K 3.6 3.5   CL 94* 95   CO2 19* 23   BUN 66* 18   CREATININE 3.0* 1.0   CALCIUM 9.0  8.4*   ALBUMIN 2.3* 3.2*   PROT 6.4 8.7*   BILITOT 0.5 0.9   ALKPHOS 170* 255*   ALT 30 48*   AST 67* 91*   MG 1.9 1.7   PHOS 4.6* 1.2*       All pertinent labs within the past 24 hours have been reviewed.    Significant Imaging:  I have reviewed all pertinent imaging results/findings within the past 24 hours.    Assessment/Plan:     * S/P aortic valve replacement  Orion Ty is a 76 y.o. female who presents to the SICU s/p redo aortic valve replacement with mechanical valve on 1/5/22.      Neuro/Psych:   -- Sedation: none  -- Pain: Liquid Tiffani, breakthrough fentanyl pushes q2     Cards:   -- Pressors: off, vaso if needed  -- Goal MAP: 60-80  -- A-fib rate controlled  -- statin  -- INR 1.4 this morning, transitioning to 0.5mg Daily  -- metoprolol 25 BID      Pulm:   -- Goal O2 sat > 90%  -- reintubated 1/12 then 1/18. S/p trach 1/21  -- Spontaneous when able.   -- Tolerated trach collar for one hour on 1/27, 4 hours on 1/31  -- ABG PRN  -- 2 mediastinal removed 1/9 L Pleural CT removed 1/8      Renal:  -- Mireles replaced 1/25, UOP 370cc yesterday  -- iHD  -- Tolerating iHD, midodrine as needed for hypotension  -- Permcath 1/21  -- Removed R IJ trialysis 1/23      FEN / GI:   -- Replace lytes as needed  -- s/p PEG 1/21  -- Nutrition: tube feeds novasource renal, at goal      ID:   -- WBC stable  -- Antibiotics: periop ancef complete. Zosyn and vanc, stopped 1/20        Heme/Onc:   -- Hgb stable  -- Daily CBC  -- Transfused products: 1U RBC on 1/5/22. 1u RBC and 1u FFP on 1/9/21. 2U RBC on 1/12. 4 FFP on 1/13. 1 FFP on 1/17, 2u pRBC 1/27  -- Anticoagulation: coumadin      Endo:   -- Gluc goal 140-180  -- Insulin per endocrinology      PPx:   Feeding: tube feeds  Analgesia/Sedation: Tiffani, fent pushes / none  Thromboembolic prevention: Coumadin, ASA  HOB >30: yes  Stress Ulcer ppx: protonix BID  Glucose control: Critical care goal 140-180 g/dl, ISS    Lines/Drains/Airway: Art line, Trach, Permcath, PEG       Dispo/Code Status/Palliative:   -- SICU / Full Code                 Sarah Shafer MD  Critical Care - Surgery  [facility]

## 2022-02-01 NOTE — ASSESSMENT & PLAN NOTE
BG goal 140 - 180       - Start Novolog 3 units q 4 hrs while on tube feeds. (HOLD if tube feeds are stopped or BG < 100)   -  Moderate Dose SQ Insulin Correction Scale PRN hyperglycemia.   - BG Monitoring q 4 hrs while NPO/tube feeds    ** Please call Endocrine for any BG related issues **  ** Please notify Endocrine for any change and/or advance in diet**  Lab Results   Component Value Date    HGBA1C 6.0 (H) 01/03/2022       Discharge Planning:   TBD. Please notify endocrinology prior to discharge.

## 2022-02-01 NOTE — SUBJECTIVE & OBJECTIVE
Interval History/Significant Events:   NAEON  Some hypotension yesterday following HD  Anxiety, tachypnea overnight. Did not sleep well.   A-fib rate 120's, now rate controlled    Follow-up For: Procedure(s) (LRB):  CREATION, TRACHEOSTOMY (N/A)  EGD, WITH PEG TUBE INSERTION (N/A)  INSERTION-CATHETER-ROSELINE (N/A)    Post-Operative Day: 7 Days Post-Op    Objective:     Vital Signs (Most Recent):  Temp: 98.8 °F (37.1 °C) (02/01/22 0700)  Pulse: 80 (02/01/22 1030)  Resp: (!) 27 (02/01/22 1030)  BP: (!) 90/51 (02/01/22 1030)  SpO2: 97 % (02/01/22 1030) Vital Signs (24h Range):  Temp:  [96.5 °F (35.8 °C)-99.4 °F (37.4 °C)] 98.8 °F (37.1 °C)  Pulse:  [] 80  Resp:  [22-56] 27  SpO2:  [90 %-100 %] 97 %  BP: ()/(48-69) 90/51  Arterial Line BP: ()/(38-59) 87/40     Weight: 47.6 kg (105 lb)  Body mass index is 19.84 kg/m².      Intake/Output Summary (Last 24 hours) at 2/1/2022 1042  Last data filed at 2/1/2022 0900  Gross per 24 hour   Intake 829.14 ml   Output 2810 ml   Net -1980.86 ml       Physical Exam  Constitutional:       General: She is not in acute distress.  HENT:      Head: Normocephalic and atraumatic.   Neck:      Comments: Trach  Cardiovascular:      Rate and Rhythm: Normal rate and regular rhythm.      Pulses: Normal pulses.      Comments: Midline sternotomy incision c/d/I  Pacemaker in place. Paced at 80  Pulmonary:      Effort: Pulmonary effort is normal. No respiratory distress.   Abdominal:      General: Abdomen is flat. There is no distension.      Palpations: Abdomen is soft.      Tenderness: There is no abdominal tenderness.      Comments: Peg site c/d/i  Soft abdomen   Genitourinary:     Comments: Mireles in place. Urine clear  Musculoskeletal:      Right lower leg: No edema.      Left lower leg: No edema.      Comments: R radial art line in place   Skin:     General: Skin is warm.      Capillary Refill: Capillary refill takes less than 2 seconds.   Neurological:      General: No focal  deficit present.      Mental Status: She is alert.         Vents:  Vent Mode: Spont (02/01/22 0754)  Ventilator Initiated: Yes (01/18/22 1636)  Set Rate: 20 BPM (02/01/22 0500)  Vt Set: 330 mL (02/01/22 0500)  Pressure Support: 10 cmH20 (02/01/22 0500)  PEEP/CPAP: 5 cmH20 (02/01/22 0754)  Oxygen Concentration (%): 60 (02/01/22 1030)  Peak Airway Pressure: 24 cmH2O (02/01/22 0754)  Plateau Pressure: 29 cmH20 (02/01/22 0754)  Total Ve: 11.3 mL (02/01/22 0754)  Negative Inspiratory Force (cm H2O): 0 (02/01/22 0754)  F/VT Ratio<105 (RSBI): 111.82 (02/01/22 0754)    Lines/Drains/Airways     Central Venous Catheter Line            Permacath 01/21/22 1013 left subclavian 11 days          Drain                 Gastrostomy/Enterostomy 01/21/22 1112 Percutaneous endoscopic gastrostomy (PEG) 10 days         Urethral Catheter 01/25/22 1341 Non-latex 6 days          Airway                 Surgical Airway 01/21/22 1043 Shiley Cuffed 10 days          Peripheral Intravenous Line                 Midline Catheter Insertion/Assessment  - Single Lumen 01/10/22 1300 Left cephalic vein (lateral side of arm) 18g x 8cm 21 days                Significant Labs:    CBC/Anemia Profile:  Recent Labs   Lab 01/31/22 0259 02/01/22  0315 02/01/22  0330   WBC 7.92 12.36  --    HGB 9.4* 12.3  --    HCT 29.6* 38.0 37   * 138*  --    MCV 94 93  --    RDW 17.8* 17.8*  --         Chemistries:  Recent Labs   Lab 01/31/22  0259 02/01/22  0315   * 133*   K 3.6 3.5   CL 94* 95   CO2 19* 23   BUN 66* 18   CREATININE 3.0* 1.0   CALCIUM 9.0 8.4*   ALBUMIN 2.3* 3.2*   PROT 6.4 8.7*   BILITOT 0.5 0.9   ALKPHOS 170* 255*   ALT 30 48*   AST 67* 91*   MG 1.9 1.7   PHOS 4.6* 1.2*       All pertinent labs within the past 24 hours have been reviewed.    Significant Imaging:  I have reviewed all pertinent imaging results/findings within the past 24 hours.

## 2022-02-02 NOTE — ASSESSMENT & PLAN NOTE
Orion Ty is a 76 y.o. female who presents to the SICU s/p redo aortic valve replacement with mechanical valve on 1/5/22.      Neuro/Psych:   -- Sedation: none  -- Pain: Liquid Tiffani, breakthrough fentanyl pushes q2     Cards:   -- Pressors: off, vaso if needed  -- Goal MAP: 60-80  -- A-fib rate controlled  -- statin  -- INR 1.4 this morning, 1mg coumadin  -- metoprolol 25 BID      Pulm:   -- Goal O2 sat > 90%  -- reintubated 1/12 then 1/18. S/p trach 1/21  -- Spontaneous when able.   -- Tolerated trach collar for one hour on 1/27, 4 hours on 1/31  -- ABG PRN  -- 2 mediastinal removed 1/9 L Pleural CT removed 1/8      Renal:  -- Mireles replaced 1/25, UOP 370cc yesterday  -- iHD  -- Tolerating iHD, midodrine as needed for hypotension  -- Permcath 1/21  -- Removed R IJ trialysis 1/23      FEN / GI:   -- Replace lytes as needed  -- s/p PEG 1/21  -- Nutrition: tube feeds novasource renal, at goal      ID:   -- WBC stable  -- Antibiotics: periop ancef complete. Zosyn and vanc, stopped 1/20        Heme/Onc:   -- Hgb stable  -- Daily CBC  -- Transfused products: 1U RBC on 1/5/22. 1u RBC and 1u FFP on 1/9/21. 2U RBC on 1/12. 4 FFP on 1/13. 1 FFP on 1/17, 2u pRBC 1/27  -- Anticoagulation: coumadin      Endo:   -- Gluc goal 140-180  -- Insulin per endocrinology      PPx:   Feeding: tube feeds  Analgesia/Sedation: Tiffani, fent pushes / none  Thromboembolic prevention: Coumadin, ASA  HOB >30: yes  Stress Ulcer ppx: protonix BID  Glucose control: Critical care goal 140-180 g/dl, ISS    Lines/Drains/Airway: Art line, Trach, Permcath, PEG      Dispo/Code Status/Palliative:   -- SICU / Full Code         Pt was rescheduled.

## 2022-02-02 NOTE — PT/OT/SLP PROGRESS
Occupational Therapy   Co-Treatment with PT     Name: Orion Ty  MRN: [id number]  Admitting Diagnosis:  S/P aortic valve replacement  12 Days Post-Op    Recommendations:     Discharge Recommendations: rehabilitation facility  Discharge Equipment Recommendations:  bedside commode  Barriers to discharge:  Decreased caregiver support,Inaccessible home environment    Assessment:     Orion Ty is a 76 y.o. female with a medical diagnosis of S/P aortic valve replacement.  She presents with performance deficits affecting function are weakness,impaired endurance,impaired self care skills,impaired functional mobilty,gait instability,impaired balance,impaired cardiopulmonary response to activity,decreased upper extremity function,decreased lower extremity function,pain. Pt would benefit from continued skilled acute OT services in order to maximize independence and safety with ADLs and functional mobility to ensure safe return to PLOF in the least restrictive environment. OT recommending IPR once pt is medically appropriate for d/c. RT notified prior to therapy session     Rehab Prognosis:  Good; patient would benefit from acute skilled OT services to address these deficits and reach maximum level of function.       Plan:     Patient to be seen 4 x/week to address the above listed problems via self-care/home management,therapeutic activities,therapeutic exercises  · Plan of Care Expires: 02/27/22  · Plan of Care Reviewed with: patient,daughter    Subjective     Pain/Comfort:  · Pain Rating 1: 0/10  · Pain Rating Post-Intervention 1: 0/10    Objective:     Communicated with: RN prior to session.  Patient found HOB elevated with ventilator,blood pressure cuff,pulse ox (continuous),telemetry,PEG Tube,PICC line upon OT entry to room. Pt agreeable to therapy session.     General Precautions: Standard, fall,sternal   Orthopedic Precautions:N/A   Braces: N/A  Respiratory Status: ventilator to trach      Occupational  Performance:     Bed Mobility:    · Patient completed Rolling/Turning to Left with  total assistance  · Patient completed Scooting/Bridging with total assistance  · Patient completed Supine to Sit with total assistance  · Patient completed Sit to Supine with total assistance     Functional Mobility/Transfers:  · Patient completed x 3 reps Sit <> Stand Transfer from EOB with maximal assistance x 2 persons  with  hand-held assist   · Pt able to achieve ~60% upright position   · B knees blocked   · Forward flexed posture and B knee flexion   · Functional Mobility: Pt performed lateral weight-shifting in standing with max A x 2 persons     Activities of Daily Living:  · Grooming: moderate assistance to combed hair alternating B UEs and washed face with active assist provided by therapist   · Support provided through R elbow. Increased in strength in L UE >RUE  · Toileting: total assistance for hygiene in standing     [facility] 6 Click ADL: 11    Treatment & Education:   Pt educated on role of OT, POC, and goals for therapy.     Pt tolerated sitting EOB for ~10 mins with min A<> mod A for dynamic sitting balance    POC was dicussed with patient/caregiver, who was included in its development and is in agreement with the identified goals and treatment plan.    B UEs elevated on pillows   Patient and family aware of patient's deficits and therapy progression.    Time provided for therapeutic counseling and discussion of health disposition.    Educated on importance of EOB/OOB mobility, maintaining routine, sitting up in chair, and maximizing independence with ADLs during admission    Pt completed ADLs and functional mobility for treatment session as noted above    Pt/caregiver verbalized understanding and expressed no further concerns/questions.   Updated communication board     Co-evaluation/treatment performed due to patient's multiple deficits requiring two skilled therapists to appropriately and safely assess  patient's strength and endurance while facilitating functional tasks in addition to accommodating for patient's activity tolerance.     Patient left HOB elevated with all lines intact, call button in reach, RN  notified and pt's daughter-in-law presentEducation:      GOALS:   Multidisciplinary Problems     Occupational Therapy Goals        Problem: Occupational Therapy Goal    Goal Priority Disciplines Outcome Interventions   Occupational Therapy Goal     OT, PT/OT Ongoing, Progressing    Description: Goals to be met by:  2/2/22     Patient will increase functional independence with ADLs by performing:    Pt to complete UE dressing with set-up  Pt to complete LE dressing with SBA  Pt to complete standing g/h skills with SBA  Pt to complete toileting with SBA  Pt to completed t/f bed, chair and commode with SBA                   Time Tracking:     OT Date of Treatment: 02/02/22  OT Start Time: 1013  OT Stop Time: 1050  OT Total Time (min): 37 min    Billable Minutes:Self Care/Home Management 13  Therapeutic Activity 13    OT/SUE: OT          2/2/2022

## 2022-02-02 NOTE — ASSESSMENT & PLAN NOTE
Lab Results   Component Value Date    CREATININE 2.8 (H) 02/02/2022     Avoid insulin stacking  Titrate insulin slowly

## 2022-02-02 NOTE — PROGRESS NOTES
[facility]  Critical Care - Surgery  Progress Note    Patient Name: Orion Ty  MRN: [id number]  Admission Date: 1/5/2022  Hospital Length of Stay: 28 days  Code Status: Full Code  Attending Provider: Dennis Haider MD  Primary Care Provider: Otis Zimmer MD   Principal Problem: S/P aortic valve replacement    Subjective:     Hospital/ICU Course:  No notes on file    Interval History/Significant Events:   NAEON  Slept well overnight  Pain under control  Rate overnight   Stable    Follow-up For: Procedure(s) (LRB):  CREATION, TRACHEOSTOMY (N/A)  EGD, WITH PEG TUBE INSERTION (N/A)  INSERTION-CATHETER-ROSELINE (N/A)    Post-Operative Day: 7 Days Post-Op    Objective:     Vital Signs (Most Recent):  Temp: 98.8 °F (37.1 °C) (02/02/22 1100)  Pulse: 80 (02/02/22 1115)  Resp: (!) 47 (02/02/22 1115)  BP: (!) 103/55 (02/02/22 1115)  SpO2: (!) 92 % (02/02/22 1115) Vital Signs (24h Range):  Temp:  [98.5 °F (36.9 °C)-99.5 °F (37.5 °C)] 98.8 °F (37.1 °C)  Pulse:  [79-87] 80  Resp:  [20-47] 47  SpO2:  [88 %-98 %] 92 %  BP: ()/(45-70) 103/55     Weight: 48.1 kg (106 lb)  Body mass index is 20.03 kg/m².      Intake/Output Summary (Last 24 hours) at 2/2/2022 1222  Last data filed at 2/2/2022 1100  Gross per 24 hour   Intake 1019.02 ml   Output 110 ml   Net 909.02 ml       Physical Exam  Constitutional:       General: She is not in acute distress.  HENT:      Head: Normocephalic and atraumatic.   Neck:      Comments: Trach  Cardiovascular:      Rate and Rhythm: Normal rate and regular rhythm.      Pulses: Normal pulses.      Comments: Midline sternotomy incision c/d/I  Pacemaker in place. Paced at 80  Pulmonary:      Effort: Pulmonary effort is normal. No respiratory distress.   Abdominal:      General: Abdomen is flat. There is no distension.      Palpations: Abdomen is soft.      Tenderness: There is no abdominal tenderness.      Comments: Peg site c/d/i  Soft abdomen   Genitourinary:      Comments: Mireles in place. Urine clear  Musculoskeletal:      Right lower leg: No edema.      Left lower leg: No edema.      Comments: R radial art line in place   Skin:     General: Skin is warm.      Capillary Refill: Capillary refill takes less than 2 seconds.   Neurological:      General: No focal deficit present.      Mental Status: She is alert.         Vents:  Vent Mode: A/C (02/02/22 0820)  Ventilator Initiated: Yes (01/18/22 1636)  Set Rate: 20 BPM (02/02/22 0820)  Vt Set: 330 mL (02/02/22 0820)  Pressure Support: 10 cmH20 (02/02/22 0030)  PEEP/CPAP: 5 cmH20 (02/02/22 0820)  Oxygen Concentration (%): 60 (02/02/22 1115)  Peak Airway Pressure: 21 cmH2O (02/02/22 0820)  Plateau Pressure: 29 cmH20 (02/02/22 0820)  Total Ve: 9 mL (02/02/22 0820)  Negative Inspiratory Force (cm H2O): 0 (02/02/22 0820)  F/VT Ratio<105 (RSBI): (!) 81.58 (02/02/22 0820)    Lines/Drains/Airways     Central Venous Catheter Line            Permacath 01/21/22 1013 left subclavian 12 days          Drain                 Gastrostomy/Enterostomy 01/21/22 1112 Percutaneous endoscopic gastrostomy (PEG) 12 days         Urethral Catheter 01/25/22 1341 Non-latex 7 days          Airway                 Surgical Airway 01/21/22 1043 Shiley Cuffed 12 days          Peripheral Intravenous Line                 Midline Catheter Insertion/Assessment  - Single Lumen 01/10/22 1300 Left cephalic vein (lateral side of arm) 18g x 8cm 22 days                Significant Labs:    CBC/Anemia Profile:  Recent Labs   Lab 02/01/22 0315 02/01/22  0330 02/02/22  0326   WBC 12.36  --  10.31   HGB 12.3  --  9.2*   HCT 38.0 37 29.1*   *  --  134*   MCV 93  --  95   RDW 17.8*  --  17.5*        Chemistries:  Recent Labs   Lab 02/01/22 0315 02/02/22  0326   * 131*   K 3.5 3.7   CL 95 90*   CO2 23 23   BUN 18 73*   CREATININE 1.0 2.8*   CALCIUM 8.4* 8.4*   ALBUMIN 3.2* 2.4*   PROT 8.7* 6.4   BILITOT 0.9 0.7   ALKPHOS 255* 221*   ALT 48* 40   AST 91* 74*   MG  1.7 1.6   PHOS 1.2* 8.9*       All pertinent labs within the past 24 hours have been reviewed.    Significant Imaging:  I have reviewed all pertinent imaging results/findings within the past 24 hours.    Assessment/Plan:     * S/P aortic valve replacement  Orion Ty is a 76 y.o. female who presents to the SICU s/p redo aortic valve replacement with mechanical valve on 1/5/22.      Neuro/Psych:   -- Sedation: none  -- Pain: Liquid Tiffani, breakthrough fentanyl pushes q2     Cards:   -- Pressors: off, vaso if needed  -- Goal MAP: 60-80  -- A-fib rate controlled  -- statin  -- INR 1.4 this morning, 1mg coumadin  -- metoprolol 25 BID      Pulm:   -- Goal O2 sat > 90%  -- reintubated 1/12 then 1/18. S/p trach 1/21  -- Spontaneous when able.   -- Tolerated trach collar for one hour on 1/27, 4 hours on 1/31  -- ABG PRN  -- 2 mediastinal removed 1/9 L Pleural CT removed 1/8      Renal:  -- Mireles replaced 1/25, UOP 370cc yesterday  -- iHD  -- Tolerating iHD, midodrine as needed for hypotension  -- Permcath 1/21  -- Removed R IJ trialysis 1/23      FEN / GI:   -- Replace lytes as needed  -- s/p PEG 1/21  -- Nutrition: tube feeds novasource renal, at goal      ID:   -- WBC stable  -- Antibiotics: periop ancef complete. Zosyn and vanc, stopped 1/20        Heme/Onc:   -- Hgb stable  -- Daily CBC  -- Transfused products: 1U RBC on 1/5/22. 1u RBC and 1u FFP on 1/9/21. 2U RBC on 1/12. 4 FFP on 1/13. 1 FFP on 1/17, 2u pRBC 1/27  -- Anticoagulation: coumadin      Endo:   -- Gluc goal 140-180  -- Insulin per endocrinology      PPx:   Feeding: tube feeds  Analgesia/Sedation: Tiffani, fent pushes / none  Thromboembolic prevention: Coumadin, ASA  HOB >30: yes  Stress Ulcer ppx: protonix BID  Glucose control: Critical care goal 140-180 g/dl, ISS    Lines/Drains/Airway: Art line, Trach, Permcath, PEG      Dispo/Code Status/Palliative:   -- SICU / Full Code                 Sarah Shafer MD  Critical Care - Surgery  [facility]  - Surgical Intensive Care

## 2022-02-02 NOTE — PLAN OF CARE
Problem: Physical Therapy Goal  Goal: Physical Therapy Goal  Description: Goals to be met by: 2022     Patient will increase functional independence with mobility by performin. Sit to stand transfer with min A -not met  2. Bed to chair transfer with minimum A -not met  3. Gait  x 50 feet with minimum A standing rest breaks prn. -not met  4. Lower extremity exercise program x30 reps per handout, with independence -not met  5. Recite 3/3 sternal precautions and remain complaint with precautions throughout session with no verbal cues -not met  6. Pt sit on EOB x 10 min with CGA using BUE support - MET 22               -sit EOB x 10 min with SBA using BUE support      Outcome: Ongoing, Progressing   Treatment performed today. Goal #6 met today. Goals appropriate.

## 2022-02-02 NOTE — SUBJECTIVE & OBJECTIVE
"Interval HPI:   Overnight events: Remains intubated in SICU. POD 11. BG at or above goal ranges on current SQ insulin regimen. Creatinine 2.8. Remains on tube feeds.   Eating:   NPO  Nausea: No  Hypoglycemia and intervention: No  Fever: No  TPN and/or TF: Yes  If yes, type of TF/TPN and rate: Novasource Renal at 35 ml/hr    BP (!) 97/52   Pulse 80   Temp 99.5 °F (37.5 °C)   Resp (!) 26   Ht 5' 1" (1.549 m)   Wt 48.1 kg (106 lb)   SpO2 96%   Breastfeeding No   BMI 20.03 kg/m²     Labs Reviewed and Include    Recent Labs   Lab 02/02/22 0326   *   CALCIUM 8.4*   ALBUMIN 2.4*   PROT 6.4   *   K 3.7   CO2 23   CL 90*   BUN 73*   CREATININE 2.8*   ALKPHOS 221*   ALT 40   AST 74*   BILITOT 0.7     Lab Results   Component Value Date    WBC 10.31 02/02/2022    HGB 9.2 (L) 02/02/2022    HCT 29.1 (L) 02/02/2022    MCV 95 02/02/2022     (L) 02/02/2022     No results for input(s): TSH, FREET4 in the last 168 hours.  Lab Results   Component Value Date    HGBA1C 6.0 (H) 01/03/2022       Nutritional status:   Body mass index is 20.03 kg/m².  Lab Results   Component Value Date    ALBUMIN 2.4 (L) 02/02/2022    ALBUMIN 3.2 (L) 02/01/2022    ALBUMIN 2.3 (L) 01/31/2022     Lab Results   Component Value Date    PREALBUMIN <3 (L) 01/28/2022    PREALBUMIN 13 (L) 01/21/2022    PREALBUMIN 11 (L) 01/10/2022       Estimated Creatinine Clearance: 12.7 mL/min (A) (based on SCr of 2.8 mg/dL (H)).    Accu-Checks  Recent Labs     01/31/22  1801 02/01/22  0008 02/01/22  0554 02/01/22  1302 02/01/22  1308 02/01/22  1718 02/01/22  2027 02/01/22  2316 02/02/22  0325 02/02/22  0839   POCTGLUCOSE 207* 256* 197* 303* 265* 187* 131* 167* 176* 243*       Current Medications and/or Treatments Impacting Glycemic Control  Immunotherapy:    Immunosuppressants     None        Steroids:   Hormones (From admission, onward)            Start     Stop Route Frequency Ordered    01/28/22 0230  vasopressin (PITRESSIN) 0.2 Units/mL in " dextrose 5 % 100 mL infusion         -- IV Continuous 01/28/22 0117    01/28/22 0207  vasopressin (PITRESSIN) 20 unit/mL injection        Note to Pharmacy: Created by cabinet override    01/28 1414   01/28/22 0207        Pressors:    Autonomic Drugs (From admission, onward)            Start     Stop Route Frequency Ordered    02/01/22 1400  midodrine tablet 15 mg         -- Oral Every 8 hours 02/01/22 1226    01/12/22 1441  rocuronium 10 mg/mL injection        Note to Pharmacy: Created by cabinet override    01/13 0244   01/12/22 1441        Hyperglycemia/Diabetes Medications:   Antihyperglycemics (From admission, onward)            Start     Stop Route Frequency Ordered    02/02/22 0800  insulin aspart U-100 pen 3 Units         -- SubQ Every 24 hours (non-standard times) 02/01/22 0903    02/02/22 0400  insulin aspart U-100 pen 3 Units         -- SubQ Every 24 hours (non-standard times) 02/01/22 0903    02/02/22 0000  insulin aspart U-100 pen 3 Units         -- SubQ Every 24 hours (non-standard times) 02/01/22 0903    02/01/22 2000  insulin aspart U-100 pen 3 Units         -- SubQ Every 24 hours (non-standard times) 02/01/22 0903    02/01/22 1600  insulin aspart U-100 pen 3 Units         -- SubQ Every 24 hours (non-standard times) 02/01/22 0903    02/01/22 1200  insulin aspart U-100 pen 3 Units         -- SubQ Every 24 hours (non-standard times) 02/01/22 0903    01/27/22 0851  insulin aspart U-100 pen 1-10 Units         -- SubQ As needed (PRN) 01/27/22 0853

## 2022-02-02 NOTE — NURSING
APTT 56.4. Heparin gtt restarted at 8 units/kg/hr per low intensity infusion nomogram. Next aPTT at 0800.

## 2022-02-02 NOTE — PROGRESS NOTES
"[facility]  Endocrinology  Progress Note    Admit Date: 1/5/2022     Reason for Consult: Management of T2DM, Hyperglycemia     Surgical Procedure and Date:   1/5/21  REPLACEMENT, AORTIC VALVE, WITH REPEAT STERNOTOMY (N/A)     Diabetes diagnosis year: MELIDA; intubated    Home Diabetes Medications:  Metformin 500 mg daily,     How often checking glucose at home?  MELIDA    BG readings on regimen: MELIDA  Hypoglycemia on the regimen?  MELIDA  Missed doses on regimen? MELIDA    Diabetes Complications include:     None    Complicating diabetes co morbidities:   CAD, HTN, a-fib       HPI:   Patient is a 76 y.o. female with a diagnosis of s/p MVR, AVR and TVr in 2010 by Dr. Haider. 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 Family states she is more fatigued, decreased appetite and fluid on lungs. Pt has increased symptoms and wants to proceed with cardiac surgery now and here for pre op. She is now s/p the above procedure. Endocrinology consulted for management of T2DM.     Lab Results   Component Value Date    HGBA1C 6.0 (H) 01/03/2022           Interval HPI:   Overnight events: Remains intubated in SICU. POD 11. BG at or above goal ranges on current SQ insulin regimen. Creatinine 2.8. Remains on tube feeds.   Eating:   NPO  Nausea: No  Hypoglycemia and intervention: No  Fever: No  TPN and/or TF: Yes  If yes, type of TF/TPN and rate: Novasource Renal at 35 ml/hr    BP (!) 97/52   Pulse 80   Temp 99.5 °F (37.5 °C)   Resp (!) 26   Ht 5' 1" (1.549 m)   Wt 48.1 kg (106 lb)   SpO2 96%   Breastfeeding No   BMI 20.03 kg/m²     Labs Reviewed and Include    Recent Labs   Lab 02/02/22  0326   *   CALCIUM 8.4*   ALBUMIN 2.4*   PROT 6.4   *   K 3.7   CO2 23   CL 90*   BUN 73*   CREATININE 2.8*   ALKPHOS 221*   ALT 40   AST 74*   BILITOT 0.7     Lab Results   Component Value Date    WBC 10.31 02/02/2022    HGB 9.2 (L) 02/02/2022    " HCT 29.1 (L) 02/02/2022    MCV 95 02/02/2022     (L) 02/02/2022     No results for input(s): TSH, FREET4 in the last 168 hours.  Lab Results   Component Value Date    HGBA1C 6.0 (H) 01/03/2022       Nutritional status:   Body mass index is 20.03 kg/m².  Lab Results   Component Value Date    ALBUMIN 2.4 (L) 02/02/2022    ALBUMIN 3.2 (L) 02/01/2022    ALBUMIN 2.3 (L) 01/31/2022     Lab Results   Component Value Date    PREALBUMIN <3 (L) 01/28/2022    PREALBUMIN 13 (L) 01/21/2022    PREALBUMIN 11 (L) 01/10/2022       Estimated Creatinine Clearance: 12.7 mL/min (A) (based on SCr of 2.8 mg/dL (H)).    Accu-Checks  Recent Labs     01/31/22  1801 02/01/22  0008 02/01/22  0554 02/01/22  1302 02/01/22  1308 02/01/22  1718 02/01/22  2027 02/01/22  2316 02/02/22  0325 02/02/22  0839   POCTGLUCOSE 207* 256* 197* 303* 265* 187* 131* 167* 176* 243*       Current Medications and/or Treatments Impacting Glycemic Control  Immunotherapy:    Immunosuppressants     None        Steroids:   Hormones (From admission, onward)            Start     Stop Route Frequency Ordered    01/28/22 0230  vasopressin (PITRESSIN) 0.2 Units/mL in dextrose 5 % 100 mL infusion         -- IV Continuous 01/28/22 0117    01/28/22 0207  vasopressin (PITRESSIN) 20 unit/mL injection        Note to Pharmacy: Created by VYout override    01/28 1414   01/28/22 0207        Pressors:    Autonomic Drugs (From admission, onward)            Start     Stop Route Frequency Ordered    02/01/22 1400  midodrine tablet 15 mg         -- Oral Every 8 hours 02/01/22 1226    01/12/22 1441  rocuronium 10 mg/mL injection        Note to Pharmacy: Created by VYout override    01/13 0244   01/12/22 1441        Hyperglycemia/Diabetes Medications:   Antihyperglycemics (From admission, onward)            Start     Stop Route Frequency Ordered    02/02/22 0800  insulin aspart U-100 pen 3 Units         -- SubQ Every 24 hours (non-standard times) 02/01/22 0903    02/02/22 0400   insulin aspart U-100 pen 3 Units         -- SubQ Every 24 hours (non-standard times) 02/01/22 0903    02/02/22 0000  insulin aspart U-100 pen 3 Units         -- SubQ Every 24 hours (non-standard times) 02/01/22 0903    02/01/22 2000  insulin aspart U-100 pen 3 Units         -- SubQ Every 24 hours (non-standard times) 02/01/22 0903    02/01/22 1600  insulin aspart U-100 pen 3 Units         -- SubQ Every 24 hours (non-standard times) 02/01/22 0903    02/01/22 1200  insulin aspart U-100 pen 3 Units         -- SubQ Every 24 hours (non-standard times) 02/01/22 0903    01/27/22 0851  insulin aspart U-100 pen 1-10 Units         -- SubQ As needed (PRN) 01/27/22 0853          ASSESSMENT and PLAN    * S/P aortic valve replacement  Managed per primary team  Optimize BG control        Type 2 diabetes mellitus with microalbuminuria, without long-term current use of insulin  BG goal 140 - 180       - Increase Novolog to 4 units q 4 hrs while on tube feeds. (HOLD if tube feeds are stopped or BG < 100) 20% dose increase  -  Moderate Dose SQ Insulin Correction Scale PRN hyperglycemia.   - BG Monitoring q 4 hrs while NPO/tube feeds    ** Please call Endocrine for any BG related issues **  ** Please notify Endocrine for any change and/or advance in diet**  Lab Results   Component Value Date    HGBA1C 6.0 (H) 01/03/2022       Discharge Planning:   TBD. Please notify endocrinology prior to discharge.           Chronic atrial fibrillation  May increase insulin resistance.         JO (acute kidney injury)  Lab Results   Component Value Date    CREATININE 2.8 (H) 02/02/2022     Avoid insulin stacking  Titrate insulin slowly          Keyanna Crocker, NP  Endocrinology  [facility] - [facility]

## 2022-02-02 NOTE — SUBJECTIVE & OBJECTIVE
Interval History/Significant Events:   DEBBIE  Slept well overnight  Pain under control  Rate overnight   Stable    Follow-up For: Procedure(s) (LRB):  CREATION, TRACHEOSTOMY (N/A)  EGD, WITH PEG TUBE INSERTION (N/A)  INSERTION-CATHETER-ROSELINE (N/A)    Post-Operative Day: 7 Days Post-Op    Objective:     Vital Signs (Most Recent):  Temp: 98.8 °F (37.1 °C) (02/02/22 1100)  Pulse: 80 (02/02/22 1115)  Resp: (!) 47 (02/02/22 1115)  BP: (!) 103/55 (02/02/22 1115)  SpO2: (!) 92 % (02/02/22 1115) Vital Signs (24h Range):  Temp:  [98.5 °F (36.9 °C)-99.5 °F (37.5 °C)] 98.8 °F (37.1 °C)  Pulse:  [79-87] 80  Resp:  [20-47] 47  SpO2:  [88 %-98 %] 92 %  BP: ()/(45-70) 103/55     Weight: 48.1 kg (106 lb)  Body mass index is 20.03 kg/m².      Intake/Output Summary (Last 24 hours) at 2/2/2022 1222  Last data filed at 2/2/2022 1100  Gross per 24 hour   Intake 1019.02 ml   Output 110 ml   Net 909.02 ml       Physical Exam  Constitutional:       General: She is not in acute distress.  HENT:      Head: Normocephalic and atraumatic.   Neck:      Comments: Trach  Cardiovascular:      Rate and Rhythm: Normal rate and regular rhythm.      Pulses: Normal pulses.      Comments: Midline sternotomy incision c/d/I  Pacemaker in place. Paced at 80  Pulmonary:      Effort: Pulmonary effort is normal. No respiratory distress.   Abdominal:      General: Abdomen is flat. There is no distension.      Palpations: Abdomen is soft.      Tenderness: There is no abdominal tenderness.      Comments: Peg site c/d/i  Soft abdomen   Genitourinary:     Comments: Mireles in place. Urine clear  Musculoskeletal:      Right lower leg: No edema.      Left lower leg: No edema.      Comments: R radial art line in place   Skin:     General: Skin is warm.      Capillary Refill: Capillary refill takes less than 2 seconds.   Neurological:      General: No focal deficit present.      Mental Status: She is alert.         Vents:  Vent Mode: A/C (02/02/22  0820)  Ventilator Initiated: Yes (01/18/22 1636)  Set Rate: 20 BPM (02/02/22 0820)  Vt Set: 330 mL (02/02/22 0820)  Pressure Support: 10 cmH20 (02/02/22 0030)  PEEP/CPAP: 5 cmH20 (02/02/22 0820)  Oxygen Concentration (%): 60 (02/02/22 1115)  Peak Airway Pressure: 21 cmH2O (02/02/22 0820)  Plateau Pressure: 29 cmH20 (02/02/22 0820)  Total Ve: 9 mL (02/02/22 0820)  Negative Inspiratory Force (cm H2O): 0 (02/02/22 0820)  F/VT Ratio<105 (RSBI): (!) 81.58 (02/02/22 0820)    Lines/Drains/Airways     Central Venous Catheter Line            Permacath 01/21/22 1013 left subclavian 12 days          Drain                 Gastrostomy/Enterostomy 01/21/22 1112 Percutaneous endoscopic gastrostomy (PEG) 12 days         Urethral Catheter 01/25/22 1341 Non-latex 7 days          Airway                 Surgical Airway 01/21/22 1043 Shiley Cuffed 12 days          Peripheral Intravenous Line                 Midline Catheter Insertion/Assessment  - Single Lumen 01/10/22 1300 Left cephalic vein (lateral side of arm) 18g x 8cm 22 days                Significant Labs:    CBC/Anemia Profile:  Recent Labs   Lab 02/01/22  0315 02/01/22  0330 02/02/22  0326   WBC 12.36  --  10.31   HGB 12.3  --  9.2*   HCT 38.0 37 29.1*   *  --  134*   MCV 93  --  95   RDW 17.8*  --  17.5*        Chemistries:  Recent Labs   Lab 02/01/22  0315 02/02/22  0326   * 131*   K 3.5 3.7   CL 95 90*   CO2 23 23   BUN 18 73*   CREATININE 1.0 2.8*   CALCIUM 8.4* 8.4*   ALBUMIN 3.2* 2.4*   PROT 8.7* 6.4   BILITOT 0.9 0.7   ALKPHOS 255* 221*   ALT 48* 40   AST 91* 74*   MG 1.7 1.6   PHOS 1.2* 8.9*       All pertinent labs within the past 24 hours have been reviewed.    Significant Imaging:  I have reviewed all pertinent imaging results/findings within the past 24 hours.

## 2022-02-02 NOTE — PROGRESS NOTES
[facility]  Nephrology  Progress Note     Patient Name: Orion Ty  MRN: [id number]  Admission Date: 1/5/2022  Hospital Length of Stay: 28 days  Attending Provider: Dennis Haider MD   Primary Care Physician: Otis Zimmer MD  Principal Problem: S/P aortic valve replacement    Subjective:     HPI: Orion Ty is our 76 y.o. female with previous aortic valve replacement, mitral valve replacement, and tricuspid valve repair in 2010 who presented on 1/5 for redo aortic valve replacement. Nephrology consulted on 1/8 for anuric JO. Patient is alert but tired, son at bedside. Stated she was tired prior to admission. During the hospital stay Cr increase from 1 to 1.5. In OR she received  2.5L crystalloid, 3U RBC, 2U FFP, 2 platelets, and 800 cell saver. she is +5 L since admit. UOP decrease overnight, per nurse she didn't urinate at all. She received diuril 500 mg twice, lasix 20 mg *2 on 1/7. This morning she urinate 225 immediately after placing the tejeda's cath. She received again lasix 100 mg this morning. Upor evaluation she was alert but repeatedly saying she was tired, denied any pain. Denied any previous hx of kidney disease.       Interval History: Net negative 867mL over the past 24h. UOP of 265mLs.  iHD completed yesterday; 1.5L removed.      Review of patient's allergies indicates:  No Known Allergies   Current Facility-Administered Medications   Medication Frequency    0.9%  NaCl infusion PRN    0.9%  NaCl infusion PRN    0.9%  NaCl infusion PRN    acetaminophen oral solution 650 mg Q4H PRN    albuterol-ipratropium 2.5 mg-0.5 mg/3 mL nebulizer solution 3 mL Q6H    [START ON 2/3/2022] atorvastatin tablet 40 mg Daily    bisacodyL suppository 10 mg Daily PRN    dextrose 10 % infusion Continuous PRN    dextrose 50% injection 12.5 g PRN    furosemide injection 80 mg Q8H    glucagon (human recombinant) injection 1 mg PRN    heparin (porcine) injection 1,000 Units PRN     heparin 25,000 units in dextrose 5% (100 units/ml) IV bolus from bag - ADDITIONAL PRN BOLUS - 30 units/kg PRN    heparin 25,000 units in dextrose 5% (100 units/ml) IV bolus from bag - ADDITIONAL PRN BOLUS - 60 units/kg PRN    heparin 25,000 units in dextrose 5% 250 mL (100 units/mL) infusion LOW INTENSITY nomogram - [facility] Continuous    hydrOXYzine 10 mg/5 mL syrup 10 mg Q6H PRN    insulin aspart U-100 pen 1-10 Units PRN    [START ON 2/3/2022] insulin aspart U-100 pen 4 Units Q24H    [START ON 2/3/2022] insulin aspart U-100 pen 4 Units Q24H    [START ON 2/3/2022] insulin aspart U-100 pen 4 Units Q24H    insulin aspart U-100 pen 4 Units Q24H    insulin aspart U-100 pen 4 Units Q24H    insulin aspart U-100 pen 4 Units Q24H    LIDOcaine HCL 2% jelly PRN    metoprolol injection 5 mg Q5 Min PRN    metoprolol tartrate (LOPRESSOR) tablet 25 mg BID    midodrine tablet 15 mg Q8H    ondansetron injection 4 mg Q12H PRN    oxyCODONE 5 mg/5 mL solution 5 mg Q4H PRN    pantoprazole injection 40 mg Q12H    polyethylene glycol packet 17 g Daily PRN    psyllium husk (aspartame) 3.4 gram packet 1 packet Daily    QUEtiapine tablet 25 mg QHS    sevelamer carbonate pwpk 0.8 g TID    sodium chloride 0.9% bolus 250 mL PRN    sodium chloride 0.9% bolus 250 mL PRN    sodium chloride 0.9% flush 10 mL PRN    vasopressin (PITRESSIN) 0.2 Units/mL in dextrose 5 % 100 mL infusion Continuous    warfarin (COUMADIN) tablet 1 mg Daily     Facility-Administered Medications Ordered in Other Encounters   Medication Frequency    0.9%  NaCl infusion Continuous       Objective:     Vital Signs (Most Recent):  Temp: 98.8 °F (37.1 °C) (02/02/22 1100)  Pulse: 80 (02/02/22 1227)  Resp: (!) 41 (02/02/22 1227)  BP: (!) 103/55 (02/02/22 1115)  SpO2: 95 % (02/02/22 1227)  O2 Device (Oxygen Therapy): Trach Collar (02/02/22 1227) Vital Signs (24h Range):  Temp:  [98.5 °F (36.9 °C)-99.5 °F (37.5 °C)] 98.8 °F (37.1 °C)  Pulse:  [79-87]  80  Resp:  [20-47] 41  SpO2:  [88 %-98 %] 95 %  BP: ()/(45-70) 103/55   Weight: 48.1 kg (106 lb) (02/02/22 0500)  Body mass index is 20.03 kg/m².  Body surface area is 1.44 meters squared.      Intake/Output Summary (Last 24 hours) at 2/2/2022 1302  Last data filed at 2/2/2022 1100  Gross per 24 hour   Intake 921.51 ml   Output 110 ml   Net 811.51 ml     I/O last 3 completed shifts:  In: 1616.9 [I.V.:114; NG/GT:1225; IV Piggyback:277.9]  Out: 2775 [Urine:275; Other:2500]  Net IO Since Admission: 5,330.35 mL [02/02/22 1302]     Physical Exam  Constitutional:       Appearance: She is well-developed. She is ill-appearing.   Cardiovascular:      Rate and Rhythm: Normal rate and regular rhythm.      Heart sounds: Normal heart sounds.   Pulmonary:      Comments: tracheostomy   Abdominal:      General: Abdomen is flat.      Palpations: Abdomen is soft.   Musculoskeletal:         General: Normal range of motion.      Right lower leg: No edema.      Left lower leg: No edema.   Skin:     General: Skin is warm and dry.      Comments: Sternal Incision CDI   Neurological:      Mental Status: She is alert.   Psychiatric:         Mood and Affect: Mood normal.         Behavior: Behavior normal.         Recent Labs   Lab 01/31/22 0259 01/31/22 0259 02/01/22 0315 02/01/22  0330 02/02/22  0326   WBC 7.92  --  12.36  --  10.31   HGB 9.4*  --  12.3  --  9.2*   HCT 29.6*   < > 38.0 37 29.1*   *  --  138*  --  134*   MONO 4.7  0.4   < > 2.4*  0.3  --  3.9*  0.4    < > = values in this interval not displayed.      Recent Labs   Lab 01/31/22 0259 02/01/22 0315 02/02/22  0326   * 133* 131*   K 3.6 3.5 3.7   CL 94* 95 90*   CO2 19* 23 23   BUN 66* 18 73*   CREATININE 3.0* 1.0 2.8*   CALCIUM 9.0 8.4* 8.4*   PROT 6.4 8.7* 6.4   BILITOT 0.5 0.9 0.7   ALKPHOS 170* 255* 221*   ALT 30 48* 40   AST 67* 91* 74*      Recent Labs     02/01/22  0330 02/02/22  0410   PH 7.489* 7.376   PCO2 34.6* 45.2*   PO2 59* 42   HCO3 26.3  26.5   POCSATURATED 92* 76*   BE 3 1       Assessment/Plan:        JO  acute kidney injury  Orion Ty is our 76 y.o. female with previous aortic valve replacement, mitral valve replacement, and tricuspid valve repair in 2010 who presented on 1/5 for redo aortic valve replacement. Nephrology consulted on 1/8 for anuric JO. During the hospital stay Cr increase from 1 to 1.5. In OR she received  2.5L crystalloid, 3U RBC, 2U FFP, 2 platelets, and 800 cell saver. she is +5 L since admit. UOP decreased. She received diuril 500 mg twice, lasix 20 mg x2 on 1/7 with no adequate response to diuretics     Plan   Oliguric ischemic ATN likely secondary to severe intraoperative hypotension requiring vasopressor on 01/05/2022  Failed high-dose diuretics and renal replacement therapy started on 1/9/22 due to volume overload  Patient clinically not overloaded  Labs reviewed; no emergent electrolyte abnormalities noted   As pt some times had low BP after HD   We will hold on iHD for today will plan for short session in AM with low UF goal and low BF   Strict I/Os   Renally dose medications to GFR      S/P aortic valve replacement  With severe intraoperative hypotension   Management per primary     ATN (acute tubular necrosis)  See jo Walsh MD  Nephrology  [facility]

## 2022-02-02 NOTE — PT/OT/SLP PROGRESS
Physical Therapy Treatment    Patient Name:  Orion Ty   MRN:  [id number]  Admit Date: 2022  Admitting Diagnosis:  S/P aortic valve replacement   Length of Stay: 28 days  Recent Surgery: Procedure(s) (LRB):  CREATION, TRACHEOSTOMY (N/A)  EGD, WITH PEG TUBE INSERTION (N/A)  INSERTION-CATHETER-ROSELINE (N/A) 12 Days Post-Op    Recommendations:     Discharge Recommendations:  rehabilitation facility   Discharge Equipment Recommendations: bedside commode   Barriers to discharge: unable to return home at current functional levels    Plan:     During this hospitalization, patient to be seen 4 x/week to address the listed problems via gait training,therapeutic activities,therapeutic exercises,neuromuscular re-education  · Plan of Care Expires:  22   Plan of Care Reviewed with: patient,daughter    Assessment:     Orino Ty is a 76 y.o. female admitted with a medical diagnosis of S/P aortic valve replacement. Pt found alert and cooperative. Pt medically stable per RN and RT. Pt able to perform weight shifts in standing to initiate gait cycle today. Pt still with decreased endurance, fatigue, SOB, and generalized weakness with activity. POC for next session will be to attempt side steps at EOB.    Problem List: weakness,gait instability,decreased upper extremity function,impaired cardiopulmonary response to activity,impaired endurance,impaired balance,decreased lower extremity function,impaired self care skills,impaired functional mobilty,impaired skin.  Rehab Prognosis: Fair     GOALS:   Multidisciplinary Problems     Physical Therapy Goals        Problem: Physical Therapy Goal    Goal Priority Disciplines Outcome Goal Variances Interventions   Physical Therapy Goal     PT, PT/OT Ongoing, Progressing     Description: Goals to be met by: 2022     Patient will increase functional independence with mobility by performin. Sit to stand transfer with min A -not met  2. Bed to chair transfer with  "minimum A -not met  3. Gait  x 50 feet with minimum A standing rest breaks prn. -not met  4. Lower extremity exercise program x30 reps per handout, with independence -not met  5. Recite 3/3 sternal precautions and remain complaint with precautions throughout session with no verbal cues -not met  6. Pt sit on EOB x 10 min with CGA - MET 2/2/22               -sit EOB x 10 min with SBA                        Subjective   Communicated with RN prior to session.  Patient found HOB elevated upon PT entry to room, agreeable to evaluation. Orion Ty's daughter present during session.    Chief Complaint: feeling tired  Patient/Family Comments/goals: return home  Pain/Comfort:  Pain Rating 1: 0/10    Objective:   Patient found with: ventilator,blood pressure cuff,central line,G/J tube,pulse ox (continuous),telemetry, catheter   General Precautions: Standard, Cardiac fall,sternal   Orthopedic Precautions:N/A   Braces: N/A   Oxygen Device: Vent Settings: Vent Mode: A/C  Oxygen Concentration (%):  [0-80] 70  Resp Rate Total:  [19 br/min-40 br/min] 26 br/min  Vt Set:  [330 mL] 330 mL  PEEP/CPAP:  [5 cmH20] 5 cmH20  Pressure Support:  [10 cmH20] 10 cmH20  Mean Airway Pressure:  [8 tzT41-18 cmH20] 11 cmH20  Vitals: BP (!) 100/55   Pulse 86   Temp 98.8 °F (37.1 °C) (Oral)   Resp (!) 26   Ht 5' 1" (1.549 m)   Wt 48.1 kg (106 lb)   SpO2 (!) 94%   Breastfeeding No   BMI 20.03 kg/m²     Outcome Measures:  AM-PAC 6 CLICK MOBILITY  Turning over in bed (including adjusting bedclothes, sheets and blankets)?: 2  Sitting down on and standing up from a chair with arms (e.g., wheelchair, bedside commode, etc.): 2  Moving from lying on back to sitting on the side of the bed?: 2  Moving to and from a bed to a chair (including a wheelchair)?: 2  Need to walk in hospital room?: 1  Climbing 3-5 steps with a railing?: 1  Basic Mobility Total Score: 10     Functional Mobility:  Additional staff present: OT - multiple professions required " d/t pt's increased fatigue, v/s monitoring, assistance required, and line management  Bed Mobility:    Rolling/Turning to Left: total assistance   Scooting to HOB via drawsheet: total assistance and 2 persons   Supine to Sit: total assistance; HOB elevated   Scooting anteriorly to EOB to have both feet planted on floor: maximal assistance   Sit to Supine: total assistance ; HOB flat    Sitting Balance at Edge of Bed:   Assistance Level Required: Contact Guard Assistance with BUE support  o Dynamic sit min-mod A to perform ADLs without BUE support   Time: 15 min   Postural deviations noted: rounded shoulders, cervical flexion, posterior lean   Comments: Pt required v/c to keep eyes open and head in midline. Pt required v/c for initiating core musculature to improve posterior lean. Pt required physical cueing for hand placement to support self in sitting with BUEs.    Transfers:    Sit <> Stand Transfer: maximal assistance x2 with no assistive device from EOB x3 trials  o Pt required v/c, facilitation and initiation for movement. Pt demo'd increased fatigue with trials. Pt only able to get ~60% upright with standing. Pt required v/c to lift head into midline as well as squeezing posterior chain to improve upright posture.      PRE Gait:  · Performed weight shifting to L & R 6x each side with max A x2 in standing to initiate the gait cycle   · Pt required v/c and facilitation of lateral weight shifting.       Therapeutic Activities, Exercises, and Education:   Pt educated on role of PT/POC  Pt educated on importance of daily activity   Pt educated on sternal precautions     Therapeutic Exercise:  Sitting EOB to improve lung expansion, core and back extensor musculature  Sit to stands to improve LE strength and endurance  ADLs in sitting to improve dynamic balance, UE and core strengthening  Weight shifting to increase WB on BLEs to increase strength    Patient left HOB elevated with all lines intact, call  button in reach and RN present..    Time Tracking:     PT Received On: 02/02/22  PT Start Time: 1012     PT Stop Time: 1053  PT Total Time (min): 41 min     Billable Minutes:   · Gait Training 15 and Therapeutic Exercise 23    Treatment Type: Treatment  PT/PTA: PT

## 2022-02-02 NOTE — PLAN OF CARE
No acute events overnight.     100% V paced at 80bpm. MAP 60-80. SpO2 >90% on ventilator. Vent settings: AC/ VC 60%/5 PEEP/ Rate20. Tv 330. Pt follows commands and moves all extremities.      Gtts:   Heparin 8 units/kg/hr. aPTT therapeutic.      PEG tube in place. TF @ goal of 35cc/hr   Mireles in place. UOP 45total/shift      POC reviewed with patient and family. All questions/concerns addressed.      Skin: Turn q2h. Pillows in place. On specialty bed. Hydrocolloid Tegaderm applied to sacrum.

## 2022-02-02 NOTE — NURSING
2145: MD notified of aPTT 134.6. Heparin gtt turned off. aPTT redrawn.     2245: aPTT 130.4. MD notified of lab result. Heparin gtt to remain off. Will monitor patient for bleeding and redraw aPTT at 0100.

## 2022-02-02 NOTE — ASSESSMENT & PLAN NOTE
BG goal 140 - 180       - Increase Novolog to 4 units q 4 hrs while on tube feeds. (HOLD if tube feeds are stopped or BG < 100) 20% dose increase  -  Moderate Dose SQ Insulin Correction Scale PRN hyperglycemia.   - BG Monitoring q 4 hrs while NPO/tube feeds    ** Please call Endocrine for any BG related issues **  ** Please notify Endocrine for any change and/or advance in diet**  Lab Results   Component Value Date    HGBA1C 6.0 (H) 01/03/2022       Discharge Planning:   TBD. Please notify endocrinology prior to discharge.

## 2022-02-03 NOTE — PT/OT/SLP PROGRESS
Occupational Therapy  Co- Treatment  Updated GOALS     Name: Orion Ty  MRN: [id number]  Admitting Diagnosis:  S/P aortic valve replacement  13 Days Post-Op    Recommendations:     Discharge Recommendations: LTAC given need for vent.       Assessment:     Orion Ty is a 76 y.o. female with a medical diagnosis of S/P aortic valve replacement.   Performance deficits affecting function are weakness,impaired endurance,impaired self care skills,impaired functional mobilty,gait instability,impaired balance,decreased upper extremity function,decreased lower extremity function,decreased coordination.   Pt tolerated session fair. VSS throughout session. Pt with eyes closed most of session. Pt  able to follow simple commands. However, pt not engaged with functional activity during session.   Anticipate pt will t/f to LTAC given continue need for vent support   Rehab Prognosis:  Fair; patient would benefit from acute skilled OT services to address these deficits and reach maximum level of function.       Plan:     Patient to be seen 4 x/week to address the above listed problems via self-care/home management,therapeutic activities,therapeutic exercises  · Plan of Care Expires: 02/27/22  · Plan of Care Reviewed with: patient,durable power of     Subjective   Pt denies pain but she does indicate fatigue.   Pain/Comfort:  · Pain Rating 1: 0/10    Objective:     Communicated with: nsg  prior to session.  Patient found supine in bed with trach to vent, tele, pulse ox, BP cuff and IV and tejeda.  Family present during session.  cotx completed this date to optimize functional performance and safety given impaired tolerance for activity in setting of ICU     RT present at start of session completing trach care. RT adjusted vent to provide increased support in prep for therapy session.     General Precautions: Standard, fall,sternal     Occupational Performance:     OT provided SALAS ARREGUIN exs within sternal precautions.  "Pt needing max encouragement and cues to actively engage with the exs. Family reports they continue to provided ROM exs as previously instructed.     · Pt requiring TOTAL A for all mobility and self care this date.   · Pt tolerated sitting EOB approx 14 min with CGA to MIN A for postural control.   · Attempted to engage pt in several basic self care skills, but pt not participating. Education provided from OT and family re: importance of performance of functional skills. Hand over hand assistance also provided, but pt at times resisting.   · Eyes closed most of session with head turned away from therapist. Pt is able to open eyes on command and follow other simple commands such as sticking tongue out and demonstrating and "thumbs up".   · Pt does report dizziness and fatigue. VSS monitored and remained stable during session.   · One trial of standing completed with TOTAL A x 2 and unable to achieve full upright standing.       [facility] [address] 6 Click ADL: 6    Education:  Education provided re: OT POC, safety with functional mobility/ADl skills, importance of engagement of therapy session with optimize effort. Family present throughout session and very supportive and engaging.     Patient left supine with all lines intact, call button in reach, nsg notified and family  presentEducation:      GOALS:   Multidisciplinary Problems     Occupational Therapy Goals        Problem: Occupational Therapy Goal    Goal Priority Disciplines Outcome Interventions   Occupational Therapy Goal     OT, PT/OT Ongoing, Progressing    Description: Goals to be met by:  2/17/22     Patient will increase functional independence with ADLs by performing:  Pt to demo Fair+ sitting balance to allow for increased engagement with ADl and transfer training.  Pt to complete g/h skills seated EOB with set-up  Pt to engaged with UE AAROM exs daily to increase functional ROM/strength.   Pt to complete t/f to BSC with MAX A                          Time " Tracking:     OT Date of Treatment: 02/03/22  OT Start Time: 0900  OT Stop Time: 0930  OT Total Time (min): 30 min    Billable Minutes:Self Care/Home Management 15  Therapeutic Exercise 15    OT/SUE: OT          2/3/2022

## 2022-02-03 NOTE — ASSESSMENT & PLAN NOTE
Orion Ty is a 76 y.o. female who presents to the SICU s/p redo aortic valve replacement with mechanical valve on 1/5/22.      Neuro/Psych:   -- Sedation: none  -- Pain: Liquid Tiffani, breakthrough fentanyl pushes q2     Cards:   -- Pressors: vaso restarted overnight for sustained hypotension  -- Goal MAP: 60-80  -- A-fib rate controlled  -- statin  -- INR 1.4 this morning, 1mg coumadin again  -- metoprolol 25 BID      Pulm:   -- Goal O2 sat > 90%  -- reintubated 1/12 then 1/18. S/p trach 1/21  -- Spontaneous when able.   -- Tolerated trach collar for 2 hours 2/2  -- ABG PRN  -- 2 mediastinal removed 1/9 L Pleural CT removed 1/8      Renal:  -- Mireles replaced 1/25, UOP 370cc yesterday  -- iHD  -- Tolerating iHD, midodrine as needed for hypotension  -- Permcath 1/21  -- Removed R IJ trialysis 1/23      FEN / GI:   -- Replace lytes as needed  -- s/p PEG 1/21  -- Nutrition: tube feeds novasource renal, at goal      ID:   -- WBC stable  -- Antibiotics: periop ancef complete. Zosyn and vanc, stopped 1/20        Heme/Onc:   -- Hgb stable  -- Daily CBC  -- Transfused products: 1U RBC on 1/5/22. 1u RBC and 1u FFP on 1/9/21. 2U RBC on 1/12. 4 FFP on 1/13. 1 FFP on 1/17, 2u pRBC 1/27  -- Anticoagulation: coumadin      Endo:   -- Gluc goal 140-180  -- Insulin per endocrinology      PPx:   Feeding: tube feeds  Analgesia/Sedation: Tiffani, fent pushes / none  Thromboembolic prevention: Coumadin, ASA  HOB >30: yes  Stress Ulcer ppx: protonix BID  Glucose control: Critical care goal 140-180 g/dl, ISS    Lines/Drains/Airway: Art line, Trach, Permcath, PEG      Dispo/Code Status/Palliative:   -- SICU / Full Code

## 2022-02-03 NOTE — SUBJECTIVE & OBJECTIVE
Subjective:     HPI:  Orion Ty is a 76 year old female with previous aortic valve replacement, mitral valve replacement, and tricuspid valve repair in 2010 who presents for redo aortic valve replacement. 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 She underwent aortic valve replacement with a mechanical valve on 1/5/22. Wound care is following for assessment of buttocks skin injury.      Hospital Course:   Interval history: Pt is seen resting in bed with family at bedside. No complaints.         Follow-up For: Procedure(s) (LRB):  CREATION, TRACHEOSTOMY (N/A)  EGD, WITH PEG TUBE INSERTION (N/A)  INSERTION-CATHETER-ROSELINE (N/A)    Post-Operative Day: 13 Days Post-Op    Scheduled Meds:   sodium chloride 0.9%   Intravenous Once    albuterol-ipratropium  3 mL Nebulization Q6H    amiodarone  400 mg Oral TID    atorvastatin  40 mg Per G Tube Daily    furosemide (LASIX) injection  80 mg Intravenous Q8H    metoprolol tartrate  25 mg Per G Tube BID    midodrine  15 mg Per G Tube Q8H    pantoprazole  40 mg Intravenous Q12H    psyllium husk (aspartame)  1 packet Per G Tube Daily    QUEtiapine  25 mg Per G Tube QHS    sevelamer carbonate  0.8 g Per G Tube TID    warfarin  2 mg Oral Daily     Continuous Infusions:   dextrose 10 % in water (D10W)      heparin (porcine) in D5W Stopped (02/02/22 1040)    vasopressin 0.02 Units/min (02/03/22 1432)     PRN Meds:sodium chloride 0.9%, sodium chloride 0.9%, sodium chloride 0.9%, sodium chloride 0.9%, acetaminophen, bisacodyL, dextrose 10 % in water (D10W), dextrose 10%, dextrose 50%, glucagon (human recombinant), heparin (porcine), heparin (PORCINE), heparin (PORCINE), hydrOXYzine, insulin aspart U-100, LIDOcaine HCL 2%, ondansetron, oxyCODONE, polyethylene glycol, sodium chloride 0.9%, sodium chloride 0.9%, sodium chloride 0.9%, sodium chloride 0.9%    Review of Systems    Skin: Positive for wound.     Objective:     Vital Signs (Most Recent):  Temp: (!) 102.3 °F (39.1 °C) (02/03/22 1400)  Pulse: 80 (02/03/22 1400)  Resp: (!) 29 (02/03/22 1400)  BP: (!) 85/50 (02/03/22 1400)  SpO2: (!) 93 % (02/03/22 1400) Vital Signs (24h Range):  Temp:  [98.6 °F (37 °C)-102.6 °F (39.2 °C)] 102.3 °F (39.1 °C)  Pulse:  [] 80  Resp:  [19-45] 29  SpO2:  [93 %-98 %] 93 %  BP: ()/(41-66) 85/50     Weight: 49.1 kg (108 lb 3.9 oz)  Body mass index is 20.45 kg/m².    Physical Exam  Constitutional:       Appearance: Normal appearance.   Skin:     General: Skin is warm and dry.      Findings: Lesion present.   Neurological:      Mental Status: She is alert.         Laboratory:  All pertinent labs reviewed within the last 24 hours.    Diagnostic Results:  None

## 2022-02-03 NOTE — CARE UPDATE
09:47 Patient placed back on vent rate (AC/20/340/+5/+60%), patient became tachycardic, with increased WOB. EKG in progress.

## 2022-02-03 NOTE — ASSESSMENT & PLAN NOTE
- Injury noted to buttocks appears to be related to moisture/friction/shearing.  - pt is incontinent of urine/stool and currently using moisture wicking pads.  - tejeda catheter in place.  - Triad dc'd per wound care RN.   - Now recommending 3M hydrocolloid sacral dressing 2x/week.   - reapplied per NP 2/3.   - currently on Immerse mattress with heel boots in place.  - strict turn q2h schedule.   - nursing to maintain pressure injury prevention measures.

## 2022-02-03 NOTE — PROGRESS NOTES
[facility]  Critical Care - Surgery  Progress Note    Patient Name: Orion Ty  MRN: [id number]  Admission Date: 1/5/2022  Hospital Length of Stay: 29 days  Code Status: Full Code  Attending Provider: Dennis Haider MD  Primary Care Provider: Otis Zimmer MD   Principal Problem: S/P aortic valve replacement    Subjective:     Hospital/ICU Course:  No notes on file    Interval History/Significant Events:   NAEON  Some vaso for mild hypotension  No HD overnight  Stable    Follow-up For: Procedure(s) (LRB):  CREATION, TRACHEOSTOMY (N/A)  EGD, WITH PEG TUBE INSERTION (N/A)  INSERTION-CATHETER-ROSELINE (N/A)    Post-Operative Day: 7 Days Post-Op    Objective:     Vital Signs (Most Recent):  Temp: 98.6 °F (37 °C) (02/03/22 0701)  Pulse: 80 (02/03/22 0715)  Resp: (!) 34 (02/03/22 0715)  BP: (!) 99/54 (02/03/22 0715)  SpO2: 98 % (02/03/22 0715) Vital Signs (24h Range):  Temp:  [98.6 °F (37 °C)-98.8 °F (37.1 °C)] 98.6 °F (37 °C)  Pulse:  [79-97] 80  Resp:  [19-57] 34  SpO2:  [87 %-98 %] 98 %  BP: ()/(41-65) 99/54     Weight: 49.1 kg (108 lb 3.9 oz)  Body mass index is 20.45 kg/m².      Intake/Output Summary (Last 24 hours) at 2/3/2022 0814  Last data filed at 2/3/2022 0700  Gross per 24 hour   Intake 498.63 ml   Output 160 ml   Net 338.63 ml       Physical Exam  Constitutional:       General: She is not in acute distress.  HENT:      Head: Normocephalic and atraumatic.   Neck:      Comments: Trach  Cardiovascular:      Rate and Rhythm: Normal rate and regular rhythm.      Pulses: Normal pulses.      Comments: Midline sternotomy incision c/d/I  Pacemaker in place. Paced at 80  Pulmonary:      Effort: Pulmonary effort is normal. No respiratory distress.   Abdominal:      General: Abdomen is flat. There is no distension.      Palpations: Abdomen is soft.      Tenderness: There is no abdominal tenderness.      Comments: Peg site c/d/i  Soft abdomen   Genitourinary:     Comments: Mireles in  place. Urine clear  Musculoskeletal:      Right lower leg: No edema.      Left lower leg: No edema.      Comments: R radial art line in place   Skin:     General: Skin is warm.      Capillary Refill: Capillary refill takes less than 2 seconds.   Neurological:      General: No focal deficit present.      Mental Status: She is alert.         Vents:  Vent Mode: A/C (02/03/22 0515)  Ventilator Initiated: Yes (01/18/22 1636)  Set Rate: 20 BPM (02/03/22 0515)  Vt Set: 330 mL (02/03/22 0515)  Pressure Support: 10 cmH20 (02/03/22 0515)  PEEP/CPAP: 5 cmH20 (02/03/22 0515)  Oxygen Concentration (%): 60 (02/03/22 0715)  Peak Airway Pressure: 30 cmH2O (02/03/22 0515)  Plateau Pressure: 28 cmH20 (02/03/22 0515)  Total Ve: 7.36 mL (02/03/22 0515)  Negative Inspiratory Force (cm H2O): 0 (02/03/22 0400)  F/VT Ratio<105 (RSBI): (!) 62.5 (02/03/22 0515)    Lines/Drains/Airways     Central Venous Catheter Line            Permacath 01/21/22 1013 left subclavian 12 days          Drain                 Gastrostomy/Enterostomy 01/21/22 1112 Percutaneous endoscopic gastrostomy (PEG) 12 days         Urethral Catheter 01/25/22 1341 Non-latex 8 days          Airway                 Surgical Airway 01/21/22 1043 Shiley Cuffed 12 days          Peripheral Intravenous Line                 Midline Catheter Insertion/Assessment  - Single Lumen 01/10/22 1300 Left cephalic vein (lateral side of arm) 18g x 8cm 23 days                Significant Labs:    CBC/Anemia Profile:  Recent Labs   Lab 02/02/22  0326 02/03/22  0418   WBC 10.31 8.53   HGB 9.2* 8.3*   HCT 29.1* 26.2*   * 106*   MCV 95 95   RDW 17.5* 17.2*        Chemistries:  Recent Labs   Lab 02/02/22  0326   *   K 3.7   CL 90*   CO2 23   BUN 73*   CREATININE 2.8*   CALCIUM 8.4*   ALBUMIN 2.4*   PROT 6.4   BILITOT 0.7   ALKPHOS 221*   ALT 40   AST 74*   MG 1.6   PHOS 8.9*       All pertinent labs within the past 24 hours have been reviewed.    Significant Imaging:  I have reviewed all  pertinent imaging results/findings within the past 24 hours.    Assessment/Plan:     * S/P aortic valve replacement  Orion Ty is a 76 y.o. female who presents to the SICU s/p redo aortic valve replacement with mechanical valve on 1/5/22.      Neuro/Psych:   -- Sedation: none  -- Pain: Liquid Tiffani, breakthrough fentanyl pushes q2     Cards:   -- Pressors: vaso restarted overnight for sustained hypotension  -- Goal MAP: 60-80  -- A-fib rate controlled  -- statin  -- INR 1.4 this morning, 1mg coumadin again  -- metoprolol 25 BID      Pulm:   -- Goal O2 sat > 90%  -- reintubated 1/12 then 1/18. S/p trach 1/21  -- Spontaneous when able.   -- Tolerated trach collar for 2 hours 2/2  -- ABG PRN  -- 2 mediastinal removed 1/9 L Pleural CT removed 1/8      Renal:  -- Mireles replaced 1/25, UOP 370cc yesterday  -- iHD  -- Tolerating iHD, midodrine as needed for hypotension  -- Permcath 1/21  -- Removed R IJ trialysis 1/23      FEN / GI:   -- Replace lytes as needed  -- s/p PEG 1/21  -- Nutrition: tube feeds novasource renal, at goal      ID:   -- WBC stable  -- Antibiotics: periop ancef complete. Zosyn and vanc, stopped 1/20        Heme/Onc:   -- Hgb stable  -- Daily CBC  -- Transfused products: 1U RBC on 1/5/22. 1u RBC and 1u FFP on 1/9/21. 2U RBC on 1/12. 4 FFP on 1/13. 1 FFP on 1/17, 2u pRBC 1/27  -- Anticoagulation: coumadin      Endo:   -- Gluc goal 140-180  -- Insulin per endocrinology      PPx:   Feeding: tube feeds  Analgesia/Sedation: Tiffani, fent pushes / none  Thromboembolic prevention: Coumadin, ASA  HOB >30: yes  Stress Ulcer ppx: protonix BID  Glucose control: Critical care goal 140-180 g/dl, ISS    Lines/Drains/Airway: Art line, Trach, Permcath, PEG      Dispo/Code Status/Palliative:   -- SICU / Full Code               Critical care was time spent personally by me on the following activities: development of treatment plan with patient or surrogate and bedside caregivers, discussions with consultants,  evaluation of patient's response to treatment, examination of patient, ordering and performing treatments and interventions, ordering and review of laboratory studies, ordering and review of radiographic studies, pulse oximetry, re-evaluation of patient's condition.  This critical care time did not overlap with that of any other provider or involve time for any procedures.     Maikel Schwab MD  Critical Care - Surgery  [facility]

## 2022-02-03 NOTE — PLAN OF CARE
Problem: Occupational Therapy Goal  Goal: Occupational Therapy Goal  Description: Goals to be met by:  2/17/22     Patient will increase functional independence with ADLs by performing:  Pt to demo Fair+ sitting balance to allow for increased engagement with ADl and transfer training.  Pt to complete g/h skills seated EOB with set-up  Pt to engaged with UE AAROM exs daily to increase functional ROM/strength.   Pt to complete t/f to BSC with MAX A         Outcome: Ongoing, Progressing

## 2022-02-03 NOTE — PROGRESS NOTES
[facility]  Adult Nutrition  Progress Note    SUMMARY       Recommendations    1. Continue Novasource Renal @ 35 mL/hr - 1680 kcal, 76 g protein, and 602 mL free water   - Meeting needs    Goals: Pt to meet >/= 75% EEN/EPN by f/u date  Nutrition Goal Status: goal met  Communication of RD Recs: reviewed with physician ([email or web address])    Assessment and Plan    Nutrition Problem  Severe Protein-Calorie Malnutrition  Malnutrition in the context of Chronic Illness     Related to (etiology):   Inability to consume sufficient energy and protein intake      Signs and Symptoms (as evidenced by):   Energy Intake: Suspected </= 75% of estimated energy requirement for >/= 1 month  Body Fat Depletion: Severe depletion of orbitals  Muscle Mass Depletion: Severe depletion of temples and clavicle region  Weight Loss: 13% x 1 month        Interventions/Recommendations (treatment strategy):  1. Enteral nutrition  2. Collaboration of nutrition care with other providers     Nutrition Diagnosis Status:   Continues      Malnutrition Assessment  Malnutrition Type: chronic illness  Energy Intake: severe energy intake          Weight Loss (Malnutrition): greater than 5% in 1 month  Energy Intake (Malnutrition): less than 75% for greater than or equal to 1 month  Subcutaneous Fat (Malnutrition): severe depletion  Muscle Mass (Malnutrition): severe depletion   Orbital Region (Subcutaneous Fat Loss): severe depletion   [place] Region (Muscle Loss): severe depletion  Clavicle Bone Region (Muscle Loss): severe depletion       Subcutaneous Fat Loss (Final Summary): severe protein-calorie malnutrition  Muscle Loss Evaluation (Final Summary): severe protein-calorie malnutrition    Severe Weight Loss (Malnutrition): greater than 5% in 1 month    Reason for Assessment    Reason For Assessment: RD follow-up  Diagnosis: cardiac disease  Relevant Medical History: CAD, DM, HTN, pacemaker  Interdisciplinary Rounds: did not  "attend    General Information Comments: S/p trach/PEG. Tolerating well. Tolerating TFs @ goal. No s/s of n/v/d/c. Working with PT/OT. Status improving. Plans for HD today. NFPE completed on ; pt continues with severe malnutrition in the context of chronic illness per ASPEN guidelines. Status improving with TFs meeting needs and working with PT/OT. Plans to discharge to LTAC.    Nutrition Discharge Planning: Adequate nutrition via TFs through PEG    Nutrition Risk Screen    Nutrition Risk Screen: tube feeding or parenteral nutrition    Nutrition/Diet History    Patient Reported Diet/Restrictions/Preferences: low salt  Spiritual, Cultural Beliefs, Alevism Practices, Values that Affect Care: no  Food Allergies: NKFA  Factors Affecting Nutritional Intake: NPO    Anthropometrics    Temp: 98.6 °F (37 °C)  Height: 5' 1" (154.9 cm)  Height (inches): 61 in  Weight Method: Bed Scale  Weight: 49.1 kg (108 lb 3.9 oz)  Weight (lb): 108.25 lb  Ideal Body Weight (IBW), Female: 105 lb  % Ideal Body Weight, Female (lb): 94.07 %  BMI (Calculated): 20.5  BMI Grade: 18.5-24.9 - normal  Usual Body Weight (UBW), k.25 kg  % Usual Body Weight: 87.6  % Weight Change From Usual Weight: -12.59 %       Lab/Procedures/Meds    Pertinent Labs Reviewed: reviewed  Pertinent Labs Comments: Na 129, , Creat 3.5, GFR 12.0, phos 8.7, AST 60  Pertinent Medications Reviewed: reviewed  Pertinent Medications Comments: statin, lasix, insulin, metoprolol, pantoprazole, psyllium husk, warfarin, heparin, vasopressin    Estimated/Assessed Needs    Weight Used For Calorie Calculations: 44.8 kg (98 lb 12.3 oz)  Energy Calorie Requirements (kcal): 6179-2309 kcal/day  Energy Need Method: Kcal/kg (30-35; HD)  Protein Requirements: 54-67 g/day (1.2-1.5 g/kg)  Weight Used For Protein Calculations: 44.8 kg (98 lb 12.3 oz)  Fluid Requirements (mL): 1 mL/kcal or per MD  Estimated Fluid Requirement Method: RDA Method  RDA Method (mL): 1344  CHO " Requirement: 150 g      Nutrition Prescription Ordered    Current Diet Order: NPO  Current Nutrition Support Formula Ordered: Novasource Renal  Current Nutrition Support Rate Ordered: 35 (ml)  Current Nutrition Support Frequency Ordered: mL/hr x 24 hrs    Evaluation of Received Nutrient/Fluid Intake    Enteral Calories (kcal): 1680  Enteral Protein (gm): 76  Enteral (Free Water) Fluid (mL): 602  % Kcal Needs: 107  % Protein Needs: 113  I/O: +5.5L since admit  Energy Calories Required: meeting needs  Protein Required: exceeds needs  Fluid Required: meeting needs  Comments: LBM: 2/1  Tolerance: tolerating  % Intake of Estimated Energy Needs: 75 - 100 %  % Meal Intake: NPO    Nutrition Risk    Level of Risk/Frequency of Follow-up: low     Monitor and Evaluation    Food and Nutrient Intake: energy intake  Food and Nutrient Adminstration: diet order  Anthropometric Measurements: weight  Biochemical Data, Medical Tests and Procedures: electrolyte and renal panel,gastrointestinal profile,glucose/endocrine profile,inflammatory profile,lipid profile  Nutrition-Focused Physical Findings: overall appearance     Nutrition Follow-Up    RD Follow-up?: Yes

## 2022-02-03 NOTE — SUBJECTIVE & OBJECTIVE
Interval History/Significant Events:   NAEON  Some vaso for mild hypotension  No HD overnight  Stable    Follow-up For: Procedure(s) (LRB):  CREATION, TRACHEOSTOMY (N/A)  EGD, WITH PEG TUBE INSERTION (N/A)  INSERTION-CATHETER-ROSELINE (N/A)    Post-Operative Day: 7 Days Post-Op    Objective:     Vital Signs (Most Recent):  Temp: 98.6 °F (37 °C) (02/03/22 0701)  Pulse: 80 (02/03/22 0715)  Resp: (!) 34 (02/03/22 0715)  BP: (!) 99/54 (02/03/22 0715)  SpO2: 98 % (02/03/22 0715) Vital Signs (24h Range):  Temp:  [98.6 °F (37 °C)-98.8 °F (37.1 °C)] 98.6 °F (37 °C)  Pulse:  [79-97] 80  Resp:  [19-57] 34  SpO2:  [87 %-98 %] 98 %  BP: ()/(41-65) 99/54     Weight: 49.1 kg (108 lb 3.9 oz)  Body mass index is 20.45 kg/m².      Intake/Output Summary (Last 24 hours) at 2/3/2022 0814  Last data filed at 2/3/2022 0700  Gross per 24 hour   Intake 498.63 ml   Output 160 ml   Net 338.63 ml       Physical Exam  Constitutional:       General: She is not in acute distress.  HENT:      Head: Normocephalic and atraumatic.   Neck:      Comments: Trach  Cardiovascular:      Rate and Rhythm: Normal rate and regular rhythm.      Pulses: Normal pulses.      Comments: Midline sternotomy incision c/d/I  Pacemaker in place. Paced at 80  Pulmonary:      Effort: Pulmonary effort is normal. No respiratory distress.   Abdominal:      General: Abdomen is flat. There is no distension.      Palpations: Abdomen is soft.      Tenderness: There is no abdominal tenderness.      Comments: Peg site c/d/i  Soft abdomen   Genitourinary:     Comments: Mireles in place. Urine clear  Musculoskeletal:      Right lower leg: No edema.      Left lower leg: No edema.      Comments: R radial art line in place   Skin:     General: Skin is warm.      Capillary Refill: Capillary refill takes less than 2 seconds.   Neurological:      General: No focal deficit present.      Mental Status: She is alert.         Vents:  Vent Mode: A/C (02/03/22 0515)  Ventilator Initiated:  Yes (01/18/22 1636)  Set Rate: 20 BPM (02/03/22 0515)  Vt Set: 330 mL (02/03/22 0515)  Pressure Support: 10 cmH20 (02/03/22 0515)  PEEP/CPAP: 5 cmH20 (02/03/22 0515)  Oxygen Concentration (%): 60 (02/03/22 0715)  Peak Airway Pressure: 30 cmH2O (02/03/22 0515)  Plateau Pressure: 28 cmH20 (02/03/22 0515)  Total Ve: 7.36 mL (02/03/22 0515)  Negative Inspiratory Force (cm H2O): 0 (02/03/22 0400)  F/VT Ratio<105 (RSBI): (!) 62.5 (02/03/22 0515)    Lines/Drains/Airways     Central Venous Catheter Line            Permacath 01/21/22 1013 left subclavian 12 days          Drain                 Gastrostomy/Enterostomy 01/21/22 1112 Percutaneous endoscopic gastrostomy (PEG) 12 days         Urethral Catheter 01/25/22 1341 Non-latex 8 days          Airway                 Surgical Airway 01/21/22 1043 Shiley Cuffed 12 days          Peripheral Intravenous Line                 Midline Catheter Insertion/Assessment  - Single Lumen 01/10/22 1300 Left cephalic vein (lateral side of arm) 18g x 8cm 23 days                Significant Labs:    CBC/Anemia Profile:  Recent Labs   Lab 02/02/22  0326 02/03/22  0418   WBC 10.31 8.53   HGB 9.2* 8.3*   HCT 29.1* 26.2*   * 106*   MCV 95 95   RDW 17.5* 17.2*        Chemistries:  Recent Labs   Lab 02/02/22  0326   *   K 3.7   CL 90*   CO2 23   BUN 73*   CREATININE 2.8*   CALCIUM 8.4*   ALBUMIN 2.4*   PROT 6.4   BILITOT 0.7   ALKPHOS 221*   ALT 40   AST 74*   MG 1.6   PHOS 8.9*       All pertinent labs within the past 24 hours have been reviewed.    Significant Imaging:  I have reviewed all pertinent imaging results/findings within the past 24 hours.

## 2022-02-03 NOTE — SUBJECTIVE & OBJECTIVE
"Interval HPI:   Overnight events:   BG has trended downwards below goal while on current SQ insulin regimen. Patient intubated in SICU. Vent setting increase secondary to increase WOB. Endo will continue to follow, and manage glycemic control while inpatient.   Diet NPO Except for: Sips with Medication  13 Days Post-Op    Eating:   NPO  Nausea: No  Hypoglycemia and intervention: No  Fever: No  TPN and/or TF: Yes  If yes, type of TF/TPN and rate: TF at 35 cc/hr.     BP (!) 112/53 (BP Location: Right arm, Patient Position: Lying)   Pulse 80   Temp (!) 102.6 °F (39.2 °C) (Axillary)   Resp (!) 29   Ht 5' 1" (1.549 m)   Wt 49.1 kg (108 lb 3.9 oz)   SpO2 95%   Breastfeeding No   BMI 20.45 kg/m²     Labs Reviewed and Include    Recent Labs   Lab 02/03/22  0836      CALCIUM 8.8   ALBUMIN 2.4*   PROT 6.4   *   K 4.2   CO2 23   CL 88*   *   CREATININE 3.5*   ALKPHOS 178*   ALT 39   AST 60*   BILITOT 0.6     Lab Results   Component Value Date    WBC 8.53 02/03/2022    HGB 8.3 (L) 02/03/2022    HCT 26.2 (L) 02/03/2022    MCV 95 02/03/2022     (L) 02/03/2022     No results for input(s): TSH, FREET4 in the last 168 hours.  Lab Results   Component Value Date    HGBA1C 6.0 (H) 01/03/2022       Nutritional status:   Body mass index is 20.45 kg/m².  Lab Results   Component Value Date    ALBUMIN 2.4 (L) 02/03/2022    ALBUMIN 2.4 (L) 02/02/2022    ALBUMIN 3.2 (L) 02/01/2022     Lab Results   Component Value Date    PREALBUMIN <3 (L) 01/28/2022    PREALBUMIN 13 (L) 01/21/2022    PREALBUMIN 11 (L) 01/10/2022       Estimated Creatinine Clearance: 10.2 mL/min (A) (based on SCr of 3.5 mg/dL (H)).    Accu-Checks  Recent Labs     02/01/22 2027 02/01/22 2316 02/02/22  0325 02/02/22  0839 02/02/22  1116 02/02/22  1506 02/02/22  2107 02/03/22  0407 02/03/22  0835 02/03/22  1129   POCTGLUCOSE 131* 167* 176* 243* 126* 187* 193* 236* 128* 74       Current Medications and/or Treatments Impacting Glycemic " Control  Immunotherapy:    Immunosuppressants     None        Steroids:   Hormones (From admission, onward)            Start     Stop Route Frequency Ordered    02/03/22 0007  vasopressin (PITRESSIN) 0.2 Units/mL in dextrose 5 % 100 mL infusion         -- IV Continuous 02/03/22 0007    01/28/22 0207  vasopressin (PITRESSIN) 20 unit/mL injection        Note to Pharmacy: Created by cabinet override    01/28 1414   01/28/22 0207        Pressors:    Autonomic Drugs (From admission, onward)            Start     Stop Route Frequency Ordered    02/02/22 1400  midodrine tablet 15 mg         -- PER G TUBE Every 8 hours 02/02/22 1045    01/12/22 1441  rocuronium 10 mg/mL injection        Note to Pharmacy: Created by cabinet override    01/13 0244   01/12/22 1441        Hyperglycemia/Diabetes Medications:   Antihyperglycemics (From admission, onward)            Start     Stop Route Frequency Ordered    02/03/22 0800  insulin aspart U-100 pen 4 Units         -- SubQ Every 24 hours (non-standard times) 02/02/22 0902    02/03/22 0400  insulin aspart U-100 pen 4 Units         -- SubQ Every 24 hours (non-standard times) 02/02/22 0902    02/03/22 0000  insulin aspart U-100 pen 4 Units         -- SubQ Every 24 hours (non-standard times) 02/02/22 0902    02/02/22 2000  insulin aspart U-100 pen 4 Units         -- SubQ Every 24 hours (non-standard times) 02/02/22 0902    02/02/22 1600  insulin aspart U-100 pen 4 Units         -- SubQ Every 24 hours (non-standard times) 02/02/22 0902    02/02/22 1200  insulin aspart U-100 pen 4 Units         -- SubQ Every 24 hours (non-standard times) 02/02/22 0902    01/27/22 0851  insulin aspart U-100 pen 1-10 Units         -- SubQ As needed (PRN) 01/27/22 0853

## 2022-02-03 NOTE — PROGRESS NOTES
[facility]  Nephrology  Progress Note     Patient Name: Orion Ty  MRN: [id number]  Admission Date: 1/5/2022  Hospital Length of Stay: 29 days  Attending Provider: Dennis Haider MD   Primary Care Physician: Otis Zimmer MD  Principal Problem: S/P aortic valve replacement    Subjective:     HPI: Orion Ty is our 76 y.o. female with previous aortic valve replacement, mitral valve replacement, and tricuspid valve repair in 2010 who presented on 1/5 for redo aortic valve replacement. Nephrology consulted on 1/8 for anuric JO. Patient is alert but tired, son at bedside. Stated she was tired prior to admission. During the hospital stay Cr increase from 1 to 1.5. In OR she received  2.5L crystalloid, 3U RBC, 2U FFP, 2 platelets, and 800 cell saver. she is +5 L since admit. UOP decrease overnight, per nurse she didn't urinate at all. She received diuril 500 mg twice, lasix 20 mg *2 on 1/7. This morning she urinate 225 immediately after placing the tejeda's cath. She received again lasix 100 mg this morning. Upor evaluation she was alert but repeatedly saying she was tired, denied any pain. Denied any previous hx of kidney disease.       Interval History: Net negative 867mL over the past 24h. UOP of 265mLs.  iHD completed yesterday; 1.5L removed.      Review of patient's allergies indicates:  No Known Allergies   Current Facility-Administered Medications   Medication Frequency    0.9%  NaCl infusion PRN    0.9%  NaCl infusion PRN    0.9%  NaCl infusion PRN    acetaminophen oral solution 650 mg Q4H PRN    albuterol-ipratropium 2.5 mg-0.5 mg/3 mL nebulizer solution 3 mL Q6H    amiodarone tablet 400 mg TID    atorvastatin tablet 40 mg Daily    bisacodyL suppository 10 mg Daily PRN    dextrose 10 % infusion Continuous PRN    dextrose 50% injection 12.5 g PRN    furosemide injection 80 mg Q8H    glucagon (human recombinant) injection 1 mg PRN    heparin (porcine) injection  1,000 Units PRN    heparin 25,000 units in dextrose 5% (100 units/ml) IV bolus from bag - ADDITIONAL PRN BOLUS - 30 units/kg PRN    heparin 25,000 units in dextrose 5% (100 units/ml) IV bolus from bag - ADDITIONAL PRN BOLUS - 60 units/kg PRN    heparin 25,000 units in dextrose 5% 250 mL (100 units/mL) infusion LOW INTENSITY nomogram - [facility] Continuous    hydrOXYzine 10 mg/5 mL syrup 10 mg Q6H PRN    insulin aspart U-100 pen 1-10 Units PRN    insulin aspart U-100 pen 4 Units Q24H    insulin aspart U-100 pen 4 Units Q24H    insulin aspart U-100 pen 4 Units Q24H    insulin aspart U-100 pen 4 Units Q24H    insulin aspart U-100 pen 4 Units Q24H    insulin aspart U-100 pen 4 Units Q24H    LIDOcaine HCL 2% jelly PRN    metoprolol tartrate (LOPRESSOR) tablet 25 mg BID    midodrine tablet 15 mg Q8H    ondansetron injection 4 mg Q12H PRN    oxyCODONE 5 mg/5 mL solution 5 mg Q4H PRN    pantoprazole injection 40 mg Q12H    polyethylene glycol packet 17 g Daily PRN    psyllium husk (aspartame) 3.4 gram packet 1 packet Daily    QUEtiapine tablet 25 mg QHS    sevelamer carbonate pwpk 0.8 g TID    sodium chloride 0.9% bolus 250 mL PRN    sodium chloride 0.9% bolus 250 mL PRN    sodium chloride 0.9% flush 10 mL PRN    vasopressin (PITRESSIN) 0.2 Units/mL in dextrose 5 % 100 mL infusion Continuous    warfarin (COUMADIN) tablet 2 mg Daily     Facility-Administered Medications Ordered in Other Encounters   Medication Frequency    0.9%  NaCl infusion Continuous       Objective:     Vital Signs (Most Recent):  Temp: (!) 102.6 °F (39.2 °C) (02/03/22 1127)  Pulse: 80 (02/03/22 1101)  Resp: (!) 44 (02/03/22 1101)  BP: 123/60 (02/03/22 1101)  SpO2: (!) 93 % (02/03/22 1101)  O2 Device (Oxygen Therapy): ventilator (02/03/22 1101) Vital Signs (24h Range):  Temp:  [98.6 °F (37 °C)-102.6 °F (39.2 °C)] 102.6 °F (39.2 °C)  Pulse:  [] 80  Resp:  [19-57] 44  SpO2:  [87 %-98 %] 93 %  BP: ()/(41-66) 123/60   Weight:  49.1 kg (108 lb 3.9 oz) (02/03/22 0444)  Body mass index is 20.45 kg/m².  Body surface area is 1.45 meters squared.      Intake/Output Summary (Last 24 hours) at 2/3/2022 1138  Last data filed at 2/3/2022 0700  Gross per 24 hour   Intake 382.48 ml   Output 130 ml   Net 252.48 ml     I/O last 3 completed shifts:  In: 957.5 [I.V.:187.5; NG/GT:770]  Out: 205 [Urine:205]  Net IO Since Admission: 5,582.83 mL [02/03/22 1138]     Physical Exam  Constitutional:       Appearance: She is well-developed. She is ill-appearing.   Cardiovascular:      Rate and Rhythm: Normal rate and regular rhythm.      Heart sounds: Normal heart sounds.   Pulmonary:      Comments: tracheostomy   Abdominal:      General: Abdomen is flat.      Palpations: Abdomen is soft.   Musculoskeletal:         General: Normal range of motion.      Right lower leg: No edema.      Left lower leg: No edema.   Skin:     General: Skin is warm and dry.      Comments: Sternal Incision CDI   Neurological:      Mental Status: She is alert.   Psychiatric:         Mood and Affect: Mood normal.         Behavior: Behavior normal.         Recent Labs   Lab 01/28/22  0428 02/01/22 0315 02/01/22  0315 02/01/22  0330 02/02/22  0326 02/03/22  0418   WBC  --  12.36  --   --  10.31 8.53   HGB  --  12.3  --   --  9.2* 8.3*   HCT   < > 38.0  --  37 29.1* 26.2*   PLT  --  138*  --   --  134* 106*   MONO  --  2.4*  0.3   < >  --  3.9*  0.4 3.5*  0.3    < > = values in this interval not displayed.      Recent Labs   Lab 02/01/22 0315 02/02/22  0326 02/03/22  0836   * 131* 129*   K 3.5 3.7 4.2   CL 95 90* 88*   CO2 23 23 23   BUN 18 73* 103*   CREATININE 1.0 2.8* 3.5*   CALCIUM 8.4* 8.4* 8.8   PROT 8.7* 6.4 6.4   BILITOT 0.9 0.7 0.6   ALKPHOS 255* 221* 178*   ALT 48* 40 39   AST 91* 74* 60*      Recent Labs     02/01/22  0330 02/02/22  0410 02/03/22  0404   PH 7.489* 7.376 7.320*   PCO2 34.6* 45.2* 50.7*   PO2 59* 42 45   HCO3 26.3 26.5 26.1   POCSATURATED 92* 76* 77*   BE  3 1 0       Assessment/Plan:        JO  acute kidney injury  Orion Ty is our 76 y.o. female with previous aortic valve replacement, mitral valve replacement, and tricuspid valve repair in 2010 who presented on 1/5 for redo aortic valve replacement. Nephrology consulted on 1/8 for anuric JO. During the hospital stay Cr increase from 1 to 1.5. In OR she received  2.5L crystalloid, 3U RBC, 2U FFP, 2 platelets, and 800 cell saver. she is +5 L since admit. UOP decreased. She received diuril 500 mg twice, lasix 20 mg x2 on 1/7 with no adequate response to diuretics     Plan   Oliguric ischemic ATN likely secondary to severe intraoperative hypotension requiring vasopressor on 01/05/2022  Failed high-dose diuretics and renal replacement therapy started on 1/9/22 due to volume overload  Patient clinically not overloaded  Labs reviewed  We will pt for 1 short session of iHD for today   Strict I/Os   Renally dose medications to GFR      S/P aortic valve replacement  With severe intraoperative hypotension   Management per primary     ATN (acute tubular necrosis)  See jo      Rose Mary Walsh MD  Nephrology  [facility]

## 2022-02-03 NOTE — PROGRESS NOTES
I have reviewed the pharmacy student's progress note and agree with the assessment.     Dotty Adams, PharmD, [facility]  Clinical Pharmacist  Ext [phone]    Pharmacokinetic Initial Assessment: IV Vancomycin    Assessment/Plan:     Initiate intravenous vancomycin with loading dose of 1000 mg once with subsequent doses when random concentrations are less than 20 mcg/mL  Desired empiric serum trough concentration is 10 to 20 mcg/mL  Draw vancomycin trough level on 2/4 with AM labs.  Pharmacy will continue to follow and monitor vancomycin.      Please contact pharmacy at extension [phone] with any questions regarding this assessment.     Thank you for the consult,   Sarah Andrea      Patient brief summary:  Orion Ty is a 76 y.o. female initiated on antimicrobial therapy with IV Vancomycin for treatment of suspected bacteremia    Drug Allergies:   Review of patient's allergies indicates:  No Known Allergies    Actual Body Weight:   49.1kg    Renal Function:   Estimated Creatinine Clearance: 10.2 mL/min (A) (based on SCr of 3.5 mg/dL (H)).,     Dialysis Method (if applicable):  intermittent HD    CBC (last 72 hours):  Recent Labs   Lab Result Units 02/01/22 0315 02/02/22 0326 02/03/22  0418 02/03/22  1333   WBC K/uL 12.36 10.31 8.53 12.58   Hemoglobin g/dL 12.3 9.2* 8.3* 7.8*   Hematocrit % 38.0 29.1* 26.2* 24.4*   Platelets K/uL 138* 134* 106* 119*   Gran % % 91.6* 86.5* 86.5* 92.7*   Lymph % % 4.5* 7.4* 7.7* 3.7*   Mono % % 2.4* 3.9* 3.5* 2.0*   Eosinophil % % 0.9 1.2 1.2 0.6   Basophil % % 0.3 0.6 0.6 0.4   Differential Method  Automated Automated Automated Automated       Metabolic Panel (last 72 hours):  Recent Labs   Lab Result Units 02/01/22 0315 02/02/22  0326 02/03/22  0836   Sodium mmol/L 133* 131* 129*   Potassium mmol/L 3.5 3.7 4.2   Chloride mmol/L 95 90* 88*   CO2 mmol/L 23 23 23   Glucose mg/dL 116* 150* 107   BUN mg/dL 18 73* 103*   Creatinine mg/dL 1.0 2.8* 3.5*   Albumin g/dL 3.2* 2.4* 2.4*   Total  Bilirubin mg/dL 0.9 0.7 0.6   Alkaline Phosphatase U/L 255* 221* 178*   AST U/L 91* 74* 60*   ALT U/L 48* 40 39   Magnesium mg/dL 1.7 1.6 1.8   Phosphorus mg/dL 1.2* 8.9* 8.7*       Drug levels (last 3 results):  No results for input(s): VANCOMYCINRA, VANCOMYCINPE, VANCOMYCINTR in the last 72 hours.    Microbiologic Results:  Microbiology Results (last 7 days)     Procedure Component Value Units Date/Time    Culture, MRSA [[id number]]     Order Status: No result Specimen: MRSA source     Blood culture [[id number]] Collected: 02/03/22 1550    Order Status: Sent Specimen: Blood from Peripheral, Hand, Right Updated: 02/03/22 1636    Blood culture [[id number]] Collected: 02/03/22 1542    Order Status: Sent Specimen: Blood from Peripheral, Hand, Left Updated: 02/03/22 1636    Culture, Respiratory with Gram Stain [[id number]]     Order Status: No result Specimen: Respiratory

## 2022-02-03 NOTE — PROGRESS NOTES
"[facility]  Endocrinology  Progress Note    Admit Date: 1/5/2022     Reason for Consult: Management of T2DM, Hyperglycemia     Surgical Procedure and Date:   1/5/21  REPLACEMENT, AORTIC VALVE, WITH REPEAT STERNOTOMY (N/A)     Diabetes diagnosis year: MELIDA; intubated    Home Diabetes Medications:  Metformin 500 mg daily,     How often checking glucose at home?  MELIDA    BG readings on regimen: MELIDA  Hypoglycemia on the regimen?  MELIDA  Missed doses on regimen? MELIDA    Diabetes Complications include:     None    Complicating diabetes co morbidities:   CAD, HTN, a-fib       HPI:   Patient is a 76 y.o. female with a diagnosis of s/p MVR, AVR and TVr in 2010 by Dr. Haider. 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 Family states she is more fatigued, decreased appetite and fluid on lungs. Pt has increased symptoms and wants to proceed with cardiac surgery now and here for pre op. She is now s/p the above procedure. Endocrinology consulted for management of T2DM.     Lab Results   Component Value Date    HGBA1C 6.0 (H) 01/03/2022           Interval HPI:   Overnight events:   BG has trended downwards below goal while on current SQ insulin regimen. Patient intubated in SICU. Vent setting increase secondary to increase WOB. Endo will continue to follow, and manage glycemic control while inpatient.   Diet NPO Except for: Sips with Medication  13 Days Post-Op    Eating:   NPO  Nausea: No  Hypoglycemia and intervention: No  Fever: No  TPN and/or TF: Yes  If yes, type of TF/TPN and rate: TF at 35 cc/hr.     BP (!) 112/53 (BP Location: Right arm, Patient Position: Lying)   Pulse 80   Temp (!) 102.6 °F (39.2 °C) (Axillary)   Resp (!) 29   Ht 5' 1" (1.549 m)   Wt 49.1 kg (108 lb 3.9 oz)   SpO2 95%   Breastfeeding No   BMI 20.45 kg/m²     Labs Reviewed and Include    Recent Labs   Lab 02/03/22  0836      CALCIUM 8.8   ALBUMIN 2.4* "   PROT 6.4   *   K 4.2   CO2 23   CL 88*   *   CREATININE 3.5*   ALKPHOS 178*   ALT 39   AST 60*   BILITOT 0.6     Lab Results   Component Value Date    WBC 8.53 02/03/2022    HGB 8.3 (L) 02/03/2022    HCT 26.2 (L) 02/03/2022    MCV 95 02/03/2022     (L) 02/03/2022     No results for input(s): TSH, FREET4 in the last 168 hours.  Lab Results   Component Value Date    HGBA1C 6.0 (H) 01/03/2022       Nutritional status:   Body mass index is 20.45 kg/m².  Lab Results   Component Value Date    ALBUMIN 2.4 (L) 02/03/2022    ALBUMIN 2.4 (L) 02/02/2022    ALBUMIN 3.2 (L) 02/01/2022     Lab Results   Component Value Date    PREALBUMIN <3 (L) 01/28/2022    PREALBUMIN 13 (L) 01/21/2022    PREALBUMIN 11 (L) 01/10/2022       Estimated Creatinine Clearance: 10.2 mL/min (A) (based on SCr of 3.5 mg/dL (H)).    Accu-Checks  Recent Labs     02/01/22  2027 02/01/22  2316 02/02/22  0325 02/02/22  0839 02/02/22  1116 02/02/22  1506 02/02/22  2107 02/03/22  0407 02/03/22  0835 02/03/22  1129   POCTGLUCOSE 131* 167* 176* 243* 126* 187* 193* 236* 128* 74       Current Medications and/or Treatments Impacting Glycemic Control  Immunotherapy:    Immunosuppressants     None        Steroids:   Hormones (From admission, onward)            Start     Stop Route Frequency Ordered    02/03/22 0007  vasopressin (PITRESSIN) 0.2 Units/mL in dextrose 5 % 100 mL infusion         -- IV Continuous 02/03/22 0007    01/28/22 0207  vasopressin (PITRESSIN) 20 unit/mL injection        Note to Pharmacy: Created by cabinet override    01/28 1414   01/28/22 0207        Pressors:    Autonomic Drugs (From admission, onward)            Start     Stop Route Frequency Ordered    02/02/22 1400  midodrine tablet 15 mg         -- PER G TUBE Every 8 hours 02/02/22 1045    01/12/22 1441  rocuronium 10 mg/mL injection        Note to Pharmacy: Created by cabinet override    01/13 0244   01/12/22 1441        Hyperglycemia/Diabetes Medications:    Antihyperglycemics (From admission, onward)            Start     Stop Route Frequency Ordered    02/03/22 0800  insulin aspart U-100 pen 4 Units         -- SubQ Every 24 hours (non-standard times) 02/02/22 0902    02/03/22 0400  insulin aspart U-100 pen 4 Units         -- SubQ Every 24 hours (non-standard times) 02/02/22 0902    02/03/22 0000  insulin aspart U-100 pen 4 Units         -- SubQ Every 24 hours (non-standard times) 02/02/22 0902    02/02/22 2000  insulin aspart U-100 pen 4 Units         -- SubQ Every 24 hours (non-standard times) 02/02/22 0902    02/02/22 1600  insulin aspart U-100 pen 4 Units         -- SubQ Every 24 hours (non-standard times) 02/02/22 0902    02/02/22 1200  insulin aspart U-100 pen 4 Units         -- SubQ Every 24 hours (non-standard times) 02/02/22 0902    01/27/22 0851  insulin aspart U-100 pen 1-10 Units         -- SubQ As needed (PRN) 01/27/22 0853          ASSESSMENT and PLAN    * S/P aortic valve replacement  Managed per primary team  Optimize BG control        Type 2 diabetes mellitus with microalbuminuria, without long-term current use of insulin  BG goal 140 - 180       -  Discontinue Novolog to 4 units q 4 hrs while on tube feeds. (HOLD if tube feeds are stopped or BG < 100) 20% dose increase  -  Continue Moderate Dose SQ Insulin Correction Scale PRN hyperglycemia.   -  BG Monitoring q 4 hrs while NPO/tube feeds    ** Please call Endocrine for any BG related issues **  ** Please notify Endocrine for any change and/or advance in diet**     Lab Results   Component Value Date    HGBA1C 6.0 (H) 01/03/2022       Discharge Planning:   TBD. Please notify endocrinology prior to discharge.           Chronic atrial fibrillation  May increase insulin resistance.               Vasquez Inman, NP  Endocrinology  [facility]

## 2022-02-03 NOTE — PROGRESS NOTES
[facility]  Skin Integrity BRIDGET  Progress Note    Patient Name: Orion Ty  MRN: [id number]  Admission Date: 1/5/2022  Hospital Length of Stay: 29 days  Attending Physician: Dennis Haider MD  Primary Care Provider: Otis Zimmer MD         Subjective:     HPI:  Orion Ty is a 76 year old female with previous aortic valve replacement, mitral valve replacement, and tricuspid valve repair in 2010 who presents for redo aortic valve replacement. 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 She underwent aortic valve replacement with a mechanical valve on 1/5/22. Wound care is following for assessment of buttocks skin injury.      Hospital Course:   Interval history: Pt is seen resting in bed with family at bedside. No complaints.         Follow-up For: Procedure(s) (LRB):  CREATION, TRACHEOSTOMY (N/A)  EGD, WITH PEG TUBE INSERTION (N/A)  INSERTION-CATHETER-ROSELINE (N/A)    Post-Operative Day: 13 Days Post-Op    Scheduled Meds:   sodium chloride 0.9%   Intravenous Once    albuterol-ipratropium  3 mL Nebulization Q6H    amiodarone  400 mg Oral TID    atorvastatin  40 mg Per G Tube Daily    furosemide (LASIX) injection  80 mg Intravenous Q8H    metoprolol tartrate  25 mg Per G Tube BID    midodrine  15 mg Per G Tube Q8H    pantoprazole  40 mg Intravenous Q12H    psyllium husk (aspartame)  1 packet Per G Tube Daily    QUEtiapine  25 mg Per G Tube QHS    sevelamer carbonate  0.8 g Per G Tube TID    warfarin  2 mg Oral Daily     Continuous Infusions:   dextrose 10 % in water (D10W)      heparin (porcine) in D5W Stopped (02/02/22 1040)    vasopressin 0.02 Units/min (02/03/22 1432)     PRN Meds:sodium chloride 0.9%, sodium chloride 0.9%, sodium chloride 0.9%, sodium chloride 0.9%, acetaminophen, bisacodyL, dextrose 10 % in water (D10W), dextrose 10%, dextrose 50%, glucagon (human  recombinant), heparin (porcine), heparin (PORCINE), heparin (PORCINE), hydrOXYzine, insulin aspart U-100, LIDOcaine HCL 2%, ondansetron, oxyCODONE, polyethylene glycol, sodium chloride 0.9%, sodium chloride 0.9%, sodium chloride 0.9%, sodium chloride 0.9%    Review of Systems   Skin: Positive for wound.     Objective:     Vital Signs (Most Recent):  Temp: (!) 102.3 °F (39.1 °C) (02/03/22 1400)  Pulse: 80 (02/03/22 1400)  Resp: (!) 29 (02/03/22 1400)  BP: (!) 85/50 (02/03/22 1400)  SpO2: (!) 93 % (02/03/22 1400) Vital Signs (24h Range):  Temp:  [98.6 °F (37 °C)-102.6 °F (39.2 °C)] 102.3 °F (39.1 °C)  Pulse:  [] 80  Resp:  [19-45] 29  SpO2:  [93 %-98 %] 93 %  BP: ()/(41-66) 85/50     Weight: 49.1 kg (108 lb 3.9 oz)  Body mass index is 20.45 kg/m².    Physical Exam  Constitutional:       Appearance: Normal appearance.   Skin:     General: Skin is warm and dry.      Findings: Lesion present.   Neurological:      Mental Status: She is alert.         Laboratory:  All pertinent labs reviewed within the last 24 hours.    Diagnostic Results:  None    Assessment/Plan:         BRIDGET Skin Integrity Evaluation    Skin Integrity BRIDGET evaluation of patient as part of the comprehensive skin care team.   She has been admitted for 29 days. Skin injury was noted on 1/9/22. POA no. PT and RD are involved in her care.                   Dermatitis associated with moisture  - Injury noted to buttocks appears to be related to moisture/friction/shearing.  - pt is incontinent of urine/stool and currently using moisture wicking pads.  - tejeda catheter in place.  - Triad dc'd per wound care RN.   - Now recommending 3M hydrocolloid sacral dressing 2x/week.   - reapplied per NP 2/3.   - currently on Immerse mattress with heel boots in place.  - strict turn q2h schedule.   - nursing to maintain pressure injury prevention measures.       Pt will be followed weekly.      Shyla Ortez NP  Skin Integrity BRIDGET  [facility]

## 2022-02-03 NOTE — PLAN OF CARE
[facility]  Discharge Reassessment    Primary Care Provider: Otis Zimmer MD    Expected Discharge Date: 2/8/2022    Reassessment (most recent)     Discharge Reassessment - 02/03/22 1033        Discharge Reassessment    Assessment Type Discharge Planning Reassessment     Discharge Plan discussed with: Adult children     Communicated MENDEL with patient/caregiver Date not available/Unable to determine     Discharge Plan A Long-term acute care facility (LTAC)     Discharge Plan B Long-term acute care facility (LTAC)     DME Needed Upon Discharge  other (see comments)   TBD    Discharge Barriers Identified None     Why the patient remains in the hospital Requires continued medical care        Post-Acute Status    Post-Acute Authorization Placement     Post-Acute Placement Status Referrals Sent               Per MD's Note,NAEON  Some vaso for mild hypotension  No HD overnight  Stable    The patient is not medically stable to discharge at this time and still requires medical care. [facility] has accepted the patient pending medical stability and auth. The  will continue to follow-up with the patient to assist with discharge planning.       Shen Simmons LMSW  Case Management [facility]  Ext: [phone]

## 2022-02-03 NOTE — ASSESSMENT & PLAN NOTE
BG goal 140 - 180       -  Discontinue Novolog to 4 units q 4 hrs while on tube feeds. (HOLD if tube feeds are stopped or BG < 100) 20% dose increase  -  Continue Moderate Dose SQ Insulin Correction Scale PRN hyperglycemia.   -  BG Monitoring q 4 hrs while NPO/tube feeds    ** Please call Endocrine for any BG related issues **  ** Please notify Endocrine for any change and/or advance in diet**     Lab Results   Component Value Date    HGBA1C 6.0 (H) 01/03/2022       Discharge Planning:   TBD. Please notify endocrinology prior to discharge.

## 2022-02-03 NOTE — PT/OT/SLP PROGRESS
Physical Therapy Co-Treatment    Patient Name:  Orion Ty   MRN:  [id number]  Admitting Diagnosis:  S/P aortic valve replacement   Recent Surgery: Procedure(s) (LRB):  CREATION, TRACHEOSTOMY (N/A)  EGD, WITH PEG TUBE INSERTION (N/A)  INSERTION-CATHETER-ROSELINE (N/A) 13 Days Post-Op  Admit Date: 1/5/2022  Length of Stay: 29 days    Recommendations:     Discharge Recommendations:  rehabilitation facility   Discharge Equipment Recommendations: bedside commode   Barriers to discharge: impaired endurance, decreased safety awareness, treanding medical condition, weakness    Assessment:     Orion Ty is a 76 y.o. female admitted with a medical diagnosis of S/P aortic valve replacement.  She presents with the following impairments/functional limitations:  weakness,impaired endurance,impaired self care skills,impaired functional mobilty,gait instability,impaired balance,decreased upper extremity function,decreased lower extremity function,decreased safety awareness,impaired cognition,impaired coordination,impaired fine motor,impaired cardiopulmonary response to activity. Pt tolerated session fairly on this date. VSS throughout session but pt minimally participatory with eyes closed 95% of session as well as turning head away from therapist. Resisted stand transfer on this date. Pt reporting fatigue. Pt will continue to benefit from skilled PT services during this hospital admit to continue to improve transfer ability and efficiency as well as continue to progress pt's ambulation distance and cardiopulmonary endurance to maximize pt's functional independence and return to PLOF. Pt would benefit from continued therapy at a LTAC level in order to further manage pt's multiple medical comorbidities as well as perform therapeutic activities/exercises to further improve command following, core strength, and overall strength/mobility.    Rehab Prognosis: Fair; patient would benefit from acute skilled PT services to address  "these deficits and reach maximum level of function.    Recent Surgery: Procedure(s) (LRB):  CREATION, TRACHEOSTOMY (N/A)  EGD, WITH PEG TUBE INSERTION (N/A)  INSERTION-CATHETER-ROSELINE (N/A) 13 Days Post-Op    Plan:     During this hospitalization, patient to be seen 4 x/week to address the identified rehab impairments via therapeutic activities,therapeutic exercises,neuromuscular re-education and progress toward the following goals:    · Plan of Care Expires:  02/05/22    Subjective     RN notified prior to session. Daughters present upon PT entrance into room.    Chief Complaint: Pt with no c/o pain, shaking her head no to therapy  Patient/Family Comments/goals: go home, getting better  Pain/Comfort:  · Pain Rating 1: 0/10  · Pain Rating Post-Intervention 1: 0/10      Objective:     Additional staff present: OT for cotx due to pt's multiple medical comorbidities and functional deficits req'ing two skilled therapists to appropriately progress pt's musculoskeletal strength, neuromuscular control, and endurance while taking into consideration severe medical acuity in the ICU    Patient found HOB elevated with: telemetry,blood pressure cuff,pulse ox (continuous),ventilator,Tracheostomy,PEG Tube,PICC line   Cognition:   · Alert, Lethargic and Cooperative  · AxOx3  · Command following: Follows one-step verbal commands  · Fluency: clear/fluent  General Precautions: Standard, Cardiac fall,sternal   Orthopedic Precautions:N/A   Braces: N/A   Body mass index is 20.45 kg/m².  Oxygen Device: trach to vent    Vent Mode: A/C  Oxygen Concentration (%):  [60-80] 60  Resp Rate Total:  [19 br/min-40 br/min] 39 br/min  Vt Set:  [330 mL-340 mL] 340 mL  PEEP/CPAP:  [5 cmH20] 5 cmH20  Pressure Support:  [10 cmH20] 10 cmH20  Mean Airway Pressure:  [9.6 [id number] cmH20] 12 cmH20  Vitals: BP (!) 112/53 (BP Location: Right arm, Patient Position: Lying)   Pulse 80   Temp (!) 102.6 °F (39.2 °C)   Resp (!) 42   Ht 5' 1" (1.549 m)   Wt " 49.1 kg (108 lb 3.9 oz)   SpO2 (!) 94%   Breastfeeding No   BMI 20.45 kg/m²     Outcome Measures:  AM-PAC 6 CLICK MOBILITY  Turning over in bed (including adjusting bedclothes, sheets and blankets)?: 2  Sitting down on and standing up from a chair with arms (e.g., wheelchair, bedside commode, etc.): 2  Moving from lying on back to sitting on the side of the bed?: 2  Moving to and from a bed to a chair (including a wheelchair)?: 1  Need to walk in hospital room?: 1  Climbing 3-5 steps with a railing?: 1  Basic Mobility Total Score: 9     Respiratory Therapist called prior to initiation of session to confirm stable respiratory status per RT dept protocol for patients requiring mechanical ventilation     Functional Mobility:    Bed Mobility:   · Scooting to HOB via supine bridge: total assistance and 2 persons  · Supine to Sit: total assistance; from Lt side of bed  · Scooting anteriorly to EOB to have both feet planted on floor: total assistance  · Sit to Supine: total assistance; to Lt side of bed    Sitting Balance at Edge of Bed:   Static Sitting Balance: Fair : able to sit unsupported without balance loss and without UE support   Dynamic Sitting Balance: Fair- : able to reach ipsilaterally but unable to weight shift   Assistance Level Required: Contact Guard Assist to Moderate Assist   Time: 15 minutes   Postural deviations noted: slouched posture, rounded shoulders and forward head   Comments: Time EOB focused primarily on tolerance to upright positioning, cardiopulmonary response and endurance for activities, and strength of postural musculature to perform dynamic sitting to prepare for tasks in the home. Pt able to accept internal and external perturbations to balance while seated EOB with appropriate trunk response to remain upright with no LOB and inermittent support. Pt with intermittent posterior LOB requiring Mod A to correct. Pt closing eyes and not participating with dynamic balance activities  but followed commands such as sticking tongue out and thumbs up.    Transfers:   · Sit <> Stand Transfer: total assistance and of 2 persons with hand-held assist   · Stand <> Sit Transfer: total assistance and of 2 persons with hand-held assist   · [id number] from EOB   · Only able to reach 75% of full stand. Pt actively resisting    Education:   Time provided for education, counseling and discussion of health disposition in regards to patient's current status   All questions answered within PT scope of practice and to patient's satisfaction   PT role in POC to address current functional deficits   Pt educated on proper body mechanics, safety techniques, and energy conservation with PT facilitation and cueing throughout session   Call nursing/pct to transfer to chair/use bathroom. Pt stated understanding.   Whiteboard updated with therapist name and pt's current mobility status documented above   Patient is appropriate for drawsheet transfer to/from cardiac chair with nursing staff    Patient left HOB elevated with all lines intact, call button in reach, RN notified and family present.    GOALS:   Multidisciplinary Problems     Physical Therapy Goals        Problem: Physical Therapy Goal    Goal Priority Disciplines Outcome Goal Variances Interventions   Physical Therapy Goal     PT, PT/OT Ongoing, Progressing     Description: Goals to be met by: 2022     Patient will increase functional independence with mobility by performin. Sit to stand transfer with min A -not met  2. Bed to chair transfer with minimum A -not met  3. Gait  x 50 feet with minimum A standing rest breaks prn. -not met  4. Lower extremity exercise program x30 reps per handout, with independence -not met  5. Recite 3/3 sternal precautions and remain complaint with precautions throughout session with no verbal cues -not met  6. Pt sit on EOB x 10 min with CGA - MET 22               -sit EOB x 10 min with SBA                       Time Tracking:     PT Received On: 02/03/22  PT Start Time: 0900     PT Stop Time: 0929  PT Total Time (min): 29 min     Billable Minutes:   · Therapeutic Activity 29 minutes    Treatment Type: Treatment  PT/PTA: PT       Emma Florentino PT, DPT  2/3/2022  Pager: [phone]

## 2022-02-04 NOTE — PT/OT/SLP PROGRESS
Occupational Therapy      Patient Name:  Orion Ty   MRN:  [id number]    Pt not seen this date. Pt with A-fib with RVR after session last date. Pt also with fever and medical instability throughout the night. OT discussed best plan with nsg/pt and family and session deferred this date. Pt appropriate for t/f to medichair as tolerated later this date and throughout the weekend. OT recommended for family to continue UE/LE ROM program and to have HOB elevated for brief periods throughout the day to continue to increase pt's tolerance for upright activity. OT to check status at later date.     2/4/2022

## 2022-02-04 NOTE — PROGRESS NOTES
Pharmacokinetic Assessment Follow Up: IV Vancomycin    Vancomycin Regimen Assessment/Plan:    - Vancomycin random level resulted as 11.5 mg/dl, drawn ~ 9 hours after the loading dose.  Goal 15-20 mg/dl.  Level was drawn while patient was receiving SLED.  - Patient with JO requiring RRT.  She received 6 hours of SLED overnight.  Nephrology is not planning for additional RRT today.  - Continue pulse dosing.  Administer vancomycin 500 mg x 1 dose.  - A random level is ordered with AM labs tomorrow.  Pharmacy to re-dose based on level and RRT plans.     Drug levels (last 3 results):  Recent Labs   Lab Result Units 02/04/22  0302   Vancomycin, Random ug/mL 11.5       Pharmacy will continue to follow and monitor vancomycin.    Please contact pharmacy at extension [phone] for questions regarding this assessment.    Thank you for the consult,   Lizett Pinto, PharmD, [facility]       Patient brief summary:  Orion Ty is a 76 y.o. female initiated on antimicrobial therapy with IV Vancomycin for treatment of sepsis    The patient's current regimen is pulse dosing.    Drug Allergies:   Review of patient's allergies indicates:  No Known Allergies    Actual Body Weight:   49.1 kg    Renal Function:   Estimated Creatinine Clearance: 32.3 mL/min (based on SCr of 1.1 mg/dL).,     Dialysis Method (if applicable):  RRT held     CBC (last 72 hours):  Recent Labs   Lab Result Units 02/02/22  0326 02/03/22  0418 02/03/22  1333 02/04/22  0302   WBC K/uL 10.31 8.53 12.58 15.86*   Hemoglobin g/dL 9.2* 8.3* 7.8* 8.5*   Hematocrit % 29.1* 26.2* 24.4* 26.1*   Platelets K/uL 134* 106* 119* 146*   Gran % % 86.5* 86.5* 92.7* 92.6*   Lymph % % 7.4* 7.7* 3.7* 3.8*   Mono % % 3.9* 3.5* 2.0* 2.1*   Eosinophil % % 1.2 1.2 0.6 0.6   Basophil % % 0.6 0.6 0.4 0.4   Differential Method  Automated Automated Automated Automated       Metabolic Panel (last 72 hours):  Recent Labs   Lab Result Units 02/02/22  0326 02/03/22  0836 02/03/22  2207  02/04/22  0302   Sodium mmol/L 131* 129* 125* 132*  130*   Potassium mmol/L 3.7 4.2 3.8 5.1  4.9   Chloride mmol/L 90* 88* 87* 101  98   CO2 mmol/L 23 23 19* 23  22*   Glucose mg/dL 150* 107 282* 160*  159*   BUN mg/dL 73* 103* 112* 26*  27*   Creatinine mg/dL 2.8* 3.5* 4.2* 1.1  1.1   Albumin g/dL 2.4* 2.4* 2.1* 2.4*  2.4*   Total Bilirubin mg/dL 0.7 0.6  --  0.7   Alkaline Phosphatase U/L 221* 178*  --  166*   AST U/L 74* 60*  --  73*   ALT U/L 40 39  --  39   Magnesium mg/dL 1.6 1.8 1.8 1.8  1.8   Phosphorus mg/dL 8.9* 8.7* 8.4* 2.4*  2.4*       Vancomycin Administrations:  vancomycin given in the last 96 hours                   vancomycin in dextrose 5 % 1 gram/250 mL IVPB 1,000 mg (mg) 1,000 mg New Bag 02/03/22 1810                Microbiologic Results:  Microbiology Results (last 7 days)     Procedure Component Value Units Date/Time    Culture, Respiratory with Gram Stain [[id number]] Collected: 02/04/22 0926    Order Status: Sent Specimen: Respiratory from Tracheal Aspirate Updated: 02/04/22 1200    Blood culture [[id number]] Collected: 02/03/22 1550    Order Status: Completed Specimen: Blood from Peripheral, Hand, Right Updated: 02/04/22 0115     Blood Culture, Routine No Growth to date    Blood culture [[id number]] Collected: 02/03/22 1542    Order Status: Completed Specimen: Blood from Peripheral, Hand, Left Updated: 02/04/22 0115     Blood Culture, Routine No Growth to date    Culture, MRSA [[id number]] Collected: 02/03/22 1810    Order Status: Sent Specimen: MRSA source from Nares, Left Updated: 02/03/22 1820

## 2022-02-04 NOTE — ASSESSMENT & PLAN NOTE
BG goal 140 - 180       -  Restart Novolog to 4 units q 4 hrs while on tube feeds. Patient restarted on vasopressors which is most likely causing a global rise in blood sugars (HOLD if tube feeds are stopped or BG < 100).  -  Continue Moderate Dose SQ Insulin Correction Scale PRN hyperglycemia.   -  BG Monitoring q 4 hrs while NPO/tube feeds    ** Please call Endocrine for any BG related issues **  ** Please notify Endocrine for any change and/or advance in diet**     Lab Results   Component Value Date    HGBA1C 6.0 (H) 01/03/2022       Discharge Planning:   TBD. Please notify endocrinology prior to discharge.

## 2022-02-04 NOTE — PT/OT/SLP PROGRESS
Physical Therapy      Patient Name:  Orion Ty   MRN:  [id number]    Patient not seen today secondary to  (hold per nursing due to pt. with fever and medical instability). Will follow-up on Monday.    Nate Landeros, PT  2/4/2022

## 2022-02-04 NOTE — PLAN OF CARE
"      SICU PLAN OF CARE NOTE    Dx: S/P aortic valve replacement    Shift Events: afib rvr, pan culture, increased vasopressor support    Goals of Care: Map 60-80    Neuro: Follows Commands and Moves All Extremities    Vital Signs: BP (!) 87/52   Pulse 80   Temp (!) 101.7 °F (38.7 °C)   Resp (!) 32   Ht 5' 1" (1.549 m)   Wt 49.1 kg (108 lb 3.9 oz)   SpO2 (!) 94%   Breastfeeding No   BMI 20.45 kg/m²     Respiratory: Ventilator    Diet: Tube Feeds    Gtts: Vasopressin and Abx    Urine Output: Urinary Catheter 45 cc/shift         Labs/Accuchecks: Renal Function Panel, Accucheck Q4    Skin: no further breakdown noted on shift, patient turned Q2H, wound care completed care to sacrum and replaced hydrocolloid. Bath given during AM shift     "

## 2022-02-04 NOTE — PROGRESS NOTES
[facility]  Nephrology  Progress Note     Patient Name: Orion Ty  MRN: [id number]  Admission Date: 1/5/2022  Hospital Length of Stay: 30 days  Attending Provider: Dennis Haider MD   Primary Care Physician: Otis Zimmer MD  Principal Problem: S/P aortic valve replacement    Subjective:     HPI: Orion Ty is our 76 y.o. female with previous aortic valve replacement, mitral valve replacement, and tricuspid valve repair in 2010 who presented on 1/5 for redo aortic valve replacement. Nephrology consulted on 1/8 for anuric JO. Patient is alert but tired, son at bedside. Stated she was tired prior to admission. During the hospital stay Cr increase from 1 to 1.5. In OR she received  2.5L crystalloid, 3U RBC, 2U FFP, 2 platelets, and 800 cell saver. she is +5 L since admit. UOP decrease overnight, per nurse she didn't urinate at all. She received diuril 500 mg twice, lasix 20 mg *2 on 1/7. This morning she urinate 225 immediately after placing the tejeda's cath. She received again lasix 100 mg this morning. Upor evaluation she was alert but repeatedly saying she was tired, denied any pain. Denied any previous hx of kidney disease.       Interval History: Net negative 867mL over the past 24h. UOP of 265mLs.  iHD completed yesterday; 1.5L removed.      Review of patient's allergies indicates:  No Known Allergies   Current Facility-Administered Medications   Medication Frequency    0.9%  NaCl infusion PRN    0.9%  NaCl infusion PRN    0.9%  NaCl infusion PRN    0.9%  NaCl infusion PRN    0.9%  NaCl infusion Once    acetaminophen oral solution 650 mg Q4H PRN    albuterol-ipratropium 2.5 mg-0.5 mg/3 mL nebulizer solution 3 mL Q6H    [START ON 2/5/2022] amiodarone tablet 200 mg Daily    atorvastatin tablet 40 mg Daily    bisacodyL suppository 10 mg Daily PRN    dextrose 10 % infusion Continuous PRN    dextrose 10% bolus 125 mL PRN    dextrose 50% injection 12.5 g PRN     furosemide injection 80 mg Q8H    glucagon (human recombinant) injection 1 mg PRN    heparin (porcine) injection 1,000 Units PRN    hydrOXYzine 10 mg/5 mL syrup 10 mg Q6H PRN    insulin aspart U-100 pen 1-10 Units PRN    LIDOcaine HCL 2% jelly PRN    magnesium sulfate 2g in water 50mL IVPB (premix) PRN    midodrine tablet 15 mg Q8H    NORepinephrine 4 mg in dextrose 5% 250 mL infusion (premix) (titrating) Continuous    ondansetron injection 4 mg Q12H PRN    oxyCODONE 5 mg/5 mL solution 5 mg Q4H PRN    pantoprazole injection 40 mg Q12H    piperacillin-tazobactam 4.5 g in sodium chloride 0.9% 100 mL IVPB (ready to mix system) Q12H    polyethylene glycol packet 17 g Daily PRN    psyllium husk (aspartame) 3.4 gram packet 1 packet Daily    QUEtiapine tablet 25 mg Nightly PRN    sodium chloride 0.9% bolus 250 mL PRN    sodium chloride 0.9% bolus 250 mL PRN    sodium chloride 0.9% bolus 250 mL PRN    sodium chloride 0.9% flush 10 mL PRN    vancomycin - pharmacy to dose pharmacy to manage frequency    vasopressin (PITRESSIN) 0.2 Units/mL in dextrose 5 % 100 mL infusion Continuous    warfarin (COUMADIN) tablet 1 mg Daily     Facility-Administered Medications Ordered in Other Encounters   Medication Frequency    0.9%  NaCl infusion Continuous       Objective:     Vital Signs (Most Recent):  Temp: 99.7 °F (37.6 °C) (02/04/22 1101)  Pulse: 81 (02/04/22 1200)  Resp: (!) 28 (02/04/22 1200)  BP: (!) 86/50 (02/04/22 1200)  SpO2: 98 % (02/04/22 1200)  O2 Device (Oxygen Therapy): ventilator (02/04/22 1200) Vital Signs (24h Range):  Temp:  [97.9 °F (36.6 °C)-102.6 °F (39.2 °C)] 99.7 °F (37.6 °C)  Pulse:  [] 81  Resp:  [20-48] 28  SpO2:  [89 %-99 %] 98 %  BP: ()/(46-60) 86/50   Weight: 49.1 kg (108 lb 3.9 oz) (02/03/22 2957)  Body mass index is 20.45 kg/m².  Body surface area is 1.45 meters squared.      Intake/Output Summary (Last 24 hours) at 2/4/2022 1250  Last data filed at 2/4/2022 1200  Gross  per 24 hour   Intake 2245 ml   Output 2106 ml   Net 139 ml     I/O last 3 completed shifts:  In: 1882.2 [I.V.:384.5; NG/GT:1065; IV Piggyback:432.7]  Out: 2231 [Urine:165; Other:2066]  Net IO Since Admission: 6,105.89 mL [02/04/22 1250]     Physical Exam  Constitutional:       Appearance: She is well-developed. She is ill-appearing.   Cardiovascular:      Rate and Rhythm: Normal rate and regular rhythm.      Heart sounds: Normal heart sounds.   Pulmonary:      Comments: tracheostomy   Abdominal:      General: Abdomen is flat.      Palpations: Abdomen is soft.   Musculoskeletal:         General: Normal range of motion.      Right lower leg: No edema.      Left lower leg: No edema.   Skin:     General: Skin is warm and dry.      Comments: Sternal Incision CDI   Neurological:      Mental Status: She is alert.   Psychiatric:         Mood and Affect: Mood normal.         Behavior: Behavior normal.         Recent Labs   Lab 02/03/22  0418 02/03/22  0418 02/03/22  1333 02/04/22  0302 02/04/22  0403   WBC 8.53  --  12.58 15.86*  --    HGB 8.3*  --  7.8* 8.5*  --    HCT 26.2*   < > 24.4* 26.1* 40   *  --  119* 146*  --    MONO 3.5*  0.3   < > 2.0*  0.3 2.1*  0.3  --     < > = values in this interval not displayed.      Recent Labs   Lab 02/02/22  0326 02/02/22  0326 02/03/22  0836 02/03/22  2207 02/04/22  0302   *   < > 129* 125* 132*  130*   K 3.7   < > 4.2 3.8 5.1  4.9   CL 90*   < > 88* 87* 101  98   CO2 23   < > 23 19* 23  22*   BUN 73*   < > 103* 112* 26*  27*   CREATININE 2.8*   < > 3.5* 4.2* 1.1  1.1   CALCIUM 8.4*   < > 8.8 8.5* 8.1*  8.2*   PROT 6.4  --  6.4  --  7.0   BILITOT 0.7  --  0.6  --  0.7   ALKPHOS 221*  --  178*  --  166*   ALT 40  --  39  --  39   AST 74*  --  60*  --  73*    < > = values in this interval not displayed.      Recent Labs     02/02/22  0410 02/03/22  0404 02/04/22  0403   PH 7.376 7.320* 7.376   PCO2 45.2* 50.7* 45.0   PO2 42 45 33*   HCO3 26.5 26.1 26.4    POCSATURATED 76* 77* 62*   BE 1 0 1       Assessment/Plan:        JO  acute kidney injury  Orion Ty is our 76 y.o. female with previous aortic valve replacement, mitral valve replacement, and tricuspid valve repair in 2010 who presented on 1/5 for redo aortic valve replacement. Nephrology consulted on 1/8 for anuric JO. During the hospital stay Cr increase from 1 to 1.5. In OR she received  2.5L crystalloid, 3U RBC, 2U FFP, 2 platelets, and 800 cell saver. she is +5 L since admit. UOP decreased. She received diuril 500 mg twice, lasix 20 mg x2 on 1/7 with no adequate response to diuretics     Plan   Oliguric ischemic ATN likely secondary to severe intraoperative hypotension requiring vasopressor on 01/05/2022  Failed high-dose diuretics and renal replacement therapy started on 1/9/22 due to volume overload  PT BP was low for which we switch back to CRRT for now as pt is hemodynamically not stable for iHD   S/p 1 session of CRRT on 2/3/22 tolerated will   Will hold on dialysis for today will assess in AM   Strict I/Os   Renally dose medications to GFR      S/P aortic valve replacement  With severe intraoperative hypotension   Management per primary     ATN (acute tubular necrosis)  See jo      Rose Mary Walsh MD  Nephrology  [facility]

## 2022-02-04 NOTE — PROGRESS NOTES
"[facility]  Endocrinology  Progress Note    Admit Date: 1/5/2022     Reason for Consult: Management of T2DM, Hyperglycemia     Surgical Procedure and Date:   1/5/22  REPLACEMENT, AORTIC VALVE, WITH REPEAT STERNOTOMY (N/A)     Diabetes diagnosis year: MELIDA; intubated    Home Diabetes Medications:  Metformin 500 mg daily,     How often checking glucose at home?  MELIDA    BG readings on regimen: MELIDA  Hypoglycemia on the regimen?  MELIDA  Missed doses on regimen? MELIDA    Diabetes Complications include:     None    Complicating diabetes co morbidities:   CAD, HTN, a-fib       HPI:   Patient is a 76 y.o. female with a diagnosis of s/p MVR, AVR and TVr in 2010 by Dr. Haider. 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 Family states she is more fatigued, decreased appetite and fluid on lungs. Pt has increased symptoms and wants to proceed with cardiac surgery now and here for pre op. She is now s/p the above procedure. Endocrinology consulted for management of T2DM.     Lab Results   Component Value Date    HGBA1C 6.0 (H) 01/03/2022           Interval HPI:   Overnight events:        BG has risen above goal since titration of vasopressors in setting of enteral nutrition. This is most likely cause for acute hyperglycemia at this time. Endo will continue to follow, and manage glycemic control.     Diet NPO Except for: Sips with Medication  14 Days Post-Op        Eating:   NPO  Nausea: No  Hypoglycemia and intervention: No  Fever: No  TPN and/or TF: yes  If yes, type of TF/TPN and rate: TF at 35 cc/hr.     BP (!) 86/51 (BP Location: Right arm, Patient Position: Lying)   Pulse 80   Temp 97.6 °F (36.4 °C) (Oral)   Resp (!) 26   Ht 5' 1" (1.549 m)   Wt 49.1 kg (108 lb 3.9 oz)   SpO2 100%   Breastfeeding No   BMI 20.45 kg/m²     Labs Reviewed and Include    Recent Labs   Lab 02/04/22  0302 02/04/22  0302 02/04/22  1402   *  159*  "  < > 219*   CALCIUM 8.1*  8.2*   < > 8.0*   ALBUMIN 2.4*  2.4*   < > 2.0*   PROT 7.0  --   --    *  130*   < > 129*   K 5.1  4.9   < > 4.0   CO2 23  22*   < > 23     98   < > 98   BUN 26*  27*   < > 34*   CREATININE 1.1  1.1   < > 1.4   ALKPHOS 166*  --   --    ALT 39  --   --    AST 73*  --   --    BILITOT 0.7  --   --     < > = values in this interval not displayed.     Lab Results   Component Value Date    WBC 15.86 (H) 02/04/2022    HGB 8.5 (L) 02/04/2022    HCT 40 02/04/2022    MCV 92 02/04/2022     (L) 02/04/2022     No results for input(s): TSH, FREET4 in the last 168 hours.  Lab Results   Component Value Date    HGBA1C 6.0 (H) 01/03/2022       Nutritional status:   Body mass index is 20.45 kg/m².  Lab Results   Component Value Date    ALBUMIN 2.0 (L) 02/04/2022    ALBUMIN 2.4 (L) 02/04/2022    ALBUMIN 2.4 (L) 02/04/2022     Lab Results   Component Value Date    PREALBUMIN <3 (L) 02/04/2022    PREALBUMIN <3 (L) 01/28/2022    PREALBUMIN 13 (L) 01/21/2022       Estimated Creatinine Clearance: 25.4 mL/min (based on SCr of 1.4 mg/dL).    Accu-Checks  Recent Labs     02/03/22  1129 02/03/22  1416 02/03/22  1803 02/03/22  2018 02/03/22  2020 02/04/22  0038 02/04/22  0313 02/04/22  0713 02/04/22  1159 02/04/22  1555   POCTGLUCOSE 74 129* 272* 351* 320* 194* 183* 199* 211* 308*       Current Medications and/or Treatments Impacting Glycemic Control  Immunotherapy:    Immunosuppressants     None        Steroids:   Hormones (From admission, onward)            Start     Stop Route Frequency Ordered    02/03/22 0007  vasopressin (PITRESSIN) 0.2 Units/mL in dextrose 5 % 100 mL infusion         -- IV Continuous 02/03/22 0007    01/28/22 0207  vasopressin (PITRESSIN) 20 unit/mL injection        Note to Pharmacy: Created by cabinet override    01/28 1414   01/28/22 0207        Pressors:    Autonomic Drugs (From admission, onward)            Start     Stop Route Frequency Ordered    02/04/22 5081   NORepinephrine 4 mg in dextrose 5% 250 mL infusion (premix) (titrating)        Question Answer Comment   Begin at (in mcg/kg/min): 0.02    Titrate by: (in mcg/kg/min) 0.02    Titrate interval: (in minutes) 3    Titrate to maintain: (MAP or SBP) MAP    Greater than: (in mmHg) 60    Maximum dose: (in mcg/kg/min) 3        -- IV Continuous 02/03/22 2351    02/02/22 1400  midodrine tablet 15 mg         -- PER G TUBE Every 8 hours 02/02/22 1045    01/12/22 1441  rocuronium 10 mg/mL injection        Note to Pharmacy: Created by cabinet override    01/13 0244   01/12/22 1441        Hyperglycemia/Diabetes Medications:   Antihyperglycemics (From admission, onward)            Start     Stop Route Frequency Ordered    02/03/22 1441  insulin aspart U-100 pen 1-10 Units         -- SubQ As needed (PRN) 02/03/22 1341          ASSESSMENT and PLAN    * S/P aortic valve replacement  Managed per primary team  Optimize BG control        Type 2 diabetes mellitus with microalbuminuria, without long-term current use of insulin  BG goal 140 - 180       -  Restart Novolog to 4 units q 4 hrs while on tube feeds. Patient restarted on vasopressors which is most likely causing a global rise in blood sugars (HOLD if tube feeds are stopped or BG < 100).  -  Continue Moderate Dose SQ Insulin Correction Scale PRN hyperglycemia.   -  BG Monitoring q 4 hrs while NPO/tube feeds    ** Please call Endocrine for any BG related issues **  ** Please notify Endocrine for any change and/or advance in diet**     Lab Results   Component Value Date    HGBA1C 6.0 (H) 01/03/2022       Discharge Planning:   TBD. Please notify endocrinology prior to discharge.           Chronic atrial fibrillation  May increase insulin resistance.                   Vasquez Inman, NP  Endocrinology  [facility]

## 2022-02-04 NOTE — ASSESSMENT & PLAN NOTE
Orion Ty is a 76 y.o. female who presents to the SICU s/p redo aortic valve replacement with mechanical valve on 1/5/22.      Neuro/Psych:   -- Sedation: none  -- Pain: Liquid Tiffani, breakthrough fentanyl pushes q2     Cards:    -- Pressors: vaso, levo intermittently  -- Goal MAP: 60-80  -- A-fib rate controlled.   -- PO amio TID  -- statin  -- Coumadin  -- INR therapeutic  -- metoprolol 25 BID      Pulm:   -- Goal O2 sat > 90%  -- reintubated 1/12 then 1/18. S/p trach 1/21  -- Spontaneous when able.   -- Tolerated trach collar for ~2hrs / day  -- ABG PRN  -- 2 mediastinal removed 1/9 L Pleural CT removed 1/8      Renal:  -- iHD  -- Tolerating iHD, midodrine as needed for hypotension  -- Permcath 1/21  -- Removed R IJ trialysis 1/23      FEN / GI:   -- Replace lytes as needed  -- s/p PEG 1/21  -- Nutrition: tube feeds novasource renal, at goal      ID:   -- WBC increasing  -- Antibiotics: fluc, vanc, zosyn  -- Initiated broad spec abx on 2/3/22 in setting of persistent fevers and rising WBC w/ hypotension  -- Blood Cx sent 2/3, f/u results        Heme/Onc:   -- Hgb stable  -- Daily CBC  -- Transfused products: 1U RBC on 1/5/22. 1u RBC and 1u FFP on 1/9/21. 2U RBC on 1/12. 4 FFP on 1/13. 1 FFP on 1/17, 2u pRBC 1/27  -- Anticoagulation: coumadin, therapeutic      Endo:   -- Gluc goal 140-180  -- Insulin per endocrinology      PPx:   Feeding: tube feeds  Analgesia/Sedation: Tiffani, fent pushes / none  Thromboembolic prevention: Coumadin, ASA  HOB >30: yes  Stress Ulcer ppx: protonix BID  Glucose control: Critical care goal 140-180 g/dl, ISS    Lines/Drains/Airway: Art line, Trach, Permcath, PEG      Dispo/Code Status/Palliative:   -- SICU / Full Code

## 2022-02-04 NOTE — PLAN OF CARE
"      SICU PLAN OF CARE NOTE    Dx: S/P aortic valve replacement    Shift Events: 6 hours CRRT completed, levo needed intermittently during dialysis, vaso off, levo off    Goals of Care: heparin bridge to warfarin, SBT, PT, abx, HD/CRRT    Neuro: follows commands, moves all extremities, PERRLA    Vital Signs: BP (!) 97/55 (BP Location: Right arm, Patient Position: Lying)   Pulse 94   Temp 98.9 °F (37.2 °C) (Oral)   Resp (!) 28   Ht 5' 1" (1.549 m)   Wt 49.1 kg (108 lb 3.9 oz)   SpO2 (!) 90%   Breastfeeding No   BMI 20.45 kg/m²     Respiratory: vent ACVC+ 50% fiO2/5 PEEP, rate 24    Diet: NPO/ TF @ 35    Gtts: heparin @ 8units/hr    Urine Output: 18ccs/shift    Drains:none       Labs/Accuchecks: q4h/q4h    Skin: no new skin breakdown, foam to sacrum, bilat heel boots in place, midsternal incision approximated with steri strips intact, R groin incision SUE approximated and healing, immerse mattress plugged in and functional, bilat SCDs in place, turned q2h, aquaphor applied to lower lip lesions       "

## 2022-02-04 NOTE — PLAN OF CARE
"      SICU PLAN OF CARE NOTE    Dx: S/P aortic valve replacement    Shift Events: vasopressin restarted for MAP <60    Goals of Care: comfort, MAP 60-80, wound care, turn Q2H, oxygenation    Neuro: AAO x4, Follows Commands and Moves All Extremities    Vital Signs: BP (!) 86/51 (BP Location: Right arm, Patient Position: Lying)   Pulse 80   Temp 97.6 °F (36.4 °C) (Oral)   Resp (!) 26   Ht 5' 1" (1.549 m)   Wt 49.1 kg (108 lb 3.9 oz)   SpO2 100%   Breastfeeding No   BMI 20.45 kg/m²     Respiratory: Ventilator    Diet: Tube Feeds    Gtts: Vasopressin    Urine Output: Urinary Catheter 15 cc/shift      Labs/Accuchecks: Accucheck Q4, Renal Function Panel, cbc, coags    Skin: no further breakdown noted on shift, patient turned Q2H and never positioned supine, patient wound care completed as ordered.     "

## 2022-02-04 NOTE — SUBJECTIVE & OBJECTIVE
Interval History/Significant Events:   NAEON  Intermittent vaso, levo for hypotension  Febrile  Blood cultures drawn 2/3  Broad spec abx initiated overnight    Follow-up For: Procedure(s) (LRB):  CREATION, TRACHEOSTOMY (N/A)  EGD, WITH PEG TUBE INSERTION (N/A)  INSERTION-CATHETER-ROSELINE (N/A)    Post-Operative Day: 7 Days Post-Op    Objective:     Vital Signs (Most Recent):  Temp: 98.9 °F (37.2 °C) (02/04/22 0300)  Pulse: 80 (02/04/22 0701)  Resp: (!) 36 (02/04/22 0701)  BP: (!) 100/59 (02/04/22 0701)  SpO2: (!) 94 % (02/04/22 0701) Vital Signs (24h Range):  Temp:  [97.9 °F (36.6 °C)-102.6 °F (39.2 °C)] 98.9 °F (37.2 °C)  Pulse:  [] 80  Resp:  [20-48] 36  SpO2:  [89 %-97 %] 94 %  BP: ()/(46-66) 100/59     Weight: 49.1 kg (108 lb 3.9 oz)  Body mass index is 20.45 kg/m².      Intake/Output Summary (Last 24 hours) at 2/4/2022 0718  Last data filed at 2/4/2022 0600  Gross per 24 hour   Intake 1580.22 ml   Output 2111 ml   Net -530.78 ml       Physical Exam  Constitutional:       General: She is not in acute distress.  HENT:      Head: Normocephalic and atraumatic.   Neck:      Comments: Trach  Cardiovascular:      Rate and Rhythm: Normal rate and regular rhythm.      Pulses: Normal pulses.      Comments: Midline sternotomy incision c/d/I  Pacemaker in place. Paced at 80  Pulmonary:      Effort: Pulmonary effort is normal. No respiratory distress.   Abdominal:      General: Abdomen is flat. There is no distension.      Palpations: Abdomen is soft.      Tenderness: There is no abdominal tenderness.      Comments: Peg site c/d/i  Soft abdomen   Musculoskeletal:      Right lower leg: No edema.      Left lower leg: No edema.   Skin:     General: Skin is warm.      Capillary Refill: Capillary refill takes less than 2 seconds.   Neurological:      General: No focal deficit present.      Mental Status: She is alert.         Vents:  Vent Mode: A/C (02/04/22 0500)  Ventilator Initiated: Yes (01/18/22 1636)  Set Rate:  20 BPM (02/04/22 0500)  Vt Set: 340 mL (02/04/22 0500)  Pressure Support: 10 cmH20 (02/04/22 0500)  PEEP/CPAP: 5 cmH20 (02/04/22 0500)  Oxygen Concentration (%): 50 (02/04/22 0701)  Peak Airway Pressure: 19 cmH2O (02/04/22 0500)  Plateau Pressure: 29 cmH20 (02/04/22 0500)  Total Ve: 11.9 mL (02/04/22 0500)  Negative Inspiratory Force (cm H2O): 0 (02/04/22 0500)  F/VT Ratio<105 (RSBI): 120.34 (02/04/22 0500)    Lines/Drains/Airways     Central Venous Catheter Line            Permacath 01/21/22 1013 left subclavian 13 days          Drain                 Gastrostomy/Enterostomy 01/21/22 1112 Percutaneous endoscopic gastrostomy (PEG) 13 days         Urethral Catheter 01/25/22 1341 Non-latex 9 days          Airway                 Surgical Airway 01/21/22 1043 Shiley Cuffed 13 days          Peripheral Intravenous Line                 Midline Catheter Insertion/Assessment  - Single Lumen 01/10/22 1300 Left cephalic vein (lateral side of arm) 18g x 8cm 24 days         Peripheral IV - Single Lumen 02/03/22 1430 20 G;1 1/4 in Posterior;Right Hand <1 day         Peripheral IV - Single Lumen 02/03/22 1800 18 G;1 1/4 in Anterior;Left Wrist <1 day                Significant Labs:    CBC/Anemia Profile:  Recent Labs   Lab 02/03/22  0418 02/03/22  0418 02/03/22  1333 02/04/22  0302 02/04/22  0403   WBC 8.53  --  12.58 15.86*  --    HGB 8.3*  --  7.8* 8.5*  --    HCT 26.2*   < > 24.4* 26.1* 40   *  --  119* 146*  --    MCV 95  --  94 92  --    RDW 17.2*  --  17.0* 16.7*  --     < > = values in this interval not displayed.        Chemistries:  Recent Labs   Lab 02/03/22  0836 02/03/22  2207 02/04/22  0302   * 125* 132*  130*   K 4.2 3.8 5.1  4.9   CL 88* 87* 101  98   CO2 23 19* 23  22*   * 112* 26*  27*   CREATININE 3.5* 4.2* 1.1  1.1   CALCIUM 8.8 8.5* 8.1*  8.2*   ALBUMIN 2.4* 2.1* 2.4*  2.4*   PROT 6.4  --  7.0   BILITOT 0.6  --  0.7   ALKPHOS 178*  --  166*   ALT 39  --  39   AST 60*  --  73*   MG 1.8  1.8 1.8  1.8   PHOS 8.7* 8.4* 2.4*  2.4*       All pertinent labs within the past 24 hours have been reviewed.    Significant Imaging:  I have reviewed all pertinent imaging results/findings within the past 24 hours.

## 2022-02-04 NOTE — NURSING
Pt requiring 0.04 of levo in addition to 0.04 of vaso in order to maintain MAPs > 60 on CRRT UF of 300, MD Jose at bedside to assess pt, orders to continue to monitor pt pressor requirements and fluid status, MD does not wish to place an art line at this time. All pt VSS, plan discussed with daughter and pt by MD and RN.

## 2022-02-04 NOTE — PROGRESS NOTES
[facility]  Critical Care - Surgery  Progress Note    Patient Name: Orion Ty  MRN: [id number]  Admission Date: 1/5/2022  Hospital Length of Stay: 30 days  Code Status: Full Code  Attending Provider: Dennis Haider MD  Primary Care Provider: Otis Zimmer MD   Principal Problem: S/P aortic valve replacement    Subjective:     Hospital/ICU Course:  No notes on file    Interval History/Significant Events:   NAEON  Intermittent vaso, levo for hypotension  Febrile  Blood cultures drawn 2/3  Broad spec abx initiated overnight    Follow-up For: Procedure(s) (LRB):  CREATION, TRACHEOSTOMY (N/A)  EGD, WITH PEG TUBE INSERTION (N/A)  INSERTION-CATHETER-ROSELINE (N/A)    Post-Operative Day: 7 Days Post-Op    Objective:     Vital Signs (Most Recent):  Temp: 98.9 °F (37.2 °C) (02/04/22 0300)  Pulse: 80 (02/04/22 0701)  Resp: (!) 36 (02/04/22 0701)  BP: (!) 100/59 (02/04/22 0701)  SpO2: (!) 94 % (02/04/22 0701) Vital Signs (24h Range):  Temp:  [97.9 °F (36.6 °C)-102.6 °F (39.2 °C)] 98.9 °F (37.2 °C)  Pulse:  [] 80  Resp:  [20-48] 36  SpO2:  [89 %-97 %] 94 %  BP: ()/(46-66) 100/59     Weight: 49.1 kg (108 lb 3.9 oz)  Body mass index is 20.45 kg/m².      Intake/Output Summary (Last 24 hours) at 2/4/2022 0718  Last data filed at 2/4/2022 0600  Gross per 24 hour   Intake 1580.22 ml   Output 2111 ml   Net -530.78 ml       Physical Exam  Constitutional:       General: She is not in acute distress.  HENT:      Head: Normocephalic and atraumatic.   Neck:      Comments: Trach  Cardiovascular:      Rate and Rhythm: Normal rate and regular rhythm.      Pulses: Normal pulses.      Comments: Midline sternotomy incision c/d/I  Pacemaker in place. Paced at 80  Pulmonary:      Effort: Pulmonary effort is normal. No respiratory distress.   Abdominal:      General: Abdomen is flat. There is no distension.      Palpations: Abdomen is soft.      Tenderness: There is no abdominal tenderness.       Comments: Peg site c/d/i  Soft abdomen   Musculoskeletal:      Right lower leg: No edema.      Left lower leg: No edema.   Skin:     General: Skin is warm.      Capillary Refill: Capillary refill takes less than 2 seconds.   Neurological:      General: No focal deficit present.      Mental Status: She is alert.         Vents:  Vent Mode: A/C (02/04/22 0500)  Ventilator Initiated: Yes (01/18/22 1636)  Set Rate: 20 BPM (02/04/22 0500)  Vt Set: 340 mL (02/04/22 0500)  Pressure Support: 10 cmH20 (02/04/22 0500)  PEEP/CPAP: 5 cmH20 (02/04/22 0500)  Oxygen Concentration (%): 50 (02/04/22 0701)  Peak Airway Pressure: 19 cmH2O (02/04/22 0500)  Plateau Pressure: 29 cmH20 (02/04/22 0500)  Total Ve: 11.9 mL (02/04/22 0500)  Negative Inspiratory Force (cm H2O): 0 (02/04/22 0500)  F/VT Ratio<105 (RSBI): 120.34 (02/04/22 0500)    Lines/Drains/Airways     Central Venous Catheter Line            Permacath 01/21/22 1013 left subclavian 13 days          Drain                 Gastrostomy/Enterostomy 01/21/22 1112 Percutaneous endoscopic gastrostomy (PEG) 13 days         Urethral Catheter 01/25/22 1341 Non-latex 9 days          Airway                 Surgical Airway 01/21/22 1043 Shiley Cuffed 13 days          Peripheral Intravenous Line                 Midline Catheter Insertion/Assessment  - Single Lumen 01/10/22 1300 Left cephalic vein (lateral side of arm) 18g x 8cm 24 days         Peripheral IV - Single Lumen 02/03/22 1430 20 G;1 1/4 in Posterior;Right Hand <1 day         Peripheral IV - Single Lumen 02/03/22 1800 18 G;1 1/4 in Anterior;Left Wrist <1 day                Significant Labs:    CBC/Anemia Profile:  Recent Labs   Lab 02/03/22  0418 02/03/22  0418 02/03/22  1333 02/04/22  0302 02/04/22  0403   WBC 8.53  --  12.58 15.86*  --    HGB 8.3*  --  7.8* 8.5*  --    HCT 26.2*   < > 24.4* 26.1* 40   *  --  119* 146*  --    MCV 95  --  94 92  --    RDW 17.2*  --  17.0* 16.7*  --     < > = values in this interval not  displayed.        Chemistries:  Recent Labs   Lab 02/03/22  0836 02/03/22  2207 02/04/22  0302   * 125* 132*  130*   K 4.2 3.8 5.1  4.9   CL 88* 87* 101  98   CO2 23 19* 23  22*   * 112* 26*  27*   CREATININE 3.5* 4.2* 1.1  1.1   CALCIUM 8.8 8.5* 8.1*  8.2*   ALBUMIN 2.4* 2.1* 2.4*  2.4*   PROT 6.4  --  7.0   BILITOT 0.6  --  0.7   ALKPHOS 178*  --  166*   ALT 39  --  39   AST 60*  --  73*   MG 1.8 1.8 1.8  1.8   PHOS 8.7* 8.4* 2.4*  2.4*       All pertinent labs within the past 24 hours have been reviewed.    Significant Imaging:  I have reviewed all pertinent imaging results/findings within the past 24 hours.    Assessment/Plan:     * S/P aortic valve replacement  Orion Ty is a 76 y.o. female who presents to the SICU s/p redo aortic valve replacement with mechanical valve on 1/5/22.      Neuro/Psych:   -- Sedation: none  -- Pain: Liquid Tiffani, breakthrough fentanyl pushes q2     Cards:    -- Pressors: vaso, levo intermittently  -- Goal MAP: 60-80  -- A-fib rate controlled.   -- PO amio TID  -- statin  -- Coumadin  -- INR therapeutic  -- metoprolol 25 BID      Pulm:   -- Goal O2 sat > 90%  -- reintubated 1/12 then 1/18. S/p trach 1/21  -- Spontaneous when able.   -- Tolerated trach collar for ~2hrs / day  -- ABG PRN  -- 2 mediastinal removed 1/9 L Pleural CT removed 1/8      Renal:  -- iHD  -- Tolerating iHD, midodrine as needed for hypotension  -- Permcath 1/21  -- Removed R IJ trialysis 1/23      FEN / GI:   -- Replace lytes as needed  -- s/p PEG 1/21  -- Nutrition: tube feeds novasource renal, at goal      ID:   -- WBC increasing  -- Antibiotics: fluc, vanc, zosyn  -- Initiated broad spec abx on 2/3/22 in setting of persistent fevers and rising WBC w/ hypotension  -- Blood Cx sent 2/3, f/u results        Heme/Onc:   -- Hgb stable  -- Daily CBC  -- Transfused products: 1U RBC on 1/5/22. 1u RBC and 1u FFP on 1/9/21. 2U RBC on 1/12. 4 FFP on 1/13. 1 FFP on 1/17, 2u pRBC 1/27  --  Anticoagulation: coumadin, therapeutic      Endo:   -- Gluc goal 140-180  -- Insulin per endocrinology      PPx:   Feeding: tube feeds  Analgesia/Sedation: Tiffani, fent pushes / none  Thromboembolic prevention: Coumadin, ASA  HOB >30: yes  Stress Ulcer ppx: protonix BID  Glucose control: Critical care goal 140-180 g/dl, ISS    Lines/Drains/Airway: Art line, Trach, Permcath, PEG      Dispo/Code Status/Palliative:   -- SICU / Full Code               Sarah Shafer MD  Critical Care - Surgery  [facility]

## 2022-02-04 NOTE — SUBJECTIVE & OBJECTIVE
"Interval HPI:   Overnight events:        BG has risen above goal since titration of vasopressors in setting of enteral nutrition. This is most likely cause for acute hyperglycemia at this time. Endo will continue to follow, and manage glycemic control.     Diet NPO Except for: Sips with Medication  14 Days Post-Op        Eating:   NPO  Nausea: No  Hypoglycemia and intervention: No  Fever: No  TPN and/or TF: yes  If yes, type of TF/TPN and rate: TF at 35 cc/hr.     BP (!) 86/51 (BP Location: Right arm, Patient Position: Lying)   Pulse 80   Temp 97.6 °F (36.4 °C) (Oral)   Resp (!) 26   Ht 5' 1" (1.549 m)   Wt 49.1 kg (108 lb 3.9 oz)   SpO2 100%   Breastfeeding No   BMI 20.45 kg/m²     Labs Reviewed and Include    Recent Labs   Lab 02/04/22  0302 02/04/22  0302 02/04/22  1402   *  159*   < > 219*   CALCIUM 8.1*  8.2*   < > 8.0*   ALBUMIN 2.4*  2.4*   < > 2.0*   PROT 7.0  --   --    *  130*   < > 129*   K 5.1  4.9   < > 4.0   CO2 23  22*   < > 23     98   < > 98   BUN 26*  27*   < > 34*   CREATININE 1.1  1.1   < > 1.4   ALKPHOS 166*  --   --    ALT 39  --   --    AST 73*  --   --    BILITOT 0.7  --   --     < > = values in this interval not displayed.     Lab Results   Component Value Date    WBC 15.86 (H) 02/04/2022    HGB 8.5 (L) 02/04/2022    HCT 40 02/04/2022    MCV 92 02/04/2022     (L) 02/04/2022     No results for input(s): TSH, FREET4 in the last 168 hours.  Lab Results   Component Value Date    HGBA1C 6.0 (H) 01/03/2022       Nutritional status:   Body mass index is 20.45 kg/m².  Lab Results   Component Value Date    ALBUMIN 2.0 (L) 02/04/2022    ALBUMIN 2.4 (L) 02/04/2022    ALBUMIN 2.4 (L) 02/04/2022     Lab Results   Component Value Date    PREALBUMIN <3 (L) 02/04/2022    PREALBUMIN <3 (L) 01/28/2022    PREALBUMIN 13 (L) 01/21/2022       Estimated Creatinine Clearance: 25.4 mL/min (based on SCr of 1.4 mg/dL).    Accu-Checks  Recent Labs     02/03/22  1129 " 02/03/22  1416 02/03/22  1803 02/03/22  2018 02/03/22  2020 02/04/22  0038 02/04/22  0313 02/04/22  0713 02/04/22  1159 02/04/22  1555   POCTGLUCOSE 74 129* 272* 351* 320* 194* 183* 199* 211* 308*       Current Medications and/or Treatments Impacting Glycemic Control  Immunotherapy:    Immunosuppressants     None        Steroids:   Hormones (From admission, onward)            Start     Stop Route Frequency Ordered    02/03/22 0007  vasopressin (PITRESSIN) 0.2 Units/mL in dextrose 5 % 100 mL infusion         -- IV Continuous 02/03/22 0007    01/28/22 0207  vasopressin (PITRESSIN) 20 unit/mL injection        Note to Pharmacy: Created by [employer] override    01/28 1414   01/28/22 0207        Pressors:    Autonomic Drugs (From admission, onward)            Start     Stop Route Frequency Ordered    02/04/22 2340  NORepinephrine 4 mg in dextrose 5% 250 mL infusion (premix) (titrating)        Question Answer Comment   Begin at (in mcg/kg/min): 0.02    Titrate by: (in mcg/kg/min) 0.02    Titrate interval: (in minutes) 3    Titrate to maintain: (MAP or SBP) MAP    Greater than: (in mmHg) 60    Maximum dose: (in mcg/kg/min) 3        -- IV Continuous 02/03/22 2351    02/02/22 1400  midodrine tablet 15 mg         -- PER G TUBE Every 8 hours 02/02/22 1045    01/12/22 1441  rocuronium 10 mg/mL injection        Note to Pharmacy: Created by [employer] override    01/13 0244   01/12/22 1441        Hyperglycemia/Diabetes Medications:   Antihyperglycemics (From admission, onward)            Start     Stop Route Frequency Ordered    02/03/22 1441  insulin aspart U-100 pen 1-10 Units         -- SubQ As needed (PRN) 02/03/22 1341

## 2022-02-05 NOTE — PLAN OF CARE
Pt free from falls and injury this shift. All VSS at this time. No complaints of pain this shift. Sats 100% on vent settings: AC/VC+ 50 and 5. MAPs maintained >65 without issue all shift. Short period noted of rate controlled Afib during HD, patient spontaneously converted back to NSR. No continuous drips in place. Abdomen rounded/nondistended, no BM this shift. Tube feeds continued at goal 35cc/hr with minimal residual. Midsternal incision SUE with steri strips intact. Mireles intact, patient with oliguria. No UOP claimed this shift, team aware. 3 hours HD tolerated well this shift, with 900cc removed. No new skin breakdown noted this shift, patient turned q2h to prevent breakdown. All wound care completed per orders. No other significant events this shift. Plan of care reviewed with patient and family, all questions answered. Will continue to monitor closely.

## 2022-02-05 NOTE — CARE UPDATE
BG goal 140 -180   Diet NPO Except for: Sips with Medication  15 Days Post-Op     BG now more stable since recent insulin dosing adjustments. Patient remains on vent in SICU and on TF. Endo will continue to follow, and manage glycemic control while inpatient.       Continue Novolog to 4 units q 4 hrs while on tube feeds. Patient restarted on vasopressors which is most likely causing a global rise in blood sugars (HOLD if tube feeds are stopped or BG < 100).  -  Continue Moderate Dose SQ Insulin Correction Scale PRN hyperglycemia.   -  BG Monitoring q 4 hrs while NPO/tube feeds     ** Please call Endocrine for any BG related issues **  ** Please notify Endocrine for any change and/or advance in diet**     Discharge Planning:    TBD. Please notify endocrinology prior to discharge.

## 2022-02-05 NOTE — ASSESSMENT & PLAN NOTE
Orion Ty is a 76 y.o. female who presents to the SICU s/p redo aortic valve replacement with mechanical valve on 1/5/22.      Neuro/Psych:   -- Sedation: none  -- Pain: Liquid Tiffani, breakthrough fentanyl pushes q2     Cards:    -- Pressors: off over last 24hrs; intermittent requires levo, vaso  -- Goal MAP: 60-80  -- A-fib rate controlled.   -- PO amio TID  -- statin  -- Coumadin  -- INR therapeutic  -- metoprolol 25 BID      Pulm:   -- Goal O2 sat > 90%  -- reintubated 1/12 then 1/18. S/p trach 1/21  -- Spontaneous when able.   -- Tolerated trach collar for ~2hrs / day  -- ABG PRN  -- 2 mediastinal removed 1/9 L Pleural CT removed 1/8      Renal:  -- iHD today  -- nephro following  -- Permcath 1/21  -- Removed R IJ trialysis 1/23      FEN / GI:   -- Replace lytes as needed  -- s/p PEG 1/21  -- Nutrition: tube feeds novasource renal, at goal      ID:   -- WBC downtrending  -- Pseudomonas in resp cx; awaiting speciation  -- Antibiotics: fluc, vanc, zosyn  -- Initiated broad spec abx on 2/3/22 in setting of persistent fevers and rising WBC w/ hypotension  -- Blood Cx sent 2/3, f/u results        Heme/Onc:   -- Hgb stable  -- Daily CBC  -- Transfused products: 1U RBC on 1/5/22. 1u RBC and 1u FFP on 1/9/21. 2U RBC on 1/12. 4 FFP on 1/13. 1 FFP on 1/17, 2u pRBC 1/27  -- Anticoagulation: coumadin, therapeutic      Endo:   -- Gluc goal 140-180  -- Insulin per endocrinology      PPx:   Feeding: tube feeds  Analgesia/Sedation: Tiffani, fent pushes / none  Thromboembolic prevention: Coumadin, ASA  HOB >30: yes  Stress Ulcer ppx: protonix BID  Glucose control: Critical care goal 140-180 g/dl, ISS    Lines/Drains/Airway: Art line, Trach, Permcath, PEG      Dispo/Code Status/Palliative:   -- SICU / Full Code

## 2022-02-05 NOTE — SUBJECTIVE & OBJECTIVE
Interval History/Significant Events: Patient seen and examined. No acute events. Off vasopressors. White count improving. Pseudomonas in respiratory cultures. Awaiting speciation to narrow abx.     Follow-up For: Procedure(s) (LRB):  CREATION, TRACHEOSTOMY (N/A)  EGD, WITH PEG TUBE INSERTION (N/A)  INSERTION-CATHETER-ROSELINE (N/A)    Post-Operative Day: 15 Days Post-Op    Objective:     Vital Signs (Most Recent):  Temp: 97.8 °F (36.6 °C) (02/05/22 1100)  Pulse: 80 (02/05/22 1248)  Resp: (!) 30 (02/05/22 1248)  BP: (!) 102/58 (02/05/22 1230)  SpO2: 99 % (02/05/22 1248) Vital Signs (24h Range):  Temp:  [97.6 °F (36.4 °C)-98.6 °F (37 °C)] 97.8 °F (36.6 °C)  Pulse:  [] 80  Resp:  [20-52] 30  SpO2:  [94 %-100 %] 99 %  BP: ()/(45-72) 102/58     Weight: 49.1 kg (108 lb 3.9 oz)  Body mass index is 20.45 kg/m².      Intake/Output Summary (Last 24 hours) at 2/5/2022 1258  Last data filed at 2/5/2022 1200  Gross per 24 hour   Intake 1209.68 ml   Output 15 ml   Net 1194.68 ml       Physical Exam  Constitutional:       General: She is not in acute distress.  HENT:      Head: Normocephalic and atraumatic.   Neck:      Comments: Trach  Cardiovascular:      Rate and Rhythm: Normal rate and regular rhythm.      Pulses: Normal pulses.      Comments: Midline sternotomy incision c/d/I  Pacemaker in place. Paced at 80  Pulmonary:      Effort: Pulmonary effort is normal. No respiratory distress.   Abdominal:      General: Abdomen is flat. There is no distension.      Palpations: Abdomen is soft.      Tenderness: There is no abdominal tenderness.      Comments: Peg site c/d/i  Soft abdomen   Musculoskeletal:      Right lower leg: No edema.      Left lower leg: No edema.   Skin:     General: Skin is warm.      Capillary Refill: Capillary refill takes less than 2 seconds.   Neurological:      General: No focal deficit present.      Mental Status: She is alert.         Vents:  Vent Mode: A/C (02/05/22 8151)  Ventilator Initiated:  Yes (01/18/22 1636)  Set Rate: 20 BPM (02/05/22 1248)  Vt Set: 340 mL (02/05/22 1248)  Pressure Support: 10 cmH20 (02/05/22 0200)  PEEP/CPAP: 5 cmH20 (02/05/22 1248)  Oxygen Concentration (%): 50 (02/05/22 1248)  Peak Airway Pressure: 27 cmH2O (02/05/22 1248)  Plateau Pressure: 29 cmH20 (02/05/22 1248)  Total Ve: 8.44 mL (02/05/22 1248)  Negative Inspiratory Force (cm H2O): 0 (02/05/22 1248)  F/VT Ratio<105 (RSBI): (!) 78.95 (02/05/22 1248)    Lines/Drains/Airways     Central Venous Catheter Line            Permacath 01/21/22 1013 left subclavian 15 days          Drain                 Gastrostomy/Enterostomy 01/21/22 1112 Percutaneous endoscopic gastrostomy (PEG) 15 days         Urethral Catheter 01/25/22 1341 Non-latex 10 days          Airway                 Surgical Airway 01/21/22 1043 Shiley Cuffed 15 days          Peripheral Intravenous Line                 Midline Catheter Insertion/Assessment  - Single Lumen 01/10/22 1300 Left cephalic vein (lateral side of arm) 18g x 8cm 25 days         Peripheral IV - Single Lumen 02/03/22 1430 20 G;1 1/4 in Posterior;Right Hand 1 day         Peripheral IV - Single Lumen 02/03/22 1800 18 G;1 1/4 in Anterior;Left Wrist 1 day                Significant Labs:    CBC/Anemia Profile:  Recent Labs   Lab 02/03/22  1333 02/03/22  1333 02/04/22  0302 02/04/22  0403 02/05/22  0334 02/05/22  0335   WBC 12.58  --  15.86*  --  11.71  --    HGB 7.8*  --  8.5*  --  7.4*  --    HCT 24.4*   < > 26.1* 40 23.5* 24*   *  --  146*  --  127*  --    MCV 94  --  92  --  96  --    RDW 17.0*  --  16.7*  --  17.6*  --     < > = values in this interval not displayed.        Chemistries:  Recent Labs   Lab 02/04/22  0302 02/04/22  0302 02/04/22  1402 02/04/22  2141 02/05/22  0334   *  130*   < > 129* 131* 133*   K 5.1  4.9   < > 4.0 3.9 3.7     98   < > 98 98 99   CO2 23  22*   < > 23 24 23   BUN 26*  27*   < > 34* 41* 50*   CREATININE 1.1  1.1   < > 1.4 1.7* 1.8*   CALCIUM  8.1*  8.2*   < > 8.0* 8.5* 8.5*   ALBUMIN 2.4*  2.4*   < > 2.0* 2.1* 2.1*   PROT 7.0  --   --   --  5.9*   BILITOT 0.7  --   --   --  0.6   ALKPHOS 166*  --   --   --  161*   ALT 39  --   --   --  30   AST 73*  --   --   --  48*   MG 1.8  1.8   < > 1.7 1.8 1.8   PHOS 2.4*  2.4*   < > 2.4* 2.9 3.3    < > = values in this interval not displayed.       ABGs:   Recent Labs   Lab 02/05/22  0335   PH 7.350   PCO2 44.1   HCO3 24.3   POCSATURATED 78*   BE -1       Significant Imaging:  I have reviewed all pertinent imaging results/findings within the past 24 hours.

## 2022-02-05 NOTE — PROGRESS NOTES
[facility]  Nephrology  Progress Note     Patient Name: Orion Ty  MRN: [id number]  Admission Date: 1/5/2022  Hospital Length of Stay: 31 days  Attending Provider: Dennis Haider MD   Primary Care Physician: Otis Zimmer MD  Principal Problem: S/P aortic valve replacement    Subjective:     HPI: Orion Ty is our 76 y.o. female with previous aortic valve replacement, mitral valve replacement, and tricuspid valve repair in 2010 who presented on 1/5 for redo aortic valve replacement. Nephrology consulted on 1/8 for anuric JO. Patient is alert but tired, son at bedside. Stated she was tired prior to admission. During the hospital stay Cr increase from 1 to 1.5. In OR she received  2.5L crystalloid, 3U RBC, 2U FFP, 2 platelets, and 800 cell saver. she is +5 L since admit. UOP decrease overnight, per nurse she didn't urinate at all. She received diuril 500 mg twice, lasix 20 mg *2 on 1/7. This morning she urinate 225 immediately after placing the tejeda's cath. She received again lasix 100 mg this morning. Upor evaluation she was alert but repeatedly saying she was tired, denied any pain. Denied any previous hx of kidney disease.       Interval History: Net negative 867mL over the past 24h. UOP of 265mLs.  iHD completed yesterday; 1.5L removed.      Review of patient's allergies indicates:  No Known Allergies   Current Facility-Administered Medications   Medication Frequency    0.9%  NaCl infusion PRN    0.9%  NaCl infusion PRN    0.9%  NaCl infusion PRN    0.9%  NaCl infusion PRN    0.9%  NaCl infusion Once    acetaminophen oral solution 650 mg Q4H PRN    albuterol-ipratropium 2.5 mg-0.5 mg/3 mL nebulizer solution 3 mL Q6H    amiodarone tablet 200 mg Daily    atorvastatin tablet 40 mg Daily    bisacodyL suppository 10 mg Daily PRN    dextrose 10 % infusion Continuous PRN    dextrose 10% bolus 125 mL PRN    dextrose 50% injection 12.5 g PRN    furosemide injection 80 mg  Q8H    glucagon (human recombinant) injection 1 mg PRN    heparin (porcine) injection 1,000 Units PRN    hydrOXYzine 10 mg/5 mL syrup 10 mg Q6H PRN    insulin aspart U-100 pen 1-10 Units PRN    insulin aspart U-100 pen 4 Units Q24H    insulin aspart U-100 pen 4 Units Q24H    insulin aspart U-100 pen 4 Units Q24H    insulin aspart U-100 pen 4 Units Q24H    insulin aspart U-100 pen 4 Units Q24H    insulin aspart U-100 pen 4 Units Q24H    LIDOcaine HCL 2% jelly PRN    midodrine tablet 15 mg Q8H    NORepinephrine 4 mg in dextrose 5% 250 mL infusion (premix) (titrating) Continuous    ondansetron injection 4 mg Q12H PRN    oxyCODONE 5 mg/5 mL solution 5 mg Q4H PRN    pantoprazole injection 40 mg Q12H    piperacillin-tazobactam 4.5 g in sodium chloride 0.9% 100 mL IVPB (ready to mix system) Q12H    polyethylene glycol packet 17 g Daily PRN    psyllium husk (aspartame) 3.4 gram packet 1 packet Daily    QUEtiapine tablet 25 mg Nightly PRN    sodium chloride 0.9% bolus 250 mL PRN    sodium chloride 0.9% bolus 250 mL PRN    sodium chloride 0.9% bolus 250 mL PRN    sodium chloride 0.9% flush 10 mL PRN    vancomycin - pharmacy to dose pharmacy to manage frequency    vasopressin (PITRESSIN) 0.2 Units/mL in dextrose 5 % 100 mL infusion Continuous    warfarin (COUMADIN) tablet 1 mg Daily     Facility-Administered Medications Ordered in Other Encounters   Medication Frequency    0.9%  NaCl infusion Continuous       Objective:     Vital Signs (Most Recent):  Temp: 98.2 °F (36.8 °C) (02/05/22 0700)  Pulse: 80 (02/05/22 0815)  Resp: (!) 25 (02/05/22 0815)  BP: 115/65 (02/05/22 0815)  SpO2: 95 % (02/05/22 0815)  O2 Device (Oxygen Therapy): ventilator (02/05/22 0800) Vital Signs (24h Range):  Temp:  [97.6 °F (36.4 °C)-99.7 °F (37.6 °C)] 98.2 °F (36.8 °C)  Pulse:  [] 80  Resp:  [20-52] 25  SpO2:  [94 %-100 %] 95 %  BP: ()/(45-65) 115/65   Weight: 49.1 kg (108 lb 3.9 oz) (02/03/22 6354)  Body mass  index is 20.45 kg/m².  Body surface area is 1.45 meters squared.      Intake/Output Summary (Last 24 hours) at 2/5/2022 0852  Last data filed at 2/5/2022 0700  Gross per 24 hour   Intake 1683.56 ml   Output 15 ml   Net 1668.56 ml     I/O last 3 completed shifts:  In: 2540.5 [I.V.:192.6; NG/GT:1415; IV Piggyback:933]  Out: 2101 [Urine:35; Other:2066]  Net IO Since Admission: 7,025.57 mL [02/05/22 0852]     Physical Exam  Constitutional:       Appearance: She is well-developed. She is ill-appearing.   Cardiovascular:      Rate and Rhythm: Normal rate and regular rhythm.      Heart sounds: Normal heart sounds.   Pulmonary:      Comments: tracheostomy   Abdominal:      General: Abdomen is flat.      Palpations: Abdomen is soft.   Musculoskeletal:         General: Normal range of motion.      Right lower leg: No edema.      Left lower leg: No edema.   Skin:     General: Skin is warm and dry.      Comments: Sternal Incision CDI   Neurological:      Mental Status: She is alert.   Psychiatric:         Mood and Affect: Mood normal.         Behavior: Behavior normal.         Recent Labs   Lab 02/01/22  0330 02/03/22  1333 02/03/22  1333 02/04/22  0302 02/04/22  0403 02/05/22  0334 02/05/22  0335   WBC  --  12.58  --  15.86*  --  11.71  --    HGB  --  7.8*  --  8.5*  --  7.4*  --    HCT   < > 24.4*   < > 26.1* 40 23.5* 24*   PLT  --  119*  --  146*  --  127*  --    MONO  --  2.0*  0.3   < > 2.1*  0.3  --  3.4*  0.4  --     < > = values in this interval not displayed.      Recent Labs   Lab 02/03/22  0836 02/03/22  2207 02/04/22  0302 02/04/22  0302 02/04/22  1402 02/04/22  2141 02/05/22  0334   *   < > 132*  130*   < > 129* 131* 133*   K 4.2   < > 5.1  4.9   < > 4.0 3.9 3.7   CL 88*   < > 101  98   < > 98 98 99   CO2 23   < > 23  22*   < > 23 24 23   *   < > 26*  27*   < > 34* 41* 50*   CREATININE 3.5*   < > 1.1  1.1   < > 1.4 1.7* 1.8*   CALCIUM 8.8   < > 8.1*  8.2*   < > 8.0* 8.5* 8.5*   PROT 6.4  --   7.0  --   --   --  5.9*   BILITOT 0.6  --  0.7  --   --   --  0.6   ALKPHOS 178*  --  166*  --   --   --  161*   ALT 39  --  39  --   --   --  30   AST 60*  --  73*  --   --   --  48*    < > = values in this interval not displayed.      Recent Labs     02/03/22  0404 02/04/22  0403 02/05/22  0335   PH 7.320* 7.376 7.350   PCO2 50.7* 45.0 44.1   PO2 45 33* 45   HCO3 26.1 26.4 24.3   POCSATURATED 77* 62* 78*   BE 0 1 -1       Assessment/Plan:        JO  acute kidney injury  Orion Ty is our 76 y.o. female with previous aortic valve replacement, mitral valve replacement, and tricuspid valve repair in 2010 who presented on 1/5 for redo aortic valve replacement. Nephrology consulted on 1/8 for anuric JO. During the hospital stay Cr increase from 1 to 1.5. In OR she received  2.5L crystalloid, 3U RBC, 2U FFP, 2 platelets, and 800 cell saver. she is +5 L since admit. UOP decreased. She received diuril 500 mg twice, lasix 20 mg x2 on 1/7 with no adequate response to diuretics     Plan   Oliguric ischemic ATN likely secondary to severe intraoperative hypotension requiring vasopressor on 01/05/2022  Failed high-dose diuretics and renal replacement therapy started on 1/9/22 due to volume overload  PT BP was low for which we switch back to CRRT for now as pt is hemodynamically not stable for iHD   S/p 1 session of CRRT on 2/3/22 tolerated   She is net positive of almost 1800 cc fluid balance ,will have 1 session of iHD   Strict I/Os   Renally dose medications to GFR      S/P aortic valve replacement  With severe intraoperative hypotension   Management per primary     ATN (acute tubular necrosis)  See jo      Rose Mary Walsh MD  Nephrology  [facility]

## 2022-02-05 NOTE — CARE UPDATE
Please link this note to today's resident note.    SICU Staff Addendum  I have reviewed and concur with the resident's history, physical, assessment, and plan.  I have personally interviewed and examined the patient at bedside.  See below for any additional findings.    Reason for admission:  S/P aortic valve replacement  Present on Admission:   Type 2 diabetes mellitus with microalbuminuria, without long-term current use of insulin   Chronic atrial fibrillation      Active issues/Goals for Today:     Looking better this AM. She has remained off pressors overnight.    - Low dose seroquel prn for hyperactive delirium/insomnia. Consider haldol if no response to seroquel.   - Continue midodrine. Now off vasopressin. Wean as tolerated for MAP > 60.      - Daily SBT. Ongoing vent weaning.  - Tentative plan for iHD as per nephrology service.    - VAP with presumably pseudomonas on BAL. Will plan on 7 day course of antibiotics. Continue Zosyn for now and follow sensitivities and final speciation.  - Hx of mechanical mitral and aortic valves. Therapeutic INR.   - PT    - Dispo planning.      Gavin Kuhn MD  Anesthesiology/Critical Care  Spectra [phone]

## 2022-02-05 NOTE — PROGRESS NOTES
Dialysis     2 hr tx 900 ml removed.  appox 45 min into tx converted to a-fib with hr 100 to 125, stable b/p asymptomatic. Report to bedside nurse

## 2022-02-05 NOTE — PROGRESS NOTES
[facility]  Critical Care - Surgery  Progress Note    Patient Name: Orion Ty  MRN: [id number]  Admission Date: 1/5/2022  Hospital Length of Stay: 31 days  Code Status: Full Code  Attending Provider: Dennis Haider MD  Primary Care Provider: Otis Zimmer MD   Principal Problem: S/P aortic valve replacement    Subjective:     Hospital/ICU Course:  No notes on file    Interval History/Significant Events: Patient seen and examined. No acute events. Off vasopressors. White count improving. Pseudomonas in respiratory cultures. Awaiting speciation to narrow abx.     Follow-up For: Procedure(s) (LRB):  CREATION, TRACHEOSTOMY (N/A)  EGD, WITH PEG TUBE INSERTION (N/A)  INSERTION-CATHETER-ROSELINE (N/A)    Post-Operative Day: 15 Days Post-Op    Objective:     Vital Signs (Most Recent):  Temp: 97.8 °F (36.6 °C) (02/05/22 1100)  Pulse: 80 (02/05/22 1248)  Resp: (!) 30 (02/05/22 1248)  BP: (!) 102/58 (02/05/22 1230)  SpO2: 99 % (02/05/22 1248) Vital Signs (24h Range):  Temp:  [97.6 °F (36.4 °C)-98.6 °F (37 °C)] 97.8 °F (36.6 °C)  Pulse:  [] 80  Resp:  [20-52] 30  SpO2:  [94 %-100 %] 99 %  BP: ()/(45-72) 102/58     Weight: 49.1 kg (108 lb 3.9 oz)  Body mass index is 20.45 kg/m².      Intake/Output Summary (Last 24 hours) at 2/5/2022 1258  Last data filed at 2/5/2022 1200  Gross per 24 hour   Intake 1209.68 ml   Output 15 ml   Net 1194.68 ml       Physical Exam  Constitutional:       General: She is not in acute distress.  HENT:      Head: Normocephalic and atraumatic.   Neck:      Comments: Trach  Cardiovascular:      Rate and Rhythm: Normal rate and regular rhythm.      Pulses: Normal pulses.      Comments: Midline sternotomy incision c/d/I  Pacemaker in place. Paced at 80  Pulmonary:      Effort: Pulmonary effort is normal. No respiratory distress.   Abdominal:      General: Abdomen is flat. There is no distension.      Palpations: Abdomen is soft.      Tenderness: There is no  abdominal tenderness.      Comments: Peg site c/d/i  Soft abdomen   Musculoskeletal:      Right lower leg: No edema.      Left lower leg: No edema.   Skin:     General: Skin is warm.      Capillary Refill: Capillary refill takes less than 2 seconds.   Neurological:      General: No focal deficit present.      Mental Status: She is alert.         Vents:  Vent Mode: A/C (02/05/22 1248)  Ventilator Initiated: Yes (01/18/22 1636)  Set Rate: 20 BPM (02/05/22 1248)  Vt Set: 340 mL (02/05/22 1248)  Pressure Support: 10 cmH20 (02/05/22 0200)  PEEP/CPAP: 5 cmH20 (02/05/22 1248)  Oxygen Concentration (%): 50 (02/05/22 1248)  Peak Airway Pressure: 27 cmH2O (02/05/22 1248)  Plateau Pressure: 29 cmH20 (02/05/22 1248)  Total Ve: 8.44 mL (02/05/22 1248)  Negative Inspiratory Force (cm H2O): 0 (02/05/22 1248)  F/VT Ratio<105 (RSBI): (!) 78.95 (02/05/22 1248)    Lines/Drains/Airways     Central Venous Catheter Line            Permacath 01/21/22 1013 left subclavian 15 days          Drain                 Gastrostomy/Enterostomy 01/21/22 1112 Percutaneous endoscopic gastrostomy (PEG) 15 days         Urethral Catheter 01/25/22 1341 Non-latex 10 days          Airway                 Surgical Airway 01/21/22 1043 Shiley Cuffed 15 days          Peripheral Intravenous Line                 Midline Catheter Insertion/Assessment  - Single Lumen 01/10/22 1300 Left cephalic vein (lateral side of arm) 18g x 8cm 25 days         Peripheral IV - Single Lumen 02/03/22 1430 20 G;1 1/4 in Posterior;Right Hand 1 day         Peripheral IV - Single Lumen 02/03/22 1800 18 G;1 1/4 in Anterior;Left Wrist 1 day                Significant Labs:    CBC/Anemia Profile:  Recent Labs   Lab 02/03/22  1333 02/03/22  1333 02/04/22  0302 02/04/22  0403 02/05/22  0334 02/05/22  0335   WBC 12.58  --  15.86*  --  11.71  --    HGB 7.8*  --  8.5*  --  7.4*  --    HCT 24.4*   < > 26.1* 40 23.5* 24*   *  --  146*  --  127*  --    MCV 94  --  92  --  96  --    RDW  17.0*  --  16.7*  --  17.6*  --     < > = values in this interval not displayed.        Chemistries:  Recent Labs   Lab 02/04/22  0302 02/04/22  0302 02/04/22  1402 02/04/22  2141 02/05/22  0334   *  130*   < > 129* 131* 133*   K 5.1  4.9   < > 4.0 3.9 3.7     98   < > 98 98 99   CO2 23  22*   < > 23 24 23   BUN 26*  27*   < > 34* 41* 50*   CREATININE 1.1  1.1   < > 1.4 1.7* 1.8*   CALCIUM 8.1*  8.2*   < > 8.0* 8.5* 8.5*   ALBUMIN 2.4*  2.4*   < > 2.0* 2.1* 2.1*   PROT 7.0  --   --   --  5.9*   BILITOT 0.7  --   --   --  0.6   ALKPHOS 166*  --   --   --  161*   ALT 39  --   --   --  30   AST 73*  --   --   --  48*   MG 1.8  1.8   < > 1.7 1.8 1.8   PHOS 2.4*  2.4*   < > 2.4* 2.9 3.3    < > = values in this interval not displayed.       ABGs:   Recent Labs   Lab 02/05/22  0335   PH 7.350   PCO2 44.1   HCO3 24.3   POCSATURATED 78*   BE -1       Significant Imaging:  I have reviewed all pertinent imaging results/findings within the past 24 hours.    Assessment/Plan:     * S/P aortic valve replacement  Orion Ty is a 76 y.o. female who presents to the SICU s/p redo aortic valve replacement with mechanical valve on 1/5/22.      Neuro/Psych:   -- Sedation: none  -- Pain: Liquid Tiffani, breakthrough fentanyl pushes q2     Cards:    -- Pressors: off over last 24hrs; intermittent requires levo, vaso  -- Goal MAP: 60-80  -- A-fib rate controlled.   -- PO amio TID  -- statin  -- Coumadin  -- INR therapeutic  -- metoprolol 25 BID      Pulm:   -- Goal O2 sat > 90%  -- reintubated 1/12 then 1/18. S/p trach 1/21  -- Spontaneous when able.   -- Tolerated trach collar for ~2hrs / day  -- ABG PRN  -- 2 mediastinal removed 1/9 L Pleural CT removed 1/8      Renal:  -- iHD today  -- nephro following  -- Permcath 1/21  -- Removed R IJ trialysis 1/23      FEN / GI:   -- Replace lytes as needed  -- s/p PEG 1/21  -- Nutrition: tube feeds novasource renal, at goal      ID:   -- WBC downtrending  -- Pseudomonas  in resp cx; awaiting speciation  -- Antibiotics: fluc, vanc, zosyn  -- Initiated broad spec abx on 2/3/22 in setting of persistent fevers and rising WBC w/ hypotension  -- Blood Cx sent 2/3, f/u results        Heme/Onc:   -- Hgb stable  -- Daily CBC  -- Transfused products: 1U RBC on 1/5/22. 1u RBC and 1u FFP on 1/9/21. 2U RBC on 1/12. 4 FFP on 1/13. 1 FFP on 1/17, 2u pRBC 1/27  -- Anticoagulation: coumadin, therapeutic      Endo:   -- Gluc goal 140-180  -- Insulin per endocrinology      PPx:   Feeding: tube feeds  Analgesia/Sedation: Tiffani, fent pushes / none  Thromboembolic prevention: Coumadin, ASA  HOB >30: yes  Stress Ulcer ppx: protonix BID  Glucose control: Critical care goal 140-180 g/dl, ISS    Lines/Drains/Airway: Art line, Trach, Permcath, PEG      Dispo/Code Status/Palliative:   -- SICU / Full Code               Critical care was time spent personally by me on the following activities: development of treatment plan with patient or surrogate and bedside caregivers, discussions with consultants, evaluation of patient's response to treatment, examination of patient, ordering and performing treatments and interventions, ordering and review of laboratory studies, ordering and review of radiographic studies, pulse oximetry, re-evaluation of patient's condition.  This critical care time did not overlap with that of any other provider or involve time for any procedures.     Maikel Schwab MD  Critical Care - Surgery  [facility]

## 2022-02-05 NOTE — PLAN OF CARE
"      SICU PLAN OF CARE NOTE    Dx: S/P aortic valve replacement    Shift Events: No acute events overnight    Goals of Care: continue lasix trials, SBT, possible LTAC placement    Neuro: follows commands, moves all extremities, PERRLA    Vital Signs: BP (!) 100/58   Pulse 84   Temp 97.7 °F (36.5 °C) (Axillary)   Resp (!) 25   Ht 5' 1" (1.549 m)   Wt 49.1 kg (108 lb 3.9 oz)   SpO2 97%   Breastfeeding No   BMI 20.45 kg/m²     Respiratory: vent ACVC+ 50%/5    Diet: NPO and TF @ 35    Gtts: none    Urine Output: 0ccs/shift    Drains: none     Labs/Accuchecks: q4h/ q4h    Skin: no new skin breakdown, stage1 to sacrum cleansed with wound cleanser and triad cream applied, bilat heel boots and scds in place, immerse mattress plugged in and functional, turned q2h       "

## 2022-02-05 NOTE — PROGRESS NOTES
Pharmacokinetic Assessment Follow Up: IV Vancomycin    Vancomycin Regimen Assessment/Plan:    · Patient with JO requiring RRT.  Previously received 6 hr SLED treatment overnight 2/3-2/4.   · Per nephrology  She received 6 hours of SLED overnight. She is net positive of almost 1800 cc fluid balance ,will have 1 session of iHD   · Vancomycin random level resulted at 20.1 which is slightly above the upper limit of the therapeutic goal of 15-20 mg/dL.    · Hold vancomycin today and continue pulse dosing.    · A random level is ordered with AM labs tomorrow.  Pharmacy to re-dose based on level and RRT plans.    Drug levels (last 3 results):  Recent Labs   Lab Result Units 02/04/22  0302 02/05/22  0334   Vancomycin, Random ug/mL 11.5 20.1       Pharmacy will continue to follow and monitor vancomycin.    Please contact pharmacy at extension [phone] for questions regarding this assessment.    Thank you for the consult,   Jeevan Hollins PharmD       Patient brief summary:  Orion Ty is a 76 y.o. female initiated on antimicrobial therapy with IV Vancomycin for treatment of sepsis    The patient's current regimen is pulse dosing.    Drug Allergies:   Review of patient's allergies indicates:  No Known Allergies    Actual Body Weight:   49.1 kg    Renal Function:   Estimated Creatinine Clearance: 19.8 mL/min (A) (based on SCr of 1.8 mg/dL (H)).,     Dialysis Method (if applicable):  RRT, HD once (previously receiving 6 hr SLED overnight)    CBC (last 72 hours):  Recent Labs   Lab Result Units 02/03/22  0418 02/03/22  1333 02/04/22  0302 02/05/22  0334   WBC K/uL 8.53 12.58 15.86* 11.71   Hemoglobin g/dL 8.3* 7.8* 8.5* 7.4*   Hematocrit % 26.2* 24.4* 26.1* 23.5*   Platelets K/uL 106* 119* 146* 127*   Gran % % 86.5* 92.7* 92.6* 89.9*   Lymph % % 7.7* 3.7* 3.8* 4.7*   Mono % % 3.5* 2.0* 2.1* 3.4*   Eosinophil % % 1.2 0.6 0.6 0.9   Basophil % % 0.6 0.4 0.4 0.5   Differential Method  Automated Automated Automated Automated        Metabolic Panel (last 72 hours):  Recent Labs   Lab Result Units 02/03/22  0836 02/03/22  2207 02/04/22  0302 02/04/22  1402 02/04/22  2141 02/05/22  0334   Sodium mmol/L 129* 125* 132*  130* 129* 131* 133*   Potassium mmol/L 4.2 3.8 5.1  4.9 4.0 3.9 3.7   Chloride mmol/L 88* 87* 101  98 98 98 99   CO2 mmol/L 23 19* 23  22* 23 24 23   Glucose mg/dL 107 282* 160*  159* 219* 165* 202*   BUN mg/dL 103* 112* 26*  27* 34* 41* 50*   Creatinine mg/dL 3.5* 4.2* 1.1  1.1 1.4 1.7* 1.8*   Albumin g/dL 2.4* 2.1* 2.4*  2.4* 2.0* 2.1* 2.1*   Total Bilirubin mg/dL 0.6  --  0.7  --   --  0.6   Alkaline Phosphatase U/L 178*  --  166*  --   --  161*   AST U/L 60*  --  73*  --   --  48*   ALT U/L 39  --  39  --   --  30   Magnesium mg/dL 1.8 1.8 1.8  1.8 1.7 1.8 1.8   Phosphorus mg/dL 8.7* 8.4* 2.4*  2.4* 2.4* 2.9 3.3       Vancomycin Administrations:  vancomycin given in the last 96 hours                   vancomycin in dextrose 5 % 1 gram/250 mL IVPB 1,000 mg (mg) 1,000 mg New Bag 02/03/22 1810                Microbiologic Results:  Microbiology Results (last 7 days)     Procedure Component Value Units Date/Time    Culture, MRSA [[id number]] Collected: 02/03/22 1810    Order Status: Completed Specimen: MRSA source from Nares, Left Updated: 02/05/22 0804     MRSA Surveillance Screen No MRSA isolated    Blood culture [[id number]] Collected: 02/03/22 1550    Order Status: Completed Specimen: Blood from Peripheral, Hand, Right Updated: 02/04/22 1812     Blood Culture, Routine No Growth to date      No Growth to date    Blood culture [[id number]] Collected: 02/03/22 1542    Order Status: Completed Specimen: Blood from Peripheral, Hand, Left Updated: 02/04/22 1812     Blood Culture, Routine No Growth to date      No Growth to date    Culture, Respiratory with Gram Stain [[id number]] Collected: 02/04/22 0926    Order Status: Completed Specimen: Respiratory from Tracheal Aspirate Updated: 02/04/22 1437     Gram Stain  (Respiratory) Few WBC's     Gram Stain (Respiratory) Rare Gram negative rods

## 2022-02-06 NOTE — PROGRESS NOTES
[facility]  Nephrology  Progress Note     Patient Name: Orion Ty  MRN: [id number]  Admission Date: 1/5/2022  Hospital Length of Stay: 32 days  Attending Provider: Dennis Haider MD   Primary Care Physician: Otis Zimmer MD  Principal Problem: S/P aortic valve replacement    Subjective:     HPI: Orion Ty is our 76 y.o. female with previous aortic valve replacement, mitral valve replacement, and tricuspid valve repair in 2010 who presented on 1/5 for redo aortic valve replacement. Nephrology consulted on 1/8 for anuric JO. Patient is alert but tired, son at bedside. Stated she was tired prior to admission. During the hospital stay Cr increase from 1 to 1.5. In OR she received  2.5L crystalloid, 3U RBC, 2U FFP, 2 platelets, and 800 cell saver. she is +5 L since admit. UOP decrease overnight, per nurse she didn't urinate at all. She received diuril 500 mg twice, lasix 20 mg *2 on 1/7. This morning she urinate 225 immediately after placing the tejeda's cath. She received again lasix 100 mg this morning. Upor evaluation she was alert but repeatedly saying she was tired, denied any pain. Denied any previous hx of kidney disease.       Interval History: Net negative 867mL over the past 24h. UOP of 265mLs.  iHD completed yesterday; 1.5L removed.      Review of patient's allergies indicates:  No Known Allergies   Current Facility-Administered Medications   Medication Frequency    0.9%  NaCl infusion PRN    0.9%  NaCl infusion PRN    0.9%  NaCl infusion PRN    0.9%  NaCl infusion PRN    0.9%  NaCl infusion Once    0.9%  NaCl infusion PRN    0.9%  NaCl infusion Once    acetaminophen oral solution 650 mg Q4H PRN    albuterol-ipratropium 2.5 mg-0.5 mg/3 mL nebulizer solution 3 mL Q6H    amiodarone tablet 200 mg Daily    atorvastatin tablet 40 mg Daily    bisacodyL suppository 10 mg Daily PRN    dextrose 10 % infusion Continuous PRN    dextrose 10% bolus 125 mL PRN     dextrose 50% injection 12.5 g PRN    glucagon (human recombinant) injection 1 mg PRN    guaiFENesin 100 mg/5 ml syrup 200 mg Q4H PRN    haloperidol lactate injection 2 mg Nightly PRN    heparin (porcine) injection 1,000 Units PRN    hydrOXYzine 10 mg/5 mL syrup 10 mg Q6H PRN    insulin aspart U-100 pen 1-10 Units PRN    insulin aspart U-100 pen 4 Units Q24H    insulin aspart U-100 pen 4 Units Q24H    insulin aspart U-100 pen 4 Units Q24H    insulin aspart U-100 pen 4 Units Q24H    insulin aspart U-100 pen 4 Units Q24H    insulin aspart U-100 pen 4 Units Q24H    LIDOcaine HCL 2% jelly PRN    midodrine tablet 15 mg Q8H    NORepinephrine 4 mg in dextrose 5% 250 mL infusion (premix) (titrating) Continuous    ondansetron injection 4 mg Q12H PRN    oxyCODONE 5 mg/5 mL solution 5 mg Q4H PRN    pantoprazole injection 40 mg Q12H    piperacillin-tazobactam 4.5 g in sodium chloride 0.9% 100 mL IVPB (ready to mix system) Q12H    polyethylene glycol packet 17 g Daily PRN    psyllium husk (aspartame) 3.4 gram packet 1 packet Daily    sodium chloride 0.9% bolus 250 mL PRN    sodium chloride 0.9% bolus 250 mL PRN    sodium chloride 0.9% bolus 250 mL PRN    sodium chloride 0.9% bolus 250 mL PRN    sodium chloride 0.9% flush 10 mL PRN    vasopressin (PITRESSIN) 0.2 Units/mL in dextrose 5 % 100 mL infusion Continuous    warfarin (COUMADIN) split tablet 0.5 mg Daily     Facility-Administered Medications Ordered in Other Encounters   Medication Frequency    0.9%  NaCl infusion Continuous       Objective:     Vital Signs (Most Recent):  Temp: 98 °F (36.7 °C) (02/06/22 0700)  Pulse: 102 (02/06/22 1000)  Resp: (!) 32 (02/06/22 1000)  BP: 115/61 (02/06/22 1000)  SpO2: (!) 94 % (02/06/22 1000)  O2 Device (Oxygen Therapy): ventilator (02/06/22 0900) Vital Signs (24h Range):  Temp:  [97.8 °F (36.6 °C)-98.7 °F (37.1 °C)] 98 °F (36.7 °C)  Pulse:  [] 102  Resp:  [21-48] 32  SpO2:  [91 %-100 %] 94 %  BP:  ()/(52-71) 115/61   Weight: 49.1 kg (108 lb 3.9 oz) (02/03/22 0444)  Body mass index is 20.45 kg/m².  Body surface area is 1.45 meters squared.      Intake/Output Summary (Last 24 hours) at 2/6/2022 1038  Last data filed at 2/6/2022 1000  Gross per 24 hour   Intake 1337.4 ml   Output 1305 ml   Net 32.4 ml     I/O last 3 completed shifts:  In: 1881.9 [Other:400; NG/GT:1220; IV Piggyback:261.9]  Out: 1305 [Urine:5; Other:1300]  Net IO Since Admission: 7,262.97 mL [02/06/22 1038]     Physical Exam  Constitutional:       Appearance: She is well-developed. She is ill-appearing.   Cardiovascular:      Rate and Rhythm: Normal rate and regular rhythm.      Heart sounds: Normal heart sounds.   Pulmonary:      Comments: tracheostomy   Abdominal:      General: Abdomen is flat.      Palpations: Abdomen is soft.   Musculoskeletal:         General: Normal range of motion.      Right lower leg: No edema.      Left lower leg: No edema.   Skin:     General: Skin is warm and dry.      Comments: Sternal Incision CDI   Neurological:      Mental Status: She is alert.   Psychiatric:         Mood and Affect: Mood normal.         Behavior: Behavior normal.         Recent Labs   Lab 02/04/22  0302 02/04/22  0403 02/05/22  0334 02/05/22  0335 02/06/22  0251   WBC 15.86*  --  11.71  --  11.11   HGB 8.5*  --  7.4*  --  7.1*   HCT 26.1*   < > 23.5* 24* 23.4*   *  --  127*  --  117*   MONO 2.1*  0.3   < > 3.4*  0.4  --  4.2  0.5    < > = values in this interval not displayed.      Recent Labs   Lab 02/04/22  0302 02/04/22  1402 02/05/22  0334 02/05/22  0334 02/05/22  1659 02/05/22  2127 02/06/22  0251   *  130*   < > 133*   < > 131* 130* 133*   K 5.1  4.9   < > 3.7   < > 4.1 3.9 4.1     98   < > 99   < > 98 100 103   CO2 23  22*   < > 23   < > 21* 21* 22*   BUN 26*  27*   < > 50*   < > 28* 34* 43*   CREATININE 1.1  1.1   < > 1.8*   < > 1.2 1.4 1.6*   CALCIUM 8.1*  8.2*   < > 8.5*   < > 8.4* 8.6* 8.8   PROT 7.0   --  5.9*  --   --   --  6.1   BILITOT 0.7  --  0.6  --   --   --  0.7   ALKPHOS 166*  --  161*  --   --   --  153*   ALT 39  --  30  --   --   --  28   AST 73*  --  48*  --   --   --  45*    < > = values in this interval not displayed.      Recent Labs     02/05/22  0335 02/06/22  0320 02/06/22  0332   PH 7.350 7.386 7.374   PCO2 44.1 37.5 39.7   PO2 45 46 75*   HCO3 24.3 22.5* 23.1*   POCSATURATED 78* 81* 95   BE -1 -2 -2       Assessment/Plan:        JO  acute kidney injury  Orion Ty is our 76 y.o. female with previous aortic valve replacement, mitral valve replacement, and tricuspid valve repair in 2010 who presented on 1/5 for redo aortic valve replacement. Nephrology consulted on 1/8 for anuric JO. During the hospital stay Cr increase from 1 to 1.5. In OR she received  2.5L crystalloid, 3U RBC, 2U FFP, 2 platelets, and 800 cell saver. she is +5 L since admit. UOP decreased. She received diuril 500 mg twice, lasix 20 mg x2 on 1/7 with no adequate response to diuretics     Plan   Oliguric ischemic ATN likely secondary to severe intraoperative hypotension requiring vasopressor on 01/05/2022  Failed high-dose diuretics and renal replacement therapy started on 1/9/22 due to volume overload  We been mangeing pt with CRRT vd iHD based on hemodynamics status  Status post 1 session of intermittent hemodialysis 2 hours tolerated well  Will at assess patient in a.m. for the need of dialysis  Strict I/Os   Renally dose medications to GFR      S/P aortic valve replacement  With severe intraoperative hypotension   Management per primary     ATN (acute tubular necrosis)  See jo Walsh MD  Nephrology  [facility]

## 2022-02-06 NOTE — CARE UPDATE
Please link this note to today's resident note.    SICU Staff Addendum  I have reviewed and concur with the resident's history, physical, assessment, and plan.  I have personally interviewed and examined the patient at bedside.  See below for any additional findings.    Reason for admission:  S/P aortic valve replacement  Present on Admission:   Type 2 diabetes mellitus with microalbuminuria, without long-term current use of insulin   Chronic atrial fibrillation      Active issues/Goals for Today:     Looking better this AM. She has remained off pressors overnight.    - Haldol prn tonight  for hyperactive delirium/insomnia as she has not responded to seroquel.  - Continue midodrine. Now off vasopressin.   - Daily SBT. Ongoing vent weaning.  - Tolerated iHD. Next session as per nephrology.  - VAP with presumably pseudomonas on BAL. Will plan on 7 day course of antibiotics. Continue Zosyn for now and follow sensitivities and final speciation. Stopped vancomycin this AM as MRSA cx is negative.  - Hx of mechanical mitral and aortic valves. Therapeutic INR. Coumadin again tonight.  - Remove tejeda catheter and initiate bladder scans q12h.  - PT    - Dispo planning.      Gavin Kuhn MD  Anesthesiology/Critical Care  [facility]ra [phone]

## 2022-02-06 NOTE — SUBJECTIVE & OBJECTIVE
Interval History/Significant Events: Patient seen and examined this morning. No acute events overnight. Intermittent bouts of afib. INR therapeutic. Tolerating HD. Narrowed abx.     Follow-up For: Procedure(s) (LRB):  CREATION, TRACHEOSTOMY (N/A)  EGD, WITH PEG TUBE INSERTION (N/A)  INSERTION-CATHETER-ROSELINE (N/A)    Post-Operative Day: 16 Days Post-Op    Objective:     Vital Signs (Most Recent):  Temp: 97.9 °F (36.6 °C) (02/06/22 1100)  Pulse: 80 (02/06/22 1143)  Resp: (!) 35 (02/06/22 1143)  BP: (!) 121/58 (02/06/22 1100)  SpO2: 95 % (02/06/22 1143) Vital Signs (24h Range):  Temp:  [97.8 °F (36.6 °C)-98.7 °F (37.1 °C)] 97.9 °F (36.6 °C)  Pulse:  [] 80  Resp:  [22-48] 35  SpO2:  [91 %-100 %] 95 %  BP: ()/(52-71) 121/58     Weight: 49.1 kg (108 lb 3.9 oz)  Body mass index is 20.45 kg/m².      Intake/Output Summary (Last 24 hours) at 2/6/2022 1254  Last data filed at 2/6/2022 1100  Gross per 24 hour   Intake 1302.4 ml   Output 1305 ml   Net -2.6 ml       Physical Exam  Constitutional:       General: She is not in acute distress.  HENT:      Head: Normocephalic and atraumatic.   Neck:      Comments: Trach  Cardiovascular:      Rate and Rhythm: Normal rate and regular rhythm.      Pulses: Normal pulses.      Comments: Midline sternotomy incision c/d/I  Pacemaker in place. Paced at 80  Pulmonary:      Effort: Pulmonary effort is normal. No respiratory distress.   Abdominal:      General: Abdomen is flat. There is no distension.      Palpations: Abdomen is soft.      Tenderness: There is no abdominal tenderness.      Comments: Peg site c/d/i  Soft abdomen   Musculoskeletal:      Right lower leg: No edema.      Left lower leg: No edema.   Skin:     General: Skin is warm.      Capillary Refill: Capillary refill takes less than 2 seconds.   Neurological:      General: No focal deficit present.      Mental Status: She is alert.         Vents:  Vent Mode: A/C (02/06/22 1143)  Ventilator Initiated: Yes (01/18/22  1636)  Set Rate: 20 BPM (02/06/22 1143)  Vt Set: 340 mL (02/06/22 1143)  Pressure Support: 10 cmH20 (02/05/22 0200)  PEEP/CPAP: 5 cmH20 (02/06/22 1143)  Oxygen Concentration (%): 50 (02/06/22 1143)  Peak Airway Pressure: 41 cmH2O (02/06/22 1143)  Plateau Pressure: 24 cmH20 (02/06/22 1143)  Total Ve: 9.99 mL (02/06/22 1143)  Negative Inspiratory Force (cm H2O): 0 (02/06/22 1143)  F/VT Ratio<105 (RSBI): (!) 75.43 (02/06/22 1143)    Lines/Drains/Airways     Central Venous Catheter Line            Permacath 01/21/22 1013 left subclavian 16 days          Drain                 Gastrostomy/Enterostomy 01/21/22 1112 Percutaneous endoscopic gastrostomy (PEG) 16 days          Airway                 Surgical Airway 01/21/22 1043 Shiley Cuffed 16 days          Peripheral Intravenous Line                 Midline Catheter Insertion/Assessment  - Single Lumen 01/10/22 1300 Left cephalic vein (lateral side of arm) 18g x 8cm 26 days         Peripheral IV - Single Lumen 02/03/22 1430 20 G;1 1/4 in Posterior;Right Hand 2 days         Peripheral IV - Single Lumen 02/03/22 1800 18 G;1 1/4 in Anterior;Left Wrist 2 days                Significant Labs:    CBC/Anemia Profile:  Recent Labs   Lab 02/05/22  0334 02/05/22  0335 02/06/22  0251   WBC 11.71  --  11.11   HGB 7.4*  --  7.1*   HCT 23.5* 24* 23.4*   *  --  117*   MCV 96  --  100*   RDW 17.6*  --  18.1*        Chemistries:  Recent Labs   Lab 02/05/22  0334 02/05/22  0334 02/05/22  1659 02/05/22 2127 02/06/22  0251   *   < > 131* 130* 133*   K 3.7   < > 4.1 3.9 4.1   CL 99   < > 98 100 103   CO2 23   < > 21* 21* 22*   BUN 50*   < > 28* 34* 43*   CREATININE 1.8*   < > 1.2 1.4 1.6*   CALCIUM 8.5*   < > 8.4* 8.6* 8.8   ALBUMIN 2.1*   < > 2.1* 2.0* 2.1*   PROT 5.9*  --   --   --  6.1   BILITOT 0.6  --   --   --  0.7   ALKPHOS 161*  --   --   --  153*   ALT 30  --   --   --  28   AST 48*  --   --   --  45*   MG 1.8   < > 1.8 1.9 1.9   PHOS 3.3   < > 2.0* 2.1* 2.4*    < > =  values in this interval not displayed.       ABGs:   Recent Labs   Lab 02/06/22  0332   PH 7.374   PCO2 39.7   HCO3 23.1*   POCSATURATED 95   BE -2       Significant Imaging:  I have reviewed all pertinent imaging results/findings within the past 24 hours.

## 2022-02-06 NOTE — PROGRESS NOTES
[facility]  Critical Care - Surgery  Progress Note    Patient Name: Orion Ty  MRN: [id number]  Admission Date: 1/5/2022  Hospital Length of Stay: 32 days  Code Status: Full Code  Attending Provider: Dennis Haider MD  Primary Care Provider: Otis Zimmer MD   Principal Problem: S/P aortic valve replacement    Subjective:     Hospital/ICU Course:  No notes on file    Interval History/Significant Events: Patient seen and examined this morning. No acute events overnight. Intermittent bouts of afib. INR therapeutic. Tolerating HD. Narrowed abx.     Follow-up For: Procedure(s) (LRB):  CREATION, TRACHEOSTOMY (N/A)  EGD, WITH PEG TUBE INSERTION (N/A)  INSERTION-CATHETER-ROSELINE (N/A)    Post-Operative Day: 16 Days Post-Op    Objective:     Vital Signs (Most Recent):  Temp: 97.9 °F (36.6 °C) (02/06/22 1100)  Pulse: 80 (02/06/22 1143)  Resp: (!) 35 (02/06/22 1143)  BP: (!) 121/58 (02/06/22 1100)  SpO2: 95 % (02/06/22 1143) Vital Signs (24h Range):  Temp:  [97.8 °F (36.6 °C)-98.7 °F (37.1 °C)] 97.9 °F (36.6 °C)  Pulse:  [] 80  Resp:  [22-48] 35  SpO2:  [91 %-100 %] 95 %  BP: ()/(52-71) 121/58     Weight: 49.1 kg (108 lb 3.9 oz)  Body mass index is 20.45 kg/m².      Intake/Output Summary (Last 24 hours) at 2/6/2022 1254  Last data filed at 2/6/2022 1100  Gross per 24 hour   Intake 1302.4 ml   Output 1305 ml   Net -2.6 ml       Physical Exam  Constitutional:       General: She is not in acute distress.  HENT:      Head: Normocephalic and atraumatic.   Neck:      Comments: Trach  Cardiovascular:      Rate and Rhythm: Normal rate and regular rhythm.      Pulses: Normal pulses.      Comments: Midline sternotomy incision c/d/I  Pacemaker in place. Paced at 80  Pulmonary:      Effort: Pulmonary effort is normal. No respiratory distress.   Abdominal:      General: Abdomen is flat. There is no distension.      Palpations: Abdomen is soft.      Tenderness: There is no abdominal  tenderness.      Comments: Peg site c/d/i  Soft abdomen   Musculoskeletal:      Right lower leg: No edema.      Left lower leg: No edema.   Skin:     General: Skin is warm.      Capillary Refill: Capillary refill takes less than 2 seconds.   Neurological:      General: No focal deficit present.      Mental Status: She is alert.         Vents:  Vent Mode: A/C (02/06/22 1143)  Ventilator Initiated: Yes (01/18/22 1636)  Set Rate: 20 BPM (02/06/22 1143)  Vt Set: 340 mL (02/06/22 1143)  Pressure Support: 10 cmH20 (02/05/22 0200)  PEEP/CPAP: 5 cmH20 (02/06/22 1143)  Oxygen Concentration (%): 50 (02/06/22 1143)  Peak Airway Pressure: 41 cmH2O (02/06/22 1143)  Plateau Pressure: 24 cmH20 (02/06/22 1143)  Total Ve: 9.99 mL (02/06/22 1143)  Negative Inspiratory Force (cm H2O): 0 (02/06/22 1143)  F/VT Ratio<105 (RSBI): (!) 75.43 (02/06/22 1143)    Lines/Drains/Airways     Central Venous Catheter Line            Permacath 01/21/22 1013 left subclavian 16 days          Drain                 Gastrostomy/Enterostomy 01/21/22 1112 Percutaneous endoscopic gastrostomy (PEG) 16 days          Airway                 Surgical Airway 01/21/22 1043 Shiley Cuffed 16 days          Peripheral Intravenous Line                 Midline Catheter Insertion/Assessment  - Single Lumen 01/10/22 1300 Left cephalic vein (lateral side of arm) 18g x 8cm 26 days         Peripheral IV - Single Lumen 02/03/22 1430 20 G;1 1/4 in Posterior;Right Hand 2 days         Peripheral IV - Single Lumen 02/03/22 1800 18 G;1 1/4 in Anterior;Left Wrist 2 days                Significant Labs:    CBC/Anemia Profile:  Recent Labs   Lab 02/05/22  0334 02/05/22  0335 02/06/22  0251   WBC 11.71  --  11.11   HGB 7.4*  --  7.1*   HCT 23.5* 24* 23.4*   *  --  117*   MCV 96  --  100*   RDW 17.6*  --  18.1*        Chemistries:  Recent Labs   Lab 02/05/22  0334 02/05/22  0334 02/05/22  1659 02/05/22  2127 02/06/22  0251   *   < > 131* 130* 133*   K 3.7   < > 4.1 3.9 4.1    CL 99   < > 98 100 103   CO2 23   < > 21* 21* 22*   BUN 50*   < > 28* 34* 43*   CREATININE 1.8*   < > 1.2 1.4 1.6*   CALCIUM 8.5*   < > 8.4* 8.6* 8.8   ALBUMIN 2.1*   < > 2.1* 2.0* 2.1*   PROT 5.9*  --   --   --  6.1   BILITOT 0.6  --   --   --  0.7   ALKPHOS 161*  --   --   --  153*   ALT 30  --   --   --  28   AST 48*  --   --   --  45*   MG 1.8   < > 1.8 1.9 1.9   PHOS 3.3   < > 2.0* 2.1* 2.4*    < > = values in this interval not displayed.       ABGs:   Recent Labs   Lab 02/06/22  0332   PH 7.374   PCO2 39.7   HCO3 23.1*   POCSATURATED 95   BE -2       Significant Imaging:  I have reviewed all pertinent imaging results/findings within the past 24 hours.    Assessment/Plan:     * S/P aortic valve replacement  Orion Ty is a 76 y.o. female who presents to the SICU s/p redo aortic valve replacement with mechanical valve on 1/5/22.      Neuro/Psych:   -- Sedation: none  -- Pain: Liquid Tiffani, breakthrough fentanyl pushes q2     Cards:    -- Pressors: off over last 24hrs; intermittently requires levo, vaso  -- Goal MAP: 60-80  -- A-fib rate controlled.   -- PO amio TID  -- statin  -- holding coumadin; discussed with pharmacy  -- INR therapeutic  -- metoprolol 25 BID      Pulm:   -- Goal O2 sat > 90%  -- reintubated 1/12 then 1/18. S/p trach 1/21  -- Spontaneous when able.   -- Tolerated trach collar for ~2hrs / day  -- ABG PRN  -- 2 mediastinal removed 1/9 L Pleural CT removed 1/8      Renal:  -- iHD today  -- nephro following  -- Permcath 1/21  -- Removed R IJ trialysis 1/23      FEN / GI:   -- Replace lytes as needed  -- s/p PEG 1/21  -- Nutrition: tube feeds novasource renal, at goal      ID:   -- WBC downtrending  -- Pseudomonas in resp cx; awaiting speciation  -- Antibiotics: fluc, vanc, zosyn  -- Initiated broad spec abx on 2/3; narrowed 2/6  -- Blood Cx sent 2/3, NGTD        Heme/Onc:   -- Hgb stable  -- Daily CBC  -- Transfused products: 1U RBC on 1/5/22. 1u RBC and 1u FFP on 1/9/21. 2U RBC on  1/12. 4 FFP on 1/13. 1 FFP on 1/17, 2u pRBC 1/27  -- Anticoagulation: coumadin, therapeutic; holding today per pharmacy      Endo:   -- Gluc goal 140-180  -- Insulin per endocrinology      PPx:   Feeding: tube feeds  Analgesia/Sedation: Tiffani, fent pushes / none  Thromboembolic prevention: Coumadin, ASA  HOB >30: yes  Stress Ulcer ppx: protonix BID  Glucose control: Critical care goal 140-180 g/dl, ISS    Lines/Drains/Airway: Art line, Trach, Permcath, PEG      Dispo/Code Status/Palliative:   -- SICU / Full Code               Critical care was time spent personally by me on the following activities: development of treatment plan with patient or surrogate and bedside caregivers, discussions with consultants, evaluation of patient's response to treatment, examination of patient, ordering and performing treatments and interventions, ordering and review of laboratory studies, ordering and review of radiographic studies, pulse oximetry, re-evaluation of patient's condition.  This critical care time did not overlap with that of any other provider or involve time for any procedures.     Maikel Schwab MD  Critical Care - Surgery  [facility]

## 2022-02-06 NOTE — ASSESSMENT & PLAN NOTE
Orion Ty is a 76 y.o. female who presents to the SICU s/p redo aortic valve replacement with mechanical valve on 1/5/22.      Neuro/Psych:   -- Sedation: none  -- Pain: Liquid Tiffani, breakthrough fentanyl pushes q2     Cards:    -- Pressors: off over last 24hrs; intermittently requires levo, vaso  -- Goal MAP: 60-80  -- A-fib rate controlled.   -- PO amio TID  -- statin  -- holding coumadin; discussed with pharmacy  -- INR therapeutic  -- metoprolol 25 BID      Pulm:   -- Goal O2 sat > 90%  -- reintubated 1/12 then 1/18. S/p trach 1/21  -- Spontaneous when able.   -- Tolerated trach collar for ~2hrs / day  -- ABG PRN  -- 2 mediastinal removed 1/9 L Pleural CT removed 1/8      Renal:  -- iHD today  -- nephro following  -- Permcath 1/21  -- Removed R IJ trialysis 1/23      FEN / GI:   -- Replace lytes as needed  -- s/p PEG 1/21  -- Nutrition: tube feeds novasource renal, at goal      ID:   -- WBC downtrending  -- Pseudomonas in resp cx; awaiting speciation  -- Antibiotics: fluc, vanc, zosyn  -- Initiated broad spec abx on 2/3; narrowed 2/6  -- Blood Cx sent 2/3, NGTD        Heme/Onc:   -- Hgb stable  -- Daily CBC  -- Transfused products: 1U RBC on 1/5/22. 1u RBC and 1u FFP on 1/9/21. 2U RBC on 1/12. 4 FFP on 1/13. 1 FFP on 1/17, 2u pRBC 1/27  -- Anticoagulation: coumadin, therapeutic; holding today per pharmacy      Endo:   -- Gluc goal 140-180  -- Insulin per endocrinology      PPx:   Feeding: tube feeds  Analgesia/Sedation: Tiffani, fent pushes / none  Thromboembolic prevention: Coumadin, ASA  HOB >30: yes  Stress Ulcer ppx: protonix BID  Glucose control: Critical care goal 140-180 g/dl, ISS    Lines/Drains/Airway: Art line, Trach, Permcath, PEG      Dispo/Code Status/Palliative:   -- SICU / Full Code

## 2022-02-06 NOTE — PROGRESS NOTES
Therapy with vanc complete and/or consult discontinued by provider.  Pharmacy will sign off, please re-consult as needed.    Thanks for the consult  Dima CastanoD, [facility]  Clinical Pharmacist

## 2022-02-06 NOTE — RESPIRATORY THERAPY
Attempted PAV+ at 70% with peep-5 and fio2-50%. Pt. Complaint of increase of WOB. Was return to rate.

## 2022-02-06 NOTE — CARE UPDATE
BG goal 140 -180   Diet NPO Except for: Sips with Medication  16 Days Post-Op     BG remains stable. Patient remains on vent in SICU and on TF and vent. Endo will continue to follow, and manage glycemic control while inpatient.       Continue Novolog to 4 units q 4 hrs while on tube feeds. Patient restarted on vasopressors which is most likely causing a global rise in blood sugars (HOLD if tube feeds are stopped or BG < 100).  -  Continue Moderate Dose SQ Insulin Correction Scale PRN hyperglycemia.   -  BG Monitoring q 4 hrs while NPO/tube feeds     ** Please call Endocrine for any BG related issues **  ** Please notify Endocrine for any change and/or advance in diet**     Discharge Planning:    TBD. Please notify endocrinology prior to discharge.

## 2022-02-07 NOTE — PLAN OF CARE
"      SICU PLAN OF CARE NOTE    Dx: S/P aortic valve replacement    Shift Events: 1 U PRBC. A Fib with rate controlled. Sacral wound pain controlled with PRN Oxycodone & Hydroxyzine. Tuber feeds tolerated. Plan of care reviewed with patient & patient's daughter. All questions/ concerns addressed.    Goals of Care: MAP 60-80, SpO2>90%, Trach Collar, Pain control, Sacral wound care    Neuro: AAO x4, Follows Commands, and Moves All Extremities    Vital Signs: /65   Pulse 100   Temp 97.8 °F (36.6 °C) (Oral)   Resp (!) 28   Ht 5' 1" (1.549 m)   Wt 49.1 kg (108 lb 3.9 oz)   SpO2 (!) 92%   Breastfeeding No   BMI 20.45 kg/m²     Respiratory: Ventilator 40%, 5 PEEP    Diet: NPO and Tube Feeds @ 35 mL/hr (goal) Novasource renal    Gtts: Abx    Urine Output: Anuric     Drains: G-tube     Labs/Accuchecks: Accuchecks Q4 hr, insulin coverage needed, see MAR. 1 U RBC for low H/H. INR increased to 5, Dr. Schwab notified.     Skin: Foam on heels & heels elevated in boots. Turned Q2 hr & assisted with weight shifting. Wedge used to turn. Sacral wound actively bleeding. Cleansed sacrum with wound cleanser & pat dry. Hydrocolloid dressing applied.        "

## 2022-02-07 NOTE — SUBJECTIVE & OBJECTIVE
Interval History/Significant Events: Patient seen and examined this morning. No acute events overnight. INR supra-therapeutic this morning despite holding coumadin. No new complaints.     Follow-up For: Procedure(s) (LRB):  CREATION, TRACHEOSTOMY (N/A)  EGD, WITH PEG TUBE INSERTION (N/A)  INSERTION-CATHETER-ROSELINE (N/A)    Post-Operative Day: 17 Days Post-Op    Objective:     Vital Signs (Most Recent):  Temp: 97.7 °F (36.5 °C) (02/07/22 0545)  Pulse: 94 (02/07/22 0645)  Resp: (!) 26 (02/07/22 0645)  BP: (!) 111/57 (02/07/22 0645)  SpO2: (!) 94 % (02/07/22 0645) Vital Signs (24h Range):  Temp:  [97.7 °F (36.5 °C)-98.6 °F (37 °C)] 97.7 °F (36.5 °C)  Pulse:  [] 94  Resp:  [21-38] 26  SpO2:  [90 %-100 %] 94 %  BP: ()/(51-75) 111/57     Weight: 49.1 kg (108 lb 3.9 oz)  Body mass index is 20.45 kg/m².      Intake/Output Summary (Last 24 hours) at 2/7/2022 0738  Last data filed at 2/7/2022 0600  Gross per 24 hour   Intake 1213.59 ml   Output --   Net 1213.59 ml       Physical Exam  Constitutional:       General: She is not in acute distress.  HENT:      Head: Normocephalic and atraumatic.   Neck:      Comments: Trach  Cardiovascular:      Rate and Rhythm: Normal rate and regular rhythm.      Pulses: Normal pulses.      Comments: Midline sternotomy incision c/d/I  Pacemaker in place.   Pulmonary:      Effort: Pulmonary effort is normal. No respiratory distress.   Abdominal:      General: Abdomen is flat. There is no distension.      Palpations: Abdomen is soft.      Tenderness: There is no abdominal tenderness.      Comments: Peg site c/d/i  Soft abdomen   Musculoskeletal:      Right lower leg: No edema.      Left lower leg: No edema.   Skin:     General: Skin is warm.      Capillary Refill: Capillary refill takes less than 2 seconds.   Neurological:      General: No focal deficit present.      Mental Status: She is alert.         Vents:  Vent Mode: A/C (02/07/22 0415)  Ventilator Initiated: Yes (01/18/22  1636)  Set Rate: 20 BPM (02/07/22 0415)  Vt Set: 340 mL (02/07/22 0415)  Pressure Support: 10 cmH20 (02/07/22 0415)  PEEP/CPAP: 5 cmH20 (02/07/22 0415)  Oxygen Concentration (%): 50 (02/07/22 0645)  Peak Airway Pressure: 63 cmH2O (02/07/22 0415)  Plateau Pressure: 24 cmH20 (02/07/22 0415)  Total Ve: 9.6 mL (02/07/22 0415)  Negative Inspiratory Force (cm H2O): 0 (02/06/22 2100)  F/VT Ratio<105 (RSBI): (!) 68.97 (02/07/22 0415)    Lines/Drains/Airways     Central Venous Catheter Line            Permacath 01/21/22 1013 left subclavian 16 days          Drain                 Gastrostomy/Enterostomy 01/21/22 1112 Percutaneous endoscopic gastrostomy (PEG) 16 days          Airway                 Surgical Airway 01/21/22 1043 Shiley Cuffed 16 days          Peripheral Intravenous Line                 Midline Catheter Insertion/Assessment  - Single Lumen 01/10/22 1300 Left cephalic vein (lateral side of arm) 18g x 8cm 27 days         Peripheral IV - Single Lumen 02/03/22 1430 20 G;1 1/4 in Posterior;Right Hand 3 days         Peripheral IV - Single Lumen 02/03/22 1800 18 G;1 1/4 in Anterior;Left Wrist 3 days                Significant Labs:    CBC/Anemia Profile:  Recent Labs   Lab 02/06/22 0251 02/07/22  0310 02/07/22  0400   WBC 11.11 13.75*  --    HGB 7.1* 6.5*  --    HCT 23.4* 21.6* 38   * 130*  --    * 98  --    RDW 18.1* 18.1*  --         Chemistries:  Recent Labs   Lab 02/05/22 2127 02/06/22  0251 02/07/22  0310   * 133* 132*   K 3.9 4.1 4.2    103 99   CO2 21* 22* 20*   BUN 34* 43* 70*   CREATININE 1.4 1.6* 2.3*   CALCIUM 8.6* 8.8 9.1   ALBUMIN 2.0* 2.1* 2.1*   PROT  --  6.1 6.1   BILITOT  --  0.7 0.6   ALKPHOS  --  153* 146*   ALT  --  28 29   AST  --  45* 45*   MG 1.9 1.9 2.0   PHOS 2.1* 2.4* 3.5       All pertinent labs within the past 24 hours have been reviewed.    Significant Imaging:  I have reviewed all pertinent imaging results/findings within the past 24 hours.   Hydroquinone Pregnancy And Lactation Text: This medication has not been assigned a Pregnancy Risk Category but animal studies failed to show danger with the topical medication. It is unknown if the medication is excreted in breast milk. Odomzo Pregnancy And Lactation Text: This medication is Pregnancy Category X and is absolutely contraindicated during pregnancy. It is unknown if it is excreted in breast milk. Bactrim Pregnancy And Lactation Text: This medication is Pregnancy Category D and is known to cause fetal risk.  It is also excreted in breast milk. Infliximab Pregnancy And Lactation Text: This medication is Pregnancy Category B and is considered safe during pregnancy. It is unknown if this medication is excreted in breast milk. Isotretinoin Counseling: Patient should get monthly blood tests, not donate blood, not drive at night if vision affected, not share medication, and not undergo elective surgery for 6 months after tx completed. Side effects reviewed, pt to contact office should one occur. Ivermectin Counseling:  Patient instructed to take medication on an empty stomach with a full glass of water.  Patient informed of potential adverse effects including but not limited to nausea, diarrhea, dizziness, itching, and swelling of the extremities or lymph nodes.  The patient verbalized understanding of the proper use and possible adverse effects of ivermectin.  All of the patient's questions and concerns were addressed. Picato Counseling:  I discussed with the patient the risks of Picato including but not limited to erythema, scaling, itching, weeping, crusting, and pain. Otezla Counseling: The side effects of Otezla were discussed with the patient, including but not limited to worsening or new depression, weight loss, diarrhea, nausea, upper respiratory tract infection, and headache. Patient instructed to call the office should any adverse effect occur.  The patient verbalized understanding of the proper use and possible adverse effects of Otezla.  All the patient's questions and concerns were addressed. Tetracycline Counseling: Patient counseled regarding possible photosensitivity and increased risk for sunburn.  Patient instructed to avoid sunlight, if possible.  When exposed to sunlight, patients should wear protective clothing, sunglasses, and sunscreen.  The patient was instructed to call the office immediately if the following severe adverse effects occur:  hearing changes, easy bruising/bleeding, severe headache, or vision changes.  The patient verbalized understanding of the proper use and possible adverse effects of tetracycline.  All of the patient's questions and concerns were addressed. Patient understands to avoid pregnancy while on therapy due to potential birth defects. Rituxan Counseling:  I discussed with the patient the risks of Rituxan infusions. Side effects can include infusion reactions, severe drug rashes including mucocutaneous reactions, reactivation of latent hepatitis and other infections and rarely progressive multifocal leukoencephalopathy.  All of the patient's questions and concerns were addressed. Clofazimine Counseling:  I discussed with the patient the risks of clofazimine including but not limited to skin and eye pigmentation, liver damage, nausea/vomiting, gastrointestinal bleeding and allergy. Cephalexin Counseling: I counseled the patient regarding use of cephalexin as an antibiotic for prophylactic and/or therapeutic purposes. Cephalexin (commonly prescribed under brand name Keflex) is a cephalosporin antibiotic which is active against numerous classes of bacteria, including most skin bacteria. Side effects may include nausea, diarrhea, gastrointestinal upset, rash, hives, yeast infections, and in rare cases, hepatitis, kidney disease, seizures, fever, confusion, neurologic symptoms, and others. Patients with severe allergies to penicillin medications are cautioned that there is about a 10% incidence of cross-reactivity with cephalosporins. When possible, patients with penicillin allergies should use alternatives to cephalosporins for antibiotic therapy. Ivermectin Pregnancy And Lactation Text: This medication is Pregnancy Category C and it isn't known if it is safe during pregnancy. It is also excreted in breast milk. Imiquimod Counseling:  I discussed with the patient the risks of imiquimod including but not limited to erythema, scaling, itching, weeping, crusting, and pain.  Patient understands that the inflammatory response to imiquimod is variable from person to person and was educated regarded proper titration schedule.  If flu-like symptoms develop, patient knows to discontinue the medication and contact us. Include Pregnancy/Lactation Warning?: No Isotretinoin Pregnancy And Lactation Text: This medication is Pregnancy Category X and is considered extremely dangerous during pregnancy. It is unknown if it is excreted in breast milk. Picato Pregnancy And Lactation Text: This medication is Pregnancy Category C. It is unknown if this medication is excreted in breast milk. Tetracycline Pregnancy And Lactation Text: This medication is Pregnancy Category D and not consider safe during pregnancy. It is also excreted in breast milk. Cephalexin Pregnancy And Lactation Text: This medication is Pregnancy Category B and considered safe during pregnancy.  It is also excreted in breast milk but can be used safely for shorter doses. Clofazimine Pregnancy And Lactation Text: This medication is Pregnancy Category C and isn't considered safe during pregnancy. It is excreted in breast milk. Azathioprine Counseling:  I discussed with the patient the risks of azathioprine including but not limited to myelosuppression, immunosuppression, hepatotoxicity, lymphoma, and infections.  The patient understands that monitoring is required including baseline LFTs, Creatinine, possible TPMP genotyping and weekly CBCs for the first month and then every 2 weeks thereafter.  The patient verbalized understanding of the proper use and possible adverse effects of azathioprine.  All of the patient's questions and concerns were addressed. Otezla Pregnancy And Lactation Text: This medication is Pregnancy Category C and it isn't known if it is safe during pregnancy. It is unknown if it is excreted in breast milk. Protopic Counseling: Patient may experience a mild burning sensation during topical application. Protopic is not approved in children less than 2 years of age. There have been case reports of hematologic and skin malignancies in patients using topical calcineurin inhibitors although causality is questionable. Rituxan Pregnancy And Lactation Text: This medication is Pregnancy Category C and it isn't know if it is safe during pregnancy. It is unknown if this medication is excreted in breast milk but similar antibodies are known to be excreted. High Dose Vitamin A Counseling: Side effects reviewed, pt to contact office should one occur. Clindamycin Counseling: I counseled the patient regarding use of clindamycin as an antibiotic for prophylactic and/or therapeutic purposes. Clindamycin is active against numerous classes of bacteria, including skin bacteria. Side effects may include nausea, diarrhea, gastrointestinal upset, rash, hives, yeast infections, and in rare cases, colitis. Colchicine Counseling:  Patient counseled regarding adverse effects including but not limited to stomach upset (nausea, vomiting, stomach pain, or diarrhea).  Patient instructed to limit alcohol consumption while taking this medication.  Colchicine may reduce blood counts especially with prolonged use.  The patient understands that monitoring of kidney function and blood counts may be required, especially at baseline. The patient verbalized understanding of the proper use and possible adverse effects of colchicine.  All of the patient's questions and concerns were addressed. Azathioprine Pregnancy And Lactation Text: This medication is Pregnancy Category D and isn't considered safe during pregnancy. It is unknown if this medication is excreted in breast milk. Oxybutynin Counseling:  I discussed with the patient the risks of oxybutynin including but not limited to skin rash, drowsiness, dry mouth, difficulty urinating, and blurred vision. Siliq Counseling:  I discussed with the patient the risks of Siliq including but not limited to new or worsening depression, suicidal thoughts and behavior, immunosuppression, malignancy, posterior leukoencephalopathy syndrome, and serious infections.  The patient understands that monitoring is required including a PPD at baseline and must alert us or the primary physician if symptoms of infection or other concerning signs are noted. There is also a special program designed to monitor depression which is required with Siliq. Minoxidil Counseling: Minoxidil is a topical medication which can increase blood flow where it is applied. It is uncertain how this medication increases hair growth. Side effects are uncommon and include stinging and allergic reactions. Protopic Pregnancy And Lactation Text: This medication is Pregnancy Category C. It is unknown if this medication is excreted in breast milk when applied topically. High Dose Vitamin A Pregnancy And Lactation Text: High dose vitamin A therapy is contraindicated during pregnancy and breast feeding. Fluconazole Counseling:  Patient counseled regarding adverse effects of fluconazole including but not limited to headache, diarrhea, nausea, upset stomach, liver function test abnormalities, taste disturbance, and stomach pain.  There is a rare possibility of liver failure that can occur when taking fluconazole.  The patient understands that monitoring of LFTs and kidney function test may be required, especially at baseline. The patient verbalized understanding of the proper use and possible adverse effects of fluconazole.  All of the patient's questions and concerns were addressed. Cellcept Counseling:  I discussed with the patient the risks of mycophenolate mofetil including but not limited to infection/immunosuppression, GI upset, hypokalemia, hypercholesterolemia, bone marrow suppression, lymphoproliferative disorders, malignancy, GI ulceration/bleed/perforation, colitis, interstitial lung disease, kidney failure, progressive multifocal leukoencephalopathy, and birth defects.  The patient understands that monitoring is required including a baseline creatinine and regular CBC testing. In addition, patient must alert us immediately if symptoms of infection or other concerning signs are noted. Clindamycin Pregnancy And Lactation Text: This medication can be used in pregnancy if certain situations. Clindamycin is also present in breast milk. Oxybutynin Pregnancy And Lactation Text: This medication is Pregnancy Category B and is considered safe during pregnancy. It is unknown if it is excreted in breast milk. Siliq Pregnancy And Lactation Text: The risk during pregnancy and breastfeeding is uncertain with this medication. Fluconazole Pregnancy And Lactation Text: This medication is Pregnancy Category C and it isn't know if it is safe during pregnancy. It is also excreted in breast milk. Doxycycline Counseling:  Patient counseled regarding possible photosensitivity and increased risk for sunburn.  Patient instructed to avoid sunlight, if possible.  When exposed to sunlight, patients should wear protective clothing, sunglasses, and sunscreen.  The patient was instructed to call the office immediately if the following severe adverse effects occur:  hearing changes, easy bruising/bleeding, severe headache, or vision changes.  The patient verbalized understanding of the proper use and possible adverse effects of doxycycline.  All of the patient's questions and concerns were addressed. Solaraze Counseling:  I discussed with the patient the risks of Solaraze including but not limited to erythema, scaling, itching, weeping, crusting, and pain. Birth Control Pills Counseling: Birth Control Pill Counseling: I discussed with the patient the potential side effects of OCPs including but not limited to increased risk of stroke, heart attack, thrombophlebitis, deep venous thrombosis, hepatic adenomas, breast changes, GI upset, headaches, and depression.  The patient verbalized understanding of the proper use and possible adverse effects of OCPs. All of the patient's questions and concerns were addressed. Griseofulvin Counseling:  I discussed with the patient the risks of griseofulvin including but not limited to photosensitivity, cytopenia, liver damage, nausea/vomiting and severe allergy.  The patient understands that this medication is best absorbed when taken with a fatty meal (e.g., ice cream or french fries). Simponi Counseling:  I discussed with the patient the risks of golimumab including but not limited to myelosuppression, immunosuppression, autoimmune hepatitis, demyelinating diseases, lymphoma, and serious infections.  The patient understands that monitoring is required including a PPD at baseline and must alert us or the primary physician if symptoms of infection or other concerning signs are noted. Dapsone Counseling: I discussed with the patient the risks of dapsone including but not limited to hemolytic anemia, agranulocytosis, rashes, methemoglobinemia, kidney failure, peripheral neuropathy, headaches, GI upset, and liver toxicity.  Patients who start dapsone require monitoring including baseline LFTs and weekly CBCs for the first month, then every month thereafter.  The patient verbalized understanding of the proper use and possible adverse effects of dapsone.  All of the patient's questions and concerns were addressed. Cimzia Counseling:  I discussed with the patient the risks of Cimzia including but not limited to immunosuppression, allergic reactions and infections.  The patient understands that monitoring is required including a PPD at baseline and must alert us or the primary physician if symptoms of infection or other concerning signs are noted. Doxycycline Pregnancy And Lactation Text: This medication is Pregnancy Category D and not consider safe during pregnancy. It is also excreted in breast milk but is considered safe for shorter treatment courses. Benzoyl Peroxide Counseling: Patient counseled that medicine may cause skin irritation and bleach clothing.  In the event of skin irritation, the patient was advised to reduce the amount of the drug applied or use it less frequently.   The patient verbalized understanding of the proper use and possible adverse effects of benzoyl peroxide.  All of the patient's questions and concerns were addressed. Solaraze Pregnancy And Lactation Text: This medication is Pregnancy Category B and is considered safe. There is some data to suggest avoiding during the third trimester. It is unknown if this medication is excreted in breast milk. Cyclophosphamide Counseling:  I discussed with the patient the risks of cyclophosphamide including but not limited to hair loss, hormonal abnormalities, decreased fertility, abdominal pain, diarrhea, nausea and vomiting, bone marrow suppression and infection. The patient understands that monitoring is required while taking this medication. Birth Control Pills Pregnancy And Lactation Text: This medication should be avoided if pregnant and for the first 30 days post-partum. Dapsone Pregnancy And Lactation Text: This medication is Pregnancy Category C and is not considered safe during pregnancy or breast feeding. Cimzia Pregnancy And Lactation Text: This medication crosses the placenta but can be considered safe in certain situations. Cimzia may be excreted in breast milk. Griseofulvin Pregnancy And Lactation Text: This medication is Pregnancy Category X and is known to cause serious birth defects. It is unknown if this medication is excreted in breast milk but breast feeding should be avoided. Benzoyl Peroxide Pregnancy And Lactation Text: This medication is Pregnancy Category C. It is unknown if benzoyl peroxide is excreted in breast milk. Topical Retinoid counseling:  Patient advised to apply a pea-sized amount only at bedtime and wait 30 minutes after washing their face before applying.  If too drying, patient may add a non-comedogenic moisturizer. The patient verbalized understanding of the proper use and possible adverse effects of retinoids.  All of the patient's questions and concerns were addressed. Cyclophosphamide Pregnancy And Lactation Text: This medication is Pregnancy Category D and it isn't considered safe during pregnancy. This medication is excreted in breast milk. Erythromycin Counseling:  I discussed with the patient the risks of erythromycin including but not limited to GI upset, allergic reaction, drug rash, diarrhea, increase in liver enzymes, and yeast infections. Spironolactone Counseling: Patient advised regarding risks of diarrhea, abdominal pain, hyperkalemia, birth defects (for female patients), liver toxicity and renal toxicity. The patient may need blood work to monitor liver and kidney function and potassium levels while on therapy. The patient verbalized understanding of the proper use and possible adverse effects of spironolactone.  All of the patient's questions and concerns were addressed. Skyrizi Counseling: I discussed with the patient the risks of risankizumab-rzaa including but not limited to immunosuppression, and serious infections.  The patient understands that monitoring is required including a PPD at baseline and must alert us or the primary physician if symptoms of infection or other concerning signs are noted. Carac Counseling:  I discussed with the patient the risks of Carac including but not limited to erythema, scaling, itching, weeping, crusting, and pain. Erivedge Counseling- I discussed with the patient the risks of Erivedge including but not limited to nausea, vomiting, diarrhea, constipation, weight loss, changes in the sense of taste, decreased appetite, muscle spasms, and hair loss.  The patient verbalized understanding of the proper use and possible adverse effects of Erivedge.  All of the patient's questions and concerns were addressed. Erythromycin Pregnancy And Lactation Text: This medication is Pregnancy Category B and is considered safe during pregnancy. It is also excreted in breast milk. Itraconazole Counseling:  I discussed with the patient the risks of itraconazole including but not limited to liver damage, nausea/vomiting, neuropathy, and severe allergy.  The patient understands that this medication is best absorbed when taken with acidic beverages such as non-diet cola or ginger ale.  The patient understands that monitoring is required including baseline LFTs and repeat LFTs at intervals.  The patient understands that they are to contact us or the primary physician if concerning signs are noted. Cosentyx Counseling:  I discussed with the patient the risks of Cosentyx including but not limited to worsening of Crohn's disease, immunosuppression, allergic reactions and infections.  The patient understands that monitoring is required including a PPD at baseline and must alert us or the primary physician if symptoms of infection or other concerning signs are noted. Spironolactone Pregnancy And Lactation Text: This medication can cause feminization of the male fetus and should be avoided during pregnancy. The active metabolite is also found in breast milk. Cyclosporine Counseling:  I discussed with the patient the risks of cyclosporine including but not limited to hypertension, gingival hyperplasia,myelosuppression, immunosuppression, liver damage, kidney damage, neurotoxicity, lymphoma, and serious infections. The patient understands that monitoring is required including baseline blood pressure, CBC, CMP, lipid panel and uric acid, and then 1-2 times monthly CMP and blood pressure. Carac Pregnancy And Lactation Text: This medication is Pregnancy Category X and contraindicated in pregnancy and in women who may become pregnant. It is unknown if this medication is excreted in breast milk. Tazorac Counseling:  Patient advised that medication is irritating and drying.  Patient may need to apply sparingly and wash off after an hour before eventually leaving it on overnight.  The patient verbalized understanding of the proper use and possible adverse effects of tazorac.  All of the patient's questions and concerns were addressed. Cyclosporine Pregnancy And Lactation Text: This medication is Pregnancy Category C and it isn't know if it is safe during pregnancy. This medication is excreted in breast milk. SSKI Counseling:  I discussed with the patient the risks of SSKI including but not limited to thyroid abnormalities, metallic taste, GI upset, fever, headache, acne, arthralgias, paraesthesias, lymphadenopathy, easy bleeding, arrhythmias, and allergic reaction. Stelara Counseling:  I discussed with the patient the risks of ustekinumab including but not limited to immunosuppression, malignancy, posterior leukoencephalopathy syndrome, and serious infections.  The patient understands that monitoring is required including a PPD at baseline and must alert us or the primary physician if symptoms of infection or other concerning signs are noted. Metronidazole Counseling:  I discussed with the patient the risks of metronidazole including but not limited to seizures, nausea/vomiting, a metallic taste in the mouth, nausea/vomiting and severe allergy. Gabapentin Counseling: I discussed with the patient the risks of gabapentin including but not limited to dizziness, somnolence, fatigue and ataxia. Dupixent Counseling: I discussed with the patient the risks of dupilumab including but not limited to eye infection and irritation, cold sores, injection site reactions, worsening of asthma, allergic reactions and increased risk of parasitic infection.  Live vaccines should be avoided while taking dupilumab. Dupilumab will also interact with certain medications such as warfarin and cyclosporine. The patient understands that monitoring is required and they must alert us or the primary physician if symptoms of infection or other concerning signs are noted. 5-Fu Counseling: 5-Fluorouracil Counseling:  I discussed with the patient the risks of 5-fluorouracil including but not limited to erythema, scaling, itching, weeping, crusting, and pain. Ketoconazole Counseling:   Patient counseled regarding improving absorption with orange juice.  Adverse effects include but are not limited to breast enlargement, headache, diarrhea, nausea, upset stomach, liver function test abnormalities, taste disturbance, and stomach pain.  There is a rare possibility of liver failure that can occur when taking ketoconazole. The patient understands that monitoring of LFTs may be required, especially at baseline. The patient verbalized understanding of the proper use and possible adverse effects of ketoconazole.  All of the patient's questions and concerns were addressed. Methotrexate Counseling:  Patient counseled regarding adverse effects of methotrexate including but not limited to nausea, vomiting, abnormalities in liver function tests. Patients may develop mouth sores, rash, diarrhea, and abnormalities in blood counts. The patient understands that monitoring is required including LFT's and blood counts.  There is a rare possibility of scarring of the liver and lung problems that can occur when taking methotrexate. Persistent nausea, loss of appetite, pale stools, dark urine, cough, and shortness of breath should be reported immediately. Patient advised to discontinue methotrexate treatment at least three months before attempting to become pregnant.  I discussed the need for folate supplements while taking methotrexate.  These supplements can decrease side effects during methotrexate treatment. The patient verbalized understanding of the proper use and possible adverse effects of methotrexate.  All of the patient's questions and concerns were addressed. Tazorac Pregnancy And Lactation Text: This medication is not safe during pregnancy. It is unknown if this medication is excreted in breast milk. Sski Pregnancy And Lactation Text: This medication is Pregnancy Category D and isn't considered safe during pregnancy. It is excreted in breast milk. Metronidazole Pregnancy And Lactation Text: This medication is Pregnancy Category B and considered safe during pregnancy.  It is also excreted in breast milk. Ketoconazole Pregnancy And Lactation Text: This medication is Pregnancy Category C and it isn't know if it is safe during pregnancy. It is also excreted in breast milk and breast feeding isn't recommended. Dupixent Pregnancy And Lactation Text: This medication likely crosses the placenta but the risk for the fetus is uncertain. This medication is excreted in breast milk. Topical Clindamycin Counseling: Patient counseled that this medication may cause skin irritation or allergic reactions.  In the event of skin irritation, the patient was advised to reduce the amount of the drug applied or use it less frequently.   The patient verbalized understanding of the proper use and possible adverse effects of clindamycin.  All of the patient's questions and concerns were addressed. Methotrexate Pregnancy And Lactation Text: This medication is Pregnancy Category X and is known to cause fetal harm. This medication is excreted in breast milk. Glycopyrrolate Counseling:  I discussed with the patient the risks of glycopyrrolate including but not limited to skin rash, drowsiness, dry mouth, difficulty urinating, and blurred vision. Minocycline Counseling: Patient advised regarding possible photosensitivity and discoloration of the teeth, skin, lips, tongue and gums.  Patient instructed to avoid sunlight, if possible.  When exposed to sunlight, patients should wear protective clothing, sunglasses, and sunscreen.  The patient was instructed to call the office immediately if the following severe adverse effects occur:  hearing changes, easy bruising/bleeding, severe headache, or vision changes.  The patient verbalized understanding of the proper use and possible adverse effects of minocycline.  All of the patient's questions and concerns were addressed. Thalidomide Counseling: I discussed with the patient the risks of thalidomide including but not limited to birth defects, anxiety, weakness, chest pain, dizziness, cough and severe allergy. Taltz Counseling: I discussed with the patient the risks of ixekizumab including but not limited to immunosuppression, serious infections, worsening of inflammatory bowel disease and drug reactions.  The patient understands that monitoring is required including a PPD at baseline and must alert us or the primary physician if symptoms of infection or other concerning signs are noted. Drysol Counseling:  I discussed with the patient the risks of drysol/aluminum chloride including but not limited to skin rash, itching, irritation, burning. Terbinafine Counseling: Patient counseling regarding adverse effects of terbinafine including but not limited to headache, diarrhea, rash, upset stomach, liver function test abnormalities, itching, taste/smell disturbance, nausea, abdominal pain, and flatulence.  There is a rare possibility of liver failure that can occur when taking terbinafine.  The patient understands that a baseline LFT and kidney function test may be required. The patient verbalized understanding of the proper use and possible adverse effects of terbinafine.  All of the patient's questions and concerns were addressed. Prednisone Counseling:  I discussed with the patient the risks of prolonged use of prednisone including but not limited to weight gain, insomnia, osteoporosis, mood changes, diabetes, susceptibility to infection, glaucoma and high blood pressure.  In cases where prednisone use is prolonged, patients should be monitored with blood pressure checks, serum glucose levels and an eye exam.  Additionally, the patient may need to be placed on GI prophylaxis, PCP prophylaxis, and calcium and vitamin D supplementation and/or a bisphosphonate.  The patient verbalized understanding of the proper use and the possible adverse effects of prednisone.  All of the patient's questions and concerns were addressed. Enbrel Counseling:  I discussed with the patient the risks of etanercept including but not limited to myelosuppression, immunosuppression, autoimmune hepatitis, demyelinating diseases, lymphoma, and infections.  The patient understands that monitoring is required including a PPD at baseline and must alert us or the primary physician if symptoms of infection or other concerning signs are noted. Doxepin Counseling:  Patient advised that the medication is sedating and not to drive a car after taking this medication. Patient informed of potential adverse effects including but not limited to dry mouth, urinary retention, and blurry vision.  The patient verbalized understanding of the proper use and possible adverse effects of doxepin.  All of the patient's questions and concerns were addressed. Topical Sulfur Applications Counseling: Topical Sulfur Counseling: Patient counseled that this medication may cause skin irritation or allergic reactions.  In the event of skin irritation, the patient was advised to reduce the amount of the drug applied or use it less frequently.   The patient verbalized understanding of the proper use and possible adverse effects of topical sulfur application.  All of the patient's questions and concerns were addressed. Drysol Pregnancy And Lactation Text: This medication is considered safe during pregnancy and breast feeding. Glycopyrrolate Pregnancy And Lactation Text: This medication is Pregnancy Category B and is considered safe during pregnancy. It is unknown if it is excreted breast milk. Terbinafine Pregnancy And Lactation Text: This medication is Pregnancy Category B and is considered safe during pregnancy. It is also excreted in breast milk and breast feeding isn't recommended. Quinolones Counseling:  I discussed with the patient the risks of fluoroquinolones including but not limited to GI upset, allergic reaction, drug rash, diarrhea, dizziness, photosensitivity, yeast infections, liver function test abnormalities, tendonitis/tendon rupture. Doxepin Pregnancy And Lactation Text: This medication is Pregnancy Category C and it isn't known if it is safe during pregnancy. It is also excreted in breast milk and breast feeding isn't recommended. Valtrex Counseling: I discussed with the patient the risks of valacyclovir including but not limited to kidney damage, nausea, vomiting and severe allergy.  The patient understands that if the infection seems to be worsening or is not improving, they are to call. Tremfya Counseling: I discussed with the patient the risks of guselkumab including but not limited to immunosuppression, serious infections, worsening of inflammatory bowel disease and drug reactions.  The patient understands that monitoring is required including a PPD at baseline and must alert us or the primary physician if symptoms of infection or other concerning signs are noted. Hydroxychloroquine Counseling:  I discussed with the patient that a baseline ophthalmologic exam is needed at the start of therapy and every year thereafter while on therapy. A CBC may also be warranted for monitoring.  The side effects of this medication were discussed with the patient, including but not limited to agranulocytosis, aplastic anemia, seizures, rashes, retinopathy, and liver toxicity. Patient instructed to call the office should any adverse effect occur.  The patient verbalized understanding of the proper use and possible adverse effects of Plaquenil.  All the patient's questions and concerns were addressed. Humira Counseling:  I discussed with the patient the risks of adalimumab including but not limited to myelosuppression, immunosuppression, autoimmune hepatitis, demyelinating diseases, lymphoma, and serious infections.  The patient understands that monitoring is required including a PPD at baseline and must alert us or the primary physician if symptoms of infection or other concerning signs are noted. Elidel Counseling: Patient may experience a mild burning sensation during topical application. Elidel is not approved in children less than 2 years of age. There have been case reports of hematologic and skin malignancies in patients using topical calcineurin inhibitors although causality is questionable. Topical Sulfur Applications Pregnancy And Lactation Text: This medication is Pregnancy Category C and has an unknown safety profile during pregnancy. It is unknown if this topical medication is excreted in breast milk. Hydroxyzine Counseling: Patient advised that the medication is sedating and not to drive a car after taking this medication.  Patient informed of potential adverse effects including but not limited to dry mouth, urinary retention, and blurry vision.  The patient verbalized understanding of the proper use and possible adverse effects of hydroxyzine.  All of the patient's questions and concerns were addressed. Valtrex Pregnancy And Lactation Text: this medication is Pregnancy Category B and is considered safe during pregnancy. This medication is not directly found in breast milk but it's metabolite acyclovir is present. Hydroxychloroquine Pregnancy And Lactation Text: This medication has been shown to cause fetal harm but it isn't assigned a Pregnancy Risk Category. There are small amounts excreted in breast milk. Cimetidine Counseling:  I discussed with the patient the risks of Cimetidine including but not limited to gynecomastia, headache, diarrhea, nausea, drowsiness, arrhythmias, pancreatitis, skin rashes, psychosis, bone marrow suppression and kidney toxicity. Acitretin Counseling:  I discussed with the patient the risks of acitretin including but not limited to hair loss, dry lips/skin/eyes, liver damage, hyperlipidemia, depression/suicidal ideation, photosensitivity.  Serious rare side effects can include but are not limited to pancreatitis, pseudotumor cerebri, bony changes, clot formation/stroke/heart attack.  Patient understands that alcohol is contraindicated since it can result in liver toxicity and significantly prolong the elimination of the drug by many years. Zyclara Counseling:  I discussed with the patient the risks of imiquimod including but not limited to erythema, scaling, itching, weeping, crusting, and pain.  Patient understands that the inflammatory response to imiquimod is variable from person to person and was educated regarded proper titration schedule.  If flu-like symptoms develop, patient knows to discontinue the medication and contact us. Rifampin Counseling: I discussed with the patient the risks of rifampin including but not limited to liver damage, kidney damage, red-orange body fluids, nausea/vomiting and severe allergy. Azithromycin Counseling:  I discussed with the patient the risks of azithromycin including but not limited to GI upset, allergic reaction, drug rash, diarrhea, and yeast infections. Hydroxyzine Pregnancy And Lactation Text: This medication is not safe during pregnancy and should not be taken. It is also excreted in breast milk and breast feeding isn't recommended. Eucrisa Counseling: Patient may experience a mild burning sensation during topical application. Eucrisa is not approved in children less than 2 years of age. Nsaids Counseling: NSAID Counseling: I discussed with the patient that NSAIDs should be taken with food. Prolonged use of NSAIDs can result in the development of stomach ulcers.  Patient advised to stop taking NSAIDs if abdominal pain occurs.  The patient verbalized understanding of the proper use and possible adverse effects of NSAIDs.  All of the patient's questions and concerns were addressed. Xeljanz Counseling: I discussed with the patient the risks of Xeljanz therapy including increased risk of infection, liver issues, headache, diarrhea, or cold symptoms. Live vaccines should be avoided. They were instructed to call if they have any problems. Rifampin Pregnancy And Lactation Text: This medication is Pregnancy Category C and it isn't know if it is safe during pregnancy. It is also excreted in breast milk and should not be used if you are breast feeding. Ilumya Counseling: I discussed with the patient the risks of tildrakizumab including but not limited to immunosuppression, malignancy, posterior leukoencephalopathy syndrome, and serious infections.  The patient understands that monitoring is required including a PPD at baseline and must alert us or the primary physician if symptoms of infection or other concerning signs are noted. Detail Level: Detailed Azithromycin Pregnancy And Lactation Text: This medication is considered safe during pregnancy and is also secreted in breast milk. Acitretin Pregnancy And Lactation Text: This medication is Pregnancy Category X and should not be given to women who are pregnant or may become pregnant in the future. This medication is excreted in breast milk. Xelcarolz Pregnancy And Lactation Text: This medication is Pregnancy Category D and is not considered safe during pregnancy.  The risk during breast feeding is also uncertain. Nsaids Pregnancy And Lactation Text: These medications are considered safe up to 30 weeks gestation. It is excreted in breast milk. Albendazole Counseling:  I discussed with the patient the risks of albendazole including but not limited to cytopenia, kidney damage, nausea/vomiting and severe allergy.  The patient understands that this medication is being used in an off-label manner. Bexarotene Counseling:  I discussed with the patient the risks of bexarotene including but not limited to hair loss, dry lips/skin/eyes, liver abnormalities, hyperlipidemia, pancreatitis, depression/suicidal ideation, photosensitivity, drug rash/allergic reactions, hypothyroidism, anemia, leukopenia, infection, cataracts, and teratogenicity.  Patient understands that they will need regular blood tests to check lipid profile, liver function tests, white blood cell count, thyroid function tests and pregnancy test if applicable. Sarecycline Counseling: Patient advised regarding possible photosensitivity and discoloration of the teeth, skin, lips, tongue and gums.  Patient instructed to avoid sunlight, if possible.  When exposed to sunlight, patients should wear protective clothing, sunglasses, and sunscreen.  The patient was instructed to call the office immediately if the following severe adverse effects occur:  hearing changes, easy bruising/bleeding, severe headache, or vision changes.  The patient verbalized understanding of the proper use and possible adverse effects of sarecycline.  All of the patient's questions and concerns were addressed. Xolair Counseling:  Patient informed of potential adverse effects including but not limited to fever, muscle aches, rash and allergic reactions.  The patient verbalized understanding of the proper use and possible adverse effects of Xolair.  All of the patient's questions and concerns were addressed. Bactrim Counseling:  I discussed with the patient the risks of sulfa antibiotics including but not limited to GI upset, allergic reaction, drug rash, diarrhea, dizziness, photosensitivity, and yeast infections.  Rarely, more serious reactions can occur including but not limited to aplastic anemia, agranulocytosis, methemoglobinemia, blood dyscrasias, liver or kidney failure, lung infiltrates or desquamative/blistering drug rashes. Odomzo Counseling- I discussed with the patient the risks of Odomzo including but not limited to nausea, vomiting, diarrhea, constipation, weight loss, changes in the sense of taste, decreased appetite, muscle spasms, and hair loss.  The patient verbalized understanding of the proper use and possible adverse effects of Odomzo.  All of the patient's questions and concerns were addressed. Infliximab Counseling:  I discussed with the patient the risks of infliximab including but not limited to myelosuppression, immunosuppression, autoimmune hepatitis, demyelinating diseases, lymphoma, and serious infections.  The patient understands that monitoring is required including a PPD at baseline and must alert us or the primary physician if symptoms of infection or other concerning signs are noted. Hydroquinone Counseling:  Patient advised that medication may result in skin irritation, lightening (hypopigmentation), dryness, and burning.  In the event of skin irritation, the patient was advised to reduce the amount of the drug applied or use it less frequently.  Rarely, spots that are treated with hydroquinone can become darker (pseudoochronosis).  Should this occur, patient instructed to stop medication and call the office. The patient verbalized understanding of the proper use and possible adverse effects of hydroquinone.  All of the patient's questions and concerns were addressed. Bexarotene Pregnancy And Lactation Text: This medication is Pregnancy Category X and should not be given to women who are pregnant or may become pregnant. This medication should not be used if you are breast feeding. Arava Counseling:  Patient counseled regarding adverse effects of Arava including but not limited to nausea, vomiting, abnormalities in liver function tests. Patients may develop mouth sores, rash, diarrhea, and abnormalities in blood counts. The patient understands that monitoring is required including LFTs and blood counts.  There is a rare possibility of scarring of the liver and lung problems that can occur when taking methotrexate. Persistent nausea, loss of appetite, pale stools, dark urine, cough, and shortness of breath should be reported immediately. Patient advised to discontinue Arava treatment and consult with a physician prior to attempting conception. The patient will have to undergo a treatment to eliminate Arava from the body prior to conception. Xolair Pregnancy And Lactation Text: This medication is Pregnancy Category B and is considered safe during pregnancy. This medication is excreted in breast milk.

## 2022-02-07 NOTE — PLAN OF CARE
Pt free from falls and injury this shift. All VSS at this time. Pain to sacral wound not well controlled with PRN medication. Sats 90-94% on vent settings: AC/VC+ 50 and 5, patient tachypneic throughout shift. MAPs maintained >65 without issue all shift. Afib noted throughout shift, 's-120. Abdomen rounded/nondistended, 1 BM this shift. Tube feeds continued at goal 35cc/hr with minimal residual. Midsternal incision SUE with steri strips intact. Mireles discontinued. 1 urine occurrence noted post catheter removal. No new skin breakdown noted this shift, patient turned q2h to prevent breakdown. All wound care completed per orders. Sacral wound with continued bleeding. No other significant events this shift. Plan of care reviewed with patient and family, all questions answered. Will continue to monitor closely.

## 2022-02-07 NOTE — PROGRESS NOTES
Arrived in patient's room for bedside HD TX.  Received report from primary nurse.  Patient awake, alert and responsive.  VSS.  HD TX started via left subclavian HD catheter.

## 2022-02-07 NOTE — CARE UPDATE
BG goal 140 -180   Diet NPO Except for: Sips with Medication  17 Days Post-Op     BG trends remains stable. Patient remains on vent in SICU and on TF and vent. Cr elevated which may effect insulin requirements. Patient remains on enteral nutrition. Endo will continue to follow, and manage glycemic control while inpatient.       Continue Novolog to 4 units q 4 hrs while on tube feeds. Patient restarted on vasopressors which is most likely causing a global rise in blood sugars (HOLD if tube feeds are stopped or BG < 100).  -  Continue Moderate Dose SQ Insulin Correction Scale PRN hyperglycemia.   -  BG Monitoring q 4 hrs while NPO/tube feeds     ** Please call Endocrine for any BG related issues **  ** Please notify Endocrine for any change and/or advance in diet**     Discharge Planning:    TBD. Please notify endocrinology prior to discharge.

## 2022-02-07 NOTE — PROGRESS NOTES
HD TX complete.  Ran for 2 1/2 hrs.  No net fluid removal. Nephrology aware.  VSS after TX.  Report given to primary nurse.

## 2022-02-07 NOTE — PLAN OF CARE
[facility]  Discharge Reassessment    Primary Care Provider: Otis Zimmer MD    Expected Discharge Date: 2/8/2022    Reassessment (most recent)     Discharge Reassessment - 02/07/22 1238        Discharge Reassessment    Discharge Plan discussed with: Adult children     Communicated MENDEL with patient/caregiver Date not available/Unable to determine     Discharge Plan A Long-term acute care facility (LTAC)     Discharge Plan B Long-term acute care facility (LTAC)     DME Needed Upon Discharge  other (see comments)   TBD    Discharge Barriers Identified None     Why the patient remains in the hospital Requires continued medical care        Post-Acute Status    Post-Acute Authorization Placement     Post-Acute Placement Status Pending payor review/awaiting authorization (if required)               Per MD's Note,  Patient seen and examined this morning. No acute events overnight. INR supra-therapeutic this morning despite holding coumadin. No new complaints.         The patient is not medically stable to discharge at this time and still requires medical care. [facility] has accepted this patient pending medical stability and authorization. The  will continue to follow-up with the patient to assist with discharge planning.       Shen Simmons LMSW  Case Management [facility]  Ext: [phone]

## 2022-02-07 NOTE — PROGRESS NOTES
[facility]  Critical Care - Surgery  Progress Note    Patient Name: Orion Ty  MRN: [id number]  Admission Date: 1/5/2022  Hospital Length of Stay: 33 days  Code Status: Full Code  Attending Provider: Dennis Haider MD  Primary Care Provider: Otis Zimmer MD   Principal Problem: S/P aortic valve replacement    Subjective:     Hospital/ICU Course:  No notes on file    Interval History/Significant Events: Patient seen and examined this morning. No acute events overnight. INR supra-therapeutic this morning despite holding coumadin. No new complaints.     Follow-up For: Procedure(s) (LRB):  CREATION, TRACHEOSTOMY (N/A)  EGD, WITH PEG TUBE INSERTION (N/A)  INSERTION-CATHETER-ROSELINE (N/A)    Post-Operative Day: 17 Days Post-Op    Objective:     Vital Signs (Most Recent):  Temp: 97.7 °F (36.5 °C) (02/07/22 0545)  Pulse: 94 (02/07/22 0645)  Resp: (!) 26 (02/07/22 0645)  BP: (!) 111/57 (02/07/22 0645)  SpO2: (!) 94 % (02/07/22 0645) Vital Signs (24h Range):  Temp:  [97.7 °F (36.5 °C)-98.6 °F (37 °C)] 97.7 °F (36.5 °C)  Pulse:  [] 94  Resp:  [21-38] 26  SpO2:  [90 %-100 %] 94 %  BP: ()/(51-75) 111/57     Weight: 49.1 kg (108 lb 3.9 oz)  Body mass index is 20.45 kg/m².      Intake/Output Summary (Last 24 hours) at 2/7/2022 0738  Last data filed at 2/7/2022 0600  Gross per 24 hour   Intake 1213.59 ml   Output --   Net 1213.59 ml       Physical Exam  Constitutional:       General: She is not in acute distress.  HENT:      Head: Normocephalic and atraumatic.   Neck:      Comments: Trach  Cardiovascular:      Rate and Rhythm: Normal rate and regular rhythm.      Pulses: Normal pulses.      Comments: Midline sternotomy incision c/d/I  Pacemaker in place.   Pulmonary:      Effort: Pulmonary effort is normal. No respiratory distress.   Abdominal:      General: Abdomen is flat. There is no distension.      Palpations: Abdomen is soft.      Tenderness: There is no abdominal tenderness.       Comments: Peg site c/d/i  Soft abdomen   Musculoskeletal:      Right lower leg: No edema.      Left lower leg: No edema.   Skin:     General: Skin is warm.      Capillary Refill: Capillary refill takes less than 2 seconds.   Neurological:      General: No focal deficit present.      Mental Status: She is alert.         Vents:  Vent Mode: A/C (02/07/22 0415)  Ventilator Initiated: Yes (01/18/22 1636)  Set Rate: 20 BPM (02/07/22 0415)  Vt Set: 340 mL (02/07/22 0415)  Pressure Support: 10 cmH20 (02/07/22 0415)  PEEP/CPAP: 5 cmH20 (02/07/22 0415)  Oxygen Concentration (%): 50 (02/07/22 0645)  Peak Airway Pressure: 63 cmH2O (02/07/22 0415)  Plateau Pressure: 24 cmH20 (02/07/22 0415)  Total Ve: 9.6 mL (02/07/22 0415)  Negative Inspiratory Force (cm H2O): 0 (02/06/22 2100)  F/VT Ratio<105 (RSBI): (!) 68.97 (02/07/22 0415)    Lines/Drains/Airways     Central Venous Catheter Line            Permacath 01/21/22 1013 left subclavian 16 days          Drain                 Gastrostomy/Enterostomy 01/21/22 1112 Percutaneous endoscopic gastrostomy (PEG) 16 days          Airway                 Surgical Airway 01/21/22 1043 Shiley Cuffed 16 days          Peripheral Intravenous Line                 Midline Catheter Insertion/Assessment  - Single Lumen 01/10/22 1300 Left cephalic vein (lateral side of arm) 18g x 8cm 27 days         Peripheral IV - Single Lumen 02/03/22 1430 20 G;1 1/4 in Posterior;Right Hand 3 days         Peripheral IV - Single Lumen 02/03/22 1800 18 G;1 1/4 in Anterior;Left Wrist 3 days                Significant Labs:    CBC/Anemia Profile:  Recent Labs   Lab 02/06/22  0251 02/07/22  0310 02/07/22  0400   WBC 11.11 13.75*  --    HGB 7.1* 6.5*  --    HCT 23.4* 21.6* 38   * 130*  --    * 98  --    RDW 18.1* 18.1*  --         Chemistries:  Recent Labs   Lab 02/05/22 2127 02/06/22  0251 02/07/22  0310   * 133* 132*   K 3.9 4.1 4.2    103 99   CO2 21* 22* 20*   BUN 34* 43* 70*   CREATININE  1.4 1.6* 2.3*   CALCIUM 8.6* 8.8 9.1   ALBUMIN 2.0* 2.1* 2.1*   PROT  --  6.1 6.1   BILITOT  --  0.7 0.6   ALKPHOS  --  153* 146*   ALT  --  28 29   AST  --  45* 45*   MG 1.9 1.9 2.0   PHOS 2.1* 2.4* 3.5       All pertinent labs within the past 24 hours have been reviewed.    Significant Imaging:  I have reviewed all pertinent imaging results/findings within the past 24 hours.    Assessment/Plan:     * S/P aortic valve replacement  Orion Ty is a 76 y.o. female who presents to the SICU s/p redo aortic valve replacement with mechanical valve on 1/5/22.      Neuro/Psych:   -- Sedation: none  -- Pain: Liquid Tiffani, PRN dilaudid for breakthrough     Cards:    -- Pressors: off over last 48hrs  -- intermittently requires vasopressors  -- Goal MAP: 60-80  -- A-fib rate controlled.   -- PO amio TID  -- statin  -- continue to hold coumadin today  -- INR supratherapeutic  -- metoprolol 25 BID      Pulm:   -- Goal O2 sat > 90%  -- reintubated 1/12 then 1/18. S/p trach 1/21  -- Spontaneous when able.  -- ABG PRN  -- 2 mediastinal removed 1/9 L Pleural CT removed 1/8      Renal:  -- iHD today  -- nephro following  -- Permcath 1/21  -- Removed R IJ trialysis 1/23      FEN / GI:   -- Replace lytes as needed  -- s/p PEG 1/21  -- Nutrition: tube feeds novasource renal, at goal      ID:   -- WBC bumped today  -- Pseudomonas in resp cx; awaiting speciation  -- Antibiotics: fluc, zosyn  -- Initiated broad spec abx on 2/3; narrowed 2/6  -- Blood Cx sent 2/3, NGTD        Heme/Onc:   -- Hgb stable  -- Daily CBC  -- Transfused products: 1U RBC on 1/5/22. 1u RBC and 1u FFP on 1/9/21. 2U RBC on 1/12. 4 FFP on 1/13. 1 FFP on 1/17, 2u pRBC 1/27  -- Anticoagulation: coumadin, supratherapeutic today; hold coumadin      Endo:   -- Gluc goal 140-180  -- Insulin per endocrinology      PPx:   Feeding: tube feeds  Analgesia/Sedation: Tiffani, dilaudid for breakthrough  Thromboembolic prevention: Coumadin, ASA  HOB >30: yes  Stress Ulcer  ppx: protonix BID  Glucose control: Critical care goal 140-180 g/dl, ISS    Lines/Drains/Airway: Art line, Trach, Permcath, PEG      Dispo/Code Status/Palliative:   -- SICU / Full Code               Critical care was time spent personally by me on the following activities: development of treatment plan with patient or surrogate and bedside caregivers, discussions with consultants, evaluation of patient's response to treatment, examination of patient, ordering and performing treatments and interventions, ordering and review of laboratory studies, ordering and review of radiographic studies, pulse oximetry, re-evaluation of patient's condition.  This critical care time did not overlap with that of any other provider or involve time for any procedures.     Maikel Schwab MD  Critical Care - Surgery  [facility]

## 2022-02-07 NOTE — ASSESSMENT & PLAN NOTE
Orion Ty is a 76 y.o. female who presents to the SICU s/p redo aortic valve replacement with mechanical valve on 1/5/22.      Neuro/Psych:   -- Sedation: none  -- Pain: Liquid Tiffani, PRN dilaudid for breakthrough     Cards:    -- Pressors: off over last 48hrs  -- intermittently requires vasopressors  -- Goal MAP: 60-80  -- A-fib rate controlled.   -- PO amio TID  -- statin  -- continue to hold coumadin today  -- INR supratherapeutic  -- metoprolol 25 BID      Pulm:   -- Goal O2 sat > 90%  -- reintubated 1/12 then 1/18. S/p trach 1/21  -- Spontaneous when able.  -- ABG PRN  -- 2 mediastinal removed 1/9 L Pleural CT removed 1/8      Renal:  -- iHD today  -- nephro following  -- Permcath 1/21  -- Removed R IJ trialysis 1/23      FEN / GI:   -- Replace lytes as needed  -- s/p PEG 1/21  -- Nutrition: tube feeds novasource renal, at goal      ID:   -- WBC bumped today  -- Pseudomonas in resp cx; awaiting speciation  -- Antibiotics: fluc, zosyn  -- Initiated broad spec abx on 2/3; narrowed 2/6  -- Blood Cx sent 2/3, NGTD        Heme/Onc:   -- Hgb stable  -- Daily CBC  -- Transfused products: 1U RBC on 1/5/22. 1u RBC and 1u FFP on 1/9/21. 2U RBC on 1/12. 4 FFP on 1/13. 1 FFP on 1/17, 2u pRBC 1/27  -- Anticoagulation: coumadin, supratherapeutic today; hold coumadin      Endo:   -- Gluc goal 140-180  -- Insulin per endocrinology      PPx:   Feeding: tube feeds  Analgesia/Sedation: Tiffani, dilaudid for breakthrough  Thromboembolic prevention: Coumadin, ASA  HOB >30: yes  Stress Ulcer ppx: protonix BID  Glucose control: Critical care goal 140-180 g/dl, ISS    Lines/Drains/Airway: Art line, Trach, Permcath, PEG      Dispo/Code Status/Palliative:   -- SICU / Full Code

## 2022-02-07 NOTE — PT/OT/SLP PROGRESS
Occupational Therapy Missed Treatment      Patient Name:  Orion Ty   MRN:  [id number]    Patient not seen on this date.  Patient with onset of dialysis upon PM attempt.  OT to follow up as POC allows.  Please continue progressive mobility and assistance with functional ADLs as appropriate. Will follow-up 2/8.    Discharge Disposition Recommendation: rehab    Bonnie Vu, OT  2/7/2022

## 2022-02-08 NOTE — PT/OT/SLP PROGRESS
Physical Therapy Missed Treatment Note      Patient Name:  Orion Ty   MRN:  [id number]  Admitting Diagnosis:  History of mechanical aortic valve replacement   Recent Surgery: Procedure(s) (LRB):  CREATION, TRACHEOSTOMY (N/A)  EGD, WITH PEG TUBE INSERTION (N/A)  INSERTION-CATHETER-ROSELINE (N/A) 18 Days Post-Op  Admit Date: 1/5/2022  Length of Stay: 34 days    Patient not seen today due to HOLD per RN. Pt with supra-therapeutic INR and actively bleeding from trach site and mouth. Orion Ty's plan of care and PT goals reviewed on this date and remain appropriate. Will follow-up for progressive mobility pending continued medical stability and patient participation.    Emma Florentino, PT, DPT  2/8/2022   Pager: [phone]

## 2022-02-08 NOTE — PROGRESS NOTES
SCUF restart per order, See notes and flow sheet    02/08/22 9676   Treatment   Treatment Type SCUF   Treatment Status Restart   Dialysis Machine Number [id number]   Dialyzer Time (hours) 0   BVP (Liters) 0 L   Solutions Labeled and Current  Yes   Access Temporary Cath;Right   Catheter Dressing Intact  Yes   Alarms Engaged Yes   $ CRRT Charges   $ CRRT Charges Restart   Prescription   Time (Hours) 10   Dialysate K + (mEq/L) Other (Comment)   Dialysate CA + (mEq/L) Other (Comment)   Dialysate HCO3 - (Bicarb) (mEq/L) Other (Comment)   Dialysate Na + (mEq/L) Other (comment)   Cartridge Type Other  (300)   Dialysate Flow Rate (mL/min) 0(seq)   UF Goal Rate 400 mL/hr   CRRT Hourly Documentation   Blood Flow (mL/min) 150   UF Rate 200 cc/hr   Arterial Pressure (mmHg) -30 mmHg   Venous Pressure (mmHg) 50 mmHg   Effluent Pressure (EP) (mmHg) 0 mmHg   Total UF (Hourly Cleared) (mL) 0   CRRT Sodium Chloride   CRRT Sodium Chloride Volume 250 mL

## 2022-02-08 NOTE — PROGRESS NOTES
[facility]  Critical Care - Surgery  Progress Note    Patient Name: Orion Ty  MRN: [id number]  Admission Date: 1/5/2022  Hospital Length of Stay: 34 days  Code Status: Full Code  Attending Provider: Dennis Haider MD  Primary Care Provider: Otis Zimmer MD   Principal Problem: S/P aortic valve replacement    Subjective:     Hospital/ICU Course:  No notes on file    Interval History/Significant Events: Patient seen and examined this morning. Bleeding issues overnight requiring packing to mucosal surfaces. INR 5.2 this morning. WBC elevated, cultures sensitive to zosyn.     Follow-up For: Procedure(s) (LRB):  CREATION, TRACHEOSTOMY (N/A)  EGD, WITH PEG TUBE INSERTION (N/A)  INSERTION-CATHETER-ROSELINE (N/A)    Post-Operative Day: 18 Days Post-Op    Objective:     Vital Signs (Most Recent):  Temp: 98.2 °F (36.8 °C) (02/08/22 1200)  Pulse: 84 (02/08/22 1352)  Resp: (!) 24 (02/08/22 1352)  BP: (!) 114/56 (02/08/22 1200)  SpO2: (!) 93 % (02/08/22 1352) Vital Signs (24h Range):  Temp:  [98 °F (36.7 °C)-98.2 °F (36.8 °C)] 98.2 °F (36.8 °C)  Pulse:  [] 84  Resp:  [10-57] 24  SpO2:  [89 %-100 %] 93 %  BP: ()/(49-74) 114/56     Weight: 49.1 kg (108 lb 3.9 oz)  Body mass index is 20.45 kg/m².      Intake/Output Summary (Last 24 hours) at 2/8/2022 1359  Last data filed at 2/8/2022 0701  Gross per 24 hour   Intake 1276.89 ml   Output 500 ml   Net 776.89 ml       Physical Exam  Constitutional:       General: She is not in acute distress.  HENT:      Head: Normocephalic and atraumatic.      Mouth/Throat:      Comments: Oozing from mucosal surfaces  Neck:      Comments: Trach  Cardiovascular:      Rate and Rhythm: Normal rate and regular rhythm.      Pulses: Normal pulses.      Comments: Midline sternotomy incision c/d/I  Pacemaker in place.   Pulmonary:      Effort: Pulmonary effort is normal. No respiratory distress.   Abdominal:      General: Abdomen is flat. There is no  distension.      Palpations: Abdomen is soft.      Tenderness: There is no abdominal tenderness.      Comments: Peg site c/d/i  Soft abdomen   Musculoskeletal:      Right lower leg: No edema.      Left lower leg: No edema.   Skin:     General: Skin is warm.      Capillary Refill: Capillary refill takes less than 2 seconds.   Neurological:      General: No focal deficit present.      Mental Status: She is alert.         Vents:  Vent Mode: A/C (02/08/22 0850)  Ventilator Initiated: Yes (01/18/22 1636)  Set Rate: 20 BPM (02/08/22 0850)  Vt Set: 340 mL (02/08/22 0850)  Pressure Support: 10 cmH20 (02/07/22 0415)  PEEP/CPAP: 5 cmH20 (02/08/22 0850)  Oxygen Concentration (%): 60 (02/08/22 1200)  Peak Airway Pressure: 42 cmH2O (02/08/22 0850)  Plateau Pressure: 29 cmH20 (02/08/22 0850)  Total Ve: 8.75 mL (02/08/22 0850)  Negative Inspiratory Force (cm H2O): 0 (02/08/22 0850)  F/VT Ratio<105 (RSBI): (!) 68.49 (02/08/22 0850)    Lines/Drains/Airways     Central Venous Catheter Line            Permacath 01/21/22 1013 left subclavian 18 days          Drain                 Gastrostomy/Enterostomy 01/21/22 1112 Percutaneous endoscopic gastrostomy (PEG) 18 days          Airway                 Surgical Airway 01/21/22 1043 Shiley Cuffed 18 days          Peripheral Intravenous Line                 Midline Catheter Insertion/Assessment  - Single Lumen 01/10/22 1300 Left cephalic vein (lateral side of arm) 18g x 8cm 29 days         Peripheral IV - Single Lumen 02/03/22 1430 20 G;1 1/4 in Posterior;Right Hand 4 days         Peripheral IV - Single Lumen 02/03/22 1800 18 G;1 1/4 in Anterior;Left Wrist 4 days                Significant Labs:    CBC/Anemia Profile:  Recent Labs   Lab 02/07/22  0310 02/07/22  0400 02/08/22  0331 02/08/22  0511   WBC 13.75*  --  19.51*  --    HGB 6.5*  --  7.3*  --    HCT 21.6* 38 23.3* 28*   *  --  117*  --    MCV 98  --  98  --    RDW 18.1*  --  18.0*  --         Chemistries:  Recent Labs   Lab  02/07/22  0310 02/07/22  1349 02/08/22  0331   * 129* 134*   K 4.2 4.2 4.0   CL 99 97 101   CO2 20* 20* 22*   BUN 70* 75* 47*   CREATININE 2.3* 2.8* 1.8*   CALCIUM 9.1 8.8 8.6*   ALBUMIN 2.1* 2.0* 2.0*   PROT 6.1  --  6.1   BILITOT 0.6  --  0.8   ALKPHOS 146*  --  129   ALT 29  --  25   AST 45*  --  39   MG 2.0 2.0 1.8   PHOS 3.5 3.6 2.5*       ABGs:   Recent Labs   Lab 02/08/22  0511   PH 7.299*   PCO2 47.2*   HCO3 23.2*   POCSATURATED 96   BE -3       Significant Imaging:  I have reviewed all pertinent imaging results/findings within the past 24 hours.    Assessment/Plan:     * S/P aortic valve replacement  Orion Ty is a 76 y.o. female who presents to the SICU s/p redo aortic valve replacement with mechanical valve on 1/5/22.      Neuro/Psych:   -- Sedation: none  -- Pain: Liquid Tiffani     Cards:    -- Pressors: off over last 48hrs  -- intermittently requires vasopressors  -- Goal MAP: 60-80  -- A-fib rate controlled.   -- PO amio TID  -- statin  -- continue to hold coumadin today  -- INR supratherapeutic  -- metoprolol 25 BID      Pulm:   -- Goal O2 sat > 90%  -- reintubated 1/12 then 1/18. S/p trach 1/21  -- Spontaneous when able.  -- ABG PRN  -- 2 mediastinal removed 1/9 L Pleural CT removed 1/8      Renal:  -- iHD today  -- nephro following  -- Permcath 1/21  -- Removed R IJ trialysis 1/23      FEN / GI:   -- Replace lytes as needed  -- s/p PEG 1/21  -- Nutrition: tube feeds novasource renal, at goal      ID:   -- WBC remains elevated  -- Pseudomonas in resp cx; sensitive to zosyn  -- Antibiotics:, zosyn  -- Initiated broad spec abx on 2/3; narrowed 2/6  -- Blood Cx sent 2/3, NGTD        Heme/Onc:   -- Hgb stable  -- Daily CBC  -- Transfused products: 1U RBC on 1/5/22. 1u RBC and 1u FFP on 1/9/21. 2U RBC on 1/12. 4 FFP on 1/13. 1 FFP on 1/17, 2u pRBC 1/27  -- Anticoagulation: coumadin, supratherapeutic today; hold coumadin      Endo:   -- Gluc goal 140-180  -- Insulin per endocrinology       PPx:   Feeding: tube feeds  Analgesia/Sedation: Tiffani  Thromboembolic prevention: Coumadin, ASA  HOB >30: yes  Stress Ulcer ppx: protonix BID  Glucose control: Critical care goal 140-180 g/dl, ISS    Lines/Drains/Airway: Art line, Trach, Permcath, PEG      Dispo/Code Status/Palliative:   -- SICU / Full Code            Critical care was time spent personally by me on the following activities: development of treatment plan with patient or surrogate and bedside caregivers, discussions with consultants, evaluation of patient's response to treatment, examination of patient, ordering and performing treatments and interventions, ordering and review of laboratory studies, ordering and review of radiographic studies, pulse oximetry, re-evaluation of patient's condition.  This critical care time did not overlap with that of any other provider or involve time for any procedures.     Maikel Schwab MD  Critical Care - Surgery  [facility]

## 2022-02-08 NOTE — CARE UPDATE
BG goal 140 -180   Diet NPO Except for: Sips with Medication  18 Days Post-Op     BG trends remain stable. Patient remains on vent in SICU and on TF and vent. Cr improved since yesterday. Patient remains on enteral nutrition. Endo will continue to follow, and manage glycemic control while inpatient.     -  Continue Novolog to 4 units q 4 hrs while on tube. feeds. Likely causing a global rise in blood sugars (HOLD if tube feeds are stopped or BG < 100).    -  Continue Moderate Dose SQ Insulin Correction Scale PRN hyperglycemia.     -  BG Monitoring q 4 hrs while NPO/tube feeds.     ** Please call Endocrine for any BG related issues **  ** Please notify Endocrine for any change and/or advance in diet**     Discharge Planning:    TBD. Please notify endocrinology prior to discharge.

## 2022-02-08 NOTE — PROGRESS NOTES
Increased bloody secretions from mouth and trach site, pt is also tachyepenic rate 30s. Peek pressures 38-48, vT 100-200s. Pt suctioned, thick bloody respiratory secretions noted, Peek pressures up to 50s sustained after suction. RT called to bedside to lavage. Dr. Garcia notified of event, at bedside to assess. Orders received for surgicel dressing around trach site and afrin soaked gauze around gums. ENT paged per Dr. Garcia.

## 2022-02-08 NOTE — PHYSICIAN QUERY
PT Name: Orion Ty  MR #: [id number]    DOCUMENTATION CLARIFICATION     CDS/: Ayah Mason RN, CCDS              Contact information: [email or web address]    This form is a permanent document in the medical record.     Query Date: February 8, 2022    By submitting this query, we are merely seeking further clarification of documentation.. Please utilize your independent clinical judgment when addressing the question(s) below.    The medical record contains the following:   Indicators  Supporting Clinical Findings Location in Medical Record   X % of Estimated Energy Intake over a time frame from p.o., TF, or TPN Energy Intake (Malnutrition): less than 75% for greater than or equal to 1 month    Status improving with TF's 1/26, 2/3  RD notes      2/3 RD note   X Weight Status over a time frame Severe Weight Loss (Malnutrition): greater than 5% in 1 month 1/26 RD note   X Subcutaneous Fat and/or Muscle Loss Body Fat Depletion: Severe depletion of orbitals  Muscle Mass Depletion: Severe depletion of temples and clavicle region 2/3 RD note    Fluid Accumulation or Edema     X Wt/BMI/Usual Body Weight BMI (Calculated): 20.5 2/3 RD note   X Delayed Wound Healing/Failure to Thrive Dermatis associated with moisture 2/3 wound care   X Acute or Chronic Illness  s/p redo aortic valve replacement with mechanical valve on 1/5/22.  -Oliguric with JO  Acute hypercapnic respiratory failure.   Acute hypoxemic respiratory failure    Vent dependence-  left internal jugular PermCath insertion, tracheostomy, percutaneous endoscopic gastrostomy 1/8 cc note    1/9, 1/18 cc notes  1/21-1/29 nephro notes  1/21 op note    Medication      Treatment     X Other Severe Protein-Calorie Malnutrition  Malnutrition in the context of Chronic Illness 1/26, 2/3  RD notes     AND / ASPEN Clinical Characteristics (October 2011)  A minimum of two characteristics is recommended for diagnosing either moderate or severe malnutrition   Mild  Malnutrition Moderate Malnutrition Severe Malnutrition   Energy Intake from p.o., TF or TPN. < 75% intake of estimated energy needs for less than 7 days < 75% intake of estimated energy needs for greater than 7 days < 50% intake of estimated energy needs for > 5 days   Weight Loss 1-2% in 1 month  5% in 3 months  7.5% in 6 months  10% in 1 year 1-2 % in 1 week  5% in 1 month  7.5% in 3 months  10% in 6 months  20% in 1 year > 2% in 1 week  > 5% in 1 month  > 7.5% in 3 months  > 10% in 6 months  > 20% in 1 year   Physical Findings     None *Mild subcutaneous fat and/or muscle loss  *Mild fluid accumulation  *Stage II decubitus  *Surgical wound or non-healing wound *Mod/severe subcutaneous fat and/or muscle loss  *Mod/severe fluid accumulation  *Stage III or IV decubitus  *Non-healing surgical wound     Provider, please specify diagnosis or diagnoses associated with above clinical findings.    [  X] Severe Protein-Calorie Malnutrition   [  ] Other Nutritional Diagnosis (please specify): _______   [  ] Clinically Undetermined       Please document in your progress notes daily for the duration of treatment until resolved and include in your discharge summary.

## 2022-02-08 NOTE — SUBJECTIVE & OBJECTIVE
Interval History/Significant Events: Patient seen and examined this morning. Bleeding issues overnight requiring packing to mucosal surfaces. INR 5.2 this morning. WBC elevated, cultures sensitive to zosyn.     Follow-up For: Procedure(s) (LRB):  CREATION, TRACHEOSTOMY (N/A)  EGD, WITH PEG TUBE INSERTION (N/A)  INSERTION-CATHETER-ROSELINE (N/A)    Post-Operative Day: 18 Days Post-Op    Objective:     Vital Signs (Most Recent):  Temp: 98.2 °F (36.8 °C) (02/08/22 1200)  Pulse: 84 (02/08/22 1352)  Resp: (!) 24 (02/08/22 1352)  BP: (!) 114/56 (02/08/22 1200)  SpO2: (!) 93 % (02/08/22 1352) Vital Signs (24h Range):  Temp:  [98 °F (36.7 °C)-98.2 °F (36.8 °C)] 98.2 °F (36.8 °C)  Pulse:  [] 84  Resp:  [10-57] 24  SpO2:  [89 %-100 %] 93 %  BP: ()/(49-74) 114/56     Weight: 49.1 kg (108 lb 3.9 oz)  Body mass index is 20.45 kg/m².      Intake/Output Summary (Last 24 hours) at 2/8/2022 1359  Last data filed at 2/8/2022 0701  Gross per 24 hour   Intake 1276.89 ml   Output 500 ml   Net 776.89 ml       Physical Exam  Constitutional:       General: She is not in acute distress.  HENT:      Head: Normocephalic and atraumatic.      Mouth/Throat:      Comments: Oozing from mucosal surfaces  Neck:      Comments: Trach  Cardiovascular:      Rate and Rhythm: Normal rate and regular rhythm.      Pulses: Normal pulses.      Comments: Midline sternotomy incision c/d/I  Pacemaker in place.   Pulmonary:      Effort: Pulmonary effort is normal. No respiratory distress.   Abdominal:      General: Abdomen is flat. There is no distension.      Palpations: Abdomen is soft.      Tenderness: There is no abdominal tenderness.      Comments: Peg site c/d/i  Soft abdomen   Musculoskeletal:      Right lower leg: No edema.      Left lower leg: No edema.   Skin:     General: Skin is warm.      Capillary Refill: Capillary refill takes less than 2 seconds.   Neurological:      General: No focal deficit present.      Mental Status: She is alert.          Vents:  Vent Mode: A/C (02/08/22 0850)  Ventilator Initiated: Yes (01/18/22 1636)  Set Rate: 20 BPM (02/08/22 0850)  Vt Set: 340 mL (02/08/22 0850)  Pressure Support: 10 cmH20 (02/07/22 0415)  PEEP/CPAP: 5 cmH20 (02/08/22 0850)  Oxygen Concentration (%): 60 (02/08/22 1200)  Peak Airway Pressure: 42 cmH2O (02/08/22 0850)  Plateau Pressure: 29 cmH20 (02/08/22 0850)  Total Ve: 8.75 mL (02/08/22 0850)  Negative Inspiratory Force (cm H2O): 0 (02/08/22 0850)  F/VT Ratio<105 (RSBI): (!) 68.49 (02/08/22 0850)    Lines/Drains/Airways     Central Venous Catheter Line            Permacath 01/21/22 1013 left subclavian 18 days          Drain                 Gastrostomy/Enterostomy 01/21/22 1112 Percutaneous endoscopic gastrostomy (PEG) 18 days          Airway                 Surgical Airway 01/21/22 1043 Shiley Cuffed 18 days          Peripheral Intravenous Line                 Midline Catheter Insertion/Assessment  - Single Lumen 01/10/22 1300 Left cephalic vein (lateral side of arm) 18g x 8cm 29 days         Peripheral IV - Single Lumen 02/03/22 1430 20 G;1 1/4 in Posterior;Right Hand 4 days         Peripheral IV - Single Lumen 02/03/22 1800 18 G;1 1/4 in Anterior;Left Wrist 4 days                Significant Labs:    CBC/Anemia Profile:  Recent Labs   Lab 02/07/22  0310 02/07/22  0400 02/08/22  0331 02/08/22  0511   WBC 13.75*  --  19.51*  --    HGB 6.5*  --  7.3*  --    HCT 21.6* 38 23.3* 28*   *  --  117*  --    MCV 98  --  98  --    RDW 18.1*  --  18.0*  --         Chemistries:  Recent Labs   Lab 02/07/22  0310 02/07/22  1349 02/08/22  0331   * 129* 134*   K 4.2 4.2 4.0   CL 99 97 101   CO2 20* 20* 22*   BUN 70* 75* 47*   CREATININE 2.3* 2.8* 1.8*   CALCIUM 9.1 8.8 8.6*   ALBUMIN 2.1* 2.0* 2.0*   PROT 6.1  --  6.1   BILITOT 0.6  --  0.8   ALKPHOS 146*  --  129   ALT 29  --  25   AST 45*  --  39   MG 2.0 2.0 1.8   PHOS 3.5 3.6 2.5*       ABGs:   Recent Labs   Lab 02/08/22  0511   PH 7.299*   PCO2 47.2*    HCO3 23.2*   POCSATURATED 96   BE -3       Significant Imaging:  I have reviewed all pertinent imaging results/findings within the past 24 hours.

## 2022-02-08 NOTE — PT/OT/SLP PROGRESS
Occupational Therapy      Patient Name:  Orion Ty   MRN:  [id number]    Patient not seen today secondary to RN hold 2' increased INR. Will follow-up as appropriate.    2/8/2022

## 2022-02-08 NOTE — PROGRESS NOTES
[facility]  Nephrology  Progress Note     Patient Name: Orion Ty  MRN: [id number]  Admission Date: 1/5/2022  Hospital Length of Stay: 34 days  Attending Provider: Dennis Haider MD   Primary Care Physician: Otis Zimmer MD  Principal Problem: S/P aortic valve replacement    Subjective:     HPI: Orion Ty is our 76 y.o. female with previous aortic valve replacement, mitral valve replacement, and tricuspid valve repair in 2010 who presented on 1/5 for redo aortic valve replacement. Nephrology consulted on 1/8 for anuric JO. Patient is alert but tired, son at bedside. Stated she was tired prior to admission. During the hospital stay Cr increase from 1 to 1.5. In OR she received  2.5L crystalloid, 3U RBC, 2U FFP, 2 platelets, and 800 cell saver. she is +5 L since admit. UOP decrease overnight, per nurse she didn't urinate at all. She received diuril 500 mg twice, lasix 20 mg *2 on 1/7. This morning she urinate 225 immediately after placing the tejeda's cath. She received again lasix 100 mg this morning. Upor evaluation she was alert but repeatedly saying she was tired, denied any pain. Denied any previous hx of kidney disease.       Interval History: Seen at the bedside no event overnight       Review of patient's allergies indicates:  No Known Allergies   Current Facility-Administered Medications   Medication Frequency    0.9%  NaCl infusion (for blood administration) Q24H PRN    0.9%  NaCl infusion PRN    acetaminophen oral solution 650 mg Q4H PRN    albuterol-ipratropium 2.5 mg-0.5 mg/3 mL nebulizer solution 3 mL Q6H    amiodarone tablet 200 mg Daily    atorvastatin tablet 40 mg Daily    bisacodyL suppository 10 mg Daily PRN    dextrose 10 % infusion Continuous PRN    dextrose 10% bolus 125 mL PRN    glucagon (human recombinant) injection 1 mg PRN    guaiFENesin 100 mg/5 ml syrup 200 mg Q4H PRN    haloperidol lactate injection 2 mg Nightly PRN    heparin (porcine)  injection 1,000 Units PRN    hydrOXYzine 10 mg/5 mL syrup 10 mg Q6H PRN    insulin aspart U-100 pen 1-10 Units PRN    insulin aspart U-100 pen 4 Units Q24H    insulin aspart U-100 pen 4 Units Q24H    insulin aspart U-100 pen 4 Units Q24H    insulin aspart U-100 pen 4 Units Q24H    insulin aspart U-100 pen 4 Units Q24H    insulin aspart U-100 pen 4 Units Q24H    LIDOcaine HCL 2% jelly PRN    midodrine tablet 15 mg Q8H    ondansetron injection 4 mg Q12H PRN    oxyCODONE 5 mg/5 mL solution 5 mg Q4H PRN    oxymetazoline 0.05 % nasal spray 2 spray BID    pantoprazole injection 40 mg Q12H    piperacillin-tazobactam 4.5 g in sodium chloride 0.9% 100 mL IVPB (ready to mix system) Q12H    polyethylene glycol packet 17 g Daily PRN    psyllium husk (aspartame) 3.4 gram packet 1 packet Daily    sodium chloride 0.9% bolus 250 mL PRN    sodium chloride 0.9% bolus 250 mL PRN    sodium chloride 0.9% flush 10 mL PRN     Facility-Administered Medications Ordered in Other Encounters   Medication Frequency    0.9%  NaCl infusion Continuous       Objective:     Vital Signs (Most Recent):  Temp: 98.2 °F (36.8 °C) (02/08/22 1200)  Pulse: 84 (02/08/22 1352)  Resp: (!) 24 (02/08/22 1352)  BP: (!) 114/56 (02/08/22 1200)  SpO2: (!) 93 % (02/08/22 1352)  O2 Device (Oxygen Therapy): ventilator (02/08/22 1145) Vital Signs (24h Range):  Temp:  [98 °F (36.7 °C)-98.2 °F (36.8 °C)] 98.2 °F (36.8 °C)  Pulse:  [] 84  Resp:  [10-57] 24  SpO2:  [89 %-100 %] 93 %  BP: ()/(49-74) 114/56   Weight: 49.1 kg (108 lb 3.9 oz) (02/03/22 0444)  Body mass index is 20.45 kg/m².  Body surface area is 1.45 meters squared.      Intake/Output Summary (Last 24 hours) at 2/8/2022 1353  Last data filed at 2/8/2022 0701  Gross per 24 hour   Intake 1276.89 ml   Output 500 ml   Net 776.89 ml     I/O last 3 completed shifts:  In: 1998.1 [I.V.:307.3; Blood:190.7; Other:250; NG/GT:965; IV Piggyback:285.1]  Out: 500 [Other:500]  Net IO Since  Admission: 9,229.82 mL [02/08/22 1353]     Physical Exam  Constitutional:       Appearance: She is well-developed. She is ill-appearing.   Cardiovascular:      Rate and Rhythm: Normal rate and regular rhythm.      Heart sounds: Normal heart sounds.   Pulmonary:      Comments: tracheostomy   Abdominal:      General: Abdomen is flat.      Palpations: Abdomen is soft.   Musculoskeletal:         General: Normal range of motion.      Right lower leg: No edema.      Left lower leg: No edema.   Skin:     General: Skin is warm and dry.      Comments: Sternal Incision CDI   Neurological:      Mental Status: She is alert.   Psychiatric:         Mood and Affect: Mood normal.         Behavior: Behavior normal.         Recent Labs   Lab 02/05/22 0335 02/06/22 0251 02/06/22  0251 02/07/22  0310 02/07/22  0400 02/08/22  0331 02/08/22  0511   WBC  --  11.11  --  13.75*  --  19.51*  --    HGB  --  7.1*  --  6.5*  --  7.3*  --    HCT   < > 23.4*   < > 21.6* 38 23.3* 28*   PLT  --  117*  --  130*  --  117*  --    MONO  --  4.2  0.5   < > 4.7  0.6  --  4.3  0.8  --     < > = values in this interval not displayed.      Recent Labs   Lab 02/06/22 0251 02/06/22 0251 02/07/22  0310 02/07/22  1349 02/08/22  0331   *   < > 132* 129* 134*   K 4.1   < > 4.2 4.2 4.0      < > 99 97 101   CO2 22*   < > 20* 20* 22*   BUN 43*   < > 70* 75* 47*   CREATININE 1.6*   < > 2.3* 2.8* 1.8*   CALCIUM 8.8   < > 9.1 8.8 8.6*   PROT 6.1  --  6.1  --  6.1   BILITOT 0.7  --  0.6  --  0.8   ALKPHOS 153*  --  146*  --  129   ALT 28  --  29  --  25   AST 45*  --  45*  --  39    < > = values in this interval not displayed.      Recent Labs     02/06/22  0332 02/07/22  0400 02/08/22  0511   PH 7.374 7.284* 7.299*   PCO2 39.7 52.8* 47.2*   PO2 75* 32* 91   HCO3 23.1* 25.0 23.2*   POCSATURATED 95 53* 96   BE -2 -2 -3       Assessment/Plan:        JO  acute kidney injury  Orion Ty is our 76 y.o. female with previous aortic valve replacement,  mitral valve replacement, and tricuspid valve repair in 2010 who presented on 1/5 for redo aortic valve replacement. Nephrology consulted on 1/8 for anuric JO. During the hospital stay Cr increase from 1 to 1.5. In OR she received  2.5L crystalloid, 3U RBC, 2U FFP, 2 platelets, and 800 cell saver. she is +5 L since admit. UOP decreased. She received diuril 500 mg twice, lasix 20 mg x2 on 1/7 with no adequate response to diuretics     Plan   Oliguric ischemic ATN likely secondary to severe intraoperative hypotension requiring vasopressor on 01/05/2022  Failed high-dose diuretics and renal replacement therapy started on 1/9/22 due to volume overload  We been mangeing pt with CRRT vd iHD based on hemodynamics status  Will plan for 1 short session of SCUF tonight   Strict I/Os   Renally dose medications to GFR      S/P aortic valve replacement  With severe intraoperative hypotension   Management per primary     ATN (acute tubular necrosis)  See jo      Rose Mary Walsh MD  Nephrology  [facility]

## 2022-02-08 NOTE — ASSESSMENT & PLAN NOTE
Orion Ty is a 76 y.o. female who presents to the SICU s/p redo aortic valve replacement with mechanical valve on 1/5/22.      Neuro/Psych:   -- Sedation: none  -- Pain: Liquid Tiffani     Cards:    -- Pressors: off over last 48hrs  -- intermittently requires vasopressors  -- Goal MAP: 60-80  -- A-fib rate controlled.   -- PO amio TID  -- statin  -- continue to hold coumadin today  -- INR supratherapeutic  -- metoprolol 25 BID      Pulm:   -- Goal O2 sat > 90%  -- reintubated 1/12 then 1/18. S/p trach 1/21  -- Spontaneous when able.  -- ABG PRN  -- 2 mediastinal removed 1/9 L Pleural CT removed 1/8      Renal:  -- iHD today  -- nephro following  -- Permcath 1/21  -- Removed R IJ trialysis 1/23      FEN / GI:   -- Replace lytes as needed  -- s/p PEG 1/21  -- Nutrition: tube feeds novasource renal, at goal      ID:   -- WBC remains elevated  -- Pseudomonas in resp cx; sensitive to zosyn  -- Antibiotics:, zosyn  -- Initiated broad spec abx on 2/3; narrowed 2/6  -- Blood Cx sent 2/3, NGTD        Heme/Onc:   -- Hgb stable  -- Daily CBC  -- Transfused products: 1U RBC on 1/5/22. 1u RBC and 1u FFP on 1/9/21. 2U RBC on 1/12. 4 FFP on 1/13. 1 FFP on 1/17, 2u pRBC 1/27  -- Anticoagulation: coumadin, supratherapeutic today; hold coumadin      Endo:   -- Gluc goal 140-180  -- Insulin per endocrinology      PPx:   Feeding: tube feeds  Analgesia/Sedation: Tiffani  Thromboembolic prevention: Coumadin, ASA  HOB >30: yes  Stress Ulcer ppx: protonix BID  Glucose control: Critical care goal 140-180 g/dl, ISS    Lines/Drains/Airway: Art line, Trach, Permcath, PEG      Dispo/Code Status/Palliative:   -- SICU / Full Code

## 2022-02-08 NOTE — PROGRESS NOTES
Dr. Garcia notified of most recent INR,  blood in PEG residuals (on PPI), and sanguineous secretions from mouth and trach site. Dr. Garcia also aware of pt condition, VS, and vent settings.

## 2022-02-09 PROBLEM — J95.851 VENTILATOR ASSOCIATED PNEUMONIA: Status: ACTIVE | Noted: 2022-01-01

## 2022-02-09 NOTE — ASSESSMENT & PLAN NOTE
BG goal 140 - 180       -  Increase Novolog to 5 units q 4 hrs while on tube feeds. Steroids likely contributing to BG excursions. (HOLD if tube feeds are stopped or BG < 100).  -  Continue Moderate Dose SQ Insulin Correction Scale PRN hyperglycemia.   -  BG Monitoring q 4 hrs while NPO/tube feeds    ** Please call Endocrine for any BG related issues **  ** Please notify Endocrine for any change and/or advance in diet**     Lab Results   Component Value Date    HGBA1C 6.0 (H) 01/03/2022       Discharge Planning:   TBD. Please notify endocrinology prior to discharge.

## 2022-02-09 NOTE — PT/OT/SLP PROGRESS
Occupational Therapy      Patient Name:  Orion Ty   MRN:  [id number]    Patient not seen today secondary to Nurse/ BRIDGET hold due to patient with continuing bleeding from mouth/trach site and supratherapeutic INR of 4.3. Will follow-up on next available date as medically appropriate.    2/9/2022

## 2022-02-09 NOTE — PLAN OF CARE
Pt alert and following commands throughout shift. Pt on ACVC 60% PEEP 5 that has now been increased to 80% when dialysis started. Paced with irregular rhythm. MAP > 60. TF @ goal. Anuric. Safety measures maintained. Family been at bedside throughout shift.

## 2022-02-09 NOTE — PLAN OF CARE
Pt neuro status decreased since this AM. More unresponsive. Opens eyes spontaneously but DNF commands. Localizing. O2 saturations continually declining to 89-90% and increased FIO2 to 90%. Dr. Rinaldi at bedside and made aware. Stat Chest xray ordered. MD ordered to increase PEEP to 8. Pt remains afebrile. MAP > 60.

## 2022-02-09 NOTE — ASSESSMENT & PLAN NOTE
Orion Ty is a 76 y.o. female who presents to the SICU s/p redo aortic valve replacement with mechanical valve on 1/5/22.      Neuro/Psych:   -- Sedation: none  -- Pain: Liquid Tiffani     Cards:    -- Pressors: low dose vaso during HD session last night  -- intermittently requires vasopressors  -- Goal MAP: 60-80  -- A-fib rate controlled.   -- PO amio TID  -- statin  -- continue to hold coumadin today  -- INR supratherapeutic  -- metoprolol 25 BID      Pulm:   -- Goal O2 sat > 90%  -- reintubated 1/12 then 1/18. S/p trach 1/21  -- Spontaneous when able.  -- ABG PRN  -- 2 mediastinal removed 1/9 L Pleural CT removed 1/8      Renal:  -- iHD  -- nephro following  -- Permcath 1/21  -- Removed R IJ trialysis 1/23      FEN / GI:   -- Replace lytes as needed  -- s/p PEG 1/21  -- Nutrition: tube feeds novasource renal, at goal      ID:   -- WBC remains elevated  -- Pseudomonas in resp cx; sensitive to zosyn  -- Antibiotics: zosyn  -- Initiated broad spec abx on 2/3; narrowed 2/6  -- Blood Cx sent 2/3, NGTD        Heme/Onc:   -- Hgb stable  -- Daily CBC  -- Transfused products: 1U RBC on 1/5/22. 1u RBC and 1u FFP on 1/9/21. 2U RBC on 1/12. 4 FFP on 1/13. 1 FFP on 1/17, 2u pRBC 1/27, 1u pRBC 2/9  -- Anticoagulation: coumadin, supratherapeutic today; hold coumadin      Endo:   -- Gluc goal 140-180  -- Insulin per endocrinology      PPx:   Feeding: tube feeds  Analgesia/Sedation: Tiffani  Thromboembolic prevention: Coumadin, ASA  HOB >30: yes  Stress Ulcer ppx: protonix BID  Glucose control: Critical care goal 140-180 g/dl, ISS    Lines/Drains/Airway: Art line, Trach, Permcath, PEG      Dispo/Code Status/Palliative:   -- SICU / Full Code

## 2022-02-09 NOTE — CARE UPDATE
BG goal 140-180    -A1C   Lab Results   Component Value Date    HGBA1C 6.0 (H) 01/03/2022         -HOME REGIMEN: Metformin 500 mg daily    -INPATIENT REGIMEN: novolog 4 units     -GLUCOSE TREND FOR THE PAST 24HRS: 138-276    -NO HYPOGYCEMIAS NOTED        -Diet: Diet NPO Except for: Sips with Medication      -Tube Feeds - novasource renal at 35 cc/hr       Remains in ICU. Trach. Decreased responsiveness today per notes. SLED. PRBCs      Plan:  Continue novolog 4 units every 4 hours while on TF; hold if TF held or BG less than 100.   BG monitoring every 4 hours and moderate dose correction scale.     Discharge planning:  tbd    Endocrine to continue to follow    ** Please call Endocrine for any BG related issues **

## 2022-02-09 NOTE — NURSING
Pt rinsed back after completing 10 hours of SCUF treatment. Dialysis RN notified. Pt required vaso support to maintain MAP >60 during treatment.

## 2022-02-09 NOTE — SUBJECTIVE & OBJECTIVE
Interval History/Significant Events: Patient seen and examined this morning. Low-dose vaso overnight during HD. INR downtrending this morning, 4.3 this morning. 1u pRBCs today. WBC remains elevated-- on abx.     Follow-up For: Procedure(s) (LRB):  CREATION, TRACHEOSTOMY (N/A)  EGD, WITH PEG TUBE INSERTION (N/A)  INSERTION-CATHETER-ROSELINE (N/A)    Post-Operative Day: 19 Days Post-Op    Objective:     Vital Signs (Most Recent):  Temp: 97.8 °F (36.6 °C) (02/09/22 0637)  Pulse: 79 (02/09/22 0744)  Resp: (!) 24 (02/09/22 0744)  BP: (!) 97/55 (02/09/22 0744)  SpO2: 96 % (02/09/22 0744) Vital Signs (24h Range):  Temp:  [96.4 °F (35.8 °C)-98.4 °F (36.9 °C)] 97.8 °F (36.6 °C)  Pulse:  [] 79  Resp:  [16-42] 24  SpO2:  [87 %-99 %] 96 %  BP: ()/(44-66) 97/55     Weight: 49.1 kg (108 lb 3.9 oz)  Body mass index is 20.45 kg/m².      Intake/Output Summary (Last 24 hours) at 2/9/2022 0942  Last data filed at 2/9/2022 0735  Gross per 24 hour   Intake 3211.05 ml   Output 2376 ml   Net 835.05 ml       Physical Exam  Constitutional:       General: She is not in acute distress.     Comments: lethargic   HENT:      Head: Normocephalic and atraumatic.      Mouth/Throat:      Comments: Oozing from mucosal surfaces at lower lip  Neck:      Comments: Trach  Cardiovascular:      Rate and Rhythm: Normal rate and regular rhythm.      Pulses: Normal pulses.      Comments: Midline sternotomy incision c/d/I  Pacemaker in place.   Pulmonary:      Effort: Pulmonary effort is normal. No respiratory distress.   Abdominal:      General: Abdomen is flat. There is no distension.      Palpations: Abdomen is soft.      Tenderness: There is no abdominal tenderness.      Comments: Peg site c/d/i  Soft abdomen   Musculoskeletal:      Right lower leg: No edema.      Left lower leg: No edema.   Skin:     General: Skin is warm.      Capillary Refill: Capillary refill takes less than 2 seconds.   Neurological:      General: No focal deficit present.          Vents:  Vent Mode: A/C (02/09/22 0744)  Ventilator Initiated: Yes (01/18/22 1636)  Set Rate: 20 BPM (02/09/22 0744)  Vt Set: 340 mL (02/09/22 0744)  Pressure Support: 10 cmH20 (02/07/22 0415)  PEEP/CPAP: 5 cmH20 (02/09/22 0744)  Oxygen Concentration (%): 70 (02/09/22 0744)  Peak Airway Pressure: 48 cmH2O (02/09/22 0744)  Plateau Pressure: 36 cmH20 (02/09/22 0744)  Total Ve: 8.11 mL (02/09/22 0744)  Negative Inspiratory Force (cm H2O): 0 (02/09/22 0744)  F/VT Ratio<105 (RSBI): (!) 59.11 (02/09/22 0744)    Lines/Drains/Airways     Central Venous Catheter Line            Permacath 01/21/22 1013 left subclavian 18 days          Drain                 Gastrostomy/Enterostomy 01/21/22 1112 Percutaneous endoscopic gastrostomy (PEG) 18 days          Airway                 Surgical Airway 01/21/22 1043 Shiley Cuffed 18 days          Peripheral Intravenous Line                 Midline Catheter Insertion/Assessment  - Single Lumen 01/10/22 1300 Left cephalic vein (lateral side of arm) 18g x 8cm 29 days         Peripheral IV - Single Lumen 02/03/22 1430 20 G;1 1/4 in Posterior;Right Hand 5 days         Peripheral IV - Single Lumen 02/03/22 1800 18 G;1 1/4 in Anterior;Left Wrist 5 days                Significant Labs:    CBC/Anemia Profile:  Recent Labs   Lab 02/08/22  0331 02/08/22  0511 02/09/22  0315 02/09/22  0520   WBC 19.51*  --  22.99* 21.55*   HGB 7.3*  --  6.1* 6.1*   HCT 23.3* 28* 20.9* 21.2*   *  --  118* 136*   MCV 98  --  105* 106*   RDW 18.0*  --  19.2* 19.0*        Chemistries:  Recent Labs   Lab 02/08/22  0331 02/08/22  0331 02/08/22  1611 02/08/22  1611 02/08/22  2217 02/09/22  0315 02/09/22  0520   *   < > 132*   < > 133* 132* 132*   K 4.0   < > 4.4   < > 4.6 4.4 4.4      < > 100   < > 103 103 103   CO2 22*   < > 21*   < > 19* 18* 17*   BUN 47*   < > 68*   < > 65* 68* 73*   CREATININE 1.8*   < > 2.2*   < > 2.1* 2.1* 2.3*   CALCIUM 8.6*   < > 8.5*   < > 8.5* 8.5* 8.5*   ALBUMIN 2.0*   <  > 1.9*   < > 1.8* 1.9* 1.9*   PROT 6.1  --   --   --   --  5.8* 5.8*   BILITOT 0.8  --   --   --   --  0.8 0.7   ALKPHOS 129  --   --   --   --  120 118   ALT 25  --   --   --   --  22 21   AST 39  --   --   --   --  42* 37   MG 1.8   < > 2.5  --  2.2 2.4  --    PHOS 2.5*   < > 3.8  --  4.3 4.8*  --     < > = values in this interval not displayed.       ABGs:   Recent Labs   Lab 02/08/22  0511   PH 7.299*   PCO2 47.2*   HCO3 23.2*   POCSATURATED 96   BE -3       Significant Imaging:  I have reviewed all pertinent imaging results/findings within the past 24 hours.

## 2022-02-09 NOTE — PT/OT/SLP PROGRESS
Physical Therapy      Patient Name:  Orion Ty   MRN:  [id number]      Patient not seen today. Pt with supra-therapeutic INR (4.3) and actively bleeding from mouth. Orion Ty's plan of care and PT goals reviewed on this date and remain appropriate. PT will follow-up for progressive mobility pending continued medical stability and patient's ability to participate.    Denisse Pereyra, PT, DPT   2/9/2022

## 2022-02-09 NOTE — PLAN OF CARE
"      SICU PLAN OF CARE NOTE    Dx: History of mechanical aortic valve replacement    Shift Events: Vaso gtt started while on CRRT per MD Cortney. 10hr treatment completed- see note. Vaso gtt off. 1 U RBC administered for low H&H per MD Cortney. Pain controlled with PRN Oxy. Continuing to bleed from mouth and trach site. Cleansed and dressed PRN.     Goals of Care: MAP 60-80    Neuro: Follows Commands and Moves All Extremities, Alert    Cardiac: Paced @ 80    Vital Signs: BP (!) 84/47   Pulse 80   Temp 97.6 °F (36.4 °C)   Resp (!) 25   Ht 5' 1" (1.549 m)   Wt 49.1 kg (108 lb 3.9 oz)   SpO2 96%   Breastfeeding No   BMI 20.45 kg/m²     Respiratory: Ventilator 70% 5 PEEP    Diet: Tube Feeds    Urine Output: Anuric 0 cc/shift     Labs/Accuchecks: Accuchecks and insulin administered/.completed per MAR. Labs trended and reviewed with MD Cortney.    Skin: skin warm and dry. Complete bath and linen change complete. Partial thickness breakdown to sacrum with moderate drainage. Cleansed with soap and water, foam dressing applied. Foam boots to heels. SCD on. Bed plugged in and working. Call light within reach. Turned q2hr.     POC reviewed with pt and daughter. Answered all questions. See flowsheet for full assessment.       "

## 2022-02-09 NOTE — PROGRESS NOTES
[facility]  Critical Care - Surgery  Progress Note    Patient Name: Orion Ty  MRN: [id number]  Admission Date: 1/5/2022  Hospital Length of Stay: 35 days  Code Status: Full Code  Attending Provider: Dennis Haider MD  Primary Care Provider: Otis Zimmer MD   Principal Problem: History of mechanical aortic valve replacement    Subjective:     Hospital/ICU Course:  No notes on file    Interval History/Significant Events: Patient seen and examined this morning. Low-dose vaso overnight during HD. INR downtrending this morning, 4.3 this morning. 1u pRBCs today. WBC remains elevated-- on abx.     Follow-up For: Procedure(s) (LRB):  CREATION, TRACHEOSTOMY (N/A)  EGD, WITH PEG TUBE INSERTION (N/A)  INSERTION-CATHETER-ROSELINE (N/A)    Post-Operative Day: 19 Days Post-Op    Objective:     Vital Signs (Most Recent):  Temp: 97.8 °F (36.6 °C) (02/09/22 0637)  Pulse: 79 (02/09/22 0744)  Resp: (!) 24 (02/09/22 0744)  BP: (!) 97/55 (02/09/22 0744)  SpO2: 96 % (02/09/22 0744) Vital Signs (24h Range):  Temp:  [96.4 °F (35.8 °C)-98.4 °F (36.9 °C)] 97.8 °F (36.6 °C)  Pulse:  [] 79  Resp:  [16-42] 24  SpO2:  [87 %-99 %] 96 %  BP: ()/(44-66) 97/55     Weight: 49.1 kg (108 lb 3.9 oz)  Body mass index is 20.45 kg/m².      Intake/Output Summary (Last 24 hours) at 2/9/2022 0942  Last data filed at 2/9/2022 0735  Gross per 24 hour   Intake 3211.05 ml   Output 2376 ml   Net 835.05 ml       Physical Exam  Constitutional:       General: She is not in acute distress.     Comments: lethargic   HENT:      Head: Normocephalic and atraumatic.      Mouth/Throat:      Comments: Oozing from mucosal surfaces at lower lip  Neck:      Comments: Trach  Cardiovascular:      Rate and Rhythm: Normal rate and regular rhythm.      Pulses: Normal pulses.      Comments: Midline sternotomy incision c/d/I  Pacemaker in place.   Pulmonary:      Effort: Pulmonary effort is normal. No respiratory distress.   Abdominal:       General: Abdomen is flat. There is no distension.      Palpations: Abdomen is soft.      Tenderness: There is no abdominal tenderness.      Comments: Peg site c/d/i  Soft abdomen   Musculoskeletal:      Right lower leg: No edema.      Left lower leg: No edema.   Skin:     General: Skin is warm.      Capillary Refill: Capillary refill takes less than 2 seconds.   Neurological:      General: No focal deficit present.         Vents:  Vent Mode: A/C (02/09/22 0744)  Ventilator Initiated: Yes (01/18/22 1636)  Set Rate: 20 BPM (02/09/22 0744)  Vt Set: 340 mL (02/09/22 0744)  Pressure Support: 10 cmH20 (02/07/22 0415)  PEEP/CPAP: 5 cmH20 (02/09/22 0744)  Oxygen Concentration (%): 70 (02/09/22 0744)  Peak Airway Pressure: 48 cmH2O (02/09/22 0744)  Plateau Pressure: 36 cmH20 (02/09/22 0744)  Total Ve: 8.11 mL (02/09/22 0744)  Negative Inspiratory Force (cm H2O): 0 (02/09/22 0744)  F/VT Ratio<105 (RSBI): (!) 59.11 (02/09/22 0744)    Lines/Drains/Airways     Central Venous Catheter Line            Permacath 01/21/22 1013 left subclavian 18 days          Drain                 Gastrostomy/Enterostomy 01/21/22 1112 Percutaneous endoscopic gastrostomy (PEG) 18 days          Airway                 Surgical Airway 01/21/22 1043 Shiley Cuffed 18 days          Peripheral Intravenous Line                 Midline Catheter Insertion/Assessment  - Single Lumen 01/10/22 1300 Left cephalic vein (lateral side of arm) 18g x 8cm 29 days         Peripheral IV - Single Lumen 02/03/22 1430 20 G;1 1/4 in Posterior;Right Hand 5 days         Peripheral IV - Single Lumen 02/03/22 1800 18 G;1 1/4 in Anterior;Left Wrist 5 days                Significant Labs:    CBC/Anemia Profile:  Recent Labs   Lab 02/08/22  0331 02/08/22  0511 02/09/22  0315 02/09/22  0520   WBC 19.51*  --  22.99* 21.55*   HGB 7.3*  --  6.1* 6.1*   HCT 23.3* 28* 20.9* 21.2*   *  --  118* 136*   MCV 98  --  105* 106*   RDW 18.0*  --  19.2* 19.0*         Chemistries:  Recent Labs   Lab 02/08/22  0331 02/08/22  0331 02/08/22  1611 02/08/22  1611 02/08/22  2217 02/09/22  0315 02/09/22  0520   *   < > 132*   < > 133* 132* 132*   K 4.0   < > 4.4   < > 4.6 4.4 4.4      < > 100   < > 103 103 103   CO2 22*   < > 21*   < > 19* 18* 17*   BUN 47*   < > 68*   < > 65* 68* 73*   CREATININE 1.8*   < > 2.2*   < > 2.1* 2.1* 2.3*   CALCIUM 8.6*   < > 8.5*   < > 8.5* 8.5* 8.5*   ALBUMIN 2.0*   < > 1.9*   < > 1.8* 1.9* 1.9*   PROT 6.1  --   --   --   --  5.8* 5.8*   BILITOT 0.8  --   --   --   --  0.8 0.7   ALKPHOS 129  --   --   --   --  120 118   ALT 25  --   --   --   --  22 21   AST 39  --   --   --   --  42* 37   MG 1.8   < > 2.5  --  2.2 2.4  --    PHOS 2.5*   < > 3.8  --  4.3 4.8*  --     < > = values in this interval not displayed.       ABGs:   Recent Labs   Lab 02/08/22  0511   PH 7.299*   PCO2 47.2*   HCO3 23.2*   POCSATURATED 96   BE -3       Significant Imaging:  I have reviewed all pertinent imaging results/findings within the past 24 hours.    Assessment/Plan:     * History of mechanical aortic valve replacement  Orion Ty is a 76 y.o. female who presents to the SICU s/p redo aortic valve replacement with mechanical valve on 1/5/22.      Neuro/Psych:   -- Sedation: none  -- Pain: Liquid Tiffani     Cards:    -- Pressors: low dose vaso during HD session last night  -- intermittently requires vasopressors  -- Goal MAP: 60-80  -- A-fib rate controlled.   -- PO amio TID  -- statin  -- continue to hold coumadin today  -- INR supratherapeutic  -- metoprolol 25 BID      Pulm:   -- Goal O2 sat > 90%  -- reintubated 1/12 then 1/18. S/p trach 1/21  -- Spontaneous when able.  -- ABG PRN  -- 2 mediastinal removed 1/9 L Pleural CT removed 1/8      Renal:  -- iHD  -- nephro following  -- Permcath 1/21  -- Removed R IJ trialysis 1/23      FEN / GI:   -- Replace lytes as needed  -- s/p PEG 1/21  -- Nutrition: tube feeds novasource renal, at goal      ID:   -- WBC  remains elevated  -- Pseudomonas in resp cx; sensitive to zosyn  -- Antibiotics: zosyn  -- Initiated broad spec abx on 2/3; narrowed 2/6  -- Blood Cx sent 2/3, NGTD        Heme/Onc:   -- Hgb stable  -- Daily CBC  -- Transfused products: 1U RBC on 1/5/22. 1u RBC and 1u FFP on 1/9/21. 2U RBC on 1/12. 4 FFP on 1/13. 1 FFP on 1/17, 2u pRBC 1/27, 1u pRBC 2/9  -- Anticoagulation: coumadin, supratherapeutic today; hold coumadin      Endo:   -- Gluc goal 140-180  -- Insulin per endocrinology      PPx:   Feeding: tube feeds  Analgesia/Sedation: Tiffani  Thromboembolic prevention: Coumadin, ASA  HOB >30: yes  Stress Ulcer ppx: protonix BID  Glucose control: Critical care goal 140-180 g/dl, ISS    Lines/Drains/Airway: Art line, Trach, Permcath, PEG      Dispo/Code Status/Palliative:   -- SICU / Full Code             Critical care was time spent personally by me on the following activities: development of treatment plan with patient or surrogate and bedside caregivers, discussions with consultants, evaluation of patient's response to treatment, examination of patient, ordering and performing treatments and interventions, ordering and review of laboratory studies, ordering and review of radiographic studies, pulse oximetry, re-evaluation of patient's condition.  This critical care time did not overlap with that of any other provider or involve time for any procedures.     Maikel Schwab MD  Critical Care - Surgery  [facility]

## 2022-02-10 NOTE — PROGRESS NOTES
[facility]  Nephrology  Progress Note     Patient Name: Orion Ty  MRN: [id number]  Admission Date: 1/5/2022  Hospital Length of Stay: 36 days  Attending Provider: Dennis Haider MD   Primary Care Physician: Otis Zimmer MD  Principal Problem: History of mechanical aortic valve replacement    Subjective:     HPI: Orion Ty is our 76 y.o. female with previous aortic valve replacement, mitral valve replacement, and tricuspid valve repair in 2010 who presented on 1/5 for redo aortic valve replacement. Nephrology consulted on 1/8 for anuric JO. Patient is alert but tired, son at bedside. Stated she was tired prior to admission. During the hospital stay Cr increase from 1 to 1.5. In OR she received  2.5L crystalloid, 3U RBC, 2U FFP, 2 platelets, and 800 cell saver. she is +5 L since admit. UOP decrease overnight, per nurse she didn't urinate at all. She received diuril 500 mg twice, lasix 20 mg *2 on 1/7. This morning she urinate 225 immediately after placing the tejeda's cath. She received again lasix 100 mg this morning. Upor evaluation she was alert but repeatedly saying she was tired, denied any pain. Denied any previous hx of kidney disease.       Interval History: Seen at the bedside no event overnight       Review of patient's allergies indicates:  No Known Allergies   Current Facility-Administered Medications   Medication Frequency    0.9%  NaCl infusion (CRRT USE ONLY) Continuous    0.9%  NaCl infusion (for blood administration) Q24H PRN    0.9%  NaCl infusion PRN    acetaminophen oral solution 650 mg Q4H PRN    albuterol-ipratropium 2.5 mg-0.5 mg/3 mL nebulizer solution 3 mL Q6H    amiodarone tablet 200 mg Daily    atorvastatin tablet 40 mg Daily    bisacodyL suppository 10 mg Daily PRN    cisatracurium (NIMBEX) 200 mg in dextrose 5 % 100 mL infusion Continuous    dextrose 10 % infusion Continuous PRN    dextrose 10% bolus 125 mL PRN    EPINEPHrine (ADRENALIN)  5 mg in dextrose 5 % 250 mL infusion Continuous    fentaNYL 2500 mcg in 0.9% sodium chloride 250 mL infusion premix (titrating) Continuous    glucagon (human recombinant) injection 1 mg PRN    guaiFENesin 100 mg/5 ml syrup 200 mg Q4H PRN    haloperidol lactate injection 2 mg Nightly PRN    heparin (porcine) injection 1,000 Units PRN    hydrOXYzine 10 mg/5 mL syrup 10 mg Q6H PRN    insulin aspart U-100 pen 1-10 Units PRN    insulin aspart U-100 pen 4 Units Q24H    insulin aspart U-100 pen 4 Units Q24H    insulin aspart U-100 pen 4 Units Q24H    insulin aspart U-100 pen 4 Units Q24H    insulin aspart U-100 pen 4 Units Q24H    insulin aspart U-100 pen 4 Units Q24H    LIDOcaine HCL 2% jelly PRN    magnesium sulfate 2g in water 50mL IVPB (premix) PRN    meropenem-0.9% sodium chloride 1 g/50 mL IVPB Q8H    midodrine tablet 15 mg Q8H    nitric oxide gas Gas 20 ppm Continuous    NORepinephrine bitartrate-D5W 4 mg/250 mL (16 mcg/mL) PERIPHERAL access infusion Continuous    ondansetron injection 4 mg Q12H PRN    oxyCODONE 5 mg/5 mL solution 5 mg Q4H PRN    oxymetazoline 0.05 % nasal spray 2 spray BID    pantoprazole injection 40 mg Q12H    polyethylene glycol packet 17 g Daily PRN    propofol (DIPRIVAN) 10 mg/mL infusion Continuous    psyllium husk (aspartame) 3.4 gram packet 1 packet Daily    sodium chloride 0.9% bolus 250 mL PRN    sodium chloride 0.9% bolus 250 mL PRN    sodium chloride 0.9% flush 10 mL PRN    vancomycin - pharmacy to dose pharmacy to manage frequency    vancomycin 750 mg in dextrose 5 % 250 mL IVPB (ready to mix system) Once    vasopressin (PITRESSIN) 0.2 Units/mL in dextrose 5 % 100 mL infusion Continuous    white petrolatum-mineral oil (LUBIFRESH P.M.) ophthalmic ointment TID     Facility-Administered Medications Ordered in Other Encounters   Medication Frequency    0.9%  NaCl infusion Continuous       Objective:     Vital Signs (Most Recent):  Temp: 97.7 °F (36.5 °C)  (02/10/22 1100)  Pulse: 92 (02/10/22 1100)  Resp: (!) 34 (02/10/22 1100)  BP: (!) 99/59 (02/10/22 1100)  SpO2: 96 % (02/10/22 1100)  O2 Device (Oxygen Therapy): ventilator (02/10/22 1100) Vital Signs (24h Range):  Temp:  [93.8 °F (34.3 °C)-98.8 °F (37.1 °C)] 97.7 °F (36.5 °C)  Pulse:  [] 92  Resp:  [30-49] 34  SpO2:  [87 %-100 %] 96 %  BP: ()/(48-59) 99/59  Arterial Line BP: ()/(40-61) 102/50   Weight: 49 kg (108 lb) (02/10/22 0815)  Body mass index is 20.41 kg/m².  Body surface area is 1.45 meters squared.      Intake/Output Summary (Last 24 hours) at 2/10/2022 1133  Last data filed at 2/10/2022 1100  Gross per 24 hour   Intake 4900.1 ml   Output 4573 ml   Net 327.1 ml     I/O last 3 completed shifts:  In: 6335.8 [I.V.:4987.7; Blood:686.9; NG/GT:350; IV Piggyback:311.3]  Out: 4862 [Drains:200; Other:4662]  Net IO Since Admission: 10,776.97 mL [02/10/22 1133]     Physical Exam  Constitutional:       Appearance: She is well-developed. She is ill-appearing.   Cardiovascular:      Rate and Rhythm: Normal rate and regular rhythm.      Heart sounds: Normal heart sounds.   Pulmonary:      Comments: tracheostomy   Abdominal:      General: Abdomen is flat.      Palpations: Abdomen is soft.   Musculoskeletal:         General: Normal range of motion.      Right lower leg: No edema.      Left lower leg: No edema.   Skin:     General: Skin is warm and dry.      Comments: Sternal Incision CDI   Neurological:      Comments: Sedated          Recent Labs   Lab 02/09/22  2206 02/10/22  0243 02/10/22  0930   WBC 24.04* 22.28* 14.84*   HGB 7.1* 7.1* 8.0*   HCT 23.1* 23.1* 25.2*   * 133* 103*   MONO 4.0  CANCELED 4.0  CANCELED  --       Recent Labs   Lab 02/09/22  0520 02/09/22  0520 02/09/22  1233 02/09/22 2206 02/10/22  0243   *   < > 134* 130*  130* 131*  131*  131*   K 4.4   < > 4.9 4.3  4.3 4.3  4.3  4.3      < > 103 99  99 99  100  100   CO2 17*   < > 20* 23  23 21*  22*  22*    BUN 73*   < > 77* 41*  41* 20  21  21   CREATININE 2.3*   < > 2.4* 1.3  1.3 0.8  0.8  0.8   CALCIUM 8.5*   < > 8.4* 8.3*  8.3* 7.8*  7.8*  7.8*   PROT 5.8*  --  5.5*  --  6.0   BILITOT 0.7  --  0.9  --  0.9   ALKPHOS 118  --  118  --  139*   ALT 21  --  22  --  23   AST 37  --  39  --  49*    < > = values in this interval not displayed.      Recent Labs     02/10/22  0439 02/10/22  0731 02/10/22  1032   PH 7.261* 7.387 7.359   PCO2 52.1* 33.5* 31.2*   PO2 141* 119* 89   HCO3 23.4* 20.1* 17.6*   POCSATURATED 99 99 97   BE -4 -5 -8       Assessment/Plan:        JO  acute kidney injury  Orion Ty is our 76 y.o. female with previous aortic valve replacement, mitral valve replacement, and tricuspid valve repair in 2010 who presented on 1/5 for redo aortic valve replacement. Nephrology consulted on 1/8 for anuric JO. During the hospital stay Cr increase from 1 to 1.5. In OR she received  2.5L crystalloid, 3U RBC, 2U FFP, 2 platelets, and 800 cell saver. she is +5 L since admit. UOP decreased. She received diuril 500 mg twice, lasix 20 mg x2 on 1/7 with no adequate response to diuretics     Plan   Oliguric ischemic ATN likely secondary to severe intraoperative hypotension requiring vasopressor on 01/05/2022  Failed high-dose diuretics and renal replacement therapy started on 1/9/22 due to volume overload  We been mangeing pt with CRRT vd iHD based on hemodynamics status  S/p  6 hrs SLED and 6 hrs SCUF on 2/9/22   Will continue with SCUF   Strict I/Os   Renally dose medications to GFR      S/P aortic valve replacement  With severe intraoperative hypotension   Management per primary     ATN (acute tubular necrosis)  See jo      Rose Mary Walsh MD  Nephrology  [facility]

## 2022-02-10 NOTE — NURSING
MD Josselin notified of -120. STAT EKG confirming a-fib. Orders received to admin amio bolus, increase vaso gtt to 0.08 units/min, wean epi as tolerated. Will implement and continue to monitor.

## 2022-02-10 NOTE — PROCEDURES
"Orion Ty is a 76 y.o. female patient.    Temp: 98.8 °F (37.1 °C) (02/09/22 1900)  Pulse: 80 (02/09/22 2100)  Resp: (!) 32 (02/09/22 2100)  BP: (!) 103/51 (02/09/22 2000)  SpO2: 100 % (02/09/22 2100)  Weight: 49.1 kg (108 lb 3.9 oz) (02/03/22 0444)  Height: 5' 1" (154.9 cm) (02/09/22 1540)       Central Line    Date/Time: 2/9/2022 9:18 PM  Performed by: Kashif Nation MD  Authorized by: Kashif Nation MD     Location procedure was performed:  [facility] CRITICAL [facility] SURGERY  Consent Done ?:  Yes  Time out complete?: Verified correct patient, procedure, equipment, staff, and site/side    Indications:  Vascular access  Anesthesia:  See MAR for details  Preparation:  Skin prepped with ChloraPrep  Skin prep agent dried: Skin prep agent completely dried prior to procedure    Sterile barriers: All five maximal sterile barriers used - gloves, gown, cap, mask and large sterile sheet    Hand hygiene: Hand hygiene performed immediately prior to central venous catheter insertion    Location:  Right internal jugular  Catheter type:  Triple lumen  Catheter size:  7 Fr  Inserted Catheter Length (cm):  15  Ultrasound guidance: Yes    Vessel Caliber:  Medium   patent  Comprressibility:  Normal  Needle advanced into vessel with real time ultrasound guidance.    Guidewire confirmed in vessel.    Image recorded and saved.    Steril sheath on probe.    Sterile gel used.  Manometry: No    Number of attempts:  1  Securement:  Line sutured, chlorhexidine patch, sterile dressing applied and blood return through all ports  Complications: No    Specimens: No    Implants: No    XRay:  Placement verified by x-ray, no pneumothorax on x-ray and successful placement  Adverse Events:  NoneTermination Site: superior vena cava      Kashif Nation MD  [employer] General Surgery PGY2      2/9/2022  "

## 2022-02-10 NOTE — CONSULTS
[facility]  Infectious Disease  Consult Note    Patient Name: Orion Ty  MRN: [id number]  Admission Date: 1/5/2022  Hospital Length of Stay: 36 days  Attending Physician: Dennis Haider MD  Primary Care Provider: Otis Zimmer MD     Isolation Status: No active isolations      Inpatient consult to Infectious Diseases  Consult performed by: Ines Parry MD  Consult ordered by: Maikel Schwab MD        Assessment/Plan:     Consult received. Full note to follow. Preliminary recommendations as below.   · Will get blood cultures to assess for other sites infection.   · OK to continue meropenem and vancomycin.     Thank you for your consult. I will follow-up with patient. Please contact us if you have any additional questions.    Ines Parry MD  Infectious Disease  [facility]

## 2022-02-10 NOTE — SUBJECTIVE & OBJECTIVE
Interval History/Significant Events: Patient seen and examined this morning. Paralyzed overnight. Manipulation of ventilatory settings with improved ventilation. WBC remains elevated at 22.1. Decreased vasopressor support overnight. On CRRT.     Follow-up For: Procedure(s) (LRB):  CREATION, TRACHEOSTOMY (N/A)  EGD, WITH PEG TUBE INSERTION (N/A)  INSERTION-CATHETER-ROSELINE (N/A)    Post-Operative Day: 20 Days Post-Op    Objective:     Vital Signs (Most Recent):  Temp: (!) 93.8 °F (34.3 °C) (linda hugger applied) (02/10/22 0705)  Pulse: 79 (02/10/22 0729)  Resp: (!) 34 (02/10/22 0729)  BP: (!) 112/55 (02/10/22 0705)  SpO2: 100 % (02/10/22 0729) Vital Signs (24h Range):  Temp:  [93.8 °F (34.3 °C)-98.8 °F (37.1 °C)] 93.8 °F (34.3 °C)  Pulse:  [] 79  Resp:  [29-49] 34  SpO2:  [87 %-100 %] 100 %  BP: ()/(48-57) 112/55  Arterial Line BP: ()/(40-60) 108/56     Weight: 49.1 kg (108 lb 3.9 oz)  Body mass index is 20.45 kg/m².      Intake/Output Summary (Last 24 hours) at 2/10/2022 0755  Last data filed at 2/10/2022 0705  Gross per 24 hour   Intake 3875.12 ml   Output 3292 ml   Net 583.12 ml       Physical Exam  Constitutional:       Appearance: She is ill-appearing.      Comments: Sedated, paralyzed   HENT:      Head: Normocephalic and atraumatic.      Comments: nims monitor in place     Mouth/Throat:      Comments: Oozing from mucosal surfaces at lower lip  Neck:      Comments: Trach  Cardiovascular:      Rate and Rhythm: Normal rate and regular rhythm.      Pulses: Normal pulses.      Comments: Midline sternotomy incision c/d/I  Pacemaker in place.   Pulmonary:      Effort: Pulmonary effort is normal. No respiratory distress.   Abdominal:      General: Abdomen is flat. There is no distension.      Palpations: Abdomen is soft.      Tenderness: There is no abdominal tenderness.      Comments: Peg site c/d/i  Soft abdomen   Musculoskeletal:      Right lower leg: No edema.      Left lower leg: No edema.    Skin:     General: Skin is warm.      Capillary Refill: Capillary refill takes less than 2 seconds.         Vents:  Vent Mode: A/C (02/10/22 0547)  Ventilator Initiated: Yes (01/18/22 1636)  Set Rate: 34 BPM (02/10/22 0547)  Vt Set: 300 mL (02/10/22 0547)  Pressure Support: 10 cmH20 (02/07/22 0415)  PEEP/CPAP: 6 cmH20 (02/10/22 0547)  Oxygen Concentration (%): 70 (02/10/22 0715)  Peak Airway Pressure: 43 cmH2O (02/10/22 0547)  Plateau Pressure: 31 cmH20 (02/10/22 0547)  Total Ve: 11.4 mL (02/10/22 0547)  Negative Inspiratory Force (cm H2O): 0 (02/10/22 0547)  F/VT Ratio<105 (RSBI): (!) 102.41 (02/10/22 0547)    Lines/Drains/Airways     Central Venous Catheter Line            Permacath 01/21/22 1013 left subclavian 19 days    Percutaneous Central Line Insertion/Assessment - Triple Lumen  02/09/22 1730 right internal jugular <1 day          Drain                 Gastrostomy/Enterostomy 01/21/22 1112 Percutaneous endoscopic gastrostomy (PEG) 19 days          Airway                 Surgical Airway 01/21/22 1043 Shiley Cuffed 19 days          Arterial Line            Arterial Line 02/09/22 1300 Left Radial <1 day          Peripheral Intravenous Line                 Midline Catheter Insertion/Assessment  - Single Lumen 01/10/22 1300 Left cephalic vein (lateral side of arm) 18g x 8cm 30 days         Peripheral IV - Single Lumen 02/03/22 1430 20 G;1 1/4 in Posterior;Right Hand 6 days         Peripheral IV - Single Lumen 02/03/22 1800 18 G;1 1/4 in Anterior;Left Wrist 6 days                Significant Labs:    CBC/Anemia Profile:  Recent Labs   Lab 02/09/22  1233 02/09/22  2206 02/10/22  0243   WBC 23.89* 24.04* 22.28*   HGB 7.3* 7.1* 7.1*   HCT 24.0* 23.1* 23.1*   * 142* 133*   MCV 99* 101* 99*   RDW 17.8* 18.6* 18.6*        Chemistries:  Recent Labs   Lab 02/09/22  0315 02/09/22  0520 02/09/22  0520 02/09/22  1233 02/09/22  1236 02/09/22  2206 02/10/22  0243   NA  --  132*   < > 134*  --  130*  130* 131*  131*   131*   K  --  4.4   < > 4.9  --  4.3  4.3 4.3  4.3  4.3   CL  --  103   < > 103  --  99  99 99  100  100   CO2  --  17*   < > 20*  --  23  23 21*  22*  22*   BUN  --  73*   < > 77*  --  41*  41* 20  21  21   CREATININE  --  2.3*   < > 2.4*  --  1.3  1.3 0.8  0.8  0.8   CALCIUM  --  8.5*   < > 8.4*  --  8.3*  8.3* 7.8*  7.8*  7.8*   ALBUMIN  --  1.9*   < > 2.0*  --  1.9*  1.9* 2.0*  1.9*  1.9*   PROT  --  5.8*  --  5.5*  --   --  6.0   BILITOT  --  0.7  --  0.9  --   --  0.9   ALKPHOS  --  118  --  118  --   --  139*   ALT  --  21  --  22  --   --  23   AST  --  37  --  39  --   --  49*   MG  --   --    < > 2.4  --  2.1  2.1 1.8  1.9  1.9   PHOS   < >  --   --   --  5.9* 3.3  3.3 2.1*  2.1*  2.1*    < > = values in this interval not displayed.       ABGs:   Recent Labs   Lab 02/10/22  0731   PH 7.387   PCO2 33.5*   HCO3 20.1*   POCSATURATED 99   BE -5       Significant Imaging:  I have reviewed all pertinent imaging results/findings within the past 24 hours.

## 2022-02-10 NOTE — PROGRESS NOTES
[facility]  Critical Care - Surgery  Progress Note    Patient Name: Orion Ty  MRN: [id number]  Admission Date: 1/5/2022  Hospital Length of Stay: 36 days  Code Status: Full Code  Attending Provider: Dennis Haider MD  Primary Care Provider: Otis Zimmer MD   Principal Problem: History of mechanical aortic valve replacement    Subjective:     Hospital/ICU Course:  No notes on file    Interval History/Significant Events: Patient seen and examined this morning. Paralyzed overnight. Manipulation of ventilatory settings with improved ventilation. WBC remains elevated at 22.1. Decreased vasopressor support overnight. On CRRT.     Follow-up For: Procedure(s) (LRB):  CREATION, TRACHEOSTOMY (N/A)  EGD, WITH PEG TUBE INSERTION (N/A)  INSERTION-CATHETER-ROSELINE (N/A)    Post-Operative Day: 20 Days Post-Op    Objective:     Vital Signs (Most Recent):  Temp: (!) 93.8 °F (34.3 °C) (linda hugger applied) (02/10/22 0705)  Pulse: 79 (02/10/22 0729)  Resp: (!) 34 (02/10/22 0729)  BP: (!) 112/55 (02/10/22 0705)  SpO2: 100 % (02/10/22 0729) Vital Signs (24h Range):  Temp:  [93.8 °F (34.3 °C)-98.8 °F (37.1 °C)] 93.8 °F (34.3 °C)  Pulse:  [] 79  Resp:  [29-49] 34  SpO2:  [87 %-100 %] 100 %  BP: ()/(48-57) 112/55  Arterial Line BP: ()/(40-60) 108/56     Weight: 49.1 kg (108 lb 3.9 oz)  Body mass index is 20.45 kg/m².      Intake/Output Summary (Last 24 hours) at 2/10/2022 0755  Last data filed at 2/10/2022 0705  Gross per 24 hour   Intake 3875.12 ml   Output 3292 ml   Net 583.12 ml       Physical Exam  Constitutional:       Appearance: She is ill-appearing.      Comments: Sedated, paralyzed   HENT:      Head: Normocephalic and atraumatic.      Comments: nims monitor in place     Mouth/Throat:      Comments: Oozing from mucosal surfaces at lower lip  Neck:      Comments: Trach  Cardiovascular:      Rate and Rhythm: Normal rate and regular rhythm.      Pulses: Normal pulses.       Comments: Midline sternotomy incision c/d/I  Pacemaker in place.   Pulmonary:      Effort: Pulmonary effort is normal. No respiratory distress.   Abdominal:      General: Abdomen is flat. There is no distension.      Palpations: Abdomen is soft.      Tenderness: There is no abdominal tenderness.      Comments: Peg site c/d/i  Soft abdomen   Musculoskeletal:      Right lower leg: No edema.      Left lower leg: No edema.   Skin:     General: Skin is warm.      Capillary Refill: Capillary refill takes less than 2 seconds.         Vents:  Vent Mode: A/C (02/10/22 0547)  Ventilator Initiated: Yes (01/18/22 1636)  Set Rate: 34 BPM (02/10/22 0547)  Vt Set: 300 mL (02/10/22 0547)  Pressure Support: 10 cmH20 (02/07/22 0415)  PEEP/CPAP: 6 cmH20 (02/10/22 0547)  Oxygen Concentration (%): 70 (02/10/22 0715)  Peak Airway Pressure: 43 cmH2O (02/10/22 0547)  Plateau Pressure: 31 cmH20 (02/10/22 0547)  Total Ve: 11.4 mL (02/10/22 0547)  Negative Inspiratory Force (cm H2O): 0 (02/10/22 0547)  F/VT Ratio<105 (RSBI): (!) 102.41 (02/10/22 0547)    Lines/Drains/Airways     Central Venous Catheter Line            Permacath 01/21/22 1013 left subclavian 19 days    Percutaneous Central Line Insertion/Assessment - Triple Lumen  02/09/22 1730 right internal jugular <1 day          Drain                 Gastrostomy/Enterostomy 01/21/22 1112 Percutaneous endoscopic gastrostomy (PEG) 19 days          Airway                 Surgical Airway 01/21/22 1043 Shiley Cuffed 19 days          Arterial Line            Arterial Line 02/09/22 1300 Left Radial <1 day          Peripheral Intravenous Line                 Midline Catheter Insertion/Assessment  - Single Lumen 01/10/22 1300 Left cephalic vein (lateral side of arm) 18g x 8cm 30 days         Peripheral IV - Single Lumen 02/03/22 1430 20 G;1 1/4 in Posterior;Right Hand 6 days         Peripheral IV - Single Lumen 02/03/22 1800 18 G;1 1/4 in Anterior;Left Wrist 6 days                Significant  Labs:    CBC/Anemia Profile:  Recent Labs   Lab 02/09/22  1233 02/09/22  2206 02/10/22  0243   WBC 23.89* 24.04* 22.28*   HGB 7.3* 7.1* 7.1*   HCT 24.0* 23.1* 23.1*   * 142* 133*   MCV 99* 101* 99*   RDW 17.8* 18.6* 18.6*        Chemistries:  Recent Labs   Lab 02/09/22  0315 02/09/22  0520 02/09/22 0520 02/09/22  1233 02/09/22 1236 02/09/22  2206 02/10/22  0243   NA  --  132*   < > 134*  --  130*  130* 131*  131*  131*   K  --  4.4   < > 4.9  --  4.3  4.3 4.3  4.3  4.3   CL  --  103   < > 103  --  99  99 99  100  100   CO2  --  17*   < > 20*  --  23  23 21*  22*  22*   BUN  --  73*   < > 77*  --  41*  41* 20  21  21   CREATININE  --  2.3*   < > 2.4*  --  1.3  1.3 0.8  0.8  0.8   CALCIUM  --  8.5*   < > 8.4*  --  8.3*  8.3* 7.8*  7.8*  7.8*   ALBUMIN  --  1.9*   < > 2.0*  --  1.9*  1.9* 2.0*  1.9*  1.9*   PROT  --  5.8*  --  5.5*  --   --  6.0   BILITOT  --  0.7  --  0.9  --   --  0.9   ALKPHOS  --  118  --  118  --   --  139*   ALT  --  21  --  22  --   --  23   AST  --  37  --  39  --   --  49*   MG  --   --    < > 2.4  --  2.1  2.1 1.8  1.9  1.9   PHOS   < >  --   --   --  5.9* 3.3  3.3 2.1*  2.1*  2.1*    < > = values in this interval not displayed.       ABGs:   Recent Labs   Lab 02/10/22  0731   PH 7.387   PCO2 33.5*   HCO3 20.1*   POCSATURATED 99   BE -5       Significant Imaging:  I have reviewed all pertinent imaging results/findings within the past 24 hours.    Assessment/Plan:     * History of mechanical aortic valve replacement  Orion Ty is a 76 y.o. female who presents to the SICU s/p redo aortic valve replacement with mechanical valve on 1/5/22.      Neuro/Psych:   -- Sedation: sedated with propofol, fentanyl; paralysis   -- Pain: fent gtt     Cards:    -- Pressors: vasopressor support decreased from yesterday afternoon  -- remains on 0.07 levo, 0.02 epi, 0.04 vaso  -- continue to wean vasopressors as tolerated  -- Goal MAP: 60-80  -- A-fib rate  controlled.   -- PO amio TID  -- statin  -- continue to hold coumadin today  -- INR supratherapeutic  -- metoprolol 25 BID      Pulm:   -- Goal O2 sat > 90%  -- reintubated 1/12 then 1/18. S/p trach 1/21  -- intubated, paralyzed, on rate with improving ventilation  -- ABGs q4hrs  -- 2 mediastinal removed 1/9 L Pleural CT removed 1/8      Renal:  -- CRRT overnight  -- nephro following  -- Permcath 1/21  -- Removed R IJ trialysis 1/23      FEN / GI:   -- Replace lytes as needed  -- s/p PEG 1/21  -- Nutrition: tube feeds held given vasopressor requirement      ID:   -- WBC remains elevated  -- Pseudomonas in resp cx; sensitive to zosyn  -- Antibiotics: zosyn  -- Initiated broad spec abx on 2/3; narrowed 2/6  -- Blood Cx sent 2/3, NGTD        Heme/Onc:   -- Hgb stable  -- Daily CBC  -- Transfused products: 1U RBC on 1/5/22. 1u RBC and 1u FFP on 1/9/21. 2U RBC on 1/12. 4 FFP on 1/13. 1 FFP on 1/17, 2u pRBC 1/27, 1u pRBC 2/9  -- Anticoagulation: coumadin, supratherapeutic today; hold coumadin      Endo:   -- Gluc goal 140-180  -- Insulin per endocrinology      PPx:   Feeding: tube feeds held due to vasopressor requirements  Analgesia/Sedation: nimbex, propofol, fentanyl  Thromboembolic prevention: Coumadin, ASA  HOB >30: yes  Stress Ulcer ppx: protonix BID  Glucose control: Critical care goal 140-180 g/dl, ISS    Lines/Drains/Airway: Art line, Trach, Permcath, PEG      Dispo/Code Status/Palliative:   -- SICU / Full Code                 Critical care was time spent personally by me on the following activities: development of treatment plan with patient or surrogate and bedside caregivers, discussions with consultants, evaluation of patient's response to treatment, examination of patient, ordering and performing treatments and interventions, ordering and review of laboratory studies, ordering and review of radiographic studies, pulse oximetry, re-evaluation of patient's condition.  This critical care time did not  overlap with that of any other provider or involve time for any procedures.     Maikel Schwab MD  Critical Care - Surgery  [facility]

## 2022-02-10 NOTE — PLAN OF CARE
Central line, arterial line placed. Pt placed on vaso, levo, epi. Propofol, fentanyl, nimbex started. Pt placed on BIS and train of four monitor. Nitric started. TF held. Maintain MAP > 60.

## 2022-02-10 NOTE — NURSING
While updating MD Rios at bedside, HR increased from 80 bpm to 140 bpm and sustaining. STAT EKG obtained & labs sent.

## 2022-02-10 NOTE — PLAN OF CARE
[facility]  Discharge Reassessment    Primary Care Provider: Otis Zimmer MD    Expected Discharge Date: 2/15/2022    Reassessment (most recent)     Discharge Reassessment - 02/10/22 1224        Discharge Reassessment    Assessment Type Discharge Planning Reassessment     Discharge Plan discussed with: Adult children     Communicated MENDEL with patient/caregiver Date not available/Unable to determine     Discharge Plan A Long-term acute care facility (LTAC)     Discharge Plan B Long-term acute care facility (LTAC)     DME Needed Upon Discharge  other (see comments)   TBD    Discharge Barriers Identified None     Why the patient remains in the hospital Requires continued medical care        Post-Acute Status    Post-Acute Authorization Placement     Post-Acute Placement Status Pending medical clearance/testing               Per MD's Note,  Patient seen and examined this morning. Paralyzed overnight. Manipulation of ventilatory settings with improved ventilation. WBC remains elevated at 22.1. Decreased vasopressor support overnight. On CRRT.       The patient is not medically stable to discharge at this time. [facility] is willing to accept this patient pending medical stability and authorization. The  will continue to follow-up with the patient to assist with discharge planning.         Shen Simmons LMSW  Case Management [facility]  Ext: [phone]

## 2022-02-10 NOTE — PT/OT/SLP PROGRESS
Physical Therapy Missed Treatment Note      Patient Name:  Orion Ty   MRN:  [id number]  Admitting Diagnosis:  S/P aortic valve replacement   Recent Surgery: Procedure(s) (LRB):  CREATION, TRACHEOSTOMY (N/A)  EGD, WITH PEG TUBE INSERTION (N/A)  INSERTION-CATHETER-ROSELINE (N/A) 20 Days Post-Op  Admit Date: 1/5/2022  Length of Stay: 36 days    Patient not seen today due to overall medical instability. Pt developed ARDS due to pusedomonas pna requiring pt to be sedated and paralyzed. Orion Ty's plan of care and PT goals reviewed on this date and remain appropriate. Will follow-up for progressive mobility pending continued medical stability and patient participation.    Emma Florentino, PT, DPT  2/10/2022   Pager: [phone]

## 2022-02-10 NOTE — CARE UPDATE
BG goal 140-180    -A1C   Lab Results   Component Value Date    HGBA1C 6.0 (H) 01/03/2022         -HOME REGIMEN: Metformin 500 mg daily    -INPATIENT REGIMEN: novolog 4 units     -GLUCOSE TREND FOR THE PAST 24HRS: 109-203    -NO HYPOGYCEMIAS NOTED        -Diet: Diet NPO Except for: Sips with Medication      -Tube Feeds - novasource renal at 35 cc/hr       Remains in ICU. Trach. BG reasonably well controlled on current SQ insulin regimen.     Plan:  Continue novolog 4 units every 4 hours while on TF; hold if TF held or BG less than 100.   BG monitoring every 4 hours and moderate dose correction scale.     Discharge planning:  tbd    Endocrine to continue to follow    ** Please call Endocrine for any BG related issues **

## 2022-02-10 NOTE — MEDICAL/APP STUDENT
[facility] - Surgical Intensive Care  Hospital Medicine  Student History and Physical    Patient Name: Orion Ty  MRN: [id number]   Admission Date: 1/5/2022  Length of Stay: 36 days  Attending Physician: Dennis Haider MD        Subjective:     Principal Problem:History of mechanical aortic valve replacement    Chief Complaint:   No chief complaint on file.    HPI:   76F with a history of MVR, AVR, and TVR in 2010. Presenting s/p AVR replacement 1/5/22. Patient currently on a ventilator. Concern for pneumonia, resp culture from 2/4 was positive for pseudomonas, patient was started on zosyn and has had consistently elevated WBC since that time. ID was consulted for antibiotic management. Patient is on dialysis and sedated. Decompensated last night with tachycardia and needed pressors. Improving this morning. Lines are not erythematous or swollen. CXR today showed stable bilateral pulmonary infiltrates and small bilateral pleural effusions. Concern for new infection or alternate source, will continue with broad spectrum abx for now.     Review of Systems   Unable to perform ROS: Intubated       Past Medical History:   Diagnosis Date    A-fib     Anticoagulant long-term use     Closed displaced fracture of distal phalanx of lesser toe 10/20/2015    Right    Coronary artery disease     Diabetes mellitus     Diabetes mellitus, type 2     Hypertension     Mechanical heart valve present     X's two    Osteopenia     Pacemaker     Rheumatic heart disease      Past Surgical History:   Procedure Laterality Date    CARDIAC PACEMAKER PLACEMENT      CARDIAC VALVE REPLACEMENT      CARDIAC VALVE SURGERY      CORONARY ANGIOGRAPHY N/A 12/21/2021    Procedure: ANGIOGRAM, CORONARY ARTERY;  Surgeon: Devonte Salazar MD;  Location: [facility] CATH LAB;  Service: Cardiology;  Laterality: N/A;    ESOPHAGOGASTRODUODENOSCOPY W/ PEG N/A 1/21/2022    Procedure: EGD, WITH PEG TUBE INSERTION;  Surgeon: Kam Storey MD;   [address]  Service: General;  Laterality: N/A;    EYE SURGERY Bilateral     cataract    FLUOROSCOPY N/A 12/11/2020    Procedure: FLUOROSCOPY;  Surgeon: Mckay Calvo MD;  Location: [facility] CATH LAB;  Service: Cardiology;  Laterality: N/A;    PLACEMENT OF DUAL-LUMEN VASCULAR CATHETER N/A 1/21/2022    Procedure: INSERTION-CATHETER-ROSELINE;  Surgeon: Kam Storey MD;  Location: [address];  Service: General;  Laterality: N/A;    REPLACEMENT OF AORTIC VALVE WITH REPEAT STERNOTOMY N/A 1/5/2022    Procedure: REPLACEMENT, AORTIC VALVE, WITH REPEAT STERNOTOMY;  Surgeon: Dennis Haider MD;  Location: [address];  Service: Cardiothoracic;  Laterality: N/A;    REPLACEMENT OF PACEMAKER GENERATOR N/A 3/2/2020    Procedure: REPLACEMENT, PACEMAKER GENERATOR;  Surgeon: Mark Ring MD;  Location: [facility] EP LAB;  Service: Cardiology;  Laterality: N/A;  TBS/CHRISTI, Single PPM gen change, Right side, MDT, MAC, SK, 3 Prep    thryoid s      TRACHEOSTOMY N/A 1/21/2022    Procedure: CREATION, TRACHEOSTOMY;  Surgeon: Kam Storey MD;  Location: [address];  Service: General;  Laterality: N/A;      Review of patient's allergies indicates:  No Known Allergies  Current Facility-Administered Medications on File Prior to Encounter   Medication    0.9%  NaCl infusion     Current Outpatient Medications on File Prior to Encounter   Medication Sig    metFORMIN (GLUCOPHAGE) 500 MG tablet TAKE 1 TABLET EVERY DAY (Patient taking differently: daily with breakfast.)    metoprolol tartrate (LOPRESSOR) 25 MG tablet TAKE 1 TABLET BY MOUTH TWICE DAILY    omega-3 acid ethyl esters (LOVAZA) 1 gram capsule TK 2 CS PO BID    amLODIPine (NORVASC) 5 MG tablet Take 1 tablet (5 mg total) by mouth once daily.    amoxicillin (AMOXIL) 875 MG tablet     furosemide (LASIX) 40 MG tablet Take 40 mg by mouth 2 (two) times daily.    lancets 30 gauge Misc Use as directed to check blood sugar twice a day (Patient not  taking: No sig reported)    losartan (COZAAR) 25 MG tablet Take 1 tablet (25 mg total) by mouth once daily. (Patient not taking: No sig reported)    nitroGLYCERIN (NITROSTAT) 0.4 MG SL tablet DISSOLVE 1 TABLET UNDER THE TONGUE EVERY 5 MINUTES AS NEEDED FOR CHEST PAINS    pravastatin (PRAVACHOL) 20 MG tablet Take 1 tablet (20 mg total) by mouth once daily. (Patient not taking: No sig reported)    warfarin (COUMADIN) 4 MG tablet Take 1 tablet on Monday and Friday.  Take 1/2 tablet on all other days. (Patient not taking: No sig reported)      Family History   Problem Relation Age of Onset    Breast cancer Maternal Aunt     Colon cancer Neg Hx     Ovarian cancer Neg Hx      Tobacco Use    Smoking status: Never Smoker    Smokeless tobacco: Never Used   Substance and Sexual Activity    Alcohol use: No    Drug use: Never    Sexual activity: Not on file       Objective:     Vital Signs (Most Recent):  Temp: 97.7 °F (36.5 °C) (02/10/22 1100)  Pulse: 91 (02/10/22 1115)  Resp: (!) 34 (02/10/22 1115)  BP: (!) 99/59 (02/10/22 1100)  SpO2: 96 % (02/10/22 1115) Vital Signs (24h Range):  Temp:  [93.8 °F (34.3 °C)-98.8 °F (37.1 °C)] 97.7 °F (36.5 °C)  Pulse:  [] 91  Resp:  [30-49] 34  SpO2:  [87 %-100 %] 96 %  BP: ()/(48-59) 99/59  Arterial Line BP: ()/(40-61) 102/50     Weight: 49 kg (108 lb)  Body mass index is 20.41 kg/m².    Intake/Output Summary (Last 24 hours) at 2/10/2022 1144  Last data filed at 2/10/2022 1100  Gross per 24 hour   Intake 4900.1 ml   Output 4573 ml   Net 327.1 ml      Physical Exam  Constitutional:       Appearance: She is ill-appearing and toxic-appearing.   HENT:      Head: Normocephalic and atraumatic.      Right Ear: External ear normal.      Left Ear: External ear normal.      Mouth/Throat:      Mouth: Mucous membranes are dry.      Pharynx: Oropharyngeal exudate present.   Eyes:      General: No scleral icterus.  Cardiovascular:      Rate and Rhythm: Normal rate and regular  rhythm.      Pulses: Normal pulses.      Heart sounds: No murmur heard.       Comments: Clicking of mechanical valves  Pulmonary:      Breath sounds: Wheezing and rhonchi present.      Comments: On ventilator  Abdominal:      General: Abdomen is flat. Bowel sounds are normal. There is no distension.   Musculoskeletal:         General: No swelling or tenderness.      Cervical back: No rigidity or tenderness.   Skin:     General: Skin is warm.      Coloration: Skin is not jaundiced.       Significant Labs:   Recent Results (from the past 24 hour(s))   POCT glucose    Collection Time: 02/09/22 12:32 PM   Result Value Ref Range    POCT Glucose 138 (H) 70 - 110 mg/dL   CBC Auto Differential    Collection Time: 02/09/22 12:33 PM   Result Value Ref Range    WBC 23.89 (H) 3.90 - 12.70 K/uL    RBC 2.42 (L) 4.00 - 5.40 M/uL    Hemoglobin 7.3 (L) 12.0 - 16.0 g/dL    Hematocrit 24.0 (L) 37.0 - 48.5 %    MCV 99 (H) 82 - 98 fL    MCH 30.2 27.0 - 31.0 pg    MCHC 30.4 (L) 32.0 - 36.0 g/dL    RDW 17.8 (H) 11.5 - 14.5 %    Platelets 131 (L) 150 - 450 K/uL    MPV 12.3 9.2 - 12.9 fL    Immature Granulocytes CANCELED 0.0 - 0.5 %    Immature Grans (Abs) CANCELED 0.00 - 0.04 K/uL    Lymph # CANCELED 1.0 - 4.8 K/uL    Mono # CANCELED 0.3 - 1.0 K/uL    Eos # CANCELED 0.0 - 0.5 K/uL    Baso # CANCELED 0.00 - 0.20 K/uL    nRBC 10 (A) 0 /100 WBC    Gran % 80.0 (H) 38.0 - 73.0 %    Lymph % 3.0 (L) 18.0 - 48.0 %    Mono % 8.0 4.0 - 15.0 %    Eosinophil % 1.0 0.0 - 8.0 %    Basophil % 0.0 0.0 - 1.9 %    Bands 8.0 %    Platelet Estimate Decreased (A)     Aniso Slight     Poik Slight     Poly Occasional     Hypo Occasional     Ovalocytes Occasional     Tear Drop Cells Occasional     Maynard Cells Occasional     Acanthocytes Present     Differential Method Manual    Comprehensive Metabolic Panel    Collection Time: 02/09/22 12:33 PM   Result Value Ref Range    Sodium 134 (L) 136 - 145 mmol/L    Potassium 4.9 3.5 - 5.1 mmol/L    Chloride 103 95 - 110  mmol/L    CO2 20 (L) 23 - 29 mmol/L    Glucose 128 (H) 70 - 110 mg/dL    BUN 77 (H) 8 - 23 mg/dL    Creatinine 2.4 (H) 0.5 - 1.4 mg/dL    Calcium 8.4 (L) 8.7 - 10.5 mg/dL    Total Protein 5.5 (L) 6.0 - 8.4 g/dL    Albumin 2.0 (L) 3.5 - 5.2 g/dL    Total Bilirubin 0.9 0.1 - 1.0 mg/dL    Alkaline Phosphatase 118 55 - 135 U/L    AST 39 10 - 40 U/L    ALT 22 10 - 44 U/L    Anion Gap 11 8 - 16 mmol/L    eGFR if African American 21.8 (A) >60 mL/min/1.73 m^2    eGFR if non  18.9 (A) >60 mL/min/1.73 m^2   Magnesium    Collection Time: 02/09/22 12:33 PM   Result Value Ref Range    Magnesium 2.4 1.6 - 2.6 mg/dL   Phosphorus    Collection Time: 02/09/22 12:36 PM   Result Value Ref Range    Phosphorus 5.9 (H) 2.7 - 4.5 mg/dL   ISTAT PROCEDURE    Collection Time: 02/09/22  3:40 PM   Result Value Ref Range    POC PH 7.175 (LL) 7.35 - 7.45    POC PCO2 51.5 (H) 35 - 45 mmHg    POC PO2 69 (L) 80 - 100 mmHg    POC HCO3 19.0 (L) 24 - 28 mmol/L    POC BE -9 -2 to 2 mmol/L    POC SATURATED O2 88 (L) 95 - 100 %    POC TCO2 21 (L) 23 - 27 mmol/L    Rate 20     Sample ARTERIAL     Site Kailee/UAC     Allens Test N/A     DelSys Adult Vent     Mode AC/PRVC     Vt 300     PEEP 8     PiP 23     FiO2 100     Min Vol 10.2     Sp02 91    ISTAT PROCEDURE    Collection Time: 02/09/22  4:43 PM   Result Value Ref Range    POC PH 7.128 (LL) 7.35 - 7.45    POC PCO2 59.5 (HH) 35 - 45 mmHg    POC PO2 57 (LL) 80 - 100 mmHg    POC HCO3 19.7 (L) 24 - 28 mmol/L    POC BE -10 -2 to 2 mmol/L    POC SATURATED O2 79 (L) 95 - 100 %    POC TCO2 21 (L) 23 - 27 mmol/L    Rate 30     Sample ARTERIAL     Site Kailee/UAC     Allens Test N/A     DelSys Adult Vent     Mode AC/PRVC     Vt 300     PEEP 8     PiP 28     FiO2 100     Min Vol 9.86     Sp02 88    ISTAT Lactate    Collection Time: 02/09/22  4:49 PM   Result Value Ref Range    POC Lactate 0.45 0.36 - 1.25 mmol/L    Rate 30     Sample ARTERIAL     Site Kailee/UAC     Allens Test N/A     DelSys Adult  Vent     Mode AC/PRVC     Vt 300     PEEP 8     PiP 27     FiO2 100     Min Vol 10.1     Sp02 87    POCT glucose    Collection Time: 02/09/22  6:12 PM   Result Value Ref Range    POCT Glucose 109 70 - 110 mg/dL   ISTAT PROCEDURE    Collection Time: 02/09/22  7:16 PM   Result Value Ref Range    POC PH 7.049 (LL) 7.35 - 7.45    POC PCO2 74.2 (HH) 35 - 45 mmHg    POC PO2 159 (H) 80 - 100 mmHg    POC HCO3 20.5 (L) 24 - 28 mmol/L    POC BE -10 -2 to 2 mmol/L    POC SATURATED O2 98 95 - 100 %    POC TCO2 23 23 - 27 mmol/L    Rate 32     Sample ARTERIAL     Site Kailee/UAC     Allens Test N/A     DelSys Adult Vent     Mode AC/PRVC     Vt 250     PEEP 8     PiP 41     FiO2 100     Min Vol 9    POCT glucose    Collection Time: 02/09/22  8:19 PM   Result Value Ref Range    POCT Glucose 145 (H) 70 - 110 mg/dL   ISTAT PROCEDURE    Collection Time: 02/09/22  8:21 PM   Result Value Ref Range    POC PH 7.158 (LL) 7.35 - 7.45    POC PCO2 61.4 (HH) 35 - 45 mmHg    POC PO2 129 (H) 80 - 100 mmHg    POC HCO3 21.8 (L) 24 - 28 mmol/L    POC BE -7 -2 to 2 mmol/L    POC SATURATED O2 98 95 - 100 %    POC TCO2 24 23 - 27 mmol/L    Rate 32     Sample ARTERIAL     Site Kailee/UAC     Allens Test N/A     DelSys Adult Vent     Mode AC/PRVC     Vt 300     PEEP 7.5     PiP 51     FiO2 100     Min Vol 10.9     Sp02 100    Renal function panel    Collection Time: 02/09/22 10:06 PM   Result Value Ref Range    Glucose 159 (H) 70 - 110 mg/dL    Sodium 130 (L) 136 - 145 mmol/L    Potassium 4.3 3.5 - 5.1 mmol/L    Chloride 99 95 - 110 mmol/L    CO2 23 23 - 29 mmol/L    BUN 41 (H) 8 - 23 mg/dL    Calcium 8.3 (L) 8.7 - 10.5 mg/dL    Creatinine 1.3 0.5 - 1.4 mg/dL    Albumin 1.9 (L) 3.5 - 5.2 g/dL    Phosphorus 3.3 2.7 - 4.5 mg/dL    eGFR if  45.7 (A) >60 mL/min/1.73 m^2    eGFR if non  39.7 (A) >60 mL/min/1.73 m^2    Anion Gap 8 8 - 16 mmol/L   Magnesium    Collection Time: 02/09/22 10:06 PM   Result Value Ref Range     Magnesium 2.1 1.6 - 2.6 mg/dL   Renal function panel    Collection Time: 02/09/22 10:06 PM   Result Value Ref Range    Glucose 159 (H) 70 - 110 mg/dL    Sodium 130 (L) 136 - 145 mmol/L    Potassium 4.3 3.5 - 5.1 mmol/L    Chloride 99 95 - 110 mmol/L    CO2 23 23 - 29 mmol/L    BUN 41 (H) 8 - 23 mg/dL    Calcium 8.3 (L) 8.7 - 10.5 mg/dL    Creatinine 1.3 0.5 - 1.4 mg/dL    Albumin 1.9 (L) 3.5 - 5.2 g/dL    Phosphorus 3.3 2.7 - 4.5 mg/dL    eGFR if  45.7 (A) >60 mL/min/1.73 m^2    eGFR if non  39.7 (A) >60 mL/min/1.73 m^2    Anion Gap 8 8 - 16 mmol/L   Magnesium    Collection Time: 02/09/22 10:06 PM   Result Value Ref Range    Magnesium 2.1 1.6 - 2.6 mg/dL   CBC auto differential    Collection Time: 02/09/22 10:06 PM   Result Value Ref Range    WBC 24.04 (H) 3.90 - 12.70 K/uL    RBC 2.28 (L) 4.00 - 5.40 M/uL    Hemoglobin 7.1 (L) 12.0 - 16.0 g/dL    Hematocrit 23.1 (L) 37.0 - 48.5 %     (H) 82 - 98 fL    MCH 31.1 (H) 27.0 - 31.0 pg    MCHC 30.7 (L) 32.0 - 36.0 g/dL    RDW 18.6 (H) 11.5 - 14.5 %    Platelets 142 (L) 150 - 450 K/uL    MPV 12.3 9.2 - 12.9 fL    Immature Granulocytes CANCELED 0.0 - 0.5 %    Immature Grans (Abs) CANCELED 0.00 - 0.04 K/uL    Lymph # CANCELED 1.0 - 4.8 K/uL    Mono # CANCELED 0.3 - 1.0 K/uL    Eos # CANCELED 0.0 - 0.5 K/uL    Baso # CANCELED 0.00 - 0.20 K/uL    nRBC 23 (A) 0 /100 WBC    Gran % 84.0 (H) 38.0 - 73.0 %    Lymph % 4.0 (L) 18.0 - 48.0 %    Mono % 4.0 4.0 - 15.0 %    Eosinophil % 1.0 0.0 - 8.0 %    Basophil % 0.0 0.0 - 1.9 %    Bands 2.0 %    Metamyelocytes 2.0 %    Myelocytes 2.0 %    Other 1 %    Platelet Estimate Decreased (A)     Aniso Slight     Poik Slight     Poly Occasional     Hypo Occasional     Ovalocytes Occasional     Tear Drop Cells CANCELED     [place]andler Cells Occasional     Basophilic Stippling Occasional     Smudge Cells Present     Differential Method Manual    Pathologist Review    Collection Time: 02/09/22 10:06 PM   Result  Value Ref Range    Pathologist Review Peripheral Smear REVIEWED    ISTAT PROCEDURE    Collection Time: 02/09/22 10:31 PM   Result Value Ref Range    POC PH 7.242 (LL) 7.35 - 7.45    POC PCO2 58.4 (HH) 35 - 45 mmHg    POC PO2 167 (H) 80 - 100 mmHg    POC HCO3 25.2 24 - 28 mmol/L    POC BE -2 -2 to 2 mmol/L    POC SATURATED O2 99 95 - 100 %    POC TCO2 27 23 - 27 mmol/L    Rate 32     Sample ARTERIAL     Site Kailee/UAC     Allens Test N/A     DelSys Adult Vent     Mode AC/PRVC     Vt 300     PEEP 7     PiP 44     FiO2 100     Min Vol 10     Sp02 100    POCT glucose    Collection Time: 02/10/22 12:33 AM   Result Value Ref Range    POCT Glucose 202 (H) 70 - 110 mg/dL   ISTAT PROCEDURE    Collection Time: 02/10/22  1:40 AM   Result Value Ref Range    POC PH 7.221 (LL) 7.35 - 7.45    POC PCO2 61.1 (HH) 35 - 45 mmHg    POC PO2 92 80 - 100 mmHg    POC HCO3 25.1 24 - 28 mmol/L    POC BE -3 -2 to 2 mmol/L    POC SATURATED O2 95 95 - 100 %    POC TCO2 27 23 - 27 mmol/L    Rate 32     Sample ARTERIAL     Site Kailee/UAC     Allens Test N/A     DelSys Adult Vent     Mode AC/PRVC     Vt 300     PEEP 6     PiP 42     FiO2 80     Min Vol 10     Sp02 100    CBC auto differential    Collection Time: 02/10/22  2:43 AM   Result Value Ref Range    WBC 22.28 (H) 3.90 - 12.70 K/uL    RBC 2.33 (L) 4.00 - 5.40 M/uL    Hemoglobin 7.1 (L) 12.0 - 16.0 g/dL    Hematocrit 23.1 (L) 37.0 - 48.5 %    MCV 99 (H) 82 - 98 fL    MCH 30.5 27.0 - 31.0 pg    MCHC 30.7 (L) 32.0 - 36.0 g/dL    RDW 18.6 (H) 11.5 - 14.5 %    Platelets 133 (L) 150 - 450 K/uL    MPV 12.1 9.2 - 12.9 fL    Immature Granulocytes CANCELED 0.0 - 0.5 %    Immature Grans (Abs) CANCELED 0.00 - 0.04 K/uL    Lymph # CANCELED 1.0 - 4.8 K/uL    Mono # CANCELED 0.3 - 1.0 K/uL    Eos # CANCELED 0.0 - 0.5 K/uL    Baso # CANCELED 0.00 - 0.20 K/uL    nRBC 25 (A) 0 /100 WBC    Gran % 85.5 (H) 38.0 - 73.0 %    Lymph % 6.0 (L) 18.0 - 48.0 %    Mono % 4.0 4.0 - 15.0 %    Eosinophil % 0.0 0.0 - 8.0 %     Basophil % 0.0 0.0 - 1.9 %    Bands 2.5 %    Metamyelocytes 1.0 %    Myelocytes 1.0 %    Platelet Estimate Decreased (A)     Aniso Moderate     Poik Slight     Poly Occasional     Ovalocytes Occasional     [place] Cells Occasional     Large/Giant Platelets Present     Differential Method Manual    Magnesium    Collection Time: 02/10/22  2:43 AM   Result Value Ref Range    Magnesium 1.8 1.6 - 2.6 mg/dL   Phosphorus    Collection Time: 02/10/22  2:43 AM   Result Value Ref Range    Phosphorus 2.1 (L) 2.7 - 4.5 mg/dL   Comprehensive Metabolic Panel    Collection Time: 02/10/22  2:43 AM   Result Value Ref Range    Sodium 131 (L) 136 - 145 mmol/L    Potassium 4.3 3.5 - 5.1 mmol/L    Chloride 99 95 - 110 mmol/L    CO2 21 (L) 23 - 29 mmol/L    Glucose 171 (H) 70 - 110 mg/dL    BUN 20 8 - 23 mg/dL    Creatinine 0.8 0.5 - 1.4 mg/dL    Calcium 7.8 (L) 8.7 - 10.5 mg/dL    Total Protein 6.0 6.0 - 8.4 g/dL    Albumin 2.0 (L) 3.5 - 5.2 g/dL    Total Bilirubin 0.9 0.1 - 1.0 mg/dL    Alkaline Phosphatase 139 (H) 55 - 135 U/L    AST 49 (H) 10 - 40 U/L    ALT 23 10 - 44 U/L    Anion Gap 11 8 - 16 mmol/L    eGFR if African American >60.0 >60 mL/min/1.73 m^2    eGFR if non African American >60.0 >60 mL/min/1.73 m^2   Protime-INR    Collection Time: 02/10/22  2:43 AM   Result Value Ref Range    Prothrombin Time 42.3 (H) 9.0 - 12.5 sec    INR 4.4 (H) 0.8 - 1.2   APTT    Collection Time: 02/10/22  2:43 AM   Result Value Ref Range    aPTT 48.1 (H) 21.0 - 32.0 sec   Renal function panel    Collection Time: 02/10/22  2:43 AM   Result Value Ref Range    Glucose 170 (H) 70 - 110 mg/dL    Sodium 131 (L) 136 - 145 mmol/L    Potassium 4.3 3.5 - 5.1 mmol/L    Chloride 100 95 - 110 mmol/L    CO2 22 (L) 23 - 29 mmol/L    BUN 21 8 - 23 mg/dL    Calcium 7.8 (L) 8.7 - 10.5 mg/dL    Creatinine 0.8 0.5 - 1.4 mg/dL    Albumin 1.9 (L) 3.5 - 5.2 g/dL    Phosphorus 2.1 (L) 2.7 - 4.5 mg/dL    eGFR if African American >60.0 >60 mL/min/1.73 m^2    eGFR if non  African American >60.0 >60 mL/min/1.73 m^2    Anion Gap 9 8 - 16 mmol/L   Magnesium    Collection Time: 02/10/22  2:43 AM   Result Value Ref Range    Magnesium 1.9 1.6 - 2.6 mg/dL   Renal function panel    Collection Time: 02/10/22  2:43 AM   Result Value Ref Range    Glucose 170 (H) 70 - 110 mg/dL    Sodium 131 (L) 136 - 145 mmol/L    Potassium 4.3 3.5 - 5.1 mmol/L    Chloride 100 95 - 110 mmol/L    CO2 22 (L) 23 - 29 mmol/L    BUN 21 8 - 23 mg/dL    Calcium 7.8 (L) 8.7 - 10.5 mg/dL    Creatinine 0.8 0.5 - 1.4 mg/dL    Albumin 1.9 (L) 3.5 - 5.2 g/dL    Phosphorus 2.1 (L) 2.7 - 4.5 mg/dL    eGFR if African American >60.0 >60 mL/min/1.73 m^2    eGFR if non African American >60.0 >60 mL/min/1.73 m^2    Anion Gap 9 8 - 16 mmol/L   Magnesium    Collection Time: 02/10/22  2:43 AM   Result Value Ref Range    Magnesium 1.9 1.6 - 2.6 mg/dL   Calcium, ionized    Collection Time: 02/10/22  2:43 AM   Result Value Ref Range    Ionized Calcium 1.06 1.06 - 1.42 mmol/L   Vancomycin, Random    Collection Time: 02/10/22  2:43 AM   Result Value Ref Range    Vancomycin, Random 4.4 Not established ug/mL   POCT glucose    Collection Time: 02/10/22  4:15 AM   Result Value Ref Range    POCT Glucose 203 (H) 70 - 110 mg/dL   ISTAT PROCEDURE    Collection Time: 02/10/22  4:39 AM   Result Value Ref Range    POC PH 7.261 (LL) 7.35 - 7.45    POC PCO2 52.1 (H) 35 - 45 mmHg    POC PO2 141 (H) 80 - 100 mmHg    POC HCO3 23.4 (L) 24 - 28 mmol/L    POC BE -4 -2 to 2 mmol/L    POC SATURATED O2 99 95 - 100 %    POC TCO2 25 23 - 27 mmol/L    Rate 34     Sample ARTERIAL     Site Kailee/UAC     Allens Test N/A     DelSys Adult Vent     Mode AC/PRVC     Vt 300     PEEP 6     PiP 42     FiO2 80     Min Vol 11     Sp02 100    POCT METHEMOGLOBIN Daily    Collection Time: 02/10/22  4:51 AM   Result Value Ref Range    Methemoglobin 2.8 0 - 3 %   ISTAT PROCEDURE    Collection Time: 02/10/22  7:31 AM   Result Value Ref Range    POC PH 7.387 7.35 - 7.45    POC PCO2  33.5 (L) 35 - 45 mmHg    POC PO2 119 (H) 80 - 100 mmHg    POC HCO3 20.1 (L) 24 - 28 mmol/L    POC BE -5 -2 to 2 mmol/L    POC SATURATED O2 99 95 - 100 %    POC TCO2 21 (L) 23 - 27 mmol/L    Rate 34     Sample ARTERIAL     Site Kailee/UAC     Allens Test N/A     DelSys Adult Vent     Mode AC/PRVC     Vt 300     PEEP 6     FiO2 70    POCT glucose    Collection Time: 02/10/22  7:34 AM   Result Value Ref Range    POCT Glucose 177 (H) 70 - 110 mg/dL   Echo    Collection Time: 02/10/22  8:48 AM   Result Value Ref Range    Ascending aorta 3.76 cm    STJ 2.95 cm    AV mean gradient 7 mmHg    Ao peak vipul 2.03 m/s    Ao VTI 36.60 cm    IVS 0.83 0.6 - 1.1 cm    LA size 4.42 cm    Left Atrium Major Axis 7.16 cm    Left Atrium Minor Axis 7.22 cm    LVIDd 4.46 3.5 - 6.0 cm    LVIDs 2.97 2.1 - 4.0 cm    LVOT diameter 2.10 cm    LVOT peak VTI 28.23 cm    Posterior Wall 0.70 0.6 - 1.1 cm    MV Peak A Vipul 0.70 m/s    E wave deceleration time 190.53 msec    MV Peak E Vipul 1.46 m/s    RA Major Axis 5.86 cm    RA Width 5.73 cm    RVDD 4.68 cm    Sinus 3.79 cm    TAPSE 0.41 cm    TR Max Vipul 2.58 m/s    TDI LATERAL 0.05 m/s    TDI SEPTAL 0.05 m/s    LA WIDTH 4.59 cm    MV stenosis pressure 1/2 time 55.25 ms    LV Diastolic Volume 90.39 mL    LV Systolic Volume 34.09 mL    LVOT peak vipul 1.46 m/s    LA volume (mod) 96.44 cm3    MV mean gradient 4 mmHg    MV peak gradient 10 mmHg    MV VTI 24.95 cm    LV LATERAL E/E' RATIO 29.20 m/s    LV SEPTAL E/E' RATIO 29.20 m/s    FS 33 %    LA volume 123.99 cm3    LV mass 105.60 g    Left Ventricle Relative Wall Thickness 0.31 cm    AV valve area 2.67 cm2    AV Velocity Ratio 0.72     AV index (prosthetic) 0.77     MV valve area p 1/2 method 3.98 cm2    MV valve area by continuity eq 3.92 cm2    E/A ratio 2.09     Mean e' 0.05 m/s    LVOT area 3.5 cm2    LVOT stroke volume 97.73 cm3    AV peak gradient 16 mmHg    E/E' ratio 29.20 m/s    LV Systolic Volume Index 23.5 mL/m2    LV Diastolic Volume Index  62.34 mL/m2    LA Volume Index 85.5 mL/m2    LV Mass Index 73 g/m2    Triscuspid Valve Regurgitation Peak Gradient 27 mmHg    LA Volume Index (Mod) 66.5 mL/m2    BSA 1.45 m2    EF 60 %    Mitral Valve Heart Rate 80 bpm   CBC auto differential    Collection Time: 02/10/22  9:30 AM   Result Value Ref Range    WBC 14.84 (H) 3.90 - 12.70 K/uL    RBC 2.62 (L) 4.00 - 5.40 M/uL    Hemoglobin 8.0 (L) 12.0 - 16.0 g/dL    Hematocrit 25.2 (L) 37.0 - 48.5 %    MCV 96 82 - 98 fL    MCH 30.5 27.0 - 31.0 pg    MCHC 31.7 (L) 32.0 - 36.0 g/dL    RDW 18.2 (H) 11.5 - 14.5 %    Platelets 103 (L) 150 - 450 K/uL    MPV 12.3 9.2 - 12.9 fL   ISTAT PROCEDURE    Collection Time: 02/10/22 10:32 AM   Result Value Ref Range    POC PH 7.359 7.35 - 7.45    POC PCO2 31.2 (L) 35 - 45 mmHg    POC PO2 89 80 - 100 mmHg    POC HCO3 17.6 (L) 24 - 28 mmol/L    POC BE -8 -2 to 2 mmol/L    POC SATURATED O2 97 95 - 100 %    POC TCO2 18 (L) 23 - 27 mmol/L    Rate 34     Sample ARTERIAL     Site Kailee/UAC     Allens Test N/A     DelSys Adult Vent     Mode AC/PRVC     Vt 300     PEEP 6     FiO2 70        Significant Imaging:    CXR - 2/10/22 stable bilateral patchy infiltrates and small bilateral pleural effusions    Assessment/Plan:       76F with pseudomonas pneumonia and concern possible coinfection or alternate source.    Pneumonia  - continue meropenem 1g IV 8  - continue vancomycin  - repeat blood cultures  - Urinalysis with reflex to culture, patient is oliguric  - WBC 22 today, continue daily CBC    VTE Risk Mitigation (From admission, onward)         Ordered     heparin (porcine) injection 1,000 Units  As needed (PRN)         01/29/22 1407     IP VTE HIGH RISK PATIENT  Once         01/05/22 1433     Place sequential compression device  Until discontinued         01/05/22 1356                 To be discussed with team.  Marco A Louise MS4

## 2022-02-10 NOTE — PLAN OF CARE
Recommendations    1. When medically able, with current propofol rate rec resuming Novasource Renal @ goal 25 mL/hr providing pt with 1200 kcal, 54 g protein, and 430 mL free water    If propofol decreases/discontinunes, rec increasing TF to 35 mL hr - 1680 kcal, 76 g protein, and 602 mL free water    2. If TPN warranted, rec Custom TPN 150g D, 75 g AA + IV lipids providing pt with 1310 kcal and 75 g protein, GIR 2.1    3. Monitor and follow-up    Goals: Pt to meet >/= 75% EEN/EPN by f/u date  Nutrition Goal Status: goal met  Communication of RD Recs: reviewed with physician (RN, POC)

## 2022-02-10 NOTE — CONSULTS
"[facility] - Surgical Intensive Care  Infectious Disease  Consult Note    Patient Name: Orion Ty  MRN: [id number]  Admission Date: 1/5/2022  Hospital Length of Stay: 36 days  Attending Physician: Dennis Haider MD  Primary Care Provider: Otis Zimmer MD     Isolation Status: No active isolations    Patient information was obtained from past medical records and ER records.      Consults  Assessment/Plan:     Ventilator associated pneumonia  Hypothermic and with down trend in leukocytosis. Sputum cultures on 2/4 with P aeruginosa. On increasing pressor requirements yesterday.   · Can continue meropenem for now.   · Will get blood cultures to assess for other infections.   · If ventilator requirements increase then would favor repeat sputum cultures.       Thank you for your consult. I will follow-up with patient. Please contact us if you have any additional questions.    Ines Parry MD  Infectious Disease  [facility] - Surgical Intensive Care    Subjective:     Principal Problem: S/P aortic valve replacement    HPI: A 76-year-old woman with CAD, chronic Afib, rheumatic heart disease, s/p aortic valve replacement, MV replacement, and TV repair who was admitted for redo aortic valve replacement which she subsequently underwent on 1/5/22. Unfortunately, she has had a prolonged hospital course in the surgical ICU. On 2/4 she developed fever, worsening respiratory status and "waxing/waning mental status". She was started on empiric antibiotics and sputum cultures subsequently grew Pseudomonas aeruginosa. Yesterday, she developed increasing pressor requirements and ventilatory support.       Infectious Diseases consulted for "pneumosepsis with known species; not responding to abx"            Past Medical History:   Diagnosis Date    A-fib     Anticoagulant long-term use     Closed displaced fracture of distal phalanx of lesser toe 10/20/2015    Right    Coronary artery disease     Diabetes mellitus  "    Diabetes mellitus, type 2     Hypertension     Mechanical heart valve present     X's two    Osteopenia     Pacemaker     Rheumatic heart disease        Past Surgical History:   Procedure Laterality Date    CARDIAC PACEMAKER PLACEMENT      CARDIAC VALVE REPLACEMENT      CARDIAC VALVE SURGERY      CORONARY ANGIOGRAPHY N/A 12/21/2021    Procedure: ANGIOGRAM, CORONARY ARTERY;  Surgeon: Devonte Salazar MD;  Location: [facility] CATH LAB;  Service: Cardiology;  Laterality: N/A;    ESOPHAGOGASTRODUODENOSCOPY W/ PEG N/A 1/21/2022    Procedure: EGD, WITH PEG TUBE INSERTION;  Surgeon: Kam Storey MD;  Location: [facility] OR [address] [place] [address];  Service: General;  Laterality: N/A;    EYE SURGERY Bilateral     cataract    FLUOROSCOPY N/A 12/11/2020    Procedure: FLUOROSCOPY;  Surgeon: Mckay Calvo MD;  Location: [facility] CATH LAB;  Service: Cardiology;  Laterality: N/A;    PLACEMENT OF DUAL-LUMEN VASCULAR CATHETER N/A 1/21/2022    Procedure: INSERTION-CATHETER-ROSELINE;  Surgeon: Kam Storey MD;  Location: [address];  Service: General;  Laterality: N/A;    REPLACEMENT OF AORTIC VALVE WITH REPEAT STERNOTOMY N/A 1/5/2022    Procedure: REPLACEMENT, AORTIC VALVE, WITH REPEAT STERNOTOMY;  Surgeon: Dennis Haider MD;  Location: [address];  Service: Cardiothoracic;  Laterality: N/A;    REPLACEMENT OF PACEMAKER GENERATOR N/A 3/2/2020    Procedure: REPLACEMENT, PACEMAKER GENERATOR;  Surgeon: Mark Ring MD;  Location: [facility] EP LAB;  Service: Cardiology;  Laterality: N/A;  TBS/CHRISTI, Single PPM gen change, Right side, MDT, MAC, SK, 3 Prep    thryoid s      TRACHEOSTOMY N/A 1/21/2022    Procedure: CREATION, TRACHEOSTOMY;  Surgeon: Kam Storey MD;  Location: [address];  Service: General;  Laterality: N/A;       Review of patient's allergies indicates:  No Known Allergies    Medications:  Medications Prior to Admission   Medication Sig    enoxaparin (LOVENOX) 40 mg/0.4 mL Syrg Inject 0.4 mLs (40  "mg total) into the skin every 12 (twelve) hours.    metFORMIN (GLUCOPHAGE) 500 MG tablet TAKE 1 TABLET EVERY DAY (Patient taking differently: daily with breakfast.)    metoprolol tartrate (LOPRESSOR) 25 MG tablet TAKE 1 TABLET BY MOUTH TWICE DAILY    omega-3 acid ethyl esters (LOVAZA) 1 gram capsule TK 2 CS PO BID    amLODIPine (NORVASC) 5 MG tablet Take 1 tablet (5 mg total) by mouth once daily.    amoxicillin (AMOXIL) 875 MG tablet     furosemide (LASIX) 40 MG tablet Take 40 mg by mouth 2 (two) times daily.    lancets 30 gauge Misc Use as directed to check blood sugar twice a day (Patient not taking: No sig reported)    losartan (COZAAR) 25 MG tablet Take 1 tablet (25 mg total) by mouth once daily. (Patient not taking: No sig reported)    mv,calcium,min/iron/folic/vitK (ONE-A-DAY WOMEN'S COMPLETE ORAL) Take 830 tablets by mouth Daily.    nitroGLYCERIN (NITROSTAT) 0.4 MG SL tablet DISSOLVE 1 TABLET UNDER THE TONGUE EVERY 5 MINUTES AS NEEDED FOR CHEST PAINS    pravastatin (PRAVACHOL) 20 MG tablet Take 1 tablet (20 mg total) by mouth once daily. (Patient not taking: No sig reported)    warfarin (COUMADIN) 4 MG tablet Take 1 tablet on Monday and Friday.  Take 1/2 tablet on all other days. (Patient not taking: No sig reported)     Antibiotics (From admission, onward)            Start     Stop Route Frequency Ordered    02/10/22 2118  meropenem-0.9% sodium chloride 1 g/50 mL IVPB         -- IV Every 12 hours (non-standard times) 02/10/22 1509    02/10/22 1051  vancomycin - pharmacy to dose  (vancomycin IVPB)        "And" Linked Group Details    -- IV pharmacy to manage frequency 02/10/22 0951    01/05/22 2100  mupirocin 2 % ointment         01/10 2059 Nasl 2 times daily 01/05/22 1356    01/05/22 2000  cefazolin (ANCEF) 2 gram in dextrose 5% 50 mL IVPB (premix)         01/06 1959 IV Every 8 hours (non-standard times) 01/05/22 1356        Antifungals (From admission, onward)            None        Antivirals " (From admission, onward)    None           Immunization History   Administered Date(s) Administered    COVID-19, MRNA, LN-S, PF (Pfizer) (Purple Cap) 01/14/2021, 02/04/2021    Influenza 10/15/2013    Influenza (FLUAD) - Quadrivalent - Adjuvanted - PF *Preferred* (65+) 10/21/2021    Influenza - High Dose - PF (65 years and older) 10/20/2015, 09/12/2017, 10/14/2018, 10/14/2019    Influenza - Quadrivalent - PF *Preferred* (6 months and older) 09/14/2020    Influenza - Trivalent (ADULT) 10/15/2013    Pneumococcal Conjugate - 13 Valent 03/24/2017    Pneumococcal Polysaccharide - 23 Valent 09/12/2018       Family History     Problem Relation (Age of Onset)    Breast cancer Maternal Aunt        Social History     Socioeconomic History    Marital status:    Tobacco Use    Smoking status: Never Smoker    Smokeless tobacco: Never Used   Substance and Sexual Activity    Alcohol use: No    Drug use: Never     Review of Systems   Unable to perform ROS: Mental status change     Objective:     Vital Signs (Most Recent):  Temp: 97.8 °F (36.6 °C) (02/10/22 1900)  Pulse: 79 (02/10/22 2030)  Resp: (!) 30 (02/10/22 2030)  BP: (!) 115/59 (02/10/22 2000)  SpO2: (!) 90 % (02/10/22 2030) Vital Signs (24h Range):  Temp:  [93.8 °F (34.3 °C)-98.7 °F (37.1 °C)] 97.8 °F (36.6 °C)  Pulse:  [] 79  Resp:  [30-34] 30  SpO2:  [89 %-100 %] 90 %  BP: ()/(51-66) 115/59  Arterial Line BP: ()/(40-62) 107/46     Weight: 49 kg (108 lb)  Body mass index is 20.41 kg/m².    Estimated Creatinine Clearance: 32.3 mL/min (based on SCr of 1.1 mg/dL).    Physical Exam  Vitals and nursing note reviewed.   Constitutional:       Appearance: She is well-developed and well-nourished. She is ill-appearing.      Interventions: She is sedated, chemically paralyzed and intubated.   HENT:      Head: Normocephalic and atraumatic.      Right Ear: External ear normal.      Left Ear: External ear normal.      Mouth/Throat:      Comments:  Bleeding from oral mucosa  Eyes:      General: No scleral icterus.        Right eye: No discharge.         Left eye: No discharge.   Cardiovascular:      Rate and Rhythm: Normal rate and regular rhythm.      Heart sounds: Normal heart sounds. No murmur heard.  No friction rub. No gallop.    Pulmonary:      Effort: Pulmonary effort is normal. No respiratory distress. She is intubated.      Breath sounds: Normal breath sounds. No stridor. No wheezing or rales.   Chest:      Comments: Midline sternotomy site without erythema or purulence.   Abdominal:      General: Bowel sounds are decreased.   Musculoskeletal:         General: No tenderness or edema.      Right lower leg: No edema.      Left lower leg: No edema.   Skin:     General: Skin is warm and dry.   Neurological:      Mental Status: She is lethargic.         Significant Labs:   Blood Culture:   Recent Labs   Lab 02/03/22  1542 02/03/22  1550   LABBLOO No growth after 5 days. No growth after 5 days.     BMP:   Recent Labs   Lab 02/10/22  1410   *   *   K 4.1      CO2 20*   BUN 28*   CREATININE 1.1   CALCIUM 8.3*   MG 2.5     CBC:   Recent Labs   Lab 02/09/22  2206 02/10/22  0243 02/10/22  0930   WBC 24.04* 22.28* 14.84*   HGB 7.1* 7.1* 8.0*   HCT 23.1* 23.1* 25.2*   * 133* 103*     Respiratory Culture:   Recent Labs   Lab 02/04/22  0926   GSRESP Few WBC's  Rare Gram negative rods   RESPIRATORYC No S aureus isolated.  PSEUDOMONAS AERUGINOSA  Moderate  *     Urine Culture:   Recent Labs   Lab 11/11/21  0941   LABURIN No significant growth     Urine Studies:   Recent Labs   Lab 01/03/22  1222   COLORU Yellow   APPEARANCEUA Hazy*   PHUR 5.0   SPECGRAV 1.020   PROTEINUA Negative   GLUCUA Negative   KETONESU Negative   BILIRUBINUA Negative   OCCULTUA Negative   NITRITE Negative   LEUKOCYTESUR Negative   RBCUA 2   WBCUA 3   BACTERIA Moderate*   SQUAMEPITHEL 1   HYALINECASTS 9*     Wound Culture: No results for input(s): LABAERO in the last  4320 hours.    Significant Imaging: I have reviewed all pertinent imaging results/findings within the past 24 hours.

## 2022-02-10 NOTE — NURSING
MD Rios notified HR increased to 135 for 1 minute then decreased back to 80 bpm. Orders placed for STAT EKG if this happens again. Continuing to monitor closely.

## 2022-02-10 NOTE — PROGRESS NOTES
[facility] - [facility] Intensive Care  Adult Nutrition  Progress Note    SUMMARY       Recommendations    1. When medically able, with current propofol rate rec resuming Novasource Renal @ goal 25 mL/hr providing pt with 1200 kcal, 54 g protein, and 430 mL free water    If propofol decreases/discontinunes, rec increasing TF to 35 mL hr - 1680 kcal, 76 g protein, and 602 mL free water    2. If TPN warranted, rec Custom TPN 150g D, 75 g AA + IV lipids providing pt with 1310 kcal and 75 g protein, GIR 2.1    3. Monitor and follow-up    Goals: Pt to meet >/= 75% EEN/EPN by f/u date  Nutrition Goal Status: goal met  Communication of RD Recs: reviewed with physician (RN, YAMILETH)    Assessment and Plan    Nutrition Problem  Severe Protein-Calorie Malnutrition  Malnutrition in the context of Chronic Illness     Related to (etiology):   Inability to consume sufficient energy and protein intake      Signs and Symptoms (as evidenced by):   Energy Intake: Suspected </= 75% of estimated energy requirement for >/= 1 month  Body Fat Depletion: Severe depletion of orbitals  Muscle Mass Depletion: Severe depletion of temples and clavicle region  Weight Loss: 13% x 1 month        Interventions/Recommendations (treatment strategy):  1. Enteral nutrition  2. Collaboration of nutrition care with other providers     Nutrition Diagnosis Status:   Continues       Malnutrition Assessment  Malnutrition Type: chronic illness  Energy Intake: severe energy intake          Weight Loss (Malnutrition): greater than 5% in 1 month  Energy Intake (Malnutrition): less than 75% for greater than or equal to 1 month  Subcutaneous Fat (Malnutrition): severe depletion  Muscle Mass (Malnutrition): severe depletion   Orbital Region (Subcutaneous Fat Loss): severe depletion   Hinduism Region (Muscle Loss): severe depletion  Clavicle Bone Region (Muscle Loss): severe depletion       Subcutaneous Fat Loss (Final Summary): severe protein-calorie malnutrition  Muscle  "Loss Evaluation (Final Summary): severe protein-calorie malnutrition    Severe Weight Loss (Malnutrition): greater than 5% in 1 month    Reason for Assessment    Reason For Assessment: RD follow-up  Diagnosis: cardiac disease  Relevant Medical History: CAD, DM, HTN, pacemaker  Interdisciplinary Rounds: did not attend    General Information Comments: S/p trach/PEG. TF held due to increased pressor support. Paralyzed/sedated over night. Receiving SCUF. Wt gain noted and stable so far at 108#. NFPE completed on ; pt continues with severe malnutrition in the context of chronic illness per ASPEN guidelines.    Nutrition Discharge Planning: Adequate nutrition via TFs through PEG    Nutrition Risk Screen    Nutrition Risk Screen: tube feeding or parenteral nutrition    Nutrition/Diet History    Patient Reported Diet/Restrictions/Preferences: low salt  Spiritual, Cultural Beliefs, Gnosticism Practices, Values that Affect Care: no  Food Allergies: NKFA  Factors Affecting Nutritional Intake: NPO,on mechanical ventilation    Anthropometrics    Temp: (!) 93.8 °F (34.3 °C) (linda hugger applied)  Height: 5' 1" (154.9 cm)  Height (inches): 61 in  Weight Method: Bed Scale  Weight: 49 kg (108 lb)  Weight (lb): 108 lb  Ideal Body Weight (IBW), Female: 105 lb  % Ideal Body Weight, Female (lb): 102.86 %  BMI (Calculated): 20.4  BMI Grade: 18.5-24.9 - normal  Usual Body Weight (UBW), k.25 kg  % Usual Body Weight: 87.6  % Weight Change From Usual Weight: -12.59 %       Lab/Procedures/Meds    Pertinent Labs Reviewed: reviewed  Pertinent Labs Comments: Na 131, CO2 21, glu 171, Phos 2.1, AST 49  Pertinent Medications Reviewed: reviewed  Pertinent Medications Comments: statin, insulin, pantoprazole, psyllium husk, vancomycin, NaCl, epi, fentanyl, propofol, vasopressin     Estimated/Assessed Needs    Weight Used For Calorie Calculations: 49 kg (108 lb 0.4 oz)  Energy Calorie Requirements (kcal): 1221 kcal/day  Energy Need Method: Jas " State  Protein Requirements: 59-74 g/day (1.2-1.5 g/kg)  Weight Used For Protein Calculations: 49 kg (108 lb 0.4 oz)  Fluid Requirements (mL): 1 mL/kcal or per MD  Estimated Fluid Requirement Method: RDA Method  RDA Method (mL): 1221  CHO Requirement: 150 g      Nutrition Prescription Ordered    Current Diet Order: NPO  Nutrition Order Comments: TF held  Current Nutrition Support Formula Ordered: Novasource Renal  Current Nutrition Support Rate Ordered: 35 (ml)  Current Nutrition Support Frequency Ordered: mL/hr x 24 hrs    Evaluation of Received Nutrient/Fluid Intake    Other calories: 40 kcals (propofol)  I/O: +11.3L since admit  Energy Calories Required: not meeting needs  Protein Required: not meeting needs  Fluid Required: not meeting needs  Comments: LBM: 2/6  Tolerance: tolerating  % Intake of Estimated Energy Needs: 0 - 25 %  % Meal Intake: NPO    Nutrition Risk    Level of Risk/Frequency of Follow-up: low     Monitor and Evaluation    Food and Nutrient Intake: energy intake,enteral nutrition intake  Food and Nutrient Adminstration: diet order,enteral and parenteral nutrition administration  Anthropometric Measurements: weight  Biochemical Data, Medical Tests and Procedures: electrolyte and renal panel,gastrointestinal profile,glucose/endocrine profile,inflammatory profile,lipid profile  Nutrition-Focused Physical Findings: overall appearance     Nutrition Follow-Up    RD Follow-up?: Yes

## 2022-02-10 NOTE — PROGRESS NOTES
Pharmacokinetic Initial Assessment: IV Vancomycin    Assessment/Plan:    - Patient previously on vancomycin during this admission, random level from 02:43 resulted as 4.4 mg/dl.  - Initiate vancomycin 750 mg x 1 dose, pulse dosing in the setting of RRT.  - Patient has JO requiring RRT, currently receiving continuous SLED.   - Desired empiric serum trough concentration is 15 to 20 mcg/mL.  - Draw vancomycin random level with AM labs tomorrow.  Pharmacy to re-dose based on level and RRT plans.     Pharmacy will continue to follow and monitor vancomycin.      Please contact pharmacy at extension [phone] with any questions regarding this assessment.     Thank you for the consult,   Lizett Pinto,  PharmD, BCCCP       Patient brief summary:  Orion Ty is a 76 y.o. female initiated on antimicrobial therapy with IV Vancomycin for treatment of suspected lower respiratory infection.    Drug Allergies:   Review of patient's allergies indicates:  No Known Allergies    Actual Body Weight:   49 kg    Renal Function:   Estimated Creatinine Clearance: 44.4 mL/min (based on SCr of 0.8 mg/dL).,     Dialysis Method (if applicable):  SLED    CBC (last 72 hours):  Recent Labs   Lab Result Units 02/08/22  0331 02/09/22  0315 02/09/22  0520 02/09/22  1233 02/09/22  2206 02/10/22  0243   WBC K/uL 19.51* 22.99* 21.55* 23.89* 24.04* 22.28*   Hemoglobin g/dL 7.3* 6.1* 6.1* 7.3* 7.1* 7.1*   Hematocrit % 23.3* 20.9* 21.2* 24.0* 23.1* 23.1*   Platelets K/uL 117* 118* 136* 131* 142* 133*   Gran % % 88.7* 84.2* 86.0* 80.0* 84.0* 85.5*   Lymph % % 4.0* 6.0* 6.5* 3.0* 4.0* 6.0*   Mono % % 4.3 5.0 3.6* 8.0 4.0 4.0   Eosinophil % % 0.4 0.3 0.3 1.0 1.0 0.0   Basophil % % 0.3 0.2 0.2 0.0 0.0 0.0   Differential Method  Automated Automated Automated Manual Manual Manual       Metabolic Panel (last 72 hours):  Recent Labs   Lab Result Units 02/07/22  1349 02/08/22  0331 02/08/22  1611 02/08/22  2217 02/09/22  0315 02/09/22  0520 02/09/22  1233  02/09/22  1236 02/09/22  2206 02/10/22  0243   Sodium mmol/L 129* 134* 132* 133* 132* 132* 134*  --  130*  130* 131*  131*  131*   Potassium mmol/L 4.2 4.0 4.4 4.6 4.4 4.4 4.9  --  4.3  4.3 4.3  4.3  4.3   Chloride mmol/L 97 101 100 103 103 103 103  --  99  99 99  100  100   CO2 mmol/L 20* 22* 21* 19* 18* 17* 20*  --  23  23 21*  22*  22*   Glucose mg/dL 231* 183* 175* 137* 243* 244* 128*  --  159*  159* 171*  170*  170*   BUN mg/dL 75* 47* 68* 65* 68* 73* 77*  --  41*  41* 20  21  21   Creatinine mg/dL 2.8* 1.8* 2.2* 2.1* 2.1* 2.3* 2.4*  --  1.3  1.3 0.8  0.8  0.8   Albumin g/dL 2.0* 2.0* 1.9* 1.8* 1.9* 1.9* 2.0*  --  1.9*  1.9* 2.0*  1.9*  1.9*   Total Bilirubin mg/dL  --  0.8  --   --  0.8 0.7 0.9  --   --  0.9   Alkaline Phosphatase U/L  --  129  --   --  120 118 118  --   --  139*   AST U/L  --  39  --   --  42* 37 39  --   --  49*   ALT U/L  --  25  --   --  22 21 22  --   --  23   Magnesium mg/dL 2.0 1.8 2.5 2.2 2.4  --  2.4  --  2.1  2.1 1.8  1.9  1.9   Phosphorus mg/dL 3.6 2.5* 3.8 4.3 4.8*  --   --  5.9* 3.3  3.3 2.1*  2.1*  2.1*       Drug levels (last 3 results):  Recent Labs   Lab Result Units 02/10/22  0243   Vancomycin, Random ug/mL 4.4       Microbiologic Results:  Microbiology Results (last 7 days)     Procedure Component Value Units Date/Time    Blood culture [[id number]] Collected: 02/03/22 1550    Order Status: Completed Specimen: Blood from Peripheral, Hand, Right Updated: 02/08/22 1812     Blood Culture, Routine No growth after 5 days.    Blood culture [[id number]] Collected: 02/03/22 1542    Order Status: Completed Specimen: Blood from Peripheral, Hand, Left Updated: 02/08/22 1812     Blood Culture, Routine No growth after 5 days.    Culture, Respiratory with Gram Stain [[id number]]  (Abnormal)  (Susceptibility) Collected: 02/04/22 0926    Order Status: Completed Specimen: Respiratory from Tracheal Aspirate Updated: 02/06/22 1425     Respiratory Culture No S aureus  isolated.      PSEUDOMONAS AERUGINOSA  Moderate       Gram Stain (Respiratory) Few WBC's     Gram Stain (Respiratory) Rare Gram negative rods    Culture, MRSA [[id number]] Collected: 02/03/22 1810    Order Status: Completed Specimen: MRSA source from Nares, Left Updated: 02/05/22 0804     MRSA Surveillance Screen No MRSA isolated

## 2022-02-10 NOTE — PLAN OF CARE
SICU PLAN OF CARE NOTE    Dx: History of mechanical aortic valve replacement    Shift Events: 1 amp bicarb given, see ABGs. 1 U PRBC. Able to titrate pressors down for MAP>60. On SLED 6 hrs, now on SCUF for 6 hr. Paralyzed, TOF Q1 hr, BIS 40-45.     Goals of Care: MAP>60    Neuro: Sedated    Respiratory: Ventilator 70%, 6 PEEP    Diet: NPO    Gtts: Propfol @ 5  Mcg/kg/min   Fentanyl @ 100  mcg/hr  Norepinephrine @ 0.02 mcg/kg/min  Vasopressin @ 0.04  Units/min  Epinephrine @ 0.02  mcg/kg/min  Cisatricurium @ 3  mcg/kg/min    Urine Output: Anuric     Drains: G-tube    CRRT SLED x 6 hr, then SCUF 6 hr.      Labs/Accuchecks: RFP trended. ABG Q3, trended. Accuchecks Q4 hr. Magnesium replaced with 2 g mag sulfate.     Skin: Foam applied to heels & sacrum. No new skin breakdown noted. Unable to turn to assess & provide wound care to sacrum due to unstable.

## 2022-02-10 NOTE — ASSESSMENT & PLAN NOTE
Orion Ty is a 76 y.o. female who presents to the SICU s/p redo aortic valve replacement with mechanical valve on 1/5/22.      Neuro/Psych:   -- Sedation: sedated with propofol, fentanyl; paralysis   -- Pain: fent gtt     Cards:    -- Pressors: vasopressor support decreased from yesterday afternoon  -- remains on 0.07 levo, 0.02 epi, 0.04 vaso  -- continue to wean vasopressors as tolerated  -- Goal MAP: 60-80  -- A-fib rate controlled.   -- PO amio TID  -- statin  -- continue to hold coumadin today  -- INR supratherapeutic  -- metoprolol 25 BID      Pulm:   -- Goal O2 sat > 90%  -- reintubated 1/12 then 1/18. S/p trach 1/21  -- intubated, paralyzed, on rate with improving ventilation  -- ABGs q4hrs  -- 2 mediastinal removed 1/9 L Pleural CT removed 1/8      Renal:  -- CRRT overnight  -- nephro following  -- Permcath 1/21  -- Removed R IJ trialysis 1/23      FEN / GI:   -- Replace lytes as needed  -- s/p PEG 1/21  -- Nutrition: tube feeds held given vasopressor requirement      ID:   -- WBC remains elevated  -- Pseudomonas in resp cx; sensitive to zosyn  -- Antibiotics: zosyn  -- Initiated broad spec abx on 2/3; narrowed 2/6  -- Blood Cx sent 2/3, NGTD        Heme/Onc:   -- Hgb stable  -- Daily CBC  -- Transfused products: 1U RBC on 1/5/22. 1u RBC and 1u FFP on 1/9/21. 2U RBC on 1/12. 4 FFP on 1/13. 1 FFP on 1/17, 2u pRBC 1/27, 1u pRBC 2/9  -- Anticoagulation: coumadin, supratherapeutic today; hold coumadin      Endo:   -- Gluc goal 140-180  -- Insulin per endocrinology      PPx:   Feeding: tube feeds held due to vasopressor requirements  Analgesia/Sedation: nimbex, propofol, fentanyl  Thromboembolic prevention: Coumadin, ASA  HOB >30: yes  Stress Ulcer ppx: protonix BID  Glucose control: Critical care goal 140-180 g/dl, ISS    Lines/Drains/Airway: Art line, Trach, Permcath, PEG      Dispo/Code Status/Palliative:   -- SICU / Full Code

## 2022-02-11 NOTE — CARE UPDATE
BG goal 140-180    -A1C   Lab Results   Component Value Date    HGBA1C 6.0 (H) 01/03/2022         -HOME REGIMEN: Metformin 500 mg daily    -INPATIENT REGIMEN: novolog 4 units     -GLUCOSE TREND FOR THE PAST 24HRS: 137-207    -NO HYPOGYCEMIAS NOTED        -Diet: Diet NPO Except for: Sips with Medication      -Tube Feeds - novasource renal at 35 cc/hr       Remains in ICU. Intubated,sedated, and paralyzed. Continues on high pressor requirements. BG reasonably well controlled on current SQ insulin regimen.     Plan:  Continue novolog 4 units every 4 hours while on TF; hold if TF held or BG less than 100.   BG monitoring every 4 hours and moderate dose correction scale.     Discharge planning:  tbd    Endocrine to continue to follow    ** Please call Endocrine for any BG related issues **

## 2022-02-11 NOTE — ASSESSMENT & PLAN NOTE
Hypothermic yest, but afebrile overnight. Sputum cultures on 2/4 with P aeruginosa. Vent requirements improving. On 3 pressors.  · Continue meropenem and vancomycin for now.   · Will f/u BCx   · If ventilator requirements increase then would favor repeat sputum cultures.   · Consider removal of midline (placed 30 days ago) for source control

## 2022-02-11 NOTE — MEDICAL/APP STUDENT
Josue Manzanares  Surgical Intensive Care  [facility] Medicine  Student Progress Note    Patient Name: Orion Ty  MRN: [id number]   Admission Date: 1/5/2022  Length of Stay: 37 days  Attending Physician: Dennis Haider MD        Subjective:     Principal Problem:S/P aortic valve replacement  Please see H&P for detailed presenting history and ROS.    Interval History:    No acute events overnight. The patient is improving slowly with respect to pressor requirements. She has remained acidotic. No increases in ventilator requirements. WBC from 24 to 14 to 17 over last 24 hours. Suspect slight increase due to steroid administration. Currently on meropenem IV 1g q12. CXR remains unchanged      Review of Systems   Unable to perform ROS: Intubated       Objective:     Vital Signs (Most Recent):  Temp: 99 °F (37.2 °C) (02/11/22 0715)  Pulse: 79 (02/11/22 0945)  Resp: (!) 30 (02/11/22 0945)  BP: (!) 105/52 (02/11/22 0900)  SpO2: (!) 91 % (02/11/22 0945) Vital Signs (24h Range):  Temp:  [97.7 °F (36.5 °C)-99 °F (37.2 °C)] 99 °F (37.2 °C)  Pulse:  [] 79  Resp:  [30-34] 30  SpO2:  [89 %-98 %] 91 %  BP: ()/(50-66) 105/52  Arterial Line BP: ()/(44-62) 122/49     Weight: 49 kg (108 lb)  Body mass index is 20.41 kg/m².    Intake/Output Summary (Last 24 hours) at 2/11/2022 0955  Last data filed at 2/11/2022 0900  Gross per 24 hour   Intake 6343.81 ml   Output 9151 ml   Net -2807.19 ml      Physical Exam  Constitutional:       Appearance: She is ill-appearing and toxic-appearing.   HENT:      Head: Normocephalic and atraumatic.      Right Ear: External ear normal.      Left Ear: External ear normal.      Nose: Nose normal.      Mouth/Throat:      Mouth: Mucous membranes are dry.      Pharynx: Oropharyngeal exudate present.   Eyes:      General:         Right eye: No discharge.         Left eye: No discharge.   Cardiovascular:      Rate and Rhythm: Normal rate and regular rhythm.      Pulses: Normal pulses.      Heart  sounds: Normal heart sounds.      Comments: Clicking of mechanical valves  Pulmonary:      Effort: Pulmonary effort is normal.      Breath sounds: No rhonchi or rales.      Comments: On ventilator  Abdominal:      General: Abdomen is flat. Bowel sounds are normal.      Tenderness: There is no abdominal tenderness.   Musculoskeletal:         General: No deformity or signs of injury.      Cervical back: No rigidity.   Skin:     Coloration: Skin is not jaundiced.      Findings: No rash.   Neurological:      Comments: Sedated    Psychiatric:      Comments: Sedated       Significant Labs:   Recent Results (from the past 24 hour(s))   ISTAT PROCEDURE    Collection Time: 02/10/22 10:32 AM   Result Value Ref Range    POC PH 7.359 7.35 - 7.45    POC PCO2 31.2 (L) 35 - 45 mmHg    POC PO2 89 80 - 100 mmHg    POC HCO3 17.6 (L) 24 - 28 mmol/L    POC BE -8 -2 to 2 mmol/L    POC SATURATED O2 97 95 - 100 %    POC TCO2 18 (L) 23 - 27 mmol/L    Rate 34     Sample ARTERIAL     Site Kailee/UAC     Allens Test N/A     DelSys Adult Vent     Mode AC/PRVC     Vt 300     PEEP 6     FiO2 70    POCT glucose    Collection Time: 02/10/22 11:49 AM   Result Value Ref Range    POCT Glucose 267 (H) 70 - 110 mg/dL   POCT glucose    Collection Time: 02/10/22 11:52 AM   Result Value Ref Range    POCT Glucose 236 (H) 70 - 110 mg/dL   ISTAT PROCEDURE    Collection Time: 02/10/22  1:29 PM   Result Value Ref Range    POC PH 7.354 7.35 - 7.45    POC PCO2 32.4 (L) 35 - 45 mmHg    POC PO2 80 80 - 100 mmHg    POC HCO3 18.0 (L) 24 - 28 mmol/L    POC BE -8 -2 to 2 mmol/L    POC SATURATED O2 95 95 - 100 %    POC TCO2 19 (L) 23 - 27 mmol/L    Rate 34     Sample ARTERIAL     Site Kailee/UAC     Allens Test N/A     DelSys Adult Vent     Mode AC/PRVC     Vt 300     PEEP 6     FiO2 65    Renal function panel    Collection Time: 02/10/22  2:10 PM   Result Value Ref Range    Glucose 162 (H) 70 - 110 mg/dL    Sodium 135 (L) 136 - 145 mmol/L    Potassium 4.1 3.5 - 5.1  mmol/L    Chloride 102 95 - 110 mmol/L    CO2 20 (L) 23 - 29 mmol/L    BUN 28 (H) 8 - 23 mg/dL    Calcium 8.3 (L) 8.7 - 10.5 mg/dL    Creatinine 1.1 0.5 - 1.4 mg/dL    Albumin 1.8 (L) 3.5 - 5.2 g/dL    Phosphorus 2.6 (L) 2.7 - 4.5 mg/dL    eGFR if  56.0 (A) >60 mL/min/1.73 m^2    eGFR if non  48.5 (A) >60 mL/min/1.73 m^2    Anion Gap 13 8 - 16 mmol/L   Magnesium    Collection Time: 02/10/22  2:10 PM   Result Value Ref Range    Magnesium 2.5 1.6 - 2.6 mg/dL   Blood culture    Collection Time: 02/10/22  4:13 PM    Specimen: Peripheral, Lower Arm, Right; Blood   Result Value Ref Range    Blood Culture, Routine No Growth to date    Blood culture    Collection Time: 02/10/22  4:13 PM    Specimen: Peripheral, Lower Arm, Right; Blood   Result Value Ref Range    Blood Culture, Routine No Growth to date    ISTAT PROCEDURE    Collection Time: 02/10/22  4:32 PM   Result Value Ref Range    POC PH 7.362 7.35 - 7.45    POC PCO2 32.6 (L) 35 - 45 mmHg    POC PO2 98 80 - 100 mmHg    POC HCO3 18.5 (L) 24 - 28 mmol/L    POC BE -7 -2 to 2 mmol/L    POC SATURATED O2 97 95 - 100 %    POC TCO2 19 (L) 23 - 27 mmol/L    Rate 33     Sample ARTERIAL     Site Kailee/UAC     Allens Test N/A     DelSys Adult Vent     Mode AC/PRVC     Vt 300     PEEP 6     FiO2 60    POCT glucose    Collection Time: 02/10/22  5:15 PM   Result Value Ref Range    POCT Glucose 150 (H) 70 - 110 mg/dL   POCT glucose    Collection Time: 02/10/22  7:26 PM   Result Value Ref Range    POCT Glucose 168 (H) 70 - 110 mg/dL   ISTAT PROCEDURE    Collection Time: 02/10/22  7:35 PM   Result Value Ref Range    POC PH 7.318 (L) 7.35 - 7.45    POC PCO2 33.9 (L) 35 - 45 mmHg    POC PO2 77 (L) 80 - 100 mmHg    POC HCO3 17.4 (L) 24 - 28 mmol/L    POC BE -9 -2 to 2 mmol/L    POC SATURATED O2 94 (L) 95 - 100 %    POC TCO2 18 (L) 23 - 27 mmol/L    Rate 30     Sample ARTERIAL     Site Kailee/UAC     Allens Test N/A     DelSys Adult Vent     Mode AC/PRVC      Vt 300     PEEP 5     PiP 40     FiO2 50     Min Vol 10     Sp02 96    Renal function panel    Collection Time: 02/10/22 10:05 PM   Result Value Ref Range    Glucose 193 (H) 70 - 110 mg/dL    Sodium 134 (L) 136 - 145 mmol/L    Potassium 4.7 3.5 - 5.1 mmol/L    Chloride 103 95 - 110 mmol/L    CO2 16 (L) 23 - 29 mmol/L    BUN 32 (H) 8 - 23 mg/dL    Calcium 8.5 (L) 8.7 - 10.5 mg/dL    Creatinine 1.2 0.5 - 1.4 mg/dL    Albumin 2.0 (L) 3.5 - 5.2 g/dL    Phosphorus 3.8 2.7 - 4.5 mg/dL    eGFR if  50.4 (A) >60 mL/min/1.73 m^2    eGFR if non  43.7 (A) >60 mL/min/1.73 m^2    Anion Gap 15 8 - 16 mmol/L   Magnesium    Collection Time: 02/10/22 10:05 PM   Result Value Ref Range    Magnesium 2.5 1.6 - 2.6 mg/dL   Type & Screen    Collection Time: 02/10/22 10:05 PM   Result Value Ref Range    Group & Rh A POS     Indirect Sabian NEG    ISTAT PROCEDURE    Collection Time: 02/10/22 10:24 PM   Result Value Ref Range    POC PH 7.280 (LL) 7.35 - 7.45    POC PCO2 36.8 35 - 45 mmHg    POC PO2 67 (L) 80 - 100 mmHg    POC HCO3 17.3 (L) 24 - 28 mmol/L    POC BE -9 -2 to 2 mmol/L    POC SATURATED O2 91 (L) 95 - 100 %    POC TCO2 18 (L) 23 - 27 mmol/L    Rate 30     Sample ARTERIAL     Site Kailee/UAC     Allens Test N/A     DelSys PRB     Mode AC/PRVC     Vt 300     PEEP 5    POCT glucose    Collection Time: 02/11/22 12:09 AM   Result Value Ref Range    POCT Glucose 207 (H) 70 - 110 mg/dL   ISTAT PROCEDURE    Collection Time: 02/11/22  1:39 AM   Result Value Ref Range    POC PH 7.272 (LL) 7.35 - 7.45    POC PCO2 36.0 35 - 45 mmHg    POC PO2 67 (L) 80 - 100 mmHg    POC HCO3 16.6 (L) 24 - 28 mmol/L    POC BE -10 -2 to 2 mmol/L    POC SATURATED O2 90 (L) 95 - 100 %    POC TCO2 18 (L) 23 - 27 mmol/L    Rate 5     Sample ARTERIAL     Site Kailee/UAC     Allens Test N/A     DelSys Adult Vent     Mode AC/PRVC     Vt 300    CBC auto differential    Collection Time: 02/11/22  2:58 AM   Result Value Ref Range    WBC  17.72 (H) 3.90 - 12.70 K/uL    RBC 2.80 (L) 4.00 - 5.40 M/uL    Hemoglobin 8.5 (L) 12.0 - 16.0 g/dL    Hematocrit 27.3 (L) 37.0 - 48.5 %    MCV 98 82 - 98 fL    MCH 30.4 27.0 - 31.0 pg    MCHC 31.1 (L) 32.0 - 36.0 g/dL    RDW 19.6 (H) 11.5 - 14.5 %    Platelets 111 (L) 150 - 450 K/uL    MPV 12.4 9.2 - 12.9 fL    Immature Granulocytes CANCELED 0.0 - 0.5 %    Immature Grans (Abs) CANCELED 0.00 - 0.04 K/uL    Lymph # CANCELED 1.0 - 4.8 K/uL    Mono # CANCELED 0.3 - 1.0 K/uL    Eos # CANCELED 0.0 - 0.5 K/uL    Baso # CANCELED 0.00 - 0.20 K/uL    nRBC 6 (A) 0 /100 WBC    Gran % 95.0 (H) 38.0 - 73.0 %    Lymph % 3.0 (L) 18.0 - 48.0 %    Mono % 2.0 (L) 4.0 - 15.0 %    Eosinophil % 0.0 0.0 - 8.0 %    Basophil % 0.0 0.0 - 1.9 %    Platelet Estimate Decreased (A)     Aniso Slight     Poik Slight     Poly Occasional     Hypo Occasional     Monserrat Cells Occasional     Differential Method Manual    Magnesium    Collection Time: 02/11/22  2:58 AM   Result Value Ref Range    Magnesium 2.6 1.6 - 2.6 mg/dL   Phosphorus    Collection Time: 02/11/22  2:58 AM   Result Value Ref Range    Phosphorus 4.6 (H) 2.7 - 4.5 mg/dL   Prealbumin    Collection Time: 02/11/22  2:58 AM   Result Value Ref Range    Prealbumin 16 (L) 20 - 43 mg/dL   C-reactive protein    Collection Time: 02/11/22  2:58 AM   Result Value Ref Range    .9 (H) 0.0 - 8.2 mg/L   Comprehensive Metabolic Panel    Collection Time: 02/11/22  2:58 AM   Result Value Ref Range    Sodium 131 (L) 136 - 145 mmol/L    Potassium 4.6 3.5 - 5.1 mmol/L    Chloride 103 95 - 110 mmol/L    CO2 15 (L) 23 - 29 mmol/L    Glucose 179 (H) 70 - 110 mg/dL    BUN 37 (H) 8 - 23 mg/dL    Creatinine 1.5 (H) 0.5 - 1.4 mg/dL    Calcium 8.5 (L) 8.7 - 10.5 mg/dL    Total Protein 6.5 6.0 - 8.4 g/dL    Albumin 2.1 (L) 3.5 - 5.2 g/dL    Total Bilirubin 1.2 (H) 0.1 - 1.0 mg/dL    Alkaline Phosphatase 131 55 - 135 U/L    AST 47 (H) 10 - 40 U/L    ALT 22 10 - 44 U/L    Anion Gap 13 8 - 16 mmol/L    eGFR if   38.5 (A) >60 mL/min/1.73 m^2    eGFR if non  33.4 (A) >60 mL/min/1.73 m^2   Protime-INR    Collection Time: 02/11/22  2:58 AM   Result Value Ref Range    Prothrombin Time 24.3 (H) 9.0 - 12.5 sec    INR 2.5 (H) 0.8 - 1.2   APTT    Collection Time: 02/11/22  2:58 AM   Result Value Ref Range    aPTT 44.7 (H) 21.0 - 32.0 sec   Renal function panel    Collection Time: 02/11/22  2:58 AM   Result Value Ref Range    Glucose 180 (H) 70 - 110 mg/dL    Sodium 123 (L) 136 - 145 mmol/L    Potassium 4.6 3.5 - 5.1 mmol/L    Chloride 103 95 - 110 mmol/L    CO2 14 (L) 23 - 29 mmol/L    BUN 36 (H) 8 - 23 mg/dL    Calcium 8.5 (L) 8.7 - 10.5 mg/dL    Creatinine 1.5 (H) 0.5 - 1.4 mg/dL    Albumin 2.1 (L) 3.5 - 5.2 g/dL    Phosphorus 4.7 (H) 2.7 - 4.5 mg/dL    eGFR if  38.5 (A) >60 mL/min/1.73 m^2    eGFR if non  33.4 (A) >60 mL/min/1.73 m^2    Anion Gap 6 (L) 8 - 16 mmol/L   Vancomycin, Random    Collection Time: 02/11/22  2:58 AM   Result Value Ref Range    Vancomycin, Random 17.8 Not established ug/mL   POCT glucose    Collection Time: 02/11/22  3:50 AM   Result Value Ref Range    POCT Glucose 188 (H) 70 - 110 mg/dL   ISTAT PROCEDURE    Collection Time: 02/11/22  4:49 AM   Result Value Ref Range    POC PH 7.264 (LL) 7.35 - 7.45    POC PCO2 34.8 (L) 35 - 45 mmHg    POC PO2 83 80 - 100 mmHg    POC HCO3 15.7 (L) 24 - 28 mmol/L    POC BE -11 -2 to 2 mmol/L    POC SATURATED O2 95 95 - 100 %    POC TCO2 17 (L) 23 - 27 mmol/L    Rate 30     Sample ARTERIAL     Site Kailee/UAC     Basils Test N/A     DelSys Adult Vent     Mode AC/PRVC     Vt 300     PEEP 5     PiP 41     FiO2 50     Min Vol 10    POCT METHEMOGLOBIN Daily    Collection Time: 02/11/22  5:49 AM   Result Value Ref Range    Methemoglobin 0.6 0 - 3 %   POCT glucose    Collection Time: 02/11/22  7:58 AM   Result Value Ref Range    POCT Glucose 137 (H) 70 - 110 mg/dL   ISTAT PROCEDURE    Collection Time: 02/11/22  8:26  AM   Result Value Ref Range    POC PH 7.235 (LL) 7.35 - 7.45    POC PCO2 34.8 (L) 35 - 45 mmHg    POC PO2 89 80 - 100 mmHg    POC HCO3 14.8 (L) 24 - 28 mmol/L    POC BE -13 -2 to 2 mmol/L    POC SATURATED O2 95 95 - 100 %    POC TCO2 16 (L) 23 - 27 mmol/L    Sample ARTERIAL     Site Kailee/UAC     Allens Test N/A        Significant Imaging:    CXR 2/11/22 - no interval changes from 2/10/22  Blood cultures from 2/10/22 no growth to date    Assessment/Plan:     76F s/p AVR on 1/5/22 with positive sputum culture for pseudomonas with persistently elevated WBC despite zosyn. Remains intubated and on dialysis from 2/9/22.     Pneumonia  - CXR stable, continue daily evaluation  - WBC from 24 to 14 to 27.7, continue to trend   - continue meropenem IV 1g q12  - follow blood cultures  - sputum culture if ventilator requirements increase    VTE Risk Mitigation (From admission, onward)         Ordered     warfarin (COUMADIN) tablet 1 mg  Once         02/11/22 0955     heparin (porcine) injection 1,000 Units  As needed (PRN)         01/29/22 1407     IP VTE HIGH RISK PATIENT  Once         01/05/22 1433     Place sequential compression device  Until discontinued         01/05/22 1356                 To be discussed with team.  Marco A Louise MS4

## 2022-02-11 NOTE — PROGRESS NOTES
"[facility] - Surgical Intensive Care  Infectious Disease  Progress Note    Patient Name: Orion Ty  MRN: [id number]  Admission Date: 1/5/2022  Length of Stay: 37 days  Attending Physician: Dennis Haider MD  Primary Care Provider: Otis Zimemr MD    Isolation Status: No active isolations  Assessment/Plan:      Ventilator associated pneumonia  Hypothermic yest, but afebrile overnight. Sputum cultures on 2/4 with P aeruginosa. Vent requirements improving. On 3 pressors. BCx 2/10 NGTD.  · Continue meropenem    · Will f/u BCx   · If ventilator requirements increase then would favor repeat sputum cultures.           Anticipated Disposition: tbd    Thank you for your consult. I will follow-up with patient. Please contact us if you have any additional questions.    Naomie Cervantes MD  Infectious Disease  [facility] - Surgical Intensive Care    Subjective:     Principal Problem:S/P aortic valve replacement    HPI: A 76-year-old woman with CAD, chronic Afib, rheumatic heart disease, s/p aortic valve replacement, MV replacement, and TV repair who was admitted for redo aortic valve replacement which she subsequently underwent on 1/5/22. Unfortunately, she has had a prolonged hospital course in the surgical ICU. On 2/4 she developed fever, worsening respiratory status and "waxing/waning mental status". She was started on empiric antibiotics and sputum cultures subsequently grew Pseudomonas aeruginosa. Yesterday, she developed increasing pressor requirements and ventilatory support.       Infectious Diseases consulted for "pneumosepsis with known species; not responding to abx"          Interval History: Remains intubated with improvement in vent requirements. Still requiring 3 pressors.     Review of Systems   Unable to perform ROS: Intubated     Objective:     Vital Signs (Most Recent):  Temp: 97.8 °F (36.6 °C) (02/11/22 1100)  Pulse: 79 (02/11/22 1230)  Resp: (!) 30 (02/11/22 1230)  BP: (!) 111/55 (02/11/22 " 1200)  SpO2: (!) 93 % (02/11/22 1230) Vital Signs (24h Range):  Temp:  [97.7 °F (36.5 °C)-99 °F (37.2 °C)] 97.8 °F (36.6 °C)  Pulse:  [] 79  Resp:  [30-34] 30  SpO2:  [89 %-98 %] 93 %  BP: ()/(50-66) 111/55  Arterial Line BP: ()/(43-62) 125/54     Weight: 49 kg (108 lb)  Body mass index is 20.41 kg/m².    Estimated Creatinine Clearance: 23.7 mL/min (A) (based on SCr of 1.5 mg/dL (H)).    Physical Exam  Vitals and nursing note reviewed.   Constitutional:       Appearance: She is well-developed. She is ill-appearing.      Interventions: She is sedated, chemically paralyzed and intubated.   HENT:      Head: Normocephalic and atraumatic.      Right Ear: External ear normal.      Left Ear: External ear normal.      Mouth/Throat:      Comments: Bleeding from oral mucosa  Eyes:      General: No scleral icterus.        Right eye: No discharge.         Left eye: No discharge.   Cardiovascular:      Rate and Rhythm: Normal rate and regular rhythm.      Heart sounds: Normal heart sounds. No murmur heard.  No friction rub. No gallop.    Pulmonary:      Effort: Pulmonary effort is normal. No respiratory distress. She is intubated.      Breath sounds: Normal breath sounds. No stridor. No wheezing or rales.   Chest:      Comments: Midline sternotomy site without erythema or purulence.   Abdominal:      General: Bowel sounds are decreased.   Musculoskeletal:         General: No tenderness.      Right lower leg: No edema.      Left lower leg: No edema.   Skin:     General: Skin is warm and dry. No erythema or purulence at midline site or permacath site.   Neurological:      Mental Status: She is lethargic.         Significant Labs:   Blood Culture:   Recent Labs   Lab 02/03/22  1542 02/03/22  1550 02/10/22  1613   LABBLOO No growth after 5 days. No growth after 5 days. No Growth to date  No Growth to date     Respiratory Culture:   Recent Labs   Lab 02/04/22  0926   GSRESP Few WBC's  Rare Gram negative rods    RESPIRATORYC No S aureus isolated.  PSEUDOMONAS AERUGINOSA  Moderate  *     All pertinent labs within the past 24 hours have been reviewed.    Significant Imaging: I have reviewed all pertinent imaging results/findings within the past 24 hours.

## 2022-02-11 NOTE — PLAN OF CARE
SICU PLAN OF CARE NOTE    Dx: S/P aortic valve replacement    Shift Events: Vaso titrated to 0.04. Levo titrated to maintain MAP>60. CRRT continuous throughout shift.     Goals of Care: MAP>60, Wean vaso then levo; epi @ 0.01 mcg/kg/min    Neuro: Sedated    Respiratory: Ventilator ACVC 50% FiO2 & 5 PEEP    Diet: NPO    Gtts: Propfol, Fentanyl, Norepinephrine, Vasopressin, Epinephrine, and Cisatricurium    Urine Output: Anuric     Drains: PEG-tube    CRRT : SCUF continuous; UF @ 400; tolerating well     Labs/Accuchecks: Accuchecks Q4 hr, insulin coverage needed, see MAR. Labs trended. RFP Q8 hr. ABG Q3 hr.    Skin: Provided weight shift assistance & turns Q2 hr. No new skin breakdown noted.

## 2022-02-11 NOTE — PLAN OF CARE
SICU PLAN OF CARE NOTE    Dx: History of mechanical aortic valve replacement    Shift Events: Pt remains intubated, sedated, medically paralyzed. Vent settings weaned as tolerated throughout shift, Eliseo remains at 20 ppm. Vasopressors weaned as tolerated to maintain MAP 60-80. SLED x 10h. TF re-started @ 10 cc/hr.    Goals of Care: Wean levo, MAP 60-80    Neuro: Unresponsive, Sedated, Medically paralyzed    Cardiac: 100% paced at 80    Respiratory: Ventilator, AC/VC FiO2 45%, PEEP 5    Diet: Tube Feeds    Gtts: Propfol, Fentanyl, Norepinephrine, Vasopressin, Epinephrine and Cisatricurium    Urine Output: Anuric     CRRT  SLED x 10h  UF @ 250    Drains: PEG tube    Labs/Accuchecks: Q8h renal, Mg. Q4h accuchecks. Q3h ABG.

## 2022-02-11 NOTE — NURSING
Dr. Whitaker notified of pt's most recent ABG. Orders received to decrease FiO2 to 40%. Will implement and continue to monitor.

## 2022-02-11 NOTE — SUBJECTIVE & OBJECTIVE
Interval History: Remains intubated with improvement in vent requirements. Still requiring 3 pressors.     Review of Systems   Unable to perform ROS: Intubated     Objective:     Vital Signs (Most Recent):  Temp: 97.8 °F (36.6 °C) (02/11/22 1100)  Pulse: 79 (02/11/22 1230)  Resp: (!) 30 (02/11/22 1230)  BP: (!) 111/55 (02/11/22 1200)  SpO2: (!) 93 % (02/11/22 1230) Vital Signs (24h Range):  Temp:  [97.7 °F (36.5 °C)-99 °F (37.2 °C)] 97.8 °F (36.6 °C)  Pulse:  [] 79  Resp:  [30-34] 30  SpO2:  [89 %-98 %] 93 %  BP: ()/(50-66) 111/55  Arterial Line BP: ()/(43-62) 125/54     Weight: 49 kg (108 lb)  Body mass index is 20.41 kg/m².    Estimated Creatinine Clearance: 23.7 mL/min (A) (based on SCr of 1.5 mg/dL (H)).    Physical Exam  Vitals and nursing note reviewed.   Constitutional:       Appearance: She is well-developed. She is ill-appearing.      Interventions: She is sedated, chemically paralyzed and intubated.   HENT:      Head: Normocephalic and atraumatic.      Right Ear: External ear normal.      Left Ear: External ear normal.      Mouth/Throat:      Comments: Bleeding from oral mucosa  Eyes:      General: No scleral icterus.        Right eye: No discharge.         Left eye: No discharge.   Cardiovascular:      Rate and Rhythm: Normal rate and regular rhythm.      Heart sounds: Normal heart sounds. No murmur heard.  No friction rub. No gallop.    Pulmonary:      Effort: Pulmonary effort is normal. No respiratory distress. She is intubated.      Breath sounds: Normal breath sounds. No stridor. No wheezing or rales.   Chest:      Comments: Midline sternotomy site without erythema or purulence.   Abdominal:      General: Bowel sounds are decreased.   Musculoskeletal:         General: No tenderness.      Right lower leg: No edema.      Left lower leg: No edema.   Skin:     General: Skin is warm and dry.   Neurological:      Mental Status: She is lethargic.         Significant Labs:   Blood Culture:    Recent Labs   Lab 02/03/22  1542 02/03/22  1550 02/10/22  1613   LABBLOO No growth after 5 days. No growth after 5 days. No Growth to date  No Growth to date     Respiratory Culture:   Recent Labs   Lab 02/04/22  0926   GSRESP Few WBC's  Rare Gram negative rods   RESPIRATORYC No S aureus isolated.  PSEUDOMONAS AERUGINOSA  Moderate  *     All pertinent labs within the past 24 hours have been reviewed.    Significant Imaging: I have reviewed all pertinent imaging results/findings within the past 24 hours.

## 2022-02-11 NOTE — PROGRESS NOTES
[facility]  Critical Care - Surgery  Progress Note    Patient Name: Orion Ty  MRN: [id number]  Admission Date: 1/5/2022  Hospital Length of Stay: 37 days  Code Status: Full Code  Attending Provider: Dennis Haider MD  Primary Care Provider: Otis Zimmer MD   Principal Problem: S/P aortic valve replacement    Subjective:     Hospital/ICU Course:  No notes on file    Interval History/Significant Events:   Patient seen and examined this morning.  Intubated, sedated, paralyzed   Continues to be acidotic  Still high pressor requirements, though improved from yesterday  Epi 0.01, Levo 0.07, vaso 0.04  HH stable          Follow-up For: Procedure(s) (LRB):  CREATION, TRACHEOSTOMY (N/A)  EGD, WITH PEG TUBE INSERTION (N/A)  INSERTION-CATHETER-ROSELINE (N/A)    Post-Operative Day: 20 Days Post-Op    Objective:     Vital Signs (Most Recent):  Temp: 99 °F (37.2 °C) (02/11/22 0715)  Pulse: 79 (02/11/22 0830)  Resp: (!) 30 (02/11/22 0830)  BP: (!) 107/53 (02/11/22 0800)  SpO2: (!) 92 % (02/11/22 0830) Vital Signs (24h Range):  Temp:  [97.7 °F (36.5 °C)-99 °F (37.2 °C)] 99 °F (37.2 °C)  Pulse:  [] 79  Resp:  [30-34] 30  SpO2:  [89 %-99 %] 92 %  BP: ()/(50-66) 107/53  Arterial Line BP: ()/(44-62) 103/44     Weight: 49 kg (108 lb)  Body mass index is 20.41 kg/m².      Intake/Output Summary (Last 24 hours) at 2/11/2022 0842  Last data filed at 2/11/2022 0800  Gross per 24 hour   Intake 6329.66 ml   Output 9174 ml   Net -2844.34 ml       Physical Exam  Constitutional:       Appearance: She is ill-appearing.      Comments: Sedated, paralyzed   HENT:      Head: Normocephalic and atraumatic.      Comments: nims monitor in place     Mouth/Throat:      Comments: Oozing from mucosal surfaces at lower lip  Neck:      Comments: Trach  Cardiovascular:      Rate and Rhythm: Normal rate and regular rhythm.      Pulses: Normal pulses.      Comments: Midline sternotomy incision c/d/I  Pacemaker  in place.   Pulmonary:      Effort: Pulmonary effort is normal. No respiratory distress.   Abdominal:      General: Abdomen is flat. There is no distension.      Palpations: Abdomen is soft.      Tenderness: There is no abdominal tenderness.      Comments: Peg site c/d/i  Soft abdomen   Musculoskeletal:      Right lower leg: No edema.      Left lower leg: No edema.   Skin:     General: Skin is warm.      Capillary Refill: Capillary refill takes less than 2 seconds.         Vents:  Vent Mode: A/C (02/11/22 0826)  Ventilator Initiated: Yes (01/18/22 1636)  Set Rate: 30 BPM (02/11/22 0826)  Vt Set: 300 mL (02/11/22 0826)  Pressure Support: 10 cmH20 (02/07/22 0415)  PEEP/CPAP: 5 cmH20 (02/11/22 0826)  Oxygen Concentration (%): 50 (02/11/22 0830)  Peak Airway Pressure: 37 cmH2O (02/11/22 0826)  Plateau Pressure: 30 cmH20 (02/11/22 0826)  Total Ve: 9.66 mL (02/11/22 0826)  Negative Inspiratory Force (cm H2O): 0 (02/11/22 0826)  F/VT Ratio<105 (RSBI): (!) 91.46 (02/11/22 0826)    Lines/Drains/Airways     Central Venous Catheter Line            Permacath 01/21/22 1013 left subclavian 20 days    Percutaneous Central Line Insertion/Assessment - Triple Lumen  02/09/22 1730 right internal jugular 1 day          Drain                 Gastrostomy/Enterostomy 01/21/22 1112 Percutaneous endoscopic gastrostomy (PEG) 20 days          Airway                 Surgical Airway 01/21/22 1043 Shiley Cuffed 20 days          Arterial Line            Arterial Line 02/09/22 1300 Left Radial 1 day          Peripheral Intravenous Line                 Midline Catheter Insertion/Assessment  - Single Lumen 01/10/22 1300 Left cephalic vein (lateral side of arm) 18g x 8cm 31 days         Peripheral IV - Single Lumen 02/10/22 1631 20 G Right Forearm <1 day                Significant Labs:    CBC/Anemia Profile:  Recent Labs   Lab 02/10/22  0243 02/10/22  0930 02/11/22  0258   WBC 22.28* 14.84* 17.72*   HGB 7.1* 8.0* 8.5*   HCT 23.1* 25.2* 27.3*   PLT  133* 103* 111*   MCV 99* 96 98   RDW 18.6* 18.2* 19.6*        Chemistries:  Recent Labs   Lab 02/09/22  1233 02/09/22  1236 02/10/22  0243 02/10/22  0243 02/10/22  1410 02/10/22  2205 02/11/22  0258   *   < > 131*  131*  131*   < > 135* 134* 123*  131*   K 4.9   < > 4.3  4.3  4.3   < > 4.1 4.7 4.6  4.6      < > 99  100  100   < > 102 103 103  103   CO2 20*   < > 21*  22*  22*   < > 20* 16* 14*  15*   BUN 77*   < > 20  21  21   < > 28* 32* 36*  37*   CREATININE 2.4*   < > 0.8  0.8  0.8   < > 1.1 1.2 1.5*  1.5*   CALCIUM 8.4*   < > 7.8*  7.8*  7.8*   < > 8.3* 8.5* 8.5*  8.5*   ALBUMIN 2.0*   < > 2.0*  1.9*  1.9*   < > 1.8* 2.0* 2.1*  2.1*   PROT 5.5*  --  6.0  --   --   --  6.5   BILITOT 0.9  --  0.9  --   --   --  1.2*   ALKPHOS 118  --  139*  --   --   --  131   ALT 22  --  23  --   --   --  22   AST 39  --  49*  --   --   --  47*   MG 2.4   < > 1.8  1.9  1.9   < > 2.5 2.5 2.6   PHOS  --    < > 2.1*  2.1*  2.1*   < > 2.6* 3.8 4.7*  4.6*    < > = values in this interval not displayed.       ABGs:   Recent Labs   Lab 02/11/22  0449   PH 7.264*   PCO2 34.8*   HCO3 15.7*   POCSATURATED 95   BE -11       Significant Imaging:  I have reviewed all pertinent imaging results/findings within the past 24 hours.    Assessment/Plan:     * S/P aortic valve replacement  Orion Ty is a 76 y.o. female who presents to the SICU s/p redo aortic valve replacement with mechanical valve on 1/5/22.      Neuro/Psych:   -- Sedation: sedated with propofol, fentanyl; paralysis   -- Pain: fent gtt     Cards:    -- Pressors: vasopressor support decreased from yesterday afternoon  -- remains on 0.07 levo, 0.01 epi, 0.04 vaso  -- continue to wean vasopressors as tolerated  -- Goal MAP: 60-80  -- A-fib rate controlled.   -- PO amio TID  -- statin  -- continue to hold coumadin today  -- INR therapeutic  -- metoprolol 25 BID      Pulm:   -- Goal O2 sat > 90%  -- reintubated 1/12 then 1/18. S/p trach  1/21  -- intubated, paralyzed, on rate with improving ventilation  -- ABGs q4hrs  -- 2 mediastinal removed 1/9 L Pleural CT removed 1/8      Renal:  -- CRRT overnight  -- nephro following  -- Permcath 1/21  -- Removed R IJ trialysis 1/23      FEN / GI:   -- Replace lytes as needed  -- s/p PEG 1/21  -- Nutrition: tube feeds held given vasopressor requirement      ID:   -- WBC remains elevated  -- Pseudomonas in resp cx; sensitive to zosyn  -- Antibiotics: zosyn  -- Initiated broad spec abx on 2/3; narrowed 2/6  -- Blood Cx sent 2/3, NGTD        Heme/Onc:   -- Hgb stable  -- Daily CBC  -- Transfused products: 1U RBC on 1/5/22. 1u RBC and 1u FFP on 1/9/21. 2U RBC on 1/12. 4 FFP on 1/13. 1 FFP on 1/17, 2u pRBC 1/27, 1u pRBC 2/9  -- Anticoagulation: coumadin, supratherapeutic today; hold coumadin      Endo:   -- Gluc goal 140-180  -- Insulin per endocrinology      PPx:   Feeding: tube feeds held due to vasopressor requirements  Analgesia/Sedation: nimbex, propofol, fentanyl  Thromboembolic prevention: Coumadin, ASA  HOB >30: yes  Stress Ulcer ppx: protonix BID  Glucose control: Critical care goal 140-180 g/dl, ISS    Lines/Drains/Airway: Art line, Trach, Permcath, PEG      Dispo/Code Status/Palliative:   -- SICU / Full Code                 Sarah Shafer MD  Critical Care - Surgery  [facility]

## 2022-02-11 NOTE — ASSESSMENT & PLAN NOTE
Orion Ty is a 76 y.o. female who presents to the SICU s/p redo aortic valve replacement with mechanical valve on 1/5/22.      Neuro/Psych:   -- Sedation: sedated with propofol, fentanyl; paralysis   -- Pain: fent gtt     Cards:    -- Pressors: vasopressor support decreased from yesterday afternoon  -- remains on 0.07 levo, 0.01 epi, 0.04 vaso  -- continue to wean vasopressors as tolerated  -- Goal MAP: 60-80  -- A-fib rate controlled.   -- PO amio TID  -- statin  -- continue to hold coumadin today  -- INR therapeutic  -- metoprolol 25 BID      Pulm:   -- Goal O2 sat > 90%  -- reintubated 1/12 then 1/18. S/p trach 1/21  -- intubated, paralyzed, on rate with improving ventilation  -- ABGs q4hrs  -- 2 mediastinal removed 1/9 L Pleural CT removed 1/8      Renal:  -- CRRT overnight  -- nephro following  -- Permcath 1/21  -- Removed R IJ trialysis 1/23      FEN / GI:   -- Replace lytes as needed  -- s/p PEG 1/21  -- Nutrition: tube feeds held given vasopressor requirement      ID:   -- WBC remains elevated  -- Pseudomonas in resp cx; sensitive to zosyn  -- Antibiotics: zosyn  -- Initiated broad spec abx on 2/3; narrowed 2/6  -- Blood Cx sent 2/3, NGTD        Heme/Onc:   -- Hgb stable  -- Daily CBC  -- Transfused products: 1U RBC on 1/5/22. 1u RBC and 1u FFP on 1/9/21. 2U RBC on 1/12. 4 FFP on 1/13. 1 FFP on 1/17, 2u pRBC 1/27, 1u pRBC 2/9  -- Anticoagulation: coumadin, supratherapeutic today; hold coumadin      Endo:   -- Gluc goal 140-180  -- Insulin per endocrinology      PPx:   Feeding: tube feeds held due to vasopressor requirements  Analgesia/Sedation: nimbex, propofol, fentanyl  Thromboembolic prevention: Coumadin, ASA  HOB >30: yes  Stress Ulcer ppx: protonix BID  Glucose control: Critical care goal 140-180 g/dl, ISS    Lines/Drains/Airway: Art line, Trach, Permcath, PEG      Dispo/Code Status/Palliative:   -- SICU / Full Code

## 2022-02-11 NOTE — HPI
"A 76-year-old woman with CAD, chronic Afib, rheumatic heart disease, s/p aortic valve replacement, MV replacement, and TV repair who was admitted for redo aortic valve replacement which she subsequently underwent on 1/5/22. Unfortunately, she has had a prolonged hospital course in the surgical ICU. On 2/4 she developed fever, worsening respiratory status and "waxing/waning mental status". She was started on empiric antibiotics and sputum cultures subsequently grew Pseudomonas aeruginosa. Yesterday, she developed increasing pressor requirements and ventilatory support.       Infectious Diseases consulted for "pneumosepsis with known species; not responding to abx"        "

## 2022-02-11 NOTE — PROGRESS NOTES
02/11/22 1214   Treatment   Treatment Type SLED   Treatment Status Order change   Dialysis Machine Number [id number]   Dialyzer Time (hours) 39.56   BVP (Liters) 71.7 L   Solutions Labeled and Current  Yes   Access Temporary Cath;Left;IJ   Catheter Dressing Intact  Yes   Alarms Engaged Yes   CRRT Comments CRRT order changed to SLED

## 2022-02-11 NOTE — PROGRESS NOTES
[facility]  Nephrology  Progress Note     Patient Name: Orion Ty  MRN: [id number]  Admission Date: 1/5/2022  Hospital Length of Stay: 37 days  Attending Provider: Dennis Haider MD   Primary Care Physician: Otis Zimmer MD  Principal Problem: S/P aortic valve replacement    Subjective:     HPI: Orion Ty is our 76 y.o. female with previous aortic valve replacement, mitral valve replacement, and tricuspid valve repair in 2010 who presented on 1/5 for redo aortic valve replacement. Nephrology consulted on 1/8 for anuric JO. Patient is alert but tired, son at bedside. Stated she was tired prior to admission. During the hospital stay Cr increase from 1 to 1.5. In OR she received  2.5L crystalloid, 3U RBC, 2U FFP, 2 platelets, and 800 cell saver. she is +5 L since admit. UOP decrease overnight, per nurse she didn't urinate at all. She received diuril 500 mg twice, lasix 20 mg *2 on 1/7. This morning she urinate 225 immediately after placing the tejeda's cath. She received again lasix 100 mg this morning. Upor evaluation she was alert but repeatedly saying she was tired, denied any pain. Denied any previous hx of kidney disease.       Interval History: Seen at the bedside no event overnight       Review of patient's allergies indicates:  No Known Allergies   Current Facility-Administered Medications   Medication Frequency    0.9%  NaCl infusion (CRRT USE ONLY) Continuous    0.9%  NaCl infusion (for blood administration) Q24H PRN    0.9%  NaCl infusion PRN    acetaminophen oral solution 650 mg Q4H PRN    albuterol-ipratropium 2.5 mg-0.5 mg/3 mL nebulizer solution 3 mL Q6H    amiodarone tablet 400 mg BID    atorvastatin tablet 40 mg Daily    bisacodyL suppository 10 mg Daily PRN    cisatracurium (NIMBEX) 200 mg in dextrose 5 % 100 mL infusion Continuous    dextrose 10 % infusion Continuous PRN    dextrose 10% bolus 125 mL PRN    EPINEPHrine (ADRENALIN) 5 mg in dextrose 5 %  250 mL infusion Continuous    fentaNYL 2500 mcg in 0.9% sodium chloride 250 mL infusion premix (titrating) Continuous    glucagon (human recombinant) injection 1 mg PRN    haloperidol lactate injection 2 mg Nightly PRN    heparin (porcine) injection 1,000 Units PRN    hydrocortisone sodium succinate injection 100 mg Q8H    insulin aspart U-100 pen 1-10 Units PRN    insulin aspart U-100 pen 4 Units Q24H    insulin aspart U-100 pen 4 Units Q24H    insulin aspart U-100 pen 4 Units Q24H    insulin aspart U-100 pen 4 Units Q24H    insulin aspart U-100 pen 4 Units Q24H    insulin aspart U-100 pen 4 Units Q24H    LIDOcaine HCL 2% jelly PRN    magnesium sulfate 2g in water 50mL IVPB (premix) PRN    meropenem-0.9% sodium chloride 1 g/50 mL IVPB Q12H    midodrine tablet 15 mg Q8H    nitric oxide gas Gas 20 ppm Continuous    NORepinephrine bitartrate-D5W 4 mg/250 mL (16 mcg/mL) PERIPHERAL access infusion Continuous    ondansetron injection 4 mg Q12H PRN    oxyCODONE 5 mg/5 mL solution 5 mg Q4H PRN    pantoprazole injection 40 mg Q12H    polyethylene glycol packet 17 g Daily PRN    propofol (DIPRIVAN) 10 mg/mL infusion Continuous    psyllium husk (aspartame) 3.4 gram packet 1 packet Daily    sodium bicarbonate tablet 650 mg TID    sodium chloride 0.9% bolus 250 mL PRN    sodium chloride 0.9% bolus 250 mL PRN    sodium chloride 0.9% flush 10 mL PRN    vancomycin - pharmacy to dose pharmacy to manage frequency    vasopressin (PITRESSIN) 0.2 Units/mL in dextrose 5 % 100 mL infusion Continuous    warfarin (COUMADIN) tablet 1 mg Once    white petrolatum-mineral oil (SYSTANE NIGHTTIME) ophthalmic ointment TID     Facility-Administered Medications Ordered in Other Encounters   Medication Frequency    0.9%  NaCl infusion Continuous       Objective:     Vital Signs (Most Recent):  Temp: 97.8 °F (36.6 °C) (02/11/22 1100)  Pulse: 79 (02/11/22 1200)  Resp: (!) 30 (02/11/22 1200)  BP: (!) 111/55 (02/11/22  1200)  SpO2: (!) 92 % (02/11/22 1200)  O2 Device (Oxygen Therapy): ventilator (02/11/22 1100) Vital Signs (24h Range):  Temp:  [97.7 °F (36.5 °C)-99 °F (37.2 °C)] 97.8 °F (36.6 °C)  Pulse:  [] 79  Resp:  [30-34] 30  SpO2:  [89 %-98 %] 92 %  BP: ()/(50-66) 111/55  Arterial Line BP: ()/(43-62) 116/48   Weight: 49 kg (108 lb) (02/10/22 0815)  Body mass index is 20.41 kg/m².  Body surface area is 1.45 meters squared.      Intake/Output Summary (Last 24 hours) at 2/11/2022 1221  Last data filed at 2/11/2022 1200  Gross per 24 hour   Intake 6086.51 ml   Output 9156 ml   Net -3069.49 ml     I/O last 3 completed shifts:  In: 9539.7 [I.V.:8701.9; Blood:392.2; IV Piggyback:445.7]  Out: 97401 [Other:53824]  Net IO Since Admission: 7,719.52 mL [02/11/22 1221]     Physical Exam  Constitutional:       Appearance: She is well-developed. She is ill-appearing.   Cardiovascular:      Rate and Rhythm: Normal rate and regular rhythm.      Heart sounds: Normal heart sounds.   Pulmonary:      Comments: tracheostomy   Abdominal:      General: Abdomen is flat.      Palpations: Abdomen is soft.   Musculoskeletal:         General: Normal range of motion.      Right lower leg: No edema.      Left lower leg: No edema.   Skin:     General: Skin is warm and dry.      Comments: Sternal Incision CDI   Neurological:      Comments: Sedated          Recent Labs   Lab 02/10/22  0243 02/10/22  0243 02/10/22  0930 02/11/22  0258   WBC 22.28*  --  14.84* 17.72*   HGB 7.1*  --  8.0* 8.5*   HCT 23.1*  --  25.2* 27.3*   *  --  103* 111*   MONO 4.0  CANCELED   < > 6.0 2.0*  CANCELED    < > = values in this interval not displayed.      Recent Labs   Lab 02/09/22  1233 02/09/22 2206 02/10/22  0243 02/10/22  0243 02/10/22  1410 02/10/22  2205 02/11/22  0258   *   < > 131*  131*  131*   < > 135* 134* 123*  131*   K 4.9   < > 4.3  4.3  4.3   < > 4.1 4.7 4.6  4.6      < > 99  100  100   < > 102 103 103  103   CO2  20*   < > 21*  22*  22*   < > 20* 16* 14*  15*   BUN 77*   < > 20  21  21   < > 28* 32* 36*  37*   CREATININE 2.4*   < > 0.8  0.8  0.8   < > 1.1 1.2 1.5*  1.5*   CALCIUM 8.4*   < > 7.8*  7.8*  7.8*   < > 8.3* 8.5* 8.5*  8.5*   PROT 5.5*  --  6.0  --   --   --  6.5   BILITOT 0.9  --  0.9  --   --   --  1.2*   ALKPHOS 118  --  139*  --   --   --  131   ALT 22  --  23  --   --   --  22   AST 39  --  49*  --   --   --  47*    < > = values in this interval not displayed.      Recent Labs     02/11/22  0449 02/11/22  0826 02/11/22  1111   PH 7.264* 7.235* 7.198*   PCO2 34.8* 34.8* 34.0*   PO2 83 89 70*   HCO3 15.7* 14.8* 13.2*   POCSATURATED 95 95 90*   BE -11 -13 -15       Assessment/Plan:        JO  acute kidney injury  Orion Ty is our 76 y.o. female with previous aortic valve replacement, mitral valve replacement, and tricuspid valve repair in 2010 who presented on 1/5 for redo aortic valve replacement. Nephrology consulted on 1/8 for anuric JO. During the hospital stay Cr increase from 1 to 1.5. In OR she received  2.5L crystalloid, 3U RBC, 2U FFP, 2 platelets, and 800 cell saver. she is +5 L since admit. UOP decreased. She received diuril 500 mg twice, lasix 20 mg x2 on 1/7 with no adequate response to diuretics     Plan   Oliguric ischemic ATN likely secondary to severe intraoperative hypotension requiring vasopressor on 01/05/2022  Failed high-dose diuretics and renal replacement therapy started on 1/9/22 due to volume overload  We been mangeing pt with CRRT vd iHD based on hemodynamics status  Will switch to SLED for 10 hrs then we will hold on CRRT and will reassess in AM   Strict I/Os   Renally dose medications to GFR      S/P aortic valve replacement  With severe intraoperative hypotension   Management per primary     ATN (acute tubular necrosis)  See oj      Rose Mary Walsh MD  Nephrology  [facility]

## 2022-02-11 NOTE — ASSESSMENT & PLAN NOTE
Hypothermic and with down trend in leukocytosis. Sputum cultures on 2/4 with P aeruginosa. On increasing pressor requirements yesterday.   · Can continue meropenem for now.   · Will get blood cultures to assess for other infections.   · If ventilator requirements increase then would favor repeat sputum cultures.

## 2022-02-11 NOTE — PROGRESS NOTES
Pharmacokinetic Assessment Follow Up: IV Vancomycin    Assessment/Plan:    - Random level from AM labs resulted as 17.8 mg/dl, goal 15-20 mg/dl.   - Patient has JO requiring RRT, Nephrology is planning for SCUF today.   - Continue pulse dosing. No additional vancomycin is required today.   - Draw vancomycin random level with AM labs tomorrow.  Pharmacy to re-dose based on level and RRT plans.      Pharmacy will continue to follow and monitor vancomycin.       Please contact pharmacy at extension [phone] with any questions regarding this assessment.      Thank you for the consult,   Lizett Pinto,  PharmVENESSA, [facility]    Drug levels (last 3 results):  Recent Labs   Lab Result Units 02/10/22  0243 02/11/22  0258   Vancomycin, Random ug/mL 4.4 17.8       Pharmacy will continue to follow and monitor vancomycin.    Please contact pharmacy at extension [phone] for questions regarding this assessment.    Thank you for the consult,   Lizett Pinto, [place], [facility]       Patient brief summary:  Orion Ty is a 76 y.o. female initiated on antimicrobial therapy with IV Vancomycin for treatment of lower respiratory infection.    The patient's current regimen is pulse dosing.    Drug Allergies:   Review of patient's allergies indicates:  No Known Allergies    Actual Body Weight:   49 kg    Renal Function:   Estimated Creatinine Clearance: 23.7 mL/min (A) (based on SCr of 1.5 mg/dL (H)).,     Dialysis Method (if applicable):  SCUF    CBC (last 72 hours):  Recent Labs   Lab Result Units 02/09/22  0315 02/09/22  0520 02/09/22  1233 02/09/22  2206 02/10/22  0243 02/10/22  0930 02/11/22  0258   WBC K/uL 22.99* 21.55* 23.89* 24.04* 22.28* 14.84* 17.72*   Hemoglobin g/dL 6.1* 6.1* 7.3* 7.1* 7.1* 8.0* 8.5*   Hematocrit % 20.9* 21.2* 24.0* 23.1* 23.1* 25.2* 27.3*   Platelets K/uL 118* 136* 131* 142* 133* 103* 111*   Gran % % 84.2* 86.0* 80.0* 84.0* 85.5* 82.0* 95.0*   Lymph % % 6.0* 6.5* 3.0* 4.0* 6.0* 8.0* 3.0*   Mono % % 5.0 3.6*  8.0 4.0 4.0 6.0 2.0*   Eosinophil % % 0.3 0.3 1.0 1.0 0.0 0.0 0.0   Basophil % % 0.2 0.2 0.0 0.0 0.0 0.0 0.0   Differential Method  Automated Automated Manual Manual Manual Manual Manual       Metabolic Panel (last 72 hours):  Recent Labs   Lab Result Units 02/08/22  1611 02/08/22  2217 02/09/22  0315 02/09/22  0520 02/09/22  1233 02/09/22  1236 02/09/22  2206 02/10/22  0243 02/10/22  1410 02/10/22  2205 02/11/22  0258   Sodium mmol/L 132* 133* 132* 132* 134*  --  130*  130* 131*  131*  131* 135* 134* 123*  131*   Potassium mmol/L 4.4 4.6 4.4 4.4 4.9  --  4.3  4.3 4.3  4.3  4.3 4.1 4.7 4.6  4.6   Chloride mmol/L 100 103 103 103 103  --  99  99 99  100  100 102 103 103  103   CO2 mmol/L 21* 19* 18* 17* 20*  --  23  23 21*  22*  22* 20* 16* 14*  15*   Glucose mg/dL 175* 137* 243* 244* 128*  --  159*  159* 171*  170*  170* 162* 193* 180*  179*   BUN mg/dL 68* 65* 68* 73* 77*  --  41*  41* 20  21  21 28* 32* 36*  37*   Creatinine mg/dL 2.2* 2.1* 2.1* 2.3* 2.4*  --  1.3  1.3 0.8  0.8  0.8 1.1 1.2 1.5*  1.5*   Albumin g/dL 1.9* 1.8* 1.9* 1.9* 2.0*  --  1.9*  1.9* 2.0*  1.9*  1.9* 1.8* 2.0* 2.1*  2.1*   Total Bilirubin mg/dL  --   --  0.8 0.7 0.9  --   --  0.9  --   --  1.2*   Alkaline Phosphatase U/L  --   --  120 118 118  --   --  139*  --   --  131   AST U/L  --   --  42* 37 39  --   --  49*  --   --  47*   ALT U/L  --   --  22 21 22  --   --  23  --   --  22   Magnesium mg/dL 2.5 2.2 2.4  --  2.4  --  2.1  2.1 1.8  1.9  1.9 2.5 2.5 2.6   Phosphorus mg/dL 3.8 4.3 4.8*  --   --  5.9* 3.3  3.3 2.1*  2.1*  2.1* 2.6* 3.8 4.7*  4.6*       Vancomycin Administrations:  vancomycin given in the last 96 hours                   vancomycin 750 mg in dextrose 5 % 250 mL IVPB (ready to mix system) (mg) 750 mg New Bag 02/10/22 1048                Microbiologic Results:  Microbiology Results (last 7 days)     Procedure Component Value Units Date/Time    Blood culture [[id number]] Collected:  02/10/22 1613    Order Status: Completed Specimen: Blood from Peripheral, Lower Arm, Right Updated: 02/11/22 0115     Blood Culture, Routine No Growth to date    Blood culture [[id number]] Collected: 02/10/22 1613    Order Status: Completed Specimen: Blood from Peripheral, Lower Arm, Right Updated: 02/11/22 0115     Blood Culture, Routine No Growth to date    Blood culture [[id number]] Collected: 02/03/22 1550    Order Status: Completed Specimen: Blood from Peripheral, Hand, Right Updated: 02/08/22 1812     Blood Culture, Routine No growth after 5 days.    Blood culture [[id number]] Collected: 02/03/22 1542    Order Status: Completed Specimen: Blood from Peripheral, Hand, Left Updated: 02/08/22 1812     Blood Culture, Routine No growth after 5 days.    Culture, Respiratory with Gram Stain [[id number]]  (Abnormal)  (Susceptibility) Collected: 02/04/22 0926    Order Status: Completed Specimen: Respiratory from Tracheal Aspirate Updated: 02/06/22 1425     Respiratory Culture No S aureus isolated.      PSEUDOMONAS AERUGINOSA  Moderate       Gram Stain (Respiratory) Few WBC's     Gram Stain (Respiratory) Rare Gram negative rods    Culture, MRSA [[id number]] Collected: 02/03/22 1810    Order Status: Completed Specimen: MRSA source from Nares, Left Updated: 02/05/22 0804     MRSA Surveillance Screen No MRSA isolated

## 2022-02-11 NOTE — PLAN OF CARE
SICU PLAN OF CARE NOTE    Dx: S/P aortic valve replacement    Shift Events: Pt remains intubated, sedated, medically paralyzed. Vent settings weaned as tolerated throughout shift, Eliseo remains at 20 ppm. Vasopressors weaned as tolerated to maintain MAP 60-80. One episode of a-fib, pt self converted to NSR prior to intervention. SCUF continuous.    Goals of Care: MAP 60-80    Neuro: Sedated, Medically paralyzed    Cardiac: 100% paced @ 80    Respiratory: Ventilator, AC/VC FiO2 50%, PEEP 5    Diet: NPO    Gtts: Propfol, Fentanyl, Norepinephrine, Vasopressin, Epinephrine and Cisatricurium    Urine Output: Anuric     CRRT   SCUF continuous  UF @ goal of 400     Labs/Accuchecks: Q4h ABG and accuchecks, Q8h renal/Mg.

## 2022-02-11 NOTE — SUBJECTIVE & OBJECTIVE
Past Medical History:   Diagnosis Date    A-fib     Anticoagulant long-term use     Closed displaced fracture of distal phalanx of lesser toe 10/20/2015    Right    Coronary artery disease     Diabetes mellitus     Diabetes mellitus, type 2     Hypertension     Mechanical heart valve present     X's two    Osteopenia     Pacemaker     Rheumatic heart disease        Past Surgical History:   Procedure Laterality Date    CARDIAC PACEMAKER PLACEMENT      CARDIAC VALVE REPLACEMENT      CARDIAC VALVE SURGERY      CORONARY ANGIOGRAPHY N/A 12/21/2021    Procedure: ANGIOGRAM, CORONARY ARTERY;  Surgeon: Devonte Salazar MD;  Location: [facility] CATH LAB;  Service: Cardiology;  Laterality: N/A;    ESOPHAGOGASTRODUODENOSCOPY W/ PEG N/A 1/21/2022    Procedure: EGD, WITH PEG TUBE INSERTION;  Surgeon: Kam Storey MD;  Location: [address];  Service: General;  Laterality: N/A;    EYE SURGERY Bilateral     cataract    FLUOROSCOPY N/A 12/11/2020    Procedure: FLUOROSCOPY;  Surgeon: Mckay Calvo MD;  Location: [facility] CATH LAB;  Service: Cardiology;  Laterality: N/A;    PLACEMENT OF DUAL-LUMEN VASCULAR CATHETER N/A 1/21/2022    Procedure: INSERTION-CATHETER-ROSELINE;  Surgeon: Kam Storey MD;  Location: [address];  Service: General;  Laterality: N/A;    REPLACEMENT OF AORTIC VALVE WITH REPEAT STERNOTOMY N/A 1/5/2022    Procedure: REPLACEMENT, AORTIC VALVE, WITH REPEAT STERNOTOMY;  Surgeon: Dennis Haider MD;  Location: [address];  Service: Cardiothoracic;  Laterality: N/A;    REPLACEMENT OF PACEMAKER GENERATOR N/A 3/2/2020    Procedure: REPLACEMENT, PACEMAKER GENERATOR;  Surgeon: Mark Ring MD;  Location: [facility] EP LAB;  Service: Cardiology;  Laterality: N/A;  TBS/CHRISTI, Single PPM gen change, Right side, MDT, MAC, SK, 3 Prep    thryoid s      TRACHEOSTOMY N/A 1/21/2022    Procedure: CREATION, TRACHEOSTOMY;  Surgeon: Kam Storey MD;  Location: [address];  Service:  "General;  Laterality: N/A;       Review of patient's allergies indicates:  No Known Allergies    Medications:  Medications Prior to Admission   Medication Sig    enoxaparin (LOVENOX) 40 mg/0.4 mL Syrg Inject 0.4 mLs (40 mg total) into the skin every 12 (twelve) hours.    metFORMIN (GLUCOPHAGE) 500 MG tablet TAKE 1 TABLET EVERY DAY (Patient taking differently: daily with breakfast.)    metoprolol tartrate (LOPRESSOR) 25 MG tablet TAKE 1 TABLET BY MOUTH TWICE DAILY    omega-3 acid ethyl esters (LOVAZA) 1 gram capsule TK 2 CS PO BID    amLODIPine (NORVASC) 5 MG tablet Take 1 tablet (5 mg total) by mouth once daily.    amoxicillin (AMOXIL) 875 MG tablet     furosemide (LASIX) 40 MG tablet Take 40 mg by mouth 2 (two) times daily.    lancets 30 gauge Misc Use as directed to check blood sugar twice a day (Patient not taking: No sig reported)    losartan (COZAAR) 25 MG tablet Take 1 tablet (25 mg total) by mouth once daily. (Patient not taking: No sig reported)    mv,calcium,min/iron/folic/vitK (ONE-A-DAY WOMEN'S COMPLETE ORAL) Take 830 tablets by mouth Daily.    nitroGLYCERIN (NITROSTAT) 0.4 MG SL tablet DISSOLVE 1 TABLET UNDER THE TONGUE EVERY 5 MINUTES AS NEEDED FOR CHEST PAINS    pravastatin (PRAVACHOL) 20 MG tablet Take 1 tablet (20 mg total) by mouth once daily. (Patient not taking: No sig reported)    warfarin (COUMADIN) 4 MG tablet Take 1 tablet on Monday and Friday.  Take 1/2 tablet on all other days. (Patient not taking: No sig reported)     Antibiotics (From admission, onward)            Start     Stop Route Frequency Ordered    02/10/22 2118  meropenem-0.9% sodium chloride 1 g/50 mL IVPB         -- IV Every 12 hours (non-standard times) 02/10/22 1509    02/10/22 1051  vancomycin - pharmacy to dose  (vancomycin IVPB)        "And" Linked Group Details    -- IV pharmacy to manage frequency 02/10/22 0951    01/05/22 2100  mupirocin 2 % ointment         01/10 2059 Nasl 2 times daily 01/05/22 1356    " 01/05/22 2000  cefazolin (ANCEF) 2 gram in dextrose 5% 50 mL IVPB (premix)         01/06 1959 IV Every 8 hours (non-standard times) 01/05/22 1356        Antifungals (From admission, onward)            None        Antivirals (From admission, onward)    None           Immunization History   Administered Date(s) Administered    COVID-19, MRNA, LN-S, PF (Pfizer) (Purple Cap) 01/14/2021, 02/04/2021    Influenza 10/15/2013    Influenza (FLUAD) - Quadrivalent - Adjuvanted - PF *Preferred* (65+) 10/21/2021    Influenza - High Dose - PF (65 years and older) 10/20/2015, 09/12/2017, 10/14/2018, 10/14/2019    Influenza - Quadrivalent - PF *Preferred* (6 months and older) 09/14/2020    Influenza - Trivalent (ADULT) 10/15/2013    Pneumococcal Conjugate - 13 Valent 03/24/2017    Pneumococcal Polysaccharide - 23 Valent 09/12/2018       Family History     Problem Relation (Age of Onset)    Breast cancer Maternal Aunt        Social History     Socioeconomic History    Marital status:    Tobacco Use    Smoking status: Never Smoker    Smokeless tobacco: Never Used   Substance and Sexual Activity    Alcohol use: No    Drug use: Never     Review of Systems   Unable to perform ROS: Mental status change     Objective:     Vital Signs (Most Recent):  Temp: 97.8 °F (36.6 °C) (02/10/22 1900)  Pulse: 79 (02/10/22 2030)  Resp: (!) 30 (02/10/22 2030)  BP: (!) 115/59 (02/10/22 2000)  SpO2: (!) 90 % (02/10/22 2030) Vital Signs (24h Range):  Temp:  [93.8 °F (34.3 °C)-98.7 °F (37.1 °C)] 97.8 °F (36.6 °C)  Pulse:  [] 79  Resp:  [30-34] 30  SpO2:  [89 %-100 %] 90 %  BP: ()/(51-66) 115/59  Arterial Line BP: ()/(40-62) 107/46     Weight: 49 kg (108 lb)  Body mass index is 20.41 kg/m².    Estimated Creatinine Clearance: 32.3 mL/min (based on SCr of 1.1 mg/dL).    Physical Exam  Vitals and nursing note reviewed.   Constitutional:       Appearance: She is well-developed and well-nourished. She is ill-appearing.       Interventions: She is sedated, chemically paralyzed and intubated.   HENT:      Head: Normocephalic and atraumatic.      Right Ear: External ear normal.      Left Ear: External ear normal.      Mouth/Throat:      Comments: Bleeding from oral mucosa  Eyes:      General: No scleral icterus.        Right eye: No discharge.         Left eye: No discharge.   Cardiovascular:      Rate and Rhythm: Normal rate and regular rhythm.      Heart sounds: Normal heart sounds. No murmur heard.  No friction rub. No gallop.    Pulmonary:      Effort: Pulmonary effort is normal. No respiratory distress. She is intubated.      Breath sounds: Normal breath sounds. No stridor. No wheezing or rales.   Chest:      Comments: Midline sternotomy site without erythema or purulence.   Abdominal:      General: Bowel sounds are decreased.   Musculoskeletal:         General: No tenderness or edema.      Right lower leg: No edema.      Left lower leg: No edema.   Skin:     General: Skin is warm and dry.   Neurological:      Mental Status: She is lethargic.         Significant Labs:   Blood Culture:   Recent Labs   Lab 02/03/22  1542 02/03/22  1550   LABBLOO No growth after 5 days. No growth after 5 days.     BMP:   Recent Labs   Lab 02/10/22  1410   *   *   K 4.1      CO2 20*   BUN 28*   CREATININE 1.1   CALCIUM 8.3*   MG 2.5     CBC:   Recent Labs   Lab 02/09/22  2206 02/10/22  0243 02/10/22  0930   WBC 24.04* 22.28* 14.84*   HGB 7.1* 7.1* 8.0*   HCT 23.1* 23.1* 25.2*   * 133* 103*     Respiratory Culture:   Recent Labs   Lab 02/04/22  0926   GSRESP Few WBC's  Rare Gram negative rods   RESPIRATORYC No S aureus isolated.  PSEUDOMONAS AERUGINOSA  Moderate  *     Urine Culture:   Recent Labs   Lab 11/11/21  0941   LABURIN No significant growth     Urine Studies:   Recent Labs   Lab 01/03/22  1222   COLORU Yellow   APPEARANCEUA Hazy*   PHUR 5.0   SPECGRAV 1.020   PROTEINUA Negative   GLUCUA Negative   KETONESU Negative    BILIRUBINUA Negative   OCCULTUA Negative   NITRITE Negative   LEUKOCYTESUR Negative   RBCUA 2   WBCUA 3   BACTERIA Moderate*   SQUAMEPITHEL 1   HYALINECASTS 9*     Wound Culture: No results for input(s): LABAERO in the last 4320 hours.    Significant Imaging: I have reviewed all pertinent imaging results/findings within the past 24 hours.

## 2022-02-11 NOTE — SUBJECTIVE & OBJECTIVE
Interval History/Significant Events:   Patient seen and examined this morning.  Intubated, sedated, paralyzed   Continues to be acidotic  Still high pressor requirements, though improved from yesterday  Epi 0.01, Levo 0.07, vaso 0.04  HH stable          Follow-up For: Procedure(s) (LRB):  CREATION, TRACHEOSTOMY (N/A)  EGD, WITH PEG TUBE INSERTION (N/A)  INSERTION-CATHETER-ROSELINE (N/A)    Post-Operative Day: 20 Days Post-Op    Objective:     Vital Signs (Most Recent):  Temp: 99 °F (37.2 °C) (02/11/22 0715)  Pulse: 79 (02/11/22 0830)  Resp: (!) 30 (02/11/22 0830)  BP: (!) 107/53 (02/11/22 0800)  SpO2: (!) 92 % (02/11/22 0830) Vital Signs (24h Range):  Temp:  [97.7 °F (36.5 °C)-99 °F (37.2 °C)] 99 °F (37.2 °C)  Pulse:  [] 79  Resp:  [30-34] 30  SpO2:  [89 %-99 %] 92 %  BP: ()/(50-66) 107/53  Arterial Line BP: ()/(44-62) 103/44     Weight: 49 kg (108 lb)  Body mass index is 20.41 kg/m².      Intake/Output Summary (Last 24 hours) at 2/11/2022 0842  Last data filed at 2/11/2022 0800  Gross per 24 hour   Intake 6329.66 ml   Output 9174 ml   Net -2844.34 ml       Physical Exam  Constitutional:       Appearance: She is ill-appearing.      Comments: Sedated, paralyzed   HENT:      Head: Normocephalic and atraumatic.      Comments: nims monitor in place     Mouth/Throat:      Comments: Oozing from mucosal surfaces at lower lip  Neck:      Comments: Trach  Cardiovascular:      Rate and Rhythm: Normal rate and regular rhythm.      Pulses: Normal pulses.      Comments: Midline sternotomy incision c/d/I  Pacemaker in place.   Pulmonary:      Effort: Pulmonary effort is normal. No respiratory distress.   Abdominal:      General: Abdomen is flat. There is no distension.      Palpations: Abdomen is soft.      Tenderness: There is no abdominal tenderness.      Comments: Peg site c/d/i  Soft abdomen   Musculoskeletal:      Right lower leg: No edema.      Left lower leg: No edema.   Skin:     General: Skin is warm.       Capillary Refill: Capillary refill takes less than 2 seconds.         Vents:  Vent Mode: A/C (02/11/22 0826)  Ventilator Initiated: Yes (01/18/22 1636)  Set Rate: 30 BPM (02/11/22 0826)  Vt Set: 300 mL (02/11/22 0826)  Pressure Support: 10 cmH20 (02/07/22 0415)  PEEP/CPAP: 5 cmH20 (02/11/22 0826)  Oxygen Concentration (%): 50 (02/11/22 0830)  Peak Airway Pressure: 37 cmH2O (02/11/22 0826)  Plateau Pressure: 30 cmH20 (02/11/22 0826)  Total Ve: 9.66 mL (02/11/22 0826)  Negative Inspiratory Force (cm H2O): 0 (02/11/22 0826)  F/VT Ratio<105 (RSBI): (!) 91.46 (02/11/22 0826)    Lines/Drains/Airways     Central Venous Catheter Line            Permacath 01/21/22 1013 left subclavian 20 days    Percutaneous Central Line Insertion/Assessment - Triple Lumen  02/09/22 1730 right internal jugular 1 day          Drain                 Gastrostomy/Enterostomy 01/21/22 1112 Percutaneous endoscopic gastrostomy (PEG) 20 days          Airway                 Surgical Airway 01/21/22 1043 Shiley Cuffed 20 days          Arterial Line            Arterial Line 02/09/22 1300 Left Radial 1 day          Peripheral Intravenous Line                 Midline Catheter Insertion/Assessment  - Single Lumen 01/10/22 1300 Left cephalic vein (lateral side of arm) 18g x 8cm 31 days         Peripheral IV - Single Lumen 02/10/22 1631 20 G Right Forearm <1 day                Significant Labs:    CBC/Anemia Profile:  Recent Labs   Lab 02/10/22  0243 02/10/22  0930 02/11/22  0258   WBC 22.28* 14.84* 17.72*   HGB 7.1* 8.0* 8.5*   HCT 23.1* 25.2* 27.3*   * 103* 111*   MCV 99* 96 98   RDW 18.6* 18.2* 19.6*        Chemistries:  Recent Labs   Lab 02/09/22  1233 02/09/22  1236 02/10/22  0243 02/10/22  0243 02/10/22  1410 02/10/22  2205 02/11/22  0258   *   < > 131*  131*  131*   < > 135* 134* 123*  131*   K 4.9   < > 4.3  4.3  4.3   < > 4.1 4.7 4.6  4.6      < > 99  100  100   < > 102 103 103  103   CO2 20*   < > 21*  22*  22*   < >  20* 16* 14*  15*   BUN 77*   < > 20  21  21   < > 28* 32* 36*  37*   CREATININE 2.4*   < > 0.8  0.8  0.8   < > 1.1 1.2 1.5*  1.5*   CALCIUM 8.4*   < > 7.8*  7.8*  7.8*   < > 8.3* 8.5* 8.5*  8.5*   ALBUMIN 2.0*   < > 2.0*  1.9*  1.9*   < > 1.8* 2.0* 2.1*  2.1*   PROT 5.5*  --  6.0  --   --   --  6.5   BILITOT 0.9  --  0.9  --   --   --  1.2*   ALKPHOS 118  --  139*  --   --   --  131   ALT 22  --  23  --   --   --  22   AST 39  --  49*  --   --   --  47*   MG 2.4   < > 1.8  1.9  1.9   < > 2.5 2.5 2.6   PHOS  --    < > 2.1*  2.1*  2.1*   < > 2.6* 3.8 4.7*  4.6*    < > = values in this interval not displayed.       ABGs:   Recent Labs   Lab 02/11/22  0449   PH 7.264*   PCO2 34.8*   HCO3 15.7*   POCSATURATED 95   BE -11       Significant Imaging:  I have reviewed all pertinent imaging results/findings within the past 24 hours.

## 2022-02-11 NOTE — NURSING
Dr. Shafer notified of pt's most recent ABG. Dr. Whitaker arrived to bedside. Orders received to admin sodium bicarbonate tablets via PEG tube. Will implement and continue to monitor.

## 2022-02-12 NOTE — SUBJECTIVE & OBJECTIVE
Interval History: No acute events overnight.  Afebrile since 2/3 with some improvement on vent and pressor requirements.     Review of Systems   Unable to perform ROS: Intubated     Objective:     Vital Signs (Most Recent):  Temp: 96.5 °F (35.8 °C) (02/12/22 1100)  Pulse: 79 (02/12/22 1430)  Resp: (!) 26 (02/12/22 1430)  BP: (!) 94/52 (02/12/22 1300)  SpO2: (!) 92 % (02/12/22 1430) Vital Signs (24h Range):  Temp:  [96.5 °F (35.8 °C)-99.6 °F (37.6 °C)] 96.5 °F (35.8 °C)  Pulse:  [] 79  Resp:  [15-52] 26  SpO2:  [85 %-100 %] 92 %  BP: ()/(49-72) 94/52  Arterial Line BP: ()/(40-68) 113/52     Weight: 45 kg (99 lb 3.3 oz)  Body mass index is 18.74 kg/m².    Estimated Creatinine Clearance: 41.8 mL/min (based on SCr of 0.8 mg/dL).    Physical Exam  Vitals and nursing note reviewed.   Constitutional:       Appearance: She is well-developed. She is ill-appearing.      Interventions: She is sedated, chemically paralyzed and intubated.   HENT:      Head: Normocephalic and atraumatic.      Right Ear: External ear normal.      Left Ear: External ear normal.      Mouth/Throat:      Comments: Bleeding from oral mucosa  Eyes:      General: No scleral icterus.        Right eye: No discharge.         Left eye: No discharge.   Cardiovascular:      Rate and Rhythm: Normal rate and regular rhythm.      Heart sounds: Normal heart sounds. No murmur heard.  No friction rub. No gallop.    Pulmonary:      Effort: Pulmonary effort is normal. No respiratory distress. She is intubated.      Breath sounds: Normal breath sounds. No stridor. No wheezing or rales.   Chest:      Comments: Midline sternotomy site without erythema or purulence.   Abdominal:      General: Bowel sounds are decreased.   Musculoskeletal:         General: No tenderness.      Right lower leg: No edema.      Left lower leg: No edema.   Skin:     General: Skin is warm and dry.   Neurological:      Mental Status: She is lethargic.         Significant Labs:    Blood Culture:   Recent Labs   Lab 02/03/22  1542 02/03/22  1550 02/10/22  1613   LABBLOO No growth after 5 days. No growth after 5 days. No Growth to date  No Growth to date  No Growth to date  No Growth to date     Respiratory Culture:   Recent Labs   Lab 02/04/22  0926   GSRESP Few WBC's  Rare Gram negative rods   RESPIRATORYC No S aureus isolated.  PSEUDOMONAS AERUGINOSA  Moderate  *     All pertinent labs within the past 24 hours have been reviewed.    Significant Imaging: I have reviewed all pertinent imaging results/findings within the past 24 hours.

## 2022-02-12 NOTE — PROGRESS NOTES
[facility]  Critical Care - Surgery  Progress Note    Patient Name: Orion Ty  MRN: [id number]  Admission Date: 1/5/2022  Hospital Length of Stay: 38 days  Code Status: Full Code  Attending Provider: Dennis Haider MD  Primary Care Provider: Otis Zimmer MD   Principal Problem: History of mechanical aortic valve replacement    Subjective:     Hospital/ICU Course:  No notes on file    Interval History/Significant Events:   NAEON  ARDS seems to be improving, FiO2 this AM at 40%  Remains on vent, intubated, sedated, paralyzed  10hrs CRRT  PH improved at 7.5  INR 2.6  This morning  Will continue to re-assess throughout day       Follow-up For: Procedure(s) (LRB):  CREATION, TRACHEOSTOMY (N/A)  EGD, WITH PEG TUBE INSERTION (N/A)  INSERTION-CATHETER-ROSELINE (N/A)    Post-Operative Day: 20 Days Post-Op    Objective:     Vital Signs (Most Recent):  Temp: 97.6 °F (36.4 °C) (02/12/22 0700)  Pulse: 79 (02/12/22 0830)  Resp: (!) 26 (02/12/22 0830)  BP: 125/72 (02/12/22 0800)  SpO2: (!) 93 % (02/12/22 0830) Vital Signs (24h Range):  Temp:  [97.6 °F (36.4 °C)-99.6 °F (37.6 °C)] 97.6 °F (36.4 °C)  Pulse:  [] 79  Resp:  [15-52] 26  SpO2:  [87 %-100 %] 93 %  BP: (100-134)/(49-72) 125/72  Arterial Line BP: ()/(40-68) 133/61     Weight: 45 kg (99 lb 3.3 oz)  Body mass index is 18.74 kg/m².      Intake/Output Summary (Last 24 hours) at 2/12/2022 0913  Last data filed at 2/12/2022 0800  Gross per 24 hour   Intake 4095.94 ml   Output 3421 ml   Net 674.94 ml       Physical Exam  Constitutional:       Appearance: She is ill-appearing.      Comments: Sedated, paralyzed   HENT:      Head: Normocephalic and atraumatic.      Comments: nims monitor in place     Mouth/Throat:      Comments: Oozing from mucosal surfaces at lower lip  Neck:      Comments: Trach  Cardiovascular:      Rate and Rhythm: Normal rate and regular rhythm.      Pulses: Normal pulses.      Comments: Midline sternotomy  incision c/d/I  Pacemaker in place.   Pulmonary:      Effort: Pulmonary effort is normal. No respiratory distress.   Abdominal:      General: Abdomen is flat. There is no distension.      Palpations: Abdomen is soft.      Tenderness: There is no abdominal tenderness.      Comments: Peg site c/d/i  Soft abdomen   Musculoskeletal:      Right lower leg: No edema.      Left lower leg: No edema.   Skin:     General: Skin is warm.      Capillary Refill: Capillary refill takes less than 2 seconds.         Vents:  Vent Mode: A/C (02/12/22 0743)  Ventilator Initiated: Yes (01/18/22 1636)  Set Rate: 26 BPM (02/12/22 0743)  Vt Set: 330 mL (02/12/22 0743)  Pressure Support: 10 cmH20 (02/07/22 0415)  PEEP/CPAP: 8 cmH20 (02/12/22 0743)  Oxygen Concentration (%): 40 (02/12/22 0830)  Peak Airway Pressure: 52 cmH2O (02/12/22 0743)  Plateau Pressure: 28 cmH20 (02/12/22 0743)  Total Ve: 10.1 mL (02/12/22 0743)  Negative Inspiratory Force (cm H2O): 0 (02/12/22 0743)  F/VT Ratio<105 (RSBI): (!) 68.97 (02/12/22 0743)    Lines/Drains/Airways     Central Venous Catheter Line            Permacath 01/21/22 1013 left subclavian 21 days    Percutaneous Central Line Insertion/Assessment - Triple Lumen  02/09/22 1730 right internal jugular 2 days          Drain                 Gastrostomy/Enterostomy 01/21/22 1112 Percutaneous endoscopic gastrostomy (PEG) 21 days          Airway                 Surgical Airway 01/21/22 1043 Shiley Cuffed 21 days          Arterial Line            Arterial Line 02/09/22 1300 Left Radial 2 days          Peripheral Intravenous Line                 Peripheral IV - Single Lumen 02/10/22 1631 20 G Right Forearm 1 day                Significant Labs:    CBC/Anemia Profile:  Recent Labs   Lab 02/10/22  0930 02/10/22  0930 02/11/22  0258 02/11/22  2033 02/12/22  0300 02/12/22  0437 02/12/22  0743   WBC 14.84*  --  17.72*  --  17.69*  --   --    HGB 8.0*  --  8.5*  --  7.4*  --   --    HCT 25.2*   < > 27.3*   < > 24.1* 24*  24*   *  --  111*  --  113*  --   --    MCV 96  --  98  --  99*  --   --    RDW 18.2*  --  19.6*  --  20.0*  --   --     < > = values in this interval not displayed.        Chemistries:  Recent Labs   Lab 02/11/22  0258 02/11/22  0258 02/11/22  1416 02/11/22  2134 02/12/22  0300   *  131*   < > 135* 135* 128*   K 4.6  4.6   < > 4.0 4.0 3.8     103   < > 97 95 90*   CO2 14*  15*   < > 28 26 27   BUN 36*  37*   < > 20 8 15   CREATININE 1.5*  1.5*   < > 0.8 0.6 0.8   CALCIUM 8.5*  8.5*   < > 8.1* 7.2* 8.2*   ALBUMIN 2.1*  2.1*   < > 2.1* 1.8* 1.9*   PROT 6.5  --   --   --  5.8*   BILITOT 1.2*  --   --   --  1.4*   ALKPHOS 131  --   --   --  119   ALT 22  --   --   --  21   AST 47*  --   --   --  47*   MG 2.6   < > 2.2 1.6 2.5   PHOS 4.7*  4.6*   < > 2.7 1.7* 2.5*    < > = values in this interval not displayed.       ABGs:   Recent Labs   Lab 02/12/22  0743   PH 7.516*   PCO2 39.5   HCO3 32.0*   POCSATURATED 98   BE 9       Significant Imaging:  I have reviewed all pertinent imaging results/findings within the past 24 hours.    Assessment/Plan:     * History of mechanical aortic valve replacement  Orion Ty is a 76 y.o. female who presents to the SICU s/p redo aortic valve replacement with mechanical valve on 1/5/22.      Neuro/Psych:   -- Sedation: sedated with propofol, fentanyl; paralysis   -- Pain: fent gtt     Cards:    -- Pressors: vasopressor support continues to decrease  -- Off levo, on epi 0.02, vaso 0.04  -- continue to wean vasopressors as tolerated  -- Goal MAP: 60-80  -- A-fib rate controlled.   -- PO amio TID  -- statin  -- hold coumadin today  -- INR therapeutic  -- metoprolol 25 BID      Pulm:   -- Goal O2 sat > 90%  -- reintubated 1/12 then 1/18. S/p trach 1/21  -- intubated, paralyzed, on rate with improving ventilation  -- ABGs q4hrs  -- 2 mediastinal removed 1/9 L Pleural CT removed 1/8      Renal:  -- CRRT   -- nephro following  -- bicarb bath yesterday -->  bicarb normalizzed  -- Permcath 1/21  -- Removed R IJ trialysis 1/23      FEN / GI:   -- Replace lytes as needed  -- s/p PEG 1/21  -- Nutrition: tube feeds held given vasopressor requirement      ID:   -- WBC remains elevated  -- Pseudomonas in resp cx; sensitive to zosyn  -- Antibiotics: zosyn  -- Initiated broad spec abx on 2/3; narrowed 2/6  -- Blood Cx sent 2/3, NGTD        Heme/Onc:   -- Hgb stable  -- Daily CBC  -- Transfused products: 1U RBC on 1/5/22. 1u RBC and 1u FFP on 1/9/21. 2U RBC on 1/12. 4 FFP on 1/13. 1 FFP on 1/17, 2u pRBC 1/27, 1u pRBC 2/9  -- Anticoagulation: coumadin, supratherapeutic today; hold coumadin      Endo:   -- Gluc goal 140-180  -- Insulin per endocrinology      PPx:   Feeding: tube feeds held due to vasopressor requirements  Analgesia/Sedation: nimbex, propofol, fentanyl  Thromboembolic prevention: Coumadin, ASA  HOB >30: yes  Stress Ulcer ppx: protonix BID  Glucose control: Critical care goal 140-180 g/dl, ISS    Lines/Drains/Airway: Art line, Trach, Permcath, PEG      Dispo/Code Status/Palliative:   -- SICU / Full Code             Sarah Shafer MD  Critical Care - Surgery  [facility]

## 2022-02-12 NOTE — PLAN OF CARE
Significant events: Ventilator support increased. Afib RVR overnight, amiodarone bolus and infusion started. Tube feedings held due to pressor requirements.  Norepinephrine titrated off.   Vitals/ Respiratory: Mechanically ventilated to trach, AC VC +, FiO2 60%, Peep 8, Rate 26, Vt 330.   Nitric Oxide: 20 ppm.   O2: >87%   HR: 's.  MAP: 60-90.  Temperature max: 99.4 F.    Gtts:  Epinephrine at 0.02 mcg/kg/min, Vasopressin, Amiodarone, Propofol, Fentanyl, and Nimbex.   UOP:   Anuric.      Last Bowel Movement: 2/12/22 x 2.   CRRT: Treatment ended this shift, CRRT rinsed back.   Neuro: Pupils equal and reactive. Patient sedated and paralyzed, MELIDA muscle movements.     Diet:  Tube feedings held. NPO except meds.   Labs/Accuchecks:  ABGs Q3 hrs. Renal and Mag Q8 hrs. Daily labs.       Plan:   Wean pressors and oxygen as tolerated. Continue ICU care.     Skin:  Chest and right groin incision CDI. Sacral wound cleaned with normal saline, foam applied. Wound care re-consulted to assess sacral wound and adjust orders. Turned Q2 hrs. Immerse mattress, heel boots, sacral foam, and wedges in use. No new skin breakdown noted.

## 2022-02-12 NOTE — PROGRESS NOTES
Pharmacokinetic Assessment Follow Up: IV Vancomycin    Vancomycin serum concentration assessment(s):    -Vancomycin random level was drawn with AM labs and can be used to guide therapy.  -JO requiring RRT, 10 hr SLED ordered 2/11.  -Level of 10.1 is appropriate for re-dosing.  -LRTI goal 15-20 mcg/mL.     Vancomycin Regimen Plan:    -Give x 1 vancomycin 750 mg (15 mg/kg) today.  -Vancomycin random level with AM labs.  -Re-dose based off level and RRT plans.     Drug levels (last 3 results):  Recent Labs   Lab Result Units 02/10/22  0243 02/11/22  0258 02/12/22  0300   Vancomycin, Random ug/mL 4.4 17.8 10.1       Pharmacy will continue to follow and monitor vancomycin.    Please contact pharmacy at extension [phone] for questions regarding this assessment.    Thank you for the consult,   Eric Ty       Patient brief summary:  Orion Ty is a 76 y.o. female initiated on antimicrobial therapy with IV Vancomycin for treatment of lower respiratory infection    Drug Allergies:   Review of patient's allergies indicates:  No Known Allergies    Actual Body Weight:   45 kg    Renal Function:   Estimated Creatinine Clearance: 41.8 mL/min (based on SCr of 0.8 mg/dL).,     Dialysis Method (if applicable):  SLED (10 hr)     CBC (last 72 hours):  Recent Labs   Lab Result Units 02/09/22  1233 02/09/22  2206 02/10/22  0243 02/10/22  0930 02/11/22  0258 02/12/22  0300   WBC K/uL 23.89* 24.04* 22.28* 14.84* 17.72* 17.69*   Hemoglobin g/dL 7.3* 7.1* 7.1* 8.0* 8.5* 7.4*   Hematocrit % 24.0* 23.1* 23.1* 25.2* 27.3* 24.1*   Platelets K/uL 131* 142* 133* 103* 111* 113*   Gran % % 80.0* 84.0* 85.5* 82.0* 95.0* 87.7*   Lymph % % 3.0* 4.0* 6.0* 8.0* 3.0* 3.8*   Mono % % 8.0 4.0 4.0 6.0 2.0* 5.1   Eosinophil % % 1.0 1.0 0.0 0.0 0.0 0.1   Basophil % % 0.0 0.0 0.0 0.0 0.0 0.1   Differential Method  Manual Manual Manual Manual Manual Automated       Metabolic Panel (last 72 hours):  Recent Labs   Lab Result Units 02/09/22  6104  02/09/22  1236 02/09/22 2206 02/10/22  0243 02/10/22  1410 02/10/22  2205 02/11/22  0258 02/11/22  1416 02/11/22  2134 02/12/22  0300   Sodium mmol/L 134*  --  130*  130* 131*  131*  131* 135* 134* 123*  131* 135* 135* 128*   Potassium mmol/L 4.9  --  4.3  4.3 4.3  4.3  4.3 4.1 4.7 4.6  4.6 4.0 4.0 3.8   Chloride mmol/L 103  --  99  99 99  100  100 102 103 103  103 97 95 90*   CO2 mmol/L 20*  --  23  23 21*  22*  22* 20* 16* 14*  15* 28 26 27   Glucose mg/dL 128*  --  159*  159* 171*  170*  170* 162* 193* 180*  179* 104 189* 258*   BUN mg/dL 77*  --  41*  41* 20  21  21 28* 32* 36*  37* 20 8 15   Creatinine mg/dL 2.4*  --  1.3  1.3 0.8  0.8  0.8 1.1 1.2 1.5*  1.5* 0.8 0.6 0.8   Albumin g/dL 2.0*  --  1.9*  1.9* 2.0*  1.9*  1.9* 1.8* 2.0* 2.1*  2.1* 2.1* 1.8* 1.9*   Total Bilirubin mg/dL 0.9  --   --  0.9  --   --  1.2*  --   --  1.4*   Alkaline Phosphatase U/L 118  --   --  139*  --   --  131  --   --  119   AST U/L 39  --   --  49*  --   --  47*  --   --  47*   ALT U/L 22  --   --  23  --   --  22  --   --  21   Magnesium mg/dL 2.4  --  2.1  2.1 1.8  1.9  1.9 2.5 2.5 2.6 2.2 1.6 2.5   Phosphorus mg/dL  --  5.9* 3.3  3.3 2.1*  2.1*  2.1* 2.6* 3.8 4.7*  4.6* 2.7 1.7* 2.5*       Vancomycin Administrations:  vancomycin given in the last 96 hours                   vancomycin 750 mg in dextrose 5 % 250 mL IVPB (ready to mix system) (mg) 750 mg New Bag 02/10/22 1048                Microbiologic Results:  Microbiology Results (last 7 days)     Procedure Component Value Units Date/Time    Blood culture [[id number]] Collected: 02/10/22 1613    Order Status: Completed Specimen: Blood from Peripheral, Lower Arm, Right Updated: 02/11/22 1812     Blood Culture, Routine No Growth to date      No Growth to date    Blood culture [[id number]] Collected: 02/10/22 1613    Order Status: Completed Specimen: Blood from Peripheral, Lower Arm, Right Updated: 02/11/22 1812     Blood Culture, Routine No  Growth to date      No Growth to date    Blood culture [[id number]] Collected: 02/03/22 1550    Order Status: Completed Specimen: Blood from Peripheral, Hand, Right Updated: 02/08/22 1812     Blood Culture, Routine No growth after 5 days.    Blood culture [[id number]] Collected: 02/03/22 1542    Order Status: Completed Specimen: Blood from Peripheral, Hand, Left Updated: 02/08/22 1812     Blood Culture, Routine No growth after 5 days.    Culture, Respiratory with Gram Stain [[id number]]  (Abnormal)  (Susceptibility) Collected: 02/04/22 0926    Order Status: Completed Specimen: Respiratory from Tracheal Aspirate Updated: 02/06/22 1425     Respiratory Culture No S aureus isolated.      PSEUDOMONAS AERUGINOSA  Moderate       Gram Stain (Respiratory) Few WBC's     Gram Stain (Respiratory) Rare Gram negative rods    Culture, MRSA [[id number]] Collected: 02/03/22 1810    Order Status: Completed Specimen: MRSA source from Nares, Left Updated: 02/05/22 0804     MRSA Surveillance Screen No MRSA isolated

## 2022-02-12 NOTE — PLAN OF CARE
"SICU PLAN OF CARE NOTE    Dx: S/P aortic valve replacement    Goals of Care:  MAP 60-80, Q3h ABGs, Q4h accuchecks, comfort    Vital Signs:  BP (!) 98/53   Pulse 80   Temp 97.8 °F (36.6 °C) (Oral)   Resp (!) 26   Ht 5' 1" (1.549 m)   Wt 45 kg (99 lb 3.3 oz)   SpO2 95%   Breastfeeding No   BMI 18.74 kg/m²     Cardiac:  100 % paced @ 80 bpm    Resp:  SpO2 >88% on current vent settings AC/VC+ 50% FiO2 and 10.0 PEEP    Neuro:  Sedated, Arouses to Voice, Follows Commands, and Moves All Extremities  Following Nimbex being turned off, pt able to follow all commands and move extremities. Pt arouses to voice and opens eyes spontaneously.     Gtts:    Fentanyl @ 75  Amio @ 0.5  Epi @ 0.04  Vaso @ 0.04  Levo @ 0.02    Urine Output:  Anuric , bladder scan performed during shift, no urine detected    Drains:  PEG tube with TF @ 10cc/hr, rate decreased due to increased pressor requirements.     Diet:  Tube Feeds     Labs/Accuchecks:  all labs trended reviewed and replaced accordingly. All ABGs communicated with MD and appropriate ventilator changes made as needed.     Skin:  all skin monitored for redness and breakdown during shift. Heels CDI with foams and green heel boots in place. Wound care orders performed to sacrum, pt turned Q2h as tolerated, immerse mattress working properly and plugged into wall. All pressure points protected, no new injuries during shift.     Shift Events:  Dr. Alexis notified of increasing pressor requirements towards end of shift. Nephrology MD notified as well, orders to hold CRRT for tonight, WCTM at this time. See flowsheet and previous note for further assessment/details.  Family @ bedside and updated on current condition/plan of care, questions answered, and emotional support provided.   MD updated on current condition, vitals, labs, and gtts.    "

## 2022-02-12 NOTE — SUBJECTIVE & OBJECTIVE
Interval History/Significant Events:   SYLVAINEON  ARDS seems to be improving, FiO2 this AM at 40%  Remains on vent, intubated, sedated, paralyzed  10hrs CRRT  PH improved at 7.5  INR 2.6  This morning  Will continue to re-assess throughout day       Follow-up For: Procedure(s) (LRB):  CREATION, TRACHEOSTOMY (N/A)  EGD, WITH PEG TUBE INSERTION (N/A)  INSERTION-CATHETER-ROSELINE (N/A)    Post-Operative Day: 20 Days Post-Op    Objective:     Vital Signs (Most Recent):  Temp: 97.6 °F (36.4 °C) (02/12/22 0700)  Pulse: 79 (02/12/22 0830)  Resp: (!) 26 (02/12/22 0830)  BP: 125/72 (02/12/22 0800)  SpO2: (!) 93 % (02/12/22 0830) Vital Signs (24h Range):  Temp:  [97.6 °F (36.4 °C)-99.6 °F (37.6 °C)] 97.6 °F (36.4 °C)  Pulse:  [] 79  Resp:  [15-52] 26  SpO2:  [87 %-100 %] 93 %  BP: (100-134)/(49-72) 125/72  Arterial Line BP: ()/(40-68) 133/61     Weight: 45 kg (99 lb 3.3 oz)  Body mass index is 18.74 kg/m².      Intake/Output Summary (Last 24 hours) at 2/12/2022 0913  Last data filed at 2/12/2022 0800  Gross per 24 hour   Intake 4095.94 ml   Output 3421 ml   Net 674.94 ml       Physical Exam  Constitutional:       Appearance: She is ill-appearing.      Comments: Sedated, paralyzed   HENT:      Head: Normocephalic and atraumatic.      Comments: nims monitor in place     Mouth/Throat:      Comments: Oozing from mucosal surfaces at lower lip  Neck:      Comments: Trach  Cardiovascular:      Rate and Rhythm: Normal rate and regular rhythm.      Pulses: Normal pulses.      Comments: Midline sternotomy incision c/d/I  Pacemaker in place.   Pulmonary:      Effort: Pulmonary effort is normal. No respiratory distress.   Abdominal:      General: Abdomen is flat. There is no distension.      Palpations: Abdomen is soft.      Tenderness: There is no abdominal tenderness.      Comments: Peg site c/d/i  Soft abdomen   Musculoskeletal:      Right lower leg: No edema.      Left lower leg: No edema.   Skin:     General: Skin is warm.       Capillary Refill: Capillary refill takes less than 2 seconds.         Vents:  Vent Mode: A/C (02/12/22 0743)  Ventilator Initiated: Yes (01/18/22 1636)  Set Rate: 26 BPM (02/12/22 0743)  Vt Set: 330 mL (02/12/22 0743)  Pressure Support: 10 cmH20 (02/07/22 0415)  PEEP/CPAP: 8 cmH20 (02/12/22 0743)  Oxygen Concentration (%): 40 (02/12/22 0830)  Peak Airway Pressure: 52 cmH2O (02/12/22 0743)  Plateau Pressure: 28 cmH20 (02/12/22 0743)  Total Ve: 10.1 mL (02/12/22 0743)  Negative Inspiratory Force (cm H2O): 0 (02/12/22 0743)  F/VT Ratio<105 (RSBI): (!) 68.97 (02/12/22 0743)    Lines/Drains/Airways     Central Venous Catheter Line            Permacath 01/21/22 1013 left subclavian 21 days    Percutaneous Central Line Insertion/Assessment - Triple Lumen  02/09/22 1730 right internal jugular 2 days          Drain                 Gastrostomy/Enterostomy 01/21/22 1112 Percutaneous endoscopic gastrostomy (PEG) 21 days          Airway                 Surgical Airway 01/21/22 1043 Shiley Cuffed 21 days          Arterial Line            Arterial Line 02/09/22 1300 Left Radial 2 days          Peripheral Intravenous Line                 Peripheral IV - Single Lumen 02/10/22 1631 20 G Right Forearm 1 day                Significant Labs:    CBC/Anemia Profile:  Recent Labs   Lab 02/10/22  0930 02/10/22  0930 02/11/22  0258 02/11/22  2033 02/12/22  0300 02/12/22  0437 02/12/22  0743   WBC 14.84*  --  17.72*  --  17.69*  --   --    HGB 8.0*  --  8.5*  --  7.4*  --   --    HCT 25.2*   < > 27.3*   < > 24.1* 24* 24*   *  --  111*  --  113*  --   --    MCV 96  --  98  --  99*  --   --    RDW 18.2*  --  19.6*  --  20.0*  --   --     < > = values in this interval not displayed.        Chemistries:  Recent Labs   Lab 02/11/22  0258 02/11/22  0258 02/11/22  1416 02/11/22  2134 02/12/22  0300   *  131*   < > 135* 135* 128*   K 4.6  4.6   < > 4.0 4.0 3.8     103   < > 97 95 90*   CO2 14*  15*   < > 28 26 27   BUN 36*   37*   < > 20 8 15   CREATININE 1.5*  1.5*   < > 0.8 0.6 0.8   CALCIUM 8.5*  8.5*   < > 8.1* 7.2* 8.2*   ALBUMIN 2.1*  2.1*   < > 2.1* 1.8* 1.9*   PROT 6.5  --   --   --  5.8*   BILITOT 1.2*  --   --   --  1.4*   ALKPHOS 131  --   --   --  119   ALT 22  --   --   --  21   AST 47*  --   --   --  47*   MG 2.6   < > 2.2 1.6 2.5   PHOS 4.7*  4.6*   < > 2.7 1.7* 2.5*    < > = values in this interval not displayed.       ABGs:   Recent Labs   Lab 02/12/22  0743   PH 7.516*   PCO2 39.5   HCO3 32.0*   POCSATURATED 98   BE 9       Significant Imaging:  I have reviewed all pertinent imaging results/findings within the past 24 hours.

## 2022-02-12 NOTE — PLAN OF CARE
Plan of Care Note  Cardiothoracic Surgery    Specific issues: considering stopping paralytic when stable from respiratory standard, hold coumadin    Plan of care for patient was discussed with ICU staff including nurses, residents, and faculty and appropriate consulting services.    Will continue to monitor patient's hemodynamics, functionality, neuro status, fluid status and renal function, and labs and will adjust medications and fluids as necessary while monitoring appropriateness for de-escalation of support and monitoring and transport to stepdown unit.    Jojo Kauffman MD, PGY-VI  Cardiothoracic Surgery  [phone]

## 2022-02-12 NOTE — ASSESSMENT & PLAN NOTE
Hypothermic yest, but afebrile overnight. Sputum cultures on 2/4 with P aeruginosa. Vent requirements improving. On 3 pressors.  · Continue meropenem and vancomycin to complete a 7 day course. Last day 2/16/22  · If ventilator requirements increase or becomes febrile please repeat resp cx.

## 2022-02-12 NOTE — PROGRESS NOTES
Dr. Orta updated on patient's ABG results. PH 7.45. CO2 43.3. PaO2 48. Bicarb 30. O2 sat 85%. MD also updated on patient's pressor requirements, Norepinephrine 0.14 mcg/kg/min, Vasopressin 0.04 units/min, and Epinephrine at 0.02 mcg/kg/min.   Ventilator settings increased to 60% FiO2, rate of 26, tidal volume 330 and tube feedings held per MD orders.

## 2022-02-12 NOTE — PROGRESS NOTES
"[facility]  Endocrinology  Progress Note    Admit Date: 1/5/2022     Reason for Consult: Management of T2DM, Hyperglycemia     Surgical Procedure and Date:   1/5/22  REPLACEMENT, AORTIC VALVE, WITH REPEAT STERNOTOMY (N/A)     Diabetes diagnosis year: MELIDA; intubated    Home Diabetes Medications:  Metformin 500 mg daily,     How often checking glucose at home?  MELIDA    BG readings on regimen: MELIDA  Hypoglycemia on the regimen?  MELIDA  Missed doses on regimen? MELIDA    Diabetes Complications include:     None    Complicating diabetes co morbidities:   CAD, HTN, a-fib       HPI:   Patient is a 76 y.o. female with a diagnosis of s/p MVR, AVR and TVr in 2010 by Dr. Haider. 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 Family states she is more fatigued, decreased appetite and fluid on lungs. Pt has increased symptoms and wants to proceed with cardiac surgery now and here for pre op. She is now s/p the above procedure. Endocrinology consulted for management of T2DM.     Lab Results   Component Value Date    HGBA1C 6.0 (H) 01/03/2022           Interval HPI:   Overnight events: Remains in CICU. NAEON. BG above goal ranges on current SQ insulin regimen. Hydrocortisone 100 mg q 8 hrs per primary. Diet NPO Except for: Sips with Medication  Eating:   NPO  Nausea: No  Hypoglycemia and intervention: No  Fever: No  TPN and/or TF: Yes  If yes, type of TF/TPN and rate: Novasource Renal at 35 ml/hr    /63   Pulse 79   Temp 96.5 °F (35.8 °C) (Oral)   Resp (!) 26   Ht 5' 1" (1.549 m)   Wt 45 kg (99 lb 3.3 oz)   SpO2 (!) 89%   Breastfeeding No   BMI 18.74 kg/m²     Labs Reviewed and Include    Recent Labs   Lab 02/12/22  0300   *   CALCIUM 8.2*   ALBUMIN 1.9*   PROT 5.8*   *   K 3.8   CO2 27   CL 90*   BUN 15   CREATININE 0.8   ALKPHOS 119   ALT 21   AST 47*   BILITOT 1.4*     Lab Results   Component Value Date    WBC 17.69 " (H) 02/12/2022    HGB 7.4 (L) 02/12/2022    HCT 25 (L) 02/12/2022    MCV 99 (H) 02/12/2022     (L) 02/12/2022     No results for input(s): TSH, FREET4 in the last 168 hours.  Lab Results   Component Value Date    HGBA1C 6.0 (H) 01/03/2022       Nutritional status:   Body mass index is 18.74 kg/m².  Lab Results   Component Value Date    ALBUMIN 1.9 (L) 02/12/2022    ALBUMIN 1.8 (L) 02/11/2022    ALBUMIN 2.1 (L) 02/11/2022     Lab Results   Component Value Date    PREALBUMIN 16 (L) 02/11/2022    PREALBUMIN <3 (L) 02/04/2022    PREALBUMIN <3 (L) 01/28/2022       Estimated Creatinine Clearance: 41.8 mL/min (based on SCr of 0.8 mg/dL).    Accu-Checks  Recent Labs     02/11/22  0350 02/11/22  0758 02/11/22  1138 02/11/22  1607 02/11/22  2030 02/11/22  2352 02/11/22  2353 02/12/22  0435 02/12/22  0743 02/12/22  1107   POCTGLUCOSE 188* 137* 207* 112* 89 277* 269* 287* 207* 228*       Current Medications and/or Treatments Impacting Glycemic Control  Immunotherapy:    Immunosuppressants     None        Steroids:   Hormones (From admission, onward)            Start     Stop Route Frequency Ordered    02/10/22 1800  hydrocortisone sodium succinate injection 100 mg         -- IV Every 8 hours 02/10/22 1747    02/08/22 2230  vasopressin (PITRESSIN) 0.2 Units/mL in dextrose 5 % 100 mL infusion         -- IV Continuous 02/08/22 2119    01/28/22 0207  vasopressin (PITRESSIN) 20 unit/mL injection        Note to Pharmacy: Created by cabinet override    01/28 1414   01/28/22 0207        Pressors:    Autonomic Drugs (From admission, onward)            Start     Stop Route Frequency Ordered    02/09/22 1815  NORepinephrine bitartrate-D5W 4 mg/250 mL (16 mcg/mL) PERIPHERAL access infusion        Question Answer Comment   Begin at (in mcg/kg/min): 0.02    Titrate by: (in mcg/kg/min) 0.02    Titrate interval: (in minutes) 3    Titrate to maintain: (MAP or SBP) MAP    Greater than: (in mmHg) 60    Maximum dose: (in mcg/kg/min) 0.2         -- IV Continuous 02/09/22 1805 02/09/22 1815  cisatracurium (NIMBEX) 200 mg in dextrose 5 % 100 mL infusion        Question Answer Comment   Begin at (in mcg/kg/min): 1    Titrate by: (in mcg/kg/min) 1    Titrate interval: (in minutes) 2    To maintain a train-of-four of (TOF_/4): 0/4    Maximum dose: (in mcg/kg/min) 10        -- IV Continuous 02/09/22 1811 02/09/22 1800  EPINEPHrine (ADRENALIN) 5 mg in dextrose 5 % 250 mL infusion        Question Answer Comment   Begin at (in mcg/kg/min): 0.01    Titrate by: (in mcg/kg/min) 0.1    Titrate interval: (in minutes) 5    Titrate to maintain: (SBP or MAP or Cardiac Index) MAP    Greater than: (in mmHg) 60    Maximum dose: (in mcg/kg/min) 0.1        -- IV Continuous 02/09/22 1650    02/02/22 1400  midodrine tablet 15 mg         -- PER G TUBE Every 8 hours 02/02/22 1045    01/12/22 1441  rocuronium 10 mg/mL injection        Note to Pharmacy: Created by cabinet override    01/13 0244   01/12/22 1441        Hyperglycemia/Diabetes Medications:   Antihyperglycemics (From admission, onward)            Start     Stop Route Frequency Ordered    02/13/22 1200  insulin aspart U-100 pen 5 Units         -- SubQ Every 24 hours (non-standard times) 02/12/22 1209    02/13/22 0800  insulin aspart U-100 pen 5 Units         -- SubQ Every 24 hours (non-standard times) 02/12/22 1209    02/13/22 0400  insulin aspart U-100 pen 5 Units         -- SubQ Every 24 hours (non-standard times) 02/12/22 1209    02/13/22 0000  insulin aspart U-100 pen 5 Units         -- SubQ Every 24 hours (non-standard times) 02/12/22 1209    02/12/22 2000  insulin aspart U-100 pen 5 Units         -- SubQ Every 24 hours (non-standard times) 02/12/22 1209    02/12/22 1600  insulin aspart U-100 pen 5 Units         -- SubQ Every 24 hours (non-standard times) 02/12/22 1209    02/03/22 1441  insulin aspart U-100 pen 1-10 Units         -- SubQ As needed (PRN) 02/03/22 1341          ASSESSMENT and PLAN    * History  of mechanical aortic valve replacement  Managed per primary team  Optimize BG control        Type 2 diabetes mellitus with microalbuminuria, without long-term current use of insulin  BG goal 140 - 180       -  Increase Novolog to 5 units q 4 hrs while on tube feeds. Steroids likely contributing to BG excursions. (HOLD if tube feeds are stopped or BG < 100).  -  Continue Moderate Dose SQ Insulin Correction Scale PRN hyperglycemia.   -  BG Monitoring q 4 hrs while NPO/tube feeds    ** Please call Endocrine for any BG related issues **  ** Please notify Endocrine for any change and/or advance in diet**     Lab Results   Component Value Date    HGBA1C 6.0 (H) 01/03/2022       Discharge Planning:   TBD. Please notify endocrinology prior to discharge.           Chronic atrial fibrillation  May increase insulin resistance.         JO (acute kidney injury)  Lab Results   Component Value Date    CREATININE 0.8 02/12/2022     Avoid insulin stacking  Titrate insulin slowly          Keyanna Crocker, NP  Endocrinology  [facility]

## 2022-02-12 NOTE — PROGRESS NOTES
"[facility] - Surgical Intensive Care  Infectious Disease  Progress Note    Patient Name: Orion Ty  MRN: [id number]  Admission Date: 1/5/2022  Length of Stay: 38 days  Attending Physician: Dennis Haider MD  Primary Care Provider: Otis Zimmer MD    Isolation Status: No active isolations  Assessment/Plan:      Ventilator associated pneumonia  Hypothermic yest, but afebrile overnight. Sputum cultures on 2/4 with P aeruginosa. Vent requirements improving. On 3 pressors.  · Continue meropenem and vancomycin to complete a 7 day course. Last day 2/16/22  · If ventilator requirements increase or becomes febrile please repeat resp cx.           Anticipated Disposition: tbd    Thank you for your consult. I will sign off. Please contact us if you have any additional questions.    Naomie Cervantes MD  Infectious Disease  [facility] - Surgical Intensive Care    Subjective:     Principal Problem:S/P aortic valve replacement    HPI: A 76-year-old woman with CAD, chronic Afib, rheumatic heart disease, s/p aortic valve replacement, MV replacement, and TV repair who was admitted for redo aortic valve replacement which she subsequently underwent on 1/5/22. Unfortunately, she has had a prolonged hospital course in the surgical ICU. On 2/4 she developed fever, worsening respiratory status and "waxing/waning mental status". She was started on empiric antibiotics and sputum cultures subsequently grew Pseudomonas aeruginosa. Yesterday, she developed increasing pressor requirements and ventilatory support.       Infectious Diseases consulted for "pneumosepsis with known species; not responding to abx"          Interval History: No acute events overnight.  Afebrile since 2/3 with some improvement on vent and pressor requirements.     Review of Systems   Unable to perform ROS: Intubated     Objective:     Vital Signs (Most Recent):  Temp: 96.5 °F (35.8 °C) (02/12/22 1100)  Pulse: 79 (02/12/22 1430)  Resp: (!) 26 (02/12/22 " 1430)  BP: (!) 94/52 (02/12/22 1300)  SpO2: (!) 92 % (02/12/22 1430) Vital Signs (24h Range):  Temp:  [96.5 °F (35.8 °C)-99.6 °F (37.6 °C)] 96.5 °F (35.8 °C)  Pulse:  [] 79  Resp:  [15-52] 26  SpO2:  [85 %-100 %] 92 %  BP: ()/(49-72) 94/52  Arterial Line BP: ()/(40-68) 113/52     Weight: 45 kg (99 lb 3.3 oz)  Body mass index is 18.74 kg/m².    Estimated Creatinine Clearance: 41.8 mL/min (based on SCr of 0.8 mg/dL).    Physical Exam  Vitals and nursing note reviewed.   Constitutional:       Appearance: She is well-developed. She is ill-appearing.      Interventions: She is sedated, chemically paralyzed and intubated.   HENT:      Head: Normocephalic and atraumatic.      Right Ear: External ear normal.      Left Ear: External ear normal.      Mouth/Throat:     Eyes:      General: No scleral icterus.        Right eye: No discharge.         Left eye: No discharge.   Cardiovascular:      Rate and Rhythm: Normal rate and regular rhythm.      Heart sounds: Normal heart sounds. No murmur heard.  No friction rub. No gallop.    Pulmonary:      Effort: Pulmonary effort is normal. No respiratory distress. She is intubated.      Breath sounds: Normal breath sounds. No stridor. No wheezing or rales.   Chest:      Comments: Midline sternotomy site without erythema or purulence.   Abdominal:      General: Bowel sounds are decreased.   Musculoskeletal:         General: No tenderness.      Right lower leg: No edema.      Left lower leg: No edema.   Skin:     General: Skin is warm and dry.   Neurological:    sedated        Significant Labs:   Blood Culture:   Recent Labs   Lab 02/03/22  1542 02/03/22  1550 02/10/22  1613   LABBLOO No growth after 5 days. No growth after 5 days. No Growth to date  No Growth to date  No Growth to date  No Growth to date     Respiratory Culture:   Recent Labs   Lab 02/04/22  0926   GSRESP Few WBC's  Rare Gram negative rods   RESPIRATORYC No S aureus isolated.  PSEUDOMONAS  AERUGINOSA  Moderate  *     All pertinent labs within the past 24 hours have been reviewed.    Significant Imaging: I have reviewed all pertinent imaging results/findings within the past 24 hours.

## 2022-02-12 NOTE — PROGRESS NOTES
[facility]  Nephrology  Progress Note     Patient Name: Orion Ty  MRN: [id number]  Admission Date: 1/5/2022  Hospital Length of Stay: 38 days  Attending Provider: Dennis Haider MD   Primary Care Physician: Otis Zimmer MD  Principal Problem: History of mechanical aortic valve replacement     PLAN/RECOMMENDATIONS      Pt requires RRT for metabolic clearance and volume management.  Currently hemodynamically unstable for iHD.    -Will continue SLED 8 hrs overnight  -Goal UF rate: 250-300 cc/hr as tolerated with a MAP >65.    -Prescription and dialysate baths were reviewed and changes made according to most recent labs.   -Monitor labs serially and follow replacement protocols as needed.       Subjective:     HPI: Orion Ty is our 76 y.o. female with previous aortic valve replacement, mitral valve replacement, and tricuspid valve repair in 2010 who presented on 1/5 for redo aortic valve replacement. Nephrology consulted on 1/8 for anuric JO. Patient is alert but tired, son at bedside. Stated she was tired prior to admission. During the hospital stay Cr increase from 1 to 1.5. In OR she received  2.5L crystalloid, 3U RBC, 2U FFP, 2 platelets, and 800 cell saver. she is +5 L since admit. UOP decrease overnight, per nurse she didn't urinate at all. She received diuril 500 mg twice, lasix 20 mg *2 on 1/7. This morning she urinate 225 immediately after placing the tejeda's cath. She received again lasix 100 mg this morning. Upor evaluation she was alert but repeatedly saying she was tired, denied any pain. Denied any previous hx of kidney disease.       Interval History:  Anuric  Decreasing oxygen requirement.   Still on pressors, Input 100cc/hr    Review of patient's allergies indicates:  No Known Allergies   Current Facility-Administered Medications   Medication Frequency    0.9%  NaCl infusion (CRRT USE ONLY) Continuous    0.9%  NaCl infusion (for blood administration) Q24H PRN     0.9%  NaCl infusion PRN    acetaminophen oral solution 650 mg Q4H PRN    albuterol-ipratropium 2.5 mg-0.5 mg/3 mL nebulizer solution 3 mL Q6H    amiodarone 360 mg/200 mL (1.8 mg/mL) infusion Continuous    atorvastatin tablet 40 mg Daily    bisacodyL suppository 10 mg Daily PRN    calcium gluconate 2 g in dextrose 5 % 100 mL IVPB Once    cisatracurium (NIMBEX) 200 mg in dextrose 5 % 100 mL infusion Continuous    dextrose 10 % infusion Continuous PRN    dextrose 10% bolus 125 mL PRN    EPINEPHrine (ADRENALIN) 5 mg in dextrose 5 % 250 mL infusion Continuous    fentaNYL 2500 mcg in 0.9% sodium chloride 250 mL infusion premix (titrating) Continuous    fentaNYL 50 mcg/mL injection 25 mcg Q1H PRN    glucagon (human recombinant) injection 1 mg PRN    haloperidol lactate injection 2 mg Nightly PRN    heparin (porcine) injection 1,000 Units PRN    hydrocortisone sodium succinate injection 100 mg Q8H    insulin aspart U-100 pen 1-10 Units PRN    insulin aspart U-100 pen 4 Units Q24H    insulin aspart U-100 pen 4 Units Q24H    insulin aspart U-100 pen 4 Units Q24H    insulin aspart U-100 pen 4 Units Q24H    insulin aspart U-100 pen 4 Units Q24H    insulin aspart U-100 pen 4 Units Q24H    LIDOcaine HCL 2% jelly PRN    meropenem-0.9% sodium chloride 1 g/50 mL IVPB Q12H    midodrine tablet 15 mg Q8H    nitric oxide gas Gas 20 ppm Continuous    NORepinephrine bitartrate-D5W 4 mg/250 mL (16 mcg/mL) PERIPHERAL access infusion Continuous    ondansetron injection 4 mg Q12H PRN    oxyCODONE 5 mg/5 mL solution 5 mg Q4H PRN    pantoprazole injection 40 mg Q12H    polyethylene glycol packet 17 g Daily PRN    propofol (DIPRIVAN) 10 mg/mL infusion Continuous    psyllium husk (aspartame) 3.4 gram packet 1 packet Daily    sodium chloride 0.9% bolus 250 mL PRN    sodium chloride 0.9% bolus 250 mL PRN    sodium chloride 0.9% flush 10 mL PRN    vancomycin - pharmacy to dose pharmacy to manage frequency     vancomycin 750 mg in dextrose 5 % 250 mL IVPB (ready to mix system) Once    vasopressin (PITRESSIN) 0.2 Units/mL in dextrose 5 % 100 mL infusion Continuous    white petrolatum-mineral oil (SYSTANE NIGHTTIME) ophthalmic ointment TID     Facility-Administered Medications Ordered in Other Encounters   Medication Frequency    0.9%  NaCl infusion Continuous       Objective:     Vital Signs (Most Recent):  Temp: 97.6 °F (36.4 °C) (02/12/22 0700)  Pulse: 79 (02/12/22 0945)  Resp: (!) 26 (02/12/22 0945)  BP: 123/66 (02/12/22 0900)  SpO2: (!) 94 % (02/12/22 0945)  O2 Device (Oxygen Therapy): ventilator (02/12/22 0734) Vital Signs (24h Range):  Temp:  [97.6 °F (36.4 °C)-99.6 °F (37.6 °C)] 97.6 °F (36.4 °C)  Pulse:  [] 79  Resp:  [15-52] 26  SpO2:  [87 %-100 %] 94 %  BP: (100-134)/(49-72) 123/66  Arterial Line BP: ()/(40-68) 131/59   Weight: 45 kg (99 lb 3.3 oz) (02/12/22 0421)  Body mass index is 18.74 kg/m².  Body surface area is 1.39 meters squared.      Intake/Output Summary (Last 24 hours) at 2/12/2022 0958  Last data filed at 2/12/2022 0800  Gross per 24 hour   Intake 4095.94 ml   Output 3421 ml   Net 674.94 ml     I/O last 3 completed shifts:  In: 7511.5 [I.V.:7032.5; NG/GT:140; IV Piggyback:339]  Out: 8808 [Other:8808]  Net IO Since Admission: 8,815.89 mL [02/12/22 0958]     Physical Exam  Constitutional:       Appearance: She is ill-appearing.   HENT:      Mouth/Throat:      Comments: Endotracheal tube  Cardiovascular:      Rate and Rhythm: Normal rate.   Pulmonary:      Comments: Mechanical ventilation  Abdominal:      General: There is no distension.   Musculoskeletal:         General: Swelling present.      Right lower leg: Edema present.      Left lower leg: Edema present.   Neurological:      Comments: Sedated         Significant Labs:  All labs within the past 24 hours have been reviewed.    Assessment/Plan:     * History of mechanical aortic valve replacement  -with severe intraoperative  hypotension   -per primary    ATN (acute tubular necrosis)  -see jo    JO (acute kidney injury)  -oliguric ischemic ATN likely secondary to severe intraoperative hypotension requiring epi, norepi, and vaso intraoperatively 1/5/22  -BL sCr 0.8  -creatinine worsened post op delayed likely due to hemodilution  -failed high dose duretic therapy and KRT started 1/9/22 due to volume overload  -reintubated 1/12/22  -last 24 hours neg 700cc fluid balance after SLED last night not requiring and vasopressors   -more alert today with stable fio2 of 30  -not volume overloaded, no severe acidosis, no severe electrolyte abnormalities  -hold dialysis today      Volume overload  -improved with KRT started 1/9/22         PLAN/RECOMMENDATIONS      Pt requires RRT for metabolic clearance and volume management.  Currently hemodynamically unstable for iHD.    -Will continue SLED 8 hrs overnight  -Goal UF rate: 250-300 cc/hr as tolerated with a MAP >65.    -Prescription and dialysate baths were reviewed and changes made according to most recent labs.   -Monitor labs serially and follow replacement protocols as needed.       Thank you for your consult. I will follow-up with patient. Please contact us if you have any additional questions.    Quang Pinzon MD  Nephrology  [facility]

## 2022-02-12 NOTE — PROGRESS NOTES
ABGs trended and reviewed with Dr. Whitaker @ bedside. Ventilator adjustments performed and nimbex gtt turned off @ this time. Will continue to monitor sedation levels, ventilator synchrony and VSS closely.

## 2022-02-12 NOTE — ASSESSMENT & PLAN NOTE
Orion Ty is a 76 y.o. female who presents to the SICU s/p redo aortic valve replacement with mechanical valve on 1/5/22.      Neuro/Psych:   -- Sedation: sedated with propofol, fentanyl; paralysis   -- Pain: fent gtt     Cards:    -- Pressors: vasopressor support continues to decrease  -- Off levo, on epi 0.02, vaso 0.04  -- continue to wean vasopressors as tolerated  -- Goal MAP: 60-80  -- A-fib rate controlled.   -- PO amio TID  -- statin  -- hold coumadin today  -- INR therapeutic  -- metoprolol 25 BID      Pulm:   -- Goal O2 sat > 90%  -- reintubated 1/12 then 1/18. S/p trach 1/21  -- intubated, paralyzed, on rate with improving ventilation  -- ABGs q4hrs  -- 2 mediastinal removed 1/9 L Pleural CT removed 1/8      Renal:  -- CRRT   -- nephro following  -- bicarb bath yesterday --> bicarb normalizzed  -- Permcath 1/21  -- Removed R IJ trialysis 1/23      FEN / GI:   -- Replace lytes as needed  -- s/p PEG 1/21  -- Nutrition: tube feeds held given vasopressor requirement      ID:   -- WBC remains elevated  -- Pseudomonas in resp cx; sensitive to zosyn  -- Antibiotics: zosyn  -- Initiated broad spec abx on 2/3; narrowed 2/6  -- Blood Cx sent 2/3, NGTD        Heme/Onc:   -- Hgb stable  -- Daily CBC  -- Transfused products: 1U RBC on 1/5/22. 1u RBC and 1u FFP on 1/9/21. 2U RBC on 1/12. 4 FFP on 1/13. 1 FFP on 1/17, 2u pRBC 1/27, 1u pRBC 2/9  -- Anticoagulation: coumadin, supratherapeutic today; hold coumadin      Endo:   -- Gluc goal 140-180  -- Insulin per endocrinology      PPx:   Feeding: tube feeds held due to vasopressor requirements  Analgesia/Sedation: nimbex, propofol, fentanyl  Thromboembolic prevention: Coumadin, ASA  HOB >30: yes  Stress Ulcer ppx: protonix BID  Glucose control: Critical care goal 140-180 g/dl, ISS    Lines/Drains/Airway: Art line, Trach, Permcath, PEG      Dispo/Code Status/Palliative:   -- SICU / Full Code

## 2022-02-12 NOTE — SUBJECTIVE & OBJECTIVE
"Interval HPI:   Overnight events: Remains in CICU. NAEON. BG above goal ranges on current SQ insulin regimen. Hydrocortisone 100 mg q 8 hrs per primary. Diet NPO Except for: Sips with Medication  Eating:   NPO  Nausea: No  Hypoglycemia and intervention: No  Fever: No  TPN and/or TF: Yes  If yes, type of TF/TPN and rate: Novasource Renal at 35 ml/hr    /63   Pulse 79   Temp 96.5 °F (35.8 °C) (Oral)   Resp (!) 26   Ht 5' 1" (1.549 m)   Wt 45 kg (99 lb 3.3 oz)   SpO2 (!) 89%   Breastfeeding No   BMI 18.74 kg/m²     Labs Reviewed and Include    Recent Labs   Lab 02/12/22  0300   *   CALCIUM 8.2*   ALBUMIN 1.9*   PROT 5.8*   *   K 3.8   CO2 27   CL 90*   BUN 15   CREATININE 0.8   ALKPHOS 119   ALT 21   AST 47*   BILITOT 1.4*     Lab Results   Component Value Date    WBC 17.69 (H) 02/12/2022    HGB 7.4 (L) 02/12/2022    HCT 25 (L) 02/12/2022    MCV 99 (H) 02/12/2022     (L) 02/12/2022     No results for input(s): TSH, FREET4 in the last 168 hours.  Lab Results   Component Value Date    HGBA1C 6.0 (H) 01/03/2022       Nutritional status:   Body mass index is 18.74 kg/m².  Lab Results   Component Value Date    ALBUMIN 1.9 (L) 02/12/2022    ALBUMIN 1.8 (L) 02/11/2022    ALBUMIN 2.1 (L) 02/11/2022     Lab Results   Component Value Date    PREALBUMIN 16 (L) 02/11/2022    PREALBUMIN <3 (L) 02/04/2022    PREALBUMIN <3 (L) 01/28/2022       Estimated Creatinine Clearance: 41.8 mL/min (based on SCr of 0.8 mg/dL).    Accu-Checks  Recent Labs     02/11/22  0350 02/11/22  0758 02/11/22  1138 02/11/22  1607 02/11/22 2030 02/11/22  2352 02/11/22  2353 02/12/22  0435 02/12/22  0743 02/12/22  1107   POCTGLUCOSE 188* 137* 207* 112* 89 277* 269* 287* 207* 228*       Current Medications and/or Treatments Impacting Glycemic Control  Immunotherapy:    Immunosuppressants     None        Steroids:   Hormones (From admission, onward)            Start     Stop Route Frequency Ordered    02/10/22 1800  " hydrocortisone sodium succinate injection 100 mg         -- IV Every 8 hours 02/10/22 1747    02/08/22 2230  vasopressin (PITRESSIN) 0.2 Units/mL in dextrose 5 % 100 mL infusion         -- IV Continuous 02/08/22 2119 01/28/22 0207  vasopressin (PITRESSIN) 20 unit/mL injection        Note to Pharmacy: Created by cabinet override    01/28 1414   01/28/22 0207        Pressors:    Autonomic Drugs (From admission, onward)            Start     Stop Route Frequency Ordered    02/09/22 1815  NORepinephrine bitartrate-D5W 4 mg/250 mL (16 mcg/mL) PERIPHERAL access infusion        Question Answer Comment   Begin at (in mcg/kg/min): 0.02    Titrate by: (in mcg/kg/min) 0.02    Titrate interval: (in minutes) 3    Titrate to maintain: (MAP or SBP) MAP    Greater than: (in mmHg) 60    Maximum dose: (in mcg/kg/min) 0.2        -- IV Continuous 02/09/22 1805    02/09/22 1815  cisatracurium (NIMBEX) 200 mg in dextrose 5 % 100 mL infusion        Question Answer Comment   Begin at (in mcg/kg/min): 1    Titrate by: (in mcg/kg/min) 1    Titrate interval: (in minutes) 2    To maintain a train-of-four of (TOF_/4): 0/4    Maximum dose: (in mcg/kg/min) 10        -- IV Continuous 02/09/22 1811 02/09/22 1800  EPINEPHrine (ADRENALIN) 5 mg in dextrose 5 % 250 mL infusion        Question Answer Comment   Begin at (in mcg/kg/min): 0.01    Titrate by: (in mcg/kg/min) 0.1    Titrate interval: (in minutes) 5    Titrate to maintain: (SBP or MAP or Cardiac Index) MAP    Greater than: (in mmHg) 60    Maximum dose: (in mcg/kg/min) 0.1        -- IV Continuous 02/09/22 1650    02/02/22 1400  midodrine tablet 15 mg         -- PER G TUBE Every 8 hours 02/02/22 1045    01/12/22 1441  rocuronium 10 mg/mL injection        Note to Pharmacy: Created by cabinet override    01/13 0244   01/12/22 1441        Hyperglycemia/Diabetes Medications:   Antihyperglycemics (From admission, onward)            Start     Stop Route Frequency Ordered    02/13/22 1200   insulin aspart U-100 pen 5 Units         -- SubQ Every 24 hours (non-standard times) 02/12/22 1209    02/13/22 0800  insulin aspart U-100 pen 5 Units         -- SubQ Every 24 hours (non-standard times) 02/12/22 1209    02/13/22 0400  insulin aspart U-100 pen 5 Units         -- SubQ Every 24 hours (non-standard times) 02/12/22 1209    02/13/22 0000  insulin aspart U-100 pen 5 Units         -- SubQ Every 24 hours (non-standard times) 02/12/22 1209    02/12/22 2000  insulin aspart U-100 pen 5 Units         -- SubQ Every 24 hours (non-standard times) 02/12/22 1209    02/12/22 1600  insulin aspart U-100 pen 5 Units         -- SubQ Every 24 hours (non-standard times) 02/12/22 1209    02/03/22 1441  insulin aspart U-100 pen 1-10 Units         -- SubQ As needed (PRN) 02/03/22 1341

## 2022-02-12 NOTE — PROGRESS NOTES
Patient's 's. EKG obtained showed Afib RVR.  Dr. Orta notified. Orders received to bolus amiodarone and start infusion.   MD also updated on patient's ABG results. PH 7.54, PaO2 55, Bicarb 34.1. No new orders at this time.

## 2022-02-12 NOTE — ASSESSMENT & PLAN NOTE
Lab Results   Component Value Date    CREATININE 0.8 02/12/2022     Avoid insulin stacking  Titrate insulin slowly

## 2022-02-13 NOTE — PROGRESS NOTES
Patient's BG 70 then 66. 125 cc Dextrose 10 % Administered per PRN orders.   Dr. Breen with endocrinology notified. Per MD orders, scheduled and PRN insulin aspart will be held for BG <180 overnight.

## 2022-02-13 NOTE — PROGRESS NOTES
[facility]  Nephrology  Progress Note     Patient Name: Orion Ty  MRN: [id number]  Admission Date: 1/5/2022  Hospital Length of Stay: 39 days  Attending Provider: Dennis Haider MD   Primary Care Physician: Otis Zimmer MD  Principal Problem: S/P aortic valve replacement     PLAN/RECOMMENDATIONS    Pt requires RRT for metabolic clearance and volume management.  Currently hemodynamically unstable for iHD.  start at 200cc/hr,increase to 250 cc/hr after 2 hrs. After 6 hours, increase to 300 cc/hr if SYS BP >120-Will start SLED   -Prescription and dialysate baths were reviewed and changes made according to most recent labs.   -Monitor labs serially and follow replacement protocols as needed.       Subjective:     HPI: Orion Ty is our 76 y.o. female with previous aortic valve replacement, mitral valve replacement, and tricuspid valve repair in 2010 who presented on 1/5 for redo aortic valve replacement. Nephrology consulted on 1/8 for anuric JO. Patient is alert but tired, son at bedside. Stated she was tired prior to admission. During the hospital stay Cr increase from 1 to 1.5. In OR she received  2.5L crystalloid, 3U RBC, 2U FFP, 2 platelets, and 800 cell saver. she is +5 L since admit. UOP decrease overnight, per nurse she didn't urinate at all. She received diuril 500 mg twice, lasix 20 mg *2 on 1/7. This morning she urinate 225 immediately after placing the tejeda's cath. She received again lasix 100 mg this morning. Upor evaluation she was alert but repeatedly saying she was tired, denied any pain. Denied any previous hx of kidney disease.       Review of patient's allergies indicates:  No Known Allergies   Current Facility-Administered Medications   Medication Frequency    0.9%  NaCl infusion (CRRT USE ONLY) Continuous    0.9%  NaCl infusion (CRRT USE ONLY) Continuous    0.9%  NaCl infusion (for blood administration) Q24H PRN    0.9%  NaCl infusion PRN     acetaminophen oral solution 650 mg Q4H PRN    albuterol-ipratropium 2.5 mg-0.5 mg/3 mL nebulizer solution 3 mL Q6H    amiodarone 360 mg/200 mL (1.8 mg/mL) infusion Continuous    atorvastatin tablet 40 mg Daily    bisacodyL suppository 10 mg Daily PRN    cisatracurium (NIMBEX) 200 mg in dextrose 5 % 100 mL infusion Continuous    dextrose 10 % infusion Continuous PRN    dextrose 10% bolus 125 mL PRN    EPINEPHrine (ADRENALIN) 5 mg in dextrose 5 % 250 mL infusion Continuous    fentaNYL 2500 mcg in 0.9% sodium chloride 250 mL infusion premix (titrating) Continuous    fentaNYL 50 mcg/mL injection 25 mcg Q1H PRN    glucagon (human recombinant) injection 1 mg PRN    haloperidol lactate injection 2 mg Nightly PRN    heparin (porcine) injection 1,000 Units PRN    hydrocortisone sodium succinate injection 100 mg Q8H    insulin aspart U-100 pen 1-10 Units PRN    [START ON 2/14/2022] insulin aspart U-100 pen 4 Units Q24H    [START ON 2/14/2022] insulin aspart U-100 pen 4 Units Q24H    [START ON 2/14/2022] insulin aspart U-100 pen 4 Units Q24H    insulin aspart U-100 pen 4 Units Q24H    insulin aspart U-100 pen 4 Units Q24H    insulin aspart U-100 pen 4 Units Q24H    LIDOcaine HCL 2% jelly PRN    magnesium sulfate 2g in water 50mL IVPB (premix) PRN    magnesium sulfate 2g in water 50mL IVPB (premix) PRN    meropenem-0.9% sodium chloride 1 g/50 mL IVPB Q12H    midodrine tablet 15 mg Q8H    nitric oxide gas Gas 5 ppm Continuous    NORepinephrine bitartrate-D5W 4 mg/250 mL (16 mcg/mL) PERIPHERAL access infusion Continuous    ondansetron injection 4 mg Q12H PRN    oxyCODONE 5 mg/5 mL solution 5 mg Q4H PRN    pantoprazole injection 40 mg Q12H    polyethylene glycol packet 17 g Daily PRN    potassium, sodium phosphates 280-160-250 mg packet 1 packet Once    propofol (DIPRIVAN) 10 mg/mL infusion Continuous    psyllium husk (aspartame) 3.4 gram packet 1 packet Daily    sodium chloride 0.9% bolus 250  mL PRN    sodium chloride 0.9% bolus 250 mL PRN    sodium chloride 0.9% flush 10 mL PRN    sodium phosphate 20.01 mmol in dextrose 5 % 250 mL IVPB PRN    sodium phosphate 20.01 mmol in dextrose 5 % 250 mL IVPB PRN    sodium phosphate 30 mmol in dextrose 5 % 250 mL IVPB PRN    sodium phosphate 30 mmol in dextrose 5 % 250 mL IVPB PRN    sodium phosphate 39.99 mmol in dextrose 5 % 250 mL IVPB PRN    sodium phosphate 39.99 mmol in dextrose 5 % 250 mL IVPB PRN    vancomycin - pharmacy to dose pharmacy to manage frequency    vasopressin (PITRESSIN) 0.2 Units/mL in dextrose 5 % 100 mL infusion Continuous    white petrolatum-mineral oil (SYSTANE NIGHTTIME) ophthalmic ointment TID     Facility-Administered Medications Ordered in Other Encounters   Medication Frequency    0.9%  NaCl infusion Continuous       Objective:     Vital Signs (Most Recent):  Temp: 97 °F (36.1 °C) (02/13/22 0700)  Pulse: 79 (02/13/22 1030)  Resp: (!) 27 (02/13/22 1030)  BP: (!) 125/58 (02/13/22 1000)  SpO2: (!) 92 % (02/13/22 1030)  O2 Device (Oxygen Therapy): ventilator (02/13/22 0721) Vital Signs (24h Range):  Temp:  [96.5 °F (35.8 °C)-99.1 °F (37.3 °C)] 97 °F (36.1 °C)  Pulse:  [79-80] 79  Resp:  [9-33] 27  SpO2:  [85 %-100 %] 92 %  BP: ()/(52-68) 125/58  Arterial Line BP: ()/(40-63) 127/49   Weight: 50 kg (110 lb 3.7 oz) (02/13/22 0400)  Body mass index is 20.83 kg/m².  Body surface area is 1.47 meters squared.      Intake/Output Summary (Last 24 hours) at 2/13/2022 1051  Last data filed at 2/13/2022 1000  Gross per 24 hour   Intake 2620.77 ml   Output --   Net 2620.77 ml     I/O last 3 completed shifts:  In: 4243.6 [I.V.:2361.3; NG/GT:1010; IV Piggyback:872.3]  Out: 518 [Other:518]  Net IO Since Admission: 11,651.64 mL [02/13/22 1051]     Physical Exam  Constitutional:       Appearance: She is ill-appearing.   Neck:      Comments: tracheostomy  Cardiovascular:      Rate and Rhythm: Normal rate.   Pulmonary:      Comments:  Mechanical ventilation  Abdominal:      General: There is no distension.   Musculoskeletal:         General: Swelling present.      Right lower leg: Edema present.      Left lower leg: Edema present.   Neurological:      Comments: Sedated         Significant Labs:  All labs within the past 24 hours have been reviewed.    Assessment/Plan:     * S/P aortic valve replacement  -with severe intraoperative hypotension   -per primary    ATN (acute tubular necrosis)  -see jo    JO (acute kidney injury)  -oliguric ischemic ATN likely secondary to severe intraoperative hypotension requiring epi, norepi, and vaso intraoperatively 1/5/22    Volume overload  -UF removal      PLAN/RECOMMENDATIONS    Pt requires RRT for metabolic clearance and volume management.  Currently hemodynamically unstable for iHD.  start at 200cc/hr,increase to 250 cc/hr after 2 hrs. After 6 hours, increase to 300 cc/hr if SYS BP >120-Will start SLED   -Prescription and dialysate baths were reviewed and changes made according to most recent labs.   -Monitor labs serially and follow replacement protocols as needed.       Thank you for your consult. I will follow-up with patient. Please contact us if you have any additional questions.    Quang Pinzon MD  Nephrology  [facility]

## 2022-02-13 NOTE — PROGRESS NOTES
Pharmacokinetic Assessment Follow Up: IV Vancomycin    Vancomycin serum concentration assessment(s):     -Vancomycin random level was drawn with AM labs and can be used to guide therapy.  -JO requiring RRT, 8 hr SLED ordered 2/12.  -Level of 20.9 mcg/mL is above goal.   -LRTI goal 15-20 mcg/mL.      Vancomycin Regimen Plan:     -Hold vancomycin dosing today. EOT 2/16 per last ID recs.   -Vancomycin random level with AM labs.  -Re-dose based off level and RRT plans.     -Addendum:  -12 hr SLED orders placed.  -Give vancomycin  mg x1.     Drug levels (last 3 results):  Recent Labs   Lab Result Units 02/11/22  0258 02/12/22  0300 02/13/22  0425   Vancomycin, Random ug/mL 17.8 10.1 20.9       Pharmacy will continue to follow and monitor vancomycin.    Please contact pharmacy at extension [phone] for questions regarding this assessment.    Thank you for the consult,   Eric Galeuyen       Patient brief summary:  Orion Ty is a 76 y.o. female initiated on antimicrobial therapy with IV Vancomycin for treatment of lower respiratory infection    Drug Allergies:   Review of patient's allergies indicates:  No Known Allergies    Actual Body Weight:   50 kg    Renal Function:   Estimated Creatinine Clearance: 23.7 mL/min (A) (based on SCr of 1.5 mg/dL (H)).     Dialysis Method (if applicable):  SLED    CBC (last 72 hours):  Recent Labs   Lab Result Units 02/10/22  0930 02/11/22  0258 02/12/22  0300 02/13/22  0425   WBC K/uL 14.84* 17.72* 17.69* 20.53*   Hemoglobin g/dL 8.0* 8.5* 7.4* 7.7*   Hematocrit % 25.2* 27.3* 24.1* 25.2*   Platelets K/uL 103* 111* 113* 122*   Gran % % 82.0* 95.0* 87.7* 89.7*   Lymph % % 8.0* 3.0* 3.8* 3.5*   Mono % % 6.0 2.0* 5.1 4.3   Eosinophil % % 0.0 0.0 0.1 0.0   Basophil % % 0.0 0.0 0.1 0.2   Differential Method  Manual Manual Automated Automated       Metabolic Panel (last 72 hours):  Recent Labs   Lab Result Units 02/10/22  1410 02/10/22  2205 02/11/22  0258 02/11/22  1416 02/11/22  2134  02/12/22  0300 02/12/22  1443 02/12/22  1444 02/12/22  2150 02/13/22  0425   Sodium mmol/L 135* 134* 123*  131* 135* 135* 128* 128*  --  126* 125*   Potassium mmol/L 4.1 4.7 4.6  4.6 4.0 4.0 3.8 4.4  --  4.3 4.6   Chloride mmol/L 102 103 103  103 97 95 90* 88*  --  89* 87*   CO2 mmol/L 20* 16* 14*  15* 28 26 27 27  --  23 23   Glucose mg/dL 162* 193* 180*  179* 104 189* 258* 219*  --  142* 211*   BUN mg/dL 28* 32* 36*  37* 20 8 15 24*  --  25* 32*   Creatinine mg/dL 1.1 1.2 1.5*  1.5* 0.8 0.6 0.8 1.0  --  1.2 1.5*   Albumin g/dL 1.8* 2.0* 2.1*  2.1* 2.1* 1.8* 1.9* 1.9*  --  1.8* 2.1*   Total Bilirubin mg/dL  --   --  1.2*  --   --  1.4*  --   --   --  1.4*   Alkaline Phosphatase U/L  --   --  131  --   --  119  --   --   --  123   AST U/L  --   --  47*  --   --  47*  --   --   --  54*   ALT U/L  --   --  22  --   --  21  --   --   --  22   Magnesium mg/dL 2.5 2.5 2.6 2.2 1.6 2.5 2.7*  2.7*  --  2.5 2.7*   Phosphorus mg/dL 2.6* 3.8 4.7*  4.6* 2.7 1.7* 2.5* 4.7* 4.8* 5.6* 6.8*       Vancomycin Administrations:  vancomycin given in the last 96 hours                   vancomycin 750 mg in dextrose 5 % 250 mL IVPB (ready to mix system) (mg) 750 mg New Bag 02/12/22 1051    vancomycin 750 mg in dextrose 5 % 250 mL IVPB (ready to mix system) (mg) 750 mg New Bag 02/10/22 1048                Microbiologic Results:  Microbiology Results (last 7 days)     Procedure Component Value Units Date/Time    Blood culture [[id number]] Collected: 02/10/22 1613    Order Status: Completed Specimen: Blood from Peripheral, Lower Arm, Right Updated: 02/12/22 1812     Blood Culture, Routine No Growth to date      No Growth to date      No Growth to date    Blood culture [[id number]] Collected: 02/10/22 1613    Order Status: Completed Specimen: Blood from Peripheral, Lower Arm, Right Updated: 02/12/22 1812     Blood Culture, Routine No Growth to date      No Growth to date      No Growth to date    Blood culture [[id number]] Collected:  02/03/22 1550    Order Status: Completed Specimen: Blood from Peripheral, Hand, Right Updated: 02/08/22 1812     Blood Culture, Routine No growth after 5 days.    Blood culture [[id number]] Collected: 02/03/22 1542    Order Status: Completed Specimen: Blood from Peripheral, Hand, Left Updated: 02/08/22 1812     Blood Culture, Routine No growth after 5 days.    Culture, Respiratory with Gram Stain [[id number]]  (Abnormal)  (Susceptibility) Collected: 02/04/22 0926    Order Status: Completed Specimen: Respiratory from Tracheal Aspirate Updated: 02/06/22 1425     Respiratory Culture No S aureus isolated.      PSEUDOMONAS AERUGINOSA  Moderate       Gram Stain (Respiratory) Few WBC's     Gram Stain (Respiratory) Rare Gram negative rods

## 2022-02-13 NOTE — PROGRESS NOTES
Pt with increasing pressor requirements; hold SLED at this time per Dr Corrales with nephrology. Primary Rn aware.

## 2022-02-13 NOTE — SUBJECTIVE & OBJECTIVE
Interval History/Significant Events:   NAEON  Increasing pressor requirements intermittently overnight  Will need CRRT overnight   Amio, epi, vaso this morning  Paralysis discontinued yesterday, moving all 4 when sedation weaned        Follow-up For: Procedure(s) (LRB):  CREATION, TRACHEOSTOMY (N/A)  EGD, WITH PEG TUBE INSERTION (N/A)  INSERTION-CATHETER-ROSELINE (N/A)    Post-Operative Day: 20 Days Post-Op    Objective:     Vital Signs (Most Recent):  Temp: 97 °F (36.1 °C) (02/13/22 0700)  Pulse: 79 (02/13/22 0900)  Resp: (!) 24 (02/13/22 0900)  BP: 120/60 (02/13/22 0900)  SpO2: (!) 92 % (02/13/22 0900) Vital Signs (24h Range):  Temp:  [96.5 °F (35.8 °C)-99.1 °F (37.3 °C)] 97 °F (36.1 °C)  Pulse:  [79-80] 79  Resp:  [9-33] 24  SpO2:  [85 %-100 %] 92 %  BP: ()/(52-68) 120/60  Arterial Line BP: ()/(40-63) 134/52     Weight: 50 kg (110 lb 3.7 oz)  Body mass index is 20.83 kg/m².      Intake/Output Summary (Last 24 hours) at 2/13/2022 0954  Last data filed at 2/13/2022 0900  Gross per 24 hour   Intake 2490.28 ml   Output --   Net 2490.28 ml       Physical Exam  Constitutional:       Appearance: She is ill-appearing.      Comments: Sedated, intubated   HENT:      Head: Normocephalic and atraumatic.      Mouth/Throat:      Comments: Oozing from mucosal surfaces at lower lip  Neck:      Comments: Trach  Cardiovascular:      Rate and Rhythm: Normal rate and regular rhythm.      Pulses: Normal pulses.      Comments: Midline sternotomy incision c/d/I  Pacemaker in place.   Pulmonary:      Effort: Pulmonary effort is normal. No respiratory distress.   Abdominal:      General: Abdomen is flat. There is no distension.      Palpations: Abdomen is soft.      Tenderness: There is no abdominal tenderness.      Comments: Peg site c/d/i  Soft abdomen   Musculoskeletal:      Right lower leg: No edema.      Left lower leg: No edema.   Skin:     General: Skin is warm.      Capillary Refill: Capillary refill takes less than 2  seconds.         Vents:  Vent Mode: A/C (02/13/22 0735)  Ventilator Initiated: Yes (01/18/22 1636)  Set Rate: 26 BPM (02/13/22 0735)  Vt Set: 300 mL (02/13/22 0735)  Pressure Support: 10 cmH20 (02/07/22 0415)  PEEP/CPAP: 10 cmH20 (02/13/22 0735)  Oxygen Concentration (%): 50 (02/13/22 0900)  Peak Airway Pressure: 42 cmH2O (02/13/22 0735)  Plateau Pressure: 31 cmH20 (02/13/22 0735)  Total Ve: 8.36 mL (02/13/22 0735)  Negative Inspiratory Force (cm H2O): 0 (02/13/22 0735)  F/VT Ratio<105 (RSBI): (!) 27.95 (02/13/22 0735)    Lines/Drains/Airways     Central Venous Catheter Line            Permacath 01/21/22 1013 left subclavian 22 days    Percutaneous Central Line Insertion/Assessment - Triple Lumen  02/09/22 1730 right internal jugular 3 days          Drain                 Gastrostomy/Enterostomy 01/21/22 1112 Percutaneous endoscopic gastrostomy (PEG) 22 days          Airway                 Surgical Airway 01/21/22 1043 Shiley Cuffed 22 days          Arterial Line            Arterial Line 02/09/22 1300 Left Radial 3 days          Peripheral Intravenous Line                 Peripheral IV - Single Lumen 02/10/22 1631 20 G Right Forearm 2 days                Significant Labs:    CBC/Anemia Profile:  Recent Labs   Lab 02/12/22  0300 02/12/22  0437 02/12/22  1951 02/13/22  0425 02/13/22  0733   WBC 17.69*  --   --  20.53*  --    HGB 7.4*  --   --  7.7*  --    HCT 24.1*   < > 25* 25.2* 24*   *  --   --  122*  --    MCV 99*  --   --  101*  --    RDW 20.0*  --   --  20.0*  --     < > = values in this interval not displayed.        Chemistries:  Recent Labs   Lab 02/12/22  0300 02/12/22  0300 02/12/22  1443 02/12/22  1443 02/12/22  1444 02/12/22  2150 02/13/22  0425   *   < > 128*  --   --  126* 125*   K 3.8   < > 4.4  --   --  4.3 4.6   CL 90*   < > 88*  --   --  89* 87*   CO2 27   < > 27  --   --  23 23   BUN 15   < > 24*  --   --  25* 32*   CREATININE 0.8   < > 1.0  --   --  1.2 1.5*   CALCIUM 8.2*   < > 8.4*   --   --  8.5* 8.3*   ALBUMIN 1.9*   < > 1.9*  --   --  1.8* 2.1*   PROT 5.8*  --   --   --   --   --  6.0   BILITOT 1.4*  --   --   --   --   --  1.4*   ALKPHOS 119  --   --   --   --   --  123   ALT 21  --   --   --   --   --  22   AST 47*  --   --   --   --   --  54*   MG 2.5   < > 2.7*  2.7*  --   --  2.5 2.7*   PHOS 2.5*   < > 4.7*   < > 4.8* 5.6* 6.8*    < > = values in this interval not displayed.       ABGs:   Recent Labs   Lab 02/13/22  0733   PH 7.290*   PCO2 54.1*   HCO3 26.0   POCSATURATED 92*   BE -1       Significant Imaging:  I have reviewed all pertinent imaging results/findings within the past 24 hours.

## 2022-02-13 NOTE — ASSESSMENT & PLAN NOTE
BG goal 140 - 180       -  Decrease Novolog to 4 units q 4 hrs while on tube feeds. 20% dose decrease. (HOLD if tube feeds are stopped or BG < 100).  -  Continue Moderate Dose SQ Insulin Correction Scale PRN hyperglycemia.   -  BG Monitoring q 4 hrs while NPO/tube feeds    ** Please call Endocrine for any BG related issues **  ** Please notify Endocrine for any change and/or advance in diet**     Lab Results   Component Value Date    HGBA1C 6.0 (H) 01/03/2022       Discharge Planning:   TBD. Please notify endocrinology prior to discharge.

## 2022-02-13 NOTE — PROGRESS NOTES
[facility]  Critical Care - Surgery  Progress Note    Patient Name: Orion Ty  MRN: [id number]  Admission Date: 1/5/2022  Hospital Length of Stay: 39 days  Code Status: Full Code  Attending Provider: Dennis Haider MD  Primary Care Provider: Otis Zimmer MD   Principal Problem: S/P aortic valve replacement    Subjective:     Hospital/ICU Course:  No notes on file    Interval History/Significant Events:   NAEON  Increasing pressor requirements intermittently overnight  Will need CRRT overnight   Amio, epi, vaso this morning  Paralysis discontinued yesterday, moving all 4 when sedation weaned        Follow-up For: Procedure(s) (LRB):  CREATION, TRACHEOSTOMY (N/A)  EGD, WITH PEG TUBE INSERTION (N/A)  INSERTION-CATHETER-ROSELINE (N/A)    Post-Operative Day: 20 Days Post-Op    Objective:     Vital Signs (Most Recent):  Temp: 97 °F (36.1 °C) (02/13/22 0700)  Pulse: 79 (02/13/22 0900)  Resp: (!) 24 (02/13/22 0900)  BP: 120/60 (02/13/22 0900)  SpO2: (!) 92 % (02/13/22 0900) Vital Signs (24h Range):  Temp:  [96.5 °F (35.8 °C)-99.1 °F (37.3 °C)] 97 °F (36.1 °C)  Pulse:  [79-80] 79  Resp:  [9-33] 24  SpO2:  [85 %-100 %] 92 %  BP: ()/(52-68) 120/60  Arterial Line BP: ()/(40-63) 134/52     Weight: 50 kg (110 lb 3.7 oz)  Body mass index is 20.83 kg/m².      Intake/Output Summary (Last 24 hours) at 2/13/2022 0954  Last data filed at 2/13/2022 0900  Gross per 24 hour   Intake 2490.28 ml   Output --   Net 2490.28 ml       Physical Exam  Constitutional:       Appearance: She is ill-appearing.      Comments: Sedated, intubated   HENT:      Head: Normocephalic and atraumatic.      Mouth/Throat:      Comments: Oozing from mucosal surfaces at lower lip  Neck:      Comments: Trach  Cardiovascular:      Rate and Rhythm: Normal rate and regular rhythm.      Pulses: Normal pulses.      Comments: Midline sternotomy incision c/d/I  Pacemaker in place.   Pulmonary:      Effort: Pulmonary effort is  normal. No respiratory distress.   Abdominal:      General: Abdomen is flat. There is no distension.      Palpations: Abdomen is soft.      Tenderness: There is no abdominal tenderness.      Comments: Peg site c/d/i  Soft abdomen   Musculoskeletal:      Right lower leg: No edema.      Left lower leg: No edema.   Skin:     General: Skin is warm.      Capillary Refill: Capillary refill takes less than 2 seconds.         Vents:  Vent Mode: A/C (02/13/22 0735)  Ventilator Initiated: Yes (01/18/22 1636)  Set Rate: 26 BPM (02/13/22 0735)  Vt Set: 300 mL (02/13/22 0735)  Pressure Support: 10 cmH20 (02/07/22 0415)  PEEP/CPAP: 10 cmH20 (02/13/22 0735)  Oxygen Concentration (%): 50 (02/13/22 0900)  Peak Airway Pressure: 42 cmH2O (02/13/22 0735)  Plateau Pressure: 31 cmH20 (02/13/22 0735)  Total Ve: 8.36 mL (02/13/22 0735)  Negative Inspiratory Force (cm H2O): 0 (02/13/22 0735)  F/VT Ratio<105 (RSBI): (!) 27.95 (02/13/22 0735)    Lines/Drains/Airways     Central Venous Catheter Line            Permacath 01/21/22 1013 left subclavian 22 days    Percutaneous Central Line Insertion/Assessment - Triple Lumen  02/09/22 1730 right internal jugular 3 days          Drain                 Gastrostomy/Enterostomy 01/21/22 1112 Percutaneous endoscopic gastrostomy (PEG) 22 days          Airway                 Surgical Airway 01/21/22 1043 Shiley Cuffed 22 days          Arterial Line            Arterial Line 02/09/22 1300 Left Radial 3 days          Peripheral Intravenous Line                 Peripheral IV - Single Lumen 02/10/22 1631 20 G Right Forearm 2 days                Significant Labs:    CBC/Anemia Profile:  Recent Labs   Lab 02/12/22  0300 02/12/22  0437 02/12/22  1951 02/13/22  0425 02/13/22  0733   WBC 17.69*  --   --  20.53*  --    HGB 7.4*  --   --  7.7*  --    HCT 24.1*   < > 25* 25.2* 24*   *  --   --  122*  --    MCV 99*  --   --  101*  --    RDW 20.0*  --   --  20.0*  --     < > = values in this interval not  displayed.        Chemistries:  Recent Labs   Lab 02/12/22  0300 02/12/22  0300 02/12/22  1443 02/12/22  1443 02/12/22  1444 02/12/22  2150 02/13/22  0425   *   < > 128*  --   --  126* 125*   K 3.8   < > 4.4  --   --  4.3 4.6   CL 90*   < > 88*  --   --  89* 87*   CO2 27   < > 27  --   --  23 23   BUN 15   < > 24*  --   --  25* 32*   CREATININE 0.8   < > 1.0  --   --  1.2 1.5*   CALCIUM 8.2*   < > 8.4*  --   --  8.5* 8.3*   ALBUMIN 1.9*   < > 1.9*  --   --  1.8* 2.1*   PROT 5.8*  --   --   --   --   --  6.0   BILITOT 1.4*  --   --   --   --   --  1.4*   ALKPHOS 119  --   --   --   --   --  123   ALT 21  --   --   --   --   --  22   AST 47*  --   --   --   --   --  54*   MG 2.5   < > 2.7*  2.7*  --   --  2.5 2.7*   PHOS 2.5*   < > 4.7*   < > 4.8* 5.6* 6.8*    < > = values in this interval not displayed.       ABGs:   Recent Labs   Lab 02/13/22  0733   PH 7.290*   PCO2 54.1*   HCO3 26.0   POCSATURATED 92*   BE -1       Significant Imaging:  I have reviewed all pertinent imaging results/findings within the past 24 hours.    Assessment/Plan:     * S/P aortic valve replacement  Orion Ty is a 76 y.o. female who presents to the SICU s/p redo aortic valve replacement with mechanical valve on 1/5/22.      Neuro/Psych:   -- Sedation: sedated with propofol, fentanyl  -- Pain: fent gtt     Cards:    -- Pressors: vasopressor support continues to decrease  -- Off levo, On epi, vaso  -- continue to wean vasopressors as tolerated  -- Goal MAP: 60-80  -- A-fib rate controlled.   -- PO amio TID  -- statin  -- hold coumadin today  -- INR therapeutic  -- metoprolol 25 BID      Pulm:   -- Goal O2 sat > 90%  -- reintubated 1/12 then 1/18. S/p trach 1/21  -- intubated, on rate with improving ventilation  -- D/c nimbex on 2/12  -- ABGs q4hrs  -- 2 mediastinal removed 1/9 L Pleural CT removed 1/8      Renal:  -- CRRT   -- nephro following  -- bicarb bath yesterday --> bicarb normalized  -- Permcath 1/21  -- Removed R IJ  trialysis 1/23      FEN / GI:   -- Replace lytes as needed  -- s/p PEG 1/21  -- Nutrition: tube feeds held given vasopressor requirement.       ID:   -- WBC remains elevated 20.5  -- Pseudomonas in resp cx; sensitive to zosyn  -- Antibiotics: zosyn  -- Initiated broad spec abx on 2/3; narrowed 2/6  -- Blood Cx sent 2/3, NGTD        Heme/Onc:   -- Hgb stable  -- Daily CBC  -- Transfused products: 1U RBC on 1/5/22. 1u RBC and 1u FFP on 1/9/21. 2U RBC on 1/12. 4 FFP on 1/13. 1 FFP on 1/17, 2u pRBC 1/27, 1u pRBC 2/9  -- Anticoagulation: coumadin, supratherapeutic today; hold coumadin      Endo:   -- Gluc goal 140-180  -- Insulin per endocrinology      PPx:   Feeding: tube feeds held due to vasopressor requirements  Analgesia/Sedation: nimbex, propofol, fentanyl  Thromboembolic prevention: Coumadin, ASA  HOB >30: yes  Stress Ulcer ppx: protonix BID  Glucose control: Critical care goal 140-180 g/dl, ISS    Lines/Drains/Airway: Art line, Trach, Permcath, PEG      Dispo/Code Status/Palliative:   -- SICU / Full Code               Sarah Shafer MD  Critical Care - Surgery  [facility]

## 2022-02-13 NOTE — PLAN OF CARE
Plan of Care Note  Cardiothoracic Surgery    Specific issues: hold coumadin, off paralytic, needs volume off    Plan of care for patient was discussed with ICU staff including nurses, residents, and faculty and appropriate consulting services.    Will continue to monitor patient's hemodynamics, functionality, neuro status, fluid status and renal function, and labs and will adjust medications and fluids as necessary while monitoring appropriateness for de-escalation of support and monitoring and transport to stepdown unit.    Jojo Kauffman MD, PGY-VI  Cardiothoracic Surgery  [phone]

## 2022-02-13 NOTE — PROGRESS NOTES
CRRT restarted, /, uf  Goal initially started at 200. B/P 120/50 pulse 79, patient on vent o2 sat 91%

## 2022-02-13 NOTE — PROGRESS NOTES
"[facility]  Endocrinology  Progress Note    Admit Date: 1/5/2022     Reason for Consult: Management of T2DM, Hyperglycemia     Surgical Procedure and Date:   1/5/22  REPLACEMENT, AORTIC VALVE, WITH REPEAT STERNOTOMY (N/A)     Diabetes diagnosis year: MELIDA; intubated    Home Diabetes Medications:  Metformin 500 mg daily,     How often checking glucose at home?  MELIDA    BG readings on regimen: MELIDA  Hypoglycemia on the regimen?  MELIDA  Missed doses on regimen? MELIDA    Diabetes Complications include:     None    Complicating diabetes co morbidities:   CAD, HTN, a-fib       HPI:   Patient is a 76 y.o. female with a diagnosis of s/p MVR, AVR and TVr in 2010 by Dr. Haider. 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 Family states she is more fatigued, decreased appetite and fluid on lungs. Pt has increased symptoms and wants to proceed with cardiac surgery now and here for pre op. She is now s/p the above procedure. Endocrinology consulted for management of T2DM.     Lab Results   Component Value Date    HGBA1C 6.0 (H) 01/03/2022           Interval HPI:   Overnight events: Remains in CICU. BG variable on current SQ insulin regimen. Hypoglycemia noted yesterday evening. Remains on hydrocortisone 100 mg q 8 hrs. Creatinine 1.5. Diet NPO Except for: Sips with Medication  Eating:   NPO  Nausea: No  Hypoglycemia and intervention: Yes, BG 54 yesterday evening requiring D50 bolus  Fever: No  TPN and/or TF: Yes  If yes, type of TF/TPN and rate: tube feeds at 30 ml/hr    BP (!) 125/59   Pulse 79   Temp 97 °F (36.1 °C) (Axillary)   Resp 17   Ht 5' 1" (1.549 m)   Wt 50 kg (110 lb 3.7 oz)   SpO2 (!) 92%   Breastfeeding No   BMI 20.83 kg/m²     Labs Reviewed and Include    Recent Labs   Lab 02/13/22  0425   *   CALCIUM 8.3*   ALBUMIN 2.1*   PROT 6.0   *   K 4.6   CO2 23   CL 87*   BUN 32*   CREATININE 1.5*   ALKPHOS 123   ALT " 22   AST 54*   BILITOT 1.4*     Lab Results   Component Value Date    WBC 20.53 (H) 02/13/2022    HGB 7.7 (L) 02/13/2022    HCT 24 (L) 02/13/2022     (H) 02/13/2022     (L) 02/13/2022     No results for input(s): TSH, FREET4 in the last 168 hours.  Lab Results   Component Value Date    HGBA1C 6.0 (H) 01/03/2022       Nutritional status:   Body mass index is 20.83 kg/m².  Lab Results   Component Value Date    ALBUMIN 2.1 (L) 02/13/2022    ALBUMIN 1.8 (L) 02/12/2022    ALBUMIN 1.9 (L) 02/12/2022     Lab Results   Component Value Date    PREALBUMIN 16 (L) 02/11/2022    PREALBUMIN <3 (L) 02/04/2022    PREALBUMIN <3 (L) 01/28/2022       Estimated Creatinine Clearance: 23.7 mL/min (A) (based on SCr of 1.5 mg/dL (H)).    Accu-Checks  Recent Labs     02/12/22  1107 02/12/22  1443 02/12/22  1724 02/12/22  1948 02/12/22  1949 02/12/22  2102 02/12/22  2103 02/12/22  2149 02/12/22  2316 02/13/22  0420   POCTGLUCOSE 228* 231* 136* 74 70 64* 66* 134* 119* 226*       Current Medications and/or Treatments Impacting Glycemic Control  Immunotherapy:    Immunosuppressants     None        Steroids:   Hormones (From admission, onward)            Start     Stop Route Frequency Ordered    02/10/22 1800  hydrocortisone sodium succinate injection 100 mg         -- IV Every 8 hours 02/10/22 1747    02/08/22 2230  vasopressin (PITRESSIN) 0.2 Units/mL in dextrose 5 % 100 mL infusion         -- IV Continuous 02/08/22 2119 01/28/22 0207  vasopressin (PITRESSIN) 20 unit/mL injection        Note to Pharmacy: Created by cabinet override    01/28 1414   01/28/22 0207        Pressors:    Autonomic Drugs (From admission, onward)            Start     Stop Route Frequency Ordered    02/09/22 1815  NORepinephrine bitartrate-D5W 4 mg/250 mL (16 mcg/mL) PERIPHERAL access infusion        Question Answer Comment   Begin at (in mcg/kg/min): 0.02    Titrate by: (in mcg/kg/min) 0.02    Titrate interval: (in minutes) 3    Titrate to maintain:  (MAP or SBP) MAP    Greater than: (in mmHg) 60    Maximum dose: (in mcg/kg/min) 0.2        -- IV Continuous 02/09/22 1805    02/09/22 1815  cisatracurium (NIMBEX) 200 mg in dextrose 5 % 100 mL infusion        Question Answer Comment   Begin at (in mcg/kg/min): 1    Titrate by: (in mcg/kg/min) 1    Titrate interval: (in minutes) 2    To maintain a train-of-four of (TOF_/4): 0/4    Maximum dose: (in mcg/kg/min) 10        -- IV Continuous 02/09/22 1811    02/09/22 1800  EPINEPHrine (ADRENALIN) 5 mg in dextrose 5 % 250 mL infusion        Question Answer Comment   Begin at (in mcg/kg/min): 0.01    Titrate by: (in mcg/kg/min) 0.1    Titrate interval: (in minutes) 5    Titrate to maintain: (SBP or MAP or Cardiac Index) MAP    Greater than: (in mmHg) 60    Maximum dose: (in mcg/kg/min) 0.1        -- IV Continuous 02/09/22 1650    02/02/22 1400  midodrine tablet 15 mg         -- PER G TUBE Every 8 hours 02/02/22 1045    01/12/22 1441  rocuronium 10 mg/mL injection        Note to Pharmacy: Created by cabinet override    01/13 0244   01/12/22 1441        Hyperglycemia/Diabetes Medications:   Antihyperglycemics (From admission, onward)            Start     Stop Route Frequency Ordered    02/14/22 0800  insulin aspart U-100 pen 4 Units         -- SubQ Every 24 hours (non-standard times) 02/13/22 0829    02/14/22 0400  insulin aspart U-100 pen 4 Units         -- SubQ Every 24 hours (non-standard times) 02/13/22 0829    02/14/22 0000  insulin aspart U-100 pen 4 Units         -- SubQ Every 24 hours (non-standard times) 02/13/22 0829    02/13/22 2000  insulin aspart U-100 pen 4 Units         -- SubQ Every 24 hours (non-standard times) 02/13/22 0829    02/13/22 1600  insulin aspart U-100 pen 4 Units         -- SubQ Every 24 hours (non-standard times) 02/13/22 0829    02/13/22 1200  insulin aspart U-100 pen 4 Units         -- SubQ Every 24 hours (non-standard times) 02/13/22 0829    02/03/22 1441  insulin aspart U-100 pen 1-10 Units          -- SubQ As needed (PRN) 02/03/22 1341          ASSESSMENT and PLAN    * S/P aortic valve replacement  Managed per primary team  Optimize BG control        Type 2 diabetes mellitus with microalbuminuria, without long-term current use of insulin  BG goal 140 - 180       -  Decrease Novolog to 4 units q 4 hrs while on tube feeds. 20% dose decrease. (HOLD if tube feeds are stopped or BG < 100).  -  Continue Moderate Dose SQ Insulin Correction Scale PRN hyperglycemia.   -  BG Monitoring q 4 hrs while NPO/tube feeds    ** Please call Endocrine for any BG related issues **  ** Please notify Endocrine for any change and/or advance in diet**     Lab Results   Component Value Date    HGBA1C 6.0 (H) 01/03/2022       Discharge Planning:   TBD. Please notify endocrinology prior to discharge.           Chronic atrial fibrillation  May increase insulin resistance.         JO (acute kidney injury)  Lab Results   Component Value Date    CREATININE 1.5 (H) 02/13/2022     Avoid insulin stacking  Titrate insulin slowly          Keyanna Crocker NP  Endocrinology  [facility]

## 2022-02-13 NOTE — PLAN OF CARE
"SICU PLAN OF CARE NOTE    Dx: History of mechanical aortic valve replacement    Goals of Care:  MAP 60-80, wean pressors and vent settings as tolerated, Q3h ABGs    Vital Signs:  /63   Pulse 79   Temp 97 °F (36.1 °C) (Axillary)   Resp (!) 26   Ht 5' 1" (1.549 m)   Wt 50 kg (110 lb 3.7 oz)   SpO2 (!) 92%   Breastfeeding No   BMI 20.83 kg/m²     Vent settings: SpO2 maintained >88% on current vent settings: AC/VC+ 50% FiO2 and 10.0 PEEP. Pt tolerating well with peak pressures <40 during shift.    Neuro:  Sedated, Arouses to Voice, Follows Commands, and Moves All Extremities. Pt arouses to voice and opens eyes spontaneously. Weakness noted to upper and lower extremities and patient slow to follow commands but able to move extremities with repeated stimulation.      Gtts:    Fentanyl @ 100  Amio @ 0.5  Levo, vaso and epi weaned off as tolerated to maintain MAP goals.     Urine Output:  Anuric , bladder scan performed during shift, no urine detected     Drains:  PEG tube with TF @ 35cc/hr, BMx1 during shift     Diet:  Tube Feeds at goal rate of 35     Labs/Accuchecks:  all labs trended reviewed and replaced accordingly. All ABGs communicated with MD and appropriate ventilator changes made as needed.       Skin:  all skin monitored for redness and breakdown during shift. Heels CDI with foams and green heel boots in place. Wound care orders performed to sacrum, pt turned Q2h as tolerated, immerse mattress working properly and plugged into wall. All pressure points protected, no new injuries during shift. New wound care consult placed and triad ordered for sacral wound.     Shift Events: VSS at this time. CRRT started this afternoon. Patient tolerating well with UF increased to 250 @ 1800 per nephrology orders. All pressors weaned to OFF, frequent ABGs discussed with Dr. Whitaker and Dr. Shafer, vent settings changed as needed. Family at bedside during shift, updated on plan of care, all questions answered.      "

## 2022-02-13 NOTE — SUBJECTIVE & OBJECTIVE
"Interval HPI:   Overnight events: Remains in CICU. BG variable on current SQ insulin regimen. Hypoglycemia noted yesterday evening. Remains on hydrocortisone 100 mg q 8 hrs. Creatinine 1.5. Diet NPO Except for: Sips with Medication  Eating:   NPO  Nausea: No  Hypoglycemia and intervention: Yes, BG 54 yesterday evening requiring D50 bolus  Fever: No  TPN and/or TF: Yes  If yes, type of TF/TPN and rate: tube feeds at 30 ml/hr    BP (!) 125/59   Pulse 79   Temp 97 °F (36.1 °C) (Axillary)   Resp 17   Ht 5' 1" (1.549 m)   Wt 50 kg (110 lb 3.7 oz)   SpO2 (!) 92%   Breastfeeding No   BMI 20.83 kg/m²     Labs Reviewed and Include    Recent Labs   Lab 02/13/22  0425   *   CALCIUM 8.3*   ALBUMIN 2.1*   PROT 6.0   *   K 4.6   CO2 23   CL 87*   BUN 32*   CREATININE 1.5*   ALKPHOS 123   ALT 22   AST 54*   BILITOT 1.4*     Lab Results   Component Value Date    WBC 20.53 (H) 02/13/2022    HGB 7.7 (L) 02/13/2022    HCT 24 (L) 02/13/2022     (H) 02/13/2022     (L) 02/13/2022     No results for input(s): TSH, FREET4 in the last 168 hours.  Lab Results   Component Value Date    HGBA1C 6.0 (H) 01/03/2022       Nutritional status:   Body mass index is 20.83 kg/m².  Lab Results   Component Value Date    ALBUMIN 2.1 (L) 02/13/2022    ALBUMIN 1.8 (L) 02/12/2022    ALBUMIN 1.9 (L) 02/12/2022     Lab Results   Component Value Date    PREALBUMIN 16 (L) 02/11/2022    PREALBUMIN <3 (L) 02/04/2022    PREALBUMIN <3 (L) 01/28/2022       Estimated Creatinine Clearance: 23.7 mL/min (A) (based on SCr of 1.5 mg/dL (H)).    Accu-Checks  Recent Labs     02/12/22  1107 02/12/22  1443 02/12/22  1724 02/12/22  1948 02/12/22  1949 02/12/22 2102 02/12/22  2103 02/12/22  2149 02/12/22  2316 02/13/22  0420   POCTGLUCOSE 228* 231* 136* 74 70 64* 66* 134* 119* 226*       Current Medications and/or Treatments Impacting Glycemic Control  Immunotherapy:    Immunosuppressants     None        Steroids:   Hormones (From admission, " onward)            Start     Stop Route Frequency Ordered    02/10/22 1800  hydrocortisone sodium succinate injection 100 mg         -- IV Every 8 hours 02/10/22 1747    02/08/22 2230  vasopressin (PITRESSIN) 0.2 Units/mL in dextrose 5 % 100 mL infusion         -- IV Continuous 02/08/22 2119 01/28/22 0207  vasopressin (PITRESSIN) 20 unit/mL injection        Note to Pharmacy: Created by cabinet override    01/28 1414   01/28/22 0207        Pressors:    Autonomic Drugs (From admission, onward)            Start     Stop Route Frequency Ordered    02/09/22 1815  NORepinephrine bitartrate-D5W 4 mg/250 mL (16 mcg/mL) PERIPHERAL access infusion        Question Answer Comment   Begin at (in mcg/kg/min): 0.02    Titrate by: (in mcg/kg/min) 0.02    Titrate interval: (in minutes) 3    Titrate to maintain: (MAP or SBP) MAP    Greater than: (in mmHg) 60    Maximum dose: (in mcg/kg/min) 0.2        -- IV Continuous 02/09/22 1805 02/09/22 1815  cisatracurium (NIMBEX) 200 mg in dextrose 5 % 100 mL infusion        Question Answer Comment   Begin at (in mcg/kg/min): 1    Titrate by: (in mcg/kg/min) 1    Titrate interval: (in minutes) 2    To maintain a train-of-four of (TOF_/4): 0/4    Maximum dose: (in mcg/kg/min) 10        -- IV Continuous 02/09/22 1811 02/09/22 1800  EPINEPHrine (ADRENALIN) 5 mg in dextrose 5 % 250 mL infusion        Question Answer Comment   Begin at (in mcg/kg/min): 0.01    Titrate by: (in mcg/kg/min) 0.1    Titrate interval: (in minutes) 5    Titrate to maintain: (SBP or MAP or Cardiac Index) MAP    Greater than: (in mmHg) 60    Maximum dose: (in mcg/kg/min) 0.1        -- IV Continuous 02/09/22 1650    02/02/22 1400  midodrine tablet 15 mg         -- PER G TUBE Every 8 hours 02/02/22 1045    01/12/22 1441  rocuronium 10 mg/mL injection        Note to Pharmacy: Created by cabinet override    01/13 0244   01/12/22 0954        Hyperglycemia/Diabetes Medications:   Antihyperglycemics (From admission,  onward)            Start     Stop Route Frequency Ordered    02/14/22 0800  insulin aspart U-100 pen 4 Units         -- SubQ Every 24 hours (non-standard times) 02/13/22 0829    02/14/22 0400  insulin aspart U-100 pen 4 Units         -- SubQ Every 24 hours (non-standard times) 02/13/22 0829    02/14/22 0000  insulin aspart U-100 pen 4 Units         -- SubQ Every 24 hours (non-standard times) 02/13/22 0829    02/13/22 2000  insulin aspart U-100 pen 4 Units         -- SubQ Every 24 hours (non-standard times) 02/13/22 0829    02/13/22 1600  insulin aspart U-100 pen 4 Units         -- SubQ Every 24 hours (non-standard times) 02/13/22 0829    02/13/22 1200  insulin aspart U-100 pen 4 Units         -- SubQ Every 24 hours (non-standard times) 02/13/22 0829    02/03/22 1441  insulin aspart U-100 pen 1-10 Units         -- SubQ As needed (PRN) 02/03/22 1341

## 2022-02-13 NOTE — ASSESSMENT & PLAN NOTE
Lab Results   Component Value Date    CREATININE 1.5 (H) 02/13/2022     Avoid insulin stacking  Titrate insulin slowly

## 2022-02-13 NOTE — PLAN OF CARE
Significant events:   Norepinephrine titrated off. D10 bolus administered.   Vitals/ Respiratory: Mechanically ventilated to trach, AC VC +, FiO2 50%, Peep 10, Rate 26, Vt 300.   Nitric Oxide: 5 ppm.   O2: >87%   HR: 100% paced at 80 bpm, sinus rhythm.  MAP: 60-90.  Temperature max: 99.1 F.    Gtts:  Epinephrine at 0.04 mcg/kg/min, Vasopressin, Amiodarone, and Fentanyl.  UOP:   Anuric.      Last Bowel Movement: 2/13/22 x 2.   Neuro: Pupils equal and reactive. Patient arouses to voice. Withdraws to pain in all extremities. Follows commands and has spontaneous movements to BUE.   Diet:  Tube feedings to peg tube increased to 30 cc/hr, minimal residuals.  Labs/Accuchecks:  ABGs Q3 hrs. Renal and Mag Q8 hrs. Daily labs.       Plan:   Wean pressors and oxygen as tolerated. Continue ICU care.      Skin:  Chest and right groin incision CDI. Sacral wound cleaned with soap and water, foam applied. Turned Q2 hrs. Immerse mattress, heel boots, sacral foam, and wedges in use. No new skin breakdown noted.

## 2022-02-14 NOTE — PROGRESS NOTES
Pharmacokinetic Assessment Follow Up: IV Vancomycin    Assessment/Plan:    - Random level from AM labs resulted below goal as 6.9 mg/dl, goal 15-20 mg/dl.   - Patient has JO requiring RRT, Nephrology is planning for 8 hours of SCUF today.   - Continue pulse dosing. Administer vancomycin 500 mg x 1 dose.  - Draw vancomycin random level with AM labs tomorrow.  Pharmacy to re-dose based on level and RRT plans.     Drug levels (last 3 results):  Recent Labs   Lab Result Units 02/12/22  0300 02/13/22  0425 02/14/22  0605   Vancomycin, Random ug/mL 10.1 20.9 6.9       Pharmacy will continue to follow and monitor vancomycin.    Please contact pharmacy at extension [phone] for questions regarding this assessment.    Thank you for the consult,   Lizett Pinto, PharmD, BCCCP       Patient brief summary:  Orion Ty is a 76 y.o. female initiated on antimicrobial therapy with IV Vancomycin for treatment of lower respiratory infection.    The patient's current regimen is pulse dosing.    Drug Allergies:   Review of patient's allergies indicates:  No Known Allergies    Actual Body Weight:   52 kg    Renal Function:   Estimated Creatinine Clearance: 88.9 mL/min (A) (based on SCr of 0.4 mg/dL (L)).,     Dialysis Method (if applicable):  SCUF    CBC (last 72 hours):  Recent Labs   Lab Result Units 02/12/22  0300 02/13/22  0425 02/14/22  0234   WBC K/uL 17.69* 20.53* 17.82*   Hemoglobin g/dL 7.4* 7.7* 7.4*   Hematocrit % 24.1* 25.2* 24.0*   Platelets K/uL 113* 122* 128*   Gran % % 87.7* 89.7* 91.2*   Lymph % % 3.8* 3.5* 2.8*   Mono % % 5.1 4.3 4.3   Eosinophil % % 0.1 0.0 0.0   Basophil % % 0.1 0.2 0.1   Differential Method  Automated Automated Automated       Metabolic Panel (last 72 hours):  Recent Labs   Lab Result Units 02/11/22  1416 02/11/22  2134 02/12/22  0300 02/12/22  1443 02/12/22  1444 02/12/22  2150 02/13/22  0425 02/13/22  1332 02/13/22  2138 02/14/22  0234   Sodium mmol/L 135* 135* 128* 128*  --  126* 125*  126* 130* 130*   Potassium mmol/L 4.0 4.0 3.8 4.4  --  4.3 4.6 4.3 4.0 4.0   Chloride mmol/L 97 95 90* 88*  --  89* 87* 88* 96 98   CO2 mmol/L 28 26 27 27  --  23 23 23 24 25   Glucose mg/dL 104 189* 258* 219*  --  142* 211* 152* 133* 142*   BUN mg/dL 20 8 15 24*  --  25* 32* 37* 8 4*   Creatinine mg/dL 0.8 0.6 0.8 1.0  --  1.2 1.5* 1.8* 0.5 0.4*   Albumin g/dL 2.1* 1.8* 1.9* 1.9*  --  1.8* 2.1* 2.0* 2.0* 2.0*   Total Bilirubin mg/dL  --   --  1.4*  --   --   --  1.4*  --   --  1.3*   Alkaline Phosphatase U/L  --   --  119  --   --   --  123  --   --  137*   AST U/L  --   --  47*  --   --   --  54*  --   --  57*   ALT U/L  --   --  21  --   --   --  22  --   --  21   Magnesium mg/dL 2.2 1.6 2.5 2.7*  2.7*  --  2.5 2.7* 2.6  2.6 1.8 2.2   Phosphorus mg/dL 2.7 1.7* 2.5* 4.7* 4.8* 5.6* 6.8* 7.0* 1.7* 2.7       Vancomycin Administrations:  vancomycin given in the last 96 hours                   vancomycin 750 mg in dextrose 5 % 250 mL IVPB (ready to mix system) (mg) 750 mg New Bag 02/10/22 1048                Microbiologic Results:  Microbiology Results (last 7 days)     Procedure Component Value Units Date/Time    Blood culture [[id number]] Collected: 02/10/22 1613    Order Status: Completed Specimen: Blood from Peripheral, Lower Arm, Right Updated: 02/13/22 1812     Blood Culture, Routine No Growth to date      No Growth to date      No Growth to date      No Growth to date    Blood culture [[id number]] Collected: 02/10/22 1613    Order Status: Completed Specimen: Blood from Peripheral, Lower Arm, Right Updated: 02/13/22 1812     Blood Culture, Routine No Growth to date      No Growth to date      No Growth to date      No Growth to date    Blood culture [[id number]] Collected: 02/03/22 1550    Order Status: Completed Specimen: Blood from Peripheral, Hand, Right Updated: 02/08/22 1812     Blood Culture, Routine No growth after 5 days.    Blood culture [[id number]] Collected: 02/03/22 1542    Order Status: Completed  Specimen: Blood from Peripheral, Hand, Left Updated: 02/08/22 1812     Blood Culture, Routine No growth after 5 days.

## 2022-02-14 NOTE — PROGRESS NOTES
[facility]  Critical Care - Surgery  Progress Note    Patient Name: Orion Ty  MRN: [id number]  Admission Date: 1/5/2022  Hospital Length of Stay: 40 days  Code Status: Full Code  Attending Provider: Dennis Haider MD  Primary Care Provider: Otis Zimmer MD   Principal Problem: History of mechanical aortic valve replacement    Subjective:     Hospital/ICU Course:  No notes on file    Interval History/Significant Events:   NAEON  Tolerated CRRT yesterday   No longer paralyzed, ventilated. Improving ABG  Therapeutic on coumadin  WBC decreasing, at 17 this morning  HH stable      Follow-up For: Procedure(s) (LRB):  CREATION, TRACHEOSTOMY (N/A)  EGD, WITH PEG TUBE INSERTION (N/A)  INSERTION-CATHETER-ROSELINE (N/A)    Post-Operative Day: 20 Days Post-Op    Objective:     Vital Signs (Most Recent):  Temp: 98.5 °F (36.9 °C) (02/14/22 0315)  Pulse: 79 (02/14/22 0600)  Resp: (!) 27 (02/14/22 0600)  BP: (!) 117/57 (02/14/22 0600)  SpO2: 99 % (02/14/22 0600) Vital Signs (24h Range):  Temp:  [97 °F (36.1 °C)-99.3 °F (37.4 °C)] 98.5 °F (36.9 °C)  Pulse:  [79-80] 79  Resp:  [9-39] 27  SpO2:  [90 %-100 %] 99 %  BP: ()/(55-91) 117/57  Arterial Line BP: ()/(40-57) 139/54     Weight: 52 kg (114 lb 10.2 oz)  Body mass index is 21.66 kg/m².      Intake/Output Summary (Last 24 hours) at 2/14/2022 0627  Last data filed at 2/14/2022 0600  Gross per 24 hour   Intake 5087.18 ml   Output 4028 ml   Net 1059.18 ml       Physical Exam  Constitutional:       Appearance: She is ill-appearing.      Comments: Sedated, ventilated   HENT:      Head: Normocephalic and atraumatic.      Mouth/Throat:      Comments: Oozing from mucosal surfaces at lower lip  Neck:      Comments: Trach  Cardiovascular:      Rate and Rhythm: Normal rate and regular rhythm.      Pulses: Normal pulses.      Comments: Midline sternotomy incision c/d/I  Pacemaker in place.   Pulmonary:      Effort: Pulmonary effort is normal. No  respiratory distress.   Abdominal:      General: Abdomen is flat. There is no distension.      Palpations: Abdomen is soft.      Tenderness: There is no abdominal tenderness.      Comments: Peg site c/d/i  Soft abdomen   Musculoskeletal:      Right lower leg: No edema.      Left lower leg: No edema.   Skin:     General: Skin is warm.      Capillary Refill: Capillary refill takes less than 2 seconds.         Vents:  Vent Mode: A/C (02/14/22 0537)  Ventilator Initiated: Yes (01/18/22 1636)  Set Rate: 26 BPM (02/14/22 0537)  Vt Set: 300 mL (02/14/22 0537)  Pressure Support: 10 cmH20 (02/07/22 0415)  PEEP/CPAP: 10 cmH20 (02/14/22 0537)  Oxygen Concentration (%): 50 (02/14/22 0600)  Peak Airway Pressure: 32 cmH2O (02/14/22 0537)  Plateau Pressure: 30 cmH20 (02/14/22 0537)  Total Ve: 8.67 mL (02/14/22 0537)  Negative Inspiratory Force (cm H2O): 0 (02/14/22 0537)  F/VT Ratio<105 (RSBI): (!) 84.59 (02/14/22 0537)    Lines/Drains/Airways     Central Venous Catheter Line            Permacath 01/21/22 1013 left subclavian 23 days    Percutaneous Central Line Insertion/Assessment - Triple Lumen  02/09/22 1730 right internal jugular 4 days          Drain                 Gastrostomy/Enterostomy 01/21/22 1112 Percutaneous endoscopic gastrostomy (PEG) 23 days          Airway                 Surgical Airway 01/21/22 1043 Shiley Cuffed 23 days          Arterial Line            Arterial Line 02/09/22 1300 Left Radial 4 days          Peripheral Intravenous Line                 Peripheral IV - Single Lumen 02/14/22 0320 20 G Right Hand <1 day                Significant Labs:    CBC/Anemia Profile:  Recent Labs   Lab 02/13/22  0425 02/13/22  0733 02/13/22  1330 02/13/22  1630 02/14/22  0234   WBC 20.53*  --   --   --  17.82*   HGB 7.7*  --   --   --  7.4*   HCT 25.2*   < > 24* 24* 24.0*   *  --   --   --  128*   *  --   --   --  101*   RDW 20.0*  --   --   --  20.6*    < > = values in this interval not displayed.         Chemistries:  Recent Labs   Lab 02/13/22  0425 02/13/22  0425 02/13/22  1332 02/13/22  2138 02/14/22  0234   *   < > 126* 130* 130*   K 4.6   < > 4.3 4.0 4.0   CL 87*   < > 88* 96 98   CO2 23   < > 23 24 25   BUN 32*   < > 37* 8 4*   CREATININE 1.5*   < > 1.8* 0.5 0.4*   CALCIUM 8.3*   < > 8.5* 7.7* 7.0*   ALBUMIN 2.1*   < > 2.0* 2.0* 2.0*   PROT 6.0  --   --   --  5.5*   BILITOT 1.4*  --   --   --  1.3*   ALKPHOS 123  --   --   --  137*   ALT 22  --   --   --  21   AST 54*  --   --   --  57*   MG 2.7*   < > 2.6  2.6 1.8 2.2   PHOS 6.8*   < > 7.0* 1.7* 2.7    < > = values in this interval not displayed.       ABGs:   Recent Labs   Lab 02/14/22  0537   PH 7.320*   PCO2 47.6*   HCO3 24.5   POCSATURATED 94*   BE -2       Significant Imaging:  I have reviewed all pertinent imaging results/findings within the past 24 hours.    Assessment/Plan:     * History of mechanical aortic valve replacement  Orion Ty is a 76 y.o. female who presents to the SICU s/p redo aortic valve replacement with mechanical valve on 1/5/22.      Neuro/Psych:   -- Sedation: sedated with propofol, fentanyl  -- Pain: fent gtt     Cards:    -- Pressors: intermittent levo, vaso. Requirements have improved.   -- Continue to wean vasopressors as tolerated  -- Goal MAP: 60-80   -- PO amio TID  -- statin  -- hold coumadin today  -- INR therapeutic  -- metoprolol 25 BID      Pulm:   -- Goal O2 sat > 90%  -- reintubated 1/12 then 1/18. S/p trach 1/21  -- intubated, on rate with improving ventilation  -- D/c nimbex on 2/12  -- ABGs q4hrs  -- 2 mediastinal removed 1/9 L Pleural CT removed 1/8      Renal:  -- CRRT   -- nephro following  -- Permcath 1/21  -- Removed R IJ trialysis 1/23        FEN / GI:   -- Replace lytes as needed  -- s/p PEG 1/21  -- Nutrition: tube feeds OK to resume with lower pressor requirements       ID:   -- WBC decreasing 20.5 --> 17  -- Pseudomonas in resp cx; sensitive to zosyn  -- Antibiotics: zosyn  --  Initiated broad spec abx on 2/3; narrowed 2/6        Heme/Onc:   -- Hgb stable  -- Daily CBC  -- Transfused products: 1U RBC on 1/5/22. 1u RBC and 1u FFP on 1/9/21. 2U RBC on 1/12. 4 FFP on 1/13. 1 FFP on 1/17, 2u pRBC 1/27, 1u pRBC 2/9  -- Anticoagulation: coumadin, therapeutic today; hold coumadin      Endo:   -- Gluc goal 140-180  -- Insulin per endocrinology      PPx:   Feeding: tube feeds   Analgesia/Sedation: nimbex, propofol, fentanyl  Thromboembolic prevention: Coumadin, ASA  HOB >30: yes  Stress Ulcer ppx: protonix BID  Glucose control: Critical care goal 140-180 g/dl, ISS    Lines/Drains/Airway: Art line, Trach, Permcath, PEG      Dispo/Code Status/Palliative:   -- SICU / Full Code                 Sarah Shafer MD  Critical Care - Surgery  [facility] - [facility]

## 2022-02-14 NOTE — PROGRESS NOTES
02/14/22 0308   Prescription   Dialysate Na + (mEq/L) 135  (Changed to 135 from 130 as ordered)

## 2022-02-14 NOTE — PROGRESS NOTES
Cardiac Surgery Progress Note    JUSTO overnight    I/O  5079/4372 net: +707  UF 4372    Gtts  Amio 0.5  Fentanyl 50     A/P  76F s/p from redo AVR (mechanical) for subvalvular pannus. Post operative course complicated by acute respiratory distress and JO. Re intubated 01/13 and 01/18, also started on CRRT. Trach, PEG, permacath placed Jan 21, 2022.      Progressing     Continue current care   RRT per renal   Wean vent to trach collar as able   Hold coumadin tonight  Continue enteral feeds to goal  Discontinue fentanyl infusion  Taper stress dose steroids  Abx per ID (remains on vancomycin and meropenem)  Cont ICU

## 2022-02-14 NOTE — PLAN OF CARE
[facility]  Discharge Reassessment    Primary Care Provider: Otis Zimmer MD    Expected Discharge Date: 2/18/2022    Reassessment (most recent)     Discharge Reassessment - 02/14/22 1120        Discharge Reassessment    Assessment Type Discharge Planning Reassessment     Discharge Plan discussed with: Adult children     Communicated MENDEL with patient/caregiver Date not available/Unable to determine     Discharge Plan A Long-term acute care facility (LTAC)     Discharge Plan B Long-term acute care facility (LTAC)     DME Needed Upon Discharge  other (see comments)   TBD    Why the patient remains in the hospital Requires continued medical care        Post-Acute Status    Post-Acute Authorization Placement     Post-Acute Placement Status Referrals Sent               Per MD's Note, NAEON  Tolerated CRRT yesterday   No longer paralyzed, ventilated. Improving ABG  Therapeutic on coumadin  WBC decreasing, at 17 this morning  HH stable      The patient is not medically stable to discharge at this time and still requires medical care. [facility] has accepted this patient pending medical stability and auth from [facility]. The  will continue to follow-up with the patient to assist with discharge planning.     Shen Simmons LMSW  Case Management [facility]  Ext: [phone]

## 2022-02-14 NOTE — PT/OT/SLP PROGRESS
Physical Therapy 30 Day Reassessment  Patient Name:  Orion Ty   MRN:  [id number]  Admit Date: 1/5/2022  Admitting Diagnosis:  History of mechanical aortic valve replacement   Length of Stay: 40 days  Recent Surgery: Procedure(s) (LRB):  CREATION, TRACHEOSTOMY (N/A)  EGD, WITH PEG TUBE INSERTION (N/A)  INSERTION-CATHETER-ROSELINE (N/A) 24 Days Post-Op    Please refer to PT initial evaluation for PLOF and social history.  Recommendations:     Discharge Recommendations:  [facility] ([facility])   Discharge Equipment Recommendations: bedside commode   Barriers to discharge: evolving medical condition, debility, impaired cardiopulmonary repsonse to activity, increased fall risk, impaired skin    Assessment:     Orion Ty is a 76 y.o. female admitted with a medical diagnosis of History of mechanical aortic valve replacement. Pt presents today for 30 day reassessment. Pt on CRRT without pressors but vent with 50% FiO2 and PEEP 10. RN requesting bed level session only on this date. Pt sedated but arousable with improved engagement with son providing instructions. AAROM performed in all planes with palpable muscular contraction in all major muscle groups. Pt required increased stimulation to remain awake but able to follow 1 step commands on this date. Pt's son educated on body mechanics and positioning to assist pt with AAROM on this date. Handout also provided. Son engaged and receptive to education. Pt is an excellent candidate for inpatient rehabilitation, as pt has a qualifying diagnosis, displays good activity tolerance, has experienced decline from PLOF, has good family support, and is motivated to participate in therapy. Pt's clinical condition meets full inpatient rehab criteria. Inpatient rehab will provide to total interdisciplinary treatment approach needed. Pt is at high risk of unplanned readmission due to fall risk. The lower level of care cannot provide total interdisciplinary approach  needed.     Problem List: weakness,impaired endurance,impaired self care skills,impaired functional mobilty,gait instability,impaired balance,decreased coordination,decreased upper extremity function,decreased lower extremity function,impaired cognition,pain,decreased ROM,impaired coordination,impaired fine motor,impaired skin,impaired cardiopulmonary response to activity,impaired muscle length  Rehab Prognosis: Fair     GOALS:   Multidisciplinary Problems     Physical Therapy Goals        Problem: Physical Therapy Goal    Goal Priority Disciplines Outcome Goal Variances Interventions   Physical Therapy Goal     PT, PT/OT Ongoing, Progressing     Description: Goals to be met by: 2022     Patient will increase functional independence with mobility by performin. Sit to stand transfer with max A -not met  2. Bed to chair transfer with max A -not met  3. Lower extremity exercise program x30 reps per handout, with independence -not met  4. Recite 3/3 sternal precautions and remain complaint with precautions throughout session with no verbal cues -not met  5. Pt sit EOB x 10 min with SBA -not met  6. Pt to stand for 60 seconds with Max A and VSS -not met                       Plan:     During this hospitalization, patient to be seen 3 x/week to address the above listed problems via therapeutic activities,therapeutic exercises,neuromuscular re-education  · Plan of Care Expires:  22   Plan of Care Reviewed with: patient,son    Subjective     RN notified prior to session. Son present upon PT entrance into room.    Chief Complaint: Pt unable to express complaints, endorsed pain in bottom and with ROM  Patient/Family Comments/goals: family hoping pt able to get stronger and go to LTACH  Pain/Comfort:  · Pain Rating 1: other (see comments) (grimacing with ROM, unable to rate)  · Pain Addressed 1: Distraction,Cessation of Activity  · Pain Rating Post-Intervention 1: 0/10 (at rest)    Objective:     Patient  "found HOB elevated with: telemetry,blood pressure cuff,pulse ox (continuous),ventilator,Tracheostomy,PEG Tube,PICC line,Trialysis (CRRT running)     General Precautions: Standard, Cardiac sternal,fall   Orthopedic Precautions:N/A   Braces: N/A   Respiratory Status: trach to vent   Vent Mode: A/C  Oxygen Concentration (%):  [50] 50  Resp Rate Total:  [26 br/min-54 br/min] 27 br/min  Vt Set:  [280 mL-300 mL] 300 mL  PEEP/CPAP:  [8 cmH20-10 cmH20] 8 cmH20  Mean Airway Pressure:  [18 diC81-10 cmH20] 19 cmH20  Vitals: BP (!) 98/53   Pulse 79   Temp 97.6 °F (36.4 °C) (Axillary)   Resp (!) 27   Ht 5' 1" (1.549 m)   Wt 52 kg (114 lb 10.2 oz)   SpO2 95%   Breastfeeding No   BMI 21.66 kg/m²      Exam:  · Cognition:   · Alert, Lethargic and Cooperative  · AxOx2 (self and location)  · Command following: Follows one-step verbal commands  · Fluency: n/a pt trach to vent and not appropriate for in-line speaking valve  · Hearing: Intact  · Vision:  Intact  · Skin Integrity: Visible skin intact  · Posture: MELIDA pt supine  · Physical Exam:    Left UE Left LE Right UE Right LE   Edema absent present absent absent   ROM Shoulder elevation to 90; elbow/wrist/finger AAROM WFL AAROM WFL; ankle DF to neutral  Shoulder elevation to 90; elbow/wrist/finger AAROM WFL AAROM WFL; ankle DF to neutral   Modified Erica Scale 0: No increase in muscle tone 0: No increase in muscle tone 0: No increase in muscle tone 0: No increase in muscle tone   Strength 1/5 1/5 1/5 1/5   Sensation intact to light touch intact to light touch intact to light touch intact to light touch   Coordination not tested due to strength or pain deficits not tested due to strength or pain deficits not tested due to strength or pain deficits not tested due to strength or pain deficits     Outcome Measures:  AM-PAC 6 CLICK MOBILITY  Turning over in bed (including adjusting bedclothes, sheets and blankets)?: 2  Sitting down on and standing up from a chair with arms " (e.g., wheelchair, bedside commode, etc.): 1  Moving from lying on back to sitting on the side of the bed?: 2  Moving to and from a bed to a chair (including a wheelchair)?: 1  Need to walk in hospital room?: 1  Climbing 3-5 steps with a railing?: 1  Basic Mobility Total Score: 8     Functional Mobility: mobility not performed on this date, pt cleared only for bed level exercises on this date  Additional staff present: none    Therapeutic Exercises:    Supine: shoulder, elbow, wrist, finger; hip, knee, ankle Active Assisted ROM 1  x 10 repetitions in all planes through available ROM. Exercises performed to develop and maintain pt's  strength and ROM.    o Son educated on hand placement and body mechanics. Educated son to perform with pt 3x/day. Handout also provided.    Education:   Time provided for education, counseling and discussion of health disposition in regards to patient's current status   All questions answered within PT scope of practice and to patient's satisfaction   PT role in POC to address current functional deficits   Pt educated on proper body mechanics, safety techniques, and energy conservation with PT facilitation and cueing throughout session   Whiteboard updated with therapist name and pt's current mobility status documented above   Encouraged patient to perform daily exercises of above therex to increase endurance and decrease effects of bedrest     Patient left HOB elevated with all lines intact, call button in reach, RN notified and son present.    Time Tracking:     PT Received On: 02/14/22  PT Start Time: 1335     PT Stop Time: 1400  PT Total Time (min): 25 min     Billable Minutes: Therapeutic Exercise 25 minutes    Emma Florentino PT, DPT  2/14/2022  Pager: [phone]

## 2022-02-14 NOTE — ASSESSMENT & PLAN NOTE
Orion Ty is a 76 y.o. female who presents to the SICU s/p redo aortic valve replacement with mechanical valve on 1/5/22.      Neuro/Psych:   -- Sedation: sedated with propofol, fentanyl  -- Pain: fent gtt     Cards:    -- Pressors: intermittent levo, vaso. Requirements have improved.   -- Continue to wean vasopressors as tolerated  -- Goal MAP: 60-80   -- PO amio TID  -- statin  -- hold coumadin today  -- INR therapeutic  -- metoprolol 25 BID      Pulm:   -- Goal O2 sat > 90%  -- reintubated 1/12 then 1/18. S/p trach 1/21  -- intubated, on rate with improving ventilation  -- D/c nimbex on 2/12  -- ABGs q4hrs  -- 2 mediastinal removed 1/9 L Pleural CT removed 1/8      Renal:  -- CRRT   -- nephro following  -- Permcath 1/21  -- Removed R IJ trialysis 1/23        FEN / GI:   -- Replace lytes as needed  -- s/p PEG 1/21  -- Nutrition: tube feeds OK to resume with lower pressor requirements       ID:   -- WBC decreasing 20.5 --> 17  -- Pseudomonas in resp cx; sensitive to zosyn  -- Antibiotics: zosyn  -- Initiated broad spec abx on 2/3; narrowed 2/6        Heme/Onc:   -- Hgb stable  -- Daily CBC  -- Transfused products: 1U RBC on 1/5/22. 1u RBC and 1u FFP on 1/9/21. 2U RBC on 1/12. 4 FFP on 1/13. 1 FFP on 1/17, 2u pRBC 1/27, 1u pRBC 2/9  -- Anticoagulation: coumadin, therapeutic today; hold coumadin      Endo:   -- Gluc goal 140-180  -- Insulin per endocrinology      PPx:   Feeding: tube feeds   Analgesia/Sedation: nimbex, propofol, fentanyl  Thromboembolic prevention: Coumadin, ASA  HOB >30: yes  Stress Ulcer ppx: protonix BID  Glucose control: Critical care goal 140-180 g/dl, ISS    Lines/Drains/Airway: Art line, Trach, Permcath, PEG      Dispo/Code Status/Palliative:   -- SICU / Full Code

## 2022-02-14 NOTE — SUBJECTIVE & OBJECTIVE
Interval History/Significant Events:   DEBBIE  Tolerated CRRT yesterday   No longer paralyzed, ventilated. Improving ABG  Therapeutic on coumadin  WBC decreasing, at 17 this morning  HH stable      Follow-up For: Procedure(s) (LRB):  CREATION, TRACHEOSTOMY (N/A)  EGD, WITH PEG TUBE INSERTION (N/A)  INSERTION-CATHETER-ROSELINE (N/A)    Post-Operative Day: 20 Days Post-Op    Objective:     Vital Signs (Most Recent):  Temp: 98.5 °F (36.9 °C) (02/14/22 0315)  Pulse: 79 (02/14/22 0600)  Resp: (!) 27 (02/14/22 0600)  BP: (!) 117/57 (02/14/22 0600)  SpO2: 99 % (02/14/22 0600) Vital Signs (24h Range):  Temp:  [97 °F (36.1 °C)-99.3 °F (37.4 °C)] 98.5 °F (36.9 °C)  Pulse:  [79-80] 79  Resp:  [9-39] 27  SpO2:  [90 %-100 %] 99 %  BP: ()/(55-91) 117/57  Arterial Line BP: ()/(40-57) 139/54     Weight: 52 kg (114 lb 10.2 oz)  Body mass index is 21.66 kg/m².      Intake/Output Summary (Last 24 hours) at 2/14/2022 0627  Last data filed at 2/14/2022 0600  Gross per 24 hour   Intake 5087.18 ml   Output 4028 ml   Net 1059.18 ml       Physical Exam  Constitutional:       Appearance: She is ill-appearing.      Comments: Sedated, ventilated   HENT:      Head: Normocephalic and atraumatic.      Mouth/Throat:      Comments: Oozing from mucosal surfaces at lower lip  Neck:      Comments: Trach  Cardiovascular:      Rate and Rhythm: Normal rate and regular rhythm.      Pulses: Normal pulses.      Comments: Midline sternotomy incision c/d/I  Pacemaker in place.   Pulmonary:      Effort: Pulmonary effort is normal. No respiratory distress.   Abdominal:      General: Abdomen is flat. There is no distension.      Palpations: Abdomen is soft.      Tenderness: There is no abdominal tenderness.      Comments: Peg site c/d/i  Soft abdomen   Musculoskeletal:      Right lower leg: No edema.      Left lower leg: No edema.   Skin:     General: Skin is warm.      Capillary Refill: Capillary refill takes less than 2 seconds.         Vents:  Vent  Mode: A/C (02/14/22 0537)  Ventilator Initiated: Yes (01/18/22 1636)  Set Rate: 26 BPM (02/14/22 0537)  Vt Set: 300 mL (02/14/22 0537)  Pressure Support: 10 cmH20 (02/07/22 0415)  PEEP/CPAP: 10 cmH20 (02/14/22 0537)  Oxygen Concentration (%): 50 (02/14/22 0600)  Peak Airway Pressure: 32 cmH2O (02/14/22 0537)  Plateau Pressure: 30 cmH20 (02/14/22 0537)  Total Ve: 8.67 mL (02/14/22 0537)  Negative Inspiratory Force (cm H2O): 0 (02/14/22 0537)  F/VT Ratio<105 (RSBI): (!) 84.59 (02/14/22 0537)    Lines/Drains/Airways     Central Venous Catheter Line            Permacath 01/21/22 1013 left subclavian 23 days    Percutaneous Central Line Insertion/Assessment - Triple Lumen  02/09/22 1730 right internal jugular 4 days          Drain                 Gastrostomy/Enterostomy 01/21/22 1112 Percutaneous endoscopic gastrostomy (PEG) 23 days          Airway                 Surgical Airway 01/21/22 1043 Shiley Cuffed 23 days          Arterial Line            Arterial Line 02/09/22 1300 Left Radial 4 days          Peripheral Intravenous Line                 Peripheral IV - Single Lumen 02/14/22 0320 20 G Right Hand <1 day                Significant Labs:    CBC/Anemia Profile:  Recent Labs   Lab 02/13/22  0425 02/13/22  0733 02/13/22  1330 02/13/22  1630 02/14/22  0234   WBC 20.53*  --   --   --  17.82*   HGB 7.7*  --   --   --  7.4*   HCT 25.2*   < > 24* 24* 24.0*   *  --   --   --  128*   *  --   --   --  101*   RDW 20.0*  --   --   --  20.6*    < > = values in this interval not displayed.        Chemistries:  Recent Labs   Lab 02/13/22  0425 02/13/22  0425 02/13/22  1332 02/13/22  2138 02/14/22  0234   *   < > 126* 130* 130*   K 4.6   < > 4.3 4.0 4.0   CL 87*   < > 88* 96 98   CO2 23   < > 23 24 25   BUN 32*   < > 37* 8 4*   CREATININE 1.5*   < > 1.8* 0.5 0.4*   CALCIUM 8.3*   < > 8.5* 7.7* 7.0*   ALBUMIN 2.1*   < > 2.0* 2.0* 2.0*   PROT 6.0  --   --   --  5.5*   BILITOT 1.4*  --   --   --  1.3*   ALKPHOS 123   --   --   --  137*   ALT 22  --   --   --  21   AST 54*  --   --   --  57*   MG 2.7*   < > 2.6  2.6 1.8 2.2   PHOS 6.8*   < > 7.0* 1.7* 2.7    < > = values in this interval not displayed.       ABGs:   Recent Labs   Lab 02/14/22  0537   PH 7.320*   PCO2 47.6*   HCO3 24.5   POCSATURATED 94*   BE -2       Significant Imaging:  I have reviewed all pertinent imaging results/findings within the past 24 hours.

## 2022-02-14 NOTE — CARE UPDATE
BG goal 140-180    -A1C   Lab Results   Component Value Date    HGBA1C 6.0 (H) 01/03/2022         -HOME REGIMEN: Metformin 500 mg daily    -INPATIENT REGIMEN: novolog 4 units q 4 hrs with tube feeds    -GLUCOSE TREND FOR THE PAST 24HRS: 141-164    -NO HYPOGYCEMIAS NOTED        -Diet: Diet NPO Except for: Sips with Medication      -Tube Feeds - novasource renal at 35 cc/hr       Remains in ICU. Intubated and sedated. Continues on pressor requirements. BG reasonably well controlled on current SQ insulin regimen.     Plan:  Continue novolog 4 units every 4 hours while on TF; hold if TF held or BG less than 100.   BG monitoring every 4 hours and moderate dose correction scale.     Discharge planning:  tbd    Endocrine to continue to follow    ** Please call Endocrine for any BG related issues **

## 2022-02-14 NOTE — PROGRESS NOTES
Dr. Pinzon updated on patient's BMP results. Na 130, Phos 1.7, Ionized calcium 1.03, Mag 1.8. Sodium currently set at 130 on dialysis machine.   Orders received to administer PRN Magnesium and sodium phosphate replacements.

## 2022-02-14 NOTE — PROGRESS NOTES
"Nephrology CRRT Progress Note    Patient followed for Oliguric JO  Patient was seen on SLED which was running for metabolic clearance and volume management with UF Rate: 300 cc/hr.    BP (!) 107/53 (BP Location: Left arm, Patient Position: Lying)   Pulse 79   Temp 98.3 °F (36.8 °C) (Axillary)   Resp (!) 26   Ht 5' 1" (1.549 m)   Wt 52 kg (114 lb 10.2 oz)   SpO2 100%   Breastfeeding No   BMI 21.66 kg/m²      I/O last 3 completed shifts:  In: 6176.4 [I.V.:4257.5; NG/GT:1070; IV Piggyback:848.9]  Out: 4372 [Other:4372]   I/O this shift:  In: 1178.2 [I.V.:1100; NG/GT:35; IV Piggyback:43.2]  Out: 1436 [Other:1436]     Intake/Output Summary (Last 24 hours) at 2/14/2022 1301  Last data filed at 2/14/2022 1157  Gross per 24 hour   Intake 5621.28 ml   Output 5808 ml   Net -186.72 ml      Net IO Since Admission: 11,650.23 mL [02/14/22 1301]     CRRT Flowsheet Data  Treatment  Treatment Type: Slow low efficiency dialysis  Treatment Status: Restart  Dialysis Machine Number: [id number]  Dialyzer Time (hours): 39.56  BVP (Liters): 71.7 L  Solutions Labeled and Current : Yes  Access: Temporary Cath,Left,IJ  Catheter Dressing Intact : Yes  Alarms Engaged: Yes  CRRT Comments: CRRT restarted    Prescription  Time (Hours): Continuous  Dialysate K + (mEq/L): 4  Dialysate CA + (mEq/L): 2.25  Dialysate HCO3 - (Bicarb) (mEq/L): 30  Dialysate Na + (mEq/L): 135 (Changed to 135 from 130 as ordered)  Cartridge Type:  (R300)  Dialysate Flow Rate (mL/min): 200  UF Goal Rate: 300 mL/hr  Total UF (mL): 500 mL  Total UF (Hourly Cleared) (mL): 286        Pt on SLED overnight.   Tolerating current parameters   Net positive fluid balance.     Adequate clearance.     -Will change from SLED to SCUF for volume management with UF rate of 250-350 cc/hr for 8 hrs.   -Repeat Labs 4-6 hrs after HD session finished.  -Will reevaluate in AM.   -Adjust UF rate as tolerated with a MAP >65.    -Monitor labs serially and follow replacement protocols as needed. "     Quang Pinzon MD   Nephrology  [facility]

## 2022-02-14 NOTE — PLAN OF CARE
Significant events:   Continuous SLED overnight.   Vitals/ Respiratory: Mechanically ventilated to trach, AC VC +, FiO2 50%, Peep 10, Rate 26, Vt 300.   Nitric Oxide: 5 ppm.   O2: >94%   HR: 100% paced at 80 bpm, sinus rhythm.  MAP: 60-85.  Temperature max: 99.3 F.    Gtts:  Amiodarone, and Fentanyl.  UOP:   Anuric.      Last Bowel Movement: 2/13/22.   Neuro: Pupils equal and reactive. Patient arouses to voice. Withdraws to pain in all extremities. Follows commands in all four extremities, although weaker and delayed response to LLE.    CRRT: Continuous SLED. UF increased to 300. Tolerating well.   Diet:  Tube feedings to peg tube at goal of 35 cc/hr, minimal residuals.  Labs/Accuchecks:  ABGs Q3 hrs. Renal and Mag Q8 hrs. Daily labs.       Plan:   Wean oxygen as tolerated. Continue ICU care.      Skin:  Chest and right groin incision CDI. Sacral wound cleaned with soap and water, triad cream applied. Blanchable redness to back of head, alternated between pillow and foam ring cushion. Turned Q2 hrs. Immerse mattress, heel boots, sacral foam, and wedges in use.

## 2022-02-14 NOTE — PLAN OF CARE
Discharge Recommendation: LTAC.    30 day re-assessment completed today. PT goals re-assessed today.    Patient is safe to perform bed level ROM and exercises with nursing staff.    Please continue Progressive Mobility Protocol as appropriate.    Emma Florentino, PT, DPT  2022  Pager: [phone]      Problem: Physical Therapy Goal  Goal: Physical Therapy Goal  Description: Goals to be met by: 2022     Patient will increase functional independence with mobility by performin. Sit to stand transfer with max A -not met  2. Bed to chair transfer with max A -not met  3. Lower extremity exercise program x30 reps per handout, with independence -not met  4. Recite 3/3 sternal precautions and remain complaint with precautions throughout session with no verbal cues -not met  5. Pt sit EOB x 10 min with SBA -not met  6. Pt to stand for 60 seconds with Max A and VSS -not met        Outcome: Ongoing, Progressing

## 2022-02-15 NOTE — EICU
Intervention Initiated From:  Bedside    Osmany Communicated with Bedside Nurse regarding:  Documentation and Time-Out  Comments:   22:55  Called into room via eLert to do time-out for emergent Chest Tube  placement & for assistance w/ documentation (see time-out record).  Sergey Plunkett supervising CT placement. Pt's VS: HR 80 (100% paced), /48 via art line, RR 20, POx 94%.    22:57  Fentanyl 50 mcg IVP given for pain per bedside RN.  23:00 Rt Pleural CT in place  & LDA added in Epic. Pt tolerated procedure well. CXR order placed & x-ray department called.  Time elapsed: 18 mins.

## 2022-02-15 NOTE — CARE UPDATE
BG goal 140-180    -A1C   Lab Results   Component Value Date    HGBA1C 6.0 (H) 01/03/2022         -HOME REGIMEN: Metformin 500 mg daily    -INPATIENT REGIMEN: novolog 4 units q 4 hrs with tube feeds    -GLUCOSE TREND FOR THE PAST 24HRS:     -Hypoglycemia noted (69) at 0749. Pt given D50 bolus.        -Diet: Diet NPO Except for: Sips with Medication      -Tube Feeds - novasource renal at 35 cc/hr       Remains in ICU. Intubated and sedated. Continues on pressor requirements. BG trending down below goal ranges.     Plan:  Discontinue novolog 4 units every 4 hours   BG monitoring every 4 hours and change to low dose correction scale.     Discharge planning:  tbd    Endocrine to continue to follow    ** Please call Endocrine for any BG related issues **

## 2022-02-15 NOTE — PROGRESS NOTES
[facility]  Skin Integrity BRIDGET  Progress Note    Patient Name: Orion Ty  MRN: [id number]  Admission Date: 1/5/2022  Hospital Length of Stay: 41 days  Attending Physician: Dennis Haider MD  Primary Care Provider: Otis Zimmer MD         Subjective:     HPI:  Orion Ty is a 76 year old female with previous aortic valve replacement, mitral valve replacement, and tricuspid valve repair in 2010 who presents for redo aortic valve replacement. 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 She underwent aortic valve replacement with a mechanical valve on 1/5/22. Wound care is following for assessment of buttocks skin injury.      Hospital Course:   Interval history: Pt remains sedated and intubated. Developed spontaneous hemothorax overnight requiring CT placement and increasing pressor requirements. RN and family at bedside during wound assessment.         Follow-up For: Procedure(s) (LRB):  CREATION, TRACHEOSTOMY (N/A)  EGD, WITH PEG TUBE INSERTION (N/A)  INSERTION-CATHETER-ROSELINE (N/A)    Post-Operative Day: 25 Days Post-Op    Scheduled Meds:   albumin human 5%  25 g Intravenous Once    albuterol-ipratropium  3 mL Nebulization Q6H    hydrocortisone sodium succinate  50 mg Intravenous Q8H    meropenem (MERREM) IVPB  500 mg Intravenous Q12H    midodrine  15 mg Per G Tube Q8H    pantoprazole  40 mg Intravenous Q12H    white petrolatum-mineral oiL   Both Eyes TID     Continuous Infusions:   amiodarone in dextrose 5% 0.5 mg/min (02/15/22 1300)    dextrose 10 % in water (D10W)      fentanyl 25 mcg/hr (02/15/22 1300)    nitric oxide gas      NORepinephrine bitartrate-D5W      propofoL 25 mcg/kg/min (02/15/22 1300)    vasopressin 0.04 Units/min (02/15/22 1300)     PRN Meds:sodium chloride, sodium chloride, sodium chloride 0.9%, acetaminophen, bisacodyL, dextrose 10 % in water (D10W),  dextrose 10%, glucagon (human recombinant), haloperidol lactate, heparin (porcine), insulin aspart U-100, LIDOcaine HCL 2%, ondansetron, oxyCODONE, polyethylene glycol, sodium chloride 0.9%, sodium chloride 0.9%, sodium chloride 0.9%    Review of Systems   Skin: Positive for wound.     Objective:     Vital Signs (Most Recent):  Temp: 97.88 °F (36.6 °C) (02/15/22 1245)  Pulse: 80 (02/15/22 1245)  Resp: 11 (02/15/22 1245)  BP: (!) 124/58 (02/15/22 1100)  SpO2: 99 % (02/15/22 1245) Vital Signs (24h Range):  Temp:  [92.2 °F (33.4 °C)-103 °F (39.4 °C)] 97.88 °F (36.6 °C)  Pulse:  [] 80  Resp:  [6-46] 11  SpO2:  [84 %-100 %] 99 %  BP: ()/(49-62) 124/58  Arterial Line BP: ()/(40-57) 101/46     Weight: 52 kg (114 lb 10.2 oz)  Body mass index is 21.66 kg/m².    Physical Exam  Constitutional:       Appearance: Normal appearance.   Skin:     General: Skin is warm and dry.      Findings: Lesion present.         Laboratory:  All pertinent labs reviewed within the last 24 hours.    Diagnostic Results:  None    Assessment/Plan:         BRIDGET Skin Integrity Evaluation    Skin Integrity BRIDGET evaluation of patient as part of the comprehensive skin care team.   She has been admitted for 41 days. Skin injury was noted on 1/9/22. POA no.                Dermatitis associated with moisture  - Injury noted to buttocks appears to be related to moisture/friction/shearing.  - areas of black eschar noted likely related to ischemic injury due to increasing pressor requirements.   - pt is incontinent of urine/stool and currently using moisture wicking pads.  - Triad dc'd per wound care RN and transitioned to 3M hydrocolloid sacral dressing 2x/week.  - wound with moderate drainage causing difficulty with dressing adhering to site.  - treatment changed back to Triad bid/prn with foam dressing.  - currently on Immerse mattress with heel boots in place.  - strict turn q2h schedule.   - nursing to maintain pressure injury prevention  measures.         Shyla Ortez NP  Skin Integrity BRIDGET  [facility]

## 2022-02-15 NOTE — PROGRESS NOTES
[facility]  Critical Care - Surgery  Progress Note    Patient Name: Orion Ty  MRN: [id number]  Admission Date: 1/5/2022  Hospital Length of Stay: 41 days  Code Status: Full Code  Attending Provider: Dennis Haider MD  Primary Care Provider: Otis Zimmer MD   Principal Problem: History of mechanical aortic valve replacement    Subjective:     Hospital/ICU Course:  No notes on file    Interval History/Significant Events:   Patient with spontaneous pneumothorax, hemothorax overnight. Placement of chest tube with evacuation of blood clot. Some minor improvement in oxygenation.  Patient remains intubated, sedated & acidotic.   Febrile this morning with an increasing WBC 17 - 36.   Increasing pressor requirements.       Follow-up For: Procedure(s) (LRB):  CREATION, TRACHEOSTOMY (N/A)  EGD, WITH PEG TUBE INSERTION (N/A)  INSERTION-CATHETER-ROSELINE (N/A)    Post-Operative Day: 20 Days Post-Op    Objective:     Vital Signs (Most Recent):  Temp: (!) 103 °F (39.4 °C) (Dr. Schwab notified and ttylenol ordered) (02/15/22 0700)  Pulse: 80 (02/15/22 0800)  Resp: 20 (02/15/22 0800)  BP: (!) 96/51 (02/15/22 0800)  SpO2: (!) 93 % (02/15/22 0800) Vital Signs (24h Range):  Temp:  [92.2 °F (33.4 °C)-103 °F (39.4 °C)] 103 °F (39.4 °C)  Pulse:  [] 80  Resp:  [6-46] 20  SpO2:  [84 %-100 %] 93 %  BP: ()/(49-62) 96/51  Arterial Line BP: ()/(40-57) 100/45     Weight: 52 kg (114 lb 10.2 oz)  Body mass index is 21.66 kg/m².      Intake/Output Summary (Last 24 hours) at 2/15/2022 0814  Last data filed at 2/15/2022 0600  Gross per 24 hour   Intake 4684.3 ml   Output 4611 ml   Net 73.3 ml       Physical Exam  Constitutional:       Appearance: She is ill-appearing and toxic-appearing.      Comments: Sedated, ventilated   HENT:      Head: Normocephalic and atraumatic.      Mouth/Throat:      Comments: Oozing from mucosal surfaces at lower lip  Neck:      Comments: Trach  Cardiovascular:       Rate and Rhythm: Normal rate and regular rhythm.      Pulses: Normal pulses.      Comments: Midline sternotomy incision c/d/I  Pacemaker in place.   Pulmonary:      Effort: Pulmonary effort is normal. No respiratory distress.      Comments: Tracheostomy, Ventilated  Abdominal:      General: Abdomen is flat. There is no distension.      Palpations: Abdomen is soft.      Tenderness: There is no abdominal tenderness.      Comments: Peg site c/d/i  Soft abdomen   Musculoskeletal:      Right lower leg: No edema.      Left lower leg: No edema.   Skin:     General: Skin is warm.      Capillary Refill: Capillary refill takes less than 2 seconds.         Vents:  Vent Mode: A/C (02/15/22 0739)  Ventilator Initiated: Yes (01/18/22 1636)  Set Rate: 28 BPM (02/15/22 0739)  Vt Set: 300 mL (02/15/22 0739)  Pressure Support: 10 cmH20 (02/07/22 0415)  PEEP/CPAP: 10 cmH20 (02/15/22 0739)  Oxygen Concentration (%): 80 (02/15/22 0800)  Peak Airway Pressure: 36 cmH2O (02/15/22 0739)  Plateau Pressure: 30 cmH20 (02/15/22 0739)  Total Ve: 10.2 mL (02/15/22 0739)  Negative Inspiratory Force (cm H2O): 0 (02/15/22 0739)  F/VT Ratio<105 (RSBI): (!) 48.54 (02/15/22 0739)    Lines/Drains/Airways     Central Venous Catheter Line            Permacath 01/21/22 1013 left subclavian 24 days    Percutaneous Central Line Insertion/Assessment - Triple Lumen  02/09/22 1730 right internal jugular 5 days          Drain                 Gastrostomy/Enterostomy 01/21/22 1112 Percutaneous endoscopic gastrostomy (PEG) 24 days         Chest Tube 02/14/22 2300 1 Right Pleural 28 Fr. <1 day          Airway                 Surgical Airway 01/21/22 1043 Shiley Cuffed 24 days          Arterial Line            Arterial Line 02/09/22 1300 Left Radial 5 days          Peripheral Intravenous Line                 Peripheral IV - Single Lumen 02/14/22 0320 20 G Right Hand 1 day                Significant Labs:    CBC/Anemia Profile:  Recent Labs   Lab 02/14/22  2222  02/14/22  2222 02/15/22  0052 02/15/22  0324 02/15/22  0751   WBC 16.77*  --  19.23* 36.93*  --    HGB 7.3*  --  6.2* 7.1*  --    HCT 24.8*   < > 21.0* 22.4* 22*   *  --  100* 81*  --    *  --  106* 100*  --    RDW 22.5*  --  23.0* 18.8*  --     < > = values in this interval not displayed.        Chemistries:  Recent Labs   Lab 02/14/22  0234 02/14/22  0234 02/14/22  1419 02/14/22  2200 02/15/22  0324   *   < > 134* 134* 132*   K 4.0   < > 4.3 3.7 4.2   CL 98   < > 101 104 102   CO2 25   < > 26 21* 22*   BUN 4*   < > <3* 5* 8   CREATININE 0.4*   < > 0.3* 0.4* 0.4*   CALCIUM 7.0*   < > 7.5* 6.5* 7.2*   ALBUMIN 2.0*   < > 2.0* 1.8* 2.4*   PROT 5.5*  --   --   --  5.1*   BILITOT 1.3*  --   --   --  1.6*   ALKPHOS 137*  --   --   --  111   ALT 21  --   --   --  18   AST 57*  --   --   --  53*   MG 2.2   < > 1.7 1.7 2.2   PHOS 2.7   < > <1.0* 5.1* 5.2*    < > = values in this interval not displayed.       ABGs:   Recent Labs   Lab 02/15/22  0751   PH 7.287*   PCO2 50.6*   HCO3 24.2   POCSATURATED 90*   BE -2       Significant Imaging:  I have reviewed all pertinent imaging results/findings within the past 24 hours.    Assessment/Plan:     * History of mechanical aortic valve replacement  Orion Ty is a 76 y.o. female who presents to the SICU s/p redo aortic valve replacement with mechanical valve on 1/5/22.      Neuro/Psych:   -- Sedation: sedated PRN fentanyl   -- Pain: fent PRN     Cards:    -- Pressors: intermittent levo, vaso. Labile blood pressure, fluctuations in pressor requirements over past few days. Seems to be worsening over last night.   -- Wean vasopressors as tolerated  -- Goal MAP: 60-80   -- PO amio TID  -- statin  -- INR subtherapeutic today, coumadin  -- metoprolol 25 BID      Pulm:   -- Goal O2 sat > 90%  -- reintubated 1/12 then 1/18. S/p trach 1/21  -- intubated, on rate with improving ventilation  -- D/c nimbex on 2/12  -- ABGs q4hrs & PRN  -- 2 mediastinal removed 1/9 L  Pleural CT removed 1/8  -- Chest tube placed on right side for pneumothorax, hemothorax overnight 2/14. Blood clot evacuated.       Renal:  -- CRRT   -- nephro following  -- Permcath 1/21  -- Removed R IJ trialysis 1/23        FEN / GI:   -- Replace lytes as needed  -- s/p PEG 1/21  -- Nutrition: tube feeds OK to resume if lower pressor requirements       ID:   -- WBC now doubled this morning 20.5 --> 17 --> 36  -- Pseudomonas in resp cx; sensitive to zosyn  -- Antibiotics: zosyn  -- Initiated broad spec abx on 2/3; narrowed 2/6        Heme/Onc:   -- Hgb stable  -- Daily CBC  -- Transfused products: 1U RBC on 1/5/22. 1u RBC and 1u FFP on 1/9/21. 2U RBC on 1/12. 4 FFP on 1/13. 1 FFP on 1/17, 2u pRBC 1/27, 1u pRBC 2/9  -- Anticoagulation: coumadin      Endo:   -- Gluc goal 140-180  -- Insulin per endocrinology      PPx:   Feeding: tube feeds   Analgesia/Sedation: nimbex, propofol, fentanyl  Thromboembolic prevention: Coumadin, ASA  HOB >30: yes  Stress Ulcer ppx: protonix BID  Glucose control: Critical care goal 140-180 g/dl, ISS    Lines/Drains/Airway: Art line, Trach, Permcath, PEG      Dispo/Code Status/Palliative:   -- SICU / Full Code             Sarah Shafer MD  Critical Care - Surgery  [facility]

## 2022-02-15 NOTE — PROGRESS NOTES
Hemodialysis not consistent with current goal of care. We will sign off. Please contact us if you have any additional questions.    Quang Corrales-Roberto  Nephrology  [facility]

## 2022-02-15 NOTE — PROGRESS NOTES
Update: Therapy with vancomycin was discontinued by the provider.  Pharmacy will sign off, please re-consult as needed.    ________________________________________________________________________________________________    Pharmacokinetic Assessment Follow Up: IV Vancomycin    Assessment/Plan:    - Random level from AM labs resulted as 11.4 mg/dl, goal 10-20 mg/dl.   - Patient has JO requiring RRT, received SCUF yesterday evening, RRT now on hold.   - Continue pulse dosing. No additional vancomycin is required today.   - Pharmacy to re-dose tomorrow based on RRT plans and goals of care.     Drug levels (last 3 results):  Recent Labs   Lab Result Units 02/13/22  0425 02/14/22  0605 02/15/22  0519   Vancomycin, Random ug/mL 20.9 6.9 11.4       Pharmacy will continue to follow and monitor vancomycin.    Please contact pharmacy at extension [phone] for questions regarding this assessment.    Thank you for the consult,   Lizett Pinto, PharmD, [facility]       Patient brief summary:  Orion Ty is a 76 y.o. female initiated on antimicrobial therapy with IV Vancomycin for treatment of lower respiratory infection.    The patient's current regimen is pulse dosing.    Drug Allergies:   Review of patient's allergies indicates:  No Known Allergies    Actual Body Weight:   52 kg    Renal Function:   Estimated Creatinine Clearance: 88.9 mL/min (A) (based on SCr of 0.4 mg/dL (L)).,     Dialysis Method (if applicable):  RRT held    CBC (last 72 hours):  Recent Labs   Lab Result Units 02/13/22  0425 02/14/22  0234 02/14/22  2222 02/15/22  0052 02/15/22  0324   WBC K/uL 20.53* 17.82* 16.77* 19.23* 36.93*   Hemoglobin g/dL 7.7* 7.4* 7.3* 6.2* 7.1*   Hematocrit % 25.2* 24.0* 24.8* 21.0* 22.4*   Platelets K/uL 122* 128* 109* 100* 81*   Gran % % 89.7* 91.2* 91.6* 92.5* 94.3*   Lymph % % 3.5* 2.8* 2.5* 2.8* 1.0*   Mono % % 4.3 4.3 4.2 2.9* 2.2*   Eosinophil % % 0.0 0.0 0.0 0.0 0.1   Basophil % % 0.2 0.1 0.1 0.1 0.2   Differential  Method  Automated Automated Automated Automated Automated       Metabolic Panel (last 72 hours):  Recent Labs   Lab Result Units 02/12/22  1443 02/12/22  1444 02/12/22  2150 02/13/22  0425 02/13/22  1332 02/13/22  2138 02/14/22  0234 02/14/22  1419 02/14/22  2200 02/15/22  0324   Sodium mmol/L 128*  --  126* 125* 126* 130* 130* 134* 134* 132*   Potassium mmol/L 4.4  --  4.3 4.6 4.3 4.0 4.0 4.3 3.7 4.2   Chloride mmol/L 88*  --  89* 87* 88* 96 98 101 104 102   CO2 mmol/L 27  --  23 23 23 24 25 26 21* 22*   Glucose mg/dL 219*  --  142* 211* 152* 133* 142* 156* 177* 148*   BUN mg/dL 24*  --  25* 32* 37* 8 4* <3* 5* 8   Creatinine mg/dL 1.0  --  1.2 1.5* 1.8* 0.5 0.4* 0.3* 0.4* 0.4*   Albumin g/dL 1.9*  --  1.8* 2.1* 2.0* 2.0* 2.0* 2.0* 1.8* 2.4*   Total Bilirubin mg/dL  --   --   --  1.4*  --   --  1.3*  --   --  1.6*   Alkaline Phosphatase U/L  --   --   --  123  --   --  137*  --   --  111   AST U/L  --   --   --  54*  --   --  57*  --   --  53*   ALT U/L  --   --   --  22  --   --  21  --   --  18   Magnesium mg/dL 2.7*  2.7*  --  2.5 2.7* 2.6  2.6 1.8 2.2 1.7 1.7 2.2   Phosphorus mg/dL 4.7* 4.8* 5.6* 6.8* 7.0* 1.7* 2.7 <1.0* 5.1* 5.2*       Vancomycin Administrations:  vancomycin given in the last 96 hours                   vancomycin 750 mg in dextrose 5 % 250 mL IVPB (ready to mix system) (mg) 750 mg New Bag 02/10/22 1048                Microbiologic Results:  Microbiology Results (last 7 days)     Procedure Component Value Units Date/Time    Blood culture [[id number]] Collected: 02/10/22 1613    Order Status: Completed Specimen: Blood from Peripheral, Lower Arm, Right Updated: 02/14/22 1812     Blood Culture, Routine No Growth to date      No Growth to date      No Growth to date      No Growth to date      No Growth to date    Blood culture [[id number]] Collected: 02/10/22 1613    Order Status: Completed Specimen: Blood from Peripheral, Lower Arm, Right Updated: 02/14/22 1812     Blood Culture, Routine No  Growth to date      No Growth to date      No Growth to date      No Growth to date      No Growth to date    Blood culture [[id number]] Collected: 02/03/22 1550    Order Status: Completed Specimen: Blood from Peripheral, Hand, Right Updated: 02/08/22 1812     Blood Culture, Routine No growth after 5 days.    Blood culture [[id number]] Collected: 02/03/22 1542    Order Status: Completed Specimen: Blood from Peripheral, Hand, Left Updated: 02/08/22 1812     Blood Culture, Routine No growth after 5 days.

## 2022-02-15 NOTE — PROCEDURES
"Orion Ty is a 76 y.o. female patient.    Temp: 97.6 °F (36.4 °C) (02/14/22 1530)  Pulse: 80 (02/14/22 2230)  Resp: (!) 32 (02/14/22 2230)  BP: 97/60 (02/14/22 2200)  SpO2: (!) 92 % (02/14/22 2230)  Weight: 52 kg (114 lb 10.2 oz) (02/14/22 0500)  Height: 5' 1" (154.9 cm) (02/14/22 0752)       Chest Tube Insertion    Date/Time: 2/14/2022 11:32 PM  Location procedure was performed: [facility] SURGICAL ICU (SICU)  Performed by: Sergey Mora MD  Authorized by: Sergey Mora MD   Post-operative diagnosis: Hemothorax   Pre-operative diagnosis: Hemothorax  Consent Done: Emergent Situation  Indications: hemopneumothorax  Anesthesia: local infiltration    Anesthesia:  Local Anesthetic: lidocaine 1% with epinephrine  Preparation: skin prepped with ChloraPrep  Placement location: right lateral  Scalpel size: 11  Tube size: 28 Moroccan  Dissection instrument: finger and Jadyn clamp  Ultrasound guidance: no  Tension pneumothorax heard: no  Tube connected to: suction  Drainage characteristics: bloody  Drainage amount: 150 ml  Suture material: 0 silk  Dressing: 4x4 sterile gauze and petrolatum-impregnated gauze  Post-insertion x-ray findings: tube in good position  Complications: No  Specimens: No  Implants: No          2/14/2022  "

## 2022-02-15 NOTE — PROCEDURES
"Orion Ty is a 76 y.o. female patient.    Temp: 97.6 °F (36.4 °C) (02/14/22 1530)  Pulse: 80 (02/14/22 2230)  Resp: (!) 32 (02/14/22 2230)  BP: 97/60 (02/14/22 2200)  SpO2: (!) 92 % (02/14/22 2230)  Weight: 52 kg (114 lb 10.2 oz) (02/14/22 0500)  Height: 5' 1" (154.9 cm) (02/14/22 0752)       Chest Tube Insertion    Date/Time: 2/14/2022 11:00 PM  Location procedure was performed: [facility] SURGICAL ICU (SICU)  Performed by: Sergey Mora MD  Authorized by: Sergey Mora MD   Consent Done: Emergent Situation  Indications: hemopneumothorax    Patient sedated: no  Anesthesia: local infiltration    Anesthesia:  Local Anesthetic: lidocaine 1% with epinephrine  Preparation: skin prepped with ChloraPrep  Placement location: right lateral  Scalpel size: 11  Tube size: 12 Yoruba  Ultrasound guidance: no  Tension pneumothorax heard: no  Tube connected to: suction  Drainage characteristics: bloody  Drainage amount: 5 ml  Dressing: 4x4 sterile gauze and petrolatum-impregnated gauze  Post-insertion x-ray findings: tube in good position  Comments: Tube was placed, drainage consistent with hemothorax.   Chest tube on a good position on CXR, however minimal drainage.   Will plan for a real chest tube.           2/14/2022  "

## 2022-02-15 NOTE — ASSESSMENT & PLAN NOTE
Orion Ty is a 76 y.o. female who presents to the SICU s/p redo aortic valve replacement with mechanical valve on 1/5/22.      Neuro/Psych:   -- Sedation: sedated PRN fentanyl   -- Pain: fent PRN     Cards:    -- Pressors: intermittent levo, vaso. Labile blood pressure, fluctuations in pressor requirements over past few days. Seems to be worsening over last night.   -- Wean vasopressors as tolerated  -- Goal MAP: 60-80   -- PO amio TID  -- statin  -- INR subtherapeutic today, coumadin  -- metoprolol 25 BID      Pulm:   -- Goal O2 sat > 90%  -- reintubated 1/12 then 1/18. S/p trach 1/21  -- intubated, on rate with improving ventilation  -- D/c nimbex on 2/12  -- ABGs q4hrs & PRN  -- 2 mediastinal removed 1/9 L Pleural CT removed 1/8  -- Chest tube placed on right side for pneumothorax, hemothorax overnight 2/14. Blood clot evacuated.       Renal:  -- CRRT   -- nephro following  -- Permcath 1/21  -- Removed R IJ trialysis 1/23        FEN / GI:   -- Replace lytes as needed  -- s/p PEG 1/21  -- Nutrition: tube feeds OK to resume if lower pressor requirements       ID:   -- WBC now doubled this morning 20.5 --> 17 --> 36  -- Pseudomonas in resp cx; sensitive to zosyn  -- Antibiotics: zosyn  -- Initiated broad spec abx on 2/3; narrowed 2/6        Heme/Onc:   -- Hgb stable  -- Daily CBC  -- Transfused products: 1U RBC on 1/5/22. 1u RBC and 1u FFP on 1/9/21. 2U RBC on 1/12. 4 FFP on 1/13. 1 FFP on 1/17, 2u pRBC 1/27, 1u pRBC 2/9  -- Anticoagulation: coumadin      Endo:   -- Gluc goal 140-180  -- Insulin per endocrinology      PPx:   Feeding: tube feeds   Analgesia/Sedation: nimbex, propofol, fentanyl  Thromboembolic prevention: Coumadin, ASA  HOB >30: yes  Stress Ulcer ppx: protonix BID  Glucose control: Critical care goal 140-180 g/dl, ISS    Lines/Drains/Airway: Art line, Trach, Permcath, PEG      Dispo/Code Status/Palliative:   -- SICU / Full Code

## 2022-02-15 NOTE — PLAN OF CARE
SICU PLAN OF CARE NOTE    Dx: History of mechanical aortic valve replacement    Shift Events: Patient remains Paced @80bpm.  Patient remains on ventilator to trach. Started at 50% 8 of peep at start of shift, now 80% 10 peep. Still on NiO. Right chest tube placed overnight. 500cc Albumin given overnight for low Maps. Restarted on Levo and Vaso. Pain controled with PRN medication. POC discussed with patient throughout shift. All family (daughter, Son Khoi and  updated overnight via phone).     Goals of Care: MAP 60-80     Neuro: Follows Commands and Moves All Extremities      Respiratory: Vent to Trach     Diet: NPO and Tube Feeds    Gtts: Norepinephrine, Vasopressin, and Amiodarone    Urine Output: Anuric     Drains: Chest Tube, total output 770 cc /  shift    CRRT Patient was on continous, rinsed back for increased pressor needs         Labs/Accuchecks: Daily labs, q8 hr Renal function, Mag q4hr accuchecl.    Skin: Remains without new breakdown. Wound care performed as ordered. Patient on immerse bed plugged in and working. Patient turned q2 hours and PRN with wedges. Heel boots in use.

## 2022-02-15 NOTE — PROGRESS NOTES
Changed from SLED to SCUF   02/14/22 1800   Treatment   Treatment Type SCUF   Treatment Status Order change

## 2022-02-15 NOTE — NURSING
Patient with high peak pressures on vent 40-50s.  O2 sats 86-87%. Increasing FiO needs.  Chest xray done as ordered, MD at bedside to see

## 2022-02-15 NOTE — SUBJECTIVE & OBJECTIVE
Interval History/Significant Events:   Patient with spontaneous pneumothorax, hemothorax overnight. Placement of chest tube with evacuation of blood clot. Some minor improvement in oxygenation.  Patient remains intubated, sedated & acidotic.   Febrile this morning with an increasing WBC 17 - 36.   Increasing pressor requirements.       Follow-up For: Procedure(s) (LRB):  CREATION, TRACHEOSTOMY (N/A)  EGD, WITH PEG TUBE INSERTION (N/A)  INSERTION-CATHETER-ROSELINE (N/A)    Post-Operative Day: 20 Days Post-Op    Objective:     Vital Signs (Most Recent):  Temp: (!) 103 °F (39.4 °C) (Dr. Schwab notified and ttylenol ordered) (02/15/22 0700)  Pulse: 80 (02/15/22 0800)  Resp: 20 (02/15/22 0800)  BP: (!) 96/51 (02/15/22 0800)  SpO2: (!) 93 % (02/15/22 0800) Vital Signs (24h Range):  Temp:  [92.2 °F (33.4 °C)-103 °F (39.4 °C)] 103 °F (39.4 °C)  Pulse:  [] 80  Resp:  [6-46] 20  SpO2:  [84 %-100 %] 93 %  BP: ()/(49-62) 96/51  Arterial Line BP: ()/(40-57) 100/45     Weight: 52 kg (114 lb 10.2 oz)  Body mass index is 21.66 kg/m².      Intake/Output Summary (Last 24 hours) at 2/15/2022 0814  Last data filed at 2/15/2022 0600  Gross per 24 hour   Intake 4684.3 ml   Output 4611 ml   Net 73.3 ml       Physical Exam  Constitutional:       Appearance: She is ill-appearing and toxic-appearing.      Comments: Sedated, ventilated   HENT:      Head: Normocephalic and atraumatic.      Mouth/Throat:      Comments: Oozing from mucosal surfaces at lower lip  Neck:      Comments: Trach  Cardiovascular:      Rate and Rhythm: Normal rate and regular rhythm.      Pulses: Normal pulses.      Comments: Midline sternotomy incision c/d/I  Pacemaker in place.   Pulmonary:      Effort: Pulmonary effort is normal. No respiratory distress.      Comments: Tracheostomy, Ventilated  Abdominal:      General: Abdomen is flat. There is no distension.      Palpations: Abdomen is soft.      Tenderness: There is no abdominal tenderness.       Comments: Peg site c/d/i  Soft abdomen   Musculoskeletal:      Right lower leg: No edema.      Left lower leg: No edema.   Skin:     General: Skin is warm.      Capillary Refill: Capillary refill takes less than 2 seconds.         Vents:  Vent Mode: A/C (02/15/22 0739)  Ventilator Initiated: Yes (01/18/22 1636)  Set Rate: 28 BPM (02/15/22 0739)  Vt Set: 300 mL (02/15/22 0739)  Pressure Support: 10 cmH20 (02/07/22 0415)  PEEP/CPAP: 10 cmH20 (02/15/22 0739)  Oxygen Concentration (%): 80 (02/15/22 0800)  Peak Airway Pressure: 36 cmH2O (02/15/22 0739)  Plateau Pressure: 30 cmH20 (02/15/22 0739)  Total Ve: 10.2 mL (02/15/22 0739)  Negative Inspiratory Force (cm H2O): 0 (02/15/22 0739)  F/VT Ratio<105 (RSBI): (!) 48.54 (02/15/22 0739)    Lines/Drains/Airways     Central Venous Catheter Line            Permacath 01/21/22 1013 left subclavian 24 days    Percutaneous Central Line Insertion/Assessment - Triple Lumen  02/09/22 1730 right internal jugular 5 days          Drain                 Gastrostomy/Enterostomy 01/21/22 1112 Percutaneous endoscopic gastrostomy (PEG) 24 days         Chest Tube 02/14/22 2300 1 Right Pleural 28 Fr. <1 day          Airway                 Surgical Airway 01/21/22 1043 Shiley Cuffed 24 days          Arterial Line            Arterial Line 02/09/22 1300 Left Radial 5 days          Peripheral Intravenous Line                 Peripheral IV - Single Lumen 02/14/22 0320 20 G Right Hand 1 day                Significant Labs:    CBC/Anemia Profile:  Recent Labs   Lab 02/14/22  2222 02/14/22  2222 02/15/22  0052 02/15/22  0324 02/15/22  0751   WBC 16.77*  --  19.23* 36.93*  --    HGB 7.3*  --  6.2* 7.1*  --    HCT 24.8*   < > 21.0* 22.4* 22*   *  --  100* 81*  --    *  --  106* 100*  --    RDW 22.5*  --  23.0* 18.8*  --     < > = values in this interval not displayed.        Chemistries:  Recent Labs   Lab 02/14/22  0234 02/14/22  0234 02/14/22  1419 02/14/22  2200 02/15/22  0324   *    < > 134* 134* 132*   K 4.0   < > 4.3 3.7 4.2   CL 98   < > 101 104 102   CO2 25   < > 26 21* 22*   BUN 4*   < > <3* 5* 8   CREATININE 0.4*   < > 0.3* 0.4* 0.4*   CALCIUM 7.0*   < > 7.5* 6.5* 7.2*   ALBUMIN 2.0*   < > 2.0* 1.8* 2.4*   PROT 5.5*  --   --   --  5.1*   BILITOT 1.3*  --   --   --  1.6*   ALKPHOS 137*  --   --   --  111   ALT 21  --   --   --  18   AST 57*  --   --   --  53*   MG 2.2   < > 1.7 1.7 2.2   PHOS 2.7   < > <1.0* 5.1* 5.2*    < > = values in this interval not displayed.       ABGs:   Recent Labs   Lab 02/15/22  0751   PH 7.287*   PCO2 50.6*   HCO3 24.2   POCSATURATED 90*   BE -2       Significant Imaging:  I have reviewed all pertinent imaging results/findings within the past 24 hours.

## 2022-02-15 NOTE — ASSESSMENT & PLAN NOTE
- Injury noted to buttocks appears to be related to moisture/friction/shearing.  - areas of black eschar noted likely related to ischemic injury due to increasing pressor requirements.   - pt is incontinent of urine/stool and currently using moisture wicking pads.  - Triad dc'd per wound care RN and transitioned to 3M hydrocolloid sacral dressing 2x/week.  - wound with moderate drainage causing difficulty with dressing adhering to site.  - treatment changed back to Triad bid/prn with foam dressing.  - currently on Immerse mattress with heel boots in place.  - strict turn q2h schedule.   - nursing to maintain pressure injury prevention measures.

## 2022-02-15 NOTE — NURSING
All family (daughter,  and son Khoi) Notified of emergency chest tube placement. Patient rinsed back from CRRT per order from Dr. Mora   due to increasing Pressor requirements. Tube feeds also on hold at this time.

## 2022-02-15 NOTE — SUBJECTIVE & OBJECTIVE
Subjective:     HPI:  Orion Ty is a 76 year old female with previous aortic valve replacement, mitral valve replacement, and tricuspid valve repair in 2010 who presents for redo aortic valve replacement. 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 She underwent aortic valve replacement with a mechanical valve on 1/5/22. Wound care is following for assessment of buttocks skin injury.      Hospital Course:   Interval history: Pt remains sedated and intubated. Developed spontaneous hemothorax overnight requiring CT placement and increasing pressor requirements. RN and family at bedside during wound assessment.         Follow-up For: Procedure(s) (LRB):  CREATION, TRACHEOSTOMY (N/A)  EGD, WITH PEG TUBE INSERTION (N/A)  INSERTION-CATHETER-ROSELINE (N/A)    Post-Operative Day: 25 Days Post-Op    Scheduled Meds:   albumin human 5%  25 g Intravenous Once    albuterol-ipratropium  3 mL Nebulization Q6H    hydrocortisone sodium succinate  50 mg Intravenous Q8H    meropenem (MERREM) IVPB  500 mg Intravenous Q12H    midodrine  15 mg Per G Tube Q8H    pantoprazole  40 mg Intravenous Q12H    white petrolatum-mineral oiL   Both Eyes TID     Continuous Infusions:   amiodarone in dextrose 5% 0.5 mg/min (02/15/22 1300)    dextrose 10 % in water (D10W)      fentanyl 25 mcg/hr (02/15/22 1300)    nitric oxide gas      NORepinephrine bitartrate-D5W      propofoL 25 mcg/kg/min (02/15/22 1300)    vasopressin 0.04 Units/min (02/15/22 1300)     PRN Meds:sodium chloride, sodium chloride, sodium chloride 0.9%, acetaminophen, bisacodyL, dextrose 10 % in water (D10W), dextrose 10%, glucagon (human recombinant), haloperidol lactate, heparin (porcine), insulin aspart U-100, LIDOcaine HCL 2%, ondansetron, oxyCODONE, polyethylene glycol, sodium chloride 0.9%, sodium chloride 0.9%, sodium chloride 0.9%    Review of Systems   Skin: Positive  for wound.     Objective:     Vital Signs (Most Recent):  Temp: 97.88 °F (36.6 °C) (02/15/22 1245)  Pulse: 80 (02/15/22 1245)  Resp: 11 (02/15/22 1245)  BP: (!) 124/58 (02/15/22 1100)  SpO2: 99 % (02/15/22 1245) Vital Signs (24h Range):  Temp:  [92.2 °F (33.4 °C)-103 °F (39.4 °C)] 97.88 °F (36.6 °C)  Pulse:  [] 80  Resp:  [6-46] 11  SpO2:  [84 %-100 %] 99 %  BP: ()/(49-62) 124/58  Arterial Line BP: ()/(40-57) 101/46     Weight: 52 kg (114 lb 10.2 oz)  Body mass index is 21.66 kg/m².    Physical Exam  Constitutional:       Appearance: Normal appearance.   Skin:     General: Skin is warm and dry.      Findings: Lesion present.         Laboratory:  All pertinent labs reviewed within the last 24 hours.    Diagnostic Results:  None

## 2022-02-16 PROBLEM — Z51.5 COMFORT MEASURES ONLY STATUS: Status: ACTIVE | Noted: 2022-01-01

## 2022-02-16 NOTE — PROGRESS NOTES
VSS throughout shift. Afebrile. Patient remains on AC/VC+ 80% 10 PEEP; SATs >95% throughout shift. Nitric remains at 5 ppm. Patient 100% paced at 80 bpm. Opens eyes spontaneously. Withdraws to pain.  MAPs >60 with pressor support. Patient on levo at 0.06 mcg/kg/min and vaso at 0.04 units/min. Propofol and fentanyl titrated for comfort. Amio at 0.5 mg/min. Anuric. 3 BMs overnight. Family at bedside at beginning of shift. Plan of care discussed and questions/concerns addressed and answered. Plan to withdraw care this morning.     Skin: no additional breakdown noted. Triad applied to sacrum. Full bath given. Bed plugged in and mattress inflated.

## 2022-02-16 NOTE — NURSING
All of patient's family present at bedside, and patient's  and family ready and requesting to start withdrawal of care process. Dr. West at bedside, and patient's family updated on process of withdrawal of care.Patient's  spoke to . Will continue to monitor patient.

## 2022-02-16 NOTE — PT/OT/SLP DISCHARGE
Physical Therapy Discharge Summary    Name: Orion Ty  MRN: [id number]   Principal Problem: History of mechanical aortic valve replacement     Patient Discharged from acute Physical Therapy on 2022.  Please refer to prior PT noted date on 2022 for functional status.     Assessment:     Patient and family moving towards comfort based measures.    Objective:     GOALS:   Multidisciplinary Problems     Physical Therapy Goals        Problem: Physical Therapy Goal    Goal Priority Disciplines Outcome Goal Variances Interventions   Physical Therapy Goal     PT, PT/OT Ongoing, Progressing     Description: Goals to be met by: 2022     Patient will increase functional independence with mobility by performin. Sit to stand transfer with max A -not met  2. Bed to chair transfer with max A -not met  3. Lower extremity exercise program x30 reps per handout, with independence -not met  4. Recite 3/3 sternal precautions and remain complaint with precautions throughout session with no verbal cues -not met  5. Pt sit EOB x 10 min with SBA -not met  6. Pt to stand for 60 seconds with Max A and VSS -not met                         Reasons for Discontinuation of Therapy Services  Patient is unable to continue work toward goals because of medical or psychosocial complications.      Plan:     Patient Discharged to: remains in ICU.      2022

## 2022-02-16 NOTE — NURSING
Patient's  at bedside. Patient's  at bedside stated that they will transition patient this morning to withdrawal of care, after family and  arrives. Will continue to monitor patient.

## 2022-02-16 NOTE — PT/OT/SLP DISCHARGE
Occupational Therapy  Discontinued Orders    Patient Name:  Orion Ty   MRN:  [id number]  Admit Date: 1/5/2022  Admitting Diagnosis:  History of mechanical aortic valve replacement   Length of Stay: 42  Recent Surgery: Procedure(s) (LRB):  CREATION, TRACHEOSTOMY (N/A)  EGD, WITH PEG TUBE INSERTION (N/A)  INSERTION-CATHETER-ROSELINE (N/A) 26 Days Post-Op    Plan:     Occuapational Therapy orders received and acknowledged. Patient & family choosing palliative care/comfort measures. Orders discontinued by MD team. Please re-consult therapy if appropriate.    Thank you for the consult.  Bonnie Vu, OT  2/16/2022

## 2022-02-16 NOTE — PLAN OF CARE
[facility]  Discharge Final Note    Primary Care Provider: Otis Zimmer MD    Expected Discharge Date: 2022    Final Discharge Note (most recent)     Final Note - 22 1223        Final Note    Assessment Type Final Discharge Note     Anticipated Discharge Disposition                  Important Message from Medicare              Discharged Condition:        Shen Simmons LMSW  Case Management [facility]  Ext: [phone]

## 2022-02-16 NOTE — NURSING
Dr. Haider, Dr. West and CTS team at bedside, to answer all of the family's questions. Family stated how appreciative they are of everyone in the CTS, SICU, SICU nursing staff teams. Family wants to spend time with patient, and will let staff know when they are ready for withdrawal of care. Will continue to monitor patient.

## 2022-02-16 NOTE — PLAN OF CARE
"SICU PLAN OF CARE NOTE    Dx: History of mechanical aortic valve replacement    Goals of Care:  MAP 60-80, comfort    Vital Signs:  BP (!) 124/58 (BP Location: Left arm, Patient Position: Lying)   Pulse 80   Temp 97.16 °F (36.2 °C)   Resp 10   Ht 5' 1" (1.549 m)   Wt 52 kg (114 lb 10.2 oz)   SpO2 99%   Breastfeeding No   BMI 21.66 kg/m²     Cardiac: Permanent pacemaker in place, 100% paced @ 80 bpm    Resp:  SpO2 >88% on current vent settings AC/VC+, 80% FiO2, 10.0 PEEP    Neuro:  pt remains withdrawn during shift. Pt open eyes spontaneously and minimally responsive to following commands with upper extremities. Pt sedated and comfortable with no signs of distress during shift.    Gtts:    Levo @ 0.08  Vaso @ 0.04  Propofol @ 25  Fentanyl @ 75  Amio @ 0.5    Urine Output:  Anuric     Diet:  NPO, TF turned off due to increase in pressors     Labs/Accuchecks:  all labs trended and reviewed with MD.     Skin:  all skin monitored for redness and breakdown during shift. Heels CDI with foams and green heel boots in place. Wound care orders performed to sacrum, pt turned as tolerated, immerse mattress working properly and plugged into wall. All pressure points protected, no new injuries during shift.    Shift Events: See flowsheet for further assessment/details.  Family @ bedside and updated on current condition/plan of care, questions answered, and emotional support provided.   MD updated on current condition, vitals, labs, and gtts.  No new orders received, will continue to monitor.     "

## 2022-02-16 NOTE — HOSPITAL COURSE
Orion Ty is a patient well-known to the cardiothoracic surgery service. She underwent an aortic valve replacement on 1/5/22. The patient's hospital course was complicated by renal failure, respiratory distress, and pneumonia. After extensive discussion with the family, the patient was transitioned to comfort care on the morning on 2/16/22.       Please see full medical record.

## 2022-02-16 NOTE — SUBJECTIVE & OBJECTIVE
Interval History/Significant Events:   SYLVAINEON  Patient comfortable on fentanyl and propofol  Discussions for withdrawal this morning, before 12pm  Family at bedside throughout day yesterday and today    Follow-up For: Procedure(s) (LRB):  CREATION, TRACHEOSTOMY (N/A)  EGD, WITH PEG TUBE INSERTION (N/A)  INSERTION-CATHETER-ROSELINE (N/A)    Post-Operative Day: 20 Days Post-Op    Objective:     Vital Signs (Most Recent):  Temp: 96.3 °F (35.7 °C) (02/16/22 0700)  Pulse: 80 (02/16/22 0830)  Resp: (!) 28 (02/16/22 0830)  BP: (!) 124/58 (02/15/22 1100)  SpO2: 100 % (02/16/22 0830) Vital Signs (24h Range):  Temp:  [96.08 °F (35.6 °C)-101.48 °F (38.6 °C)] 96.3 °F (35.7 °C)  Pulse:  [80] 80  Resp:  [10-34] 28  SpO2:  [94 %-100 %] 100 %  BP: ()/(54-58) 124/58  Arterial Line BP: ()/(40-57) 116/51     Weight: 52 kg (114 lb 10.2 oz)  Body mass index is 21.66 kg/m².      Intake/Output Summary (Last 24 hours) at 2/16/2022 0857  Last data filed at 2/16/2022 0700  Gross per 24 hour   Intake 1401.57 ml   Output 20 ml   Net 1381.57 ml       Physical Exam  Constitutional:       Appearance: She is ill-appearing and toxic-appearing.      Comments: Sedated, ventilated   HENT:      Head: Normocephalic and atraumatic.      Mouth/Throat:      Comments: Oozing from mucosal surfaces at lower lip  Neck:      Comments: Trach  Cardiovascular:      Rate and Rhythm: Normal rate and regular rhythm.      Pulses: Normal pulses.      Comments: Midline sternotomy incision c/d/I  Pacemaker in place.   Pulmonary:      Effort: Pulmonary effort is normal. No respiratory distress.      Comments: Tracheostomy, Ventilated  Abdominal:      General: Abdomen is flat. There is no distension.      Palpations: Abdomen is soft.      Tenderness: There is no abdominal tenderness.      Comments: Peg site c/d/i  Soft abdomen   Musculoskeletal:      Right lower leg: No edema.      Left lower leg: No edema.   Skin:     General: Skin is warm.      Capillary  Refill: Capillary refill takes less than 2 seconds.         Vents:  Vent Mode: A/C (02/16/22 0747)  Ventilator Initiated: Yes (01/18/22 1636)  Set Rate: 28 BPM (02/16/22 0747)  Vt Set: 300 mL (02/16/22 0747)  Pressure Support: 10 cmH20 (02/07/22 0415)  PEEP/CPAP: 10 cmH20 (02/16/22 0747)  Oxygen Concentration (%): 80 (02/16/22 0830)  Peak Airway Pressure: 46 cmH2O (02/16/22 0747)  Plateau Pressure: 35 cmH20 (02/16/22 0747)  Total Ve: 9.11 mL (02/16/22 0747)  Negative Inspiratory Force (cm H2O): 0 (02/16/22 0747)  F/VT Ratio<105 (RSBI): (!) 85.89 (02/16/22 0747)    Lines/Drains/Airways     Central Venous Catheter Line            Permacath 01/21/22 1013 left subclavian 25 days    Percutaneous Central Line Insertion/Assessment - Triple Lumen  02/09/22 1730 right internal jugular 6 days          Drain                 Gastrostomy/Enterostomy 01/21/22 1112 Percutaneous endoscopic gastrostomy (PEG) 25 days         Chest Tube 02/14/22 2300 1 Right Pleural 28 Fr. 1 day          Airway                 Surgical Airway 01/21/22 1043 Shiley Cuffed 25 days          Arterial Line            Arterial Line 02/09/22 1300 Left Radial 6 days          Peripheral Intravenous Line                 Peripheral IV - Single Lumen 02/14/22 0320 20 G Right Hand 2 days                Significant Labs:    CBC/Anemia Profile:  Recent Labs   Lab 02/15/22  0052 02/15/22  0052 02/15/22  0324 02/15/22  0751 02/16/22  0259   WBC 19.23*  --  36.93*  --  22.09*   HGB 6.2*  --  7.1*  --  6.4*   HCT 21.0*   < > 22.4* 22* 20.8*   *  --  81*  --  81*   *  --  100*  --  100*   RDW 23.0*  --  18.8*  --  21.0*    < > = values in this interval not displayed.        Chemistries:  Recent Labs   Lab 02/14/22 2200 02/15/22  0324 02/16/22  0259   * 132* 127*   K 3.7 4.2 4.6    102 97   CO2 21* 22* 18*   BUN 5* 8 21   CREATININE 0.4* 0.4* 0.9   CALCIUM 6.5* 7.2* 7.5*   ALBUMIN 1.8* 2.4* 2.2*   PROT  --  5.1* 4.7*   BILITOT  --  1.6* 2.0*    ALKPHOS  --  111 104   ALT  --  18 18   AST  --  53* 60*   MG 1.7 2.2 2.2   PHOS 5.1* 5.2* 5.8*       ABGs:   Recent Labs   Lab 02/15/22  0751   PH 7.287*   PCO2 50.6*   HCO3 24.2   POCSATURATED 90*   BE -2       Significant Imaging:  I have reviewed all pertinent imaging results/findings within the past 24 hours.

## 2022-02-16 NOTE — NURSING
Patient with Dr. West at bedside, patient, patient time of death 1142. Family at bedside, and all questions and concerns answered,  Laura notified.

## 2022-02-16 NOTE — DISCHARGE SUMMARY
[facility] - [facility] Int[facility]  Critical Care - Surgery  Discharge Summary      Patient Name: Orion Ty  MRN: [id number]  Admission Date: 1/5/2022  Hospital Length of Stay: 42 days  Discharge Date and Time:  02/16/2022 11:55 AM  Attending Physician: April Haider MD   Discharging Provider: Eryn Schwab MD  Primary Care Provider: Otis Zimmer MD    HPI:        Procedure(s) (LRB):  CREATION, TRACHEOSTOMY (N/A)  EGD, WITH PEG TUBE INSERTION (N/A)  INSERTION-CATHETER-ROSELINE (N/A)    Indwelling Lines/Drains at Time of Discharge:   Lines/Drains/Airways     Central Venous Catheter Line            Permacath 01/21/22 1013 left subclavian 26 days    Percutaneous Central Line Insertion/Assessment - Triple Lumen  02/09/22 1730 right internal jugular 6 days          Drain                 Gastrostomy/Enterostomy 01/21/22 1112 Percutaneous endoscopic gastrostomy (PEG) 26 days         Chest Tube 02/14/22 2300 1 Right Pleural 28 Fr. 1 day          Airway                 Surgical Airway 01/21/22 1043 Shiley Cuffed 26 days          Arterial Line            Arterial Line 02/09/22 1300 Left Radial 6 days                Hospital Course:  Orion Ty is a patient well-known to the cardiothoracic surgery service. She underwent an aortic valve replacement on 1/5/22. The patient's hospital course was complicated by renal failure, respiratory distress, and pneumonia. After extensive discussion with the family, the patient was transitioned to comfort care on the morning on 2/16/22.       Please see full medical record.         Goals of Care Treatment Preferences:  Code Status: DNR      Consults (From admission, onward)        Status Ordering Provider     Inpatient consult to Infectious Diseases  Once        Provider:  (Not yet assigned)    Completed ERYN SCHWAB     Inpatient consult to Midline team  Once        Provider:  (Not yet assigned)    Completed APRIL HAIDER     Inpatient consult to General  Surgery  Once        Provider:  (Not yet assigned)    Completed BELEN CANO     Inpatient consult to Registered Dietitian/Nutritionist  Once        Provider:  (Not yet assigned)    Completed RACHANA HOOD     Inpatient consult to Registered Dietitian/Nutritionist  Once        Provider:  (Not yet assigned)    Completed APRIL WEBER     Inpatient consult to Midline team  Once        Provider:  (Not yet assigned)    Completed BELEN CANO     Inpatient consult to Nephrology  Once        Provider:  (Not yet assigned)    Completed BELEN CANO     Consult Case Management/Social Work  Once        Provider:  (Not yet assigned)    Acknowledged JAY SOLIZ     Consult to Endocrinology  Once        Provider:  (Not yet assigned)    Completed JAY SOLIZ     Inpatient consult to Registered Dietitian/Nutritionist  Once        Provider:  (Not yet assigned)    Completed DAMARI DAVIS          Significant Labs:  All pertinent labs within the past 24 hours have been reviewed.    Significant Imaging:  I have reviewed all pertinent imaging results/findings within the past 24 hours.    Pending Diagnostic Studies:     Procedure Component Value Units Date/Time    APTT [[id number]] Collected: 01/05/22 1440    Order Status: Sent Lab Status: In process Updated: 01/05/22 1441    Specimen: Blood     CBC auto differential [[id number]] Collected: 01/05/22 1440    Order Status: Sent Lab Status: In process Updated: 01/05/22 1441    Specimen: Blood     COVID-19 Routine Screening [[id number]] Collected: 01/08/22 0957    Order Status: Sent Lab Status: In process Updated: 01/08/22 1027    Specimen: Nasopharyngeal     Comprehensive Metabolic Panel [[id number]] Collected: 01/05/22 1440    Order Status: Sent Lab Status: In process Updated: 01/05/22 1441    Specimen: Blood     EKG 12-lead [[id number]]     Order Status: Sent Lab Status: No result     Fibrinogen [[id number]] Collected: 01/05/22 1440    Order Status: Sent Lab  Status: In process Updated: 01/05/22 1441    Specimen: Blood     Magnesium [[id number]] Collected: 01/05/22 1440    Order Status: Sent Lab Status: In process Updated: 01/05/22 1441    Specimen: Blood     Phosphorus [[id number]] Collected: 01/15/22 2149    Order Status: Sent Lab Status: In process Updated: 01/15/22 2149    Specimen: Blood     Phosphorus [[id number]] Collected: 01/05/22 1440    Order Status: Sent Lab Status: In process Updated: 01/05/22 1441    Specimen: Blood     Potassium [[id number]] Collected: 01/15/22 0826    Order Status: Sent Lab Status: In process Updated: 01/15/22 0826    Specimen: Blood     Potassium [[id number]] Collected: 01/09/22 2133    Order Status: Sent Lab Status: In process Updated: 01/09/22 2133    Specimen: Blood     Protime-INR [[id number]] Collected: 01/27/22 0529    Order Status: Sent Lab Status: In process Updated: 01/27/22 0530    Specimen: Blood     Protime-INR [[id number]] Collected: 01/05/22 1440    Order Status: Sent Lab Status: In process Updated: 01/05/22 1441    Specimen: Blood     Vancomycin, Random [[id number]] Collected: 02/06/22 0251    Order Status: Sent Lab Status: In process Updated: 02/06/22 0252    Specimen: Blood     X-Ray Chest 1 View [[id number]]     Order Status: Sent Lab Status: No result         Final Active Diagnoses:    Diagnosis Date Noted POA    PRINCIPAL PROBLEM:  History of mechanical aortic valve replacement [Z95.2] 02/24/2015 Not Applicable    Comfort measures only status [Z51.5] 02/16/2022 Not Applicable    Ventilator associated pneumonia [J95.851] 02/09/2022 No    Dermatitis associated with moisture [L30.8] 01/20/2022 No    Acute hypoxemic respiratory failure [J96.01]  No    ATN (acute tubular necrosis) [N17.0] 01/10/2022 Unknown    JO (acute kidney injury) [N17.9] 01/08/2022 Unknown    Volume overload [E87.70] 12/09/2019 Unknown    Type 2 diabetes mellitus with microalbuminuria, without long-term current use of insulin [E11.29, R80.9]  2015 Yes     Chronic    Chronic atrial fibrillation [I48.20] 2013 Yes     Chronic      Problems Resolved During this Admission:       Discharged Condition:     Disposition:     Follow Up:    Patient Instructions:      Ambulatory referral/consult to Outpatient Case Management   Referral Priority: Routine Referral Type: Consultation   Referral Reason: Specialty Services Required   Number of Visits Requested: 1     Medications:  None    Maikel Schwab MD  Critical Care - Surgery  [facility] - Surgical Intensive Care

## 2022-02-16 NOTE — SIGNIFICANT EVENT
Death Note                                                                        Orion Ty is a patient well-known to the cardiothoracic surgery service. She underwent an aortic valve replacement on 1/5/22. The patient's hospital course was complicated by renal failure, respiratory distress, and pneumonia. After extensive discussion with the family, the patient was transitioned to comfort care on the morning on 2/16/22.     Called to bedside by patient's nurse at 1140. Nursing supervisor notified. Family at bedside.    During my exam the patient does not respond to verbal or tactile stimuli. They do not have intact pupillary, corneal, oculovestibular, or gag reflexes. Their pupils are fixed and dilated. There are no heart or breath sounds on auscultation. There are no visible respirations. There are no palpable carotid, femoral or peripheral pulses. This exam is consistent with death.     Time of my exam: 1142      Maikel Schwab MD  General Surgery, PGY-2

## 2022-02-16 NOTE — PHYSICIAN QUERY
PT Name: Orion Ty  MR #: [id number]     DOCUMENTATION CLARIFICATION      CDS/: Ayah Mason RN, CCDS              Contact information: [email or web address]  This form is a permanent document in the medical record.    Query Date: February 16, 2022    By submitting this query, we are merely seeking further clarification of documentation to reflect the severity of illness of your patient. Please utilize your independent clinical judgment when addressing the question(s) below.     The Medical Record contains the following:   Indicators   Supporting Clinical Findings Location in Medical Record   X Documentation of Sepsis, Septic Shock Concern for Sepsis, Doing worse with fever, worsening respiratory status     Septic Shock   2/4 cc note      2/10-2/13 cc notes   X Blood Culture 2/3, 2/18 blood culture- NGTD   2/3, 2/10 lab   X Respiratory Culture 2/4 Resp culture- Pseudomonas Aeruginosa 2/4 lab    Urine Culture      Other Culture     X Acute/Chronic Illness ARDS 2/2 pseudomonas pna    2/10- 2/13 cc notes   X Medication/Treatment Initiated broad spec abx on 2/3/22 in setting of persistent fevers and rising WBC w/ hypotension    requiring 0.04 of levo in addition to 0.04 of vaso in order to maintain MAPs     ID reconsulted, changing to merrem and vanc  2/4 cc note        2/4 nurse note      2/10 cc note     X Other Wbc- 15-->19-->22-->24-->17-->20-->16  10-->36-->22     T 101.7, 87/52    Febrile this morning with an increasing WBC 17 - 36.  T-103  Increasing pressor requirements.  2/4, 2/8-2/16 2/4 nurse poc    2/15 cc note      Provider, please further specify the Septic Shock  diagnosis:   [ X  ] Due to Pseudomonas Pneumonia   [   ] Due to other (please specify): __________________________________   [   ] Other specification (please specify): ____________________   [   ] Clinically Undetermined         Please document in your progress notes daily for the duration of treatment until resolved, and include  in your discharge summary.  Form No. [id number]

## 2022-02-16 NOTE — ASSESSMENT & PLAN NOTE
Orion Ty is a 76 y.o. female who presents to the SICU s/p redo aortic valve replacement with mechanical valve on 1/5/22.      Neuro/Psych:   -- Sedation: sedated PRN fentanyl   -- Pain: fent PRN  -- Comfort care measures to be fully initiated today     Cards:    -- Pressors: intermittent levo, vaso. Labile blood pressure, fluctuations in pressor requirements over past few days. Seems to be worsening over last night.   -- Wean vasopressors as tolerated  -- Goal MAP: 60-80   -- PO amio TID  -- statin  -- metoprolol 25 BID      Pulm:   -- Goal O2 sat > 90%  -- reintubated 1/12 then 1/18. S/p trach 1/21  -- intubated, on rate with improving ventilation  -- D/c nimbex on 2/12  -- ABGs q4hrs & PRN  -- 2 mediastinal removed 1/9 L Pleural CT removed 1/8  -- Chest tube placed on right side for pneumothorax, hemothorax overnight 2/14. Blood clot evacuated.   -- Keep on vent, stable      Renal:  -- CRRT   -- nephro following  -- Permcath 1/21  -- Removed R IJ trialysis 1/23  -- No further dialysis indicated in setting of initiation of comfort care today         FEN / GI:   -- Replace lytes as needed  -- s/p PEG 1/21       ID:   -- WBC elevated in setting of likely PNA  -- Pseudomonas in resp cx; sensitive to zosyn  -- Antibiotics: zosyn  -- Initiated broad spec abx on 2/3; narrowed 2/6, no improvement in clinical picture with broad spec abx         Heme/Onc:   -- Hgb stable  -- Daily CBC  -- Transfused products: 1U RBC on 1/5/22. 1u RBC and 1u FFP on 1/9/21. 2U RBC on 1/12. 4 FFP on 1/13. 1 FFP on 1/17, 2u pRBC 1/27, 1u pRBC 2/9  -- Anticoagulation: coumadin      Endo:   -- Gluc goal 140-180  -- Insulin per endocrinology      PPx:   Feeding:   Analgesia/Sedation: nimbex, propofol, fentanyl  Thromboembolic prevention: Coumadin, ASA  HOB >30: yes  Stress Ulcer ppx: protonix BID  Glucose control: Critical care goal 140-180 g/dl, ISS    Lines/Drains/Airway: Art line, Trach, Permcath, PEG      Dispo/Code  Status/Palliative:   -- SICU / Full Code

## 2022-02-17 ENCOUNTER — ANTI-COAG VISIT (OUTPATIENT)
Dept: CARDIOLOGY | Facility: CLINIC | Age: 77
End: 2022-02-17
Payer: MEDICARE

## 2022-02-17 DIAGNOSIS — Z95.2 S/P MVR (MITRAL VALVE REPLACEMENT): ICD-10-CM

## 2022-02-17 DIAGNOSIS — Z79.01 LONG TERM CURRENT USE OF ANTICOAGULANT: ICD-10-CM

## 2022-02-17 DIAGNOSIS — I48.20 CHRONIC ATRIAL FIBRILLATION: Primary | ICD-10-CM

## 2022-02-17 DIAGNOSIS — Z95.2 HISTORY OF MECHANICAL AORTIC VALVE REPLACEMENT: ICD-10-CM

## 2022-02-18 LAB
BLD PROD TYP BPU: NORMAL
BLD PROD TYP BPU: NORMAL
BLOOD UNIT EXPIRATION DATE: NORMAL
BLOOD UNIT EXPIRATION DATE: NORMAL
BLOOD UNIT TYPE CODE: 6200
BLOOD UNIT TYPE CODE: 6200
BLOOD UNIT TYPE: NORMAL
BLOOD UNIT TYPE: NORMAL
CODING SYSTEM: NORMAL
CODING SYSTEM: NORMAL
DISPENSE STATUS: NORMAL
DISPENSE STATUS: NORMAL
NUM UNITS TRANS PACKED RBC: NORMAL
TRANS ERYTHROCYTES VOL PATIENT: NORMAL ML

## 2022-02-20 ENCOUNTER — CLINICAL SUPPORT (OUTPATIENT)
Dept: CARDIOLOGY | Facility: HOSPITAL | Age: 77
End: 2022-02-20
Payer: MEDICARE

## 2022-02-20 DIAGNOSIS — Z95.0 PRESENCE OF CARDIAC PACEMAKER: ICD-10-CM

## 2022-02-20 PROCEDURE — 93296 REM INTERROG EVL PM/IDS: CPT | Performed by: INTERNAL MEDICINE

## 2022-06-21 RX ORDER — FUROSEMIDE 40 MG/1
TABLET ORAL
Qty: 180 TABLET | Refills: 3 | OUTPATIENT
Start: 2022-06-21

## 2023-06-03 NOTE — PROGRESS NOTES
[facility] - [facility] Intensive Care  Critical Care - Surgery  Progress Note    Patient Name: Orion Ty  MRN: [id number]  Admission Date: 1/5/2022  Hospital Length of Stay: 42 days  Code Status: DNR  Attending Provider: Dennis Haider MD  Primary Care Provider: Otis Zimmer MD   Principal Problem: History of mechanical aortic valve replacement    Subjective:     Hospital/ICU Course:  No notes on file    Interval History/Significant Events:   NAEON  Patient comfortable on fentanyl and propofol  Discussions for withdrawal this morning, before 12pm  Family at bedside throughout day yesterday and today    Follow-up For: Procedure(s) (LRB):  CREATION, TRACHEOSTOMY (N/A)  EGD, WITH PEG TUBE INSERTION (N/A)  INSERTION-CATHETER-ROSELINE (N/A)    Post-Operative Day: 20 Days Post-Op    Objective:     Vital Signs (Most Recent):  Temp: 96.3 °F (35.7 °C) (02/16/22 0700)  Pulse: 80 (02/16/22 0830)  Resp: (!) 28 (02/16/22 0830)  BP: (!) 124/58 (02/15/22 1100)  SpO2: 100 % (02/16/22 0830) Vital Signs (24h Range):  Temp:  [96.08 °F (35.6 °C)-101.48 °F (38.6 °C)] 96.3 °F (35.7 °C)  Pulse:  [80] 80  Resp:  [10-34] 28  SpO2:  [94 %-100 %] 100 %  BP: ()/(54-58) 124/58  Arterial Line BP: ()/(40-57) 116/51     Weight: 52 kg (114 lb 10.2 oz)  Body mass index is 21.66 kg/m².      Intake/Output Summary (Last 24 hours) at 2/16/2022 0857  Last data filed at 2/16/2022 0700  Gross per 24 hour   Intake 1401.57 ml   Output 20 ml   Net 1381.57 ml       Physical Exam  Constitutional:       Appearance: She is ill-appearing and toxic-appearing.      Comments: Sedated, ventilated   HENT:      Head: Normocephalic and atraumatic.      Mouth/Throat:      Comments: Oozing from mucosal surfaces at lower lip  Neck:      Comments: Trach  Cardiovascular:      Rate and Rhythm: Normal rate and regular rhythm.      Pulses: Normal pulses.      Comments: Midline sternotomy incision c/d/I  Pacemaker in place.   Pulmonary:      Effort: Pulmonary  effort is normal. No respiratory distress.      Comments: Tracheostomy, Ventilated  Abdominal:      General: Abdomen is flat. There is no distension.      Palpations: Abdomen is soft.      Tenderness: There is no abdominal tenderness.      Comments: Peg site c/d/i  Soft abdomen   Musculoskeletal:      Right lower leg: No edema.      Left lower leg: No edema.   Skin:     General: Skin is warm.      Capillary Refill: Capillary refill takes less than 2 seconds.         Vents:  Vent Mode: A/C (02/16/22 0747)  Ventilator Initiated: Yes (01/18/22 1636)  Set Rate: 28 BPM (02/16/22 0747)  Vt Set: 300 mL (02/16/22 0747)  Pressure Support: 10 cmH20 (02/07/22 0415)  PEEP/CPAP: 10 cmH20 (02/16/22 0747)  Oxygen Concentration (%): 80 (02/16/22 0830)  Peak Airway Pressure: 46 cmH2O (02/16/22 0747)  Plateau Pressure: 35 cmH20 (02/16/22 0747)  Total Ve: 9.11 mL (02/16/22 0747)  Negative Inspiratory Force (cm H2O): 0 (02/16/22 0747)  F/VT Ratio<105 (RSBI): (!) 85.89 (02/16/22 0747)    Lines/Drains/Airways     Central Venous Catheter Line            Permacath 01/21/22 1013 left subclavian 25 days    Percutaneous Central Line Insertion/Assessment - Triple Lumen  02/09/22 1730 right internal jugular 6 days          Drain                 Gastrostomy/Enterostomy 01/21/22 1112 Percutaneous endoscopic gastrostomy (PEG) 25 days         Chest Tube 02/14/22 2300 1 Right Pleural 28 Fr. 1 day          Airway                 Surgical Airway 01/21/22 1043 Shiley Cuffed 25 days          Arterial Line            Arterial Line 02/09/22 1300 Left Radial 6 days          Peripheral Intravenous Line                 Peripheral IV - Single Lumen 02/14/22 0320 20 G Right Hand 2 days                Significant Labs:    CBC/Anemia Profile:  Recent Labs   Lab 02/15/22  0052 02/15/22  0052 02/15/22  0324 02/15/22  0751 02/16/22  0259   WBC 19.23*  --  36.93*  --  22.09*   HGB 6.2*  --  7.1*  --  6.4*   HCT 21.0*   < > 22.4* 22* 20.8*   *  --  81*  --   81*   *  --  100*  --  100*   RDW 23.0*  --  18.8*  --  21.0*    < > = values in this interval not displayed.        Chemistries:  Recent Labs   Lab 02/14/22  2200 02/15/22  0324 02/16/22  0259   * 132* 127*   K 3.7 4.2 4.6    102 97   CO2 21* 22* 18*   BUN 5* 8 21   CREATININE 0.4* 0.4* 0.9   CALCIUM 6.5* 7.2* 7.5*   ALBUMIN 1.8* 2.4* 2.2*   PROT  --  5.1* 4.7*   BILITOT  --  1.6* 2.0*   ALKPHOS  --  111 104   ALT  --  18 18   AST  --  53* 60*   MG 1.7 2.2 2.2   PHOS 5.1* 5.2* 5.8*       ABGs:   Recent Labs   Lab 02/15/22  0751   PH 7.287*   PCO2 50.6*   HCO3 24.2   POCSATURATED 90*   BE -2       Significant Imaging:  I have reviewed all pertinent imaging results/findings within the past 24 hours.    Assessment/Plan:     * History of mechanical aortic valve replacement  Orion Ty is a 76 y.o. female who presents to the SICU s/p redo aortic valve replacement with mechanical valve on 1/5/22.      Neuro/Psych:   -- Sedation: sedated PRN fentanyl   -- Pain: fent PRN  -- Comfort care measures to be fully initiated today     Cards:    -- Pressors: intermittent levo, vaso. Labile blood pressure, fluctuations in pressor requirements over past few days. Seems to be worsening over last night.   -- Wean vasopressors as tolerated  -- Goal MAP: 60-80   -- PO amio TID  -- statin  -- metoprolol 25 BID      Pulm:   -- Goal O2 sat > 90%  -- reintubated 1/12 then 1/18. S/p trach 1/21  -- intubated, on rate with improving ventilation  -- D/c nimbex on 2/12  -- ABGs q4hrs & PRN  -- 2 mediastinal removed 1/9 L Pleural CT removed 1/8  -- Chest tube placed on right side for pneumothorax, hemothorax overnight 2/14. Blood clot evacuated.   -- Keep on vent, stable      Renal:  -- CRRT   -- nephro following  -- Permcath 1/21  -- Removed R IJ trialysis 1/23  -- No further dialysis indicated in setting of initiation of comfort care today         FEN / GI:   -- Replace lytes as needed  -- s/p PEG 1/21       ID:    -- WBC elevated in setting of likely PNA  -- Pseudomonas in resp cx; sensitive to zosyn  -- Antibiotics: zosyn  -- Initiated broad spec abx on 2/3; narrowed 2/6, no improvement in clinical picture with broad spec abx         Heme/Onc:   -- Hgb stable  -- Daily CBC  -- Transfused products: 1U RBC on 1/5/22. 1u RBC and 1u FFP on 1/9/21. 2U RBC on 1/12. 4 FFP on 1/13. 1 FFP on 1/17, 2u pRBC 1/27, 1u pRBC 2/9  -- Anticoagulation: coumadin      Endo:   -- Gluc goal 140-180  -- Insulin per endocrinology      PPx:   Feeding:   Analgesia/Sedation: nimbex, propofol, fentanyl  Thromboembolic prevention: Coumadin, ASA  HOB >30: yes  Stress Ulcer ppx: protonix BID  Glucose control: Critical care goal 140-180 g/dl, ISS    Lines/Drains/Airway: Art line, Trach, Permcath, PEG      Dispo/Code Status/Palliative:   -- SICU / Full Code             Sarah Shafer MD  Critical Care - Surgery  [facility] - [facility]     show

## 2024-02-22 NOTE — PROGRESS NOTES
Date of Service: 2/22/2024    Patient: Yolanda Reed 61 y.o. female     MRN: [id number]     Physician/s: Zenobia Ortiz MD    Pre-operative Diagnosis: right and left Sacroiliac dysfunction    Post-operative Diagnosis: right and left Sacroiliac dysfunction    Procedure: right and left Sacroiliac joint injection    Description of procedure:    The risks, benefits, and alternatives of the procedure were reviewed and discussed with the patient.  Written informed consent was freely obtained. A pre-procedural time-out was conducted by the physician verifying patient’s identity, procedure to be performed, procedure site and side, and allergy verification. Appropriate equipment was determined to be in place for the procedure.         In the fluoroscopy suite the patient was placed in a prone position and the skin was prepped and draped in the usual sterile fashion. The fluoroscope was placed over the right and left sacroiliac joint at the appropriate angle for joint entrance, and the target for injection was marked. A 27g needle was placed into each of the marked level(s), and approx 2cc of 1% Lidocaine was injected subcutaneously into the epidermal and dermal layers. The needle was removed. On the right and left side, a 25g 3.5 inch needle was then placed down to the level of bone at the inferior/distal aspect of the joint. The needle was then retracted and carefully advanced into the joint capsule. The needle tip was then verified by a lateral view. In the AP view, contrast dye was used to highlight the joint line while the fluoroscope was running live. Following negative aspiration, approx 1mL of 1% lidocaine with 0.5mL of 10 mg/mL dexamethasone was then injected.  The needle was removed intact after being restyleted. The patient's low back was covered with a 4x4 gauze, the area was cleansed with sterile normal saline, and a dressing was applied. There were no complications noted.     Follow-up as scheduled    Pain score  [facility]  Nephrology  Progress Note     Patient Name: Orion Ty  MRN: [id number]  Admission Date: 1/5/2022  Hospital Length of Stay: 16 days  Attending Provider: Dennis Haider MD   Primary Care Physician: Otis Zimmer MD  Principal Problem: S/P aortic valve replacement    Subjective:     HPI: Orion Ty is our 76 y.o. female with previous aortic valve replacement, mitral valve replacement, and tricuspid valve repair in 2010 who presented on 1/5 for redo aortic valve replacement. Nephrology consulted on 1/8 for anuric JO. Patient is alert but tired, son at bedside. Stated she was tired prior to admission. During the hospital stay Cr increase from 1 to 1.5. In OR she received  2.5L crystalloid, 3U RBC, 2U FFP, 2 platelets, and 800 cell saver. she is +5 L since admit. UOP decrease overnight, per nurse she didn't urinate at all. She received diuril 500 mg twice, lasix 20 mg *2 on 1/7. This morning she urinate 225 immediately after placing the tejeda's cath. She received again lasix 100 mg this morning. Upor evaluation she was alert but repeatedly saying she was tired, denied any pain. Denied any previous hx of kidney disease.       Interval History: Seen at the bedside no event overnight       Review of patient's allergies indicates:  No Known Allergies   Current Facility-Administered Medications   Medication Frequency    0.9%  NaCl infusion (CRRT USE ONLY) Continuous    0.9%  NaCl infusion (for blood administration) Q24H PRN    0.9%  NaCl infusion Continuous    acetaminophen oral solution 650 mg Q8H    albuterol-ipratropium 2.5 mg-0.5 mg/3 mL nebulizer solution 3 mL Q6H    aspirin suppository 300 mg Once PRN    atorvastatin tablet 40 mg Daily    bisacodyL suppository 10 mg Daily PRN    calcium chloride 100 mg/mL (10 %) injection     calcium gluconate 100 mg/mL (10%) injection     dexmedetomidine (PRECEDEX) 400mcg/100mL 0.9% NaCL infusion Continuous    dextrose 10 %  infusion Continuous PRN    dextrose 50% injection 12.5 g PRN    fentaNYL 50 mcg/mL injection 25 mcg Q2H PRN    fluconazole 40 mg/ml suspension 400 mg Daily    glucagon (human recombinant) injection 1 mg PRN    heparin 25,000 units in dextrose 5% 250 mL (100 units/mL) infusion (heparin infusion - NO NOMOGRAM) Continuous    insulin aspart U-100 pen 0-5 Units Q4H PRN    insulin aspart U-100 pen 4 Units Q24H    insulin aspart U-100 pen 4 Units Q24H    insulin aspart U-100 pen 4 Units Q24H    insulin aspart U-100 pen 4 Units Q24H    insulin aspart U-100 pen 4 Units Q24H    insulin aspart U-100 pen 4 Units Q24H    LIDOcaine 5 % patch 1 patch Q24H    LIDOcaine HCL 2% jelly PRN    magnesium sulfate 2g in water 50mL IVPB (premix) PRN    melatonin tablet 9 mg Nightly PRN    midodrine tablet 10 mg Q12H    NORepinephrine 4 mg in dextrose 5% 250 mL infusion (premix) (titrating) Continuous    NORepinephrine bitartrate-D5W 4 mg/250 mL (16 mcg/mL) infusion Soln     ondansetron injection 4 mg Q12H PRN    oxyCODONE 5 mg/5 mL solution 5 mg Q4H PRN    pantoprazole injection 40 mg Daily    polyethylene glycol packet 17 g Daily    potassium, sodium phosphates 280-160-250 mg packet 2 packet TID PRN    sodium chloride 0.9% bolus 250 mL PRN    sodium chloride 0.9% flush 10 mL PRN    vasopressin (PITRESSIN) 0.2 Units/mL in dextrose 5 % 100 mL infusion Continuous    warfarin (COUMADIN) tablet 2 mg Daily     Facility-Administered Medications Ordered in Other Encounters   Medication Frequency    0.9%  NaCl infusion Continuous       Objective:     Vital Signs (Most Recent):  Temp: 98.8 °F (37.1 °C) (01/21/22 0700)  Pulse: 79 (01/21/22 1213)  Resp: (!) 30 (01/21/22 1213)  BP: (!) 103/53 (01/21/22 0900)  SpO2: 100 % (01/21/22 1213)  O2 Device (Oxygen Therapy): ventilator (01/21/22 1211) Vital Signs (24h Range):  Temp:  [97.5 °F (36.4 °C)-98.8 °F (37.1 °C)] 98.8 °F (37.1 °C)  Pulse:  [79-82] 79  Resp:  [16-38] 30  SpO2:  prior to injection: 5/10 on NRS  Pain score immediately after injection: 0/10 on NRS  Greater than 50% pain relief was achieved immediately after the injection.      Zenobia Ortiz MD  Interventional Pain Management  Physical Medicine and Rehabilitation  [facility]  2/22/2024        CPT  sacroiliac joint with fluoroscopy 90624 - 15      [92 %-100 %] 100 %  BP: ()/(50-68) 103/53  Arterial Line BP: ()/(43-65) 111/53   Weight: 44.8 kg (98 lb 12.3 oz) (01/20/22 1300)  Body mass index is 18.66 kg/m².  Body surface area is 1.39 meters squared.      Intake/Output Summary (Last 24 hours) at 1/21/2022 1356  Last data filed at 1/21/2022 1142  Gross per 24 hour   Intake 3172.74 ml   Output 2860 ml   Net 312.74 ml     I/O last 3 completed shifts:  In: 3666.7 [I.V.:2039.2; NG/GT:1400; IV Piggyback:227.6]  Out: 2860 [Other:2860]  Net IO Since Admission: 3,410.6 mL [01/21/22 1356]     Physical Exam  Constitutional:       Appearance: She is well-developed. She is ill-appearing.   Cardiovascular:      Rate and Rhythm: Normal rate and regular rhythm.      Heart sounds: Normal heart sounds.   Pulmonary:      Comments: Intubated mechanically ventilated   Abdominal:      General: Abdomen is flat.      Palpations: Abdomen is soft.   Musculoskeletal:         General: Normal range of motion.   Skin:     General: Skin is warm and dry.      Comments: Sternal Incision CDI   Neurological:      Comments: On sedation         Recent Labs   Lab 01/19/22 0314 01/19/22  0331 01/20/22  0316 01/20/22  0429 01/21/22  0317 01/21/22  0355   WBC 10.36  --  12.43  --  10.61  --    HGB 8.1*  --  8.2*  --  8.0*  --    HCT 26.5*   < > 26.5* 25* 25.5* 25*   PLT 71*  --  115*  --  102*  --    MONO 7.6  0.8   < > 8.1  1.0  --  6.0  0.6  --     < > = values in this interval not displayed.      Recent Labs   Lab 01/19/22  0314 01/19/22  0805 01/20/22  0316 01/20/22  0822 01/20/22  2214 01/21/22  0317 01/21/22  0825      < > 139  --  139 136  --    K 4.3   < > 3.9   < > 4.2  4.2 4.3 5.5*      < > 104  --  106 104  --    CO2 23   < > 21*  --  20* 22*  --    BUN 10   < > 35*  --  34* 12  --    CREATININE 0.7   < > 1.8*  --  1.7* 0.8  --    CALCIUM 8.8   < > 8.0*  --  8.1* 8.6*  --    PROT 6.3  --  6.1  --   --  6.5  --    BILITOT 1.3*  --  1.0  --   --  1.0  --     ALKPHOS 104  --  111  --   --  104  --    ALT 16  --  14  --   --  24  --    AST 43*  --  44*  --   --  50*  --     < > = values in this interval not displayed.      Recent Labs     01/19/22  0331 01/20/22  0429 01/21/22  0355   PH 7.439 7.382 7.452*   PCO2 37.0 39.9 36.7   PO2 131* 115* 75*   HCO3 25.1 23.7* 25.7   POCSATURATED 99 98 96   BE 1 -1 2       Assessment/Plan:       S/P aortic valve replacement    Chronic atrial fibrillation    Type 2 diabetes mellitus with microalbuminuria, without long-term current use of insulin    Volume overload    JO (acute kidney injury)    ATN (acute tubular necrosis)    Acute hypoxemic respiratory failure    Dermatitis associated with moisture     JO  acute kidney injury  Orion Ty is our 76 y.o. female with previous aortic valve replacement, mitral valve replacement, and tricuspid valve repair in 2010 who presented on 1/5 for redo aortic valve replacement. Nephrology consulted on 1/8 for anuric JO. During the hospital stay Cr increase from 1 to 1.5. In OR she received  2.5L crystalloid, 3U RBC, 2U FFP, 2 platelets, and 800 cell saver. she is +5 L since admit. UOP decrease ,She received diuril 500 mg twice, lasix 20 mg *2 on 1/7.  With no adequate response to diuretics     Plan   oliguric ischemic ATN likely secondary to severe intraoperative hypotension requiring vasopressor on 01/05/2022  Failed high-dose diuretics and renal replacement therapy started on 01/09/2022 due to volume overload  Patient clinically not overloaded  Labs were today reviewed stable  Will plan for short session of SLED tonight 8 hrs       * S/P aortic valve replacement  -with severe intraoperative hypotension   -per primary     ATN (acute tubular necrosis)  -see jo      Thank you for your consult. I will follow-up with patient. Please contact us if you have any additional questions.    Rose Mary Walsh MD  Nephrology  [facility]    ATTENDING PHYSICIAN ATTESTATION  I have  personally verified the history and examined the patient. I thoroughly reviewed the demographic, clinical, laboratorial and imaging information available in medical records. I agree with the assessment and recommendations provided by the subspecialty resident who was under my supervision.

## 2024-04-21 NOTE — PROGRESS NOTES
[facility]  Cardiology  Progress Note    Patient Name: Orion Ty  MRN: [id number]  Admission Date: 2/1/2020  Hospital Length of Stay: 2 days  Code Status: Full Code   Attending Physician: Dennis Pearson MD   Primary Care Physician: Otis Zimmer MD  Expected Discharge Date:   Principal Problem:Influenza A    Subjective:     Hospital Course:   2/1/20 adm with influenza and MARTÍNEZ.  2/2/20: echo with normal LV fxn and normally functioning AVR/MVR (?pros AS)    Interval Hx: no cp, complains of SOB/cough.   at bedside aids with interpretation.  Asking when she will get better, I tried to explain the nature of flu, and that she may be wiped out for a bit.    Tele: AF 80-90s, occ  (personally reviewed and interpreted)      Past Medical History:   Diagnosis Date    Closed displaced fracture of distal phalanx of lesser toe 10/20/2015    Diabetes mellitus     Diabetes mellitus, type 2     Hypertension     Osteopenia     Pacemaker     Rheumatic heart disease        Past Surgical History:   Procedure Laterality Date    CARDIAC PACEMAKER PLACEMENT      CARDIAC VALVE REPLACEMENT      CARDIAC VALVE SURGERY      thryoid s         Review of patient's allergies indicates:   Allergen Reactions    Aspirin Other (See Comments)       No current facility-administered medications on file prior to encounter.      Current Outpatient Medications on File Prior to Encounter   Medication Sig    losartan (COZAAR) 50 MG tablet Take 1 tablet (50 mg total) by mouth once daily.    metoprolol tartrate (LOPRESSOR) 25 MG tablet TAKE 1 TABLET BY MOUTH TWICE DAILY    multivitamin-Ca-iron-minerals 27-0.4 mg Tab     omega-3 acid ethyl esters (LOVAZA) 1 gram capsule TK 2 CS PO BID    pravastatin (PRAVACHOL) 20 MG tablet Take 1 tablet (20 mg total) by mouth once daily.    warfarin (COUMADIN) 4 MG tablet TAKE 1 TABLET BY MOUTH ON MONDAY AND FRIDAY AND 1/2 TABLET BY MOUTH ON ALL OTHER DAYS    ACCU-CHEK  GATO CONTROL SOLN Soln     adhesive bandage (BANDAGES/PLASTIC TOP)     B-COMPLEX WITH VITAMIN C ORAL     blood sugar diagnostic Strp Use to test blood sugar BID    blood-glucose meter Misc Accu-chek Gato Glucose Meter, Use to test blood sugar once daily.    FLUZONE HIGH-DOSE 2019-20, PF, 180 mcg/0.5 mL Syrg ADM 0.5ML IM UTD    lancets (ACCU-CHEK SOFTCLIX LANCETS) Misc Use to test blood sugar BID    lancets 30 gauge Misc     lancing device Misc     nitroGLYCERIN (NITROSTAT) 0.4 MG SL tablet DISSOLVE 1 TABLET UNDER THE TONGUE EVERY 5 MINUTES AS NEEDED FOR CHEST PAINS (Patient not taking: Reported on 1/27/2020)    TRUETEST TEST STRIPS Strp USE ONCE DAILY     Family History     Problem Relation (Age of Onset)    Breast cancer Maternal Aunt        Tobacco Use    Smoking status: Never Smoker    Smokeless tobacco: Never Used   Substance and Sexual Activity    Alcohol use: No    Drug use: Never    Sexual activity: Not on file     Review of Systems   Gastrointestinal: Negative for melena.   Genitourinary: Negative for hematuria.     Objective:     Vital Signs (Most Recent):  Temp: 98.1 °F (36.7 °C) (02/03/20 0444)  Pulse: 80 (02/03/20 0500)  Resp: 18 (02/02/20 2340)  BP: (!) 92/54 (02/03/20 0500)  SpO2: 99 % (02/03/20 0444) Vital Signs (24h Range):  Temp:  [98.1 °F (36.7 °C)-102.9 °F (39.4 °C)] 98.1 °F (36.7 °C)  Pulse:  [] 80  Resp:  [18-21] 18  SpO2:  [93 %-99 %] 99 %  BP: ()/() 92/54     Weight: 52.2 kg (115 lb 1.3 oz)  Body mass index is 21.74 kg/m².    SpO2: 99 %  O2 Device (Oxygen Therapy): nasal cannula      Intake/Output Summary (Last 24 hours) at 2/3/2020 0733  Last data filed at 2/2/2020 1917  Gross per 24 hour   Intake 318.33 ml   Output 2500 ml   Net -2181.67 ml       Lines/Drains/Airways     Peripheral Intravenous Line                 Peripheral IV - Single Lumen 02/01/20 18 G Left Antecubital 2 days                Physical Exam   Constitutional: She is oriented to person,  place, and time. She appears well-developed and well-nourished. No distress.   HENT:   Head: Normocephalic and atraumatic.   Mouth/Throat: No oropharyngeal exudate.   Eyes: Pupils are equal, round, and reactive to light. Conjunctivae and EOM are normal. No scleral icterus.   Neck: Normal range of motion. Neck supple. No JVD present. No tracheal deviation present. No thyromegaly present.   Cardiovascular: Normal rate. An irregularly irregular rhythm present. Exam reveals no gallop and no friction rub.   Murmur heard.   Systolic murmur is present with a grade of 3/6.  Mech S1/S2, crisp   Pulmonary/Chest: Effort normal. No respiratory distress. She has no wheezes. She has no rales. She exhibits no tenderness.   Abdominal: Soft. She exhibits no distension.   Musculoskeletal: Normal range of motion. She exhibits no edema.   Neurological: She is alert and oriented to person, place, and time. No cranial nerve deficit.   Skin: Skin is warm and dry. She is not diaphoretic.   Psychiatric: She has a normal mood and affect. Her behavior is normal. Judgment normal.       Current Medications:   enoxaparin  1 mg/kg Subcutaneous Q12H    furosemide  40 mg Intravenous BID    metoprolol tartrate  12.5 mg Oral BID    oseltamivir  75 mg Oral BID    warfarin  2 mg Oral Daily       acetaminophen, dextromethorphan-guaifenesin  mg/5 ml, dextrose 50%, dextrose 50%, glucagon (human recombinant), glucose, glucose, hydrALAZINE, insulin aspart U-100, morphine, nitroGLYCERIN, promethazine (PHENERGAN) IVPB, sodium chloride 0.9%    Laboratory (all labs reviewed):  CBC:  Recent Labs   Lab 12/09/19  0226 12/10/19  0647 02/01/20  1443 02/02/20  0154 02/03/20  0414   WBC 7.44 6.37 5.07 5.43 6.49   Hemoglobin 12.3 11.9 L 11.0 L 10.3 L 11.2 L   Hematocrit 37.8 37.1 33.3 L 30.8 L 34.1 L   Platelets 96 L 95 L 71 L 71 L 88 L       CHEMISTRIES:  Recent Labs   Lab 12/09/19  0226 12/10/19  0647 02/01/20  1443 02/02/20  0154 02/02/20  1226  02/03/20  0414   Glucose 108 115 H 127 H 153 H  --  130 H   Sodium 133 L 136 124 L 124 L  --  128 L   Potassium 4.2 4.3 4.1 3.6  --  3.2 L   BUN, Bld 16 11 15 14  --  19   Creatinine 0.8 0.7 0.8 0.7  --  0.9   eGFR if  >60 >60 >60 >60  --  >60   eGFR if non African American >60 >60 >60 >60  --  >60   Calcium 9.6 9.1 8.8 8.3 L  --  8.5 L   Magnesium  --   --  1.5 L  --  2.1 1.9       CARDIAC BIOMARKERS:  Recent Labs   Lab 12/09/19  1655 02/01/20  1443 02/01/20  1940 02/02/20  0154 02/02/20  0753   Troponin I 0.010 0.099 H 0.255 H 0.190 H 0.162 H       COAGS:  Recent Labs   Lab 01/08/20 01/29/20 02/01/20  1443 02/02/20  0154 02/03/20  0414   INR 3.0 3.4 2.3 H 1.7 H 1.3 H       LIPIDS/LFTS:  Recent Labs   Lab 03/14/17  0735 08/15/17  0745 03/08/18  0736 03/22/19  0704 12/09/19  0226 12/09/19  0526 12/10/19  0647 02/01/20  1443   Cholesterol 139  139 127 140 143  --  131  --   --    Triglycerides 269 H  269 H 229 H 146 96  --  72  --   --    HDL 30 L  30 L 30 L 38 L 51  --  43  --   --    LDL Cholesterol 55.2 L  55.2 L 51.2 L 72.8 72.8  --  73.6  --   --    Non-HDL Cholesterol 109  109 97 102 92  --  88  --   --    AST 45 H 32 41 H 33 35  --  28 64 H   ALT 23 21 30 17 19  --  12 28       BNP:  Recent Labs   Lab 12/09/19  0226 02/01/20  1443    H 633 H       TSH:  Recent Labs   Lab 08/15/17  0745 03/08/18  0736 09/13/18  0738 03/22/19  0704 12/09/19  0525   TSH 6.046 H 7.037 H 3.997 5.081 H 3.665       Free T4:  Recent Labs   Lab 03/14/17  0735 08/15/17  0745 03/08/18  0736 03/22/19  0704   Free T4 1.03 0.97 0.99 1.08       Diagnostic Results:  ECG (personally reviewed and interpreted tracing(s)):  2/1/20 1438 , inflat ST abnl ?isch    Chest X-Ray (personally reviewed and interpreted image(s)): 2/1/20 CHFH vs bilat pneumonitis, R PPM 1 lead, sternotomy    Echo 2/2/20 (images personally reviewed and interpreted; Ao peak yong 4.3 m/sec, mean grad 47 mmHg)  · Normal left ventricular systolic  function. The estimated ejection fraction is 55-60%.  · Mild concentric left ventricular hypertrophy.  · Septal wall has abnormal motion with normal thickening consistent with post-operative status.  · Mild right ventricular enlargement.  · Low normal right ventricular systolic function.  · There is a bileaflet tilting disc mechanical aortic valve present. There is no aortic insufficiency present. Cannot exclude some degree of prsothetic AS (not severe).  · There is a bileaflet mechanical mitral valve prosthesis. Prosthetic mitral valve is normal.  · Mild tricuspid regurgitation.  · The estimated PA systolic pressure is 52 mmHg.  · Pulmonary hypertension present.    L MPI 12/10/19    The perfusion scan is free of evidence from myocardial ischemia or injury.    There is a moderate to severe intensity defect in the anterolateral wall of the left ventricle, secondary to breast attenuation.    Gated perfusion images showed an ejection fraction of 58 %.    The EKG portion of this study is uninterpretable.    The patient reported no chest pain during the stress test.    Arrhythmias during stress: rare PVCs.    Abnormal septal motion consistent with pacemaker.       Assessment and Plan:     * Influenza A  Seem to be presenting issue with high fever and cough.  Mgmt per IM, on Tamiflu and abx.    Elevated troponin I level  Trop peaked at 0.255.  Likely demand in setting of influenza and chr AF/RVR.  Recent MPI neg for ischemia.  Echo 2/2/20 without WMA.  Will plan med rx.    Acute diastolic congestive heart failure  Agree with lasix can likely titrate down in next 24 hrs.    Chronic atrial fibrillation  Cont coumadin, [facility]  Start enox until INR >2.5.    Mixed hyperlipidemia  Cont statin/fish oil    Long term current use of anticoagulant  Cont coumadin for goal INR 2.5-3.5  Cover with enox until INR >2.5    Type 2 diabetes mellitus with microalbuminuria, without long-term current use of  insulin  Per IM    Essential (primary) hypertension  Restart low dose metoprolol    S/P AVR  [facility] AVR, prosthetic stenosis noted on last echo.    S/P MVR (mitral valve replacement)  [facility] MVR, normally functioning on last echo.        VTE Risk Mitigation (From admission, onward)         Ordered     warfarin (COUMADIN) tablet 2 mg  Daily      02/01/20 2005     enoxaparin injection 50 mg  Every 12 hours (non-standard times)      02/02/20 0832     IP VTE HIGH RISK PATIENT  Once      02/01/20 2005     Reason for No Pharmacological VTE Prophylaxis  Once     Question:  Reasons:  Answer:  Already adequately anticoagulated on oral Anticoagulants    02/01/20 2005              Cardiology will sign off, pls call with questions.  Pt to follow up with me after discharge.    Ludwig Grigsby MD  Cardiology  [facility]   Patient requests all Lab, Cardiology, and Radiology Results on their Discharge Instructions
